# Patient Record
Sex: FEMALE | Race: WHITE | NOT HISPANIC OR LATINO | Employment: OTHER | URBAN - METROPOLITAN AREA
[De-identification: names, ages, dates, MRNs, and addresses within clinical notes are randomized per-mention and may not be internally consistent; named-entity substitution may affect disease eponyms.]

---

## 2017-04-28 ENCOUNTER — ALLSCRIPTS OFFICE VISIT (OUTPATIENT)
Dept: OTHER | Facility: OTHER | Age: 62
End: 2017-04-28

## 2018-01-11 NOTE — RESULT NOTES
Verified Results  (1) CBC/PLT/DIFF [id number] 10:29AM Ida Dub     Test Name Result Flag Reference   WBC 5 4 x10E3/uL  3 4-10 8   RBC 4 89 x10E6/uL  3 77-5 28   Hemoglobin 13 6 g/dL  11 1-15 9   Hematocrit 41 6 %  34 0-46  6   MCV 85 fL  79-97   MCH 27 8 pg  26 6-33 0   Baso (Absolute) 0 0 x10E3/uL  0 0-0 2   Immature Granulocytes 0 %     Immature Grans (Abs) 0 0 x10E3/uL  0 0-0 1   Eos 2 %     Basos 0 %     Neutrophils (Absolute) 2 9 x10E3/uL  1 4-7 0   Lymphs (Absolute) 2 1 x10E3/uL  0 7-3 1   Monocytes(Absolute) 0 4 x10E3/uL  0 1-0 9   Eos (Absolute) 0 1 x10E3/uL  0 0-0 4   MCHC 32 7 g/dL  31 5-35 7   RDW 14 8 %  12 3-15 4   Platelets 729 O45L5/MT  150-379   Neutrophils 53 %     Lymphs 38 %     Monocytes 7 %

## 2018-01-12 NOTE — RESULT NOTES
Verified Results  *US BREAST RIGHT LIMITED (DIAGNOSTIC) [id number] 03:04PM Ileene Side Order Number: [id number]    - Patient Instructions: To schedule this appointment, please contact Central Scheduling at [phone]   Order Number: [id number]    - Patient Instructions: To schedule this appointment, please contact Central Scheduling at [phone]   Order Number: [id number]    - Patient Instructions: To schedule this appointment, please contact Central Scheduling at [phone]  Test Name Result Flag Reference   US BREAST RIGHT LIMITED (Report)     Patient History:   Patient is postmenopausal    Family history of breast cancer in 2 maternal cousins  Benign excisional biopsy of the right breast, January 1, 2004  Patient's BMI is 39 3  Reason for exam: follow-up at short interval from prior study  Abnormal previous study  Mammo Diagnostic Bilateral W CAD: October 14, 2016 - Check In #:    [de-identified]   Bilateral MLO and CC view(s) were taken  ML view(s) were taken    of the right breast      Technologist: DANIELLA North   Prior study comparison: September 14, 2015, right breast    screening mammogram performed at [address] [address]  August 30, 2005, diagnostic mammogram performed at [facility]  There are scattered fibroglandular densities  Asymmetric density   previously described in the right breast is less conspicuous    compared to the prior study  The left breast is unremarkable  US Breast Right Limited: October 14, 2016 - Check In #: [de-identified]   Technologist: Ekaterina Rausch   Real-time images are obtained in the right breast at the 12    o'clock position, 5 cm from the nipple  A band of very dense    breast tissue is identified  No discrete solid or cystic mass is   seen  No architectural distortion is identified  No abnormal masses identified       ASSESSMENT: BiRad:2 - Benign (Overall)   Diag Mammogram: BiRad:2 - benign finding  Right breast Right Brst US: BiRad:2 - benign finding in both    breasts  Recommendation:   Routine screening mammogram of both breasts in 1 year  Analyzed by CAD     Transcription Location: [address] [place], [place] [address]way: [id number]     Risk Value(s):   Tyrer-Cuzick 10 Year: 2 838%, Tyrer-Cuzick Lifetime: 6 676%,    Myriad Table: 1 5%, MICK 5 Year: 1 7%, NCI Lifetime: 7 8%   Signed by:   Gali Martinez MD   10/18/16     MAMMO DIAGNOSTIC BILATERAL W CAD [id number] 03:04PM Natalie Turner Order Number: [id number]    - Patient Instructions: To schedule this appointment, please contact Central Scheduling at [phone]  Do not wear any perfume, powder, lotion or deodorant on breast or underarm area  Please bring your doctors order, referral (if needed) and insurance information with you on the day of the test  Failure to bring this information may result in this test being rescheduled  Arrive 15 minutes prior to your appointment time to register  On the day of your test, please bring any prior mammogram or breast studies with you that were not performed at a [facility]  Failure to bring prior exams may result in your test needing to be rescheduled   Order Number: [id number]    - Patient Instructions: To schedule this appointment, please contact Central Scheduling at [phone]  Do not wear any perfume, powder, lotion or deodorant on breast or underarm area  Please bring your doctors order, referral (if needed) and insurance information with you on the day of the test  Failure to bring this information may result in this test being rescheduled  Arrive 15 minutes prior to your appointment time to register  On the day of your test, please bring any prior mammogram or breast studies with you that were not performed at a [facility]  Failure to bring prior exams may result in your test needing to be rescheduled     TW Order Number: [id number]    - Patient Instructions: To schedule this appointment, please contact Central Scheduling at [phone]  Do not wear any perfume, powder, lotion or deodorant on breast or underarm area  Please bring your doctors order, referral (if needed) and insurance information with you on the day of the test  Failure to bring this information may result in this test being rescheduled  Arrive 15 minutes prior to your appointment time to register  On the day of your test, please bring any prior mammogram or breast studies with you that were not performed at a [facility]  Failure to bring prior exams may result in your test needing to be rescheduled  Test Name Result Flag Reference   MAMMO DIAGNOSTIC BILATERAL W CAD (Report)     Patient History:   Patient is postmenopausal    Family history of breast cancer in 2 maternal cousins  Benign excisional biopsy of the right breast, January 1, 2004  Patient's BMI is 39 3  Reason for exam: follow-up at short interval from prior study  Abnormal previous study  Mammo Diagnostic Bilateral W CAD: October 14, 2016 - Check In #:    [de-identified]   Bilateral MLO and CC view(s) were taken  ML view(s) were taken    of the right breast      Technologist: DANIELLA Moul[place]   Prior study comparison: September 14, 2015, right breast    screening mammogram performed at [facility]  August 30, 2005, diagnostic mammogram performed at [facility]  There are scattered fibroglandular densities  Asymmetric density   previously described in the right breast is less conspicuous    compared to the prior study  The left breast is unremarkable  US Breast Right Limited: October 14, 2016 - Check In #: [de-identified]   Technologist: Braeden Rogers   Real-time images are obtained in the right breast at the 12    o'clock position, 5 cm from the nipple  A band of very dense    breast tissue is identified   No discrete solid or cystic mass is   seen  No architectural distortion is identified  No abnormal masses identified  ASSESSMENT: BiRad:2 - Benign (Overall)   Diag Mammogram: BiRad:2 - benign finding  Right breast Right Brst US: BiRad:2 - benign finding in both    breasts  Recommendation:   Routine screening mammogram of both breasts in 1 year  Analyzed by CAD     Transcription Location: [facility]: [id number]     Risk Value(s):   Tyrer-Cuzick 10 Year: 2 838%, Tyrer-Cuzick Lifetime: 6 676%,    Myriad Table: 1 5%, MICK 5 Year: 1 7%, NCI Lifetime: 7 8%   Signed by:   Malgorzata Glaser MD   10/18/16       Discussion/Summary   Called patient at home number  Left message stating mammo and ultrasound normal  Recommend routine screening in one year  Left CFP number for callback if needed        Signatures   Electronically signed by : ESTRELLITA Schulz ; Nov 15 2016  5:22PM EST                       (Author)

## 2018-01-13 VITALS
HEART RATE: 76 BPM | RESPIRATION RATE: 16 BRPM | WEIGHT: 198 LBS | BODY MASS INDEX: 41.56 KG/M2 | SYSTOLIC BLOOD PRESSURE: 108 MMHG | TEMPERATURE: 98.5 F | HEIGHT: 58 IN | DIASTOLIC BLOOD PRESSURE: 70 MMHG

## 2018-01-14 NOTE — RESULT NOTES
Verified Results  (1) COMPREHENSIVE METABOLIC PANEL [id number] 48:17QU [facility]     Test Name Result Flag Reference   Glucose, Serum 83 mg/dL  65-99   BUN 19 mg/dL  8-27   Creatinine, Serum 0 91 mg/dL  0 57-1 00   eGFR If NonAfricn Am 68 mL/min/1 73  >59   eGFR If Africn Am 78 mL/min/1 73  >59   BUN/Creatinine Ratio 21  11-26   Sodium, Serum 141 mmol/L  136-144   **Effective December 12, 2016 the reference interval**                   for Sodium, Serum will be changing to:                                                             134 - 144   Potassium, Serum 4 5 mmol/L  3 5-5 2   Chloride, Serum 104 mmol/L     **Effective December 12, 2016 the reference interval**                   for Chloride, Serum will be changing to:                                                              96 - 106   Carbon Dioxide, Total 22 mmol/L  18-29   Calcium, Serum 9 2 mg/dL  8 7-10 3   Protein, Total, Serum 6 6 g/dL  6 0-8 5   Albumin, Serum 3 6 g/dL  3 6-4 8   Globulin, Total 3 0 g/dL  1 5-4 5   A/G Ratio 1 2  1 1-2 5   Bilirubin, Total 0 7 mg/dL  0 0-1 2   Alkaline Phosphatase, S 54 IU/L     AST (SGOT) 22 IU/L  0-40   ALT (SGPT) 24 IU/L  0-32

## 2018-01-17 NOTE — RESULT NOTES
Verified Results  (1) COMPREHENSIVE METABOLIC PANEL [id number] 38:54YW [facility]     Test Name Result Flag Reference   Glucose, Serum 87 mg/dL  65-99   BUN 18 mg/dL  8-27   Creatinine, Serum 0 90 mg/dL  0 57-1 00   eGFR If NonAfricn Am 69 mL/min/1 73  >59   eGFR If Africn Am 79 mL/min/1 73  >59   BUN/Creatinine Ratio 20  11-26   Sodium, Serum 139 mmol/L  136-144   Potassium, Serum 5 5 mmol/L H 3 5-5 2   Chloride, Serum 102 mmol/L     Carbon Dioxide, Total 22 mmol/L  18-29   Calcium, Serum 9 4 mg/dL  8 7-10 3   Protein, Total, Serum 6 6 g/dL  6 0-8 5   Albumin, Serum 3 8 g/dL  3 6-4 8   Globulin, Total 2 8 g/dL  1 5-4 5   A/G Ratio 1 4  1 1-2 5   Bilirubin, Total 0 9 mg/dL  0 0-1 2   Alkaline Phosphatase, S 51 IU/L     AST (SGOT) 16 IU/L  0-40   ALT (SGPT) 16 IU/L  0-32     (1) LIPID PANEL FASTING W DIRECT LDL REFLEX [id number] 10:29AM [facility]     Test Name Result Flag Reference   Cholesterol, Total 226 mg/dL H 100-199   Triglycerides 68 mg/dL  0-149   HDL Cholesterol 57 mg/dL  >39   LDL Cholesterol Calc 155 mg/dL H 0-99     (1) HEMOGLOBIN A1C [id number] 10:29AM [facility]     Test Name Result Flag Reference   Hemoglobin A1c 5 6 %  4 8-5 6   Pre-diabetes: 5 7 - 6 4           Diabetes: >6 4           Glycemic control for adults with diabetes: <7 0     (1) TSH [id number] 10:29AM [facility]     Test Name Result Flag Reference   TSH 2 240 uIU/mL  0 450-4 500

## 2018-01-17 NOTE — RESULT NOTES
Verified Results  ([address] [facility]) [address] [facility] Default [id number] 10:36AM Genella Proper     Test Name Result Flag Reference   Oksana Starr CMP14 Default Comment     A hand-written panel/profile was received from your office  In  accordance with the [facility] Ambiguous Test Code Policy dated July 3617, we have completed your order by using the closest currently  or formerly recognized AMA panel  We have assigned Comprehensive  Metabolic Panel (14), Test Code #[id number] to this request   If this  is not the testing you wished to receive on this specimen, please  contact the [facility] Client Inquiry/Technical Services Department  to clarify the test order  We appreciate your business

## 2019-04-01 ENCOUNTER — OFFICE VISIT (OUTPATIENT)
Dept: OBGYN CLINIC | Facility: CLINIC | Age: 64
End: 2019-04-01
Payer: MEDICARE

## 2019-04-01 VITALS
SYSTOLIC BLOOD PRESSURE: 122 MMHG | BODY MASS INDEX: 45.97 KG/M2 | DIASTOLIC BLOOD PRESSURE: 78 MMHG | WEIGHT: 219 LBS | HEIGHT: 58 IN

## 2019-04-01 DIAGNOSIS — Z11.3 SCREENING EXAMINATION FOR STD (SEXUALLY TRANSMITTED DISEASE): ICD-10-CM

## 2019-04-01 DIAGNOSIS — Z72.51 HIGH RISK HETEROSEXUAL BEHAVIOR: ICD-10-CM

## 2019-04-01 DIAGNOSIS — Z01.419 ENCNTR FOR GYN EXAM (GENERAL) (ROUTINE) W/O ABN FINDINGS: Primary | ICD-10-CM

## 2019-04-01 DIAGNOSIS — Z78.0 MENOPAUSE: ICD-10-CM

## 2019-04-01 DIAGNOSIS — Z13.820 SCREENING FOR OSTEOPOROSIS: ICD-10-CM

## 2019-04-01 DIAGNOSIS — Z12.39 BREAST CANCER SCREENING: ICD-10-CM

## 2019-04-01 PROBLEM — L84 CALLUS OF FOOT: Status: ACTIVE | Noted: 2017-04-28

## 2019-04-01 PROBLEM — M79.673 FOOT PAIN: Status: ACTIVE | Noted: 2017-04-28

## 2019-04-01 PROCEDURE — G0101 CA SCREEN;PELVIC/BREAST EXAM: HCPCS | Performed by: NURSE PRACTITIONER

## 2019-04-01 RX ORDER — ATORVASTATIN CALCIUM 40 MG/1
1 TABLET, FILM COATED ORAL DAILY
COMMUNITY
Start: 2016-11-28 | End: 2019-04-08 | Stop reason: ALTCHOICE

## 2019-04-01 RX ORDER — RANITIDINE 150 MG/1
1 TABLET ORAL 2 TIMES DAILY
COMMUNITY
Start: 2016-12-09 | End: 2019-04-08 | Stop reason: ALTCHOICE

## 2019-04-08 ENCOUNTER — OFFICE VISIT (OUTPATIENT)
Dept: FAMILY MEDICINE CLINIC | Facility: CLINIC | Age: 64
End: 2019-04-08
Payer: MEDICARE

## 2019-04-08 VITALS
HEIGHT: 58 IN | HEART RATE: 88 BPM | DIASTOLIC BLOOD PRESSURE: 80 MMHG | WEIGHT: 220 LBS | SYSTOLIC BLOOD PRESSURE: 126 MMHG | BODY MASS INDEX: 46.18 KG/M2 | TEMPERATURE: 98.5 F | RESPIRATION RATE: 16 BRPM

## 2019-04-08 DIAGNOSIS — R25.2 BILATERAL LEG CRAMPS: ICD-10-CM

## 2019-04-08 DIAGNOSIS — Z12.11 SCREENING FOR COLON CANCER: ICD-10-CM

## 2019-04-08 DIAGNOSIS — K21.9 GASTROESOPHAGEAL REFLUX DISEASE, ESOPHAGITIS PRESENCE NOT SPECIFIED: ICD-10-CM

## 2019-04-08 DIAGNOSIS — Z13.29 SCREENING FOR THYROID DISORDER: ICD-10-CM

## 2019-04-08 DIAGNOSIS — Z13.6 SCREENING FOR CARDIOVASCULAR CONDITION: ICD-10-CM

## 2019-04-08 DIAGNOSIS — Z76.89 ENCOUNTER TO ESTABLISH CARE: Primary | ICD-10-CM

## 2019-04-08 PROCEDURE — 99204 OFFICE O/P NEW MOD 45 MIN: CPT | Performed by: NURSE PRACTITIONER

## 2019-04-08 RX ORDER — RANITIDINE 150 MG/1
150 TABLET ORAL 2 TIMES DAILY
Qty: 60 TABLET | Refills: 2 | Status: SHIPPED | OUTPATIENT
Start: 2019-04-08 | End: 2019-12-02

## 2019-04-10 LAB
DEPRECATED C TRACH RRNA XXX QL PRB: NOT DETECTED
HPV HR 12 DNA CVX QL NAA+PROBE: NOT DETECTED
HPV16 DNA SPEC QL NAA+PROBE: NOT DETECTED
HPV18 DNA SPEC QL NAA+PROBE: NOT DETECTED
N GONORRHOEA DNA UR QL NAA+PROBE: NOT DETECTED
THIN PREP CVX: NORMAL

## 2019-04-16 ENCOUNTER — TELEPHONE (OUTPATIENT)
Dept: FAMILY MEDICINE CLINIC | Facility: CLINIC | Age: 64
End: 2019-04-16

## 2019-04-16 DIAGNOSIS — E78.2 MIXED HYPERLIPIDEMIA: Primary | ICD-10-CM

## 2019-04-16 LAB
ALBUMIN SERPL-MCNC: 4 G/DL (ref 3.6–4.8)
ALBUMIN/GLOB SERPL: 1.3 {RATIO} (ref 1.2–2.2)
ALP SERPL-CCNC: 51 IU/L (ref 39–117)
ALT SERPL-CCNC: 18 IU/L (ref 0–32)
AST SERPL-CCNC: 16 IU/L (ref 0–40)
BASOPHILS # BLD AUTO: 0 X10E3/UL (ref 0–0.2)
BASOPHILS NFR BLD AUTO: 0 %
BILIRUB SERPL-MCNC: 0.5 MG/DL (ref 0–1.2)
BUN SERPL-MCNC: 20 MG/DL (ref 8–27)
BUN/CREAT SERPL: 22 (ref 12–28)
CALCIUM SERPL-MCNC: 9.2 MG/DL (ref 8.7–10.3)
CHLORIDE SERPL-SCNC: 108 MMOL/L (ref 96–106)
CHOLEST SERPL-MCNC: 246 MG/DL (ref 100–199)
CO2 SERPL-SCNC: 21 MMOL/L (ref 20–29)
CREAT SERPL-MCNC: 0.9 MG/DL (ref 0.57–1)
EOSINOPHIL # BLD AUTO: 0.1 X10E3/UL (ref 0–0.4)
EOSINOPHIL NFR BLD AUTO: 1 %
ERYTHROCYTE [DISTWIDTH] IN BLOOD BY AUTOMATED COUNT: 15 % (ref 12.3–15.4)
GLOBULIN SER-MCNC: 3 G/DL (ref 1.5–4.5)
GLUCOSE SERPL-MCNC: 89 MG/DL (ref 65–99)
HCT VFR BLD AUTO: 41.8 % (ref 34–46.6)
HCV AB S/CO SERPL IA: <0.1 S/CO RATIO (ref 0–0.9)
HDLC SERPL-MCNC: 54 MG/DL
HGB BLD-MCNC: 13.7 G/DL (ref 11.1–15.9)
HIV 1+2 AB+HIV1 P24 AG SERPL QL IA: NON REACTIVE
IMM GRANULOCYTES # BLD: 0 X10E3/UL (ref 0–0.1)
IMM GRANULOCYTES NFR BLD: 0 %
LABCORP COMMENT: NORMAL
LDLC SERPL CALC-MCNC: 174 MG/DL (ref 0–99)
LYMPHOCYTES # BLD AUTO: 1.9 X10E3/UL (ref 0.7–3.1)
LYMPHOCYTES NFR BLD AUTO: 34 %
MAGNESIUM SERPL-MCNC: 2.1 MG/DL (ref 1.6–2.3)
MCH RBC QN AUTO: 27.8 PG (ref 26.6–33)
MCHC RBC AUTO-ENTMCNC: 32.8 G/DL (ref 31.5–35.7)
MCV RBC AUTO: 85 FL (ref 79–97)
MICRODELETION SYND BLD/T FISH: NORMAL
MONOCYTES # BLD AUTO: 0.3 X10E3/UL (ref 0.1–0.9)
MONOCYTES NFR BLD AUTO: 5 %
NEUTROPHILS # BLD AUTO: 3.4 X10E3/UL (ref 1.4–7)
NEUTROPHILS NFR BLD AUTO: 60 %
PHOSPHATE SERPL-MCNC: 2.7 MG/DL (ref 2.5–4.5)
PLATELET # BLD AUTO: 231 X10E3/UL (ref 150–379)
POTASSIUM SERPL-SCNC: 4.8 MMOL/L (ref 3.5–5.2)
PROT SERPL-MCNC: 7 G/DL (ref 6–8.5)
RBC # BLD AUTO: 4.92 X10E6/UL (ref 3.77–5.28)
RPR SER QL: NON REACTIVE
SL AMB EGFR AFRICAN AMERICAN: 78 ML/MIN/1.73
SL AMB EGFR NON AFRICAN AMERICAN: 67 ML/MIN/1.73
SODIUM SERPL-SCNC: 141 MMOL/L (ref 134–144)
TRIGL SERPL-MCNC: 91 MG/DL (ref 0–149)
TSH SERPL DL<=0.005 MIU/L-ACNC: 1.6 UIU/ML (ref 0.45–4.5)
WBC # BLD AUTO: 5.7 X10E3/UL (ref 3.4–10.8)

## 2019-04-16 RX ORDER — ROSUVASTATIN CALCIUM 20 MG/1
20 TABLET, COATED ORAL DAILY
Qty: 90 TABLET | Refills: 3 | Status: SHIPPED | OUTPATIENT
Start: 2019-04-16 | End: 2020-01-13 | Stop reason: SDUPTHER

## 2019-04-30 ENCOUNTER — TELEPHONE (OUTPATIENT)
Dept: BARIATRICS | Facility: CLINIC | Age: 64
End: 2019-04-30

## 2019-05-06 ENCOUNTER — HOSPITAL ENCOUNTER (OUTPATIENT)
Dept: RADIOLOGY | Facility: HOSPITAL | Age: 64
Discharge: HOME | End: 2019-05-06
Payer: MEDICARE

## 2019-05-06 VITALS — HEIGHT: 58 IN | BODY MASS INDEX: 45.98 KG/M2

## 2019-05-06 DIAGNOSIS — Z78.0 MENOPAUSE: ICD-10-CM

## 2019-05-06 DIAGNOSIS — Z12.39 BREAST CANCER SCREENING: ICD-10-CM

## 2019-05-06 DIAGNOSIS — Z13.820 SCREENING FOR OSTEOPOROSIS: ICD-10-CM

## 2019-05-06 PROCEDURE — 77080 DXA BONE DENSITY AXIAL: CPT

## 2019-05-06 PROCEDURE — 77067 SCR MAMMO BI INCL CAD: CPT

## 2019-05-15 ENCOUNTER — OFFICE VISIT (OUTPATIENT)
Dept: BARIATRICS | Facility: CLINIC | Age: 64
End: 2019-05-15
Payer: MEDICARE

## 2019-05-15 VITALS
DIASTOLIC BLOOD PRESSURE: 74 MMHG | TEMPERATURE: 98.5 F | BODY MASS INDEX: 44.71 KG/M2 | SYSTOLIC BLOOD PRESSURE: 126 MMHG | HEIGHT: 58 IN | WEIGHT: 213 LBS | RESPIRATION RATE: 16 BRPM | HEART RATE: 76 BPM

## 2019-05-15 DIAGNOSIS — E66.01 OBESITY, CLASS III, BMI 40-49.9 (MORBID OBESITY) (HCC): Primary | ICD-10-CM

## 2019-05-15 DIAGNOSIS — E78.49 OTHER HYPERLIPIDEMIA: ICD-10-CM

## 2019-05-15 DIAGNOSIS — K21.9 GASTROESOPHAGEAL REFLUX DISEASE, ESOPHAGITIS PRESENCE NOT SPECIFIED: ICD-10-CM

## 2019-05-15 PROCEDURE — 99204 OFFICE O/P NEW MOD 45 MIN: CPT | Performed by: PHYSICIAN ASSISTANT

## 2019-06-28 ENCOUNTER — OFFICE VISIT (OUTPATIENT)
Dept: BARIATRICS | Facility: CLINIC | Age: 64
End: 2019-06-28
Payer: MEDICARE

## 2019-06-28 VITALS
WEIGHT: 207 LBS | SYSTOLIC BLOOD PRESSURE: 100 MMHG | TEMPERATURE: 99.5 F | HEIGHT: 58 IN | HEART RATE: 90 BPM | DIASTOLIC BLOOD PRESSURE: 64 MMHG | RESPIRATION RATE: 14 BRPM | BODY MASS INDEX: 43.45 KG/M2

## 2019-06-28 DIAGNOSIS — E66.01 OBESITY, CLASS III, BMI 40-49.9 (MORBID OBESITY) (HCC): Primary | ICD-10-CM

## 2019-06-28 PROCEDURE — 99214 OFFICE O/P EST MOD 30 MIN: CPT | Performed by: PHYSICIAN ASSISTANT

## 2019-06-28 RX ORDER — RANITIDINE 150 MG/1
150 TABLET ORAL AS NEEDED
COMMUNITY
Start: 2019-06-05 | End: 2020-01-06 | Stop reason: ALTCHOICE

## 2019-08-09 ENCOUNTER — OFFICE VISIT (OUTPATIENT)
Dept: BARIATRICS | Facility: CLINIC | Age: 64
End: 2019-08-09
Payer: MEDICARE

## 2019-08-09 VITALS
BODY MASS INDEX: 42.53 KG/M2 | WEIGHT: 202.6 LBS | SYSTOLIC BLOOD PRESSURE: 110 MMHG | HEART RATE: 78 BPM | HEIGHT: 58 IN | TEMPERATURE: 98.3 F | DIASTOLIC BLOOD PRESSURE: 82 MMHG

## 2019-08-09 DIAGNOSIS — E66.01 OBESITY, CLASS III, BMI 40-49.9 (MORBID OBESITY) (HCC): Primary | ICD-10-CM

## 2019-08-09 PROCEDURE — 99214 OFFICE O/P EST MOD 30 MIN: CPT | Performed by: PHYSICIAN ASSISTANT

## 2019-08-09 NOTE — PATIENT INSTRUCTIONS
Goals: Food log (ie ) www myfitnesspal com,sparkpeople  com,loseit com,calorieking  com,etc    No sugary beverages  At least 64oz of water daily  Increase physical activity by 10 minutes daily   Gradually increase physical activity to a goal of 5 days per week for 30 minutes of MODERATE intensity PLUS 2 days per week of FULL BODY resistance training  5749-7031 calories per day  5-10 servings of fruits and vegetables per day  Plan for 100-150 calorie snack in between lunch and dinner  1/4 cup = serving of nuts/seeds  Keep up the great work!!

## 2019-08-09 NOTE — ASSESSMENT & PLAN NOTE
-Patient is pursuing Conservative Program  -Initial weight loss goal of 5-10% weight loss for improved health-met  -continue with dietary/lifestyle changes  -encouraged patient to food log at least 3 full days a week to help with better accuracy of estimation of calorie intake  -increased water intake recommended  -encouraged her to consider starting a formal exercise routine with increasing her steps daily/walking    Initial: 213  Current: 202 6  Change: -10 4 lbs (5 % TBW)

## 2019-08-09 NOTE — PROGRESS NOTES
Assessment/Plan:    Obesity, Class III, BMI 40-49 9 (morbid obesity) ([address] )  -Patient is pursuing Conservative Program  -Initial weight loss goal of 5-10% weight loss for improved health-met  -continue with dietary/lifestyle changes  -encouraged patient to food log at least 3 full days a week to help with better accuracy of estimation of calorie intake  -increased water intake recommended  -encouraged her to consider starting a formal exercise routine with increasing her steps daily/walking    Initial: 213  Current: 202 6  Change: -10 4 lbs (5 % TBW)    Goals:    Food log (ie ) www myfitnesspal com,sparkpeople  com,loseit com,calorieking  com,etc    No sugary beverages  At least 64oz of water daily  Increase physical activity by 10 minutes daily  Gradually increase physical activity to a goal of 5 days per week for 30 minutes of MODERATE intensity PLUS 2 days per week of FULL BODY resistance training  3171-5818 calories per day  5-10 servings of fruits and vegetables per day  Plan for 100-150 calorie snack in between lunch and dinner  1/4 cup = serving of nuts/seeds      Follow up in approximately 2 months with Non-Surgical Physician/Advanced Practitioner  25 minute visit, >50% face-to-face time spent counseling patient on diet behavior and exercise modification for weight loss  Diagnoses and all orders for this visit:    Obesity, Class III, BMI 40-49 9 (morbid obesity) ([facility])          Subjective:   Chief Complaint   Patient presents with    Follow-up     Patient here for six week MWM follow-up w/PA  Patient ID: Rufino Lara  is a 71 y o  female with excess weight/obesity here to pursue weight managment  Patient is pursuing Conservative Program      HPI Patient presents for MWM follow up  Continues to do well with dietary changes  Reports she has been walking more [place] [employer] and reaching over 10,000 steps  Historically reported not exercising in the form of walking due to joint pain   She has un upcoming cruise  She reports she plans not to watch her intake  Discussed goal of maintaining weight while on vacation  Still struggles with hydration, discussed ways to increase  Also reports going 7+ hours between lunch and dinner on days she works 2 shifts which may lead to some grazing while at work - encouraged planned snack in between these two meals on those days    The following portions of the patient's history were reviewed and updated as appropriate: allergies, current medications, past family history, past medical history, past social history, past surgical history and problem list     Review of Systems   Respiratory: Negative for cough and shortness of breath  Cardiovascular: Negative for chest pain and palpitations  Gastrointestinal: Negative for abdominal pain, nausea and vomiting  Musculoskeletal: Positive for arthralgias  Objective:    /82 (BP Location: Right arm, Patient Position: Sitting, Cuff Size: Large)   Pulse 78   Temp 98 3 °F (36 8 °C) (Tympanic)   Ht 4' 10" (1 473 m)   Wt 91 9 kg (202 lb 9 6 oz)   LMP 09/01/2011 (Approximate)   BMI 42 34 kg/m²      Physical Exam   Constitutional: She is oriented to person, place, and time  She appears well-developed  HENT:   Head: Normocephalic  Pulmonary/Chest: Effort normal    Neurological: She is alert and oriented to person, place, and time  Psychiatric: She has a normal mood and affect  Her behavior is normal  Thought content normal    Nursing note and vitals reviewed

## 2019-09-16 ENCOUNTER — OFFICE VISIT (OUTPATIENT)
Dept: BARIATRICS | Facility: CLINIC | Age: 64
End: 2019-09-16
Payer: MEDICARE

## 2019-09-16 VITALS
HEIGHT: 58 IN | RESPIRATION RATE: 16 BRPM | WEIGHT: 201.8 LBS | HEART RATE: 81 BPM | DIASTOLIC BLOOD PRESSURE: 68 MMHG | TEMPERATURE: 99.9 F | BODY MASS INDEX: 42.36 KG/M2 | SYSTOLIC BLOOD PRESSURE: 102 MMHG

## 2019-09-16 DIAGNOSIS — E66.01 OBESITY, CLASS III, BMI 40-49.9 (MORBID OBESITY) (HCC): Primary | ICD-10-CM

## 2019-09-16 DIAGNOSIS — K21.9 GASTROESOPHAGEAL REFLUX DISEASE, ESOPHAGITIS PRESENCE NOT SPECIFIED: ICD-10-CM

## 2019-09-16 PROCEDURE — 99213 OFFICE O/P EST LOW 20 MIN: CPT | Performed by: PHYSICIAN ASSISTANT

## 2019-09-16 NOTE — PATIENT INSTRUCTIONS
Goals: Food log (ie ) www myfitnesspal com,sparkpeople  com,loseit com,calorieking  com,etc    No sugary beverages  At least 64oz of water daily  --3 bottles of water   Increase physical activity by 10 minutes daily   Gradually increase physical activity to a goal of 5 days per week for 30 minutes of MODERATE intensity PLUS 2 days per week of FULL BODY resistance training- 30 minutes of walking 3 times per week   8945-9667 calories per day  5-10 servings of fruits and vegetables per day-3 servings per day   Add protein to salad   1 cup of nonstarchy at every dinner

## 2019-09-16 NOTE — ASSESSMENT & PLAN NOTE
-Patient is pursuing Conservative Program  -Initial weight loss goal of 5-10% weight loss for improved health-met    Initial: 213 lbs  Current: 201 8 lbs   Change: -11 2 lbs    Goals:  Food log (ie ) www myfitnesspal com,sparkpeople  com,loseit com,calorieking  com,etc    No sugary beverages  At least 64oz of water daily  --3 bottles of water   Increase physical activity by 10 minutes daily   Gradually increase physical activity to a goal of 5 days per week for 30 minutes of MODERATE intensity PLUS 2 days per week of FULL BODY resistance training- 30 minutes of walking 3 times per week   5481-0729 calories per day  5-10 servings of fruits and vegetables per day-3 servings per day   Add protein to salad   1 cup of nonstarchy at every dinner

## 2019-09-16 NOTE — ASSESSMENT & PLAN NOTE
Taking zantac  -should improve with weight loss, dietary, and lifestyle changes  -continue management with prescribing provider

## 2019-09-16 NOTE — PROGRESS NOTES
Assessment/Plan:    Obesity, Class III, BMI 40-49 9 (morbid obesity) ([facility] [address] [facility] )  -Patient is pursuing Conservative Program  -Initial weight loss goal of 5-10% weight loss for improved health-met    Initial: 213 lbs  Current: 201 8 lbs   Change: -11 2 lbs    Goals:  Food log (ie ) www myfitnesspal com,sparkpeople  com,loseit com,calorieking  com,etc    No sugary beverages  At least 64oz of water daily  --3 bottles of water   Increase physical activity by 10 minutes daily  Gradually increase physical activity to a goal of 5 days per week for 30 minutes of MODERATE intensity PLUS 2 days per week of FULL BODY resistance training- 30 minutes of walking 3 times per week   7658-9598 calories per day  5-10 servings of fruits and vegetables per day-3 servings per day   Add protein to salad   1 cup of nonstarchy at every dinner     GERD (gastroesophageal reflux disease)  Taking zantac  -should improve with weight loss, dietary, and lifestyle changes  -continue management with prescribing provider        Follow up in approximately 2 months with Non-Surgical Physician/Advanced Practitioner  Diagnoses and all orders for this visit:    Obesity, Class III, BMI 40-49 9 (morbid obesity) ([facility])    Gastroesophageal reflux disease, esophagitis presence not specified          Subjective:   Chief Complaint   Patient presents with    Follow-up     pt is here for 6 wk follow up  Patient ID: Arnulfo Mena  is a 71 y o  female with excess weight/obesity here to pursue weight managment  Patient is pursuing Conservative Program      HPI  Patient notes she just returned from a cruise which is why she didn't lose     Food logging: has not started back up since being on the cruise   Increased appetite/cravings: denies   Fruit/Vegetable servings: 1 serving   Exercise: no exercise   Hydration: 2 bottles of water     B: cereal with skim milk  L: salad or sandwich   D: protein with starch   S: popcorn or ice cream sandwich Colonoscopy-Not Completed--going next week    The following portions of the patient's history were reviewed and updated as appropriate: allergies, current medications, past family history, past medical history, past social history, past surgical history and problem list     Review of Systems   HENT: Negative for sore throat  Respiratory: Negative for cough and shortness of breath  Cardiovascular: Negative for chest pain and palpitations  Gastrointestinal: Positive for diarrhea (getting colonoscopy next week )  Negative for abdominal pain, constipation, nausea and vomiting         + GERD--diet controlled    Skin: Negative for rash  Psychiatric/Behavioral: Negative for suicidal ideas (denies HI)          Denies depression and anxiety       Objective:    /68 (BP Location: Left arm, Patient Position: Sitting, Cuff Size: Large)   Pulse 81   Temp 99 9 °F (37 7 °C) (Tympanic)   Resp 16   Ht 4' 10" (1 473 m)   Wt 91 5 kg (201 lb 12 8 oz)   LMP 09/01/2011 (Approximate)   BMI 42 18 kg/m²      Physical Exam

## 2019-09-24 ENCOUNTER — TELEPHONE (OUTPATIENT)
Dept: FAMILY MEDICINE CLINIC | Facility: CLINIC | Age: 64
End: 2019-09-24

## 2019-09-24 NOTE — TELEPHONE ENCOUNTER
Dr Chris Stein called and stated that recently did the colonoscopy on the patient and found rectal mass and biopsy still pending and he told patient and will be referring patient to colorectal surgeon   DEEPAK Soler

## 2019-09-30 PROBLEM — C19 RECTOSIGMOID CANCER (HCC): Status: ACTIVE | Noted: 2019-09-30

## 2019-10-04 ENCOUNTER — HOSPITAL ENCOUNTER (OUTPATIENT)
Dept: RADIOLOGY | Facility: HOSPITAL | Age: 64
Discharge: HOME | End: 2019-10-04
Attending: COLON & RECTAL SURGERY
Payer: MEDICARE

## 2019-10-04 DIAGNOSIS — C20 RECTAL CANCER (HCC): ICD-10-CM

## 2019-10-04 PROCEDURE — 71260 CT THORAX DX C+: CPT

## 2019-10-04 PROCEDURE — 74177 CT ABD & PELVIS W/CONTRAST: CPT

## 2019-10-04 RX ADMIN — IOHEXOL 100 ML: 350 INJECTION, SOLUTION INTRAVENOUS at 11:03

## 2019-10-14 ENCOUNTER — HOSPITAL ENCOUNTER (OUTPATIENT)
Dept: RADIOLOGY | Age: 64
Discharge: HOME | End: 2019-10-14
Payer: MEDICARE

## 2019-10-14 DIAGNOSIS — C20 RECTAL CANCER (HCC): ICD-10-CM

## 2019-10-14 PROCEDURE — 72195 MRI PELVIS W/O DYE: CPT

## 2019-10-21 PROBLEM — R06.00 DYSPNEA ON EXERTION: Status: ACTIVE | Noted: 2019-10-21

## 2019-10-21 PROBLEM — R06.09 DYSPNEA ON EXERTION: Status: ACTIVE | Noted: 2019-10-21

## 2019-10-21 PROCEDURE — 1124F ACP DISCUSS-NO DSCNMKR DOCD: CPT | Performed by: PATHOLOGY

## 2019-10-28 ENCOUNTER — OFFICE VISIT (OUTPATIENT)
Dept: BARIATRICS | Facility: CLINIC | Age: 64
End: 2019-10-28
Payer: MEDICARE

## 2019-10-28 VITALS
HEIGHT: 58 IN | DIASTOLIC BLOOD PRESSURE: 72 MMHG | SYSTOLIC BLOOD PRESSURE: 118 MMHG | TEMPERATURE: 100.1 F | HEART RATE: 76 BPM | BODY MASS INDEX: 43.07 KG/M2 | WEIGHT: 205.2 LBS | RESPIRATION RATE: 14 BRPM

## 2019-10-28 DIAGNOSIS — K21.9 GASTROESOPHAGEAL REFLUX DISEASE, ESOPHAGITIS PRESENCE NOT SPECIFIED: ICD-10-CM

## 2019-10-28 DIAGNOSIS — E66.01 OBESITY, CLASS III, BMI 40-49.9 (MORBID OBESITY) (HCC): Primary | ICD-10-CM

## 2019-10-28 PROCEDURE — 99213 OFFICE O/P EST LOW 20 MIN: CPT | Performed by: PHYSICIAN ASSISTANT

## 2019-10-28 NOTE — PROGRESS NOTES
Assessment/Plan:    Obesity, Class III, BMI 40-49 9 (morbid obesity) ([facility] [address] )  -Patient is pursuing Conservative Program  -Initial weight loss goal of 5-10% weight loss for improved health-met    Initial: 213 lbs  Current: 205 2 lbs   Change: -7 8 lbs    Patient will return when she has finished receiving treatment for her cancer  Discussed being mindful of her food choices while stress eating  Patient think about eating more fruits/vegetables  GERD (gastroesophageal reflux disease)  No longer taking medications         Follow up after she receives treatment for her cancer      Diagnoses and all orders for this visit:    Obesity, Class III, BMI 40-49 9 (morbid obesity) ([facility] )    Gastroesophageal reflux disease, esophagitis presence not specified          Subjective:   Chief Complaint   Patient presents with    Follow-up     pt is here for mwm follow up  Patient ID: Poly Cruz  is a 71 y o  female with excess weight/obesity here to pursue weight managment  Patient is pursuing Conservative Program      HPI  Patient was just diagnosed colorectal cancer  Having surgery dec 10, 2019  Patient notes she is stress eating and has not focused on weight loss at all  Patient notes she feel likes she has to get all the good "pumpkin food in now" as she will be on a liquid after surgery  Has good support system at home  Does not feel she needs to see a therapist  Patient has a good attitude about her cancer doctor  Colonoscopy-Completed 9/23/19    The following portions of the patient's history were reviewed and updated as appropriate: allergies, current medications, past family history, past medical history, past social history, past surgical history and problem list     Review of Systems   HENT: Negative for sore throat  Respiratory: Negative for cough and shortness of breath  Cardiovascular: Negative for chest pain and palpitations     Gastrointestinal: Negative for abdominal pain, constipation, diarrhea, nausea and vomiting  Denies GERD   Skin: Negative for rash  Psychiatric/Behavioral: Negative for suicidal ideas (denies HI)  Denies depression and anxiety       Objective:    /72 (BP Location: Left arm, Patient Position: Sitting, Cuff Size: Large)   Pulse 76   Temp 100 1 °F (37 8 °C) (Tympanic)   Resp 14   Ht 4' 10" (1 473 m)   Wt 93 1 kg (205 lb 3 2 oz)   LMP 09/01/2011 (Approximate)   BMI 42 89 kg/m²      Physical Exam   Nursing note and vitals reviewed  Constitutional   General appearance: Abnormal   well developed and morbidly obese  Eyes No conjunctival pallor  Ears, Nose, Mouth, and Throat Oral mucosa moist    Pulmonary   Respiratory effort: No increased work of breathing or signs of respiratory distress  Auscultation of lungs: Clear to auscultation, equal breath sounds bilaterally, no wheezes, no rales, no rhonci  Cardiovascular   Auscultation of heart: Normal rate and rhythm, normal S1 and S2, without murmurs  Examination of extremities for edema and/or varicosities: Normal   no edema  Abdomen   Abdomen: Abnormal   The abdomen was obese  Bowel sounds were normal  The abdomen was soft and nontender     Musculoskeletal   Gait and station: Normal     Psychiatric   Orientation to person, place and time: Normal     Affect: appropriate

## 2019-10-28 NOTE — ASSESSMENT & PLAN NOTE
-Patient is pursuing Conservative Program  -Initial weight loss goal of 5-10% weight loss for improved health-met    Initial: 213 lbs  Current: 205 2 lbs   Change: -7 8 lbs    Patient will return when she has finished receiving treatment for her cancer  Discussed being mindful of her food choices while stress eating  Patient think about eating more fruits/vegetables

## 2019-11-14 ENCOUNTER — DOCUMENTATION (OUTPATIENT)
Dept: HEMATOLOGY ONCOLOGY | Facility: CLINIC | Age: 64
End: 2019-11-14

## 2019-11-14 NOTE — PROGRESS NOTES
Rectal/GI Multidisciplinary Conference: 11/14/19    Diagnosis: Rectal Cancer    Rosalba Tom was presented at the Rectal/GI Multidisciplinary Conference today  Physician Recommended Plan:    - Patient will need surgery    Patient is scheduled for surgery with Dr Korina Sprague on 12/10/19  The team agreed to the plan

## 2019-11-25 ENCOUNTER — VBI (OUTPATIENT)
Dept: ADMINISTRATIVE | Facility: OTHER | Age: 64
End: 2019-11-25

## 2019-11-25 NOTE — TELEPHONE ENCOUNTER
Oseas Matos [facility]   Phone Number: [phone]             Patient had colonoscopy done by [facility] recently, please collect reports  DEEPAK Barajas      Colonoscopy was scanned in 10/1/2019 for DOS 9/23/19 but not resulted/ resulted colonoscopy   Estela Hicks MA 11/25/19 9:19 AM

## 2019-12-02 ENCOUNTER — ANESTHESIA EVENT (OUTPATIENT)
Dept: PERIOP | Facility: HOSPITAL | Age: 64
DRG: 330 | End: 2019-12-02
Payer: MEDICARE

## 2019-12-02 DIAGNOSIS — Z91.89 RISK FACTORS FOR OBSTRUCTIVE SLEEP APNEA: Primary | ICD-10-CM

## 2019-12-05 NOTE — PRE-PROCEDURE INSTRUCTIONS
Pre-Surgery Instructions:   Medication Instructions    [START ON 12/9/2019] metroNIDAZOLE (FLAGYL) 500 mg tablet Patient was instructed by Physician and understands   [START ON 12/9/2019] neomycin (MYCIFRADIN) 500 mg tablet Patient was instructed by Physician and understands   ranitidine (ZANTAC) 150 mg tablet Instructed patient per Anesthesia Guidelines   rosuvastatin (CRESTOR) 20 MG tablet Instructed patient per Anesthesia Guidelines      Reviewed by Christine Valdivia RN

## 2019-12-09 NOTE — ANESTHESIA PREPROCEDURE EVALUATION
Review of Systems/Medical History  Patient summary reviewed    History of anesthetic complications PONV    Cardiovascular  Hyperlipidemia,    Pulmonary  Not a smoker ,        GI/Hepatic    GERD ,        Negative  ROS        Endo/Other    Obesity    GYN  Negative gynecology ROS          Hematology  Negative hematology ROS      Musculoskeletal  Negative musculoskeletal ROS        Neurology  Negative neurology ROS      Psychology   Negative psychology ROS                   Anesthesia Plan  ASA Score- 3     Anesthesia Type- general with ASA Monitors  Additional Monitors:   Airway Plan:     Comment: Tap block prn  Plan Factors-    Induction- intravenous  Postoperative Plan- Plan for postoperative opioid use       Informed Consent-

## 2019-12-10 ENCOUNTER — ANESTHESIA (OUTPATIENT)
Dept: PERIOP | Facility: HOSPITAL | Age: 64
DRG: 330 | End: 2019-12-10
Payer: MEDICARE

## 2019-12-10 ENCOUNTER — HOSPITAL ENCOUNTER (INPATIENT)
Facility: HOSPITAL | Age: 64
LOS: 4 days | Discharge: HOME HEALTH | DRG: 330 | End: 2019-12-14
Attending: COLON & RECTAL SURGERY | Admitting: COLON & RECTAL SURGERY
Payer: MEDICARE

## 2019-12-10 DIAGNOSIS — C19 RECTOSIGMOID CANCER (HCC): ICD-10-CM

## 2019-12-10 LAB
ABO GROUP BLD: NORMAL
BLD GP AB SCN SERPL QL: NEGATIVE
GLUCOSE SERPL-MCNC: 113 MG/DL (ref 65–140)
PLATELET # BLD AUTO: 203 THOUSANDS/UL (ref 149–390)
PMV BLD AUTO: 10.6 FL (ref 8.9–12.7)
RH BLD: POSITIVE
SPECIMEN EXPIRATION DATE: NORMAL

## 2019-12-10 PROCEDURE — 44207 L COLECTOMY/COLOPROCTOSTOMY: CPT | Performed by: COLON & RECTAL SURGERY

## 2019-12-10 PROCEDURE — 44187 LAP ILEO/JEJUNO-STOMY: CPT | Performed by: PHYSICIAN ASSISTANT

## 2019-12-10 PROCEDURE — 0D1B0Z4 BYPASS ILEUM TO CUTANEOUS, OPEN APPROACH: ICD-10-PCS | Performed by: COLON & RECTAL SURGERY

## 2019-12-10 PROCEDURE — 88309 TISSUE EXAM BY PATHOLOGIST: CPT | Performed by: PATHOLOGY

## 2019-12-10 PROCEDURE — 45330 DIAGNOSTIC SIGMOIDOSCOPY: CPT | Performed by: COLON & RECTAL SURGERY

## 2019-12-10 PROCEDURE — 82948 REAGENT STRIP/BLOOD GLUCOSE: CPT

## 2019-12-10 PROCEDURE — 15860 IV NJX TST VASC FLO FLAP/GRF: CPT | Performed by: COLON & RECTAL SURGERY

## 2019-12-10 PROCEDURE — 0DBP0ZZ EXCISION OF RECTUM, OPEN APPROACH: ICD-10-PCS | Performed by: COLON & RECTAL SURGERY

## 2019-12-10 PROCEDURE — 88304 TISSUE EXAM BY PATHOLOGIST: CPT | Performed by: PATHOLOGY

## 2019-12-10 PROCEDURE — 85049 AUTOMATED PLATELET COUNT: CPT | Performed by: SURGERY

## 2019-12-10 PROCEDURE — 0DJD8ZZ INSPECTION OF LOWER INTESTINAL TRACT, VIA NATURAL OR ARTIFICIAL OPENING ENDOSCOPIC: ICD-10-PCS | Performed by: COLON & RECTAL SURGERY

## 2019-12-10 PROCEDURE — 88341 IMHCHEM/IMCYTCHM EA ADD ANTB: CPT | Performed by: PATHOLOGY

## 2019-12-10 PROCEDURE — 44213 LAP MOBIL SPLENIC FL ADD-ON: CPT | Performed by: COLON & RECTAL SURGERY

## 2019-12-10 PROCEDURE — 86901 BLOOD TYPING SEROLOGIC RH(D): CPT | Performed by: STUDENT IN AN ORGANIZED HEALTH CARE EDUCATION/TRAINING PROGRAM

## 2019-12-10 PROCEDURE — 86850 RBC ANTIBODY SCREEN: CPT | Performed by: STUDENT IN AN ORGANIZED HEALTH CARE EDUCATION/TRAINING PROGRAM

## 2019-12-10 PROCEDURE — 45330 DIAGNOSTIC SIGMOIDOSCOPY: CPT | Performed by: PHYSICIAN ASSISTANT

## 2019-12-10 PROCEDURE — 44187 LAP ILEO/JEJUNO-STOMY: CPT | Performed by: COLON & RECTAL SURGERY

## 2019-12-10 PROCEDURE — 44213 LAP MOBIL SPLENIC FL ADD-ON: CPT | Performed by: PHYSICIAN ASSISTANT

## 2019-12-10 PROCEDURE — 8E0W4CZ ROBOTIC ASSISTED PROCEDURE OF TRUNK REGION, PERCUTANEOUS ENDOSCOPIC APPROACH: ICD-10-PCS | Performed by: COLON & RECTAL SURGERY

## 2019-12-10 PROCEDURE — 88342 IMHCHEM/IMCYTCHM 1ST ANTB: CPT | Performed by: PATHOLOGY

## 2019-12-10 PROCEDURE — 15860 IV NJX TST VASC FLO FLAP/GRF: CPT | Performed by: PHYSICIAN ASSISTANT

## 2019-12-10 PROCEDURE — 44207 L COLECTOMY/COLOPROCTOSTOMY: CPT | Performed by: PHYSICIAN ASSISTANT

## 2019-12-10 PROCEDURE — 86900 BLOOD TYPING SEROLOGIC ABO: CPT | Performed by: STUDENT IN AN ORGANIZED HEALTH CARE EDUCATION/TRAINING PROGRAM

## 2019-12-10 PROCEDURE — 0DTN0ZZ RESECTION OF SIGMOID COLON, OPEN APPROACH: ICD-10-PCS | Performed by: COLON & RECTAL SURGERY

## 2019-12-10 RX ORDER — ONDANSETRON 2 MG/ML
4 INJECTION INTRAMUSCULAR; INTRAVENOUS EVERY 6 HOURS PRN
Status: DISCONTINUED | OUTPATIENT
Start: 2019-12-10 | End: 2019-12-14 | Stop reason: HOSPADM

## 2019-12-10 RX ORDER — FENTANYL CITRATE 50 UG/ML
INJECTION, SOLUTION INTRAMUSCULAR; INTRAVENOUS AS NEEDED
Status: DISCONTINUED | OUTPATIENT
Start: 2019-12-10 | End: 2019-12-10 | Stop reason: SURG

## 2019-12-10 RX ORDER — SODIUM CHLORIDE 9 MG/ML
INJECTION, SOLUTION INTRAVENOUS CONTINUOUS PRN
Status: DISCONTINUED | OUTPATIENT
Start: 2019-12-10 | End: 2019-12-10

## 2019-12-10 RX ORDER — CEFAZOLIN SODIUM 2 G/50ML
2000 SOLUTION INTRAVENOUS ONCE
Status: DISCONTINUED | OUTPATIENT
Start: 2019-12-10 | End: 2019-12-10 | Stop reason: HOSPADM

## 2019-12-10 RX ORDER — HEPARIN SODIUM 5000 [USP'U]/ML
INJECTION, SOLUTION INTRAVENOUS; SUBCUTANEOUS AS NEEDED
Status: DISCONTINUED | OUTPATIENT
Start: 2019-12-10 | End: 2019-12-10 | Stop reason: SURG

## 2019-12-10 RX ORDER — PROMETHAZINE HYDROCHLORIDE 25 MG/ML
12.5 INJECTION, SOLUTION INTRAMUSCULAR; INTRAVENOUS
Status: DISCONTINUED | OUTPATIENT
Start: 2019-12-10 | End: 2019-12-10 | Stop reason: HOSPADM

## 2019-12-10 RX ORDER — PROPOFOL 10 MG/ML
INJECTION, EMULSION INTRAVENOUS AS NEEDED
Status: DISCONTINUED | OUTPATIENT
Start: 2019-12-10 | End: 2019-12-10 | Stop reason: SURG

## 2019-12-10 RX ORDER — ALBUMIN, HUMAN INJ 5% 5 %
SOLUTION INTRAVENOUS CONTINUOUS PRN
Status: DISCONTINUED | OUTPATIENT
Start: 2019-12-10 | End: 2019-12-10 | Stop reason: SURG

## 2019-12-10 RX ORDER — ONDANSETRON 2 MG/ML
INJECTION INTRAMUSCULAR; INTRAVENOUS AS NEEDED
Status: DISCONTINUED | OUTPATIENT
Start: 2019-12-10 | End: 2019-12-10 | Stop reason: SURG

## 2019-12-10 RX ORDER — ONDANSETRON 2 MG/ML
4 INJECTION INTRAMUSCULAR; INTRAVENOUS ONCE AS NEEDED
Status: DISCONTINUED | OUTPATIENT
Start: 2019-12-10 | End: 2019-12-10 | Stop reason: HOSPADM

## 2019-12-10 RX ORDER — FENTANYL CITRATE/PF 50 MCG/ML
50 SYRINGE (ML) INJECTION
Status: DISCONTINUED | OUTPATIENT
Start: 2019-12-10 | End: 2019-12-10 | Stop reason: HOSPADM

## 2019-12-10 RX ORDER — SODIUM CHLORIDE, SODIUM LACTATE, POTASSIUM CHLORIDE, CALCIUM CHLORIDE 600; 310; 30; 20 MG/100ML; MG/100ML; MG/100ML; MG/100ML
75 INJECTION, SOLUTION INTRAVENOUS CONTINUOUS
Status: DISCONTINUED | OUTPATIENT
Start: 2019-12-10 | End: 2019-12-10

## 2019-12-10 RX ORDER — DIPHENHYDRAMINE HYDROCHLORIDE 50 MG/ML
25 INJECTION INTRAMUSCULAR; INTRAVENOUS EVERY 6 HOURS PRN
Status: DISCONTINUED | OUTPATIENT
Start: 2019-12-10 | End: 2019-12-14 | Stop reason: HOSPADM

## 2019-12-10 RX ORDER — ACETAMINOPHEN 325 MG/1
650 TABLET ORAL EVERY 6 HOURS SCHEDULED
Status: DISCONTINUED | OUTPATIENT
Start: 2019-12-10 | End: 2019-12-14 | Stop reason: HOSPADM

## 2019-12-10 RX ORDER — EPHEDRINE SULFATE 50 MG/ML
INJECTION INTRAVENOUS AS NEEDED
Status: DISCONTINUED | OUTPATIENT
Start: 2019-12-10 | End: 2019-12-10 | Stop reason: SURG

## 2019-12-10 RX ORDER — HYDROMORPHONE HCL/PF 1 MG/ML
0.4 SYRINGE (ML) INJECTION
Status: DISCONTINUED | OUTPATIENT
Start: 2019-12-10 | End: 2019-12-10 | Stop reason: HOSPADM

## 2019-12-10 RX ORDER — HEPARIN SODIUM 5000 [USP'U]/ML
5000 INJECTION, SOLUTION INTRAVENOUS; SUBCUTANEOUS EVERY 8 HOURS SCHEDULED
Status: DISCONTINUED | OUTPATIENT
Start: 2019-12-10 | End: 2019-12-14 | Stop reason: HOSPADM

## 2019-12-10 RX ORDER — MIDAZOLAM HYDROCHLORIDE 2 MG/2ML
INJECTION, SOLUTION INTRAMUSCULAR; INTRAVENOUS AS NEEDED
Status: DISCONTINUED | OUTPATIENT
Start: 2019-12-10 | End: 2019-12-10 | Stop reason: SURG

## 2019-12-10 RX ORDER — LIDOCAINE HYDROCHLORIDE 10 MG/ML
INJECTION, SOLUTION EPIDURAL; INFILTRATION; INTRACAUDAL; PERINEURAL AS NEEDED
Status: DISCONTINUED | OUTPATIENT
Start: 2019-12-10 | End: 2019-12-10 | Stop reason: SURG

## 2019-12-10 RX ORDER — ROCURONIUM BROMIDE 10 MG/ML
INJECTION, SOLUTION INTRAVENOUS AS NEEDED
Status: DISCONTINUED | OUTPATIENT
Start: 2019-12-10 | End: 2019-12-10 | Stop reason: SURG

## 2019-12-10 RX ORDER — GABAPENTIN 100 MG/1
100 CAPSULE ORAL
Status: DISCONTINUED | OUTPATIENT
Start: 2019-12-10 | End: 2019-12-14 | Stop reason: HOSPADM

## 2019-12-10 RX ORDER — NEOMYCIN SULFATE 500 MG/1
1000 TABLET ORAL 3 TIMES DAILY
Status: DISCONTINUED | OUTPATIENT
Start: 2019-12-10 | End: 2019-12-10

## 2019-12-10 RX ORDER — SODIUM CHLORIDE, SODIUM LACTATE, POTASSIUM CHLORIDE, CALCIUM CHLORIDE 600; 310; 30; 20 MG/100ML; MG/100ML; MG/100ML; MG/100ML
INJECTION, SOLUTION INTRAVENOUS CONTINUOUS PRN
Status: DISCONTINUED | OUTPATIENT
Start: 2019-12-10 | End: 2019-12-10

## 2019-12-10 RX ORDER — METRONIDAZOLE 500 MG/1
1000 TABLET ORAL 3 TIMES DAILY
Status: DISCONTINUED | OUTPATIENT
Start: 2019-12-10 | End: 2019-12-10

## 2019-12-10 RX ORDER — HEPARIN SODIUM 5000 [USP'U]/ML
5000 INJECTION, SOLUTION INTRAVENOUS; SUBCUTANEOUS EVERY 8 HOURS SCHEDULED
Status: DISCONTINUED | OUTPATIENT
Start: 2019-12-10 | End: 2019-12-10 | Stop reason: SDUPTHER

## 2019-12-10 RX ORDER — CEFAZOLIN SODIUM 2 G/50ML
SOLUTION INTRAVENOUS AS NEEDED
Status: DISCONTINUED | OUTPATIENT
Start: 2019-12-10 | End: 2019-12-10 | Stop reason: SURG

## 2019-12-10 RX ORDER — SODIUM CHLORIDE 9 MG/ML
75 INJECTION, SOLUTION INTRAVENOUS CONTINUOUS
Status: DISCONTINUED | OUTPATIENT
Start: 2019-12-10 | End: 2019-12-11

## 2019-12-10 RX ORDER — PRAVASTATIN SODIUM 20 MG
20 TABLET ORAL
Status: DISCONTINUED | OUTPATIENT
Start: 2019-12-10 | End: 2019-12-14 | Stop reason: HOSPADM

## 2019-12-10 RX ORDER — DEXAMETHASONE SODIUM PHOSPHATE 10 MG/ML
INJECTION, SOLUTION INTRAMUSCULAR; INTRAVENOUS AS NEEDED
Status: DISCONTINUED | OUTPATIENT
Start: 2019-12-10 | End: 2019-12-10 | Stop reason: SURG

## 2019-12-10 RX ORDER — HYDROMORPHONE HCL 110MG/55ML
PATIENT CONTROLLED ANALGESIA SYRINGE INTRAVENOUS AS NEEDED
Status: DISCONTINUED | OUTPATIENT
Start: 2019-12-10 | End: 2019-12-10 | Stop reason: SURG

## 2019-12-10 RX ORDER — INDOCYANINE GREEN AND WATER 25 MG
KIT INJECTION AS NEEDED
Status: DISCONTINUED | OUTPATIENT
Start: 2019-12-10 | End: 2019-12-10 | Stop reason: SURG

## 2019-12-10 RX ORDER — METHOCARBAMOL 500 MG/1
500 TABLET, FILM COATED ORAL EVERY 6 HOURS SCHEDULED
Status: DISCONTINUED | OUTPATIENT
Start: 2019-12-10 | End: 2019-12-14 | Stop reason: HOSPADM

## 2019-12-10 RX ADMIN — SUGAMMADEX 377 MG: 100 INJECTION, SOLUTION INTRAVENOUS at 13:12

## 2019-12-10 RX ADMIN — METHOCARBAMOL TABLETS 500 MG: 500 TABLET, COATED ORAL at 23:27

## 2019-12-10 RX ADMIN — HEPARIN SODIUM 5000 UNITS: 5000 INJECTION, SOLUTION INTRAVENOUS; SUBCUTANEOUS at 07:55

## 2019-12-10 RX ADMIN — SODIUM CHLORIDE: 0.9 INJECTION, SOLUTION INTRAVENOUS at 12:25

## 2019-12-10 RX ADMIN — CEFAZOLIN 500 MG: 1 INJECTION, POWDER, FOR SOLUTION INTRAMUSCULAR; INTRAVENOUS at 20:54

## 2019-12-10 RX ADMIN — DEXAMETHASONE SODIUM PHOSPHATE 10 MG: 10 INJECTION, SOLUTION INTRAMUSCULAR; INTRAVENOUS at 10:35

## 2019-12-10 RX ADMIN — SODIUM CHLORIDE, SODIUM LACTATE, POTASSIUM CHLORIDE, AND CALCIUM CHLORIDE: .6; .31; .03; .02 INJECTION, SOLUTION INTRAVENOUS at 11:52

## 2019-12-10 RX ADMIN — SODIUM CHLORIDE 125 ML/HR: 0.9 INJECTION, SOLUTION INTRAVENOUS at 14:21

## 2019-12-10 RX ADMIN — HYDROMORPHONE HYDROCHLORIDE 0.5 MG: 2 INJECTION, SOLUTION INTRAMUSCULAR; INTRAVENOUS; SUBCUTANEOUS at 08:30

## 2019-12-10 RX ADMIN — PHENYLEPHRINE HYDROCHLORIDE 100 MCG: 10 INJECTION INTRAVENOUS at 07:51

## 2019-12-10 RX ADMIN — ACETAMINOPHEN 650 MG: 325 TABLET ORAL at 23:27

## 2019-12-10 RX ADMIN — HYDROMORPHONE HYDROCHLORIDE 0.25 MG: 2 INJECTION, SOLUTION INTRAMUSCULAR; INTRAVENOUS; SUBCUTANEOUS at 11:51

## 2019-12-10 RX ADMIN — GABAPENTIN 100 MG: 100 CAPSULE ORAL at 21:04

## 2019-12-10 RX ADMIN — ROCURONIUM BROMIDE 40 MG: 50 INJECTION, SOLUTION INTRAVENOUS at 07:49

## 2019-12-10 RX ADMIN — ROCURONIUM BROMIDE 20 MG: 50 INJECTION, SOLUTION INTRAVENOUS at 08:41

## 2019-12-10 RX ADMIN — METRONIDAZOLE 500 MG: 500 INJECTION, SOLUTION INTRAVENOUS at 08:30

## 2019-12-10 RX ADMIN — ROCURONIUM BROMIDE 20 MG: 50 INJECTION, SOLUTION INTRAVENOUS at 09:24

## 2019-12-10 RX ADMIN — ALBUMIN (HUMAN): 12.5 SOLUTION INTRAVENOUS at 11:00

## 2019-12-10 RX ADMIN — DEXMEDETOMIDINE 0.2 MCG/KG/HR: 100 INJECTION, SOLUTION, CONCENTRATE INTRAVENOUS at 08:00

## 2019-12-10 RX ADMIN — SODIUM CHLORIDE 125 ML/HR: 0.9 INJECTION, SOLUTION INTRAVENOUS at 15:54

## 2019-12-10 RX ADMIN — PHENYLEPHRINE HYDROCHLORIDE 20 MCG/MIN: 10 INJECTION INTRAVENOUS at 07:57

## 2019-12-10 RX ADMIN — ROCURONIUM BROMIDE 10 MG: 50 INJECTION, SOLUTION INTRAVENOUS at 11:51

## 2019-12-10 RX ADMIN — SODIUM CHLORIDE, SODIUM LACTATE, POTASSIUM CHLORIDE, AND CALCIUM CHLORIDE: .6; .31; .03; .02 INJECTION, SOLUTION INTRAVENOUS at 07:15

## 2019-12-10 RX ADMIN — LIDOCAINE HYDROCHLORIDE 50 MG: 10 INJECTION, SOLUTION EPIDURAL; INFILTRATION; INTRACAUDAL; PERINEURAL at 07:48

## 2019-12-10 RX ADMIN — HYDROMORPHONE HYDROCHLORIDE 0.25 MG: 2 INJECTION, SOLUTION INTRAMUSCULAR; INTRAVENOUS; SUBCUTANEOUS at 11:03

## 2019-12-10 RX ADMIN — CEFAZOLIN SODIUM 2000 MG: 2 SOLUTION INTRAVENOUS at 08:25

## 2019-12-10 RX ADMIN — INDOCYANINE GREEN AND WATER 8 MG: KIT at 11:33

## 2019-12-10 RX ADMIN — ROCURONIUM BROMIDE 20 MG: 50 INJECTION, SOLUTION INTRAVENOUS at 10:02

## 2019-12-10 RX ADMIN — FENTANYL CITRATE 50 MCG: 50 INJECTION, SOLUTION INTRAMUSCULAR; INTRAVENOUS at 15:44

## 2019-12-10 RX ADMIN — ALBUMIN (HUMAN): 12.5 SOLUTION INTRAVENOUS at 10:46

## 2019-12-10 RX ADMIN — PHENYLEPHRINE HYDROCHLORIDE 100 MCG: 10 INJECTION INTRAVENOUS at 08:00

## 2019-12-10 RX ADMIN — ONDANSETRON 4 MG: 2 INJECTION INTRAMUSCULAR; INTRAVENOUS at 10:36

## 2019-12-10 RX ADMIN — SODIUM CHLORIDE 500 ML: 0.9 INJECTION, SOLUTION INTRAVENOUS at 15:12

## 2019-12-10 RX ADMIN — SODIUM CHLORIDE 125 ML/HR: 0.9 INJECTION, SOLUTION INTRAVENOUS at 22:50

## 2019-12-10 RX ADMIN — SODIUM CHLORIDE: 0.9 INJECTION, SOLUTION INTRAVENOUS at 07:51

## 2019-12-10 RX ADMIN — FENTANYL CITRATE 50 MCG: 50 INJECTION, SOLUTION INTRAMUSCULAR; INTRAVENOUS at 07:48

## 2019-12-10 RX ADMIN — PROPOFOL 150 MG: 10 INJECTION, EMULSION INTRAVENOUS at 07:48

## 2019-12-10 RX ADMIN — Medication: at 14:56

## 2019-12-10 RX ADMIN — HEPARIN SODIUM 5000 UNITS: 5000 INJECTION INTRAVENOUS; SUBCUTANEOUS at 21:04

## 2019-12-10 RX ADMIN — MIDAZOLAM 2 MG: 1 INJECTION INTRAMUSCULAR; INTRAVENOUS at 07:43

## 2019-12-10 RX ADMIN — CEFAZOLIN SODIUM 2000 MG: 2 SOLUTION INTRAVENOUS at 12:25

## 2019-12-10 RX ADMIN — EPHEDRINE SULFATE 10 MG: 50 INJECTION, SOLUTION INTRAVENOUS at 10:57

## 2019-12-10 NOTE — PROGRESS NOTES
Unable to obtain temperature; temp not reading via thermometer; dr Amari greenfield aware; will be up to see pt

## 2019-12-10 NOTE — PROGRESS NOTES
Dr Stevphen Lennox on floor to see pt; temp done via forehead reader from the or; temp 96 7; will continue to monitor

## 2019-12-10 NOTE — PLAN OF CARE
Problem: Prexisting or High Potential for Compromised Skin Integrity  Goal: Skin integrity is maintained or improved  Description  INTERVENTIONS:  - Identify patients at risk for skin breakdown  - Assess and monitor skin integrity  - Assess and monitor nutrition and hydration status  - Monitor labs   - Assess for incontinence   - Turn and reposition patient  - Assist with mobility/ambulation  - Relieve pressure over bony prominences  - Avoid friction and shearing  - Provide appropriate hygiene as needed including keeping skin clean and dry  - Evaluate need for skin moisturizer/barrier cream  - Collaborate with interdisciplinary team   - Patient/family teaching  - Consider wound care consult   Outcome: Progressing     Problem: Potential for Falls  Goal: Patient will remain free of falls  Description  INTERVENTIONS:  - Assess patient frequently for physical needs  -  Identify cognitive and physical deficits and behaviors that affect risk of falls    -  [place] fall precautions as indicated by assessment   - Educate patient/family on patient safety including physical limitations  - Instruct patient to call for assistance with activity based on assessment  - Modify environment to reduce risk of injury  - Consider OT/PT consult to assist with strengthening/mobility  Outcome: Progressing     Problem: GASTROINTESTINAL - ADULT  Goal: Minimal or absence of nausea and/or vomiting  Description  INTERVENTIONS:  - Administer IV fluids if ordered to ensure adequate hydration  - Maintain NPO status until nausea and vomiting are resolved  - Nasogastric tube if ordered  - Administer ordered antiemetic medications as needed  - Provide nonpharmacologic comfort measures as appropriate  - Advance diet as tolerated, if ordered  - Consider nutrition services referral to assist patient with adequate nutrition and appropriate food choices  Outcome: Progressing  Goal: Maintains or returns to baseline bowel function  Description  INTERVENTIONS:  - Assess bowel function  - Encourage oral fluids to ensure adequate hydration  - Administer IV fluids if ordered to ensure adequate hydration  - Administer ordered medications as needed  - Encourage mobilization and activity  - Consider nutritional services referral to assist patient with adequate nutrition and appropriate food choices  Outcome: Progressing  Goal: Maintains adequate nutritional intake  Description  INTERVENTIONS:  - Monitor percentage of each meal consumed  - Identify factors contributing to decreased intake, treat as appropriate  - Assist with meals as needed  - Monitor I&O, weight, and lab values if indicated  - Obtain nutrition services referral as needed  Outcome: Progressing     Problem: GENITOURINARY - ADULT  Goal: Maintains or returns to baseline urinary function  Description  INTERVENTIONS:  - Assess urinary function  - Encourage oral fluids to ensure adequate hydration if ordered  - Administer IV fluids as ordered to ensure adequate hydration  - Administer ordered medications as needed  - Offer frequent toileting  - Follow urinary retention protocol if ordered  Outcome: Progressing     Problem: SKIN/TISSUE INTEGRITY - ADULT  Goal: Skin integrity remains intact  Description  INTERVENTIONS  - Identify patients at risk for skin breakdown  - Assess and monitor skin integrity  - Assess and monitor nutrition and hydration status  - Monitor labs (i e  albumin)  - Assess for incontinence   - Turn and reposition patient  - Assist with mobility/ambulation  - Relieve pressure over bony prominences  - Avoid friction and shearing  - Provide appropriate hygiene as needed including keeping skin clean and dry  - Evaluate need for skin moisturizer/barrier cream  - Collaborate with interdisciplinary team (i e  Nutrition, Rehabilitation, etc )   - Patient/family teaching  Outcome: Progressing  Goal: Incision(s), wounds(s) or drain site(s) healing without S/S of infection  Description  INTERVENTIONS  - Assess and document risk factors for skin impairment   - Assess and document dressing, incision, wound bed, drain sites and surrounding tissue  - Consider nutrition services referral as needed  - Oral mucous membranes remain intact  - Provide patient/ family education  Outcome: Progressing     Problem: PAIN - ADULT  Goal: Verbalizes/displays adequate comfort level or baseline comfort level  Description  Interventions:  - Encourage patient to monitor pain and request assistance  - Assess pain using appropriate pain scale  - Administer analgesics based on type and severity of pain and evaluate response  - Implement non-pharmacological measures as appropriate and evaluate response  - Consider cultural and social influences on pain and pain management  - Notify physician/advanced practitioner if interventions unsuccessful or patient reports new pain  Outcome: Progressing     Problem: INFECTION - ADULT  Goal: Absence or prevention of progression during hospitalization  Description  INTERVENTIONS:  - Assess and monitor for signs and symptoms of infection  - Monitor lab/diagnostic results  - Monitor all insertion sites, i e  indwelling lines, tubes, and drains  - Monitor endotracheal if appropriate and nasal secretions for changes in amount and color  - [place] appropriate cooling/warming therapies per order  - Administer medications as ordered  - Instruct and encourage patient and family to use good hand hygiene technique  - Identify and instruct in appropriate isolation precautions for identified infection/condition  Outcome: Progressing     Problem: SAFETY ADULT  Goal: Maintain or return to baseline ADL function  Description  INTERVENTIONS:  -  Assess patient's ability to carry out ADLs; assess patient's baseline for ADL function and identify physical deficits which impact ability to perform ADLs (bathing, care of mouth/teeth, toileting, grooming, dressing, etc )  - Assess/evaluate cause of self-care deficits   - Assess range of motion  - Assess patient's mobility; develop plan if impaired  - Assess patient's need for assistive devices and provide as appropriate  - Encourage maximum independence but intervene and supervise when necessary  - Involve family in performance of ADLs  - Assess for home care needs following discharge   - Consider OT consult to assist with ADL evaluation and planning for discharge  - Provide patient education as appropriate  Outcome: Progressing  Goal: Maintain or return mobility status to optimal level  Description  INTERVENTIONS:  - Assess patient's baseline mobility status (ambulation, transfers, stairs, etc )    - Identify cognitive and physical deficits and behaviors that affect mobility  - Identify mobility aids required to assist with transfers and/or ambulation (gait belt, sit-to-stand, lift, walker, cane, etc )  - [place] fall precautions as indicated by assessment  - Record patient progress and toleration of activity level on Mobility SBAR; progress patient to next Phase/Stage  - Instruct patient to call for assistance with activity based on assessment  - Consider rehabilitation consult to assist with strengthening/weightbearing, etc   Outcome: Progressing     Problem: DISCHARGE PLANNING  Goal: Discharge to home or other facility with appropriate resources  Description  INTERVENTIONS:  - Identify barriers to discharge w/patient and caregiver  - Arrange for needed discharge resources and transportation as appropriate  - Identify discharge learning needs (meds, wound care, etc )  - Arrange for interpretive services to assist at discharge as needed  - Refer to Case Management Department for coordinating discharge planning if the patient needs post-hospital services based on physician/advanced practitioner order or complex needs related to functional status, cognitive ability, or social support system  Outcome: Progressing     Problem: Knowledge Deficit  Goal: Patient/family/caregiver demonstrates understanding of disease process, treatment plan, medications, and discharge instructions  Description  Complete learning assessment and assess knowledge base    Interventions:  - Provide teaching at level of understanding  - Provide teaching via preferred learning methods  Outcome: Progressing

## 2019-12-10 NOTE — PROGRESS NOTES
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 today    AVSS  On room air  Ostomy is pink and healthy  Bowel sweat present in bag  LLQ MARIA T drain with 30 cc SS output since placement  Plan:   OSMIN Burch@[facility] TV  PCA-D  Prn anti-emetics   DVT ppx      S: Patient is s/p Robotic sigmoidectomy from earlier today  Post-operatively, patient is feeling well  She is complaining of abdominal pain, controlled on prn analgesia  Denies nausea, vomiting, fevers, chills, chest pain, SOB or any other complaints at this time  O:  NAD, alert and oriented x3  Normocephalic, atraumatic  MMM, EOMI, PERRLA  Norm resp effort on RA  RRR  Abd soft, NT/ND, incisions c/d/i  Ostomy is pink and healthy  Bowel sweat present in bag  LLQ MARIA T drain with 30 cc SS output since placement    No calf tenderness or peripheral edema  Motor/sensation intact in distal extremities  CN grossly intact  -rash/lesions

## 2019-12-10 NOTE — H&P
History and Physical   Colon and Rectal Surgery   Lashae Selby 71 y o  female MRN: [id number]  Unit/Bed#: OR [place] Encounter: [id number]  12/10/19   7:17 AM      CC: Rectosigmoid cancer  History of Present Illness   HPI:  Lashae Selby is a 71 y o  female for resection      Historical Information   Past Medical History:   Diagnosis Date    Blood in stool     Breast disorder     GERD (gastroesophageal reflux disease)     Hyperlipidemia     PONV (postoperative nausea and vomiting)      Past Surgical History:   Procedure Laterality Date    BREAST LUMPECTOMY Right      SECTION      x3    COLONOSCOPY         Meds/Allergies     Medications Prior to Admission   Medication    [] metroNIDAZOLE (FLAGYL) 500 mg tablet    [] neomycin (MYCIFRADIN) 500 mg tablet    ranitidine (ZANTAC) 150 mg tablet    rosuvastatin (CRESTOR) 20 MG tablet         Current Facility-Administered Medications:     ceFAZolin (ANCEF) IVPB (premix) 2,000 mg, 2,000 mg, Intravenous, Once **AND** metroNIDAZOLE (FLAGYL) IVPB (premix) 500 mg, 500 mg, Intravenous, Once, W Promise Yoon MD    Allergies   Allergen Reactions    Medical Tape      Skin irritation  Skin peeling         Social History   Social History     Substance and Sexual Activity   Alcohol Use Yes    Frequency: 2-4 times a month     Social History     Substance and Sexual Activity   Drug Use Never     Social History     Tobacco Use   Smoking Status Never Smoker   Smokeless Tobacco Never Used         Family History:   Family History   Problem Relation Age of Onset    Hypertension Mother     Heart attack Mother     Heart attack Father     Heart disease Father     Hypertension Brother     Heart attack Brother     Leukemia Cousin     Breast cancer Cousin     Diabetes Cousin     Breast cancer Family         maternal side    Colon cancer Family         paternal uncle    Colon cancer Paternal Uncle     Stroke Neg Hx          Objective Current Vitals:   Blood Pressure: 119/69 (12/10/19 0601)  Pulse: 84 (12/10/19 0601)  Temperature: 97 7 °F (36 5 °C) (12/10/19 0601)  Temp Source: Tympanic (12/10/19 0601)  Respirations: 20 (12/10/19 0601)  Height: 4' 10" (147 3 cm) (12/10/19 0601)  Weight - Scale: 94 3 kg (208 lb) (12/10/19 0601)  SpO2: 100 % (12/10/19 0601)  No intake or output data in the 24 hours ending 12/10/19 0717    Physical Exam:  General:  Well nourished, no distress  Neuro: Alert and oriented  Eyes:Sclera anicteric, conjunctiva pink  Pulm: Clear to auscultation bilaterally  No respiratory Distress  CV:  Regular rate and rhythm  No murmurs  Abdomen:  Soft, flat, non-tender, without masses or hepatosplenomegaly  Lab Results:       ASSESSMENT:  Rafia May is a 71 y o  female for robotic proctosigmoidectomy  PLAN:  Risks of surgery, including but not limited to bleeding, pain, infection, hernia formation, injury to the ureter or other internal organs requiring surgery specific to these injuries, anastomotic leak, impotence, deep vein thrombosis, pulmonary embolism, myocardial infarction or heart failure, stroke, death, need for and enterostomy, or other unspecified complications were discussed  Benefits and alternatives were discussed  Questions were answered     Lydia Calhoun MD

## 2019-12-10 NOTE — ANESTHESIA POSTPROCEDURE EVALUATION
Post-Op Assessment Note    CV Status:  Stable  Pain Score: 0    Pain management: adequate     Mental Status:  Awake and lethargic   Hydration Status:  Euvolemic   PONV Controlled:  Controlled   Airway Patency:  Patent   Post Op Vitals Reviewed: Yes      Staff: CRNA           BP   103/52   Temp   96 5   Pulse  76   Resp   14   SpO2   98% on simple mask

## 2019-12-11 LAB
ANION GAP SERPL CALCULATED.3IONS-SCNC: 6 MMOL/L (ref 4–13)
BASOPHILS # BLD AUTO: 0.01 THOUSANDS/ΜL (ref 0–0.1)
BASOPHILS NFR BLD AUTO: 0 % (ref 0–1)
BUN SERPL-MCNC: 11 MG/DL (ref 5–25)
CALCIUM SERPL-MCNC: 7.5 MG/DL (ref 8.3–10.1)
CHLORIDE SERPL-SCNC: 118 MMOL/L (ref 100–108)
CO2 SERPL-SCNC: 19 MMOL/L (ref 21–32)
CREAT SERPL-MCNC: 0.84 MG/DL (ref 0.6–1.3)
EOSINOPHIL # BLD AUTO: 0 THOUSAND/ΜL (ref 0–0.61)
EOSINOPHIL NFR BLD AUTO: 0 % (ref 0–6)
ERYTHROCYTE [DISTWIDTH] IN BLOOD BY AUTOMATED COUNT: 14.5 % (ref 11.6–15.1)
GFR SERPL CREATININE-BSD FRML MDRD: 73 ML/MIN/1.73SQ M
GLUCOSE SERPL-MCNC: 106 MG/DL (ref 65–140)
HCT VFR BLD AUTO: 29.5 % (ref 34.8–46.1)
HCT VFR BLD AUTO: 29.5 % (ref 34.8–46.1)
HGB BLD-MCNC: 9 G/DL (ref 11.5–15.4)
HGB BLD-MCNC: 9.1 G/DL (ref 11.5–15.4)
IMM GRANULOCYTES # BLD AUTO: 0.04 THOUSAND/UL (ref 0–0.2)
IMM GRANULOCYTES NFR BLD AUTO: 1 % (ref 0–2)
LYMPHOCYTES # BLD AUTO: 1.07 THOUSANDS/ΜL (ref 0.6–4.47)
LYMPHOCYTES NFR BLD AUTO: 13 % (ref 14–44)
MCH RBC QN AUTO: 27.5 PG (ref 26.8–34.3)
MCHC RBC AUTO-ENTMCNC: 30.8 G/DL (ref 31.4–37.4)
MCV RBC AUTO: 89 FL (ref 82–98)
MONOCYTES # BLD AUTO: 0.46 THOUSAND/ΜL (ref 0.17–1.22)
MONOCYTES NFR BLD AUTO: 6 % (ref 4–12)
NEUTROPHILS # BLD AUTO: 6.75 THOUSANDS/ΜL (ref 1.85–7.62)
NEUTS SEG NFR BLD AUTO: 80 % (ref 43–75)
NRBC BLD AUTO-RTO: 0 /100 WBCS
PLATELET # BLD AUTO: 181 THOUSANDS/UL (ref 149–390)
PMV BLD AUTO: 10.5 FL (ref 8.9–12.7)
POTASSIUM SERPL-SCNC: 4.1 MMOL/L (ref 3.5–5.3)
RBC # BLD AUTO: 3.31 MILLION/UL (ref 3.81–5.12)
SODIUM SERPL-SCNC: 143 MMOL/L (ref 136–145)
WBC # BLD AUTO: 8.33 THOUSAND/UL (ref 4.31–10.16)

## 2019-12-11 PROCEDURE — 80048 BASIC METABOLIC PNL TOTAL CA: CPT | Performed by: STUDENT IN AN ORGANIZED HEALTH CARE EDUCATION/TRAINING PROGRAM

## 2019-12-11 PROCEDURE — 85014 HEMATOCRIT: CPT | Performed by: STUDENT IN AN ORGANIZED HEALTH CARE EDUCATION/TRAINING PROGRAM

## 2019-12-11 PROCEDURE — 85018 HEMOGLOBIN: CPT | Performed by: STUDENT IN AN ORGANIZED HEALTH CARE EDUCATION/TRAINING PROGRAM

## 2019-12-11 PROCEDURE — 85025 COMPLETE CBC W/AUTO DIFF WBC: CPT | Performed by: STUDENT IN AN ORGANIZED HEALTH CARE EDUCATION/TRAINING PROGRAM

## 2019-12-11 RX ORDER — HYDROMORPHONE HCL/PF 1 MG/ML
0.5 SYRINGE (ML) INJECTION
Status: DISCONTINUED | OUTPATIENT
Start: 2019-12-11 | End: 2019-12-14 | Stop reason: HOSPADM

## 2019-12-11 RX ORDER — DEXTROSE, SODIUM CHLORIDE, AND POTASSIUM CHLORIDE 5; .45; .15 G/100ML; G/100ML; G/100ML
75 INJECTION INTRAVENOUS CONTINUOUS
Status: DISCONTINUED | OUTPATIENT
Start: 2019-12-11 | End: 2019-12-12

## 2019-12-11 RX ADMIN — GABAPENTIN 100 MG: 100 CAPSULE ORAL at 22:27

## 2019-12-11 RX ADMIN — ACETAMINOPHEN 650 MG: 325 TABLET ORAL at 19:27

## 2019-12-11 RX ADMIN — HEPARIN SODIUM 5000 UNITS: 5000 INJECTION INTRAVENOUS; SUBCUTANEOUS at 12:46

## 2019-12-11 RX ADMIN — METHOCARBAMOL TABLETS 500 MG: 500 TABLET, COATED ORAL at 05:35

## 2019-12-11 RX ADMIN — PRAVASTATIN SODIUM 20 MG: 20 TABLET ORAL at 17:47

## 2019-12-11 RX ADMIN — CEFAZOLIN 500 MG: 1 INJECTION, POWDER, FOR SOLUTION INTRAMUSCULAR; INTRAVENOUS at 04:24

## 2019-12-11 RX ADMIN — HEPARIN SODIUM 5000 UNITS: 5000 INJECTION INTRAVENOUS; SUBCUTANEOUS at 05:35

## 2019-12-11 RX ADMIN — METHOCARBAMOL TABLETS 500 MG: 500 TABLET, COATED ORAL at 19:28

## 2019-12-11 RX ADMIN — METHOCARBAMOL TABLETS 500 MG: 500 TABLET, COATED ORAL at 12:46

## 2019-12-11 RX ADMIN — DEXTROSE, SODIUM CHLORIDE, AND POTASSIUM CHLORIDE 84 ML/HR: 5; .45; .15 INJECTION INTRAVENOUS at 08:56

## 2019-12-11 RX ADMIN — DEXTROSE, SODIUM CHLORIDE, AND POTASSIUM CHLORIDE 84 ML/HR: 5; .45; .15 INJECTION INTRAVENOUS at 19:33

## 2019-12-11 RX ADMIN — SODIUM CHLORIDE 75 ML/HR: 0.9 INJECTION, SOLUTION INTRAVENOUS at 06:48

## 2019-12-11 RX ADMIN — ACETAMINOPHEN 650 MG: 325 TABLET ORAL at 05:35

## 2019-12-11 RX ADMIN — ACETAMINOPHEN 650 MG: 325 TABLET ORAL at 12:46

## 2019-12-11 RX ADMIN — HYDROMORPHONE HYDROCHLORIDE 0.5 MG: 1 INJECTION, SOLUTION INTRAMUSCULAR; INTRAVENOUS; SUBCUTANEOUS at 22:44

## 2019-12-11 RX ADMIN — HEPARIN SODIUM 5000 UNITS: 5000 INJECTION INTRAVENOUS; SUBCUTANEOUS at 22:27

## 2019-12-11 NOTE — PROGRESS NOTES
She has morbid obesity with poor exercise tolerance  Dietary management was discussed  Tumor care is more paramount

## 2019-12-11 NOTE — PLAN OF CARE
Problem: Prexisting or High Potential for Compromised Skin Integrity  Goal: Skin integrity is maintained or improved  Description  INTERVENTIONS:  - Identify patients at risk for skin breakdown  - Assess and monitor skin integrity  - Assess and monitor nutrition and hydration status  - Monitor labs   - Assess for incontinence   - Turn and reposition patient  - Assist with mobility/ambulation  - Relieve pressure over bony prominences  - Avoid friction and shearing  - Provide appropriate hygiene as needed including keeping skin clean and dry  - Evaluate need for skin moisturizer/barrier cream  - Collaborate with interdisciplinary team   - Patient/family teaching  - Consider wound care consult   12/11/2019 0750 by Félix Duarte RN  Outcome: Progressing  12/10/2019 1840 by Félix Duarte RN  Outcome: Progressing     Problem: Potential for Falls  Goal: Patient will remain free of falls  Description  INTERVENTIONS:  - Assess patient frequently for physical needs  -  Identify cognitive and physical deficits and behaviors that affect risk of falls    -  [place] fall precautions as indicated by assessment   - Educate patient/family on patient safety including physical limitations  - Instruct patient to call for assistance with activity based on assessment  - Modify environment to reduce risk of injury  - Consider OT/PT consult to assist with strengthening/mobility  12/11/2019 0750 by Félix Duarte RN  Outcome: Progressing  12/10/2019 1840 by Félix Duarte RN  Outcome: Progressing     Problem: GASTROINTESTINAL - ADULT  Goal: Minimal or absence of nausea and/or vomiting  Description  INTERVENTIONS:  - Administer IV fluids if ordered to ensure adequate hydration  - Maintain NPO status until nausea and vomiting are resolved  - Nasogastric tube if ordered  - Administer ordered antiemetic medications as needed  - Provide nonpharmacologic comfort measures as appropriate  - Advance diet as tolerated, if ordered  - Consider nutrition services referral to assist patient with adequate nutrition and appropriate food choices  12/11/2019 0750 by Carlos Dunn RN  Outcome: Progressing  12/10/2019 1840 by Carlos Dunn RN  Outcome: Progressing  Goal: Maintains or returns to baseline bowel function  Description  INTERVENTIONS:  - Assess bowel function  - Encourage oral fluids to ensure adequate hydration  - Administer IV fluids if ordered to ensure adequate hydration  - Administer ordered medications as needed  - Encourage mobilization and activity  - Consider nutritional services referral to assist patient with adequate nutrition and appropriate food choices  12/11/2019 0750 by Carlos Dunn RN  Outcome: Progressing  12/10/2019 1840 by Carlos Dunn RN  Outcome: Progressing  Goal: Maintains adequate nutritional intake  Description  INTERVENTIONS:  - Monitor percentage of each meal consumed  - Identify factors contributing to decreased intake, treat as appropriate  - Assist with meals as needed  - Monitor I&O, weight, and lab values if indicated  - Obtain nutrition services referral as needed  12/11/2019 0750 by Carlos Dunn RN  Outcome: Progressing  12/10/2019 1840 by Carlos Dunn RN  Outcome: Progressing     Problem: GENITOURINARY - ADULT  Goal: Maintains or returns to baseline urinary function  Description  INTERVENTIONS:  - Assess urinary function  - Encourage oral fluids to ensure adequate hydration if ordered  - Administer IV fluids as ordered to ensure adequate hydration  - Administer ordered medications as needed  - Offer frequent toileting  - Follow urinary retention protocol if ordered  12/11/2019 0750 by Carlos Dunn RN  Outcome: Progressing  12/10/2019 1840 by Carlos Dunn RN  Outcome: Progressing     Problem: SKIN/TISSUE INTEGRITY - ADULT  Goal: Skin integrity remains intact  Description  INTERVENTIONS  - Identify patients at risk for skin breakdown  - Assess and monitor skin integrity  - Assess and monitor nutrition and hydration status  - Monitor labs (i e  albumin)  - Assess for incontinence   - Turn and reposition patient  - Assist with mobility/ambulation  - Relieve pressure over bony prominences  - Avoid friction and shearing  - Provide appropriate hygiene as needed including keeping skin clean and dry  - Evaluate need for skin moisturizer/barrier cream  - Collaborate with interdisciplinary team (i e  Nutrition, Rehabilitation, etc )   - Patient/family teaching  12/11/2019 0750 by Khari Gross RN  Outcome: Progressing  12/10/2019 1840 by Khari Gross RN  Outcome: Progressing  Goal: Incision(s), wounds(s) or drain site(s) healing without S/S of infection  Description  INTERVENTIONS  - Assess and document risk factors for skin impairment   - Assess and document dressing, incision, wound bed, drain sites and surrounding tissue  - Consider nutrition services referral as needed  - Oral mucous membranes remain intact  - Provide patient/ family education  12/11/2019 0750 by Khari Gross RN  Outcome: Progressing  12/10/2019 1840 by Khari Gross RN  Outcome: Progressing     Problem: PAIN - ADULT  Goal: Verbalizes/displays adequate comfort level or baseline comfort level  Description  Interventions:  - Encourage patient to monitor pain and request assistance  - Assess pain using appropriate pain scale  - Administer analgesics based on type and severity of pain and evaluate response  - Implement non-pharmacological measures as appropriate and evaluate response  - Consider cultural and social influences on pain and pain management  - Notify physician/advanced practitioner if interventions unsuccessful or patient reports new pain  12/11/2019 0750 by Khari Gross RN  Outcome: Progressing  12/10/2019 1840 by Khari Gross RN  Outcome: Progressing     Problem: INFECTION - ADULT  Goal: Absence or prevention of progression during hospitalization  Description  INTERVENTIONS:  - Assess and monitor for signs and symptoms of infection  - Monitor lab/diagnostic results  - Monitor all insertion sites, i e  indwelling lines, tubes, and drains  - Monitor endotracheal if appropriate and nasal secretions for changes in amount and color  - [place] appropriate cooling/warming therapies per order  - Administer medications as ordered  - Instruct and encourage patient and family to use good hand hygiene technique  - Identify and instruct in appropriate isolation precautions for identified infection/condition  12/11/2019 0750 by Ronald Cross RN  Outcome: Progressing  12/10/2019 1840 by Ronald Cross RN  Outcome: Progressing     Problem: SAFETY ADULT  Goal: Maintain or return to baseline ADL function  Description  INTERVENTIONS:  -  Assess patient's ability to carry out ADLs; assess patient's baseline for ADL function and identify physical deficits which impact ability to perform ADLs (bathing, care of mouth/teeth, toileting, grooming, dressing, etc )  - Assess/evaluate cause of self-care deficits   - Assess range of motion  - Assess patient's mobility; develop plan if impaired  - Assess patient's need for assistive devices and provide as appropriate  - Encourage maximum independence but intervene and supervise when necessary  - Involve family in performance of ADLs  - Assess for home care needs following discharge   - Consider OT consult to assist with ADL evaluation and planning for discharge  - Provide patient education as appropriate  12/11/2019 0750 by Ronald Cross RN  Outcome: Progressing  12/10/2019 1840 by Ronald Cross RN  Outcome: Progressing  Goal: Maintain or return mobility status to optimal level  Description  INTERVENTIONS:  - Assess patient's baseline mobility status (ambulation, transfers, stairs, etc )    - Identify cognitive and physical deficits and behaviors that affect mobility  - Identify mobility aids required to assist with transfers and/or ambulation (gait belt, sit-to-stand, lift, walker, cane, etc )  - [place] fall precautions as indicated by assessment  - Record patient progress and toleration of activity level on Mobility SBAR; progress patient to next Phase/Stage  - Instruct patient to call for assistance with activity based on assessment  - Consider rehabilitation consult to assist with strengthening/weightbearing, etc   12/11/2019 0750 by Alis Wilkerson RN  Outcome: Progressing  12/10/2019 1840 by Alis Wilkerson RN  Outcome: Progressing     Problem: DISCHARGE PLANNING  Goal: Discharge to home or other facility with appropriate resources  Description  INTERVENTIONS:  - Identify barriers to discharge w/patient and caregiver  - Arrange for needed discharge resources and transportation as appropriate  - Identify discharge learning needs (meds, wound care, etc )  - Arrange for interpretive services to assist at discharge as needed  - Refer to Case Management Department for coordinating discharge planning if the patient needs post-hospital services based on physician/advanced practitioner order or complex needs related to functional status, cognitive ability, or social support system  12/11/2019 0750 by Alis Wilkerson RN  Outcome: Progressing  12/10/2019 1840 by Alis Wilkerson RN  Outcome: Progressing     Problem: Knowledge Deficit  Goal: Patient/family/caregiver demonstrates understanding of disease process, treatment plan, medications, and discharge instructions  Description  Complete learning assessment and assess knowledge base    Interventions:  - Provide teaching at level of understanding  - Provide teaching via preferred learning methods  12/11/2019 0750 by Alis Wilkerson RN  Outcome: Progressing  12/10/2019 1840 by Alis Wilkerson RN  Outcome: Progressing

## 2019-12-11 NOTE — PROGRESS NOTES
Progress Note - Colorectal Surgery   Gloria Blackburn 71 y o  female MRN: [id number]  Unit/Bed#: [facility] 811-01 Encounter: [id number]    Assessment:  71yo F with h/o rectosigmoid cancer, POD#1 s/p Robotic sigmoidectomy    Plan:  · Clear liquid diet as tolerated  · Wean IVF  · Mg d/c'd this AM  · Pain control: PCA-d  · OOB, ambulate  · Maintain MARIA T drain, monitor output  · DVT PPx    Subjective/Objective     Subjective:   No acute events overnight  Pain is well-controlled with PCA, which she used twice overnight  Has not ambulated yet  No nausea/vomiting  Objective:     Blood pressure 111/57, pulse 94, temperature 98 °F (36 7 °C), resp  rate 17, height 4' 10" (1 473 m), weight 94 3 kg (208 lb), last menstrual period 09/01/2011, SpO2 98 %  ,Body mass index is 43 47 kg/m²        Intake/Output Summary (Last 24 hours) at 12/11/2019 1061  Last data filed at 12/11/2019 8561  Gross per 24 hour   Intake 5378 2 ml   Output 1918 ml   Net 3460 2 ml       Invasive Devices     Peripheral Intravenous Line            Peripheral IV 12/10/19 Left Hand less than 1 day    Peripheral IV 12/10/19 Right Hand less than 1 day          Drain            Closed/Suction Drain Left LLQ Bulb 10 Fr  less than 1 day    Ileostomy Loop RLQ less than 1 day                Physical Exam:   Gen: NAD  CV: RRR  Lungs: nl effort  Abd: soft, obese, tender in LLQ near MARIA T drain, incisions C/D/I    Ostomy pink with dark bowel sweat noted in appliance (no gas)   LLQ MARIA T in place to bulb suction with serosang output, 178 cc in 24 hrs  Ext: no CCE  Skin: no rashes  Neuro: A&Ox3     Lab, Imaging and other studies:  CBC:   Lab Results   Component Value Date     12/10/2019    MPV 10 6 12/10/2019   , CMP: No results found for: SODIUM, K, CL, CO2, ANIONGAP, BUN, CREATININE, GLUCOSE, CALCIUM, AST, ALT, ALKPHOS, PROT, BILITOT, EGFR  VTE Pharmacologic Prophylaxis: Heparin  VTE Mechanical Prophylaxis: sequential compression device

## 2019-12-11 NOTE — SOCIAL WORK
Cm met with patient to explain Cm role and discuss discharge planning  Patient lives alone in 3rd floor apartment with 2 flight of [place]  Patient drives, works, has no DME, and reported being independent with ADLs PTA  Patient's dtr Stefania Noble) is emergency contact, [phone]  Patient denied SNF, VNA, mental health or ETOH treatment in the past   Pharmacy: [facility] in [place]  PCP: [facility]  Patient does not have POA/living will  Patient's family to transport once stable  Cm following  Patient needing VNA services for ostomy care  Patient stated she lives in [place] but will be staying at her dtr's home in [place] upon discharge ([address], [address]) but does not know how long she will be staying there  Patient provided Cm with permission to expand VNA search to both [place] and [place] VNAs  Awaiting determination on VNAs and if any are able to service both areas  A post acute care recommendation was made by your care team for [facility] [facility]  Discussed Freedom of Choice with patient  List of agencies given to patient via in person  patient aware the list is custom filtered for them by preference  and that [facility] post acute providers are designated  CM reviewed d/c planning process including the following: identifying help at home, patient preference for d/c planning needs, Discharge Lounge, [facility] Meds to Bed program, availability of treatment team to discuss questions or concerns patient and/or family may have regarding understanding medications and recognizing signs and symptoms once discharged  CM also encouraged patient to follow up with all recommended appointments after discharge  Patient advised of importance for patient and family to participate in managing patients medical well being

## 2019-12-11 NOTE — OP NOTE
OPERATIVE REPORT  PATIENT NAME: Naeem aTmayo    :  1955  MRN: [id number]  Pt Location: [facility] OR ROOM 14    SURGERY DATE: 12/10/2019    Surgeon(s) and Role:     * Michelle Cantu MD - Primary     * Celena Martin PA-C - Assisting, needed for retraction and assistance  No resident was available for the procedure  Preop Diagnosis:  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]    Post-Op Diagnosis Codes:     * 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]    Procedure(s) (LRB):  SIGMOID RESECTION COLON LAPAROSCOPIC W ROBOTICS converted to lap hand assisted  with Partial proctectomy , LASER FLUORESCENCE ANGIOGRAPHY, INTRA OP COLONOSCOPY (N/A)  ILEOSTOMY LOOP (N/A)    Specimen(s):  ID Type Source Tests Collected by Time Destination   1 : and portion of rectum Tissue Large Intestine, Sigmoid Colon TISSUE EXAM Michelle Cantu MD 12/10/2019 1159    2 : anastamotic rings Tissue Anastomatic site TISSUE EXAM Michelle Cantu MD 12/10/2019 1220        Estimated Blood Loss:   300 mL    Drains:  Closed/Suction Drain Left LLQ Bulb 10 Fr  (Active)   Site Description Unable to view 12/10/2019  6:30 PM   Dressing Status Intact; New drainage 12/10/2019  6:30 PM   Drainage Appearance Bloody 12/10/2019  6:30 PM   Status To bulb suction 12/10/2019  6:30 PM   Intake (mL) 150 mL 12/10/2019  1:50 PM   Output (mL) 50 mL 12/10/2019  4:02 PM   Number of days: 0       Ileostomy Loop RLQ (Active)   Stomal Appliance 1 piece;Clean;Dry; Intact 12/10/2019  6:30 PM   Stoma Assessment [place] 12/10/2019  6:30 PM   Stoma Shape Round 12/10/2019  1:50 PM   Peristomal Assessment Clean; Intact 12/10/2019  6:30 PM   Number of days: 0       Urethral Catheter Non-latex 16 Fr   (Active)   Reasons to continue Urinary Catheter  Post-operative urological requirements 12/10/2019  6:30 PM   Site Assessment Clean;Skin intact 12/10/2019  6:30 PM   Collection Container Standard drainage bag 12/10/2019  6:30 PM   Securement Method Securing device (Describe) 12/10/2019 6:30 PM   Output (mL) 175 mL 12/10/2019  4:02 PM   Number of days: 0       Anesthesia Type:   General    Operative Indications:  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]    Operative Findings:  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[address] ) [A59]    Complications:   None    Procedure and Technique:  The patient was placed in a supine position with legs in [employer]  The arms were positioned at her sides and were doubly cushion with egg crate material at the elbows and hands followed by a wrap of a 2nd layer of egg crate to tuck the arms at her sides with the thumbs facing ventrally  The abdomen was prepped widely using ChloraPrep and allowed to dry completely  The areas were draped in a sterile manner and a time-out was done  Robotic surgery was initiated by 1st positioning a port for an Philippe device at the right upper abdomen  This was a small incision and was intended for use for a specimen excision  It was lateral to the rectus muscle  Through the opening, the Philippe device was positioned and air insufflation was instituted through trocar placed in the Pinky port  A 12 mm right lower quadrant robotic trocar and 3 9 mm trocars were placed in a diagonal line extending from just anteromedial to the ASIS, up to the level to the right of the midline  The robot was docked appropriately  Inspection of the abdominal cavity was unremarkable for any evidence of tumor  Robotic dissection was initiated and was undertaken to mobilize the medial side of the rectosigmoid colon  This allowed for delivery of the rectosigmoid anteriorly and identification of the left ureter and gonadal vessels  Dissection was carried posterior to the rectum,  the mesorectum from surrounding structures  Nerves were protected posteriorly and lateral dissection down through the level of the peritoneal reflection was undertaken  The rectum was  from the superior vagina to deliver the upper rectal tumor more superiorly    The dissection was made difficult to some degree by extensive obesity and a very large mesorectum  We were able to surround the rectum at the level just distal to the cul de sac for transection at a point approximately 4 cm distal to the tumor that was noted in the proximal rectum just distal to the rectosigmoid junction  Before mobilization, the tumor was just above the cul de sac  This patient had a very short pelvis and was quite short herself at 4 feet 10  Dissection was then carried more proximally up to the inferior mesenteric artery which was surrounded and transected using a vessel sealer with double fire on 2 sides of the site that was eventually transected using the vessel sealer  No significant bleeding was and countered during dissection to this point  The mesorectum was transected in a the squared off plane, after total mesenteric excision technique was used down to that point, at the level that was later seen to be at least 4 cm distal to the rectal tumor before fixation of the specimen  Inspection of the tumor itself revealed no evidence of serosal involvement or other abnormalities of the tissues surrounding the tumor itself  A small enterotomy was noted in the anterior rectum just above where the transection point was chosen and was not involved in the selection of the transection level  This opening was made as a course of the procedure and is not considered a complication, given the difficult dissection planes in the patient  The enterotomy was removed with the resected specimen  No fecal material was ever noted during the operation  The transection was done using 3 staple lines of robotic stapler at the level chosen  The 3 staple lines were required due to the low position of the tumor and needs with angles for the stapler itself        Because we had some delay in the dissection due to the adipose tissue in the pelvis and due to extrusion of the trocars when the patient was placed in a lateral position, the robotic procedure was abandoned after transection of the rectum to minimize complications and to expedite the operation  The lower midline was opened using Bovie cautery and a GelPort was inserted to use for a hand assist   Proximal dissection on the colon was carried up to and including the inferior mesenteric vein at the ligament of Treitz  The lateral and superior attachments to the retroperitoneum and omentum were  all the way to the middle colic artery and associated vessels  This created great mobility of descending and splenic flexure of the colon  The ureter was avoided during all the dissection  The bowel was then delivered into the GelPort wound and was seen to be very redundant and allowed for a tension-free anastomosis  Laser fluorescence angiography was performed to allow selection of an optimal point on the descending colon for creation of the proximal side of the anastomosis  The mesentery was sequentially divided and ligated out to that viable bowel  A pursestring device was used to place of 2 0 Prolene pursestring and the anvil of a 29 mm [facility] stapler device was positioned and doubly wrapped with a nice thick full-thickness collar of bowel to create the anastomosis  It was tightened to the anvil of stapler and brought into the pelvis  The GelPort lid was replaced and we insufflation was accomplished  The anastomosis was a double stapled anastomosis with no tension  Flexible sigmoidoscopy revealed viable and pink bowel proximal and distal to the wide and circumferentially intact anastomosis  However, on an air test, 2 single bubbles were identified  I had some difficulty reconfirming any leak but these bubbles were a concern    I therefore used the GelPort incision and placed a Bookwalter retractor to allow direct visualization of the anastomosis during a repeat air test   We repeated the air test using the [facility] and full visualization of the area including the anastomosis  No leak was visualized on this repeat attempt to identify a defect in the anastomosis  The donuts that were seen at the time of the stapling were very thick and actually unusually thick and were circumferentially intact  I felt strongly that this anastomosis would do fine without any further surgery and thought a repeat anastomosis would reduce the size of the rectum and may in fact result in a less good anastomosis  It was my best judgment to leave this very good anastomosis with perhaps a staple hole exit of some air if any, and perform a diverting ileostomy for protection  Again, this was a very minimal finding and the leak was uncertain and the bowel was very healthy and otherwise the anastomosis was perfect  There was no tension on the anastomosis and the bowel was supple and otherwise unremarkable  We insufflated the abdomen again and inspected all our dissection field  No bleeding was identified  The omentum was positioned over the bowel that had been placed in a fashion to avoid torsion or herniation  A 10  Flat Lazaro-Tineo drain was placed in the pelvis and brought out through a left lower quadrant stab incision and tied into position using 3 0 nylon  The trocars were then removed  The right lower quadrant trocar was closed using 0 Vicryl suture placed from inside the abdomen  The lower midline was closed using running 1 PDS suture  The wounds were irrigated and the skin was closed using running or interrupted subcuticular 4 O Monocryl sutures  Prior to the closure of the midline wound, the bowel was inspected and a position approximately 30 cm proximal to the ileocecal valve was chosen for an ileostomy  It was marked for proximal and distal orientation and was brought out through the Pinky port site in the right upper quadrant  After the wounds were closed, and after Histoacryl was applied, the ileostomy was matured    A [place] type ileostomy was created using 3 0 chromic suture, folding back to proximal side of the ileostomy and maturing the distal side flush with the skin  Subcuticular to full-thickness interval sutures were placed to complete the maturation of the stoma  An appliance was positioned and the procedure was concluded  Sponge needle and instrument counts were correct  Wand technique revealed no retained gauze      I was present for the entire procedure    Patient Disposition:  PACU     SIGNATURE: Tanya Barillas MD  DATE: December 10, 2019  TIME: 7:18 PM

## 2019-12-11 NOTE — CONSULTS
Progress Note- Ostomy  Tin Meraz 71 y o  female  [id number]  [facility] 811-01        Assessment: Patient is seen for ostomy care and teaching today   The patient is out of bed in the chair for the assessment of the ostomy   The ileostomy if located in the right lower quadrant of the abdomen   Stoma is moist pink and well budded   Darlene stomal area is covered with 1 piece appliance intact on the skin   Output is loose liquid brown stool   Emptied for 75 cc of brown liquid stool   Patient was very receptive to the information and reported that her mother had a ostomy that she would help change the bags for her at times   Discussed the type of bags , the 3 companies for supplies , measuring of the stoma , appearance of the stoma , opening and closing the bag , emptying the bag and when to empty , hydration , types of foods that may affect the output and foods to avoid   Plan: Will continue to provide teaching and support for ostomy care   Vitals:    12/11/19 1118   BP: 106/56   Pulse: 95   Resp: 18   Temp: 98 5 °F (36 9 °C)   SpO2: 99%             Wound 12/10/19 Incision Abdomen N/A (Active)   Wound Description Clean;Dry; Intact 12/11/2019  7:05 AM   Darlene-wound Assessment Clean;Dry; Intact 12/11/2019  7:05 AM   Closure Approximated; Hystocryl 12/11/2019  7:05 AM   Drainage Amount None 12/11/2019  7:05 AM   Dressing Open to air 12/11/2019  7:05 AM   Number of days: 1             Ileostomy Loop RLQ (Active)   Stomal Appliance 1 piece; Intact 12/11/2019  2:01 PM   Stoma Assessment Atkinson 12/11/2019  2:01 PM   Stoma Shape Round;Budded 12/11/2019  2:01 PM   Peristomal Assessment Intact 12/11/2019  2:01 PM   Output (mL) 0 mL 12/11/2019  6:01 AM   Number of days: [address], RN BSN Pio Turcios

## 2019-12-12 LAB
ANION GAP SERPL CALCULATED.3IONS-SCNC: 3 MMOL/L (ref 4–13)
BUN SERPL-MCNC: 6 MG/DL (ref 5–25)
CALCIUM SERPL-MCNC: 8 MG/DL (ref 8.3–10.1)
CHLORIDE SERPL-SCNC: 115 MMOL/L (ref 100–108)
CO2 SERPL-SCNC: 24 MMOL/L (ref 21–32)
CREAT SERPL-MCNC: 0.82 MG/DL (ref 0.6–1.3)
ERYTHROCYTE [DISTWIDTH] IN BLOOD BY AUTOMATED COUNT: 14.9 % (ref 11.6–15.1)
GFR SERPL CREATININE-BSD FRML MDRD: 75 ML/MIN/1.73SQ M
GLUCOSE SERPL-MCNC: 105 MG/DL (ref 65–140)
HCT VFR BLD AUTO: 29.7 % (ref 34.8–46.1)
HGB BLD-MCNC: 9 G/DL (ref 11.5–15.4)
MCH RBC QN AUTO: 27.4 PG (ref 26.8–34.3)
MCHC RBC AUTO-ENTMCNC: 30.3 G/DL (ref 31.4–37.4)
MCV RBC AUTO: 91 FL (ref 82–98)
PLATELET # BLD AUTO: 159 THOUSANDS/UL (ref 149–390)
PMV BLD AUTO: 10.9 FL (ref 8.9–12.7)
POTASSIUM SERPL-SCNC: 3.9 MMOL/L (ref 3.5–5.3)
RBC # BLD AUTO: 3.28 MILLION/UL (ref 3.81–5.12)
SODIUM SERPL-SCNC: 142 MMOL/L (ref 136–145)
WBC # BLD AUTO: 8.36 THOUSAND/UL (ref 4.31–10.16)

## 2019-12-12 PROCEDURE — 85027 COMPLETE CBC AUTOMATED: CPT | Performed by: SURGERY

## 2019-12-12 PROCEDURE — G8979 MOBILITY GOAL STATUS: HCPCS

## 2019-12-12 PROCEDURE — 80048 BASIC METABOLIC PNL TOTAL CA: CPT | Performed by: SURGERY

## 2019-12-12 PROCEDURE — G8978 MOBILITY CURRENT STATUS: HCPCS

## 2019-12-12 PROCEDURE — G8989 SELF CARE D/C STATUS: HCPCS

## 2019-12-12 PROCEDURE — G8980 MOBILITY D/C STATUS: HCPCS

## 2019-12-12 PROCEDURE — 97530 THERAPEUTIC ACTIVITIES: CPT

## 2019-12-12 PROCEDURE — 97166 OT EVAL MOD COMPLEX 45 MIN: CPT

## 2019-12-12 PROCEDURE — G8988 SELF CARE GOAL STATUS: HCPCS

## 2019-12-12 PROCEDURE — G8987 SELF CARE CURRENT STATUS: HCPCS

## 2019-12-12 PROCEDURE — 97163 PT EVAL HIGH COMPLEX 45 MIN: CPT

## 2019-12-12 RX ORDER — OXYCODONE HYDROCHLORIDE 10 MG/1
10 TABLET ORAL EVERY 4 HOURS PRN
Status: DISCONTINUED | OUTPATIENT
Start: 2019-12-12 | End: 2019-12-14 | Stop reason: HOSPADM

## 2019-12-12 RX ORDER — POTASSIUM CHLORIDE 20 MEQ/1
20 TABLET, EXTENDED RELEASE ORAL ONCE
Status: COMPLETED | OUTPATIENT
Start: 2019-12-12 | End: 2019-12-12

## 2019-12-12 RX ORDER — OXYCODONE HYDROCHLORIDE 5 MG/1
5 TABLET ORAL EVERY 4 HOURS PRN
Status: DISCONTINUED | OUTPATIENT
Start: 2019-12-12 | End: 2019-12-14 | Stop reason: HOSPADM

## 2019-12-12 RX ADMIN — ACETAMINOPHEN 650 MG: 325 TABLET ORAL at 06:15

## 2019-12-12 RX ADMIN — ACETAMINOPHEN 650 MG: 325 TABLET ORAL at 00:40

## 2019-12-12 RX ADMIN — GABAPENTIN 100 MG: 100 CAPSULE ORAL at 21:34

## 2019-12-12 RX ADMIN — POTASSIUM CHLORIDE 20 MEQ: 1500 TABLET, EXTENDED RELEASE ORAL at 10:33

## 2019-12-12 RX ADMIN — METHOCARBAMOL TABLETS 500 MG: 500 TABLET, COATED ORAL at 13:11

## 2019-12-12 RX ADMIN — METHOCARBAMOL TABLETS 500 MG: 500 TABLET, COATED ORAL at 17:11

## 2019-12-12 RX ADMIN — ACETAMINOPHEN 650 MG: 325 TABLET ORAL at 13:11

## 2019-12-12 RX ADMIN — HEPARIN SODIUM 5000 UNITS: 5000 INJECTION INTRAVENOUS; SUBCUTANEOUS at 14:13

## 2019-12-12 RX ADMIN — ACETAMINOPHEN 650 MG: 325 TABLET ORAL at 23:07

## 2019-12-12 RX ADMIN — METHOCARBAMOL TABLETS 500 MG: 500 TABLET, COATED ORAL at 23:07

## 2019-12-12 RX ADMIN — DEXTROSE, SODIUM CHLORIDE, AND POTASSIUM CHLORIDE 75 ML/HR: 5; .45; .15 INJECTION INTRAVENOUS at 06:15

## 2019-12-12 RX ADMIN — HEPARIN SODIUM 5000 UNITS: 5000 INJECTION INTRAVENOUS; SUBCUTANEOUS at 06:14

## 2019-12-12 RX ADMIN — HEPARIN SODIUM 5000 UNITS: 5000 INJECTION INTRAVENOUS; SUBCUTANEOUS at 21:34

## 2019-12-12 RX ADMIN — ACETAMINOPHEN 650 MG: 325 TABLET ORAL at 17:12

## 2019-12-12 RX ADMIN — METHOCARBAMOL TABLETS 500 MG: 500 TABLET, COATED ORAL at 06:14

## 2019-12-12 RX ADMIN — OXYCODONE HYDROCHLORIDE 5 MG: 5 TABLET ORAL at 10:33

## 2019-12-12 RX ADMIN — METHOCARBAMOL TABLETS 500 MG: 500 TABLET, COATED ORAL at 00:40

## 2019-12-12 RX ADMIN — PRAVASTATIN SODIUM 20 MG: 20 TABLET ORAL at 17:12

## 2019-12-12 RX ADMIN — OXYCODONE HYDROCHLORIDE 5 MG: 5 TABLET ORAL at 20:08

## 2019-12-12 NOTE — WOUND OSTOMY CARE
Progress Note- Ostomy  Leanna Levels 71 y o  female  [id number]  [facility] 811-[facility] 811-01        Assessment: Patient is seen for ostomy care and teaching   The patient is in bed for the teaching   Literature is provided and at the bedside   Discussed the following information with the patient   Discussed 1 piece versus 2 piece appliance , appearance of the stoma , measuring of the stoma, merle stomal appearance , when to empty and shown how to empty and close the bag , hydration and dietary review , supplies for at home and the 3 companies with the different types of products ,  Cleansing of the peristomal skin ,showering and activities  Did the first appliance change , peristomal skin is intact , stoma is moist pink well budded in the right lower quadrant   Stoma size is 1 1/2 inch by 1 3/4 inch in size   Patient wanting to try the 2 piece appliance   Reviewed 2 piece moldable and the application   Plan: Will continue to provide support and ostomy care / teaching           Objective:      Vitals:    12/12/19 1512   BP: 133/68   Pulse: (!) 51   Resp: 18   Temp: 98 °F (36 7 °C)   SpO2: 90%             Wound 12/10/19 Incision Abdomen N/A (Active)   Wound Description Clean;Dry; Intact 12/12/2019  8:30 AM   Merle-wound Assessment Clean;Dry; Intact 12/12/2019  8:30 AM   Closure Hystocryl 12/12/2019  8:30 AM   Drainage Amount None 12/11/2019  7:05 AM   Dressing Open to air 12/12/2019  8:30 AM   Number of days: 2            Ileostomy Loop RLQ (Active)   Stomal Appliance 1 piece;2 piece 12/12/2019  5:13 PM   Stoma Assessment Budded;Pink 12/12/2019  5:13 PM   Stoma Shape Round;Budded 12/12/2019  5:13 PM   Peristomal Assessment Clean; Intact 12/12/2019  5:13 PM   Treatment Bag change 12/12/2019  5:13 PM   Output (mL) 250 mL 12/12/2019  5:13 PM   Number of days: [address] PADILLA BSN Sisi Velasquez

## 2019-12-12 NOTE — PLAN OF CARE
Problem: PHYSICAL THERAPY ADULT  Goal: Performs mobility at highest level of function for planned discharge setting  See evaluation for individualized goals  Description     Equipment Recommended: Walker(short wide )       See flowsheet documentation for full assessment, interventions and recommendations  Outcome: Adequate for Discharge  Note:   Prognosis: Good  Problem List: Decreased strength, Decreased endurance, Impaired balance, Obesity, Decreased skin integrity, Pain  Assessment: see below   Barriers to Discharge: None  Barriers to Discharge Comments: pain/ medical sta  Recommendation: Home with family support     PT - OK to Discharge: Yes    See flowsheet documentation for full assessment

## 2019-12-12 NOTE — SOCIAL WORK
Cm reviewed patient during care coordination rounds with [facility]  Patient anticipated for possible discharge tomorrow  Patient needing VNA services for new ostomy  No accepting VNA for both Victoriano BAKER and Syed Escamilla as patient will be discharging to her dtr's home once stable and then returning to her home in [place] once comfortable being on her own again  Cm discussed accepting VNAs with patient  Patient requesting [facility] [address] [place] [address] needing 4-5 days of supplies; RN Chloe Combs aware)  Cm faxed needed clinicals to [facility] [place] for review  [facility] to assist in transferring patient to [facility] VNA once patient returns to her home  Patient aware of this  RW to be ordered tomorrow  Chayo aware script needs to be signed  Cm following

## 2019-12-12 NOTE — PHYSICAL THERAPY NOTE
Physical Therapy Evaluation     Patient Name: Kimberley Ghosh    FZFIH'T Date: 2019     Problem List  Principal Problem:    Rectosigmoid cancer [facility])       Past Medical History  Past Medical History:   Diagnosis Date    Blood in stool     Breast disorder     GERD (gastroesophageal reflux disease)     Hyperlipidemia     PONV (postoperative nausea and vomiting)         Past Surgical History  Past Surgical History:   Procedure Laterality Date    BREAST LUMPECTOMY Right      SECTION      x3    COLONOSCOPY      ILEO LOOP DIVERSION N/A 12/10/2019    Procedure: ILEOSTOMY LOOP;  Surgeon: Erick Leroy MD;  [facility];  Service: Robotics    DE LAP,SURG,COLECTOMY, PARTIAL, W/ANAST N/A 12/10/2019    Procedure: SIGMOID RESECTION COLON LAPAROSCOPIC W ROBOTICS converted to lap hand assisted  with Partial proctectomy , LASER FLUORESCENCE ANGIOGRAPHY, INTRA OP COLONOSCOPY;  Surgeon: Erick Leroy MD;  [facility];  Service: Robotics         19 1210   Note Type   Note type Eval/Treat   Pain Assessment   Pain Assessment 0-10   Pain Score 4   Pain Location Abdomen   Pain Orientation Bilateral   Pain Descriptors Aching; Sore   Pain Frequency Intermittent   Pain Onset Gradual   Clinical Progression Gradually improving   Effect of Pain on Daily Activities limited mobility    Patient's Stated Pain Goal No pain   Hospital Pain Intervention(s) Repositioned; Emotional support; Ambulation/increased activity   Response to Interventions improved post ambualtion    Home Living   Type of [address] One level  ( 2 flights to enter w/ B/L rails)   Home Equipment   (grab bars in BR otherwise none )   Additional Comments PLAN TO D/C TO 1ST FLOOR OF DAUGHTER''S HOME 1-2STE    Prior Function   Level of [place] Independent with ADLs and functional mobility   Lives With Alone   Receives Help From Family;Friend(s)   ADL Assistance Independent   IADLs Independent   Falls in the last 6 months 0  (DRIVES )   Vocational Full time employment   Comments PT REPORTS I AND NOT USING AD PTA- 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    Restrictions/Precautions   Weight Bearing Precautions Per Order No   Other Precautions Pain;Multiple lines  (OBESITY/ NEW COLOSTOMY )   General   Family/Caregiver Present Yes  (friend present )   Cognition   Overall Cognitive Status WFL   Arousal/Participation Alert   Attention Within functional limits   Orientation Level Oriented X4   Memory Within functional limits   Following Commands Follows all commands and directions without difficulty   RUE Assessment   RUE Assessment WFL   LUE Assessment   LUE Assessment WFL   RLE Assessment   RLE Assessment WFL   LLE Assessment   LLE Assessment WFL   Coordination   Movements are Fluid and Coordinated   (slow antalgic )   Bed Mobility   Rolling R 5  Supervision   Rolling L 5  Supervision   Supine to Sit 5  Supervision   Additional items Increased time required;Verbal cues  (for technique )   Sit to Supine 4  Minimal assistance  (for LE's onto bed 2* pain )   Transfers   Sit to Stand 5  Supervision   Stand to Sit 5  Supervision   Stand pivot 5  Supervision  (rw)   Toilet transfer 5  Supervision   Ambulation/Elevation   Gait pattern Excessively slow;Decreased foot clearance; Forward Flexion   Gait Assistance 5  Supervision   Additional items Verbal cues   Assistive Device Rolling walker   Distance 360   Stair Management Assistance 4  Minimal assist   Balance   Static Sitting Good   Dynamic Sitting Fair +   Static Standing Fair +   Dynamic Standing Fair +   Ambulatory Fair  (rw)   Activity Tolerance   Activity Tolerance Patient limited by fatigue;Patient limited by pain   Medical Staff Made Aware CM RN    Nurse Made Aware RN updated - cleared for session    Assessment   Prognosis Good   Problem List Decreased strength;Decreased endurance; Impaired balance;Obesity; Decreased skin integrity;Pain   Assessment see below    Barriers to Discharge None   Barriers to Discharge Comments pain/ medical sta   Goals   Patient Goals go home    Plan   PT Frequency Follow-up visit only  (see below )   Recommendation   Recommendation D/C PT;Home with family support   Equipment Recommended Walker  (short wide )   PT - OK to Discharge Yes   Modified Evangeline Scale   Modified Evangeline Scale 3   Barthel Index   Feeding 10   Bathing 0   Grooming Score 5   Dressing Score 5   Bladder Score 10   Bowels Score 10   Toilet Use Score 10   Transfers (Bed/Chair) Score 10   Mobility (Level Surface) Score 10   Stairs Score 5   Barthel Index Score 75     Pt is 71 y o  female seen for PT evaluation s/p admit to [address] [place] [address] on 12/10/2019  Pt presenting for elective resection 2* dx of rectosigmoid CA  Pt underwent SIGMOID RESECTION COLON LAPAROSCOPIC W ROBOTICS converted to lap hand assisted  with Partial proctectomy , LASER FLUORESCENCE ANGIOGRAPHY, INTRA OP COLONOSCOPY (N/A)  ILEOSTOMY LOOP (N/A) on 12/10/19  PT now consulted for assessment of mobility and d/c needs w/ up as tolerated and ambulate orders  Comorbidities affecting pt's physical performance at time of assessment listed above and significant for post op hypotension and PMhx as above   Personal factors affecting pt at time of IE include: steps to enter environment, limited home support, regression from baseline function; environmental and financial barriers; unable to  Currently perform job functions; adjustment to disability and CA dx inability to perform IADLs, inability to perform ADLs, inability to ambulate household distances, Prior to admission, pt was living alone in a [address] [place] [address] w/ 2 flights of steps to enter and was I w/ all mobility and not using AD; workign FT and driving ; I w/ all ADL's reports having supportive daughter   Plan for d/c to daughter's home to [address] [place] [address] on d/c- will require RW for d/c  Upon evaluation, pt currently is requiring S A for bed skills; S for functional transfers and S for ambulation w/ ' PT provided Tee for sit<>supine 2* abdominal pain and pt reports daughter can assist- PT provided 1x tx session that occurred post eval from [id number] for pt assit to use BR- during subsequent session pt was able to demonstrate log rool to R and supine <>sit w/ S and w/o cues- ambualtes to/ from BR x !10' x2 w/ ophysical assist from PT- pt refusing OOB in chair reporting bed is more comfortable      Pt presents functioning below baseline and currently w/ overall mobility deficits 2* to: decreased LE strength/AROM; limited flexibility;  generalized weakness/ deconditioning; decreased endurance; decreased activity tolerance;gait deviations; pain obesity   Lines; colostomy (Please find additional objective findings from PT assessment regarding body systems outlined above ) From a  PT standpoint pt is cleared for d/c home w/ daughter w/ RW - anticipate no need for home PT as long as pt continues to ambualte at min 4x/ day w/ RW as recommended - please reconsult if status declines- recommend d/c from PT at this time- teaching compelted and pt is safe to d/ home once medically cleared- Pt/ family agreeable to plan and d/c from PT at present - pt states she will ambulate daily as recommended

## 2019-12-12 NOTE — PROGRESS NOTES
Progress Note - Colorectal   Fernando Dad 71 y o  female MRN: [id number]  Unit/Bed#: [facility] 811-01 Encounter: [id number]      Objective:  Patient is doing okay this morning  States that she has incisional pain  There is no nausea, no emesis  She has walked from the bathroom to her bed, has not been out the halls as of yet  She is tolerating clear liquids is  She is having the normal serous drainage from the ileostomy  900 + 1 UA  110 MARIA T  400 ileostomy    Blood pressure 118/68, pulse 95, temperature 98 3 °F (36 8 °C), resp  rate 16, height 4' 10" (1 473 m), weight 94 3 kg (208 lb), last menstrual period 09/01/2011, SpO2 99 %  ,Body mass index is 43 47 kg/m²  Intake/Output Summary (Last 24 hours) at 12/12/2019 0526  Last data filed at 12/11/2019 2244  Gross per 24 hour   Intake 3136 47 ml   Output 1800 ml   Net 1336 47 ml       Invasive Devices     Peripheral Intravenous Line            Peripheral IV 12/10/19 Left Hand 1 day          Drain            Closed/Suction Drain Left LLQ Bulb 10 Fr  1 day    Ileostomy Loop RLQ 1 day                Physical Exam:   Abdomen:  Soft, lower midline wound is clean and dry, Lazaro-Tineo left lower quadrant is light serosanguineous, drain milked x2, ileostomy stoma is pink and healthy there is no air in the bag, serosanguineous fluid in base of bag  Extremities:  No calf tenderness, no pedal edema, venadynes are on and functioning    Lab, Imaging and other studies:  Pending  VTE Pharmacologic Prophylaxis: Heparin  VTE Mechanical Prophylaxis: sequential compression device    Assessment:  POD # 2 robotic sigmoid resection converted to laparoscopic hand assisted partial proctectomy, spy angiography, diverting loop ileostomy - Dr Carlos Bartlett:  1  Add toast and crackers  2  Decrease IV fluids to 75 mL/hour  3  Continue ileostomy teaching and care  4  Patient needs to be out the hallway today and ambulating  5  Continue using the incentive spirometry  6  Check a m  Labs

## 2019-12-12 NOTE — OCCUPATIONAL THERAPY NOTE
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[facility])    Past Medical History  Past Medical History:   Diagnosis Date    Blood in stool     Breast disorder     GERD (gastroesophageal reflux disease)     Hyperlipidemia     PONV (postoperative nausea and vomiting)      Past Surgical History  Past Surgical History:   Procedure Laterality Date    BREAST LUMPECTOMY Right      SECTION      x3    COLONOSCOPY      ILEO LOOP DIVERSION N/A 12/10/2019    Procedure: ILEOSTOMY LOOP;  Surgeon: Tanya Barillas MD;  [facility];  Service: Robotics    TX LAP,SURG,COLECTOMY, PARTIAL, W/ANAST N/A 12/10/2019    Procedure: SIGMOID RESECTION COLON LAPAROSCOPIC W ROBOTICS converted to lap hand assisted  with Partial proctectomy , LASER FLUORESCENCE ANGIOGRAPHY, INTRA OP COLONOSCOPY;  Surgeon: Tanya Barillas MD;  [facility];  Service: Robotics             19 0914   Note Type   Note type Eval only   Restrictions/Precautions   Weight Bearing Precautions Per Order No   Other Precautions Multiple lines;Telemetry; Fall Risk;Pain, MARIA T drain, illeostomy   Pain Assessment   Pain Assessment Craft-Baker FACES   Craft-Baker FACES Pain Rating 4   Pain Type Surgical pain;Acute pain   Pain Location Abdomen   Hospital Pain Intervention(s) Ambulation/increased activity;Repositioned;Rest;Emotional support   Home Living   Type of Home Apartment  (3rd floor apartment without elevator access/two FF stairs to enter -will stay with daughter upon discharge in a bilevel home with 0STE and first floor bed/bathroom )   Home Layout One level   Bathroom Shower/Tub Tub/shower unit   Bathroom Toilet Standard   Bathroom Equipment   (suction cup grab bar)   Bathroom Accessibility Accessible   Home Equipment Grab bars   Prior Function   Level of [place] Independent with ADLs and functional mobility   Lives With Alone Receives Help From Family   ADL Assistance Independent   IADLs Independent   Falls in the last 6 months 0   Vocational Full time employment- at [employer]   Lifestyle   Autonomy I ADL's/IADL's, no AD with functional ambulation, +drives, works full time   Reciprocal Relationships 2 daughters -live local   Service to Others full time [address] staying active   Psychosocial   Psychosocial (WDL) WDL   ADL   Where Assessed Chair   Eating Assistance 7  Independent   Eating Deficit   (clear diet)   Grooming Assistance 7  Independent   UB Bathing Assistance 5  Supervision/Setup   UB Bathing Deficit Setup   LB Bathing Assistance 3  Moderate Assistance   [address] 5  Supervision/Setup   [address] [facility] 2  Maximal [address]  5  Supervision/Setup   Bed Mobility   Additional Comments pt left in chair as received with all needs within reach   Transfers   Sit to Stand 5  Supervision   Additional items Assist x 1   Stand to Sit 5  Supervision   Additional items Assist x 1   Toilet transfer 5  Supervision   Additional items Commode   Functional Mobility   Functional Mobility 5  Supervision   Additional Comments S with IV pole, improved balance with RW short distance functional mobility   Additional items Rolling walker   Balance   Static Sitting Fair +   Static Standing Fair   Dynamic Standing [address],Floors 3,4, & 5 -   Activity Tolerance   Activity Tolerance Patient tolerated treatment well   Medical Staff Made Aware CM   Nurse Made Aware RN cleared pt for therapy   RUE Assessment   RUE Assessment WFL   LUE Assessment   LUE Assessment WFL   Hand Function   Gross Motor Coordination Functional   Fine Motor Coordination Functional   Cognition   Overall Cognitive Status WFL   Arousal/Participation Alert; Cooperative   Attention Within functional limits   Orientation Level Oriented X4   Memory Within functional limits Following Commands Follows all commands and directions without difficulty   Comments pt with good safety/judgement with evaluation   Assessment   Limitation Decreased ADL status; Decreased endurance;Decreased high-level ADLs; Decreased self-care trans   Prognosis Good   Assessment Pt is a 71 y o  female who was [facility] on 12/10/2019 with Rectosigmoid cancer [facility])  S/p sigmoid resection colon partial protectomy on 12/10/19 Pt's problem list also includes PMH of prediabetes, varicose veins obesity  At baseline pt was completing ADL's/IADL's with I, drives, works full time  Pt lives alone in a 3rd floor aparment with 2 full flights to enter -pt reports will stay with daughter in a bilevel-[address] [place] on first floor when discharged  Currently pt requires S UB, mod a LB for overall ADLS and S with RW for functional mobility/transfers  Pt currently presents with impairments in the following categories -steps to enter environment, difficulty performing ADLS and difficulty performing IADLS  activity tolerance, endurance and standing balance/tolerance  These impairments, as well as pt's fatigue, pain and risk for falls  limit pt's ability to safely engage in all baseline areas of occupation, includingbathing, dressing, toileting, functional mobility/transfers, community mobility, laundry , driving, house maintenance, medication management, meal prep, cleaning and work/volunteer work  From [employer] standpoint, recommend home with family support upon D/C  No further acute OT needs indicated at this time - Recommend continued oob for meals, ambulation to/from BR, setup for self care tasks and mobility in hallway with nursing/restorative - d/c from caseload with above recommendations   Goals   Patient Goals go home   Recommendation   OT Discharge Recommendation Home with family support-daughters can assist as needed     Equipment Recommended Bedside commode   OT - OK to Discharge Yes   Barthel Index   Feeding 10 Bathing 0   Grooming Score 5   Dressing Score 5   Bladder Score 10   Bowels Score 10   Toilet Use Score 10   Transfers (Bed/Chair) Score 10   Mobility (Level Surface) Score 10   Stairs Score 0   Barthel Index Score 70   Modified [place] Scale   Modified [place] Scale 3      Melani Mai MOT, OTR/L

## 2019-12-13 LAB
ANION GAP SERPL CALCULATED.3IONS-SCNC: 3 MMOL/L (ref 4–13)
BUN SERPL-MCNC: 5 MG/DL (ref 5–25)
CALCIUM SERPL-MCNC: 8.6 MG/DL (ref 8.3–10.1)
CHLORIDE SERPL-SCNC: 114 MMOL/L (ref 100–108)
CO2 SERPL-SCNC: 24 MMOL/L (ref 21–32)
CREAT SERPL-MCNC: 0.81 MG/DL (ref 0.6–1.3)
GFR SERPL CREATININE-BSD FRML MDRD: 76 ML/MIN/1.73SQ M
GLUCOSE SERPL-MCNC: 96 MG/DL (ref 65–140)
POTASSIUM SERPL-SCNC: 3.7 MMOL/L (ref 3.5–5.3)
SODIUM SERPL-SCNC: 141 MMOL/L (ref 136–145)

## 2019-12-13 PROCEDURE — 80048 BASIC METABOLIC PNL TOTAL CA: CPT | Performed by: PHYSICIAN ASSISTANT

## 2019-12-13 RX ORDER — POTASSIUM CHLORIDE 20 MEQ/1
20 TABLET, EXTENDED RELEASE ORAL ONCE
Status: COMPLETED | OUTPATIENT
Start: 2019-12-13 | End: 2019-12-13

## 2019-12-13 RX ORDER — OXYCODONE HYDROCHLORIDE 5 MG/1
5 TABLET ORAL EVERY 4 HOURS PRN
Qty: 30 TABLET | Refills: 0 | Status: SHIPPED | OUTPATIENT
Start: 2019-12-13 | End: 2019-12-18

## 2019-12-13 RX ADMIN — POTASSIUM CHLORIDE 20 MEQ: 1500 TABLET, EXTENDED RELEASE ORAL at 08:49

## 2019-12-13 RX ADMIN — HEPARIN SODIUM 5000 UNITS: 5000 INJECTION INTRAVENOUS; SUBCUTANEOUS at 22:00

## 2019-12-13 RX ADMIN — ACETAMINOPHEN 650 MG: 325 TABLET ORAL at 17:53

## 2019-12-13 RX ADMIN — OXYCODONE HYDROCHLORIDE 5 MG: 5 TABLET ORAL at 13:55

## 2019-12-13 RX ADMIN — ACETAMINOPHEN 650 MG: 325 TABLET ORAL at 05:10

## 2019-12-13 RX ADMIN — ACETAMINOPHEN 650 MG: 325 TABLET ORAL at 12:22

## 2019-12-13 RX ADMIN — METHOCARBAMOL TABLETS 500 MG: 500 TABLET, COATED ORAL at 17:53

## 2019-12-13 RX ADMIN — GABAPENTIN 100 MG: 100 CAPSULE ORAL at 22:00

## 2019-12-13 RX ADMIN — HEPARIN SODIUM 5000 UNITS: 5000 INJECTION INTRAVENOUS; SUBCUTANEOUS at 13:55

## 2019-12-13 RX ADMIN — ACETAMINOPHEN 650 MG: 325 TABLET ORAL at 23:00

## 2019-12-13 RX ADMIN — METHOCARBAMOL TABLETS 500 MG: 500 TABLET, COATED ORAL at 12:22

## 2019-12-13 RX ADMIN — HEPARIN SODIUM 5000 UNITS: 5000 INJECTION INTRAVENOUS; SUBCUTANEOUS at 05:11

## 2019-12-13 RX ADMIN — METHOCARBAMOL TABLETS 500 MG: 500 TABLET, COATED ORAL at 05:10

## 2019-12-13 RX ADMIN — METHOCARBAMOL TABLETS 500 MG: 500 TABLET, COATED ORAL at 23:00

## 2019-12-13 RX ADMIN — PRAVASTATIN SODIUM 20 MG: 20 TABLET ORAL at 16:16

## 2019-12-13 NOTE — SOCIAL WORK
Cm reviewed patient during care coordination rounds with [facility]  Patient not stable for discharge today but anticipated for possible discharge tomorrow  [facility] to accept (needing 4-5 days of ostomy supplies)  RANDY mcintosh  RW ordered  Patient's family to transport  Cm following

## 2019-12-13 NOTE — PROGRESS NOTES
Lazaro-Tineo drain was removed without difficulty    An E-script was sent to [address] [place] [address] here for oxycodone 5 mg every 4-6 hours as needed for pain  #30 dispensed with no refills  For when patient is discharged this weekend

## 2019-12-13 NOTE — PROGRESS NOTES
Progress Note - Colorectal   Leonid Swift 71 y o  female MRN: [id number]  Unit/Bed#: [facility] 811-01 Encounter: [id number]      Objective:  Patient doing extremely well this morning now  She is out of bed and ambulating the halls  Her ileostomy is functioning well now  She is tolerating a house diet without nausea or vomiting  Patient has ileostomy wound care and teaching  Her Lazaro-Tineo drain is a very light serosanguineous  Patient is voiding well on her own     1900 + 2 UA  95 MARIA T  675 ileostomy    Blood pressure 123/66, pulse 96, temperature 98 °F (36 7 °C), resp  rate 17, height 4' 10" (1 473 m), weight 94 3 kg (208 lb), last menstrual period 09/01/2011, SpO2 98 %  ,Body mass index is 43 47 kg/m²  Intake/Output Summary (Last 24 hours) at 12/13/2019 4908  Last data filed at 12/13/2019 0507  Gross per 24 hour   Intake 1374 6 ml   Output 2670 ml   Net -1295 4 ml       Invasive Devices     Peripheral Intravenous Line            Peripheral IV 12/10/19 Left Hand 2 days          Drain            Closed/Suction Drain Left LLQ Bulb 10 Fr  2 days    Ileostomy Loop RLQ 2 days                Physical Exam:   Abdomen:  Is soft, nondistended, obese, Lazaro-Tineo drain left abdomen is a light serosanguineous  Lower midline wound is clean and dry with incisional tenderness  Ileostomy, with healthy stoma hand thicker dark fluid in the bag  Extremities:  No calf tenderness, no pedal edema    Lab, Imaging and other studies:    VTE Pharmacologic Prophylaxis: Heparin  VTE Mechanical Prophylaxis: sequential compression device    Assessment:  POD # 3 robotic sigmoid resection converted to laparoscopic hand assisted partial proctectomy, spy angiography, diverting loop ileostomy - Dr Kessler Aas:  1  Discontinue Lazaro-Tineo drain  2  Continue out of bed and ambulating the halls  3  Continue incentive spirometry  4  Continue ileostomy teaching and care  5  Case management for DC planning  6   Saline lock IV fluids

## 2019-12-13 NOTE — PLAN OF CARE
Problem: Prexisting or High Potential for Compromised Skin Integrity  Goal: Skin integrity is maintained or improved  Description  INTERVENTIONS:  - Identify patients at risk for skin breakdown  - Assess and monitor skin integrity  - Assess and monitor nutrition and hydration status  - Monitor labs   - Assess for incontinence   - Turn and reposition patient  - Assist with mobility/ambulation  - Relieve pressure over bony prominences  - Avoid friction and shearing  - Provide appropriate hygiene as needed including keeping skin clean and dry  - Evaluate need for skin moisturizer/barrier cream  - Collaborate with interdisciplinary team   - Patient/family teaching  - Consider wound care consult   Outcome: Progressing     Problem: Potential for Falls  Goal: Patient will remain free of falls  Description  INTERVENTIONS:  - Assess patient frequently for physical needs  -  Identify cognitive and physical deficits and behaviors that affect risk of falls    -  [place] fall precautions as indicated by assessment   - Educate patient/family on patient safety including physical limitations  - Instruct patient to call for assistance with activity based on assessment  - Modify environment to reduce risk of injury  - Consider OT/PT consult to assist with strengthening/mobility  Outcome: Progressing     Problem: GASTROINTESTINAL - ADULT  Goal: Minimal or absence of nausea and/or vomiting  Description  INTERVENTIONS:  - Administer IV fluids if ordered to ensure adequate hydration  - Maintain NPO status until nausea and vomiting are resolved  - Nasogastric tube if ordered  - Administer ordered antiemetic medications as needed  - Provide nonpharmacologic comfort measures as appropriate  - Advance diet as tolerated, if ordered  - Consider nutrition services referral to assist patient with adequate nutrition and appropriate food choices  Outcome: Progressing  Goal: Maintains or returns to baseline bowel function  Description  INTERVENTIONS:  - Assess bowel function  - Encourage oral fluids to ensure adequate hydration  - Administer IV fluids if ordered to ensure adequate hydration  - Administer ordered medications as needed  - Encourage mobilization and activity  - Consider nutritional services referral to assist patient with adequate nutrition and appropriate food choices  Outcome: Progressing  Goal: Maintains adequate nutritional intake  Description  INTERVENTIONS:  - Monitor percentage of each meal consumed  - Identify factors contributing to decreased intake, treat as appropriate  - Assist with meals as needed  - Monitor I&O, weight, and lab values if indicated  - Obtain nutrition services referral as needed  Outcome: Progressing     Problem: GENITOURINARY - ADULT  Goal: Maintains or returns to baseline urinary function  Description  INTERVENTIONS:  - Assess urinary function  - Encourage oral fluids to ensure adequate hydration if ordered  - Administer IV fluids as ordered to ensure adequate hydration  - Administer ordered medications as needed  - Offer frequent toileting  - Follow urinary retention protocol if ordered  Outcome: Progressing     Problem: SKIN/TISSUE INTEGRITY - ADULT  Goal: Skin integrity remains intact  Description  INTERVENTIONS  - Identify patients at risk for skin breakdown  - Assess and monitor skin integrity  - Assess and monitor nutrition and hydration status  - Monitor labs (i e  albumin)  - Assess for incontinence   - Turn and reposition patient  - Assist with mobility/ambulation  - Relieve pressure over bony prominences  - Avoid friction and shearing  - Provide appropriate hygiene as needed including keeping skin clean and dry  - Evaluate need for skin moisturizer/barrier cream  - Collaborate with interdisciplinary team (i e  Nutrition, Rehabilitation, etc )   - Patient/family teaching  Outcome: Progressing  Goal: Incision(s), wounds(s) or drain site(s) healing without S/S of infection  Description  INTERVENTIONS  - Assess and document risk factors for skin impairment   - Assess and document dressing, incision, wound bed, drain sites and surrounding tissue  - Consider nutrition services referral as needed  - Oral mucous membranes remain intact  - Provide patient/ family education  Outcome: Progressing     Problem: PAIN - ADULT  Goal: Verbalizes/displays adequate comfort level or baseline comfort level  Description  Interventions:  - Encourage patient to monitor pain and request assistance  - Assess pain using appropriate pain scale  - Administer analgesics based on type and severity of pain and evaluate response  - Implement non-pharmacological measures as appropriate and evaluate response  - Consider cultural and social influences on pain and pain management  - Notify physician/advanced practitioner if interventions unsuccessful or patient reports new pain  Outcome: Progressing     Problem: INFECTION - ADULT  Goal: Absence or prevention of progression during hospitalization  Description  INTERVENTIONS:  - Assess and monitor for signs and symptoms of infection  - Monitor lab/diagnostic results  - Monitor all insertion sites, i e  indwelling lines, tubes, and drains  - Monitor endotracheal if appropriate and nasal secretions for changes in amount and color  - [place] appropriate cooling/warming therapies per order  - Administer medications as ordered  - Instruct and encourage patient and family to use good hand hygiene technique  - Identify and instruct in appropriate isolation precautions for identified infection/condition  Outcome: Progressing     Problem: SAFETY ADULT  Goal: Maintain or return to baseline ADL function  Description  INTERVENTIONS:  -  Assess patient's ability to carry out ADLs; assess patient's baseline for ADL function and identify physical deficits which impact ability to perform ADLs (bathing, care of mouth/teeth, toileting, grooming, dressing, etc )  - Assess/evaluate cause of self-care deficits   - Assess range of motion  - Assess patient's mobility; develop plan if impaired  - Assess patient's need for assistive devices and provide as appropriate  - Encourage maximum independence but intervene and supervise when necessary  - Involve family in performance of ADLs  - Assess for home care needs following discharge   - Consider OT consult to assist with ADL evaluation and planning for discharge  - Provide patient education as appropriate  Outcome: Progressing  Goal: Maintain or return mobility status to optimal level  Description  INTERVENTIONS:  - Assess patient's baseline mobility status (ambulation, transfers, stairs, etc )    - Identify cognitive and physical deficits and behaviors that affect mobility  - Identify mobility aids required to assist with transfers and/or ambulation (gait belt, sit-to-stand, lift, walker, cane, etc )  - [place] fall precautions as indicated by assessment  - Record patient progress and toleration of activity level on Mobility SBAR; progress patient to next Phase/Stage  - Instruct patient to call for assistance with activity based on assessment  - Consider rehabilitation consult to assist with strengthening/weightbearing, etc   Outcome: Progressing     Problem: DISCHARGE PLANNING  Goal: Discharge to home or other facility with appropriate resources  Description  INTERVENTIONS:  - Identify barriers to discharge w/patient and caregiver  - Arrange for needed discharge resources and transportation as appropriate  - Identify discharge learning needs (meds, wound care, etc )  - Arrange for interpretive services to assist at discharge as needed  - Refer to Case Management Department for coordinating discharge planning if the patient needs post-hospital services based on physician/advanced practitioner order or complex needs related to functional status, cognitive ability, or social support system  Outcome: Progressing     Problem: Knowledge Deficit  Goal: Patient/family/caregiver demonstrates understanding of disease process, treatment plan, medications, and discharge instructions  Description  Complete learning assessment and assess knowledge base  Interventions:  - Provide teaching at level of understanding  - Provide teaching via preferred learning methods  Outcome: Progressing     Problem: Nutrition/Hydration-ADULT  Goal: Nutrient/Hydration intake appropriate for improving, restoring or maintaining nutritional needs  Description  Monitor and assess patient's nutrition/hydration status for malnutrition  Collaborate with interdisciplinary team and initiate plan and interventions as ordered  Monitor patient's weight and dietary intake as ordered or per policy  Utilize nutrition screening tool and intervene as necessary  Determine patient's food preferences and provide high-protein, high-caloric foods as appropriate       INTERVENTIONS:  - Monitor oral intake, urinary output, labs, and treatment plans  - Assess nutrition and hydration status and recommend course of action  - Evaluate amount of meals eaten  - Assist patient with eating if necessary   - Allow adequate time for meals  - Recommend/ encourage appropriate diets, oral nutritional supplements, and vitamin/mineral supplements  - Order, calculate, and assess calorie counts as needed  - Recommend, monitor, and adjust tube feedings and TPN/PPN based on assessed needs  - Assess need for intravenous fluids  - Provide specific nutrition/hydration education as appropriate  - Include patient/family/caregiver in decisions related to nutrition  Outcome: Progressing

## 2019-12-14 VITALS
WEIGHT: 208 LBS | HEIGHT: 58 IN | SYSTOLIC BLOOD PRESSURE: 144 MMHG | DIASTOLIC BLOOD PRESSURE: 79 MMHG | BODY MASS INDEX: 43.66 KG/M2 | OXYGEN SATURATION: 91 % | HEART RATE: 89 BPM | TEMPERATURE: 98.2 F | RESPIRATION RATE: 16 BRPM

## 2019-12-14 RX ADMIN — ACETAMINOPHEN 650 MG: 325 TABLET ORAL at 05:36

## 2019-12-14 RX ADMIN — METHOCARBAMOL TABLETS 500 MG: 500 TABLET, COATED ORAL at 05:36

## 2019-12-14 RX ADMIN — HEPARIN SODIUM 5000 UNITS: 5000 INJECTION INTRAVENOUS; SUBCUTANEOUS at 05:36

## 2019-12-14 NOTE — DISCHARGE SUMMARY
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    Discharge Diagnosis: Same    Resolved Problems  Date Reviewed: 10/28/2019    None          Attending: Hernandez Guerrero Physician(s): None    Procedures Performed: No orders of the defined types were placed in this encounter  12/10/19   SIGMOID RESECTION COLON LAPAROSCOPIC W ROBOTICS converted to lap hand assisted  with Partial proctectomy , LASER FLUORESCENCE ANGIOGRAPHY, INTRA OP COLONOSCOPY (N/A)  ILEOSTOMY LOOP (N/A)    Pathology: Pending at time of discharge    Hospital Course: The patient presented on 12/10/19 for scheduled surgery  She underwent the above listed procedure and tolerated well and was transferred to the floor  Diet was advanced to clears on POD#1 and then to regular diet on POD#2 which ximena tolerated well  Pain was controlled on oral analgesics  Mg catheter was removed on POD#1 and she voided spontaneously  She was ambulating without difficulty  MARIA T drain was removed on POD#3 without incident  Wound care was consulted and the patient underwent ostomy teaching  She met discharge criteria on POD#4 and was discharged home with home health care for assistance with ostomy care  She will be seen in follow up in the office  She is also instructed to follow up with her PCP within 1 week of discharge to discuss her recent hospitalization  Condition at Discharge: good     Discharge instructions/Information to patient and family:   See after visit summary for information provided to patient and family        Provisions for Follow-Up Care:  See after visit summary for information related to follow-up care and any pertinent home health orders  Disposition: See After Visit Summary for discharge disposition information  Planned Readmission: No    Discharge Statement   I spent 25 minutes discharging the patient  This time was spent on the day of discharge  I had direct contact with the patient on the day of discharge  Additional documentation is required if more than 30 minutes were spent on discharge  Discharge Medications:  See after visit summary for reconciled discharge medications provided to patient and family

## 2019-12-14 NOTE — PLAN OF CARE
Problem: Prexisting or High Potential for Compromised Skin Integrity  Goal: Skin integrity is maintained or improved  Description  INTERVENTIONS:  - Identify patients at risk for skin breakdown  - Assess and monitor skin integrity  - Assess and monitor nutrition and hydration status  - Monitor labs   - Assess for incontinence   - Turn and reposition patient  - Assist with mobility/ambulation  - Relieve pressure over bony prominences  - Avoid friction and shearing  - Provide appropriate hygiene as needed including keeping skin clean and dry  - Evaluate need for skin moisturizer/barrier cream  - Collaborate with interdisciplinary team   - Patient/family teaching  - Consider wound care consult   Outcome: Adequate for Discharge     Problem: Potential for Falls  Goal: Patient will remain free of falls  Description  INTERVENTIONS:  - Assess patient frequently for physical needs  -  Identify cognitive and physical deficits and behaviors that affect risk of falls    -  [place] fall precautions as indicated by assessment   - Educate patient/family on patient safety including physical limitations  - Instruct patient to call for assistance with activity based on assessment  - Modify environment to reduce risk of injury  - Consider OT/PT consult to assist with strengthening/mobility  Outcome: Adequate for Discharge     Problem: GASTROINTESTINAL - ADULT  Goal: Minimal or absence of nausea and/or vomiting  Description  INTERVENTIONS:  - Administer IV fluids if ordered to ensure adequate hydration  - Maintain NPO status until nausea and vomiting are resolved  - Nasogastric tube if ordered  - Administer ordered antiemetic medications as needed  - Provide nonpharmacologic comfort measures as appropriate  - Advance diet as tolerated, if ordered  - Consider nutrition services referral to assist patient with adequate nutrition and appropriate food choices  Outcome: Adequate for Discharge  Goal: Maintains or returns to baseline bowel function  Description  INTERVENTIONS:  - Assess bowel function  - Encourage oral fluids to ensure adequate hydration  - Administer IV fluids if ordered to ensure adequate hydration  - Administer ordered medications as needed  - Encourage mobilization and activity  - Consider nutritional services referral to assist patient with adequate nutrition and appropriate food choices  Outcome: Adequate for Discharge  Goal: Maintains adequate nutritional intake  Description  INTERVENTIONS:  - Monitor percentage of each meal consumed  - Identify factors contributing to decreased intake, treat as appropriate  - Assist with meals as needed  - Monitor I&O, weight, and lab values if indicated  - Obtain nutrition services referral as needed  Outcome: Adequate for Discharge     Problem: GENITOURINARY - ADULT  Goal: Maintains or returns to baseline urinary function  Description  INTERVENTIONS:  - Assess urinary function  - Encourage oral fluids to ensure adequate hydration if ordered  - Administer IV fluids as ordered to ensure adequate hydration  - Administer ordered medications as needed  - Offer frequent toileting  - Follow urinary retention protocol if ordered  Outcome: Adequate for Discharge     Problem: SKIN/TISSUE INTEGRITY - ADULT  Goal: Skin integrity remains intact  Description  INTERVENTIONS  - Identify patients at risk for skin breakdown  - Assess and monitor skin integrity  - Assess and monitor nutrition and hydration status  - Monitor labs (i e  albumin)  - Assess for incontinence   - Turn and reposition patient  - Assist with mobility/ambulation  - Relieve pressure over bony prominences  - Avoid friction and shearing  - Provide appropriate hygiene as needed including keeping skin clean and dry  - Evaluate need for skin moisturizer/barrier cream  - Collaborate with interdisciplinary team (i e  Nutrition, Rehabilitation, etc )   - Patient/family teaching  Outcome: Adequate for Discharge  Goal: Incision(s), wounds(s) or drain site(s) healing without S/S of infection  Description  INTERVENTIONS  - Assess and document risk factors for skin impairment   - Assess and document dressing, incision, wound bed, drain sites and surrounding tissue  - Consider nutrition services referral as needed  - Oral mucous membranes remain intact  - Provide patient/ family education  Outcome: Adequate for Discharge     Problem: PAIN - ADULT  Goal: Verbalizes/displays adequate comfort level or baseline comfort level  Description  Interventions:  - Encourage patient to monitor pain and request assistance  - Assess pain using appropriate pain scale  - Administer analgesics based on type and severity of pain and evaluate response  - Implement non-pharmacological measures as appropriate and evaluate response  - Consider cultural and social influences on pain and pain management  - Notify physician/advanced practitioner if interventions unsuccessful or patient reports new pain  Outcome: Adequate for Discharge     Problem: INFECTION - ADULT  Goal: Absence or prevention of progression during hospitalization  Description  INTERVENTIONS:  - Assess and monitor for signs and symptoms of infection  - Monitor lab/diagnostic results  - Monitor all insertion sites, i e  indwelling lines, tubes, and drains  - Monitor endotracheal if appropriate and nasal secretions for changes in amount and color  - [place] appropriate cooling/warming therapies per order  - Administer medications as ordered  - Instruct and encourage patient and family to use good hand hygiene technique  - Identify and instruct in appropriate isolation precautions for identified infection/condition  Outcome: Adequate for Discharge     Problem: SAFETY ADULT  Goal: Maintain or return to baseline ADL function  Description  INTERVENTIONS:  -  Assess patient's ability to carry out ADLs; assess patient's baseline for ADL function and identify physical deficits which impact ability to perform ADLs (bathing, care of mouth/teeth, toileting, grooming, dressing, etc )  - Assess/evaluate cause of self-care deficits   - Assess range of motion  - Assess patient's mobility; develop plan if impaired  - Assess patient's need for assistive devices and provide as appropriate  - Encourage maximum independence but intervene and supervise when necessary  - Involve family in performance of ADLs  - Assess for home care needs following discharge   - Consider OT consult to assist with ADL evaluation and planning for discharge  - Provide patient education as appropriate  Outcome: Adequate for Discharge  Goal: Maintain or return mobility status to optimal level  Description  INTERVENTIONS:  - Assess patient's baseline mobility status (ambulation, transfers, stairs, etc )    - Identify cognitive and physical deficits and behaviors that affect mobility  - Identify mobility aids required to assist with transfers and/or ambulation (gait belt, sit-to-stand, lift, walker, cane, etc )  - [place] fall precautions as indicated by assessment  - Record patient progress and toleration of activity level on Mobility SBAR; progress patient to next Phase/Stage  - Instruct patient to call for assistance with activity based on assessment  - Consider rehabilitation consult to assist with strengthening/weightbearing, etc   Outcome: Adequate for Discharge     Problem: DISCHARGE PLANNING  Goal: Discharge to home or other facility with appropriate resources  Description  INTERVENTIONS:  - Identify barriers to discharge w/patient and caregiver  - Arrange for needed discharge resources and transportation as appropriate  - Identify discharge learning needs (meds, wound care, etc )  - Arrange for interpretive services to assist at discharge as needed  - Refer to Case Management Department for coordinating discharge planning if the patient needs post-hospital services based on physician/advanced practitioner order or complex needs related to functional status, cognitive ability, or social support system  Outcome: Adequate for Discharge     Problem: Knowledge Deficit  Goal: Patient/family/caregiver demonstrates understanding of disease process, treatment plan, medications, and discharge instructions  Description  Complete learning assessment and assess knowledge base  Interventions:  - Provide teaching at level of understanding  - Provide teaching via preferred learning methods  Outcome: Adequate for Discharge     Problem: Nutrition/Hydration-ADULT  Goal: Nutrient/Hydration intake appropriate for improving, restoring or maintaining nutritional needs  Description  Monitor and assess patient's nutrition/hydration status for malnutrition  Collaborate with interdisciplinary team and initiate plan and interventions as ordered  Monitor patient's weight and dietary intake as ordered or per policy  Utilize nutrition screening tool and intervene as necessary  Determine patient's food preferences and provide high-protein, high-caloric foods as appropriate       INTERVENTIONS:  - Monitor oral intake, urinary output, labs, and treatment plans  - Assess nutrition and hydration status and recommend course of action  - Evaluate amount of meals eaten  - Assist patient with eating if necessary   - Allow adequate time for meals  - Recommend/ encourage appropriate diets, oral nutritional supplements, and vitamin/mineral supplements  - Order, calculate, and assess calorie counts as needed  - Recommend, monitor, and adjust tube feedings and TPN/PPN based on assessed needs  - Assess need for intravenous fluids  - Provide specific nutrition/hydration education as appropriate  - Include patient/family/caregiver in decisions related to nutrition  Outcome: Adequate for Discharge

## 2019-12-14 NOTE — DISCHARGE INSTRUCTIONS
Please call your PCP to arrange an appointment to be seen within 1 week of discharge to discuss your recent hospitalization

## 2019-12-14 NOTE — PROGRESS NOTES
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[facility])  Assessment & Plan  59-year-old female with rectosigmoid cancer now postop day for from robotic converted to laparoscopic hand assisted sigmoid resection, partial proctectomy and diverting loop ileostomy doing well ready for discharge today  Plan:  Regular diet  Discharge today, patient will go home with home health care                 Subjective/Objective     Subjective: No acute events overnight  Doing well  Urinating, ostomy functioning  Pain is controlled, mainly incisional pain  Objective:     Blood pressure 122/59, pulse 89, temperature 98 3 °F (36 8 °C), resp  rate 18, height 4' 10" (1 473 m), weight 94 3 kg (208 lb), last menstrual period 09/01/2011, SpO2 95 %  ,Body mass index is 43 47 kg/m²  Intake/Output Summary (Last 24 hours) at 12/14/2019 0358  Last data filed at 12/13/2019 2300  Gross per 24 hour   Intake 960 ml   Output 1395 ml   Net -435 ml       Invasive Devices     Peripheral Intravenous Line            Peripheral IV 12/10/19 Left Hand 3 days          Drain            Ileostomy Loop RLQ 3 days                Physical Exam:   Gen:  Well-developed, obese female  in NAD  CV: RRR, distal pulses intact  Lungs: Normal respiratory effort  Abd: soft, nontender, nondistended, surgical incisions clean dry and intact; ileostomy in place w/ air and stool in the bag  Neuro:  AxO x3          Lab, Imaging and other studies:  CBC: No results found for: WBC, HGB, HCT, MCV, PLT, ADJUSTEDWBC, MCH, MCHC, RDW, MPV, NRBC, CMP:   Lab Results   Component Value Date    SODIUM 141 12/13/2019    K 3 7 12/13/2019     (H) 12/13/2019    CO2 24 12/13/2019    BUN 5 12/13/2019    CREATININE 0 81 12/13/2019    CALCIUM 8 6 12/13/2019    EGFR 76 12/13/2019     VTE Pharmacologic Prophylaxis: Heparin  VTE Mechanical Prophylaxis: sequential compression device

## 2019-12-16 ENCOUNTER — TRANSITIONAL CARE MANAGEMENT (OUTPATIENT)
Dept: FAMILY MEDICINE CLINIC | Facility: CLINIC | Age: 64
End: 2019-12-16

## 2020-01-06 ENCOUNTER — OFFICE VISIT (OUTPATIENT)
Dept: FAMILY MEDICINE CLINIC | Facility: CLINIC | Age: 65
End: 2020-01-06
Payer: MEDICARE

## 2020-01-06 VITALS
SYSTOLIC BLOOD PRESSURE: 124 MMHG | WEIGHT: 196 LBS | RESPIRATION RATE: 18 BRPM | HEIGHT: 58 IN | HEART RATE: 100 BPM | OXYGEN SATURATION: 98 % | BODY MASS INDEX: 41.14 KG/M2 | DIASTOLIC BLOOD PRESSURE: 78 MMHG | TEMPERATURE: 97.6 F

## 2020-01-06 DIAGNOSIS — E66.01 MORBID OBESITY (HCC): ICD-10-CM

## 2020-01-06 DIAGNOSIS — Z00.00 MEDICARE ANNUAL WELLNESS VISIT, INITIAL: ICD-10-CM

## 2020-01-06 DIAGNOSIS — C19 RECTOSIGMOID CANCER (HCC): Primary | ICD-10-CM

## 2020-01-06 DIAGNOSIS — E78.49 OTHER HYPERLIPIDEMIA: ICD-10-CM

## 2020-01-06 PROBLEM — E78.5 DYSLIPIDEMIA: Status: ACTIVE | Noted: 2019-10-30

## 2020-01-06 PROBLEM — C18.7 CARCINOMA OF SIGMOID COLON (HCC): Status: ACTIVE | Noted: 2019-10-30

## 2020-01-06 PROBLEM — C20 RECTAL CANCER (HCC): Status: ACTIVE | Noted: 2020-01-06

## 2020-01-06 PROCEDURE — 99213 OFFICE O/P EST LOW 20 MIN: CPT | Performed by: NURSE PRACTITIONER

## 2020-01-06 PROCEDURE — G0402 INITIAL PREVENTIVE EXAM: HCPCS | Performed by: NURSE PRACTITIONER

## 2020-01-06 PROCEDURE — 1123F ACP DISCUSS/DSCN MKR DOCD: CPT | Performed by: NURSE PRACTITIONER

## 2020-01-06 NOTE — PROGRESS NOTES
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    Relevant Orders    CBC and Platelet    Comprehensive metabolic panel    Lipid Panel with Direct LDL reflex       Other    Hyperlipidemia    Relevant Orders    CBC and Platelet    Comprehensive metabolic panel    Lipid Panel with Direct LDL reflex    Morbid obesity ([facility] )      Other Visit Diagnoses     Medicare annual wellness visit, initial               Preventive health issues were discussed with patient, and age appropriate screening tests were ordered as noted in patient's After Visit Summary  Personalized health advice and appropriate referrals for health education or preventive services given if needed, as noted in patient's After Visit Summary       History of Present Illness:     Patient presents for Medicare Annual Wellness visit    Patient Care Team:  Estelle Moseley as PCP - General (Family Medicine)  Shaylee Mathews MD     Problem List:     Patient Active Problem List   Diagnosis    Callus of foot    Foot pain    GERD (gastroesophageal reflux disease)    Hyperlipidemia    Prediabetes    Varicose veins with pain    Morbid obesity ([facility] )    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[facility] )    Dyspnea on exertion    Carcinoma of sigmoid colon ([facility] )    Dyslipidemia    Rectal cancer ([facility] )      Past Medical and Surgical History:     Past Medical History:   Diagnosis Date    Blood in stool     Breast disorder     GERD (gastroesophageal reflux disease)     Hyperlipidemia     PONV (postoperative nausea and vomiting)      Past Surgical History:   Procedure Laterality Date    BREAST LUMPECTOMY Right      SECTION      x3    COLONOSCOPY      ILEO LOOP DIVERSION N/A 12/10/2019    Procedure: ILEOSTOMY LOOP;  Surgeon: All Brownlee MD;  [facility]: [facility];  Service: Robotics    AK LAP,SURG,COLECTOMY, PARTIAL, Theadorchanelle Marcano N/A 12/10/2019    Procedure: SIGMOID RESECTION COLON LAPAROSCOPIC W ROBOTICS converted to lap hand assisted  with Partial proctectomy , LASER FLUORESCENCE ANGIOGRAPHY, INTRA OP COLONOSCOPY;  Surgeon: Anitha Cooper MD;  Location: [facility];  Service: Robotics      Family History:     Family History   Problem Relation Age of Onset    Hypertension Mother     Heart attack Mother     Heart attack Father     Heart disease Father     Hypertension Brother     Heart attack Brother     Leukemia Cousin     Breast cancer Cousin     Diabetes Cousin     Breast cancer Family         maternal side    Colon cancer Family         paternal uncle    Colon cancer Paternal Uncle     Stroke Neg Hx       Social History:     Social History     Socioeconomic History    Marital status:      Spouse name: None    Number of children: None    Years of education: None    Highest education level: None   Occupational History    None   Social Needs    Financial resource strain: None    Food insecurity:     Worry: None     Inability: None    Transportation needs:     Medical: None     Non-medical: None   Tobacco Use    Smoking status: Never Smoker    Smokeless tobacco: Never Used   Substance and Sexual Activity    Alcohol use: Yes     Frequency: 2-4 times a month    Drug use: Never    Sexual activity: Not Currently     Partners: Male     Birth control/protection: Post-menopausal   Lifestyle    Physical activity:     Days per week: None     Minutes per session: None    Stress: None   Relationships    Social connections:     Talks on phone: None     Gets together: None     Attends Christian service: None     Active member of club or organization: None     Attends meetings of clubs or organizations: None     Relationship status: None    Intimate partner violence:     Fear of current or ex partner: None     Emotionally abused: None     Physically abused: None     Forced sexual activity: None   Other Topics Concern    None   Social History Narrative    None       Medications and Allergies:     Current Outpatient Medications   Medication Sig Dispense Refill    rosuvastatin (CRESTOR) 20 MG tablet Take 1 tablet (20 mg total) by mouth daily 90 tablet 3     No current facility-administered medications for this visit  Allergies   Allergen Reactions    Medical Tape      Skin irritation  Skin peeling      Immunizations:     Immunization History   Administered Date(s) Administered    Influenza Quadrivalent Preservative Free 3 years and older IM 10/26/2016      Health Maintenance:         Topic Date Due    MAMMOGRAM  05/06/2020    CRC Screening: Colonoscopy  09/23/2020    Cervical Cancer Screening  04/01/2022    Hepatitis C Screening  Completed         Topic Date Due    Pneumococcal Vaccine: 65+ Years (1 of 2 - PCV13) 03/17/2019    Influenza Vaccine  07/01/2019      Medicare Health Risk Assessment:     /78   Pulse 100   Temp 97 6 °F (36 4 °C)   Resp 18   Ht 4' 10" (1 473 m)   Wt 88 9 kg (196 lb)   LMP 09/01/2011 (Approximate)   SpO2 98%   BMI 40 96 kg/m²      Rosales Eagle is here for her Initial Wellness visit  Health Risk Assessment:   Patient rates overall health as very good  Patient feels that their physical health rating is same  Eyesight was rated as same  Hearing was rated as same  Patient feels that their emotional and mental health rating is same  Pain experienced in the last 7 days has been some  Patient's pain rating has been 2/10  Patient states that she has experienced no weight loss or gain in last 6 months  Depression Screening:   PHQ-2 Score: 0      Fall Risk Screening: In the past year, patient has experienced: no history of falling in past year      Urinary Incontinence Screening:   Patient has leaked urine accidently in the last six months  Home Safety:  Patient does not have trouble with stairs inside or outside of their home  Patient has working smoke alarms and has working carbon monoxide detector   Home safety hazards include: household clutter  Nutrition:   Current diet is Regular  Medications:   Patient is not currently taking any over-the-counter supplements  Patient is able to manage medications  Activities of Daily Living (ADLs)/Instrumental Activities of Daily Living (IADLs):   Walk and transfer into and out of bed and chair?: Yes  Dress and groom yourself?: Yes    Bathe or shower yourself?: Yes    Feed yourself? Yes  Do your laundry/housekeeping?: Yes  Manage your money, pay your bills and track your expenses?: Yes  Make your own meals?: Yes    Do your own shopping?: Yes    Previous Hospitalizations:   Any hospitalizations or ED visits within the last 12 months?: Yes    How many hospitalizations have you had in the last year?: 1-2    Hospitalization Comments: surgery    Advance Care Planning:   Living will: No    Durable POA for healthcare: No    Advanced directive: No    Advanced directive counseling given: Yes      Cognitive Screening:   Provider or family/friend/caregiver concerned regarding cognition?: No    PREVENTIVE SCREENINGS      Cardiovascular Screening:    General: History Lipid Disorder    Due for: Lipid Panel      Diabetes Screening:     General: Screening Current    Due for: Blood Glucose      Colorectal Cancer Screening:     General: Screening Current and History Colorectal Cancer      Breast Cancer Screening:     General: Screening Current      Cervical Cancer Screening:    General: Screening Current      Osteoporosis Screening:    General: Screening Current      Abdominal Aortic Aneurysm (AAA) Screening:        General: Screening Not Indicated      Lung Cancer Screening:     General: Screening Not Indicated      Hepatitis C Screening:    General: Screening Current    Other Counseling Topics:   Alcohol use counseling, car/seat belt/driving safety, skin self-exam, sunscreen and calcium and vitamin D intake and regular weightbearing exercise   Refused flu and pneumonia vaccine      DEEPAK Crawford

## 2020-01-06 NOTE — PROGRESS NOTES
Assessment/Plan:    1  Rectosigmoid cancer [facility])  Comments:  management as per colorectal surgeon  Orders:  -     CBC and Platelet; Future  -     Comprehensive metabolic panel; Future  -     Lipid Panel with Direct LDL reflex; Future    2  Other hyperlipidemia  Comments:  will do blood work this week and will decide on dose and will call  Orders:  -     CBC and Platelet; Future  -     Comprehensive metabolic panel; Future  -     Lipid Panel with Direct LDL reflex; Future    3  Morbid obesity ([address] )    4  Medicare annual wellness visit, initial          BMI Counseling: Body mass index is 40 96 kg/m²  Discussed the patient's BMI with her  The BMI is above normal  Nutrition recommendations include reducing portion sizes, decreasing overall calorie intake, 3-5 servings of fruits/vegetables daily, reducing fast food intake, consuming healthier snacks, decreasing soda and/or juice intake and moderation in carbohydrate intake  Exercise recommendations include exercising 3-5 times per week, joining a gym and strength training exercises  Patient Instructions:  Supportive care discussed and advised  Advised to RTO for any worsening and no improvement  Follow up for no improvement and worsening of conditions  Patient advised and educated when to see immediate medical care  Return in about 6 months (around 7/6/2020)  Future Appointments   Date Time Provider Colton Cunningham   2/3/2020  2:30 PM Nallely Clement MD [facility]  [facility]           Subjective:      Patient ID: Vince Griffith is a 71 y o  female  Chief Complaint   Patient presents with    Follow-up     surgery  [facility]         Vitals:  /78   Pulse 100   Temp 97 6 °F (36 4 °C)   Resp 18   Ht 4' 10" (1 473 m)   Wt 88 9 kg (196 lb)   LMP 09/01/2011 (Approximate)   SpO2 98%   BMI 40 96 kg/m²     HPI  Patient is here for follow up  Had rectosigmoid colon, low anterior resection done on 12/10/19    Has follow up with surgeon tonight to discuss about chemo  Stated that doing very well and denies any pain  Stated that on and off with crestor and due for blood work  PHQ-9 Depression Screening    PHQ-9:    Frequency of the following problems over the past two weeks:       Little interest or pleasure in doing things:  0 - not at all  Feeling down, depressed, or hopeless:  0 - not at all  PHQ-2 Score:  0             The following portions of the patient's history were reviewed and updated as appropriate: allergies, current medications, past family history, past medical history, past social history, past surgical history and problem list       Review of Systems   Constitutional: Negative  Respiratory: Negative  Cardiovascular: Negative  Gastrointestinal: Negative  Genitourinary: Negative  Musculoskeletal: Negative  Skin: Negative  Neurological: Negative  Psychiatric/Behavioral: Negative  Objective:    Social History     Tobacco Use   Smoking Status Never Smoker   Smokeless Tobacco Never Used       Allergies: Allergies   Allergen Reactions    Medical Tape      Skin irritation  Skin peeling         Current Outpatient Medications   Medication Sig Dispense Refill    rosuvastatin (CRESTOR) 20 MG tablet Take 1 tablet (20 mg total) by mouth daily 90 tablet 3     No current facility-administered medications for this visit  Physical Exam   Constitutional: She appears well-developed and well-nourished  Cardiovascular: Normal rate, regular rhythm and normal heart sounds  Pulmonary/Chest: Effort normal and breath sounds normal    Abdominal: Soft  Bowel sounds are normal  She exhibits no distension  There is no tenderness  Musculoskeletal: Normal range of motion  She exhibits no edema, tenderness or deformity  Neurological: She is alert  Skin: Skin is warm and dry  Psychiatric: She has a normal mood and affect   Her behavior is normal  Judgment and thought content normal  BARB SuarezNP

## 2020-01-06 NOTE — PATIENT INSTRUCTIONS
Medicare Preventive Visit Patient Instructions  Thank you for completing your Welcome to Medicare Visit or Medicare Annual Wellness Visit today  Your next wellness visit will be due in one year (1/6/2021)  The screening/preventive services that you may require over the next 5-10 years are detailed below  Some tests may not apply to you based off risk factors and/or age  Screening tests ordered at today's visit but not completed yet may show as past due  Also, please note that scanned in results may not display below  Preventive Screenings:  Service Recommendations Previous Testing/Comments   Colorectal Cancer Screening  * Colonoscopy    * Fecal Occult Blood Test (FOBT)/Fecal Immunochemical Test (FIT)  * Fecal DNA/Cologuard Test  * Flexible Sigmoidoscopy Age: 54-65 years old   Colonoscopy: every 10 years (may be performed more frequently if at higher risk)  OR  FOBT/FIT: every 1 year  OR  Cologuard: every 3 years  OR  Sigmoidoscopy: every 5 years  Screening may be recommended earlier than age 48 if at higher risk for colorectal cancer  Also, an individualized decision between you and your healthcare provider will decide whether screening between the ages of 74-80 would be appropriate  Colonoscopy: 09/23/2019  FOBT/FIT: Not on file  Cologuard: Not on file  Sigmoidoscopy: Not on file    Screening Current  History Colorectal Cancer     Breast Cancer Screening Age: 36 years old  Frequency: every 1-2 years  Not required if history of left and right mastectomy Mammogram: 05/06/2019    Screening Current   Cervical Cancer Screening Between the ages of 21-29, pap smear recommended once every 3 years  Between the ages of 33-67, can perform pap smear with HPV co-testing every 5 years     Recommendations may differ for women with a history of total hysterectomy, cervical cancer, or abnormal pap smears in past  Pap Smear: 04/01/2019    Screening Not Indicated   Hepatitis C Screening Once for adults born between 1945 and 1965  More frequently in patients at high risk for Hepatitis C Hep C Antibody: 04/15/2019    Screening Current   Diabetes Screening 1-2 times per year if you're at risk for diabetes or have pre-diabetes Fasting glucose: No results in last 5 years   A1C: 5 6 %    Screening Current   Cholesterol Screening Once every 5 years if you don't have a lipid disorder  May order more often based on risk factors  Lipid panel: 04/15/2019    Screening Not Indicated  History Lipid Disorder     Other Preventive Screenings Covered by Medicare:  1  Abdominal Aortic Aneurysm (AAA) Screening: covered once if your at risk  You're considered to be at risk if you have a family history of AAA  2  Lung Cancer Screening: covers low dose CT scan once per year if you meet all of the following conditions: (1) Age 50-69; (2) No signs or symptoms of lung cancer; (3) Current smoker or have quit smoking within the last 15 years; (4) You have a tobacco smoking history of at least 30 pack years (packs per day multiplied by number of years you smoked); (5) You get a written order from a healthcare provider  3  Glaucoma Screening: covered annually if you're considered high risk: (1) You have diabetes OR (2) Family history of glaucoma OR (3)  aged 48 and older OR (3)  American aged 72 and older  3  Osteoporosis Screening: covered every 2 years if you meet one of the following conditions: (1) You're estrogen deficient and at risk for osteoporosis based off medical history and other findings; (2) Have a vertebral abnormality; (3) On glucocorticoid therapy for more than 3 months; (4) Have primary hyperparathyroidism; (5) On osteoporosis medications and need to assess response to drug therapy  · Last bone density test (DXA Scan): 05/06/2019   5  HIV Screening: covered annually if you're between the age of 15-65  Also covered annually if you are younger than 13 and older than 72 with risk factors for HIV infection   For pregnant patients, it is covered up to 3 times per pregnancy  Immunizations:  Immunization Recommendations   Influenza Vaccine Annual influenza vaccination during flu season is recommended for all persons aged >= 6 months who do not have contraindications   Pneumococcal Vaccine (Prevnar and Pneumovax)  * Prevnar = PCV13  * Pneumovax = PPSV23   Adults 25-60 years old: 1-3 doses may be recommended based on certain risk factors  Adults 72 years old: Prevnar (PCV13) vaccine recommended followed by Pneumovax (PPSV23) vaccine  If already received PPSV23 since turning 65, then PCV13 recommended at least one year after PPSV23 dose  Hepatitis B Vaccine 3 dose series if at intermediate or high risk (ex: diabetes, end stage renal disease, liver disease)   Tetanus (Td) Vaccine - COST NOT COVERED BY MEDICARE PART B Following completion of primary series, a booster dose should be given every 10 years to maintain immunity against tetanus  Td may also be given as tetanus wound prophylaxis  Tdap Vaccine - COST NOT COVERED BY MEDICARE PART B Recommended at least once for all adults  For pregnant patients, recommended with each pregnancy  Shingles Vaccine (Shingrix) - COST NOT COVERED BY MEDICARE PART B  2 shot series recommended in those aged 48 and above     Health Maintenance Due:      Topic Date Due    MAMMOGRAM  05/06/2020    CRC Screening: Colonoscopy  09/23/2020    Cervical Cancer Screening  04/01/2022    Hepatitis C Screening  Completed     Immunizations Due:      Topic Date Due    Pneumococcal Vaccine: 65+ Years (1 of 2 - PCV13) 03/17/2019    Influenza Vaccine  07/01/2019     Advance Directives   What are advance directives? Advance directives are legal documents that state your wishes and plans for medical care  These plans are made ahead of time in case you lose your ability to make decisions for yourself   Advance directives can apply to any medical decision, such as the treatments you want, and if you want to donate organs  What are the types of advance directives? There are many types of advance directives, and each state has rules about how to use them  You may choose a combination of any of the following:  · Living will: This is a written record of the treatment you want  You can also choose which treatments you do not want, which to limit, and which to stop at a certain time  This includes surgery, medicine, IV fluid, and tube feedings  · Durable power of  for healthcare [facility]): This is a written record that states who you want to make healthcare choices for you when you are unable to make them for yourself  This person, called a proxy, is usually a family member or a friend  You may choose more than 1 proxy  · Do not resuscitate (DNR) order:  A DNR order is used in case your heart stops beating or you stop breathing  It is a request not to have certain forms of treatment, such as CPR  A DNR order may be included in other types of advance directives  · Medical directive: This covers the care that you want if you are in a coma, near death, or unable to make decisions for yourself  You can list the treatments you want for each condition  Treatment may include pain medicine, surgery, blood transfusions, dialysis, IV or tube feedings, and a ventilator (breathing machine)  · Values history: This document has questions about your views, beliefs, and how you feel and think about life  This information can help others choose the care that you would choose  Why are advance directives important? An advance directive helps you control your care  Although spoken wishes may be used, it is better to have your wishes written down  Spoken wishes can be misunderstood, or not followed  Treatments may be given even if you do not want them  An advance directive may make it easier for your family to make difficult choices about your care     Urinary Incontinence   Urinary incontinence (UI)  is when you lose control of your bladder  UI develops because your bladder cannot store or empty urine properly  The 3 most common types of UI are stress incontinence, urge incontinence, or both  Medicines:   · May be given to help strengthen your bladder control  Report any side effects of medication to your healthcare provider  Do pelvic muscle exercises often:  Your pelvic muscles help you stop urinating  Squeeze these muscles tight for 5 seconds, then relax for 5 seconds  Gradually work up to squeezing for 10 seconds  Do 3 sets of 15 repetitions a day, or as directed  This will help strengthen your pelvic muscles and improve bladder control  Train your bladder:  Go to the bathroom at set times, such as every 2 hours, even if you do not feel the urge to go  You can also try to hold your urine when you feel the urge to go  For example, hold your urine for 5 minutes when you feel the urge to go  As that becomes easier, hold your urine for 10 minutes  Self-care:   · Keep a UI record  Write down how often you leak urine and how much you leak  Make a note of what you were doing when you leaked urine  · Drink liquids as directed  You may need to limit the amount of liquid you drink to help control your urine leakage  Do not drink any liquid right before you go to bed  Limit or do not have drinks that contain caffeine or alcohol  · Prevent constipation  Eat a variety of high-fiber foods  Good examples are high-fiber cereals, beans, vegetables, and whole-grain breads  Walking is the best way to trigger your intestines to have a bowel movement  · Exercise regularly and maintain a healthy weight  Weight loss and exercise will decrease pressure on your bladder and help you control your leakage  · Use a catheter as directed  to help empty your bladder  A catheter is a tiny, plastic tube that is put into your bladder to drain your urine  · Go to behavior therapy as directed    Behavior therapy may be used to help you learn to control your urge to urinate  Weight Management   Why it is important to manage your weight:  Being overweight increases your risk of health conditions such as heart disease, high blood pressure, type 2 diabetes, and certain types of cancer  It can also increase your risk for osteoarthritis, sleep apnea, and other respiratory problems  Aim for a slow, steady weight loss  Even a small amount of weight loss can lower your risk of health problems  How to lose weight safely:  A safe and healthy way to lose weight is to eat fewer calories and get regular exercise  You can lose up about 1 pound a week by decreasing the number of calories you eat by 500 calories each day  Healthy meal plan for weight management:  A healthy meal plan includes a variety of foods, contains fewer calories, and helps you stay healthy  A healthy meal plan includes the following:  · Eat whole-grain foods more often  A healthy meal plan should contain fiber  Fiber is the part of grains, fruits, and vegetables that is not broken down by your body  Whole-grain foods are healthy and provide extra fiber in your diet  Some examples of whole-grain foods are whole-wheat breads and pastas, oatmeal, brown rice, and bulgur  · Eat a variety of vegetables every day  Include dark, leafy greens such as spinach, kale, devin greens, and mustard greens  Eat yellow and orange vegetables such as carrots, sweet potatoes, and winter squash  · Eat a variety of fruits every day  Choose fresh or canned fruit (canned in its own juice or light syrup) instead of juice  Fruit juice has very little or no fiber  · Eat low-fat dairy foods  Drink fat-free (skim) milk or 1% milk  Eat fat-free yogurt and low-fat cottage cheese  Try low-fat cheeses such as mozzarella and other reduced-fat cheeses  · Choose meat and other protein foods that are low in fat  Choose beans or other legumes such as split peas or lentils   Choose fish, skinless poultry (chicken or turkey), or lean cuts of red meat (beef or pork)  Before you cook meat or poultry, cut off any visible fat  · Use less fat and oil  Try baking foods instead of frying them  Add less fat, such as margarine, sour cream, regular salad dressing and mayonnaise to foods  Eat fewer high-fat foods  Some examples of high-fat foods include french fries, doughnuts, ice cream, and cakes  · Eat fewer sweets  Limit foods and drinks that are high in sugar  This includes candy, cookies, regular soda, and sweetened drinks  Exercise:  Exercise at least 30 minutes per day on most days of the week  Some examples of exercise include walking, biking, dancing, and swimming  You can also fit in more physical activity by taking the stairs instead of the elevator or parking farther away from stores  Ask your healthcare provider about the best exercise plan for you  © Copyright [employer] 2018 Information is for End User's use only and may not be sold, redistributed or otherwise used for commercial purposes   All illustrations and images included in CareNotes® are the copyrighted property of [employer]  or [address] [place] [address]

## 2020-01-11 LAB
ALBUMIN SERPL-MCNC: 4 G/DL (ref 3.6–4.8)
ALBUMIN/GLOB SERPL: 1.3 {RATIO} (ref 1.2–2.2)
ALP SERPL-CCNC: 70 IU/L (ref 39–117)
ALT SERPL-CCNC: 27 IU/L (ref 0–32)
AST SERPL-CCNC: 18 IU/L (ref 0–40)
BASOPHILS # BLD AUTO: 0 X10E3/UL (ref 0–0.2)
BASOPHILS NFR BLD AUTO: 0 %
BILIRUB SERPL-MCNC: 0.6 MG/DL (ref 0–1.2)
BUN SERPL-MCNC: 26 MG/DL (ref 8–27)
BUN/CREAT SERPL: 18 (ref 12–28)
CALCIUM SERPL-MCNC: 9.7 MG/DL (ref 8.7–10.3)
CHLORIDE SERPL-SCNC: 106 MMOL/L (ref 96–106)
CHOLEST SERPL-MCNC: 219 MG/DL (ref 100–199)
CO2 SERPL-SCNC: 17 MMOL/L (ref 20–29)
CREAT SERPL-MCNC: 1.48 MG/DL (ref 0.57–1)
EOSINOPHIL # BLD AUTO: 0.2 X10E3/UL (ref 0–0.4)
EOSINOPHIL NFR BLD AUTO: 3 %
ERYTHROCYTE [DISTWIDTH] IN BLOOD BY AUTOMATED COUNT: 14.4 % (ref 11.7–15.4)
GLOBULIN SER-MCNC: 3 G/DL (ref 1.5–4.5)
GLUCOSE SERPL-MCNC: 96 MG/DL (ref 65–99)
HCT VFR BLD AUTO: 38.8 % (ref 34–46.6)
HDLC SERPL-MCNC: 53 MG/DL
HGB BLD-MCNC: 12.4 G/DL (ref 11.1–15.9)
IMM GRANULOCYTES # BLD: 0 X10E3/UL (ref 0–0.1)
IMM GRANULOCYTES NFR BLD: 0 %
LDLC SERPL CALC-MCNC: 143 MG/DL (ref 0–99)
LYMPHOCYTES # BLD AUTO: 1.9 X10E3/UL (ref 0.7–3.1)
LYMPHOCYTES NFR BLD AUTO: 31 %
MCH RBC QN AUTO: 26.6 PG (ref 26.6–33)
MCHC RBC AUTO-ENTMCNC: 32 G/DL (ref 31.5–35.7)
MCV RBC AUTO: 83 FL (ref 79–97)
MICRODELETION SYND BLD/T FISH: NORMAL
MICRODELETION SYND BLD/T FISH: NORMAL
MONOCYTES # BLD AUTO: 0.5 X10E3/UL (ref 0.1–0.9)
MONOCYTES NFR BLD AUTO: 8 %
NEUTROPHILS # BLD AUTO: 3.5 X10E3/UL (ref 1.4–7)
NEUTROPHILS NFR BLD AUTO: 58 %
PLATELET # BLD AUTO: 225 X10E3/UL (ref 150–450)
POTASSIUM SERPL-SCNC: 5.1 MMOL/L (ref 3.5–5.2)
PROT SERPL-MCNC: 7 G/DL (ref 6–8.5)
RBC # BLD AUTO: 4.66 X10E6/UL (ref 3.77–5.28)
SL AMB EGFR AFRICAN AMERICAN: 43 ML/MIN/1.73
SL AMB EGFR NON AFRICAN AMERICAN: 37 ML/MIN/1.73
SL AMB PDF IMAGE: NORMAL
SODIUM SERPL-SCNC: 139 MMOL/L (ref 134–144)
TRIGL SERPL-MCNC: 117 MG/DL (ref 0–149)
WBC # BLD AUTO: 6 X10E3/UL (ref 3.4–10.8)

## 2020-01-13 ENCOUNTER — TELEPHONE (OUTPATIENT)
Dept: FAMILY MEDICINE CLINIC | Facility: CLINIC | Age: 65
End: 2020-01-13

## 2020-01-13 DIAGNOSIS — E78.2 MIXED HYPERLIPIDEMIA: ICD-10-CM

## 2020-01-13 DIAGNOSIS — R79.89 ELEVATED SERUM CREATININE: Primary | ICD-10-CM

## 2020-01-13 RX ORDER — ROSUVASTATIN CALCIUM 10 MG/1
20 TABLET, COATED ORAL DAILY
Qty: 90 TABLET | Refills: 1 | Status: SHIPPED | OUTPATIENT
Start: 2020-01-13 | End: 2020-08-24 | Stop reason: ALTCHOICE

## 2020-01-13 NOTE — TELEPHONE ENCOUNTER
Patient called and blood work discussed  Informed that creatinine is elevated which was normal about month ago and advised to drink plenty of fluids and repeat blood work in a month, script mailed to her house  If still elevate, will consult nephrology  Total cholesterol and LDL slightly improved, considering patient was not complaint with Crestor and does not have any CVD, will start on 10 mg and repeat levels in 6 months

## 2020-01-24 NOTE — PRE-PROCEDURE INSTRUCTIONS
Pre-Surgery Instructions:   Medication Instructions    rosuvastatin (CRESTOR) 10 MG tablet Instructed patient per Anesthesia Guidelines  Spoke to pt  Medication list reviewed & instructed  As of 1/28 instructed on tylenol only   Am DOS no meds  (statin taken @ hs)   Showering instructions provided by surgeon office, reviewed @ time of call  Pt questioning pre op instructions regarding fleet enema  Forwarded to office and will f/u with patient  All instructions verbally understood by patient  No further questions  Callback number provided     Statin Med Class     Continue to take this medication on your normal schedule  If this is an oral medication and you take it in the morning, then you may take this medicine with a sip of water  1/28: left message with pt and instructed per office:     Harish June,  Patient will not need to do the fleet enema  I apologize, the enema is used for patients with a colostomy    Thanks    Roberto   From: Estrella Morfin - To: Jude Casillas RN  yesterday

## 2020-01-30 NOTE — H&P (VIEW-ONLY)
Colon and Rectal Surgery   Rosario Wilcox 71 y o  female MRN [id number]  Encounter: [id number]  20 3:20 PM            Assessment: Rosario Wilcox is a 71 y o  female who has rectal cancer  Plan:   Rectal cancer [facility])  She is doing very well  Her anastomosis is unremarkable  It is palpable at 6 cm from the anal verge and is circumferentially intact  Sigmoidoscopy confirmed intact this of anastomosis  The remainder of her examination is also unremarkable, relative to her prior status  Ileostomy closure is recommended  Risks of surgery, including but not limited to bleeding, pain, infection, hernia formation, injury to the ureter or other internal organs requiring surgery specific to these injuries, anastomotic leak, deep vein thrombosis, pulmonary embolism, myocardial infarction or heart failure, stroke, death, need for and enterostomy, or other unspecified complications were discussed  Benefits and alternatives were discussed  Questions were answered  Subjective     HPI    Rosario Wilcox is a 71 y o  female is here today for Flexible sigmoidoscopy evaluation prior to ileostomy closure surgery on 2020  Overall she states that she is feeling well  She was last seen in the office on 2020, status post laparoscopic with robotics sigmoid resection converted to lap hand assisted with partial proctectomy; loop ileostomy on 12/10/2019, due to rectal cancer          Historical Information   Past Medical History:   Diagnosis Date    Blood in stool     Breast disorder     GERD (gastroesophageal reflux disease)     Hyperlipidemia     PONV (postoperative nausea and vomiting)      Past Surgical History:   Procedure Laterality Date    BREAST LUMPECTOMY Right      SECTION      x3    COLONOSCOPY      ILEO LOOP DIVERSION N/A 12/10/2019    Procedure: ILEOSTOMY LOOP;  Surgeon: You Draper MD;  [facility]: [facility];  Service: Robotics    DC LAP,SURG,COLECTOMY, PARTIAL, W/ANAST N/A 12/10/2019    Procedure: SIGMOID RESECTION COLON LAPAROSCOPIC W ROBOTICS converted to lap hand assisted  with Partial proctectomy , LASER FLUORESCENCE ANGIOGRAPHY, INTRA OP COLONOSCOPY;  Surgeon: Harris Medrano MD;  Location: [facility];  Service: Robotics       Meds/Allergies       Current Outpatient Medications:     metroNIDAZOLE (FLAGYL) 500 mg tablet, Take 1 tablet at 1:00pm and 1 tablet at 10:00pm the day prior to surgery  , Disp: 2 tablet, Rfl: 0    neomycin (MYCIFRADIN) 500 mg tablet, Take 2 tablets at 1:00pm and 2 tablets at 10:00pm the day prior to surgery  , Disp: 4 tablet, Rfl: 0    rosuvastatin (CRESTOR) 10 MG tablet, Take 2 tablets (20 mg total) by mouth daily (Patient taking differently: Take 20 mg by mouth daily at bedtime ), Disp: 90 tablet, Rfl: 1  Allergies   Allergen Reactions    Medical Tape      Skin irritation  Skin peeling       Social History   Social History     Substance and Sexual Activity   Drug Use Never     Social History     Tobacco Use   Smoking Status Never Smoker   Smokeless Tobacco Never Used         Family History   Problem Relation Age of Onset    Hypertension Mother     Heart attack Mother     Heart attack Father     Heart disease Father     Hypertension Brother     Heart attack Brother     Leukemia Cousin     Breast cancer Cousin     Diabetes Cousin     Breast cancer Family         maternal side    Colon cancer Family         paternal uncle    Colon cancer Paternal Uncle     Stroke Neg Hx          Review of Systems   Constitutional: Negative  Respiratory: Negative  Cardiovascular: Negative  Gastrointestinal: Negative  Objective   Current Vitals:  Vitals:    02/03/20 1431   BP: 140/82   Weight: 72 6 kg (160 lb)   Height: 4' 10" (1 473 m)         Physical Exam   Constitutional: She is oriented to person, place, and time  She appears well-developed and well-nourished  Cardiovascular: Normal rate and regular rhythm  Pulmonary/Chest: Effort normal and breath sounds normal    Abdominal: Soft  Bowel sounds are normal    Stoma well  Genitourinary:   Genitourinary Comments: Anastomosis normal at 6-7 cm from anal verge  Neurological: She is alert and oriented to person, place, and time  Psychiatric: She has a normal mood and affect   Her behavior is normal  Judgment and thought content normal

## 2020-02-03 ENCOUNTER — ANESTHESIA EVENT (OUTPATIENT)
Dept: PERIOP | Facility: HOSPITAL | Age: 65
DRG: 331 | End: 2020-02-03
Payer: MEDICARE

## 2020-02-03 NOTE — ANESTHESIA PREPROCEDURE EVALUATION
Review of Systems/Medical History  Patient summary reviewed  Chart reviewed  History of anesthetic complications PONV    Cardiovascular  Exercise tolerance (METS): >4,  Hyperlipidemia, No past MI , No angina ,    Pulmonary  Negative pulmonary ROS Not a smoker ,        GI/Hepatic    GERD , GI malignancy (rectal CA s/p colectomy and ileostomy),        Negative  ROS        Endo/Other    Obesity    GYN  Negative gynecology ROS          Hematology  Negative hematology ROS      Musculoskeletal  Negative musculoskeletal ROS        Neurology  Negative neurology ROS No seizures ,     Psychology   Negative psychology ROS          TTE 11/4/19:  Ejection fraction 60 to 65%  Aortic valve leaflets mildly thickened and calcified without significant stenosis  Color Doppler reveals trace mitral regurgitation and trace tricuspid regurgitation    EKG 10/30/19:  Sinus rhythm   Within normal limits  Lab Results   Component Value Date    WBC 6 0 01/10/2020    HGB 12 4 01/10/2020    HCT 38 8 01/10/2020    MCV 83 01/10/2020     01/10/2020     Lab Results   Component Value Date    SODIUM 139 01/10/2020    K 5 1 01/10/2020     01/10/2020    CO2 17 (L) 01/10/2020    BUN 26 01/10/2020    CREATININE 1 48 (H) 01/10/2020    GLUC 96 01/10/2020    CALCIUM 8 6 12/13/2019     No results found for: INR, PROTIME      Physical Exam    Airway    Mallampati score: I  TM Distance: >3 FB  Neck ROM: full     Dental   No notable dental hx     Cardiovascular  Cardiovascular exam normal    Pulmonary  Pulmonary exam normal     Other Findings        Anesthesia Plan  ASA Score- 3     Anesthesia Type- general with ASA Monitors  Additional Monitors:   Airway Plan: ETT  Plan Factors-    Induction- intravenous  Postoperative Plan- Plan for postoperative opioid use  Planned trial extubation    Informed Consent- Anesthetic plan and risks discussed with patient  I personally reviewed this patient with the CRNA   Discussed and agreed on the Anesthesia Plan with the CRNA  Rip Virgen

## 2020-02-04 ENCOUNTER — HOSPITAL ENCOUNTER (INPATIENT)
Facility: HOSPITAL | Age: 65
LOS: 2 days | Discharge: HOME | DRG: 331 | End: 2020-02-06
Attending: COLON & RECTAL SURGERY | Admitting: COLON & RECTAL SURGERY
Payer: MEDICARE

## 2020-02-04 ENCOUNTER — ANESTHESIA (OUTPATIENT)
Dept: PERIOP | Facility: HOSPITAL | Age: 65
DRG: 331 | End: 2020-02-04
Payer: MEDICARE

## 2020-02-04 DIAGNOSIS — C20 RECTAL CANCER (HCC): ICD-10-CM

## 2020-02-04 LAB
ABO GROUP BLD: NORMAL
ANION GAP SERPL CALCULATED.3IONS-SCNC: 4 MMOL/L (ref 4–13)
BASOPHILS # BLD AUTO: 0.03 THOUSANDS/ΜL (ref 0–0.1)
BASOPHILS NFR BLD AUTO: 1 % (ref 0–1)
BLD GP AB SCN SERPL QL: NEGATIVE
BUN SERPL-MCNC: 21 MG/DL (ref 5–25)
CALCIUM SERPL-MCNC: 9.4 MG/DL (ref 8.3–10.1)
CHLORIDE SERPL-SCNC: 111 MMOL/L (ref 100–108)
CO2 SERPL-SCNC: 23 MMOL/L (ref 21–32)
CREAT SERPL-MCNC: 1.48 MG/DL (ref 0.6–1.3)
EOSINOPHIL # BLD AUTO: 0.05 THOUSAND/ΜL (ref 0–0.61)
EOSINOPHIL NFR BLD AUTO: 1 % (ref 0–6)
ERYTHROCYTE [DISTWIDTH] IN BLOOD BY AUTOMATED COUNT: 15.3 % (ref 11.6–15.1)
GFR SERPL CREATININE-BSD FRML MDRD: 37 ML/MIN/1.73SQ M
GLUCOSE SERPL-MCNC: 108 MG/DL (ref 65–140)
GLUCOSE SERPL-MCNC: 93 MG/DL (ref 65–140)
HCT VFR BLD AUTO: 36.7 % (ref 34.8–46.1)
HGB BLD-MCNC: 11.5 G/DL (ref 11.5–15.4)
IMM GRANULOCYTES # BLD AUTO: 0.01 THOUSAND/UL (ref 0–0.2)
IMM GRANULOCYTES NFR BLD AUTO: 0 % (ref 0–2)
LYMPHOCYTES # BLD AUTO: 1.34 THOUSANDS/ΜL (ref 0.6–4.47)
LYMPHOCYTES NFR BLD AUTO: 21 % (ref 14–44)
MCH RBC QN AUTO: 26.7 PG (ref 26.8–34.3)
MCHC RBC AUTO-ENTMCNC: 31.3 G/DL (ref 31.4–37.4)
MCV RBC AUTO: 85 FL (ref 82–98)
MONOCYTES # BLD AUTO: 0.37 THOUSAND/ΜL (ref 0.17–1.22)
MONOCYTES NFR BLD AUTO: 6 % (ref 4–12)
NEUTROPHILS # BLD AUTO: 4.72 THOUSANDS/ΜL (ref 1.85–7.62)
NEUTS SEG NFR BLD AUTO: 71 % (ref 43–75)
NRBC BLD AUTO-RTO: 0 /100 WBCS
PLATELET # BLD AUTO: 232 THOUSANDS/UL (ref 149–390)
PMV BLD AUTO: 10.7 FL (ref 8.9–12.7)
POTASSIUM SERPL-SCNC: 4 MMOL/L (ref 3.5–5.3)
RBC # BLD AUTO: 4.3 MILLION/UL (ref 3.81–5.12)
RH BLD: POSITIVE
SODIUM SERPL-SCNC: 138 MMOL/L (ref 136–145)
SPECIMEN EXPIRATION DATE: NORMAL
WBC # BLD AUTO: 6.52 THOUSAND/UL (ref 4.31–10.16)

## 2020-02-04 PROCEDURE — 82948 REAGENT STRIP/BLOOD GLUCOSE: CPT

## 2020-02-04 PROCEDURE — 88342 IMHCHEM/IMCYTCHM 1ST ANTB: CPT | Performed by: PATHOLOGY

## 2020-02-04 PROCEDURE — 0DBB0ZZ EXCISION OF ILEUM, OPEN APPROACH: ICD-10-PCS | Performed by: COLON & RECTAL SURGERY

## 2020-02-04 PROCEDURE — 44605 REPAIR OF BOWEL LESION: CPT | Performed by: COLON & RECTAL SURGERY

## 2020-02-04 PROCEDURE — 0DNW0ZZ RELEASE PERITONEUM, OPEN APPROACH: ICD-10-PCS | Performed by: COLON & RECTAL SURGERY

## 2020-02-04 PROCEDURE — 85025 COMPLETE CBC W/AUTO DIFF WBC: CPT | Performed by: COLON & RECTAL SURGERY

## 2020-02-04 PROCEDURE — 0WQF0ZZ REPAIR ABDOMINAL WALL, OPEN APPROACH: ICD-10-PCS | Performed by: COLON & RECTAL SURGERY

## 2020-02-04 PROCEDURE — 86901 BLOOD TYPING SEROLOGIC RH(D): CPT | Performed by: COLON & RECTAL SURGERY

## 2020-02-04 PROCEDURE — 86900 BLOOD TYPING SEROLOGIC ABO: CPT | Performed by: COLON & RECTAL SURGERY

## 2020-02-04 PROCEDURE — 80048 BASIC METABOLIC PNL TOTAL CA: CPT | Performed by: COLON & RECTAL SURGERY

## 2020-02-04 PROCEDURE — 88304 TISSUE EXAM BY PATHOLOGIST: CPT | Performed by: PATHOLOGY

## 2020-02-04 PROCEDURE — 0DQ80ZZ REPAIR SMALL INTESTINE, OPEN APPROACH: ICD-10-PCS | Performed by: COLON & RECTAL SURGERY

## 2020-02-04 PROCEDURE — 86850 RBC ANTIBODY SCREEN: CPT | Performed by: COLON & RECTAL SURGERY

## 2020-02-04 RX ORDER — SODIUM CHLORIDE, SODIUM LACTATE, POTASSIUM CHLORIDE, CALCIUM CHLORIDE 600; 310; 30; 20 MG/100ML; MG/100ML; MG/100ML; MG/100ML
INJECTION, SOLUTION INTRAVENOUS CONTINUOUS PRN
Status: DISCONTINUED | OUTPATIENT
Start: 2020-02-04 | End: 2020-02-04 | Stop reason: SURG

## 2020-02-04 RX ORDER — FENTANYL CITRATE 50 UG/ML
INJECTION, SOLUTION INTRAMUSCULAR; INTRAVENOUS AS NEEDED
Status: DISCONTINUED | OUTPATIENT
Start: 2020-02-04 | End: 2020-02-04 | Stop reason: SURG

## 2020-02-04 RX ORDER — NEOSTIGMINE METHYLSULFATE 1 MG/ML
INJECTION INTRAVENOUS AS NEEDED
Status: DISCONTINUED | OUTPATIENT
Start: 2020-02-04 | End: 2020-02-04 | Stop reason: SURG

## 2020-02-04 RX ORDER — ONDANSETRON 2 MG/ML
4 INJECTION INTRAMUSCULAR; INTRAVENOUS EVERY 4 HOURS PRN
Status: DISCONTINUED | OUTPATIENT
Start: 2020-02-04 | End: 2020-02-06 | Stop reason: HOSPADM

## 2020-02-04 RX ORDER — OXYCODONE HYDROCHLORIDE 5 MG/1
5 TABLET ORAL EVERY 4 HOURS PRN
Status: CANCELLED | OUTPATIENT
Start: 2020-02-04

## 2020-02-04 RX ORDER — LANOLIN ALCOHOL/MO/W.PET/CERES
3 CREAM (GRAM) TOPICAL
Status: DISCONTINUED | OUTPATIENT
Start: 2020-02-04 | End: 2020-02-06 | Stop reason: HOSPADM

## 2020-02-04 RX ORDER — LIDOCAINE HYDROCHLORIDE 10 MG/ML
0.5 INJECTION, SOLUTION EPIDURAL; INFILTRATION; INTRACAUDAL; PERINEURAL ONCE AS NEEDED
Status: COMPLETED | OUTPATIENT
Start: 2020-02-04 | End: 2020-02-04

## 2020-02-04 RX ORDER — ACETAMINOPHEN 325 MG/1
975 TABLET ORAL EVERY 8 HOURS SCHEDULED
Status: DISCONTINUED | OUTPATIENT
Start: 2020-02-04 | End: 2020-02-06 | Stop reason: HOSPADM

## 2020-02-04 RX ORDER — ROCURONIUM BROMIDE 10 MG/ML
INJECTION, SOLUTION INTRAVENOUS AS NEEDED
Status: DISCONTINUED | OUTPATIENT
Start: 2020-02-04 | End: 2020-02-04 | Stop reason: SURG

## 2020-02-04 RX ORDER — SODIUM CHLORIDE, SODIUM LACTATE, POTASSIUM CHLORIDE, CALCIUM CHLORIDE 600; 310; 30; 20 MG/100ML; MG/100ML; MG/100ML; MG/100ML
125 INJECTION, SOLUTION INTRAVENOUS CONTINUOUS
Status: DISCONTINUED | OUTPATIENT
Start: 2020-02-04 | End: 2020-02-04

## 2020-02-04 RX ORDER — HEPARIN SODIUM 5000 [USP'U]/ML
5000 INJECTION, SOLUTION INTRAVENOUS; SUBCUTANEOUS EVERY 8 HOURS SCHEDULED
Status: DISCONTINUED | OUTPATIENT
Start: 2020-02-04 | End: 2020-02-06 | Stop reason: HOSPADM

## 2020-02-04 RX ORDER — DOCUSATE SODIUM 100 MG/1
100 CAPSULE, LIQUID FILLED ORAL 2 TIMES DAILY
Status: DISCONTINUED | OUTPATIENT
Start: 2020-02-04 | End: 2020-02-06 | Stop reason: HOSPADM

## 2020-02-04 RX ORDER — MIDAZOLAM HYDROCHLORIDE 2 MG/2ML
INJECTION, SOLUTION INTRAMUSCULAR; INTRAVENOUS AS NEEDED
Status: DISCONTINUED | OUTPATIENT
Start: 2020-02-04 | End: 2020-02-04 | Stop reason: SURG

## 2020-02-04 RX ORDER — PROPOFOL 10 MG/ML
INJECTION, EMULSION INTRAVENOUS CONTINUOUS PRN
Status: DISCONTINUED | OUTPATIENT
Start: 2020-02-04 | End: 2020-02-04 | Stop reason: SURG

## 2020-02-04 RX ORDER — HYDROMORPHONE HCL/PF 1 MG/ML
0.5 SYRINGE (ML) INJECTION
Status: CANCELLED | OUTPATIENT
Start: 2020-02-04

## 2020-02-04 RX ORDER — OXYCODONE HYDROCHLORIDE 10 MG/1
10 TABLET ORAL EVERY 4 HOURS PRN
Status: CANCELLED | OUTPATIENT
Start: 2020-02-04

## 2020-02-04 RX ORDER — FENTANYL CITRATE/PF 50 MCG/ML
25 SYRINGE (ML) INJECTION
Status: COMPLETED | OUTPATIENT
Start: 2020-02-04 | End: 2020-02-04

## 2020-02-04 RX ORDER — DEXAMETHASONE SODIUM PHOSPHATE 10 MG/ML
INJECTION, SOLUTION INTRAMUSCULAR; INTRAVENOUS AS NEEDED
Status: DISCONTINUED | OUTPATIENT
Start: 2020-02-04 | End: 2020-02-04 | Stop reason: SURG

## 2020-02-04 RX ORDER — SODIUM CHLORIDE, SODIUM LACTATE, POTASSIUM CHLORIDE, CALCIUM CHLORIDE 600; 310; 30; 20 MG/100ML; MG/100ML; MG/100ML; MG/100ML
100 INJECTION, SOLUTION INTRAVENOUS CONTINUOUS
Status: DISCONTINUED | OUTPATIENT
Start: 2020-02-04 | End: 2020-02-05

## 2020-02-04 RX ORDER — DIPHENHYDRAMINE HYDROCHLORIDE 50 MG/ML
25 INJECTION INTRAMUSCULAR; INTRAVENOUS EVERY 6 HOURS PRN
Status: DISCONTINUED | OUTPATIENT
Start: 2020-02-04 | End: 2020-02-06 | Stop reason: HOSPADM

## 2020-02-04 RX ORDER — GLYCOPYRROLATE 0.2 MG/ML
INJECTION INTRAMUSCULAR; INTRAVENOUS AS NEEDED
Status: DISCONTINUED | OUTPATIENT
Start: 2020-02-04 | End: 2020-02-04 | Stop reason: SURG

## 2020-02-04 RX ORDER — ONDANSETRON 2 MG/ML
4 INJECTION INTRAMUSCULAR; INTRAVENOUS ONCE AS NEEDED
Status: DISCONTINUED | OUTPATIENT
Start: 2020-02-04 | End: 2020-02-04 | Stop reason: HOSPADM

## 2020-02-04 RX ORDER — ONDANSETRON 2 MG/ML
INJECTION INTRAMUSCULAR; INTRAVENOUS AS NEEDED
Status: DISCONTINUED | OUTPATIENT
Start: 2020-02-04 | End: 2020-02-04 | Stop reason: SURG

## 2020-02-04 RX ORDER — DIPHENHYDRAMINE HYDROCHLORIDE 50 MG/ML
12.5 INJECTION INTRAMUSCULAR; INTRAVENOUS ONCE AS NEEDED
Status: DISCONTINUED | OUTPATIENT
Start: 2020-02-04 | End: 2020-02-04 | Stop reason: HOSPADM

## 2020-02-04 RX ORDER — ONDANSETRON 2 MG/ML
4 INJECTION INTRAMUSCULAR; INTRAVENOUS EVERY 6 HOURS PRN
Status: DISCONTINUED | OUTPATIENT
Start: 2020-02-04 | End: 2020-02-04

## 2020-02-04 RX ORDER — HYDROMORPHONE HCL/PF 1 MG/ML
0.2 SYRINGE (ML) INJECTION ONCE
Status: COMPLETED | OUTPATIENT
Start: 2020-02-04 | End: 2020-02-04

## 2020-02-04 RX ORDER — PROPOFOL 10 MG/ML
INJECTION, EMULSION INTRAVENOUS AS NEEDED
Status: DISCONTINUED | OUTPATIENT
Start: 2020-02-04 | End: 2020-02-04 | Stop reason: SURG

## 2020-02-04 RX ORDER — ATORVASTATIN CALCIUM 40 MG/1
40 TABLET, FILM COATED ORAL
Status: DISCONTINUED | OUTPATIENT
Start: 2020-02-04 | End: 2020-02-06 | Stop reason: HOSPADM

## 2020-02-04 RX ADMIN — LIDOCAINE HYDROCHLORIDE 100 MG: 20 INJECTION, SOLUTION INTRAVENOUS at 12:16

## 2020-02-04 RX ADMIN — DEXAMETHASONE SODIUM PHOSPHATE 5 MG: 10 INJECTION, SOLUTION INTRAMUSCULAR; INTRAVENOUS at 12:22

## 2020-02-04 RX ADMIN — FENTANYL CITRATE 50 MCG: 50 INJECTION, SOLUTION INTRAMUSCULAR; INTRAVENOUS at 15:18

## 2020-02-04 RX ADMIN — HYDROMORPHONE HYDROCHLORIDE 0.2 MG: 1 INJECTION, SOLUTION INTRAMUSCULAR; INTRAVENOUS; SUBCUTANEOUS at 18:08

## 2020-02-04 RX ADMIN — NEOSTIGMINE METHYLSULFATE 3 MG: 1 INJECTION INTRAVENOUS at 15:04

## 2020-02-04 RX ADMIN — FENTANYL CITRATE 50 MCG: 50 INJECTION, SOLUTION INTRAMUSCULAR; INTRAVENOUS at 12:16

## 2020-02-04 RX ADMIN — ROCURONIUM BROMIDE 10 MG: 50 INJECTION, SOLUTION INTRAVENOUS at 13:19

## 2020-02-04 RX ADMIN — ACETAMINOPHEN 975 MG: 325 TABLET ORAL at 18:08

## 2020-02-04 RX ADMIN — FENTANYL CITRATE 50 MCG: 50 INJECTION, SOLUTION INTRAMUSCULAR; INTRAVENOUS at 12:56

## 2020-02-04 RX ADMIN — FENTANYL CITRATE 25 MCG: 50 INJECTION, SOLUTION INTRAMUSCULAR; INTRAVENOUS at 15:47

## 2020-02-04 RX ADMIN — SODIUM CHLORIDE, SODIUM LACTATE, POTASSIUM CHLORIDE, AND CALCIUM CHLORIDE: .6; .31; .03; .02 INJECTION, SOLUTION INTRAVENOUS at 11:22

## 2020-02-04 RX ADMIN — ATORVASTATIN CALCIUM 40 MG: 40 TABLET, FILM COATED ORAL at 18:08

## 2020-02-04 RX ADMIN — ROCURONIUM BROMIDE 10 MG: 50 INJECTION, SOLUTION INTRAVENOUS at 14:17

## 2020-02-04 RX ADMIN — ROCURONIUM BROMIDE 10 MG: 50 INJECTION, SOLUTION INTRAVENOUS at 14:36

## 2020-02-04 RX ADMIN — METRONIDAZOLE 500 MG: 500 INJECTION, SOLUTION INTRAVENOUS at 12:01

## 2020-02-04 RX ADMIN — FENTANYL CITRATE 25 MCG: 50 INJECTION, SOLUTION INTRAMUSCULAR; INTRAVENOUS at 15:54

## 2020-02-04 RX ADMIN — FENTANYL CITRATE 50 MCG: 50 INJECTION, SOLUTION INTRAMUSCULAR; INTRAVENOUS at 14:36

## 2020-02-04 RX ADMIN — PHENYLEPHRINE HYDROCHLORIDE 100 MCG: 10 INJECTION INTRAVENOUS at 12:32

## 2020-02-04 RX ADMIN — MIDAZOLAM 2 MG: 1 INJECTION INTRAMUSCULAR; INTRAVENOUS at 12:03

## 2020-02-04 RX ADMIN — LIDOCAINE HYDROCHLORIDE 0.5 ML: 10 INJECTION, SOLUTION EPIDURAL; INFILTRATION; INTRACAUDAL; PERINEURAL at 11:23

## 2020-02-04 RX ADMIN — SODIUM CHLORIDE, SODIUM LACTATE, POTASSIUM CHLORIDE, AND CALCIUM CHLORIDE: .6; .31; .03; .02 INJECTION, SOLUTION INTRAVENOUS at 13:23

## 2020-02-04 RX ADMIN — ONDANSETRON 4 MG: 2 INJECTION INTRAMUSCULAR; INTRAVENOUS at 14:11

## 2020-02-04 RX ADMIN — PROPOFOL 120 MCG/KG/MIN: 10 INJECTION, EMULSION INTRAVENOUS at 12:18

## 2020-02-04 RX ADMIN — FENTANYL CITRATE 25 MCG: 50 INJECTION, SOLUTION INTRAMUSCULAR; INTRAVENOUS at 16:15

## 2020-02-04 RX ADMIN — SODIUM CHLORIDE, SODIUM LACTATE, POTASSIUM CHLORIDE, AND CALCIUM CHLORIDE 125 ML/HR: .6; .31; .03; .02 INJECTION, SOLUTION INTRAVENOUS at 11:23

## 2020-02-04 RX ADMIN — Medication: at 18:43

## 2020-02-04 RX ADMIN — Medication 2000 MG: at 12:04

## 2020-02-04 RX ADMIN — FENTANYL CITRATE 25 MCG: 50 INJECTION, SOLUTION INTRAMUSCULAR; INTRAVENOUS at 16:28

## 2020-02-04 RX ADMIN — SODIUM CHLORIDE, SODIUM LACTATE, POTASSIUM CHLORIDE, AND CALCIUM CHLORIDE 100 ML/HR: .6; .31; .03; .02 INJECTION, SOLUTION INTRAVENOUS at 16:50

## 2020-02-04 RX ADMIN — GLYCOPYRROLATE 0.4 MG: 0.2 INJECTION, SOLUTION INTRAMUSCULAR; INTRAVENOUS at 15:04

## 2020-02-04 RX ADMIN — DOCUSATE SODIUM 100 MG: 100 CAPSULE, LIQUID FILLED ORAL at 18:08

## 2020-02-04 RX ADMIN — ROCURONIUM BROMIDE 50 MG: 50 INJECTION, SOLUTION INTRAVENOUS at 12:16

## 2020-02-04 RX ADMIN — PROPOFOL 150 MG: 10 INJECTION, EMULSION INTRAVENOUS at 12:16

## 2020-02-04 RX ADMIN — ROCURONIUM BROMIDE 10 MG: 50 INJECTION, SOLUTION INTRAVENOUS at 14:10

## 2020-02-04 RX ADMIN — HEPARIN SODIUM 5000 UNITS: 5000 INJECTION INTRAVENOUS; SUBCUTANEOUS at 18:08

## 2020-02-04 NOTE — ANESTHESIA POSTPROCEDURE EVALUATION
Post-Op Assessment Note    CV Status:  Stable       Mental Status:  Arousable   Hydration Status:  Stable   PONV Controlled:  None   Airway Patency:  Patent   Post Op Vitals Reviewed: Yes      Staff: CRNA           BP   92/62   Temp   98 6   Pulse  55   Resp   22   SpO2   100% on 6LFM   Postop VS in PACU noted above, SV non-obstructed

## 2020-02-04 NOTE — OP NOTE
OPERATIVE REPORT  PATIENT NAME: Rosario Wilcox    :  1955  MRN: [id number]  Pt Location: [facility] OR ROOM 10    SURGERY DATE: 2020    Surgeon(s) and Role:     * You Draper MD - Primary     * Bernarda Lyn MD - Assisting    Preop Diagnosis:  Rectal cancer ([facility] ) [C20]  Ileostomy    Post-Op Diagnosis Codes:  Rectal cancer ([facility] [address] ) [C20]  Ileostomy  Parastomal hernia  Intestinal adhesions    Procedure(s) (LRB):  CLOSURE ILEOSTOMY (N/A)  RESECTION SMALL BOWEL    Specimen(s):  ID Type Source Tests Collected by Time Destination   1 :  Tissue Ileum, ileostomy stoma TISSUE EXAM You Draper MD 2020 1244        Estimated Blood Loss:   Minimal    Drains:  [REMOVED] Urethral Catheter Latex 16 Fr  (Removed)   Number of days: 0       Anesthesia Type:   General    Operative Indications:  Rectal cancer ([facility] [address] ) [C20]  Ileostomy    Operative Findings:  Dense adhesions  Complications:   None    Procedure and Technique:  The patient was placed in a supine position with the arms at a T position  The abdomen was cleansed and then prepped using Betadine after removal of the ileostomy device  A 2nd prep was used  The areas were draped in a sterile manner  A time-out was done  A horizontally oriented elliptical incision was created around the ileostomy  Dissection was carried down to the subcutaneous fat  The fat was retained as much as possible  The serosal levels were identified and dissection was carried deeply along the subcutaneous fat and to the level of the fascia  There was dense adhesion between the ileostomy and the subcutaneous fat as well as to the fascia  At the fascia, there was very hard adhesions  The dissection was made more difficult because of the depth of the abdominal wall fat  At the fascia, there was a single area in the superior ileostomy/fascia junction that was more densely adhesed  Dissection was continued trying to separate the bowel from the fascia    The abdominal cavity was entered and we were able to preserve the bowel at multiple sites but at the superior aspect, there was a loop of bowel in a parastomal hernia that was more densely adhesed to the fascia  During the dissection of this very dense adhesion area, an enterotomy was created in the course of the operation, that was unavoidable  No fecal spillage was identified  We continued the dissection to separate the loops of bowel from the fascia and surrounding loops of bowel  I opended the fascia 1 cm to the left of the wound to allow easier mobilization of bowel  There were dense interloop adhesions, as well  When the dissection was completed, the limbs of bowel to the ileostomy and to the enterotomy were well mobilized  We repaired the enterotomy by creating a side-to-side anastomosis  This was done using a double staple technique using 75 mm RUBEN stapling  The mesentery did not need to be divided at this anastomosis  Attention was then directed to the ileostomy  The mesentery at the ileostomy was sequentially divided and ligated and the functional end-to-end anastomosis was created there using the similar double stapled technique with the 75 mm RUBEN stapler  During planning for closure of the fascia, the area of the repair of the enterostomy was noted to have very subtle duskiness at the staple line  This appeared to be a problem with venous drainage, as it retained its viability a full 45 minutes after the anastomosis was created  None the less, I felt the security warranted removal of the staple lines and a repeat of the anastomosis between 2 viable and supple sides of the bowel  It should be noted that all the small intestine that remained at this time was supple and well-vascularized  The staple lines were therefore removed, mesenteric vessels were ligated as needed, and an end-to-end is running interrupted 4 0 PDS hand-sewn anastomosis was created    At this time, the bowel appeared to be completely unremarkable at the anastomosis  There was the palpable lumen  The abdominal wound was irrigated with copious amounts of saline and dried  The bowel was returned to the abdominal cavity  Inspection revealed no evidence of bleeding from mesentery or surrounding areas  The fascia was then closed using running 1 PDS suture placed from either end and the wound  The wound was again irrigated and dried  The subcutaneous fat was approximated using 2 0 Vicryl sutures  The skin was closed using staples  Sponge needle and instrument counts were correct  Wand technique revealed no retained gauze    Blood loss was minimal      I was present for the entire procedure    Patient Disposition:  PACU     SIGNATURE: Avery Guzman MD  DATE: February 4, 2020  TIME: 3:10 PM

## 2020-02-04 NOTE — INTERVAL H&P NOTE
H&P reviewed  After examining the patient I find no changes in the patients condition since the H&P had been written      Vitals:    02/04/20 1130   BP: 128/74   Pulse: 88   Resp: 16   Temp: 98 2 °F (36 8 °C)   SpO2: 100%

## 2020-02-05 PROCEDURE — 97161 PT EVAL LOW COMPLEX 20 MIN: CPT

## 2020-02-05 RX ORDER — PANTOPRAZOLE SODIUM 40 MG/1
40 TABLET, DELAYED RELEASE ORAL
Status: DISCONTINUED | OUTPATIENT
Start: 2020-02-05 | End: 2020-02-06 | Stop reason: HOSPADM

## 2020-02-05 RX ORDER — HYDROMORPHONE HCL/PF 1 MG/ML
0.5 SYRINGE (ML) INJECTION
Status: DISCONTINUED | OUTPATIENT
Start: 2020-02-05 | End: 2020-02-06 | Stop reason: HOSPADM

## 2020-02-05 RX ORDER — DEXTROSE, SODIUM CHLORIDE, AND POTASSIUM CHLORIDE 5; .45; .15 G/100ML; G/100ML; G/100ML
75 INJECTION INTRAVENOUS CONTINUOUS
Status: DISCONTINUED | OUTPATIENT
Start: 2020-02-05 | End: 2020-02-06

## 2020-02-05 RX ORDER — OXYCODONE HYDROCHLORIDE 5 MG/1
5 TABLET ORAL EVERY 4 HOURS PRN
Status: DISCONTINUED | OUTPATIENT
Start: 2020-02-05 | End: 2020-02-06 | Stop reason: HOSPADM

## 2020-02-05 RX ORDER — OXYCODONE HYDROCHLORIDE 5 MG/1
10 TABLET ORAL EVERY 4 HOURS PRN
Status: DISCONTINUED | OUTPATIENT
Start: 2020-02-05 | End: 2020-02-06 | Stop reason: HOSPADM

## 2020-02-05 RX ADMIN — DOCUSATE SODIUM 100 MG: 100 CAPSULE, LIQUID FILLED ORAL at 17:30

## 2020-02-05 RX ADMIN — DEXTROSE, SODIUM CHLORIDE, AND POTASSIUM CHLORIDE 75 ML/HR: 5; .45; .15 INJECTION INTRAVENOUS at 17:33

## 2020-02-05 RX ADMIN — OXYCODONE HYDROCHLORIDE 5 MG: 5 TABLET ORAL at 21:51

## 2020-02-05 RX ADMIN — ACETAMINOPHEN 975 MG: 325 TABLET ORAL at 06:07

## 2020-02-05 RX ADMIN — PANTOPRAZOLE SODIUM 40 MG: 40 TABLET, DELAYED RELEASE ORAL at 12:06

## 2020-02-05 RX ADMIN — HEPARIN SODIUM 5000 UNITS: 5000 INJECTION INTRAVENOUS; SUBCUTANEOUS at 06:07

## 2020-02-05 RX ADMIN — DEXTROSE, SODIUM CHLORIDE, AND POTASSIUM CHLORIDE 75 ML/HR: 5; .45; .15 INJECTION INTRAVENOUS at 06:44

## 2020-02-05 RX ADMIN — ACETAMINOPHEN 975 MG: 325 TABLET ORAL at 13:36

## 2020-02-05 RX ADMIN — ACETAMINOPHEN 975 MG: 325 TABLET ORAL at 21:52

## 2020-02-05 RX ADMIN — SODIUM CHLORIDE, SODIUM LACTATE, POTASSIUM CHLORIDE, AND CALCIUM CHLORIDE 100 ML/HR: .6; .31; .03; .02 INJECTION, SOLUTION INTRAVENOUS at 03:06

## 2020-02-05 RX ADMIN — DOCUSATE SODIUM 100 MG: 100 CAPSULE, LIQUID FILLED ORAL at 08:36

## 2020-02-05 RX ADMIN — ATORVASTATIN CALCIUM 40 MG: 40 TABLET, FILM COATED ORAL at 17:30

## 2020-02-05 RX ADMIN — HEPARIN SODIUM 5000 UNITS: 5000 INJECTION INTRAVENOUS; SUBCUTANEOUS at 13:36

## 2020-02-05 RX ADMIN — HEPARIN SODIUM 5000 UNITS: 5000 INJECTION INTRAVENOUS; SUBCUTANEOUS at 21:53

## 2020-02-05 NOTE — PROGRESS NOTES
PGY2 Post-Op Check Note    S: Doing well resting in bed  Complaining of back pain, but no other discomforts  Offered her an Aqua K pad and she accepted  Tolerated small sips of water without nausea at this time    O:   Vitals:    02/04/20 1935   BP: 144/74   Pulse: 60   Resp: 18   Temp: 97 5 °F (36 4 °C)   SpO2: 100%       I/O last 3 completed shifts: In: 1750 [I V :1750]  Out: 110 [Urine:60; Blood:50]  No intake/output data recorded      Physical Exam: General: AAOx3  Head: normocephalic, atraumatic  Neck: supple, trachea midline  Respiratory: BS b/l  Abdomen: Soft, mildly tender near incision, bandage is in place, with small serosanguinous drainage other wise clean and intact in RLQ  Heart: RRR, S1s2  Ext: Warm no cyanosis   Pulse: 2+ radial      Lab Results   Component Value Date    WBC 6 52 02/04/2020    HGB 11 5 02/04/2020    HCT 36 7 02/04/2020    MCV 85 02/04/2020     02/04/2020     Lab Results   Component Value Date    GLUCOSE 83 12/03/2016    CALCIUM 9 4 02/04/2020     12/03/2016    K 4 0 02/04/2020    CO2 23 02/04/2020     (H) 02/04/2020    BUN 21 02/04/2020    CREATININE 1 48 (H) 02/04/2020         A/P: 71 y o  F w/ PMHx of rectal cancer with ileostomy now s/p ileostomy closure and small bowel resection 2/4      - Clears  - PCA for pain control  Incentive spirometer  OOB/[place]  - PT/OT  - SQH/SCDs

## 2020-02-05 NOTE — PLAN OF CARE
Problem: PAIN - ADULT  Goal: Verbalizes/displays adequate comfort level or baseline comfort level  Description  Interventions:  - Encourage patient to monitor pain and request assistance  - Assess pain using appropriate pain scale  - Administer analgesics based on type and severity of pain and evaluate response  - Implement non-pharmacological measures as appropriate and evaluate response  - Consider cultural and social influences on pain and pain management  - Notify physician/advanced practitioner if interventions unsuccessful or patient reports new pain  2/5/2020 0844 by Epifanio Peacock RN  Outcome: Progressing  2/5/2020 0844 by Epifanio Peacock RN  Outcome: Progressing     Problem: INFECTION - ADULT  Goal: Absence or prevention of progression during hospitalization  Description  INTERVENTIONS:  - Assess and monitor for signs and symptoms of infection  - Monitor lab/diagnostic results  - Monitor all insertion sites, i e  indwelling lines, tubes, and drains  - Monitor endotracheal if appropriate and nasal secretions for changes in amount and color  - [place] appropriate cooling/warming therapies per order  - Administer medications as ordered  - Instruct and encourage patient and family to use good hand hygiene technique  - Identify and instruct in appropriate isolation precautions for identified infection/condition  2/5/2020 0844 by Epifanio Peacock RN  Outcome: Progressing  2/5/2020 0844 by Epifanio Peacock RN  Outcome: Progressing  Goal: Absence of fever/infection during neutropenic period  Description  INTERVENTIONS:  - Monitor WBC    2/5/2020 0844 by Epifanio Peacock RN  Outcome: Progressing  2/5/2020 0844 by Epifanio Paecock RN  Outcome: Progressing     Problem: SAFETY ADULT  Goal: Patient will remain free of falls  Description  INTERVENTIONS:  - Assess patient frequently for physical needs  -  Identify cognitive and physical deficits and behaviors that affect risk of falls    - [place] fall precautions as indicated by assessment   - Educate patient/family on patient safety including physical limitations  - Instruct patient to call for assistance with activity based on assessment  - Modify environment to reduce risk of injury  - Consider OT/PT consult to assist with strengthening/mobility  2/5/2020 0844 by Pelon Conteh RN  Outcome: Progressing  2/5/2020 0844 by Pelon Conteh RN  Outcome: Progressing  Goal: Maintain or return to baseline ADL function  Description  INTERVENTIONS:  -  Assess patient's ability to carry out ADLs; assess patient's baseline for ADL function and identify physical deficits which impact ability to perform ADLs (bathing, care of mouth/teeth, toileting, grooming, dressing, etc )  - Assess/evaluate cause of self-care deficits   - Assess range of motion  - Assess patient's mobility; develop plan if impaired  - Assess patient's need for assistive devices and provide as appropriate  - Encourage maximum independence but intervene and supervise when necessary  - Involve family in performance of ADLs  - Assess for home care needs following discharge   - Consider OT consult to assist with ADL evaluation and planning for discharge  - Provide patient education as appropriate  2/5/2020 0844 by Pelon Conteh RN  Outcome: Progressing  2/5/2020 0844 by Pelon Conteh RN  Outcome: Progressing  Goal: Maintain or return mobility status to optimal level  Description  INTERVENTIONS:  - Assess patient's baseline mobility status (ambulation, transfers, stairs, etc )    - Identify cognitive and physical deficits and behaviors that affect mobility  - Identify mobility aids required to assist with transfers and/or ambulation (gait belt, sit-to-stand, lift, walker, cane, etc )  - [place] fall precautions as indicated by assessment  - Record patient progress and toleration of activity level on Mobility SBAR; progress patient to next Phase/Stage  - Instruct patient to call for assistance with activity based on assessment  - Consider rehabilitation consult to assist with strengthening/weightbearing, etc   2/5/2020 0844 by Arthur Powers RN  Outcome: Progressing  2/5/2020 0844 by Arthur Powers RN  Outcome: Progressing     Problem: DISCHARGE PLANNING  Goal: Discharge to home or other facility with appropriate resources  Description  INTERVENTIONS:  - Identify barriers to discharge w/patient and caregiver  - Arrange for needed discharge resources and transportation as appropriate  - Identify discharge learning needs (meds, wound care, etc )  - Arrange for interpretive services to assist at discharge as needed  - Refer to Case Management Department for coordinating discharge planning if the patient needs post-hospital services based on physician/advanced practitioner order or complex needs related to functional status, cognitive ability, or social support system  2/5/2020 0844 by Arhtur Powers RN  Outcome: Progressing  2/5/2020 0844 by Arthur Powers RN  Outcome: Progressing     Problem: Knowledge Deficit  Goal: Patient/family/caregiver demonstrates understanding of disease process, treatment plan, medications, and discharge instructions  Description  Complete learning assessment and assess knowledge base    Interventions:  - Provide teaching at level of understanding  - Provide teaching via preferred learning methods  2/5/2020 0844 by Arthur Powers RN  Outcome: Progressing  2/5/2020 0844 by Arthur Powers RN  Outcome: Progressing

## 2020-02-05 NOTE — PLAN OF CARE
Problem: PAIN - ADULT  Goal: Verbalizes/displays adequate comfort level or baseline comfort level  Description  Interventions:  - Encourage patient to monitor pain and request assistance  - Assess pain using appropriate pain scale  - Administer analgesics based on type and severity of pain and evaluate response  - Implement non-pharmacological measures as appropriate and evaluate response  - Consider cultural and social influences on pain and pain management  - Notify physician/advanced practitioner if interventions unsuccessful or patient reports new pain  Outcome: Progressing     Problem: INFECTION - ADULT  Goal: Absence or prevention of progression during hospitalization  Description  INTERVENTIONS:  - Assess and monitor for signs and symptoms of infection  - Monitor lab/diagnostic results  - Monitor all insertion sites, i e  indwelling lines, tubes, and drains  - Monitor endotracheal if appropriate and nasal secretions for changes in amount and color  - [place] appropriate cooling/warming therapies per order  - Administer medications as ordered  - Instruct and encourage patient and family to use good hand hygiene technique  - Identify and instruct in appropriate isolation precautions for identified infection/condition  Outcome: Progressing  Goal: Absence of fever/infection during neutropenic period  Description  INTERVENTIONS:  - Monitor WBC    Outcome: Progressing     Problem: SAFETY ADULT  Goal: Patient will remain free of falls  Description  INTERVENTIONS:  - Assess patient frequently for physical needs  -  Identify cognitive and physical deficits and behaviors that affect risk of falls    -  [place] fall precautions as indicated by assessment   - Educate patient/family on patient safety including physical limitations  - Instruct patient to call for assistance with activity based on assessment  - Modify environment to reduce risk of injury  - Consider OT/PT consult to assist with strengthening/mobility  Outcome: Progressing  Goal: Maintain or return to baseline ADL function  Description  INTERVENTIONS:  -  Assess patient's ability to carry out ADLs; assess patient's baseline for ADL function and identify physical deficits which impact ability to perform ADLs (bathing, care of mouth/teeth, toileting, grooming, dressing, etc )  - Assess/evaluate cause of self-care deficits   - Assess range of motion  - Assess patient's mobility; develop plan if impaired  - Assess patient's need for assistive devices and provide as appropriate  - Encourage maximum independence but intervene and supervise when necessary  - Involve family in performance of ADLs  - Assess for home care needs following discharge   - Consider OT consult to assist with ADL evaluation and planning for discharge  - Provide patient education as appropriate  Outcome: Progressing  Goal: Maintain or return mobility status to optimal level  Description  INTERVENTIONS:  - Assess patient's baseline mobility status (ambulation, transfers, stairs, etc )    - Identify cognitive and physical deficits and behaviors that affect mobility  - Identify mobility aids required to assist with transfers and/or ambulation (gait belt, sit-to-stand, lift, walker, cane, etc )  - [place] fall precautions as indicated by assessment  - Record patient progress and toleration of activity level on Mobility SBAR; progress patient to next Phase/Stage  - Instruct patient to call for assistance with activity based on assessment  - Consider rehabilitation consult to assist with strengthening/weightbearing, etc   Outcome: Progressing     Problem: DISCHARGE PLANNING  Goal: Discharge to home or other facility with appropriate resources  Description  INTERVENTIONS:  - Identify barriers to discharge w/patient and caregiver  - Arrange for needed discharge resources and transportation as appropriate  - Identify discharge learning needs (meds, wound care, etc )  - Arrange for interpretive services to assist at discharge as needed  - Refer to Case Management Department for coordinating discharge planning if the patient needs post-hospital services based on physician/advanced practitioner order or complex needs related to functional status, cognitive ability, or social support system  Outcome: Progressing     Problem: Knowledge Deficit  Goal: Patient/family/caregiver demonstrates understanding of disease process, treatment plan, medications, and discharge instructions  Description  Complete learning assessment and assess knowledge base    Interventions:  - Provide teaching at level of understanding  - Provide teaching via preferred learning methods  Outcome: Progressing

## 2020-02-05 NOTE — PHYSICAL THERAPY NOTE
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[facility] )    Dyspnea on exertion    Carcinoma of sigmoid colon ([facility] )    Dyslipidemia    Rectal cancer ([facility] )       Past Medical History  Past Medical History:   Diagnosis Date    Blood in stool     Breast disorder     Cancer ([facility] )     rectal    GERD (gastroesophageal reflux disease)     Hyperlipidemia     PONV (postoperative nausea and vomiting)        Past Surgical History  Past Surgical History:   Procedure Laterality Date    BREAST LUMPECTOMY Right      SECTION      x3    COLONOSCOPY      ILEO LOOP DIVERSION N/A 12/10/2019    Procedure: ILEOSTOMY LOOP;  Surgeon: All Brownlee MD;  Location: [facility] MAIN OR;  Service: Robotics    DC LAP,SURG,COLECTOMY, PARTIAL, W/ANAST N/A 12/10/2019    Procedure: SIGMOID RESECTION COLON LAPAROSCOPIC W ROBOTICS converted to lap hand assisted  with Partial proctectomy , LASER FLUORESCENCE ANGIOGRAPHY, INTRA OP COLONOSCOPY;  Surgeon: All Brownlee MD;  Location: [facility] MAIN OR;  Service: Robotics        20 1005   Note Type   Note type Eval only   Pain Assessment   Pain Assessment No/denies pain   Pain Score 2   Pain Type Surgical pain   Pain Location Abdomen   Pain Orientation Bilateral   Home Living   Type of Home House   Home Layout Stairs to enter with rails; One level  (15 [place] with HR's, once in home, everything on one floor )   Prior Function   Level of [place] Independent with ADLs and functional mobility   Lives With Alone   Receives Help From Family   ADL Assistance Independent   IADLs Independent   Falls in the last 6 months 0   Comments Pt reports no AD prior to admission, managing well at home on short term disability, has been driving herself to appointments   Restrictions/Precautions   Weight Bearing Precautions Per Order No   Other Precautions Pain   General   Family/Caregiver Present No   Cognition   Overall Cognitive Status WFL   Arousal/Participation Alert   Orientation Level Oriented X4   Following Commands Follows all commands and directions without difficulty   RLE Assessment   RLE Assessment WNL   LLE Assessment   LLE Assessment WNL   Bed Mobility   Sit to Supine 7  Independent   Transfers   Sit to Stand 7  Independent   Stand to Sit 7  Independent   Additional Comments Pt standing up in room independently upon PT arrival    Ambulation/Elevation   Gait pattern WNL   Gait Assistance 7  Independent   Assistive Device None   Distance 350 ft   Stair Management Assistance   (pt declined performance )   Balance   Static Sitting Normal   Dynamic Sitting Normal   Static Standing Normal   Dynamic Standing Good   Ambulatory Good   Endurance Deficit   Endurance Deficit No   Activity Tolerance   Activity Tolerance Patient tolerated treatment well   Nurse Made Aware Handoff communication with RN before and after PT session    Assessment   Prognosis Good   Assessment Pt is a 71 y o  female seen for PT evaluation s/p admit to [facility] on 2/4/2020  Pt was admitted with a primary dx of: rectal ca s/p closure ileostomy and resection of small bowel on 2/4  PT now consulted for assessment of mobility and d/c needs  Pt with Up and OOB as tolerated  orders  Pts current co morbidities effecting treatment include: GERD  Pts current clinical presentation is Stable (low complexity)  Prior to admission, pt was independent with all mobility, resides alone  Upon evaluation, pt currently is independent for bed mobility; independent for transfers and independent for ambulation 350 ft without AD  Pt presents at PT eval functioning at baseline    No acute PT needs, encouraged continued mobility 3x/day with RN staff, verbalized understanding  At conclusion of PT session pt returned BTB with phone and call bell within reach  Pt denies any further questions at this time  Recommend home independently upon hospital D/C     Goals   Patient Goals "to go home"   Plan   PT Frequency One time visit   Recommendation   Recommendation Home independently   PT - OK to Discharge Yes   B[place] Index   Feeding 10   Bathing 5   Grooming Score 5   Dressing Score 10   Bladder Score 10   Bowels Score 10   Toilet Use Score 10   Transfers (Bed/Chair) Score 15   Mobility (Level Surface) Score 15   Stairs Score 10   Barthel Index Score 100       Adelfo Hernandez, PT, DPT

## 2020-02-05 NOTE — SOCIAL WORK
Pt lives alone in 3rd floor apartment  Indpendent Pta  Patient's daughter Romie Molina will transport home  Hx VNA with [facility]  Patient c/o they did not transfer her care to [place] when she left her daughters  NO LW  No DME  Drives  CM reviewed d/c planning process including the following: identifying help at home, patient preference for d/c planning needs, Discharge Lounge, [facility] Meds to Bed program, availability of treatment team to discuss questions or concerns patient and/or family may have regarding understanding medications and recognizing signs and symptoms once discharged  CM also encouraged patient to follow up with all recommended appointments after discharge  Patient advised of importance for patient and family to participate in managing patients medical well being

## 2020-02-05 NOTE — PLAN OF CARE
Problem: PAIN - ADULT  Goal: Verbalizes/displays adequate comfort level or baseline comfort level  Description  Interventions:  - Encourage patient to monitor pain and request assistance  - Assess pain using appropriate pain scale  - Administer analgesics based on type and severity of pain and evaluate response  - Implement non-pharmacological measures as appropriate and evaluate response  - Consider cultural and social influences on pain and pain management  - Notify physician/advanced practitioner if interventions unsuccessful or patient reports new pain  Outcome: Progressing     Problem: INFECTION - ADULT  Goal: Absence or prevention of progression during hospitalization  Description  INTERVENTIONS:  - Assess and monitor for signs and symptoms of infection  - Monitor lab/diagnostic results  - Monitor all insertion sites, i e  indwelling lines, tubes, and drains  - Monitor endotracheal if appropriate and nasal secretions for changes in amount and color  - [place] appropriate cooling/warming therapies per order  - Administer medications as ordered  - Instruct and encourage patient and family to use good hand hygiene technique  - Identify and instruct in appropriate isolation precautions for identified infection/condition  Outcome: Progressing  Goal: Absence of fever/infection during neutropenic period  Description  INTERVENTIONS:  - Monitor WBC    Outcome: Progressing     Problem: SAFETY ADULT  Goal: Patient will remain free of falls  Description  INTERVENTIONS:  - Assess patient frequently for physical needs  -  Identify cognitive and physical deficits and behaviors that affect risk of falls    -  [place] fall precautions as indicated by assessment   - Educate patient/family on patient safety including physical limitations  - Instruct patient to call for assistance with activity based on assessment  - Modify environment to reduce risk of injury  - Consider OT/PT consult to assist with strengthening/mobility  Outcome: Progressing  Goal: Maintain or return to baseline ADL function  Description  INTERVENTIONS:  -  Assess patient's ability to carry out ADLs; assess patient's baseline for ADL function and identify physical deficits which impact ability to perform ADLs (bathing, care of mouth/teeth, toileting, grooming, dressing, etc )  - Assess/evaluate cause of self-care deficits   - Assess range of motion  - Assess patient's mobility; develop plan if impaired  - Assess patient's need for assistive devices and provide as appropriate  - Encourage maximum independence but intervene and supervise when necessary  - Involve family in performance of ADLs  - Assess for home care needs following discharge   - Consider OT consult to assist with ADL evaluation and planning for discharge  - Provide patient education as appropriate  Outcome: Progressing  Goal: Maintain or return mobility status to optimal level  Description  INTERVENTIONS:  - Assess patient's baseline mobility status (ambulation, transfers, stairs, etc )    - Identify cognitive and physical deficits and behaviors that affect mobility  - Identify mobility aids required to assist with transfers and/or ambulation (gait belt, sit-to-stand, lift, walker, cane, etc )  - [place] fall precautions as indicated by assessment  - Record patient progress and toleration of activity level on Mobility SBAR; progress patient to next Phase/Stage  - Instruct patient to call for assistance with activity based on assessment  - Consider rehabilitation consult to assist with strengthening/weightbearing, etc   Outcome: Progressing     Problem: DISCHARGE PLANNING  Goal: Discharge to home or other facility with appropriate resources  Description  INTERVENTIONS:  - Identify barriers to discharge w/patient and caregiver  - Arrange for needed discharge resources and transportation as appropriate  - Identify discharge learning needs (meds, wound care, etc )  - Arrange for interpretive services to assist at discharge as needed  - Refer to Case Management Department for coordinating discharge planning if the patient needs post-hospital services based on physician/advanced practitioner order or complex needs related to functional status, cognitive ability, or social support system  Outcome: Progressing     Problem: Knowledge Deficit  Goal: Patient/family/caregiver demonstrates understanding of disease process, treatment plan, medications, and discharge instructions  Description  Complete learning assessment and assess knowledge base    Interventions:  - Provide teaching at level of understanding  - Provide teaching via preferred learning methods  Outcome: Progressing

## 2020-02-05 NOTE — PROGRESS NOTES
Progress Note - Colorectal Surgery   Kimberley Ghosh 71 y o  female MRN: [id number]  Unit/Bed#: [facility] 822-01 Encounter: [id number]    Assessment:  64F w/ PMHx of Rectal cancer and ileostomy, now s/p ileostomy closure 2/4    Afebrile, good urine output overnight  Plan:  Advance to Clears/Toast, if tolerates with return of bowel function will go to surgical soft  Decrease fluids and switch to D5 1/2ns 20K  Pain control PRN, can try to wean of PCA this aftenoon for PO  OOB/Ambulate  PT/OT    Subjective/Objective   Chief Complaint:     Subjective: No acute events  Tolerated clears with no nausea or vomiting, no bowel function at this time    Objective:     Blood pressure 140/59, pulse 75, temperature 98 6 °F (37 °C), resp  rate 16, height 4' 10" (1 473 m), weight 88 9 kg (196 lb), last menstrual period 09/01/2011, SpO2 97 %  ,Body mass index is 40 96 kg/m²  Intake/Output Summary (Last 24 hours) at 2/5/2020 0509  Last data filed at 2/5/2020 0304  Gross per 24 hour   Intake 2773 33 ml   Output 660 ml   Net 2113 33 ml       Invasive Devices     Peripheral Intravenous Line            Peripheral IV 02/04/20 Left Arm less than 1 day                Physical Exam: General: AAOx3  Head: normocephalic, atraumatic  Neck: supple, trachea midline   Respiratory: BS b/l  Abdomen: Soft, non tender, incision is c/d/i, dressing taken down and mild non blanching erythema on cephalad portion likely from retraction/manipulation  Heart: RRR, S1s2  Ext: Warm no cyanosis   Pulse: 2+ radial      Lab, Imaging and other studies:  I have personally reviewed pertinent lab results    , CBC:   Lab Results   Component Value Date    WBC 6 52 02/04/2020    HGB 11 5 02/04/2020    HCT 36 7 02/04/2020    MCV 85 02/04/2020     02/04/2020    MCH 26 7 (L) 02/04/2020    MCHC 31 3 (L) 02/04/2020    RDW 15 3 (H) 02/04/2020    MPV 10 7 02/04/2020    NRBC 0 02/04/2020   , CMP:   Lab Results   Component Value Date    SODIUM 138 02/04/2020    K 4 0 02/04/2020     (H) 02/04/2020    CO2 23 02/04/2020    BUN 21 02/04/2020    CREATININE 1 48 (H) 02/04/2020    CALCIUM 9 4 02/04/2020    EGFR 37 02/04/2020     VTE Pharmacologic Prophylaxis: Heparin  VTE Mechanical Prophylaxis: sequential compression device

## 2020-02-06 VITALS
BODY MASS INDEX: 41.14 KG/M2 | TEMPERATURE: 98.2 F | WEIGHT: 196 LBS | SYSTOLIC BLOOD PRESSURE: 115 MMHG | RESPIRATION RATE: 12 BRPM | HEART RATE: 79 BPM | HEIGHT: 58 IN | DIASTOLIC BLOOD PRESSURE: 57 MMHG | OXYGEN SATURATION: 95 %

## 2020-02-06 LAB
ANION GAP SERPL CALCULATED.3IONS-SCNC: 6 MMOL/L (ref 4–13)
BASOPHILS # BLD AUTO: 0.02 THOUSANDS/ΜL (ref 0–0.1)
BASOPHILS NFR BLD AUTO: 0 % (ref 0–1)
BUN SERPL-MCNC: 10 MG/DL (ref 5–25)
CALCIUM SERPL-MCNC: 8.2 MG/DL (ref 8.3–10.1)
CHLORIDE SERPL-SCNC: 114 MMOL/L (ref 100–108)
CO2 SERPL-SCNC: 23 MMOL/L (ref 21–32)
CREAT SERPL-MCNC: 0.99 MG/DL (ref 0.6–1.3)
EOSINOPHIL # BLD AUTO: 0.07 THOUSAND/ΜL (ref 0–0.61)
EOSINOPHIL NFR BLD AUTO: 1 % (ref 0–6)
ERYTHROCYTE [DISTWIDTH] IN BLOOD BY AUTOMATED COUNT: 15.7 % (ref 11.6–15.1)
GFR SERPL CREATININE-BSD FRML MDRD: 60 ML/MIN/1.73SQ M
GLUCOSE SERPL-MCNC: 104 MG/DL (ref 65–140)
HCT VFR BLD AUTO: 30.1 % (ref 34.8–46.1)
HGB BLD-MCNC: 9.3 G/DL (ref 11.5–15.4)
IMM GRANULOCYTES # BLD AUTO: 0.02 THOUSAND/UL (ref 0–0.2)
IMM GRANULOCYTES NFR BLD AUTO: 0 % (ref 0–2)
LYMPHOCYTES # BLD AUTO: 2.09 THOUSANDS/ΜL (ref 0.6–4.47)
LYMPHOCYTES NFR BLD AUTO: 28 % (ref 14–44)
MCH RBC QN AUTO: 26.7 PG (ref 26.8–34.3)
MCHC RBC AUTO-ENTMCNC: 30.9 G/DL (ref 31.4–37.4)
MCV RBC AUTO: 87 FL (ref 82–98)
MONOCYTES # BLD AUTO: 0.65 THOUSAND/ΜL (ref 0.17–1.22)
MONOCYTES NFR BLD AUTO: 9 % (ref 4–12)
NEUTROPHILS # BLD AUTO: 4.52 THOUSANDS/ΜL (ref 1.85–7.62)
NEUTS SEG NFR BLD AUTO: 62 % (ref 43–75)
NRBC BLD AUTO-RTO: 0 /100 WBCS
PLATELET # BLD AUTO: 170 THOUSANDS/UL (ref 149–390)
PMV BLD AUTO: 11.7 FL (ref 8.9–12.7)
POTASSIUM SERPL-SCNC: 4.1 MMOL/L (ref 3.5–5.3)
RBC # BLD AUTO: 3.48 MILLION/UL (ref 3.81–5.12)
SODIUM SERPL-SCNC: 143 MMOL/L (ref 136–145)
WBC # BLD AUTO: 7.37 THOUSAND/UL (ref 4.31–10.16)

## 2020-02-06 PROCEDURE — 80048 BASIC METABOLIC PNL TOTAL CA: CPT | Performed by: SURGERY

## 2020-02-06 PROCEDURE — 85025 COMPLETE CBC W/AUTO DIFF WBC: CPT | Performed by: SURGERY

## 2020-02-06 RX ORDER — ACETAMINOPHEN 325 MG/1
975 TABLET ORAL EVERY 8 HOURS SCHEDULED
Qty: 30 TABLET | Refills: 0 | COMMUNITY
Start: 2020-02-06 | End: 2022-04-25

## 2020-02-06 RX ORDER — CALCIUM CARBONATE 200(500)MG
1000 TABLET,CHEWABLE ORAL ONCE
Status: COMPLETED | OUTPATIENT
Start: 2020-02-06 | End: 2020-02-06

## 2020-02-06 RX ORDER — CALCIUM CARBONATE 200(500)MG
TABLET,CHEWABLE ORAL
Status: COMPLETED
Start: 2020-02-06 | End: 2020-02-06

## 2020-02-06 RX ORDER — DOCUSATE SODIUM 100 MG/1
100 CAPSULE, LIQUID FILLED ORAL 2 TIMES DAILY
Qty: 10 CAPSULE | Refills: 0 | COMMUNITY
Start: 2020-02-06 | End: 2020-08-24 | Stop reason: ALTCHOICE

## 2020-02-06 RX ADMIN — PANTOPRAZOLE SODIUM 40 MG: 40 TABLET, DELAYED RELEASE ORAL at 05:29

## 2020-02-06 RX ADMIN — HEPARIN SODIUM 5000 UNITS: 5000 INJECTION INTRAVENOUS; SUBCUTANEOUS at 05:28

## 2020-02-06 RX ADMIN — DEXTROSE, SODIUM CHLORIDE, AND POTASSIUM CHLORIDE 75 ML/HR: 5; .45; .15 INJECTION INTRAVENOUS at 07:20

## 2020-02-06 RX ADMIN — CALCIUM CARBONATE (ANTACID) CHEW TAB 500 MG 1000 MG: 500 CHEW TAB at 01:30

## 2020-02-06 RX ADMIN — ACETAMINOPHEN 975 MG: 325 TABLET ORAL at 05:29

## 2020-02-06 RX ADMIN — Medication 1000 MG: at 01:30

## 2020-02-06 NOTE — DISCHARGE SUMMARY
Discharge Summary - Colorectal Surgery   Jamie Avila 71 y o  female MRN: [id number]  Unit/Bed#: [facility] 822-01 Encounter: [id number]        Admitting Diagnosis:  Rectal cancer, diverting loop ileostomy    Admit Date:  February 4th, 2020    Discharge Diagnosis:  Same    Discharge Date: 2/6/20    HPI:  This is a very pleasant 21-year-old woman who has a history rectal cancer  She underwent a low anterior resection with a diverting loop ileostomy in December of 2019  Patient now is being admitted for closure of her ileostomy  She has maintained her weight, and has a good appetite  Procedures Performed:  February 4th, 2020 diverting loop ileostomy reversal with a hand-sewn enteroenterostomy- Dr Mina Kay  Hospital Course:  Patient has done well postoperatively  There has been no nausea or vomiting  Her abdomen is states soft and nondistended  Her right abdominal wound is clean and dry with no erythema  Staples are intact  Patient initially was started on a clear liquid diet and advanced to a low residue diet which she is tolerating well  Patient is up and ambulating the halls  She will be discharged home and followed up with Dr Mina Kay in 2 weeks for staple removal   All discharge instructions were given to the patient  Patient is only using Tylenol for pain and refuses to have any narcotics  Complications:  None      Condition at Discharge: good     Discharge instructions/Information to patient and family:   See after visit summary for information provided to patient and family  Provisions for Follow-Up Care:  See after visit summary for information related to follow-up care and any pertinent home health orders  Disposition: Home    Planned Readmission: No    Discharge Statement   I spent 20 minutes discharging the patient  This time was spent on the day of discharge  I had direct contact with the patient on the day of discharge   Additional documentation is required if more than 30 minutes were spent on discharge  Discharge Medications:  See after visit summary for reconciled discharge medications provided to patient and family

## 2020-02-06 NOTE — PROGRESS NOTES
Progress Note - Colorectal Surgery   Sonido Browne 71 y o  female MRN: [id number]  Unit/Bed#: [facility] 822-01 Encounter: [id number]    Assessment:  64F w/ PMHx of Rectal cancer and ileostomy, now s/p ileostomy closure 2/4    Vss  Afebrile  abd soft, nontender, non distended  Plan:  Continue diet  Home today  dvt ppx    Subjective/Objective     Subjective:  Nothing acute overnight  Denied fever, chills, chest pain, shortness of breath, nausea, vomiting, or abdominal pain this morning  Objective:   Blood pressure 115/57, pulse 90, temperature 99 2 °F (37 3 °C), resp  rate 18, height 4' 10" (1 473 m), weight 88 9 kg (196 lb), last menstrual period 09/01/2011, SpO2 96 %  ,Body mass index is 40 96 kg/m²  Intake/Output Summary (Last 24 hours) at 2/6/2020 0343  Last data filed at 2/6/2020 0035  Gross per 24 hour   Intake 2347 92 ml   Output 1725 ml   Net 622 92 ml       Invasive Devices     Peripheral Intravenous Line            Peripheral IV 02/05/20 Left Forearm less than 1 day                Physical Exam:   Gen: NAD, A&O, Comfortable   Chest: Normal work of breathing, no resp distress  Abd: S, ND, NT  Ext: No edema  Skin: warm, dry, intact        Lab, Imaging and other studies:  I have personally reviewed pertinent lab results    , CBC:   No results found for: WBC, HGB, HCT, MCV, PLT, ADJUSTEDWBC, MCH, MCHC, RDW, MPV, NRBC, CMP:   No results found for: SODIUM, K, CL, CO2, ANIONGAP, BUN, CREATININE, GLUCOSE, CALCIUM, AST, ALT, ALKPHOS, PROT, BILITOT, EGFR  VTE Pharmacologic Prophylaxis: Heparin  VTE Mechanical Prophylaxis: sequential compression device

## 2020-02-06 NOTE — OCCUPATIONAL THERAPY NOTE
Occupational Therapy Screen        Patient Name: Lashae Selby  [id number] Date: 2/6/2020    OT consult received, chart reviewed  Pt Independent with ambulation/self care tasks, good activity tolerance per PT, RN   pt with no skilled OT needs at this time  Please re-consult with any changes or concerns    Sade RING, OTR/L

## 2020-02-07 ENCOUNTER — TRANSITIONAL CARE MANAGEMENT (OUTPATIENT)
Dept: FAMILY MEDICINE CLINIC | Facility: CLINIC | Age: 65
End: 2020-02-07

## 2020-02-18 ENCOUNTER — OFFICE VISIT (OUTPATIENT)
Dept: FAMILY MEDICINE CLINIC | Facility: CLINIC | Age: 65
End: 2020-02-18
Payer: MEDICARE

## 2020-02-18 VITALS
HEART RATE: 76 BPM | SYSTOLIC BLOOD PRESSURE: 126 MMHG | RESPIRATION RATE: 16 BRPM | WEIGHT: 196 LBS | OXYGEN SATURATION: 98 % | BODY MASS INDEX: 41.14 KG/M2 | TEMPERATURE: 98.8 F | DIASTOLIC BLOOD PRESSURE: 78 MMHG | HEIGHT: 58 IN

## 2020-02-18 DIAGNOSIS — Z98.890 STATUS POST REVERSAL OF ILEOSTOMY: Primary | ICD-10-CM

## 2020-02-18 DIAGNOSIS — E78.2 MIXED HYPERLIPIDEMIA: ICD-10-CM

## 2020-02-18 DIAGNOSIS — C20 RECTAL CANCER (HCC): ICD-10-CM

## 2020-02-18 PROCEDURE — 99495 TRANSJ CARE MGMT MOD F2F 14D: CPT | Performed by: NURSE PRACTITIONER

## 2020-02-18 NOTE — PROGRESS NOTES
Assessment/Plan:    1  Status post reversal of ileostomy  Comments:  stapels intact and well approximated and will follow up with surgeon next week    2  Rectal cancer [facility])  Comments:  will be going for colonoscopies every 3 months for next 2 years as per advised by surgeon  3  Mixed hyperlipidemia  Comments:  will repeat cmp by 3/15/2020  Orders:  -     Comprehensive metabolic panel; Future  -     Lipid Panel with Direct LDL reflex; Future; Expected date: 05/18/2020  -     Comprehensive metabolic panel; Future; Expected date: 05/18/2020          BMI Counseling: Body mass index is 40 96 kg/m²  Discussed the patient's BMI with her  The BMI is above normal  Nutrition recommendations include reducing portion sizes, decreasing overall calorie intake, 3-5 servings of fruits/vegetables daily, reducing fast food intake, consuming healthier snacks, decreasing soda and/or juice intake, moderation in carbohydrate intake, increasing intake of lean protein, reducing intake of saturated fat and trans fat and reducing intake of cholesterol  Patient Instructions:  Supportive care discussed and advised  Advised to RTO for any worsening and no improvement  Follow up for no improvement and worsening of conditions  Patient advised and educated when to see immediate medical care  Return in about 6 months (around 8/18/2020)  Future Appointments   Date Time Provider Colton Cunningham   2/24/2020  4:45 PM Julio C Moctezuma MD [facility]   8/24/2020  1:00 PM DEEPAK Sargent [facility]           Subjective:      Patient ID: Domo Chowdary is a 71 y o  female      Chief Complaint   Patient presents with    Transition of Care Management     [facility]         Vitals:  /78   Pulse 76   Temp 98 8 °F (37 1 °C)   Resp 16   Ht 4' 10" (1 473 m)   Wt 88 9 kg (196 lb)   LMP 09/01/2011 (Approximate)   SpO2 98%   BMI 40 96 kg/m²     HPI  Patient is here for follow up after reversal of ileostomy  s  Stated that doing well  Bowel movements are getting better  Tolerating food  Have follow up with surgeon next week for staple removals  Denies any fever, chills and sob  Complaint with statins and tolerating it well    TCM Call (since 1/18/2020)     Date and time call was made  2/7/2020 10:02 AM    Hospital care reviewed  Records reviewed    Patient was hospitialized at  [facility]    Date of Admission  02/04/20    Date of discharge  02/06/20    Diagnosis  Rectal cancer     Disposition  Home    Were the patients medications reviewed and updated  Yes    Current Symptoms  -- <img src='C:FILES (X86)      TCM Call (since 1/18/2020)     Post hospital issues  None    Should patient be enrolled in anticoag monitoring? No    Scheduled for follow up? Yes    Patients specialists  Other (comment)    Other specialists names  Christiana Granados MD (Colon and Rectal Surgery)    Did you obtain your prescribed medications  Yes    Do you need help managing your prescriptions or medications  No    Is transportation to your appointment needed  Yes    Specify why  She is not driving for 10 days due to being post op    I have advised the patient to call PCP with any new or worsening symptoms  [place]  Friends    The type of support provided  Physical; Emotional    Do you have social support  Yes, as much as I need    Are you recieving any outpatient services  No    Are you recieving home care services  No    Interperter language line needed  No    Counseling  Patient    Counseling topics  Activities of daily living; Importance of RX compliance; patient and family education; instructions for management; Risk factor reduction    Comments  I spoke with Leslie Chung who is doing well  She is afebrile and her pain is controlled with Tylenol  She is tolerating her diet and is moving her bowels which are mostly formed   She knows to report any fever to her surgeon  She knows to go to the ER if any chest pain, dypsnea, weakness, etc Jassi Michael                    The following portions of the patient's history were reviewed and updated as appropriate: allergies, current medications, past family history, past medical history, past social history, past surgical history and problem list       Review of Systems   Constitutional: Negative for chills, diaphoresis, fatigue, fever and unexpected weight change  Respiratory: Negative  Cardiovascular: Negative  Gastrointestinal: Negative for abdominal pain, nausea and vomiting  Genitourinary: Negative for decreased urine volume, difficulty urinating, dysuria, flank pain, frequency, genital sores, hematuria and urgency  Musculoskeletal: Negative  Skin:        As noted in HPI     Neurological: Negative for dizziness and headaches  Objective:    Social History     Tobacco Use   Smoking Status Never Smoker   Smokeless Tobacco Never Used       Allergies: Allergies   Allergen Reactions    Medical Tape      Skin irritation  Skin peeling         Current Outpatient Medications   Medication Sig Dispense Refill    acetaminophen (TYLENOL) 325 mg tablet Take 3 tablets (975 mg total) by mouth every 8 (eight) hours 30 tablet 0    docusate sodium (COLACE) 100 mg capsule Take 1 capsule (100 mg total) by mouth 2 (two) times a day 10 capsule 0    rosuvastatin (CRESTOR) 10 MG tablet Take 2 tablets (20 mg total) by mouth daily (Patient taking differently: Take 20 mg by mouth daily at bedtime ) 90 tablet 1     No current facility-administered medications for this visit  Physical Exam   Constitutional: She is oriented to person, place, and time  She appears well-developed and well-nourished  Cardiovascular: Normal rate, regular rhythm and normal heart sounds  Pulmonary/Chest: Effort normal and breath sounds normal    Abdominal: Soft  Bowel sounds are normal  There is no tenderness   There is no CVA tenderness  Neurological: She is alert and oriented to person, place, and time  Skin: Skin is warm and dry  Psychiatric: She has a normal mood and affect  Her behavior is normal  Judgment and thought content normal    Vitals reviewed                    DEEPAK Hogue

## 2020-06-09 ENCOUNTER — TELEPHONE (OUTPATIENT)
Dept: FAMILY MEDICINE CLINIC | Facility: CLINIC | Age: 65
End: 2020-06-09

## 2020-06-09 DIAGNOSIS — J30.2 SEASONAL ALLERGIC RHINITIS, UNSPECIFIED TRIGGER: ICD-10-CM

## 2020-06-09 DIAGNOSIS — Z78.9 STATIN INTOLERANCE: ICD-10-CM

## 2020-06-09 DIAGNOSIS — Z12.39 ENCOUNTER FOR SCREENING FOR MALIGNANT NEOPLASM OF BREAST: Primary | ICD-10-CM

## 2020-06-09 DIAGNOSIS — E78.2 MIXED HYPERLIPIDEMIA: ICD-10-CM

## 2020-06-09 LAB
ALBUMIN SERPL-MCNC: 3.8 G/DL (ref 3.8–4.8)
ALBUMIN/GLOB SERPL: 1.4 {RATIO} (ref 1.2–2.2)
ALP SERPL-CCNC: 56 IU/L (ref 39–117)
ALT SERPL-CCNC: 14 IU/L (ref 0–32)
AST SERPL-CCNC: 13 IU/L (ref 0–40)
BILIRUB SERPL-MCNC: 0.9 MG/DL (ref 0–1.2)
BUN SERPL-MCNC: 19 MG/DL (ref 8–27)
BUN/CREAT SERPL: 19 (ref 12–28)
CALCIUM SERPL-MCNC: 9.2 MG/DL (ref 8.7–10.3)
CHLORIDE SERPL-SCNC: 105 MMOL/L (ref 96–106)
CHOLEST SERPL-MCNC: 239 MG/DL (ref 100–199)
CO2 SERPL-SCNC: 21 MMOL/L (ref 20–29)
CREAT SERPL-MCNC: 1 MG/DL (ref 0.57–1)
GLOBULIN SER-MCNC: 2.8 G/DL (ref 1.5–4.5)
GLUCOSE SERPL-MCNC: 86 MG/DL (ref 65–99)
HDLC SERPL-MCNC: 52 MG/DL
LDLC SERPL CALC-MCNC: 169 MG/DL (ref 0–99)
MICRODELETION SYND BLD/T FISH: NORMAL
MICRODELETION SYND BLD/T FISH: NORMAL
POTASSIUM SERPL-SCNC: 4.4 MMOL/L (ref 3.5–5.2)
PROT SERPL-MCNC: 6.6 G/DL (ref 6–8.5)
SL AMB EGFR AFRICAN AMERICAN: 68 ML/MIN/1.73
SL AMB EGFR NON AFRICAN AMERICAN: 59 ML/MIN/1.73
SL AMB PDF IMAGE: NORMAL
SODIUM SERPL-SCNC: 139 MMOL/L (ref 134–144)
TRIGL SERPL-MCNC: 92 MG/DL (ref 0–149)

## 2020-06-10 RX ORDER — EZETIMIBE 10 MG/1
10 TABLET ORAL DAILY
Qty: 90 TABLET | Refills: 0 | Status: SHIPPED | OUTPATIENT
Start: 2020-06-10 | End: 2020-09-19 | Stop reason: SDUPTHER

## 2020-06-10 RX ORDER — LEVOCETIRIZINE DIHYDROCHLORIDE 5 MG/1
2.5 TABLET, FILM COATED ORAL DAILY
Qty: 45 TABLET | Refills: 0 | Status: SHIPPED | OUTPATIENT
Start: 2020-06-10 | End: 2021-11-12 | Stop reason: ALTCHOICE

## 2020-06-26 ENCOUNTER — HOSPITAL ENCOUNTER (OUTPATIENT)
Dept: RADIOLOGY | Facility: HOSPITAL | Age: 65
Discharge: HOME | End: 2020-06-26
Payer: MEDICARE

## 2020-06-26 ENCOUNTER — TRANSCRIBE ORDERS (OUTPATIENT)
Dept: ADMINISTRATIVE | Facility: HOSPITAL | Age: 65
End: 2020-06-26

## 2020-06-26 VITALS — WEIGHT: 196 LBS | BODY MASS INDEX: 41.14 KG/M2 | HEIGHT: 58 IN

## 2020-06-26 DIAGNOSIS — Z12.39 ENCOUNTER FOR SCREENING FOR MALIGNANT NEOPLASM OF BREAST: ICD-10-CM

## 2020-06-26 PROCEDURE — 77063 BREAST TOMOSYNTHESIS BI: CPT

## 2020-06-26 PROCEDURE — 77067 SCR MAMMO BI INCL CAD: CPT

## 2020-08-17 ENCOUNTER — APPOINTMENT (EMERGENCY)
Dept: RADIOLOGY | Facility: HOSPITAL | Age: 65
End: 2020-08-17
Payer: MEDICARE

## 2020-08-17 ENCOUNTER — HOSPITAL ENCOUNTER (EMERGENCY)
Facility: HOSPITAL | Age: 65
Discharge: HOME | End: 2020-08-17
Attending: EMERGENCY MEDICINE | Admitting: EMERGENCY MEDICINE
Payer: MEDICARE

## 2020-08-17 VITALS
HEART RATE: 122 BPM | SYSTOLIC BLOOD PRESSURE: 179 MMHG | TEMPERATURE: 98.4 F | RESPIRATION RATE: 18 BRPM | OXYGEN SATURATION: 99 % | DIASTOLIC BLOOD PRESSURE: 80 MMHG

## 2020-08-17 DIAGNOSIS — S92.901A FOOT FRACTURE, RIGHT, CLOSED, INITIAL ENCOUNTER: Primary | ICD-10-CM

## 2020-08-17 PROCEDURE — 99283 EMERGENCY DEPT VISIT LOW MDM: CPT

## 2020-08-17 PROCEDURE — 99282 EMERGENCY DEPT VISIT SF MDM: CPT | Performed by: EMERGENCY MEDICINE

## 2020-08-17 PROCEDURE — 73630 X-RAY EXAM OF FOOT: CPT

## 2020-08-17 NOTE — Clinical Note
Sourav Sandra was seen and treated in our emergency department on 8/17/2020  Diagnosis:     Lenora Sethi  may return to work on return date  She may return on 08/20/2020  If you have any questions or concerns, please don't hesitate to call        Ander Gannon DO    ______________________________           _______________          _______________  Hospital Representative                              Date                                Time

## 2020-08-18 NOTE — ED PROVIDER NOTES
History  Chief Complaint   Patient presents with    Ankle Injury     Pt reports rolling her ankle wearing a pair of wedges and reports pain around her lateral malleolus  Very slight swelling noted     77-year-old female presents with complaints of right foot pain she states approximately 3 hours ago she was wearing low heel and had an inversion injury of the right foot  She has some bruising and tenderness right around the area of the 5th metatarsal   No alleviating factors are aggravated by palpation and movement  History provided by:  Patient and spouse   used: No        Prior to Admission Medications   Prescriptions Last Dose Informant Patient Reported?  Taking?   acetaminophen (TYLENOL) 325 mg tablet   No No   Sig: Take 3 tablets (975 mg total) by mouth every 8 (eight) hours   docusate sodium (COLACE) 100 mg capsule   No No   Sig: Take 1 capsule (100 mg total) by mouth 2 (two) times a day   ezetimibe (ZETIA) 10 mg tablet   No No   Sig: Take 1 tablet (10 mg total) by mouth daily   levocetirizine (XYZAL) 5 MG tablet   No No   Sig: Take 0 5 tablets (2 5 mg total) by mouth daily   rosuvastatin (CRESTOR) 10 MG tablet   No No   Sig: Take 2 tablets (20 mg total) by mouth daily   Patient taking differently: Take 20 mg by mouth daily at bedtime       Facility-Administered Medications: None       Past Medical History:   Diagnosis Date    Blood in stool     Breast disorder     Cancer ([facility] [address] )     rectal    GERD (gastroesophageal reflux disease)     History of rectal surgery     Hyperlipidemia     PONV (postoperative nausea and vomiting)        Past Surgical History:   Procedure Laterality Date    BREAST LUMPECTOMY Right      SECTION      x3    COLONOSCOPY      ILEO LOOP DIVERSION N/A 12/10/2019    Procedure: ILEOSTOMY LOOP;  Surgeon: Quentin Go MD;  [facility];  Service: Robotics    [place] CLOSE ENTEROSTOMY N/A 2020    Procedure: CLOSURE ILEOSTOMY;  Surgeon: Sherri Baeza Tracy Velasquez MD;  [facility];  Service: Colorectal    TN LAP,SURG,COLECTOMY, PARTIAL, W/ANAST N/A 12/10/2019    Procedure: SIGMOID RESECTION COLON LAPAROSCOPIC W ROBOTICS converted to lap hand assisted  with Partial proctectomy , LASER FLUORESCENCE ANGIOGRAPHY, INTRA OP COLONOSCOPY;  Surgeon: Ramy Malcolm MD;  [facility];  Service: Robotics    SMALL INTESTINE SURGERY  2/4/2020    Procedure: RESECTION SMALL BOWEL;  Surgeon: Ramy Malcolm MD;  [facility];  Service: Colorectal       Family History   Problem Relation Age of Onset    Hypertension Mother     Heart attack Mother     Heart attack Father     Heart disease Father     Hypertension Brother     Heart attack Brother     Leukemia Cousin     Breast cancer Cousin     Diabetes Cousin     Breast cancer Family         maternal side    Colon cancer Family         paternal uncle    Colon cancer Paternal Uncle     Stroke Neg Hx      I have reviewed and agree with the history as documented  E-Cigarette/Vaping    E-Cigarette Use Never User      E-Cigarette/Vaping Substances     Social History     Tobacco Use    Smoking status: Never Smoker    Smokeless tobacco: Never Used   Substance Use Topics    Alcohol use: Yes     Frequency: Monthly or less    Drug use: Never       Review of Systems   Constitutional: Negative for activity change, chills, diaphoresis and fever  HENT: Negative for congestion, ear pain, nosebleeds, sore throat, trouble swallowing and voice change  Eyes: Negative for pain, discharge and redness  Respiratory: Negative for apnea, cough, choking, shortness of breath, wheezing and stridor  Cardiovascular: Negative for chest pain and palpitations  Gastrointestinal: Negative for abdominal distention, abdominal pain, constipation, diarrhea, nausea and vomiting  Endocrine: Negative for polydipsia     Genitourinary: Negative for difficulty urinating, dysuria, flank pain, frequency, hematuria and urgency  Musculoskeletal: Positive for arthralgias and gait problem  Negative for back pain, joint swelling, myalgias, neck pain and neck stiffness  Skin: Negative for pallor and rash  Neurological: Negative for dizziness, tremors, syncope, speech difficulty, weakness, numbness and headaches  Hematological: Negative for adenopathy  Psychiatric/Behavioral: Negative for confusion, hallucinations, self-injury and suicidal ideas  The patient is not nervous/anxious  Physical Exam  Physical Exam  Vitals signs and nursing note reviewed  Constitutional:       General: She is not in acute distress  Appearance: She is well-developed  She is not diaphoretic  HENT:      Head: Normocephalic and atraumatic  Right Ear: External ear normal       Left Ear: External ear normal       Nose: Nose normal    Eyes:      Conjunctiva/sclera: Conjunctivae normal       Pupils: Pupils are equal, round, and reactive to light  Neck:      Musculoskeletal: Normal range of motion and neck supple  Cardiovascular:      Rate and Rhythm: Normal rate and regular rhythm  Heart sounds: Normal heart sounds  Pulmonary:      Effort: Pulmonary effort is normal       Breath sounds: Normal breath sounds  Abdominal:      General: Bowel sounds are normal       Palpations: Abdomen is soft  Musculoskeletal: Normal range of motion  General: Swelling, tenderness and signs of injury present  Comments: Ecchymosis swelling tenderness to the right foot in the area of the 5th metatarsal head   Skin:     General: Skin is warm and dry  Neurological:      Mental Status: She is alert and oriented to person, place, and time  Deep Tendon Reflexes: Reflexes are normal and symmetric  Psychiatric:         Behavior: Behavior is cooperative           Vital Signs  ED Triage Vitals [08/17/20 2037]   Temperature Pulse Respirations Blood Pressure SpO2   98 4 °F (36 9 °C) (!) 122 18 (!) 179/80 99 %      Temp Source Heart Rate Source Patient Position - Orthostatic VS BP Location FiO2 (%)   Tympanic Monitor Sitting Left arm --      Pain Score       3           Vitals:    08/17/20 2037   BP: (!) 179/80   Pulse: (!) 122   Patient Position - Orthostatic VS: Sitting         Visual Acuity      ED Medications  Medications - No data to display    Diagnostic Studies  Results Reviewed     None                 XR foot 3+ views RIGHT    (Results Pending)              Procedures  Procedures         ED Course       US AUDIT      Most Recent Value   Initial Alcohol Screen: US AUDIT-C    1  How often do you have a drink containing alcohol? 1 Filed at: 08/17/2020 2037   2  How many drinks containing alcohol do you have on a typical day you are drinking? 1 Filed at: 08/17/2020 2037   3a  Male UNDER 65: How often do you have five or more drinks on one occasion? 0 Filed at: 08/17/2020 2037   3b  FEMALE Any Age, or MALE 65+: How often do you have 4 or more drinks on one occassion? 1 Filed at: 08/17/2020 2037   Audit-C Score  3 Filed at: 08/17/2020 2037                  HYACINTH/DAST-10      Most Recent Value   How many times in the past year have you    Used an illegal drug or used a prescription medication for non-medical reasons? Never Filed at: 08/17/2020 2038                                MDM      Disposition  Final diagnoses: Foot fracture, right, closed, initial encounter     Time reflects when diagnosis was documented in both MDM as applicable and the Disposition within this note     Time User Action Codes Description Comment    8/17/2020  9:28 PM Adela Selby 129 Foot fracture, right, closed, initial encounter       ED Disposition     ED Disposition Condition Date/Time Comment    Discharge Stable Mon Aug 17, 2020  9:28 PM Vicky Jackson discharge to home/self care              Follow-up Information     Follow up With Specialties Details Why Contact Info    Elisa Waddell MD Orthopedic Surgery Schedule an appointment as soon as possible for a visit  As needed [address] [facility] [address]  [phone]            Patient's Medications   Discharge Prescriptions    No medications on file     No discharge procedures on file      PDMP Review     None          ED Provider  Electronically Signed by           Ander Gannon DO  08/17/20 2129       Ander Gannon DO  08/17/20 2138

## 2020-08-19 ENCOUNTER — VBI (OUTPATIENT)
Dept: FAMILY MEDICINE CLINIC | Facility: CLINIC | Age: 65
End: 2020-08-19

## 2020-08-19 NOTE — LETTER
A call is made to Harry to schedule his pre-op COVID testing and he states he has decided with the pandemic still active he would prefer to reschedule his sinus surgery at a later date, he would like a call back in approx. 2 months to do so   [facility]  [address]  [place] [address]    Date: 08/20/20     Sharita Quinonez  [facility] [place] [address] [address]    Dear Vicki Hence:                                                                                                                              Thank you for choosing [facility] Emergency Department for care  As your primary care provider we want to make sure that your ongoing medical care is being addressed  If you require follow up care as a result of your emergency department visit, there are a few things we would like you to know  As part of our continuing commitment to caring for our patients, we have added more same day appointments and have extended our office hours to meet your medical needs  After hours, on-call physicians are available via our main office line  We encourage you to contact our office prior to seeking treatment to discuss your symptoms with our medical staff  Together, we can determine the correct course of action  A majority of non-emergent conditions such as: common cold, flu-like symptoms, fevers, strains/sprains, dislocations, minor burns, cuts and animal bites can be treated at [address] [place] [address] facilities  Diagnostic testing is available at some sites  Of course, if you are experiencing a life threatening medical emergency call 911 or proceed directly to the nearest emergency room      Your nearest [address] [place] [address] facility is conveniently located at:    [address] [place] [address] [address], [facility]  [phone]    SKIP THE WAIT  Conveniently offered at most [facility] locations  Cynthiafort your spot online at www [email or web address] or on the [facility]    Sincerely,      [facility]  Dept: [phone]

## 2020-08-19 NOTE — TELEPHONE ENCOUNTER
Minor Lunch    ED Visit Information     Ed visit date: 08/17/2020  Diagnosis Description: Foot fracture, right, closed, initial encounter  In Network? Yes [facility]  Discharge status: Home  Discharged with meds ? No  Number of ED visits to date: 0  ED Severity:NA     Outreach Information    Outreach successful: Yes 2  Date letter mailed: 08/20/2020  Date Finalized: 08/20/2020    Care Coordination    Follow up appointment with pcp: no no  Transportation issues ?  NA    Value Base Outreach    08/19/20 9:45 AM - [facility]  08/20/2020 11:44 AM

## 2020-08-24 ENCOUNTER — OFFICE VISIT (OUTPATIENT)
Dept: FAMILY MEDICINE CLINIC | Facility: CLINIC | Age: 65
End: 2020-08-24
Payer: MEDICARE

## 2020-08-24 VITALS
TEMPERATURE: 97.5 F | BODY MASS INDEX: 41.56 KG/M2 | HEART RATE: 94 BPM | DIASTOLIC BLOOD PRESSURE: 70 MMHG | RESPIRATION RATE: 16 BRPM | HEIGHT: 58 IN | WEIGHT: 198 LBS | OXYGEN SATURATION: 97 % | SYSTOLIC BLOOD PRESSURE: 110 MMHG

## 2020-08-24 DIAGNOSIS — R42 VERTIGO: ICD-10-CM

## 2020-08-24 DIAGNOSIS — E78.49 OTHER HYPERLIPIDEMIA: Primary | ICD-10-CM

## 2020-08-24 DIAGNOSIS — R42 DIZZINESS: ICD-10-CM

## 2020-08-24 PROCEDURE — 99214 OFFICE O/P EST MOD 30 MIN: CPT | Performed by: NURSE PRACTITIONER

## 2020-08-24 PROCEDURE — 1160F RVW MEDS BY RX/DR IN RCRD: CPT | Performed by: NURSE PRACTITIONER

## 2020-08-24 PROCEDURE — 1036F TOBACCO NON-USER: CPT | Performed by: NURSE PRACTITIONER

## 2020-08-24 PROCEDURE — 3008F BODY MASS INDEX DOCD: CPT | Performed by: NURSE PRACTITIONER

## 2020-08-24 RX ORDER — MECLIZINE HYDROCHLORIDE 25 MG/1
25 TABLET ORAL 3 TIMES DAILY PRN
Qty: 30 TABLET | Refills: 0 | Status: SHIPPED | OUTPATIENT
Start: 2020-08-24 | End: 2021-09-08 | Stop reason: SDUPTHER

## 2020-08-24 NOTE — PROGRESS NOTES
Assessment/Plan:  Advised to avoid any straining  Will try antivert and will follow up with balance center and if still having dizziness or not resolved then should follow up back in office and will do consider brain imaging at that  Patient verbalizes understanding  1  Other hyperlipidemia  Comments:  continue with zetia at this time and will repeat labs around mid sept 2020 as due to check lipid profile  Orders:  -     Comprehensive metabolic panel; Future  -     Lipid Panel with Direct LDL reflex; Future    2  Vertigo  -     Ambulatory referral to Physical Therapy; Future  -     meclizine (ANTIVERT) 25 mg tablet; Take 1 tablet (25 mg total) by mouth 3 (three) times a day as needed for dizziness    3  Dizziness  -     Ambulatory referral to Physical Therapy; Future  -     meclizine (ANTIVERT) 25 mg tablet; Take 1 tablet (25 mg total) by mouth 3 (three) times a day as needed for dizziness          BMI Counseling: Body mass index is 41 38 kg/m²  Discussed the patient's BMI with her  The BMI is above normal  Nutrition recommendations include reducing portion sizes, decreasing overall calorie intake, 3-5 servings of fruits/vegetables daily, reducing fast food intake, consuming healthier snacks, decreasing soda and/or juice intake, moderation in carbohydrate intake, increasing intake of lean protein, reducing intake of saturated fat and trans fat and reducing intake of cholesterol  Patient Instructions:  Supportive care discussed and advised  Advised to RTO for any worsening and no improvement  Follow up for no improvement and worsening of conditions  Patient advised and educated when to see immediate medical care  Return in about 6 months (around 2/24/2021)        Future Appointments   Date Time Provider [place]   11/2/2020  1:30 PM Delano Tabor MD [facility] [address] [facility]   2/22/2021  1:00 PM DEEPAK Reyes [facility]           Subjective:      Patient ID: Moriah Gabriela Allyson Babcock is a 76 y o  female  Chief Complaint   Patient presents with    Follow-up     6 month f/u-[facility]    Dizziness         Vitals:  /70   Pulse 94   Temp 97 5 °F (36 4 °C)   Resp 16   Ht 4' 10" (1 473 m)   Wt 89 8 kg (198 lb)   LMP 09/01/2011 (Approximate)   SpO2 97%   BMI 41 38 kg/m²     HPI  Patient is here for follow up on her hyperlipidemia medication  Been complaint with zetia and tolerating it well  Denies any adverse effects related to that  Stated that used to have lot of dizziness and vertigo years ago and now restarted back again about a month ago  Denies any injury, falls, headache and weakness  Stated that started with room spinning and now more dizziness on and off  Stated that usually feels when lying down and sitting on toilet  On asking patient informed that she gets constipated and does strain at times and gets dizzy at that time  PHQ-9 Depression Screening    PHQ-9:    Frequency of the following problems over the past two weeks:       Little interest or pleasure in doing things:  0 - not at all  Feeling down, depressed, or hopeless:  0 - not at all  PHQ-2 Score:  0             The following portions of the patient's history were reviewed and updated as appropriate: allergies, current medications, past family history, past medical history, past social history, past surgical history and problem list       Review of Systems   Constitutional: Negative  HENT: Negative  Respiratory: Negative  Cardiovascular: Negative  Gastrointestinal: Negative  Genitourinary: Negative  Skin: Negative  Neurological: Positive for dizziness  Negative for tremors, seizures, syncope, facial asymmetry, speech difficulty, weakness, light-headedness, numbness and headaches  As noted in HPI     Psychiatric/Behavioral: Negative  Objective:    Social History     Tobacco Use   Smoking Status Never Smoker   Smokeless Tobacco Never Used       Allergies:    Allergies Allergen Reactions    Medical Tape      Skin irritation  Skin peeling         Current Outpatient Medications   Medication Sig Dispense Refill    acetaminophen (TYLENOL) 325 mg tablet Take 3 tablets (975 mg total) by mouth every 8 (eight) hours 30 tablet 0    ezetimibe (ZETIA) 10 mg tablet Take 1 tablet (10 mg total) by mouth daily 90 tablet 0    levocetirizine (XYZAL) 5 MG tablet Take 0 5 tablets (2 5 mg total) by mouth daily 45 tablet 0    meclizine (ANTIVERT) 25 mg tablet Take 1 tablet (25 mg total) by mouth 3 (three) times a day as needed for dizziness 30 tablet 0     No current facility-administered medications for this visit  Physical Exam  Vitals signs reviewed  Constitutional:       Appearance: Normal appearance  She is well-developed  HENT:      Head: Normocephalic  Right Ear: Tympanic membrane, ear canal and external ear normal       Left Ear: Tympanic membrane, ear canal and external ear normal       Nose: Nose normal       Right Sinus: No maxillary sinus tenderness or frontal sinus tenderness  Left Sinus: No maxillary sinus tenderness or frontal sinus tenderness  Neck:      Musculoskeletal: Neck supple  Cardiovascular:      Rate and Rhythm: Normal rate and regular rhythm  Heart sounds: Normal heart sounds  Pulmonary:      Effort: Pulmonary effort is normal       Breath sounds: Normal breath sounds  Abdominal:      General: Bowel sounds are normal       Tenderness: There is no abdominal tenderness  There is no rebound  Lymphadenopathy:      Cervical:      Right cervical: No superficial or posterior cervical adenopathy  Left cervical: No superficial or posterior cervical adenopathy  Skin:     General: Skin is warm and dry  Neurological:      Mental Status: She is alert and oriented to person, place, and time  GCS: GCS eye subscore is 4  GCS verbal subscore is 5  GCS motor subscore is 6  Cranial Nerves: Cranial nerves are intact        Sensory: Sensation is intact  Motor: No weakness  Coordination: Coordination is intact  Coordination normal       Comments: No eye lag noted  Psychiatric:         Behavior: Behavior normal          Thought Content:  Thought content normal          Judgment: Judgment normal                      DEEPAK Gupta

## 2020-09-15 ENCOUNTER — EVALUATION (OUTPATIENT)
Dept: PHYSICAL THERAPY | Facility: CLINIC | Age: 65
End: 2020-09-15
Payer: MEDICARE

## 2020-09-15 DIAGNOSIS — R42 VERTIGO: Primary | ICD-10-CM

## 2020-09-15 DIAGNOSIS — R42 DIZZINESS: ICD-10-CM

## 2020-09-15 PROCEDURE — 97163 PT EVAL HIGH COMPLEX 45 MIN: CPT | Performed by: PHYSICAL THERAPIST

## 2020-09-15 NOTE — PROGRESS NOTES
PT Evaluation     Today's date: 9/15/2020  Patient name: Olga Puckett  : 1955  MRN: [id number]  Referring provider: DEEPAK Meyer  Dx:   Encounter Diagnosis     ICD-10-CM    1  Vertigo  R42 Ambulatory referral to Physical Therapy   2  Dizziness  R42 Ambulatory referral to Physical Therapy                  Assessment  Assessment details: Pt is a 76year old female who presents to skilled PT for onset of dizziness a month and half ago  Oculomotor exam was unremarkable  Low risk for falls with DGI and 10 meter walk test  Patient was given education on dizziness and causes  Advised patient to contact therapy clinic if dizziness returns and does not reduce within 2 to 4 weeks  Patient will be DC at this time  Patient is agreeable to PT recommendation  Plan  Plan of Care beginning date: 9/15/2020  Plan of Care expiration date: 10/6/2020        Subjective Evaluation    History of Present Illness  Mechanism of injury: Patient is a 76year old female who presents to skilled PT with history of dizziness which started 15 years ago where she was dizzy for 4 days  7 years ago had same issue were it lasted 3-4 days  Most recently had episode of dizziness about 3 to 4 times were had to lay in bed for a day to day and half  With feeling off balance for about 1-2 weeks  Noted spinning waking up and usually am was busy  Pt reports triggering when bearing down at night  Had surgery for cancer in Dec and than follow up in feb  Notes having most recent spinning dizziness about a month and month half  Reports dizziness lasted about a week and week half  No dizziness since then     Quality of life: good    Pain  No pain reported    Social Support  Lives in: multiple-level home          Objective     Functional Assessment        Comments  Oculomotor Screen   Headache 0 / 10  Dizziness 0 / 10  Nausea 0/ 10    -Smooth Pursuits- (Central)   Normal, Dizziness 0/10    -Gaze holding nystagmus-   Normal, Dizziness 0/10    -Spontaneous nystagmus room light-  Normal, Dizziness 0/10    -Near Point Convergence- (Central)   Normal, 3 Inches/CM ( 4 inch/ 5 CM norm)   Dizziness 0/10    -Saccades- (Central)   Horizontal   Normal, Dizziness 0/10  Vertical   Normal, Dizziness 0/10    -VOR Screen / Motion Sensitivity-   Horizontal   Normal, Dizziness 0/10  Vertical   Normal, Dizziness 0/10    -VOR Cancel- (Central)   Horizontal   Normal, Dizziness 0/10  Vertical   Normal, Dizziness 0/10      -Head Thrust- Moderate to severe   Horizontal  Normal,bilateral, Dizziness 0/10    Head Shaking Test: Mild hypofunction   shake 20 times with eyes closed and eyes open  Dizziness 0/10    -Coordination Screen-   -Dysmetria- Normal  -Dysdiadochokinesia- Normal  -Alternating Toe Taps- Normal      -Positional Testing-  -Kirt-Hallpike-   Right:Normal, Nystagmus  no, 0 seconds   Left: Normal,  Nystagmus  no, 0 seconds   -Roll Test-   Right: Normal, Nystagmus  no 0 seconds  Left: Normal,  Nystagmus  no, 0 seconds       DGI: 19/24    10 MWT: 1 1 M/S       Flowsheet Rows      Most Recent Value   PT/OT G-Codes   Current Score  19   Projected Score  59             Precautions:  has a past medical history of Blood in stool, Breast disorder, Cancer ([address] ), GERD (gastroesophageal reflux disease), History of rectal surgery, Hyperlipidemia, and PONV (postoperative nausea and vomiting)

## 2020-09-19 ENCOUNTER — TELEPHONE (OUTPATIENT)
Dept: FAMILY MEDICINE CLINIC | Facility: CLINIC | Age: 65
End: 2020-09-19

## 2020-09-19 DIAGNOSIS — E78.2 MIXED HYPERLIPIDEMIA: ICD-10-CM

## 2020-09-19 DIAGNOSIS — Z78.9 STATIN INTOLERANCE: ICD-10-CM

## 2020-09-19 LAB
ALBUMIN SERPL-MCNC: 3.8 G/DL (ref 3.8–4.8)
ALBUMIN/GLOB SERPL: 1.3 {RATIO} (ref 1.2–2.2)
ALP SERPL-CCNC: 50 IU/L (ref 39–117)
ALT SERPL-CCNC: 15 IU/L (ref 0–32)
AST SERPL-CCNC: 14 IU/L (ref 0–40)
BILIRUB SERPL-MCNC: 0.9 MG/DL (ref 0–1.2)
BUN SERPL-MCNC: 15 MG/DL (ref 8–27)
BUN/CREAT SERPL: 15 (ref 12–28)
CALCIUM SERPL-MCNC: 9.5 MG/DL (ref 8.7–10.3)
CHLORIDE SERPL-SCNC: 106 MMOL/L (ref 96–106)
CHOLEST SERPL-MCNC: 206 MG/DL (ref 100–199)
CO2 SERPL-SCNC: 23 MMOL/L (ref 20–29)
CREAT SERPL-MCNC: 0.97 MG/DL (ref 0.57–1)
GLOBULIN SER-MCNC: 3 G/DL (ref 1.5–4.5)
GLUCOSE SERPL-MCNC: 85 MG/DL (ref 65–99)
HDLC SERPL-MCNC: 49 MG/DL
LDLC SERPL CALC-MCNC: 135 MG/DL (ref 0–99)
MICRODELETION SYND BLD/T FISH: NORMAL
POTASSIUM SERPL-SCNC: 4.8 MMOL/L (ref 3.5–5.2)
PROT SERPL-MCNC: 6.8 G/DL (ref 6–8.5)
SL AMB EGFR AFRICAN AMERICAN: 70 ML/MIN/1.73
SL AMB EGFR NON AFRICAN AMERICAN: 61 ML/MIN/1.73
SODIUM SERPL-SCNC: 140 MMOL/L (ref 134–144)
TRIGL SERPL-MCNC: 120 MG/DL (ref 0–149)

## 2020-09-19 RX ORDER — EZETIMIBE 10 MG/1
10 TABLET ORAL DAILY
Qty: 90 TABLET | Refills: 1 | Status: SHIPPED | OUTPATIENT
Start: 2020-09-19 | End: 2021-02-25 | Stop reason: SDUPTHER

## 2020-09-19 NOTE — TELEPHONE ENCOUNTER
Patient called and results discussed and no further action needed at this time  Will continue with zetia at this time and lipid profile improved  Denies any adverse effects related to it   DEEPAK Chávez

## 2020-09-30 PROBLEM — K57.30 SIGMOID DIVERTICULOSIS: Status: ACTIVE | Noted: 2020-09-30

## 2021-02-24 ENCOUNTER — HOSPITAL ENCOUNTER (OUTPATIENT)
Dept: RADIOLOGY | Facility: HOSPITAL | Age: 66
Discharge: HOME | End: 2021-02-24
Attending: COLON & RECTAL SURGERY
Payer: MEDICARE

## 2021-02-24 DIAGNOSIS — C19 RECTOSIGMOID CANCER (HCC): ICD-10-CM

## 2021-02-24 PROCEDURE — 71260 CT THORAX DX C+: CPT

## 2021-02-24 PROCEDURE — 74177 CT ABD & PELVIS W/CONTRAST: CPT

## 2021-02-24 PROCEDURE — G1004 CDSM NDSC: HCPCS

## 2021-02-24 RX ADMIN — IOHEXOL 100 ML: 350 INJECTION, SOLUTION INTRAVENOUS at 15:14

## 2021-02-25 ENCOUNTER — OFFICE VISIT (OUTPATIENT)
Dept: FAMILY MEDICINE CLINIC | Facility: CLINIC | Age: 66
End: 2021-02-25
Payer: MEDICARE

## 2021-02-25 VITALS
HEIGHT: 58 IN | BODY MASS INDEX: 45.76 KG/M2 | OXYGEN SATURATION: 98 % | RESPIRATION RATE: 18 BRPM | TEMPERATURE: 97.2 F | HEART RATE: 88 BPM | SYSTOLIC BLOOD PRESSURE: 114 MMHG | DIASTOLIC BLOOD PRESSURE: 72 MMHG | WEIGHT: 218 LBS

## 2021-02-25 DIAGNOSIS — Z12.31 ENCOUNTER FOR SCREENING MAMMOGRAM FOR MALIGNANT NEOPLASM OF BREAST: ICD-10-CM

## 2021-02-25 DIAGNOSIS — Z78.9 STATIN INTOLERANCE: ICD-10-CM

## 2021-02-25 DIAGNOSIS — K21.9 GASTROESOPHAGEAL REFLUX DISEASE, UNSPECIFIED WHETHER ESOPHAGITIS PRESENT: ICD-10-CM

## 2021-02-25 DIAGNOSIS — Z00.00 MEDICARE ANNUAL WELLNESS VISIT, SUBSEQUENT: ICD-10-CM

## 2021-02-25 DIAGNOSIS — Z78.0 POST-MENOPAUSAL: ICD-10-CM

## 2021-02-25 DIAGNOSIS — E78.2 MIXED HYPERLIPIDEMIA: Primary | ICD-10-CM

## 2021-02-25 PROCEDURE — G0438 PPPS, INITIAL VISIT: HCPCS | Performed by: NURSE PRACTITIONER

## 2021-02-25 PROCEDURE — 1123F ACP DISCUSS/DSCN MKR DOCD: CPT | Performed by: NURSE PRACTITIONER

## 2021-02-25 PROCEDURE — 99213 OFFICE O/P EST LOW 20 MIN: CPT | Performed by: NURSE PRACTITIONER

## 2021-02-25 RX ORDER — FAMOTIDINE 20 MG/1
20 TABLET, FILM COATED ORAL 2 TIMES DAILY
Qty: 180 TABLET | Refills: 1 | Status: SHIPPED | OUTPATIENT
Start: 2021-02-25 | End: 2022-03-21 | Stop reason: SDUPTHER

## 2021-02-25 RX ORDER — EZETIMIBE 10 MG/1
10 TABLET ORAL DAILY
Qty: 90 TABLET | Refills: 1 | Status: SHIPPED | OUTPATIENT
Start: 2021-02-25 | End: 2021-09-08 | Stop reason: SDUPTHER

## 2021-02-25 NOTE — PROGRESS NOTES
Assessment and Plan:     Problem List Items Addressed This Visit        Digestive    GERD (gastroesophageal reflux disease)    Relevant Medications    famotidine (PEPCID) 20 mg tablet       Other    Hyperlipidemia - Primary    Relevant Medications    ezetimibe (ZETIA) 10 mg tablet    Other Relevant Orders    Comprehensive metabolic panel    Lipid Panel with Direct LDL reflex      Other Visit Diagnoses     Statin intolerance        Relevant Medications    ezetimibe (ZETIA) 10 mg tablet    Other Relevant Orders    Comprehensive metabolic panel    Lipid Panel with Direct LDL reflex    Body mass index (BMI) 45 0-49 9, adult (HCC)        diet/excercise/weight loss advised    Encounter for screening mammogram for malignant neoplasm of breast        Relevant Orders    Mammo screening bilateral w 3d & cad    Post-menopausal        Relevant Orders    DXA bone density spine hip and pelvis    Medicare annual wellness visit, subsequent               Preventive health issues were discussed with patient, and age appropriate screening tests were ordered as noted in patient's After Visit Summary  Personalized health advice and appropriate referrals for health education or preventive services given if needed, as noted in patient's After Visit Summary       History of Present Illness:     Patient presents for Medicare Annual Wellness visit    Patient Care Team:  Fernando Edge as PCP - General (Family Medicine)  Marichuy Harry MD as PCP - [address] [place] [address] (RTE)  MD Elsy Quinones MD (Colon and Rectal Surgery)     Problem List:     Patient Active Problem List   Diagnosis    Callus of foot    Foot pain    GERD (gastroesophageal reflux disease)    Hyperlipidemia    Prediabetes    Varicose veins with pain    Morbid obesity ([address]    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[address] )    Dyspnea on exertion    Carcinoma of sigmoid colon ([address]    Dyslipidemia    Rectal cancer ([address] )    Sigmoid diverticulosis      Past Medical and Surgical History:     Past Medical History:   Diagnosis Date    Blood in stool     Breast disorder     Cancer ([address] )     rectal    GERD (gastroesophageal reflux disease)     History of rectal surgery     Hyperlipidemia     PONV (postoperative nausea and vomiting)      Past Surgical History:   Procedure Laterality Date    BREAST LUMPECTOMY Right      SECTION      x3    COLONOSCOPY      ILEO LOOP DIVERSION N/A 12/10/2019    Procedure: ILEOSTOMY LOOP;  Surgeon: Vern Delgadillo MD;  [facility];  Service: Robotics    TX CLOSE ENTEROSTOMY N/A 2020    Procedure: CLOSURE ILEOSTOMY;  Surgeon: Vern Delgadillo MD;  [facility];  Service: Colorectal    TX LAP,SURG,COLECTOMY, PARTIAL, W/ANAST N/A 12/10/2019    Procedure: SIGMOID RESECTION COLON LAPAROSCOPIC W ROBOTICS converted to lap hand assisted  with Partial proctectomy , LASER FLUORESCENCE ANGIOGRAPHY, INTRA OP COLONOSCOPY;  Surgeon: Vern Delgadillo MD;  [facility];  Service: Robotics    SMALL INTESTINE SURGERY  2020    Procedure: RESECTION SMALL BOWEL;  Surgeon: Vern Delgadillo MD;  [facility];  Service: Colorectal      Family History:     Family History   Problem Relation Age of Onset    Hypertension Mother     Heart attack Mother     Heart attack Father     Heart disease Father     Hypertension Brother     Heart attack Brother     Leukemia Cousin     Breast cancer Cousin     Diabetes Cousin     Breast cancer Family         maternal side    Colon cancer Family         paternal uncle    Colon cancer Paternal Uncle     Stroke Neg Hx       Social History:        Social History     Socioeconomic History    Marital status:      Spouse name: None    Number of children: None    Years of education: None    Highest education level: None   Occupational History    None   Social Needs    Financial resource strain: None    Food insecurity     Worry: None     Inability: None    Transportation needs     Medical: None     Non-medical: None   Tobacco Use    Smoking status: Never Smoker    Smokeless tobacco: Never Used   Substance and Sexual Activity    Alcohol use: Yes     Frequency: Monthly or less    Drug use: Never    Sexual activity: Not Currently     Partners: Male     Birth control/protection: Post-menopausal   Lifestyle    Physical activity     Days per week: None     Minutes per session: None    Stress: None   Relationships    Social connections     Talks on phone: None     Gets together: None     Attends Taoist service: None     Active member of club or organization: None     Attends meetings of clubs or organizations: None     Relationship status: None    Intimate partner violence     Fear of current or ex partner: None     Emotionally abused: None     Physically abused: None     Forced sexual activity: None   Other Topics Concern    None   Social History Narrative    None      Medications and Allergies:     Current Outpatient Medications   Medication Sig Dispense Refill    acetaminophen (TYLENOL) 325 mg tablet Take 3 tablets (975 mg total) by mouth every 8 (eight) hours 30 tablet 0    ezetimibe (ZETIA) 10 mg tablet Take 1 tablet (10 mg total) by mouth daily 90 tablet 1    meclizine (ANTIVERT) 25 mg tablet Take 1 tablet (25 mg total) by mouth 3 (three) times a day as needed for dizziness 30 tablet 0    famotidine (PEPCID) 20 mg tablet Take 1 tablet (20 mg total) by mouth 2 (two) times a day 180 tablet 1    levocetirizine (XYZAL) 5 MG tablet Take 0 5 tablets (2 5 mg total) by mouth daily (Patient not taking: Reported on 2/25/2021) 45 tablet 0     No current facility-administered medications for this visit        Allergies   Allergen Reactions    Medical Tape Rash     Skin irritation  Skin peeling  Skin irritation  Skin peeling  Blisters  Rash      Immunizations:     Immunization History   Administered Date(s) Administered    Influenza Quadrivalent Preservative Free 3 years and older IM 10/26/2016      Health Maintenance:         Topic Date Due    MAMMOGRAM  06/26/2021    Colonoscopy Surveillance  09/28/2021    Cervical Cancer Screening  04/01/2022    Hepatitis C Screening  Completed         Topic Date Due    DTaP,Tdap,and Td Vaccines (1 - Tdap) 03/17/1975    Pneumococcal Vaccine: 65+ Years (1 of 1 - PPSV23) 03/17/2019    Influenza Vaccine (1) 09/01/2020      Medicare Health Risk Assessment:     /72   Pulse 88   Temp (!) 97 2 °F (36 2 °C)   Resp 18   Ht 4' 10" (1 473 m)   Wt 98 9 kg (218 lb)   LMP 09/01/2011 (Approximate)   SpO2 98%   BMI 45 56 kg/m²      Aida Andrade is here for her Subsequent Wellness visit  Health Risk Assessment:   Patient feels that their physical health rating is slightly better  Eyesight was rated as slightly worse  Hearing was rated as same  Patient feels that their emotional and mental health rating is same  Pain experienced in the last 7 days has been none  Patient states that she has experienced no weight loss or gain in last 6 months  Depression Screening:   PHQ-2 Score: 0      Fall Risk Screening: In the past year, patient has experienced: history of falling in past year    Number of falls: 1  Injured during fall?: Yes    Feels unsteady when standing or walking?: No    Worried about falling?: No      Urinary Incontinence Screening:   Patient has not leaked urine accidently in the last six months  Home Safety:  Patient does not have trouble with stairs inside or outside of their home  Patient has working smoke alarms and has working carbon monoxide detector  Home safety hazards include: none  Nutrition:   Current diet is Regular  Medications:   Patient is currently taking over-the-counter supplements  OTC medications include: see medication list  Patient is able to manage medications       Activities of Daily Living (ADLs)/Instrumental Activities of Daily Living (IADLs):   Walk and transfer into and out of bed and chair?: Yes  Dress and groom yourself?: Yes    Bathe or shower yourself?: Yes    Feed yourself? Yes  Do your laundry/housekeeping?: Yes  Manage your money, pay your bills and track your expenses?: Yes  Make your own meals?: Yes    Do your own shopping?: Yes    Previous Hospitalizations:   Any hospitalizations or ED visits within the last 12 months?: Yes    How many hospitalizations have you had in the last year?: 1-2    Advance Care Planning:   Living will: No    Durable POA for healthcare: No    Advanced directive: No    Advanced directive counseling given: Yes    Five wishes given: Yes      Cognitive Screening:   Provider or family/friend/caregiver concerned regarding cognition?: No    PREVENTIVE SCREENINGS      Cardiovascular Screening:    General: History Lipid Disorder, Risks and Benefits Discussed and Screening Current      Diabetes Screening:     General: Screening Current and Risks and Benefits Discussed      Colorectal Cancer Screening:     General: History Colorectal Cancer, Risks and Benefits Discussed and Screening Current      Breast Cancer Screening:     General: Screening Current and Risks and Benefits Discussed      Cervical Cancer Screening:    General: Screening Not Indicated      Osteoporosis Screening:    General: Risks and Benefits Discussed and Screening Current      Abdominal Aortic Aneurysm (AAA) Screening:        General: Screening Not Indicated      Lung Cancer Screening:     General: Screening Not Indicated      Hepatitis C Screening:    General: Screening Current    Other Counseling Topics:   Alcohol use counseling, car/seat belt/driving safety, skin self-exam, sunscreen and calcium and vitamin D intake and regular weightbearing exercise         DEEPAK Comer

## 2021-02-25 NOTE — PROGRESS NOTES
Assessment/Plan:    1  Mixed hyperlipidemia  Comments:  tolearting zetia and compliant with it and will do blood work in first week of april 2021  Orders:  -     ezetimibe (ZETIA) 10 mg tablet; Take 1 tablet (10 mg total) by mouth daily  -     Comprehensive metabolic panel; Future  -     Lipid Panel with Direct LDL reflex; Future    2  Statin intolerance  -     ezetimibe (ZETIA) 10 mg tablet; Take 1 tablet (10 mg total) by mouth daily  -     Comprehensive metabolic panel; Future  -     Lipid Panel with Direct LDL reflex; Future    3  Body mass index (BMI) 45 0-49 9, adult ([facility])  Comments:  diet/excercise/weight loss advised    4  Gastroesophageal reflux disease, unspecified whether esophagitis present  -     famotidine (PEPCID) 20 mg tablet; Take 1 tablet (20 mg total) by mouth 2 (two) times a day    5  Encounter for screening mammogram for malignant neoplasm of breast  -     Mammo screening bilateral w 3d & cad; Future; Expected date: 06/27/2021    6  Post-menopausal  -     DXA bone density spine hip and pelvis; Future; Expected date: 05/07/2021    7  Medicare annual wellness visit, subsequent          BMI Counseling: Body mass index is 45 56 kg/m²  Discussed the patient's BMI with her  The BMI is above normal  Nutrition recommendations include reducing portion sizes, decreasing overall calorie intake, 3-5 servings of fruits/vegetables daily, reducing fast food intake, consuming healthier snacks, decreasing soda and/or juice intake, moderation in carbohydrate intake, increasing intake of lean protein, reducing intake of saturated fat and trans fat and reducing intake of cholesterol  Exercise recommendations include exercising 3-5 times per week, joining a gym and strength training exercises  Patient Instructions:    Return in about 6 months (around 8/25/2021)        Future Appointments   Date Time Provider Colton Cunningham   9/7/2021  1:00 PM DEEPAK Olsen [facility]           Subjective: Patient ID: Russ López is a 76 y o  female  Chief Complaint   Patient presents with    Follow-up     lab work from Dr Daniela Harmon and blood pressure check  [facility]         Vitals:  /72   Pulse 88   Temp (!) 97 2 °F (36 2 °C)   Resp 18   Ht 4' 10" (1 473 m)   Wt 98 9 kg (218 lb)   LMP 09/01/2011 (Approximate)   SpO2 98%   BMI 45 56 kg/m²     HPI  Patient is here for 6 month follow up  Doing well with zetia and denies any adverse effects related to that  Will do blood work in April 2021  Had CT chest abdomen pelvis done yesterday but report pending  CEA blood work done recently and is negative  Last colonoscopy was in sept 2020  Stated that at times her GERD symptoms are worse as gained weight due to covid-19 as not be to active and will start pepcid and if no improvement then will follow up with gastro  Denies any sob, and chest pain  Refusing flu and pneumonia vaccine but will consider shingrix vaccine and aware that to go to pharmacy to get it  PHQ-9 Depression Screening    PHQ-9:   Frequency of the following problems over the past two weeks:      Little interest or pleasure in doing things: 0 - not at all  Feeling down, depressed, or hopeless: 0 - not at all  PHQ-2 Score: 0             The following portions of the patient's history were reviewed and updated as appropriate: allergies, current medications, past family history, past medical history, past social history, past surgical history and problem list       Review of Systems   Constitutional: Negative  Negative for appetite change, chills, fever and unexpected weight change  HENT: Negative  Respiratory: Negative  Cardiovascular: Negative  Gastrointestinal: Negative for abdominal pain, anal bleeding, blood in stool, constipation, diarrhea, nausea, rectal pain and vomiting  As noted in HPI     Genitourinary: Negative  Skin: Negative      Neurological: Negative for dizziness, light-headedness and headaches  Hematological: Negative  Psychiatric/Behavioral: Negative  Objective:    Social History     Tobacco Use   Smoking Status Never Smoker   Smokeless Tobacco Never Used       Allergies: Allergies   Allergen Reactions    Medical Tape Rash     Skin irritation  Skin peeling  Skin irritation  Skin peeling  Blisters  Rash         Current Outpatient Medications   Medication Sig Dispense Refill    acetaminophen (TYLENOL) 325 mg tablet Take 3 tablets (975 mg total) by mouth every 8 (eight) hours 30 tablet 0    ezetimibe (ZETIA) 10 mg tablet Take 1 tablet (10 mg total) by mouth daily 90 tablet 1    meclizine (ANTIVERT) 25 mg tablet Take 1 tablet (25 mg total) by mouth 3 (three) times a day as needed for dizziness 30 tablet 0    famotidine (PEPCID) 20 mg tablet Take 1 tablet (20 mg total) by mouth 2 (two) times a day 180 tablet 1    levocetirizine (XYZAL) 5 MG tablet Take 0 5 tablets (2 5 mg total) by mouth daily (Patient not taking: Reported on 2/25/2021) 45 tablet 0     No current facility-administered medications for this visit  Physical Exam  Constitutional:       Appearance: Normal appearance  HENT:      Head: Normocephalic and atraumatic  Nose: Nose normal    Eyes:      Conjunctiva/sclera: Conjunctivae normal    Cardiovascular:      Rate and Rhythm: Normal rate and regular rhythm  Pulses: Normal pulses  Heart sounds: Normal heart sounds  Pulmonary:      Effort: Pulmonary effort is normal       Breath sounds: Normal breath sounds  Skin:     General: Skin is warm and dry  Findings: No rash  Neurological:      Mental Status: She is alert and oriented to person, place, and time  Psychiatric:         Mood and Affect: Mood normal          Behavior: Behavior normal          Thought Content:  Thought content normal          Judgment: Judgment normal                      DEEPAK Suarez

## 2021-02-25 NOTE — PATIENT INSTRUCTIONS
Medicare Preventive Visit Patient Instructions  Thank you for completing your Welcome to Medicare Visit or Medicare Annual Wellness Visit today  Your next wellness visit will be due in one year (2/25/2022)  The screening/preventive services that you may require over the next 5-10 years are detailed below  Some tests may not apply to you based off risk factors and/or age  Screening tests ordered at today's visit but not completed yet may show as past due  Also, please note that scanned in results may not display below  Preventive Screenings:  Service Recommendations Previous Testing/Comments   Colorectal Cancer Screening  * Colonoscopy    * Fecal Occult Blood Test (FOBT)/Fecal Immunochemical Test (FIT)  * Fecal DNA/Cologuard Test  * Flexible Sigmoidoscopy Age: 54-65 years old   Colonoscopy: every 10 years (may be performed more frequently if at higher risk)  OR  FOBT/FIT: every 1 year  OR  Cologuard: every 3 years  OR  Sigmoidoscopy: every 5 years  Screening may be recommended earlier than age 48 if at higher risk for colorectal cancer  Also, an individualized decision between you and your healthcare provider will decide whether screening between the ages of 74-80 would be appropriate  Colonoscopy: 09/28/2020  FOBT/FIT: Not on file  Cologuard: Not on file  Sigmoidoscopy: Not on file    History Colorectal Cancer     Breast Cancer Screening Age: 36 years old  Frequency: every 1-2 years  Not required if history of left and right mastectomy Mammogram: 06/26/2020    Screening Current   Cervical Cancer Screening Between the ages of 21-29, pap smear recommended once every 3 years  Between the ages of 33-67, can perform pap smear with HPV co-testing every 5 years     Recommendations may differ for women with a history of total hysterectomy, cervical cancer, or abnormal pap smears in past  Pap Smear: 04/01/2019    Screening Not Indicated   Hepatitis C Screening Once for adults born between 1945 and 1965  More frequently in patients at high risk for Hepatitis C Hep C Antibody: 04/15/2019    Screening Current   Diabetes Screening 1-2 times per year if you're at risk for diabetes or have pre-diabetes Fasting glucose: No results in last 5 years   A1C: 5 3 %    Screening Current   Cholesterol Screening Once every 5 years if you don't have a lipid disorder  May order more often based on risk factors  Lipid panel: 09/18/2020    Screening Not Indicated  History Lipid Disorder     Other Preventive Screenings Covered by Medicare:  1  Abdominal Aortic Aneurysm (AAA) Screening: covered once if your at risk  You're considered to be at risk if you have a family history of AAA  2  Lung Cancer Screening: covers low dose CT scan once per year if you meet all of the following conditions: (1) Age 50-69; (2) No signs or symptoms of lung cancer; (3) Current smoker or have quit smoking within the last 15 years; (4) You have a tobacco smoking history of at least 30 pack years (packs per day multiplied by number of years you smoked); (5) You get a written order from a healthcare provider  3  Glaucoma Screening: covered annually if you're considered high risk: (1) You have diabetes OR (2) Family history of glaucoma OR (3)  aged 48 and older OR (3)  American aged 72 and older  3  Osteoporosis Screening: covered every 2 years if you meet one of the following conditions: (1) You're estrogen deficient and at risk for osteoporosis based off medical history and other findings; (2) Have a vertebral abnormality; (3) On glucocorticoid therapy for more than 3 months; (4) Have primary hyperparathyroidism; (5) On osteoporosis medications and need to assess response to drug therapy  · Last bone density test (DXA Scan): 05/06/2019   5  HIV Screening: covered annually if you're between the age of 15-65  Also covered annually if you are younger than 13 and older than 72 with risk factors for HIV infection   For pregnant patients, it is covered up to 3 times per pregnancy  Immunizations:  Immunization Recommendations   Influenza Vaccine Annual influenza vaccination during flu season is recommended for all persons aged >= 6 months who do not have contraindications   Pneumococcal Vaccine (Prevnar and Pneumovax)  * Prevnar = PCV13  * Pneumovax = PPSV23   Adults 25-60 years old: 1-3 doses may be recommended based on certain risk factors  Adults 72 years old: Prevnar (PCV13) vaccine recommended followed by Pneumovax (PPSV23) vaccine  If already received PPSV23 since turning 65, then PCV13 recommended at least one year after PPSV23 dose  Hepatitis B Vaccine 3 dose series if at intermediate or high risk (ex: diabetes, end stage renal disease, liver disease)   Tetanus (Td) Vaccine - COST NOT COVERED BY MEDICARE PART B Following completion of primary series, a booster dose should be given every 10 years to maintain immunity against tetanus  Td may also be given as tetanus wound prophylaxis  Tdap Vaccine - COST NOT COVERED BY MEDICARE PART B Recommended at least once for all adults  For pregnant patients, recommended with each pregnancy  Shingles Vaccine (Shingrix) - COST NOT COVERED BY MEDICARE PART B  2 shot series recommended in those aged 48 and above     Health Maintenance Due:      Topic Date Due    MAMMOGRAM  06/26/2021    Colonoscopy Surveillance  09/28/2021    Cervical Cancer Screening  04/01/2022    Hepatitis C Screening  Completed     Immunizations Due:      Topic Date Due    DTaP,Tdap,and Td Vaccines (1 - Tdap) 03/17/1975    Pneumococcal Vaccine: 65+ Years (1 of 1 - PPSV23) 03/17/2019    Influenza Vaccine (1) 09/01/2020     Advance Directives   What are advance directives? Advance directives are legal documents that state your wishes and plans for medical care  These plans are made ahead of time in case you lose your ability to make decisions for yourself   Advance directives can apply to any medical decision, such as the treatments you want, and if you want to donate organs  What are the types of advance directives? There are many types of advance directives, and each state has rules about how to use them  You may choose a combination of any of the following:  · Living will: This is a written record of the treatment you want  You can also choose which treatments you do not want, which to limit, and which to stop at a certain time  This includes surgery, medicine, IV fluid, and tube feedings  · Durable power of  for healthcare [facility]): This is a written record that states who you want to make healthcare choices for you when you are unable to make them for yourself  This person, called a proxy, is usually a family member or a friend  You may choose more than 1 proxy  · Do not resuscitate (DNR) order:  A DNR order is used in case your heart stops beating or you stop breathing  It is a request not to have certain forms of treatment, such as CPR  A DNR order may be included in other types of advance directives  · Medical directive: This covers the care that you want if you are in a coma, near death, or unable to make decisions for yourself  You can list the treatments you want for each condition  Treatment may include pain medicine, surgery, blood transfusions, dialysis, IV or tube feedings, and a ventilator (breathing machine)  · Values history: This document has questions about your views, beliefs, and how you feel and think about life  This information can help others choose the care that you would choose  Why are advance directives important? An advance directive helps you control your care  Although spoken wishes may be used, it is better to have your wishes written down  Spoken wishes can be misunderstood, or not followed  Treatments may be given even if you do not want them  An advance directive may make it easier for your family to make difficult choices about your care     Fall Prevention    Fall prevention  includes ways to make your home and other areas safer  It also includes ways you can move more carefully to prevent a fall  Health conditions that cause changes in your blood pressure, vision, or muscle strength and coordination may increase your risk for falls  Medicines may also increase your risk for falls if they make you dizzy, weak, or sleepy  Fall prevention tips:   · Stand or sit up slowly  · Use assistive devices as directed  · Wear shoes that fit well and have soles that   · Wear a personal alarm  · Stay active  · Manage your medical conditions  Home Safety Tips:  · Add items to prevent falls in the bathroom  · Keep paths clear  · Install bright lights in your home  · Keep items you use often on shelves within reach  · Paint or place reflective tape on the edges of your stairs  Weight Management   Why it is important to manage your weight:  Being overweight increases your risk of health conditions such as heart disease, high blood pressure, type 2 diabetes, and certain types of cancer  It can also increase your risk for osteoarthritis, sleep apnea, and other respiratory problems  Aim for a slow, steady weight loss  Even a small amount of weight loss can lower your risk of health problems  How to lose weight safely:  A safe and healthy way to lose weight is to eat fewer calories and get regular exercise  You can lose up about 1 pound a week by decreasing the number of calories you eat by 500 calories each day  Healthy meal plan for weight management:  A healthy meal plan includes a variety of foods, contains fewer calories, and helps you stay healthy  A healthy meal plan includes the following:  · Eat whole-grain foods more often  A healthy meal plan should contain fiber  Fiber is the part of grains, fruits, and vegetables that is not broken down by your body  Whole-grain foods are healthy and provide extra fiber in your diet   Some examples of whole-grain foods are whole-wheat breads and pastas, oatmeal, brown rice, and bulgur  · Eat a variety of vegetables every day  Include dark, leafy greens such as spinach, kale, devin greens, and mustard greens  Eat yellow and orange vegetables such as carrots, sweet potatoes, and winter squash  · Eat a variety of fruits every day  Choose fresh or canned fruit (canned in its own juice or light syrup) instead of juice  Fruit juice has very little or no fiber  · Eat low-fat dairy foods  Drink fat-free (skim) milk or 1% milk  Eat fat-free yogurt and low-fat cottage cheese  Try low-fat cheeses such as mozzarella and other reduced-fat cheeses  · Choose meat and other protein foods that are low in fat  Choose beans or other legumes such as split peas or lentils  Choose fish, skinless poultry (chicken or turkey), or lean cuts of red meat (beef or pork)  Before you cook meat or poultry, cut off any visible fat  · Use less fat and oil  Try baking foods instead of frying them  Add less fat, such as margarine, sour cream, regular salad dressing and mayonnaise to foods  Eat fewer high-fat foods  Some examples of high-fat foods include french fries, doughnuts, ice cream, and cakes  · Eat fewer sweets  Limit foods and drinks that are high in sugar  This includes candy, cookies, regular soda, and sweetened drinks  Exercise:  Exercise at least 30 minutes per day on most days of the week  Some examples of exercise include walking, biking, dancing, and swimming  You can also fit in more physical activity by taking the stairs instead of the elevator or parking farther away from stores  Ask your healthcare provider about the best exercise plan for you  © Copyright [employer] 2018 Information is for End User's use only and may not be sold, redistributed or otherwise used for commercial purposes   All illustrations and images included in CareNotes® are the copyrighted property of [employer] M [employer]  or [facility] Hospital[facility] (By mouth) Ezetimibe ([id number])  Lowers high cholesterol levels  Brand Name(s): Ezetimibe SandroakHilary   There may be other brand names for this medicine  When This Medicine Should Not Be Used: This medicine is not right for everyone  Do not use it if you had an allergic reaction to ezetimibe  How to Use This Medicine:   Tablet  · Take your medicine as directed  Your dose may need to be changed several times to find what works best for you  · If you also use cholestyramine, take it at least 2 hours after or 4 hours before you take ezetimibe  · Missed dose: Take a dose as soon as you remember  If it is almost time for your next dose, wait until then and take a regular dose  Do not take extra medicine to make up for a missed dose  · Store the medicine in a closed container at room temperature, away from heat, moisture, and direct light  Drugs and Foods to Avoid:   Ask your doctor or pharmacist before using any other medicine, including over-the-counter medicines, vitamins, and herbal products  · Do not use this medicine together with a statin medicine if you are pregnant or breastfeeding, or if you have liver disease  · Some medicines can affect how ezetimibe works  Tell your doctor if you are using any of the following:   ? Cyclosporine  ? Cholestyramine  ? Fenofibrate  ? Gemfibrozil  ? A blood thinner, such as warfarin  Warnings While Using This Medicine:   · Tell your doctor if you are pregnant or breastfeeding, or if you have liver disease, kidney disease, or thyroid problems  · This medicine may cause myopathy or rhabdomyolysis, which are serious muscle problems  · Your doctor will check your progress and the effects of this medicine at regular visits  Keep all appointments  · Keep all medicine out of the reach of children  Never share your medicine with anyone    Possible Side Effects While Using This Medicine:   Call your doctor right away if you notice any of these side effects:  · Allergic reaction: Itching or hives, swelling in your face or hands, swelling or tingling in your mouth or throat, chest tightness, trouble breathing  · Muscle pain, tenderness, or weakness  · Nausea, vomiting, loss of appetite, stomach pain, yellow skin or eyes  If you notice other side effects that you think are caused by this medicine, tell your doctor  Call your doctor for medical advice about side effects  You may report side effects to FDA at 3-639-FDA-7044  © Copyright [address] Information is for End User's use only and may not be sold, redistributed or otherwise used for commercial purposes  The above information is an  only  It is not intended as medical advice for individual conditions or treatments  Talk to your doctor, nurse or pharmacist before following any medical regimen to see if it is safe and effective for you

## 2021-03-01 DIAGNOSIS — Z23 ENCOUNTER FOR IMMUNIZATION: ICD-10-CM

## 2021-03-04 ENCOUNTER — IMMUNIZATIONS (OUTPATIENT)
Dept: FAMILY MEDICINE CLINIC | Facility: HOSPITAL | Age: 66
End: 2021-03-04

## 2021-03-04 DIAGNOSIS — Z23 ENCOUNTER FOR IMMUNIZATION: Primary | ICD-10-CM

## 2021-03-04 PROCEDURE — 91300 SARS-COV-2 / COVID-19 MRNA VACCINE (PFIZER-BIONTECH) 30 MCG: CPT

## 2021-03-04 PROCEDURE — 0001A SARS-COV-2 / COVID-19 MRNA VACCINE (PFIZER-BIONTECH) 30 MCG: CPT

## 2021-03-23 ENCOUNTER — IMMUNIZATIONS (OUTPATIENT)
Dept: FAMILY MEDICINE CLINIC | Facility: HOSPITAL | Age: 66
End: 2021-03-23

## 2021-03-23 DIAGNOSIS — Z23 ENCOUNTER FOR IMMUNIZATION: Primary | ICD-10-CM

## 2021-03-23 PROCEDURE — 0002A SARS-COV-2 / COVID-19 MRNA VACCINE (PFIZER-BIONTECH) 30 MCG: CPT

## 2021-03-23 PROCEDURE — 91300 SARS-COV-2 / COVID-19 MRNA VACCINE (PFIZER-BIONTECH) 30 MCG: CPT

## 2021-04-17 LAB
ALBUMIN SERPL-MCNC: 4 G/DL (ref 3.8–4.8)
ALBUMIN/GLOB SERPL: 1.2 {RATIO} (ref 1.2–2.2)
ALP SERPL-CCNC: 60 IU/L (ref 39–117)
ALT SERPL-CCNC: 17 IU/L (ref 0–32)
AST SERPL-CCNC: 18 IU/L (ref 0–40)
BILIRUB SERPL-MCNC: 0.8 MG/DL (ref 0–1.2)
BUN SERPL-MCNC: 23 MG/DL (ref 8–27)
BUN/CREAT SERPL: 21 (ref 12–28)
CALCIUM SERPL-MCNC: 9.5 MG/DL (ref 8.7–10.3)
CHLORIDE SERPL-SCNC: 104 MMOL/L (ref 96–106)
CHOLEST SERPL-MCNC: 225 MG/DL (ref 100–199)
CO2 SERPL-SCNC: 21 MMOL/L (ref 20–29)
CREAT SERPL-MCNC: 1.09 MG/DL (ref 0.57–1)
GLOBULIN SER-MCNC: 3.3 G/DL (ref 1.5–4.5)
GLUCOSE SERPL-MCNC: 85 MG/DL (ref 65–99)
HDLC SERPL-MCNC: 60 MG/DL
LDLC SERPL CALC-MCNC: 149 MG/DL (ref 0–99)
MICRODELETION SYND BLD/T FISH: NORMAL
MICRODELETION SYND BLD/T FISH: NORMAL
POTASSIUM SERPL-SCNC: 4.3 MMOL/L (ref 3.5–5.2)
PROT SERPL-MCNC: 7.3 G/DL (ref 6–8.5)
SL AMB EGFR AFRICAN AMERICAN: 61 ML/MIN/1.73
SL AMB EGFR NON AFRICAN AMERICAN: 53 ML/MIN/1.73
SODIUM SERPL-SCNC: 139 MMOL/L (ref 134–144)
TRIGL SERPL-MCNC: 90 MG/DL (ref 0–149)

## 2021-04-19 DIAGNOSIS — R79.89 ELEVATED SERUM CREATININE: Primary | ICD-10-CM

## 2021-06-12 LAB
BUN SERPL-MCNC: 17 MG/DL (ref 8–27)
BUN/CREAT SERPL: 18 (ref 12–28)
CALCIUM SERPL-MCNC: 9.1 MG/DL (ref 8.7–10.3)
CHLORIDE SERPL-SCNC: 107 MMOL/L (ref 96–106)
CO2 SERPL-SCNC: 24 MMOL/L (ref 20–29)
CREAT SERPL-MCNC: 0.92 MG/DL (ref 0.57–1)
GLUCOSE SERPL-MCNC: 94 MG/DL (ref 65–99)
POTASSIUM SERPL-SCNC: 4.6 MMOL/L (ref 3.5–5.2)
SL AMB EGFR AFRICAN AMERICAN: 75 ML/MIN/1.73
SL AMB EGFR NON AFRICAN AMERICAN: 65 ML/MIN/1.73
SODIUM SERPL-SCNC: 140 MMOL/L (ref 134–144)

## 2021-09-08 ENCOUNTER — OFFICE VISIT (OUTPATIENT)
Dept: FAMILY MEDICINE CLINIC | Facility: CLINIC | Age: 66
End: 2021-09-08
Payer: MEDICARE

## 2021-09-08 VITALS
HEIGHT: 58 IN | DIASTOLIC BLOOD PRESSURE: 74 MMHG | TEMPERATURE: 97.5 F | OXYGEN SATURATION: 98 % | RESPIRATION RATE: 16 BRPM | SYSTOLIC BLOOD PRESSURE: 120 MMHG | BODY MASS INDEX: 45.97 KG/M2 | HEART RATE: 106 BPM | WEIGHT: 219 LBS

## 2021-09-08 DIAGNOSIS — K21.9 GASTROESOPHAGEAL REFLUX DISEASE, UNSPECIFIED WHETHER ESOPHAGITIS PRESENT: ICD-10-CM

## 2021-09-08 DIAGNOSIS — R42 VERTIGO: ICD-10-CM

## 2021-09-08 DIAGNOSIS — Z78.9 STATIN INTOLERANCE: ICD-10-CM

## 2021-09-08 DIAGNOSIS — E78.2 MIXED HYPERLIPIDEMIA: Primary | ICD-10-CM

## 2021-09-08 DIAGNOSIS — E66.01 MORBID OBESITY (HCC): ICD-10-CM

## 2021-09-08 DIAGNOSIS — Z13.29 SCREENING FOR THYROID DISORDER: ICD-10-CM

## 2021-09-08 DIAGNOSIS — N18.2 STAGE 2 CHRONIC KIDNEY DISEASE: ICD-10-CM

## 2021-09-08 DIAGNOSIS — C20 RECTAL CANCER (HCC): ICD-10-CM

## 2021-09-08 PROCEDURE — 99214 OFFICE O/P EST MOD 30 MIN: CPT | Performed by: NURSE PRACTITIONER

## 2021-09-08 RX ORDER — EZETIMIBE 10 MG/1
10 TABLET ORAL DAILY
Qty: 90 TABLET | Refills: 1 | Status: SHIPPED | OUTPATIENT
Start: 2021-09-08 | End: 2022-03-21 | Stop reason: SDUPTHER

## 2021-09-08 RX ORDER — MECLIZINE HYDROCHLORIDE 25 MG/1
25 TABLET ORAL EVERY 12 HOURS PRN
Qty: 30 TABLET | Refills: 0 | Status: SHIPPED | OUTPATIENT
Start: 2021-09-08 | End: 2021-11-12 | Stop reason: ALTCHOICE

## 2021-09-08 NOTE — PROGRESS NOTES
Assessment/Plan:    1  Mixed hyperlipidemia  Comments:  continue with zetia and will repeat blood work this month  Orders:  -     ezetimibe (ZETIA) 10 mg tablet; Take 1 tablet (10 mg total) by mouth daily  -     Comprehensive metabolic panel; Future  -     Lipid Panel with Direct LDL reflex; Future  -     Comprehensive metabolic panel  -     Lipid Panel with Direct LDL reflex    2  Stage 2 chronic kidney disease  Comments:  stable and will check BMP and advised to drink plenty of fluids and avoid NSAIDs  Orders:  -     Comprehensive metabolic panel; Future  -     Comprehensive metabolic panel    3  Morbid obesity ([address] )  Comments:  diet/excercise/weight loss advised    4  Statin intolerance  -     ezetimibe (ZETIA) 10 mg tablet; Take 1 tablet (10 mg total) by mouth daily    5  Rectal cancer [facility])  Comments:  managed by colorectal surgeon and due for repeat CEA end of this month  Orders:  -     CBC and Platelet; Future  -     CBC and Platelet    6  Screening for thyroid disorder  -     TSH, 3rd generation with Free T4 reflex; Future  -     TSH, 3rd generation with Free T4 reflex    7  Vertigo  -     meclizine (ANTIVERT) 25 mg tablet; Take 1 tablet (25 mg total) by mouth every 12 (twelve) hours as needed for dizziness    8  Gastroesophageal reflux disease, unspecified whether esophagitis present  Comments:  stable with current regimen          BMI Counseling: Body mass index is 45 77 kg/m²  Discussed the patient's BMI with her  The BMI is above normal  Nutrition recommendations include reducing portion sizes, decreasing overall calorie intake, 3-5 servings of fruits/vegetables daily, reducing fast food intake, consuming healthier snacks, decreasing soda and/or juice intake and moderation in carbohydrate intake  Patient Instructions:  Supportive care discussed and advised  Advised to RTO for any worsening and no improvement  Follow up for no improvement and worsening of conditions    Patient advised and educated when to see immediate medical care  Return in about 6 months (around 3/8/2022)  Future Appointments   Date Time Provider Colton Cunningham   2021  2:00 PM DEEPAK Mills [facility]   3/8/2022  1:00 PM DEEPAK Mills [facility]           Subjective:      Patient ID: Candie Baeza is a 78 y o  female  No chief complaint on file  Vitals:  /74   Pulse (!) 106   Temp 97 5 °F (36 4 °C)   Resp 16   Ht 4' 10" (1 473 m)   Wt 99 3 kg (219 lb)   LMP 2011 (Approximate)   SpO2 98%   BMI 45 77 kg/m²     HPI  Patient is here for 6 month follow up  Complaint with zetia and tolerating it well  Due for blood work for lipids and will order that  Stated that last month walked a lot straight for couple of days and had left knee pain and shin pain and now almost resolved  Denies any injury and will follow back for any concerns  Recent CEA marker done and was in normal range but elevated then her previous level and surgeon advised to repeat in a month and if still elevated then they will consider PET scan  Last ct scan in march was normal   CKD stage 2 and will recheck BMP  Refused pneumonia vaccine and flu vaccine  Will schedule shingrix at pharmacy  Due for mammogram and has script and will schedule that  Stated that occasionally has vertigo and would like script of meclizine handy as old one  and have not used or needed from more than 6 months  Due for colonoscopy and will schedule that      Falls Plan of Care: Balance, strength, and gait training instructions were provided  The following portions of the patient's history were reviewed and updated as appropriate: allergies, current medications, past family history, past medical history, past social history, past surgical history and problem list       Review of Systems   Constitutional: Negative  HENT: Negative  Respiratory: Negative  Cardiovascular: Negative  Gastrointestinal: Negative  Genitourinary: Negative for difficulty urinating  Skin: Negative  Neurological: Negative for dizziness, light-headedness and headaches  Psychiatric/Behavioral: Negative  Objective:    Social History     Tobacco Use   Smoking Status Never Smoker   Smokeless Tobacco Never Used       Allergies: Allergies   Allergen Reactions    Medical Tape Rash     Skin irritation  Skin peeling  Skin irritation  Skin peeling  Blisters  Rash         Current Outpatient Medications   Medication Sig Dispense Refill    acetaminophen (TYLENOL) 325 mg tablet Take 3 tablets (975 mg total) by mouth every 8 (eight) hours 30 tablet 0    ezetimibe (ZETIA) 10 mg tablet Take 1 tablet (10 mg total) by mouth daily 90 tablet 1    famotidine (PEPCID) 20 mg tablet Take 1 tablet (20 mg total) by mouth 2 (two) times a day 180 tablet 1    Glucosamine-Chondroitin-MSM (GLUCOSAMINE CHONDROIT MSM DS PO) Take by mouth      meclizine (ANTIVERT) 25 mg tablet Take 1 tablet (25 mg total) by mouth every 12 (twelve) hours as needed for dizziness 30 tablet 0    levocetirizine (XYZAL) 5 MG tablet Take 0 5 tablets (2 5 mg total) by mouth daily (Patient not taking: Reported on 2/25/2021) 45 tablet 0     No current facility-administered medications for this visit  Physical Exam  Constitutional:       Appearance: She is obese  HENT:      Head: Normocephalic and atraumatic  Nose: Nose normal    Eyes:      Conjunctiva/sclera: Conjunctivae normal    Cardiovascular:      Rate and Rhythm: Normal rate and regular rhythm  Pulses: Normal pulses  Heart sounds: Normal heart sounds  Pulmonary:      Effort: Pulmonary effort is normal       Breath sounds: Normal breath sounds  Musculoskeletal:         General: No swelling  Normal range of motion  Right lower leg: No edema  Left lower leg: No edema  Skin:     General: Skin is warm and dry  Findings: No rash     Neurological: Mental Status: She is alert and oriented to person, place, and time  Psychiatric:         Mood and Affect: Mood normal          Behavior: Behavior normal          Thought Content:  Thought content normal          Judgment: Judgment normal                      DEEPAK Oliver

## 2021-09-10 ENCOUNTER — PREP FOR PROCEDURE (OUTPATIENT)
Dept: GASTROENTEROLOGY | Facility: CLINIC | Age: 66
End: 2021-09-10

## 2021-09-10 DIAGNOSIS — Z85.048 PERSONAL HISTORY OF RECTAL CANCER: Primary | ICD-10-CM

## 2021-09-13 LAB
ALBUMIN SERPL-MCNC: 3.7 G/DL (ref 3.8–4.8)
ALBUMIN/GLOB SERPL: 1.4 {RATIO} (ref 1.2–2.2)
ALP SERPL-CCNC: 51 IU/L (ref 44–121)
ALT SERPL-CCNC: 16 IU/L (ref 0–32)
AST SERPL-CCNC: 14 IU/L (ref 0–40)
BILIRUB SERPL-MCNC: 0.6 MG/DL (ref 0–1.2)
BUN SERPL-MCNC: 19 MG/DL (ref 8–27)
BUN/CREAT SERPL: 20 (ref 12–28)
CALCIUM SERPL-MCNC: 9.3 MG/DL (ref 8.7–10.3)
CHLORIDE SERPL-SCNC: 105 MMOL/L (ref 96–106)
CHOLEST SERPL-MCNC: 205 MG/DL (ref 100–199)
CO2 SERPL-SCNC: 22 MMOL/L (ref 20–29)
CREAT SERPL-MCNC: 0.93 MG/DL (ref 0.57–1)
ERYTHROCYTE [DISTWIDTH] IN BLOOD BY AUTOMATED COUNT: 14.6 % (ref 11.7–15.4)
GLOBULIN SER-MCNC: 2.7 G/DL (ref 1.5–4.5)
GLUCOSE SERPL-MCNC: 93 MG/DL (ref 65–99)
HCT VFR BLD AUTO: 40.2 % (ref 34–46.6)
HDLC SERPL-MCNC: 51 MG/DL
HGB BLD-MCNC: 13.1 G/DL (ref 11.1–15.9)
LDLC SERPL CALC-MCNC: 137 MG/DL (ref 0–99)
LDLC/HDLC SERPL: 2.7 RATIO (ref 0–3.2)
MCH RBC QN AUTO: 27.5 PG (ref 26.6–33)
MCHC RBC AUTO-ENTMCNC: 32.6 G/DL (ref 31.5–35.7)
MCV RBC AUTO: 84 FL (ref 79–97)
MICRODELETION SYND BLD/T FISH: NORMAL
PLATELET # BLD AUTO: 235 X10E3/UL (ref 150–450)
POTASSIUM SERPL-SCNC: 4.7 MMOL/L (ref 3.5–5.2)
PROT SERPL-MCNC: 6.4 G/DL (ref 6–8.5)
RBC # BLD AUTO: 4.77 X10E6/UL (ref 3.77–5.28)
SL AMB EGFR AFRICAN AMERICAN: 74 ML/MIN/1.73
SL AMB EGFR NON AFRICAN AMERICAN: 64 ML/MIN/1.73
SL AMB VLDL CHOLESTEROL CALC: 17 MG/DL (ref 5–40)
SODIUM SERPL-SCNC: 142 MMOL/L (ref 134–144)
TRIGL SERPL-MCNC: 95 MG/DL (ref 0–149)
TSH SERPL DL<=0.005 MIU/L-ACNC: 1.86 UIU/ML (ref 0.45–4.5)
WBC # BLD AUTO: 6.5 X10E3/UL (ref 3.4–10.8)

## 2021-09-28 ENCOUNTER — HOSPITAL ENCOUNTER (OUTPATIENT)
Dept: RADIOLOGY | Age: 66
Discharge: HOME | End: 2021-09-28
Payer: MEDICARE

## 2021-09-28 DIAGNOSIS — C20 RECTAL CANCER (HCC): ICD-10-CM

## 2021-09-28 DIAGNOSIS — C18.7 CARCINOMA OF SIGMOID COLON (HCC): ICD-10-CM

## 2021-09-28 LAB — GLUCOSE SERPL-MCNC: 90 MG/DL (ref 65–140)

## 2021-09-28 PROCEDURE — 82948 REAGENT STRIP/BLOOD GLUCOSE: CPT

## 2021-09-28 PROCEDURE — A9552 F18 FDG: HCPCS

## 2021-09-28 PROCEDURE — 78815 PET IMAGE W/CT SKULL-THIGH: CPT

## 2021-09-28 PROCEDURE — G1004 CDSM NDSC: HCPCS

## 2021-10-04 ENCOUNTER — TELEPHONE (OUTPATIENT)
Dept: GASTROENTEROLOGY | Facility: CLINIC | Age: 66
End: 2021-10-04

## 2021-10-05 ENCOUNTER — TELEPHONE (OUTPATIENT)
Dept: GASTROENTEROLOGY | Facility: AMBULARY SURGERY CENTER | Age: 66
End: 2021-10-05

## 2021-10-06 ENCOUNTER — ANESTHESIA EVENT (OUTPATIENT)
Dept: GASTROENTEROLOGY | Facility: AMBULARY SURGERY CENTER | Age: 66
End: 2021-10-06

## 2021-10-06 ENCOUNTER — HOSPITAL ENCOUNTER (OUTPATIENT)
Dept: GASTROENTEROLOGY | Facility: AMBULARY SURGERY CENTER | Age: 66
Setting detail: OUTPATIENT SURGERY
Discharge: HOME | End: 2021-10-06
Attending: INTERNAL MEDICINE | Admitting: INTERNAL MEDICINE
Payer: MEDICARE

## 2021-10-06 ENCOUNTER — ANESTHESIA (OUTPATIENT)
Dept: GASTROENTEROLOGY | Facility: AMBULARY SURGERY CENTER | Age: 66
End: 2021-10-06

## 2021-10-06 VITALS
TEMPERATURE: 97.6 F | SYSTOLIC BLOOD PRESSURE: 171 MMHG | DIASTOLIC BLOOD PRESSURE: 81 MMHG | HEART RATE: 75 BPM | RESPIRATION RATE: 18 BRPM | OXYGEN SATURATION: 100 %

## 2021-10-06 DIAGNOSIS — Z85.048 PERSONAL HISTORY OF RECTAL CANCER: ICD-10-CM

## 2021-10-06 PROBLEM — R11.2 PONV (POSTOPERATIVE NAUSEA AND VOMITING): Status: ACTIVE | Noted: 2021-10-06

## 2021-10-06 PROBLEM — Z98.890 PONV (POSTOPERATIVE NAUSEA AND VOMITING): Status: ACTIVE | Noted: 2021-10-06

## 2021-10-06 PROCEDURE — 45378 DIAGNOSTIC COLONOSCOPY: CPT | Performed by: INTERNAL MEDICINE

## 2021-10-06 RX ORDER — PROPOFOL 10 MG/ML
INJECTION, EMULSION INTRAVENOUS AS NEEDED
Status: DISCONTINUED | OUTPATIENT
Start: 2021-10-06 | End: 2021-10-06

## 2021-10-06 RX ORDER — SODIUM CHLORIDE, SODIUM LACTATE, POTASSIUM CHLORIDE, CALCIUM CHLORIDE 600; 310; 30; 20 MG/100ML; MG/100ML; MG/100ML; MG/100ML
75 INJECTION, SOLUTION INTRAVENOUS CONTINUOUS
Status: DISCONTINUED | OUTPATIENT
Start: 2021-10-06 | End: 2021-10-10 | Stop reason: HOSPADM

## 2021-10-06 RX ORDER — PROPOFOL 10 MG/ML
INJECTION, EMULSION INTRAVENOUS CONTINUOUS PRN
Status: DISCONTINUED | OUTPATIENT
Start: 2021-10-06 | End: 2021-10-06

## 2021-10-06 RX ADMIN — PROPOFOL 145 MCG/KG/MIN: 10 INJECTION, EMULSION INTRAVENOUS at 12:17

## 2021-10-06 RX ADMIN — SODIUM CHLORIDE, SODIUM LACTATE, POTASSIUM CHLORIDE, AND CALCIUM CHLORIDE: .6; .31; .03; .02 INJECTION, SOLUTION INTRAVENOUS at 12:15

## 2021-10-06 RX ADMIN — PROPOFOL 70 MG: 10 INJECTION, EMULSION INTRAVENOUS at 12:17

## 2021-10-11 ENCOUNTER — TELEPHONE (OUTPATIENT)
Dept: HEMATOLOGY ONCOLOGY | Facility: CLINIC | Age: 66
End: 2021-10-11

## 2021-10-13 ENCOUNTER — HOSPITAL ENCOUNTER (OUTPATIENT)
Dept: RADIOLOGY | Facility: HOSPITAL | Age: 66
Discharge: HOME | End: 2021-10-13
Payer: MEDICARE

## 2021-10-13 VITALS — BODY MASS INDEX: 46.18 KG/M2 | HEIGHT: 58 IN | WEIGHT: 220 LBS

## 2021-10-13 DIAGNOSIS — Z12.31 ENCOUNTER FOR SCREENING MAMMOGRAM FOR MALIGNANT NEOPLASM OF BREAST: ICD-10-CM

## 2021-10-13 DIAGNOSIS — Z78.0 POST-MENOPAUSAL: ICD-10-CM

## 2021-10-13 PROCEDURE — 77063 BREAST TOMOSYNTHESIS BI: CPT

## 2021-10-13 PROCEDURE — 77080 DXA BONE DENSITY AXIAL: CPT

## 2021-10-13 PROCEDURE — 77067 SCR MAMMO BI INCL CAD: CPT

## 2021-10-15 PROBLEM — R19.00 INTRAABDOMINAL MASS: Status: ACTIVE | Noted: 2021-10-15

## 2021-10-19 ENCOUNTER — PREP FOR PROCEDURE (OUTPATIENT)
Dept: INTERVENTIONAL RADIOLOGY/VASCULAR | Facility: CLINIC | Age: 66
End: 2021-10-19

## 2021-10-19 ENCOUNTER — TELEPHONE (OUTPATIENT)
Dept: GENETICS | Facility: CLINIC | Age: 66
End: 2021-10-19

## 2021-10-19 ENCOUNTER — CONSULT (OUTPATIENT)
Dept: SURGICAL ONCOLOGY | Facility: CLINIC | Age: 66
End: 2021-10-19
Payer: MEDICARE

## 2021-10-19 VITALS
HEIGHT: 58 IN | TEMPERATURE: 96.9 F | RESPIRATION RATE: 17 BRPM | OXYGEN SATURATION: 99 % | SYSTOLIC BLOOD PRESSURE: 126 MMHG | BODY MASS INDEX: 45.97 KG/M2 | WEIGHT: 219 LBS | HEART RATE: 86 BPM | DIASTOLIC BLOOD PRESSURE: 72 MMHG

## 2021-10-19 DIAGNOSIS — C19 RECTOSIGMOID CANCER (HCC): Primary | ICD-10-CM

## 2021-10-19 DIAGNOSIS — R19.00 INTRAABDOMINAL MASS: ICD-10-CM

## 2021-10-19 PROCEDURE — 99205 OFFICE O/P NEW HI 60 MIN: CPT | Performed by: STUDENT IN AN ORGANIZED HEALTH CARE EDUCATION/TRAINING PROGRAM

## 2021-10-20 ENCOUNTER — TELEPHONE (OUTPATIENT)
Dept: HEMATOLOGY ONCOLOGY | Facility: CLINIC | Age: 66
End: 2021-10-20

## 2021-10-25 ENCOUNTER — HOSPITAL ENCOUNTER (OUTPATIENT)
Dept: RADIOLOGY | Facility: HOSPITAL | Age: 66
Discharge: HOME | End: 2021-10-25
Attending: STUDENT IN AN ORGANIZED HEALTH CARE EDUCATION/TRAINING PROGRAM
Payer: MEDICARE

## 2021-10-25 DIAGNOSIS — R19.00 INTRAABDOMINAL MASS: ICD-10-CM

## 2021-10-25 PROCEDURE — A9585 GADOBUTROL INJECTION: HCPCS | Performed by: STUDENT IN AN ORGANIZED HEALTH CARE EDUCATION/TRAINING PROGRAM

## 2021-10-25 PROCEDURE — G1004 CDSM NDSC: HCPCS

## 2021-10-25 PROCEDURE — 74183 MRI ABD W/O CNTR FLWD CNTR: CPT

## 2021-10-25 RX ADMIN — GADOBUTROL 12 ML: 604.72 INJECTION INTRAVENOUS at 13:14

## 2021-10-29 ENCOUNTER — HOSPITAL ENCOUNTER (OUTPATIENT)
Dept: RADIOLOGY | Facility: HOSPITAL | Age: 66
Discharge: HOME | End: 2021-10-29
Payer: MEDICARE

## 2021-10-29 DIAGNOSIS — R19.00 INTRAABDOMINAL MASS: ICD-10-CM

## 2021-11-01 ENCOUNTER — HOSPITAL ENCOUNTER (OUTPATIENT)
Dept: RADIOLOGY | Facility: HOSPITAL | Age: 66
Discharge: HOME | End: 2021-11-01
Payer: MEDICARE

## 2021-11-01 PROCEDURE — 72195 MRI PELVIS W/O DYE: CPT

## 2021-11-03 ENCOUNTER — CONSULT (OUTPATIENT)
Dept: HEMATOLOGY ONCOLOGY | Facility: MEDICAL CENTER | Age: 66
End: 2021-11-03
Payer: MEDICARE

## 2021-11-03 VITALS
BODY MASS INDEX: 46.18 KG/M2 | TEMPERATURE: 97.4 F | DIASTOLIC BLOOD PRESSURE: 62 MMHG | RESPIRATION RATE: 18 BRPM | HEART RATE: 90 BPM | WEIGHT: 220 LBS | OXYGEN SATURATION: 100 % | SYSTOLIC BLOOD PRESSURE: 118 MMHG | HEIGHT: 58 IN

## 2021-11-03 DIAGNOSIS — C19 RECTOSIGMOID CANCER (HCC): ICD-10-CM

## 2021-11-03 DIAGNOSIS — R19.00 INTRAABDOMINAL MASS: ICD-10-CM

## 2021-11-03 DIAGNOSIS — E66.01 MORBID OBESITY (HCC): Primary | ICD-10-CM

## 2021-11-03 PROCEDURE — 99205 OFFICE O/P NEW HI 60 MIN: CPT | Performed by: INTERNAL MEDICINE

## 2021-11-04 ENCOUNTER — TELEPHONE (OUTPATIENT)
Dept: HEMATOLOGY ONCOLOGY | Facility: MEDICAL CENTER | Age: 66
End: 2021-11-04

## 2021-11-04 DIAGNOSIS — C19 RECTOSIGMOID CANCER (HCC): Primary | ICD-10-CM

## 2021-11-04 DIAGNOSIS — N83.8 OVARIAN MASS: Primary | ICD-10-CM

## 2021-11-11 ENCOUNTER — HOSPITAL ENCOUNTER (OUTPATIENT)
Dept: RADIOLOGY | Facility: HOSPITAL | Age: 66
Discharge: HOME | End: 2021-11-11
Attending: INTERNAL MEDICINE
Payer: MEDICARE

## 2021-11-11 DIAGNOSIS — C19 RECTOSIGMOID CANCER (HCC): ICD-10-CM

## 2021-11-11 PROCEDURE — 76856 US EXAM PELVIC COMPLETE: CPT

## 2021-11-12 ENCOUNTER — HOSPITAL ENCOUNTER (OUTPATIENT)
Dept: RADIOLOGY | Facility: HOSPITAL | Age: 66
Discharge: HOME | End: 2021-11-12
Payer: MEDICARE

## 2021-11-12 VITALS
WEIGHT: 218.4 LBS | BODY MASS INDEX: 45.65 KG/M2 | RESPIRATION RATE: 18 BRPM | OXYGEN SATURATION: 99 % | SYSTOLIC BLOOD PRESSURE: 142 MMHG | TEMPERATURE: 97.8 F | DIASTOLIC BLOOD PRESSURE: 85 MMHG | HEART RATE: 76 BPM

## 2021-11-12 DIAGNOSIS — C19 RECTOSIGMOID CANCER (HCC): ICD-10-CM

## 2021-11-12 LAB
INR PPP: 0.98 (ref 0.84–1.19)
PROTHROMBIN TIME: 12.8 SECONDS (ref 11.6–14.5)

## 2021-11-12 PROCEDURE — 88305 TISSUE EXAM BY PATHOLOGIST: CPT | Performed by: PATHOLOGY

## 2021-11-12 PROCEDURE — 88341 IMHCHEM/IMCYTCHM EA ADD ANTB: CPT | Performed by: PATHOLOGY

## 2021-11-12 PROCEDURE — 99153 MOD SED SAME PHYS/QHP EA: CPT

## 2021-11-12 PROCEDURE — 88333 PATH CONSLTJ SURG CYTO XM 1: CPT | Performed by: PATHOLOGY

## 2021-11-12 PROCEDURE — 99152 MOD SED SAME PHYS/QHP 5/>YRS: CPT | Performed by: RADIOLOGY

## 2021-11-12 PROCEDURE — 99152 MOD SED SAME PHYS/QHP 5/>YRS: CPT

## 2021-11-12 PROCEDURE — 49180 BIOPSY ABDOMINAL MASS: CPT

## 2021-11-12 PROCEDURE — 88363 XM ARCHIVE TISSUE MOLEC ANAL: CPT | Performed by: PATHOLOGY

## 2021-11-12 PROCEDURE — 88342 IMHCHEM/IMCYTCHM 1ST ANTB: CPT | Performed by: PATHOLOGY

## 2021-11-12 PROCEDURE — 49180 BIOPSY ABDOMINAL MASS: CPT | Performed by: RADIOLOGY

## 2021-11-12 PROCEDURE — 77012 CT SCAN FOR NEEDLE BIOPSY: CPT | Performed by: RADIOLOGY

## 2021-11-12 PROCEDURE — 85610 PROTHROMBIN TIME: CPT | Performed by: RADIOLOGY

## 2021-11-12 RX ORDER — MIDAZOLAM HYDROCHLORIDE 2 MG/2ML
INJECTION, SOLUTION INTRAMUSCULAR; INTRAVENOUS CODE/TRAUMA/SEDATION MEDICATION
Status: COMPLETED | OUTPATIENT
Start: 2021-11-12 | End: 2021-11-12

## 2021-11-12 RX ORDER — FENTANYL CITRATE 50 UG/ML
INJECTION, SOLUTION INTRAMUSCULAR; INTRAVENOUS CODE/TRAUMA/SEDATION MEDICATION
Status: COMPLETED | OUTPATIENT
Start: 2021-11-12 | End: 2021-11-12

## 2021-11-12 RX ORDER — SODIUM CHLORIDE 9 MG/ML
75 INJECTION, SOLUTION INTRAVENOUS CONTINUOUS
Status: DISCONTINUED | OUTPATIENT
Start: 2021-11-12 | End: 2021-11-13 | Stop reason: HOSPADM

## 2021-11-12 RX ORDER — ACETAMINOPHEN 325 MG/1
650 TABLET ORAL EVERY 4 HOURS PRN
Status: DISCONTINUED | OUTPATIENT
Start: 2021-11-12 | End: 2021-11-13 | Stop reason: HOSPADM

## 2021-11-12 RX ORDER — HYDROMORPHONE HCL/PF 1 MG/ML
0.5 SYRINGE (ML) INJECTION EVERY 2 HOUR PRN
Status: DISCONTINUED | OUTPATIENT
Start: 2021-11-12 | End: 2021-11-13 | Stop reason: HOSPADM

## 2021-11-12 RX ORDER — OXYCODONE HYDROCHLORIDE 10 MG/1
10 TABLET ORAL EVERY 4 HOURS PRN
Status: DISCONTINUED | OUTPATIENT
Start: 2021-11-12 | End: 2021-11-13 | Stop reason: HOSPADM

## 2021-11-12 RX ORDER — OXYCODONE HYDROCHLORIDE 5 MG/1
5 TABLET ORAL EVERY 4 HOURS PRN
Status: DISCONTINUED | OUTPATIENT
Start: 2021-11-12 | End: 2021-11-13 | Stop reason: HOSPADM

## 2021-11-12 RX ADMIN — MIDAZOLAM 1 MG: 1 INJECTION INTRAMUSCULAR; INTRAVENOUS at 09:18

## 2021-11-12 RX ADMIN — MIDAZOLAM 1 MG: 1 INJECTION INTRAMUSCULAR; INTRAVENOUS at 09:05

## 2021-11-12 RX ADMIN — FENTANYL CITRATE 50 MCG: 50 INJECTION, SOLUTION INTRAMUSCULAR; INTRAVENOUS at 09:05

## 2021-11-12 RX ADMIN — FENTANYL CITRATE 50 MCG: 50 INJECTION, SOLUTION INTRAMUSCULAR; INTRAVENOUS at 09:19

## 2021-11-16 ENCOUNTER — TELEPHONE (OUTPATIENT)
Dept: SURGICAL ONCOLOGY | Facility: CLINIC | Age: 66
End: 2021-11-16

## 2021-11-22 ENCOUNTER — TELEPHONE (OUTPATIENT)
Dept: SURGICAL ONCOLOGY | Facility: CLINIC | Age: 66
End: 2021-11-22

## 2021-11-29 PROBLEM — C18.7 CARCINOMA OF SIGMOID COLON (HCC): Status: RESOLVED | Noted: 2019-10-30 | Resolved: 2021-11-29

## 2021-11-29 PROBLEM — R19.00 INTRAABDOMINAL MASS: Status: RESOLVED | Noted: 2021-10-15 | Resolved: 2021-11-29

## 2021-11-29 PROBLEM — C20 RECTAL CANCER (HCC): Status: RESOLVED | Noted: 2020-01-06 | Resolved: 2021-11-29

## 2021-11-29 PROBLEM — C78.6 OMENTAL METASTASIS (HCC): Status: ACTIVE | Noted: 2021-11-29

## 2021-11-30 ENCOUNTER — OFFICE VISIT (OUTPATIENT)
Dept: SURGICAL ONCOLOGY | Facility: CLINIC | Age: 66
End: 2021-11-30
Payer: MEDICARE

## 2021-11-30 VITALS
RESPIRATION RATE: 18 BRPM | HEIGHT: 58 IN | DIASTOLIC BLOOD PRESSURE: 94 MMHG | TEMPERATURE: 97.8 F | BODY MASS INDEX: 45.76 KG/M2 | OXYGEN SATURATION: 100 % | SYSTOLIC BLOOD PRESSURE: 158 MMHG | HEART RATE: 90 BPM | WEIGHT: 218 LBS

## 2021-11-30 DIAGNOSIS — C19 RECTOSIGMOID CANCER (HCC): Primary | ICD-10-CM

## 2021-11-30 DIAGNOSIS — C78.6 OMENTAL METASTASIS (HCC): ICD-10-CM

## 2021-11-30 PROCEDURE — 99215 OFFICE O/P EST HI 40 MIN: CPT | Performed by: STUDENT IN AN ORGANIZED HEALTH CARE EDUCATION/TRAINING PROGRAM

## 2021-12-01 ENCOUNTER — TELEPHONE (OUTPATIENT)
Dept: HEMATOLOGY ONCOLOGY | Facility: CLINIC | Age: 66
End: 2021-12-01

## 2021-12-01 ENCOUNTER — OFFICE VISIT (OUTPATIENT)
Dept: HEMATOLOGY ONCOLOGY | Facility: MEDICAL CENTER | Age: 66
End: 2021-12-01
Payer: MEDICARE

## 2021-12-01 ENCOUNTER — TELEPHONE (OUTPATIENT)
Dept: HEMATOLOGY ONCOLOGY | Facility: MEDICAL CENTER | Age: 66
End: 2021-12-01

## 2021-12-01 VITALS
HEART RATE: 78 BPM | RESPIRATION RATE: 16 BRPM | WEIGHT: 216 LBS | OXYGEN SATURATION: 100 % | HEIGHT: 58 IN | SYSTOLIC BLOOD PRESSURE: 136 MMHG | BODY MASS INDEX: 45.34 KG/M2 | DIASTOLIC BLOOD PRESSURE: 82 MMHG | TEMPERATURE: 97.6 F

## 2021-12-01 DIAGNOSIS — E66.01 MORBID OBESITY (HCC): ICD-10-CM

## 2021-12-01 DIAGNOSIS — N83.9 LESION OF OVARY: ICD-10-CM

## 2021-12-01 DIAGNOSIS — C19 RECTOSIGMOID CANCER (HCC): Primary | ICD-10-CM

## 2021-12-01 DIAGNOSIS — N83.209 CYSTIC DISEASE OF OVARY: ICD-10-CM

## 2021-12-01 DIAGNOSIS — C78.6 OMENTAL METASTASIS (HCC): ICD-10-CM

## 2021-12-01 PROCEDURE — 99215 OFFICE O/P EST HI 40 MIN: CPT | Performed by: INTERNAL MEDICINE

## 2021-12-02 ENCOUNTER — TELEPHONE (OUTPATIENT)
Dept: HEMATOLOGY ONCOLOGY | Facility: MEDICAL CENTER | Age: 66
End: 2021-12-02

## 2021-12-02 ENCOUNTER — TELEPHONE (OUTPATIENT)
Dept: GENETICS | Facility: CLINIC | Age: 66
End: 2021-12-02

## 2021-12-02 DIAGNOSIS — C78.6 OMENTAL METASTASIS (HCC): Primary | ICD-10-CM

## 2021-12-02 DIAGNOSIS — C19 RECTOSIGMOID CANCER (HCC): ICD-10-CM

## 2021-12-03 ENCOUNTER — CLINICAL SUPPORT (OUTPATIENT)
Dept: GENETICS | Facility: CLINIC | Age: 66
End: 2021-12-03

## 2021-12-03 ENCOUNTER — DOCUMENTATION (OUTPATIENT)
Dept: GENETICS | Facility: CLINIC | Age: 66
End: 2021-12-03

## 2021-12-03 ENCOUNTER — HOSPITAL ENCOUNTER (OUTPATIENT)
Dept: RADIOLOGY | Facility: HOSPITAL | Age: 66
Discharge: HOME | End: 2021-12-03
Attending: INTERNAL MEDICINE
Payer: MEDICARE

## 2021-12-03 DIAGNOSIS — C19 RECTOSIGMOID CANCER (HCC): Primary | ICD-10-CM

## 2021-12-03 DIAGNOSIS — C78.6 OMENTAL METASTASIS (HCC): ICD-10-CM

## 2021-12-03 DIAGNOSIS — Z80.3 FAMILY HISTORY OF BREAST CANCER: ICD-10-CM

## 2021-12-03 DIAGNOSIS — C19 RECTOSIGMOID CANCER (HCC): ICD-10-CM

## 2021-12-03 DIAGNOSIS — Z80.0 FAMILY HISTORY OF COLON CANCER: ICD-10-CM

## 2021-12-03 PROCEDURE — NC001 PR NO CHARGE: Performed by: GENETIC COUNSELOR, MS

## 2021-12-03 PROCEDURE — G1004 CDSM NDSC: HCPCS

## 2021-12-03 PROCEDURE — 74177 CT ABD & PELVIS W/CONTRAST: CPT

## 2021-12-03 PROCEDURE — 71260 CT THORAX DX C+: CPT

## 2021-12-03 RX ADMIN — IOHEXOL 100 ML: 350 INJECTION, SOLUTION INTRAVENOUS at 13:51

## 2021-12-06 DIAGNOSIS — C78.6 OMENTAL METASTASIS (HCC): Primary | ICD-10-CM

## 2021-12-06 DIAGNOSIS — C19 RECTOSIGMOID CANCER (HCC): ICD-10-CM

## 2021-12-06 RX ORDER — DEXTROSE MONOHYDRATE 50 MG/ML
20 INJECTION, SOLUTION INTRAVENOUS ONCE
Status: CANCELLED | OUTPATIENT
Start: 2021-12-10

## 2021-12-06 RX ORDER — SODIUM CHLORIDE 9 MG/ML
20 INJECTION, SOLUTION INTRAVENOUS ONCE AS NEEDED
Status: CANCELLED | OUTPATIENT
Start: 2021-12-10

## 2021-12-08 ENCOUNTER — TELEPHONE (OUTPATIENT)
Dept: HEMATOLOGY ONCOLOGY | Facility: MEDICAL CENTER | Age: 66
End: 2021-12-08

## 2021-12-09 ENCOUNTER — TELEPHONE (OUTPATIENT)
Dept: HEMATOLOGY ONCOLOGY | Facility: MEDICAL CENTER | Age: 66
End: 2021-12-09

## 2021-12-09 ENCOUNTER — TELEPHONE (OUTPATIENT)
Dept: INFUSION CENTER | Facility: HOSPITAL | Age: 66
End: 2021-12-09

## 2021-12-09 DIAGNOSIS — C19 RECTOSIGMOID CANCER (HCC): Primary | ICD-10-CM

## 2021-12-10 ENCOUNTER — HOSPITAL ENCOUNTER (OUTPATIENT)
Facility: HOSPITAL | Age: 66
Setting detail: OBSERVATION
Discharge: HOME | End: 2021-12-11
Attending: EMERGENCY MEDICINE | Admitting: STUDENT IN AN ORGANIZED HEALTH CARE EDUCATION/TRAINING PROGRAM
Payer: MEDICARE

## 2021-12-10 ENCOUNTER — OFFICE VISIT (OUTPATIENT)
Dept: HEMATOLOGY ONCOLOGY | Facility: MEDICAL CENTER | Age: 66
End: 2021-12-10
Payer: MEDICARE

## 2021-12-10 ENCOUNTER — HOSPITAL ENCOUNTER (OUTPATIENT)
Dept: INFUSION CENTER | Facility: HOSPITAL | Age: 66
Discharge: HOME | End: 2021-12-10
Attending: INTERNAL MEDICINE
Payer: MEDICARE

## 2021-12-10 VITALS
TEMPERATURE: 97.4 F | RESPIRATION RATE: 20 BRPM | BODY MASS INDEX: 46.09 KG/M2 | SYSTOLIC BLOOD PRESSURE: 167 MMHG | WEIGHT: 219.58 LBS | OXYGEN SATURATION: 99 % | DIASTOLIC BLOOD PRESSURE: 77 MMHG | HEART RATE: 86 BPM | HEIGHT: 58 IN

## 2021-12-10 DIAGNOSIS — C19 RECTOSIGMOID CANCER (HCC): Primary | ICD-10-CM

## 2021-12-10 DIAGNOSIS — R11.10 INTRACTABLE VOMITING: Primary | ICD-10-CM

## 2021-12-10 DIAGNOSIS — N83.9 LESION OF OVARY: ICD-10-CM

## 2021-12-10 DIAGNOSIS — C78.6 OMENTAL METASTASIS (HCC): Primary | ICD-10-CM

## 2021-12-10 DIAGNOSIS — E66.01 MORBID OBESITY (HCC): ICD-10-CM

## 2021-12-10 DIAGNOSIS — C78.6 OMENTAL METASTASIS (HCC): ICD-10-CM

## 2021-12-10 PROBLEM — I10 HYPERTENSION: Status: ACTIVE | Noted: 2021-12-10

## 2021-12-10 PROBLEM — T45.1X5A CHEMOTHERAPY ADVERSE REACTION: Status: ACTIVE | Noted: 2021-12-10

## 2021-12-10 LAB
ALBUMIN SERPL BCP-MCNC: 2.9 G/DL (ref 3.5–5)
ALBUMIN SERPL BCP-MCNC: 3.3 G/DL (ref 3.5–5)
ALP SERPL-CCNC: 53 U/L (ref 46–116)
ALP SERPL-CCNC: 59 U/L (ref 46–116)
ALT SERPL W P-5'-P-CCNC: 26 U/L (ref 12–78)
ALT SERPL W P-5'-P-CCNC: 27 U/L (ref 12–78)
ANION GAP SERPL CALCULATED.3IONS-SCNC: 12 MMOL/L (ref 4–13)
ANION GAP SERPL CALCULATED.3IONS-SCNC: 9 MMOL/L (ref 4–13)
AST SERPL W P-5'-P-CCNC: 15 U/L (ref 5–45)
AST SERPL W P-5'-P-CCNC: 17 U/L (ref 5–45)
BASOPHILS # BLD AUTO: 0.03 THOUSANDS/ΜL (ref 0–0.1)
BASOPHILS # BLD MANUAL: 0 THOUSAND/UL (ref 0–0.1)
BASOPHILS NFR BLD AUTO: 1 % (ref 0–1)
BASOPHILS NFR MAR MANUAL: 0 % (ref 0–1)
BILIRUB SERPL-MCNC: 0.56 MG/DL (ref 0.2–1)
BILIRUB SERPL-MCNC: 0.69 MG/DL (ref 0.2–1)
BUN SERPL-MCNC: 16 MG/DL (ref 5–25)
BUN SERPL-MCNC: 21 MG/DL (ref 5–25)
CALCIUM ALBUM COR SERPL-MCNC: 9.4 MG/DL (ref 8.3–10.1)
CALCIUM ALBUM COR SERPL-MCNC: 9.5 MG/DL (ref 8.3–10.1)
CALCIUM SERPL-MCNC: 8.6 MG/DL (ref 8.3–10.1)
CALCIUM SERPL-MCNC: 8.8 MG/DL (ref 8.3–10.1)
CHLORIDE SERPL-SCNC: 105 MMOL/L (ref 100–108)
CHLORIDE SERPL-SCNC: 110 MMOL/L (ref 100–108)
CO2 SERPL-SCNC: 22 MMOL/L (ref 21–32)
CO2 SERPL-SCNC: 24 MMOL/L (ref 21–32)
CREAT SERPL-MCNC: 0.92 MG/DL (ref 0.6–1.3)
CREAT SERPL-MCNC: 1.11 MG/DL (ref 0.6–1.3)
EOSINOPHIL # BLD AUTO: 0.15 THOUSAND/ΜL (ref 0–0.61)
EOSINOPHIL # BLD MANUAL: 0 THOUSAND/UL (ref 0–0.4)
EOSINOPHIL NFR BLD AUTO: 2 % (ref 0–6)
EOSINOPHIL NFR BLD MANUAL: 0 % (ref 0–6)
ERYTHROCYTE [DISTWIDTH] IN BLOOD BY AUTOMATED COUNT: 14.8 % (ref 11.6–15.1)
ERYTHROCYTE [DISTWIDTH] IN BLOOD BY AUTOMATED COUNT: 15 % (ref 11.6–15.1)
GFR SERPL CREATININE-BSD FRML MDRD: 52 ML/MIN/1.73SQ M
GFR SERPL CREATININE-BSD FRML MDRD: 65 ML/MIN/1.73SQ M
GLUCOSE SERPL-MCNC: 127 MG/DL (ref 65–140)
GLUCOSE SERPL-MCNC: 87 MG/DL (ref 65–140)
HCT VFR BLD AUTO: 41 % (ref 34.8–46.1)
HCT VFR BLD AUTO: 42.2 % (ref 34.8–46.1)
HGB BLD-MCNC: 12.8 G/DL (ref 11.5–15.4)
HGB BLD-MCNC: 13.4 G/DL (ref 11.5–15.4)
IMM GRANULOCYTES # BLD AUTO: 0.02 THOUSAND/UL (ref 0–0.2)
IMM GRANULOCYTES NFR BLD AUTO: 0 % (ref 0–2)
LIPASE SERPL-CCNC: 61 U/L (ref 73–393)
LYMPHOCYTES # BLD AUTO: 0.16 THOUSAND/UL (ref 0.6–4.47)
LYMPHOCYTES # BLD AUTO: 1.8 THOUSANDS/ΜL (ref 0.6–4.47)
LYMPHOCYTES # BLD AUTO: 2 % (ref 14–44)
LYMPHOCYTES NFR BLD AUTO: 29 % (ref 14–44)
MAGNESIUM SERPL-MCNC: 2.1 MG/DL (ref 1.6–2.6)
MCH RBC QN AUTO: 27.5 PG (ref 26.8–34.3)
MCH RBC QN AUTO: 27.7 PG (ref 26.8–34.3)
MCHC RBC AUTO-ENTMCNC: 31.2 G/DL (ref 31.4–37.4)
MCHC RBC AUTO-ENTMCNC: 31.8 G/DL (ref 31.4–37.4)
MCV RBC AUTO: 87 FL (ref 82–98)
MCV RBC AUTO: 88 FL (ref 82–98)
MONOCYTES # BLD AUTO: 0.24 THOUSAND/UL (ref 0–1.22)
MONOCYTES # BLD AUTO: 0.45 THOUSAND/ΜL (ref 0.17–1.22)
MONOCYTES NFR BLD AUTO: 7 % (ref 4–12)
MONOCYTES NFR BLD: 3 % (ref 4–12)
NEUTROPHILS # BLD AUTO: 3.86 THOUSANDS/ΜL (ref 1.85–7.62)
NEUTROPHILS # BLD MANUAL: 7.7 THOUSAND/UL (ref 1.85–7.62)
NEUTS SEG NFR BLD AUTO: 61 % (ref 43–75)
NEUTS SEG NFR BLD AUTO: 95 % (ref 43–75)
NRBC BLD AUTO-RTO: 0 /100 WBCS
PLATELET # BLD AUTO: 199 THOUSANDS/UL (ref 149–390)
PLATELET # BLD AUTO: 220 THOUSANDS/UL (ref 149–390)
PLATELET BLD QL SMEAR: ADEQUATE
PMV BLD AUTO: 10.1 FL (ref 8.9–12.7)
PMV BLD AUTO: 10.5 FL (ref 8.9–12.7)
POTASSIUM SERPL-SCNC: 3.5 MMOL/L (ref 3.5–5.3)
POTASSIUM SERPL-SCNC: 4.2 MMOL/L (ref 3.5–5.3)
PROT SERPL-MCNC: 6.9 G/DL (ref 6.4–8.2)
PROT SERPL-MCNC: 7.8 G/DL (ref 6.4–8.2)
RBC # BLD AUTO: 4.65 MILLION/UL (ref 3.81–5.12)
RBC # BLD AUTO: 4.84 MILLION/UL (ref 3.81–5.12)
RBC MORPH BLD: NORMAL
SODIUM SERPL-SCNC: 139 MMOL/L (ref 136–145)
SODIUM SERPL-SCNC: 143 MMOL/L (ref 136–145)
WBC # BLD AUTO: 6.31 THOUSAND/UL (ref 4.31–10.16)
WBC # BLD AUTO: 8.11 THOUSAND/UL (ref 4.31–10.16)

## 2021-12-10 PROCEDURE — 96415 CHEMO IV INFUSION ADDL HR: CPT

## 2021-12-10 PROCEDURE — 99285 EMERGENCY DEPT VISIT HI MDM: CPT

## 2021-12-10 PROCEDURE — 96361 HYDRATE IV INFUSION ADD-ON: CPT

## 2021-12-10 PROCEDURE — 96413 CHEMO IV INFUSION 1 HR: CPT

## 2021-12-10 PROCEDURE — 80053 COMPREHEN METABOLIC PANEL: CPT | Performed by: INTERNAL MEDICINE

## 2021-12-10 PROCEDURE — 99219 PR INITIAL OBSERVATION CARE/DAY 50 MINUTES: CPT | Performed by: NURSE PRACTITIONER

## 2021-12-10 PROCEDURE — 83735 ASSAY OF MAGNESIUM: CPT | Performed by: EMERGENCY MEDICINE

## 2021-12-10 PROCEDURE — 85007 BL SMEAR W/DIFF WBC COUNT: CPT | Performed by: EMERGENCY MEDICINE

## 2021-12-10 PROCEDURE — 96360 HYDRATION IV INFUSION INIT: CPT

## 2021-12-10 PROCEDURE — 36415 COLL VENOUS BLD VENIPUNCTURE: CPT | Performed by: EMERGENCY MEDICINE

## 2021-12-10 PROCEDURE — 99284 EMERGENCY DEPT VISIT MOD MDM: CPT | Performed by: EMERGENCY MEDICINE

## 2021-12-10 PROCEDURE — 96367 TX/PROPH/DG ADDL SEQ IV INF: CPT

## 2021-12-10 PROCEDURE — 85025 COMPLETE CBC W/AUTO DIFF WBC: CPT | Performed by: INTERNAL MEDICINE

## 2021-12-10 PROCEDURE — 85027 COMPLETE CBC AUTOMATED: CPT | Performed by: EMERGENCY MEDICINE

## 2021-12-10 PROCEDURE — 99214 OFFICE O/P EST MOD 30 MIN: CPT | Performed by: INTERNAL MEDICINE

## 2021-12-10 PROCEDURE — 83690 ASSAY OF LIPASE: CPT | Performed by: EMERGENCY MEDICINE

## 2021-12-10 RX ORDER — HYDRALAZINE HYDROCHLORIDE 10 MG/1
10 TABLET, FILM COATED ORAL ONCE
Status: COMPLETED | OUTPATIENT
Start: 2021-12-10 | End: 2021-12-10

## 2021-12-10 RX ORDER — ONDANSETRON 2 MG/ML
4 INJECTION INTRAMUSCULAR; INTRAVENOUS EVERY 6 HOURS PRN
Status: DISCONTINUED | OUTPATIENT
Start: 2021-12-10 | End: 2021-12-11 | Stop reason: HOSPADM

## 2021-12-10 RX ORDER — ONDANSETRON HYDROCHLORIDE 8 MG/1
8 TABLET, FILM COATED ORAL EVERY 12 HOURS PRN
Qty: 20 TABLET | Refills: 2 | Status: SHIPPED | OUTPATIENT
Start: 2021-12-10 | End: 2022-06-21

## 2021-12-10 RX ORDER — ACETAMINOPHEN 325 MG/1
650 TABLET ORAL EVERY 6 HOURS PRN
Status: DISCONTINUED | OUTPATIENT
Start: 2021-12-10 | End: 2021-12-11 | Stop reason: HOSPADM

## 2021-12-10 RX ORDER — PALONOSETRON 0.05 MG/ML
0.25 INJECTION, SOLUTION INTRAVENOUS ONCE
Status: CANCELLED
Start: 2021-12-10

## 2021-12-10 RX ORDER — HYDRALAZINE HYDROCHLORIDE 10 MG/1
10 TABLET, FILM COATED ORAL EVERY 6 HOURS PRN
Status: DISCONTINUED | OUTPATIENT
Start: 2021-12-10 | End: 2021-12-11 | Stop reason: HOSPADM

## 2021-12-10 RX ORDER — DEXTROSE MONOHYDRATE 50 MG/ML
20 INJECTION, SOLUTION INTRAVENOUS ONCE
Status: COMPLETED | OUTPATIENT
Start: 2021-12-10 | End: 2021-12-10

## 2021-12-10 RX ORDER — PROCHLORPERAZINE MALEATE 10 MG
10 TABLET ORAL EVERY 6 HOURS PRN
Qty: 60 TABLET | Refills: 0 | Status: SHIPPED | OUTPATIENT
Start: 2021-12-10 | End: 2022-06-21

## 2021-12-10 RX ORDER — EZETIMIBE 10 MG/1
10 TABLET ORAL
Status: DISCONTINUED | OUTPATIENT
Start: 2021-12-10 | End: 2021-12-11 | Stop reason: HOSPADM

## 2021-12-10 RX ORDER — POTASSIUM CHLORIDE 20 MEQ/1
20 TABLET, EXTENDED RELEASE ORAL ONCE
Status: COMPLETED | OUTPATIENT
Start: 2021-12-10 | End: 2021-12-10

## 2021-12-10 RX ORDER — SODIUM CHLORIDE, SODIUM LACTATE, POTASSIUM CHLORIDE, CALCIUM CHLORIDE 600; 310; 30; 20 MG/100ML; MG/100ML; MG/100ML; MG/100ML
50 INJECTION, SOLUTION INTRAVENOUS CONTINUOUS
Status: DISCONTINUED | OUTPATIENT
Start: 2021-12-10 | End: 2021-12-11 | Stop reason: HOSPADM

## 2021-12-10 RX ORDER — FAMOTIDINE 20 MG/1
20 TABLET, FILM COATED ORAL 2 TIMES DAILY
Status: DISCONTINUED | OUTPATIENT
Start: 2021-12-10 | End: 2021-12-11 | Stop reason: HOSPADM

## 2021-12-10 RX ORDER — SODIUM CHLORIDE 9 MG/ML
20 INJECTION, SOLUTION INTRAVENOUS ONCE AS NEEDED
Status: DISCONTINUED | OUTPATIENT
Start: 2021-12-10 | End: 2021-12-13 | Stop reason: HOSPADM

## 2021-12-10 RX ADMIN — EZETIMIBE 10 MG: 10 TABLET ORAL at 22:04

## 2021-12-10 RX ADMIN — HYDRALAZINE HYDROCHLORIDE 10 MG: 10 TABLET, FILM COATED ORAL at 18:33

## 2021-12-10 RX ADMIN — FOSAPREPITANT 150 MG: 150 INJECTION, POWDER, LYOPHILIZED, FOR SOLUTION INTRAVENOUS at 12:28

## 2021-12-10 RX ADMIN — OXALIPLATIN 250 MG: 5 INJECTION, SOLUTION INTRAVENOUS at 09:50

## 2021-12-10 RX ADMIN — DEXTROSE 20 ML/HR: 50 INJECTION, SOLUTION INTRAVENOUS at 09:49

## 2021-12-10 RX ADMIN — SODIUM CHLORIDE 500 ML: 0.9 INJECTION, SOLUTION INTRAVENOUS at 15:11

## 2021-12-10 RX ADMIN — ONDANSETRON 4 MG: 2 INJECTION INTRAMUSCULAR; INTRAVENOUS at 20:55

## 2021-12-10 RX ADMIN — ENOXAPARIN SODIUM 40 MG: 40 INJECTION SUBCUTANEOUS at 22:00

## 2021-12-10 RX ADMIN — FAMOTIDINE 20 MG: 20 TABLET ORAL at 21:40

## 2021-12-10 RX ADMIN — SODIUM CHLORIDE 500 ML: 0.9 INJECTION, SOLUTION INTRAVENOUS at 12:10

## 2021-12-10 RX ADMIN — ACETAMINOPHEN 650 MG: 325 TABLET, FILM COATED ORAL at 19:27

## 2021-12-10 RX ADMIN — DEXAMETHASONE SODIUM PHOSPHATE: 10 INJECTION, SOLUTION INTRAMUSCULAR; INTRAVENOUS at 09:26

## 2021-12-10 RX ADMIN — SODIUM CHLORIDE 20 ML/HR: 0.9 INJECTION, SOLUTION INTRAVENOUS at 09:26

## 2021-12-10 RX ADMIN — SODIUM CHLORIDE, SODIUM LACTATE, POTASSIUM CHLORIDE, AND CALCIUM CHLORIDE 50 ML/HR: .6; .31; .03; .02 INJECTION, SOLUTION INTRAVENOUS at 21:30

## 2021-12-10 RX ADMIN — POTASSIUM CHLORIDE 20 MEQ: 1500 TABLET, EXTENDED RELEASE ORAL at 21:40

## 2021-12-11 VITALS
HEART RATE: 80 BPM | WEIGHT: 219.14 LBS | BODY MASS INDEX: 47.28 KG/M2 | DIASTOLIC BLOOD PRESSURE: 76 MMHG | SYSTOLIC BLOOD PRESSURE: 137 MMHG | RESPIRATION RATE: 17 BRPM | HEIGHT: 57 IN | OXYGEN SATURATION: 97 % | TEMPERATURE: 98.7 F

## 2021-12-11 LAB
ANION GAP SERPL CALCULATED.3IONS-SCNC: 9 MMOL/L (ref 4–13)
BUN SERPL-MCNC: 11 MG/DL (ref 5–25)
CALCIUM SERPL-MCNC: 9.1 MG/DL (ref 8.3–10.1)
CHLORIDE SERPL-SCNC: 108 MMOL/L (ref 100–108)
CO2 SERPL-SCNC: 22 MMOL/L (ref 21–32)
CREAT SERPL-MCNC: 0.92 MG/DL (ref 0.6–1.3)
GFR SERPL CREATININE-BSD FRML MDRD: 65 ML/MIN/1.73SQ M
GLUCOSE P FAST SERPL-MCNC: 108 MG/DL (ref 65–99)
GLUCOSE SERPL-MCNC: 108 MG/DL (ref 65–140)
MAGNESIUM SERPL-MCNC: 2.3 MG/DL (ref 1.6–2.6)
PHOSPHATE SERPL-MCNC: 3 MG/DL (ref 2.3–4.1)
POTASSIUM SERPL-SCNC: 4 MMOL/L (ref 3.5–5.3)
SODIUM SERPL-SCNC: 139 MMOL/L (ref 136–145)

## 2021-12-11 PROCEDURE — 84100 ASSAY OF PHOSPHORUS: CPT | Performed by: NURSE PRACTITIONER

## 2021-12-11 PROCEDURE — 80048 BASIC METABOLIC PNL TOTAL CA: CPT | Performed by: NURSE PRACTITIONER

## 2021-12-11 PROCEDURE — 83735 ASSAY OF MAGNESIUM: CPT | Performed by: NURSE PRACTITIONER

## 2021-12-11 PROCEDURE — 99217 PR OBSERVATION CARE DISCHARGE MANAGEMENT: CPT | Performed by: NURSE PRACTITIONER

## 2021-12-11 RX ADMIN — ENOXAPARIN SODIUM 40 MG: 40 INJECTION SUBCUTANEOUS at 09:40

## 2021-12-11 RX ADMIN — FAMOTIDINE 20 MG: 20 TABLET ORAL at 18:01

## 2021-12-11 RX ADMIN — FAMOTIDINE 20 MG: 20 TABLET ORAL at 09:40

## 2021-12-13 ENCOUNTER — TELEPHONE (OUTPATIENT)
Dept: HEMATOLOGY ONCOLOGY | Facility: MEDICAL CENTER | Age: 66
End: 2021-12-13

## 2021-12-13 DIAGNOSIS — C19 RECTOSIGMOID CANCER (HCC): Primary | ICD-10-CM

## 2021-12-14 ENCOUNTER — TELEPHONE (OUTPATIENT)
Dept: HEMATOLOGY ONCOLOGY | Facility: MEDICAL CENTER | Age: 66
End: 2021-12-14

## 2021-12-14 DIAGNOSIS — C19 RECTOSIGMOID CANCER (HCC): ICD-10-CM

## 2021-12-14 DIAGNOSIS — C78.6 OMENTAL METASTASIS (HCC): Primary | ICD-10-CM

## 2021-12-15 RX ORDER — SODIUM CHLORIDE 9 MG/ML
20 INJECTION, SOLUTION INTRAVENOUS ONCE AS NEEDED
Status: CANCELLED | OUTPATIENT
Start: 2022-01-04

## 2021-12-15 RX ORDER — DEXTROSE MONOHYDRATE 50 MG/ML
20 INJECTION, SOLUTION INTRAVENOUS ONCE
Status: CANCELLED | OUTPATIENT
Start: 2022-01-04

## 2021-12-15 RX ORDER — FLUOROURACIL 50 MG/ML
400 INJECTION, SOLUTION INTRAVENOUS ONCE
Status: CANCELLED | OUTPATIENT
Start: 2022-01-03

## 2021-12-16 ENCOUNTER — TELEPHONE (OUTPATIENT)
Dept: HEMATOLOGY ONCOLOGY | Facility: MEDICAL CENTER | Age: 66
End: 2021-12-16

## 2021-12-28 ENCOUNTER — HOSPITAL ENCOUNTER (OUTPATIENT)
Dept: NON INVASIVE DIAGNOSTICS | Facility: HOSPITAL | Age: 66
Discharge: HOME | End: 2021-12-28
Attending: INTERNAL MEDICINE
Payer: MEDICARE

## 2021-12-28 VITALS
HEART RATE: 77 BPM | HEIGHT: 58 IN | RESPIRATION RATE: 18 BRPM | WEIGHT: 217 LBS | SYSTOLIC BLOOD PRESSURE: 150 MMHG | BODY MASS INDEX: 45.55 KG/M2 | OXYGEN SATURATION: 97 % | TEMPERATURE: 96.8 F | DIASTOLIC BLOOD PRESSURE: 72 MMHG

## 2021-12-28 DIAGNOSIS — C19 RECTOSIGMOID CANCER (HCC): ICD-10-CM

## 2021-12-28 PROCEDURE — 77001 FLUOROGUIDE FOR VEIN DEVICE: CPT

## 2021-12-28 PROCEDURE — 99153 MOD SED SAME PHYS/QHP EA: CPT

## 2021-12-28 PROCEDURE — 77001 FLUOROGUIDE FOR VEIN DEVICE: CPT | Performed by: RADIOLOGY

## 2021-12-28 PROCEDURE — 76937 US GUIDE VASCULAR ACCESS: CPT | Performed by: RADIOLOGY

## 2021-12-28 PROCEDURE — 99152 MOD SED SAME PHYS/QHP 5/>YRS: CPT | Performed by: RADIOLOGY

## 2021-12-28 PROCEDURE — 36561 INSERT TUNNELED CV CATH: CPT | Performed by: RADIOLOGY

## 2021-12-28 PROCEDURE — 99152 MOD SED SAME PHYS/QHP 5/>YRS: CPT

## 2021-12-28 PROCEDURE — 76937 US GUIDE VASCULAR ACCESS: CPT

## 2021-12-28 PROCEDURE — 36561 INSERT TUNNELED CV CATH: CPT

## 2021-12-28 PROCEDURE — C1788 PORT, INDWELLING, IMP: HCPCS

## 2021-12-28 RX ORDER — FENTANYL CITRATE 50 UG/ML
INJECTION, SOLUTION INTRAMUSCULAR; INTRAVENOUS CODE/TRAUMA/SEDATION MEDICATION
Status: COMPLETED | OUTPATIENT
Start: 2021-12-28 | End: 2021-12-28

## 2021-12-28 RX ORDER — ACETAMINOPHEN 325 MG/1
650 TABLET ORAL EVERY 4 HOURS PRN
Status: DISCONTINUED | OUTPATIENT
Start: 2021-12-28 | End: 2021-12-29 | Stop reason: HOSPADM

## 2021-12-28 RX ORDER — FENTANYL CITRATE 50 UG/ML
25 INJECTION, SOLUTION INTRAMUSCULAR; INTRAVENOUS EVERY 2 HOUR PRN
Status: DISCONTINUED | OUTPATIENT
Start: 2021-12-28 | End: 2021-12-29 | Stop reason: HOSPADM

## 2021-12-28 RX ORDER — MIDAZOLAM HYDROCHLORIDE 2 MG/2ML
INJECTION, SOLUTION INTRAMUSCULAR; INTRAVENOUS CODE/TRAUMA/SEDATION MEDICATION
Status: COMPLETED | OUTPATIENT
Start: 2021-12-28 | End: 2021-12-28

## 2021-12-28 RX ORDER — SODIUM CHLORIDE 9 MG/ML
75 INJECTION, SOLUTION INTRAVENOUS CONTINUOUS
Status: DISCONTINUED | OUTPATIENT
Start: 2021-12-28 | End: 2021-12-29 | Stop reason: HOSPADM

## 2021-12-28 RX ORDER — LIDOCAINE HYDROCHLORIDE AND EPINEPHRINE 10; 10 MG/ML; UG/ML
INJECTION, SOLUTION INFILTRATION; PERINEURAL CODE/TRAUMA/SEDATION MEDICATION
Status: COMPLETED | OUTPATIENT
Start: 2021-12-28 | End: 2021-12-28

## 2021-12-28 RX ORDER — CEFAZOLIN SODIUM 2 G/50ML
2000 SOLUTION INTRAVENOUS ONCE
Status: COMPLETED | OUTPATIENT
Start: 2021-12-28 | End: 2021-12-28

## 2021-12-28 RX ORDER — OXYCODONE HYDROCHLORIDE 5 MG/1
10 TABLET ORAL EVERY 4 HOURS PRN
Status: DISCONTINUED | OUTPATIENT
Start: 2021-12-28 | End: 2021-12-29 | Stop reason: HOSPADM

## 2021-12-28 RX ORDER — OXYCODONE HYDROCHLORIDE 5 MG/1
5 TABLET ORAL EVERY 4 HOURS PRN
Status: DISCONTINUED | OUTPATIENT
Start: 2021-12-28 | End: 2021-12-29 | Stop reason: HOSPADM

## 2021-12-28 RX ADMIN — FENTANYL CITRATE 50 MCG: 50 INJECTION, SOLUTION INTRAMUSCULAR; INTRAVENOUS at 08:41

## 2021-12-28 RX ADMIN — Medication 10 ML: at 08:56

## 2021-12-28 RX ADMIN — FENTANYL CITRATE 50 MCG: 50 INJECTION, SOLUTION INTRAMUSCULAR; INTRAVENOUS at 09:02

## 2021-12-28 RX ADMIN — MIDAZOLAM 1 MG: 1 INJECTION INTRAMUSCULAR; INTRAVENOUS at 09:02

## 2021-12-28 RX ADMIN — FENTANYL CITRATE 50 MCG: 50 INJECTION, SOLUTION INTRAMUSCULAR; INTRAVENOUS at 08:56

## 2021-12-28 RX ADMIN — LIDOCAINE HYDROCHLORIDE AND EPINEPHRINE 10 ML: 10; 10 INJECTION, SOLUTION INFILTRATION; PERINEURAL at 09:00

## 2021-12-28 RX ADMIN — SODIUM CHLORIDE 75 ML/HR: 0.9 INJECTION, SOLUTION INTRAVENOUS at 07:51

## 2021-12-28 RX ADMIN — MIDAZOLAM 1 MG: 1 INJECTION INTRAMUSCULAR; INTRAVENOUS at 08:41

## 2021-12-28 RX ADMIN — MIDAZOLAM 1 MG: 1 INJECTION INTRAMUSCULAR; INTRAVENOUS at 08:56

## 2021-12-28 RX ADMIN — CEFAZOLIN SODIUM 2000 MG: 2 SOLUTION INTRAVENOUS at 08:40

## 2021-12-29 ENCOUNTER — OFFICE VISIT (OUTPATIENT)
Dept: HEMATOLOGY ONCOLOGY | Facility: MEDICAL CENTER | Age: 66
End: 2021-12-29
Payer: MEDICARE

## 2021-12-29 ENCOUNTER — APPOINTMENT (OUTPATIENT)
Dept: LAB | Facility: HOSPITAL | Age: 66
End: 2021-12-29
Attending: INTERNAL MEDICINE
Payer: MEDICARE

## 2021-12-29 VITALS
RESPIRATION RATE: 20 BRPM | HEIGHT: 58 IN | DIASTOLIC BLOOD PRESSURE: 72 MMHG | HEART RATE: 102 BPM | BODY MASS INDEX: 45.55 KG/M2 | WEIGHT: 217 LBS | TEMPERATURE: 96.2 F | SYSTOLIC BLOOD PRESSURE: 112 MMHG | OXYGEN SATURATION: 98 %

## 2021-12-29 DIAGNOSIS — E66.01 MORBID OBESITY (HCC): ICD-10-CM

## 2021-12-29 DIAGNOSIS — C19 RECTOSIGMOID CANCER (HCC): ICD-10-CM

## 2021-12-29 DIAGNOSIS — C78.6 OMENTAL METASTASIS (HCC): ICD-10-CM

## 2021-12-29 DIAGNOSIS — C78.6 OMENTAL METASTASIS (HCC): Primary | ICD-10-CM

## 2021-12-29 DIAGNOSIS — N83.9 LESION OF OVARY: ICD-10-CM

## 2021-12-29 DIAGNOSIS — C19 RECTOSIGMOID CANCER (HCC): Primary | ICD-10-CM

## 2021-12-29 LAB
ALBUMIN SERPL BCP-MCNC: 3 G/DL (ref 3.5–5)
ALP SERPL-CCNC: 61 U/L (ref 46–116)
ALT SERPL W P-5'-P-CCNC: 34 U/L (ref 12–78)
ANION GAP SERPL CALCULATED.3IONS-SCNC: 7 MMOL/L (ref 4–13)
AST SERPL W P-5'-P-CCNC: 23 U/L (ref 5–45)
BASOPHILS # BLD AUTO: 0.03 THOUSANDS/ΜL (ref 0–0.1)
BASOPHILS NFR BLD AUTO: 0 % (ref 0–1)
BILIRUB SERPL-MCNC: 0.41 MG/DL (ref 0.2–1)
BUN SERPL-MCNC: 13 MG/DL (ref 5–25)
CALCIUM ALBUM COR SERPL-MCNC: 9.4 MG/DL (ref 8.3–10.1)
CALCIUM SERPL-MCNC: 8.6 MG/DL (ref 8.3–10.1)
CHLORIDE SERPL-SCNC: 107 MMOL/L (ref 100–108)
CO2 SERPL-SCNC: 27 MMOL/L (ref 21–32)
CREAT SERPL-MCNC: 1.15 MG/DL (ref 0.6–1.3)
EOSINOPHIL # BLD AUTO: 0.06 THOUSAND/ΜL (ref 0–0.61)
EOSINOPHIL NFR BLD AUTO: 1 % (ref 0–6)
ERYTHROCYTE [DISTWIDTH] IN BLOOD BY AUTOMATED COUNT: 15.4 % (ref 11.6–15.1)
GFR SERPL CREATININE-BSD FRML MDRD: 49 ML/MIN/1.73SQ M
GLUCOSE SERPL-MCNC: 87 MG/DL (ref 65–140)
HCT VFR BLD AUTO: 42.5 % (ref 34.8–46.1)
HGB BLD-MCNC: 13.1 G/DL (ref 11.5–15.4)
IMM GRANULOCYTES # BLD AUTO: 0.02 THOUSAND/UL (ref 0–0.2)
IMM GRANULOCYTES NFR BLD AUTO: 0 % (ref 0–2)
LYMPHOCYTES # BLD AUTO: 1.82 THOUSANDS/ΜL (ref 0.6–4.47)
LYMPHOCYTES NFR BLD AUTO: 25 % (ref 14–44)
MCH RBC QN AUTO: 26.6 PG (ref 26.8–34.3)
MCHC RBC AUTO-ENTMCNC: 30.8 G/DL (ref 31.4–37.4)
MCV RBC AUTO: 86 FL (ref 82–98)
MONOCYTES # BLD AUTO: 0.68 THOUSAND/ΜL (ref 0.17–1.22)
MONOCYTES NFR BLD AUTO: 9 % (ref 4–12)
NEUTROPHILS # BLD AUTO: 4.7 THOUSANDS/ΜL (ref 1.85–7.62)
NEUTS SEG NFR BLD AUTO: 65 % (ref 43–75)
NRBC BLD AUTO-RTO: 0 /100 WBCS
PLATELET # BLD AUTO: 255 THOUSANDS/UL (ref 149–390)
PMV BLD AUTO: 10.3 FL (ref 8.9–12.7)
POTASSIUM SERPL-SCNC: 4.1 MMOL/L (ref 3.5–5.3)
PROT SERPL-MCNC: 7.2 G/DL (ref 6.4–8.2)
RBC # BLD AUTO: 4.93 MILLION/UL (ref 3.81–5.12)
SODIUM SERPL-SCNC: 141 MMOL/L (ref 136–145)
WBC # BLD AUTO: 7.31 THOUSAND/UL (ref 4.31–10.16)

## 2021-12-29 PROCEDURE — 99215 OFFICE O/P EST HI 40 MIN: CPT | Performed by: INTERNAL MEDICINE

## 2021-12-29 PROCEDURE — 85025 COMPLETE CBC W/AUTO DIFF WBC: CPT

## 2021-12-29 PROCEDURE — 36415 COLL VENOUS BLD VENIPUNCTURE: CPT

## 2021-12-29 PROCEDURE — 80053 COMPREHEN METABOLIC PANEL: CPT

## 2021-12-29 RX ORDER — FLUOROURACIL 50 MG/ML
300 INJECTION, SOLUTION INTRAVENOUS ONCE
Status: CANCELLED | OUTPATIENT
Start: 2022-01-04

## 2021-12-30 ENCOUNTER — CONSULT (OUTPATIENT)
Dept: GYNECOLOGIC ONCOLOGY | Facility: CLINIC | Age: 66
End: 2021-12-30
Payer: MEDICARE

## 2021-12-30 VITALS
TEMPERATURE: 98.8 F | BODY MASS INDEX: 45.34 KG/M2 | HEART RATE: 106 BPM | RESPIRATION RATE: 18 BRPM | HEIGHT: 58 IN | DIASTOLIC BLOOD PRESSURE: 94 MMHG | WEIGHT: 216 LBS | SYSTOLIC BLOOD PRESSURE: 134 MMHG

## 2021-12-30 DIAGNOSIS — N83.209 CYSTIC DISEASE OF OVARY: ICD-10-CM

## 2021-12-30 DIAGNOSIS — C19 RECTOSIGMOID CANCER (HCC): ICD-10-CM

## 2021-12-30 DIAGNOSIS — N83.9 LESION OF OVARY: Primary | ICD-10-CM

## 2021-12-30 DIAGNOSIS — C78.6 OMENTAL METASTASIS (HCC): ICD-10-CM

## 2021-12-30 PROCEDURE — 99205 OFFICE O/P NEW HI 60 MIN: CPT | Performed by: OBSTETRICS & GYNECOLOGY

## 2022-01-04 ENCOUNTER — HOSPITAL ENCOUNTER (OUTPATIENT)
Dept: INFUSION CENTER | Facility: HOSPITAL | Age: 67
Discharge: HOME | End: 2022-01-04
Attending: INTERNAL MEDICINE
Payer: MEDICARE

## 2022-01-04 VITALS
HEART RATE: 96 BPM | RESPIRATION RATE: 20 BRPM | BODY MASS INDEX: 45.81 KG/M2 | WEIGHT: 218.26 LBS | OXYGEN SATURATION: 94 % | DIASTOLIC BLOOD PRESSURE: 91 MMHG | TEMPERATURE: 97 F | HEIGHT: 58 IN | SYSTOLIC BLOOD PRESSURE: 168 MMHG

## 2022-01-04 DIAGNOSIS — C19 RECTOSIGMOID CANCER (HCC): Primary | ICD-10-CM

## 2022-01-04 DIAGNOSIS — C78.6 OMENTAL METASTASIS (HCC): ICD-10-CM

## 2022-01-04 PROCEDURE — 96368 THER/DIAG CONCURRENT INF: CPT

## 2022-01-04 PROCEDURE — 96411 CHEMO IV PUSH ADDL DRUG: CPT

## 2022-01-04 PROCEDURE — 96413 CHEMO IV INFUSION 1 HR: CPT

## 2022-01-04 PROCEDURE — 96415 CHEMO IV INFUSION ADDL HR: CPT

## 2022-01-04 PROCEDURE — G0498 CHEMO EXTEND IV INFUS W/PUMP: HCPCS

## 2022-01-04 PROCEDURE — 96367 TX/PROPH/DG ADDL SEQ IV INF: CPT

## 2022-01-04 RX ORDER — DEXTROSE MONOHYDRATE 50 MG/ML
20 INJECTION, SOLUTION INTRAVENOUS ONCE
Status: COMPLETED | OUTPATIENT
Start: 2022-01-04 | End: 2022-01-04

## 2022-01-04 RX ORDER — SODIUM CHLORIDE 9 MG/ML
20 INJECTION, SOLUTION INTRAVENOUS ONCE AS NEEDED
Status: DISCONTINUED | OUTPATIENT
Start: 2022-01-04 | End: 2022-01-07 | Stop reason: HOSPADM

## 2022-01-04 RX ORDER — FLUOROURACIL 50 MG/ML
300 INJECTION, SOLUTION INTRAVENOUS ONCE
Status: COMPLETED | OUTPATIENT
Start: 2022-01-04 | End: 2022-01-04

## 2022-01-04 RX ADMIN — LEUCOVORIN CALCIUM 561 MG: 100 INJECTION, POWDER, LYOPHILIZED, FOR SUSPENSION INTRAMUSCULAR; INTRAVENOUS at 12:15

## 2022-01-04 RX ADMIN — FLUOROURACIL 560 MG: 50 INJECTION, SOLUTION INTRAVENOUS at 14:22

## 2022-01-04 RX ADMIN — OXALIPLATIN 119.21 MG: 5 INJECTION, SOLUTION INTRAVENOUS at 12:15

## 2022-01-04 RX ADMIN — DEXTROSE 20 ML/HR: 50 INJECTION, SOLUTION INTRAVENOUS at 10:29

## 2022-01-04 RX ADMIN — DEXAMETHASONE SODIUM PHOSPHATE: 10 INJECTION, SOLUTION INTRAMUSCULAR; INTRAVENOUS at 10:30

## 2022-01-05 ENCOUNTER — TELEPHONE (OUTPATIENT)
Dept: GENETICS | Facility: CLINIC | Age: 67
End: 2022-01-05

## 2022-01-05 ENCOUNTER — TELEPHONE (OUTPATIENT)
Dept: HEMATOLOGY ONCOLOGY | Facility: MEDICAL CENTER | Age: 67
End: 2022-01-05

## 2022-01-05 NOTE — TELEPHONE ENCOUNTER
I called and left a message for the patient regarding a sample that has not been received by the lab for genetic testing  I advised the patient to return my call  A reminder letter will be sent to the patient if she does not reach our team by 1/10

## 2022-01-06 ENCOUNTER — HOSPITAL ENCOUNTER (OUTPATIENT)
Dept: INFUSION CENTER | Facility: HOSPITAL | Age: 67
Discharge: HOME | End: 2022-01-06
Attending: INTERNAL MEDICINE

## 2022-01-06 VITALS — TEMPERATURE: 96.3 F

## 2022-01-06 DIAGNOSIS — C19 RECTOSIGMOID CANCER (HCC): Primary | ICD-10-CM

## 2022-01-06 DIAGNOSIS — C78.6 OMENTAL METASTASIS (HCC): ICD-10-CM

## 2022-01-11 RX ORDER — FLUOROURACIL 50 MG/ML
300 INJECTION, SOLUTION INTRAVENOUS ONCE
Status: CANCELLED | OUTPATIENT
Start: 2022-01-18

## 2022-01-11 RX ORDER — DEXTROSE MONOHYDRATE 50 MG/ML
20 INJECTION, SOLUTION INTRAVENOUS ONCE
Status: CANCELLED | OUTPATIENT
Start: 2022-01-18

## 2022-01-11 RX ORDER — SODIUM CHLORIDE 9 MG/ML
20 INJECTION, SOLUTION INTRAVENOUS ONCE AS NEEDED
Status: CANCELLED | OUTPATIENT
Start: 2022-01-18

## 2022-01-12 ENCOUNTER — OFFICE VISIT (OUTPATIENT)
Dept: HEMATOLOGY ONCOLOGY | Facility: MEDICAL CENTER | Age: 67
End: 2022-01-12
Payer: MEDICARE

## 2022-01-12 VITALS
HEART RATE: 109 BPM | HEIGHT: 57 IN | OXYGEN SATURATION: 98 % | SYSTOLIC BLOOD PRESSURE: 116 MMHG | WEIGHT: 212 LBS | DIASTOLIC BLOOD PRESSURE: 68 MMHG | RESPIRATION RATE: 18 BRPM | TEMPERATURE: 97 F | BODY MASS INDEX: 45.74 KG/M2

## 2022-01-12 DIAGNOSIS — T45.1X5S ADVERSE EFFECT OF CHEMOTHERAPY, SEQUELA: ICD-10-CM

## 2022-01-12 DIAGNOSIS — N83.9 LESION OF OVARY: ICD-10-CM

## 2022-01-12 DIAGNOSIS — C78.6 OMENTAL METASTASIS (HCC): ICD-10-CM

## 2022-01-12 DIAGNOSIS — C19 RECTOSIGMOID CANCER (HCC): Primary | ICD-10-CM

## 2022-01-12 PROCEDURE — 99215 OFFICE O/P EST HI 40 MIN: CPT | Performed by: INTERNAL MEDICINE

## 2022-01-12 NOTE — PROGRESS NOTES
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) now with an abdominal mass consistent with recurrence  Issues:    Recurrent rectosigmoid adenocarcinoma  Patient was seen by Dr Ephraim Fenton surgical oncology recently  The plan was to begin with chemotherapy and demonstrate response with systemic treatment  Patient was then to be re-evaluated for the possibility of surgery  Patient was recently started on CAPEOX  Unfortunately Mrs Feliz Ching was not able to get through the 1st day of treatment  Mrs Feliz Ching needed to be admitted for treatment of nausea/vomiting and dehydration (2 day hospitalization)  CAPEOX was discontinued  Other options were discussed and patient has now completed 1 cycle of FOLFOX  Mrs Feliz Ching was able to tolerate the FOLFOX much better  Patient states feeling quite well presently  Second cycle is scheduled for January 18, 2022  Patient states having all required medications at home  If patient is able to tolerate the 2nd cycle as well as the 1st, the plan will be to increase the dose up towards 100%  NCCN guidelines 3 2020 state that for patients with recurrent disease, potentially resectable with no previous chemotherapy, options include resection upfront or neoadjuvant chemotherapy for 2-3 months (mFOLFOX and CAPOX are preferred)  If/when patient is able to demonstrate a response to systemic treatment, she can return to surgery for re-evaluation of resection  Hopefully patient will be able to complete 6 months of perioperative treatment      mFOLFOX 6 regimen (all drugs dose reduced to 75%)  Oxaliplatin 85 mg/m2 IV day 1  Leucovorin 400 mg/m2 IV day 1  5  mg/m2 IVP day 1  5 FU 1200 mg/m2/day, continues infusion x 46 hours  Cycle length = 2 weeks  Goal = cure    Because of planned pending surgery, patient will not receive bevacizumab  Caris results are still pending  Ovarian lesion  Recent MRI/pelvis demonstrated a multi septated left ovarian cystic lesion  Transabdominal ultrasound results did not demonstrate any concerning abnormality, 1 year follow-up suggested  Patient was recently seen by Dr Miguel Rodriguez, gyn Oncology  Final decisions are being made but patient may undergo DILMA/BSO at the time of the resection of the abdominal mass (after patient demonstrates response to neoadjuvant chemotherapy)  Patient may also be a candidate for HIPEC  Recent PET-CT with increased uptake at the anastomotic site  No GI issues  Patient underwent colonoscopy on October 6, 2021 - no abnormalities found at the anastomotic site  Port  Port was placed few weeks ago  The area was slightly red but no clear signs of infection  To be sure there was no infection, the covering bandage was removed  There are 2 small areas on the medial suture line that have not closed  This was addressed by the nursing staff, triple antibiotic and a Steri-Strip wa placed  Patient will monitor the area  Genetics  Patient has a complicated family history but multiple family members with colon/rectal cancer  Patient can be seen by Oncology genetics in the future when patient has restarted chemotherapy and is tolerating it  Patient is to return in 2 weeks  Carefully review your medication list and verify that the list is accurate and up-to-date  Please call the hematology/oncology office if there are medications missing from the list, medications on the list that you are not currently taking or if there is a dosage or instruction that is different from how you're taking that medication      Patient goals and areas of care:  Continue with FOLFOX  Barriers to care:  None  Patient is able to self-care   ______________________________________________________________________________________    Chief complaint: Rectosigmoid carcinoma, nausea, vomiting    History of Present Illness:  78year old female previously diagnosed with rectosigmoid carcinoma status post resection in September 2019  Postoperatively patient did well; Mrs Adriano Sullivan did not need/receive adjuvant chemotherapy  Recent surveillance CEA level was found to be elevated  Patient underwent workup  Results demonstrated an intra-abdominal mass by the spleen  Patient has been seen by Dr Ann Bernard the plan has been neoadjuvant chemotherapy followed by resection  Patient began her 1st cycle of CAPEOX recently  Unfortunately Mrs Adriano Sullivan could not tolerate the Xeloda at all  Patient was seen/evaluated in the infusion room a few weeks ago with persistent nausea/vomiting and dehydration  Patient required admission, discharged after 48 hours  Options were subsequently discussed and patient was switched to FOLFOX  Mrs Adriano Sullivan has completed her 1st cycle of treatment - tolerated this well  Appetite is good, no GI issues  No nausea or vomiting, no GI bleeding  No pain control issues  No respiratory issues  No fevers or signs of infection  Activities are baseline  No port issues  Patient has received her COVID vaccination  Review of Systems   Constitutional: Positive for fatigue  Negative for activity change and appetite change  HENT: Negative  Eyes: Negative  Respiratory: Negative  Cardiovascular: Negative  Gastrointestinal: Negative for nausea and vomiting  Endocrine: Negative  Genitourinary: Negative  Musculoskeletal: Negative  Skin: Negative  Allergic/Immunologic: Negative  Neurological: Negative  Hematological: Negative  Psychiatric/Behavioral: Negative  All other systems reviewed and are negative      Patient Active Problem List   Diagnosis    Callus of foot    Foot pain    GERD (gastroesophageal reflux disease)    Hyperlipidemia    Prediabetes    Varicose veins with pain    Morbid obesity (HCC)    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HCC)    Dyspnea on exertion    Dyslipidemia    Sigmoid diverticulosis    Stage 2 chronic kidney disease    PONV (postoperative nausea and vomiting)    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HCC)    Lesion of ovary    Hypertension    Chemotherapy adverse reaction     Past Medical History:   Diagnosis Date    Blood in stool     Breast disorder     Cancer ([facility] [address] )     rectal    Cancer determined by colorectal biopsy ([facility] [address] )     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    GERD (gastroesophageal reflux disease)     History of rectal surgery     Hyperlipidemia     PONV (postoperative nausea and vomiting)      Past Surgical History:   Procedure Laterality Date    BREAST LUMPECTOMY Right      SECTION      x3    COLON SIGMOID RESECTION      COLONOSCOPY      DILATION AND CURETTAGE OF UTERUS      ILEO LOOP DIVERSION N/A 12/10/2019    Procedure: ILEOSTOMY LOOP;  Surgeon: La Beltrán MD;  [facility];  Service: Robotics    IR BIOPSY OMENTUM  2021    IR PORT PLACEMENT  2021    NJ CLOSE ENTEROSTOMY N/A 2020    Procedure: CLOSURE ILEOSTOMY;  Surgeon: La Beltrán MD;  [facility];  Service: Colorectal    NJ LAP,SURG,COLECTOMY, PARTIAL, W/ANAST N/A 12/10/2019    Procedure: SIGMOID RESECTION COLON LAPAROSCOPIC W ROBOTICS converted to lap hand assisted  with Partial proctectomy , LASER FLUORESCENCE ANGIOGRAPHY, INTRA OP COLONOSCOPY;  Surgeon: La Beltrán MD;  [facility];  Service: Robotics    SMALL INTESTINE SURGERY  2020    Procedure: RESECTION SMALL BOWEL;  Surgeon: La Beltrán MD;  [facility];  Service: Colorectal   Past surgical history:  No prior blood transfusions    OBGYN:  No breast issues, no abnormal mammograms previously, no postmenopausal bleeding, no other GYN issues    Family History   Problem Relation Age of Onset    Hypertension Mother     Heart attack Mother     Heart attack Father     Heart disease Father     Hypertension Brother     Heart attack Brother     Leukemia Cousin     Breast cancer Cousin     Diabetes Cousin     Breast cancer Family         maternal side    Colon cancer Family         paternal uncle    Colon cancer Paternal Uncle     Stroke Neg Hx    Family history:  Somewhat complicated, mother with colostomy but etiology for this is unclear, paternal uncle with rectal cancer, patient has to first-degree relatives on the paternal side with history of colon cancer, 3 sons and 1 grandchild in good general health    Social History     Socioeconomic History    Marital status:      Spouse name: Not on file    Number of children: Not on file    Years of education: Not on file    Highest education level: Not on file   Occupational History    Not on file   Tobacco Use    Smoking status: Never Smoker    Smokeless tobacco: Never Used   Vaping Use    Vaping Use: Never used   Substance and Sexual Activity    Alcohol use: Yes     Comment: "occasionally"    Drug use: Never    Sexual activity: Not on file   Other Topics Concern    Not on file   Social History Narrative    Not on file     Social Determinants of Health     Financial Resource Strain: Not on file   Food Insecurity: Not on file   Transportation Needs: Not on file   Physical Activity: Not on file   Stress: Not on file   Social Connections: Not on file   Intimate Partner Violence: Not on file   Housing Stability: Not on file   Social history:  No tobacco, alcohol or drug abuse, no toxic exposure    Current Outpatient Medications:     acetaminophen (TYLENOL) 325 mg tablet, Take 3 tablets (975 mg total) by mouth every 8 (eight) hours, Disp: 30 tablet, Rfl: 0    ezetimibe (ZETIA) 10 mg tablet, Take 1 tablet (10 mg total) by mouth daily (Patient taking differently: Take 10 mg by mouth daily at bedtime  ), Disp: 90 tablet, Rfl: 1    famotidine (PEPCID) 20 mg tablet, Take 1 tablet (20 mg total) by mouth 2 (two) times a day (Patient taking differently: Take 20 mg by mouth daily as needed ), Disp: 180 tablet, Rfl: 1    ondansetron (ZOFRAN) 8 mg tablet, Take 1 tablet (8 mg total) by mouth every 12 (twelve) hours as needed for nausea or vomiting, Disp: 20 tablet, Rfl: 2    prochlorperazine (COMPAZINE) 10 mg tablet, Take 1 tablet (10 mg total) by mouth every 6 (six) hours as needed for nausea or vomiting, Disp: 60 tablet, Rfl: 0    Allergies   Allergen Reactions    Medical Tape Rash     Skin irritation  Skin peeling  Skin irritation  Skin peeling  Blisters  Rash     Vitals:    01/12/22 1349   BP: 116/68   Pulse: (!) 109   Resp: 18   Temp: (!) 97 °F (36 1 °C)   SpO2: 98%   Physical Exam  Constitutional:       Appearance: She is well-developed  HENT:      Head: Normocephalic and atraumatic  Right Ear: External ear normal       Left Ear: External ear normal    Eyes:      Conjunctiva/sclera: Conjunctivae normal       Pupils: Pupils are equal, round, and reactive to light  Cardiovascular:      Rate and Rhythm: Normal rate and regular rhythm  Heart sounds: Normal heart sounds  Pulmonary:      Effort: Pulmonary effort is normal       Breath sounds: Normal breath sounds  Abdominal:      General:  Obese, +bowel sounds, nontender, cannot palpate liver or spleen, no guarding  Musculoskeletal:         General: Normal range of motion  Cervical back: Normal range of motion and neck supple  Skin:     General: Skin is warm moist, good color, no petechiae or ecchymoses, see anterior chest wall picture below - 2 areas of open suture line, no signs of infection or cellulitis although area is still red  Neurological:      Mental Status: She is alert and oriented to person, place, and time  Deep Tendon Reflexes: Reflexes are normal and symmetric  Psychiatric:         Behavior: Behavior normal          Thought Content:  Thought content normal          Judgment: Judgment normal     Extremities:  No lower extremity edema, +obesity, no cords, pulses are 1+  Lymphatics:  No adenopathy in neck, supraclavicular region, axilla bilaterally    01/12/2022 right anterior chest wall - picture reviewed and okayed by patient        Laboratory    12/29/2021 WBC = 7 3 hemoglobin = 13 1 hematocrit = 42 5 platelet = 506 neutrophil = 65% BUN = 13 creatinine = 1 15 LFTs WNL        Procedures    10/06/2021 colonoscopy    FINDINGS:  · Healthy end-to-end colorectal anastomosis with no bleeding in the mid rectum  · All observed locations appeared normal, including the cecum, ascending colon, transverse colon, splenic flexure and descending colon  A couple scattered diverticuli seen    Imaging    11/11/2021 ultrasound pelvis transabdominal only    Cluster of anechoic cystic areas seen replacing the left ovary similar to MRI largest measuring 8 mm compared to 1 3 cm  Based on the ACR O-RADS system, this is O-RADS category 2 (almost certain benign with <1% risk of malignancy ) The management recommendation is followup in 1 year  10/25/2021 MRI pelvis rectal cancer staging    1  No recurrent or metastatic disease in the pelvis  Please refer to the separately dictated MRI abdomen report regarding mesenteric mass in the left upper quadrant      2   Multi septated cystic lesion versus cluster of small cysts in the left ovary measuring 2 6 x 2 5 x 1 9 cm, increased in size from October 2019  Further characterization with pelvic ultrasound is recommended      10/25/2021 MRI abdomen    1   5 2 cm mesenteric mass in the left upper quadrant with thickening of peritoneal reflection and invasion of the spleen, similar to prior PET/CT  This is highly suspicious for metastatic tumor implant  2   No additional potential sites of metastasis in the abdomen  3   Moderately distended gallbladder with gallstones and probable adenomyomatosis  09/28/2021 PET-CT    1   There is a large left paracolic gutter markedly hypermetabolic mass immediately inferior to the spleen, most consistent with metastatic colorectal carcinoma  2  Mildly increased activity at the right abdominal bowel anastomotic site  If not recently performed, direct visualization is recommended  3  There are no other glucose avid abnormalities identified within the abdomen or pelvis  4  No evidence of distant glucose avid metastatic disease in the neck, chest, or skeleton     Pathology    Case Report   Surgical Pathology Report                         Case: [id number]                                    Authorizing Provider: Peterson Mcarthur MD           Collected:           11/12/2021 0921               Ordering Location:     [address] [place] [address] Received:            11/12/2021 0941                                      CAT Scan                                                                      Pathologist:           Pamela Flores MD                                                         Specimen:    Omentum, Omental mass                                                                      Final Diagnosis   A  Omentum, Omental mass:  - Metastatic adenocarcinoma, with an immunophenotype compatible with metastasis from patient's known colonic primary   - See note      Note: Tumor is positive with CK20, CDX2 and negative with CK7, PAX8  This immunophenotype supports the above interpretation  Best representative block with tumor A5      This case was reviewed at the intradepartmental  conference     Dr Leif Clark is notified of the diagnosis in [facility] via [address] on 11/17/2021  at 8 45 am    Electronically signed by Harinder Ahumada MD on 11/17/2021 at  8:53 AM         Case Report   Surgical Pathology Report                         Case: [id number]                                    Authorizing Provider: Laird Baumgarten, MD       Collected:           12/10/2019 1159               Ordering Location:     [address] [place] [address]      Received:            12/10/2019 [address] [place] [address] Operating Room                                                       Pathologist:           Lan Bhardwaj MD                                                                  Specimens:   A) - Large Intestine, Sigmoid Colon, and portion of rectum                                           B) - Anastomatic site, anastamotic rings                                                   Addendum   RRESULTS OF IMMUNOHISTOCHEMICAL ANALYSIS FOR MISMATCH REPAIR PROTEIN LOSS  INTERPRETATION: NO LOSS OF NUCLEAR EXPRESSION OF MMR PROTEINS: LOW PROBABILITY OF MSI-H  Note: Background non-neoplastic tissue and/or internal control with intact nuclear expression       RESULTS:  Antibody          Clone               Description                           Results  MLH1               M1                  Mismatch repair protein       Intact nuclear expression  MSH2              [id number]       Mismatch repair protein       Intact nuclear expression  ZFB0              66                   Mismatch repair protein       Intact nuclear expression  GEZ1              [id number]         Mismatch repair protein       Intact nuclear expression     Comment:  A negative control and a positive control for each antibody have been reviewed and accepted  [facility] Specimen ID: [id number], Evaluator: Jordon Pastor MD  These tests were developed and their performance characteristics determined by [facility]  Rylan Combs may not be cleared or approved by the [place]   Food and Drug Administration   The FDA determined that such clearance or approval is not necessary   These tests are used for clinical purposes  Rylan Combs should not be regarded as investigational or for research   This laboratory has been approved by CLIA 88, designated as a high-complexity laboratory and is qualified to perform these tests      Comments: Patients whose tumors demonstrate lack of expression of one or more DNA mismatch repair proteins might be at risk for Bee Syndrome  This cancer susceptibility syndrome greatly increases the risk of synchronous and/or metachronous cancers in the affected patients and their family members  Normal expression of all proteins does not completely rule out familial cancer predisposition      The [facility] Bee Syndrome Surveillance Program Task Forces recommends that all patients with lack of expression of one or more DNA mismatch repair proteins and those with concerning personal or family history should undergo thorough evaluation, counseling and possibly genetic testing        Addendum electronically signed by Santo Brand MD on 12/20/2019 at  3:28 PM   Final Diagnosis   A  Rectosigmoid colon, low anterior resection:  - Adenocarcinoma, moderately differentiated  - Tumor invades through the muscularis propria into pericolorectal tissue, T3  - Diverticula  - All margins are negative for tumor   - Thirty-four lymph nodes, negative for malignancy (0/34)      B  Colon, anastomotic rings, resection:  - Two portions of colon with no pathologic abnormality     Intradepartmental consultation is in agreement  Interpretation performed at [facility] [address]  Immunohistochemistry for desmin  is performed on tissue blocks A13 and A16 to help in the assessment of this case          Electronically signed by Santo Brand MD on 12/18/2019 at 10:42 AM   Additional Information    All controls performed with the immunohistochemical stains reported above reacted appropriately  These tests were developed and their performance characteristics determined by [facility] or [facility]  They may not be cleared or approved by the [place]  Food and Drug Administration  The FDA has determined that such clearance or approval is not necessary  These tests are used for clinical purposes   They should not be regarded as investigational or for research  This laboratory has been approved by CLIA 88, designated as a high-complexity laboratory and is qualified to perform these tests  Synoptic Checklist   COLON AND RECTUM: Resection, Including Transanal Disk Excision of Rectal Neoplasms  8th Edition - Protocol posted: 2/27/2019  ColoRectal - All Specimens  SPECIMEN   Procedure  Low anterior resection    Macroscopic Intactness of Mesorectum  Complete    TUMOR   Tumor Site  Rectosigmoid    Histologic Type  Adenocarcinoma    Histologic Grade  G2: Moderately differentiated    Tumor Size  Greatest dimension (Centimeters): 5 4 cm   Additional Dimension (Centimeters)  4 6 cm     1 cm   Tumor Deposits  Not identified    Tumor Extension  Tumor invades through the muscularis propria into pericolorectal tissue    Macroscopic Tumor Perforation  Not identified    Lymphovascular Invasion  Not identified    Perineural Invasion  Not identified    Type of Polyp in Which Invasive Carcinoma Arose  Tubular adenoma    Treatment Effect  No known presurgical therapy    MARGINS   Margins  All margins are uninvolved by invasive carcinoma, high-grade dysplasia, intramucosal adenocarcinoma, and adenoma    Margins Examined  Proximal      Distal      Radial or Mesenteric    Distance of Invasive Carcinoma from Closest Margin  1 5 cm   Closest Margin  Radial or Mesenteric    Distance of Tumor from Radial Margin  1 5 cm   LYMPH NODES   Number of Lymph Nodes Involved  0    Number of Lymph Nodes Examined  34    PATHOLOGIC STAGE CLASSIFICATION (pTNM, AJCC 8th Edition)   Primary Tumor (pT)  pT3    Regional Lymph Nodes (pN)  pN0    ADDITIONAL FINDINGS   Additional Pathologic Findings  Diverticulosis

## 2022-01-13 ENCOUNTER — TELEPHONE (OUTPATIENT)
Dept: HEMATOLOGY ONCOLOGY | Facility: MEDICAL CENTER | Age: 67
End: 2022-01-13

## 2022-01-13 NOTE — TELEPHONE ENCOUNTER
Patirnt will be going to outpatient lab for labs pre chemo  Appt for central line lab draw cancelled  Message left for [facility] transport as well

## 2022-01-14 ENCOUNTER — APPOINTMENT (OUTPATIENT)
Dept: LAB | Facility: HOSPITAL | Age: 67
End: 2022-01-14
Attending: INTERNAL MEDICINE
Payer: MEDICARE

## 2022-01-17 ENCOUNTER — HOSPITAL ENCOUNTER (OUTPATIENT)
Dept: INFUSION CENTER | Facility: HOSPITAL | Age: 67
Discharge: HOME | End: 2022-01-17

## 2022-01-18 ENCOUNTER — HOSPITAL ENCOUNTER (OUTPATIENT)
Dept: INFUSION CENTER | Facility: HOSPITAL | Age: 67
Discharge: HOME | End: 2022-01-18
Attending: INTERNAL MEDICINE
Payer: MEDICARE

## 2022-01-18 VITALS
WEIGHT: 215.61 LBS | HEART RATE: 96 BPM | OXYGEN SATURATION: 96 % | RESPIRATION RATE: 20 BRPM | SYSTOLIC BLOOD PRESSURE: 161 MMHG | BODY MASS INDEX: 46.52 KG/M2 | HEIGHT: 57 IN | TEMPERATURE: 96.8 F | DIASTOLIC BLOOD PRESSURE: 73 MMHG

## 2022-01-18 DIAGNOSIS — C78.6 OMENTAL METASTASIS (HCC): ICD-10-CM

## 2022-01-18 DIAGNOSIS — C19 RECTOSIGMOID CANCER (HCC): Primary | ICD-10-CM

## 2022-01-18 PROCEDURE — 96368 THER/DIAG CONCURRENT INF: CPT

## 2022-01-18 PROCEDURE — G0498 CHEMO EXTEND IV INFUS W/PUMP: HCPCS

## 2022-01-18 PROCEDURE — 96411 CHEMO IV PUSH ADDL DRUG: CPT

## 2022-01-18 PROCEDURE — 96413 CHEMO IV INFUSION 1 HR: CPT

## 2022-01-18 PROCEDURE — 96415 CHEMO IV INFUSION ADDL HR: CPT

## 2022-01-18 PROCEDURE — 96367 TX/PROPH/DG ADDL SEQ IV INF: CPT

## 2022-01-18 RX ORDER — FLUOROURACIL 50 MG/ML
300 INJECTION, SOLUTION INTRAVENOUS ONCE
Status: COMPLETED | OUTPATIENT
Start: 2022-01-18 | End: 2022-01-18

## 2022-01-18 RX ORDER — DEXTROSE MONOHYDRATE 50 MG/ML
20 INJECTION, SOLUTION INTRAVENOUS ONCE
Status: COMPLETED | OUTPATIENT
Start: 2022-01-18 | End: 2022-01-18

## 2022-01-18 RX ORDER — SODIUM CHLORIDE 9 MG/ML
20 INJECTION, SOLUTION INTRAVENOUS ONCE AS NEEDED
Status: DISCONTINUED | OUTPATIENT
Start: 2022-01-18 | End: 2022-01-21 | Stop reason: HOSPADM

## 2022-01-18 RX ADMIN — DEXTROSE 20 ML/HR: 50 INJECTION, SOLUTION INTRAVENOUS at 10:14

## 2022-01-18 RX ADMIN — FLUOROURACIL 560 MG: 50 INJECTION, SOLUTION INTRAVENOUS at 13:25

## 2022-01-18 RX ADMIN — LEUCOVORIN CALCIUM 561 MG: 200 INJECTION, POWDER, LYOPHILIZED, FOR SUSPENSION INTRAMUSCULAR; INTRAVENOUS at 11:19

## 2022-01-18 RX ADMIN — OXALIPLATIN 119.21 MG: 5 INJECTION, SOLUTION INTRAVENOUS at 11:19

## 2022-01-18 RX ADMIN — DEXAMETHASONE SODIUM PHOSPHATE: 10 INJECTION, SOLUTION INTRAMUSCULAR; INTRAVENOUS at 10:15

## 2022-01-18 NOTE — PLAN OF CARE
Problem: Potential for Falls  Goal: Patient will remain free of falls  Description: INTERVENTIONS:  - Educate patient/family on patient safety including physical limitations  - Instruct patient to call for assistance with activity   - Keep Call bell within reach  Outcome: Progressing     Problem: INFECTION - ADULT  Goal: Absence or prevention of progression during hospitalization  Description: INTERVENTIONS:  - Assess and monitor for signs and symptoms of infection  - Monitor lab/diagnostic results  - Monitor all insertion sites, i e  indwelling lines, tubes, and drains  - Monitor endotracheal if appropriate and nasal secretions for changes in amount and color  - [place] appropriate cooling/warming therapies per order  - Administer medications as ordered  - Instruct and encourage patient and family to use good hand hygiene technique  - Identify and instruct in appropriate isolation precautions for identified infection/condition  Outcome: Progressing  Goal: Absence of fever/infection during neutropenic period  Description: INTERVENTIONS:  - Monitor WBC    Outcome: Progressing     Problem: DISCHARGE PLANNING  Goal: Discharge to home or other facility with appropriate resources  Description: INTERVENTIONS:  - Identify barriers to discharge w/patient and caregiver  - Arrange for needed discharge resources and transportation as appropriate  - Identify discharge learning needs (meds, wound care, etc )  - Arrange for interpretive services to assist at discharge as needed  - Refer to Case Management Department for coordinating discharge planning if the patient needs post-hospital services based on physician/advanced practitioner order or complex needs related to functional status, cognitive ability, or social support system  Outcome: Progressing     Problem: Knowledge Deficit  Goal: Patient/family/caregiver demonstrates understanding of disease process, treatment plan, medications, and discharge instructions  Description: Complete learning assessment and assess knowledge base    Interventions:  - Provide teaching at level of understanding  - Provide teaching via preferred learning methods  Outcome: Progressing

## 2022-01-19 DIAGNOSIS — C19 RECTOSIGMOID CANCER (HCC): ICD-10-CM

## 2022-01-19 DIAGNOSIS — C78.6 OMENTAL METASTASIS (HCC): Primary | ICD-10-CM

## 2022-01-20 ENCOUNTER — HOSPITAL ENCOUNTER (OUTPATIENT)
Dept: INFUSION CENTER | Facility: HOSPITAL | Age: 67
Discharge: HOME | End: 2022-01-20
Attending: INTERNAL MEDICINE

## 2022-01-20 DIAGNOSIS — C78.6 OMENTAL METASTASIS (HCC): ICD-10-CM

## 2022-01-20 DIAGNOSIS — C19 RECTOSIGMOID CANCER (HCC): Primary | ICD-10-CM

## 2022-01-25 DIAGNOSIS — C19 RECTOSIGMOID CANCER (HCC): ICD-10-CM

## 2022-01-25 DIAGNOSIS — C78.6 OMENTAL METASTASIS (HCC): Primary | ICD-10-CM

## 2022-01-25 RX ORDER — FLUOROURACIL 50 MG/ML
300 INJECTION, SOLUTION INTRAVENOUS ONCE
Status: CANCELLED | OUTPATIENT
Start: 2022-02-01

## 2022-01-25 RX ORDER — SODIUM CHLORIDE 9 MG/ML
20 INJECTION, SOLUTION INTRAVENOUS ONCE AS NEEDED
Status: CANCELLED | OUTPATIENT
Start: 2022-02-01

## 2022-01-25 RX ORDER — DEXTROSE MONOHYDRATE 50 MG/ML
20 INJECTION, SOLUTION INTRAVENOUS ONCE
Status: CANCELLED | OUTPATIENT
Start: 2022-02-01

## 2022-01-27 ENCOUNTER — OFFICE VISIT (OUTPATIENT)
Dept: HEMATOLOGY ONCOLOGY | Facility: MEDICAL CENTER | Age: 67
End: 2022-01-27
Payer: MEDICARE

## 2022-01-27 VITALS
SYSTOLIC BLOOD PRESSURE: 122 MMHG | HEIGHT: 57 IN | RESPIRATION RATE: 18 BRPM | HEART RATE: 90 BPM | BODY MASS INDEX: 45.52 KG/M2 | OXYGEN SATURATION: 98 % | TEMPERATURE: 97.6 F | WEIGHT: 211 LBS | DIASTOLIC BLOOD PRESSURE: 72 MMHG

## 2022-01-27 DIAGNOSIS — K59.03 DRUG-INDUCED CONSTIPATION: ICD-10-CM

## 2022-01-27 DIAGNOSIS — T45.1X5A CHEMOTHERAPY-INDUCED NAUSEA: Primary | ICD-10-CM

## 2022-01-27 DIAGNOSIS — R11.0 CHEMOTHERAPY-INDUCED NAUSEA: Primary | ICD-10-CM

## 2022-01-27 DIAGNOSIS — E86.0 DEHYDRATION: ICD-10-CM

## 2022-01-27 DIAGNOSIS — R19.00 INTRAABDOMINAL MASS: ICD-10-CM

## 2022-01-27 DIAGNOSIS — C19 RECTOSIGMOID CANCER (HCC): ICD-10-CM

## 2022-01-27 DIAGNOSIS — C78.6 OMENTAL METASTASIS (HCC): ICD-10-CM

## 2022-01-27 PROCEDURE — 99215 OFFICE O/P EST HI 40 MIN: CPT | Performed by: PHYSICIAN ASSISTANT

## 2022-01-27 RX ORDER — LIDOCAINE AND PRILOCAINE 25; 25 MG/G; MG/G
CREAM TOPICAL AS NEEDED
Qty: 30 G | Refills: 0 | Status: SHIPPED | OUTPATIENT
Start: 2022-01-27 | End: 2022-04-25

## 2022-01-27 NOTE — PROGRESS NOTES
[address] HEMATOLOGY ONCOLOGY Abhilash Rogers  [facility] [address]  Oncology Progress Note  Manolo See, 1955, [id number]  1/27/2022        Assessment/Plan:   1  Chemotherapy-induced nausea;  2  Dehydration  Patient's nausea secondary to chemotherapy  We discussed utilizing home medications since she did not do that during cycle two  We discussed per actively taking this medication in order to decrease total nausea  Patient's nausea is most likely the reason the patient became dehydrated  We discussed hydration through the IV on her disconnect day at the infusion center  Regimen:  1000 mL normal saline on day three  16 mg IV Zofran on day three  START proactively taking Zofran Day 2 at home  3  Drug-induced constipation  Constipation is likely multifactorial due to chemotherapy medications as well as increased oral intake of fluids  Regimen:  Colace PO Daily to twice a day starting day 2 of chemotherapy    4  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[address] ); 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[address] ); 6  Intraabdominal mass  This is a 70-year-old female with history of early stage colorectal cancer who recently was found to have elevation in CEA in October of 2021 and was subsequently found to have in of the intra-abdominal mass adjacent to the spleen  Plan is for the patient to receive neoadjuvant chemotherapy and then evaluate the patient for surgical excision  Patient had completed a cycle of CAPOX but this was discontinued due to side effects  Patient has now completed two cycles of FOLFOX that is dose reduced to 75%  Patient has had some issues with side effects see 1  Three three above  I will not increase the dose of chemotherapy at this time due to side effect profile  Caris testing has returned without significant findings, MSI stable, no actionable gene mutations        mFOLFOX 6 regimen (all drugs dose reduced to 75%)  Oxaliplatin 85 mg/m2 IV day 1  Leucovorin 400 mg/m2 IV day 1  5  mg/m2 IVP day 1  5 FU 1200 mg/m2/day, continues infusion x 46 hours  Cycle length = 2 weeks  Goal = cure  - lidocaine-prilocaine (EMLA) cream; Apply topically as needed for mild pain  Dispense: 30 g; Refill: 0    Once the patient is tolerating chemotherapy successfully, referral to Oncology genetics will occur  The patient is scheduled for follow-up in approximately 2 weels  Patient voiced agreement and understanding to the above  Patient knows to call the Hematology/Oncology office with any questions and concerns regarding the above  Goals and Barriers:    Current Goal:   Prolong Survival from Cancer  Barriers: None  Patient's Capacity to Self Care:  Patient able to self care  Shirley Hector PA-C  Medical Oncology/Hematology  [address]  ______________________________________________________________________________________________________________    Subjective     Chief Complaint   Patient presents with    Follow-up     patient states second round of tx much worse than first  port bothersome after treatment        History of present illness/Cancer History:   Oncology History   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 [address] )   9/23/2019 Initial Diagnosis    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[facility] [address] )     12/10/2019 Surgery    Low anterior resection (Dr Teresa Mcnulty):    A  Rectosigmoid colon, low anterior resection:  - Adenocarcinoma, moderately differentiated  - Tumor invades through the muscularis propria into pericolorectal tissue, T3  - Diverticula  - All margins are negative for tumor   - Thirty-four lymph nodes, negative for malignancy (0/34)       B  Colon, anastomotic rings, resection:  - Two portions of colon with no pathologic abnormality     12/10/2019 Genomic Testing    RRESULTS OF IMMUNOHISTOCHEMICAL ANALYSIS FOR MISMATCH REPAIR PROTEIN LOSS  INTERPRETATION: NO LOSS OF NUCLEAR EXPRESSION OF MMR PROTEINS: LOW PROBABILITY OF MSI-H  Note: Background non-neoplastic tissue and/or internal control with intact nuclear expression  RESULTS:  Antibody          Clone               Description                           Results  MLH1               M1                  Mismatch repair protein       Intact nuclear expression  MSH2              [id number]       Mismatch repair protein       Intact nuclear expression  MSH6              40                   Mismatch repair protein       Intact nuclear expression  PMS2              [id number]         Mismatch repair protein       Intact nuclear expression     11/12/2021 Biopsy    Omental mass:  - Metastatic adenocarcinoma, with an immunophenotype compatible with metastasis from patient's known colonic primary       12/10/2021 - 12/10/2021 Chemotherapy    capecitabine (XELODA), 850 mg/m2 = 1,600 mg (100 % of original dose 850 mg/m2), Oral, 2 times daily with meals, 1 of 8 cycles  Dose modification: 850 mg/m2 (100 % of original dose 850 mg/m2, Cycle 1, Reason: Other (see comments))  fosaprepitant (EMEND) IVPB, 150 mg, Intravenous, Once, 1 of 1 cycle  Administration: 150 mg (12/10/2021)  oxaliplatin (ELOXATIN) chemo infusion, 244 4 mg, Intravenous, Once, 1 of 8 cycles  Administration: 250 mg (12/10/2021)     1/4/2022 -  Chemotherapy    fluorouracil (ADRUCIL), 300 mg/m2 = 560 mg (75 % of original dose 400 mg/m2), Intravenous, Once, 2 of 12 cycles  Dose modification: 300 mg/m2 (75 % of original dose 400 mg/m2, Cycle 1, Reason: Dose Not Tolerated)  Administration: 560 mg (1/4/2022), 560 mg (1/18/2022)  leucovorin calcium IVPB, 300 mg/m2 = 561 mg (75 % of original dose 400 mg/m2), Intravenous, Once, 2 of 12 cycles  Dose modification: 300 mg/m2 (75 % of original dose 400 mg/m2, Cycle 1, Reason: Dose Not Tolerated)  Administration: 561 mg (1/4/2022), 561 mg (1/18/2022)  oxaliplatin (ELOXATIN) chemo infusion, 63 75 mg/m2 = 119 2125 mg (75 % of original dose 85 mg/m2), Intravenous, Once, 2 of 12 cycles  Dose modification: 63 75 mg/m2 (75 % of original dose 85 mg/m2, Cycle 1, Reason: Dose Not Tolerated)  Administration: 119 2125 mg (1/4/2022), 119 2125 mg (1/18/2022)  fluorouracil (ADRUCIL) ambulatory infusion Soln, 900 mg/m2/day = 3,365 mg (75 % of original dose 1,200 mg/m2/day), Intravenous, Over 46 hours, 2 of 12 cycles  Dose modification: 900 mg/m2/day (75 % of original dose 1,200 mg/m2/day, Cycle 2, Reason: Other (see comments))     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[facility] [address] )   11/29/2021 Initial Diagnosis    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[facility] [address] )     12/10/2021 - 12/10/2021 Chemotherapy    capecitabine (XELODA), 850 mg/m2 = 1,600 mg (100 % of original dose 850 mg/m2), Oral, 2 times daily with meals, 1 of 8 cycles  Dose modification: 850 mg/m2 (100 % of original dose 850 mg/m2, Cycle 1, Reason: Other (see comments))  fosaprepitant (EMEND) IVPB, 150 mg, Intravenous, Once, 1 of 1 cycle  Administration: 150 mg (12/10/2021)  oxaliplatin (ELOXATIN) chemo infusion, 244 4 mg, Intravenous, Once, 1 of 8 cycles  Administration: 250 mg (12/10/2021)     1/4/2022 -  Chemotherapy    fluorouracil (ADRUCIL), 300 mg/m2 = 560 mg (75 % of original dose 400 mg/m2), Intravenous, Once, 2 of 12 cycles  Dose modification: 300 mg/m2 (75 % of original dose 400 mg/m2, Cycle 1, Reason: Dose Not Tolerated)  Administration: 560 mg (1/4/2022), 560 mg (1/18/2022)  leucovorin calcium IVPB, 300 mg/m2 = 561 mg (75 % of original dose 400 mg/m2), Intravenous, Once, 2 of 12 cycles  Dose modification: 300 mg/m2 (75 % of original dose 400 mg/m2, Cycle 1, Reason: Dose Not Tolerated)  Administration: 561 mg (1/4/2022), 561 mg (1/18/2022)  oxaliplatin (ELOXATIN) chemo infusion, 63 75 mg/m2 = 119 2125 mg (75 % of original dose 85 mg/m2), Intravenous, Once, 2 of 12 cycles  Dose modification: 63 75 mg/m2 (75 % of original dose 85 mg/m2, Cycle 1, Reason: Dose Not Tolerated)  Administration: 119 2125 mg (1/4/2022), 119 2125 mg (1/18/2022)  fluorouracil (ADRUCIL) ambulatory infusion Soln, 900 mg/m2/day = 3,365 mg (75 % of original dose 1,200 mg/m2/day), Intravenous, Over 46 hours, 2 of 12 cycles  Dose modification: 900 mg/m2/day (75 % of original dose 1,200 mg/m2/day, Cycle 2, Reason: Other (see comments))          Lab Results   Component Value Date    WBC 5 48 2022    HGB 12 4 2022    HCT 40 1 2022    MCV 86 2022     2022     Lab Results   Component Value Date     2016    SODIUM 142 2022    K 3 9 2022     2022    CO2 25 2022    AGAP 9 2022    BUN 18 2022    CREATININE 1 23 2022    GLUC 93 2022    GLUF 108 (H) 2021    CALCIUM 8 5 2022    AST 21 2022    ALT 34 2022    ALKPHOS 55 2022    PROT 6 6 2016    TP 7 1 2022    BILITOT 0 7 2016    TBILI 0 61 2022    EGFR 45 2022     Interval history:  Feeling tired from last cycle  Beat up   + dehydration with nausea and no vomiting  Constipation with help from stool softener x 1 dose  ECO - Symptomatic but completely ambulatory    Review of Systems   Constitutional: Positive for fatigue  Gastrointestinal: Positive for constipation and nausea  Neurological: Positive for weakness (generalized)  Hematological: Bruises/bleeds easily  All other systems reviewed and are negative        Current Outpatient Medications:     acetaminophen (TYLENOL) 325 mg tablet, Take 3 tablets (975 mg total) by mouth every 8 (eight) hours, Disp: 30 tablet, Rfl: 0    ezetimibe (ZETIA) 10 mg tablet, Take 1 tablet (10 mg total) by mouth daily (Patient taking differently: Take 10 mg by mouth daily at bedtime  ), Disp: 90 tablet, Rfl: 1    famotidine (PEPCID) 20 mg tablet, Take 1 tablet (20 mg total) by mouth 2 (two) times a day (Patient taking differently: Take 20 mg by mouth daily as needed ), Disp: 180 tablet, Rfl: 1    [START ON 2022] fluorouracil 3,365 mg in CADD infusion pump, Infuse 3,365 mg (900 mg/m2/day x 1 87 m2) into a venous catheter over 46 hours for 2 days  Do not start before February 1, 2022 , Disp: 1 each, Rfl: 0    lidocaine-prilocaine (EMLA) cream, Apply topically as needed for mild pain, Disp: 30 g, Rfl: 0    ondansetron (ZOFRAN) 8 mg tablet, Take 1 tablet (8 mg total) by mouth every 12 (twelve) hours as needed for nausea or vomiting, Disp: 20 tablet, Rfl: 2    prochlorperazine (COMPAZINE) 10 mg tablet, Take 1 tablet (10 mg total) by mouth every 6 (six) hours as needed for nausea or vomiting, Disp: 60 tablet, Rfl: 0  Allergies   Allergen Reactions    Medical Tape Rash     Skin irritation  Skin peeling  Skin irritation  Skin peeling  Blisters  Rash       Advance Directive and Living Will:            Objective   /72 (BP Location: Left arm, Patient Position: Sitting, Cuff Size: Adult)   Pulse 90   Temp 97 6 °F (36 4 °C)   Resp 18   Ht 4' 9" (1 448 m)   Wt 95 7 kg (211 lb)   LMP 09/01/2011 (Approximate)   SpO2 98%   BMI 45 66 kg/m²   Wt Readings from Last 6 Encounters:   01/27/22 95 7 kg (211 lb)   01/18/22 97 8 kg (215 lb 9 8 oz)   01/12/22 96 2 kg (212 lb)   01/04/22 99 kg (218 lb 4 1 oz)   12/30/21 98 kg (216 lb)   12/29/21 98 4 kg (217 lb)       Physical Exam  Constitutional:       General: She is not in acute distress  Appearance: She is well-developed  HENT:      Head: Normocephalic and atraumatic  Mouth/Throat:      Pharynx: No oropharyngeal exudate  Eyes:      General: No scleral icterus  Pupils: Pupils are equal, round, and reactive to light  Cardiovascular:      Rate and Rhythm: Normal rate and regular rhythm  Heart sounds: No murmur heard  Comments: + right chest wall port a cath  Pulmonary:      Effort: Pulmonary effort is normal  No respiratory distress  Breath sounds: Normal breath sounds  Abdominal:      General: Bowel sounds are normal  There is no distension  Palpations: Abdomen is soft  Tenderness: There is no abdominal tenderness  Musculoskeletal:         General: Normal range of motion  Cervical back: Normal range of motion  Lymphadenopathy:      Cervical: No cervical adenopathy  Skin:     General: Skin is warm  Coloration: Skin is not pale  Findings: No rash  Neurological:      Mental Status: She is alert and oriented to person, place, and time  Cranial Nerves: No cranial nerve deficit  Psychiatric:         Thought Content:  Thought content normal          Pertinent Laboratory Results and Imaging Review:  Ancillary Orders on 01/14/2022   Component Date Value Ref Range Status    WBC 01/14/2022 5 48  4 31 - 10 16 Thousand/uL Final    RBC 01/14/2022 4 67  3 81 - 5 12 Million/uL Final    Hemoglobin 01/14/2022 12 4  11 5 - 15 4 g/dL Final    Hematocrit 01/14/2022 40 1  34 8 - 46 1 % Final    MCV 01/14/2022 86  82 - 98 fL Final    MCH 01/14/2022 26 6* 26 8 - 34 3 pg Final    MCHC 01/14/2022 30 9* 31 4 - 37 4 g/dL Final    RDW 01/14/2022 15 5* 11 6 - 15 1 % Final    MPV 01/14/2022 10 3  8 9 - 12 7 fL Final    Platelets 83/81/6311 223  149 - 390 Thousands/uL Final    nRBC 01/14/2022 0  /100 WBCs Final    Neutrophils Relative 01/14/2022 53  43 - 75 % Final    Immat GRANS % 01/14/2022 0  0 - 2 % Final    Lymphocytes Relative 01/14/2022 36  14 - 44 % Final    Monocytes Relative 01/14/2022 9  4 - 12 % Final    Eosinophils Relative 01/14/2022 2  0 - 6 % Final    Basophils Relative 01/14/2022 0  0 - 1 % Final    Neutrophils Absolute 01/14/2022 2 89  1 85 - 7 62 Thousands/µL Final    Immature Grans Absolute 01/14/2022 0 02  0 00 - 0 20 Thousand/uL Final    Lymphocytes Absolute 01/14/2022 1 95  0 60 - 4 47 Thousands/µL Final    Monocytes Absolute 01/14/2022 0 49  0 17 - 1 22 Thousand/µL Final    Eosinophils Absolute 01/14/2022 0 11  0 00 - 0 61 Thousand/µL Final    Basophils Absolute 01/14/2022 0 02  0 00 - 0 10 Thousands/µL Final    Sodium 01/14/2022 142  136 - 145 mmol/L Final    Potassium 01/14/2022 3 9  3 5 - 5 3 mmol/L Final    Chloride 01/14/2022 108  100 - 108 mmol/L Final    CO2 01/14/2022 25  21 - 32 mmol/L Final    ANION GAP 01/14/2022 9  4 - 13 mmol/L Final    BUN 01/14/2022 18  5 - 25 mg/dL Final    Creatinine 01/14/2022 1 23  0 60 - 1 30 mg/dL Final    Standardized to IDMS reference method    Glucose 01/14/2022 93  65 - 140 mg/dL Final    If the patient is fasting, the ADA then defines impaired fasting glucose as > 100 mg/dL and diabetes as > or equal to 123 mg/dL  Specimen collection should occur prior to Sulfasalazine administration due to the potential for falsely depressed results  Specimen collection should occur prior to Sulfapyridine administration due to the potential for falsely elevated results   Calcium 01/14/2022 8 5  8 3 - 10 1 mg/dL Final    Corrected Calcium 01/14/2022 9 3  8 3 - 10 1 mg/dL Final    AST 01/14/2022 21  5 - 45 U/L Final    Specimen collection should occur prior to Sulfasalazine administration due to the potential for falsely depressed results   ALT 01/14/2022 34  12 - 78 U/L Final    Specimen collection should occur prior to Sulfasalazine administration due to the potential for falsely depressed results   Alkaline Phosphatase 01/14/2022 55  46 - 116 U/L Final    Total Protein 01/14/2022 7 1  6 4 - 8 2 g/dL Final    Albumin 01/14/2022 3 0* 3 5 - 5 0 g/dL Final    Total Bilirubin 01/14/2022 0 61  0 20 - 1 00 mg/dL Final    Use of this assay is not recommended for patients undergoing treatment with eltrombopag due to the potential for falsely elevated results      eGFR 01/14/2022 45  ml/min/1 73sq m Final       The following historical data was reviewed:  Past Medical History:   Diagnosis Date    Blood in stool     Breast disorder     Cancer ([facility] [address] )     rectal    Cancer determined by colorectal biopsy ([facility] )     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    GERD (gastroesophageal reflux disease)     History of rectal surgery     Hyperlipidemia     PONV (postoperative nausea and vomiting)      Past Surgical History:   Procedure Laterality Date    BREAST LUMPECTOMY Right      SECTION      x3    COLON SIGMOID RESECTION      COLONOSCOPY      DILATION AND CURETTAGE OF UTERUS      ILEO LOOP DIVERSION N/A 12/10/2019    Procedure: ILEOSTOMY LOOP;  Surgeon: Liam Lu MD;  [facility];  Service: Robotics    IR BIOPSY OMENTUM  2021    IR PORT PLACEMENT  2021    [place] CLOSE ENTEROSTOMY N/A 2020    Procedure: CLOSURE ILEOSTOMY;  Surgeon: Liam Lu MD;  [facility];  Service: Colorectal    [place] LAP,SURG,COLECTOMY, PARTIAL, W/ANAST N/A 12/10/2019    Procedure: SIGMOID RESECTION COLON LAPAROSCOPIC W ROBOTICS converted to lap hand assisted  with Partial proctectomy , LASER FLUORESCENCE ANGIOGRAPHY, INTRA OP COLONOSCOPY;  Surgeon: Liam Lu MD;  [facility];  Service: Robotics    SMALL INTESTINE SURGERY  2020    Procedure: RESECTION SMALL BOWEL;  Surgeon: Liam Lu MD;  [facility];  Service: Colorectal     Social History     Socioeconomic History    Marital status:      Spouse name: None    Number of children: None    Years of education: None    Highest education level: None   Occupational History    None   Tobacco Use    Smoking status: Never Smoker    Smokeless tobacco: Never Used   Vaping Use    Vaping Use: Never used   Substance and Sexual Activity    Alcohol use: Yes     Comment: "occasionally"    Drug use: Never    Sexual activity: None   Other Topics Concern    None   Social History Narrative    None     Social Determinants of Health     Financial Resource Strain: Not on file   Food Insecurity: Not on file   Transportation Needs: Not on file   Physical Activity: Not on file   Stress: Not on file   Social Connections: Not on file   Intimate Partner Violence: Not on file   Housing Stability: Not on file     Family History   Problem Relation Age of Onset    Hypertension Mother     Heart attack Mother     Heart attack Father     Heart disease Father     Hypertension Brother     Heart attack Brother     Leukemia Cousin     Breast cancer Cousin     Diabetes Cousin     Breast cancer Family         maternal side    Colon cancer Family         paternal uncle    Colon cancer Paternal Uncle     Stroke Neg Hx        Please note: This report has been generated by a voice recognition software system  Therefore there may be syntax, spelling, and/or grammatical errors  Please call if you have any questions

## 2022-01-31 ENCOUNTER — APPOINTMENT (OUTPATIENT)
Dept: LAB | Facility: HOSPITAL | Age: 67
End: 2022-01-31
Attending: INTERNAL MEDICINE
Payer: MEDICARE

## 2022-01-31 DIAGNOSIS — C19 RECTOSIGMOID CANCER (HCC): ICD-10-CM

## 2022-01-31 DIAGNOSIS — C78.6 OMENTAL METASTASIS (HCC): ICD-10-CM

## 2022-01-31 LAB
ALBUMIN SERPL BCP-MCNC: 3 G/DL (ref 3.5–5)
ALP SERPL-CCNC: 64 U/L (ref 46–116)
ALT SERPL W P-5'-P-CCNC: 44 U/L (ref 12–78)
ANION GAP SERPL CALCULATED.3IONS-SCNC: 10 MMOL/L (ref 4–13)
AST SERPL W P-5'-P-CCNC: 29 U/L (ref 5–45)
BASOPHILS # BLD AUTO: 0.02 THOUSANDS/ΜL (ref 0–0.1)
BASOPHILS NFR BLD AUTO: 0 % (ref 0–1)
BILIRUB SERPL-MCNC: 0.57 MG/DL (ref 0.2–1)
BUN SERPL-MCNC: 16 MG/DL (ref 5–25)
CALCIUM ALBUM COR SERPL-MCNC: 9.5 MG/DL (ref 8.3–10.1)
CALCIUM SERPL-MCNC: 8.7 MG/DL (ref 8.3–10.1)
CHLORIDE SERPL-SCNC: 108 MMOL/L (ref 100–108)
CO2 SERPL-SCNC: 24 MMOL/L (ref 21–32)
CREAT SERPL-MCNC: 0.98 MG/DL (ref 0.6–1.3)
EOSINOPHIL # BLD AUTO: 0.06 THOUSAND/ΜL (ref 0–0.61)
EOSINOPHIL NFR BLD AUTO: 1 % (ref 0–6)
ERYTHROCYTE [DISTWIDTH] IN BLOOD BY AUTOMATED COUNT: 16.7 % (ref 11.6–15.1)
GFR SERPL CREATININE-BSD FRML MDRD: 59 ML/MIN/1.73SQ M
GLUCOSE SERPL-MCNC: 90 MG/DL (ref 65–140)
HCT VFR BLD AUTO: 40.6 % (ref 34.8–46.1)
HGB BLD-MCNC: 12.8 G/DL (ref 11.5–15.4)
IMM GRANULOCYTES # BLD AUTO: 0.02 THOUSAND/UL (ref 0–0.2)
IMM GRANULOCYTES NFR BLD AUTO: 0 % (ref 0–2)
LYMPHOCYTES # BLD AUTO: 1.79 THOUSANDS/ΜL (ref 0.6–4.47)
LYMPHOCYTES NFR BLD AUTO: 33 % (ref 14–44)
MCH RBC QN AUTO: 27.1 PG (ref 26.8–34.3)
MCHC RBC AUTO-ENTMCNC: 31.5 G/DL (ref 31.4–37.4)
MCV RBC AUTO: 86 FL (ref 82–98)
MONOCYTES # BLD AUTO: 0.56 THOUSAND/ΜL (ref 0.17–1.22)
MONOCYTES NFR BLD AUTO: 10 % (ref 4–12)
NEUTROPHILS # BLD AUTO: 2.93 THOUSANDS/ΜL (ref 1.85–7.62)
NEUTS SEG NFR BLD AUTO: 56 % (ref 43–75)
NRBC BLD AUTO-RTO: 0 /100 WBCS
PLATELET # BLD AUTO: 154 THOUSANDS/UL (ref 149–390)
PMV BLD AUTO: 10.2 FL (ref 8.9–12.7)
POTASSIUM SERPL-SCNC: 4 MMOL/L (ref 3.5–5.3)
PROT SERPL-MCNC: 7.1 G/DL (ref 6.4–8.2)
RBC # BLD AUTO: 4.72 MILLION/UL (ref 3.81–5.12)
SODIUM SERPL-SCNC: 142 MMOL/L (ref 136–145)
WBC # BLD AUTO: 5.38 THOUSAND/UL (ref 4.31–10.16)

## 2022-01-31 PROCEDURE — 85025 COMPLETE CBC W/AUTO DIFF WBC: CPT

## 2022-01-31 PROCEDURE — 36415 COLL VENOUS BLD VENIPUNCTURE: CPT

## 2022-01-31 PROCEDURE — 80053 COMPREHEN METABOLIC PANEL: CPT

## 2022-02-01 ENCOUNTER — HOSPITAL ENCOUNTER (OUTPATIENT)
Dept: INFUSION CENTER | Facility: HOSPITAL | Age: 67
Discharge: HOME | End: 2022-02-01
Attending: INTERNAL MEDICINE
Payer: MEDICARE

## 2022-02-01 VITALS
HEART RATE: 106 BPM | OXYGEN SATURATION: 97 % | WEIGHT: 214.51 LBS | RESPIRATION RATE: 20 BRPM | TEMPERATURE: 96.8 F | SYSTOLIC BLOOD PRESSURE: 133 MMHG | BODY MASS INDEX: 46.28 KG/M2 | HEIGHT: 57 IN | DIASTOLIC BLOOD PRESSURE: 78 MMHG

## 2022-02-01 DIAGNOSIS — C78.6 OMENTAL METASTASIS (HCC): ICD-10-CM

## 2022-02-01 DIAGNOSIS — C19 RECTOSIGMOID CANCER (HCC): Primary | ICD-10-CM

## 2022-02-01 PROCEDURE — 96368 THER/DIAG CONCURRENT INF: CPT

## 2022-02-01 PROCEDURE — 96367 TX/PROPH/DG ADDL SEQ IV INF: CPT

## 2022-02-01 PROCEDURE — G0498 CHEMO EXTEND IV INFUS W/PUMP: HCPCS

## 2022-02-01 PROCEDURE — 96413 CHEMO IV INFUSION 1 HR: CPT

## 2022-02-01 PROCEDURE — 96415 CHEMO IV INFUSION ADDL HR: CPT

## 2022-02-01 PROCEDURE — 96411 CHEMO IV PUSH ADDL DRUG: CPT

## 2022-02-01 RX ORDER — SODIUM CHLORIDE 9 MG/ML
20 INJECTION, SOLUTION INTRAVENOUS ONCE AS NEEDED
Status: DISCONTINUED | OUTPATIENT
Start: 2022-02-01 | End: 2022-02-04 | Stop reason: HOSPADM

## 2022-02-01 RX ORDER — DEXTROSE MONOHYDRATE 50 MG/ML
20 INJECTION, SOLUTION INTRAVENOUS ONCE
Status: COMPLETED | OUTPATIENT
Start: 2022-02-01 | End: 2022-02-01

## 2022-02-01 RX ORDER — FLUOROURACIL 50 MG/ML
300 INJECTION, SOLUTION INTRAVENOUS ONCE
Status: COMPLETED | OUTPATIENT
Start: 2022-02-01 | End: 2022-02-01

## 2022-02-01 RX ADMIN — SODIUM CHLORIDE 20 ML/HR: 0.9 INJECTION, SOLUTION INTRAVENOUS at 10:45

## 2022-02-01 RX ADMIN — FLUOROURACIL 560 MG: 50 INJECTION, SOLUTION INTRAVENOUS at 13:52

## 2022-02-01 RX ADMIN — OXALIPLATIN 119.21 MG: 5 INJECTION, SOLUTION INTRAVENOUS at 11:35

## 2022-02-01 RX ADMIN — DEXAMETHASONE SODIUM PHOSPHATE: 10 INJECTION INTRAMUSCULAR; INTRAVENOUS at 10:45

## 2022-02-01 RX ADMIN — LEUCOVORIN CALCIUM 561 MG: 100 INJECTION, POWDER, LYOPHILIZED, FOR SUSPENSION INTRAMUSCULAR; INTRAVENOUS at 11:34

## 2022-02-01 RX ADMIN — DEXTROSE 20 ML/HR: 50 INJECTION, SOLUTION INTRAVENOUS at 11:34

## 2022-02-01 NOTE — PLAN OF CARE
Problem: Knowledge Deficit  Goal: Patient/family/caregiver demonstrates understanding of disease process, treatment plan, medications, and discharge instructions  Description: Complete learning assessment and assess knowledge base    Interventions:  - Provide teaching at level of understanding  - Provide teaching via preferred learning methods  Outcome: Progressing     Problem: Potential for Falls  Goal: Patient will remain free of falls  Description: INTERVENTIONS:  - Educate patient/family on patient safety including physical limitations  - Instruct patient to call for assistance with activity   - Keep Call bell within reach  Outcome: Progressing     Problem: INFECTION - ADULT  Goal: Absence or prevention of progression during hospitalization  Description: INTERVENTIONS:  - Assess and monitor for signs and symptoms of infection  - Monitor lab/diagnostic results  - Monitor all insertion sites, i e  indwelling lines, tubes, and drains  - Monitor endotracheal if appropriate and nasal secretions for changes in amount and color  - [place] appropriate cooling/warming therapies per order  - Administer medications as ordered  - Instruct and encourage patient and family to use good hand hygiene technique  - Identify and instruct in appropriate isolation precautions for identified infection/condition  Outcome: Progressing  Goal: Absence of fever/infection during neutropenic period  Description: INTERVENTIONS:  - Monitor WBC    Outcome: Progressing

## 2022-02-03 ENCOUNTER — HOSPITAL ENCOUNTER (OUTPATIENT)
Dept: INFUSION CENTER | Facility: HOSPITAL | Age: 67
Discharge: HOME | End: 2022-02-03
Attending: INTERNAL MEDICINE
Payer: MEDICARE

## 2022-02-03 VITALS
TEMPERATURE: 97.4 F | SYSTOLIC BLOOD PRESSURE: 150 MMHG | OXYGEN SATURATION: 97 % | HEART RATE: 88 BPM | RESPIRATION RATE: 20 BRPM | DIASTOLIC BLOOD PRESSURE: 71 MMHG

## 2022-02-03 DIAGNOSIS — T45.1X5A CHEMOTHERAPY-INDUCED NAUSEA: ICD-10-CM

## 2022-02-03 DIAGNOSIS — C19 RECTOSIGMOID CANCER (HCC): Primary | ICD-10-CM

## 2022-02-03 DIAGNOSIS — C78.6 OMENTAL METASTASIS (HCC): ICD-10-CM

## 2022-02-03 DIAGNOSIS — R11.0 CHEMOTHERAPY-INDUCED NAUSEA: ICD-10-CM

## 2022-02-03 DIAGNOSIS — C78.6 OMENTAL METASTASIS (HCC): Primary | ICD-10-CM

## 2022-02-03 DIAGNOSIS — E86.0 DEHYDRATION: ICD-10-CM

## 2022-02-03 DIAGNOSIS — C19 RECTOSIGMOID CANCER (HCC): ICD-10-CM

## 2022-02-03 PROCEDURE — 96374 THER/PROPH/DIAG INJ IV PUSH: CPT

## 2022-02-03 PROCEDURE — 96361 HYDRATE IV INFUSION ADD-ON: CPT

## 2022-02-03 RX ADMIN — SODIUM CHLORIDE 1000 ML: 0.9 INJECTION, SOLUTION INTRAVENOUS at 12:00

## 2022-02-03 RX ADMIN — ONDANSETRON 16 MG: 2 INJECTION INTRAMUSCULAR; INTRAVENOUS at 12:01

## 2022-02-07 ENCOUNTER — OFFICE VISIT (OUTPATIENT)
Dept: HEMATOLOGY ONCOLOGY | Facility: MEDICAL CENTER | Age: 67
End: 2022-02-07
Payer: MEDICARE

## 2022-02-07 VITALS
DIASTOLIC BLOOD PRESSURE: 78 MMHG | BODY MASS INDEX: 45.09 KG/M2 | SYSTOLIC BLOOD PRESSURE: 130 MMHG | OXYGEN SATURATION: 99 % | HEIGHT: 57 IN | RESPIRATION RATE: 18 BRPM | HEART RATE: 104 BPM | TEMPERATURE: 97.6 F | WEIGHT: 209 LBS

## 2022-02-07 DIAGNOSIS — C78.6 OMENTAL METASTASIS (HCC): Primary | ICD-10-CM

## 2022-02-07 DIAGNOSIS — C78.6 OMENTAL METASTASIS (HCC): ICD-10-CM

## 2022-02-07 DIAGNOSIS — E66.01 MORBID OBESITY (HCC): ICD-10-CM

## 2022-02-07 DIAGNOSIS — C19 RECTOSIGMOID CANCER (HCC): Primary | ICD-10-CM

## 2022-02-07 DIAGNOSIS — C19 RECTOSIGMOID CANCER (HCC): ICD-10-CM

## 2022-02-07 PROCEDURE — 99215 OFFICE O/P EST HI 40 MIN: CPT | Performed by: INTERNAL MEDICINE

## 2022-02-07 RX ORDER — DEXTROSE MONOHYDRATE 50 MG/ML
20 INJECTION, SOLUTION INTRAVENOUS ONCE
Status: CANCELLED | OUTPATIENT
Start: 2022-02-15

## 2022-02-07 RX ORDER — FLUOROURACIL 50 MG/ML
340 INJECTION, SOLUTION INTRAVENOUS ONCE
Status: CANCELLED | OUTPATIENT
Start: 2022-02-15

## 2022-02-07 RX ORDER — SODIUM CHLORIDE 9 MG/ML
20 INJECTION, SOLUTION INTRAVENOUS ONCE AS NEEDED
Status: CANCELLED | OUTPATIENT
Start: 2022-02-15

## 2022-02-07 RX ORDER — DOCUSATE SODIUM 100 MG/1
100 CAPSULE, LIQUID FILLED ORAL 2 TIMES DAILY PRN
COMMUNITY
End: 2022-06-21

## 2022-02-07 NOTE — PROGRESS NOTES
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) now with an abdominal mass consistent with recurrence  Issues:    Recurrent rectosigmoid adenocarcinoma  Patient was seen by Dr Katrina Arellano surgical oncology recently  The plan was to begin with chemotherapy and demonstrate response with systemic treatment  Patient was then to be re-evaluated for the possibility of surgery  Patient was recently started on CAPEOX  Unfortunately Mrs Last Kruse was not able to get through the 1st day of treatment  Mrs Last Kruse needed to be admitted for treatment of nausea/vomiting and dehydration (2 day hospitalization)  CAPEOX was discontinued  Other options were discussed and patient has now completed 3 cycles of FOLFOX  Mrs Per Sellers has tolerated this regimen well  Fourth cycle is due on February 15, 2022  Patient states having all required medications at home  The regimen has been increased from 75% 85% for the 4th cycle  NCCN guidelines 3 2020 state that for patients with recurrent disease, potentially resectable with no previous chemotherapy, options include resection upfront or neoadjuvant chemotherapy for 2-3 months (mFOLFOX and CAPOX are preferred)  If/when patient is able to demonstrate a response to systemic treatment, she can return to surgery for re-evaluation of resection  Hopefully patient will be able to complete 6 months of perioperative treatment  mFOLFOX 6 regimen (reduced to 85%)  Oxaliplatin 85 mg/m2 IV day 1  Leucovorin 400 mg/m2 IV day 1  5  mg/m2 IVP day 1  5 FU 1200 mg/m2/day, continuous infusion x 46 hours  Cycle length = 2 weeks  Goal = cure    Because of planned pending surgery, patient will not receive bevacizumab      Ángelis results are still pending  Ovarian lesion  Recent MRI/pelvis demonstrated a multi septated left ovarian cystic lesion  Transabdominal ultrasound results did not demonstrate any concerning abnormality, 1 year follow-up suggested  Patient was recently seen by Dr Oanh Azevedo, gyn Oncology  Final decisions are being made but patient may undergo DILMA/BSO at the time of the resection of the abdominal mass (after patient demonstrates response to neoadjuvant chemotherapy)  Patient may also be a candidate for HIPEC  Recent PET-CT with increased uptake at the anastomotic site  No GI issues  Patient underwent colonoscopy on October 6, 2021 - no abnormalities found at the anastomotic site  Genetics  Patient has a complicated family history but multiple family members with colon/rectal cancer  Patient can be seen by Oncology genetics in the future when patient has restarted chemotherapy and is tolerating it  Patient is to return in 4 weeks  Carefully review your medication list and verify that the list is accurate and up-to-date  Please call the hematology/oncology office if there are medications missing from the list, medications on the list that you are not currently taking or if there is a dosage or instruction that is different from how you're taking that medication  Patient goals and areas of care:  Continue with FOLFOX  Barriers to care:  None  Patient is able to self-care   ______________________________________________________________________________________    Chief complaint: Rectosigmoid carcinoma, omental recurrence    History of Present Illness:  78year old female previously diagnosed with rectosigmoid carcinoma status post resection in September 2019  Postoperatively patient did well; Mrs Roula Aguirre did not need/receive adjuvant chemotherapy  Recent surveillance CEA level was found to be elevated  Patient underwent workup  Results demonstrated an intra-abdominal mass by the spleen    Patient has been seen by Dr Lili Diallo and the plan has been neoadjuvant chemotherapy followed by resection  Patient began her 1st cycle of CAPEOX recently  Unfortunately Mrs Renetta Mauricio could not tolerate the Xeloda at all  Patient was seen/evaluated in the infusion room a few weeks ago with persistent nausea/vomiting and dehydration  Patient required admission, discharged after 48 hours  Options were subsequently discussed and patient was switched to FOLFOX  Mrs Renetta Mauricio has completed 3 cycles of FOLFOX  Patient returns for follow-up  Mrs Renetta Mauricio states feeling okay, baseline  No nausea, vomiting, diarrhea or constipation  No abdominal pain  No  or gyn issues  No fevers or signs of infection  Activities are baseline  No pain control issues  Patient has received her COVID vaccination  Review of Systems   Constitutional: Positive for fatigue  Negative for activity change and appetite change  HENT: Negative  Eyes: Negative  Respiratory: Negative  Cardiovascular: Negative  Gastrointestinal: Negative for nausea and vomiting  Endocrine: Negative  Genitourinary: Negative  Musculoskeletal: Negative  Skin: Negative  Allergic/Immunologic: Negative  Neurological: Negative  Hematological: Negative  Psychiatric/Behavioral: Negative  All other systems reviewed and are negative      Patient Active Problem List   Diagnosis    Callus of foot    Foot pain    GERD (gastroesophageal reflux disease)    Hyperlipidemia    Prediabetes    Varicose veins with pain    Morbid obesity ([address] )    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HCC)    Dyspnea on exertion    Dyslipidemia    Sigmoid diverticulosis    Stage 2 chronic kidney disease    PONV (postoperative nausea and vomiting)    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HCC)    Lesion of ovary    Hypertension    Chemotherapy adverse reaction    Dehydration    Chemotherapy-induced nausea     Past Medical History:   Diagnosis Date    Blood in stool     Breast disorder     Cancer [facility] )     rectal    Cancer determined by colorectal biopsy ([facility] )     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    GERD (gastroesophageal reflux disease)     History of rectal surgery     Hyperlipidemia     PONV (postoperative nausea and vomiting)      Past Surgical History:   Procedure Laterality Date    BREAST LUMPECTOMY Right      SECTION      x3    COLON SIGMOID RESECTION      COLONOSCOPY      DILATION AND CURETTAGE OF UTERUS      ILEO LOOP DIVERSION N/A 12/10/2019    Procedure: ILEOSTOMY LOOP;  Surgeon: Branden Ley MD;  [facility];  Service: Robotics    IR BIOPSY OMENTUM  2021    IR PORT PLACEMENT  2021    W[place] CLOSE ENTEROSTOMY N/A 2020    Procedure: CLOSURE ILEOSTOMY;  Surgeon: Branden Ley MD;  [facility];  Service: Colorectal    [place] LAP,SURG,COLECTOMY, PARTIAL, W/ANAST N/A 12/10/2019    Procedure: SIGMOID RESECTION COLON LAPAROSCOPIC W ROBOTICS converted to lap hand assisted  with Partial proctectomy , LASER FLUORESCENCE ANGIOGRAPHY, INTRA OP COLONOSCOPY;  Surgeon: Branden Ley MD;  [facility];  Service: Robotics    SMALL INTESTINE SURGERY  2020    Procedure: RESECTION SMALL BOWEL;  Surgeon: Branden Ley MD;  [facility];  Service: Colorectal   Past surgical history:  No prior blood transfusions    OBGYN:  No breast issues, no abnormal mammograms previously, no postmenopausal bleeding, no other GYN issues    Family History   Problem Relation Age of Onset    Hypertension Mother     Heart attack Mother     Heart attack Father     Heart disease Father     Hypertension Brother     Heart attack Brother     Leukemia Cousin     Breast cancer Cousin     Diabetes Cousin     Breast cancer Family         maternal side    Colon cancer Family         paternal uncle    Colon cancer Paternal Uncle     Stroke Neg Hx    Family history:  Somewhat complicated, mother with colostomy but etiology for this is unclear, paternal uncle with rectal cancer, patient has to first-degree relatives on the paternal side with history of colon cancer, 3 sons and 1 grandchild in good general health    Social History     Socioeconomic History    Marital status:      Spouse name: Not on file    Number of children: Not on file    Years of education: Not on file    Highest education level: Not on file   Occupational History    Not on file   Tobacco Use    Smoking status: Never Smoker    Smokeless tobacco: Never Used   Vaping Use    Vaping Use: Never used   Substance and Sexual Activity    Alcohol use: Yes     Comment: "occasionally"    Drug use: Never    Sexual activity: Not on file   Other Topics Concern    Not on file   Social History Narrative    Not on file     Social Determinants of Health     Financial Resource Strain: Not on file   Food Insecurity: Not on file   Transportation Needs: Not on file   Physical Activity: Not on file   Stress: Not on file   Social Connections: Not on file   Intimate Partner Violence: Not on file   Housing Stability: Not on file   Social history:  No tobacco, alcohol or drug abuse, no toxic exposure    Current Outpatient Medications:     docusate sodium (COLACE) 100 mg capsule, Take 100 mg by mouth 2 (two) times a day, Disp: , Rfl:     acetaminophen (TYLENOL) 325 mg tablet, Take 3 tablets (975 mg total) by mouth every 8 (eight) hours, Disp: 30 tablet, Rfl: 0    ezetimibe (ZETIA) 10 mg tablet, Take 1 tablet (10 mg total) by mouth daily (Patient taking differently: Take 10 mg by mouth daily at bedtime  ), Disp: 90 tablet, Rfl: 1    famotidine (PEPCID) 20 mg tablet, Take 1 tablet (20 mg total) by mouth 2 (two) times a day (Patient taking differently: Take 20 mg by mouth daily as needed ), Disp: 180 tablet, Rfl: 1    lidocaine-prilocaine (EMLA) cream, Apply topically as needed for mild pain, Disp: 30 g, Rfl: 0    ondansetron (ZOFRAN) 8 mg tablet, Take 1 tablet (8 mg total) by mouth every 12 (twelve) hours as needed for nausea or vomiting, Disp: 20 tablet, Rfl: 2    prochlorperazine (COMPAZINE) 10 mg tablet, Take 1 tablet (10 mg total) by mouth every 6 (six) hours as needed for nausea or vomiting, Disp: 60 tablet, Rfl: 0    Allergies   Allergen Reactions    Medical Tape Rash     Skin irritation  Skin peeling  Skin irritation  Skin peeling  Blisters  Rash     Vitals:    02/07/22 1236   BP: 130/78   Pulse: 104   Resp: 18   Temp: 97 6 °F (36 4 °C)   SpO2: 99%   Physical Exam  Constitutional:       Appearance: She is well-developed  HENT:      Head: Normocephalic and atraumatic  Right Ear: External ear normal       Left Ear: External ear normal    Eyes:      Conjunctiva/sclera: Conjunctivae normal       Pupils: Pupils are equal, round, and reactive to light  Cardiovascular:      Rate and Rhythm: Normal rate and regular rhythm  Heart sounds: Normal heart sounds  Pulmonary:      Effort: Pulmonary effort is normal       Breath sounds: Normal breath sounds  Abdominal:      General:  Obese, +bowel sounds, nontender, cannot palpate liver or spleen, no guarding  Musculoskeletal:         General: Normal range of motion  Cervical back: Normal range of motion and neck supple  Skin:     General: Skin is warm moist, good color, no petechiae or ecchymoses  Neurological:      Mental Status: She is alert and oriented to person, place, and time  Deep Tendon Reflexes: Reflexes are normal and symmetric  Psychiatric:         Behavior: Behavior normal          Thought Content:  Thought content normal          Judgment: Judgment normal     Extremities:  No lower extremity edema, +obesity, no cords, pulses are 1+  Lymphatics:  No adenopathy in neck, supraclavicular region, axilla bilaterally    Laboratory    01/31/2022 WBC = 5 38 hemoglobin = 12 8 hematocrit = 40 6 platelet = 086 neutrophil = 56% BUN = 16 creatinine = 0 98 calcium = 8 7 LFTs WNL    12/29/2021 WBC = 7 3 hemoglobin = 13 1 hematocrit = 42 5 platelet = 944 neutrophil = 65% BUN = 13 creatinine = 1 15 LFTs WNL        Procedures    10/06/2021 colonoscopy    FINDINGS:  · Healthy end-to-end colorectal anastomosis with no bleeding in the mid rectum  · All observed locations appeared normal, including the cecum, ascending colon, transverse colon, splenic flexure and descending colon  A couple scattered diverticuli seen    Imaging    11/11/2021 ultrasound pelvis transabdominal only    Cluster of anechoic cystic areas seen replacing the left ovary similar to MRI largest measuring 8 mm compared to 1 3 cm  Based on the ACR O-RADS system, this is O-RADS category 2 (almost certain benign with <3% risk of malignancy ) The management recommendation is followup in 1 year  10/25/2021 MRI pelvis rectal cancer staging    1  No recurrent or metastatic disease in the pelvis  Please refer to the separately dictated MRI abdomen report regarding mesenteric mass in the left upper quadrant      2   Multi septated cystic lesion versus cluster of small cysts in the left ovary measuring 2 6 x 2 5 x 1 9 cm, increased in size from October 2019  Further characterization with pelvic ultrasound is recommended      10/25/2021 MRI abdomen    1   5 2 cm mesenteric mass in the left upper quadrant with thickening of peritoneal reflection and invasion of the spleen, similar to prior PET/CT  This is highly suspicious for metastatic tumor implant  2   No additional potential sites of metastasis in the abdomen  3   Moderately distended gallbladder with gallstones and probable adenomyomatosis  09/28/2021 PET-CT    1  There is a large left paracolic gutter markedly hypermetabolic mass immediately inferior to the spleen, most consistent with metastatic colorectal carcinoma  2  Mildly increased activity at the right abdominal bowel anastomotic site  If not recently performed, direct visualization is recommended  3   There are no other glucose avid abnormalities identified within the abdomen or pelvis  4  No evidence of distant glucose avid metastatic disease in the neck, chest, or skeleton     Pathology    Case Report   Surgical Pathology Report                         Case: [id number]                                    Authorizing Provider: Mala Ponce MD           Collected:           11/12/2021 0921               Ordering Location:     [address] [place] [address] Received:            11/12/2021 0941                                      CAT Scan                                                                      Pathologist:           Pamela Flores MD                                                         Specimen:    Omentum, Omental mass                                                                      Final Diagnosis   A  Omentum, Omental mass:  - Metastatic adenocarcinoma, with an immunophenotype compatible with metastasis from patient's known colonic primary   - See note      Note: Tumor is positive with CK20, CDX2 and negative with CK7, PAX8  This immunophenotype supports the above interpretation  Best representative block with tumor A5      This case was reviewed at the intradepartmental  conference     Dr Jose Yun is notified of the diagnosis in [facility] via [address] on 11/17/2021  at 8 45 am    Electronically signed by Aravind Macedo MD on 11/17/2021 at  8:53 AM         Case Report   Surgical Pathology Report                         Case: [id number]                                    Authorizing Provider: Carola Melara MD       Collected:           12/10/2019 1159               Ordering Location:     [address]      Received:            12/10/2019 [address] [place] [address] Operating Room                                                       Pathologist:           Norberto Mathews MD                                                                  Specimens:   A) - Large Intestine, Sigmoid Colon, and portion of rectum                                           B) - Anastomatic site, anastamotic rings                                                   Addendum   RRESULTS OF IMMUNOHISTOCHEMICAL ANALYSIS FOR MISMATCH REPAIR PROTEIN LOSS  INTERPRETATION: NO LOSS OF NUCLEAR EXPRESSION OF MMR PROTEINS: LOW PROBABILITY OF MSI-H  Note: Background non-neoplastic tissue and/or internal control with intact nuclear expression       RESULTS:  Antibody          Clone               Description                           Results  MLH1               M1                  Mismatch repair protein       Intact nuclear expression  BTT8              [id number]       Mismatch repair protein       Intact nuclear expression  ADO9              05                   Mismatch repair protein       Intact nuclear expression  AEE2              [id number]         Mismatch repair protein       Intact nuclear expression     Comment:  A negative control and a positive control for each antibody have been reviewed and accepted  [facility] Specimen ID: [id number], Evaluator: Homa Sharma MD  These tests were developed and their performance characteristics determined by [facility]  Yvone Fly may not be cleared or approved by the [place]  Food and Drug Administration   The FDA determined that such clearance or approval is not necessary   These tests are used for clinical purposes  Yvone Fly should not be regarded as investigational or for research   This laboratory has been approved by [place] [id number], designated as a high-complexity laboratory and is qualified to perform these tests      Comments: Patients whose tumors demonstrate lack of expression of one or more DNA mismatch repair proteins might be at risk for Bee Syndrome  This cancer susceptibility syndrome greatly increases the risk of synchronous and/or metachronous cancers in the affected patients and their family members   Normal expression of all proteins does not completely rule out familial cancer predisposition      The [facility] Bee Syndrome Surveillance Program Task Forces recommends that all patients with lack of expression of one or more DNA mismatch repair proteins and those with concerning personal or family history should undergo thorough evaluation, counseling and possibly genetic testing        Addendum electronically signed by Heaven Balderas MD on 12/20/2019 at  3:28 PM   Final Diagnosis   A  Rectosigmoid colon, low anterior resection:  - Adenocarcinoma, moderately differentiated  - Tumor invades through the muscularis propria into pericolorectal tissue, T3  - Diverticula  - All margins are negative for tumor   - Thirty-four lymph nodes, negative for malignancy (0/34)      B  Colon, anastomotic rings, resection:  - Two portions of colon with no pathologic abnormality     Intradepartmental consultation is in agreement  Interpretation performed at [address]  Immunohistochemistry for desmin  is performed on tissue blocks A13 and A16 to help in the assessment of this case          Electronically signed by Heaven Balderas MD on 12/18/2019 at 10:42 AM   Additional Information    All controls performed with the immunohistochemical stains reported above reacted appropriately  These tests were developed and their performance characteristics determined by [facility] or [facility]  They may not be cleared or approved by the [place]  Food and Drug Administration  The FDA has determined that such clearance or approval is not necessary  These tests are used for clinical purposes  They should not be regarded as investigational or for research  This laboratory has been approved by CLIA 88, designated as a high-complexity laboratory and is qualified to perform these tests     Synoptic Checklist   COLON AND RECTUM: Resection, Including Transanal Disk Excision of Rectal Neoplasms  8th Edition - Protocol posted: 2/27/2019  ColoRectal - All Specimens  SPECIMEN   Procedure Low anterior resection    Macroscopic Intactness of Mesorectum  Complete    TUMOR   Tumor Site  Rectosigmoid    Histologic Type  Adenocarcinoma    Histologic Grade  G2: Moderately differentiated    Tumor Size  Greatest dimension (Centimeters): 5 4 cm   Additional Dimension (Centimeters)  4 6 cm     1 cm   Tumor Deposits  Not identified    Tumor Extension  Tumor invades through the muscularis propria into pericolorectal tissue    Macroscopic Tumor Perforation  Not identified    Lymphovascular Invasion  Not identified    Perineural Invasion  Not identified    Type of Polyp in Which Invasive Carcinoma Arose  Tubular adenoma    Treatment Effect  No known presurgical therapy    MARGINS   Margins  All margins are uninvolved by invasive carcinoma, high-grade dysplasia, intramucosal adenocarcinoma, and adenoma    Margins Examined  Proximal      Distal      Radial or Mesenteric    Distance of Invasive Carcinoma from Closest Margin  1 5 cm   Closest Margin  Radial or Mesenteric    Distance of Tumor from Radial Margin  1 5 cm   LYMPH NODES   Number of Lymph Nodes Involved  0    Number of Lymph Nodes Examined  34    PATHOLOGIC STAGE CLASSIFICATION (pTNM, AJCC 8th Edition)   Primary Tumor (pT)  pT3    Regional Lymph Nodes (pN)  pN0    ADDITIONAL FINDINGS   Additional Pathologic Findings  Diverticulosis

## 2022-02-09 DIAGNOSIS — C78.6 OMENTAL METASTASIS (HCC): Primary | ICD-10-CM

## 2022-02-09 DIAGNOSIS — C19 RECTOSIGMOID CANCER (HCC): ICD-10-CM

## 2022-02-14 ENCOUNTER — APPOINTMENT (OUTPATIENT)
Dept: LAB | Facility: HOSPITAL | Age: 67
End: 2022-02-14
Attending: INTERNAL MEDICINE
Payer: MEDICARE

## 2022-02-14 DIAGNOSIS — C19 RECTOSIGMOID CANCER (HCC): ICD-10-CM

## 2022-02-14 DIAGNOSIS — C78.6 OMENTAL METASTASIS (HCC): ICD-10-CM

## 2022-02-14 LAB
ALBUMIN SERPL BCP-MCNC: 3 G/DL (ref 3.5–5)
ALP SERPL-CCNC: 58 U/L (ref 46–116)
ALT SERPL W P-5'-P-CCNC: 48 U/L (ref 12–78)
ANION GAP SERPL CALCULATED.3IONS-SCNC: 8 MMOL/L (ref 4–13)
AST SERPL W P-5'-P-CCNC: 29 U/L (ref 5–45)
BASOPHILS # BLD AUTO: 0.01 THOUSANDS/ΜL (ref 0–0.1)
BASOPHILS NFR BLD AUTO: 0 % (ref 0–1)
BILIRUB SERPL-MCNC: 0.79 MG/DL (ref 0.2–1)
BUN SERPL-MCNC: 17 MG/DL (ref 5–25)
CALCIUM ALBUM COR SERPL-MCNC: 9.6 MG/DL (ref 8.3–10.1)
CALCIUM SERPL-MCNC: 8.8 MG/DL (ref 8.3–10.1)
CHLORIDE SERPL-SCNC: 109 MMOL/L (ref 100–108)
CO2 SERPL-SCNC: 26 MMOL/L (ref 21–32)
CREAT SERPL-MCNC: 0.97 MG/DL (ref 0.6–1.3)
EOSINOPHIL # BLD AUTO: 0.04 THOUSAND/ΜL (ref 0–0.61)
EOSINOPHIL NFR BLD AUTO: 1 % (ref 0–6)
ERYTHROCYTE [DISTWIDTH] IN BLOOD BY AUTOMATED COUNT: 18.2 % (ref 11.6–15.1)
GFR SERPL CREATININE-BSD FRML MDRD: 60 ML/MIN/1.73SQ M
GLUCOSE SERPL-MCNC: 100 MG/DL (ref 65–140)
HCT VFR BLD AUTO: 39.6 % (ref 34.8–46.1)
HGB BLD-MCNC: 12.6 G/DL (ref 11.5–15.4)
IMM GRANULOCYTES # BLD AUTO: 0.01 THOUSAND/UL (ref 0–0.2)
IMM GRANULOCYTES NFR BLD AUTO: 0 % (ref 0–2)
LYMPHOCYTES # BLD AUTO: 1.74 THOUSANDS/ΜL (ref 0.6–4.47)
LYMPHOCYTES NFR BLD AUTO: 36 % (ref 14–44)
MCH RBC QN AUTO: 27.8 PG (ref 26.8–34.3)
MCHC RBC AUTO-ENTMCNC: 31.8 G/DL (ref 31.4–37.4)
MCV RBC AUTO: 87 FL (ref 82–98)
MONOCYTES # BLD AUTO: 0.71 THOUSAND/ΜL (ref 0.17–1.22)
MONOCYTES NFR BLD AUTO: 15 % (ref 4–12)
NEUTROPHILS # BLD AUTO: 2.37 THOUSANDS/ΜL (ref 1.85–7.62)
NEUTS SEG NFR BLD AUTO: 48 % (ref 43–75)
NRBC BLD AUTO-RTO: 0 /100 WBCS
PLATELET # BLD AUTO: 135 THOUSANDS/UL (ref 149–390)
PMV BLD AUTO: 10.2 FL (ref 8.9–12.7)
POTASSIUM SERPL-SCNC: 4.5 MMOL/L (ref 3.5–5.3)
PROT SERPL-MCNC: 7.2 G/DL (ref 6.4–8.2)
RBC # BLD AUTO: 4.54 MILLION/UL (ref 3.81–5.12)
SODIUM SERPL-SCNC: 143 MMOL/L (ref 136–145)
WBC # BLD AUTO: 4.88 THOUSAND/UL (ref 4.31–10.16)

## 2022-02-14 PROCEDURE — 85025 COMPLETE CBC W/AUTO DIFF WBC: CPT

## 2022-02-14 PROCEDURE — 36415 COLL VENOUS BLD VENIPUNCTURE: CPT

## 2022-02-14 PROCEDURE — 80053 COMPREHEN METABOLIC PANEL: CPT

## 2022-02-15 ENCOUNTER — HOSPITAL ENCOUNTER (OUTPATIENT)
Dept: INFUSION CENTER | Facility: HOSPITAL | Age: 67
Discharge: HOME | End: 2022-02-15
Attending: INTERNAL MEDICINE
Payer: MEDICARE

## 2022-02-15 VITALS
RESPIRATION RATE: 18 BRPM | HEART RATE: 118 BPM | HEIGHT: 57 IN | BODY MASS INDEX: 45.47 KG/M2 | TEMPERATURE: 96.7 F | SYSTOLIC BLOOD PRESSURE: 115 MMHG | WEIGHT: 210.76 LBS | DIASTOLIC BLOOD PRESSURE: 56 MMHG | OXYGEN SATURATION: 98 %

## 2022-02-15 DIAGNOSIS — C78.6 OMENTAL METASTASIS (HCC): ICD-10-CM

## 2022-02-15 DIAGNOSIS — E86.0 DEHYDRATION: Primary | ICD-10-CM

## 2022-02-15 DIAGNOSIS — R11.0 CHEMOTHERAPY-INDUCED NAUSEA: ICD-10-CM

## 2022-02-15 DIAGNOSIS — C19 RECTOSIGMOID CANCER (HCC): Primary | ICD-10-CM

## 2022-02-15 DIAGNOSIS — T45.1X5A CHEMOTHERAPY-INDUCED NAUSEA: ICD-10-CM

## 2022-02-15 PROCEDURE — 96413 CHEMO IV INFUSION 1 HR: CPT

## 2022-02-15 PROCEDURE — 96367 TX/PROPH/DG ADDL SEQ IV INF: CPT

## 2022-02-15 PROCEDURE — G0498 CHEMO EXTEND IV INFUS W/PUMP: HCPCS

## 2022-02-15 PROCEDURE — 96368 THER/DIAG CONCURRENT INF: CPT

## 2022-02-15 PROCEDURE — 96411 CHEMO IV PUSH ADDL DRUG: CPT

## 2022-02-15 PROCEDURE — 96415 CHEMO IV INFUSION ADDL HR: CPT

## 2022-02-15 RX ORDER — DEXTROSE MONOHYDRATE 50 MG/ML
20 INJECTION, SOLUTION INTRAVENOUS ONCE
Status: COMPLETED | OUTPATIENT
Start: 2022-02-15 | End: 2022-02-15

## 2022-02-15 RX ORDER — FLUOROURACIL 50 MG/ML
340 INJECTION, SOLUTION INTRAVENOUS ONCE
Status: COMPLETED | OUTPATIENT
Start: 2022-02-15 | End: 2022-02-15

## 2022-02-15 RX ORDER — SODIUM CHLORIDE 9 MG/ML
20 INJECTION, SOLUTION INTRAVENOUS ONCE AS NEEDED
Status: DISCONTINUED | OUTPATIENT
Start: 2022-02-15 | End: 2022-02-18 | Stop reason: HOSPADM

## 2022-02-15 RX ADMIN — LEUCOVORIN CALCIUM 636 MG: 500 INJECTION, POWDER, LYOPHILIZED, FOR SOLUTION INTRAMUSCULAR; INTRAVENOUS at 11:39

## 2022-02-15 RX ADMIN — OXALIPLATIN 135.11 MG: 5 INJECTION, SOLUTION INTRAVENOUS at 11:40

## 2022-02-15 RX ADMIN — DEXTROSE 20 ML/HR: 50 INJECTION, SOLUTION INTRAVENOUS at 11:39

## 2022-02-15 RX ADMIN — FLUOROURACIL 635 MG: 50 INJECTION, SOLUTION INTRAVENOUS at 13:47

## 2022-02-15 RX ADMIN — DEXAMETHASONE SODIUM PHOSPHATE: 10 INJECTION INTRAMUSCULAR; INTRAVENOUS at 10:35

## 2022-02-15 RX ADMIN — SODIUM CHLORIDE 20 ML/HR: 9 INJECTION, SOLUTION INTRAVENOUS at 10:35

## 2022-02-17 ENCOUNTER — HOSPITAL ENCOUNTER (OUTPATIENT)
Dept: INFUSION CENTER | Facility: HOSPITAL | Age: 67
Discharge: HOME | End: 2022-02-17
Attending: INTERNAL MEDICINE
Payer: MEDICARE

## 2022-02-17 VITALS
SYSTOLIC BLOOD PRESSURE: 107 MMHG | RESPIRATION RATE: 18 BRPM | DIASTOLIC BLOOD PRESSURE: 61 MMHG | TEMPERATURE: 96.9 F | HEART RATE: 109 BPM | OXYGEN SATURATION: 97 %

## 2022-02-17 DIAGNOSIS — R11.0 CHEMOTHERAPY-INDUCED NAUSEA: ICD-10-CM

## 2022-02-17 DIAGNOSIS — C19 RECTOSIGMOID CANCER (HCC): Primary | ICD-10-CM

## 2022-02-17 DIAGNOSIS — T45.1X5A CHEMOTHERAPY-INDUCED NAUSEA: ICD-10-CM

## 2022-02-17 DIAGNOSIS — E86.0 DEHYDRATION: ICD-10-CM

## 2022-02-17 DIAGNOSIS — C78.6 OMENTAL METASTASIS (HCC): ICD-10-CM

## 2022-02-17 PROCEDURE — 96374 THER/PROPH/DIAG INJ IV PUSH: CPT

## 2022-02-17 PROCEDURE — 96361 HYDRATE IV INFUSION ADD-ON: CPT

## 2022-02-17 RX ADMIN — ONDANSETRON 16 MG: 2 INJECTION INTRAMUSCULAR; INTRAVENOUS at 12:05

## 2022-02-17 RX ADMIN — SODIUM CHLORIDE 1000 ML: 0.9 INJECTION, SOLUTION INTRAVENOUS at 12:29

## 2022-02-22 RX ORDER — FLUOROURACIL 50 MG/ML
300 INJECTION, SOLUTION INTRAVENOUS ONCE
Status: CANCELLED | OUTPATIENT
Start: 2022-03-01

## 2022-02-22 RX ORDER — DEXTROSE MONOHYDRATE 50 MG/ML
20 INJECTION, SOLUTION INTRAVENOUS ONCE
Status: CANCELLED | OUTPATIENT
Start: 2022-03-01

## 2022-02-22 RX ORDER — SODIUM CHLORIDE 9 MG/ML
20 INJECTION, SOLUTION INTRAVENOUS ONCE AS NEEDED
Status: CANCELLED | OUTPATIENT
Start: 2022-03-01

## 2022-02-23 DIAGNOSIS — C78.6 OMENTAL METASTASIS (HCC): Primary | ICD-10-CM

## 2022-02-23 DIAGNOSIS — C19 RECTOSIGMOID CANCER (HCC): ICD-10-CM

## 2022-02-28 ENCOUNTER — HOSPITAL ENCOUNTER (OUTPATIENT)
Dept: INFUSION CENTER | Facility: HOSPITAL | Age: 67
Discharge: HOME | End: 2022-02-28
Payer: MEDICARE

## 2022-02-28 DIAGNOSIS — C19 RECTOSIGMOID CANCER (HCC): Primary | ICD-10-CM

## 2022-02-28 LAB
ALBUMIN SERPL BCP-MCNC: 2.9 G/DL (ref 3.5–5)
ALP SERPL-CCNC: 63 U/L (ref 46–116)
ALT SERPL W P-5'-P-CCNC: 59 U/L (ref 12–78)
ANION GAP SERPL CALCULATED.3IONS-SCNC: 10 MMOL/L (ref 4–13)
AST SERPL W P-5'-P-CCNC: 38 U/L (ref 5–45)
BASOPHILS # BLD AUTO: 0.01 THOUSANDS/ΜL (ref 0–0.1)
BASOPHILS NFR BLD AUTO: 0 % (ref 0–1)
BILIRUB SERPL-MCNC: 0.88 MG/DL (ref 0.2–1)
BUN SERPL-MCNC: 17 MG/DL (ref 5–25)
CALCIUM ALBUM COR SERPL-MCNC: 9.7 MG/DL (ref 8.3–10.1)
CALCIUM SERPL-MCNC: 8.8 MG/DL (ref 8.3–10.1)
CHLORIDE SERPL-SCNC: 110 MMOL/L (ref 100–108)
CO2 SERPL-SCNC: 22 MMOL/L (ref 21–32)
CREAT SERPL-MCNC: 1.11 MG/DL (ref 0.6–1.3)
EOSINOPHIL # BLD AUTO: 0.04 THOUSAND/ΜL (ref 0–0.61)
EOSINOPHIL NFR BLD AUTO: 1 % (ref 0–6)
ERYTHROCYTE [DISTWIDTH] IN BLOOD BY AUTOMATED COUNT: 19.1 % (ref 11.6–15.1)
GFR SERPL CREATININE-BSD FRML MDRD: 51 ML/MIN/1.73SQ M
GLUCOSE SERPL-MCNC: 117 MG/DL (ref 65–140)
HCT VFR BLD AUTO: 38.6 % (ref 34.8–46.1)
HGB BLD-MCNC: 11.9 G/DL (ref 11.5–15.4)
IMM GRANULOCYTES # BLD AUTO: 0 THOUSAND/UL (ref 0–0.2)
IMM GRANULOCYTES NFR BLD AUTO: 0 % (ref 0–2)
LYMPHOCYTES # BLD AUTO: 2.04 THOUSANDS/ΜL (ref 0.6–4.47)
LYMPHOCYTES NFR BLD AUTO: 39 % (ref 14–44)
MCH RBC QN AUTO: 26.7 PG (ref 26.8–34.3)
MCHC RBC AUTO-ENTMCNC: 30.8 G/DL (ref 31.4–37.4)
MCV RBC AUTO: 87 FL (ref 82–98)
MONOCYTES # BLD AUTO: 0.49 THOUSAND/ΜL (ref 0.17–1.22)
MONOCYTES NFR BLD AUTO: 9 % (ref 4–12)
NEUTROPHILS # BLD AUTO: 2.68 THOUSANDS/ΜL (ref 1.85–7.62)
NEUTS SEG NFR BLD AUTO: 51 % (ref 43–75)
NRBC BLD AUTO-RTO: 0 /100 WBCS
PLATELET # BLD AUTO: 112 THOUSANDS/UL (ref 149–390)
PMV BLD AUTO: 10.8 FL (ref 8.9–12.7)
POTASSIUM SERPL-SCNC: 3.8 MMOL/L (ref 3.5–5.3)
PROT SERPL-MCNC: 7.1 G/DL (ref 6.4–8.2)
RBC # BLD AUTO: 4.45 MILLION/UL (ref 3.81–5.12)
SODIUM SERPL-SCNC: 142 MMOL/L (ref 136–145)
WBC # BLD AUTO: 5.26 THOUSAND/UL (ref 4.31–10.16)

## 2022-02-28 PROCEDURE — 85025 COMPLETE CBC W/AUTO DIFF WBC: CPT | Performed by: INTERNAL MEDICINE

## 2022-02-28 PROCEDURE — 80053 COMPREHEN METABOLIC PANEL: CPT | Performed by: INTERNAL MEDICINE

## 2022-02-28 RX ORDER — FLUOROURACIL 50 MG/ML
340 INJECTION, SOLUTION INTRAVENOUS ONCE
Status: CANCELLED | OUTPATIENT
Start: 2022-03-01

## 2022-03-01 ENCOUNTER — HOSPITAL ENCOUNTER (OUTPATIENT)
Dept: INFUSION CENTER | Facility: HOSPITAL | Age: 67
Discharge: HOME | End: 2022-03-01
Attending: INTERNAL MEDICINE
Payer: MEDICARE

## 2022-03-01 VITALS
HEIGHT: 57 IN | HEART RATE: 86 BPM | SYSTOLIC BLOOD PRESSURE: 155 MMHG | RESPIRATION RATE: 20 BRPM | WEIGHT: 210.1 LBS | BODY MASS INDEX: 45.33 KG/M2 | OXYGEN SATURATION: 98 % | TEMPERATURE: 97.6 F | DIASTOLIC BLOOD PRESSURE: 74 MMHG

## 2022-03-01 DIAGNOSIS — C19 RECTOSIGMOID CANCER (HCC): Primary | ICD-10-CM

## 2022-03-01 DIAGNOSIS — C78.6 OMENTAL METASTASIS (HCC): ICD-10-CM

## 2022-03-01 PROCEDURE — 96411 CHEMO IV PUSH ADDL DRUG: CPT

## 2022-03-01 PROCEDURE — 96368 THER/DIAG CONCURRENT INF: CPT

## 2022-03-01 PROCEDURE — G0498 CHEMO EXTEND IV INFUS W/PUMP: HCPCS

## 2022-03-01 PROCEDURE — 96413 CHEMO IV INFUSION 1 HR: CPT

## 2022-03-01 PROCEDURE — 96415 CHEMO IV INFUSION ADDL HR: CPT

## 2022-03-01 PROCEDURE — 96367 TX/PROPH/DG ADDL SEQ IV INF: CPT

## 2022-03-01 RX ORDER — FLUOROURACIL 50 MG/ML
340 INJECTION, SOLUTION INTRAVENOUS ONCE
Status: COMPLETED | OUTPATIENT
Start: 2022-03-01 | End: 2022-03-01

## 2022-03-01 RX ORDER — DEXTROSE MONOHYDRATE 50 MG/ML
20 INJECTION, SOLUTION INTRAVENOUS ONCE
Status: COMPLETED | OUTPATIENT
Start: 2022-03-01 | End: 2022-03-01

## 2022-03-01 RX ORDER — SODIUM CHLORIDE 9 MG/ML
20 INJECTION, SOLUTION INTRAVENOUS ONCE AS NEEDED
Status: DISCONTINUED | OUTPATIENT
Start: 2022-03-01 | End: 2022-03-04 | Stop reason: HOSPADM

## 2022-03-01 RX ADMIN — DEXTROSE 20 ML/HR: 50 INJECTION, SOLUTION INTRAVENOUS at 10:51

## 2022-03-01 RX ADMIN — LEUCOVORIN CALCIUM 636 MG: 100 INJECTION, POWDER, LYOPHILIZED, FOR SOLUTION INTRAMUSCULAR; INTRAVENOUS at 10:56

## 2022-03-01 RX ADMIN — FLUOROURACIL 635 MG: 50 INJECTION, SOLUTION INTRAVENOUS at 13:37

## 2022-03-01 RX ADMIN — DEXAMETHASONE SODIUM PHOSPHATE: 10 INJECTION INTRAMUSCULAR; INTRAVENOUS at 10:04

## 2022-03-01 RX ADMIN — SODIUM CHLORIDE 20 ML/HR: 9 INJECTION, SOLUTION INTRAVENOUS at 10:04

## 2022-03-01 RX ADMIN — OXALIPLATIN 135.11 MG: 5 INJECTION, SOLUTION INTRAVENOUS at 11:01

## 2022-03-01 NOTE — PLAN OF CARE
Problem: Potential for Falls  Goal: Patient will remain free of falls  Description: INTERVENTIONS:  - Educate patient/family on patient safety including physical limitations  - Instruct patient to call for assistance with activity   - Consult OT/PT to assist with strengthening/mobility   - Keep Call bell within reach  - Keep care items and personal belongings within reach  - Initiate and maintain comfort rounds    Outcome: Progressing

## 2022-03-02 ENCOUNTER — RA CDI HCC (OUTPATIENT)
Dept: OTHER | Facility: HOSPITAL | Age: 67
End: 2022-03-02

## 2022-03-02 NOTE — PROGRESS NOTES
[facility]  coding opportunities       Chart reviewed, no opportunity found: CHART REVIEWED, NO OPPORTUNITY FOUND                        Patients insurance company: [employer]

## 2022-03-03 ENCOUNTER — HOSPITAL ENCOUNTER (OUTPATIENT)
Dept: INFUSION CENTER | Facility: HOSPITAL | Age: 67
Discharge: HOME | End: 2022-03-03
Attending: INTERNAL MEDICINE
Payer: MEDICARE

## 2022-03-03 VITALS
OXYGEN SATURATION: 97 % | TEMPERATURE: 96.5 F | RESPIRATION RATE: 20 BRPM | HEART RATE: 101 BPM | SYSTOLIC BLOOD PRESSURE: 126 MMHG | DIASTOLIC BLOOD PRESSURE: 66 MMHG

## 2022-03-03 DIAGNOSIS — E86.0 DEHYDRATION: ICD-10-CM

## 2022-03-03 DIAGNOSIS — C19 RECTOSIGMOID CANCER (HCC): Primary | ICD-10-CM

## 2022-03-03 DIAGNOSIS — R11.0 CHEMOTHERAPY-INDUCED NAUSEA: ICD-10-CM

## 2022-03-03 DIAGNOSIS — T45.1X5A CHEMOTHERAPY-INDUCED NAUSEA: ICD-10-CM

## 2022-03-03 DIAGNOSIS — C78.6 OMENTAL METASTASIS (HCC): ICD-10-CM

## 2022-03-03 PROCEDURE — 96361 HYDRATE IV INFUSION ADD-ON: CPT

## 2022-03-03 PROCEDURE — 96374 THER/PROPH/DIAG INJ IV PUSH: CPT

## 2022-03-03 RX ADMIN — ONDANSETRON 16 MG: 2 INJECTION INTRAMUSCULAR; INTRAVENOUS at 12:41

## 2022-03-03 RX ADMIN — SODIUM CHLORIDE 1000 ML: 0.9 INJECTION, SOLUTION INTRAVENOUS at 12:40

## 2022-03-07 ENCOUNTER — OFFICE VISIT (OUTPATIENT)
Dept: HEMATOLOGY ONCOLOGY | Facility: MEDICAL CENTER | Age: 67
End: 2022-03-07
Payer: MEDICARE

## 2022-03-07 VITALS
RESPIRATION RATE: 18 BRPM | BODY MASS INDEX: 45.09 KG/M2 | HEART RATE: 94 BPM | HEIGHT: 57 IN | TEMPERATURE: 97.3 F | SYSTOLIC BLOOD PRESSURE: 124 MMHG | OXYGEN SATURATION: 98 % | WEIGHT: 209 LBS | DIASTOLIC BLOOD PRESSURE: 76 MMHG

## 2022-03-07 DIAGNOSIS — C19 RECTOSIGMOID CANCER (HCC): Primary | ICD-10-CM

## 2022-03-07 DIAGNOSIS — C19 RECTOSIGMOID CANCER (HCC): ICD-10-CM

## 2022-03-07 DIAGNOSIS — C78.6 OMENTAL METASTASIS (HCC): Primary | ICD-10-CM

## 2022-03-07 DIAGNOSIS — C78.6 OMENTAL METASTASIS (HCC): ICD-10-CM

## 2022-03-07 PROCEDURE — 99215 OFFICE O/P EST HI 40 MIN: CPT | Performed by: PHYSICIAN ASSISTANT

## 2022-03-07 NOTE — PROGRESS NOTES
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 )  This is a 58-year-old female with history of early stage colorectal cancer who was found to have an increasing CEA level through observation with Colorectal surgery  Patient subsequently had a PET-CT scan that demonstrated omental metastases, invading the spleen  Patient's plan was to undergo perioperative chemotherapy for approximately six months  Patient is now three months into chemotherapy administration  I recommended that the patient undergo CT scan to re-evaluate the abdomen and chest   Patient will follow-up with Dr Rock Hummel for result review  Furthermore, I will also have the patient follow-up with Dr Rena aLne after CT scan is reviewed by Dr Rock Hummel in approximately three weeks  I explained to the patient that if the CT imaging demonstrates reduction of the masses, surgical evaluation would then be evaluated  If chemotherapy response is not as robust as we would like, then the patient would continue on chemotherapy  Caris testing has returned without significant findings, MSI stable, no actionable gene mutations        mFOLFOX 6 regimen (all drugs dose reduced to 85%)  Oxaliplatin 85 mg/m2 IV day 1  Leucovorin 400 mg/m2 IV day 1  5  mg/m2 IVP day 1  5 FU 1200 mg/m2/day, continues infusion x 46 hours  Cycle length = 2 weeks  Goal = cure  - CT chest abdomen pelvis w contrast; Future    Patient will be it dehydrated and would not eat  Patient is rehydrated on day three at the same time as her disconnect from chemotherapy in the [facility]  This should be continued      The patient is scheduled for follow-up in approximately 3 weeks with Dr Rock Hummel and CT scan       Patient voiced agreement and understanding to the above  Patient knows to call the Hematology/Oncology office with any questions and concerns regarding the above  Goals and Barriers:    Current Goal:   Prolong Survival from Cancer  Barriers: None  Patient's Capacity to Self Care:  Patient able to self care  Shirley Hector PA-C  Medical Oncology/Hematology  [address]  ______________________________________________________________________________________________________________    Subjective     Chief Complaint   Patient presents with    Follow-up       History of present illness/Cancer History:   Oncology History   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[facility] )   9/23/2019 Initial Diagnosis    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[facility] )     12/10/2019 Surgery    Low anterior resection (Dr Lala Jin):    A  Rectosigmoid colon, low anterior resection:  - Adenocarcinoma, moderately differentiated  - Tumor invades through the muscularis propria into pericolorectal tissue, T3  - Diverticula  - All margins are negative for tumor   - Thirty-four lymph nodes, negative for malignancy (0/34)  B  Colon, anastomotic rings, resection:  - Two portions of colon with no pathologic abnormality     12/10/2019 Genomic Testing    RRESULTS OF IMMUNOHISTOCHEMICAL ANALYSIS FOR MISMATCH REPAIR PROTEIN LOSS  INTERPRETATION: NO LOSS OF NUCLEAR EXPRESSION OF MMR PROTEINS: LOW PROBABILITY OF MSI-H  Note: Background non-neoplastic tissue and/or internal control with intact nuclear expression        RESULTS:  Antibody          Clone               Description                           Results  MLH1               M1                  Mismatch repair protein       Intact nuclear expression  MSH2              [id number]       Mismatch repair protein       Intact nuclear expression  MSH6              40                   Mismatch repair protein       Intact nuclear expression  PMS2              [id number]         Mismatch repair protein       Intact nuclear expression     11/12/2021 Biopsy    Omental mass:  - Metastatic adenocarcinoma, with an immunophenotype compatible with metastasis from patient's known colonic primary       12/10/2021 - 12/10/2021 Chemotherapy    capecitabine (XELODA), 850 mg/m2 = 1,600 mg (100 % of original dose 850 mg/m2), Oral, 2 times daily with meals, 1 of 8 cycles  Dose modification: 850 mg/m2 (100 % of original dose 850 mg/m2, Cycle 1, Reason: Other (see comments))  fosaprepitant (EMEND) IVPB, 150 mg, Intravenous, Once, 1 of 1 cycle  Administration: 150 mg (12/10/2021)  oxaliplatin (ELOXATIN) chemo infusion, 244 4 mg, Intravenous, Once, 1 of 8 cycles  Administration: 250 mg (12/10/2021)     1/4/2022 -  Chemotherapy    fluorouracil (ADRUCIL), 300 mg/m2 = 560 mg (75 % of original dose 400 mg/m2), Intravenous, Once, 5 of 12 cycles  Dose modification: 300 mg/m2 (75 % of original dose 400 mg/m2, Cycle 1, Reason: Dose Not Tolerated), 340 mg/m2 (85 % of original dose 400 mg/m2, Cycle 4, Reason: Other (see comments)), 340 mg/m2 (85 % of original dose 400 mg/m2, Cycle 5, Reason: Other (see comments))  Administration: 560 mg (1/4/2022), 560 mg (1/18/2022), 560 mg (2/1/2022), 635 mg (2/15/2022), 635 mg (3/1/2022)  leucovorin calcium IVPB, 300 mg/m2 = 561 mg (75 % of original dose 400 mg/m2), Intravenous, Once, 5 of 12 cycles  Dose modification: 300 mg/m2 (75 % of original dose 400 mg/m2, Cycle 1, Reason: Dose Not Tolerated), 340 mg/m2 (85 % of original dose 400 mg/m2, Cycle 4, Reason: Other (see comments)), 340 mg/m2 (85 % of original dose 400 mg/m2, Cycle 5, Reason: Other (see comments))  Administration: 561 mg (1/4/2022), 561 mg (1/18/2022), 561 mg (2/1/2022), 636 mg (2/15/2022), 636 mg (3/1/2022)  oxaliplatin (ELOXATIN) chemo infusion, 63 75 mg/m2 = 119 2125 mg (75 % of original dose 85 mg/m2), Intravenous, Once, 5 of 12 cycles  Dose modification: 63 75 mg/m2 (75 % of original dose 85 mg/m2, Cycle 1, Reason: Dose Not Tolerated), 72 25 mg/m2 (85 % of original dose 85 mg/m2, Cycle 4, Reason: Other (see comments)), 72 25 mg/m2 (85 % of original dose 85 mg/m2, Cycle 5, Reason: Other (see comments))  Administration: 119 2125 mg (1/4/2022), 119 2125 mg (1/18/2022), 119 2125 mg (2/1/2022), 135 1075 mg (2/15/2022), 135 1075 mg (3/1/2022)  fluorouracil (ADRUCIL) ambulatory infusion Soln, 900 mg/m2/day = 3,365 mg (75 % of original dose 1,200 mg/m2/day), Intravenous, Over 46 hours, 5 of 12 cycles  Dose modification: 900 mg/m2/day (75 % of original dose 1,200 mg/m2/day, Cycle 2, Reason: Other (see comments)), 1,020 mg/m2/day (85 % of original dose 1,200 mg/m2/day, Cycle 4, Reason: Other (see comments), Comment: anticipated tolerance)     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[facility] )   11/29/2021 Initial Diagnosis    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[facility] )     12/10/2021 - 12/10/2021 Chemotherapy    capecitabine (XELODA), 850 mg/m2 = 1,600 mg (100 % of original dose 850 mg/m2), Oral, 2 times daily with meals, 1 of 8 cycles  Dose modification: 850 mg/m2 (100 % of original dose 850 mg/m2, Cycle 1, Reason: Other (see comments))  fosaprepitant (EMEND) IVPB, 150 mg, Intravenous, Once, 1 of 1 cycle  Administration: 150 mg (12/10/2021)  oxaliplatin (ELOXATIN) chemo infusion, 244 4 mg, Intravenous, Once, 1 of 8 cycles  Administration: 250 mg (12/10/2021)     1/4/2022 -  Chemotherapy    fluorouracil (ADRUCIL), 300 mg/m2 = 560 mg (75 % of original dose 400 mg/m2), Intravenous, Once, 5 of 12 cycles  Dose modification: 300 mg/m2 (75 % of original dose 400 mg/m2, Cycle 1, Reason: Dose Not Tolerated), 340 mg/m2 (85 % of original dose 400 mg/m2, Cycle 4, Reason: Other (see comments)), 340 mg/m2 (85 % of original dose 400 mg/m2, Cycle 5, Reason: Other (see comments))  Administration: 560 mg (1/4/2022), 560 mg (1/18/2022), 560 mg (2/1/2022), 635 mg (2/15/2022), 635 mg (3/1/2022)  leucovorin calcium IVPB, 300 mg/m2 = 561 mg (75 % of original dose 400 mg/m2), Intravenous, Once, 5 of 12 cycles  Dose modification: 300 mg/m2 (75 % of original dose 400 mg/m2, Cycle 1, Reason: Dose Not Tolerated), 340 mg/m2 (85 % of original dose 400 mg/m2, Cycle 4, Reason: Other (see comments)), 340 mg/m2 (85 % of original dose 400 mg/m2, Cycle 5, Reason: Other (see comments))  Administration: 561 mg (1/4/2022), 561 mg (1/18/2022), 561 mg (2/1/2022), 636 mg (2/15/2022), 636 mg (3/1/2022)  oxaliplatin (ELOXATIN) chemo infusion, 63 75 mg/m2 = 119 2125 mg (75 % of original dose 85 mg/m2), Intravenous, Once, 5 of 12 cycles  Dose modification: 63 75 mg/m2 (75 % of original dose 85 mg/m2, Cycle 1, Reason: Dose Not Tolerated), 72 25 mg/m2 (85 % of original dose 85 mg/m2, Cycle 4, Reason: Other (see comments)), 72 25 mg/m2 (85 % of original dose 85 mg/m2, Cycle 5, Reason: Other (see comments))  Administration: 119 2125 mg (1/4/2022), 119 2125 mg (1/18/2022), 119 2125 mg (2/1/2022), 135 1075 mg (2/15/2022), 135 1075 mg (3/1/2022)  fluorouracil (ADRUCIL) ambulatory infusion Soln, 900 mg/m2/day = 3,365 mg (75 % of original dose 1,200 mg/m2/day), Intravenous, Over 46 hours, 5 of 12 cycles  Dose modification: 900 mg/m2/day (75 % of original dose 1,200 mg/m2/day, Cycle 2, Reason: Other (see comments)), 1,020 mg/m2/day (85 % of original dose 1,200 mg/m2/day, Cycle 4, Reason: Other (see comments), Comment: anticipated tolerance)          Lab Results   Component Value Date    WBC 5 26 02/28/2022    HGB 11 9 02/28/2022    HCT 38 6 02/28/2022    MCV 87 02/28/2022     (L) 02/28/2022     Lab Results   Component Value Date     12/03/2016    SODIUM 142 02/28/2022    K 3 8 02/28/2022     (H) 02/28/2022    CO2 22 02/28/2022    AGAP 10 02/28/2022    BUN 17 02/28/2022    CREATININE 1 11 02/28/2022    GLUC 117 02/28/2022    GLUF 108 (H) 12/11/2021    CALCIUM 8 8 02/28/2022    AST 38 02/28/2022    ALT 59 02/28/2022    ALKPHOS 63 02/28/2022    PROT 6 6 12/03/2016    TP 7 1 02/28/2022    BILITOT 0 7 12/03/2016    TBILI 0 88 02/28/2022    EGFR 51 02/28/2022       Interval history:  Feeling ok  Cold interance with eating- misses ice cream     ECO - Symptomatic but completely ambulatory    Review of Systems   Constitutional: Negative for appetite change, fatigue, fever and unexpected weight change  HENT: Negative for nosebleeds  Respiratory: Negative for cough, choking and shortness of breath  Negative hemoptysis  Cardiovascular: Negative for chest pain, palpitations and leg swelling  Gastrointestinal: Negative  Negative for abdominal distention, abdominal pain, anal bleeding, blood in stool, constipation, diarrhea, nausea and vomiting  Endocrine: Negative  Negative for cold intolerance  Genitourinary: Negative  Negative for hematuria, menstrual problem, vaginal bleeding, vaginal discharge and vaginal pain  Musculoskeletal: Negative  Negative for arthralgias, myalgias, neck pain and neck stiffness  Skin: Negative  Negative for color change, pallor and rash  Allergic/Immunologic: Negative  Negative for immunocompromised state  Neurological: Negative  Negative for weakness and headaches  Hematological: Negative for adenopathy  Does not bruise/bleed easily  All other systems reviewed and are negative        Current Outpatient Medications:     acetaminophen (TYLENOL) 325 mg tablet, Take 3 tablets (975 mg total) by mouth every 8 (eight) hours, Disp: 30 tablet, Rfl: 0    docusate sodium (COLACE) 100 mg capsule, Take 100 mg by mouth 2 (two) times a day, Disp: , Rfl:     ezetimibe (ZETIA) 10 mg tablet, Take 1 tablet (10 mg total) by mouth daily (Patient taking differently: Take 10 mg by mouth daily at bedtime  ), Disp: 90 tablet, Rfl: 1    famotidine (PEPCID) 20 mg tablet, Take 1 tablet (20 mg total) by mouth 2 (two) times a day (Patient taking differently: Take 20 mg by mouth daily as needed ), Disp: 180 tablet, Rfl: 1    lidocaine-prilocaine (EMLA) cream, Apply topically as needed for mild pain, Disp: 30 g, Rfl: 0    ondansetron (ZOFRAN) 8 mg tablet, Take 1 tablet (8 mg total) by mouth every 12 (twelve) hours as needed for nausea or vomiting, Disp: 20 tablet, Rfl: 2    prochlorperazine (COMPAZINE) 10 mg tablet, Take 1 tablet (10 mg total) by mouth every 6 (six) hours as needed for nausea or vomiting, Disp: 60 tablet, Rfl: 0  Allergies   Allergen Reactions    Medical Tape Rash     Skin irritation  Skin peeling  Skin irritation  Skin peeling  Blisters  Rash       Advance Directive and Living Will:            Objective   /76 (BP Location: Left arm, Patient Position: Sitting, Cuff Size: Large)   Pulse 94   Temp (!) 97 3 °F (36 3 °C) (Tympanic)   Resp 18   Ht 4' 9 01" (1 448 m)   Wt 94 8 kg (209 lb)   LMP 09/01/2011 (Approximate)   SpO2 98%   BMI 45 21 kg/m²   Wt Readings from Last 6 Encounters:   03/07/22 94 8 kg (209 lb)   03/01/22 95 3 kg (210 lb 1 6 oz)   02/15/22 95 6 kg (210 lb 12 2 oz)   02/07/22 94 8 kg (209 lb)   02/01/22 97 3 kg (214 lb 8 1 oz)   01/27/22 95 7 kg (211 lb)       Physical Exam  Constitutional:       General: She is not in acute distress  Appearance: She is well-developed  HENT:      Head: Normocephalic and atraumatic  Mouth/Throat:      Pharynx: No oropharyngeal exudate  Eyes:      General: No scleral icterus  Pupils: Pupils are equal, round, and reactive to light  Cardiovascular:      Rate and Rhythm: Normal rate and regular rhythm  Heart sounds: No murmur heard  Comments: Right chest wall amee cath intact  Pulmonary:      Effort: Pulmonary effort is normal  No respiratory distress  Breath sounds: Normal breath sounds  Abdominal:      General: Bowel sounds are normal  There is no distension  Palpations: Abdomen is soft  Tenderness: There is no abdominal tenderness  Musculoskeletal:         General: Normal range of motion  Cervical back: Normal range of motion  Lymphadenopathy:      Cervical: No cervical adenopathy  Skin:     General: Skin is warm  Coloration: Skin is not pale  Findings: No rash  Neurological:      Mental Status: She is alert and oriented to person, place, and time  Cranial Nerves: No cranial nerve deficit  Psychiatric:         Thought Content:  Thought content normal          Pertinent Laboratory Results and Imaging Review:  Hospital Outpatient Visit on 02/28/2022   Component Date Value Ref Range Status    WBC 02/28/2022 5 26  4 31 - 10 16 Thousand/uL Final    RBC 02/28/2022 4 45  3 81 - 5 12 Million/uL Final    Hemoglobin 02/28/2022 11 9  11 5 - 15 4 g/dL Final    Hematocrit 02/28/2022 38 6  34 8 - 46 1 % Final    MCV 02/28/2022 87  82 - 98 fL Final    MCH 02/28/2022 26 7* 26 8 - 34 3 pg Final    MCHC 02/28/2022 30 8* 31 4 - 37 4 g/dL Final    RDW 02/28/2022 19 1* 11 6 - 15 1 % Final    MPV 02/28/2022 10 8  8 9 - 12 7 fL Final    Platelets 55/08/5838 112* 149 - 390 Thousands/uL Final    nRBC 02/28/2022 0  /100 WBCs Final    Neutrophils Relative 02/28/2022 51  43 - 75 % Final    Immat GRANS % 02/28/2022 0  0 - 2 % Final    Lymphocytes Relative 02/28/2022 39  14 - 44 % Final    Monocytes Relative 02/28/2022 9  4 - 12 % Final    Eosinophils Relative 02/28/2022 1  0 - 6 % Final    Basophils Relative 02/28/2022 0  0 - 1 % Final    Neutrophils Absolute 02/28/2022 2 68  1 85 - 7 62 Thousands/µL Final    Immature Grans Absolute 02/28/2022 0 00  0 00 - 0 20 Thousand/uL Final    Lymphocytes Absolute 02/28/2022 2 04  0 60 - 4 47 Thousands/µL Final    Monocytes Absolute 02/28/2022 0 49  0 17 - 1 22 Thousand/µL Final    Eosinophils Absolute 02/28/2022 0 04  0 00 - 0 61 Thousand/µL Final    Basophils Absolute 02/28/2022 0 01  0 00 - 0 10 Thousands/µL Final    Sodium 02/28/2022 142  136 - 145 mmol/L Final    Potassium 02/28/2022 3 8  3 5 - 5 3 mmol/L Final    Chloride 02/28/2022 110* 100 - 108 mmol/L Final    CO2 02/28/2022 22  21 - 32 mmol/L Final    ANION GAP 02/28/2022 10  4 - 13 mmol/L Final    BUN 02/28/2022 17  5 - 25 mg/dL Final    Creatinine 2022 1 11  0 60 - 1 30 mg/dL Final    Standardized to IDMS reference method    Glucose 2022 117  65 - 140 mg/dL Final    If the patient is fasting, the ADA then defines impaired fasting glucose as > 100 mg/dL and diabetes as > or equal to 123 mg/dL  Specimen collection should occur prior to Sulfasalazine administration due to the potential for falsely depressed results  Specimen collection should occur prior to Sulfapyridine administration due to the potential for falsely elevated results   Calcium 2022 8 8  8 3 - 10 1 mg/dL Final    Corrected Calcium 2022 9 7  8 3 - 10 1 mg/dL Final    AST 2022 38  5 - 45 U/L Final    Specimen collection should occur prior to Sulfasalazine administration due to the potential for falsely depressed results   ALT 2022 59  12 - 78 U/L Final    Specimen collection should occur prior to Sulfasalazine administration due to the potential for falsely depressed results   Alkaline Phosphatase 2022 63  46 - 116 U/L Final    Total Protein 2022 7 1  6 4 - 8 2 g/dL Final    Albumin 2022 2 9* 3 5 - 5 0 g/dL Final    Total Bilirubin 2022 0 88  0 20 - 1 00 mg/dL Final    Use of this assay is not recommended for patients undergoing treatment with eltrombopag due to the potential for falsely elevated results      eGFR 2022 51  ml/min/1 73sq m Final         The following historical data was reviewed:  Past Medical History:   Diagnosis Date    Blood in stool     Breast disorder     Cancer ([facility] )     rectal    Cancer determined by colorectal biopsy ([facility] )     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    GERD (gastroesophageal reflux disease)     History of rectal surgery     Hyperlipidemia     PONV (postoperative nausea and vomiting)      Past Surgical History:   Procedure Laterality Date    BREAST LUMPECTOMY Right      SECTION      x3    COLON SIGMOID RESECTION      COLONOSCOPY      DILATION AND CURETTAGE OF UTERUS      ILEO LOOP DIVERSION N/A 12/10/2019    Procedure: ILEOSTOMY LOOP;  Surgeon: Margoth Castro MD;  [facility];  Service: Robotics    IR BIOPSY OMENTUM  11/12/2021    IR PORT PLACEMENT  12/28/2021    FL CLOSE ENTEROSTOMY N/A 2/4/2020    Procedure: CLOSURE ILEOSTOMY;  Surgeon: Margoth Castro MD;  [facility];  Service: Colorectal    FL LAP,SURG,COLECTOMY, PARTIAL, W/ANAST N/A 12/10/2019    Procedure: SIGMOID RESECTION COLON LAPAROSCOPIC W ROBOTICS converted to lap hand assisted  with Partial proctectomy , LASER FLUORESCENCE ANGIOGRAPHY, INTRA OP COLONOSCOPY;  Surgeon: Margoth Castro MD;  [facility];  Service: Robotics    SMALL INTESTINE SURGERY  2/4/2020    Procedure: RESECTION SMALL BOWEL;  Surgeon: Margoth Castro MD;  [facility];  Service: Colorectal     Social History     Socioeconomic History    Marital status:      Spouse name: None    Number of children: None    Years of education: None    Highest education level: None   Occupational History    None   Tobacco Use    Smoking status: Never Smoker    Smokeless tobacco: Never Used   Vaping Use    Vaping Use: Never used   Substance and Sexual Activity    Alcohol use: Yes     Comment: "occasionally"    Drug use: Never    Sexual activity: None   Other Topics Concern    None   Social History Narrative    None     Social Determinants of Health     Financial Resource Strain: Not on file   Food Insecurity: Not on file   Transportation Needs: Not on file   Physical Activity: Not on file   Stress: Not on file   Social Connections: Not on file   Intimate Partner Violence: Not on file   Housing Stability: Not on file     Family History   Problem Relation Age of Onset    Hypertension Mother     Heart attack Mother     Heart attack Father     Heart disease Father     Hypertension Brother     Heart attack Brother     Leukemia Cousin     Breast cancer [address] Breast cancer Family         maternal side    Colon cancer Family         paternal uncle    Colon cancer Paternal Uncle     Stroke Neg Hx        Please note: This report has been generated by a voice recognition software system  Therefore there may be syntax, spelling, and/or grammatical errors  Please call if you have any questions

## 2022-03-08 RX ORDER — DEXTROSE MONOHYDRATE 50 MG/ML
20 INJECTION, SOLUTION INTRAVENOUS ONCE
Status: CANCELLED | OUTPATIENT
Start: 2022-03-15

## 2022-03-08 RX ORDER — SODIUM CHLORIDE 9 MG/ML
20 INJECTION, SOLUTION INTRAVENOUS ONCE AS NEEDED
Status: CANCELLED | OUTPATIENT
Start: 2022-03-15

## 2022-03-08 RX ORDER — FLUOROURACIL 50 MG/ML
340 INJECTION, SOLUTION INTRAVENOUS ONCE
Status: CANCELLED | OUTPATIENT
Start: 2022-03-15

## 2022-03-10 DIAGNOSIS — C19 RECTOSIGMOID CANCER (HCC): ICD-10-CM

## 2022-03-10 DIAGNOSIS — C78.6 OMENTAL METASTASIS (HCC): Primary | ICD-10-CM

## 2022-03-14 ENCOUNTER — APPOINTMENT (OUTPATIENT)
Dept: LAB | Facility: HOSPITAL | Age: 67
End: 2022-03-14
Attending: INTERNAL MEDICINE
Payer: MEDICARE

## 2022-03-14 DIAGNOSIS — C19 RECTOSIGMOID CANCER (HCC): ICD-10-CM

## 2022-03-14 DIAGNOSIS — C78.6 OMENTAL METASTASIS (HCC): ICD-10-CM

## 2022-03-15 ENCOUNTER — HOSPITAL ENCOUNTER (OUTPATIENT)
Dept: INFUSION CENTER | Facility: HOSPITAL | Age: 67
Discharge: HOME | End: 2022-03-15
Attending: INTERNAL MEDICINE
Payer: MEDICARE

## 2022-03-15 VITALS
WEIGHT: 209.44 LBS | TEMPERATURE: 98.2 F | HEIGHT: 57 IN | OXYGEN SATURATION: 97 % | BODY MASS INDEX: 45.18 KG/M2 | SYSTOLIC BLOOD PRESSURE: 147 MMHG | RESPIRATION RATE: 20 BRPM | DIASTOLIC BLOOD PRESSURE: 74 MMHG

## 2022-03-15 DIAGNOSIS — C19 RECTOSIGMOID CANCER (HCC): Primary | ICD-10-CM

## 2022-03-15 DIAGNOSIS — C78.6 OMENTAL METASTASIS (HCC): ICD-10-CM

## 2022-03-15 PROCEDURE — 96415 CHEMO IV INFUSION ADDL HR: CPT

## 2022-03-15 PROCEDURE — 96411 CHEMO IV PUSH ADDL DRUG: CPT

## 2022-03-15 PROCEDURE — 96367 TX/PROPH/DG ADDL SEQ IV INF: CPT

## 2022-03-15 PROCEDURE — 96368 THER/DIAG CONCURRENT INF: CPT

## 2022-03-15 PROCEDURE — 96413 CHEMO IV INFUSION 1 HR: CPT

## 2022-03-15 RX ORDER — FLUOROURACIL 50 MG/ML
340 INJECTION, SOLUTION INTRAVENOUS ONCE
Status: COMPLETED | OUTPATIENT
Start: 2022-03-15 | End: 2022-03-15

## 2022-03-15 RX ORDER — SODIUM CHLORIDE 9 MG/ML
20 INJECTION, SOLUTION INTRAVENOUS ONCE AS NEEDED
Status: DISCONTINUED | OUTPATIENT
Start: 2022-03-15 | End: 2022-03-18 | Stop reason: HOSPADM

## 2022-03-15 RX ORDER — DEXTROSE MONOHYDRATE 50 MG/ML
20 INJECTION, SOLUTION INTRAVENOUS ONCE
Status: COMPLETED | OUTPATIENT
Start: 2022-03-15 | End: 2022-03-15

## 2022-03-15 RX ADMIN — OXALIPLATIN 135.11 MG: 5 INJECTION, SOLUTION INTRAVENOUS at 11:25

## 2022-03-15 RX ADMIN — DEXAMETHASONE SODIUM PHOSPHATE: 10 INJECTION, SOLUTION INTRAMUSCULAR; INTRAVENOUS at 10:19

## 2022-03-15 RX ADMIN — SODIUM CHLORIDE 20 ML/HR: 9 INJECTION, SOLUTION INTRAVENOUS at 10:17

## 2022-03-15 RX ADMIN — FLUOROURACIL 635 MG: 50 INJECTION, SOLUTION INTRAVENOUS at 13:54

## 2022-03-15 RX ADMIN — LEUCOVORIN CALCIUM 636 MG: 100 INJECTION, POWDER, LYOPHILIZED, FOR SUSPENSION INTRAMUSCULAR; INTRAVENOUS at 11:20

## 2022-03-15 RX ADMIN — DEXTROSE 20 ML/HR: 50 INJECTION, SOLUTION INTRAVENOUS at 11:17

## 2022-03-17 ENCOUNTER — HOSPITAL ENCOUNTER (OUTPATIENT)
Dept: INFUSION CENTER | Facility: HOSPITAL | Age: 67
Discharge: HOME | End: 2022-03-17
Attending: INTERNAL MEDICINE
Payer: MEDICARE

## 2022-03-17 VITALS
OXYGEN SATURATION: 97 % | HEART RATE: 91 BPM | SYSTOLIC BLOOD PRESSURE: 109 MMHG | RESPIRATION RATE: 20 BRPM | DIASTOLIC BLOOD PRESSURE: 63 MMHG | TEMPERATURE: 98.5 F

## 2022-03-17 DIAGNOSIS — C19 RECTOSIGMOID CANCER (HCC): Primary | ICD-10-CM

## 2022-03-17 DIAGNOSIS — E86.0 DEHYDRATION: ICD-10-CM

## 2022-03-17 DIAGNOSIS — C78.6 OMENTAL METASTASIS (HCC): ICD-10-CM

## 2022-03-17 DIAGNOSIS — R11.0 CHEMOTHERAPY-INDUCED NAUSEA: ICD-10-CM

## 2022-03-17 DIAGNOSIS — T45.1X5A CHEMOTHERAPY-INDUCED NAUSEA: ICD-10-CM

## 2022-03-17 PROCEDURE — 96374 THER/PROPH/DIAG INJ IV PUSH: CPT

## 2022-03-17 PROCEDURE — 96361 HYDRATE IV INFUSION ADD-ON: CPT

## 2022-03-17 RX ADMIN — SODIUM CHLORIDE 1000 ML: 0.9 INJECTION, SOLUTION INTRAVENOUS at 12:54

## 2022-03-17 RX ADMIN — ONDANSETRON 16 MG: 2 INJECTION INTRAMUSCULAR; INTRAVENOUS at 12:30

## 2022-03-17 NOTE — PROGRESS NOTES
CADD pump D/C'd by riri hong RN  Patient received antiemetics and hydration fluids via port  Tolerated treatment well with no adverse effects  Patient offers no complaints  Next appointment verified, AVS provided

## 2022-03-21 ENCOUNTER — OFFICE VISIT (OUTPATIENT)
Dept: FAMILY MEDICINE CLINIC | Facility: CLINIC | Age: 67
End: 2022-03-21
Payer: MEDICARE

## 2022-03-21 VITALS
DIASTOLIC BLOOD PRESSURE: 68 MMHG | WEIGHT: 203.8 LBS | HEIGHT: 57 IN | TEMPERATURE: 99.5 F | BODY MASS INDEX: 43.97 KG/M2 | SYSTOLIC BLOOD PRESSURE: 128 MMHG | HEART RATE: 105 BPM | OXYGEN SATURATION: 99 % | RESPIRATION RATE: 18 BRPM

## 2022-03-21 DIAGNOSIS — E78.2 MIXED HYPERLIPIDEMIA: ICD-10-CM

## 2022-03-21 DIAGNOSIS — C78.6 OMENTAL METASTASIS (HCC): ICD-10-CM

## 2022-03-21 DIAGNOSIS — K21.9 GASTROESOPHAGEAL REFLUX DISEASE, UNSPECIFIED WHETHER ESOPHAGITIS PRESENT: ICD-10-CM

## 2022-03-21 DIAGNOSIS — Z00.00 MEDICARE ANNUAL WELLNESS VISIT, SUBSEQUENT: ICD-10-CM

## 2022-03-21 DIAGNOSIS — E66.01 MORBID OBESITY (HCC): ICD-10-CM

## 2022-03-21 DIAGNOSIS — C19 RECTOSIGMOID CANCER (HCC): Primary | ICD-10-CM

## 2022-03-21 DIAGNOSIS — K59.00 CONSTIPATION, UNSPECIFIED CONSTIPATION TYPE: ICD-10-CM

## 2022-03-21 DIAGNOSIS — Z78.9 STATIN INTOLERANCE: ICD-10-CM

## 2022-03-21 PROBLEM — D69.6 PLATELETS DECREASED (HCC): Status: ACTIVE | Noted: 2022-03-21

## 2022-03-21 PROBLEM — N18.30 STAGE 3 CHRONIC KIDNEY DISEASE, UNSPECIFIED WHETHER STAGE 3A OR 3B CKD (HCC): Status: ACTIVE | Noted: 2022-03-21

## 2022-03-21 PROBLEM — N18.2 STAGE 2 CHRONIC KIDNEY DISEASE: Status: RESOLVED | Noted: 2021-09-08 | Resolved: 2022-03-21

## 2022-03-21 PROCEDURE — 99214 OFFICE O/P EST MOD 30 MIN: CPT | Performed by: NURSE PRACTITIONER

## 2022-03-21 PROCEDURE — G0439 PPPS, SUBSEQ VISIT: HCPCS | Performed by: NURSE PRACTITIONER

## 2022-03-21 PROCEDURE — 1123F ACP DISCUSS/DSCN MKR DOCD: CPT | Performed by: NURSE PRACTITIONER

## 2022-03-21 RX ORDER — FAMOTIDINE 20 MG/1
20 TABLET, FILM COATED ORAL 2 TIMES DAILY
Qty: 180 TABLET | Refills: 1 | Status: SHIPPED | OUTPATIENT
Start: 2022-03-21 | End: 2022-06-21

## 2022-03-21 RX ORDER — EZETIMIBE 10 MG/1
10 TABLET ORAL DAILY
Qty: 90 TABLET | Refills: 1 | Status: SHIPPED | OUTPATIENT
Start: 2022-03-21

## 2022-03-21 NOTE — PROGRESS NOTES
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[facility] )      Other Visit Diagnoses     Statin intolerance        Relevant Medications    ezetimibe (ZETIA) 10 mg tablet    Other Relevant Orders    Lipid panel    Medicare annual wellness visit, subsequent        Constipation, unspecified constipation type        Advised on adequate fluid intake, fiber diet, and will follow back for any concerns          Depression Screening and Follow-up Plan: Patient was screened for depression during today's encounter  They screened negative with a PHQ-2 score of 0  Preventive health issues were discussed with patient, and age appropriate screening tests were ordered as noted in patient's After Visit Summary  Personalized health advice and appropriate referrals for health education or preventive services given if needed, as noted in patient's After Visit Summary       History of Present Illness:     Patient presents for Medicare Annual Wellness visit    Patient Care Team:  Estephania Tijerina as PCP - General (Family Medicine)  Anne Marie Law MD as PCP - [address] [place] [address] (RTE)  MD Ramy Goldberg MD (Colon and Rectal Surgery)  Lusi Costa MD (Surgical Oncology)     Problem List:     Patient Active Problem List   Diagnosis    Callus of foot    Foot pain    GERD (gastroesophageal reflux disease)    Hyperlipidemia    Prediabetes    Varicose veins with pain    [facility]    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[facility] )    Dyspnea on exertion    Dyslipidemia    Sigmoid diverticulosis    PONV (postoperative nausea and vomiting)    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    Lesion of ovary    Hypertension    Chemotherapy adverse reaction    Dehydration    Chemotherapy-induced nausea    Platelets decreased (HCC)    Stage 3 chronic kidney disease, unspecified whether stage 3a or 3b CKD (HCC)      Past Medical and Surgical History:     Past Medical History:   Diagnosis Date    Blood in stool     Breast disorder     Cancer [facility] )     rectal    Cancer determined by colorectal biopsy ([facility] )     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    GERD (gastroesophageal reflux disease)     History of rectal surgery     Hyperlipidemia     PONV (postoperative nausea and vomiting)      Past Surgical History:   Procedure Laterality Date    BREAST LUMPECTOMY Right      SECTION      x3    COLON SIGMOID RESECTION      COLONOSCOPY      DILATION AND CURETTAGE OF UTERUS      ILEO LOOP DIVERSION N/A 12/10/2019    Procedure: ILEOSTOMY LOOP;  Surgeon: Valery Latif MD;  [facility];  Service: Robotics    IR BIOPSY OMENTUM  2021    IR PORT PLACEMENT  2021    TX CLOSE ENTEROSTOMY N/A 2020    Procedure: CLOSURE ILEOSTOMY;  Surgeon: Valery Latif MD;  [facility];  Service: Colorectal    TX LAP,SURG,COLECTOMY, PARTIAL, W/ANAST N/A 12/10/2019    Procedure: SIGMOID RESECTION COLON LAPAROSCOPIC W ROBOTICS converted to lap hand assisted  with Partial proctectomy , LASER FLUORESCENCE ANGIOGRAPHY, INTRA OP COLONOSCOPY;  Surgeon: Valery Latif MD;  [facility];  Service: Robotics    SMALL INTESTINE SURGERY  2020    Procedure: RESECTION SMALL BOWEL;  Surgeon: Valery Latif MD;  [facility];  Service: Colorectal      Family History:     Family History   Problem Relation Age of Onset    Hypertension Mother     Heart attack Mother     Heart attack Father     Heart disease Father     Hypertension Brother     Heart attack Brother     Leukemia Cousin     Breast cancer Cousin     Diabetes Cousin     Breast cancer Family         maternal side  Colon cancer Family         paternal uncle    Colon cancer Paternal Uncle     Stroke Neg Hx       Social History:     Social History     Socioeconomic History    Marital status:      Spouse name: None    Number of children: None    Years of education: None    Highest education level: None   Occupational History    None   Tobacco Use    Smoking status: Never Smoker    Smokeless tobacco: Never Used   Vaping Use    Vaping Use: Never used   Substance and Sexual Activity    Alcohol use: Yes     Comment: "occasionally"    Drug use: Never    Sexual activity: None   Other Topics Concern    None   Social History Narrative    None     Social Determinants of Health     Financial Resource Strain: Not on file   Food Insecurity: Not on file   Transportation Needs: Not on file   Physical Activity: Not on file   Stress: Not on file   Social Connections: Not on file   Intimate Partner Violence: Not on file   Housing Stability: Not on file      Medications and Allergies:     Current Outpatient Medications   Medication Sig Dispense Refill    docusate sodium (COLACE) 100 mg capsule Take 100 mg by mouth 2 (two) times a day      ezetimibe (ZETIA) 10 mg tablet Take 1 tablet (10 mg total) by mouth daily 90 tablet 1    famotidine (PEPCID) 20 mg tablet Take 1 tablet (20 mg total) by mouth 2 (two) times a day 180 tablet 1    ondansetron (ZOFRAN) 8 mg tablet Take 1 tablet (8 mg total) by mouth every 12 (twelve) hours as needed for nausea or vomiting 20 tablet 2    prochlorperazine (COMPAZINE) 10 mg tablet Take 1 tablet (10 mg total) by mouth every 6 (six) hours as needed for nausea or vomiting 60 tablet 0    acetaminophen (TYLENOL) 325 mg tablet Take 3 tablets (975 mg total) by mouth every 8 (eight) hours (Patient not taking: Reported on 3/21/2022 ) 30 tablet 0    lidocaine-prilocaine (EMLA) cream Apply topically as needed for mild pain (Patient not taking: Reported on 3/21/2022 ) 30 g 0     No current facility-administered medications for this visit  Allergies   Allergen Reactions    Medical Tape Rash     Skin irritation  Skin peeling  Skin irritation  Skin peeling  Blisters  Rash      Immunizations:     Immunization History   Administered Date(s) Administered    COVID-19 PFIZER VACCINE 0 3 ML IM 03/04/2021, 03/23/2021    Influenza Quadrivalent Preservative Free 3 years and older IM 10/26/2016      Health Maintenance:         Topic Date Due    Cervical Cancer Screening  04/01/2022    Breast Cancer Screening: Mammogram  10/13/2022    Hepatitis C Screening  Completed         Topic Date Due    Pneumococcal Vaccine: 65+ Years (1 of 4 - PCV13) Never done    DTaP,Tdap,and Td Vaccines (1 - Tdap) Never done    COVID-19 Vaccine (3 - Pfizer risk 4-dose series) 04/20/2021    Influenza Vaccine (1) 09/01/2021      Medicare Health Risk Assessment:     /68   Pulse 105   Temp 99 5 °F (37 5 °C)   Resp 18   Ht 4' 9" (1 448 m)   Wt 92 4 kg (203 lb 12 8 oz)   LMP 09/01/2011 (Approximate)   SpO2 99%   BMI 44 10 kg/m²      Vicki Hence is here for her Subsequent Wellness visit  Health Risk Assessment:   Patient rates overall health as good  Patient feels that their physical health rating is slightly worse  Patient is very satisfied with their life  Eyesight was rated as slightly worse  Hearing was rated as same  Patient feels that their emotional and mental health rating is same  Patients states they are never, rarely angry  Patient states they are always unusually tired/fatigued  Pain experienced in the last 7 days has been a lot  Patient's pain rating has been 9/10  Patient states that she has experienced weight loss or gain in last 6 months  Lost weight due to chemo    Depression Screening:   PHQ-2 Score: 0      Fall Risk Screening:    In the past year, patient has experienced: no history of falling in past year      Urinary Incontinence Screening:   Patient has not leaked urine accidently in the last six months  Home Safety:  Patient has trouble with stairs inside or outside of their home  Patient has working smoke alarms and has working carbon monoxide detector  Home safety hazards include: none  Nutrition:   Current diet is Regular  Medications:   Patient is not currently taking any over-the-counter supplements  Patient is able to manage medications  Activities of Daily Living (ADLs)/Instrumental Activities of Daily Living (IADLs):   Walk and transfer into and out of bed and chair?: Yes  Dress and groom yourself?: Yes    Bathe or shower yourself?: Yes    Feed yourself?  Yes  Do your laundry/housekeeping?: Yes  Manage your money, pay your bills and track your expenses?: Yes  Make your own meals?: Yes    Do your own shopping?: Yes    Previous Hospitalizations:   Any hospitalizations or ED visits within the last 12 months?: Yes    How many hospitalizations have you had in the last year?: 1-2    Advance Care Planning:   Living will: No    Durable POA for healthcare: No    Advanced directive counseling given: Yes    Five wishes given: Yes      Cognitive Screening:   Provider or family/friend/caregiver concerned regarding cognition?: No    PREVENTIVE SCREENINGS      Cardiovascular Screening:    General: History Lipid Disorder, Risks and Benefits Discussed and Screening Current      Diabetes Screening:     General: Screening Current      Colorectal Cancer Screening:     General: History Colorectal Cancer      Breast Cancer Screening:     General: Screening Current and Risks and Benefits Discussed      Cervical Cancer Screening:    General: Screening Not Indicated      Osteoporosis Screening:    General: Risks and Benefits Discussed and Screening Current      Abdominal Aortic Aneurysm (AAA) Screening:        General: Screening Not Indicated      Lung Cancer Screening:     General: Screening Not Indicated      Hepatitis C Screening:    General: Screening Current    Screening, Brief Intervention, and Referral to Treatment (SBIRT)    Screening  Typical number of drinks in a day: 0  Typical number of drinks in a week: 0  Interpretation: Low risk drinking behavior  Single Item Drug Screening:  How often have you used an illegal drug (including marijuana) or a prescription medication for non-medical reasons in the past year? never    Single Item Drug Screen Score: 0  Interpretation: Negative screen for possible drug use disorder    Brief Intervention  Alcohol & drug use screenings were reviewed  No concerns regarding substance use disorder identified  Other Counseling Topics:   Car/seat belt/driving safety, skin self-exam, sunscreen and calcium and vitamin D intake and regular weightbearing exercise         DEEPAK Michelle

## 2022-03-21 NOTE — PROGRESS NOTES
Assessment/Plan:    1  Rectosigmoid cancer [facility])  Comments:  managed by oncologist and will follow with surgeon for evaluation for possible surgery based on CT abdomen results    2  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[facility] )  Comments:  currently on chemotherapy    3  Mixed hyperlipidemia  Comments:  continue with zetia  Orders:  -     ezetimibe (ZETIA) 10 mg tablet; Take 1 tablet (10 mg total) by mouth daily  -     Lipid panel; Future    4  Statin intolerance  -     ezetimibe (ZETIA) 10 mg tablet; Take 1 tablet (10 mg total) by mouth daily  -     Lipid panel; Future    5  Gastroesophageal reflux disease, unspecified whether esophagitis present  Comments:  stable with current regimen  Orders:  -     famotidine (PEPCID) 20 mg tablet; Take 1 tablet (20 mg total) by mouth 2 (two) times a day    6  Morbid obesity ([facility] )    7  Medicare annual wellness visit, subsequent    8  Constipation, unspecified constipation type  Comments:  Advised on adequate fluid intake, fiber diet, and will follow back for any concerns          BMI Counseling: Body mass index is 44 1 kg/m²  Discussed the patient's BMI with her  The BMI is above normal  Nutrition recommendations include reducing portion sizes, decreasing overall calorie intake, 3-5 servings of fruits/vegetables daily, reducing fast food intake and consuming healthier snacks  Patient Instructions:  Supportive care discussed and advised  Advised to RTO for any worsening and no improvement  Follow up for no improvement and worsening of conditions  Patient advised and educated when to see immediate medical care   '  Return in about 6 months (around 9/21/2022), or if symptoms worsen or fail to improve        Future Appointments   Date Time Provider Colton Cunningham   3/22/2022  1:00 PM [address]   3/29/2022 10:00 AM [facility] CHAIR [address] [facility]   3/31/2022 12:00 PM [address]   4/6/2022  1:00 PM Calvin Haney MD SURG [facility] 4/12/2022 10:00 AM [facility] [address]   4/14/2022 11:30 AM [facility] [address]   4/26/2022 10:00 AM [facility] [address]   4/28/2022 12:00 PM [facility]           Subjective:      Patient ID: Concha Rodriguez is a 75 y o  female  Chief Complaint   Patient presents with    Follow-up     6 month f/u nm lpn  Medicare Wellness Visit         Vitals:  /68   Pulse 105   Temp 99 5 °F (37 5 °C)   Resp 18   Ht 4' 9" (1 448 m)   Wt 92 4 kg (203 lb 12 8 oz)   LMP 09/01/2011 (Approximate)   SpO2 99%   BMI 44 10 kg/m²     HPI  Patient is here for 6 month follow up  Currently under chemotherapy and scheduled for CT abdomen tomorrow and then will be evaluated for the possibility for surgery  Currently having constipation from last couple of days which has gotten better slightly  Taking colace and dulcolax  Denies nausea and vomiting  Complaint with zetia and tolerating it well and will check lipid profile with next blood work  No pneumonia vaccine administered today as waiting for office to receive PCV 20 and will call patient to schedule that              PHQ-2/9 Depression Screening    Little interest or pleasure in doing things: 0 - not at all  Feeling down, depressed, or hopeless: 0 - not at all  PHQ-2 Score: 0  PHQ-2 Interpretation: Negative depression screen             The following portions of the patient's history were reviewed and updated as appropriate: allergies, current medications, past family history, past medical history, past social history, past surgical history and problem list       Review of Systems   Constitutional: Negative  Negative for appetite change, chills, fever and unexpected weight change  HENT: Negative  Respiratory: Negative  Cardiovascular: Negative  Gastrointestinal: Positive for constipation and nausea   Negative for abdominal pain, anal bleeding, blood in stool, diarrhea, rectal pain and vomiting  Genitourinary: Negative  Skin: Negative  Neurological: Negative for dizziness and light-headedness  Objective:    Social History     Tobacco Use   Smoking Status Never Smoker   Smokeless Tobacco Never Used       Allergies: Allergies   Allergen Reactions    Medical Tape Rash     Skin irritation  Skin peeling  Skin irritation  Skin peeling  Blisters  Rash         Current Outpatient Medications   Medication Sig Dispense Refill    docusate sodium (COLACE) 100 mg capsule Take 100 mg by mouth 2 (two) times a day      ezetimibe (ZETIA) 10 mg tablet Take 1 tablet (10 mg total) by mouth daily 90 tablet 1    famotidine (PEPCID) 20 mg tablet Take 1 tablet (20 mg total) by mouth 2 (two) times a day 180 tablet 1    ondansetron (ZOFRAN) 8 mg tablet Take 1 tablet (8 mg total) by mouth every 12 (twelve) hours as needed for nausea or vomiting 20 tablet 2    prochlorperazine (COMPAZINE) 10 mg tablet Take 1 tablet (10 mg total) by mouth every 6 (six) hours as needed for nausea or vomiting 60 tablet 0    acetaminophen (TYLENOL) 325 mg tablet Take 3 tablets (975 mg total) by mouth every 8 (eight) hours (Patient not taking: Reported on 3/21/2022 ) 30 tablet 0    lidocaine-prilocaine (EMLA) cream Apply topically as needed for mild pain (Patient not taking: Reported on 3/21/2022 ) 30 g 0     No current facility-administered medications for this visit  Physical Exam  Vitals reviewed  Constitutional:       Appearance: She is well-developed  She is obese  Cardiovascular:      Rate and Rhythm: Normal rate and regular rhythm  Heart sounds: Normal heart sounds  Pulmonary:      Effort: Pulmonary effort is normal       Breath sounds: Normal breath sounds  Abdominal:      General: Bowel sounds are normal       Palpations: Abdomen is soft  Tenderness: There is no abdominal tenderness  Skin:     General: Skin is warm and dry     Neurological:      Mental Status: She is alert and oriented to person, place, and time  Psychiatric:         Behavior: Behavior normal          Thought Content:  Thought content normal          Judgment: Judgment normal                      DEEPAK Stark

## 2022-03-21 NOTE — PATIENT INSTRUCTIONS
Supportive care discussed and advised  Advised to RTO for any worsening and no improvement  Follow up for no improvement and worsening of conditions  Patient advised and educated when to see immediate medical care  Medicare Preventive Visit Patient Instructions  Thank you for completing your Welcome to Medicare Visit or Medicare Annual Wellness Visit today  Your next wellness visit will be due in one year (3/22/2023)  The screening/preventive services that you may require over the next 5-10 years are detailed below  Some tests may not apply to you based off risk factors and/or age  Screening tests ordered at today's visit but not completed yet may show as past due  Also, please note that scanned in results may not display below  Preventive Screenings:  Service Recommendations Previous Testing/Comments   Colorectal Cancer Screening  * Colonoscopy    * Fecal Occult Blood Test (FOBT)/Fecal Immunochemical Test (FIT)  * Fecal DNA/Cologuard Test  * Flexible Sigmoidoscopy Age: 54-65 years old   Colonoscopy: every 10 years (may be performed more frequently if at higher risk)  OR  FOBT/FIT: every 1 year  OR  Cologuard: every 3 years  OR  Sigmoidoscopy: every 5 years  Screening may be recommended earlier than age 48 if at higher risk for colorectal cancer  Also, an individualized decision between you and your healthcare provider will decide whether screening between the ages of 74-80 would be appropriate  Colonoscopy: 10/06/2021  FOBT/FIT: Not on file  Cologuard: Not on file  Sigmoidoscopy: Not on file    History Colorectal Cancer     Breast Cancer Screening Age: 36 years old  Frequency: every 1-2 years  Not required if history of left and right mastectomy Mammogram: 10/13/2021    Screening Current   Cervical Cancer Screening Between the ages of 21-29, pap smear recommended once every 3 years  Between the ages of 33-67, can perform pap smear with HPV co-testing every 5 years     Recommendations may differ for women with a history of total hysterectomy, cervical cancer, or abnormal pap smears in past  Pap Smear: 04/01/2019    Screening Not Indicated   Hepatitis C Screening Once for adults born between 1945 and 1965  More frequently in patients at high risk for Hepatitis C Hep C Antibody: 04/15/2019    Screening Current   Diabetes Screening 1-2 times per year if you're at risk for diabetes or have pre-diabetes Fasting glucose: 108 mg/dL   A1C: 5 3 %    Screening Current   Cholesterol Screening Once every 5 years if you don't have a lipid disorder  May order more often based on risk factors  Lipid panel: 09/13/2021    Screening Not Indicated  History Lipid Disorder     Other Preventive Screenings Covered by Medicare:  1  Abdominal Aortic Aneurysm (AAA) Screening: covered once if your at risk  You're considered to be at risk if you have a family history of AAA  2  Lung Cancer Screening: covers low dose CT scan once per year if you meet all of the following conditions: (1) Age 50-69; (2) No signs or symptoms of lung cancer; (3) Current smoker or have quit smoking within the last 15 years; (4) You have a tobacco smoking history of at least 30 pack years (packs per day multiplied by number of years you smoked); (5) You get a written order from a healthcare provider  3  Glaucoma Screening: covered annually if you're considered high risk: (1) You have diabetes OR (2) Family history of glaucoma OR (3)  aged 48 and older OR (3)  American aged 72 and older  3  Osteoporosis Screening: covered every 2 years if you meet one of the following conditions: (1) You're estrogen deficient and at risk for osteoporosis based off medical history and other findings; (2) Have a vertebral abnormality; (3) On glucocorticoid therapy for more than 3 months; (4) Have primary hyperparathyroidism; (5) On osteoporosis medications and need to assess response to drug therapy     · Last bone density test (DXA Scan): 10/13/2021   5  HIV Screening: covered annually if you're between the age of 15-65  Also covered annually if you are younger than 13 and older than 72 with risk factors for HIV infection  For pregnant patients, it is covered up to 3 times per pregnancy  Immunizations:  Immunization Recommendations   Influenza Vaccine Annual influenza vaccination during flu season is recommended for all persons aged >= 6 months who do not have contraindications   Pneumococcal Vaccine (Prevnar and Pneumovax)  * Prevnar = PCV13  * Pneumovax = PPSV23   Adults 25-60 years old: 1-3 doses may be recommended based on certain risk factors  Adults 72 years old: Prevnar (PCV13) vaccine recommended followed by Pneumovax (PPSV23) vaccine  If already received PPSV23 since turning 65, then PCV13 recommended at least one year after PPSV23 dose  Hepatitis B Vaccine 3 dose series if at intermediate or high risk (ex: diabetes, end stage renal disease, liver disease)   Tetanus (Td) Vaccine - COST NOT COVERED BY MEDICARE PART B Following completion of primary series, a booster dose should be given every 10 years to maintain immunity against tetanus  Td may also be given as tetanus wound prophylaxis  Tdap Vaccine - COST NOT COVERED BY MEDICARE PART B Recommended at least once for all adults  For pregnant patients, recommended with each pregnancy     Shingles Vaccine (Shingrix) - COST NOT COVERED BY MEDICARE PART B  2 shot series recommended in those aged 48 and above     Health Maintenance Due:      Topic Date Due    Cervical Cancer Screening  04/01/2022    Breast Cancer Screening: Mammogram  10/13/2022    Hepatitis C Screening  Completed     Immunizations Due:      Topic Date Due    Pneumococcal Vaccine: 65+ Years (1 of 4 - PCV13) Never done    DTaP,Tdap,and Td Vaccines (1 - Tdap) Never done    COVID-19 Vaccine (3 - Pfizer risk 4-dose series) 04/20/2021    Influenza Vaccine (1) 09/01/2021     Advance Directives   What are advance directives? Advance directives are legal documents that state your wishes and plans for medical care  These plans are made ahead of time in case you lose your ability to make decisions for yourself  Advance directives can apply to any medical decision, such as the treatments you want, and if you want to donate organs  What are the types of advance directives? There are many types of advance directives, and each state has rules about how to use them  You may choose a combination of any of the following:  · Living will: This is a written record of the treatment you want  You can also choose which treatments you do not want, which to limit, and which to stop at a certain time  This includes surgery, medicine, IV fluid, and tube feedings  · Durable power of  for healthcare [facility]): This is a written record that states who you want to make healthcare choices for you when you are unable to make them for yourself  This person, called a proxy, is usually a family member or a friend  You may choose more than 1 proxy  · Do not resuscitate (DNR) order:  A DNR order is used in case your heart stops beating or you stop breathing  It is a request not to have certain forms of treatment, such as CPR  A DNR order may be included in other types of advance directives  · Medical directive: This covers the care that you want if you are in a coma, near death, or unable to make decisions for yourself  You can list the treatments you want for each condition  Treatment may include pain medicine, surgery, blood transfusions, dialysis, IV or tube feedings, and a ventilator (breathing machine)  · Values history: This document has questions about your views, beliefs, and how you feel and think about life  This information can help others choose the care that you would choose  Why are advance directives important? An advance directive helps you control your care   Although spoken wishes may be used, it is better to have your wishes written down  Spoken wishes can be misunderstood, or not followed  Treatments may be given even if you do not want them  An advance directive may make it easier for your family to make difficult choices about your care  Weight Management   Why it is important to manage your weight:  Being overweight increases your risk of health conditions such as heart disease, high blood pressure, type 2 diabetes, and certain types of cancer  It can also increase your risk for osteoarthritis, sleep apnea, and other respiratory problems  Aim for a slow, steady weight loss  Even a small amount of weight loss can lower your risk of health problems  How to lose weight safely:  A safe and healthy way to lose weight is to eat fewer calories and get regular exercise  You can lose up about 1 pound a week by decreasing the number of calories you eat by 500 calories each day  Healthy meal plan for weight management:  A healthy meal plan includes a variety of foods, contains fewer calories, and helps you stay healthy  A healthy meal plan includes the following:  · Eat whole-grain foods more often  A healthy meal plan should contain fiber  Fiber is the part of grains, fruits, and vegetables that is not broken down by your body  Whole-grain foods are healthy and provide extra fiber in your diet  Some examples of whole-grain foods are whole-wheat breads and pastas, oatmeal, brown rice, and bulgur  · Eat a variety of vegetables every day  Include dark, leafy greens such as spinach, kale, devin greens, and mustard greens  Eat yellow and orange vegetables such as carrots, sweet potatoes, and winter squash  · Eat a variety of fruits every day  Choose fresh or canned fruit (canned in its own juice or light syrup) instead of juice  Fruit juice has very little or no fiber  · Eat low-fat dairy foods  Drink fat-free (skim) milk or 1% milk  Eat fat-free yogurt and low-fat cottage cheese   Try low-fat cheeses such as mozzarella and other reduced-fat cheeses  · Choose meat and other protein foods that are low in fat  Choose beans or other legumes such as split peas or lentils  Choose fish, skinless poultry (chicken or turkey), or lean cuts of red meat (beef or pork)  Before you cook meat or poultry, cut off any visible fat  · Use less fat and oil  Try baking foods instead of frying them  Add less fat, such as margarine, sour cream, regular salad dressing and mayonnaise to foods  Eat fewer high-fat foods  Some examples of high-fat foods include french fries, doughnuts, ice cream, and cakes  · Eat fewer sweets  Limit foods and drinks that are high in sugar  This includes candy, cookies, regular soda, and sweetened drinks  Exercise:  Exercise at least 30 minutes per day on most days of the week  Some examples of exercise include walking, biking, dancing, and swimming  You can also fit in more physical activity by taking the stairs instead of the elevator or parking farther away from stores  Ask your healthcare provider about the best exercise plan for you  © Copyright [employer] 2018 Information is for End User's use only and may not be sold, redistributed or otherwise used for commercial purposes   All illustrations and images included in CareNotes® are the copyrighted property of A D A M , Inc  or Stan Partida  [facility]

## 2022-03-22 ENCOUNTER — APPOINTMENT (OUTPATIENT)
Dept: LAB | Facility: HOSPITAL | Age: 67
End: 2022-03-22
Payer: MEDICARE

## 2022-03-22 ENCOUNTER — HOSPITAL ENCOUNTER (OUTPATIENT)
Dept: RADIOLOGY | Facility: HOSPITAL | Age: 67
Discharge: HOME | End: 2022-03-22
Payer: MEDICARE

## 2022-03-22 DIAGNOSIS — Z78.9 STATIN INTOLERANCE: ICD-10-CM

## 2022-03-22 DIAGNOSIS — C19 RECTOSIGMOID CANCER (HCC): ICD-10-CM

## 2022-03-22 DIAGNOSIS — C78.6 OMENTAL METASTASIS (HCC): ICD-10-CM

## 2022-03-22 DIAGNOSIS — E78.2 MIXED HYPERLIPIDEMIA: ICD-10-CM

## 2022-03-22 LAB
CHOLEST SERPL-MCNC: 179 MG/DL
HDLC SERPL-MCNC: 57 MG/DL
LDLC SERPL CALC-MCNC: 103 MG/DL (ref 0–100)
NONHDLC SERPL-MCNC: 122 MG/DL
TRIGL SERPL-MCNC: 93 MG/DL

## 2022-03-22 PROCEDURE — 71260 CT THORAX DX C+: CPT

## 2022-03-22 PROCEDURE — 80061 LIPID PANEL: CPT

## 2022-03-22 PROCEDURE — 36415 COLL VENOUS BLD VENIPUNCTURE: CPT

## 2022-03-22 PROCEDURE — 74177 CT ABD & PELVIS W/CONTRAST: CPT

## 2022-03-22 RX ORDER — FLUOROURACIL 50 MG/ML
340 INJECTION, SOLUTION INTRAVENOUS ONCE
Status: CANCELLED | OUTPATIENT
Start: 2022-03-29

## 2022-03-22 RX ORDER — SODIUM CHLORIDE 9 MG/ML
20 INJECTION, SOLUTION INTRAVENOUS ONCE AS NEEDED
Status: CANCELLED | OUTPATIENT
Start: 2022-03-29

## 2022-03-22 RX ORDER — DEXTROSE MONOHYDRATE 50 MG/ML
20 INJECTION, SOLUTION INTRAVENOUS ONCE
Status: CANCELLED | OUTPATIENT
Start: 2022-03-29

## 2022-03-22 RX ADMIN — IOHEXOL 100 ML: 350 INJECTION, SOLUTION INTRAVENOUS at 13:17

## 2022-03-23 DIAGNOSIS — C19 RECTOSIGMOID CANCER (HCC): ICD-10-CM

## 2022-03-23 DIAGNOSIS — C78.6 OMENTAL METASTASIS (HCC): Primary | ICD-10-CM

## 2022-03-24 ENCOUNTER — TELEPHONE (OUTPATIENT)
Dept: HEMATOLOGY ONCOLOGY | Facility: CLINIC | Age: 67
End: 2022-03-24

## 2022-03-24 NOTE — TELEPHONE ENCOUNTER
Dr Lyons  reviewed CT scan  Patient will see Dr Davonte Kenyon on 4/6/2022  No chemotherapy until seen by Dr Davonte Kenyon  Will send message to [facility]fusion, place [facility] order on hold, email [facility] and cancel [facility]  Patient aware of plan

## 2022-03-29 ENCOUNTER — HOSPITAL ENCOUNTER (OUTPATIENT)
Dept: INFUSION CENTER | Facility: HOSPITAL | Age: 67
End: 2022-03-29
Attending: INTERNAL MEDICINE

## 2022-04-04 ENCOUNTER — TELEPHONE (OUTPATIENT)
Dept: HEMATOLOGY ONCOLOGY | Facility: CLINIC | Age: 67
End: 2022-04-04

## 2022-04-04 NOTE — TELEPHONE ENCOUNTER
CALL RETURN FORM   Reason for patient call? Patient has questions about transportation    Patient's primary oncologist?  Dr Mimi Martinez   Name of person the patient was calling for? Dr Mimi Martinez   Any additional information to add, if applicable? Please call [phone] regarding arrangements with [facility]   Informed patient that the message will be forwarded to the team and someone will get back to them as soon as possible    Did you relay this information to the patient?   Yes

## 2022-04-04 NOTE — TELEPHONE ENCOUNTER
[facility] to [facility]  [facility] booked on 4/6  Offered to reschedule appt with Dr Freeman and setup [facility] however none of the open appts at [facility] or [place] were close enough for her liking  We will keep appt as scheduled on 4/6 and she will try to find a ride from someone  She asked to be called if there are any cancellations at [facility] or [place]

## 2022-04-05 NOTE — TELEPHONE ENCOUNTER
Called and spoke with Heather Kelly to inform her we were able to setup a lyft ride for her appt tomorrow  12pm pickup tomorrow  She was agreeable and very appreciative

## 2022-04-06 ENCOUNTER — OFFICE VISIT (OUTPATIENT)
Dept: SURGICAL ONCOLOGY | Facility: CLINIC | Age: 67
End: 2022-04-06
Payer: MEDICARE

## 2022-04-06 VITALS
RESPIRATION RATE: 20 BRPM | WEIGHT: 207.2 LBS | SYSTOLIC BLOOD PRESSURE: 138 MMHG | OXYGEN SATURATION: 98 % | TEMPERATURE: 98.3 F | HEIGHT: 57 IN | DIASTOLIC BLOOD PRESSURE: 82 MMHG | BODY MASS INDEX: 44.7 KG/M2 | HEART RATE: 113 BPM

## 2022-04-06 DIAGNOSIS — C19 RECTOSIGMOID CANCER (HCC): Primary | ICD-10-CM

## 2022-04-06 DIAGNOSIS — D49.9 NEOPLASM OF UNSPECIFIED BEHAVIOR OF UNSPECIFIED SITE: ICD-10-CM

## 2022-04-06 DIAGNOSIS — C78.6 OMENTAL METASTASIS (HCC): ICD-10-CM

## 2022-04-06 DIAGNOSIS — E08.8 DIABETES MELLITUS DUE TO UNDERLYING CONDITION WITH UNSPECIFIED COMPLICATIONS (HCC): ICD-10-CM

## 2022-04-06 PROCEDURE — 99215 OFFICE O/P EST HI 40 MIN: CPT | Performed by: STUDENT IN AN ORGANIZED HEALTH CARE EDUCATION/TRAINING PROGRAM

## 2022-04-06 RX ORDER — CEFAZOLIN SODIUM 2 G/50ML
2000 SOLUTION INTRAVENOUS ONCE
Status: CANCELLED | OUTPATIENT
Start: 2022-04-06 | End: 2022-04-06

## 2022-04-06 NOTE — LETTER
April 6, 2022     Vianey Nascimento, [address] [facility] [address]    Patient: Leanna Bermudez   YOB: 1955   Date of Visit: 4/6/2022       Dear Dr Ivana Luz: Thank you for referring Ruba Gonzalez to me for evaluation  Below are my notes for this consultation  If you have questions, please do not hesitate to call me  I look forward to following your patient along with you  Sincerely,        Héctor Robles MD        CC: MD Héctor Braswell MD  4/6/2022  2:40 PM  Sign when Signing Visit  Surgical Oncology Consultation    [address] [address]    Patient:  Leanna Bermudez  1955  [id number]    Primary Care provider:  Enrigue Collet, [place] [address]    Referring provider:  No referring provider defined for this encounter  Diagnoses and all orders for this visit:    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[facility] )    Omental metastasis [facility])        Chief Complaint   Patient presents with    Follow-up       No follow-ups on file  Oncology History   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[facility] [address] )   9/23/2019 Initial Diagnosis    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[facility] [address] )     12/10/2019 Surgery    Low anterior resection (Dr Quintin Aj):    A  Rectosigmoid colon, low anterior resection:  - Adenocarcinoma, moderately differentiated  - Tumor invades through the muscularis propria into pericolorectal tissue, T3  - Diverticula  - All margins are negative for tumor   - Thirty-four lymph nodes, negative for malignancy (0/34)       B  Colon, anastomotic rings, resection:  - Two portions of colon with no pathologic abnormality     12/10/2019 Genomic Testing    RRESULTS OF IMMUNOHISTOCHEMICAL ANALYSIS FOR MISMATCH REPAIR PROTEIN LOSS  INTERPRETATION: NO LOSS OF NUCLEAR EXPRESSION OF MMR PROTEINS: LOW PROBABILITY OF MSI-H  Note: Background non-neoplastic tissue and/or internal control with intact nuclear expression  RESULTS:  Antibody          Clone               Description                           Results  MLH1               M1                  Mismatch repair protein       Intact nuclear expression  MSH2              [id number]       Mismatch repair protein       Intact nuclear expression  MSH6              40                   Mismatch repair protein       Intact nuclear expression  PMS2              [id number]         Mismatch repair protein       Intact nuclear expression     11/12/2021 Biopsy    Omental mass:  - Metastatic adenocarcinoma, with an immunophenotype compatible with metastasis from patient's known colonic primary       12/10/2021 - 12/10/2021 Chemotherapy    capecitabine (XELODA), 850 mg/m2 = 1,600 mg (100 % of original dose 850 mg/m2), Oral, 2 times daily with meals, 1 of 8 cycles  Dose modification: 850 mg/m2 (100 % of original dose 850 mg/m2, Cycle 1, Reason: Other (see comments))  fosaprepitant (EMEND) IVPB, 150 mg, Intravenous, Once, 1 of 1 cycle  Administration: 150 mg (12/10/2021)  oxaliplatin (ELOXATIN) chemo infusion, 244 4 mg, Intravenous, Once, 1 of 8 cycles  Administration: 250 mg (12/10/2021)     1/4/2022 -  Chemotherapy    fluorouracil (ADRUCIL), 300 mg/m2 = 560 mg (75 % of original dose 400 mg/m2), Intravenous, Once, 6 of 12 cycles  Dose modification: 300 mg/m2 (75 % of original dose 400 mg/m2, Cycle 1, Reason: Dose Not Tolerated), 340 mg/m2 (85 % of original dose 400 mg/m2, Cycle 4, Reason: Other (see comments)), 340 mg/m2 (85 % of original dose 400 mg/m2, Cycle 5, Reason: Other (see comments))  Administration: 560 mg (1/4/2022), 560 mg (1/18/2022), 560 mg (2/1/2022), 635 mg (2/15/2022), 635 mg (3/1/2022), 635 mg (3/15/2022)  leucovorin calcium IVPB, 300 mg/m2 = 561 mg (75 % of original dose 400 mg/m2), Intravenous, Once, 6 of 12 cycles  Dose modification: 300 mg/m2 (75 % of original dose 400 mg/m2, Cycle 1, Reason: Dose Not Tolerated), 340 mg/m2 (85 % of original dose 400 mg/m2, Cycle 4, Reason: Other (see comments)), 340 mg/m2 (85 % of original dose 400 mg/m2, Cycle 5, Reason: Other (see comments))  Administration: 561 mg (1/4/2022), 561 mg (1/18/2022), 561 mg (2/1/2022), 636 mg (2/15/2022), 636 mg (3/1/2022), 636 mg (3/15/2022)  oxaliplatin (ELOXATIN) chemo infusion, 63 75 mg/m2 = 119 2125 mg (75 % of original dose 85 mg/m2), Intravenous, Once, 6 of 12 cycles  Dose modification: 63 75 mg/m2 (75 % of original dose 85 mg/m2, Cycle 1, Reason: Dose Not Tolerated), 72 25 mg/m2 (85 % of original dose 85 mg/m2, Cycle 4, Reason: Other (see comments)), 72 25 mg/m2 (85 % of original dose 85 mg/m2, Cycle 5, Reason: Other (see comments))  Administration: 119 2125 mg (1/4/2022), 119 2125 mg (1/18/2022), 119 2125 mg (2/1/2022), 135 1075 mg (2/15/2022), 135 1075 mg (3/1/2022), 135 1075 mg (3/15/2022)  fluorouracil (ADRUCIL) ambulatory infusion Soln, 900 mg/m2/day = 3,365 mg (75 % of original dose 1,200 mg/m2/day), Intravenous, Over 46 hours, 6 of 12 cycles  Dose modification: 900 mg/m2/day (75 % of original dose 1,200 mg/m2/day, Cycle 2, Reason: Other (see comments)), 1,020 mg/m2/day (85 % of original dose 1,200 mg/m2/day, Cycle 4, Reason: Other (see comments), Comment: anticipated tolerance)     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[address] )   11/29/2021 Initial Diagnosis    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[facility] [address] )     12/10/2021 - 12/10/2021 Chemotherapy    capecitabine (XELODA), 850 mg/m2 = 1,600 mg (100 % of original dose 850 mg/m2), Oral, 2 times daily with meals, 1 of 8 cycles  Dose modification: 850 mg/m2 (100 % of original dose 850 mg/m2, Cycle 1, Reason: Other (see comments))  fosaprepitant (EMEND) IVPB, 150 mg, Intravenous, Once, 1 of 1 cycle  Administration: 150 mg (12/10/2021)  oxaliplatin (ELOXATIN) chemo infusion, 244 4 mg, Intravenous, Once, 1 of 8 cycles  Administration: 250 mg (12/10/2021)     1/4/2022 -  Chemotherapy    fluorouracil (ADRUCIL), 300 mg/m2 = 560 mg (75 % of original dose 400 mg/m2), Intravenous, Once, 6 of 12 cycles  Dose modification: 300 mg/m2 (75 % of original dose 400 mg/m2, Cycle 1, Reason: Dose Not Tolerated), 340 mg/m2 (85 % of original dose 400 mg/m2, Cycle 4, Reason: Other (see comments)), 340 mg/m2 (85 % of original dose 400 mg/m2, Cycle 5, Reason: Other (see comments))  Administration: 560 mg (1/4/2022), 560 mg (1/18/2022), 560 mg (2/1/2022), 635 mg (2/15/2022), 635 mg (3/1/2022), 635 mg (3/15/2022)  leucovorin calcium IVPB, 300 mg/m2 = 561 mg (75 % of original dose 400 mg/m2), Intravenous, Once, 6 of 12 cycles  Dose modification: 300 mg/m2 (75 % of original dose 400 mg/m2, Cycle 1, Reason: Dose Not Tolerated), 340 mg/m2 (85 % of original dose 400 mg/m2, Cycle 4, Reason: Other (see comments)), 340 mg/m2 (85 % of original dose 400 mg/m2, Cycle 5, Reason: Other (see comments))  Administration: 561 mg (1/4/2022), 561 mg (1/18/2022), 561 mg (2/1/2022), 636 mg (2/15/2022), 636 mg (3/1/2022), 636 mg (3/15/2022)  oxaliplatin (ELOXATIN) chemo infusion, 63 75 mg/m2 = 119 2125 mg (75 % of original dose 85 mg/m2), Intravenous, Once, 6 of 12 cycles  Dose modification: 63 75 mg/m2 (75 % of original dose 85 mg/m2, Cycle 1, Reason: Dose Not Tolerated), 72 25 mg/m2 (85 % of original dose 85 mg/m2, Cycle 4, Reason: Other (see comments)), 72 25 mg/m2 (85 % of original dose 85 mg/m2, Cycle 5, Reason: Other (see comments))  Administration: 119 2125 mg (1/4/2022), 119 2125 mg (1/18/2022), 119 2125 mg (2/1/2022), 135 1075 mg (2/15/2022), 135 1075 mg (3/1/2022), 135 1075 mg (3/15/2022)  fluorouracil (ADRUCIL) ambulatory infusion Soln, 900 mg/m2/day = 3,365 mg (75 % of original dose 1,200 mg/m2/day), Intravenous, Over 46 hours, 6 of 12 cycles  Dose modification: 900 mg/m2/day (75 % of original dose 1,200 mg/m2/day, Cycle 2, Reason: Other (see comments)), 1,020 mg/m2/day (85 % of original dose 1,200 mg/m2/day, Cycle 4, Reason: Other (see comments), Comment: anticipated tolerance) History of Present Illness  :   Miss Junaid Carrion is seen here today for a new diagnosis of a likely metastatic colorectal cancer  Briefly, the patient underwent screening colonoscopy in late 2019  This detected a rectosigmoid mass, which was then resected in December of 2019  Surgery required a diverting loop ileostomy, which was subsequently reversed in February of 2020  Of note, preop MRI suggested a T3bN1 lesion above the peritoneal reflection, and upfront surgery was recommended  This revealed a final path T3 N0 cancer with no aggressive features and no adjuvant therapy was recommended by her surgeon Dr Dangelo Humphrey  Since that time, the patient has been doing well  She had a CT 2/2021 which demonstrated ISIAH  She recently underwent a PET scan due to a rising CEA (4 4 from 2 2 posotp), and this demonstrated a large 4 5 cm left pericolic gutter markedly hypermetabolic mass  There was mild increased activity at the colon anastomosis  Subsequent colonoscopy revealed no abnormality at this location  She denies any systemic symptoms including weight loss, nausea, vomiting, changes in her bowel habits  I described that this juncture, a PET scan demonstrates a highly metabolic mass, and more detailed imaging is necessary  I described that an MRI will give us a detailed image of her abdomen and will allow was to ascertain whether there is a high suspicion for other peritoneal implants  This will change my surgical recommendations of resection of the single metastasis versus a potential cytoreductive surgery with HIPEC  Likewise, depending on the burden of disease, I may recommend that she proceed chemotherapy before surgery  I recommend an IR biopsy in order to determine her current markers to more properly select chemotherapy regimen  I will refer her to Dr Jordan Phillips with medical oncology for assessment    Finally, given her family history of colorectal cancer, I willr efer her to onc [facility] for assessment  She will f/u following these studies  Interval History 11/30/21  Patient returns today after the above studies  MRI of the abdomen revealed a single metastatic lesion invading the spleen  No evidence of other peritoneal implants  MRI of the pelvis revealed a concerning appearance of the ovary  This was followed by a transvaginal ultrasound, which ultimately determined that the ovary appeared benign  She then underwent interventional radiology biopsy, which confirmed a metastatic lesion from a colorectal primary  The patient has been doing very well with no new developments  We discussed that the treatment for this will require chemotherapy as well as surgical resection  I think that a robotic approach would be beneficial given the patient's overall body habitus  The patient would strongly prefer not to be in the hospital postoperatively around the holidays, and I have discussed this with Dr Magali Lo, who agrees that initiating chemotherapy now is reasonable  I will therefore see the patient in 2-3 months with a repeat CT scan of the chest abdomen and pelvis  Interval History 4/6/22  The patient returns in follow-up today  She has been undergoing FOLFOX with decent toleratation of treatment  She does describe some cold hypersensitivity  Today she is doing well with no abdominal pain, nausea vomiting, constipation or diarrhea  Follow-up scan showed overall decrease in the size of a single omental implant with less involvement of the spleen  No evidence of other disease in the chest abdomen or pelvis with the exception of the multicystic disease noted in the pelvis  Review of Systems  Complete ROS Surg Onc:   Constitutional: The patient denies new or recent history of general fatigue, no recent weight loss, no change in appetite  Eyes: No complaints of visual problems, no scleral icterus     ENT: No complaints of ear pain, no hoarseness, no difficulty swallowing,  no tinnitus and no new masses in head, oral cavity, or neck  Cardiovascular: No complaints of chest pain, no palpitations, no ankle edema  Respiratory: No complaints of shortness of breath, no cough  Gastrointestinal: No complaints of jaundice, no bloody stools, no pale stools  Genitourinary: No complaints of dysuria, no hematuria, no nocturia, no frequent urination, no urethral discharge  Musculoskeletal: No complaints of weakness, paralysis, joint stiffness or arthralgias  Integumentary: No complaints of rash, no new lesions  Neurological: No complaints of convulsions, no seizures, no dizziness  Hematologic/Lymphatic: No complaints of easy bruising  Endocrine:  ENDORSES cold intolerance  No polydipsia, polyphagia, or polyuria  Allergy/immunology:  No environmental allergies  No food allergies  Not immunocompromised        Patient Active Problem List   Diagnosis    Callus of foot    Foot pain    GERD (gastroesophageal reflux disease)    Hyperlipidemia    Prediabetes    Varicose veins with pain    [facility]    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HCC)    Dyspnea on exertion    Dyslipidemia    Sigmoid diverticulosis    PONV (postoperative nausea and vomiting)    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HCC)    Lesion of ovary    Hypertension    Chemotherapy adverse reaction    Dehydration    Chemotherapy-induced nausea    Platelets decreased (HCC)    Stage 3 chronic kidney disease, unspecified whether stage 3a or 3b CKD (HCC)     Past Medical History:   Diagnosis Date    Blood in stool     Breast disorder     Cancer ([facility] [address] [address] )     rectal    Cancer determined by colorectal biopsy [facility]     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    GERD (gastroesophageal reflux disease)     History of rectal surgery     Hyperlipidemia     PONV (postoperative nausea and vomiting)      Past Surgical History:   Procedure Laterality Date    BREAST LUMPECTOMY Right      SECTION      x3    COLON SIGMOID RESECTION      COLONOSCOPY      DILATION AND CURETTAGE OF UTERUS      ILEO LOOP DIVERSION N/A 12/10/2019    Procedure: ILEOSTOMY LOOP;  Surgeon: Vern Delgadillo MD;  [facility];  Service: Robotics    IR BIOPSY OMENTUM  11/12/2021    IR PORT PLACEMENT  12/28/2021    ND CLOSE ENTEROSTOMY N/A 2/4/2020    Procedure: CLOSURE ILEOSTOMY;  Surgeon: Vern Delgadillo MD;  [facility];  Service: Colorectal    ND LAP,SURG,COLECTOMY, PARTIAL, W/ANAST N/A 12/10/2019    Procedure: SIGMOID RESECTION COLON LAPAROSCOPIC W ROBOTICS converted to lap hand assisted  with Partial proctectomy , LASER FLUORESCENCE ANGIOGRAPHY, INTRA OP COLONOSCOPY;  Surgeon: Vern Delgadillo MD;  [facility];  Service: Robotics    SMALL INTESTINE SURGERY  2/4/2020    Procedure: RESECTION SMALL BOWEL;  Surgeon: Vern Delgadillo MD;  [facility];  Service: Colorectal     Family History   Problem Relation Age of Onset    Hypertension Mother     Heart attack Mother     Heart attack Father     Heart disease Father     Hypertension Brother     Heart attack Brother     Leukemia Cousin     Breast cancer Cousin     Diabetes Cousin     Breast cancer Family         maternal side    Colon cancer Family         paternal uncle    Colon cancer Paternal Uncle     Stroke Neg Hx      Social History     Socioeconomic History    Marital status:      Spouse name: Not on file    Number of children: Not on file    Years of education: Not on file    Highest education level: Not on file   Occupational History    Not on file   Tobacco Use    Smoking status: Never Smoker    Smokeless tobacco: Never Used   Vaping Use    Vaping Use: Never used   Substance and Sexual Activity    Alcohol use: Yes     Comment: "occasionally"    Drug use: Never    Sexual activity: Not on file   Other Topics Concern    Not on file   Social History Narrative    Not on file     Social Determinants of Health     Financial Resource Strain: Not on file   Food Insecurity: Not on file   Transportation Needs: Not on file   Physical Activity: Not on file   Stress: Not on file   Social Connections: Not on file   Intimate Partner Violence: Not on file   Housing Stability: Not on file       Current Outpatient Medications:     acetaminophen (TYLENOL) 325 mg tablet, Take 3 tablets (975 mg total) by mouth every 8 (eight) hours (Patient not taking: Reported on 3/21/2022 ), Disp: 30 tablet, Rfl: 0    docusate sodium (COLACE) 100 mg capsule, Take 100 mg by mouth 2 (two) times a day, Disp: , Rfl:     ezetimibe (ZETIA) 10 mg tablet, Take 1 tablet (10 mg total) by mouth daily, Disp: 90 tablet, Rfl: 1    famotidine (PEPCID) 20 mg tablet, Take 1 tablet (20 mg total) by mouth 2 (two) times a day, Disp: 180 tablet, Rfl: 1    lidocaine-prilocaine (EMLA) cream, Apply topically as needed for mild pain (Patient not taking: Reported on 3/21/2022 ), Disp: 30 g, Rfl: 0    ondansetron (ZOFRAN) 8 mg tablet, Take 1 tablet (8 mg total) by mouth every 12 (twelve) hours as needed for nausea or vomiting, Disp: 20 tablet, Rfl: 2    prochlorperazine (COMPAZINE) 10 mg tablet, Take 1 tablet (10 mg total) by mouth every 6 (six) hours as needed for nausea or vomiting, Disp: 60 tablet, Rfl: 0  Allergies   Allergen Reactions    Medical Tape Rash     Skin irritation  Skin peeling  Skin irritation  Skin peeling  Blisters  Rash       Vitals:    04/06/22 1305   BP: 138/82   Pulse: (!) 113   Resp: 20   Temp: 98 3 °F (36 8 °C)   SpO2: 98%       Physical Exam   General: Appears well, appears stated age  Skin: Warm, anicteric  HEENT: Normocephalic, atraumatic; sclera aniceteric, mucous membranes moist; cervical nodes without adenopathy  Cardiopulmonary: RRR, Easy WOB, no BLE edema  Abd: Obese, nontender, no masses appreciated, no hepatosplenomegaly   Incisions well-healed  MSK: Symmetric, no cyanosis, no overt weakness  Lymphatic: No cervical, axillary or inguinal lymphadenopathy  Neuro: Affect appropriate, no gross motor abnormalities      Pathology:  Final Diagnosis   A  Rectosigmoid colon, low anterior resection:  - Adenocarcinoma, moderately differentiated  - Tumor invades through the muscularis propria into pericolorectal tissue, T3  - Diverticula  - All margins are negative for tumor   - Thirty-four lymph nodes, negative for malignancy (0/34)      B  Colon, anastomotic rings, resection:  - Two portions of colon with no pathologic abnormality     Intradepartmental consultation is in agreement  Interpretation performed at [facility], [address] [facility] [address] [place] [address]  Immunohistochemistry for desmin  is performed on tissue blocks A13 and A16 to help in the assessment of this case  Labs: Reviewed in [facility]    Imaging  Colonoscopy    Addendum Date: 10/6/2021 Addendum:   [address] [id number] [phone] DATE OF SERVICE: 10/06/21 PHYSICIAN(S): Carrillo Oakes MD - Attending Physician INDICATION: Personal history of rectal cancer , had a low anterior resection in December of 2019  T3 N0, 34 lymph nodes were removed and were negative  Patient did not receive any chemotherapy  Colonoscopy performed for a diagnostic indication  POST-OP DIAGNOSIS: See the impression below  HISTORY: Prior colonoscopy: Less than 3 years ago  It is being repeated at an interval of less than 3 years because: This colonoscopy is being performed for a diagnostic indication BOWEL PREPARATION: Miralax/Dulcolax PREPROCEDURE: Informed consent was obtained for the procedure, including sedation  Risks including but not limited to bleeding, infection, perforation, adverse drug reaction and aspiration were explained in detail  Also explained about less than 100% sensitivity with the exam and other alternatives  The patient was placed in the left lateral decubitus position   DETAILS OF PROCEDURE: Patient was taken to the procedure room where a time out was performed to confirm correct patient and correct procedure  The patient underwent monitored anesthesia care, which was administered by an anesthesia professional  The patient's blood pressure, heart rate, level of consciousness, oxygen and respirations were monitored throughout the procedure  A digital rectal exam was performed  The scope was introduced through the anus and advanced to the cecum  Retroflexion was performed in the rectum  The quality of bowel preparation was evaluated using the [facility] Bowel Preparation Scale with scores of: right colon = 2, transverse colon = 2, left colon = 2  The total BBPS score was 6  Bowel prep was adequate  The patient experienced no blood loss  The procedure was not difficult  The patient tolerated the procedure well  There were no apparent complications  ANESTHESIA INFORMATION: ASA: III Anesthesia Type: IV Sedation with Anesthesia MEDICATIONS: No administrations occurring from 1215 to 1230 on 10/06/21 FINDINGS: Healthy end-to-end colorectal anastomosis with no bleeding in the mid rectum All observed locations appeared normal, including the cecum, ascending colon, transverse colon, splenic flexure and descending colon  A couple scattered diverticuli seen EVENTS: Procedure Events Event Event Time ENDO CECUM REACHED 10/6/2021 12:21 PM ENDO SCOPE OUT TIME 10/6/2021 12:28 PM SPECIMENS: * No specimens in log * EQUIPMENT: Colonoscope - IMPRESSION: 1  Normal-appearing colon  Anastomosis site was seen in the rectum appeared healthy  No evidence of local recurrence  2  Couple small scattered diverticuli  RECOMMENDATION: Repeat colonoscopy in 2 years due to a personal history of colon cancer  Advised her urgent evaluation by medical oncologist and Dr Earline Weber MD     Addendum Date: 10/6/2021 Addendum:   [address] [place] [id number] [phone] DATE OF SERVICE: 10/06/21 PHYSICIAN(S): Jemal Weber MD - Attending Physician INDICATION: Personal history of rectal cancer , had a low anterior resection in December of 2019  T3 N0, 34 lymph nodes were removed and were negative  Patient did not receive any chemotherapy  Colonoscopy performed for a diagnostic indication  POST-OP DIAGNOSIS: See the impression below  HISTORY: Prior colonoscopy: Less than 3 years ago  It is being repeated at an interval of less than 3 years because: This colonoscopy is being performed for a diagnostic indication BOWEL PREPARATION: Miralax/Dulcolax PREPROCEDURE: Informed consent was obtained for the procedure, including sedation  Risks including but not limited to bleeding, infection, perforation, adverse drug reaction and aspiration were explained in detail  Also explained about less than 100% sensitivity with the exam and other alternatives  The patient was placed in the left lateral decubitus position  DETAILS OF PROCEDURE: Patient was taken to the procedure room where a time out was performed to confirm correct patient and correct procedure  The patient underwent monitored anesthesia care, which was administered by an anesthesia professional  The patient's blood pressure, heart rate, level of consciousness, oxygen and respirations were monitored throughout the procedure  A digital rectal exam was performed  The scope was introduced through the anus and advanced to the cecum  Retroflexion was performed in the rectum  The quality of bowel preparation was evaluated using the [facility] Bowel Preparation Scale with scores of: right colon = 2, transverse colon = 2, left colon = 2  The total BBPS score was 6  Bowel prep was adequate  The patient experienced no blood loss  The procedure was not difficult  The patient tolerated the procedure well  There were no apparent complications   ANESTHESIA INFORMATION: ASA: III Anesthesia Type: IV Sedation with Anesthesia MEDICATIONS: No administrations occurring from 1215 to 1230 on 10/06/21 FINDINGS: Healthy end-to-end colorectal anastomosis with no bleeding in the mid rectum All observed locations appeared normal, including the cecum, ascending colon, transverse colon, splenic flexure and descending colon  A couple scattered diverticuli seen EVENTS: Procedure Events Event Event Time ENDO CECUM REACHED 10/6/2021 12:21 PM ENDO SCOPE OUT TIME 10/6/2021 12:28 PM SPECIMENS: * No specimens in log * EQUIPMENT: Colonoscope - IMPRESSION: 1  Normal-appearing colon  Anastomosis site was seen in the rectum appeared healthy  No evidence of local recurrence  2  Couple small scattered diverticuli  RECOMMENDATION: Repeat colonoscopy in 2 years due to a personal history of colon cancer  Heath Lambert MD     Result Date: 10/6/2021  Narrative: [address] [place] [id number] [phone] DATE OF SERVICE: 10/06/21 PHYSICIAN(S): Heath Lambert MD - Attending Physician INDICATION: Personal history of rectal cancer Colonoscopy performed for a diagnostic indication  POST-OP DIAGNOSIS: See the impression below  HISTORY: Prior colonoscopy: Less than 3 years ago  It is being repeated at an interval of less than 3 years because: This colonoscopy is being performed for a diagnostic indication BOWEL PREPARATION: Miralax/Dulcolax PREPROCEDURE: Informed consent was obtained for the procedure, including sedation  Risks including but not limited to bleeding, infection, perforation, adverse drug reaction and aspiration were explained in detail  Also explained about less than 100% sensitivity with the exam and other alternatives  The patient was placed in the left lateral decubitus position  DETAILS OF PROCEDURE: Patient was taken to the procedure room where a time out was performed to confirm correct patient and correct procedure  The patient underwent monitored anesthesia care, which was administered by an anesthesia professional  The patient's blood pressure, heart rate, level of consciousness, oxygen and respirations were monitored throughout the procedure   A digital rectal exam was performed  The scope was introduced through the anus and advanced to the cecum  Retroflexion was performed in the rectum  The quality of bowel preparation was evaluated using the [facility] Bowel Preparation Scale with scores of: right colon = 2, transverse colon = 2, left colon = 2  The total BBPS score was 6  Bowel prep was adequate  The patient experienced no blood loss  The procedure was not difficult  The patient tolerated the procedure well  There were no apparent complications  ANESTHESIA INFORMATION: ASA: III Anesthesia Type: IV Sedation with Anesthesia MEDICATIONS: No administrations occurring from 1215 to 1230 on 10/06/21 FINDINGS: Healthy end-to-end colorectal anastomosis with no bleeding in the mid rectum All observed locations appeared normal, including the cecum, ascending colon, transverse colon, splenic flexure and descending colon  A couple scattered diverticuli seen EVENTS: Procedure Events Event Event Time ENDO CECUM REACHED 10/6/2021 12:21 PM ENDO SCOPE OUT TIME 10/6/2021 12:28 PM SPECIMENS: * No specimens in log * EQUIPMENT: Colonoscope -     Impression: 1  Normal-appearing colon  Anastomosis site was seen in the rectum appeared healthy  No evidence of local recurrence  2  Couple small scattered diverticuli  RECOMMENDATION: Repeat colonoscopy in 2 years due to a personal history of colon cancer  Ariela Ga MD     DXA bone density spine hip and pelvis    Result Date: 10/14/2021  Narrative: CENTRAL  DXA SCAN CLINICAL HISTORY:  69-year-old postmenopausal female  OTHER RISK FACTORS:  History of fracture resulting from minor injury  PHARMACOLOGIC THERAPY FOR OSTEOPOROSIS:  None  TECHNIQUE: Bone densitometry was performed using a Achieve XXA   bone densitometer  Regions of interest appear properly placed  COMPARISON: 5/6/2019   RESULTS: LUMBAR SPINE L1-L4 : BMD  1 141  gm/cm2 T-score -0 4 LEFT TOTAL HIP: BMD: 0 992  gm/cm2 T-score: -0 1 LEFT FEMORAL NECK: BMD: 0 856  gm/cm2 T score: -1 3 RIGHT TOTAL HIP: BMD:  1 004  gm/cm2 T-score: 0 0 RIGHT FEMORAL NECK: BMD:  0 851  gm/cm2  T score: -1 3     Impression: 1  Low bone mass (osteopenia)  [Based on the left and right femoral necks] 2  Since a DXA study from 5/6/2019, there has been: A  STATISTICALLY SIGNIFICANT DECREASE in bone mineral density of  0 039 g/cm2 (3 3)% in the lumbar spine  A  STATISTICALLY SIGNIFICANT DECREASE in bone mineral density of  0 046 g/cm2 (4 4)% in the hips  3   The 10 year risk of hip fracture is 1 2% with the 10 year risk of major osteoporotic fracture being 12 6% as calculated by the [facility] fracture risk assessment tool (FRAX, which is based on data generated by the [facility] for Metabolic Bone Diseases)  4   The current NOF guidelines recommend treating patients with a T-score of -2 5 or less in the lumbar spine or hips, or in post-menopausal women and men over the age of 48 with low bone mass (osteopenia) and a FRAX 10 year risk score of >3% for hip fracture and/or >20% for major osteoporotic fracture  5   The NOF recommends follow-up DXA in 1-2 years after initiating therapy for osteoporosis and every 2 years thereafter  More frequent evaluation is appropriate for patients with conditions associated with rapid bone loss, such as glucocorticoid therapy  The interval between DXA screenings may be longer for individuals without major risk factors and initial T-score in the normal or upper low bone mass range  The FRAX algorithm has certain limitations: -FRAX has not been validated in patients currently or previously treated with pharmacotherapy for osteoporosis  In such patients, clinical judgment must be exercised in interpreting FRAX scores    -Prior hip, vertebral and humeral fragility fractures appear to confer greater risk of subsequent fracture than fractures at other sites (this is especially true for individuals with severe vertebral fractures), but quantification of this incremental risk is not possible with FRAX  -FRAX underestimates fracture risk in patients with history of multiple fragility fractures  -FRAX may underestimate fracture risk in patients with history of frequent falls  -It is not appropriate to use FRAX to monitor treatment response  WHO CLASSIFICATION: Normal (a T-score of -1 0 or higher) Low bone mineral density (a T-score of less than -1 0 but higher than -2 5) Osteoporosis (a T-score of -2 5 or less) Severe osteoporosis (a T-score of -2 5 or less with a fragility fracture) LEAST SIGNIFICANT CHANGE AT 95% C I: Lumbar spine: 0 036 gm/cm2 (3 2%)  Total hip: 0 018 gm/cm2 (2 0%)  Forearm: 0 024 gm/cm2 (3 2%)  Workstation performed: [id number]     NM PET CT skull base to mid thigh    Result Date: 9/28/2021  Narrative: PET/CT SCAN INDICATION:  C18 7: Malignant neoplasm of sigmoid colon C20: Malignant neoplasm of rectum   Rectosigmoid carcinoma, restaging/surveillance examination  Borderline elevated CEA (most recently 4 4)  MODIFIER: PS COMPARISON: CT chest abdomen and pelvis 2/24/2021  CELL TYPE:  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 TECHNIQUE:   12 1 mCi F-18-FDG administered IV  Multiplanar attenuation corrected and non attenuation corrected PET images were acquired 60 minutes post injection  Contiguous, low dose, axial CT sections were obtained from the skull base through the femurs   Intravenous contrast material was not utilized  This examination, like all CT scans performed in the [facility], was performed utilizing techniques to minimize radiation dose exposure, including the use of iterative reconstruction and automated exposure control  Fasting serum glucose: 90 mg/dl FINDINGS: VISUALIZED BRAIN:   No acute abnormalities are seen  HEAD/NECK:   There is a physiologic distribution of FDG  No FDG avid cervical adenopathy is seen  CT images: Unremarkable  CHEST:   No FDG avid soft tissue lesions are seen   CT images: Mild coronary artery calcification  No pericardial effusion, no pleural effusion ABDOMEN:   There is a large hypermetabolic left lateral abdominal lobular mass situated immediately inferior to the spleen in the left paracolic gutter region, which measures 4 3 x 3 9 x 4 5 cm in size, SUV max 20 8  No other definite hypermetabolic abnormalities are seen within the upper abdomen  Activity at the bowel anastomotic site in the right abdomen on image 165 demonstrates SUV max of 3 8  This is nonspecific but may simply be inflammatory or physiologic  Direct visualization is recommended if not recently performed  CT images: No ascites  No hydronephrosis or bowel obstruction  PELVIS: Additional rectosigmoid anastomotic site noted image 212  No evidence of abnormal glucose activity  No evidence of glucose avid pelvic adenopathy  No pelvic ascites  OSSEOUS STRUCTURES: No FDG avid lesions are seen  CT images: No significant findings  Impression: 1  There is a large left paracolic gutter markedly hypermetabolic mass immediately inferior to the spleen, most consistent with metastatic colorectal carcinoma  2  Mildly increased activity at the right abdominal bowel anastomotic site  If not recently performed, direct visualization is recommended 3  There are no other glucose avid abnormalities identified within the abdomen or pelvis 4  No evidence of distant glucose avid metastatic disease in the neck, chest, or skeleton The examination demonstrates a significant  finding and was documented as such in [facility] for liaison and referring practitioner notification  Workstation performed: [id number]     Mammo screening bilateral w 3d & cad    Result Date: 10/14/2021  Narrative: DIAGNOSIS: Encounter for screening mammogram for malignant neoplasm of breast TECHNIQUE: Digital screening mammography was performed  Computer Aided Detection (CAD) analyzed all applicable images   COMPARISONS: Prior breast imaging dated: 06/26/2020, 05/06/2019, 10/18/2016, 10/14/2016, and 09/14/2015 RELEVANT HISTORY: Family Breast Cancer History: History of breast cancer in [address] [place], Family  Family Medical History: Family medical history includes breast cancer in 2 relatives (cousin, family) and colon cancer in 2 relatives (family, paternal uncle)  Personal History: No known relevant hormone history  Surgical history includes lumpectomy  No known relevant medical history  The patient is scheduled in a reminder system for screening mammography  8-10% of cancers will be missed on mammography  Management of a palpable abnormality must be based on clinical grounds  Patients will be notified of their results via letter from our facility  Accredited by [employer] of [employer] and FDA  RISK ASSESSMENT: 5 Year Tyrer-Cuzick: 2 13 % 10 Year Tyrer-Cuzick: 4 17 % Lifetime Tyrer-Cuzick: 7 93 % TISSUE DENSITY: There are scattered areas of fibroglandular density  INDICATION: Nani Beck is a 78 y o  female presenting for screening mammography  FINDINGS: Breast parenchymal distribution is unchanged from priors including multiple focal asymmetries in the right breast   Long-term stability confirms benignancy  No developing asymmetries or concerning masses  Single upper-outer-anterior coarse calcification in the left breast is definitively benign  No suspicious microcalcifications, architectural distortion, skin thickening, or abnormalities of the nipples in either breast       Impression: No mammographic evidence of malignancy or significant change from priors  ASSESSMENT/BI-RADS CATEGORY: Left: 2 - Benign Right: 2 - Benign Overall: 2 - Benign RECOMMENDATION:      - Routine screening mammogram in 1 year for both breasts  Workstation ID: [id number]       I independently reviewed and interpreted the above laboratory and imaging data, including recent medical oncology visits, new cross-sectional imaging    I discussed this patient with   Suzanna Greenwood  Discussion/Summary: This is a 65-year-old female with a 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  She has had several months of FOLFOX treatment with no progression of disease  I discussed with the patient today after personally reviewing her scans that the implant in her spleen appears to be the only site of omental disease, however, CT scans can often underestimate the degree of peritoneal disease  I have proposed a laparoscopic versus hand assisted versus open omentectomy with possible additional cytoreduction followed by HIPEC  Concurrently, she will undergo hysterectomy with oophorectomy with Dr Suzanna Greenwood  We discussed the risks the procedure to include infection, bleeding, ileus, leak, respiratory failure, heart attack, stroke, DVT, PE, death  Due to her body habitus, she is at above average risk for these complications  We discussed the possible organs of resection and that intraoperatively these decisions would need to be made  At this juncture, the patient has been off chemotherapy for 3 weeks and we need to perform her surgery in the next 2-3 weeks  We will work diligently to find a date with Dr Suzanna Greenwood that works for everyone involved  Likewise, she will see the surgical optimization clinic preoperatively for optimization given upcoming major surgery  All questions were answered today

## 2022-04-06 NOTE — H&P (VIEW-ONLY)
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[address] )     12/10/2019 Surgery    Low anterior resection (Dr Dino Colon):    A  Rectosigmoid colon, low anterior resection:  - Adenocarcinoma, moderately differentiated  - Tumor invades through the muscularis propria into pericolorectal tissue, T3  - Diverticula  - All margins are negative for tumor   - Thirty-four lymph nodes, negative for malignancy (0/34)  B  Colon, anastomotic rings, resection:  - Two portions of colon with no pathologic abnormality     12/10/2019 Genomic Testing    RRESULTS OF IMMUNOHISTOCHEMICAL ANALYSIS FOR MISMATCH REPAIR PROTEIN LOSS  INTERPRETATION: NO LOSS OF NUCLEAR EXPRESSION OF MMR PROTEINS: LOW PROBABILITY OF MSI-H  Note: Background non-neoplastic tissue and/or internal control with intact nuclear expression        RESULTS:  Antibody          Clone               Description                           Results  MLH1               M1                  Mismatch repair protein       Intact nuclear expression  MSH2              [id number]       Mismatch repair protein       Intact nuclear expression  MSH6              40                   Mismatch repair protein       Intact nuclear expression  PMS2              [id number]         Mismatch repair protein       Intact nuclear expression     11/12/2021 Biopsy    Omental mass:  - Metastatic adenocarcinoma, with an immunophenotype compatible with metastasis from patient's known colonic primary       12/10/2021 - 12/10/2021 Chemotherapy    capecitabine (XELODA), 850 mg/m2 = 1,600 mg (100 % of original dose 850 mg/m2), Oral, 2 times daily with meals, 1 of 8 cycles  Dose modification: 850 mg/m2 (100 % of original dose 850 mg/m2, Cycle 1, Reason: Other (see comments))  fosaprepitant (EMEND) IVPB, 150 mg, Intravenous, Once, 1 of 1 cycle  Administration: 150 mg (12/10/2021)  oxaliplatin (ELOXATIN) chemo infusion, 244 4 mg, Intravenous, Once, 1 of 8 cycles  Administration: 250 mg (12/10/2021)     1/4/2022 -  Chemotherapy    fluorouracil (ADRUCIL), 300 mg/m2 = 560 mg (75 % of original dose 400 mg/m2), Intravenous, Once, 6 of 12 cycles  Dose modification: 300 mg/m2 (75 % of original dose 400 mg/m2, Cycle 1, Reason: Dose Not Tolerated), 340 mg/m2 (85 % of original dose 400 mg/m2, Cycle 4, Reason: Other (see comments)), 340 mg/m2 (85 % of original dose 400 mg/m2, Cycle 5, Reason: Other (see comments))  Administration: 560 mg (1/4/2022), 560 mg (1/18/2022), 560 mg (2/1/2022), 635 mg (2/15/2022), 635 mg (3/1/2022), 635 mg (3/15/2022)  leucovorin calcium IVPB, 300 mg/m2 = 561 mg (75 % of original dose 400 mg/m2), Intravenous, Once, 6 of 12 cycles  Dose modification: 300 mg/m2 (75 % of original dose 400 mg/m2, Cycle 1, Reason: Dose Not Tolerated), 340 mg/m2 (85 % of original dose 400 mg/m2, Cycle 4, Reason: Other (see comments)), 340 mg/m2 (85 % of original dose 400 mg/m2, Cycle 5, Reason: Other (see comments))  Administration: 561 mg (1/4/2022), 561 mg (1/18/2022), 561 mg (2/1/2022), 636 mg (2/15/2022), 636 mg (3/1/2022), 636 mg (3/15/2022)  oxaliplatin (ELOXATIN) chemo infusion, 63 75 mg/m2 = 119 2125 mg (75 % of original dose 85 mg/m2), Intravenous, Once, 6 of 12 cycles  Dose modification: 63 75 mg/m2 (75 % of original dose 85 mg/m2, Cycle 1, Reason: Dose Not Tolerated), 72 25 mg/m2 (85 % of original dose 85 mg/m2, Cycle 4, Reason: Other (see comments)), 72 25 mg/m2 (85 % of original dose 85 mg/m2, Cycle 5, Reason: Other (see comments))  Administration: 119 2125 mg (1/4/2022), 119 2125 mg (1/18/2022), 119 2125 mg (2/1/2022), 135 1075 mg (2/15/2022), 135 1075 mg (3/1/2022), 135 1075 mg (3/15/2022)  fluorouracil (ADRUCIL) ambulatory infusion Soln, 900 mg/m2/day = 3,365 mg (75 % of original dose 1,200 mg/m2/day), Intravenous, Over 46 hours, 6 of 12 cycles  Dose modification: 900 mg/m2/day (75 % of original dose 1,200 mg/m2/day, Cycle 2, Reason: Other (see comments)), 1,020 mg/m2/day (85 % of original dose 1,200 mg/m2/day, Cycle 4, Reason: Other (see comments), Comment: anticipated tolerance)     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HCC)   11/29/2021 Initial Diagnosis    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[facility] [address] )     12/10/2021 - 12/10/2021 Chemotherapy    capecitabine (XELODA), 850 mg/m2 = 1,600 mg (100 % of original dose 850 mg/m2), Oral, 2 times daily with meals, 1 of 8 cycles  Dose modification: 850 mg/m2 (100 % of original dose 850 mg/m2, Cycle 1, Reason: Other (see comments))  fosaprepitant (EMEND) IVPB, 150 mg, Intravenous, Once, 1 of 1 cycle  Administration: 150 mg (12/10/2021)  oxaliplatin (ELOXATIN) chemo infusion, 244 4 mg, Intravenous, Once, 1 of 8 cycles  Administration: 250 mg (12/10/2021)     1/4/2022 -  Chemotherapy    fluorouracil (ADRUCIL), 300 mg/m2 = 560 mg (75 % of original dose 400 mg/m2), Intravenous, Once, 6 of 12 cycles  Dose modification: 300 mg/m2 (75 % of original dose 400 mg/m2, Cycle 1, Reason: Dose Not Tolerated), 340 mg/m2 (85 % of original dose 400 mg/m2, Cycle 4, Reason: Other (see comments)), 340 mg/m2 (85 % of original dose 400 mg/m2, Cycle 5, Reason: Other (see comments))  Administration: 560 mg (1/4/2022), 560 mg (1/18/2022), 560 mg (2/1/2022), 635 mg (2/15/2022), 635 mg (3/1/2022), 635 mg (3/15/2022)  leucovorin calcium IVPB, 300 mg/m2 = 561 mg (75 % of original dose 400 mg/m2), Intravenous, Once, 6 of 12 cycles  Dose modification: 300 mg/m2 (75 % of original dose 400 mg/m2, Cycle 1, Reason: Dose Not Tolerated), 340 mg/m2 (85 % of original dose 400 mg/m2, Cycle 4, Reason: Other (see comments)), 340 mg/m2 (85 % of original dose 400 mg/m2, Cycle 5, Reason: Other (see comments))  Administration: 561 mg (1/4/2022), 561 mg (1/18/2022), 561 mg (2/1/2022), 636 mg (2/15/2022), 636 mg (3/1/2022), 636 mg (3/15/2022)  oxaliplatin (ELOXATIN) chemo infusion, 63 75 mg/m2 = 119 2125 mg (75 % of original dose 85 mg/m2), Intravenous, Once, 6 of 12 cycles  Dose modification: 63 75 mg/m2 (75 % of original dose 85 mg/m2, Cycle 1, Reason: Dose Not Tolerated), 72 25 mg/m2 (85 % of original dose 85 mg/m2, Cycle 4, Reason: Other (see comments)), 72 25 mg/m2 (85 % of original dose 85 mg/m2, Cycle 5, Reason: Other (see comments))  Administration: 119 2125 mg (1/4/2022), 119 2125 mg (1/18/2022), 119 2125 mg (2/1/2022), 135 1075 mg (2/15/2022), 135 1075 mg (3/1/2022), 135 1075 mg (3/15/2022)  fluorouracil (ADRUCIL) ambulatory infusion Soln, 900 mg/m2/day = 3,365 mg (75 % of original dose 1,200 mg/m2/day), Intravenous, Over 46 hours, 6 of 12 cycles  Dose modification: 900 mg/m2/day (75 % of original dose 1,200 mg/m2/day, Cycle 2, Reason: Other (see comments)), 1,020 mg/m2/day (85 % of original dose 1,200 mg/m2/day, Cycle 4, Reason: Other (see comments), Comment: anticipated tolerance)         History of Present Illness  :   Miss Alise Casillas is seen here today for a new diagnosis of a likely metastatic colorectal cancer  Briefly, the patient underwent screening colonoscopy in late 2019  This detected a rectosigmoid mass, which was then resected in December of 2019  Surgery required a diverting loop ileostomy, which was subsequently reversed in February of 2020  Of note, preop MRI suggested a T3bN1 lesion above the peritoneal reflection, and upfront surgery was recommended   This revealed a final path T3 N0 cancer with no aggressive features and no adjuvant therapy was recommended by her surgeon Dr Ronaldo Castleman  Since that time, the patient has been doing well  She had a CT 2/2021 which demonstrated ISIAH  She recently underwent a PET scan due to a rising CEA (4 4 from 2 2 posotp), and this demonstrated a large 4 5 cm left pericolic gutter markedly hypermetabolic mass  There was mild increased activity at the colon anastomosis  Subsequent colonoscopy revealed no abnormality at this location  She denies any systemic symptoms including weight loss, nausea, vomiting, changes in her bowel habits  I described that this juncture, a PET scan demonstrates a highly metabolic mass, and more detailed imaging is necessary  I described that an MRI will give us a detailed image of her abdomen and will allow was to ascertain whether there is a high suspicion for other peritoneal implants  This will change my surgical recommendations of resection of the single metastasis versus a potential cytoreductive surgery with HIPEC  Likewise, depending on the burden of disease, I may recommend that she proceed chemotherapy before surgery  I recommend an IR biopsy in order to determine her current markers to more properly select chemotherapy regimen  I will refer her to Dr Frank Smith with medical oncology for assessment  Finally, given her family history of colorectal cancer, I willr efer her to onc genetics for assessment  She will f/u following these studies  Interval History 11/30/21  Patient returns today after the above studies  MRI of the abdomen revealed a single metastatic lesion invading the spleen  No evidence of other peritoneal implants  MRI of the pelvis revealed a concerning appearance of the ovary  This was followed by a transvaginal ultrasound, which ultimately determined that the ovary appeared benign  She then underwent interventional radiology biopsy, which confirmed a metastatic lesion from a colorectal primary   The patient has been doing very well with no new developments  We discussed that the treatment for this will require chemotherapy as well as surgical resection  I think that a robotic approach would be beneficial given the patient's overall body habitus  The patient would strongly prefer not to be in the hospital postoperatively around the holidays, and I have discussed this with Dr Sukumar Swenson, who agrees that initiating chemotherapy now is reasonable  I will therefore see the patient in 2-3 months with a repeat CT scan of the chest abdomen and pelvis  Interval History 4/6/22  The patient returns in follow-up today  She has been undergoing FOLFOX with decent toleratation of treatment  She does describe some cold hypersensitivity  Today she is doing well with no abdominal pain, nausea vomiting, constipation or diarrhea  Follow-up scan showed overall decrease in the size of a single omental implant with less involvement of the spleen  No evidence of other disease in the chest abdomen or pelvis with the exception of the multicystic disease noted in the pelvis  Review of Systems  Complete ROS Surg Onc:   Constitutional: The patient denies new or recent history of general fatigue, no recent weight loss, no change in appetite  Eyes: No complaints of visual problems, no scleral icterus  ENT: No complaints of ear pain, no hoarseness, no difficulty swallowing,  no tinnitus and no new masses in head, oral cavity, or neck  Cardiovascular: No complaints of chest pain, no palpitations, no ankle edema  Respiratory: No complaints of shortness of breath, no cough  Gastrointestinal: No complaints of jaundice, no bloody stools, no pale stools  Genitourinary: No complaints of dysuria, no hematuria, no nocturia, no frequent urination, no urethral discharge  Musculoskeletal: No complaints of weakness, paralysis, joint stiffness or arthralgias  Integumentary: No complaints of rash, no new lesions     Neurological: No complaints of convulsions, no seizures, no dizziness  Hematologic/Lymphatic: No complaints of easy bruising  Endocrine:  ENDORSES cold intolerance  No polydipsia, polyphagia, or polyuria  Allergy/immunology:  No environmental allergies  No food allergies  Not immunocompromised        Patient Active Problem List   Diagnosis    Callus of foot    Foot pain    GERD (gastroesophageal reflux disease)    Hyperlipidemia    Prediabetes    Varicose veins with pain    [facility]    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HCC)    Dyspnea on exertion    Dyslipidemia    Sigmoid diverticulosis    PONV (postoperative nausea and vomiting)    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HCC)    Lesion of ovary    Hypertension    Chemotherapy adverse reaction    Dehydration    Chemotherapy-induced nausea    Platelets decreased (HCC)    Stage 3 chronic kidney disease, unspecified whether stage 3a or 3b CKD (HCC)     Past Medical History:   Diagnosis Date    Blood in stool     Breast disorder     Cancer ([facility] )     rectal    Cancer determined by colorectal biopsy ([place] [address] )     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    GERD (gastroesophageal reflux disease)     History of rectal surgery     Hyperlipidemia     PONV (postoperative nausea and vomiting)      Past Surgical History:   Procedure Laterality Date    BREAST LUMPECTOMY Right      SECTION      x3    COLON SIGMOID RESECTION      COLONOSCOPY      DILATION AND CURETTAGE OF UTERUS      ILEO LOOP DIVERSION N/A 12/10/2019    Procedure: ILEOSTOMY LOOP;  Surgeon: Michelle Cantu MD;  [facility];  Service: Robotics    IR BIOPSY OMENTUM  2021    IR PORT PLACEMENT  2021    MN CLOSE ENTEROSTOMY N/A 2020    Procedure: CLOSURE ILEOSTOMY;  Surgeon: Michelle Cantu MD;  [facility];  Service: Colorectal    MN LAP,SURG,COLECTOMY, PARTIAL, W/ANAST N/A 12/10/2019    Procedure: SIGMOID RESECTION COLON LAPAROSCOPIC W ROBOTICS converted to lap hand assisted  with Partial proctectomy , LASER FLUORESCENCE ANGIOGRAPHY, INTRA OP COLONOSCOPY;  Surgeon: Ines Manning MD;  [facility];  Service: Robotics    SMALL INTESTINE SURGERY  2/4/2020    Procedure: RESECTION SMALL BOWEL;  Surgeon: Ines Manning MD;  [facility];  Service: Colorectal     Family History   Problem Relation Age of Onset    Hypertension Mother     Heart attack Mother     Heart attack Father     Heart disease Father     Hypertension Brother     Heart attack Brother     Leukemia Cousin     Breast cancer Cousin     Diabetes Cousin     Breast cancer Family         maternal side    Colon cancer Family         paternal uncle    Colon cancer Paternal Uncle     Stroke Neg Hx      Social History     Socioeconomic History    Marital status:      Spouse name: Not on file    Number of children: Not on file    Years of education: Not on file    Highest education level: Not on file   Occupational History    Not on file   Tobacco Use    Smoking status: Never Smoker    Smokeless tobacco: Never Used   Vaping Use    Vaping Use: Never used   Substance and Sexual Activity    Alcohol use: Yes     Comment: "occasionally"    Drug use: Never    Sexual activity: Not on file   Other Topics Concern    Not on file   Social History Narrative    Not on file     Social Determinants of Health     Financial Resource Strain: Not on file   Food Insecurity: Not on file   Transportation Needs: Not on file   Physical Activity: Not on file   Stress: Not on file   Social Connections: Not on file   Intimate Partner Violence: Not on file   Housing Stability: Not on file       Current Outpatient Medications:     acetaminophen (TYLENOL) 325 mg tablet, Take 3 tablets (975 mg total) by mouth every 8 (eight) hours (Patient not taking: Reported on 3/21/2022 ), Disp: 30 tablet, Rfl: 0    docusate sodium (COLACE) 100 mg capsule, Take 100 mg by mouth 2 (two) times a day, Disp: , Rfl:     ezetimibe (ZETIA) 10 mg tablet, Take 1 tablet (10 mg total) by mouth daily, Disp: 90 tablet, Rfl: 1    famotidine (PEPCID) 20 mg tablet, Take 1 tablet (20 mg total) by mouth 2 (two) times a day, Disp: 180 tablet, Rfl: 1    lidocaine-prilocaine (EMLA) cream, Apply topically as needed for mild pain (Patient not taking: Reported on 3/21/2022 ), Disp: 30 g, Rfl: 0    ondansetron (ZOFRAN) 8 mg tablet, Take 1 tablet (8 mg total) by mouth every 12 (twelve) hours as needed for nausea or vomiting, Disp: 20 tablet, Rfl: 2    prochlorperazine (COMPAZINE) 10 mg tablet, Take 1 tablet (10 mg total) by mouth every 6 (six) hours as needed for nausea or vomiting, Disp: 60 tablet, Rfl: 0  Allergies   Allergen Reactions    Medical Tape Rash     Skin irritation  Skin peeling  Skin irritation  Skin peeling  Blisters  Rash       Vitals:    04/06/22 1305   BP: 138/82   Pulse: (!) 113   Resp: 20   Temp: 98 3 °F (36 8 °C)   SpO2: 98%       Physical Exam   General: Appears well, appears stated age  Skin: Warm, anicteric  HEENT: Normocephalic, atraumatic; sclera aniceteric, mucous membranes moist; cervical nodes without adenopathy  Cardiopulmonary: RRR, Easy WOB, no BLE edema  Abd: Obese, nontender, no masses appreciated, no hepatosplenomegaly  Incisions well-healed  MSK: Symmetric, no cyanosis, no overt weakness  Lymphatic: No cervical, axillary or inguinal lymphadenopathy  Neuro: Affect appropriate, no gross motor abnormalities      Pathology:  Final Diagnosis   A  Rectosigmoid colon, low anterior resection:  - Adenocarcinoma, moderately differentiated  - Tumor invades through the muscularis propria into pericolorectal tissue, T3  - Diverticula  - All margins are negative for tumor   - Thirty-four lymph nodes, negative for malignancy (0/34)      B  Colon, anastomotic rings, resection:  - Two portions of colon with no pathologic abnormality     Intradepartmental consultation is in agreement    Interpretation performed at [facility], [address] [place] [address], [address]  Immunohistochemistry for desmin  is performed on tissue blocks A13 and A16 to help in the assessment of this case  Labs: Reviewed in Psychiatric    Imaging  Colonoscopy    Addendum Date: 10/6/2021 Addendum:   [facility] [place] [address] [phone] DATE OF SERVICE: 10/06/21 PHYSICIAN(S): Garrett Olmedo MD - Attending Physician INDICATION: Personal history of rectal cancer , had a low anterior resection in December of 2019  T3 N0, 34 lymph nodes were removed and were negative  Patient did not receive any chemotherapy  Colonoscopy performed for a diagnostic indication  POST-OP DIAGNOSIS: See the impression below  HISTORY: Prior colonoscopy: Less than 3 years ago  It is being repeated at an interval of less than 3 years because: This colonoscopy is being performed for a diagnostic indication BOWEL PREPARATION: Miralax/Dulcolax PREPROCEDURE: Informed consent was obtained for the procedure, including sedation  Risks including but not limited to bleeding, infection, perforation, adverse drug reaction and aspiration were explained in detail  Also explained about less than 100% sensitivity with the exam and other alternatives  The patient was placed in the left lateral decubitus position  DETAILS OF PROCEDURE: Patient was taken to the procedure room where a time out was performed to confirm correct patient and correct procedure  The patient underwent monitored anesthesia care, which was administered by an anesthesia professional  The patient's blood pressure, heart rate, level of consciousness, oxygen and respirations were monitored throughout the procedure  A digital rectal exam was performed  The scope was introduced through the anus and advanced to the cecum  Retroflexion was performed in the rectum   The quality of bowel preparation was evaluated using the [facility] Bowel Preparation Scale with scores of: right colon = 2, transverse colon = 2, left colon = 2  The total BBPS score was 6  Bowel prep was adequate  The patient experienced no blood loss  The procedure was not difficult  The patient tolerated the procedure well  There were no apparent complications  ANESTHESIA INFORMATION: ASA: III Anesthesia Type: IV Sedation with Anesthesia MEDICATIONS: No administrations occurring from 1215 to 1230 on 10/06/21 FINDINGS: Healthy end-to-end colorectal anastomosis with no bleeding in the mid rectum All observed locations appeared normal, including the cecum, ascending colon, transverse colon, splenic flexure and descending colon  A couple scattered diverticuli seen EVENTS: Procedure Events Event Event Time ENDO CECUM REACHED 10/6/2021 12:21 PM ENDO SCOPE OUT TIME 10/6/2021 12:28 PM SPECIMENS: * No specimens in log * EQUIPMENT: Colonoscope - IMPRESSION: 1  Normal-appearing colon  Anastomosis site was seen in the rectum appeared healthy  No evidence of local recurrence  2  Couple small scattered diverticuli  RECOMMENDATION: Repeat colonoscopy in 2 years due to a personal history of colon cancer  Advised her urgent evaluation by medical oncologist and Dr Mina Ralph MD     Addendum Date: 10/6/2021 Addendum:   [address] [place] [address] [phone] DATE OF SERVICE: 10/06/21 PHYSICIAN(S): Tonja Ralph MD - Attending Physician INDICATION: Personal history of rectal cancer , had a low anterior resection in December of 2019  T3 N0, 34 lymph nodes were removed and were negative  Patient did not receive any chemotherapy  Colonoscopy performed for a diagnostic indication  POST-OP DIAGNOSIS: See the impression below  HISTORY: Prior colonoscopy: Less than 3 years ago  It is being repeated at an interval of less than 3 years because: This colonoscopy is being performed for a diagnostic indication BOWEL PREPARATION: Miralax/Dulcolax PREPROCEDURE: Informed consent was obtained for the procedure, including sedation   Risks including but not limited to bleeding, infection, perforation, adverse drug reaction and aspiration were explained in detail  Also explained about less than 100% sensitivity with the exam and other alternatives  The patient was placed in the left lateral decubitus position  DETAILS OF PROCEDURE: Patient was taken to the procedure room where a time out was performed to confirm correct patient and correct procedure  The patient underwent monitored anesthesia care, which was administered by an anesthesia professional  The patient's blood pressure, heart rate, level of consciousness, oxygen and respirations were monitored throughout the procedure  A digital rectal exam was performed  The scope was introduced through the anus and advanced to the cecum  Retroflexion was performed in the rectum  The quality of bowel preparation was evaluated using the [place] Bowel Preparation Scale with scores of: right colon = 2, transverse colon = 2, left colon = 2  The total BBPS score was 6  Bowel prep was adequate  The patient experienced no blood loss  The procedure was not difficult  The patient tolerated the procedure well  There were no apparent complications  ANESTHESIA INFORMATION: ASA: III Anesthesia Type: IV Sedation with Anesthesia MEDICATIONS: No administrations occurring from 1215 to 1230 on 10/06/21 FINDINGS: Healthy end-to-end colorectal anastomosis with no bleeding in the mid rectum All observed locations appeared normal, including the cecum, ascending colon, transverse colon, splenic flexure and descending colon  A couple scattered diverticuli seen EVENTS: Procedure Events Event Event Time ENDO CECUM REACHED 10/6/2021 12:21 PM ENDO SCOPE OUT TIME 10/6/2021 12:28 PM SPECIMENS: * No specimens in log * EQUIPMENT: Colonoscope - IMPRESSION: 1  Normal-appearing colon  Anastomosis site was seen in the rectum appeared healthy  No evidence of local recurrence  2  Couple small scattered diverticuli   RECOMMENDATION: Repeat colonoscopy in 2 years due to a personal history of colon cancer  Chelsea Street MD     Result Date: 10/6/2021  Narrative: [address] [place] [phone] DATE OF SERVICE: 10/06/21 PHYSICIAN(S): Chelsea Street MD - Attending Physician INDICATION: Personal history of rectal cancer Colonoscopy performed for a diagnostic indication  POST-OP DIAGNOSIS: See the impression below  HISTORY: Prior colonoscopy: Less than 3 years ago  It is being repeated at an interval of less than 3 years because: This colonoscopy is being performed for a diagnostic indication BOWEL PREPARATION: Miralax/Dulcolax PREPROCEDURE: Informed consent was obtained for the procedure, including sedation  Risks including but not limited to bleeding, infection, perforation, adverse drug reaction and aspiration were explained in detail  Also explained about less than 100% sensitivity with the exam and other alternatives  The patient was placed in the left lateral decubitus position  DETAILS OF PROCEDURE: Patient was taken to the procedure room where a time out was performed to confirm correct patient and correct procedure  The patient underwent monitored anesthesia care, which was administered by an anesthesia professional  The patient's blood pressure, heart rate, level of consciousness, oxygen and respirations were monitored throughout the procedure  A digital rectal exam was performed  The scope was introduced through the anus and advanced to the cecum  Retroflexion was performed in the rectum  The quality of bowel preparation was evaluated using the [facility] Bowel Preparation Scale with scores of: right colon = 2, transverse colon = 2, left colon = 2  The total BBPS score was 6  Bowel prep was adequate  The patient experienced no blood loss  The procedure was not difficult  The patient tolerated the procedure well  There were no apparent complications   ANESTHESIA INFORMATION: ASA: III Anesthesia Type: IV Sedation with Anesthesia MEDICATIONS: No administrations occurring from 1215 to 1230 on 10/06/21 FINDINGS: Healthy end-to-end colorectal anastomosis with no bleeding in the mid rectum All observed locations appeared normal, including the cecum, ascending colon, transverse colon, splenic flexure and descending colon  A couple scattered diverticuli seen EVENTS: Procedure Events Event Event Time ENDO CECUM REACHED 10/6/2021 12:21 PM ENDO SCOPE OUT TIME 10/6/2021 12:28 PM SPECIMENS: * No specimens in log * EQUIPMENT: Colonoscope -     Impression: 1  Normal-appearing colon  Anastomosis site was seen in the rectum appeared healthy  No evidence of local recurrence  2  Couple small scattered diverticuli  RECOMMENDATION: Repeat colonoscopy in 2 years due to a personal history of colon cancer  Ariela Ga MD     DXA bone density spine hip and pelvis    Result Date: 10/14/2021  Narrative: CENTRAL  DXA SCAN CLINICAL HISTORY:  16-year-old postmenopausal female  OTHER RISK FACTORS:  History of fracture resulting from minor injury  PHARMACOLOGIC THERAPY FOR OSTEOPOROSIS:  None  TECHNIQUE: Bone densitometry was performed using a Liazon   bone densitometer  Regions of interest appear properly placed  COMPARISON: 5/6/2019  RESULTS: LUMBAR SPINE L1-L4 : BMD  1 141  gm/cm2 T-score -0 4 LEFT TOTAL HIP: BMD: 0 992  gm/cm2 T-score: -0 1 LEFT FEMORAL NECK: BMD: 0 856  gm/cm2 T score: -1 3 RIGHT TOTAL HIP: BMD:  1 004  gm/cm2 T-score: 0 0 RIGHT FEMORAL NECK: BMD:  0 851  gm/cm2  T score: -1 3     Impression: 1  Low bone mass (osteopenia)  [Based on the left and right femoral necks] 2  Since a DXA study from 5/6/2019, there has been: A  STATISTICALLY SIGNIFICANT DECREASE in bone mineral density of  0 039 g/cm2 (3 3)% in the lumbar spine  A  STATISTICALLY SIGNIFICANT DECREASE in bone mineral density of  0 046 g/cm2 (4 4)% in the hips   3   The 10 year risk of hip fracture is 1 2% with the 10 year risk of major osteoporotic fracture being 12 6% as calculated by the [facility] fracture risk assessment tool (FRAX, which is based on data generated by the [facility] for Metabolic Bone Diseases)  4   The current NOF guidelines recommend treating patients with a T-score of -2 5 or less in the lumbar spine or hips, or in post-menopausal women and men over the age of 48 with low bone mass (osteopenia) and a FRAX 10 year risk score of >3% for hip fracture and/or >20% for major osteoporotic fracture  5   The NOF recommends follow-up DXA in 1-2 years after initiating therapy for osteoporosis and every 2 years thereafter  More frequent evaluation is appropriate for patients with conditions associated with rapid bone loss, such as glucocorticoid therapy  The interval between DXA screenings may be longer for individuals without major risk factors and initial T-score in the normal or upper low bone mass range  The FRAX algorithm has certain limitations: -FRAX has not been validated in patients currently or previously treated with pharmacotherapy for osteoporosis  In such patients, clinical judgment must be exercised in interpreting FRAX scores  -Prior hip, vertebral and humeral fragility fractures appear to confer greater risk of subsequent fracture than fractures at other sites (this is especially true for individuals with severe vertebral fractures), but quantification of this incremental risk is not possible with FRAX  -FRAX underestimates fracture risk in patients with history of multiple fragility fractures  -FRAX may underestimate fracture risk in patients with history of frequent falls  -It is not appropriate to use FRAX to monitor treatment response   WHO CLASSIFICATION: Normal (a T-score of -1 0 or higher) Low bone mineral density (a T-score of less than -1 0 but higher than -2 5) Osteoporosis (a T-score of -2 5 or less) Severe osteoporosis (a T-score of -2 5 or less with a fragility fracture) LEAST SIGNIFICANT CHANGE AT 95% C I: Lumbar spine: 0 036 gm/cm2 (3 2%)  Total hip: 0 018 gm/cm2 (2 0%)  Forearm: 0 024 gm/cm2 (3 2%)  Workstation performed: [id number]     NM PET CT skull base to mid thigh    Result Date: 9/28/2021  Narrative: PET/CT SCAN INDICATION:  C18 7: Malignant neoplasm of sigmoid colon C20: Malignant neoplasm of rectum   Rectosigmoid carcinoma, restaging/surveillance examination  Borderline elevated CEA (most recently 4 4)  MODIFIER: PS COMPARISON: CT chest abdomen and pelvis 2/24/2021  CELL TYPE:  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 TECHNIQUE:   12 1 mCi F-18-FDG administered IV  Multiplanar attenuation corrected and non attenuation corrected PET images were acquired 60 minutes post injection  Contiguous, low dose, axial CT sections were obtained from the skull base through the femurs   Intravenous contrast material was not utilized  This examination, like all CT scans performed in the [facility], was performed utilizing techniques to minimize radiation dose exposure, including the use of iterative reconstruction and automated exposure control  Fasting serum glucose: 90 mg/dl FINDINGS: VISUALIZED BRAIN:   No acute abnormalities are seen  HEAD/NECK:   There is a physiologic distribution of FDG  No FDG avid cervical adenopathy is seen  CT images: Unremarkable  CHEST:   No FDG avid soft tissue lesions are seen  CT images: Mild coronary artery calcification  No pericardial effusion, no pleural effusion ABDOMEN:   There is a large hypermetabolic left lateral abdominal lobular mass situated immediately inferior to the spleen in the left paracolic gutter region, which measures 4 3 x 3 9 x 4 5 cm in size, SUV max 20 8  No other definite hypermetabolic abnormalities are seen within the upper abdomen  Activity at the bowel anastomotic site in the right abdomen on image 165 demonstrates SUV max of 3 8  This is nonspecific but may simply be inflammatory or physiologic  Direct visualization is recommended if not recently performed  CT images: No ascites  No hydronephrosis or bowel obstruction  PELVIS: Additional rectosigmoid anastomotic site noted image 212  No evidence of abnormal glucose activity  No evidence of glucose avid pelvic adenopathy  No pelvic ascites  OSSEOUS STRUCTURES: No FDG avid lesions are seen  CT images: No significant findings  Impression: 1  There is a large left paracolic gutter markedly hypermetabolic mass immediately inferior to the spleen, most consistent with metastatic colorectal carcinoma  2  Mildly increased activity at the right abdominal bowel anastomotic site  If not recently performed, direct visualization is recommended 3  There are no other glucose avid abnormalities identified within the abdomen or pelvis 4  No evidence of distant glucose avid metastatic disease in the neck, chest, or skeleton The examination demonstrates a significant  finding and was documented as such in [facility] for liaison and referring practitioner notification  Workstation performed: [id number]     Mammo screening bilateral w 3d & cad    Result Date: 10/14/2021  Narrative: DIAGNOSIS: Encounter for screening mammogram for malignant neoplasm of breast TECHNIQUE: Digital screening mammography was performed  Computer Aided Detection (CAD) analyzed all applicable images  COMPARISONS: Prior breast imaging dated: 06/26/2020, 05/06/2019, 10/18/2016, 10/14/2016, and 09/14/2015 RELEVANT HISTORY: Family Breast Cancer History: History of breast cancer in [facility], Family  Family Medical History: Family medical history includes breast cancer in 2 relatives (cousin, family) and colon cancer in 2 relatives (family, paternal uncle)  Personal History: No known relevant hormone history  Surgical history includes lumpectomy  No known relevant medical history  The patient is scheduled in a reminder system for screening mammography  8-10% of cancers will be missed on mammography  Management of a palpable abnormality must be based on clinical grounds  Patients will be notified of their results via letter from our facility  Accredited by [employer] of [employer] and FDA  RISK ASSESSMENT: 5 Year Tyrer-Cuzick: 2 13 % 10 Year Tyrer-Cuzick: 4 17 % Lifetime Tyrer-Cuzick: 7 93 % TISSUE DENSITY: There are scattered areas of fibroglandular density  INDICATION: Domo Chowdary is a 78 y o  female presenting for screening mammography  FINDINGS: Breast parenchymal distribution is unchanged from priors including multiple focal asymmetries in the right breast   Long-term stability confirms benignancy  No developing asymmetries or concerning masses  Single upper-outer-anterior coarse calcification in the left breast is definitively benign  No suspicious microcalcifications, architectural distortion, skin thickening, or abnormalities of the nipples in either breast       Impression: No mammographic evidence of malignancy or significant change from priors  ASSESSMENT/BI-RADS CATEGORY: Left: 2 - Benign Right: 2 - Benign Overall: 2 - Benign RECOMMENDATION:      - Routine screening mammogram in 1 year for both breasts  Workstation ID: [id number]       I independently reviewed and interpreted the above laboratory and imaging data, including recent medical oncology visits, new cross-sectional imaging  I discussed this patient with Dr Shannan Stover  Discussion/Summary: This is a 50-year-old female with a 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  She has had several months of FOLFOX treatment with no progression of disease  I discussed with the patient today after personally reviewing her scans that the implant in her spleen appears to be the only site of omental disease, however, CT scans can often underestimate the degree of peritoneal disease    I have proposed a laparoscopic versus hand assisted versus open omentectomy with possible additional cytoreduction followed by HIPEC  Concurrently, she will undergo hysterectomy with oophorectomy with Dr Trudy Kate  We discussed the risks the procedure to include infection, bleeding, ileus, leak, respiratory failure, heart attack, stroke, DVT, PE, death  Due to her body habitus, she is at above average risk for these complications  We discussed the possible organs of resection and that intraoperatively these decisions would need to be made  At this juncture, the patient has been off chemotherapy for 3 weeks and we need to perform her surgery in the next 2-3 weeks  We will work diligently to find a date with Dr Trudy Kate that works for everyone involved  Likewise, she will see the surgical optimization clinic preoperatively for optimization given upcoming major surgery  All questions were answered today

## 2022-04-06 NOTE — PROGRESS NOTES
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[address] )     12/10/2019 Surgery    Low anterior resection (Dr Hercules [place]):    A  Rectosigmoid colon, low anterior resection:  - Adenocarcinoma, moderately differentiated  - Tumor invades through the muscularis propria into pericolorectal tissue, T3  - Diverticula  - All margins are negative for tumor   - Thirty-four lymph nodes, negative for malignancy (0/34)  B  Colon, anastomotic rings, resection:  - Two portions of colon with no pathologic abnormality     12/10/2019 Genomic Testing    RRESULTS OF IMMUNOHISTOCHEMICAL ANALYSIS FOR MISMATCH REPAIR PROTEIN LOSS  INTERPRETATION: NO LOSS OF NUCLEAR EXPRESSION OF MMR PROTEINS: LOW PROBABILITY OF MSI-H  Note: Background non-neoplastic tissue and/or internal control with intact nuclear expression        RESULTS:  Antibody          Clone               Description                           Results  MLH1               M1                  Mismatch repair protein       Intact nuclear expression  MSH2              [id number]       Mismatch repair protein       Intact nuclear expression  MSH6              40                   Mismatch repair protein       Intact nuclear expression  PMS2              [id number]         Mismatch repair protein       Intact nuclear expression     11/12/2021 Biopsy    Omental mass:  - Metastatic adenocarcinoma, with an immunophenotype compatible with metastasis from patient's known colonic primary       12/10/2021 - 12/10/2021 Chemotherapy    capecitabine (XELODA), 850 mg/m2 = 1,600 mg (100 % of original dose 850 mg/m2), Oral, 2 times daily with meals, 1 of 8 cycles  Dose modification: 850 mg/m2 (100 % of original dose 850 mg/m2, Cycle 1, Reason: Other (see comments))  fosaprepitant (EMEND) IVPB, 150 mg, Intravenous, Once, 1 of 1 cycle  Administration: 150 mg (12/10/2021)  oxaliplatin (ELOXATIN) chemo infusion, 244 4 mg, Intravenous, Once, 1 of 8 cycles  Administration: 250 mg (12/10/2021)     1/4/2022 -  Chemotherapy    fluorouracil (ADRUCIL), 300 mg/m2 = 560 mg (75 % of original dose 400 mg/m2), Intravenous, Once, 6 of 12 cycles  Dose modification: 300 mg/m2 (75 % of original dose 400 mg/m2, Cycle 1, Reason: Dose Not Tolerated), 340 mg/m2 (85 % of original dose 400 mg/m2, Cycle 4, Reason: Other (see comments)), 340 mg/m2 (85 % of original dose 400 mg/m2, Cycle 5, Reason: Other (see comments))  Administration: 560 mg (1/4/2022), 560 mg (1/18/2022), 560 mg (2/1/2022), 635 mg (2/15/2022), 635 mg (3/1/2022), 635 mg (3/15/2022)  leucovorin calcium IVPB, 300 mg/m2 = 561 mg (75 % of original dose 400 mg/m2), Intravenous, Once, 6 of 12 cycles  Dose modification: 300 mg/m2 (75 % of original dose 400 mg/m2, Cycle 1, Reason: Dose Not Tolerated), 340 mg/m2 (85 % of original dose 400 mg/m2, Cycle 4, Reason: Other (see comments)), 340 mg/m2 (85 % of original dose 400 mg/m2, Cycle 5, Reason: Other (see comments))  Administration: 561 mg (1/4/2022), 561 mg (1/18/2022), 561 mg (2/1/2022), 636 mg (2/15/2022), 636 mg (3/1/2022), 636 mg (3/15/2022)  oxaliplatin (ELOXATIN) chemo infusion, 63 75 mg/m2 = 119 2125 mg (75 % of original dose 85 mg/m2), Intravenous, Once, 6 of 12 cycles  Dose modification: 63 75 mg/m2 (75 % of original dose 85 mg/m2, Cycle 1, Reason: Dose Not Tolerated), 72 25 mg/m2 (85 % of original dose 85 mg/m2, Cycle 4, Reason: Other (see comments)), 72 25 mg/m2 (85 % of original dose 85 mg/m2, Cycle 5, Reason: Other (see comments))  Administration: 119 2125 mg (1/4/2022), 119 2125 mg (1/18/2022), 119 2125 mg (2/1/2022), 135 1075 mg (2/15/2022), 135 1075 mg (3/1/2022), 135 1075 mg (3/15/2022)  fluorouracil (ADRUCIL) ambulatory infusion Soln, 900 mg/m2/day = 3,365 mg (75 % of original dose 1,200 mg/m2/day), Intravenous, Over 46 hours, 6 of 12 cycles  Dose modification: 900 mg/m2/day (75 % of original dose 1,200 mg/m2/day, Cycle 2, Reason: Other (see comments)), 1,020 mg/m2/day (85 % of original dose 1,200 mg/m2/day, Cycle 4, Reason: Other (see comments), Comment: anticipated tolerance)     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HCC)   11/29/2021 Initial Diagnosis    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[facility] [address] )     12/10/2021 - 12/10/2021 Chemotherapy    capecitabine (XELODA), 850 mg/m2 = 1,600 mg (100 % of original dose 850 mg/m2), Oral, 2 times daily with meals, 1 of 8 cycles  Dose modification: 850 mg/m2 (100 % of original dose 850 mg/m2, Cycle 1, Reason: Other (see comments))  fosaprepitant (EMEND) IVPB, 150 mg, Intravenous, Once, 1 of 1 cycle  Administration: 150 mg (12/10/2021)  oxaliplatin (ELOXATIN) chemo infusion, 244 4 mg, Intravenous, Once, 1 of 8 cycles  Administration: 250 mg (12/10/2021)     1/4/2022 -  Chemotherapy    fluorouracil (ADRUCIL), 300 mg/m2 = 560 mg (75 % of original dose 400 mg/m2), Intravenous, Once, 6 of 12 cycles  Dose modification: 300 mg/m2 (75 % of original dose 400 mg/m2, Cycle 1, Reason: Dose Not Tolerated), 340 mg/m2 (85 % of original dose 400 mg/m2, Cycle 4, Reason: Other (see comments)), 340 mg/m2 (85 % of original dose 400 mg/m2, Cycle 5, Reason: Other (see comments))  Administration: 560 mg (1/4/2022), 560 mg (1/18/2022), 560 mg (2/1/2022), 635 mg (2/15/2022), 635 mg (3/1/2022), 635 mg (3/15/2022)  leucovorin calcium IVPB, 300 mg/m2 = 561 mg (75 % of original dose 400 mg/m2), Intravenous, Once, 6 of 12 cycles  Dose modification: 300 mg/m2 (75 % of original dose 400 mg/m2, Cycle 1, Reason: Dose Not Tolerated), 340 mg/m2 (85 % of original dose 400 mg/m2, Cycle 4, Reason: Other (see comments)), 340 mg/m2 (85 % of original dose 400 mg/m2, Cycle 5, Reason: Other (see comments))  Administration: 561 mg (1/4/2022), 561 mg (1/18/2022), 561 mg (2/1/2022), 636 mg (2/15/2022), 636 mg (3/1/2022), 636 mg (3/15/2022)  oxaliplatin (ELOXATIN) chemo infusion, 63 75 mg/m2 = 119 2125 mg (75 % of original dose 85 mg/m2), Intravenous, Once, 6 of 12 cycles  Dose modification: 63 75 mg/m2 (75 % of original dose 85 mg/m2, Cycle 1, Reason: Dose Not Tolerated), 72 25 mg/m2 (85 % of original dose 85 mg/m2, Cycle 4, Reason: Other (see comments)), 72 25 mg/m2 (85 % of original dose 85 mg/m2, Cycle 5, Reason: Other (see comments))  Administration: 119 2125 mg (1/4/2022), 119 2125 mg (1/18/2022), 119 2125 mg (2/1/2022), 135 1075 mg (2/15/2022), 135 1075 mg (3/1/2022), 135 1075 mg (3/15/2022)  fluorouracil (ADRUCIL) ambulatory infusion Soln, 900 mg/m2/day = 3,365 mg (75 % of original dose 1,200 mg/m2/day), Intravenous, Over 46 hours, 6 of 12 cycles  Dose modification: 900 mg/m2/day (75 % of original dose 1,200 mg/m2/day, Cycle 2, Reason: Other (see comments)), 1,020 mg/m2/day (85 % of original dose 1,200 mg/m2/day, Cycle 4, Reason: Other (see comments), Comment: anticipated tolerance)         History of Present Illness  :   Miss Precious Rai is seen here today for a new diagnosis of a likely metastatic colorectal cancer  Briefly, the patient underwent screening colonoscopy in late 2019  This detected a rectosigmoid mass, which was then resected in December of 2019  Surgery required a diverting loop ileostomy, which was subsequently reversed in February of 2020  Of note, preop MRI suggested a T3bN1 lesion above the peritoneal reflection, and upfront surgery was recommended   This revealed a final path T3 N0 cancer with no aggressive features and no adjuvant therapy was recommended by her surgeon Dr Marcos Ortiz  Since that time, the patient has been doing well  She had a CT 2/2021 which demonstrated ISIAH  She recently underwent a PET scan due to a rising CEA (4 4 from 2 2 posotp), and this demonstrated a large 4 5 cm left pericolic gutter markedly hypermetabolic mass  There was mild increased activity at the colon anastomosis  Subsequent colonoscopy revealed no abnormality at this location  She denies any systemic symptoms including weight loss, nausea, vomiting, changes in her bowel habits  I described that this juncture, a PET scan demonstrates a highly metabolic mass, and more detailed imaging is necessary  I described that an MRI will give us a detailed image of her abdomen and will allow was to ascertain whether there is a high suspicion for other peritoneal implants  This will change my surgical recommendations of resection of the single metastasis versus a potential cytoreductive surgery with HIPEC  Likewise, depending on the burden of disease, I may recommend that she proceed chemotherapy before surgery  I recommend an IR biopsy in order to determine her current markers to more properly select chemotherapy regimen  I will refer her to Dr Dalia Childers with medical oncology for assessment  Finally, given her family history of colorectal cancer, I willr efer her to onc genetics for assessment  She will f/u following these studies  Interval History 11/30/21  Patient returns today after the above studies  MRI of the abdomen revealed a single metastatic lesion invading the spleen  No evidence of other peritoneal implants  MRI of the pelvis revealed a concerning appearance of the ovary  This was followed by a transvaginal ultrasound, which ultimately determined that the ovary appeared benign  She then underwent interventional radiology biopsy, which confirmed a metastatic lesion from a colorectal primary   The patient has been doing very well with no new developments  We discussed that the treatment for this will require chemotherapy as well as surgical resection  I think that a robotic approach would be beneficial given the patient's overall body habitus  The patient would strongly prefer not to be in the hospital postoperatively around the holidays, and I have discussed this with Dr Zulema Danielson, who agrees that initiating chemotherapy now is reasonable  I will therefore see the patient in 2-3 months with a repeat CT scan of the chest abdomen and pelvis  Interval History 4/6/22  The patient returns in follow-up today  She has been undergoing FOLFOX with decent toleratation of treatment  She does describe some cold hypersensitivity  Today she is doing well with no abdominal pain, nausea vomiting, constipation or diarrhea  Follow-up scan showed overall decrease in the size of a single omental implant with less involvement of the spleen  No evidence of other disease in the chest abdomen or pelvis with the exception of the multicystic disease noted in the pelvis  Review of Systems  Complete ROS Surg Onc:   Constitutional: The patient denies new or recent history of general fatigue, no recent weight loss, no change in appetite  Eyes: No complaints of visual problems, no scleral icterus  ENT: No complaints of ear pain, no hoarseness, no difficulty swallowing,  no tinnitus and no new masses in head, oral cavity, or neck  Cardiovascular: No complaints of chest pain, no palpitations, no ankle edema  Respiratory: No complaints of shortness of breath, no cough  Gastrointestinal: No complaints of jaundice, no bloody stools, no pale stools  Genitourinary: No complaints of dysuria, no hematuria, no nocturia, no frequent urination, no urethral discharge  Musculoskeletal: No complaints of weakness, paralysis, joint stiffness or arthralgias  Integumentary: No complaints of rash, no new lesions     Neurological: No complaints of convulsions, no seizures, no dizziness  Hematologic/Lymphatic: No complaints of easy bruising  Endocrine:  ENDORSES cold intolerance  No polydipsia, polyphagia, or polyuria  Allergy/immunology:  No environmental allergies  No food allergies  Not immunocompromised        Patient Active Problem List   Diagnosis    Callus of foot    Foot pain    GERD (gastroesophageal reflux disease)    Hyperlipidemia    Prediabetes    Varicose veins with pain    [facility]    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HCC)    Dyspnea on exertion    Dyslipidemia    Sigmoid diverticulosis    PONV (postoperative nausea and vomiting)    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HCC)    Lesion of ovary    Hypertension    Chemotherapy adverse reaction    Dehydration    Chemotherapy-induced nausea    Platelets decreased (HCC)    Stage 3 chronic kidney disease, unspecified whether stage 3a or 3b CKD (HCC)     Past Medical History:   Diagnosis Date    Blood in stool     Breast disorder     Cancer ([facility] )     rectal    Cancer determined by colorectal biopsy ([place] [address] )     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    GERD (gastroesophageal reflux disease)     History of rectal surgery     Hyperlipidemia     PONV (postoperative nausea and vomiting)      Past Surgical History:   Procedure Laterality Date    BREAST LUMPECTOMY Right      SECTION      x3    COLON SIGMOID RESECTION      COLONOSCOPY      DILATION AND CURETTAGE OF UTERUS      ILEO LOOP DIVERSION N/A 12/10/2019    Procedure: ILEOSTOMY LOOP;  Surgeon: Jonas Stock MD;  [facility];  Service: Robotics    IR BIOPSY OMENTUM  2021    IR PORT PLACEMENT  2021    AL CLOSE ENTEROSTOMY N/A 2020    Procedure: CLOSURE ILEOSTOMY;  Surgeon: Jonas Stock MD;  [facility];  Service: Colorectal    AL LAP,SURG,COLECTOMY, PARTIAL, W/ANAST N/A 12/10/2019    Procedure: SIGMOID RESECTION COLON LAPAROSCOPIC W ROBOTICS converted to lap hand assisted  with Partial proctectomy , LASER FLUORESCENCE ANGIOGRAPHY, INTRA OP COLONOSCOPY;  Surgeon: Brenden Nazario MD;  [facility];  Service: Robotics    SMALL INTESTINE SURGERY  2/4/2020    Procedure: RESECTION SMALL BOWEL;  Surgeon: Brenden Nazario MD;  [facility];  Service: Colorectal     Family History   Problem Relation Age of Onset    Hypertension Mother     Heart attack Mother     Heart attack Father     Heart disease Father     Hypertension Brother     Heart attack Brother     Leukemia Cousin     Breast cancer Cousin     Diabetes Cousin     Breast cancer Family         maternal side    Colon cancer Family         paternal uncle    Colon cancer Paternal Uncle     Stroke Neg Hx      Social History     Socioeconomic History    Marital status:      Spouse name: Not on file    Number of children: Not on file    Years of education: Not on file    Highest education level: Not on file   Occupational History    Not on file   Tobacco Use    Smoking status: Never Smoker    Smokeless tobacco: Never Used   Vaping Use    Vaping Use: Never used   Substance and Sexual Activity    Alcohol use: Yes     Comment: "occasionally"    Drug use: Never    Sexual activity: Not on file   Other Topics Concern    Not on file   Social History Narrative    Not on file     Social Determinants of Health     Financial Resource Strain: Not on file   Food Insecurity: Not on file   Transportation Needs: Not on file   Physical Activity: Not on file   Stress: Not on file   Social Connections: Not on file   Intimate Partner Violence: Not on file   Housing Stability: Not on file       Current Outpatient Medications:     acetaminophen (TYLENOL) 325 mg tablet, Take 3 tablets (975 mg total) by mouth every 8 (eight) hours (Patient not taking: Reported on 3/21/2022 ), Disp: 30 tablet, Rfl: 0    docusate sodium (COLACE) 100 mg capsule, Take 100 mg by mouth 2 (two) times a day, Disp: , Rfl:     ezetimibe (ZETIA) 10 mg tablet, Take 1 tablet (10 mg total) by mouth daily, Disp: 90 tablet, Rfl: 1    famotidine (PEPCID) 20 mg tablet, Take 1 tablet (20 mg total) by mouth 2 (two) times a day, Disp: 180 tablet, Rfl: 1    lidocaine-prilocaine (EMLA) cream, Apply topically as needed for mild pain (Patient not taking: Reported on 3/21/2022 ), Disp: 30 g, Rfl: 0    ondansetron (ZOFRAN) 8 mg tablet, Take 1 tablet (8 mg total) by mouth every 12 (twelve) hours as needed for nausea or vomiting, Disp: 20 tablet, Rfl: 2    prochlorperazine (COMPAZINE) 10 mg tablet, Take 1 tablet (10 mg total) by mouth every 6 (six) hours as needed for nausea or vomiting, Disp: 60 tablet, Rfl: 0  Allergies   Allergen Reactions    Medical Tape Rash     Skin irritation  Skin peeling  Skin irritation  Skin peeling  Blisters  Rash       Vitals:    04/06/22 1305   BP: 138/82   Pulse: (!) 113   Resp: 20   Temp: 98 3 °F (36 8 °C)   SpO2: 98%       Physical Exam   General: Appears well, appears stated age  Skin: Warm, anicteric  HEENT: Normocephalic, atraumatic; sclera aniceteric, mucous membranes moist; cervical nodes without adenopathy  Cardiopulmonary: RRR, Easy WOB, no BLE edema  Abd: Obese, nontender, no masses appreciated, no hepatosplenomegaly  Incisions well-healed  MSK: Symmetric, no cyanosis, no overt weakness  Lymphatic: No cervical, axillary or inguinal lymphadenopathy  Neuro: Affect appropriate, no gross motor abnormalities      Pathology:  Final Diagnosis   A  Rectosigmoid colon, low anterior resection:  - Adenocarcinoma, moderately differentiated  - Tumor invades through the muscularis propria into pericolorectal tissue, T3  - Diverticula  - All margins are negative for tumor   - Thirty-four lymph nodes, negative for malignancy (0/34)      B  Colon, anastomotic rings, resection:  - Two portions of colon with no pathologic abnormality     Intradepartmental consultation is in agreement    Interpretation performed at [facility], [address] [place] [address], [address]  Immunohistochemistry for desmin  is performed on tissue blocks A13 and A16 to help in the assessment of this case  Labs: Reviewed in [facility]    Imaging  Colonoscopy    Addendum Date: 10/6/2021 Addendum:   [address] [facility] [place] [address] [id number] [phone] DATE OF SERVICE: 10/06/21 PHYSICIAN(S): Lennox Iba, MD - Attending Physician INDICATION: Personal history of rectal cancer , had a low anterior resection in December of 2019  T3 N0, 34 lymph nodes were removed and were negative  Patient did not receive any chemotherapy  Colonoscopy performed for a diagnostic indication  POST-OP DIAGNOSIS: See the impression below  HISTORY: Prior colonoscopy: Less than 3 years ago  It is being repeated at an interval of less than 3 years because: This colonoscopy is being performed for a diagnostic indication BOWEL PREPARATION: Miralax/Dulcolax PREPROCEDURE: Informed consent was obtained for the procedure, including sedation  Risks including but not limited to bleeding, infection, perforation, adverse drug reaction and aspiration were explained in detail  Also explained about less than 100% sensitivity with the exam and other alternatives  The patient was placed in the left lateral decubitus position  DETAILS OF PROCEDURE: Patient was taken to the procedure room where a time out was performed to confirm correct patient and correct procedure  The patient underwent monitored anesthesia care, which was administered by an anesthesia professional  The patient's blood pressure, heart rate, level of consciousness, oxygen and respirations were monitored throughout the procedure  A digital rectal exam was performed  The scope was introduced through the anus and advanced to the cecum  Retroflexion was performed in the rectum   The quality of bowel preparation was evaluated using the [place] Bowel Preparation Scale with scores of: right colon = 2, transverse colon = 2, left colon = 2  The total BBPS score was 6  Bowel prep was adequate  The patient experienced no blood loss  The procedure was not difficult  The patient tolerated the procedure well  There were no apparent complications  ANESTHESIA INFORMATION: ASA: III Anesthesia Type: IV Sedation with Anesthesia MEDICATIONS: No administrations occurring from 1215 to 1230 on 10/06/21 FINDINGS: Healthy end-to-end colorectal anastomosis with no bleeding in the mid rectum All observed locations appeared normal, including the cecum, ascending colon, transverse colon, splenic flexure and descending colon  A couple scattered diverticuli seen EVENTS: Procedure Events Event Event Time ENDO CECUM REACHED 10/6/2021 12:21 PM ENDO SCOPE OUT TIME 10/6/2021 12:28 PM SPECIMENS: * No specimens in log * EQUIPMENT: Colonoscope - IMPRESSION: 1  Normal-appearing colon  Anastomosis site was seen in the rectum appeared healthy  No evidence of local recurrence  2  Couple small scattered diverticuli  RECOMMENDATION: Repeat colonoscopy in 2 years due to a personal history of colon cancer  Advised her urgent evaluation by medical oncologist and Dr Sisto Siemens Omelia Gruber, MD     Addendum Date: 10/6/2021 Addendum:   [address] [place] [id number] [phone] DATE OF SERVICE: 10/06/21 PHYSICIAN(S): Jorge Castillo MD - Attending Physician INDICATION: Personal history of rectal cancer , had a low anterior resection in December of 2019  T3 N0, 34 lymph nodes were removed and were negative  Patient did not receive any chemotherapy  Colonoscopy performed for a diagnostic indication  POST-OP DIAGNOSIS: See the impression below  HISTORY: Prior colonoscopy: Less than 3 years ago  It is being repeated at an interval of less than 3 years because: This colonoscopy is being performed for a diagnostic indication BOWEL PREPARATION: Miralax/Dulcolax PREPROCEDURE: Informed consent was obtained for the procedure, including sedation   Risks including but not limited to bleeding, infection, perforation, adverse drug reaction and aspiration were explained in detail  Also explained about less than 100% sensitivity with the exam and other alternatives  The patient was placed in the left lateral decubitus position  DETAILS OF PROCEDURE: Patient was taken to the procedure room where a time out was performed to confirm correct patient and correct procedure  The patient underwent monitored anesthesia care, which was administered by an anesthesia professional  The patient's blood pressure, heart rate, level of consciousness, oxygen and respirations were monitored throughout the procedure  A digital rectal exam was performed  The scope was introduced through the anus and advanced to the cecum  Retroflexion was performed in the rectum  The quality of bowel preparation was evaluated using the [facility] Bowel Preparation Scale with scores of: right colon = 2, transverse colon = 2, left colon = 2  The total BBPS score was 6  Bowel prep was adequate  The patient experienced no blood loss  The procedure was not difficult  The patient tolerated the procedure well  There were no apparent complications  ANESTHESIA INFORMATION: ASA: III Anesthesia Type: IV Sedation with Anesthesia MEDICATIONS: No administrations occurring from 1215 to 1230 on 10/06/21 FINDINGS: Healthy end-to-end colorectal anastomosis with no bleeding in the mid rectum All observed locations appeared normal, including the cecum, ascending colon, transverse colon, splenic flexure and descending colon  A couple scattered diverticuli seen EVENTS: Procedure Events Event Event Time ENDO CECUM REACHED 10/6/2021 12:21 PM ENDO SCOPE OUT TIME 10/6/2021 12:28 PM SPECIMENS: * No specimens in log * EQUIPMENT: Colonoscope - IMPRESSION: 1  Normal-appearing colon  Anastomosis site was seen in the rectum appeared healthy  No evidence of local recurrence  2  Couple small scattered diverticuli   RECOMMENDATION: Repeat colonoscopy in 2 years due to a personal history of colon cancer  Coral Lewis MD     Result Date: 10/6/2021  Narrative: [address] [place] [id number] [phone] DATE OF SERVICE: 10/06/21 PHYSICIAN(S): Coral Lewis MD - Attending Physician INDICATION: Personal history of rectal cancer Colonoscopy performed for a diagnostic indication  POST-OP DIAGNOSIS: See the impression below  HISTORY: Prior colonoscopy: Less than 3 years ago  It is being repeated at an interval of less than 3 years because: This colonoscopy is being performed for a diagnostic indication BOWEL PREPARATION: Miralax/Dulcolax PREPROCEDURE: Informed consent was obtained for the procedure, including sedation  Risks including but not limited to bleeding, infection, perforation, adverse drug reaction and aspiration were explained in detail  Also explained about less than 100% sensitivity with the exam and other alternatives  The patient was placed in the left lateral decubitus position  DETAILS OF PROCEDURE: Patient was taken to the procedure room where a time out was performed to confirm correct patient and correct procedure  The patient underwent monitored anesthesia care, which was administered by an anesthesia professional  The patient's blood pressure, heart rate, level of consciousness, oxygen and respirations were monitored throughout the procedure  A digital rectal exam was performed  The scope was introduced through the anus and advanced to the cecum  Retroflexion was performed in the rectum  The quality of bowel preparation was evaluated using the [facility] Bowel Preparation Scale with scores of: right colon = 2, transverse colon = 2, left colon = 2  The total BBPS score was 6  Bowel prep was adequate  The patient experienced no blood loss  The procedure was not difficult  The patient tolerated the procedure well  There were no apparent complications   ANESTHESIA INFORMATION: ASA: III Anesthesia Type: IV Sedation with Anesthesia MEDICATIONS: No administrations occurring from 1215 to 1230 on 10/06/21 FINDINGS: Healthy end-to-end colorectal anastomosis with no bleeding in the mid rectum All observed locations appeared normal, including the cecum, ascending colon, transverse colon, splenic flexure and descending colon  A couple scattered diverticuli seen EVENTS: Procedure Events Event Event Time ENDO CECUM REACHED 10/6/2021 12:21 PM ENDO SCOPE OUT TIME 10/6/2021 12:28 PM SPECIMENS: * No specimens in log * EQUIPMENT: Colonoscope -     Impression: 1  Normal-appearing colon  Anastomosis site was seen in the rectum appeared healthy  No evidence of local recurrence  2  Couple small scattered diverticuli  RECOMMENDATION: Repeat colonoscopy in 2 years due to a personal history of colon cancer  Laureen Booker MD     DXA bone density spine hip and pelvis    Result Date: 10/14/2021  Narrative: CENTRAL  DXA SCAN CLINICAL HISTORY:  58-year-old postmenopausal female  OTHER RISK FACTORS:  History of fracture resulting from minor injury  PHARMACOLOGIC THERAPY FOR OSTEOPOROSIS:  None  TECHNIQUE: Bone densitometry was performed using a [employer]   bone densitometer  Regions of interest appear properly placed  COMPARISON: 5/6/2019  RESULTS: LUMBAR SPINE L1-L4 : BMD  1 141  gm/cm2 T-score -0 4 LEFT TOTAL HIP: BMD: 0 992  gm/cm2 T-score: -0 1 LEFT FEMORAL NECK: BMD: 0 856  gm/cm2 T score: -1 3 RIGHT TOTAL HIP: BMD:  1 004  gm/cm2 T-score: 0 0 RIGHT FEMORAL NECK: BMD:  0 851  gm/cm2  T score: -1 3     Impression: 1  Low bone mass (osteopenia)  [Based on the left and right femoral necks] 2  Since a DXA study from 5/6/2019, there has been: A  STATISTICALLY SIGNIFICANT DECREASE in bone mineral density of  0 039 g/cm2 (3 3)% in the lumbar spine  A  STATISTICALLY SIGNIFICANT DECREASE in bone mineral density of  0 046 g/cm2 (4 4)% in the hips   3   The 10 year risk of hip fracture is 1 2% with the 10 year risk of major osteoporotic fracture being 12 6% as calculated by the [facility] of [place] fracture risk assessment tool (FRAX, which is based on data generated by the [facility] for Metabolic Bone Diseases)  4   The current NOF guidelines recommend treating patients with a T-score of -2 5 or less in the lumbar spine or hips, or in post-menopausal women and men over the age of 48 with low bone mass (osteopenia) and a FRAX 10 year risk score of >3% for hip fracture and/or >20% for major osteoporotic fracture  5   The NOF recommends follow-up DXA in 1-2 years after initiating therapy for osteoporosis and every 2 years thereafter  More frequent evaluation is appropriate for patients with conditions associated with rapid bone loss, such as glucocorticoid therapy  The interval between DXA screenings may be longer for individuals without major risk factors and initial T-score in the normal or upper low bone mass range  The FRAX algorithm has certain limitations: -FRAX has not been validated in patients currently or previously treated with pharmacotherapy for osteoporosis  In such patients, clinical judgment must be exercised in interpreting FRAX scores  -Prior hip, vertebral and humeral fragility fractures appear to confer greater risk of subsequent fracture than fractures at other sites (this is especially true for individuals with severe vertebral fractures), but quantification of this incremental risk is not possible with FRAX  -FRAX underestimates fracture risk in patients with history of multiple fragility fractures  -FRAX may underestimate fracture risk in patients with history of frequent falls  -It is not appropriate to use FRAX to monitor treatment response   WHO CLASSIFICATION: Normal (a T-score of -1 0 or higher) Low bone mineral density (a T-score of less than -1 0 but higher than -2 5) Osteoporosis (a T-score of -2 5 or less) Severe osteoporosis (a T-score of -2 5 or less with a fragility fracture) LEAST SIGNIFICANT CHANGE AT 95% C I: Lumbar spine: 0 036 gm/cm2 (3 2%)  Total hip: 0 018 gm/cm2 (2 0%)  Forearm: 0 024 gm/cm2 (3 2%)  Workstation performed: [id number]     NM PET CT skull base to mid thigh    Result Date: 9/28/2021  Narrative: PET/CT SCAN INDICATION:  C18 7: Malignant neoplasm of sigmoid colon C20: Malignant neoplasm of rectum   Rectosigmoid carcinoma, restaging/surveillance examination  Borderline elevated CEA (most recently 4 4)  MODIFIER: PS COMPARISON: CT chest abdomen and pelvis 2/24/2021  CELL TYPE:  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 TECHNIQUE:   12 1 mCi F-18-FDG administered IV  Multiplanar attenuation corrected and non attenuation corrected PET images were acquired 60 minutes post injection  Contiguous, low dose, axial CT sections were obtained from the skull base through the femurs   Intravenous contrast material was not utilized  This examination, like all CT scans performed in the [facility], was performed utilizing techniques to minimize radiation dose exposure, including the use of iterative reconstruction and automated exposure control  Fasting serum glucose: 90 mg/dl FINDINGS: VISUALIZED BRAIN:   No acute abnormalities are seen  HEAD/NECK:   There is a physiologic distribution of FDG  No FDG avid cervical adenopathy is seen  CT images: Unremarkable  CHEST:   No FDG avid soft tissue lesions are seen  CT images: Mild coronary artery calcification  No pericardial effusion, no pleural effusion ABDOMEN:   There is a large hypermetabolic left lateral abdominal lobular mass situated immediately inferior to the spleen in the left paracolic gutter region, which measures 4 3 x 3 9 x 4 5 cm in size, SUV max 20 8  No other definite hypermetabolic abnormalities are seen within the upper abdomen  Activity at the bowel anastomotic site in the right abdomen on image 165 demonstrates SUV max of 3 8  This is nonspecific but may simply be inflammatory or physiologic  Direct visualization is recommended if not recently performed  CT images: No ascites  No hydronephrosis or bowel obstruction  PELVIS: Additional rectosigmoid anastomotic site noted image 212  No evidence of abnormal glucose activity  No evidence of glucose avid pelvic adenopathy  No pelvic ascites  OSSEOUS STRUCTURES: No FDG avid lesions are seen  CT images: No significant findings  Impression: 1  There is a large left paracolic gutter markedly hypermetabolic mass immediately inferior to the spleen, most consistent with metastatic colorectal carcinoma  2  Mildly increased activity at the right abdominal bowel anastomotic site  If not recently performed, direct visualization is recommended 3  There are no other glucose avid abnormalities identified within the abdomen or pelvis 4  No evidence of distant glucose avid metastatic disease in the neck, chest, or skeleton The examination demonstrates a significant  finding and was documented as such in [facility] for liaison and referring practitioner notification  Workstation performed: [id number]     Mammo screening bilateral w 3d & cad    Result Date: 10/14/2021  Narrative: DIAGNOSIS: Encounter for screening mammogram for malignant neoplasm of breast TECHNIQUE: Digital screening mammography was performed  Computer Aided Detection (CAD) analyzed all applicable images  COMPARISONS: Prior breast imaging dated: 06/26/2020, 05/06/2019, 10/18/2016, 10/14/2016, and 09/14/2015 RELEVANT HISTORY: Family Breast Cancer History: History of breast cancer in [address] [facility], Family  Family Medical History: Family medical history includes breast cancer in 2 relatives (cousin, family) and colon cancer in 2 relatives (family, paternal uncle)  Personal History: No known relevant hormone history  Surgical history includes lumpectomy  No known relevant medical history  The patient is scheduled in a reminder system for screening mammography  8-10% of cancers will be missed on mammography  Management of a palpable abnormality must be based on clinical grounds  Patients will be notified of their results via letter from our facility  Accredited by [employer] of Radiology and FDA  RISK ASSESSMENT: 5 Year Tyrer-Cuzick: 2 13 % 10 Year Tyrer-Cuzick: 4 17 % Lifetime Tyrer-Cuzick: 7 93 % TISSUE DENSITY: There are scattered areas of fibroglandular density  INDICATION: Sonido Browne is a 78 y o  female presenting for screening mammography  FINDINGS: Breast parenchymal distribution is unchanged from priors including multiple focal asymmetries in the right breast   Long-term stability confirms benignancy  No developing asymmetries or concerning masses  Single upper-outer-anterior coarse calcification in the left breast is definitively benign  No suspicious microcalcifications, architectural distortion, skin thickening, or abnormalities of the nipples in either breast       Impression: No mammographic evidence of malignancy or significant change from priors  ASSESSMENT/BI-RADS CATEGORY: Left: 2 - Benign Right: 2 - Benign Overall: 2 - Benign RECOMMENDATION:      - Routine screening mammogram in 1 year for both breasts  Workstation ID: [id number]       I independently reviewed and interpreted the above laboratory and imaging data, including recent medical oncology visits, new cross-sectional imaging  I discussed this patient with Dr Zach Arthur  Discussion/Summary: This is a 69-year-old female with a 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  She has had several months of FOLFOX treatment with no progression of disease  I discussed with the patient today after personally reviewing her scans that the implant in her spleen appears to be the only site of omental disease, however, CT scans can often underestimate the degree of peritoneal disease    I have proposed a laparoscopic versus hand assisted versus open omentectomy with possible additional cytoreduction followed by HIPEC  Concurrently, she will undergo hysterectomy with oophorectomy with Dr Macdonald  We discussed the risks the procedure to include infection, bleeding, ileus, leak, respiratory failure, heart attack, stroke, DVT, PE, death  Due to her body habitus, she is at above average risk for these complications  We discussed the possible organs of resection and that intraoperatively these decisions would need to be made  At this juncture, the patient has been off chemotherapy for 3 weeks and we need to perform her surgery in the next 2-3 weeks  We will work diligently to find a date with Dr Macdonald that works for everyone involved  Likewise, she will see the surgical optimization clinic preoperatively for optimization given upcoming major surgery  All questions were answered today

## 2022-04-08 ENCOUNTER — TELEPHONE (OUTPATIENT)
Dept: SURGICAL ONCOLOGY | Facility: CLINIC | Age: 67
End: 2022-04-08

## 2022-04-11 ENCOUNTER — TELEPHONE (OUTPATIENT)
Dept: SURGICAL ONCOLOGY | Facility: CLINIC | Age: 67
End: 2022-04-11

## 2022-04-13 ENCOUNTER — CLINICAL SUPPORT (OUTPATIENT)
Dept: FAMILY MEDICINE CLINIC | Facility: CLINIC | Age: 67
End: 2022-04-13
Payer: MEDICARE

## 2022-04-13 DIAGNOSIS — Z23 NEED FOR VACCINATION: Primary | ICD-10-CM

## 2022-04-13 PROCEDURE — 90677 PCV20 VACCINE IM: CPT

## 2022-04-13 PROCEDURE — G0009 ADMIN PNEUMOCOCCAL VACCINE: HCPCS

## 2022-04-14 ENCOUNTER — TELEPHONE (OUTPATIENT)
Dept: SURGICAL ONCOLOGY | Facility: CLINIC | Age: 67
End: 2022-04-14

## 2022-04-15 ENCOUNTER — LAB REQUISITION (OUTPATIENT)
Dept: LAB | Facility: HOSPITAL | Age: 67
End: 2022-04-15
Payer: MEDICARE

## 2022-04-15 ENCOUNTER — APPOINTMENT (OUTPATIENT)
Dept: LAB | Facility: HOSPITAL | Age: 67
End: 2022-04-15
Payer: MEDICARE

## 2022-04-15 DIAGNOSIS — E08.8 DIABETES MELLITUS DUE TO UNDERLYING CONDITION WITH UNSPECIFIED COMPLICATIONS (HCC): ICD-10-CM

## 2022-04-15 DIAGNOSIS — D49.9 NEOPLASM OF UNSPECIFIED BEHAVIOR OF UNSPECIFIED SITE: ICD-10-CM

## 2022-04-15 DIAGNOSIS — G11.4 HEREDITARY SPASTIC PARAPLEGIA (HCC): ICD-10-CM

## 2022-04-15 DIAGNOSIS — C19 RECTOSIGMOID CANCER (HCC): ICD-10-CM

## 2022-04-15 DIAGNOSIS — C78.6 SECONDARY MALIGNANT NEOPLASM OF RETROPERITONEUM AND PERITONEUM (HCC): ICD-10-CM

## 2022-04-15 DIAGNOSIS — C78.6 OMENTAL METASTASIS (HCC): ICD-10-CM

## 2022-04-15 LAB
ALBUMIN SERPL BCP-MCNC: 2.7 G/DL (ref 3.5–5)
ALP SERPL-CCNC: 67 U/L (ref 46–116)
ALT SERPL W P-5'-P-CCNC: 30 U/L (ref 12–78)
ANION GAP SERPL CALCULATED.3IONS-SCNC: 9 MMOL/L (ref 4–13)
APTT PPP: 26 SECONDS (ref 23–37)
AST SERPL W P-5'-P-CCNC: 25 U/L (ref 5–45)
BASOPHILS # BLD AUTO: 0.02 THOUSANDS/ΜL (ref 0–0.1)
BASOPHILS NFR BLD AUTO: 0 % (ref 0–1)
BILIRUB SERPL-MCNC: 0.74 MG/DL (ref 0.2–1)
BUN SERPL-MCNC: 14 MG/DL (ref 5–25)
CALCIUM ALBUM COR SERPL-MCNC: 9.4 MG/DL (ref 8.3–10.1)
CALCIUM SERPL-MCNC: 8.4 MG/DL (ref 8.3–10.1)
CEA SERPL-MCNC: 4 NG/ML (ref 0–3)
CHLORIDE SERPL-SCNC: 108 MMOL/L (ref 100–108)
CO2 SERPL-SCNC: 22 MMOL/L (ref 21–32)
CREAT SERPL-MCNC: 0.9 MG/DL (ref 0.6–1.3)
EOSINOPHIL # BLD AUTO: 0.06 THOUSAND/ΜL (ref 0–0.61)
EOSINOPHIL NFR BLD AUTO: 1 % (ref 0–6)
ERYTHROCYTE [DISTWIDTH] IN BLOOD BY AUTOMATED COUNT: 18.6 % (ref 11.6–15.1)
EST. AVERAGE GLUCOSE BLD GHB EST-MCNC: 105 MG/DL
GFR SERPL CREATININE-BSD FRML MDRD: 65 ML/MIN/1.73SQ M
GLUCOSE P FAST SERPL-MCNC: 91 MG/DL (ref 65–99)
HBA1C MFR BLD: 5.3 %
HCT VFR BLD AUTO: 36.5 % (ref 34.8–46.1)
HGB BLD-MCNC: 11.5 G/DL (ref 11.5–15.4)
IMM GRANULOCYTES # BLD AUTO: 0.03 THOUSAND/UL (ref 0–0.2)
IMM GRANULOCYTES NFR BLD AUTO: 1 % (ref 0–2)
INR PPP: 1.01 (ref 0.84–1.19)
LYMPHOCYTES # BLD AUTO: 1.64 THOUSANDS/ΜL (ref 0.6–4.47)
LYMPHOCYTES NFR BLD AUTO: 30 % (ref 14–44)
MCH RBC QN AUTO: 28.8 PG (ref 26.8–34.3)
MCHC RBC AUTO-ENTMCNC: 31.5 G/DL (ref 31.4–37.4)
MCV RBC AUTO: 92 FL (ref 82–98)
MONOCYTES # BLD AUTO: 0.43 THOUSAND/ΜL (ref 0.17–1.22)
MONOCYTES NFR BLD AUTO: 8 % (ref 4–12)
NEUTROPHILS # BLD AUTO: 3.29 THOUSANDS/ΜL (ref 1.85–7.62)
NEUTS SEG NFR BLD AUTO: 60 % (ref 43–75)
NRBC BLD AUTO-RTO: 0 /100 WBCS
PLATELET # BLD AUTO: 178 THOUSANDS/UL (ref 149–390)
PMV BLD AUTO: 10.7 FL (ref 8.9–12.7)
POTASSIUM SERPL-SCNC: 3.7 MMOL/L (ref 3.5–5.3)
PROT SERPL-MCNC: 7.1 G/DL (ref 6.4–8.2)
PROTHROMBIN TIME: 13.1 SECONDS (ref 11.6–14.5)
RBC # BLD AUTO: 3.99 MILLION/UL (ref 3.81–5.12)
SODIUM SERPL-SCNC: 139 MMOL/L (ref 136–145)
WBC # BLD AUTO: 5.47 THOUSAND/UL (ref 4.31–10.16)

## 2022-04-15 PROCEDURE — 86850 RBC ANTIBODY SCREEN: CPT | Performed by: STUDENT IN AN ORGANIZED HEALTH CARE EDUCATION/TRAINING PROGRAM

## 2022-04-15 PROCEDURE — 86900 BLOOD TYPING SEROLOGIC ABO: CPT | Performed by: STUDENT IN AN ORGANIZED HEALTH CARE EDUCATION/TRAINING PROGRAM

## 2022-04-15 PROCEDURE — 86901 BLOOD TYPING SEROLOGIC RH(D): CPT | Performed by: STUDENT IN AN ORGANIZED HEALTH CARE EDUCATION/TRAINING PROGRAM

## 2022-04-15 PROCEDURE — 85730 THROMBOPLASTIN TIME PARTIAL: CPT

## 2022-04-15 PROCEDURE — 80053 COMPREHEN METABOLIC PANEL: CPT

## 2022-04-15 PROCEDURE — 93005 ELECTROCARDIOGRAM TRACING: CPT

## 2022-04-15 PROCEDURE — 82378 CARCINOEMBRYONIC ANTIGEN: CPT

## 2022-04-15 PROCEDURE — 83036 HEMOGLOBIN GLYCOSYLATED A1C: CPT

## 2022-04-15 PROCEDURE — 85610 PROTHROMBIN TIME: CPT

## 2022-04-15 PROCEDURE — 85025 COMPLETE CBC W/AUTO DIFF WBC: CPT

## 2022-04-15 PROCEDURE — 36415 COLL VENOUS BLD VENIPUNCTURE: CPT

## 2022-04-18 LAB
ATRIAL RATE: 76 BPM
P AXIS: 29 DEGREES
PR INTERVAL: 170 MS
QRS AXIS: 18 DEGREES
QRSD INTERVAL: 82 MS
QT INTERVAL: 382 MS
QTC INTERVAL: 429 MS
T WAVE AXIS: 26 DEGREES
VENTRICULAR RATE: 76 BPM

## 2022-04-18 PROCEDURE — 93010 ELECTROCARDIOGRAM REPORT: CPT | Performed by: INTERNAL MEDICINE

## 2022-04-19 ENCOUNTER — TELEPHONE (OUTPATIENT)
Dept: ANESTHESIOLOGY | Facility: CLINIC | Age: 67
End: 2022-04-19

## 2022-04-20 ENCOUNTER — TELEPHONE (OUTPATIENT)
Dept: SURGICAL ONCOLOGY | Facility: CLINIC | Age: 67
End: 2022-04-20

## 2022-04-24 NOTE — PROGRESS NOTES
[facility]  Consult Surgery Sptimization  Hypoalbuminemia/S/P CHEMO       Assessment/Plan:  · 26-year-old female referred to [facility] for pre-surgery surgical optimization with concerns of hypoalbuminemia ans S/P CHEMO   · She is scheduled on 04/29/2022  Case: [id number] Date/Time: 04/29/22 0800   Procedures:        TOTAL ABDOMINAL HYSTERECTOMY, BILATERAL SALPINGO-OOPHORECTOMY (N/A Abdomen)       LAPAROSCOPIC VS OPEN OMENTECTOMY, POSSIBLE SPLENECTOMY (N/A Abdomen)       INSTILLATION CHEMOTHERAPY INTRAPERITONEAL (HIPEC) LAPAROTOMY (N/A Abdomen)   Anesthesia type: General w/ Epidural   Diagnosis:        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]       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]   Pre-op diagnosis:        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]       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]   Location: [facility] OR ROOM 09 / [facility] MAIN OR         No problem-specific Assessment & Plan notes found for this encounter         Problem List Items Addressed This Visit        Digestive    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[address] ) - Primary  · Here today for surgery optimization   · Encouraged to increase protein prior surgery, encouraged to switch out carbs & sweets for protein snacks  · Recommended chicken, fish, tuna fish, cottage cheese, [place] yogurt, eggs, cheese, and protein shakes as needed  · Nutrition will see patient after surgery  · Encouraged bilateral lower extremity exercises to be done for muscle strengthening and balance coordination  · Instructed to exercise lungs with incentive spirometer  CMP Egfr 65 CR 0 90  CBC HGB 11 5 PLATES 802  DCS2S 5 3  At risk for post-op BETY -yes  At risk for post-op SSI - no  METS 7   Active goes out shopping   Eating well        Cardiovascular and Mediastinum    Hypertension  · Followed by PCP  · PCP aware of upcoming surgery  · No medications at this time       Genitourinary    Stage 3 chronic kidney disease, unspecified whether stage 3a or 3b CKD ([address] )  · Alerts high risk for BETY after surgery due to renal insufficiency  · Currently estimated GFR and creatinine are stable  · Nephrology can see postop- I ordered       Other        Morbid obesity ([facility] )  BMI Counseling:   · Body mass index is 44 1 kg/m²  Discussed the patient's BMI with her  · The BMI is above normal  Nutrition recommendations include reducing portion sizes, decreasing overall calorie intake, 3-5 servings of fruits/vegetables daily, reducing fast food intake and consuming healthier snacks  · Not interested in weight management this time           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[facility])    Chemotherapy-induced nausea  · Status post chemo  · Last chemo was March 14th  · Patient is doing well, feels stronger  · Yesterday she went out shopping, which is what she loves to do in her past time  · Encouraged to remain active and healthy  · Encouraged to implement exercise routines      Platelets decreased ([facility] )  · Platelets stable at this time 176            Subjective:      Patient ID: Laisha Sanabria is a 75 y o  female who was referred to [facility] for surgical optimization  Patient is status post chemo on 03/14/2022  She is planning to undergo a total abdominal hysterectomy  I met patient SOC today  She arrived alone  She walks independently  Denies falls  Does not use a walker or cane  She lives alone  She is independent with all ADLs  She is active  She has 2 daughters and 1 son  She is close with her family  Yesterday she went out shopping for a few hours and tolerated well  She does feel at her physical baseline since having chemo  She lives in an apartment and has to 3 flights of steps  She does complain of chronic shortness of breath with steps but said that her baseline because of being overweight  Has some episodes of exhaustion but feels much better  Today she looks well  Her protein levels are still low  Albumin is 2 7  She does have a good appetite  She does have a BMI of 45 1    I did recommend changing out carbs and sweets and replacing them with protein snacks  Patient agrees  She refused weight management at this time  As always we discussed having your BEST surgery, and BEST recovery  Surgery goals reviewed today  Breathing exercises   Patient was encouraged to begin lung exercises today  This could be accomplished through deep breathing and cough exercises  Patient was taught how to use an incentive spirometer  Return demonstration provided  Eating/nutrition   Encouraged patient to increase oral protein intake prior to surgery  Based on current weight of   , patient was instructed to consume    Grams of protein a day  This can be accomplished by consuming chicken, fish, tuna fish, cottage cheese, cheese, eggs, [place] yogurt, and protein shakes as needed  I encouraged use of protein shakes such ENLIVE  I also recommended making your own protein shakes with protein powder  Sleep/Stress management  Patient was encouraged to rest their body prior to surgery  Encouraged attempting to get 8 hours of sleep at night  Avoid stress  Avoid sick contacts  Encouraged to find a relaxing hobby such as reading, meditation, listening to music  Training exercises  Patient was encouraged to remain active as possible  Today bilateral lower extremity generic exercises were taught for muscle strengthening and balance  All exercises to be done sitting down  The following portions of the patient's history were reviewed and updated as appropriate: current medications, past family history, past medical history, past social history, past surgical history and problem list     Review of Systems   Constitutional: Negative for chills and fever  HENT: Positive for postnasal drip  Negative for sore throat and trouble swallowing  Respiratory: Positive for shortness of breath  Negative for cough  Chronic    Cardiovascular: Negative for palpitations and leg swelling     Gastrointestinal: Negative for constipation, diarrhea and vomiting  Genitourinary: Negative for difficulty urinating  Skin: Negative for color change, pallor and rash  Neurological: Negative for dizziness, seizures, light-headedness, numbness and headaches  Psychiatric/Behavioral: Negative for agitation, behavioral problems, confusion, decreased concentration and dysphoric mood  Objective:      LMP 09/01/2011 (Approximate)          Physical Exam  Constitutional:       Appearance: She is obese  HENT:      Head: Normocephalic  Nose: Nose normal       Mouth/Throat:      Mouth: Mucous membranes are moist    Eyes:      Pupils: Pupils are equal, round, and reactive to light  Cardiovascular:      Rate and Rhythm: Normal rate and regular rhythm  Pulmonary:      Effort: Pulmonary effort is normal       Breath sounds: Normal breath sounds  No wheezing or rales  Abdominal:      Palpations: Abdomen is soft  Musculoskeletal:         General: Normal range of motion  Cervical back: Normal range of motion  Skin:     General: Skin is warm and dry  Neurological:      General: No focal deficit present  Mental Status: She is alert and oriented to person, place, and time  Mental status is at baseline  Psychiatric:         Mood and Affect: Mood normal          Behavior: Behavior normal          Thought Content:  Thought content normal          Judgment: Judgment normal

## 2022-04-25 ENCOUNTER — CONSULT (OUTPATIENT)
Dept: ANESTHESIOLOGY | Facility: CLINIC | Age: 67
End: 2022-04-25
Payer: MEDICARE

## 2022-04-25 ENCOUNTER — ANESTHESIA EVENT (OUTPATIENT)
Dept: PERIOP | Facility: HOSPITAL | Age: 67
DRG: 326 | End: 2022-04-25
Payer: MEDICARE

## 2022-04-25 VITALS
SYSTOLIC BLOOD PRESSURE: 160 MMHG | DIASTOLIC BLOOD PRESSURE: 92 MMHG | WEIGHT: 208 LBS | TEMPERATURE: 96.9 F | HEART RATE: 99 BPM | BODY MASS INDEX: 44.88 KG/M2 | HEIGHT: 57 IN | RESPIRATION RATE: 15 BRPM | OXYGEN SATURATION: 99 %

## 2022-04-25 DIAGNOSIS — N18.30 STAGE 3 CHRONIC KIDNEY DISEASE, UNSPECIFIED WHETHER STAGE 3A OR 3B CKD (HCC): ICD-10-CM

## 2022-04-25 DIAGNOSIS — R73.03 PREDIABETES: ICD-10-CM

## 2022-04-25 DIAGNOSIS — C78.6 OMENTAL METASTASIS (HCC): ICD-10-CM

## 2022-04-25 DIAGNOSIS — E66.01 MORBID OBESITY (HCC): ICD-10-CM

## 2022-04-25 DIAGNOSIS — T45.1X5A CHEMOTHERAPY-INDUCED NAUSEA: ICD-10-CM

## 2022-04-25 DIAGNOSIS — C19 RECTOSIGMOID CANCER (HCC): Primary | ICD-10-CM

## 2022-04-25 DIAGNOSIS — D69.6 PLATELETS DECREASED (HCC): ICD-10-CM

## 2022-04-25 DIAGNOSIS — R11.0 CHEMOTHERAPY-INDUCED NAUSEA: ICD-10-CM

## 2022-04-25 DIAGNOSIS — I10 HYPERTENSION, UNSPECIFIED TYPE: ICD-10-CM

## 2022-04-25 PROCEDURE — 99215 OFFICE O/P EST HI 40 MIN: CPT | Performed by: NURSE PRACTITIONER

## 2022-04-25 NOTE — PRE-PROCEDURE INSTRUCTIONS
Pre-Surgery Instructions:   Medication Instructions    docusate sodium (COLACE) 100 mg capsule Instructed to take as needed including DOS    ezetimibe (ZETIA) 10 mg tablet Take at HS    famotidine (PEPCID) 20 mg tablet Instructed to take as needed including DOS    ondansetron (ZOFRAN) 8 mg tablet Instructed to take as needed including DOS    prochlorperazine (COMPAZINE) 10 mg tablet Instructed to take as needed including DOS   Have you had / have a sore throat? No  Have you had / have a cough less than 1 week? No  Have you had / have a fever greater than 100 0 - 100  4? No  Are you experiencing any shortness of breath? No  Fully covid vaccinated    Review with patient in [facility] clinic  medications and showering instructions  Advised don't take NSAID's, ok tylenol products  Advised ASC call with surgery schedule time, nothing eat or drink after midnight  Verbalized understanding  Advised AMB Referral to Sleep Medicine and  Refer to Weight Management Program is not interested

## 2022-04-29 ENCOUNTER — APPOINTMENT (INPATIENT)
Dept: RADIOLOGY | Facility: HOSPITAL | Age: 67
DRG: 326 | End: 2022-04-29
Payer: MEDICARE

## 2022-04-29 ENCOUNTER — HOSPITAL ENCOUNTER (INPATIENT)
Facility: HOSPITAL | Age: 67
LOS: 6 days | Discharge: HOME HEALTH | DRG: 326 | End: 2022-05-05
Attending: OBSTETRICS & GYNECOLOGY | Admitting: STUDENT IN AN ORGANIZED HEALTH CARE EDUCATION/TRAINING PROGRAM
Payer: MEDICARE

## 2022-04-29 ENCOUNTER — ANESTHESIA (OUTPATIENT)
Dept: PERIOP | Facility: HOSPITAL | Age: 67
DRG: 326 | End: 2022-04-29
Payer: MEDICARE

## 2022-04-29 DIAGNOSIS — N18.30 STAGE 3 CHRONIC KIDNEY DISEASE, UNSPECIFIED WHETHER STAGE 3A OR 3B CKD (HCC): ICD-10-CM

## 2022-04-29 DIAGNOSIS — C78.6 OMENTAL METASTASIS (HCC): Primary | ICD-10-CM

## 2022-04-29 DIAGNOSIS — C19 RECTOSIGMOID CANCER (HCC): ICD-10-CM

## 2022-04-29 LAB
ALBUMIN SERPL BCP-MCNC: 2.8 G/DL (ref 3.5–5)
ALP SERPL-CCNC: 38 U/L (ref 46–116)
ALT SERPL W P-5'-P-CCNC: 60 U/L (ref 12–78)
ANION GAP SERPL CALCULATED.3IONS-SCNC: 7 MMOL/L (ref 4–13)
ANION GAP SERPL CALCULATED.3IONS-SCNC: 8 MMOL/L (ref 4–13)
ANISOCYTOSIS BLD QL SMEAR: PRESENT
APTT PPP: 23 SECONDS (ref 23–37)
AST SERPL W P-5'-P-CCNC: 76 U/L (ref 5–45)
BASOPHILS # BLD MANUAL: 0 THOUSAND/UL (ref 0–0.1)
BASOPHILS NFR MAR MANUAL: 0 % (ref 0–1)
BILIRUB SERPL-MCNC: 0.55 MG/DL (ref 0.2–1)
BUN SERPL-MCNC: 14 MG/DL (ref 5–25)
BUN SERPL-MCNC: 15 MG/DL (ref 5–25)
CA-I BLD-SCNC: 1.04 MMOL/L (ref 1.12–1.32)
CALCIUM ALBUM COR SERPL-MCNC: 8.7 MG/DL (ref 8.3–10.1)
CALCIUM SERPL-MCNC: 7.6 MG/DL (ref 8.3–10.1)
CALCIUM SERPL-MCNC: 7.7 MG/DL (ref 8.3–10.1)
CHLORIDE SERPL-SCNC: 113 MMOL/L (ref 100–108)
CHLORIDE SERPL-SCNC: 114 MMOL/L (ref 100–108)
CO2 SERPL-SCNC: 16 MMOL/L (ref 21–32)
CO2 SERPL-SCNC: 17 MMOL/L (ref 21–32)
CREAT SERPL-MCNC: 1.16 MG/DL (ref 0.6–1.3)
CREAT SERPL-MCNC: 1.21 MG/DL (ref 0.6–1.3)
EOSINOPHIL # BLD MANUAL: 0 THOUSAND/UL (ref 0–0.4)
EOSINOPHIL NFR BLD MANUAL: 0 % (ref 0–6)
ERYTHROCYTE [DISTWIDTH] IN BLOOD BY AUTOMATED COUNT: 17 % (ref 11.6–15.1)
GFR SERPL CREATININE-BSD FRML MDRD: 46 ML/MIN/1.73SQ M
GFR SERPL CREATININE-BSD FRML MDRD: 48 ML/MIN/1.73SQ M
GLUCOSE SERPL-MCNC: 164 MG/DL (ref 65–140)
GLUCOSE SERPL-MCNC: 180 MG/DL (ref 65–140)
HCT VFR BLD AUTO: 28.6 % (ref 34.8–46.1)
HGB BLD-MCNC: 8.4 G/DL (ref 11.5–15.4)
INR PPP: 1.13 (ref 0.84–1.19)
LACTATE SERPL-SCNC: 3.9 MMOL/L (ref 0.5–2)
LACTATE SERPL-SCNC: 3.9 MMOL/L (ref 0.5–2)
LYMPHOCYTES # BLD AUTO: 0.99 THOUSAND/UL (ref 0.6–4.47)
LYMPHOCYTES # BLD AUTO: 6 % (ref 14–44)
MACROCYTES BLD QL AUTO: PRESENT
MAGNESIUM SERPL-MCNC: 1.4 MG/DL (ref 1.6–2.6)
MCH RBC QN AUTO: 29.3 PG (ref 26.8–34.3)
MCHC RBC AUTO-ENTMCNC: 29.4 G/DL (ref 31.4–37.4)
MCV RBC AUTO: 100 FL (ref 82–98)
METAMYELOCYTES NFR BLD MANUAL: 4 % (ref 0–1)
MONOCYTES # BLD AUTO: 0.5 THOUSAND/UL (ref 0–1.22)
MONOCYTES NFR BLD: 3 % (ref 4–12)
NEUTROPHILS # BLD MANUAL: 14.41 THOUSAND/UL (ref 1.85–7.62)
NEUTS BAND NFR BLD MANUAL: 19 % (ref 0–8)
NEUTS SEG NFR BLD AUTO: 68 % (ref 43–75)
OVALOCYTES BLD QL SMEAR: PRESENT
PHOSPHATE SERPL-MCNC: 3 MG/DL (ref 2.3–4.1)
PLATELET # BLD AUTO: 188 THOUSANDS/UL (ref 149–390)
PLATELET BLD QL SMEAR: ADEQUATE
PMV BLD AUTO: 10.4 FL (ref 8.9–12.7)
POIKILOCYTOSIS BLD QL SMEAR: PRESENT
POLYCHROMASIA BLD QL SMEAR: PRESENT
POTASSIUM SERPL-SCNC: 5.4 MMOL/L (ref 3.5–5.3)
POTASSIUM SERPL-SCNC: 5.7 MMOL/L (ref 3.5–5.3)
PROT SERPL-MCNC: 5.6 G/DL (ref 6.4–8.2)
PROTHROMBIN TIME: 14 SECONDS (ref 11.6–14.5)
RBC # BLD AUTO: 2.87 MILLION/UL (ref 3.81–5.12)
RBC MORPH BLD: PRESENT
SODIUM SERPL-SCNC: 137 MMOL/L (ref 136–145)
SODIUM SERPL-SCNC: 138 MMOL/L (ref 136–145)
WBC # BLD AUTO: 16.56 THOUSAND/UL (ref 4.31–10.16)

## 2022-04-29 PROCEDURE — 80053 COMPREHEN METABOLIC PANEL: CPT | Performed by: SURGERY

## 2022-04-29 PROCEDURE — 83605 ASSAY OF LACTIC ACID: CPT | Performed by: SURGERY

## 2022-04-29 PROCEDURE — 85730 THROMBOPLASTIN TIME PARTIAL: CPT | Performed by: SURGERY

## 2022-04-29 PROCEDURE — 0W9B00Z DRAINAGE OF LEFT PLEURAL CAVITY WITH DRAINAGE DEVICE, OPEN APPROACH: ICD-10-PCS | Performed by: STUDENT IN AN ORGANIZED HEALTH CARE EDUCATION/TRAINING PROGRAM

## 2022-04-29 PROCEDURE — 0DTU0ZZ RESECTION OF OMENTUM, OPEN APPROACH: ICD-10-PCS | Performed by: STUDENT IN AN ORGANIZED HEALTH CARE EDUCATION/TRAINING PROGRAM

## 2022-04-29 PROCEDURE — 32551 INSERTION OF CHEST TUBE: CPT | Performed by: STUDENT IN AN ORGANIZED HEALTH CARE EDUCATION/TRAINING PROGRAM

## 2022-04-29 PROCEDURE — 71045 X-RAY EXAM CHEST 1 VIEW: CPT

## 2022-04-29 PROCEDURE — 88309 TISSUE EXAM BY PATHOLOGIST: CPT | Performed by: PATHOLOGY

## 2022-04-29 PROCEDURE — 88305 TISSUE EXAM BY PATHOLOGIST: CPT | Performed by: PATHOLOGY

## 2022-04-29 PROCEDURE — 0UT74ZZ RESECTION OF BILATERAL FALLOPIAN TUBES, PERCUTANEOUS ENDOSCOPIC APPROACH: ICD-10-PCS | Performed by: STUDENT IN AN ORGANIZED HEALTH CARE EDUCATION/TRAINING PROGRAM

## 2022-04-29 PROCEDURE — 0UT94ZZ RESECTION OF UTERUS, PERCUTANEOUS ENDOSCOPIC APPROACH: ICD-10-PCS | Performed by: STUDENT IN AN ORGANIZED HEALTH CARE EDUCATION/TRAINING PROGRAM

## 2022-04-29 PROCEDURE — 80048 BASIC METABOLIC PNL TOTAL CA: CPT | Performed by: SURGERY

## 2022-04-29 PROCEDURE — 94002 VENT MGMT INPAT INIT DAY: CPT

## 2022-04-29 PROCEDURE — 94760 N-INVAS EAR/PLS OXIMETRY 1: CPT

## 2022-04-29 PROCEDURE — 83735 ASSAY OF MAGNESIUM: CPT | Performed by: SURGERY

## 2022-04-29 PROCEDURE — 58150 TOTAL HYSTERECTOMY: CPT | Performed by: OBSTETRICS & GYNECOLOGY

## 2022-04-29 PROCEDURE — 99223 1ST HOSP IP/OBS HIGH 75: CPT | Performed by: EMERGENCY MEDICINE

## 2022-04-29 PROCEDURE — 39501 REPAIR DIAPHRAGM LACERATION: CPT | Performed by: THORACIC SURGERY (CARDIOTHORACIC VASCULAR SURGERY)

## 2022-04-29 PROCEDURE — 0BQT0ZZ REPAIR DIAPHRAGM, OPEN APPROACH: ICD-10-PCS | Performed by: STUDENT IN AN ORGANIZED HEALTH CARE EDUCATION/TRAINING PROGRAM

## 2022-04-29 PROCEDURE — 93005 ELECTROCARDIOGRAM TRACING: CPT

## 2022-04-29 PROCEDURE — 86923 COMPATIBILITY TEST ELECTRIC: CPT

## 2022-04-29 PROCEDURE — 85610 PROTHROMBIN TIME: CPT | Performed by: SURGERY

## 2022-04-29 PROCEDURE — NC001 PR NO CHARGE: Performed by: OBSTETRICS & GYNECOLOGY

## 2022-04-29 PROCEDURE — 84100 ASSAY OF PHOSPHORUS: CPT | Performed by: SURGERY

## 2022-04-29 PROCEDURE — 85027 COMPLETE CBC AUTOMATED: CPT | Performed by: SURGERY

## 2022-04-29 PROCEDURE — 39560 RESECT DIAPHRAGM SIMPLE: CPT | Performed by: STUDENT IN AN ORGANIZED HEALTH CARE EDUCATION/TRAINING PROGRAM

## 2022-04-29 PROCEDURE — 07TP0ZZ RESECTION OF SPLEEN, OPEN APPROACH: ICD-10-PCS | Performed by: STUDENT IN AN ORGANIZED HEALTH CARE EDUCATION/TRAINING PROGRAM

## 2022-04-29 PROCEDURE — 85007 BL SMEAR W/DIFF WBC COUNT: CPT | Performed by: SURGERY

## 2022-04-29 PROCEDURE — 82330 ASSAY OF CALCIUM: CPT | Performed by: SURGERY

## 2022-04-29 PROCEDURE — C9113 INJ PANTOPRAZOLE SODIUM, VIA: HCPCS | Performed by: SURGERY

## 2022-04-29 PROCEDURE — 0UT24ZZ RESECTION OF BILATERAL OVARIES, PERCUTANEOUS ENDOSCOPIC APPROACH: ICD-10-PCS | Performed by: STUDENT IN AN ORGANIZED HEALTH CARE EDUCATION/TRAINING PROGRAM

## 2022-04-29 PROCEDURE — 96549 UNLISTED CHEMOTHERAPY PX: CPT | Performed by: STUDENT IN AN ORGANIZED HEALTH CARE EDUCATION/TRAINING PROGRAM

## 2022-04-29 PROCEDURE — 38102 REMOVAL OF SPLEEN TOTAL: CPT | Performed by: STUDENT IN AN ORGANIZED HEALTH CARE EDUCATION/TRAINING PROGRAM

## 2022-04-29 RX ORDER — ONDANSETRON 2 MG/ML
INJECTION INTRAMUSCULAR; INTRAVENOUS AS NEEDED
Status: DISCONTINUED | OUTPATIENT
Start: 2022-04-29 | End: 2022-04-29

## 2022-04-29 RX ORDER — SODIUM CHLORIDE 9 MG/ML
INJECTION, SOLUTION INTRAVENOUS CONTINUOUS PRN
Status: DISCONTINUED | OUTPATIENT
Start: 2022-04-29 | End: 2022-04-29

## 2022-04-29 RX ORDER — LIDOCAINE HYDROCHLORIDE 10 MG/ML
INJECTION, SOLUTION EPIDURAL; INFILTRATION; INTRACAUDAL; PERINEURAL AS NEEDED
Status: DISCONTINUED | OUTPATIENT
Start: 2022-04-29 | End: 2022-04-29

## 2022-04-29 RX ORDER — ALBUMIN, HUMAN INJ 5% 5 %
SOLUTION INTRAVENOUS CONTINUOUS PRN
Status: DISCONTINUED | OUTPATIENT
Start: 2022-04-29 | End: 2022-04-29

## 2022-04-29 RX ORDER — MIDAZOLAM HYDROCHLORIDE 2 MG/2ML
INJECTION, SOLUTION INTRAMUSCULAR; INTRAVENOUS AS NEEDED
Status: DISCONTINUED | OUTPATIENT
Start: 2022-04-29 | End: 2022-04-29

## 2022-04-29 RX ORDER — HYDROMORPHONE HCL/PF 1 MG/ML
0.5 SYRINGE (ML) INJECTION
Status: DISCONTINUED | OUTPATIENT
Start: 2022-04-29 | End: 2022-04-29

## 2022-04-29 RX ORDER — SODIUM CHLORIDE, SODIUM LACTATE, POTASSIUM CHLORIDE, CALCIUM CHLORIDE 600; 310; 30; 20 MG/100ML; MG/100ML; MG/100ML; MG/100ML
INJECTION, SOLUTION INTRAVENOUS CONTINUOUS PRN
Status: DISCONTINUED | OUTPATIENT
Start: 2022-04-29 | End: 2022-04-29

## 2022-04-29 RX ORDER — ROCURONIUM BROMIDE 10 MG/ML
INJECTION, SOLUTION INTRAVENOUS AS NEEDED
Status: DISCONTINUED | OUTPATIENT
Start: 2022-04-29 | End: 2022-04-29

## 2022-04-29 RX ORDER — HEPARIN SODIUM 5000 [USP'U]/ML
5000 INJECTION, SOLUTION INTRAVENOUS; SUBCUTANEOUS EVERY 8 HOURS SCHEDULED
Status: DISCONTINUED | OUTPATIENT
Start: 2022-04-29 | End: 2022-05-05

## 2022-04-29 RX ORDER — DEXAMETHASONE SODIUM PHOSPHATE 10 MG/ML
INJECTION, SOLUTION INTRAMUSCULAR; INTRAVENOUS AS NEEDED
Status: DISCONTINUED | OUTPATIENT
Start: 2022-04-29 | End: 2022-04-29

## 2022-04-29 RX ORDER — LABETALOL HYDROCHLORIDE 5 MG/ML
10 INJECTION, SOLUTION INTRAVENOUS EVERY 4 HOURS PRN
Status: DISCONTINUED | OUTPATIENT
Start: 2022-04-29 | End: 2022-05-05 | Stop reason: HOSPADM

## 2022-04-29 RX ORDER — SODIUM CHLORIDE, SODIUM GLUCONATE, SODIUM ACETATE, POTASSIUM CHLORIDE, MAGNESIUM CHLORIDE, SODIUM PHOSPHATE, DIBASIC, AND POTASSIUM PHOSPHATE .53; .5; .37; .037; .03; .012; .00082 G/100ML; G/100ML; G/100ML; G/100ML; G/100ML; G/100ML; G/100ML
1000 INJECTION, SOLUTION INTRAVENOUS ONCE
Status: COMPLETED | OUTPATIENT
Start: 2022-04-29 | End: 2022-04-30

## 2022-04-29 RX ORDER — FENTANYL CITRATE/PF 50 MCG/ML
25 SYRINGE (ML) INJECTION
Status: DISCONTINUED | OUTPATIENT
Start: 2022-04-29 | End: 2022-04-29

## 2022-04-29 RX ORDER — HYDROMORPHONE HCL/PF 1 MG/ML
SYRINGE (ML) INJECTION AS NEEDED
Status: DISCONTINUED | OUTPATIENT
Start: 2022-04-29 | End: 2022-04-29

## 2022-04-29 RX ORDER — KETAMINE HCL IN NACL, ISO-OSM 100MG/10ML
SYRINGE (ML) INJECTION AS NEEDED
Status: DISCONTINUED | OUTPATIENT
Start: 2022-04-29 | End: 2022-04-29

## 2022-04-29 RX ORDER — ONDANSETRON 2 MG/ML
4 INJECTION INTRAMUSCULAR; INTRAVENOUS EVERY 6 HOURS PRN
Status: DISCONTINUED | OUTPATIENT
Start: 2022-04-29 | End: 2022-05-05 | Stop reason: HOSPADM

## 2022-04-29 RX ORDER — DEXAMETHASONE SODIUM PHOSPHATE 4 MG/ML
8 INJECTION, SOLUTION INTRA-ARTICULAR; INTRALESIONAL; INTRAMUSCULAR; INTRAVENOUS; SOFT TISSUE ONCE AS NEEDED
Status: DISCONTINUED | OUTPATIENT
Start: 2022-04-29 | End: 2022-04-29

## 2022-04-29 RX ORDER — PANTOPRAZOLE SODIUM 40 MG/1
40 INJECTION, POWDER, FOR SOLUTION INTRAVENOUS
Status: DISCONTINUED | OUTPATIENT
Start: 2022-04-29 | End: 2022-04-30

## 2022-04-29 RX ORDER — SODIUM CHLORIDE 9 MG/ML
125 INJECTION, SOLUTION INTRAVENOUS CONTINUOUS
Status: DISCONTINUED | OUTPATIENT
Start: 2022-04-29 | End: 2022-04-29

## 2022-04-29 RX ORDER — PROPOFOL 10 MG/ML
INJECTION, EMULSION INTRAVENOUS CONTINUOUS PRN
Status: DISCONTINUED | OUTPATIENT
Start: 2022-04-29 | End: 2022-04-29

## 2022-04-29 RX ORDER — MAGNESIUM HYDROXIDE 1200 MG/15ML
LIQUID ORAL AS NEEDED
Status: DISCONTINUED | OUTPATIENT
Start: 2022-04-29 | End: 2022-04-29 | Stop reason: HOSPADM

## 2022-04-29 RX ORDER — SODIUM CHLORIDE, SODIUM GLUCONATE, SODIUM ACETATE, POTASSIUM CHLORIDE, MAGNESIUM CHLORIDE, SODIUM PHOSPHATE, DIBASIC, AND POTASSIUM PHOSPHATE .53; .5; .37; .037; .03; .012; .00082 G/100ML; G/100ML; G/100ML; G/100ML; G/100ML; G/100ML; G/100ML
125 INJECTION, SOLUTION INTRAVENOUS CONTINUOUS
Status: DISCONTINUED | OUTPATIENT
Start: 2022-04-29 | End: 2022-05-01

## 2022-04-29 RX ORDER — ONDANSETRON 2 MG/ML
4 INJECTION INTRAMUSCULAR; INTRAVENOUS ONCE AS NEEDED
Status: DISCONTINUED | OUTPATIENT
Start: 2022-04-29 | End: 2022-04-29

## 2022-04-29 RX ORDER — CEFAZOLIN SODIUM 2 G/50ML
2000 SOLUTION INTRAVENOUS ONCE
Status: COMPLETED | OUTPATIENT
Start: 2022-04-29 | End: 2022-04-29

## 2022-04-29 RX ORDER — PROPOFOL 10 MG/ML
INJECTION, EMULSION INTRAVENOUS AS NEEDED
Status: DISCONTINUED | OUTPATIENT
Start: 2022-04-29 | End: 2022-04-29

## 2022-04-29 RX ORDER — DEXTROSE MONOHYDRATE 25 G/50ML
25 INJECTION, SOLUTION INTRAVENOUS ONCE
Status: COMPLETED | OUTPATIENT
Start: 2022-04-29 | End: 2022-04-29

## 2022-04-29 RX ORDER — LIDOCAINE HYDROCHLORIDE AND EPINEPHRINE 15; 5 MG/ML; UG/ML
INJECTION, SOLUTION EPIDURAL
Status: COMPLETED | OUTPATIENT
Start: 2022-04-29 | End: 2022-04-29

## 2022-04-29 RX ORDER — KETOROLAC TROMETHAMINE 30 MG/ML
15 INJECTION, SOLUTION INTRAMUSCULAR; INTRAVENOUS EVERY 6 HOURS
Status: DISCONTINUED | OUTPATIENT
Start: 2022-04-29 | End: 2022-04-29

## 2022-04-29 RX ORDER — MAGNESIUM SULFATE HEPTAHYDRATE 40 MG/ML
2 INJECTION, SOLUTION INTRAVENOUS ONCE
Status: COMPLETED | OUTPATIENT
Start: 2022-04-29 | End: 2022-04-29

## 2022-04-29 RX ORDER — SODIUM CHLORIDE, SODIUM GLUCONATE, SODIUM ACETATE, POTASSIUM CHLORIDE, MAGNESIUM CHLORIDE, SODIUM PHOSPHATE, DIBASIC, AND POTASSIUM PHOSPHATE .53; .5; .37; .037; .03; .012; .00082 G/100ML; G/100ML; G/100ML; G/100ML; G/100ML; G/100ML; G/100ML
1000 INJECTION, SOLUTION INTRAVENOUS ONCE
Status: COMPLETED | OUTPATIENT
Start: 2022-04-29 | End: 2022-04-29

## 2022-04-29 RX ORDER — CALCIUM GLUCONATE 20 MG/ML
2 INJECTION, SOLUTION INTRAVENOUS ONCE
Status: COMPLETED | OUTPATIENT
Start: 2022-04-29 | End: 2022-04-29

## 2022-04-29 RX ORDER — HYDRALAZINE HYDROCHLORIDE 20 MG/ML
10 INJECTION INTRAMUSCULAR; INTRAVENOUS EVERY 4 HOURS PRN
Status: DISCONTINUED | OUTPATIENT
Start: 2022-04-29 | End: 2022-05-05 | Stop reason: HOSPADM

## 2022-04-29 RX ORDER — FENTANYL CITRATE 50 UG/ML
INJECTION, SOLUTION INTRAMUSCULAR; INTRAVENOUS AS NEEDED
Status: DISCONTINUED | OUTPATIENT
Start: 2022-04-29 | End: 2022-04-29

## 2022-04-29 RX ORDER — CEFAZOLIN SODIUM 1 G/3ML
INJECTION, POWDER, FOR SOLUTION INTRAMUSCULAR; INTRAVENOUS AS NEEDED
Status: DISCONTINUED | OUTPATIENT
Start: 2022-04-29 | End: 2022-04-29

## 2022-04-29 RX ADMIN — DEXTROSE MONOHYDRATE 25 ML: 25 INJECTION, SOLUTION INTRAVENOUS at 19:58

## 2022-04-29 RX ADMIN — ALBUMIN (HUMAN): 12.5 INJECTION, SOLUTION INTRAVENOUS at 10:26

## 2022-04-29 RX ADMIN — HYDROMORPHONE HYDROCHLORIDE: 10 INJECTION, SOLUTION INTRAMUSCULAR; INTRAVENOUS; SUBCUTANEOUS at 21:13

## 2022-04-29 RX ADMIN — ROCURONIUM BROMIDE 20 MG: 50 INJECTION INTRAVENOUS at 13:05

## 2022-04-29 RX ADMIN — MAGNESIUM SULFATE HEPTAHYDRATE 2 G: 40 INJECTION, SOLUTION INTRAVENOUS at 19:55

## 2022-04-29 RX ADMIN — CEFAZOLIN 2000 MG: 1 INJECTION, POWDER, FOR SOLUTION INTRAMUSCULAR; INTRAVENOUS at 12:43

## 2022-04-29 RX ADMIN — SODIUM CHLORIDE, SODIUM LACTATE, POTASSIUM CHLORIDE, AND CALCIUM CHLORIDE: .6; .31; .03; .02 INJECTION, SOLUTION INTRAVENOUS at 10:31

## 2022-04-29 RX ADMIN — SODIUM CHLORIDE, SODIUM LACTATE, POTASSIUM CHLORIDE, AND CALCIUM CHLORIDE: .6; .31; .03; .02 INJECTION, SOLUTION INTRAVENOUS at 07:45

## 2022-04-29 RX ADMIN — SODIUM CHLORIDE, SODIUM LACTATE, POTASSIUM CHLORIDE, CALCIUM CHLORIDE: 600; 310; 30; 20 INJECTION, SOLUTION INTRAVENOUS at 11:31

## 2022-04-29 RX ADMIN — CALCIUM GLUCONATE 2 G: 20 INJECTION, SOLUTION INTRAVENOUS at 19:53

## 2022-04-29 RX ADMIN — HYDROMORPHONE HYDROCHLORIDE 0.5 MG: 1 INJECTION, SOLUTION INTRAMUSCULAR; INTRAVENOUS; SUBCUTANEOUS at 15:49

## 2022-04-29 RX ADMIN — PANTOPRAZOLE SODIUM 40 MG: 40 INJECTION, POWDER, FOR SOLUTION INTRAVENOUS at 19:56

## 2022-04-29 RX ADMIN — ONDANSETRON 4 MG: 2 INJECTION INTRAMUSCULAR; INTRAVENOUS at 08:47

## 2022-04-29 RX ADMIN — MITOMYCIN 10 MG: 40 INJECTION, POWDER, LYOPHILIZED, FOR SOLUTION INTRAVENOUS at 15:03

## 2022-04-29 RX ADMIN — HYDROMORPHONE HYDROCHLORIDE 0.5 MG: 1 INJECTION, SOLUTION INTRAMUSCULAR; INTRAVENOUS; SUBCUTANEOUS at 12:46

## 2022-04-29 RX ADMIN — PROPOFOL 40 MCG/KG/MIN: 10 INJECTION, EMULSION INTRAVENOUS at 08:53

## 2022-04-29 RX ADMIN — SODIUM CHLORIDE 125 ML/HR: 0.9 INJECTION, SOLUTION INTRAVENOUS at 18:34

## 2022-04-29 RX ADMIN — Medication 10 MG: at 21:56

## 2022-04-29 RX ADMIN — Medication 20 MG: at 11:38

## 2022-04-29 RX ADMIN — ALBUMIN (HUMAN): 12.5 INJECTION, SOLUTION INTRAVENOUS at 10:50

## 2022-04-29 RX ADMIN — ROCURONIUM BROMIDE 20 MG: 50 INJECTION INTRAVENOUS at 10:39

## 2022-04-29 RX ADMIN — INSULIN HUMAN 10 UNITS: 100 INJECTION, SOLUTION PARENTERAL at 19:58

## 2022-04-29 RX ADMIN — LIDOCAINE HYDROCHLORIDE 50 MG: 10 INJECTION, SOLUTION EPIDURAL; INFILTRATION; INTRACAUDAL; PERINEURAL at 08:47

## 2022-04-29 RX ADMIN — FENTANYL CITRATE 100 MCG: 50 INJECTION INTRAMUSCULAR; INTRAVENOUS at 08:52

## 2022-04-29 RX ADMIN — LIDOCAINE HYDROCHLORIDE AND EPINEPHRINE 5 ML: 15; 5 INJECTION, SOLUTION EPIDURAL at 09:24

## 2022-04-29 RX ADMIN — SODIUM CHLORIDE, SODIUM GLUCONATE, SODIUM ACETATE, POTASSIUM CHLORIDE, MAGNESIUM CHLORIDE, SODIUM PHOSPHATE, DIBASIC, AND POTASSIUM PHOSPHATE 150 ML/HR: .53; .5; .37; .037; .03; .012; .00082 INJECTION, SOLUTION INTRAVENOUS at 22:46

## 2022-04-29 RX ADMIN — HEPARIN SODIUM 5000 UNITS: 5000 INJECTION INTRAVENOUS; SUBCUTANEOUS at 18:50

## 2022-04-29 RX ADMIN — ROCURONIUM BROMIDE 30 MG: 50 INJECTION INTRAVENOUS at 11:36

## 2022-04-29 RX ADMIN — SODIUM CHLORIDE, SODIUM LACTATE, POTASSIUM CHLORIDE, AND CALCIUM CHLORIDE: .6; .31; .03; .02 INJECTION, SOLUTION INTRAVENOUS at 16:15

## 2022-04-29 RX ADMIN — ROCURONIUM BROMIDE 50 MG: 50 INJECTION INTRAVENOUS at 08:47

## 2022-04-29 RX ADMIN — ALBUMIN (HUMAN): 12.5 INJECTION, SOLUTION INTRAVENOUS at 16:10

## 2022-04-29 RX ADMIN — SODIUM CHLORIDE, SODIUM LACTATE, POTASSIUM CHLORIDE, CALCIUM CHLORIDE: 600; 310; 30; 20 INJECTION, SOLUTION INTRAVENOUS at 13:38

## 2022-04-29 RX ADMIN — SUGAMMADEX 200 MG: 100 INJECTION, SOLUTION INTRAVENOUS at 16:53

## 2022-04-29 RX ADMIN — CEFAZOLIN 2000 MG: 1 INJECTION, POWDER, FOR SOLUTION INTRAMUSCULAR; INTRAVENOUS at 16:47

## 2022-04-29 RX ADMIN — MITOMYCIN 10 MG: 40 INJECTION, POWDER, LYOPHILIZED, FOR SOLUTION INTRAVENOUS at 14:02

## 2022-04-29 RX ADMIN — SODIUM CHLORIDE, SODIUM GLUCONATE, SODIUM ACETATE, POTASSIUM CHLORIDE, MAGNESIUM CHLORIDE, SODIUM PHOSPHATE, DIBASIC, AND POTASSIUM PHOSPHATE 125 ML/HR: .53; .5; .37; .037; .03; .012; .00082 INJECTION, SOLUTION INTRAVENOUS at 18:45

## 2022-04-29 RX ADMIN — PROPOFOL 200 MG: 10 INJECTION, EMULSION INTRAVENOUS at 08:47

## 2022-04-29 RX ADMIN — ROCURONIUM BROMIDE 10 MG: 50 INJECTION INTRAVENOUS at 15:34

## 2022-04-29 RX ADMIN — ROCURONIUM BROMIDE 10 MG: 50 INJECTION INTRAVENOUS at 16:25

## 2022-04-29 RX ADMIN — Medication 30 MG: at 08:47

## 2022-04-29 RX ADMIN — PHENYLEPHRINE HYDROCHLORIDE 40 MCG/MIN: 10 INJECTION INTRAVENOUS at 08:53

## 2022-04-29 RX ADMIN — ROCURONIUM BROMIDE 20 MG: 50 INJECTION INTRAVENOUS at 09:46

## 2022-04-29 RX ADMIN — CEFAZOLIN SODIUM 2000 MG: 2 SOLUTION INTRAVENOUS at 08:57

## 2022-04-29 RX ADMIN — SODIUM CHLORIDE, SODIUM GLUCONATE, SODIUM ACETATE, POTASSIUM CHLORIDE, MAGNESIUM CHLORIDE, SODIUM PHOSPHATE, DIBASIC, AND POTASSIUM PHOSPHATE 1000 ML: .53; .5; .37; .037; .03; .012; .00082 INJECTION, SOLUTION INTRAVENOUS at 18:53

## 2022-04-29 RX ADMIN — FENTANYL CITRATE 100 MCG: 50 INJECTION INTRAMUSCULAR; INTRAVENOUS at 08:47

## 2022-04-29 RX ADMIN — MIDAZOLAM 2 MG: 1 INJECTION INTRAMUSCULAR; INTRAVENOUS at 07:45

## 2022-04-29 RX ADMIN — DEXAMETHASONE SODIUM PHOSPHATE 10 MG: 10 INJECTION, SOLUTION INTRAMUSCULAR; INTRAVENOUS at 08:47

## 2022-04-29 RX ADMIN — HYDRALAZINE HYDROCHLORIDE 10 MG: 20 INJECTION INTRAMUSCULAR; INTRAVENOUS at 20:11

## 2022-04-29 RX ADMIN — SODIUM CHLORIDE, SODIUM LACTATE, POTASSIUM CHLORIDE, CALCIUM CHLORIDE: 600; 310; 30; 20 INJECTION, SOLUTION INTRAVENOUS at 10:02

## 2022-04-29 RX ADMIN — ONDANSETRON 4 MG: 2 INJECTION INTRAMUSCULAR; INTRAVENOUS at 16:16

## 2022-04-29 RX ADMIN — SODIUM BICARBONATE 50 MEQ: 84 INJECTION INTRAVENOUS at 21:25

## 2022-04-29 RX ADMIN — SODIUM CHLORIDE: 0.9 INJECTION, SOLUTION INTRAVENOUS at 08:52

## 2022-04-29 RX ADMIN — SODIUM CHLORIDE, SODIUM GLUCONATE, SODIUM ACETATE, POTASSIUM CHLORIDE, MAGNESIUM CHLORIDE, SODIUM PHOSPHATE, DIBASIC, AND POTASSIUM PHOSPHATE 1000 ML: .53; .5; .37; .037; .03; .012; .00082 INJECTION, SOLUTION INTRAVENOUS at 22:46

## 2022-04-29 NOTE — ANESTHESIA POSTPROCEDURE EVALUATION
Post-Op Assessment Note    CV Status:  Stable  Pain Score: 0    Pain management: adequate     Hydration Status:  Euvolemic   PONV Controlled:  Controlled   Airway Patency:  Patent  Airway: intubated      Post Op Vitals Reviewed: Yes      Staff: Anesthesiologist, CRNA   Comments: patient was transported to ICU with assistance of CRNA and anesthesiologist  Refer to quick note regarding epidural removal and reasoning for patient to remain intubated     Post-op block assessment: no complications      No complications documented      BP   103/82   Temp      Pulse  90   Resp   20   SpO2 100%

## 2022-04-29 NOTE — ANESTHESIA PROCEDURE NOTES
Epidural Block    Start time: 4/29/2022 7:45 AM  Staffing  Performed: Anesthesiologist   Anesthesiologist: Monica Rahman MD  Preanesthetic Checklist  Completed: patient identified, IV checked, site marked, risks and benefits discussed, surgical consent, monitors and equipment checked, pre-op evaluation and timeout performed  Epidural  Patient position: sitting  Prep: Betadine  Patient monitoring: continuous pulse ox and heart rate  Approach: midline  Location: lumbar  Injection technique: LIZETH saline  Needle  Needle type: Tuohy   Needle gauge: 18 G  Catheter type: side hole  Catheter size: 20 G  Catheter securement method: clear occlusive dressing  Test dose: negativelidocaine 1 5% with epinephrine 1:200,000 test dose, 5 mL  Assessment  Number of attempts: 2negative aspiration for CSF, negative aspiration for heme and no paresthesia on injection  patient tolerated the procedure well with no immediate complications

## 2022-04-29 NOTE — OP NOTE
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]    Procedure(s) (LRB):  DIAGNOSTIC LAPAROSCOPY, TOTAL ABDOMINAL HYSTERECTOMY, BILATERAL SALPINGO-OOPHORECTOMY, EXTENSIVE ADHESIOLYSIS (N/A)  DIAGNOSTIC LAPAROSCOPY, OPEN OMENTECTOMY, SPLENECTOMY, DIAPHRAGM REPAIR, CHEST TUBE INSERTION (N/A)  INSTILLATION CHEMOTHERAPY INTRAPERITONEAL (HIPEC) LAPAROTOMY (N/A)  REPAIR DIAPHRAGM TEAR     Diagnostic laparoscopy  Exploratory laparotomy  Lysis of adhesions> 90 minutes  Complete omentectomy  Splenectomy  DILMA/BSO Philsolomon Anderson)  Repair of diaphragm defect (Ana Joseph)  Left chest tube placement  HIPEC w/ mitomycin C x 90 min    Specimen(s):  ID Type Source Tests Collected by Time Destination   1 :  Tissue Uterus w/Bilateral Ovaries and Fallopian Tubes TISSUE EXAM Holden Elise MD 2022 1118    2 : spleen, omentum, darotis Tissue Spleen TISSUE EXAM Holden Elise MD 2022 1224        Estimated Blood Loss:   500 mL    Drains:  Chest Tube 3 Left Pleural 28 Fr  (Active)   Number of days: 0       Closed/Suction Drain Left LLQ Bulb 19 Fr  (Active)   Number of days: 0       Closed/Suction Drain Left LUQ Bulb 19 Fr  (Active)   Number of days: 0       NG/OG/Enteral Tube Orogastric 18 Fr Center mouth (Active)   Number of days: 0       Urethral Catheter Non-latex; Temperature probe 16 Fr   (Active)   Number of days: 0       Anesthesia Type:   General w/ Epidural    Operative Indications:  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]  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[address] ) [C78 6]    Operative Findings:  Dense adhesive disease; LUQ tumor implant invading spleen and focally invading diaphragm; scattered disease in the pelvis with no major implants  No disease of the small bowel, bilateral diaphragms, other intra-abdominal sites    Complications:   None    Procedure and Technique:  The patient was identified in the operating suite general endotracheal intubation was achieved  Both arms were padded and tucked to for line placement by Anesthesia  The legs were padded and placed in stirrups  The abdomen and vagina were prepped and draped in the usual sterile fashion  Mg was placed sterilely by the Gynecologic Oncology team   Elisabeth Pendleton was performed  A left upper quadrant stab incision at patel's point was made and a 0 degree scope was advanced through a 5 mm port  Inspection revealed dense adhesive disease to the anterior abdominal wall  An additional port was placed in the right upper quadrant  Immediate inspection revealed no evidence of peritoneal studding  Sharp dissection was initiated to release the dense adhesions from the abdominal wall  After some time, it became clear that the small bowel was quite adherent to the abdominal wall and extended deep into the pelvis  The decision was made at this juncture to transition to an open procedure due to the safety of lysis of adhesions  A generous midline incision was made to accommodate both upper and lower abdominal procedures  The fascia was incised sharply and the abdomen entered in the falciform ligament region in an area clear of adhesions  The falciform ligament was divided with the EnSeal device  The small bowel adhesions to the anterior abdominal wall were then taken down sharply  This required an additional over 60 minutes of time  Once this was completed, Dr Dee Thakkar performed the hysterectomy and oophorectomy  Please see his separate dictation for details of this procedure  Once his procedure was complete, I rejoined the operating suite  We examined the rectum carefully and several serosal tears were oversewn with Lembert 3-0 silk and 3-0 Vicryl suture  There was no evidence of full-thickness injury  We then turned our attention to the upper abdomen  Dissection was initiated at the transverse colon, carefully taking the omentum from the transverse colon and  it from the mesentery, which were densely adherent to 1 another  Via the lesser sac, the omentum was taken off the greater curvature with the EnSeal device to the diaphragm  The short gastrics again were divided with the EnSeal device  The dissection was carried distally, and the omentum was taken down from gallbladder adhesions  Continuing the dissection to the left lateral wall, became clear that the implant detected on preoperative CT scan was buried within the omentum, inferior spleen, and anterior Gerota's fascia  Using manual retraction, these tissues were divided with the EnSeal device, dividing the retroperitoneal tissue from the splenocolic ligament, tracing the dissection superiorly along the lateral spleen, taking care to include the entirety of the omental implant  At this juncture, it became clear that the implant could not be  from the diaphragm  Thus, a 3 cm region of full-thickness diaphragm was included with the specimen  The lateral attachments to the spleen were then taken down, tracing the spleen superiorly  Remaining short gastric vessels were divided with the EnSeal device  We then identified the tail of the pancreas terminating at the splenic hilum  With several fires of the vascular load stapler, the splenic hilum was divided, removing the specimen from the field to include the omentum, tumor implant, Gerota's fascia, and spleen  Bleeding was noted at the inferior adrenal gland    This was controlled with 3-0 silk suture  At this juncture, the area was copiously irrigated and found to be hemostatic  The diaphragm was reapproximated with 0 Ethibond interrupted suture  At the inferior/posterior extent of the defect, the diaphragm fibers were thinned and there was no robust peritoneum to sew to  The superior and inferior diaphragm were reapproximated  The area was then irrigated and Valsalva maneuver was applied  Several air bubbles could be seen in the fluid  Dr Kristian Graves with thoracic surgery was then called to assist with diaphragm repair  Please see her separate dictation for details of this procedure, however, the previous repair was taken down and the diaphragm was reapproximated in a slightly different reconfiguration, allowing better apposition of the anterior and posterior leaflets  Again, Valsalva was applied once irrigation was placed into the field  Small air bubbles could be seen from the posterior most extent of the repair  An additional figure-of-eight Ethibond suture was placed  Again irrigation was infiltrated into the space and Valsalva applied  No air bubbles could be seen at this juncture  The area was irrigated and found to be hemostatic  The rest of the abdomen was carefully checked for any evidence of peritoneal disease  We confirmed that the bilateral diaphragm surfaces, small bowel serosa, and remaining peritoneal surfaces were free of disease  At this juncture, the cannulas were sewed in placed and the skin closed with nylon  Temperature probes were placed  Her solution was pumped into the abdomen, and when it reached 41°, 30 mg of mitomycin-C was added  This was allowed to circulate for 60 minutes with excellent flow  At 60 minutes, an additional 10 mg of mitomycin-C was added to the peritoneal fluid  This circulated for 30 additional minutes  The abdomen was then flushed and drained  The temporary skin closure was opened and the abdomen irrigated dry    We then inspected for hemostasis  The vaginal cuff appeared to be hemostatic and Surgicel was placed deep in the pelvis  At the left lateral pelvic wall, a small branch of the internal iliac appeared to be bleeding  This was controlled with 3-0 silk suture  We then irrigated the pelvis and again confirmed hemostasis  A 19 [place] drain was brought through a left lower quadrant stab incision to terminate in the pelvis  We then turned our attention to the small bowel  This was inspected carefully for any evidence of serosal injury during lysis of adhesions  Three separate very small serosal injuries were oversewn with 3-0 silk Lembert sutures  We then turned our attention to the upper abdomen  At the inferior adrenal gland, there was continued oozing  This wound was controlled with 3-0 silk  This area was then copiously irrigated and found to be hemostatic  Surgiflo was placed over the pancreatic tail, transected splenic veins and arteries, and adrenal bed  A drain was placed through the patel's point stab incision to terminate at the splenic bed  Hemostasis was again confirmed  All lap and instrument counts were correct  The fascia was then run with 1  PDS  The skin was then irrigated and closed with staples  We then turned our attention towards placement of a 28 Upper sorbian chest tube  The left chest was prepped and draped in the usual sterile fashion  A 3 cm incision was made at the 5th intercostal space  Using blunt dissection, the chest cavity was entered over the rib space  Chest tube was advanced, guided along the left lateral chest wall  This was then sutured in place with silk suture and attached to a Pleur-Evac system  All incisions were sterilely dressed  The patient tolerated the procedure well  She was transported to the ICU in stable condition       I was present for the entire procedure    Patient Disposition:  Critical Care Unit      SIGNATURE: Jamie Pham MD  DATE: April 29, 2022  TIME: 4:47 PM

## 2022-04-29 NOTE — H&P
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 presents for debulking surgery and heated intraperitoneal chemotherapy after receiving FOLFOX  I reviewed her CBC, CMP, CEA, CT of chest abdomen pelvis  Her performance status is 0   1  I discussed the risks and benefits of total abdominal hysterectomy, bilateral salpingo-oophorectomy, all other indicated procedures including possible laparoscopic hysterectomy  She understands the risks and benefits of the operation agrees to proceed as outlined  Consent was obtained by me  CHIEF COMPLAINT:  Here for surgery        Previous therapy:  Oncology History   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[facility] [place] [address] )   9/23/2019 Initial Diagnosis    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[facility] [address] )     12/10/2019 Surgery    Low anterior resection (Dr Jeffie Leyden):    A  Rectosigmoid colon, low anterior resection:  - Adenocarcinoma, moderately differentiated  - Tumor invades through the muscularis propria into pericolorectal tissue, T3  - Diverticula  - All margins are negative for tumor   - Thirty-four lymph nodes, negative for malignancy (0/34)  B  Colon, anastomotic rings, resection:  - Two portions of colon with no pathologic abnormality     12/10/2019 Genomic Testing    RRESULTS OF IMMUNOHISTOCHEMICAL ANALYSIS FOR MISMATCH REPAIR PROTEIN LOSS  INTERPRETATION: NO LOSS OF NUCLEAR EXPRESSION OF MMR PROTEINS: LOW PROBABILITY OF MSI-H  Note: Background non-neoplastic tissue and/or internal control with intact nuclear expression        RESULTS:  Antibody          Clone               Description                           Results  MLH1               M1                  Mismatch repair protein       Intact nuclear expression  MSH2              [id number]       Mismatch repair protein       Intact nuclear expression  MSH6              44                   Mismatch repair protein       Intact nuclear expression  PMS2              [id number] Mismatch repair protein       Intact nuclear expression     11/12/2021 Biopsy    Omental mass:  - Metastatic adenocarcinoma, with an immunophenotype compatible with metastasis from patient's known colonic primary       12/10/2021 - 12/10/2021 Chemotherapy    capecitabine (XELODA), 850 mg/m2 = 1,600 mg (100 % of original dose 850 mg/m2), Oral, 2 times daily with meals, 1 of 8 cycles  Dose modification: 850 mg/m2 (100 % of original dose 850 mg/m2, Cycle 1, Reason: Other (see comments))  fosaprepitant (EMEND) IVPB, 150 mg, Intravenous, Once, 1 of 1 cycle  Administration: 150 mg (12/10/2021)  oxaliplatin (ELOXATIN) chemo infusion, 244 4 mg, Intravenous, Once, 1 of 8 cycles  Administration: 250 mg (12/10/2021)     1/4/2022 -  Chemotherapy    fluorouracil (ADRUCIL), 300 mg/m2 = 560 mg (75 % of original dose 400 mg/m2), Intravenous, Once, 6 of 12 cycles  Dose modification: 300 mg/m2 (75 % of original dose 400 mg/m2, Cycle 1, Reason: Dose Not Tolerated), 340 mg/m2 (85 % of original dose 400 mg/m2, Cycle 4, Reason: Other (see comments)), 340 mg/m2 (85 % of original dose 400 mg/m2, Cycle 5, Reason: Other (see comments))  Administration: 560 mg (1/4/2022), 560 mg (1/18/2022), 560 mg (2/1/2022), 635 mg (2/15/2022), 635 mg (3/1/2022), 635 mg (3/15/2022)  leucovorin calcium IVPB, 300 mg/m2 = 561 mg (75 % of original dose 400 mg/m2), Intravenous, Once, 6 of 12 cycles  Dose modification: 300 mg/m2 (75 % of original dose 400 mg/m2, Cycle 1, Reason: Dose Not Tolerated), 340 mg/m2 (85 % of original dose 400 mg/m2, Cycle 4, Reason: Other (see comments)), 340 mg/m2 (85 % of original dose 400 mg/m2, Cycle 5, Reason: Other (see comments))  Administration: 561 mg (1/4/2022), 561 mg (1/18/2022), 561 mg (2/1/2022), 636 mg (2/15/2022), 636 mg (3/1/2022), 636 mg (3/15/2022)  oxaliplatin (ELOXATIN) chemo infusion, 63 75 mg/m2 = 119 2125 mg (75 % of original dose 85 mg/m2), Intravenous, Once, 6 of 12 cycles  Dose modification: 63 75 mg/m2 (75 % of original dose 85 mg/m2, Cycle 1, Reason: Dose Not Tolerated), 72 25 mg/m2 (85 % of original dose 85 mg/m2, Cycle 4, Reason: Other (see comments)), 72 25 mg/m2 (85 % of original dose 85 mg/m2, Cycle 5, Reason: Other (see comments))  Administration: 119 2125 mg (1/4/2022), 119 2125 mg (1/18/2022), 119 2125 mg (2/1/2022), 135 1075 mg (2/15/2022), 135 1075 mg (3/1/2022), 135 1075 mg (3/15/2022)  fluorouracil (ADRUCIL) ambulatory infusion Soln, 900 mg/m2/day = 3,365 mg (75 % of original dose 1,200 mg/m2/day), Intravenous, Over 46 hours, 6 of 12 cycles  Dose modification: 900 mg/m2/day (75 % of original dose 1,200 mg/m2/day, Cycle 2, Reason: Other (see comments)), 1,020 mg/m2/day (85 % of original dose 1,200 mg/m2/day, Cycle 4, Reason: Other (see comments), Comment: anticipated tolerance)     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[facility] [place] [address] )   11/29/2021 Initial Diagnosis    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[facility] [place] [address] )     12/10/2021 - 12/10/2021 Chemotherapy    capecitabine (XELODA), 850 mg/m2 = 1,600 mg (100 % of original dose 850 mg/m2), Oral, 2 times daily with meals, 1 of 8 cycles  Dose modification: 850 mg/m2 (100 % of original dose 850 mg/m2, Cycle 1, Reason: Other (see comments))  fosaprepitant (EMEND) IVPB, 150 mg, Intravenous, Once, 1 of 1 cycle  Administration: 150 mg (12/10/2021)  oxaliplatin (ELOXATIN) chemo infusion, 244 4 mg, Intravenous, Once, 1 of 8 cycles  Administration: 250 mg (12/10/2021)     1/4/2022 -  Chemotherapy    fluorouracil (ADRUCIL), 300 mg/m2 = 560 mg (75 % of original dose 400 mg/m2), Intravenous, Once, 6 of 12 cycles  Dose modification: 300 mg/m2 (75 % of original dose 400 mg/m2, Cycle 1, Reason: Dose Not Tolerated), 340 mg/m2 (85 % of original dose 400 mg/m2, Cycle 4, Reason: Other (see comments)), 340 mg/m2 (85 % of original dose 400 mg/m2, Cycle 5, Reason: Other (see comments))  Administration: 560 mg (1/4/2022), 560 mg (1/18/2022), 560 mg (2/1/2022), 635 mg (2/15/2022), 635 mg (3/1/2022), 635 mg (3/15/2022)  leucovorin calcium IVPB, 300 mg/m2 = 561 mg (75 % of original dose 400 mg/m2), Intravenous, Once, 6 of 12 cycles  Dose modification: 300 mg/m2 (75 % of original dose 400 mg/m2, Cycle 1, Reason: Dose Not Tolerated), 340 mg/m2 (85 % of original dose 400 mg/m2, Cycle 4, Reason: Other (see comments)), 340 mg/m2 (85 % of original dose 400 mg/m2, Cycle 5, Reason: Other (see comments))  Administration: 561 mg (1/4/2022), 561 mg (1/18/2022), 561 mg (2/1/2022), 636 mg (2/15/2022), 636 mg (3/1/2022), 636 mg (3/15/2022)  oxaliplatin (ELOXATIN) chemo infusion, 63 75 mg/m2 = 119 2125 mg (75 % of original dose 85 mg/m2), Intravenous, Once, 6 of 12 cycles  Dose modification: 63 75 mg/m2 (75 % of original dose 85 mg/m2, Cycle 1, Reason: Dose Not Tolerated), 72 25 mg/m2 (85 % of original dose 85 mg/m2, Cycle 4, Reason: Other (see comments)), 72 25 mg/m2 (85 % of original dose 85 mg/m2, Cycle 5, Reason: Other (see comments))  Administration: 119 2125 mg (1/4/2022), 119 2125 mg (1/18/2022), 119 2125 mg (2/1/2022), 135 1075 mg (2/15/2022), 135 1075 mg (3/1/2022), 135 1075 mg (3/15/2022)  fluorouracil (ADRUCIL) ambulatory infusion Soln, 900 mg/m2/day = 3,365 mg (75 % of original dose 1,200 mg/m2/day), Intravenous, Over 46 hours, 6 of 12 cycles  Dose modification: 900 mg/m2/day (75 % of original dose 1,200 mg/m2/day, Cycle 2, Reason: Other (see comments)), 1,020 mg/m2/day (85 % of original dose 1,200 mg/m2/day, Cycle 4, Reason: Other (see comments), Comment: anticipated tolerance)           Patient ID: Leanna Bermudez is a 75 y o  female  59-year-old presents for debulking surgery for recurrent rectosigmoid adenocarcinoma  She has been receiving FOLFOX treatment which she has been tolerating well  She has no new complaints today  No other interval change in her medications or medical history since her last visit to the office  Review of Systems   Constitutional: Negative for activity change and unexpected weight change     HENT: Negative  Eyes: Negative  Respiratory: Negative  Cardiovascular: Negative  Gastrointestinal: Negative for abdominal distention and abdominal pain  Endocrine: Negative  Genitourinary: Negative for pelvic pain and vaginal bleeding  Musculoskeletal: Negative  Skin: Negative  Allergic/Immunologic: Negative  Neurological: Negative  Hematological: Negative  Psychiatric/Behavioral: Negative          Current Facility-Administered Medications   Medication Dose Route Frequency Provider Last Rate Last Admin    bupivacaine liposomal (EXPAREL) 1 3 % injection 20 mL  20 mL Infiltration Once Ilir Knight MD        ceFAZolin (ANCEF) IVPB (premix in dextrose) 2,000 mg 50 mL  2,000 mg Intravenous Once Rometta Dance, MD        heparin (porcine) 5,000 Units in sodium chloride 0 9 % 500 mL irrigation   Irrigation Once Ilir Knight MD           Allergies   Allergen Reactions    Medical Tape Rash     Skin irritation  Skin peeling  Skin irritation  Skin peeling  Blisters  Rash       Past Medical History:   Diagnosis Date    Blood in stool     Breast disorder     Cancer ([facility] [address] )     rectal    Cancer determined by colorectal biopsy ([facility] )     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    Colon polyp     GERD (gastroesophageal reflux disease)     History of rectal surgery     Hyperlipidemia     PONV (postoperative nausea and vomiting)        Past Surgical History:   Procedure Laterality Date    BREAST LUMPECTOMY Right      SECTION      x3    COLON SIGMOID RESECTION      COLONOSCOPY      DILATION AND CURETTAGE OF UTERUS      ILEO LOOP DIVERSION N/A 12/10/2019    Procedure: ILEOSTOMY LOOP;  Surgeon: Harris Medrano MD;  Location: [facility] MAIN [facility];  Service: Robotics    IR BIOPSY OMENTUM  2021    IR PORT PLACEMENT  2021    KS CLOSE ENTEROSTOMY N/A 2020    Procedure: CLOSURE ILEOSTOMY;  Surgeon: Harris Medrano MD;  Location: [facility];  Service: Colorectal    KS LAP,SURG,COLECTOMY, PARTIAL, W/ANAST N/A 12/10/2019    Procedure: SIGMOID RESECTION COLON LAPAROSCOPIC W ROBOTICS converted to lap hand assisted  with Partial proctectomy , LASER FLUORESCENCE ANGIOGRAPHY, INTRA OP COLONOSCOPY;  Surgeon: Gisselle Broussard MD;  Location: [facility] MAIN OR;  Service: Robotics    SMALL INTESTINE SURGERY  2020    Procedure: RESECTION SMALL BOWEL;  Surgeon: Gisselle Broussard MD;  Location: [facility];  Service: Colorectal       OB History        4    Para   3    Term   3            AB   1    Living   3       SAB        IAB        Ectopic        Multiple        Live Births                     Family History   Problem Relation Age of Onset    Hypertension Mother     Heart attack Mother     Heart attack Father     Heart disease Father     Hypertension Brother     Heart attack Brother     Leukemia Cousin     Breast cancer Cousin     Diabetes Cousin     Breast cancer Family         maternal side    Colon cancer Family         paternal uncle    Colon cancer Paternal Uncle     Stroke Neg Hx        The following portions of the patient's history were reviewed and updated as appropriate: allergies, current medications, past family history, past medical history, past social history, past surgical history and problem list       Objective:    Blood pressure 163/89, pulse 96, temperature 97 5 °F (36 4 °C), temperature source Temporal, resp  rate 20, height 4' 9" (1 448 m), weight 94 3 kg (208 lb), last menstrual period 2011, SpO2 100 %, not currently breastfeeding  Body mass index is 45 01 kg/m²  Physical Exam  Vitals reviewed  Constitutional:       General: She is not in acute distress  Appearance: Normal appearance  She is obese  She is not ill-appearing  HENT:      Head: Normocephalic and atraumatic  Eyes:      General: No scleral icterus  Right eye: No discharge  Left eye: No discharge        Conjunctiva/sclera: Conjunctivae normal  Cardiovascular:      Rate and Rhythm: Normal rate and regular rhythm  Heart sounds: Normal heart sounds  Pulmonary:      Effort: Pulmonary effort is normal       Breath sounds: Normal breath sounds  Abdominal:      Palpations: Abdomen is soft  Musculoskeletal:      Right lower leg: No edema  Left lower leg: No edema  Skin:     General: Skin is warm and dry  Coloration: Skin is not jaundiced  Findings: No rash  Neurological:      General: No focal deficit present  Mental Status: She is alert and oriented to person, place, and time  Cranial Nerves: No cranial nerve deficit  Motor: No weakness  Gait: Gait normal    Psychiatric:         Mood and Affect: Mood normal          Behavior: Behavior normal          Thought Content:  Thought content normal          Judgment: Judgment normal            No results found for:   Lab Results   Component Value Date    WBC 5 47 04/15/2022    HGB 11 5 04/15/2022    HCT 36 5 04/15/2022    MCV 92 04/15/2022     04/15/2022     Lab Results   Component Value Date     12/03/2016    K 3 7 04/15/2022     04/15/2022    CO2 22 04/15/2022    BUN 14 04/15/2022    CREATININE 0 90 04/15/2022    GLUCOSE 83 12/03/2016    GLUF 91 04/15/2022    CALCIUM 8 4 04/15/2022    CORRECTEDCA 9 4 04/15/2022    AST 25 04/15/2022    ALT 30 04/15/2022    ALKPHOS 67 04/15/2022    PROT 6 6 12/03/2016    BILITOT 0 7 12/03/2016    EGFR 65 04/15/2022

## 2022-04-29 NOTE — CONSULTS
Acceptance Note - Surgical Critical Care   Naeem Tamayo 75 y o  female MRN: [id number]  Unit/Bed#: [facility] Encounter: [id number]      -------------------------------------------------------------------------------------------------------------  Chief Complaint: s/p HIPEC on 4/29    History of Present Illness     Naeem Tamayo is a 75 y o  female with PMHx of colorectal cancer, prior colectomy, HLD, presenting w omental metastasis, and now s/p DILMA BSO, extensive adhesiolysis, open omentectomy, splenectomy, diaphragm repair, L CT placement and HIPEC on 4/29  Pt arrives to the MICU intubated  No current signs of distress  Vitals are stable  L CT in place, to suction no air leak  MARIA T x 2 w serosang output  History obtained from chart review  -------------------------------------------------------------------------------------------------------------  Assessment and Plan:    Neuro:    Diagnosis: acute pain post op  o Plan: PCA dilaudid  o Hold SQH @ [place] for thoracic epidural on 4/30   Sedation: none  · Delirium PPx  ? Plan: regulate sleep-wake cycle, daily CAM-ICU, delirium precautions    CV:    Diagnosis: h/o HLD  o Plan: resume ezetimibe when starts diet   Diagnosis: post op lactic acidosis  o Plan: monitor lactate  o Fluid resuscitate with 1 L bolus and increased isolyte to 150cc/h  o Will monitor urine output and goal is 1cc/kg/h    Pulm:   Diagnosis: acute hypoxic respiratory failure  o Plan: wean vent  o Goal to extubate  o Tolerating pressure support now  o Diagnosis: s/p diaphragm repair and L CT placement on 4/29  - Monitor L CT  Continue CT to suction  GI:    Diagnosis: s/p DILMA, BSO, Splenectomy, diaphragm repair   Plan: continue NPO, NGT until 5/1   Maintain abd drains x2  Monitor output and character   Stress Ulcer PPx: protonix   Bowel Regimen: not indicated         :    Diagnosis: oliguria    o Plan: continue IVF  o Bolus prn for UOP goal 1cc/kg/h  - Monitor UOP and renal function    F/E/N:    F: isolyte 150cc/h   E: monitor electrolytes and replete PRN   Hyperkalemia  tx w insulin dextrose   Replete mag   Replete ical   Follow up bmp and lytes at midnight   N: npo      Heme/Onc:    Diagnosis: acute blood loss anemia s/p surgery  o Plan: monitor hgb    o Transfuse < 7   DVT PPx: sqh, SCDs  o Hold dvt ppx at midnight for possible thoracic epidural on 4/30      Endo:    Diagnosis: no active issues  No h/o DM    o Plan: monitor blood glucose, goal 140-180      ID:    Diagnosis: reactive leukocytosis s/p surgery  o Plan:   - Monitor fever curve and WBC count    o MSK/Skin:    Diagnosis: at risk for pressure ulcer  o Plan: frequent turning/repositioning and pressure offloading      Disposition: Continue Critical Care   Code Status: Level 1 - Full Code  --------------------------------------------------------------------------------------------------------------  Review of Systems   Unable to perform ROS: Intubated       A 12-point, complete review of systems was reviewed and negative except as stated above     Physical Exam  Vitals reviewed  HENT:      Head: Normocephalic and atraumatic  Nose: Nose normal    Eyes:      Pupils: Pupils are equal, round, and reactive to light  Cardiovascular:      Rate and Rhythm: Normal rate  Pulses: Normal pulses  Pulmonary:      Effort: Pulmonary effort is normal       Comments: Remains intubated  Following commands  Abdominal:      General: There is no distension  Palpations: Abdomen is soft  Comments: MARIA T x2 w serosang output  Genitourinary:     Comments: deferred  Musculoskeletal:         General: Normal range of motion  Cervical back: Normal range of motion  Skin:     General: Skin is warm  Capillary Refill: Capillary refill takes 2 to 3 seconds  Neurological:      General: No focal deficit present  Mental Status: She is alert and oriented to person, place, and time     Psychiatric: Mood and Affect: Mood normal          --------------------------------------------------------------------------------------------------------------  Vitals:   Vitals:    22 1755 22 1800 22 1815 22 1830   BP:    121/76   BP Location:    Left arm   Pulse: 86 86 82 86   Resp: 21      Temp:    (!) 96 4 °F (35 8 °C)   TempSrc:    Bladder   SpO2: 99% 100% 100% 100%   Weight:       Height:         Temp  Min: 96 4 °F (35 8 °C)  Max: 98 3 °F (36 8 °C)     Height: 4' 9" (144 8 cm)  Body mass index is 45 01 kg/m²  Laboratory and Diagnostics:  Results from last 7 days   Lab Units 22  1804   WBC Thousand/uL 16 56*   HEMOGLOBIN g/dL 8 4*   HEMATOCRIT % 28 6*   PLATELETS Thousands/uL 188   BANDS PCT % 19*   MONO PCT % 3*     Results from last 7 days   Lab Units 22  1804   SODIUM mmol/L 137   POTASSIUM mmol/L 5 7*   CHLORIDE mmol/L 113*   CO2 mmol/L 17*   ANION GAP mmol/L 7   BUN mg/dL 14   CREATININE mg/dL 1 21   CALCIUM mg/dL 7 7*   GLUCOSE RANDOM mg/dL 164*   ALT U/L 60   AST U/L 76*   ALK PHOS U/L 38*   ALBUMIN g/dL 2 8*   TOTAL BILIRUBIN mg/dL 0 55          Results from last 7 days   Lab Units 22  1804   INR  1 13   PTT seconds 23              ABG:    VBG:          Micro:        EKG: I have personally reviewed pertinent reports  Imaging: I have personally reviewed pertinent reports        Historical Information   Past Medical History:   Diagnosis Date    Blood in stool     Breast disorder     Cancer ([facility] )     rectal    Cancer determined by colorectal biopsy ([facility] )     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    Colon polyp     GERD (gastroesophageal reflux disease)     History of rectal surgery     Hyperlipidemia     PONV (postoperative nausea and vomiting)      Past Surgical History:   Procedure Laterality Date    BREAST LUMPECTOMY Right      SECTION      x3    COLON SIGMOID RESECTION      COLONOSCOPY      DILATION AND CURETTAGE OF UTERUS      ILEO LOOP DIVERSION N/A 12/10/2019    Procedure: ILEOSTOMY LOOP;  Surgeon: Nallely Clement MD;  [facility];  Service: Robotics    IR BIOPSY OMENTUM  11/12/2021    IR PORT PLACEMENT  12/28/2021    MD CLOSE ENTEROSTOMY N/A 2/4/2020    Procedure: CLOSURE ILEOSTOMY;  Surgeon: Nallely Clement MD;  [facility];  Service: Colorectal    MD LAP,SURG,COLECTOMY, PARTIAL, W/ANAST N/A 12/10/2019    Procedure: SIGMOID RESECTION COLON LAPAROSCOPIC W ROBOTICS converted to lap hand assisted  with Partial proctectomy , LASER FLUORESCENCE ANGIOGRAPHY, INTRA OP COLONOSCOPY;  Surgeon: Nallely Clement MD;  [facility];  Service: Robotics    SMALL INTESTINE SURGERY  2/4/2020    Procedure: RESECTION SMALL BOWEL;  Surgeon: Nallely Clement MD;  [facility];  Service: Colorectal     Social History   Social History     Substance and Sexual Activity   Alcohol Use Yes    Comment: "occasionally"     Social History     Substance and Sexual Activity   Drug Use Never     Social History     Tobacco Use   Smoking Status Never Smoker   Smokeless Tobacco Never Used       Family History:   Family History   Problem Relation Age of Onset    Hypertension Mother     Heart attack Mother     Heart attack Father     Heart disease Father     Hypertension Brother     Heart attack Brother     Leukemia Cousin     Breast cancer Cousin     Diabetes Cousin     Breast cancer Family         maternal side    Colon cancer Family         paternal uncle    Colon cancer Paternal Uncle     Stroke Neg Hx      I have reviewed this patient's family history and commented on sigificant items within the HPI      Medications:  Current Facility-Administered Medications   Medication Dose Route Frequency    dexamethasone (DECADRON) injection 8 mg  8 mg Intravenous Once PRN    heparin (porcine) subcutaneous injection 5,000 Units  5,000 Units Subcutaneous Q8H [facility]    HYDROmorphone (DILAUDID) 1 mg/mL 50 mL PCA   Intravenous Continuous    multi-electrolyte (ISOLYTE-S PH 7 4) bolus 1,000 mL  1,000 mL Intravenous Once    multi-electrolyte (PLASMALYTE-A/ISOLYTE-S PH 7 4) IV solution  125 mL/hr Intravenous Continuous    ondansetron (ZOFRAN) injection 4 mg  4 mg Intravenous Once PRN    ondansetron (ZOFRAN) injection 4 mg  4 mg Intravenous Q6H PRN     Home medications:  Prior to Admission Medications   Prescriptions Last Dose Informant Patient Reported? Taking?   docusate sodium (COLACE) 100 mg capsule 4/8/2022 Self Yes Yes   Sig: Take 100 mg by mouth 2 (two) times a day as needed     ezetimibe (ZETIA) 10 mg tablet 4/28/2022 at Unknown time  No Yes   Sig: Take 1 tablet (10 mg total) by mouth daily   Patient taking differently: Take 10 mg by mouth daily at bedtime     famotidine (PEPCID) 20 mg tablet 4/26/2022  No Yes   Sig: Take 1 tablet (20 mg total) by mouth 2 (two) times a day   Patient taking differently: Take 20 mg by mouth as needed     ondansetron (ZOFRAN) 8 mg tablet 4/15/2022 Self No Yes   Sig: Take 1 tablet (8 mg total) by mouth every 12 (twelve) hours as needed for nausea or vomiting   prochlorperazine (COMPAZINE) 10 mg tablet 4/15/2022 Self No Yes   Sig: Take 1 tablet (10 mg total) by mouth every 6 (six) hours as needed for nausea or vomiting      Facility-Administered Medications: None     Allergies: Allergies   Allergen Reactions    Medical Tape Rash     Skin irritation  Skin peeling  Skin irritation  Skin peeling  Blisters  Rash     ------------------------------------------------------------------------------------------------------------  Advance Directive and Living Will:      Power of :    POLST:    ------------------------------------------------------------------------------------------------------------  Anticipated Length of Stay is > 2 midnights      Arvin Brooke MD        Portions of the record may have been created with voice recognition software    Occasional wrong word or "sound a like" substitutions may have occurred due to the inherent limitations of voice recognition software    Read the chart carefully and recognize, using context, where substitutions have occurred

## 2022-04-29 NOTE — OP NOTE
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]     * 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]    Procedure(s) (LRB):  DIAGNOSTIC LAPAROSCOPY, TOTAL ABDOMINAL HYSTERECTOMY, BILATERAL SALPINGO-OOPHORECTOMY, EXTENSIVE ADHESIOLYSIS (N/A)  DIAGNOSTIC LAPAROSCOPY, OPEN OMENTECTOMY, POSSIBLE SPLENECTOMY (N/A)  INSTILLATION CHEMOTHERAPY INTRAPERITONEAL (HIPEC) LAPAROTOMY (N/A)  REPAIR DIAPHRAGM TEAR    Specimen(s):  ID Type Source Tests Collected by Time Destination   1 :  Tissue Uterus w/Bilateral Ovaries and Fallopian Tubes TISSUE EXAM Daniel Rios MD 2022 1118    2 : spleen, omentum, darotis Tissue Spleen TISSUE EXAM Daniel Rios MD 2022 1224        Estimated Blood Loss:   500 mL    Drains:  NG/OG/Enteral Tube Orogastric 18 Fr Center mouth (Active)   Number of days: 0       Urethral Catheter Non-latex; Temperature probe 16 Fr  (Active)   Number of days: 0       Anesthesia Type:   General w/ Epidural    Operative Indications:  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]  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[address] ) [C666]  58-year-old with recurrent rectal cancer presents for surgical cytoreduction and HIPEC  The left ovary was abnormal on preoperative imaging suggestive of metastatic disease  Operative Findings:  1  Exam under anesthesia revealed a firm cervix  The uterus was mobile  There is no palpable adnexal masses  No parametrial disease    2  On laparoscopy, there were extensive adhesions identified from the small bowel to the anterior abdominal wall as well as from the omentum to the anterior abdominal wall  Therefore, decision was made to perform laparotomy  3  On laparotomy, there were extensive adhesions present from the small bowel to the anterior abdominal wall, omentum to the anterior abdominal wall, colon to anterior abdominal wall, colon to left pelvic sidewall, rectosigmoid colon the posterior aspect of the uterus, sigmoid mesentery to left adnexa, left pelvis  There was palpable disease in the left upper quadrant just inferior to the spleen  Complications:   None    Procedure and Technique:  After informed consent was obtained, the patient had an epidural placed followed by general endotracheal anesthesia  She was then prepped and draped in normal sterile fashion in the low dorsal lithotomy position  Examination under anesthesia was then performed with findings noted as above  Mg catheter was inserted  Attention was then turned the abdomen  Dr Jessica Amin then performed the initial trocar incision with initial evaluation laparoscopically  Once the decision was made to perform laparotomy, a midline vertical incision was made using cutting current cautery  This was taken down to the underlying layer of fascia using cautery as well  The fascia was opened the midline the fascial incision extended superiorly and inferiorly  The peritoneum was then identified and entered using cautery  The gas was expelled  The trocars removed  Nesha Gals clamps were used to grasp the fascia and extensive lysis of adhesions was then performed lines small-bowel to be taken down from the anterior abdominal wall, bilateral pelvic sidewalls  Once the pelvis was able to be visualized, adhesions from the rectosigmoid colon to the left pelvic brim were lysed  This allowed placement of a Bookwalter retractor  The bowel was packed with moist laparotomy sponges    Adhesions from the rectosigmoid colon to the left pelvic sidewall were then lysed  There were extensive dense adhesions present between the colon and the left adnexa  These were lysed  There were inherent small serosal tears to the sigmoid colon which were oversewn using 3-0 Vicryl suture  The mesentery was freed from dense adhesions left pelvic sidewall  There were mesenteric bleeders that were controlled using 3-0 Vicryl suture  The rectum was then able to be taken down from the posterior aspect of the uterus  These adhesions were dense as well  The rectovaginal septum was entered  The bilateral retroperitoneal spaces were opened using monopolar cautery  The ureters were able to be identified coursing normally within the medial leaf of the broad ligament  On the left side, there was fibrosis present in the retroperitoneum without evidence of hydronephrosis  The ureter was able to be identified via palpation and trans peritoneal visualization  A window was made in the broad ligament on the right side above the ureter  The infundibulopelvic ligament on the right side was then skeletonized, cauterized and transected using the EnSeal Super jaw  In a similar fashion on the left side, a window was made in the broad ligament above the ureter and the infundibulopelvic ligament was skeletonized, cauterized and transected with the EnSeal   There were adhesions present from the bladder to the uterus  These were taken down  The vesicovaginal space was ultimately developed  The bladder was able to be taken down below the level of the external os of the cervix  There was some small bleeders present on the posterior aspect of lateral for cauterized  Adhesions from the ovaries to the bilateral pelvic sidewalls were lysed  The ovaries were able to be elevated  The uterine vessels were able to be skeletonized  The cardinal ligaments were then clamped with zeppelin clamps transected secured using 0 Vicryl suture    The cardinal ligaments were then clamped with straight zeppelin clamps transected and secured using 0 Vicryl suture until the level of the external os of the cervix was reached  At this point, right angle zeppelin clamps were used to come across the upper portion of the vagina and the uterus, cervix, bilateral tubes and ovaries were removed using Arturo scissors  The vagina was then closed using interrupted figure-of-eight 0 Vicryl suture with good hemostasis identified  Additional bleeders on the surface of the sigmoid colon were then oversewn using interrupted 3-0 Vicryl  The pelvis was packed  There is no evidence of bleeding identified  The procedure was then turned over to Dr Maxim Tovar to perform the upper abdominal debulking and heated intraperitoneal chemotherapy  Estimated blood loss for this portion of procedure is 200 mL  I was present for the entire procedure        Patient Disposition:  PACU       SIGNATURE: Didi Stallworth MD  DATE: April 29, 2022  TIME: 1:39 PM

## 2022-04-29 NOTE — ANESTHESIA PREPROCEDURE EVALUATION
Procedure:  TOTAL ABDOMINAL HYSTERECTOMY, BILATERAL SALPINGO-OOPHORECTOMY (N/A Abdomen)  LAPAROSCOPIC VS OPEN OMENTECTOMY, POSSIBLE SPLENECTOMY (N/A Abdomen)  INSTILLATION CHEMOTHERAPY INTRAPERITONEAL (HIPEC) LAPAROTOMY (N/A Abdomen)    Relevant Problems   ANESTHESIA   (+) PONV (postoperative nausea and vomiting)      CARDIO   (+) Dyspnea on exertion   (+) Hyperlipidemia   (+) Hypertension      GI/HEPATIC   (+) GERD (gastroesophageal reflux disease)   (+) 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HCC)      /RENAL   (+) Stage 3 chronic kidney disease, unspecified whether stage 3a or 3b CKD (HCC)      HEMATOLOGY   (+) Platelets decreased (HCC)      PULMONARY   (+) Dyspnea on exertion        Physical Exam    Airway    Mallampati score: III  TM Distance: >3 FB  Neck ROM: full     Dental       Cardiovascular      Pulmonary      Other Findings        Anesthesia Plan  ASA Score- 3     Anesthesia Type- general with ASA Monitors  Additional Monitors:   Airway Plan: ETT  Plan Factors-    Chart reviewed  Patient summary reviewed  Patient is not a current smoker  Induction- intravenous  Postoperative Plan-     Informed Consent- Anesthetic plan and risks discussed with patient  I personally reviewed this patient with the CRNA  Discussed and agreed on the Anesthesia Plan with the CRNA  Shakeel Reddy

## 2022-04-29 NOTE — INTERVAL H&P NOTE
H&P reviewed  After examining the patient I find no changes in the patients condition since the H&P had been written      Vitals:    04/29/22 0614   BP: 163/89   Pulse: 96   Resp: 20   Temp: 97 5 °F (36 4 °C)   SpO2: 100%

## 2022-04-29 NOTE — ANESTHESIA PROCEDURE NOTES
Arterial Line Insertion  Performed by: Daniel Cross CRNA  Authorized by: Urbano Arechiga MD   Patient understanding: patient states understanding of the procedure being performed  Patient consent: the patient's understanding of the procedure matches consent given  Procedure consent: procedure consent matches procedure scheduled  Relevant documents: relevant documents present and verified  Patient identity confirmed: verbally with patient, arm band and provided demographic data  Preparation: Patient was prepped and draped in the usual sterile fashion    Indications: hemodynamic monitoring  Orientation:  Right  Location: radial artery  Procedure Details:  Bonifacio's test normal: yes  Needle gauge: 20  Seldinger technique: Seldinger technique used  Number of attempts: 1    Post-procedure:  Post-procedure: dressing applied  Waveform: good waveform  Post-procedure CNS: normal    Comments: Placed by Linda Chavarria CRNA

## 2022-04-29 NOTE — RESPIRATORY THERAPY NOTE
RT Ventilator Management Note  Gaby Mack 75 y o  female MRN: [id number]  Unit/Bed#: [facility] 02 Encounter: [id number]      Daily Screen       4/29/2022  5255             Patient safety screen outcome[de-identified] Passed (P)             Physical Exam:   Assessment Type: Assess only  General Appearance: Drowsy  Respiratory Pattern: Spontaneous  Chest Assessment: Chest expansion symmetrical  Bilateral Breath Sounds: Diminished  Suction: ET Tube  O2 Device: vent      Resp Comments: pt received from OR intubated  pt placed on psv on vent  pt tolwerating it well  will continue to moniter pt

## 2022-04-30 LAB
ANION GAP SERPL CALCULATED.3IONS-SCNC: 5 MMOL/L (ref 4–13)
ANION GAP SERPL CALCULATED.3IONS-SCNC: 6 MMOL/L (ref 4–13)
BASOPHILS # BLD AUTO: 0.01 THOUSANDS/ΜL (ref 0–0.1)
BASOPHILS NFR BLD AUTO: 0 % (ref 0–1)
BUN SERPL-MCNC: 12 MG/DL (ref 5–25)
BUN SERPL-MCNC: 13 MG/DL (ref 5–25)
CA-I BLD-SCNC: 0.99 MMOL/L (ref 1.12–1.32)
CA-I BLD-SCNC: 1.1 MMOL/L (ref 1.12–1.32)
CALCIUM SERPL-MCNC: 7.7 MG/DL (ref 8.3–10.1)
CALCIUM SERPL-MCNC: 8.4 MG/DL (ref 8.3–10.1)
CHLORIDE SERPL-SCNC: 112 MMOL/L (ref 100–108)
CHLORIDE SERPL-SCNC: 113 MMOL/L (ref 100–108)
CO2 SERPL-SCNC: 19 MMOL/L (ref 21–32)
CO2 SERPL-SCNC: 23 MMOL/L (ref 21–32)
CREAT SERPL-MCNC: 0.89 MG/DL (ref 0.6–1.3)
CREAT SERPL-MCNC: 0.9 MG/DL (ref 0.6–1.3)
EOSINOPHIL # BLD AUTO: 0 THOUSAND/ΜL (ref 0–0.61)
EOSINOPHIL NFR BLD AUTO: 0 % (ref 0–6)
ERYTHROCYTE [DISTWIDTH] IN BLOOD BY AUTOMATED COUNT: 17.1 % (ref 11.6–15.1)
GFR SERPL CREATININE-BSD FRML MDRD: 65 ML/MIN/1.73SQ M
GFR SERPL CREATININE-BSD FRML MDRD: 66 ML/MIN/1.73SQ M
GLUCOSE SERPL-MCNC: 120 MG/DL (ref 65–140)
GLUCOSE SERPL-MCNC: 132 MG/DL (ref 65–140)
HCT VFR BLD AUTO: 23.2 % (ref 34.8–46.1)
HGB BLD-MCNC: 7.2 G/DL (ref 11.5–15.4)
IMM GRANULOCYTES # BLD AUTO: 0.04 THOUSAND/UL (ref 0–0.2)
IMM GRANULOCYTES NFR BLD AUTO: 0 % (ref 0–2)
LACTATE SERPL-SCNC: 1.5 MMOL/L (ref 0.5–2)
LACTATE SERPL-SCNC: 1.8 MMOL/L (ref 0.5–2)
LYMPHOCYTES # BLD AUTO: 1.55 THOUSANDS/ΜL (ref 0.6–4.47)
LYMPHOCYTES NFR BLD AUTO: 12 % (ref 14–44)
MAGNESIUM SERPL-MCNC: 2.4 MG/DL (ref 1.6–2.6)
MAGNESIUM SERPL-MCNC: 2.4 MG/DL (ref 1.6–2.6)
MCH RBC QN AUTO: 29.1 PG (ref 26.8–34.3)
MCHC RBC AUTO-ENTMCNC: 31 G/DL (ref 31.4–37.4)
MCV RBC AUTO: 94 FL (ref 82–98)
MONOCYTES # BLD AUTO: 0.63 THOUSAND/ΜL (ref 0.17–1.22)
MONOCYTES NFR BLD AUTO: 5 % (ref 4–12)
NEUTROPHILS # BLD AUTO: 11.08 THOUSANDS/ΜL (ref 1.85–7.62)
NEUTS SEG NFR BLD AUTO: 83 % (ref 43–75)
NRBC BLD AUTO-RTO: 0 /100 WBCS
PHOSPHATE SERPL-MCNC: 2.9 MG/DL (ref 2.3–4.1)
PHOSPHATE SERPL-MCNC: 3.3 MG/DL (ref 2.3–4.1)
PLATELET # BLD AUTO: 162 THOUSANDS/UL (ref 149–390)
PMV BLD AUTO: 10.7 FL (ref 8.9–12.7)
POTASSIUM SERPL-SCNC: 4.4 MMOL/L (ref 3.5–5.3)
POTASSIUM SERPL-SCNC: 4.5 MMOL/L (ref 3.5–5.3)
RBC # BLD AUTO: 2.47 MILLION/UL (ref 3.81–5.12)
SODIUM SERPL-SCNC: 138 MMOL/L (ref 136–145)
SODIUM SERPL-SCNC: 140 MMOL/L (ref 136–145)
WBC # BLD AUTO: 13.31 THOUSAND/UL (ref 4.31–10.16)

## 2022-04-30 PROCEDURE — NC001 PR NO CHARGE: Performed by: INTERNAL MEDICINE

## 2022-04-30 PROCEDURE — 99024 POSTOP FOLLOW-UP VISIT: CPT | Performed by: OBSTETRICS & GYNECOLOGY

## 2022-04-30 PROCEDURE — 83605 ASSAY OF LACTIC ACID: CPT | Performed by: SURGERY

## 2022-04-30 PROCEDURE — 99024 POSTOP FOLLOW-UP VISIT: CPT | Performed by: SURGERY

## 2022-04-30 PROCEDURE — 99024 POSTOP FOLLOW-UP VISIT: CPT | Performed by: THORACIC SURGERY (CARDIOTHORACIC VASCULAR SURGERY)

## 2022-04-30 PROCEDURE — 82330 ASSAY OF CALCIUM: CPT | Performed by: SURGERY

## 2022-04-30 PROCEDURE — 84100 ASSAY OF PHOSPHORUS: CPT | Performed by: SURGERY

## 2022-04-30 PROCEDURE — 80048 BASIC METABOLIC PNL TOTAL CA: CPT | Performed by: SURGERY

## 2022-04-30 PROCEDURE — 83735 ASSAY OF MAGNESIUM: CPT | Performed by: SURGERY

## 2022-04-30 PROCEDURE — C9113 INJ PANTOPRAZOLE SODIUM, VIA: HCPCS | Performed by: SURGERY

## 2022-04-30 PROCEDURE — 85025 COMPLETE CBC W/AUTO DIFF WBC: CPT | Performed by: SURGERY

## 2022-04-30 PROCEDURE — 99291 CRITICAL CARE FIRST HOUR: CPT | Performed by: EMERGENCY MEDICINE

## 2022-04-30 PROCEDURE — NC001 PR NO CHARGE: Performed by: OBSTETRICS & GYNECOLOGY

## 2022-04-30 RX ORDER — SODIUM CHLORIDE, SODIUM GLUCONATE, SODIUM ACETATE, POTASSIUM CHLORIDE, MAGNESIUM CHLORIDE, SODIUM PHOSPHATE, DIBASIC, AND POTASSIUM PHOSPHATE .53; .5; .37; .037; .03; .012; .00082 G/100ML; G/100ML; G/100ML; G/100ML; G/100ML; G/100ML; G/100ML
1000 INJECTION, SOLUTION INTRAVENOUS ONCE
Status: COMPLETED | OUTPATIENT
Start: 2022-04-30 | End: 2022-04-30

## 2022-04-30 RX ORDER — CALCIUM GLUCONATE 20 MG/ML
2 INJECTION, SOLUTION INTRAVENOUS ONCE
Status: COMPLETED | OUTPATIENT
Start: 2022-04-30 | End: 2022-04-30

## 2022-04-30 RX ORDER — ALBUMIN, HUMAN INJ 5% 5 %
25 SOLUTION INTRAVENOUS ONCE
Status: COMPLETED | OUTPATIENT
Start: 2022-04-30 | End: 2022-04-30

## 2022-04-30 RX ORDER — CALCIUM GLUCONATE 20 MG/ML
1 INJECTION, SOLUTION INTRAVENOUS ONCE
Status: COMPLETED | OUTPATIENT
Start: 2022-04-30 | End: 2022-04-30

## 2022-04-30 RX ADMIN — ALBUMIN (HUMAN) 25 G: 12.5 INJECTION, SOLUTION INTRAVENOUS at 01:29

## 2022-04-30 RX ADMIN — CALCIUM GLUCONATE 1 G: 20 INJECTION, SOLUTION INTRAVENOUS at 09:23

## 2022-04-30 RX ADMIN — PANTOPRAZOLE SODIUM 40 MG: 40 INJECTION, POWDER, FOR SOLUTION INTRAVENOUS at 09:23

## 2022-04-30 RX ADMIN — CALCIUM GLUCONATE 2 G: 20 INJECTION, SOLUTION INTRAVENOUS at 01:18

## 2022-04-30 RX ADMIN — SODIUM CHLORIDE, SODIUM GLUCONATE, SODIUM ACETATE, POTASSIUM CHLORIDE, MAGNESIUM CHLORIDE, SODIUM PHOSPHATE, DIBASIC, AND POTASSIUM PHOSPHATE 1000 ML: .53; .5; .37; .037; .03; .012; .00082 INJECTION, SOLUTION INTRAVENOUS at 22:48

## 2022-04-30 RX ADMIN — HEPARIN SODIUM 5000 UNITS: 5000 INJECTION INTRAVENOUS; SUBCUTANEOUS at 22:45

## 2022-04-30 RX ADMIN — SODIUM CHLORIDE, SODIUM GLUCONATE, SODIUM ACETATE, POTASSIUM CHLORIDE, MAGNESIUM CHLORIDE, SODIUM PHOSPHATE, DIBASIC, AND POTASSIUM PHOSPHATE 1000 ML: .53; .5; .37; .037; .03; .012; .00082 INJECTION, SOLUTION INTRAVENOUS at 02:27

## 2022-04-30 NOTE — PROGRESS NOTES
Entered in error  See separate note      Simone Banegas MD, [address]  4/30/2022  11:54 AM

## 2022-04-30 NOTE — CONSULTS
Inpatient consult to Acute Pain Service  Consult performed by: Ezlbieta Mir MD  Consult ordered by: Ita Garcia MD        Progress Note - Acute Pain Service    Ari Wu 75 y o  female MRN: [id number]  [facility] En[facility]: [id number]      Assessment:   Principal Problem:    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[facility] [address] )  Active Problems:    GERD (gastroesophageal reflux disease)    Hyperlipidemia    Prediabetes    Morbid obesity ([facility] [address] )    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[facility] )    Dyspnea on exertion    PONV (postoperative nausea and vomiting)    Hypertension    Ari Wu is a 75 y o  female  POD1 s/p TAHBSO, extensive CARMEN, HIPEC, diaphragm repair  Preoperative thoracic epidural placed however malfunctioning tubing post procedure and pulled out by Anesthesia team   Started on dilaudid PCA after extubation in ICU  Pt seen and examined today, remains in ICU care, extubated yesterday with no issues  Doing well today, pain controlled with PCA  Pt happy with button and gets good relief with each push  L Chest tube to suction and patient tolerating well with [facility]  Discussed replacement of epidural which patient responded "do I need it" and given good cough clearing, minimal supplemental oxygen, and subjective comfort with low dose opioids without e/o oversedation, will hold off on replacement of epidural at this time  Plan:   - Pt appears comfortable and relieved with PCA, will forego Epidural Replacement unless further indications arise  - Dilaudid PCA 0 basal/0 2mg/q10min  - MMA when able to tolerate PO's  - t/c IV muscle relaxants if needed  - Bowel regimen when appropriate from surgical perspective    APS will continue to follow  Please contact Acute Pain Service - [facility] via [employer] from [phone] with additional questions or concerns  See Mayur or Valarie for additional contacts and after hours information      Pain History  Current pain location(s): incisional  Pain Scale:   2-3  Quality: sharp  24 hour history: as above    Opioid requirement previous 24 hours: 3 2mg IV dilaudid via PCA    Meds/Allergies   all current active meds have been reviewed    Allergies   Allergen Reactions    Medical Tape Rash     Skin irritation  Skin peeling  Skin irritation  Skin peeling  Blisters  Rash       Objective     Temp:  [96 1 °F (35 6 °C)-98 2 °F (36 8 °C)] 98 2 °F (36 8 °C)  HR:  [74-98] 92  Resp:  [4-35] 15  BP: (110-174)/(42-80) 141/57  Arterial Line BP: ()/() 94/62  FiO2 (%):  [40] 40    Physical Exam  Vitals and nursing note reviewed  Constitutional:       Appearance: Normal appearance  She is ill-appearing  HENT:      Head: Normocephalic and atraumatic  Mouth/Throat:      Mouth: Mucous membranes are moist    Eyes:      Extraocular Movements: Extraocular movements intact  Cardiovascular:      Rate and Rhythm: Normal rate  Pulmonary:      Effort: Pulmonary effort is normal    Abdominal:      General: There is no distension  Palpations: Abdomen is soft  Tenderness: There is abdominal tenderness  Musculoskeletal:         General: No swelling  Skin:     General: Skin is warm and dry  Neurological:      Mental Status: She is alert and oriented to person, place, and time  Psychiatric:         Mood and Affect: Mood normal          Behavior: Behavior normal          Lab Results:   Results from last 7 days   Lab Units 04/30/22  0618   WBC Thousand/uL 13 31*   HEMOGLOBIN g/dL 7 2*   HEMATOCRIT % 23 2*   PLATELETS Thousands/uL 162      Results from last 7 days   Lab Units 04/30/22  0618 04/29/22  1847 04/29/22  1804   POTASSIUM mmol/L 4 5   < > 5 7*   CHLORIDE mmol/L 112*   < > 113*   CO2 mmol/L 23   < > 17*   BUN mg/dL 12   < > 14   CREATININE mg/dL 0 90   < > 1 21   CALCIUM mg/dL 8 4   < > 7 7*   ALK PHOS U/L  --   --  38*   ALT U/L  --   --  60   AST U/L  --   --  76*    < > = values in this interval not displayed         Imaging Studies: I have personally reviewed pertinent reports  EKG, Pathology, and Other Studies: I have personally reviewed pertinent reports  Counseling / Coordination of Care  Total floor / unit time spent today 20 minutes  Greater than 50% of total time was spent with the patient and / or family counseling and / or coordination of care  A description of the counseling / coordination of care:  oor    Please note that the APS provides consultative services regarding pain management only  With the exception of ketamine and epidural infusions and except when indicated, final decisions regarding starting or changing doses of analgesic medications are at the discretion of the consulting service  Off hours consultation and/or medication management is generally not available      Hanane Souza MD  Acute Pain Service

## 2022-04-30 NOTE — QUICK NOTE
Surgical Oncology Post-Op Check Note  Laisha Sanabria 75 y o  female MRN: [id number]  Unit/Bed#: MICU 02 Encounter: [id number]     S:    Patient extubated earlier this evening without incident  Currently denies any complaints, pain well-controlled, on 4L NC     O:     Vitals:    04/29/22 2100   BP:    Pulse: 98   Resp: (!) 35   Temp:    SpO2: 100%        I/O       04/28 0701  04/29 0700 04/29 0701 04/30 0700    I V  (mL/kg)  7734 6 (82)    IV Piggyback  750    Total Intake(mL/kg)  8484 6 (90)    Urine (mL/kg/hr)  740 (0 5)    Emesis/NG output  0    Drains  160    Blood  500    Chest Tube  65    Total Output  1465    Net  +7019 6                PE:  GEN: NAD  HEENT: MMM  Chest:  L CT to suction with serosanguinous output, -AL  CV: RRR  Lung: normal effort, on 4L NC  Ab: Soft, NT/ND, MARIA T x2 with serosanguinous outputs, dressings c/d/i  Extrem: No CCE  Neuro:  A+Ox3, motor and sensation grossly intact    Lab Results   Component Value Date    WBC 16 56 (H) 04/29/2022    HGB 8 4 (L) 04/29/2022    HCT 28 6 (L) 04/29/2022     (H) 04/29/2022     04/29/2022     Lab Results   Component Value Date    GLUCOSE 83 12/03/2016    CALCIUM 7 6 (L) 04/29/2022     12/03/2016    K 5 4 (H) 04/29/2022    CO2 16 (L) 04/29/2022     (H) 04/29/2022    BUN 15 04/29/2022    CREATININE 1 16 04/29/2022           A/P:   76 y/o F w/ metastatic colon cancer, now s/p open DILMA/BSO, omentectomy, splenectomy, diaphragm repair, Left chest tube insertion, HIPEC   --NPO/NGT (NGT stays until Sun, 5/1)  --[email or web address]  --Fluid resuscitation to UOP of 1cc/kg/hr  --D-PCA for pain control, APS following and will place Thoracic Epidural tomorrow (SQ Heparin to be held at midnight)  --Saurav (stays until Sun, 5/1)  --MARIA T x2  --Maintain Left Chest tube to suction    Bertha Aldana MD  PGY-4, General Surgery  4/29/2022

## 2022-04-30 NOTE — PROGRESS NOTES
Progress Note - Surgical Oncology   Tiago Ortega 75 y o  female MRN: [id number]  Unit/Bed#: MICU 02 Encounter: [id number]    Assessment:  74 y/o F w/ metastatic colon cancer, now s/p open DILMA/BSO, omentectomy, splenectomy, diaphragm repair, Left chest tube insertion, HIPEC on 4/29     3L crystalloid, 500cc albumin given overnight, with Lactic acidosis cleared    L CT (-20 sxn, -AL): 229 cc output, serosanguinous  MARIA T #1: 170 cc, serosanguinous  MARIA T #2: 210 cc, serosanguinous    Plan:  --NPO/NGT (NGT stays until Sun, 5/1)  --[email or web address]  --Fluid resuscitation to UOP of 1cc/kg/hr  --Dilaudid PCA for pain control, APS following   --Mg (stays until Sun, 5/1)  --MARIA T x2  --Maintain Left Chest tube to suction    Subjective/Objective     Subjective:     No acute events overnight  This morning, pt feels well- reports pain is well-controlled  Denies any nausea or vomiting  Objective:     Blood pressure (!) 173/70, pulse 90, temperature (!) 96 4 °F (35 8 °C), resp  rate 14, height 4' 9" (1 448 m), weight 94 3 kg (208 lb), last menstrual period 09/01/2011, SpO2 99 %, not currently breastfeeding  ,Body mass index is 45 01 kg/m²  Intake/Output Summary (Last 24 hours) at 4/29/2022 2158  Last data filed at 4/29/2022 2137  Gross per 24 hour   Intake 8484 59 ml   Output 1695 ml   Net 6789 59 ml       Invasive Devices  Report    Central Venous Catheter Line            Port A Cath Right Chest -- days          Peripheral Intravenous Line            Peripheral IV 04/29/22 Left Antecubital <1 day    Peripheral IV 04/29/22 Left Hand <1 day          Arterial Line            Arterial Line 04/29/22 Right Radial <1 day          Drain            Chest Tube 3 Left Pleural 28 Fr  <1 day    Closed/Suction Drain Left LLQ Bulb 19 Fr  <1 day    Closed/Suction Drain Left LUQ Bulb 19 Fr  <1 day    NG/OG/Enteral Tube Orogastric 18 Fr Center mouth <1 day    Urethral Catheter Non-latex; Temperature probe 16 Fr  <1 day                Physical Exam:    GEN: NAD  HEENT: MMM  Chest: L CT in place to -20 sxn, with serosanguinous output, -AL  CV: RRR  Lung: normal effort  Ab: Soft, NT/ND, dressing c/d/i, MARIA T x2 with serosanguinous outputs  Extrem: No CCE  Neuro:  A+Ox3, motor and sensation grossly intact    Lab, Imaging and other studies:  CBC:   Lab Results   Component Value Date    WBC 16 56 (H) 04/29/2022    HGB 8 4 (L) 04/29/2022    HCT 28 6 (L) 04/29/2022     (H) 04/29/2022     04/29/2022    MCH 29 3 04/29/2022    MCHC 29 4 (L) 04/29/2022    RDW 17 0 (H) 04/29/2022    MPV 10 4 04/29/2022   , CMP:   Lab Results   Component Value Date    SODIUM 138 04/29/2022    K 5 4 (H) 04/29/2022     (H) 04/29/2022    CO2 16 (L) 04/29/2022    BUN 15 04/29/2022    CREATININE 1 16 04/29/2022    CALCIUM 7 6 (L) 04/29/2022    AST 76 (H) 04/29/2022    ALT 60 04/29/2022    ALKPHOS 38 (L) 04/29/2022    EGFR 48 04/29/2022   , Coagulation:   Lab Results   Component Value Date    INR 1 13 04/29/2022   , Urinalysis: No results found for: Verlyn Concepcion, SPECGRAV, PHUR, LEUKOCYTESUR, NITRITE, PROTEINUA, GLUCOSEU, KETONESU, BILIRUBINUR, BLOODU, Amylase: No results found for: AMYLASE  VTE Pharmacologic Prophylaxis: Heparin  VTE Mechanical Prophylaxis: sequential compression device

## 2022-04-30 NOTE — PROGRESS NOTES
Progress Note - Gynecologic Oncology   Billy Dyer 75 y o  female MRN: [id number]  Unit/Bed#: MICU 02 Encounter: [id number]    Assessment / Plan:    1  Recurrent metastatic rectosigmoid adenocarcinoma:  Now postoperative day 1 status post cytoreduction with HIPEC  Our service performed hysterectomy bilateral salpingo-oophorectomy  I discussed with patient procedure in detail  All questions answered  Noted anemia with hemoglobin above 7 grams/deciliter  Consider transfusion if hemoglobin less than 7 g/dL or prn symptoms  Postoperative management per surgical Oncology service  Will follow peripherally  Subjective/Objective       Subjective:  Abdominal pelvic discomfort, fatigued  No chest pain  No shortness of breath  No significant vaginal bleeding  Voiding through Mg  Objective:     Blood pressure 141/57, pulse 92, temperature 98 2 °F (36 8 °C), resp  rate 15, height 4' 9" (1 448 m), weight 97 6 kg (215 lb 2 7 oz), last menstrual period 09/01/2011, SpO2 97 %, not currently breastfeeding  ,Body mass index is 46 56 kg/m²  Intake/Output Summary (Last 24 hours) at 4/30/2022 1150  Last data filed at 4/30/2022 0731  Gross per 24 hour   Intake 7884 49 ml   Output 2679 ml   Net 5205 49 ml       Invasive Devices  Report    Central Venous Catheter Line            Port A Cath Right Chest -- days          Peripheral Intravenous Line            Peripheral IV 04/29/22 Left Antecubital 1 day    Peripheral IV 04/29/22 Left Hand 1 day          Arterial Line            Arterial Line 04/29/22 Right Radial 1 day          Drain            NG/OG/Enteral Tube Orogastric 18 Fr Center mouth 1 day    Urethral Catheter Non-latex; Temperature probe 16 Fr  1 day    Chest Tube 3 Left Pleural 28 Fr  <1 day    Closed/Suction Drain Left LLQ Bulb 19 Fr  <1 day    Closed/Suction Drain Left LUQ Bulb 19 Fr  <1 day                Physical Exam: /57   Pulse 92   Temp 98 2 °F (36 8 °C)   Resp 15   Ht 4' 9" (1 448 m)   Wt 97 6 kg (215 lb 2 7 oz)   LMP 09/01/2011 (Approximate)   SpO2 97%   BMI 46 56 kg/m²     General Appearance:    Alert, cooperative, no distress, appears stated age   Abdomen:     Soft, drains in place with serosanguineous output, dressing dry  Extremities:   SCDs on         Recent Results (from the past 24 hour(s))   CBC and differential    Collection Time: 04/29/22  6:04 PM   Result Value Ref Range    WBC 16 56 (H) 4 31 - 10 16 Thousand/uL    RBC 2 87 (L) 3 81 - 5 12 Million/uL    Hemoglobin 8 4 (L) 11 5 - 15 4 g/dL    Hematocrit 28 6 (L) 34 8 - 46 1 %     (H) 82 - 98 fL    MCH 29 3 26 8 - 34 3 pg    MCHC 29 4 (L) 31 4 - 37 4 g/dL    RDW 17 0 (H) 11 6 - 15 1 %    MPV 10 4 8 9 - 12 7 fL    Platelets 262 857 - 676 Thousands/uL   Protime-INR    Collection Time: 04/29/22  6:04 PM   Result Value Ref Range    Protime 14 0 11 6 - 14 5 seconds    INR 1 13 0 84 - 1 19   APTT    Collection Time: 04/29/22  6:04 PM   Result Value Ref Range    PTT 23 23 - 37 seconds   Comprehensive metabolic panel    Collection Time: 04/29/22  6:04 PM   Result Value Ref Range    Sodium 137 136 - 145 mmol/L    Potassium 5 7 (H) 3 5 - 5 3 mmol/L    Chloride 113 (H) 100 - 108 mmol/L    CO2 17 (L) 21 - 32 mmol/L    ANION GAP 7 4 - 13 mmol/L    BUN 14 5 - 25 mg/dL    Creatinine 1 21 0 60 - 1 30 mg/dL    Glucose 164 (H) 65 - 140 mg/dL    Calcium 7 7 (L) 8 3 - 10 1 mg/dL    Corrected Calcium 8 7 8 3 - 10 1 mg/dL    AST 76 (H) 5 - 45 U/L    ALT 60 12 - 78 U/L    Alkaline Phosphatase 38 (L) 46 - 116 U/L    Total Protein 5 6 (L) 6 4 - 8 2 g/dL    Albumin 2 8 (L) 3 5 - 5 0 g/dL    Total Bilirubin 0 55 0 20 - 1 00 mg/dL    eGFR 46 ml/min/1 73sq m   Lactic acid, plasma    Collection Time: 04/29/22  6:04 PM   Result Value Ref Range    LACTIC ACID 3 9 (HH) 0 5 - 2 0 mmol/L   Manual Differential(PHLEBS Do Not Order)    Collection Time: 04/29/22  6:04 PM   Result Value Ref Range    Segmented % 68 43 - 75 %    Bands % 19 (H) 0 - 8 % Lymphocytes % 6 (L) 14 - 44 %    Monocytes % 3 (L) 4 - 12 %    Eosinophils, % 0 0 - 6 %    Basophils % 0 0 - 1 %    Metamyelocytes% 4 (H) 0 - 1 %    Absolute Neutrophils 14 41 (H) 1 85 - 7 62 Thousand/uL    Lymphocytes Absolute 0 99 0 60 - 4 47 Thousand/uL    Monocytes Absolute 0 50 0 00 - 1 22 Thousand/uL    Eosinophils Absolute 0 00 0 00 - 0 40 Thousand/uL    Basophils Absolute 0 00 0 00 - 0 10 Thousand/uL    Total Counted      RBC Morphology Present     Anisocytosis Present     Macrocytes Present     Ovalocytes Present     Poikilocytes Present     Polychromasia Present     Platelet Estimate Adequate Adequate   Basic metabolic panel    Collection Time: 04/29/22  6:47 PM   Result Value Ref Range    Sodium 138 136 - 145 mmol/L    Potassium 5 4 (H) 3 5 - 5 3 mmol/L    Chloride 114 (H) 100 - 108 mmol/L    CO2 16 (L) 21 - 32 mmol/L    ANION GAP 8 4 - 13 mmol/L    BUN 15 5 - 25 mg/dL    Creatinine 1 16 0 60 - 1 30 mg/dL    Glucose 180 (H) 65 - 140 mg/dL    Calcium 7 6 (L) 8 3 - 10 1 mg/dL    eGFR 48 ml/min/1 73sq m   Magnesium    Collection Time: 04/29/22  6:47 PM   Result Value Ref Range    Magnesium 1 4 (L) 1 6 - 2 6 mg/dL   Phosphorus    Collection Time: 04/29/22  6:47 PM   Result Value Ref Range    Phosphorus 3 0 2 3 - 4 1 mg/dL   Calcium, ionized    Collection Time: 04/29/22  6:47 PM   Result Value Ref Range    Calcium, Ionized 1 04 (L) 1 12 - 1 32 mmol/L   Lactic acid 2 Hours    Collection Time: 04/29/22  9:35 PM   Result Value Ref Range    LACTIC ACID 3 9 (HH) 0 5 - 2 0 mmol/L   Basic metabolic panel    Collection Time: 04/30/22 12:18 AM   Result Value Ref Range    Sodium 138 136 - 145 mmol/L    Potassium 4 4 3 5 - 5 3 mmol/L    Chloride 113 (H) 100 - 108 mmol/L    CO2 19 (L) 21 - 32 mmol/L    ANION GAP 6 4 - 13 mmol/L    BUN 13 5 - 25 mg/dL    Creatinine 0 89 0 60 - 1 30 mg/dL    Glucose 132 65 - 140 mg/dL    Calcium 7 7 (L) 8 3 - 10 1 mg/dL    eGFR 66 ml/min/1 73sq m   Calcium, ionized    Collection Time: 04/30/22 12:18 AM   Result Value Ref Range    Calcium, Ionized 0 99 (L) 1 12 - 1 32 mmol/L   Magnesium    Collection Time: 04/30/22 12:18 AM   Result Value Ref Range    Magnesium 2 4 1 6 - 2 6 mg/dL   Phosphorus    Collection Time: 04/30/22 12:18 AM   Result Value Ref Range    Phosphorus 2 9 2 3 - 4 1 mg/dL   Lactic acid, plasma    Collection Time: 04/30/22  1:33 AM   Result Value Ref Range    LACTIC ACID 1 8 0 5 - 2 0 mmol/L   CBC and differential    Collection Time: 04/30/22  6:18 AM   Result Value Ref Range    WBC 13 31 (H) 4 31 - 10 16 Thousand/uL    RBC 2 47 (L) 3 81 - 5 12 Million/uL    Hemoglobin 7 2 (L) 11 5 - 15 4 g/dL    Hematocrit 23 2 (L) 34 8 - 46 1 %    MCV 94 82 - 98 fL    MCH 29 1 26 8 - 34 3 pg    MCHC 31 0 (L) 31 4 - 37 4 g/dL    RDW 17 1 (H) 11 6 - 15 1 %    MPV 10 7 8 9 - 12 7 fL    Platelets 430 596 - 589 Thousands/uL    nRBC 0 /100 WBCs    Neutrophils Relative 83 (H) 43 - 75 %    Immat GRANS % 0 0 - 2 %    Lymphocytes Relative 12 (L) 14 - 44 %    Monocytes Relative 5 4 - 12 %    Eosinophils Relative 0 0 - 6 %    Basophils Relative 0 0 - 1 %    Neutrophils Absolute 11 08 (H) 1 85 - 7 62 Thousands/µL    Immature Grans Absolute 0 04 0 00 - 0 20 Thousand/uL    Lymphocytes Absolute 1 55 0 60 - 4 47 Thousands/µL    Monocytes Absolute 0 63 0 17 - 1 22 Thousand/µL    Eosinophils Absolute 0 00 0 00 - 0 61 Thousand/µL    Basophils Absolute 0 01 0 00 - 0 10 Thousands/µL   Basic metabolic panel    Collection Time: 04/30/22  6:18 AM   Result Value Ref Range    Sodium 140 136 - 145 mmol/L    Potassium 4 5 3 5 - 5 3 mmol/L    Chloride 112 (H) 100 - 108 mmol/L    CO2 23 21 - 32 mmol/L    ANION GAP 5 4 - 13 mmol/L    BUN 12 5 - 25 mg/dL    Creatinine 0 90 0 60 - 1 30 mg/dL    Glucose 120 65 - 140 mg/dL    Calcium 8 4 8 3 - 10 1 mg/dL    eGFR 65 ml/min/1 73sq m   Calcium, ionized    Collection Time: 04/30/22  6:18 AM   Result Value Ref Range    Calcium, Ionized 1 10 (L) 1 12 - 1 32 mmol/L   Magnesium Collection Time: 04/30/22  6:18 AM   Result Value Ref Range    Magnesium 2 4 1 6 - 2 6 mg/dL   Phosphorus    Collection Time: 04/30/22  6:18 AM   Result Value Ref Range    Phosphorus 3 3 2 3 - 4 1 mg/dL   Lactic acid, plasma    Collection Time: 04/30/22  6:18 AM   Result Value Ref Range    LACTIC ACID 1 5 0 5 - 2 0 mmol/L     Cora Townsend MD, [place], [facility]  4/30/2022  11:53 AM

## 2022-04-30 NOTE — PLAN OF CARE
Pt arrived approximately 1800 from OR vented and sedated  Hyperkalemia on repeat lab  Treatment with insulin and dextrose  Mag replaced  Urine output goal >100 cc hr  Dr Sherie Alonzo at bedside discussed increasing blood pressure, pt shakes head yes and no to questions  Dtr at bedside  2130-Pt was able to be liberated from the ventilator and verbalized understanding of PCA use and settings  Pt continues to complain of left shoulder and back pain  Attempts to reposition for comfort unsuccessful  Call bell in reach

## 2022-04-30 NOTE — CONSULTS
Consultation generated through [facility] protocol  For surgical optimization  Overall GFR is above 65 blood pressures are stable patient is nonoliguric with lactic acid that is improving  Electrolytes are stable bland potassium is improved  Given the above will see the patient as needed  Please call us with any questions    This was discussed with surgical resident

## 2022-04-30 NOTE — PROGRESS NOTES
Progress Note - Thoracic Surgery   Kimberley Ghosh 75 y o  female MRN: [id number]  Unit/Bed#: MICU 02 Encounter: [id number]    Assessment:  76 y/o F w/ metastatic colon cancer, now s/p open DILMA/BSO, omentectomy, splenectomy, diaphragm repair, Left chest tube insertion, HIPEC on 4/29     L CT to -20 sxn with 229 cc serosanguinous output, -AL    Plan:  --Maintain Left Chest tube to suction, monitor output  --IS, aggressive pulmonary toileting  --OOB, ambulate  --PT/OT    Subjective/Objective     Subjective:     No acute events overnight  Patient doing well this AM, pain adequately controlled with PCA  SpO2 97% on 2L NC  Objective:     Blood pressure (!) 173/70, pulse 90, temperature (!) 96 4 °F (35 8 °C), resp  rate 14, height 4' 9" (1 448 m), weight 94 3 kg (208 lb), last menstrual period 09/01/2011, SpO2 99 %, not currently breastfeeding  ,Body mass index is 45 01 kg/m²  Intake/Output Summary (Last 24 hours) at 4/29/2022 2158  Last data filed at 4/29/2022 2137  Gross per 24 hour   Intake 8484 59 ml   Output 1695 ml   Net 6789 59 ml       Invasive Devices  Report    Central Venous Catheter Line            Port A Cath Right Chest -- days          Peripheral Intravenous Line            Peripheral IV 04/29/22 Left Antecubital <1 day    Peripheral IV 04/29/22 Left Hand <1 day          Arterial Line            Arterial Line 04/29/22 Right Radial <1 day          Drain            Chest Tube 3 Left Pleural 28 Fr  <1 day    Closed/Suction Drain Left LLQ Bulb 19 Fr  <1 day    Closed/Suction Drain Left LUQ Bulb 19 Fr  <1 day    NG/OG/Enteral Tube Orogastric 18 Fr Center mouth <1 day    Urethral Catheter Non-latex; Temperature probe 16 Fr  <1 day                Physical Exam:    GEN: NAD  HEENT: MMM  Chest: L CT to suction with serosanguinous output, -AL  CV: RRR  Lung: normal effort  Ab: Soft, NT/ND  Extrem: No CCE  Neuro:  A+Ox3, motor and sensation grossly intact    Lab, Imaging and other studies:  CBC:   Lab Results Component Value Date    WBC 16 56 (H) 04/29/2022    HGB 8 4 (L) 04/29/2022    HCT 28 6 (L) 04/29/2022     (H) 04/29/2022     04/29/2022    MCH 29 3 04/29/2022    MCHC 29 4 (L) 04/29/2022    RDW 17 0 (H) 04/29/2022    MPV 10 4 04/29/2022   , CMP:   Lab Results   Component Value Date    SODIUM 138 04/29/2022    K 5 4 (H) 04/29/2022     (H) 04/29/2022    CO2 16 (L) 04/29/2022    BUN 15 04/29/2022    CREATININE 1 16 04/29/2022    CALCIUM 7 6 (L) 04/29/2022    AST 76 (H) 04/29/2022    ALT 60 04/29/2022    ALKPHOS 38 (L) 04/29/2022    EGFR 48 04/29/2022   , Coagulation:   Lab Results   Component Value Date    INR 1 13 04/29/2022   , Urinalysis: No results found for: Cathren Barrow, SPECGRAV, PHUR, LEUKOCYTESUR, NITRITE, PROTEINUA, GLUCOSEU, KETONESU, BILIRUBINUR, BLOODU, Amylase: No results found for: AMYLASE  VTE Pharmacologic Prophylaxis: Heparin  VTE Mechanical Prophylaxis: sequential compression device

## 2022-04-30 NOTE — PLAN OF CARE
Problem: Potential for Falls  Goal: Patient will remain free of falls  Description: INTERVENTIONS:  - Educate patient/family on patient safety including physical limitations  - Instruct patient to call for assistance with activity   - Consult OT/PT to assist with strengthening/mobility   - Keep Call bell within reach  - Keep bed low and locked with side rails adjusted as appropriate  - Keep care items and personal belongings within reach  - Initiate and maintain comfort rounds  - Make Fall Risk Sign visible to staff  - Offer Toileting every 2 Hours, in advance of need  - Initiate/Maintain alarm  - Obtain necessary fall risk management equipment:   - Apply yellow socks and bracelet for high fall risk patients  - Consider moving patient to room near nurses station  Outcome: Progressing     Problem: Prexisting or High Potential for Compromised Skin Integrity  Goal: Skin integrity is maintained or improved  Description: INTERVENTIONS:  - Identify patients at risk for skin breakdown  - Assess and monitor skin integrity  - Assess and monitor nutrition and hydration status  - Monitor labs   - Assess for incontinence   - Turn and reposition patient  - Assist with mobility/ambulation  - Relieve pressure over bony prominences  - Avoid friction and shearing  - Provide appropriate hygiene as needed including keeping skin clean and dry  - Evaluate need for skin moisturizer/barrier cream  - Collaborate with interdisciplinary team   - Patient/family teaching  - Consider wound care consult   Outcome: Progressing     Problem: MOBILITY - ADULT  Goal: Maintain or return to baseline ADL function  Description: INTERVENTIONS:  -  Assess patient's ability to carry out ADLs; assess patient's baseline for ADL function and identify physical deficits which impact ability to perform ADLs (bathing, care of mouth/teeth, toileting, grooming, dressing, etc )  - Assess/evaluate cause of self-care deficits   - Assess range of motion  - Assess patient's mobility; develop plan if impaired  - Assess patient's need for assistive devices and provide as appropriate  - Encourage maximum independence but intervene and supervise when necessary  - Involve family in performance of ADLs  - Assess for home care needs following discharge   - Consider OT consult to assist with ADL evaluation and planning for discharge  - Provide patient education as appropriate  Outcome: Progressing  Goal: Maintains/Returns to pre admission functional level  Description: INTERVENTIONS:  - Perform BMAT or MOVE assessment daily    - Set and communicate daily mobility goal to care team and patient/family/caregiver  - Collaborate with rehabilitation services on mobility goals if consulted  - Perform Range of Motion 4 times a day  - Reposition patient every 2 hours    - Dangle patient 1 times a day  - Stand patient 1 times a day  - Ambulate patient 1 times a day  - Out of bed to chair 1 times a day   - Out of bed for meals 0 times a day  - Out of bed for toileting  - Record patient progress and toleration of activity level   Outcome: Progressing     Problem: PAIN - ADULT  Goal: Verbalizes/displays adequate comfort level or baseline comfort level  Description: Interventions:  - Encourage patient to monitor pain and request assistance  - Assess pain using appropriate pain scale  - Administer analgesics based on type and severity of pain and evaluate response  - Implement non-pharmacological measures as appropriate and evaluate response  - Consider cultural and social influences on pain and pain management  - Notify physician/advanced practitioner if interventions unsuccessful or patient reports new pain  Outcome: Progressing     Problem: INFECTION - ADULT  Goal: Absence or prevention of progression during hospitalization  Description: INTERVENTIONS:  - Assess and monitor for signs and symptoms of infection  - Monitor lab/diagnostic results  - Monitor all insertion sites, i e  indwelling lines, tubes, and drains  - Monitor endotracheal if appropriate and nasal secretions for changes in amount and color  - [place] appropriate cooling/warming therapies per order  - Administer medications as ordered  - Instruct and encourage patient and family to use good hand hygiene technique  - Identify and instruct in appropriate isolation precautions for identified infection/condition  Outcome: Progressing  Goal: Absence of fever/infection during neutropenic period  Description: INTERVENTIONS:  - Monitor WBC    Outcome: Progressing     Problem: DISCHARGE PLANNING  Goal: Discharge to home or other facility with appropriate resources  Description: INTERVENTIONS:  - Identify barriers to discharge w/patient and caregiver  - Arrange for needed discharge resources and transportation as appropriate  - Identify discharge learning needs (meds, wound care, etc )  - Arrange for interpretive services to assist at discharge as needed  - Refer to Case Management Department for coordinating discharge planning if the patient needs post-hospital services based on physician/advanced practitioner order or complex needs related to functional status, cognitive ability, or social support system  Outcome: Progressing

## 2022-04-30 NOTE — PROGRESS NOTES
Progress Note - [address] 75 y o  female MRN: [id number]  Unit/Bed#: MICU 02 Encounter: [id number]        ----------------------------------------------------------------------------------------  HPI/24hr events: pod 1 s/p ex lap, DILMA BSO, omentectomy, extensive CARMEN, splenectomy, diaphragm repair  Extubated shortly after arrival to unit  bolused 3L crystalloid, and 500 cc albumin  Urine output adequate  Lactate cleared 3 9-> 1 8    NGT: 0  MARIA T drain: 310 serosang  UOP: 1 6 L    ---------------------------------------------------------------------------------------  SUBJECTIVE  Denied chest pain or shortness of breath  Review of Systems   Constitutional: Negative for chills and fever  HENT: Negative  Eyes: Negative  Respiratory: Negative  Cardiovascular: Negative  Gastrointestinal: Negative  Negative for abdominal pain, nausea and vomiting  Endocrine: Negative  Genitourinary: Negative  Musculoskeletal: Negative  Skin: Negative  Allergic/Immunologic: Negative  Neurological: Negative  Hematological: Negative  Psychiatric/Behavioral: Negative  Review of systems was reviewed and negative unless stated above in HPI/24-hour events   ---------------------------------------------------------------------------------------  Assessment and Plan:    Neuro:    Diagnosis: acute pain post op  o Plan: thoracic epidural today, appreciate APS  o Heparin held since midnight   Delirium Precautions  o Plan: CAM ICU, regulate sleep-wake cycle, avoid deliriogenic medications      CV:    Diagnosis: post op htn  o Plan: prn hydralazine, labetalol    o Goal systolic < 421    Pulm:   Diagnosis: L diaphragm repair  o Plan: continue left chest tube to suction  o Monitor output and character    GI:   · Diagnosis: s/p DILMA, BSO, Splenectomy, diaphragm repair  · Plan: continue NPO, NGT until 5/1  · Maintain abd drains x2  Monitor output and character     · Stress Ulcer PPx: protonix  · Bowel Regimen: not indicated  :    Diagnosis: no acute issues  o Plan: monitor urine output  o Goal 1cc/kg/h  o Continue ortiz until 5/1    F/E/N:    F: isoltye @ 150 cc/h   E: monitor and replete electrolytes PRN    N: keep npo and ngt      Heme/Onc:    Diagnosis: acute blood loss anemia s/p surgery  o Plan: monitor hgb  o Transfuse < 7   DVT PPx  o Plan: SQH  Held at [facility] for epidural today  Will resume post procedure  Endo:    Diagnosis: no active issues  o Plan: monitor glucose 140-180    ID:    Diagnosis: reactive leukocytosis  o Plan: monitor wbc  o Monitor fever curve    MSK/Skin:    Diagnosis: deconditioning  o Plan: out of bed, up in chair  o Ambulate  o Pt ot    o Pt must walk 3x today    Disposition: Continue Critical Care   Code Status: Level 1 - Full Code  ---------------------------------------------------------------------------------------  ICU CORE MEASURES    Prophylaxis   VTE Pharmacologic Prophylaxis: Heparin  VTE Mechanical Prophylaxis: sequential compression device  Stress Ulcer Prophylaxis: Pantoprazole IV     ABCDE Protocol (if indicated)  Plan to perform spontaneous awakening trial today? Not applicable  Plan to perform spontaneous breathing trial today? Not applicable  Obvious barriers to extubation? Not applicable  CAM-ICU: Negative    Invasive Devices Review  Invasive Devices  Report    Central Venous Catheter Line            Port A Cath Right Chest -- days          Peripheral Intravenous Line            Peripheral IV 04/29/22 Left Antecubital <1 day    Peripheral IV 04/29/22 Left Hand <1 day          Arterial Line            Arterial Line 04/29/22 Right Radial <1 day          Drain            Chest Tube 3 Left Pleural 28 Fr  <1 day    Closed/Suction Drain Left LLQ Bulb 19 Fr  <1 day    Closed/Suction Drain Left LUQ Bulb 19 Fr  <1 day    NG/OG/Enteral Tube Orogastric 18 Fr Center mouth <1 day    Urethral Catheter Non-latex; Temperature probe 16 Fr  <1 day              Can any invasive devices be discontinued today? Not applicable  ---------------------------------------------------------------------------------------  OBJECTIVE    Vitals   Vitals:    22 0100 22 0130 22 0200 22 0300   BP: (!) 110/42  126/57 135/59   Pulse: 78 82 86 88   Resp: (!) 9 (!) 8 (!) 10 12   Temp: (!) 96 4 °F (35 8 °C) (!) 96 4 °F (35 8 °C) (!) 96 4 °F (35 8 °C) (!) 96 8 °F (36 °C)   TempSrc:       SpO2: 99% 99% 99% 97%   Weight:       Height:         Temp (24hrs), Av 4 °F (35 8 °C), Min:96 1 °F (35 6 °C), Max:97 5 °F (36 4 °C)  Current: Temperature: (!) 96 8 °F (36 °C)    Physical Exam  Vitals reviewed  HENT:      Head: Normocephalic and atraumatic  Nose: Nose normal       Mouth/Throat:      Mouth: Mucous membranes are moist    Eyes:      Pupils: Pupils are equal, round, and reactive to light  Cardiovascular:      Rate and Rhythm: Normal rate  Pulses: Normal pulses  Pulmonary:      Effort: Pulmonary effort is normal    Abdominal:      Palpations: Abdomen is soft  Genitourinary:     Comments: deferred  Musculoskeletal:         General: Normal range of motion  Cervical back: Normal range of motion  Skin:     General: Skin is warm  Capillary Refill: Capillary refill takes 2 to 3 seconds  Neurological:      Mental Status: She is alert     Psychiatric:         Mood and Affect: Mood normal          Respiratory:  SpO2: SpO2: 97 %       Invasive/non-invasive ventilation settings   Respiratory  Report   Lab Data (Last 4 hours)    None         O2/Vent Data (Last 4 hours)    None                Laboratory and Diagnostics:  Results from last 7 days   Lab Units 22  1804   WBC Thousand/uL 16 56*   HEMOGLOBIN g/dL 8 4*   HEMATOCRIT % 28 6*   PLATELETS Thousands/uL 188   BANDS PCT % 19*   MONO PCT % 3*     Results from last 7 days   Lab Units 22  0018 22  1847 22  1804   SODIUM mmol/L 138 138 137   POTASSIUM mmol/L 4 4 5 4* 5 7*   CHLORIDE mmol/L 113* 114* 113*   CO2 mmol/L 19* 16* 17*   ANION GAP mmol/L 6 8 7   BUN mg/dL 13 15 14   CREATININE mg/dL 0 89 1 16 1 21   CALCIUM mg/dL 7 7* 7 6* 7 7*   GLUCOSE RANDOM mg/dL 132 180* 164*   ALT U/L  --   --  60   AST U/L  --   --  76*   ALK PHOS U/L  --   --  38*   ALBUMIN g/dL  --   --  2 8*   TOTAL BILIRUBIN mg/dL  --   --  0 55     Results from last 7 days   Lab Units 04/30/22  0018 04/29/22  1847   MAGNESIUM mg/dL 2 4 1 4*   PHOSPHORUS mg/dL 2 9 3 0      Results from last 7 days   Lab Units 04/29/22  1804   INR  1 13   PTT seconds 23          Results from last 7 days   Lab Units 04/30/22  0133 04/29/22  2135 04/29/22  1804   LACTIC ACID mmol/L 1 8 3 9* 3 9*     ABG:    VBG:          Micro        EKG: I have personally reviewed pertinent reports  Imaging: I have personally reviewed pertinent reports  Intake and Output  I/O       04/28 0701  04/29 0700 04/29 0701  04/30 0700    I V  (mL/kg)  7734 6 (82)    IV Piggyback  750    Total Intake(mL/kg)  8484 6 (90)    Urine (mL/kg/hr)  740 (0 5)    Emesis/NG output  0    Drains  160    Blood  500    Chest Tube  65    Total Output  1465    Net  +7019 6                  Height and Weights   Height: 4' 9" (144 8 cm)     Body mass index is 46 56 kg/m²  Weight (last 2 days)     Date/Time Weight    04/29/22 2200 97 6 (215 17)    04/29/22 0650 94 3 (208)            Nutrition       Diet Orders   (From admission, onward)             Start     Ordered    04/29/22 1726  Diet NPO  Diet effective now        References:    Nutrtion Support Algorithm Enteral vs  Parenteral   Question Answer Comment   Diet Type NPO    RD to adjust diet per protocol?  Yes        04/29/22 1726                  Active Medications  Scheduled Meds:  Current Facility-Administered Medications   Medication Dose Route Frequency Provider Last Rate    heparin (porcine)  5,000 Units Subcutaneous [facility] Gali Bruce MD      hydrALAZINE  10 mg Intravenous Q4H PRN Brook Morataya MD      HYDROmorphone   Intravenous Continuous Candice Jimenes MD      labetalol  10 mg Intravenous Q4H PRN Brook Morataya MD      multi-electrolyte  150 mL/hr Intravenous Continuous Brook Morataya  mL/hr (04/29/22 2246)    ondansetron  4 mg Intravenous Q6H PRN Bonilla Brock MD      pantoprazole  40 mg Intravenous Q24H [address] Brook Morataya MD       Continuous Infusions:  HYDROmorphone,   multi-electrolyte, 150 mL/hr, Last Rate: 150 mL/hr (04/29/22 2246)      PRN Meds:   hydrALAZINE, 10 mg, Q4H PRN  labetalol, 10 mg, Q4H PRN  ondansetron, 4 mg, Q6H PRN        Allergies   Allergies   Allergen Reactions    Medical Tape Rash     Skin irritation  Skin peeling  Skin irritation  Skin peeling  Blisters  Rash     ---------------------------------------------------------------------------------------  Advance Directive and Living Will:      Power of :    POLST:    ---------------------------------------------------------------------------------------      Brook Morataya MD      Portions of the record may have been created with voice recognition software  Occasional wrong word or "sound a like" substitutions may have occurred due to the inherent limitations of voice recognition software    Read the chart carefully and recognize, using context, where substitutions have occurred

## 2022-05-01 LAB
ABO GROUP BLD BPU: NORMAL
ABO GROUP BLD BPU: NORMAL
ABO GROUP BLD: NORMAL
ABO GROUP BLD: NORMAL
ALBUMIN SERPL BCP-MCNC: 2.3 G/DL (ref 3.5–5)
ALP SERPL-CCNC: 40 U/L (ref 46–116)
ALT SERPL W P-5'-P-CCNC: 31 U/L (ref 12–78)
ANION GAP SERPL CALCULATED.3IONS-SCNC: 6 MMOL/L (ref 4–13)
AST SERPL W P-5'-P-CCNC: 48 U/L (ref 5–45)
BILIRUB SERPL-MCNC: 1.04 MG/DL (ref 0.2–1)
BLD GP AB SCN SERPL QL: NEGATIVE
BLD GP AB SCN SERPL QL: NEGATIVE
BPU ID: NORMAL
BPU ID: NORMAL
BUN SERPL-MCNC: 11 MG/DL (ref 5–25)
CA-I BLD-SCNC: 1.08 MMOL/L (ref 1.12–1.32)
CALCIUM ALBUM COR SERPL-MCNC: 9.5 MG/DL (ref 8.3–10.1)
CALCIUM SERPL-MCNC: 8.1 MG/DL (ref 8.3–10.1)
CHLORIDE SERPL-SCNC: 111 MMOL/L (ref 100–108)
CO2 SERPL-SCNC: 24 MMOL/L (ref 21–32)
CREAT SERPL-MCNC: 0.9 MG/DL (ref 0.6–1.3)
CROSSMATCH: NORMAL
CROSSMATCH: NORMAL
ERYTHROCYTE [DISTWIDTH] IN BLOOD BY AUTOMATED COUNT: 17.6 % (ref 11.6–15.1)
GFR SERPL CREATININE-BSD FRML MDRD: 65 ML/MIN/1.73SQ M
GLUCOSE SERPL-MCNC: 119 MG/DL (ref 65–140)
HCT VFR BLD AUTO: 22.4 % (ref 34.8–46.1)
HCT VFR BLD AUTO: 26.6 % (ref 34.8–46.1)
HGB BLD-MCNC: 6.9 G/DL (ref 11.5–15.4)
HGB BLD-MCNC: 8.2 G/DL (ref 11.5–15.4)
MAGNESIUM SERPL-MCNC: 2.4 MG/DL (ref 1.6–2.6)
MCH RBC QN AUTO: 29.4 PG (ref 26.8–34.3)
MCHC RBC AUTO-ENTMCNC: 30.8 G/DL (ref 31.4–37.4)
MCV RBC AUTO: 95 FL (ref 82–98)
NRBC BLD AUTO-RTO: 0 /100 WBCS
PHOSPHATE SERPL-MCNC: 2.7 MG/DL (ref 2.3–4.1)
PLATELET # BLD AUTO: 172 THOUSANDS/UL (ref 149–390)
PMV BLD AUTO: 11.5 FL (ref 8.9–12.7)
POTASSIUM SERPL-SCNC: 4.6 MMOL/L (ref 3.5–5.3)
PROT SERPL-MCNC: 5.3 G/DL (ref 6.4–8.2)
RBC # BLD AUTO: 2.35 MILLION/UL (ref 3.81–5.12)
RH BLD: POSITIVE
RH BLD: POSITIVE
SODIUM SERPL-SCNC: 141 MMOL/L (ref 136–145)
SPECIMEN EXPIRATION DATE: NORMAL
SPECIMEN EXPIRATION DATE: NORMAL
UNIT DISPENSE STATUS: NORMAL
UNIT DISPENSE STATUS: NORMAL
UNIT PRODUCT CODE: NORMAL
UNIT PRODUCT CODE: NORMAL
UNIT PRODUCT VOLUME: 300 ML
UNIT PRODUCT VOLUME: 300 ML
UNIT RH: NORMAL
UNIT RH: NORMAL
WBC # BLD AUTO: 16.97 THOUSAND/UL (ref 4.31–10.16)

## 2022-05-01 PROCEDURE — 84100 ASSAY OF PHOSPHORUS: CPT | Performed by: STUDENT IN AN ORGANIZED HEALTH CARE EDUCATION/TRAINING PROGRAM

## 2022-05-01 PROCEDURE — 80053 COMPREHEN METABOLIC PANEL: CPT | Performed by: STUDENT IN AN ORGANIZED HEALTH CARE EDUCATION/TRAINING PROGRAM

## 2022-05-01 PROCEDURE — 86850 RBC ANTIBODY SCREEN: CPT | Performed by: STUDENT IN AN ORGANIZED HEALTH CARE EDUCATION/TRAINING PROGRAM

## 2022-05-01 PROCEDURE — 83735 ASSAY OF MAGNESIUM: CPT | Performed by: STUDENT IN AN ORGANIZED HEALTH CARE EDUCATION/TRAINING PROGRAM

## 2022-05-01 PROCEDURE — 86900 BLOOD TYPING SEROLOGIC ABO: CPT | Performed by: STUDENT IN AN ORGANIZED HEALTH CARE EDUCATION/TRAINING PROGRAM

## 2022-05-01 PROCEDURE — 99291 CRITICAL CARE FIRST HOUR: CPT | Performed by: EMERGENCY MEDICINE

## 2022-05-01 PROCEDURE — 99024 POSTOP FOLLOW-UP VISIT: CPT | Performed by: SURGERY

## 2022-05-01 PROCEDURE — 99232 SBSQ HOSP IP/OBS MODERATE 35: CPT | Performed by: ANESTHESIOLOGY

## 2022-05-01 PROCEDURE — P9040 RBC LEUKOREDUCED IRRADIATED: HCPCS

## 2022-05-01 PROCEDURE — 99024 POSTOP FOLLOW-UP VISIT: CPT | Performed by: THORACIC SURGERY (CARDIOTHORACIC VASCULAR SURGERY)

## 2022-05-01 PROCEDURE — 82330 ASSAY OF CALCIUM: CPT | Performed by: NURSE PRACTITIONER

## 2022-05-01 PROCEDURE — 86901 BLOOD TYPING SEROLOGIC RH(D): CPT | Performed by: STUDENT IN AN ORGANIZED HEALTH CARE EDUCATION/TRAINING PROGRAM

## 2022-05-01 PROCEDURE — 85027 COMPLETE CBC AUTOMATED: CPT | Performed by: STUDENT IN AN ORGANIZED HEALTH CARE EDUCATION/TRAINING PROGRAM

## 2022-05-01 PROCEDURE — 85018 HEMOGLOBIN: CPT | Performed by: SURGERY

## 2022-05-01 PROCEDURE — 85014 HEMATOCRIT: CPT | Performed by: SURGERY

## 2022-05-01 RX ORDER — DIPHENHYDRAMINE HYDROCHLORIDE 50 MG/ML
25 INJECTION INTRAMUSCULAR; INTRAVENOUS ONCE
Status: COMPLETED | OUTPATIENT
Start: 2022-05-01 | End: 2022-05-01

## 2022-05-01 RX ORDER — GABAPENTIN 250 MG/5ML
100 SOLUTION ORAL 3 TIMES DAILY
Status: DISCONTINUED | OUTPATIENT
Start: 2022-05-01 | End: 2022-05-01

## 2022-05-01 RX ORDER — HYDROMORPHONE HCL IN WATER/PF 6 MG/30 ML
0.2 PATIENT CONTROLLED ANALGESIA SYRINGE INTRAVENOUS EVERY 4 HOURS PRN
Status: DISCONTINUED | OUTPATIENT
Start: 2022-05-01 | End: 2022-05-03

## 2022-05-01 RX ORDER — ACETAMINOPHEN 325 MG/1
650 TABLET ORAL ONCE
Status: COMPLETED | OUTPATIENT
Start: 2022-05-01 | End: 2022-05-01

## 2022-05-01 RX ORDER — OXYCODONE HYDROCHLORIDE 5 MG/1
10 TABLET ORAL EVERY 4 HOURS PRN
Status: DISCONTINUED | OUTPATIENT
Start: 2022-05-01 | End: 2022-05-05 | Stop reason: HOSPADM

## 2022-05-01 RX ORDER — DEXTROSE, SODIUM CHLORIDE, AND POTASSIUM CHLORIDE 5; .45; .15 G/100ML; G/100ML; G/100ML
100 INJECTION INTRAVENOUS CONTINUOUS
Status: DISCONTINUED | OUTPATIENT
Start: 2022-05-01 | End: 2022-05-02

## 2022-05-01 RX ORDER — GABAPENTIN 100 MG/1
100 CAPSULE ORAL 3 TIMES DAILY
Status: DISCONTINUED | OUTPATIENT
Start: 2022-05-01 | End: 2022-05-02

## 2022-05-01 RX ORDER — OXYCODONE HYDROCHLORIDE 5 MG/1
5 TABLET ORAL EVERY 4 HOURS PRN
Status: DISCONTINUED | OUTPATIENT
Start: 2022-05-01 | End: 2022-05-05 | Stop reason: HOSPADM

## 2022-05-01 RX ORDER — ACETAMINOPHEN 325 MG/1
975 TABLET ORAL EVERY 8 HOURS SCHEDULED
Status: DISCONTINUED | OUTPATIENT
Start: 2022-05-01 | End: 2022-05-05 | Stop reason: HOSPADM

## 2022-05-01 RX ORDER — CELECOXIB 100 MG/1
100 CAPSULE ORAL 2 TIMES DAILY
Status: DISCONTINUED | OUTPATIENT
Start: 2022-05-01 | End: 2022-05-02

## 2022-05-01 RX ADMIN — DEXTROSE, SODIUM CHLORIDE, AND POTASSIUM CHLORIDE 100 ML/HR: 5; .45; .15 INJECTION INTRAVENOUS at 08:55

## 2022-05-01 RX ADMIN — ACETAMINOPHEN 650 MG: 325 TABLET ORAL at 08:30

## 2022-05-01 RX ADMIN — DIPHENHYDRAMINE HYDROCHLORIDE 25 MG: 50 INJECTION INTRAMUSCULAR; INTRAVENOUS at 08:30

## 2022-05-01 RX ADMIN — ACETAMINOPHEN 975 MG: 325 TABLET, FILM COATED ORAL at 13:33

## 2022-05-01 RX ADMIN — HEPARIN SODIUM 5000 UNITS: 5000 INJECTION INTRAVENOUS; SUBCUTANEOUS at 23:18

## 2022-05-01 RX ADMIN — GABAPENTIN 100 MG: 100 CAPSULE ORAL at 23:17

## 2022-05-01 RX ADMIN — HEPARIN SODIUM 5000 UNITS: 5000 INJECTION INTRAVENOUS; SUBCUTANEOUS at 13:33

## 2022-05-01 RX ADMIN — GABAPENTIN 100 MG: 100 CAPSULE ORAL at 17:30

## 2022-05-01 RX ADMIN — SODIUM CHLORIDE, SODIUM GLUCONATE, SODIUM ACETATE, POTASSIUM CHLORIDE, MAGNESIUM CHLORIDE, SODIUM PHOSPHATE, DIBASIC, AND POTASSIUM PHOSPHATE 125 ML/HR: .53; .5; .37; .037; .03; .012; .00082 INJECTION, SOLUTION INTRAVENOUS at 05:35

## 2022-05-01 RX ADMIN — ACETAMINOPHEN 975 MG: 325 TABLET, FILM COATED ORAL at 23:17

## 2022-05-01 NOTE — PLAN OF CARE
Problem: Potential for Falls  Goal: Patient will remain free of falls  Description: INTERVENTIONS:  - Educate patient/family on patient safety including physical limitations  - Instruct patient to call for assistance with activity   - Consult OT/PT to assist with strengthening/mobility   - Keep Call bell within reach  - Keep bed low and locked with side rails adjusted as appropriate  - Keep care items and personal belongings within reach  - Initiate and maintain comfort rounds  - Make Fall Risk Sign visible to staff  - Offer Toileting every  Hours, in advance of need  - Initiate/Maintain alarm  - Obtain necessary fall risk management equipment:   - Apply yellow socks and bracelet for high fall risk patients  - Consider moving patient to room near nurses station  Outcome: Progressing     Problem: Prexisting or High Potential for Compromised Skin Integrity  Goal: Skin integrity is maintained or improved  Description: INTERVENTIONS:  - Identify patients at risk for skin breakdown  - Assess and monitor skin integrity  - Assess and monitor nutrition and hydration status  - Monitor labs   - Assess for incontinence   - Turn and reposition patient  - Assist with mobility/ambulation  - Relieve pressure over bony prominences  - Avoid friction and shearing  - Provide appropriate hygiene as needed including keeping skin clean and dry  - Evaluate need for skin moisturizer/barrier cream  - Collaborate with interdisciplinary team   - Patient/family teaching  - Consider wound care consult   Outcome: Progressing     Problem: MOBILITY - ADULT  Goal: Maintain or return to baseline ADL function  Description: INTERVENTIONS:  -  Assess patient's ability to carry out ADLs; assess patient's baseline for ADL function and identify physical deficits which impact ability to perform ADLs (bathing, care of mouth/teeth, toileting, grooming, dressing, etc )  - Assess/evaluate cause of self-care deficits   - Assess range of motion  - Assess patient's mobility; develop plan if impaired  - Assess patient's need for assistive devices and provide as appropriate  - Encourage maximum independence but intervene and supervise when necessary  - Involve family in performance of ADLs  - Assess for home care needs following discharge   - Consider OT consult to assist with ADL evaluation and planning for discharge  - Provide patient education as appropriate  Outcome: Progressing  Goal: Maintains/Returns to pre admission functional level  Description: INTERVENTIONS:  - Perform BMAT or MOVE assessment daily    - Set and communicate daily mobility goal to care team and patient/family/caregiver  - Collaborate with rehabilitation services on mobility goals if consulted  - Perform Range of Motion  times a day  - Reposition patient every  hours    - Dangle patient  times a day  - Stand patient  times a day  - Ambulate patient times a day  - Out of bed to chair  times a day   - Out of bed for meals  times a day  - Out of bed for toileting  - Record patient progress and toleration of activity level   Outcome: Progressing     Problem: PAIN - ADULT  Goal: Verbalizes/displays adequate comfort level or baseline comfort level  Description: Interventions:  - Encourage patient to monitor pain and request assistance  - Assess pain using appropriate pain scale  - Administer analgesics based on type and severity of pain and evaluate response  - Implement non-pharmacological measures as appropriate and evaluate response  - Consider cultural and social influences on pain and pain management  - Notify physician/advanced practitioner if interventions unsuccessful or patient reports new pain  Outcome: Progressing     Problem: INFECTION - ADULT  Goal: Absence or prevention of progression during hospitalization  Description: INTERVENTIONS:  - Assess and monitor for signs and symptoms of infection  - Monitor lab/diagnostic results  - Monitor all insertion sites, i e  indwelling lines, tubes, and drains  - Monitor endotracheal if appropriate and nasal secretions for changes in amount and color  - [place] appropriate cooling/warming therapies per order  - Administer medications as ordered  - Instruct and encourage patient and family to use good hand hygiene technique  - Identify and instruct in appropriate isolation precautions for identified infection/condition  Outcome: Progressing  Goal: Absence of fever/infection during neutropenic period  Description: INTERVENTIONS:  - Monitor WBC    Outcome: Progressing     Problem: DISCHARGE PLANNING  Goal: Discharge to home or other facility with appropriate resources  Description: INTERVENTIONS:  - Identify barriers to discharge w/patient and caregiver  - Arrange for needed discharge resources and transportation as appropriate  - Identify discharge learning needs (meds, wound care, etc )  - Arrange for interpretive services to assist at discharge as needed  - Refer to Case Management Department for coordinating discharge planning if the patient needs post-hospital services based on physician/advanced practitioner order or complex needs related to functional status, cognitive ability, or social support system  Outcome: Progressing

## 2022-05-01 NOTE — PROGRESS NOTES
Progress Note - Oncology Surgery   Lashae Selby 75 y o  female MRN: [id number]  Unit/Bed#: MICU 02 Encounter: [id number]    Assessment:  74 y/o F w/ metastatic colon cancer, now s/p open DILMA/BSO, omentectomy, splenectomy, diaphragm repair, Left chest tube insertion, HIPEC on 4/29      L CT (-8, -AL): 320 cc output, serosanguinous  MARIA T LLQ: 135 cc, serosanguinous  MARIA T LUQ: 115 cc, serosanguinous     Plan:  --Will dc NG and start liquid diet  --[email or web address]  --DC ortiz  --Dilaudid PCA for pain control, APS following   --MARIA T x2  --Maintain Left Chest tube to water seal    Subjective/Objective     Subjective:   Pain is controlled, no flatus or BM    Objective:    Blood pressure 144/55, pulse 102, temperature 97 8 °F (36 6 °C), temperature source Oral, resp  rate (!) 26, height 4' 9" (1 448 m), weight 97 6 kg (215 lb 2 7 oz), last menstrual period 09/01/2011, SpO2 94 %, not currently breastfeeding  ,Body mass index is 46 56 kg/m²  Intake/Output Summary (Last 24 hours) at 5/1/2022 0706  Last data filed at 5/1/2022 0700  Gross per 24 hour   Intake 2499 3 ml   Output 2620 ml   Net -120 7 ml       Invasive Devices  Report    Central Venous Catheter Line            Port A Cath Right Chest -- days          Peripheral Intravenous Line            Peripheral IV 04/29/22 Left Antecubital 1 day    Peripheral IV 04/29/22 Left Hand 1 day          Drain            Chest Tube 3 Left Pleural 28 Fr  1 day    Closed/Suction Drain Left LLQ Bulb 19 Fr  1 day    Closed/Suction Drain Left LUQ Bulb 19 Fr  1 day    NG/OG/Enteral Tube Orogastric 18 Fr Center mouth 1 day    Urethral Catheter Non-latex; Temperature probe 16 Fr  1 day                Physical Exam:   Gen:  NAD  CV:  warm, well-perfused  Lungs: nl effort, L CT in place  Abd:  soft, NT/ND, MARIA T x2 in place, NG in place  Ext:  no CCE  Neuro: A&Ox3     Results from last 7 days   Lab Units 05/01/22  0508 04/30/22  0618 04/29/22  1804   WBC Thousand/uL 16 97* 13 31* 16 56*   HEMOGLOBIN g/dL 6 9* 7 2* 8 4*   HEMATOCRIT % 22 4* 23 2* 28 6*   PLATELETS Thousands/uL 172 162 188     Results from last 7 days   Lab Units 05/01/22  0508 04/30/22  0618 04/30/22  0018   POTASSIUM mmol/L 4 6 4 5 4 4   CHLORIDE mmol/L 111* 112* 113*   CO2 mmol/L 24 23 19*   BUN mg/dL 11 12 13   CREATININE mg/dL 0 90 0 90 0 89   CALCIUM mg/dL 8 1* 8 4 7 7*     Results from last 7 days   Lab Units 04/29/22  1804   INR  1 13   PTT seconds 23

## 2022-05-01 NOTE — PROGRESS NOTES
Progress Note - Thoracic Surgery   Lauren Chung 75 y o  female MRN: [id number]  Unit/Bed#: MICU 02 Encounter: [id number]    Assessment:  76 y/o F w/ metastatic colon cancer, now s/p open DILMA/BSO, omentectomy, splenectomy, diaphragm repair, Left chest tube insertion, HIPEC on 4/29      L CT to -8 sxn with 320 cc serosanguinous output, -AL     Plan:  --Maintain Left Chest tube to suction, monitor output  --IS, aggressive pulmonary toileting  --OOB, ambulate  --PT/OT    Subjective/Objective     Subjective:   Pain is controlled with PCA, denies nausea, no flatus or BM    Objective:    Blood pressure 144/55, pulse 102, temperature 97 8 °F (36 6 °C), temperature source Oral, resp  rate (!) 26, height 4' 9" (1 448 m), weight 97 6 kg (215 lb 2 7 oz), last menstrual period 09/01/2011, SpO2 94 %, not currently breastfeeding  ,Body mass index is 46 56 kg/m²  Intake/Output Summary (Last 24 hours) at 5/1/2022 0703  Last data filed at 5/1/2022 0700  Gross per 24 hour   Intake 2499 3 ml   Output 2620 ml   Net -120 7 ml       Invasive Devices  Report    Central Venous Catheter Line            Port A Cath Right Chest -- days          Peripheral Intravenous Line            Peripheral IV 04/29/22 Left Antecubital 1 day    Peripheral IV 04/29/22 Left Hand 1 day          Drain            Chest Tube 3 Left Pleural 28 Fr  1 day    Closed/Suction Drain Left LLQ Bulb 19 Fr  1 day    Closed/Suction Drain Left LUQ Bulb 19 Fr  1 day    NG/OG/Enteral Tube Orogastric 18 Fr Center mouth 1 day    Urethral Catheter Non-latex; Temperature probe 16 Fr  1 day                Physical Exam:   Gen:  NAD  CV:  warm, well-perfused  Lungs: nl effort, L CT in place  Abd:  soft, NT/ND, dressing clean, MARIA T x2 in place, NG in place  Ext:  no CCE  Neuro: A&Ox3     Results from last 7 days   Lab Units 05/01/22  0508 04/30/22  0618 04/29/22  1804   WBC Thousand/uL 16 97* 13 31* 16 56*   HEMOGLOBIN g/dL 6 9* 7 2* 8 4*   HEMATOCRIT % 22 4* 23 2* 28 6* PLATELETS Thousands/uL 172 162 188     Results from last 7 days   Lab Units 05/01/22  0508 04/30/22  0618 04/30/22  0018   POTASSIUM mmol/L 4 6 4 5 4 4   CHLORIDE mmol/L 111* 112* 113*   CO2 mmol/L 24 23 19*   BUN mg/dL 11 12 13   CREATININE mg/dL 0 90 0 90 0 89   CALCIUM mg/dL 8 1* 8 4 7 7*     Results from last 7 days   Lab Units 04/29/22  1804   INR  1 13   PTT seconds 23

## 2022-05-01 NOTE — NURSING NOTE
V/ss noted report given to [facility]   Family notified by patient re transfer to Q610

## 2022-05-01 NOTE — PROGRESS NOTES
Progress Note - Acute Pain Service    Kimberley Ghosh 75 y o  female MRN: [id number]  Unit/Bed#: [facility] 912-01 Encounter: [id number]      Assessment:   Principal Problem:    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[facility] )  Active Problems:    GERD (gastroesophageal reflux disease)    Hyperlipidemia    Prediabetes    Morbid obesity ([facility] )    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[facility] )    Dyspnea on exertion    PONV (postoperative nausea and vomiting)    Hypertension    Kimberley Ghosh is a 75 y o  female  POD1 s/p TAHBSO, extensive CARMEN, HIPEC, diaphragm repair  Preoperative thoracic epidural placed however malfunctioning tubing post procedure and pulled out by Anesthesia team   Started on dilaudid PCA after extubation in ICU  Doing well today, pain controlled with PCA  Pt happy with button and gets good relief with each push  Plan:   1  Would discontinue PCA tomorrow and switch to an oral regimen as she is tolerating an advancing diet  2  Bowel regimen when appropriate as hasn't had bowel movement yet  3  As she is now tolerating PO, would add acetaminophen 975 mg q8h PO scheduled  APS sign off  Thank you for the Consult  Please contact APS ( [phone]) with any further questions    Pain History  Current pain location(s): abdomen  Pain Scale:   1 5/10  Quality: dull ache  24 hour history: transferred out of ICU    Opioid requirement previous 24 hours: dilaudid PCA    Meds/Allergies   all current active meds have been reviewed    Allergies   Allergen Reactions    Medical Tape Rash     Skin irritation  Skin peeling  Skin irritation  Skin peeling  Blisters  Rash       Objective     Temp:  [97 8 °F (36 6 °C)-98 9 °F (37 2 °C)] 98 3 °F (36 8 °C)  HR:  [] 98  Resp:  [12-26] 20  BP: (102-165)/() 165/83    Physical Exam  Constitutional:       Appearance: Normal appearance  She is obese  HENT:      Head: Normocephalic and atraumatic        Nose: Nose normal       Mouth/Throat:      Mouth: Mucous membranes are moist  Pulmonary:      Effort: Pulmonary effort is normal  No respiratory distress  Musculoskeletal:      Cervical back: Normal range of motion  Skin:     General: Skin is warm  Neurological:      General: No focal deficit present  Mental Status: She is alert and oriented to person, place, and time  Mental status is at baseline  Psychiatric:         Mood and Affect: Mood normal          Behavior: Behavior normal          Thought Content: Thought content normal          Judgment: Judgment normal          Lab Results:   Results from last 7 days   Lab Units 05/01/22  1330 05/01/22  0508 05/01/22  0508   WBC Thousand/uL  --   --  16 97*   HEMOGLOBIN g/dL 8 2*   < > 6 9*   HEMATOCRIT % 26 6*   < > 22 4*   PLATELETS Thousands/uL  --   --  172    < > = values in this interval not displayed  Results from last 7 days   Lab Units 05/01/22  0508   POTASSIUM mmol/L 4 6   CHLORIDE mmol/L 111*   CO2 mmol/L 24   BUN mg/dL 11   CREATININE mg/dL 0 90   CALCIUM mg/dL 8 1*   ALK PHOS U/L 40*   ALT U/L 31   AST U/L 48*       Imaging Studies: I have personally reviewed pertinent reports  EKG, Pathology, and Other Studies: I have personally reviewed pertinent reports          Gisel Bruce MD

## 2022-05-02 LAB
ABO GROUP BLD BPU: NORMAL
ANION GAP SERPL CALCULATED.3IONS-SCNC: 5 MMOL/L (ref 4–13)
ATRIAL RATE: 83 BPM
BASOPHILS # BLD MANUAL: 0 THOUSAND/UL (ref 0–0.1)
BASOPHILS NFR MAR MANUAL: 0 % (ref 0–1)
BPU ID: NORMAL
BUN SERPL-MCNC: 10 MG/DL (ref 5–25)
CALCIUM SERPL-MCNC: 8.2 MG/DL (ref 8.3–10.1)
CHLORIDE SERPL-SCNC: 112 MMOL/L (ref 100–108)
CO2 SERPL-SCNC: 24 MMOL/L (ref 21–32)
CREAT SERPL-MCNC: 0.94 MG/DL (ref 0.6–1.3)
CROSSMATCH: NORMAL
EOSINOPHIL # BLD MANUAL: 0 THOUSAND/UL (ref 0–0.4)
EOSINOPHIL NFR BLD MANUAL: 0 % (ref 0–6)
ERYTHROCYTE [DISTWIDTH] IN BLOOD BY AUTOMATED COUNT: 18.3 % (ref 11.6–15.1)
GFR SERPL CREATININE-BSD FRML MDRD: 62 ML/MIN/1.73SQ M
GLUCOSE SERPL-MCNC: 88 MG/DL (ref 65–140)
HCT VFR BLD AUTO: 25.1 % (ref 34.8–46.1)
HGB BLD-MCNC: 7.9 G/DL (ref 11.5–15.4)
LYMPHOCYTES # BLD AUTO: 0.68 THOUSAND/UL (ref 0.6–4.47)
LYMPHOCYTES # BLD AUTO: 5 % (ref 14–44)
MAGNESIUM SERPL-MCNC: 2.1 MG/DL (ref 1.6–2.6)
MCH RBC QN AUTO: 30 PG (ref 26.8–34.3)
MCHC RBC AUTO-ENTMCNC: 31.5 G/DL (ref 31.4–37.4)
MCV RBC AUTO: 95 FL (ref 82–98)
MONOCYTES # BLD AUTO: 0.27 THOUSAND/UL (ref 0–1.22)
MONOCYTES NFR BLD: 2 % (ref 4–12)
NEUTROPHILS # BLD MANUAL: 12.64 THOUSAND/UL (ref 1.85–7.62)
NEUTS BAND NFR BLD MANUAL: 1 % (ref 0–8)
NEUTS SEG NFR BLD AUTO: 92 % (ref 43–75)
P AXIS: 48 DEGREES
PHOSPHATE SERPL-MCNC: 1.7 MG/DL (ref 2.3–4.1)
PLATELET # BLD AUTO: 188 THOUSANDS/UL (ref 149–390)
PLATELET BLD QL SMEAR: ADEQUATE
PMV BLD AUTO: 11.2 FL (ref 8.9–12.7)
POLYCHROMASIA BLD QL SMEAR: PRESENT
POTASSIUM SERPL-SCNC: 3.8 MMOL/L (ref 3.5–5.3)
PR INTERVAL: 183 MS
QRS AXIS: 30 DEGREES
QRSD INTERVAL: 75 MS
QT INTERVAL: 392 MS
QTC INTERVAL: 461 MS
RBC # BLD AUTO: 2.63 MILLION/UL (ref 3.81–5.12)
RBC MORPH BLD: PRESENT
SODIUM SERPL-SCNC: 141 MMOL/L (ref 136–145)
T WAVE AXIS: 42 DEGREES
UNIT DISPENSE STATUS: NORMAL
UNIT PRODUCT CODE: NORMAL
UNIT PRODUCT VOLUME: 350 ML
UNIT RH: NORMAL
VENTRICULAR RATE: 83 BPM
WBC # BLD AUTO: 13.59 THOUSAND/UL (ref 4.31–10.16)

## 2022-05-02 PROCEDURE — 84100 ASSAY OF PHOSPHORUS: CPT

## 2022-05-02 PROCEDURE — 93010 ELECTROCARDIOGRAM REPORT: CPT | Performed by: INTERNAL MEDICINE

## 2022-05-02 PROCEDURE — 97166 OT EVAL MOD COMPLEX 45 MIN: CPT

## 2022-05-02 PROCEDURE — 80048 BASIC METABOLIC PNL TOTAL CA: CPT

## 2022-05-02 PROCEDURE — 99024 POSTOP FOLLOW-UP VISIT: CPT | Performed by: STUDENT IN AN ORGANIZED HEALTH CARE EDUCATION/TRAINING PROGRAM

## 2022-05-02 PROCEDURE — 83735 ASSAY OF MAGNESIUM: CPT

## 2022-05-02 PROCEDURE — 99024 POSTOP FOLLOW-UP VISIT: CPT | Performed by: THORACIC SURGERY (CARDIOTHORACIC VASCULAR SURGERY)

## 2022-05-02 PROCEDURE — 97162 PT EVAL MOD COMPLEX 30 MIN: CPT

## 2022-05-02 PROCEDURE — 85027 COMPLETE CBC AUTOMATED: CPT

## 2022-05-02 PROCEDURE — 85007 BL SMEAR W/DIFF WBC COUNT: CPT

## 2022-05-02 RX ORDER — GABAPENTIN 300 MG/1
300 CAPSULE ORAL 3 TIMES DAILY
Status: DISCONTINUED | OUTPATIENT
Start: 2022-05-02 | End: 2022-05-05 | Stop reason: HOSPADM

## 2022-05-02 RX ORDER — CELECOXIB 200 MG/1
200 CAPSULE ORAL DAILY
Status: DISCONTINUED | OUTPATIENT
Start: 2022-05-03 | End: 2022-05-05 | Stop reason: HOSPADM

## 2022-05-02 RX ADMIN — GABAPENTIN 100 MG: 100 CAPSULE ORAL at 09:39

## 2022-05-02 RX ADMIN — HEPARIN SODIUM 5000 UNITS: 5000 INJECTION INTRAVENOUS; SUBCUTANEOUS at 22:11

## 2022-05-02 RX ADMIN — GABAPENTIN 300 MG: 300 CAPSULE ORAL at 16:07

## 2022-05-02 RX ADMIN — GABAPENTIN 300 MG: 300 CAPSULE ORAL at 22:07

## 2022-05-02 RX ADMIN — ACETAMINOPHEN 975 MG: 325 TABLET, FILM COATED ORAL at 22:07

## 2022-05-02 RX ADMIN — ACETAMINOPHEN 975 MG: 325 TABLET, FILM COATED ORAL at 16:08

## 2022-05-02 RX ADMIN — ACETAMINOPHEN 975 MG: 325 TABLET, FILM COATED ORAL at 05:40

## 2022-05-02 RX ADMIN — CELECOXIB 100 MG: 100 CAPSULE ORAL at 09:40

## 2022-05-02 RX ADMIN — DEXTROSE, SODIUM CHLORIDE, AND POTASSIUM CHLORIDE 100 ML/HR: 5; .45; .15 INJECTION INTRAVENOUS at 05:47

## 2022-05-02 RX ADMIN — HEPARIN SODIUM 5000 UNITS: 5000 INJECTION INTRAVENOUS; SUBCUTANEOUS at 16:08

## 2022-05-02 RX ADMIN — HEPARIN SODIUM 5000 UNITS: 5000 INJECTION INTRAVENOUS; SUBCUTANEOUS at 05:40

## 2022-05-02 NOTE — PROGRESS NOTES
Progress Note - Thoracic Surgery  Carol Espinoza 75 y o  female MRN: [id number]  Unit/Bed#: [facility] 912-01 Encounter: [id number]    Assessment:  76 y/o F w/ metastatic colon cancer, now s/p open DILMA/BSO, omentectomy, splenectomy, diaphragm repair, Left chest tube insertion, HIPEC on 4/29     L CT (-8, -AL): 215 cc output, serous, -AL    Plan:  --d/c Left Chest tube today  --Post-pull CXR  --IS, aggressive pulmonary toileting  --OOB, ambulate  --PT/OT    Subjective/Objective     Subjective:     No acute events overnight  This morning, pt feels well, denies any complaints  Objective:     Blood pressure (!) 175/91, pulse 103, temperature 98 1 °F (36 7 °C), resp  rate 20, height 4' 9" (1 448 m), weight 97 6 kg (215 lb 2 7 oz), last menstrual period 09/01/2011, SpO2 95 %, not currently breastfeeding  ,Body mass index is 46 56 kg/m²  Intake/Output Summary (Last 24 hours) at 5/1/2022 2036  Last data filed at 5/1/2022 1951  Gross per 24 hour   Intake 3141 8 ml   Output 3015 ml   Net 126 8 ml       Invasive Devices  Report    Central Venous Catheter Line            Port A Cath Right Chest -- days          Peripheral Intravenous Line            Peripheral IV 04/29/22 Left Antecubital 2 days    Peripheral IV 04/29/22 Left Hand 2 days          Drain            Chest Tube 3 Left Pleural 28 Fr  2 days    Closed/Suction Drain Left LLQ Bulb 19 Fr  2 days    Closed/Suction Drain Left LUQ Bulb 19 Fr  2 days                Physical Exam:     GEN: NAD  HEENT: MMM  Chest: L CT in place to water seal with serous output, -AL  CV: RRR  Lung: normal effort  Ab: Soft, NT/ND  Extrem: No CCE  Neuro:  A+Ox3, motor and sensation grossly intact    Lab, Imaging and other studies:  CBC:   Lab Results   Component Value Date    WBC 16 97 (H) 05/01/2022    HGB 8 2 (L) 05/01/2022    HCT 26 6 (L) 05/01/2022    MCV 95 05/01/2022     05/01/2022    MCH 29 4 05/01/2022    MCHC 30 8 (L) 05/01/2022    RDW 17 6 (H) 05/01/2022    MPV 11 5 05/01/2022    NRBC 0 05/01/2022   , CMP:   Lab Results   Component Value Date    SODIUM 141 05/01/2022    K 4 6 05/01/2022     (H) 05/01/2022    CO2 24 05/01/2022    BUN 11 05/01/2022    CREATININE 0 90 05/01/2022    CALCIUM 8 1 (L) 05/01/2022    AST 48 (H) 05/01/2022    ALT 31 05/01/2022    ALKPHOS 40 (L) 05/01/2022    EGFR 65 05/01/2022   , Coagulation: No results found for: PT, INR, APTT, Urinalysis: No results found for: COLORU, CLARITYU, SPECGRAV, PHUR, LEUKOCYTESUR, NITRITE, PROTEINUA, GLUCOSEU, KETONESU, BILIRUBINUR, BLOODU  VTE Pharmacologic Prophylaxis: Heparin  VTE Mechanical Prophylaxis: sequential compression device

## 2022-05-02 NOTE — CASE MANAGEMENT
Case Management Assessment    Patient name Vince Griffith  Location [address] 912/PPHP 512-04 MRN [id number]  : 1955 Date 2022       Current Admission Date: 2022  Current Admission Diagnosis:Omental metastasis [facility])   Patient Active Problem List    Diagnosis Date Noted    Platelets decreased ([facility] ) 2022    Stage 3 chronic kidney disease, unspecified whether stage 3a or 3b CKD ([facility] ) 2022    Dehydration 2022    Chemotherapy-induced nausea 2022    Hypertension 12/10/2021    Chemotherapy adverse reaction 12/10/2021    Lesion of ovary 2021    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[facility] ) 2021    PONV (postoperative nausea and vomiting) 10/06/2021    Sigmoid diverticulosis 2020    Dyslipidemia 10/30/2019    Dyspnea on exertion 10/21/2019    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[facility] ) 2019    Morbid obesity ([facility] ) 05/15/2019    Callus of foot 2017    Foot pain 2017    GERD (gastroesophageal reflux disease) 2016    Hyperlipidemia 2015    Prediabetes 2015    Varicose veins with pain 2015      LOS (days): 3  Geometric Mean LOS (GMLOS) (days): 3 60  Days to GMLOS:0 8     OBJECTIVE:    Risk of Unplanned Readmission Score: 17         Current admission status: Inpatient       Preferred Pharmacy:   [address] [place] [address], [address] [place] [address]  [address]  [place] [address]  Phone: [phone] Fax: Victoriano Lambert  [address] [place] [address] [facility] [address] [place] [facility]  Phone: [phone] Fax: [phone]    Primary Care Provider: DEEPAK Lyons    Primary Insurance: MEDICARE  Secondary Insurance: [employer]    ASSESSMENT:  [address] Proxies    There are no active Health Care Proxies on file                   Readmission Root Cause  30 Day Readmission: No    Patient Information  Admitted from[de-identified] Home  Mental Status: Alert  During Assessment patient was accompanied by: Not accompanied during assessment  Assessment information provided by[de-identified] Patient  Primary Caregiver: Self  Support Systems: [address] of Residence: [address] [place] [address] do you live in?: [place]  Type of Current Residence: Bi-level (5/6 steps to enter)  Upon entering residence, is there a bedroom on the main floor (no further steps)?: No  A bedroom is located on the following floor levels of residence (select all that apply):: 2nd Floor  Upon entering residence, is there a bathroom on the main floor (no further steps)?: No  Number of steps to 2nd floor from main floor: 6  In the last 12 months, was there a time when you were not able to pay the mortgage or rent on time?: No  In the last 12 months, was there a time when you did not have a steady place to sleep or slept in a shelter (including now)?: No  Homeless/housing insecurity resource given?: N/A  Living Arrangements: Lives Alone (will be staying with daughters post-discharge)    Activities of Daily Living Prior to Admission  Functional Status: Independent  Completes ADLs independently?: Yes  Ambulates independently?: Yes  Does patient use assisted devices?: No  Does patient currently own DME?: Yes  What DME does the patient currently own?: [employer] Cooley  Does patient have a history of Outpatient Therapy (PT/OT)?: No  Does the patient have a history of Short-Term Rehab?: No  Does patient have a history of HHC?: Yes ([facility])  Does patient currently have [address]?: No         Patient Information Continued  Income Source: Pension/residential  Does patient have prescription coverage?: Yes  Within the past 12 months, you worried that your food would run out before you got the money to buy more : Never true  Within the past 12 months, the food you bought just didnt last and you didnt have money to get more : Never true  Food insecurity resource given?: N/A  Does patient receive dialysis treatments?: No  Does patient have a history of substance abuse?: No  Does patient have a history of Mental Health Diagnosis?: No         Means of Transportation  Means of Transport to Appts[de-identified] Drives Self  In the past 12 months, has lack of transportation kept you from medical appointments or from getting medications?: No  In the past 12 months, has lack of transportation kept you from meetings, work, or from getting things needed for daily living?: No  Was application for public transport provided?: N/A      Patient/caregiver received discharge checklist   Content reviewed  Patient/caregiver encouraged to participate in discharge plan of care prior to discharge home  CM reviewed d/c planning process including the following: identifying help at home, patient preference for d/c planning needs, Discharge Lounge, [facility] Meds to Bed program, availability of treatment team to discuss questions or concerns patient and/or family may have regarding understanding medications and recognizing signs and symptoms once discharged  CM also encouraged patient to follow up with all recommended appointments after discharge  Patient advised of importance for patient and family to participate in managing patients medical well being  Patient independent at baseline, will be residing at daughters' home at [address] [place] [address] [place] post[place], family will assist   Referral sent to  KEELEY  Assigned CM to follow

## 2022-05-02 NOTE — PLAN OF CARE
Problem: Potential for Falls  Goal: Patient will remain free of falls  Description: INTERVENTIONS:  - Educate patient/family on patient safety including physical limitations  - Instruct patient to call for assistance with activity   - Consult OT/PT to assist with strengthening/mobility   - Keep Call bell within reach  - Keep bed low and locked with side rails adjusted as appropriate  - Keep care items and personal belongings within reach  - Initiate and maintain comfort rounds  - Make Fall Risk Sign visible to staff  - Apply yellow socks and bracelet for high fall risk patients  - Consider moving patient to room near nurses station  Outcome: Progressing     Problem: Prexisting or High Potential for Compromised Skin Integrity  Goal: Skin integrity is maintained or improved  Description: INTERVENTIONS:  - Identify patients at risk for skin breakdown  - Assess and monitor skin integrity  - Assess and monitor nutrition and hydration status  - Monitor labs   - Assess for incontinence   - Turn and reposition patient  - Assist with mobility/ambulation  - Relieve pressure over bony prominences  - Avoid friction and shearing  - Provide appropriate hygiene as needed including keeping skin clean and dry  - Evaluate need for skin moisturizer/barrier cream  - Collaborate with interdisciplinary team   - Patient/family teaching  - Consider wound care consult   Outcome: Progressing     Problem: MOBILITY - ADULT  Goal: Maintain or return to baseline ADL function  Description: INTERVENTIONS:  -  Assess patient's ability to carry out ADLs; assess patient's baseline for ADL function and identify physical deficits which impact ability to perform ADLs (bathing, care of mouth/teeth, toileting, grooming, dressing, etc )  - Assess/evaluate cause of self-care deficits   - Assess range of motion  - Assess patient's mobility; develop plan if impaired  - Assess patient's need for assistive devices and provide as appropriate  - Encourage maximum independence but intervene and supervise when necessary  - Involve family in performance of ADLs  - Assess for home care needs following discharge   - Consider OT consult to assist with ADL evaluation and planning for discharge  - Provide patient education as appropriate  Outcome: Progressing  Goal: Maintains/Returns to pre admission functional level  Description: INTERVENTIONS:  - Perform BMAT or MOVE assessment daily    - Set and communicate daily mobility goal to care team and patient/family/caregiver     - Collaborate with rehabilitation services on mobility goals if consulted  - Out of bed for toileting  - Record patient progress and toleration of activity level   Outcome: Progressing     Problem: PAIN - ADULT  Goal: Verbalizes/displays adequate comfort level or baseline comfort level  Description: Interventions:  - Encourage patient to monitor pain and request assistance  - Assess pain using appropriate pain scale  - Administer analgesics based on type and severity of pain and evaluate response  - Implement non-pharmacological measures as appropriate and evaluate response  - Consider cultural and social influences on pain and pain management  - Notify physician/advanced practitioner if interventions unsuccessful or patient reports new pain  Outcome: Progressing     Problem: INFECTION - ADULT  Goal: Absence or prevention of progression during hospitalization  Description: INTERVENTIONS:  - Assess and monitor for signs and symptoms of infection  - Monitor lab/diagnostic results  - Monitor all insertion sites, i e  indwelling lines, tubes, and drains  - Monitor endotracheal if appropriate and nasal secretions for changes in amount and color  - [place] appropriate cooling/warming therapies per order  - Administer medications as ordered  - Instruct and encourage patient and family to use good hand hygiene technique  - Identify and instruct in appropriate isolation precautions for identified infection/condition  Outcome: Progressing  Goal: Absence of fever/infection during neutropenic period  Description: INTERVENTIONS:  - Monitor WBC    Outcome: Progressing     Problem: DISCHARGE PLANNING  Goal: Discharge to home or other facility with appropriate resources  Description: INTERVENTIONS:  - Identify barriers to discharge w/patient and caregiver  - Arrange for needed discharge resources and transportation as appropriate  - Identify discharge learning needs (meds, wound care, etc )  - Arrange for interpretive services to assist at discharge as needed  - Refer to Case Management Department for coordinating discharge planning if the patient needs post-hospital services based on physician/advanced practitioner order or complex needs related to functional status, cognitive ability, or social support system  Outcome: Progressing     Problem: Nutrition/Hydration-ADULT  Goal: Nutrient/Hydration intake appropriate for improving, restoring or maintaining nutritional needs  Description: Monitor and assess patient's nutrition/hydration status for malnutrition  Collaborate with interdisciplinary team and initiate plan and interventions as ordered  Monitor patient's weight and dietary intake as ordered or per policy  Utilize nutrition screening tool and intervene as necessary  Determine patient's food preferences and provide high-protein, high-caloric foods as appropriate       INTERVENTIONS:  - Monitor oral intake, urinary output, labs, and treatment plans  - Assess nutrition and hydration status and recommend course of action  - Evaluate amount of meals eaten  - Assist patient with eating if necessary   - Allow adequate time for meals  - Recommend/ encourage appropriate diets, oral nutritional supplements, and vitamin/mineral supplements  - Order, calculate, and assess calorie counts as needed  - Recommend, monitor, and adjust tube feedings and TPN/PPN based on assessed needs  - Assess need for intravenous fluids  - Provide specific nutrition/hydration education as appropriate  - Include patient/family/caregiver in decisions related to nutrition  Outcome: Progressing

## 2022-05-02 NOTE — PHYSICAL THERAPY NOTE
Physical Therapy Evaluation     Patient's Name: Gaby Mack    Admitting Diagnosis  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[facility] ) Jasiel Cifuentes  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[place] [address] ) [C78 6]    Problem List  Patient Active Problem List   Diagnosis    Callus of foot    Foot pain    GERD (gastroesophageal reflux disease)    Hyperlipidemia    Prediabetes    Varicose veins with pain    Morbid obesity ([facility] )    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[place] [address] )    Dyspnea on exertion    Dyslipidemia    Sigmoid diverticulosis    PONV (postoperative nausea and vomiting)    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HCC)    Lesion of ovary    Hypertension    Chemotherapy adverse reaction    Dehydration    Chemotherapy-induced nausea    Platelets decreased (HCC)    Stage 3 chronic kidney disease, unspecified whether stage 3a or 3b CKD ([place] [address] )       Past Medical History  Past Medical History:   Diagnosis Date    Blood in stool     Breast disorder     Cancer ([place] [address] )     rectal    Cancer determined by colorectal biopsy ([place] [address] )     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    Colon polyp     GERD (gastroesophageal reflux disease)     History of rectal surgery     Hyperlipidemia     PONV (postoperative nausea and vomiting)        Past Surgical History  Past Surgical History:   Procedure Laterality Date    BREAST LUMPECTOMY Right      SECTION      x3    COLON SIGMOID RESECTION      COLONOSCOPY      DILATION AND CURETTAGE OF UTERUS      ILEO LOOP DIVERSION N/A 12/10/2019    Procedure: ILEOSTOMY LOOP;  Surgeon: Bronwyn Vanessa MD;  [facility]: [facility] MA[facility];  Service: Robotics    INSTILLATION CHEMOTHERAPY INTRAPERITONEAL (HIPEC) LAPAROTOMY N/A 2022    Procedure: INSTILLATION CHEMOTHERAPY INTRAPERITONEAL (HIPEC) LAPAROTOMY;  Surgeon: Nancy Estrada MD;  Location: [facility] MA[facility] OR;  Service: Surgical Oncology    IR BIOPSY OMENTUM  2021    IR PORT PLACEMENT  2021    OMENTECTOMY N/A 2022    Procedure: DIAGNOSTIC LAPAROSCOPY, OPEN OMENTECTOMY, SPLENECTOMY, DIAPHRAGM REPAIR, CHEST TUBE INSERTION;  Surgeon: Basil Rome MD;  Location: [facility];  Service: Surgical Oncology    NJ CLOSE ENTEROSTOMY N/A 2/4/2020    Procedure: CLOSURE ILEOSTOMY;  Surgeon: Hi Andrew MD;  Location: [facility];  Service: Colorectal    [place] DIAPHRAGM SURG [address] UNLISTED  4/29/2022    Procedure: REPAIR DIAPHRAGM TEAR;  Surgeon: Nidhi Harris MD;  Location: [facility] MA[facility] OR;  Service: Thoracic    NJ LAP,SURG,COLECTOMY, PARTIAL, W/ANAST N/A 12/10/2019    Procedure: SIGMOID RESECTION COLON LAPAROSCOPIC W ROBOTICS converted to lap hand assisted  with Partial proctectomy , LASER FLUORESCENCE ANGIOGRAPHY, INTRA OP COLONOSCOPY;  Surgeon: Hi Andrew MD;  Location: [facility];  Service: Robotics    NJ TOTAL ABDOM HYSTERECTOMY N/A 4/29/2022    Procedure: DIAGNOSTIC LAPAROSCOPY, TOTAL ABDOMINAL HYSTERECTOMY, BILATERAL SALPINGO-OOPHORECTOMY, EXTENSIVE ADHESIOLYSIS;  Surgeon: Oneal Sanderson MD;  Location: [facility] OR;  Service: Gynecology Oncology    SMALL INTESTINE SURGERY  2/4/2020    Procedure: RESECTION SMALL BOWEL;  Surgeon: Hi Andrew MD;  Location: [facility];  Service: Colorectal          05/02/22 0853   PT Last Visit   PT Visit Date 05/02/22   Note Type   Note type Evaluation   Pain Assessment   Pain Assessment Tool 0-10   Pain Score No Pain   Restrictions/Precautions   Weight Bearing Precautions Per Order No   Other Precautions Multiple lines; Fall Risk  (2x MARIA T drain, chest tube)   Home Living   Type of [address] [place] [address] Two level; Able to live on main level with bedroom/bathroom  (reports being able to sleep on couch on 1st floor, 0 [place] )   Bathroom Shower/Tub Walk-in shower   Bathroom Toilet Standard   Bathroom Equipment Shower chair   Home Equipment Walker   Additional Comments pt reports she will be living with daughter in above set up, but PTA lived alone in 3rd floor apt   Prior Function   Level of [place] Independent with ADLs and functional mobility   Lives With (typically alone; will be staying with 2 daughters )   Alicia 67 Help From [facility], [address] in the last 6 months 0   Vocational Retired   Comments (+) drives; reports at least one of her daughters will be home during the day with her to help out as needed    General   Family/Caregiver Present No   Cognition   Overall Cognitive Status WFL   Arousal/Participation Alert   Attention Within functional limits   Orientation Level Oriented X4   Memory Within functional limits   Following Commands Follows all commands and directions without difficulty   Comments pt pleasant and cooperative t/o   RLE Assessment   RLE Assessment WFL   LLE Assessment   LLE Assessment WFL   Light Touch   RLE Light Touch Grossly intact   LLE Light Touch Grossly intact   Bed Mobility   Supine to Sit 5  Supervision   Additional items HOB elevated; Increased time required   Sit to Supine 5  Supervision   Additional items HOB elevated; Increased time required   Transfers   Sit to Stand 5  Supervision   Stand to Sit 5  Supervision   Additional Comments transfers with RW    Ambulation/Elevation   Gait pattern Excessively slow   Gait Assistance 5  Supervision   Assistive Device Rolling walker   Distance 60 ft x 2    Stair Management Assistance Not tested  (pt does not need to perform 2* d/c with daughters )   Balance   Static Sitting Fair +   Dynamic Sitting Fair +   Static Standing Fair   Dynamic Standing Jace Silverman 2334 -   Activity Tolerance   Activity Tolerance Patient limited by fatigue   Medical Staff Made Aware co eval with OT Emi Cluster 2* medical complexity    Nurse Made Aware RN cleared pt to participate in therapy session   Assessment   Prognosis Good   Assessment Pt seen for moderate complexity PT evaluation to assess functional status and discharge needs  Pt with active PT eval/treat orders as well as up as tolerated orders   Pt is a 74 y/o female admitted on 4/29/22 for omental metastasis and is s/p total abdominal hysterectomy, B/L salpingo-oophorectomy, omentectomy, possible splenectomy, instillation chemotherapy intraperitoneal laparotomy 4/29/22  Pt's active comorbidities include GERD, hyperlipidemia, prediabetes, morbid obesity, rectosigmoid cancer, MARTINEZ and HTN  Pt resides with her two daughters in bi level home  PTA, pt was independent with ADLS/IADLS  Upon evaluation, pt was able to perform bed mobility tasks with supervision, transfers with supervision, and ambulation with supervision with RW  Pt is performing near/at baseline mobility levels, and reports no questions or concerns regarding d/c  Given the results of the evaluation, pt with no acute needs for PT at this time  PT to sign off and recommends home with increased family support  Please re-consult if needed  The patient's AM-PAC Basic Mobility Inpatient Short Form Raw Score is 18  A Raw score of greater than 16 suggests the patient may benefit from discharge to home  Please also refer to the recommendation of the Physical Therapist for safe discharge planning  Goals   Patient Goals to go home    Plan   Treatment/Interventions Functional transfer training; Endurance training;Patient/family training;Equipment eval/education;Gait training;Bed mobility;Spoke to nursing;OT   PT Frequency Other (Comment)  (eval only )   Recommendation   PT Discharge Recommendation No rehabilitation needs  (+increased family support )   Equipment Recommended Walker  (owns )   [address] in Bed Without Bedrails 3   Lying on Back to Sitting on Edge of Flat Bed 3   Moving Bed to Chair 3   Standing Up From Chair 3   Walk in Room 3   Climb 3-5 Stairs 3   Basic Mobility Inpatient Raw Score 18   Basic Mobility Standardized Score 41 05   Highest Level Of Mobility   JH-HLM Goal 6: Walk 10 steps or more   JH-HLM Highest Level of Mobility 7: Walk 25 feet or more   JH-HLM Goal Achieved Yes   End of Consult   Patient Position at End of Consult Bedside chair; All needs within reach         [facility], SPT

## 2022-05-02 NOTE — PROGRESS NOTES
Progress Note - Surgical Oncology   Tiago Ortega 75 y o  female MRN: [id number]  Unit/Bed#: [facility] 912-01 Encounter: [id number]    Assessment:  76 y/o F w/ metastatic colon cancer, now s/p open DILMA/BSO, omentectomy, splenectomy, diaphragm repair, Left chest tube insertion, HIPEC on 4/29     L CT (-8, -AL): 210 cc output, serosanguinous  MARIA T #1: 50 cc, serosanguinous  MARIA T #2: 50 cc, serosanguinous    Plan:  --Advance to Regular diet   --Maintenance fluids  --Dilaudid PCA for pain control, APS following   --Monitor MARIA T outputs  --Management of Left chest tube per Thoracic Surgery  --PT/OT    Subjective/Objective     Subjective:     No acute events overnight  This morning, pt denies any complaints  Denies any flatus or bowel movements  Objective:     Blood pressure (!) 175/91, pulse 103, temperature 98 1 °F (36 7 °C), resp  rate 20, height 4' 9" (1 448 m), weight 97 6 kg (215 lb 2 7 oz), last menstrual period 09/01/2011, SpO2 95 %, not currently breastfeeding  ,Body mass index is 46 56 kg/m²  Intake/Output Summary (Last 24 hours) at 5/1/2022 2036  Last data filed at 5/1/2022 1951  Gross per 24 hour   Intake 3141 8 ml   Output 3015 ml   Net 126 8 ml       Invasive Devices  Report    Central Venous Catheter Line            Port A Cath Right Chest -- days          Peripheral Intravenous Line            Peripheral IV 04/29/22 Left Antecubital 2 days    Peripheral IV 04/29/22 Left Hand 2 days          Drain            Chest Tube 3 Left Pleural 28 Fr  2 days    Closed/Suction Drain Left LLQ Bulb 19 Fr  2 days    Closed/Suction Drain Left LUQ Bulb 19 Fr  2 days                Physical Exam:     GEN: NAD  HEENT: MMM  Chest: L CT to water seal with serous output, -AL  CV: RRR  Lung: normal effort  Ab: Soft, NT/ND, incision c/d/i, MARIA T x2 with serosanguinous outputs  Extrem: No CCE  Neuro:  A+Ox3, motor and sensation grossly intact    Lab, Imaging and other studies:  CBC:   Lab Results   Component Value Date    WBC 16 97 (H) 05/01/2022    HGB 8 2 (L) 05/01/2022    HCT 26 6 (L) 05/01/2022    MCV 95 05/01/2022     05/01/2022    MCH 29 4 05/01/2022    MCHC 30 8 (L) 05/01/2022    RDW 17 6 (H) 05/01/2022    MPV 11 5 05/01/2022    NRBC 0 05/01/2022   , CMP:   Lab Results   Component Value Date    SODIUM 141 05/01/2022    K 4 6 05/01/2022     (H) 05/01/2022    CO2 24 05/01/2022    BUN 11 05/01/2022    CREATININE 0 90 05/01/2022    CALCIUM 8 1 (L) 05/01/2022    AST 48 (H) 05/01/2022    ALT 31 05/01/2022    ALKPHOS 40 (L) 05/01/2022    EGFR 65 05/01/2022   , Coagulation: No results found for: PT, INR, APTT, Urinalysis: No results found for: COLORU, CLARITYU, SPECGRAV, PHUR, LEUKOCYTESUR, NITRITE, PROTEINUA, GLUCOSEU, KETONESU, BILIRUBINUR, BLOODU  VTE Pharmacologic Prophylaxis: Heparin  VTE Mechanical Prophylaxis: sequential compression device

## 2022-05-02 NOTE — RESTORATIVE TECHNICIAN NOTE
Restorative Technician Note      Patient Name: Rafia May     Restorative Tech Visit Date: 05/02/22  Note Type: Mobility  Patient Position Upon Consult: Supine  Activity Performed: Ambulated  Assistive Device: Standard walker  Patient Position at End of Consult: Bedside chair;  All needs within reach    Calleen Parkinson  DPT, Restorative Technician

## 2022-05-02 NOTE — OCCUPATIONAL THERAPY NOTE
Occupational Therapy Evaluation     Patient Name: Rosario Wilcox  BAJRK'D Date: 2022  Problem List  Principal Problem:    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 )  Active Problems:    GERD (gastroesophageal reflux disease)    Hyperlipidemia    Prediabetes    Morbid obesity ([facility] )    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HCC)    Dyspnea on exertion    PONV (postoperative nausea and vomiting)    Hypertension    Past Medical History  Past Medical History:   Diagnosis Date    Blood in stool     Breast disorder     Cancer ([facility] )     rectal    Cancer determined by colorectal biopsy ([facility] )     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    Colon polyp     GERD (gastroesophageal reflux disease)     History of rectal surgery     Hyperlipidemia     PONV (postoperative nausea and vomiting)      Past Surgical History  Past Surgical History:   Procedure Laterality Date    BREAST LUMPECTOMY Right      SECTION      x3    COLON SIGMOID RESECTION      COLONOSCOPY      DILATION AND CURETTAGE OF UTERUS      ILEO LOOP DIVERSION N/A 12/10/2019    Procedure: ILEOSTOMY LOOP;  Surgeon: You Draper MD;  Location: [facility];  Service: Robotics    INSTILLATION CHEMOTHERAPY INTRAPERITONEAL (HIPEC) LAPAROTOMY N/A 2022    Procedure: INSTILLATION CHEMOTHERAPY INTRAPERITONEAL (HIPEC) LAPAROTOMY;  Surgeon: Robinson Marsh MD;  Location: [facility] OR;  Service: Surgical Oncology    IR BIOPSY OMENTUM  2021    IR PORT PLACEMENT  2021    OMENTECTOMY N/A 2022    Procedure: DIAGNOSTIC LAPAROSCOPY, OPEN OMENTECTOMY, SPLENECTOMY, DIAPHRAGM REPAIR, CHEST TUBE INSERTION;  Surgeon: Robinson Marsh MD;  Location: [facility] OR;  Service: Surgical Oncology    FL CLOSE ENTEROSTOMY N/A 2020    Procedure: CLOSURE ILEOSTOMY;  Surgeon: You Draper MD;  Location: [facility];  Service: Colorectal    FL DIAPHRAGM SURG [address] UNLISTED  2022    Procedure: REPAIR DIAPHRAGM TEAR;  Surgeon: Marlon Reese MD;  Location: [facility] OR;  Service: Thoracic    MI LAP,SURG,COLECTOMY, PARTIAL, W/ANAST N/A 12/10/2019    Procedure: SIGMOID RESECTION COLON LAPAROSCOPIC W ROBOTICS converted to lap hand assisted  with Partial proctectomy , LASER FLUORESCENCE ANGIOGRAPHY, INTRA OP COLONOSCOPY;  Surgeon: Mike Baum MD;  Location: [facility];  Service: Robotics    MI TOTAL ABDOM HYSTERECTOMY N/A 4/29/2022    Procedure: DIAGNOSTIC LAPAROSCOPY, TOTAL ABDOMINAL HYSTERECTOMY, BILATERAL SALPINGO-OOPHORECTOMY, EXTENSIVE ADHESIOLYSIS;  Surgeon: Myrtle Obando MD;  Location: [facility] OR;  Service: Gynecology Oncology    SMALL INTESTINE SURGERY  2/4/2020    Procedure: RESECTION SMALL BOWEL;  Surgeon: Mike Baum MD;  Location: [facility] OR;  Service: Colorectal         05/02/22 0852   OT Last Visit   OT Visit Date 05/02/22   Note Type   Note type Evaluation   Restrictions/Precautions   Weight Bearing Precautions Per Order No   Other Precautions Fall Risk;Multiple lines  (chest tube )   Pain Assessment   Pain Assessment Tool 0-10   Pain Score No Pain   Effect of Pain on Daily Activities No pain at rest, ~ 2/10 with activity    Home Living   Type of [address] Two level  (bi-level, 0STE through garage)   Bathroom Shower/Tub Walk-in shower   Bathroom Toilet Standard   Bathroom Equipment Shower chair   Home Equipment Walker   Additional Comments Pt typically lives alone in a 3rd floor apt; however, pt reports she plans to stay with her dtrs upon d/c in above set-up   Prior Function   Level of [place] Independent with ADLs and functional mobility   Lives With   (typically alone, ill be staying with her 2 dtrs)   Receives Help From [facility], [address] in the last 6 months 0   Vocational Retired   Lifestyle   Autonomy PTA, pt reports being I with ADLs/IADLs, RW for fnxl mobility, (+)    Reciprocal Relationships Dtrs   Service to Others Retired - worked for [employer] [address] Enjoys going to dinner with friends, outdoor concerts, food festivals    Psychosocial   Psychosocial (WDL) [address] of bed   Eating Assistance 7  [address]  5  Supervision/Setup   Bed Mobility   Supine to Sit 5  Supervision   Additional items HOB elevated; Increased time required   Sit to Supine 5  Supervision   Additional items HOB elevated; Increased time required   Transfers   Sit to Stand 5  Supervision   Stand to Sit 5  Supervision   Additional Comments Transfers with RW   Functional Mobility   Functional Mobility 5  Supervision   Additional items Rolling walker   Balance   Static Sitting Fair +   Dynamic Sitting Fair +   Static Standing Fair   Dynamic Standing Fair   Ambulatory Fair -   Activity Tolerance   Activity Tolerance Patient limited by fatigue   Medical Staff Made Aware PT Clive Edwards, SPT [address]    Nurse Made Aware RN clearance for session   RUE Assessment   RUE Assessment WFL   LUE Assessment   LUE Assessment WFL   Vision - Complex Assessment   Ocular Range of Motion WFL   Head Position WDL   Tracking Able to track stimulus in all quads without difficulty   Cognition   Overall Cognitive Status [facility]   Arousal/Participation Alert; Responsive; Cooperative   Attention Within functional limits   Orientation Level Oriented X4   Memory Within functional limits   Following Commands Follows all commands and directions without difficulty   Comments Pt pleasant and cooperative t/o session    Assessment   Assessment Pt is a 75 y o  female admitted [facility] on 4/29/2022 w/ 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[facility] )  Pt s/p open DILMA/BSO, omentectomy, splenectomy, diaphragm repair, L chest tube insertion    has a past medical history of Blood in stool, Breast disorder, Cancer, Colon polyp, GERD, rectal surgery, Hyperlipidemia, and PONV  Pt with active OT orders and up as tolerated orders  Pt seen as a co-evaluation with PT due to the patient's co-morbidities, clinically unstable presentation/clinical complexity, and present impairments  Pt typically lives alone in a 3rd floor apt and was I with ADLs/IADLs; however, pt reports she plans to stay with her 2 dtrs upon d/c in a bi-level home with 3STE  Pt (+)   Upon evaluation, pt currently requires S with RW for transfers and mobility  Pt currently requires I eating, I grooming, S UB ADLs, MIN A LB ADLs, and S toileting  Pt reports supportive dtrs who are able to assist as needed upon d/c  From OT standpoint, recommendation would be home with increased social support  No further acute OT needs  D/C OT  Please re-consult if needed  Thank you  Pt was left after session with all current needs met  The patient's raw score on the AM-PAC Daily Activity inpatient short form is 20, standardized score is 42 03, greater than 39 4  Patients at this level are likely to benefit from discharge to home  Please refer to the recommendation of the Occupational Therapist for safe discharge planning     Plan   OT Frequency Eval only   Recommendation   OT Discharge Recommendation No rehabilitation needs  (home with increased social support)   OT - OK to Discharge Yes  (when medically stable )   AM-PAC Daily Activity Inpatient   Lower Body Dressing 3   Bathing 3   Toileting 3   Upper Body Dressing 3   Grooming 4   Eating 4   Daily Activity Raw Score 20   Daily Activity Standardized Score (Calc for Raw Score >=11) 42 03   AM-PAC Applied Cognition Inpatient   Following a Speech/Presentation 4   Understanding Ordinary Conversation 4   Taking Medications 4   Remembering Where Things Are Placed or Put Away 4   Remembering List of 4-5 Errands 4   Taking Care of Complicated Tasks 4   Applied Cognition Raw Score 24   Applied Cognition Standardized Score 62 21 Vika Viramontes MS, OTR/L

## 2022-05-02 NOTE — CASE MANAGEMENT
Case Management Discharge Planning Note    Patient name Conrado Foster  Location [address] 912/[address] 303[address]89 MRN [id number]  : 1955 Date 2022       Current Admission Date: 2022  Current Admission Diagnosis:Omental metastasis [facility])   Patient Active Problem List    Diagnosis Date Noted    [facility] 2022    Stage 3 chronic kidney disease, unspecified whether stage 3a or 3b CKD ([facility] ) 2022    Dehydration 2022    Chemotherapy-induced nausea 2022    Hypertension 12/10/2021    Chemotherapy adverse reaction 12/10/2021    Lesion of ovary 2021    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[facility] ) 2021    PONV (postoperative nausea and vomiting) 10/06/2021    Sigmoid diverticulosis 2020    Dyslipidemia 10/30/2019    Dyspnea on exertion 10/21/2019    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[facility] ) 2019    Morbid obesity ([facility] ) 05/15/2019    Callus of foot 2017    Foot pain 2017    GERD (gastroesophageal reflux disease) 2016    Hyperlipidemia 2015    Prediabetes 2015    Varicose veins with pain 2015      LOS (days): 3  Geometric Mean LOS (GMLOS) (days): 3 60  Days to GMLOS:0 6     OBJECTIVE:  Risk of Unplanned Readmission Score: 17         Current admission status: Inpatient   Preferred Pharmacy:   [address], [address] [place] [address]  [address]  [place] [address]  Phone: [phone] Fax: [place], [place] [address] [place] [address] [facility] 94 [facility] [address] [place] [address]  Phone: [phone] Fax: [phone]    Primary Care Provider: DEEPAK Rodriguez    Primary Insurance: MEDICARE  Secondary Insurance: [employer]    DISCHARGE DETAILS:                               Additional Comments: [facility] both following for home nursing care post-discharge  Assigned CM to follow

## 2022-05-03 ENCOUNTER — APPOINTMENT (INPATIENT)
Dept: RADIOLOGY | Facility: HOSPITAL | Age: 67
DRG: 326 | End: 2022-05-03
Payer: MEDICARE

## 2022-05-03 LAB
ERYTHROCYTE [DISTWIDTH] IN BLOOD BY AUTOMATED COUNT: 17.2 % (ref 11.6–15.1)
HCT VFR BLD AUTO: 25.8 % (ref 34.8–46.1)
HGB BLD-MCNC: 8 G/DL (ref 11.5–15.4)
MCH RBC QN AUTO: 30.1 PG (ref 26.8–34.3)
MCHC RBC AUTO-ENTMCNC: 31 G/DL (ref 31.4–37.4)
MCV RBC AUTO: 97 FL (ref 82–98)
PLATELET # BLD AUTO: 232 THOUSANDS/UL (ref 149–390)
PMV BLD AUTO: 11 FL (ref 8.9–12.7)
RBC # BLD AUTO: 2.66 MILLION/UL (ref 3.81–5.12)
WBC # BLD AUTO: 10.22 THOUSAND/UL (ref 4.31–10.16)

## 2022-05-03 PROCEDURE — 71046 X-RAY EXAM CHEST 2 VIEWS: CPT

## 2022-05-03 PROCEDURE — 99024 POSTOP FOLLOW-UP VISIT: CPT | Performed by: STUDENT IN AN ORGANIZED HEALTH CARE EDUCATION/TRAINING PROGRAM

## 2022-05-03 PROCEDURE — 99024 POSTOP FOLLOW-UP VISIT: CPT | Performed by: THORACIC SURGERY (CARDIOTHORACIC VASCULAR SURGERY)

## 2022-05-03 PROCEDURE — 85027 COMPLETE CBC AUTOMATED: CPT | Performed by: PHYSICIAN ASSISTANT

## 2022-05-03 RX ORDER — HYDROMORPHONE HCL/PF 1 MG/ML
0.5 SYRINGE (ML) INJECTION
Status: DISCONTINUED | OUTPATIENT
Start: 2022-05-03 | End: 2022-05-05 | Stop reason: HOSPADM

## 2022-05-03 RX ORDER — SENNOSIDES 8.6 MG
1 TABLET ORAL 2 TIMES DAILY
Status: DISCONTINUED | OUTPATIENT
Start: 2022-05-03 | End: 2022-05-05 | Stop reason: HOSPADM

## 2022-05-03 RX ORDER — POLYETHYLENE GLYCOL 3350 17 G/17G
17 POWDER, FOR SOLUTION ORAL DAILY
Status: DISCONTINUED | OUTPATIENT
Start: 2022-05-03 | End: 2022-05-05 | Stop reason: HOSPADM

## 2022-05-03 RX ADMIN — HEPARIN SODIUM 5000 UNITS: 5000 INJECTION INTRAVENOUS; SUBCUTANEOUS at 18:37

## 2022-05-03 RX ADMIN — SENNOSIDES 8.6 MG: 8.6 TABLET, FILM COATED ORAL at 12:38

## 2022-05-03 RX ADMIN — HEPARIN SODIUM 5000 UNITS: 5000 INJECTION INTRAVENOUS; SUBCUTANEOUS at 05:39

## 2022-05-03 RX ADMIN — SENNOSIDES 8.6 MG: 8.6 TABLET, FILM COATED ORAL at 18:37

## 2022-05-03 RX ADMIN — ACETAMINOPHEN 975 MG: 325 TABLET, FILM COATED ORAL at 05:38

## 2022-05-03 RX ADMIN — CELECOXIB 200 MG: 200 CAPSULE ORAL at 09:57

## 2022-05-03 RX ADMIN — ACETAMINOPHEN 975 MG: 325 TABLET, FILM COATED ORAL at 18:37

## 2022-05-03 RX ADMIN — GABAPENTIN 300 MG: 300 CAPSULE ORAL at 21:17

## 2022-05-03 RX ADMIN — GABAPENTIN 300 MG: 300 CAPSULE ORAL at 09:57

## 2022-05-03 RX ADMIN — POLYETHYLENE GLYCOL 3350 17 G: 17 POWDER, FOR SOLUTION ORAL at 12:38

## 2022-05-03 RX ADMIN — GABAPENTIN 300 MG: 300 CAPSULE ORAL at 18:37

## 2022-05-03 RX ADMIN — ACETAMINOPHEN 975 MG: 325 TABLET, FILM COATED ORAL at 21:16

## 2022-05-03 RX ADMIN — HEPARIN SODIUM 5000 UNITS: 5000 INJECTION INTRAVENOUS; SUBCUTANEOUS at 21:17

## 2022-05-03 NOTE — PROGRESS NOTES
Progress Note - Surgical Oncology   Baron Esteves 75 y o  female MRN: [id number]  Unit/Bed#: [facility] 912-01 Encounter: [id number]    Assessment:  76 y/o F w/ metastatic colon cancer, now s/p open DILMA/BSO, omentectomy, splenectomy, diaphragm repair, Left chest tube insertion, HIPEC on 4/29, POD4    Vitals stable, afebrile  Labs pending    L CT (-8, -AL): 70cc output, serosanguinous  MARIA T LLQ  60 cc serosanguinous  MARIA T LUQ  70 cc serosanguinous      Plan:  - fulls  - JPx2, monitor outputs and character  - PCA for pain control  - SQH  - L CT per thoracic  - incentive spirometry  - encourage ambulation    Subjective/Objective   Subjective:   Having some minimal abdominal pain but overall doing well, passing flatus but not yet having a bowel movement, tolerating full liquid diet without nausea or emesis, ambulating without issues, using incentive spirometry  Objective:     Blood pressure 142/69, pulse 103, temperature 98 °F (36 7 °C), resp  rate 17, height 4' 9" (1 448 m), weight 97 6 kg (215 lb 2 7 oz), last menstrual period 09/01/2011, SpO2 95 %, not currently breastfeeding  ,Body mass index is 46 56 kg/m²        Intake/Output Summary (Last 24 hours) at 5/3/2022 0544  Last data filed at 5/3/2022 0111  Gross per 24 hour   Intake 202 ml   Output 980 ml   Net -778 ml       Invasive Devices  Report    Central Venous Catheter Line            Port A Cath Right Chest -- days          Peripheral Intravenous Line            Peripheral IV 04/29/22 Left Antecubital 3 days          Drain            Chest Tube 3 Left Pleural 28 Fr  3 days    Closed/Suction Drain Left LLQ Bulb 19 Fr  3 days    Closed/Suction Drain Left LUQ Bulb 19 Fr  3 days                Physical Exam:  General: NAD  Skin: Warm, dry, anicteric  HEENT: Normocephalic, atraumatic  CV: RRR, no m/r/g  Pulm: CTA b/l, no inc WOB, left chest tube serosanguineous output, to water seal without air leak  Abd: Soft, ND/NT, incision clean dry and intact, MARIA T x2 serosanguineous  MSK: Symmetric, no edema, no tenderness, no deformity  Neuro: AOx3, GCS 15    Lab, Imaging and other studies:I have personally reviewed pertinent lab results    , CBC: No results found for: WBC, HGB, HCT, MCV, PLT, ADJUSTEDWBC, MCH, MCHC, RDW, MPV, NRBC, CMP: No results found for: SODIUM, K, CL, CO2, ANIONGAP, BUN, CREATININE, GLUCOSE, CALCIUM, AST, ALT, ALKPHOS, PROT, BILITOT, EGFR  VTE Pharmacologic Prophylaxis: Sequential compression device (Venodyne)  and Heparin  VTE Mechanical Prophylaxis: sequential compression device

## 2022-05-03 NOTE — OP NOTE
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]    Procedure(s) (LRB):  DIAGNOSTIC LAPAROSCOPY, TOTAL ABDOMINAL HYSTERECTOMY, BILATERAL SALPINGO-OOPHORECTOMY, EXTENSIVE ADHESIOLYSIS (N/A)  DIAGNOSTIC LAPAROSCOPY, OPEN OMENTECTOMY, SPLENECTOMY, DIAPHRAGM REPAIR, CHEST TUBE INSERTION (N/A)  INSTILLATION CHEMOTHERAPY INTRAPERITONEAL (HIPEC) LAPAROTOMY (N/A)  REPAIR DIAPHRAGM TEAR    Specimen(s):  ID Type Source Tests Collected by Time Destination   1 :  Tissue Uterus w/Bilateral Ovaries and Fallopian Tubes TISSUE EXAM Oneal Sanderson MD 2022 1118    2 : spleen, omentum, darotis Tissue Spleen TISSUE EXAM Oneal Sanderson MD 2022 1224        Estimated Blood Loss:   500 mL    Drains:  Chest Tube 3 Left Pleural 28 Fr  (Active)   Function To water seal 22   Chest Tube Air Leak No 22   Patency Intervention Tip/tilt 22   Drainage Description Serous 22   Dressing Status Dry; Intact; Old drainage 22   Site Assessment Intact 22   Surrounding Skin Intact 22   Output (mL) 15 mL 22 0501   Number of days: 4       Closed/Suction Drain Left LLQ Bulb 19 Fr  (Active)   Site Description Unable to view 22   Dressing Status Clean;Dry; Intact 22   Drainage Appearance Serosanguineous 05/03/22 0201   Status To bulb suction 22 0201   Output (mL) 30 mL 05/03/22 0501   Number of days: 4       Closed/Suction Drain Left LUQ Bulb 19 Fr  (Active)   Site Description Unable to view 05/03/22 0201   Dressing Status Clean;Dry; Intact 05/03/22 0201   Drainage Appearance Serosanguineous 05/03/22 0201   Status To bulb suction 05/03/22 0201   Output (mL) 60 mL 05/03/22 0501   Number of days: 4       [REMOVED] NG/OG/Enteral Tube Orogastric 18 Fr Center mouth (Removed)   Placement Reverification Auscultation 05/01/22 0902   Site Assessment Clean; Intact;Dry 05/01/22 0902   Status Suction-low continuous 05/01/22 0902   Drainage Appearance Bile 05/01/22 0401   Output (mL) 50 mL 04/30/22 1801   Number of days: 2       [REMOVED] Urethral Catheter Non-latex; Temperature probe 16 Fr  (Removed)   Reasons to continue Urinary Catheter  Post-operative urological requirements 04/30/22 1601   Goal for Removal Other (Comment) 04/29/22 1905   Site Assessment Clean;Skin intact 05/01/22 0800   Mg Care Done 05/01/22 0800   Collection Container Standard drainage bag 05/01/22 0800   Output (mL) 225 mL 05/01/22 0900   Number of days: 2       Anesthesia Type:   General w/ Epidural    Operative Indications:  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]  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]  Diaphragm Defect    Operative Findings:  Approximately 4cm defect in left posterior diaphragm from cancer resection    Complications:   None    Procedure and Technique:  I was called into the operating room for consultation by Dr Ramiro Rodriguez  During her procedure for cancer debulking and HIPEC, a large diaphragm metastasis was identified  This was attempted to be removed, however, it was clear that this was invading deep into the diaphragm  Because of this, a piece of diaphragm was removed on block with the metastasis  The defect was large and there was concern that this was not able to be closed primarily so consultation with myself was requested  Upon inspection, the defect had been attempted to be closed    The stitches were removed  Reconfiguring the diaphragm in a different direction, I was able to primarily close the defect using multiple horizontal mattress 0 Ethibond suture  After the closure, the abdomen was instilled with saline solution and the anesthesiologist provided multiple Valsalva maneuvers  There were some bubbles that were identified in the saline and so 2 additional stitches were placed posteriorly  Because of the nature of the procedure, and the need for intraperitoneal chemotherapy, I elected not to perform a repair with pledgets or Craigmont-Jimi mesh at the request of the surgical oncologist   Again, the abdomen was instilled with saline and after multiple Valsalva maneuvers, no bubbles were identified  The diaphragm repair appeared to be complete and intact  The surgery was turned back over to Dr Tonya Freeman at this time  I was present for the entire procedure    Patient Disposition:  intubated and hemodynamically stable        SIGNATURE: Denise Oliver MD  DATE: May 3, 2022  TIME: 9:25 AM

## 2022-05-03 NOTE — PLAN OF CARE
Problem: Potential for Falls  Goal: Patient will remain free of falls  Description: INTERVENTIONS:  - Educate patient/family on patient safety including physical limitations  - Instruct patient to call for assistance with activity   - Consult OT/PT to assist with strengthening/mobility   - Keep Call bell within reach  - Keep bed low and locked with side rails adjusted as appropriate  - Keep care items and personal belongings within reach  - Apply yellow socks and bracelet for high fall risk patients  - Consider moving patient to room near nurses station  5/3/2022 0218 by Igor Bolton RN  Outcome: Progressing  5/3/2022 0212 by Igor Bolton RN  Outcome: Progressing

## 2022-05-03 NOTE — PROGRESS NOTES
Progress Note - Thoracic Surgery   Lauren Chung 75 y o  female MRN: [id number]  Unit/Bed#: [facility] 912-01 Encounter: [id number]    Assessment:  76 y/o F w/ metastatic colon cancer, now s/p open DILMA/BSO, omentectomy, splenectomy, diaphragm repair, Left chest tube insertion, HIPEC on 4/29, POD4    Vital stable, afebrile  Labs pending    L CT (-8, -AL): 70cc output serosanguineous     Plan:  - L CT to WS, continue to monitor output and character  - incentive spirometry, pulm toilet  - encourage ambulation  - care per primary    Subjective/Objective   Subjective:   Having some minimal abdominal pain but overall doing well, passing flatus but not yet having a bowel movement, tolerating full liquid diet without nausea or emesis, ambulating without issues, using incentive spirometry  Objective:     Blood pressure 142/69, pulse 103, temperature 98 °F (36 7 °C), resp  rate 17, height 4' 9" (1 448 m), weight 97 6 kg (215 lb 2 7 oz), last menstrual period 09/01/2011, SpO2 95 %, not currently breastfeeding  ,Body mass index is 46 56 kg/m²        Intake/Output Summary (Last 24 hours) at 5/3/2022 0546  Last data filed at 5/3/2022 0111  Gross per 24 hour   Intake 202 ml   Output 980 ml   Net -778 ml       Invasive Devices  Report    Central Venous Catheter Line            Port A Cath Right Chest -- days          Peripheral Intravenous Line            Peripheral IV 04/29/22 Left Antecubital 3 days          Drain            Chest Tube 3 Left Pleural 28 Fr  3 days    Closed/Suction Drain Left LLQ Bulb 19 Fr  3 days    Closed/Suction Drain Left LUQ Bulb 19 Fr  3 days                Physical Exam:     General: NAD  Skin: Warm, dry, anicteric  HEENT: Normocephalic, atraumatic  CV: RRR, no m/r/g  Pulm: CTA b/l, no inc WOB, left chest tube serosanguineous output, to water seal without air leak  Abd: Soft, ND/NT, incision clean dry and intact, MARIA T x2 serosanguineous  MSK: Symmetric, no edema, no tenderness, no deformity  Neuro: AOx3, GCS 15    Lab, Imaging and other studies:I have personally reviewed pertinent lab results    , CBC: No results found for: WBC, HGB, HCT, MCV, PLT, ADJUSTEDWBC, MCH, MCHC, RDW, MPV, NRBC, CMP: No results found for: SODIUM, K, CL, CO2, ANIONGAP, BUN, CREATININE, GLUCOSE, CALCIUM, AST, ALT, ALKPHOS, PROT, BILITOT, EGFR  VTE Pharmacologic Prophylaxis: Sequential compression device (Venodyne)  and Heparin  VTE Mechanical Prophylaxis: sequential compression device

## 2022-05-03 NOTE — CASE MANAGEMENT
Case Management Discharge Planning Note    Patient name Tin Master  Location [facility] [address] 912/[facility] 068-60 MRN [id number]  : 1955 Date 5/3/2022       Current Admission Date: 2022  Current Admission Diagnosis:Omental metastasis [facility])   Patient Active Problem List    Diagnosis Date Noted    Platelets decreased ([facility] ) 2022    Stage 3 chronic kidney disease, unspecified whether stage 3a or 3b CKD ([facility] ) 2022    Dehydration 2022    Chemotherapy-induced nausea 2022    Hypertension 12/10/2021    Chemotherapy adverse reaction 12/10/2021    Lesion of ovary 2021    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[facility] ) 2021    PONV (postoperative nausea and vomiting) 10/06/2021    Sigmoid diverticulosis 2020    Dyslipidemia 10/30/2019    Dyspnea on exertion 10/21/2019    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[facility] ) 2019    Morbid obesity ([facility] ) 05/15/2019    Callus of foot 2017    Foot pain 2017    GERD (gastroesophageal reflux disease) 2016    Hyperlipidemia 2015    Prediabetes 2015    Varicose veins with pain 2015      LOS (days): 4  Geometric Mean LOS (GMLOS) (days): 3 60  Days to GMLOS:-0 4     OBJECTIVE:  Risk of Unplanned Readmission Score: 18         Current admission status: Inpatient   Preferred Pharmacy:   [address], [address] [place] [address] Drive  [place] [address]  Phone: [phone] Fax: Victoriano Lambert  P[facility] Corrigan [address] [place] [address]  Phone: [phone] Fax: [phone]    Primary Care Provider: DEEPAK Zazueta    Primary Insurance: MEDICARE  Secondary Insurance: [employer] FIDELITY    DISCHARGE DETAILS:          Comments - Freedom of Choice: [facility] unable to accept, referral accepted by [facility] a/k/a [address] requested[de-identified] Nursing  Home Health Agency Name[de-identified] Other ([address] [place] [address] a/k/a [facility] Phone: [phone]) [address] Provider[de-identified] PCP  Home Health Services Needed[de-identified] Post-Op Care and Assessment  Homebound Criteria Met[de-identified] Requires the Assistance of Another Person for Safe Ambulation or to Leave the Home  Supporting Clincal Findings[de-identified] Delmy Win in Short Distances    DME Referral Provided  Referral made for DME?: No              Treatment Team Recommendation: Home with [address]  Discharge Destination Plan[de-identified] Home with [place] at Discharge : Family           ETA of Transport (Date): 05/04/22                          Additional Comments: [facility] able to provide home health nursing care for patient post-discharge  Expected to be cleared for discharge Wednesday 5/4

## 2022-05-03 NOTE — QUICK NOTE
05/03/22       Procedure: Chest tube removal     L chest tube removed in routine fashion without incident  The patient tolerated the procedure well  A dry, sterile dressing was placed  PA/Lateral Chest Xray ordered

## 2022-05-04 ENCOUNTER — APPOINTMENT (INPATIENT)
Dept: RADIOLOGY | Facility: HOSPITAL | Age: 67
DRG: 326 | End: 2022-05-04
Payer: MEDICARE

## 2022-05-04 LAB
ERYTHROCYTE [DISTWIDTH] IN BLOOD BY AUTOMATED COUNT: 16.8 % (ref 11.6–15.1)
HCT VFR BLD AUTO: 22.5 % (ref 34.8–46.1)
HGB BLD-MCNC: 7.1 G/DL (ref 11.5–15.4)
MCH RBC QN AUTO: 30 PG (ref 26.8–34.3)
MCHC RBC AUTO-ENTMCNC: 31.6 G/DL (ref 31.4–37.4)
MCV RBC AUTO: 95 FL (ref 82–98)
PLATELET # BLD AUTO: 252 THOUSANDS/UL (ref 149–390)
PMV BLD AUTO: 11 FL (ref 8.9–12.7)
RBC # BLD AUTO: 2.37 MILLION/UL (ref 3.81–5.12)
WBC # BLD AUTO: 8.34 THOUSAND/UL (ref 4.31–10.16)

## 2022-05-04 PROCEDURE — 71046 X-RAY EXAM CHEST 2 VIEWS: CPT

## 2022-05-04 PROCEDURE — 99024 POSTOP FOLLOW-UP VISIT: CPT | Performed by: THORACIC SURGERY (CARDIOTHORACIC VASCULAR SURGERY)

## 2022-05-04 PROCEDURE — 85027 COMPLETE CBC AUTOMATED: CPT | Performed by: SURGERY

## 2022-05-04 PROCEDURE — 99024 POSTOP FOLLOW-UP VISIT: CPT | Performed by: STUDENT IN AN ORGANIZED HEALTH CARE EDUCATION/TRAINING PROGRAM

## 2022-05-04 RX ORDER — ACETAMINOPHEN 325 MG/1
975 TABLET ORAL EVERY 8 HOURS SCHEDULED
Refills: 0 | COMMUNITY
Start: 2022-05-04 | End: 2022-06-21

## 2022-05-04 RX ORDER — SENNOSIDES 8.6 MG
8.6 TABLET ORAL 2 TIMES DAILY
Refills: 0 | COMMUNITY
Start: 2022-05-04 | End: 2022-06-21

## 2022-05-04 RX ORDER — OXYCODONE HYDROCHLORIDE 5 MG/1
5 TABLET ORAL EVERY 4 HOURS PRN
Qty: 28 TABLET | Refills: 0 | Status: SHIPPED | OUTPATIENT
Start: 2022-05-04 | End: 2022-05-09

## 2022-05-04 RX ORDER — POLYETHYLENE GLYCOL 3350 17 G/17G
17 POWDER, FOR SOLUTION ORAL DAILY
Refills: 0 | COMMUNITY
Start: 2022-05-05 | End: 2022-06-21

## 2022-05-04 RX ORDER — ENOXAPARIN SODIUM 100 MG/ML
40 INJECTION SUBCUTANEOUS EVERY 12 HOURS
Qty: 20 ML | Refills: 0 | Status: SHIPPED | OUTPATIENT
Start: 2022-05-04 | End: 2022-06-21

## 2022-05-04 RX ORDER — GABAPENTIN 300 MG/1
300 CAPSULE ORAL 3 TIMES DAILY
Qty: 30 CAPSULE | Refills: 0 | Status: SHIPPED | OUTPATIENT
Start: 2022-05-04 | End: 2022-06-21

## 2022-05-04 RX ORDER — CELECOXIB 200 MG/1
200 CAPSULE ORAL DAILY
Qty: 10 CAPSULE | Refills: 0 | Status: SHIPPED | OUTPATIENT
Start: 2022-05-05 | End: 2022-06-21

## 2022-05-04 RX ADMIN — OXYCODONE HYDROCHLORIDE 5 MG: 5 TABLET ORAL at 22:53

## 2022-05-04 RX ADMIN — HEPARIN SODIUM 5000 UNITS: 5000 INJECTION INTRAVENOUS; SUBCUTANEOUS at 21:12

## 2022-05-04 RX ADMIN — ACETAMINOPHEN 975 MG: 325 TABLET, FILM COATED ORAL at 04:34

## 2022-05-04 RX ADMIN — CELECOXIB 200 MG: 200 CAPSULE ORAL at 09:00

## 2022-05-04 RX ADMIN — SENNOSIDES 8.6 MG: 8.6 TABLET, FILM COATED ORAL at 08:59

## 2022-05-04 RX ADMIN — ACETAMINOPHEN 975 MG: 325 TABLET, FILM COATED ORAL at 21:11

## 2022-05-04 RX ADMIN — GABAPENTIN 300 MG: 300 CAPSULE ORAL at 08:59

## 2022-05-04 RX ADMIN — ACETAMINOPHEN 975 MG: 325 TABLET, FILM COATED ORAL at 14:13

## 2022-05-04 RX ADMIN — GABAPENTIN 300 MG: 300 CAPSULE ORAL at 21:12

## 2022-05-04 RX ADMIN — HEPARIN SODIUM 5000 UNITS: 5000 INJECTION INTRAVENOUS; SUBCUTANEOUS at 14:13

## 2022-05-04 RX ADMIN — GABAPENTIN 300 MG: 300 CAPSULE ORAL at 16:32

## 2022-05-04 RX ADMIN — HEPARIN SODIUM 5000 UNITS: 5000 INJECTION INTRAVENOUS; SUBCUTANEOUS at 04:34

## 2022-05-04 NOTE — PROGRESS NOTES
Progress Note - Thoracic surgery  Rafia May 75 y o  female MRN: [id number]  [facility] Encounter: [id number]    Assessment:  76 y/o F w/ metastatic colon cancer, now s/p open DILMA/BSO, omentectomy, splenectomy, diaphragm repair, Left chest tube insertion, HIPEC on 4/29, POD5  Afebrile, VSS  MARIA T LLQ 45 from 60 cc yesterday light serosanguinous  MARIA T LUQ 80 from  70 cc yesterday light serosanguinous  UOP 1 1 L    L CT pulled yesterday; post post chest x-ray showed small left pneumothorax    Plan:  Fulls toast and crackers  Monitor MARIA T x2  DVT prophylaxis  Encourage out of bed and ambulation  P r n  Pain control  Case Management for dispo planning:  Patient set up home health care  Follow-up a m  Chest x-ray    Subjective/Objective   Subjective:   No acute events overnight  Doing well  Pain is controlled  Passing flatus and having bowel movements  Urinating adequately  Tolerating a diet  Denies chest pain or shortness of breath    Objective:     Blood pressure 130/64, pulse 84, temperature 98 1 °F (36 7 °C), resp  rate 20, height 4' 9" (1 448 m), weight 97 6 kg (215 lb 2 7 oz), last menstrual period 09/01/2011, SpO2 95 %, not currently breastfeeding  ,Body mass index is 46 56 kg/m²  Intake/Output Summary (Last 24 hours) at 5/4/2022 0600  Last data filed at 5/4/2022 0401  Gross per 24 hour   Intake 480 2 ml   Output 1525 ml   Net -1044 8 ml       Invasive Devices  Report    Central Venous Catheter Line            Port A Cath Right Chest -- days          Peripheral Intravenous Line            Peripheral IV 05/03/22 Proximal;Right;Ventral (anterior) Forearm 1 day          Drain            Closed/Suction Drain Left LLQ Bulb 19 Fr  4 days    Closed/Suction Drain Left LUQ Bulb 19 Fr  4 days                Physical Exam  Constitutional:       General: She is not in acute distress  Appearance: She is well-developed  HENT:      Head: Normocephalic and atraumatic     Eyes:      Extraocular Movements: Extraocular movements intact  Cardiovascular:      Rate and Rhythm: Normal rate  Pulmonary:      Effort: Pulmonary effort is normal  No respiratory distress  Comments: Left chest tube dressing clean dry and intact  Abdominal:      General: There is no distension  Palpations: Abdomen is soft  Tenderness: There is abdominal tenderness  There is no guarding or rebound  Comments: Incisions clean dry and intact MARIA T drains x2   Musculoskeletal:         General: Normal range of motion  Cervical back: Normal range of motion  Skin:     General: Skin is warm and dry  Neurological:      Mental Status: She is alert and oriented to person, place, and time  Psychiatric:         Behavior: Behavior normal          Thought Content: Thought content normal          Judgment: Judgment normal          Lab, Imaging and other studies:I have personally reviewed pertinent lab results    , CBC:   No results found for: WBC, HGB, HCT, MCV, PLT, ADJUSTEDWBC, MCH, MCHC, RDW, MPV, NRBC, CMP: No results found for: SODIUM, K, CL, CO2, ANIONGAP, BUN, CREATININE, GLUCOSE, CALCIUM, AST, ALT, ALKPHOS, PROT, BILITOT, EGFR  VTE Pharmacologic Prophylaxis: Sequential compression device (Venodyne)  and Heparin  VTE Mechanical Prophylaxis: sequential compression device

## 2022-05-04 NOTE — PLAN OF CARE
Problem: Potential for Falls  Goal: Patient will remain free of falls  Description: INTERVENTIONS:  - Educate patient/family on patient safety including physical limitations  - Instruct patient to call for assistance with activity   - Consult OT/PT to assist with strengthening/mobility   - Keep Call bell within reach  - Keep bed low and locked with side rails adjusted as appropriate  - Keep care items and personal belongings within reach  - Initiate and maintain comfort rounds  - Make Fall Risk Sign visible to staff  - Offer Toileting every  Hours, in advance of need  - Initiate/Maintain alarm  - Obtain necessary fall risk management equipment:   - Apply yellow socks and bracelet for high fall risk patients  - Consider moving patient to room near nurses station  Outcome: Progressing     Problem: Prexisting or High Potential for Compromised Skin Integrity  Goal: Skin integrity is maintained or improved  Description: INTERVENTIONS:  - Identify patients at risk for skin breakdown  - Assess and monitor skin integrity  - Assess and monitor nutrition and hydration status  - Monitor labs   - Assess for incontinence   - Turn and reposition patient  - Assist with mobility/ambulation  - Relieve pressure over bony prominences  - Avoid friction and shearing  - Provide appropriate hygiene as needed including keeping skin clean and dry  - Evaluate need for skin moisturizer/barrier cream  - Collaborate with interdisciplinary team   - Patient/family teaching  - Consider wound care consult   Outcome: Progressing     Problem: MOBILITY - ADULT  Goal: Maintain or return to baseline ADL function  Description: INTERVENTIONS:  -  Assess patient's ability to carry out ADLs; assess patient's baseline for ADL function and identify physical deficits which impact ability to perform ADLs (bathing, care of mouth/teeth, toileting, grooming, dressing, etc )  - Assess/evaluate cause of self-care deficits   - Assess range of motion  - Assess patient's mobility; develop plan if impaired  - Assess patient's need for assistive devices and provide as appropriate  - Encourage maximum independence but intervene and supervise when necessary  - Involve family in performance of ADLs  - Assess for home care needs following discharge   - Consider OT consult to assist with ADL evaluation and planning for discharge  - Provide patient education as appropriate  Outcome: Progressing  Goal: Maintains/Returns to pre admission functional level  Description: INTERVENTIONS:  - Perform BMAT or MOVE assessment daily    - Set and communicate daily mobility goal to care team and patient/family/caregiver  - Collaborate with rehabilitation services on mobility goals if consulted  - Perform Range of Motion  times a day  - Reposition patient every  hours    - Dangle patient  times a day  - Stand patient  times a day  - Ambulate patient  times a day  - Out of bed to chair  times a day   - Out of bed for meal times a day  - Out of bed for toileting  - Record patient progress and toleration of activity level   Outcome: Progressing     Problem: PAIN - ADULT  Goal: Verbalizes/displays adequate comfort level or baseline comfort level  Description: Interventions:  - Encourage patient to monitor pain and request assistance  - Assess pain using appropriate pain scale  - Administer analgesics based on type and severity of pain and evaluate response  - Implement non-pharmacological measures as appropriate and evaluate response  - Consider cultural and social influences on pain and pain management  - Notify physician/advanced practitioner if interventions unsuccessful or patient reports new pain  Outcome: Progressing     Problem: INFECTION - ADULT  Goal: Absence or prevention of progression during hospitalization  Description: INTERVENTIONS:  - Assess and monitor for signs and symptoms of infection  - Monitor lab/diagnostic results  - Monitor all insertion sites, i e  indwelling lines, tubes, and drains  - Monitor endotracheal if appropriate and nasal secretions for changes in amount and color  - [place] appropriate cooling/warming therapies per order  - Administer medications as ordered  - Instruct and encourage patient and family to use good hand hygiene technique  - Identify and instruct in appropriate isolation precautions for identified infection/condition  Outcome: Progressing  Goal: Absence of fever/infection during neutropenic period  Description: INTERVENTIONS:  - Monitor WBC    Outcome: Progressing     Problem: DISCHARGE PLANNING  Goal: Discharge to home or other facility with appropriate resources  Description: INTERVENTIONS:  - Identify barriers to discharge w/patient and caregiver  - Arrange for needed discharge resources and transportation as appropriate  - Identify discharge learning needs (meds, wound care, etc )  - Arrange for interpretive services to assist at discharge as needed  - Refer to Case Management Department for coordinating discharge planning if the patient needs post-hospital services based on physician/advanced practitioner order or complex needs related to functional status, cognitive ability, or social support system  Outcome: Progressing     Problem: Nutrition/Hydration-ADULT  Goal: Nutrient/Hydration intake appropriate for improving, restoring or maintaining nutritional needs  Description: Monitor and assess patient's nutrition/hydration status for malnutrition  Collaborate with interdisciplinary team and initiate plan and interventions as ordered  Monitor patient's weight and dietary intake as ordered or per policy  Utilize nutrition screening tool and intervene as necessary  Determine patient's food preferences and provide high-protein, high-caloric foods as appropriate       INTERVENTIONS:  - Monitor oral intake, urinary output, labs, and treatment plans  - Assess nutrition and hydration status and recommend course of action  - Evaluate amount of meals eaten  - Assist patient with eating if necessary   - Allow adequate time for meals  - Recommend/ encourage appropriate diets, oral nutritional supplements, and vitamin/mineral supplements  - Order, calculate, and assess calorie counts as needed  - Recommend, monitor, and adjust tube feedings and TPN/PPN based on assessed needs  - Assess need for intravenous fluids  - Provide specific nutrition/hydration education as appropriate  - Include patient/family/caregiver in decisions related to nutrition  Outcome: Progressing

## 2022-05-04 NOTE — CASE MANAGEMENT
Case Management Discharge Planning Note    Patient name Vince Griffith  [facility] [id number]  : 1955 Date 2022       Current Admission Date: 2022  Current Admission Diagnosis:Omental metastasis [facility])   Patient Active Problem List    Diagnosis Date Noted    [facility] ) 2022    Stage 3 chronic kidney disease, unspecified whether stage 3a or 3b CKD ([facility] ) 2022    Dehydration 2022    Chemotherapy-induced nausea 2022    Hypertension 12/10/2021    Chemotherapy adverse reaction 12/10/2021    Lesion of ovary 2021    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[facility] ) 2021    PONV (postoperative nausea and vomiting) 10/06/2021    Sigmoid diverticulosis 2020    Dyslipidemia 10/30/2019    Dyspnea on exertion 10/21/2019    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[facility] ) 2019    Morbid obesity ([facility] ) 05/15/2019    Callus of foot 2017    Foot pain 2017    GERD (gastroesophageal reflux disease) 2016    Hyperlipidemia 2015    Prediabetes 2015    Varicose veins with pain 2015      LOS (days): 5  Geometric Mean LOS (GMLOS) (days): 3 60  Days to GMLOS:-1 2     OBJECTIVE:  Risk of Unplanned Readmission Score: 18         Current admission status: Inpatient   Preferred Pharmacy:   [address] [place] [address], [address] [place] [address]  Phone: [phone] Fax: [place], [place] - [address] [facility]  Phone: [phone] Fax: [phone]    Primary Care Provider: DEEPAK Lyons    Primary Insurance: MEDICARE  Secondary Insurance: [employer] FIDELITY    DISCHARGE DETAILS:        ETA of Transport (Date): 22      IMM Given (Date):: 22  IMM Given to[de-identified] Patient     Additional Comments: [facility] able to provide home health nursing care for patient post-discharge   Expected to be cleared for discharge Thursday 5/5  Price for Lovenox $1 35 (as per [facility]) , patient aware

## 2022-05-04 NOTE — PROGRESS NOTES
Progress Note - Surgical Oncology   Theodore Hillman 75 y o  female MRN: [id number]  Unit/Bed#: [facility] 912-01 Encounter: [id number]    Assessment:  74 y/o F w/ metastatic colon cancer, now s/p open DILMA/BSO, omentectomy, splenectomy, diaphragm repair, Left chest tube insertion, HIPEC on 4/29, POD5  Afebrile, VSS  MARIA T LLQ 45 from 60 cc yesterday light serosanguinous  MARIA T LUQ 80 from  70 cc yesterday light serosanguinous  UOP 1 1 L    L CT pulled yesterday; post post chest x-ray showed small left pneumothorax    Plan:  Fulls toast and crackers  Monitor MARIA T x2  DVT prophylaxis  Encourage out of bed and ambulation  P r n  Pain control  Case Management for dispo planning:  Patient set up home health care  Follow-up a m  Chest x-ray    Subjective/Objective   Subjective:   No acute events overnight  Doing well  Pain is controlled  Passing flatus and having bowel movements  Urinating adequately  Tolerating a diet  Denies chest pain or shortness of breath    Objective:     Blood pressure 112/64, pulse 84, temperature 98 7 °F (37 1 °C), resp  rate 20, height 4' 9" (1 448 m), weight 97 6 kg (215 lb 2 7 oz), last menstrual period 09/01/2011, SpO2 95 %, not currently breastfeeding  ,Body mass index is 46 56 kg/m²  Intake/Output Summary (Last 24 hours) at 5/4/2022 0348  Last data filed at [address] [place] [address] Rd  Gross per 24 hour   Intake 483 ml   Output 1680 ml   Net -1197 ml       Invasive Devices  Report    Central Venous Catheter Line            Port A Cath Right Chest -- days          Peripheral Intravenous Line            Peripheral IV 05/03/22 Proximal;Right;Ventral (anterior) Forearm <1 day          Drain            Closed/Suction Drain Left LLQ Bulb 19 Fr  4 days    Closed/Suction Drain Left LUQ Bulb 19 Fr  4 days                Physical Exam  Constitutional:       General: She is not in acute distress  Appearance: She is well-developed  HENT:      Head: Normocephalic and atraumatic     Eyes:      Extraocular Movements: Extraocular movements intact  Cardiovascular:      Rate and Rhythm: Normal rate  Pulmonary:      Effort: Pulmonary effort is normal  No respiratory distress  Comments: Left chest tube dressing clean dry and intact  Abdominal:      General: There is no distension  Palpations: Abdomen is soft  Tenderness: There is abdominal tenderness  There is no guarding or rebound  Comments: Incisions clean dry and intact MARIA T drains x2   Musculoskeletal:         General: Normal range of motion  Cervical back: Normal range of motion  Skin:     General: Skin is warm and dry  Neurological:      Mental Status: She is alert and oriented to person, place, and time  Psychiatric:         Behavior: Behavior normal          Thought Content: Thought content normal          Judgment: Judgment normal          Lab, Imaging and other studies:I have personally reviewed pertinent lab results    , CBC:   Lab Results   Component Value Date    WBC 10 22 (H) 05/03/2022    HGB 8 0 (L) 05/03/2022    HCT 25 8 (L) 05/03/2022    MCV 97 05/03/2022     05/03/2022    MCH 30 1 05/03/2022    MCHC 31 0 (L) 05/03/2022    RDW 17 2 (H) 05/03/2022    MPV 11 0 05/03/2022   , CMP: No results found for: SODIUM, K, CL, CO2, ANIONGAP, BUN, CREATININE, GLUCOSE, CALCIUM, AST, ALT, ALKPHOS, PROT, BILITOT, EGFR  VTE Pharmacologic Prophylaxis: Sequential compression device (Venodyne)  and Heparin  VTE Mechanical Prophylaxis: sequential compression device

## 2022-05-05 ENCOUNTER — TRANSITIONAL CARE MANAGEMENT (OUTPATIENT)
Dept: FAMILY MEDICINE CLINIC | Facility: CLINIC | Age: 67
End: 2022-05-05

## 2022-05-05 VITALS
RESPIRATION RATE: 16 BRPM | SYSTOLIC BLOOD PRESSURE: 130 MMHG | OXYGEN SATURATION: 95 % | WEIGHT: 215.17 LBS | TEMPERATURE: 98.1 F | HEIGHT: 57 IN | HEART RATE: 84 BPM | BODY MASS INDEX: 46.42 KG/M2 | DIASTOLIC BLOOD PRESSURE: 72 MMHG

## 2022-05-05 PROCEDURE — 99024 POSTOP FOLLOW-UP VISIT: CPT | Performed by: STUDENT IN AN ORGANIZED HEALTH CARE EDUCATION/TRAINING PROGRAM

## 2022-05-05 PROCEDURE — 90734 MENACWYD/MENACWYCRM VACC IM: CPT | Performed by: PHYSICIAN ASSISTANT

## 2022-05-05 PROCEDURE — NC001 PR NO CHARGE: Performed by: STUDENT IN AN ORGANIZED HEALTH CARE EDUCATION/TRAINING PROGRAM

## 2022-05-05 PROCEDURE — 90648 HIB PRP-T VACCINE 4 DOSE IM: CPT | Performed by: PHYSICIAN ASSISTANT

## 2022-05-05 RX ORDER — ENOXAPARIN SODIUM 100 MG/ML
40 INJECTION SUBCUTANEOUS EVERY 12 HOURS SCHEDULED
Status: DISCONTINUED | OUTPATIENT
Start: 2022-05-05 | End: 2022-05-05 | Stop reason: HOSPADM

## 2022-05-05 RX ADMIN — GABAPENTIN 300 MG: 300 CAPSULE ORAL at 09:41

## 2022-05-05 RX ADMIN — HAEMOPHILUS B POLYSACCHARIDE CONJUGATE VACCINE FOR INJ 0.5 ML: RECON SOLN at 09:42

## 2022-05-05 RX ADMIN — ENOXAPARIN SODIUM 40 MG: 40 INJECTION SUBCUTANEOUS at 12:53

## 2022-05-05 RX ADMIN — HEPARIN SODIUM 5000 UNITS: 5000 INJECTION INTRAVENOUS; SUBCUTANEOUS at 05:51

## 2022-05-05 RX ADMIN — NEISSERIA MENINGITIDIS GROUP A CAPSULAR POLYSACCHARIDE DIPHTHERIA TOXOID CONJUGATE ANTIGEN, NEISSERIA MENINGITIDIS GROUP C CAPSULAR POLYSACCHARIDE DIPHTHERIA TOXOID CONJUGATE ANTIGEN, NEISSERIA MENINGITIDIS GROUP Y CAPSULAR POLYSACCHARIDE DIPHTHERIA TOXOID CONJUGATE ANTIGEN, AND NEISSERIA MENINGITIDIS GROUP W-135 CAPSULAR POLYSACCHARIDE DIPHTHERIA TOXOID CONJUGATE ANTIGEN 0.5 ML: 4; 4; 4; 4 INJECTION, SOLUTION INTRAMUSCULAR at 09:43

## 2022-05-05 RX ADMIN — CELECOXIB 200 MG: 200 CAPSULE ORAL at 09:48

## 2022-05-05 RX ADMIN — ACETAMINOPHEN 975 MG: 325 TABLET, FILM COATED ORAL at 05:51

## 2022-05-05 NOTE — PROGRESS NOTES
Progress Note - Surgical Oncology   Rafia May 75 y o  female MRN: [id number]  Unit/Bed#: [facility] 912-01 Encounter: [id number]    Assessment:  74 y/o F w/ metastatic colon cancer, now s/p open DILMA/BSO, omentectomy, splenectomy, diaphragm repair, Left chest tube insertion, HIPEC on 4/29, POD6     Vitals stable, afebrile  Labs pending    MARIA T LLQ  60 cc serosanguinous  MARIA T LUQ  45 cc serosanguinous  UOP unrecorded  Stool x4    Plan:  - regular diet  - JPx2, monitor outputs and character, likely discontinue to  - pain control  - SQH  - incentive spirometry  - encourage ambulation  - splenic vaccines    Subjective/Objective   Subjective:   Patient doing well, denies pain, tolerating diet without nausea or emesis, continues to pass flatus and have bowel function, voiding without issues, out of bed ambulating, using incentive spirometry  Objective:     Blood pressure 130/72, pulse 84, temperature 98 1 °F (36 7 °C), resp  rate 16, height 4' 9" (1 448 m), weight 97 6 kg (215 lb 2 7 oz), last menstrual period 09/01/2011, SpO2 95 %, not currently breastfeeding  ,Body mass index is 46 56 kg/m²        Intake/Output Summary (Last 24 hours) at 5/5/2022 7962  Last data filed at 5/4/2022 2028  Gross per 24 hour   Intake --   Output 105 ml   Net -105 ml       Invasive Devices  Report    Central Venous Catheter Line            Port A Cath Right Chest -- days          Peripheral Intravenous Line            Peripheral IV 05/03/22 Proximal;Right;Ventral (anterior) Forearm 2 days          Drain            Closed/Suction Drain Left LLQ Bulb 19 Fr  5 days    Closed/Suction Drain Left LUQ Bulb 19 Fr  5 days                Physical Exam:  General: NAD  Skin: Warm, dry, anicteric  HEENT: Normocephalic, atraumatic  CV: RRR, no m/r/g  Pulm: CTA b/l, no inc WOB  Abd: Soft, ND/NT, MARIA T x2 serosanguineous  MSK: Symmetric, no edema, no tenderness, no deformity  Neuro: AOx3, GCS 15    Lab, Imaging and other studies:I have personally reviewed pertinent lab results    , CBC: No results found for: WBC, HGB, HCT, MCV, PLT, ADJUSTEDWBC, MCH, MCHC, RDW, MPV, NRBC, CMP: No results found for: SODIUM, K, CL, CO2, ANIONGAP, BUN, CREATININE, GLUCOSE, CALCIUM, AST, ALT, ALKPHOS, PROT, BILITOT, EGFR  VTE Pharmacologic Prophylaxis: Sequential compression device (Venodyne)  and Heparin  VTE Mechanical Prophylaxis: sequential compression device

## 2022-05-05 NOTE — DISCHARGE SUMMARY
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]  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[facility] ) [C78 6]    HPI:  Du Crane is a 44-year-old woman was found to have metastatic colorectal cancer in 2019  It was a rectosigmoid mass where patient underwent a low anterior resection with a diverting loop ileostomy  The loop ileostomy was reversed in February of 2020 and patient has been followed since  Her final path pathology of fat tumor was T3 N0  Patient underwent a PET scan since her CEA level was rising from 2 2 to 4 4  The PET scan revealed a hypermetabolic mass in the left pericolic gutter that was 4 5 cm  A colonoscopy did not reveal any abnormality  Patient underwent an IR biopsy of the mass and chemotherapy was initiated  This all occurred before Christmas of 2021  An MRI showed a single metastatic lesion involving her spleen  A single omental implant was seen  Also the MRI of her pelvis revealed an abnormal appearance of her ovary which IR did a biopsy confirming metastatic lesion from colorectal primary  Patient is now being admitted for surgical intervention with Gyn Onc assisting  Procedures Performed:  April 29th, 2022 open total abdominal hysterectomy, bilateral salpingo oophorectomy, omentectomy, splenectomy, diaphragm repair, left chest tube insertion, HIPEC - Dr Yulissa Horta Course:  Patient did well postoperatively  Patient was started on a clear liquid diet by postop day 2  Her pain was controlled with an epidural   Her midline abdominal wound stayed clean and dry with staples intact  She had 2 Lazaro-Tineo drains which were subsequently removed prior to her discharge  Once the epidural catheter was removed by postop day number 5 patient was well pain controlled with gabapentin, Celebrex, oxycodone  Patient was slow to get out of bed and start ambulating the halls  She was working with her incentive spirometer well  Patient's chest tube was removed on postop day number 4  And subsequent chest x-rays revealed very small left pneumothorax post pull  Patient was continued on subcu heparin throughout the admission for DVT prophylaxis  Patient started having bowel movements  She was advanced from a full liquid diet to a regular diet  Splenic vaccines were given on May 5th, 2022  Previously patient got Prevnar 13 on April 13th, 2022 so therefore that was not readministered  Patient has a postop appointment with Dr Maxim Tovar on May 18th, 2022 at 10:30 a m  At the [facility]  Scripts for gabapentin 300 mg t i d  For the next 10 days no refills, Celebrex 200 mg daily, oxycodone 5 mg every 4-6 hours as needed for pain with 28 pills prescribed in no refills  were sent to patient's pharmacy  Lovenox 40 mg subQ q 12 hours for the next 25 days was also sent to her pharmacy  Case management has been working with the patient and therefore will be sent home with VNA  She is not hesitate to call the office for any fevers of 101 5 or higher increasing abdominal pain or vomiting  Patient's platelet count post splenectomy on day of discharge was 252  She will be getting CBCs with platelet counts weekly with results going to Dr Maxim Tovar  Pathology pending    Complications:  None    Discharge Diagnosis:  Metastatic colon cancer    Discharge Date:  May 5th, 2022    Condition at Discharge:  Good    Discharge instructions/Information to patient and family:   See after visit summary for information provided to patient and family  Provisions for Follow-Up Care:  See after visit summary for information related to follow-up care and any pertinent home health orders  Disposition:  Home with VNA    Planned Readmission:  No    Discharge Statement   I spent 45 minutes discharging the patient   This time was spent on the day of discharge  I had direct contact with the patient on the day of discharge  Additional documentation is required if more than 30 minutes were spent on discharge  Discharge Medications:  See after visit summary for reconciled discharge medications provided to patient and family

## 2022-05-05 NOTE — CASE MANAGEMENT
Case Management Discharge Planning Note    Patient name Lashae Selby  Location [facility] [address] 912/PPHP 617-85 MRN [id number]  : 1955 Date 2022       Current Admission Date: 2022  Current Admission Diagnosis:Omental metastasis [facility])   Patient Active Problem List    Diagnosis Date Noted    Platelets decreased ([facility] ) 2022    Stage 3 chronic kidney disease, unspecified whether stage 3a or 3b CKD ([facility] ) 2022    Dehydration 2022    Chemotherapy-induced nausea 2022    Hypertension 12/10/2021    Chemotherapy adverse reaction 12/10/2021    Lesion of ovary 2021    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[facility] ) 2021    PONV (postoperative nausea and vomiting) 10/06/2021    Sigmoid diverticulosis 2020    Dyslipidemia 10/30/2019    Dyspnea on exertion 10/21/2019    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[facility] ) 2019    Morbid obesity ([facility] ) 05/15/2019    Callus of foot 2017    Foot pain 2017    GERD (gastroesophageal reflux disease) 2016    Hyperlipidemia 2015    Prediabetes 2015    Varicose veins with pain 2015      LOS (days): 6  Geometric Mean LOS (GMLOS) (days): 4 60  Days to GMLOS:-1 1     OBJECTIVE:  Risk of Unplanned Readmission Score: 16         Current admission status: Inpatient   Preferred Pharmacy:   [address] [place] [address], [address] [place] [address] Road  [address]  [place] [address]  Phone: [phone] Fax: Rainer Alvarez [address] [facility] [address] [facility]  Phone: [phone] Fax: [phone]    Primary Care Provider: DEEPAK Barajas    Primary Insurance: MEDICARE  Secondary Insurance: [employer]    DISCHARGE DETAILS:               Additional Comments: Patient cleared for discharge today, home with [facility] for nursing  Daughter to provide discharge transportation  AVS faxed to [facility]       Message sent to [facility], patient will be residing at daughter's home on [facility], for p/u on 5/18 follow-up with Dr Juan Grajeda  Discussed with patient she will be receiving first dose of Lovenox here at hospital prior to discharge, she will arrange for family to  her meds at [facility] post-discharge  Daughter will be providing transport this afternoon after 1530

## 2022-05-13 ENCOUNTER — TELEPHONE (OUTPATIENT)
Dept: HEMATOLOGY ONCOLOGY | Facility: CLINIC | Age: 67
End: 2022-05-13

## 2022-05-13 NOTE — TELEPHONE ENCOUNTER
Patient calling in to confirm [facility]  location for appt 5/18 Dr Meri Knox at 10:30am  Contact [facility] to confirm she has been scheduled and   location

## 2022-05-16 ENCOUNTER — TELEPHONE (OUTPATIENT)
Dept: HEMATOLOGY ONCOLOGY | Facility: CLINIC | Age: 67
End: 2022-05-16

## 2022-05-16 ENCOUNTER — TELEPHONE (OUTPATIENT)
Dept: SURGICAL ONCOLOGY | Facility: CLINIC | Age: 67
End: 2022-05-16

## 2022-05-16 ENCOUNTER — OFFICE VISIT (OUTPATIENT)
Dept: FAMILY MEDICINE CLINIC | Facility: CLINIC | Age: 67
End: 2022-05-16
Payer: MEDICARE

## 2022-05-16 VITALS
OXYGEN SATURATION: 97 % | HEIGHT: 57 IN | DIASTOLIC BLOOD PRESSURE: 60 MMHG | BODY MASS INDEX: 43.15 KG/M2 | TEMPERATURE: 97.6 F | WEIGHT: 200 LBS | SYSTOLIC BLOOD PRESSURE: 100 MMHG | HEART RATE: 100 BPM | RESPIRATION RATE: 20 BRPM

## 2022-05-16 DIAGNOSIS — C78.6 OMENTAL METASTASIS (HCC): Primary | ICD-10-CM

## 2022-05-16 DIAGNOSIS — C19 RECTOSIGMOID CANCER (HCC): ICD-10-CM

## 2022-05-16 DIAGNOSIS — Z23 NEED FOR VACCINATION: ICD-10-CM

## 2022-05-16 DIAGNOSIS — K21.9 GASTROESOPHAGEAL REFLUX DISEASE, UNSPECIFIED WHETHER ESOPHAGITIS PRESENT: ICD-10-CM

## 2022-05-16 DIAGNOSIS — Q89.01 ASPLENIA: ICD-10-CM

## 2022-05-16 DIAGNOSIS — E78.49 OTHER HYPERLIPIDEMIA: ICD-10-CM

## 2022-05-16 PROBLEM — Z90.81 HISTORY OF SPLENECTOMY: Status: ACTIVE | Noted: 2022-05-16

## 2022-05-16 PROBLEM — I10 HYPERTENSION: Status: RESOLVED | Noted: 2021-12-10 | Resolved: 2022-05-16

## 2022-05-16 PROCEDURE — 99495 TRANSJ CARE MGMT MOD F2F 14D: CPT | Performed by: NURSE PRACTITIONER

## 2022-05-16 PROCEDURE — 90471 IMMUNIZATION ADMIN: CPT | Performed by: NURSE PRACTITIONER

## 2022-05-16 PROCEDURE — 90715 TDAP VACCINE 7 YRS/> IM: CPT | Performed by: NURSE PRACTITIONER

## 2022-05-16 NOTE — TELEPHONE ENCOUNTER
Attempted to contact pt about rescheduling f/u appt with Dr Dewayne Bolaños on 5/18  Unable to leave VM

## 2022-05-16 NOTE — TELEPHONE ENCOUNTER
Patient calling to speak with Cynthia regarding r/s appt for Dr Anoop Brown  As per Cynthia, I transferred the call to her

## 2022-05-16 NOTE — PROGRESS NOTES
Assessment/Plan:    1  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[facility] [address] )  Comments:  will be following with surgical and GYN oncologist and doing well at this time and will continue with lovenox as advised    2  Asplenia  -     TDAP VACCINE GREATER THAN OR EQUAL TO 6YO IM    3  Need for vaccination  -     TDAP VACCINE GREATER THAN OR EQUAL TO 6YO IM    4  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[facility] )    5  Other hyperlipidemia  Comments:  complaint with zetia and tolerating it well    6  Gastroesophageal reflux disease, unspecified whether esophagitis present  Comments:  stable with current regimen        Patient Instructions:  Supportive care discussed and advised  Advised to RTO for any worsening and no improvement  Follow up for no improvement and worsening of conditions  Patient advised and educated when to see immediate medical care  Return if symptoms worsen or fail to improve  Future Appointments   Date Time Provider Colton Cunningham   5/18/2022 10:30 AM Mel Patterson MD [facility]G [facility]   6/1/2022  1:00 PM David Castrejon PA-C GYN ONC [facility]           Subjective:      Patient ID: Leonid Swift is a 75 y o  female  Chief Complaint   Patient presents with    Transition of Care Management     Lashay Ahmadi MA          Vitals:  /60   Pulse 100   Temp 97 6 °F (36 4 °C)   Resp 20   Ht 4' 9" (1 448 m)   Wt 90 7 kg (200 lb)   LMP 09/01/2011 (Approximate)   SpO2 97%   BMI 43 28 kg/m²     HPI  Patient is following up after recent surgery of  DIAGNOSTIC LAPAROSCOPY, TOTAL ABDOMINAL HYSTERECTOMY, BILATERAL SALPINGO-OOPHORECTOMY, EXTENSIVE ADHESIOLYSIS (N/A Abdomen)       DIAGNOSTIC LAPAROSCOPY, OPEN OMENTECTOMY, SPLENECTOMY, DIAPHRAGM REPAIR, CHEST TUBE INSERTION (N/A Abdomen)       INSTILLATION CHEMOTHERAPY INTRAPERITONEAL (HIPEC) LAPAROTOMY (N/A Abdomen)       REPAIR DIAPHRAGM TEAR (Chest)  Patient stated that doing well and denies any abdominal pain, nausea, vomiting, diarrhea and constipation  Tolerating food  Stated that initially after eating was getting some left sided abdominal pain and under rib cage area but now better  Denies any fever and sob  Vaccines reviewed and due for tdap and will administer that    TCM Call (since 4/15/2022)     Date and time call was made  5/5/2022  3:59 PM    Hospital care reviewed  Records reviewed    Patient was hospitialized at  [facility]    Date of Admission  04/29/22    Date of discharge  05/05/22    Diagnosis  Omental metastasis    Disposition  Home    Were the patients medications reviewed and updated  Yes    Current Symptoms  --  Left abdomen discomfort, somewhat controlled with Tylenol      TCM Call (since 4/15/2022)     Post hospital issues  None    Should patient be enrolled in anticoag monitoring? No    Scheduled for follow up? Yes    Patients specialists  Other (comment)    Other specialists names  Dr Hina Newberry, Surgical oncology    Did you obtain your prescribed medications  Yes    Do you need help managing your prescriptions or medications  No    Is transportation to your appointment needed  No    I have advised the patient to call PCP with any new or worsening symptoms  [place]  Family    The type of support provided  Physical; Emotional    Do you have social support  Yes, as much as I need    Are you recieving any outpatient services  No    Are you recieving home care services  Yes    Types of home care services  Nurse visit    Interperter language line needed  No    Counseling  Patient    Counseling topics  Activities of daily living; Importance of RX compliance; patient and family education; instructions for management; Risk factor reduction    Comments  I spoke with Rosales Eagle who states that she is doing ok but still having muscle spasms, left abdomen  She did not receive the Celebrex from the pharmacy and she will call to inquire if this is ready for     If any issues with this rx, she will let us know  I told her to please follow up if any pain control issues  She knows to call surgeon if any fevers, vomiting, not moving her bowels  etc Julia Pizarro LPN                          The following portions of the patient's history were reviewed and updated as appropriate: allergies, current medications, past family history, past medical history, past social history, past surgical history and problem list       Review of Systems   Constitutional: Negative  Respiratory: Negative  Cardiovascular: Negative  Gastrointestinal: Negative  Skin: Positive for wound (surgical wound on mid abdomen area)  Neurological: Negative  Objective:    Social History     Tobacco Use   Smoking Status Never Smoker   Smokeless Tobacco Never Used       Allergies:    Allergies   Allergen Reactions    Medical Tape Rash     Skin irritation  Skin peeling  Skin irritation  Skin peeling  Blisters  Rash         Current Outpatient Medications   Medication Sig Dispense Refill    acetaminophen (TYLENOL) 325 mg tablet Take 3 tablets (975 mg total) by mouth every 8 (eight) hours  0    docusate sodium (COLACE) 100 mg capsule Take 100 mg by mouth 2 (two) times a day as needed        enoxaparin (enoxaparin) 40 mg/0 4 mL Inject 0 4 mL (40 mg total) under the skin every 12 (twelve) hours for 25 days 20 mL 0    ezetimibe (ZETIA) 10 mg tablet Take 1 tablet (10 mg total) by mouth daily (Patient taking differently: Take 10 mg by mouth daily at bedtime) 90 tablet 1    famotidine (PEPCID) 20 mg tablet Take 1 tablet (20 mg total) by mouth 2 (two) times a day (Patient taking differently: Take 20 mg by mouth as needed in the morning ) 180 tablet 1    gabapentin (NEURONTIN) 300 mg capsule Take 1 capsule (300 mg total) by mouth 3 (three) times a day for 10 days 30 capsule 0    ondansetron (ZOFRAN) 8 mg tablet Take 1 tablet (8 mg total) by mouth every 12 (twelve) hours as needed for nausea or vomiting 20 tablet 2    polyethylene glycol (MIRALAX) 17 g packet Take 17 g by mouth daily  0    prochlorperazine (COMPAZINE) 10 mg tablet Take 1 tablet (10 mg total) by mouth every 6 (six) hours as needed for nausea or vomiting 60 tablet 0    senna (SENOKOT) 8 6 mg Take 1 tablet (8 6 mg total) by mouth 2 (two) times a day  0    celecoxib (CeleBREX) 200 mg capsule Take 1 capsule (200 mg total) by mouth daily for 10 days (Patient not taking: Reported on 5/16/2022) 10 capsule 0     No current facility-administered medications for this visit  Physical Exam  Vitals reviewed  Constitutional:       Appearance: Normal appearance  She is well-developed  HENT:      Head: Normocephalic and atraumatic  Nose: Nose normal    Eyes:      Conjunctiva/sclera: Conjunctivae normal    Cardiovascular:      Rate and Rhythm: Normal rate and regular rhythm  Pulses: Normal pulses  Heart sounds: Normal heart sounds  Pulmonary:      Effort: Pulmonary effort is normal       Breath sounds: Normal breath sounds  Abdominal:      General: Bowel sounds are normal       Palpations: Abdomen is soft  Tenderness: There is no abdominal tenderness  Skin:     General: Skin is warm and dry  Comments: Midline abdominal surgical incision with staples on  staples area intact and well approximated and no signs of infection noted   Neurological:      Mental Status: She is alert and oriented to person, place, and time  Psychiatric:         Mood and Affect: Mood normal          Behavior: Behavior normal          Thought Content:  Thought content normal          Judgment: Judgment normal                      Enrigue Collet, CRNP

## 2022-05-16 NOTE — TELEPHONE ENCOUNTER
Tc to pt to notify that [facility] has been set up for her f/u appt with Dr Sameera Pulido on 5/19 at 1pm  Pt verbalized understanding

## 2022-05-17 ENCOUNTER — DOCUMENTATION (OUTPATIENT)
Dept: HEMATOLOGY ONCOLOGY | Facility: MEDICAL CENTER | Age: 67
End: 2022-05-17

## 2022-05-17 NOTE — PROGRESS NOTES
I tried leaving a message for patient but her voicemail was full  I have scheduled her follow up with Odalys Calvert for Thurs 6/2/22 at 10:30am   I asked her to call us if this date and time does not work for her

## 2022-05-19 ENCOUNTER — OFFICE VISIT (OUTPATIENT)
Dept: SURGICAL ONCOLOGY | Facility: CLINIC | Age: 67
End: 2022-05-19

## 2022-05-19 VITALS
WEIGHT: 192 LBS | HEART RATE: 99 BPM | RESPIRATION RATE: 16 BRPM | SYSTOLIC BLOOD PRESSURE: 120 MMHG | OXYGEN SATURATION: 99 % | TEMPERATURE: 98.1 F | BODY MASS INDEX: 41.42 KG/M2 | DIASTOLIC BLOOD PRESSURE: 78 MMHG | HEIGHT: 57 IN

## 2022-05-19 DIAGNOSIS — C18.7 MALIGNANT NEOPLASM OF SIGMOID COLON (HCC): ICD-10-CM

## 2022-05-19 DIAGNOSIS — C78.6 OMENTAL METASTASIS (HCC): ICD-10-CM

## 2022-05-19 DIAGNOSIS — C19 RECTOSIGMOID CANCER (HCC): Primary | ICD-10-CM

## 2022-05-19 PROCEDURE — 99024 POSTOP FOLLOW-UP VISIT: CPT | Performed by: STUDENT IN AN ORGANIZED HEALTH CARE EDUCATION/TRAINING PROGRAM

## 2022-05-19 NOTE — PROGRESS NOTES
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[address] )    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[address] )  -     CT chest abdomen pelvis w contrast; Future  -     CEA; Future    Malignant neoplasm of sigmoid colon ([address] )   -     CEA; Future        Chief Complaint   Patient presents with    Post-op       Return in about 4 months (around 9/19/2022) for Office Visit, Imaging - See orders, Labs - See Treatment Plan  Oncology History   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[address] )   9/23/2019 Initial Diagnosis    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[address] )     12/10/2019 Surgery    Low anterior resection (Dr Kelsea Henderson):    A  Rectosigmoid colon, low anterior resection:  - Adenocarcinoma, moderately differentiated  - Tumor invades through the muscularis propria into pericolorectal tissue, T3  - Diverticula  - All margins are negative for tumor   - Thirty-four lymph nodes, negative for malignancy (0/34)  B  Colon, anastomotic rings, resection:  - Two portions of colon with no pathologic abnormality     12/10/2019 Genomic Testing    RRESULTS OF IMMUNOHISTOCHEMICAL ANALYSIS FOR MISMATCH REPAIR PROTEIN LOSS  INTERPRETATION: NO LOSS OF NUCLEAR EXPRESSION OF MMR PROTEINS: LOW PROBABILITY OF MSI-H  Note: Background non-neoplastic tissue and/or internal control with intact nuclear expression        RESULTS:  Antibody          Clone               Description                           Results  MLH1               M1                  Mismatch repair protein       Intact nuclear expression  MSH2              [id number]       Mismatch repair protein       Intact nuclear expression  MSH6 40                   Mismatch repair protein       Intact nuclear expression  PMS2              [id number]         Mismatch repair protein       Intact nuclear expression     11/12/2021 Biopsy    Omental mass:  - Metastatic adenocarcinoma, with an immunophenotype compatible with metastasis from patient's known colonic primary       12/10/2021 - 12/10/2021 Chemotherapy    capecitabine (XELODA), 850 mg/m2 = 1,600 mg (100 % of original dose 850 mg/m2), Oral, 2 times daily with meals, 1 of 8 cycles  Dose modification: 850 mg/m2 (100 % of original dose 850 mg/m2, Cycle 1, Reason: Other (see comments))  fosaprepitant (EMEND) IVPB, 150 mg, Intravenous, Once, 1 of 1 cycle  Administration: 150 mg (12/10/2021)  oxaliplatin (ELOXATIN) chemo infusion, 244 4 mg, Intravenous, Once, 1 of 8 cycles  Administration: 250 mg (12/10/2021)     1/4/2022 -  Chemotherapy    fluorouracil (ADRUCIL), 300 mg/m2 = 560 mg (75 % of original dose 400 mg/m2), Intravenous, Once, 6 of 12 cycles  Dose modification: 300 mg/m2 (75 % of original dose 400 mg/m2, Cycle 1, Reason: Dose Not Tolerated), 340 mg/m2 (85 % of original dose 400 mg/m2, Cycle 4, Reason: Other (see comments)), 340 mg/m2 (85 % of original dose 400 mg/m2, Cycle 5, Reason: Other (see comments))  Administration: 560 mg (1/4/2022), 560 mg (1/18/2022), 560 mg (2/1/2022), 635 mg (2/15/2022), 635 mg (3/1/2022), 635 mg (3/15/2022)  leucovorin calcium IVPB, 300 mg/m2 = 561 mg (75 % of original dose 400 mg/m2), Intravenous, Once, 6 of 12 cycles  Dose modification: 300 mg/m2 (75 % of original dose 400 mg/m2, Cycle 1, Reason: Dose Not Tolerated), 340 mg/m2 (85 % of original dose 400 mg/m2, Cycle 4, Reason: Other (see comments)), 340 mg/m2 (85 % of original dose 400 mg/m2, Cycle 5, Reason: Other (see comments))  Administration: 561 mg (1/4/2022), 561 mg (1/18/2022), 561 mg (2/1/2022), 636 mg (2/15/2022), 636 mg (3/1/2022), 636 mg (3/15/2022)  oxaliplatin (ELOXATIN) chemo infusion, 63 75 mg/m2 = 119 2125 mg (75 % of original dose 85 mg/m2), Intravenous, Once, 6 of 12 cycles  Dose modification: 63 75 mg/m2 (75 % of original dose 85 mg/m2, Cycle 1, Reason: Dose Not Tolerated), 72 25 mg/m2 (85 % of original dose 85 mg/m2, Cycle 4, Reason: Other (see comments)), 72 25 mg/m2 (85 % of original dose 85 mg/m2, Cycle 5, Reason: Other (see comments))  Administration: 119 2125 mg (1/4/2022), 119 2125 mg (1/18/2022), 119 2125 mg (2/1/2022), 135 1075 mg (2/15/2022), 135 1075 mg (3/1/2022), 135 1075 mg (3/15/2022)  fluorouracil (ADRUCIL) ambulatory infusion Soln, 900 mg/m2/day = 3,365 mg (75 % of original dose 1,200 mg/m2/day), Intravenous, Over 46 hours, 6 of 12 cycles  Dose modification: 900 mg/m2/day (75 % of original dose 1,200 mg/m2/day, Cycle 2, Reason: Other (see comments)), 1,020 mg/m2/day (85 % of original dose 1,200 mg/m2/day, Cycle 4, Reason: Other (see comments), Comment: anticipated tolerance)     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HCC)   11/29/2021 Initial Diagnosis    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[facility] )     12/10/2021 - 12/10/2021 Chemotherapy    capecitabine (XELODA), 850 mg/m2 = 1,600 mg (100 % of original dose 850 mg/m2), Oral, 2 times daily with meals, 1 of 8 cycles  Dose modification: 850 mg/m2 (100 % of original dose 850 mg/m2, Cycle 1, Reason: Other (see comments))  fosaprepitant (EMEND) IVPB, 150 mg, Intravenous, Once, 1 of 1 cycle  Administration: 150 mg (12/10/2021)  oxaliplatin (ELOXATIN) chemo infusion, 244 4 mg, Intravenous, Once, 1 of 8 cycles  Administration: 250 mg (12/10/2021)     1/4/2022 -  Chemotherapy    fluorouracil (ADRUCIL), 300 mg/m2 = 560 mg (75 % of original dose 400 mg/m2), Intravenous, Once, 6 of 12 cycles  Dose modification: 300 mg/m2 (75 % of original dose 400 mg/m2, Cycle 1, Reason: Dose Not Tolerated), 340 mg/m2 (85 % of original dose 400 mg/m2, Cycle 4, Reason: Other (see comments)), 340 mg/m2 (85 % of original dose 400 mg/m2, Cycle 5, Reason: Other (see comments))  Administration: 560 mg (1/4/2022), 560 mg (1/18/2022), 560 mg (2/1/2022), 635 mg (2/15/2022), 635 mg (3/1/2022), 635 mg (3/15/2022)  leucovorin calcium IVPB, 300 mg/m2 = 561 mg (75 % of original dose 400 mg/m2), Intravenous, Once, 6 of 12 cycles  Dose modification: 300 mg/m2 (75 % of original dose 400 mg/m2, Cycle 1, Reason: Dose Not Tolerated), 340 mg/m2 (85 % of original dose 400 mg/m2, Cycle 4, Reason: Other (see comments)), 340 mg/m2 (85 % of original dose 400 mg/m2, Cycle 5, Reason: Other (see comments))  Administration: 561 mg (1/4/2022), 561 mg (1/18/2022), 561 mg (2/1/2022), 636 mg (2/15/2022), 636 mg (3/1/2022), 636 mg (3/15/2022)  oxaliplatin (ELOXATIN) chemo infusion, 63 75 mg/m2 = 119 2125 mg (75 % of original dose 85 mg/m2), Intravenous, Once, 6 of 12 cycles  Dose modification: 63 75 mg/m2 (75 % of original dose 85 mg/m2, Cycle 1, Reason: Dose Not Tolerated), 72 25 mg/m2 (85 % of original dose 85 mg/m2, Cycle 4, Reason: Other (see comments)), 72 25 mg/m2 (85 % of original dose 85 mg/m2, Cycle 5, Reason: Other (see comments))  Administration: 119 2125 mg (1/4/2022), 119 2125 mg (1/18/2022), 119 2125 mg (2/1/2022), 135 1075 mg (2/15/2022), 135 1075 mg (3/1/2022), 135 1075 mg (3/15/2022)  fluorouracil (ADRUCIL) ambulatory infusion Soln, 900 mg/m2/day = 3,365 mg (75 % of original dose 1,200 mg/m2/day), Intravenous, Over 46 hours, 6 of 12 cycles  Dose modification: 900 mg/m2/day (75 % of original dose 1,200 mg/m2/day, Cycle 2, Reason: Other (see comments)), 1,020 mg/m2/day (85 % of original dose 1,200 mg/m2/day, Cycle 4, Reason: Other (see comments), Comment: anticipated tolerance)     4/29/2022 Surgery    Final Diagnosis   A  Uterus, Bilateral Ovaries and Fallopian Tubes; Hysterosalpingo-oophorectomy:  - Leiomyoma  - Left ovary with serous cystadenoma  - Unremarkable cervix  - Benign inactive endometrium with no specific pathologic change  - Unremarkable right ovary and bilateral fallopian tubes  B   Spleen, spleen, omentum, darotis:  - Metastatic adenocarcinoma involving spleen and omentum, consistent with colonic primary  See Note  History of Present Illness  :   Miss Maribeth Ugarte is seen here today for a new diagnosis of a likely metastatic colorectal cancer  Briefly, the patient underwent screening colonoscopy in late 2019  This detected a rectosigmoid mass, which was then resected in December of 2019  Surgery required a diverting loop ileostomy, which was subsequently reversed in February of 2020  Of note, preop MRI suggested a T3bN1 lesion above the peritoneal reflection, and upfront surgery was recommended  This revealed a final path T3 N0 cancer with no aggressive features and no adjuvant therapy was recommended by her surgeon Dr Marcos Ortiz  Since that time, the patient has been doing well  She had a CT 2/2021 which demonstrated ISIAH  She recently underwent a PET scan due to a rising CEA (4 4 from 2 2 posotp), and this demonstrated a large 4 5 cm left pericolic gutter markedly hypermetabolic mass  There was mild increased activity at the colon anastomosis  Subsequent colonoscopy revealed no abnormality at this location  She denies any systemic symptoms including weight loss, nausea, vomiting, changes in her bowel habits  I described that this juncture, a PET scan demonstrates a highly metabolic mass, and more detailed imaging is necessary  I described that an MRI will give us a detailed image of her abdomen and will allow was to ascertain whether there is a high suspicion for other peritoneal implants  This will change my surgical recommendations of resection of the single metastasis versus a potential cytoreductive surgery with HIPEC  Likewise, depending on the burden of disease, I may recommend that she proceed chemotherapy before surgery  I recommend an IR biopsy in order to determine her current markers to more properly select chemotherapy regimen    I will refer her to Dr Dalia Childers with medical oncology for assessment  Finally, given her family history of colorectal cancer, I willr efer her to onc genetics for assessment  She will f/u following these studies  Interval History 11/30/21  Patient returns today after the above studies  MRI of the abdomen revealed a single metastatic lesion invading the spleen  No evidence of other peritoneal implants  MRI of the pelvis revealed a concerning appearance of the ovary  This was followed by a transvaginal ultrasound, which ultimately determined that the ovary appeared benign  She then underwent interventional radiology biopsy, which confirmed a metastatic lesion from a colorectal primary  The patient has been doing very well with no new developments  We discussed that the treatment for this will require chemotherapy as well as surgical resection  I think that a robotic approach would be beneficial given the patient's overall body habitus  The patient would strongly prefer not to be in the hospital postoperatively around the holidays, and I have discussed this with Dr Ab Luis, who agrees that initiating chemotherapy now is reasonable  I will therefore see the patient in 2-3 months with a repeat CT scan of the chest abdomen and pelvis  Interval History 4/6/22  The patient returns in follow-up today  She has been undergoing FOLFOX with decent toleratation of treatment  She does describe some cold hypersensitivity  Today she is doing well with no abdominal pain, nausea vomiting, constipation or diarrhea  Follow-up scan showed overall decrease in the size of a single omental implant with less involvement of the spleen  No evidence of other disease in the chest abdomen or pelvis with the exception of the multicystic disease noted in the pelvis  5/19/22  Peter Rehman is seen postop after open omentectomy, splenectomy, resection of involved diaphragm with primary repair, hysterectomy and oophorectomy with HIPEC    The postoperative course was relatively uneventful  She did have a chest tube in for some time after her diaphragm repair but has had no sequela of shortness of breath, fever, chills, fluid collection  She does complain of occasional left upper quadrant pain  No fevers, chills, concerns about her incision  She is getting worked around well, eating well, having regular bowel movements  Again, no fever, chills, other systemic symptoms  Path was reviewed with a single large omental implant and no other evidence of disease  Review of Systems  Complete ROS Surg Onc:   Constitutional: The patient denies new or recent history of general fatigue, no recent weight loss, no change in appetite  Eyes: No complaints of visual problems, no scleral icterus  ENT: No complaints of ear pain, no hoarseness, no difficulty swallowing,  no tinnitus and no new masses in head, oral cavity, or neck  Cardiovascular: No complaints of chest pain, no palpitations, no ankle edema  Respiratory: No complaints of shortness of breath, no cough  Gastrointestinal: No complaints of jaundice, no bloody stools, no pale stools  Genitourinary: No complaints of dysuria, no hematuria, no nocturia, no frequent urination, no urethral discharge  Musculoskeletal: No complaints of weakness, paralysis, joint stiffness or arthralgias  Integumentary: No complaints of rash, no new lesions  Neurological: No complaints of convulsions, no seizures, no dizziness  Hematologic/Lymphatic: No complaints of easy bruising  Endocrine:  ENDORSES cold intolerance  No polydipsia, polyphagia, or polyuria  Allergy/immunology:  No environmental allergies  No food allergies  Not immunocompromised        Patient Active Problem List   Diagnosis    Callus of foot    Foot pain    GERD (gastroesophageal reflux disease)    Hyperlipidemia    Prediabetes    Varicose veins with pain    Morbid obesity ([address] )    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HCC)    Dyspnea on exertion    Dyslipidemia    Sigmoid diverticulosis    PONV (postoperative nausea and vomiting)    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HCC)    Lesion of ovary    Chemotherapy adverse reaction    Dehydration    Chemotherapy-induced nausea    Platelets decreased (HCC)    Stage 3 chronic kidney disease, unspecified whether stage 3a or 3b CKD (HCC)    History of splenectomy    Asplenia     Past Medical History:   Diagnosis Date    Blood in stool     Breast disorder     Cancer ([facility] )     rectal    Cancer determined by colorectal biopsy ([facility] )     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    Colon polyp     GERD (gastroesophageal reflux disease)     History of rectal surgery     Hyperlipidemia     PONV (postoperative nausea and vomiting)      Past Surgical History:   Procedure Laterality Date    BREAST LUMPECTOMY Right      SECTION      x3    COLON SIGMOID RESECTION      COLONOSCOPY      DILATION AND CURETTAGE OF UTERUS      ILEO LOOP DIVERSION N/A 12/10/2019    Procedure: ILEOSTOMY LOOP;  Surgeon: Anthony Phan MD;  [facility];  Service: Robotics    INSTILLATION CHEMOTHERAPY INTRAPERITONEAL (HIPEC) LAPAROTOMY N/A 2022    Procedure: INSTILLATION CHEMOTHERAPY INTRAPERITONEAL (HIPEC) LAPAROTOMY;  Surgeon: Ivan Taylor MD;  [facility];  Service: Surgical Oncology    IR BIOPSY OMENTUM  2021    IR PORT PLACEMENT  2021    OMENTECTOMY N/A 2022    Procedure: DIAGNOSTIC LAPAROSCOPY, OPEN OMENTECTOMY, SPLENECTOMY, DIAPHRAGM REPAIR, CHEST TUBE INSERTION;  Surgeon: Ivan Taylor MD;  [facility];  Service: Surgical Oncology    MD CLOSE ENTEROSTOMY N/A 2020    Procedure: CLOSURE ILEOSTOMY;  Surgeon: Anthony Phan MD;  [facility];  Service: Colorectal    MD DIAPHRAGM SURG [address] UNLISTED  2022    Procedure: REPAIR DIAPHRAGM TEAR;  Surgeon: Juan C Diez MD;  [facility];  Service: Thoracic    MD LAP,SURG,COLECTOMY, PARTIAL, W/ANAST N/A 12/10/2019    Procedure: SIGMOID RESECTION COLON LAPAROSCOPIC W ROBOTICS converted to lap hand assisted  with Partial proctectomy , LASER FLUORESCENCE ANGIOGRAPHY, INTRA OP COLONOSCOPY;  Surgeon: Sandip Marks MD;  [facility];  Service: Robotics    GA TOTAL ABDOM HYSTERECTOMY N/A 4/29/2022    Procedure: DIAGNOSTIC LAPAROSCOPY, TOTAL ABDOMINAL HYSTERECTOMY, BILATERAL SALPINGO-OOPHORECTOMY, EXTENSIVE ADHESIOLYSIS;  Surgeon: Deo Mann MD;  [facility];  Service: Gynecology Oncology    SMALL INTESTINE SURGERY  2/4/2020    Procedure: RESECTION SMALL BOWEL;  Surgeon: Sandip Marks MD;  [facility];  Service: Colorectal     Family History   Problem Relation Age of Onset    Hypertension Mother     Heart attack Mother     Heart attack Father     Heart disease Father     Hypertension Brother     Heart attack Brother     Leukemia Cousin     Breast cancer Cousin     Diabetes Cousin     Breast cancer Family         maternal side    Colon cancer Family         paternal uncle    Colon cancer Paternal Uncle     Stroke Neg Hx      Social History     Socioeconomic History    Marital status:      Spouse name: Not on file    Number of children: Not on file    Years of education: Not on file    Highest education level: Not on file   Occupational History    Not on file   Tobacco Use    Smoking status: Never Smoker    Smokeless tobacco: Never Used   Vaping Use    Vaping Use: Never used   Substance and Sexual Activity    Alcohol use: Yes     Comment: "occasionally"    Drug use: Never    Sexual activity: Not on file   Other Topics Concern    Not on file   Social History Narrative    Not on file     Social Determinants of Health     Financial Resource Strain: Not on file   Food Insecurity: No Food Insecurity    Worried About Running Out of Food in the Last Year: Never true    Uri of Food in the Last Year: Never true   Transportation Needs: No Transportation Needs    Lack of Transportation (Medical): No    Lack of Transportation (Non-Medical):  No   Physical Activity: Not on file   Stress: Not on file   Social Connections: Not on file   Intimate Partner Violence: Not on file   Housing Stability: Unknown    Unable to Pay for Housing in the Last Year: No    Number of Places Lived in the Last Year: Not on file    Unstable Housing in the Last Year: No       Current Outpatient Medications:     acetaminophen (TYLENOL) 325 mg tablet, Take 3 tablets (975 mg total) by mouth every 8 (eight) hours, Disp: , Rfl: 0    docusate sodium (COLACE) 100 mg capsule, Take 100 mg by mouth 2 (two) times a day as needed  , Disp: , Rfl:     enoxaparin (enoxaparin) 40 mg/0 4 mL, Inject 0 4 mL (40 mg total) under the skin every 12 (twelve) hours for 25 days, Disp: 20 mL, Rfl: 0    ezetimibe (ZETIA) 10 mg tablet, Take 1 tablet (10 mg total) by mouth daily (Patient taking differently: Take 10 mg by mouth daily at bedtime), Disp: 90 tablet, Rfl: 1    famotidine (PEPCID) 20 mg tablet, Take 1 tablet (20 mg total) by mouth 2 (two) times a day (Patient taking differently: Take 20 mg by mouth as needed in the morning ), Disp: 180 tablet, Rfl: 1    ondansetron (ZOFRAN) 8 mg tablet, Take 1 tablet (8 mg total) by mouth every 12 (twelve) hours as needed for nausea or vomiting, Disp: 20 tablet, Rfl: 2    polyethylene glycol (MIRALAX) 17 g packet, Take 17 g by mouth daily, Disp: , Rfl: 0    prochlorperazine (COMPAZINE) 10 mg tablet, Take 1 tablet (10 mg total) by mouth every 6 (six) hours as needed for nausea or vomiting, Disp: 60 tablet, Rfl: 0    senna (SENOKOT) 8 6 mg, Take 1 tablet (8 6 mg total) by mouth 2 (two) times a day, Disp: , Rfl: 0    celecoxib (CeleBREX) 200 mg capsule, Take 1 capsule (200 mg total) by mouth daily for 10 days (Patient not taking: No sig reported), Disp: 10 capsule, Rfl: 0    gabapentin (NEURONTIN) 300 mg capsule, Take 1 capsule (300 mg total) by mouth 3 (three) times a day for 10 days (Patient not taking: Reported on 5/19/2022), Disp: 30 capsule, Rfl: 0  Allergies   Allergen Reactions    Medical Tape Rash     Skin irritation  Skin peeling  Skin irritation  Skin peeling  Blisters  Rash       Vitals:    05/19/22 1256   BP: 120/78   Pulse: 99   Resp: 16   Temp: 98 1 °F (36 7 °C)   SpO2: 99%       Physical Exam   General: Appears well, appears stated age  Skin: Warm, anicteric  HEENT: Normocephalic, atraumatic; sclera aniceteric, mucous membranes moist; cervical nodes without adenopathy  Cardiopulmonary: RRR, Easy WOB, no BLE edema  Abd: Obese, nontender, no masses appreciated, no hepatosplenomegaly  Incision well-healed  MSK: Symmetric, no cyanosis, no overt weakness  Lymphatic: No cervical, axillary or inguinal lymphadenopathy  Neuro: Affect appropriate, no gross motor abnormalities      Pathology:  Final Diagnosis   A  Rectosigmoid colon, low anterior resection:  - Adenocarcinoma, moderately differentiated  - Tumor invades through the muscularis propria into pericolorectal tissue, T3  - Diverticula  - All margins are negative for tumor   - Thirty-four lymph nodes, negative for malignancy (0/34)      B  Colon, anastomotic rings, resection:  - Two portions of colon with no pathologic abnormality     Intradepartmental consultation is in agreement  [facility], [address] [place] [address], [address]  Immunohistochemistry for desmin  is performed on tissue blocks A13 and A16 to help in the assessment of this case  Final Diagnosis   A  Uterus, Bilateral Ovaries and Fallopian Tubes; Hysterosalpingo-oophorectomy:  - Leiomyoma  - Left ovary with serous cystadenoma  - Unremarkable cervix  - Benign inactive endometrium with no specific pathologic change  - Unremarkable right ovary and bilateral fallopian tubes      B  Spleen, spleen, omentum, darotis:  - Metastatic adenocarcinoma involving spleen and omentum, consistent with colonic primary   See Note          Labs: Reviewed in [facility]    Imaging  Colonoscopy    Addendum Date: 10/6/2021 Addendum:   [address] [facility] [address] [address] [phone] DATE OF SERVICE: 10/06/21 PHYSICIAN(S): Ro Polanco MD - Attending Physician INDICATION: Personal history of rectal cancer , had a low anterior resection in December of 2019  T3 N0, 34 lymph nodes were removed and were negative  Patient did not receive any chemotherapy  Colonoscopy performed for a diagnostic indication  POST-OP DIAGNOSIS: See the impression below  HISTORY: Prior colonoscopy: Less than 3 years ago  It is being repeated at an interval of less than 3 years because: This colonoscopy is being performed for a diagnostic indication BOWEL PREPARATION: Miralax/Dulcolax PREPROCEDURE: Informed consent was obtained for the procedure, including sedation  Risks including but not limited to bleeding, infection, perforation, adverse drug reaction and aspiration were explained in detail  Also explained about less than 100% sensitivity with the exam and other alternatives  The patient was placed in the left lateral decubitus position  DETAILS OF PROCEDURE: Patient was taken to the procedure room where a time out was performed to confirm correct patient and correct procedure  The patient underwent monitored anesthesia care, which was administered by an anesthesia professional  The patient's blood pressure, heart rate, level of consciousness, oxygen and respirations were monitored throughout the procedure  A digital rectal exam was performed  The scope was introduced through the anus and advanced to the cecum  Retroflexion was performed in the rectum  The quality of bowel preparation was evaluated using the [facility] Bowel Preparation Scale with scores of: right colon = 2, transverse colon = 2, left colon = 2  The total BBPS score was 6  Bowel prep was adequate  The patient experienced no blood loss  The procedure was not difficult  The patient tolerated the procedure well  There were no apparent complications  ANESTHESIA INFORMATION: ASA: III Anesthesia Type: IV Sedation with Anesthesia MEDICATIONS: No administrations occurring from 1215 to 1230 on 10/06/21 FINDINGS: Healthy end-to-end colorectal anastomosis with no bleeding in the mid rectum All observed locations appeared normal, including the cecum, ascending colon, transverse colon, splenic flexure and descending colon  A couple scattered diverticuli seen EVENTS: Procedure Events Event Event Time ENDO CECUM REACHED 10/6/2021 12:21 PM ENDO SCOPE OUT TIME 10/6/2021 12:28 PM SPECIMENS: * No specimens in log * EQUIPMENT: Colonoscope - IMPRESSION: 1  Normal-appearing colon  Anastomosis site was seen in the rectum appeared healthy  No evidence of local recurrence  2  Couple small scattered diverticuli  RECOMMENDATION: Repeat colonoscopy in 2 years due to a personal history of colon cancer  Advised her urgent evaluation by medical oncologist and Dr Aneta Olmedo MD     Addendum Date: 10/6/2021 Addendum:   [address] [place] [id number] [phone] DATE OF SERVICE: 10/06/21 PHYSICIAN(S): Garrett Olmedo MD - Attending Physician INDICATION: Personal history of rectal cancer , had a low anterior resection in December of 2019  T3 N0, 34 lymph nodes were removed and were negative  Patient did not receive any chemotherapy  Colonoscopy performed for a diagnostic indication  POST-OP DIAGNOSIS: See the impression below  HISTORY: Prior colonoscopy: Less than 3 years ago  It is being repeated at an interval of less than 3 years because: This colonoscopy is being performed for a diagnostic indication BOWEL PREPARATION: Miralax/Dulcolax PREPROCEDURE: Informed consent was obtained for the procedure, including sedation  Risks including but not limited to bleeding, infection, perforation, adverse drug reaction and aspiration were explained in detail   Also explained about less than 100% sensitivity with the exam and other alternatives  The patient was placed in the left lateral decubitus position  DETAILS OF PROCEDURE: Patient was taken to the procedure room where a time out was performed to confirm correct patient and correct procedure  The patient underwent monitored anesthesia care, which was administered by an anesthesia professional  The patient's blood pressure, heart rate, level of consciousness, oxygen and respirations were monitored throughout the procedure  A digital rectal exam was performed  The scope was introduced through the anus and advanced to the cecum  Retroflexion was performed in the rectum  The quality of bowel preparation was evaluated using the [facility] Bowel Preparation Scale with scores of: right colon = 2, transverse colon = 2, left colon = 2  The total BBPS score was 6  Bowel prep was adequate  The patient experienced no blood loss  The procedure was not difficult  The patient tolerated the procedure well  There were no apparent complications  ANESTHESIA INFORMATION: ASA: III Anesthesia Type: IV Sedation with Anesthesia MEDICATIONS: No administrations occurring from 1215 to 1230 on 10/06/21 FINDINGS: Healthy end-to-end colorectal anastomosis with no bleeding in the mid rectum All observed locations appeared normal, including the cecum, ascending colon, transverse colon, splenic flexure and descending colon  A couple scattered diverticuli seen EVENTS: Procedure Events Event Event Time ENDO CECUM REACHED 10/6/2021 12:21 PM ENDO SCOPE OUT TIME 10/6/2021 12:28 PM SPECIMENS: * No specimens in log * EQUIPMENT: Colonoscope - IMPRESSION: 1  Normal-appearing colon  Anastomosis site was seen in the rectum appeared healthy  No evidence of local recurrence  2  Couple small scattered diverticuli  RECOMMENDATION: Repeat colonoscopy in 2 years due to a personal history of colon cancer  Be Jung MD     Result Date: 10/6/2021  Narrative:  [facility] [address] [place] [address] [phone] DATE OF SERVICE: 10/06/21 PHYSICIAN(S): Tonja Ralph MD - Attending Physician INDICATION: Personal history of rectal cancer Colonoscopy performed for a diagnostic indication  POST-OP DIAGNOSIS: See the impression below  HISTORY: Prior colonoscopy: Less than 3 years ago  It is being repeated at an interval of less than 3 years because: This colonoscopy is being performed for a diagnostic indication BOWEL PREPARATION: Miralax/Dulcolax PREPROCEDURE: Informed consent was obtained for the procedure, including sedation  Risks including but not limited to bleeding, infection, perforation, adverse drug reaction and aspiration were explained in detail  Also explained about less than 100% sensitivity with the exam and other alternatives  The patient was placed in the left lateral decubitus position  DETAILS OF PROCEDURE: Patient was taken to the procedure room where a time out was performed to confirm correct patient and correct procedure  The patient underwent monitored anesthesia care, which was administered by an anesthesia professional  The patient's blood pressure, heart rate, level of consciousness, oxygen and respirations were monitored throughout the procedure  A digital rectal exam was performed  The scope was introduced through the anus and advanced to the cecum  Retroflexion was performed in the rectum  The quality of bowel preparation was evaluated using the [facility] Bowel Preparation Scale with scores of: right colon = 2, transverse colon = 2, left colon = 2  The total BBPS score was 6  Bowel prep was adequate  The patient experienced no blood loss  The procedure was not difficult  The patient tolerated the procedure well  There were no apparent complications   ANESTHESIA INFORMATION: ASA: III Anesthesia Type: IV Sedation with Anesthesia MEDICATIONS: No administrations occurring from 1215 to 1230 on 10/06/21 FINDINGS: Healthy end-to-end colorectal anastomosis with no bleeding in the mid rectum All observed locations appeared normal, including the cecum, ascending colon, transverse colon, splenic flexure and descending colon  A couple scattered diverticuli seen EVENTS: Procedure Events Event Event Time ENDO CECUM REACHED 10/6/2021 12:21 PM ENDO SCOPE OUT TIME 10/6/2021 12:28 PM SPECIMENS: * No specimens in log * EQUIPMENT: Colonoscope -     Impression: 1  Normal-appearing colon  Anastomosis site was seen in the rectum appeared healthy  No evidence of local recurrence  2  Couple small scattered diverticuli  RECOMMENDATION: Repeat colonoscopy in 2 years due to a personal history of colon cancer  Roxana Sawyer MD     DXA bone density spine hip and pelvis    Result Date: 10/14/2021  Narrative: CENTRAL  DXA SCAN CLINICAL HISTORY:  50-year-old postmenopausal female  OTHER RISK FACTORS:  History of fracture resulting from minor injury  PHARMACOLOGIC THERAPY FOR OSTEOPOROSIS:  None  TECHNIQUE: Bone densitometry was performed using a Pop.itXA   bone densitometer  Regions of interest appear properly placed  COMPARISON: 5/6/2019  RESULTS: LUMBAR SPINE L1-L4 : BMD  1 141  gm/cm2 T-score -0 4 LEFT TOTAL HIP: BMD: 0 992  gm/cm2 T-score: -0 1 LEFT FEMORAL NECK: BMD: 0 856  gm/cm2 T score: -1 3 RIGHT TOTAL HIP: BMD:  1 004  gm/cm2 T-score: 0 0 RIGHT FEMORAL NECK: BMD:  0 851  gm/cm2  T score: -1 3     Impression: 1  Low bone mass (osteopenia)  [Based on the left and right femoral necks] 2  Since a DXA study from 5/6/2019, there has been: A  STATISTICALLY SIGNIFICANT DECREASE in bone mineral density of  0 039 g/cm2 (3 3)% in the lumbar spine  A  STATISTICALLY SIGNIFICANT DECREASE in bone mineral density of  0 046 g/cm2 (4 4)% in the hips   3   The 10 year risk of hip fracture is 1 2% with the 10 year risk of major osteoporotic fracture being 12 6% as calculated by the [facility] fracture risk assessment tool (FRAX, which is based on data generated by the [facility] for Metabolic Bone Diseases)  4   The current NOF guidelines recommend treating patients with a T-score of -2 5 or less in the lumbar spine or hips, or in post-menopausal women and men over the age of 48 with low bone mass (osteopenia) and a FRAX 10 year risk score of >3% for hip fracture and/or >20% for major osteoporotic fracture  5   The NOF recommends follow-up DXA in 1-2 years after initiating therapy for osteoporosis and every 2 years thereafter  More frequent evaluation is appropriate for patients with conditions associated with rapid bone loss, such as glucocorticoid therapy  The interval between DXA screenings may be longer for individuals without major risk factors and initial T-score in the normal or upper low bone mass range  The FRAX algorithm has certain limitations: -FRAX has not been validated in patients currently or previously treated with pharmacotherapy for osteoporosis  In such patients, clinical judgment must be exercised in interpreting FRAX scores  -Prior hip, vertebral and humeral fragility fractures appear to confer greater risk of subsequent fracture than fractures at other sites (this is especially true for individuals with severe vertebral fractures), but quantification of this incremental risk is not possible with FRAX  -FRAX underestimates fracture risk in patients with history of multiple fragility fractures  -FRAX may underestimate fracture risk in patients with history of frequent falls  -It is not appropriate to use FRAX to monitor treatment response  WHO CLASSIFICATION: Normal (a T-score of -1 0 or higher) Low bone mineral density (a T-score of less than -1 0 but higher than -2 5) Osteoporosis (a T-score of -2 5 or less) Severe osteoporosis (a T-score of -2 5 or less with a fragility fracture) LEAST SIGNIFICANT CHANGE AT 95% C I: Lumbar spine: 0 036 gm/cm2 (3 2%)  Total hip: 0 018 gm/cm2 (2 0%)  Forearm: 0 024 gm/cm2 (3 2%)   Workstation performed: [id number]     NM PET CT skull base to mid thigh    Result Date: 9/28/2021  Narrative: PET/CT SCAN INDICATION:  C18 7: Malignant neoplasm of sigmoid colon C20: Malignant neoplasm of rectum   Rectosigmoid carcinoma, restaging/surveillance examination  Borderline elevated CEA (most recently 4 4)  MODIFIER: PS COMPARISON: CT chest abdomen and pelvis 2/24/2021  CELL TYPE:  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 TECHNIQUE:   12 1 mCi F-18-FDG administered IV  Multiplanar attenuation corrected and non attenuation corrected PET images were acquired 60 minutes post injection  Contiguous, low dose, axial CT sections were obtained from the skull base through the femurs   Intravenous contrast material was not utilized  This examination, like all CT scans performed in the [facility], was performed utilizing techniques to minimize radiation dose exposure, including the use of iterative reconstruction and automated exposure control  Fasting serum glucose: 90 mg/dl FINDINGS: VISUALIZED BRAIN:   No acute abnormalities are seen  HEAD/NECK:   There is a physiologic distribution of FDG  No FDG avid cervical adenopathy is seen  CT images: Unremarkable  CHEST:   No FDG avid soft tissue lesions are seen  CT images: Mild coronary artery calcification  No pericardial effusion, no pleural effusion ABDOMEN:   There is a large hypermetabolic left lateral abdominal lobular mass situated immediately inferior to the spleen in the left paracolic gutter region, which measures 4 3 x 3 9 x 4 5 cm in size, SUV max 20 8  No other definite hypermetabolic abnormalities are seen within the upper abdomen  Activity at the bowel anastomotic site in the right abdomen on image 165 demonstrates SUV max of 3 8  This is nonspecific but may simply be inflammatory or physiologic  Direct visualization is recommended if not recently performed  CT images: No ascites  No hydronephrosis or bowel obstruction  PELVIS: Additional rectosigmoid anastomotic site noted image 212  No evidence of abnormal glucose activity  No evidence of glucose avid pelvic adenopathy  No pelvic ascites  OSSEOUS STRUCTURES: No FDG avid lesions are seen  CT images: No significant findings  Impression: 1  There is a large left paracolic gutter markedly hypermetabolic mass immediately inferior to the spleen, most consistent with metastatic colorectal carcinoma  2  Mildly increased activity at the right abdominal bowel anastomotic site  If not recently performed, direct visualization is recommended 3  There are no other glucose avid abnormalities identified within the abdomen or pelvis 4  No evidence of distant glucose avid metastatic disease in the neck, chest, or skeleton The examination demonstrates a significant  finding and was documented as such in [facility] for liaison and referring practitioner notification  Workstation performed: [id number]     Mammo screening bilateral w 3d & cad    Result Date: 10/14/2021  Narrative: DIAGNOSIS: Encounter for screening mammogram for malignant neoplasm of breast TECHNIQUE: Digital screening mammography was performed  Computer Aided Detection (CAD) analyzed all applicable images  COMPARISONS: Prior breast imaging dated: 06/26/2020, 05/06/2019, 10/18/2016, 10/14/2016, and 09/14/2015 RELEVANT HISTORY: Family Breast Cancer History: History of breast cancer in [address] [place] [address], [facility]  Family Medical History: Family medical history includes breast cancer in 2 relatives (cousin, family) and colon cancer in 2 relatives (family, paternal uncle)  Personal History: No known relevant hormone history  Surgical history includes lumpectomy  No known relevant medical history  The patient is scheduled in a reminder system for screening mammography  8-10% of cancers will be missed on mammography  Management of a palpable abnormality must be based on clinical grounds    Patients will be notified of their results via letter from our facility  Accredited by [employer] of Rad[employer] and FDA  RISK ASSESSMENT: 5 Year Tyrer-Cuzick: 2 13 % 10 Year Tyrer-Cuzick: 4 17 % Lifetime Tyrer-Cuzick: 7 93 % TISSUE DENSITY: There are scattered areas of fibroglandular density  INDICATION: Ever Stokes is a 78 y o  female presenting for screening mammography  FINDINGS: Breast parenchymal distribution is unchanged from priors including multiple focal asymmetries in the right breast   Long-term stability confirms benignancy  No developing asymmetries or concerning masses  Single upper-outer-anterior coarse calcification in the left breast is definitively benign  No suspicious microcalcifications, architectural distortion, skin thickening, or abnormalities of the nipples in either breast       Impression: No mammographic evidence of malignancy or significant change from priors  ASSESSMENT/BI-RADS CATEGORY: Left: 2 - Benign Right: 2 - Benign Overall: 2 - Benign RECOMMENDATION:      - Routine screening mammogram in 1 year for both breasts  Workstation ID: [id number]       Discussion/Summary: This is a 48-year-old female with a 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

## 2022-05-19 NOTE — LETTER
May 19, 2022     Purvi Chavez MD  [address] [address]    Patient: Conrado Foster   YOB: 1955   Date of Visit: 5/19/2022       Dear Dr Elsy Vences: Thank you for referring Anand Yang to me for evaluation  Below are my notes for this consultation  If you have questions, please do not hesitate to call me  I look forward to following your patient along with you  Sincerely,        Jerry Gardiner MD        CC: MD Jerry Tellez MD  5/19/2022  1:43 PM  Sign when Signing Visit  Surgical Oncology Consultation    [address] [id number]    Patient:  Conrado Foster  1955  [id number]    Primary Care provider:  Iris Rodriguez [id number]    Referring provider:  No referring provider defined for this encounter  Diagnoses and all orders for this visit:    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[address] )    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[address] )  -     CT chest abdomen pelvis w contrast; Future  -     CEA; Future    Malignant neoplasm of sigmoid colon ([address] )   -     CEA; Future        Chief Complaint   Patient presents with    Post-op       Return in about 4 months (around 9/19/2022) for Office Visit, Imaging - See orders, Labs - See Treatment Plan  Oncology History   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[address] )   9/23/2019 Initial Diagnosis    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[address] )     12/10/2019 Surgery    Low anterior resection (Dr Elver Lujan):    A  Rectosigmoid colon, low anterior resection:  - Adenocarcinoma, moderately differentiated  - Tumor invades through the muscularis propria into pericolorectal tissue, T3  - Diverticula  - All margins are negative for tumor   - Thirty-four lymph nodes, negative for malignancy (0/34)       B  Colon, anastomotic rings, resection:  - Two portions of colon with no pathologic abnormality     12/10/2019 Genomic Testing RRESULTS OF IMMUNOHISTOCHEMICAL ANALYSIS FOR MISMATCH REPAIR PROTEIN LOSS  INTERPRETATION: NO LOSS OF NUCLEAR EXPRESSION OF MMR PROTEINS: LOW PROBABILITY OF MSI-H  Note: Background non-neoplastic tissue and/or internal control with intact nuclear expression  RESULTS:  Antibody          Clone               Description                           Results  MLH1               M1                  Mismatch repair protein       Intact nuclear expression  MSH2              [id number]       Mismatch repair protein       Intact nuclear expression  MSH6              40                   Mismatch repair protein       Intact nuclear expression  PMS2              [id number]         Mismatch repair protein       Intact nuclear expression     11/12/2021 Biopsy    Omental mass:  - Metastatic adenocarcinoma, with an immunophenotype compatible with metastasis from patient's known colonic primary       12/10/2021 - 12/10/2021 Chemotherapy    capecitabine (XELODA), 850 mg/m2 = 1,600 mg (100 % of original dose 850 mg/m2), Oral, 2 times daily with meals, 1 of 8 cycles  Dose modification: 850 mg/m2 (100 % of original dose 850 mg/m2, Cycle 1, Reason: Other (see comments))  fosaprepitant (EMEND) IVPB, 150 mg, Intravenous, Once, 1 of 1 cycle  Administration: 150 mg (12/10/2021)  oxaliplatin (ELOXATIN) chemo infusion, 244 4 mg, Intravenous, Once, 1 of 8 cycles  Administration: 250 mg (12/10/2021)     1/4/2022 -  Chemotherapy    fluorouracil (ADRUCIL), 300 mg/m2 = 560 mg (75 % of original dose 400 mg/m2), Intravenous, Once, 6 of 12 cycles  Dose modification: 300 mg/m2 (75 % of original dose 400 mg/m2, Cycle 1, Reason: Dose Not Tolerated), 340 mg/m2 (85 % of original dose 400 mg/m2, Cycle 4, Reason: Other (see comments)), 340 mg/m2 (85 % of original dose 400 mg/m2, Cycle 5, Reason: Other (see comments))  Administration: 560 mg (1/4/2022), 560 mg (1/18/2022), 560 mg (2/1/2022), 635 mg (2/15/2022), 635 mg (3/1/2022), 635 mg (3/15/2022)  leucovorin calcium IVPB, 300 mg/m2 = 561 mg (75 % of original dose 400 mg/m2), Intravenous, Once, 6 of 12 cycles  Dose modification: 300 mg/m2 (75 % of original dose 400 mg/m2, Cycle 1, Reason: Dose Not Tolerated), 340 mg/m2 (85 % of original dose 400 mg/m2, Cycle 4, Reason: Other (see comments)), 340 mg/m2 (85 % of original dose 400 mg/m2, Cycle 5, Reason: Other (see comments))  Administration: 561 mg (1/4/2022), 561 mg (1/18/2022), 561 mg (2/1/2022), 636 mg (2/15/2022), 636 mg (3/1/2022), 636 mg (3/15/2022)  oxaliplatin (ELOXATIN) chemo infusion, 63 75 mg/m2 = 119 2125 mg (75 % of original dose 85 mg/m2), Intravenous, Once, 6 of 12 cycles  Dose modification: 63 75 mg/m2 (75 % of original dose 85 mg/m2, Cycle 1, Reason: Dose Not Tolerated), 72 25 mg/m2 (85 % of original dose 85 mg/m2, Cycle 4, Reason: Other (see comments)), 72 25 mg/m2 (85 % of original dose 85 mg/m2, Cycle 5, Reason: Other (see comments))  Administration: 119 2125 mg (1/4/2022), 119 2125 mg (1/18/2022), 119 2125 mg (2/1/2022), 135 1075 mg (2/15/2022), 135 1075 mg (3/1/2022), 135 1075 mg (3/15/2022)  fluorouracil (ADRUCIL) ambulatory infusion Soln, 900 mg/m2/day = 3,365 mg (75 % of original dose 1,200 mg/m2/day), Intravenous, Over 46 hours, 6 of 12 cycles  Dose modification: 900 mg/m2/day (75 % of original dose 1,200 mg/m2/day, Cycle 2, Reason: Other (see comments)), 1,020 mg/m2/day (85 % of original dose 1,200 mg/m2/day, Cycle 4, Reason: Other (see comments), Comment: anticipated tolerance)     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[facility] )   11/29/2021 Initial Diagnosis    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[facility] )     12/10/2021 - 12/10/2021 Chemotherapy    capecitabine (XELODA), 850 mg/m2 = 1,600 mg (100 % of original dose 850 mg/m2), Oral, 2 times daily with meals, 1 of 8 cycles  Dose modification: 850 mg/m2 (100 % of original dose 850 mg/m2, Cycle 1, Reason: Other (see comments))  fosaprepitant (EMEND) IVPB, 150 mg, Intravenous, Once, 1 of 1 cycle  Administration: 150 mg (12/10/2021)  oxaliplatin (ELOXATIN) chemo infusion, 244 4 mg, Intravenous, Once, 1 of 8 cycles  Administration: 250 mg (12/10/2021)     1/4/2022 -  Chemotherapy    fluorouracil (ADRUCIL), 300 mg/m2 = 560 mg (75 % of original dose 400 mg/m2), Intravenous, Once, 6 of 12 cycles  Dose modification: 300 mg/m2 (75 % of original dose 400 mg/m2, Cycle 1, Reason: Dose Not Tolerated), 340 mg/m2 (85 % of original dose 400 mg/m2, Cycle 4, Reason: Other (see comments)), 340 mg/m2 (85 % of original dose 400 mg/m2, Cycle 5, Reason: Other (see comments))  Administration: 560 mg (1/4/2022), 560 mg (1/18/2022), 560 mg (2/1/2022), 635 mg (2/15/2022), 635 mg (3/1/2022), 635 mg (3/15/2022)  leucovorin calcium IVPB, 300 mg/m2 = 561 mg (75 % of original dose 400 mg/m2), Intravenous, Once, 6 of 12 cycles  Dose modification: 300 mg/m2 (75 % of original dose 400 mg/m2, Cycle 1, Reason: Dose Not Tolerated), 340 mg/m2 (85 % of original dose 400 mg/m2, Cycle 4, Reason: Other (see comments)), 340 mg/m2 (85 % of original dose 400 mg/m2, Cycle 5, Reason: Other (see comments))  Administration: 561 mg (1/4/2022), 561 mg (1/18/2022), 561 mg (2/1/2022), 636 mg (2/15/2022), 636 mg (3/1/2022), 636 mg (3/15/2022)  oxaliplatin (ELOXATIN) chemo infusion, 63 75 mg/m2 = 119 2125 mg (75 % of original dose 85 mg/m2), Intravenous, Once, 6 of 12 cycles  Dose modification: 63 75 mg/m2 (75 % of original dose 85 mg/m2, Cycle 1, Reason: Dose Not Tolerated), 72 25 mg/m2 (85 % of original dose 85 mg/m2, Cycle 4, Reason: Other (see comments)), 72 25 mg/m2 (85 % of original dose 85 mg/m2, Cycle 5, Reason: Other (see comments))  Administration: 119 2125 mg (1/4/2022), 119 2125 mg (1/18/2022), 119 2125 mg (2/1/2022), 135 1075 mg (2/15/2022), 135 1075 mg (3/1/2022), 135 1075 mg (3/15/2022)  fluorouracil (ADRUCIL) ambulatory infusion Soln, 900 mg/m2/day = 3,365 mg (75 % of original dose 1,200 mg/m2/day), Intravenous, Over 46 hours, 6 of 12 cycles  Dose modification: 900 mg/m2/day (75 % of original dose 1,200 mg/m2/day, Cycle 2, Reason: Other (see comments)), 1,020 mg/m2/day (85 % of original dose 1,200 mg/m2/day, Cycle 4, Reason: Other (see comments), Comment: anticipated tolerance)     4/29/2022 Surgery    Final Diagnosis   A  Uterus, Bilateral Ovaries and Fallopian Tubes; Hysterosalpingo-oophorectomy:  - Leiomyoma  - Left ovary with serous cystadenoma  - Unremarkable cervix  - Benign inactive endometrium with no specific pathologic change  - Unremarkable right ovary and bilateral fallopian tubes  B  Spleen, spleen, omentum, darotis:  - Metastatic adenocarcinoma involving spleen and omentum, consistent with colonic primary  See Note  History of Present Illness  :   Miss Miranda Loera is seen here today for a new diagnosis of a likely metastatic colorectal cancer  Briefly, the patient underwent screening colonoscopy in late 2019  This detected a rectosigmoid mass, which was then resected in December of 2019  Surgery required a diverting loop ileostomy, which was subsequently reversed in February of 2020  Of note, preop MRI suggested a T3bN1 lesion above the peritoneal reflection, and upfront surgery was recommended  This revealed a final path T3 N0 cancer with no aggressive features and no adjuvant therapy was recommended by her surgeon Dr Sarai Mejias  Since that time, the patient has been doing well  She had a CT 2/2021 which demonstrated ISIAH  She recently underwent a PET scan due to a rising CEA (4 4 from 2 2 posotp), and this demonstrated a large 4 5 cm left pericolic gutter markedly hypermetabolic mass  There was mild increased activity at the colon anastomosis  Subsequent colonoscopy revealed no abnormality at this location  She denies any systemic symptoms including weight loss, nausea, vomiting, changes in her bowel habits  I described that this juncture, a PET scan demonstrates a highly metabolic mass, and more detailed imaging is necessary    I described that an MRI will give us a detailed image of her abdomen and will allow was to ascertain whether there is a high suspicion for other peritoneal implants  This will change my surgical recommendations of resection of the single metastasis versus a potential cytoreductive surgery with HIPEC  Likewise, depending on the burden of disease, I may recommend that she proceed chemotherapy before surgery  I recommend an IR biopsy in order to determine her current markers to more properly select chemotherapy regimen  I will refer her to Dr Ke Bar with medical oncology for assessment  Finally, given her family history of colorectal cancer, I willr efer her to onc genetics for assessment  She will f/u following these studies  Interval History 11/30/21  Patient returns today after the above studies  MRI of the abdomen revealed a single metastatic lesion invading the spleen  No evidence of other peritoneal implants  MRI of the pelvis revealed a concerning appearance of the ovary  This was followed by a transvaginal ultrasound, which ultimately determined that the ovary appeared benign  She then underwent interventional radiology biopsy, which confirmed a metastatic lesion from a colorectal primary  The patient has been doing very well with no new developments  We discussed that the treatment for this will require chemotherapy as well as surgical resection  I think that a robotic approach would be beneficial given the patient's overall body habitus  The patient would strongly prefer not to be in the hospital postoperatively around the holidays, and I have discussed this with Dr Toño Jiménez, who agrees that initiating chemotherapy now is reasonable  I will therefore see the patient in 2-3 months with a repeat CT scan of the chest abdomen and pelvis  Interval History 4/6/22  The patient returns in follow-up today  She has been undergoing FOLFOX with decent toleratation of treatment    She does describe some cold hypersensitivity  Today she is doing well with no abdominal pain, nausea vomiting, constipation or diarrhea  Follow-up scan showed overall decrease in the size of a single omental implant with less involvement of the spleen  No evidence of other disease in the chest abdomen or pelvis with the exception of the multicystic disease noted in the pelvis  5/19/22  Michelle Meneses is seen postop after open omentectomy, splenectomy, resection of involved diaphragm with primary repair, hysterectomy and oophorectomy with HIPEC  The postoperative course was relatively uneventful  She did have a chest tube in for some time after her diaphragm repair but has had no sequela of shortness of breath, fever, chills, fluid collection  She does complain of occasional left upper quadrant pain  No fevers, chills, concerns about her incision  She is getting worked around well, eating well, having regular bowel movements  Again, no fever, chills, other systemic symptoms  Path was reviewed with a single large omental implant and no other evidence of disease  Review of Systems  Complete ROS Surg Onc:   Constitutional: The patient denies new or recent history of general fatigue, no recent weight loss, no change in appetite  Eyes: No complaints of visual problems, no scleral icterus  ENT: No complaints of ear pain, no hoarseness, no difficulty swallowing,  no tinnitus and no new masses in head, oral cavity, or neck  Cardiovascular: No complaints of chest pain, no palpitations, no ankle edema  Respiratory: No complaints of shortness of breath, no cough  Gastrointestinal: No complaints of jaundice, no bloody stools, no pale stools  Genitourinary: No complaints of dysuria, no hematuria, no nocturia, no frequent urination, no urethral discharge  Musculoskeletal: No complaints of weakness, paralysis, joint stiffness or arthralgias  Integumentary: No complaints of rash, no new lesions     Neurological: No complaints of convulsions, no seizures, no dizziness  Hematologic/Lymphatic: No complaints of easy bruising  Endocrine:  ENDORSES cold intolerance  No polydipsia, polyphagia, or polyuria  Allergy/immunology:  No environmental allergies  No food allergies  Not immunocompromised        Patient Active Problem List   Diagnosis    Callus of foot    Foot pain    GERD (gastroesophageal reflux disease)    Hyperlipidemia    Prediabetes    Varicose veins with pain    Morbid obesity ([facility] )    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HCC)    Dyspnea on exertion    Dyslipidemia    Sigmoid diverticulosis    PONV (postoperative nausea and vomiting)    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HCC)    Lesion of ovary    Chemotherapy adverse reaction    Dehydration    Chemotherapy-induced nausea    Platelets decreased (HCC)    Stage 3 chronic kidney disease, unspecified whether stage 3a or 3b CKD ([place] [address] )    History of splenectomy    Asplenia     Past Medical History:   Diagnosis Date    Blood in stool     Breast disorder     Cancer ([place] [address] )     rectal    Cancer determined by colorectal biopsy ([place] [address] )     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    Colon polyp     GERD (gastroesophageal reflux disease)     History of rectal surgery     Hyperlipidemia     PONV (postoperative nausea and vomiting)      Past Surgical History:   Procedure Laterality Date    BREAST LUMPECTOMY Right      SECTION      x3    COLON SIGMOID RESECTION      COLONOSCOPY      DILATION AND CURETTAGE OF UTERUS      ILEO LOOP DIVERSION N/A 12/10/2019    Procedure: ILEOSTOMY LOOP;  Surgeon: Vonda Ramirez MD;  [facility];  Service: Robotics    INSTILLATION CHEMOTHERAPY INTRAPERITONEAL (HIPEC) LAPAROTOMY N/A 2022    Procedure: INSTILLATION CHEMOTHERAPY INTRAPERITONEAL (HIPEC) LAPAROTOMY;  Surgeon: Héctor Robles MD;  [facility];  Service: Surgical Oncology    IR BIOPSY OMENTUM  2021    IR PORT PLACEMENT  2021    OMENTECTOMY N/A 2022    Procedure: DIAGNOSTIC LAPAROSCOPY, OPEN OMENTECTOMY, SPLENECTOMY, DIAPHRAGM REPAIR, CHEST TUBE INSERTION;  Surgeon: Ousmane Fabian MD;  [facility];  Service: Surgical Oncology    CT CLOSE ENTEROSTOMY N/A 2/4/2020    Procedure: CLOSURE ILEOSTOMY;  Surgeon: Anitha Cooper MD;  [facility];  Service: Colorectal    CT DIAPHRAGM SURG [address] UNLISTED  4/29/2022    Procedure: REPAIR DIAPHRAGM TEAR;  Surgeon: Augusto Elena MD;  [facility];  Service: Thoracic    CT LAP,SURG,COLECTOMY, PARTIAL, W/ANAST N/A 12/10/2019    Procedure: SIGMOID RESECTION COLON LAPAROSCOPIC W ROBOTICS converted to lap hand assisted  with Partial proctectomy , LASER FLUORESCENCE ANGIOGRAPHY, INTRA OP COLONOSCOPY;  Surgeon: Anitha Cooper MD;  [facility];  Service: Robotics    CT TOTAL ABDOM HYSTERECTOMY N/A 4/29/2022    Procedure: DIAGNOSTIC LAPAROSCOPY, TOTAL ABDOMINAL HYSTERECTOMY, BILATERAL SALPINGO-OOPHORECTOMY, EXTENSIVE ADHESIOLYSIS;  Surgeon: Shekhar Wright MD;  [facility];  Service: Gynecology Oncology    SMALL INTESTINE SURGERY  2/4/2020    Procedure: RESECTION SMALL BOWEL;  Surgeon: Anitha Cooper MD;  [facility];  Service: Colorectal     Family History   Problem Relation Age of Onset    Hypertension Mother     Heart attack Mother     Heart attack Father     Heart disease Father     Hypertension Brother     Heart attack Brother     Leukemia Cousin     Breast cancer Cousin     Diabetes Cousin     Breast cancer Family         maternal side    Colon cancer Family         paternal uncle    Colon cancer Paternal Uncle     Stroke Neg Hx      Social History     Socioeconomic History    Marital status:      Spouse name: Not on file    Number of children: Not on file    Years of education: Not on file    Highest education level: Not on file   Occupational History    Not on file   Tobacco Use    Smoking status: Never Smoker    Smokeless tobacco: Never Used   Vaping Use    Vaping Use: Never used   Substance and Sexual Activity    Alcohol use: Yes     Comment: "occasionally"    Drug use: Never    Sexual activity: Not on file   Other Topics Concern    Not on file   Social History Narrative    Not on file     Social Determinants of Health     Financial Resource Strain: Not on file   Food Insecurity: No Food Insecurity    Worried About Running Out of Food in the Last Year: Never true    Uri of Food in the Last Year: Never true   Transportation Needs: No Transportation Needs    Lack of Transportation (Medical): No    Lack of Transportation (Non-Medical):  No   Physical Activity: Not on file   Stress: Not on file   Social Connections: Not on file   Intimate Partner Violence: Not on file   Housing Stability: Unknown    Unable to Pay for Housing in the Last Year: No    Number of Places Lived in the Last Year: Not on file    Unstable Housing in the Last Year: No       Current Outpatient Medications:     acetaminophen (TYLENOL) 325 mg tablet, Take 3 tablets (975 mg total) by mouth every 8 (eight) hours, Disp: , Rfl: 0    docusate sodium (COLACE) 100 mg capsule, Take 100 mg by mouth 2 (two) times a day as needed  , Disp: , Rfl:     enoxaparin (enoxaparin) 40 mg/0 4 mL, Inject 0 4 mL (40 mg total) under the skin every 12 (twelve) hours for 25 days, Disp: 20 mL, Rfl: 0    ezetimibe (ZETIA) 10 mg tablet, Take 1 tablet (10 mg total) by mouth daily (Patient taking differently: Take 10 mg by mouth daily at bedtime), Disp: 90 tablet, Rfl: 1    famotidine (PEPCID) 20 mg tablet, Take 1 tablet (20 mg total) by mouth 2 (two) times a day (Patient taking differently: Take 20 mg by mouth as needed in the morning ), Disp: 180 tablet, Rfl: 1    ondansetron (ZOFRAN) 8 mg tablet, Take 1 tablet (8 mg total) by mouth every 12 (twelve) hours as needed for nausea or vomiting, Disp: 20 tablet, Rfl: 2    polyethylene glycol (MIRALAX) 17 g packet, Take 17 g by mouth daily, Disp: , Rfl: 0    prochlorperazine (COMPAZINE) 10 mg tablet, Take 1 tablet (10 mg total) by mouth every 6 (six) hours as needed for nausea or vomiting, Disp: 60 tablet, Rfl: 0    senna (SENOKOT) 8 6 mg, Take 1 tablet (8 6 mg total) by mouth 2 (two) times a day, Disp: , Rfl: 0    celecoxib (CeleBREX) 200 mg capsule, Take 1 capsule (200 mg total) by mouth daily for 10 days (Patient not taking: No sig reported), Disp: 10 capsule, Rfl: 0    gabapentin (NEURONTIN) 300 mg capsule, Take 1 capsule (300 mg total) by mouth 3 (three) times a day for 10 days (Patient not taking: Reported on 5/19/2022), Disp: 30 capsule, Rfl: 0  Allergies   Allergen Reactions    Medical Tape Rash     Skin irritation  Skin peeling  Skin irritation  Skin peeling  Blisters  Rash       Vitals:    05/19/22 1256   BP: 120/78   Pulse: 99   Resp: 16   Temp: 98 1 °F (36 7 °C)   SpO2: 99%       Physical Exam   General: Appears well, appears stated age  Skin: Warm, anicteric  HEENT: Normocephalic, atraumatic; sclera aniceteric, mucous membranes moist; cervical nodes without adenopathy  Cardiopulmonary: RRR, Easy WOB, no BLE edema  Abd: Obese, nontender, no masses appreciated, no hepatosplenomegaly  Incision well-healed  MSK: Symmetric, no cyanosis, no overt weakness  Lymphatic: No cervical, axillary or inguinal lymphadenopathy  Neuro: Affect appropriate, no gross motor abnormalities      Pathology:  Final Diagnosis   A  Rectosigmoid colon, low anterior resection:  - Adenocarcinoma, moderately differentiated  - Tumor invades through the muscularis propria into pericolorectal tissue, T3  - Diverticula  - All margins are negative for tumor   - Thirty-four lymph nodes, negative for malignancy (0/34)      B  Colon, anastomotic rings, resection:  - Two portions of colon with no pathologic abnormality     Intradepartmental consultation is in agreement    Interpretation performed at [facility], [address] [place] [id number]  Immunohistochemistry for desmin  is performed on tissue blocks A13 and A16 to help in the assessment of this case  Final Diagnosis   A  Uterus, Bilateral Ovaries and Fallopian Tubes; Hysterosalpingo-oophorectomy:  - Leiomyoma  - Left ovary with serous cystadenoma  - Unremarkable cervix  - Benign inactive endometrium with no specific pathologic change  - Unremarkable right ovary and bilateral fallopian tubes      B  Spleen, spleen, omentum, darotis:  - Metastatic adenocarcinoma involving spleen and omentum, consistent with colonic primary  See Note  Labs: Reviewed in [facility]    Imaging  Colonoscopy    Addendum Date: 10/6/2021 Addendum:   [address] [facility] [address] [phone] DATE OF SERVICE: 10/06/21 PHYSICIAN(S): Chelsea Street MD - Attending Physician INDICATION: Personal history of rectal cancer , had a low anterior resection in December of 2019  T3 N0, 34 lymph nodes were removed and were negative  Patient did not receive any chemotherapy  Colonoscopy performed for a diagnostic indication  POST-OP DIAGNOSIS: See the impression below  HISTORY: Prior colonoscopy: Less than 3 years ago  It is being repeated at an interval of less than 3 years because: This colonoscopy is being performed for a diagnostic indication BOWEL PREPARATION: Miralax/Dulcolax PREPROCEDURE: Informed consent was obtained for the procedure, including sedation  Risks including but not limited to bleeding, infection, perforation, adverse drug reaction and aspiration were explained in detail  Also explained about less than 100% sensitivity with the exam and other alternatives  The patient was placed in the left lateral decubitus position  DETAILS OF PROCEDURE: Patient was taken to the procedure room where a time out was performed to confirm correct patient and correct procedure   The patient underwent monitored anesthesia care, which was administered by an anesthesia professional  The patient's blood pressure, heart rate, level of consciousness, oxygen and respirations were monitored throughout the procedure  A digital rectal exam was performed  The scope was introduced through the anus and advanced to the cecum  Retroflexion was performed in the rectum  The quality of bowel preparation was evaluated using the [facility] Bowel Preparation Scale with scores of: right colon = 2, transverse colon = 2, left colon = 2  The total BBPS score was 6  Bowel prep was adequate  The patient experienced no blood loss  The procedure was not difficult  The patient tolerated the procedure well  There were no apparent complications  ANESTHESIA INFORMATION: ASA: III Anesthesia Type: IV Sedation with Anesthesia MEDICATIONS: No administrations occurring from 1215 to 1230 on 10/06/21 FINDINGS: Healthy end-to-end colorectal anastomosis with no bleeding in the mid rectum All observed locations appeared normal, including the cecum, ascending colon, transverse colon, splenic flexure and descending colon  A couple scattered diverticuli seen EVENTS: Procedure Events Event Event Time ENDO CECUM REACHED 10/6/2021 12:21 PM ENDO SCOPE OUT TIME 10/6/2021 12:28 PM SPECIMENS: * No specimens in log * EQUIPMENT: Colonoscope - IMPRESSION: 1  Normal-appearing colon  Anastomosis site was seen in the rectum appeared healthy  No evidence of local recurrence  2  Couple small scattered diverticuli  RECOMMENDATION: Repeat colonoscopy in 2 years due to a personal history of colon cancer  Advised her urgent evaluation by medical oncologist and Dr Edith Waller MD     Addendum Date: 10/6/2021 Addendum:   [address] [facility] [address] [id number] [phone] DATE OF SERVICE: 10/06/21 PHYSICIAN(S): Ramandeep Waller MD - Attending Physician INDICATION: Personal history of rectal cancer , had a low anterior resection in December of 2019  T3 N0, 34 lymph nodes were removed and were negative    Patient did not receive any chemotherapy  Colonoscopy performed for a diagnostic indication  POST-OP DIAGNOSIS: See the impression below  HISTORY: Prior colonoscopy: Less than 3 years ago  It is being repeated at an interval of less than 3 years because: This colonoscopy is being performed for a diagnostic indication BOWEL PREPARATION: Miralax/Dulcolax PREPROCEDURE: Informed consent was obtained for the procedure, including sedation  Risks including but not limited to bleeding, infection, perforation, adverse drug reaction and aspiration were explained in detail  Also explained about less than 100% sensitivity with the exam and other alternatives  The patient was placed in the left lateral decubitus position  DETAILS OF PROCEDURE: Patient was taken to the procedure room where a time out was performed to confirm correct patient and correct procedure  The patient underwent monitored anesthesia care, which was administered by an anesthesia professional  The patient's blood pressure, heart rate, level of consciousness, oxygen and respirations were monitored throughout the procedure  A digital rectal exam was performed  The scope was introduced through the anus and advanced to the cecum  Retroflexion was performed in the rectum  The quality of bowel preparation was evaluated using the [facility] Bowel Preparation Scale with scores of: right colon = 2, transverse colon = 2, left colon = 2  The total BBPS score was 6  Bowel prep was adequate  The patient experienced no blood loss  The procedure was not difficult  The patient tolerated the procedure well  There were no apparent complications   ANESTHESIA INFORMATION: ASA: III Anesthesia Type: IV Sedation with Anesthesia MEDICATIONS: No administrations occurring from 1215 to 1230 on 10/06/21 FINDINGS: Healthy end-to-end colorectal anastomosis with no bleeding in the mid rectum All observed locations appeared normal, including the cecum, ascending colon, transverse colon, splenic flexure and descending colon  A couple scattered diverticuli seen EVENTS: Procedure Events Event Event Time ENDO CECUM REACHED 10/6/2021 12:21 PM ENDO SCOPE OUT TIME 10/6/2021 12:28 PM SPECIMENS: * No specimens in log * EQUIPMENT: Colonoscope - IMPRESSION: 1  Normal-appearing colon  Anastomosis site was seen in the rectum appeared healthy  No evidence of local recurrence  2  Couple small scattered diverticuli  RECOMMENDATION: Repeat colonoscopy in 2 years due to a personal history of colon cancer  Heath Lambert MD     Result Date: 10/6/2021  Narrative: [address] [place] [id number] [phone] DATE OF SERVICE: 10/06/21 PHYSICIAN(S): Heath Lambert MD - Attending Physician INDICATION: Personal history of rectal cancer Colonoscopy performed for a diagnostic indication  POST-OP DIAGNOSIS: See the impression below  HISTORY: Prior colonoscopy: Less than 3 years ago  It is being repeated at an interval of less than 3 years because: This colonoscopy is being performed for a diagnostic indication BOWEL PREPARATION: Miralax/Dulcolax PREPROCEDURE: Informed consent was obtained for the procedure, including sedation  Risks including but not limited to bleeding, infection, perforation, adverse drug reaction and aspiration were explained in detail  Also explained about less than 100% sensitivity with the exam and other alternatives  The patient was placed in the left lateral decubitus position  DETAILS OF PROCEDURE: Patient was taken to the procedure room where a time out was performed to confirm correct patient and correct procedure  The patient underwent monitored anesthesia care, which was administered by an anesthesia professional  The patient's blood pressure, heart rate, level of consciousness, oxygen and respirations were monitored throughout the procedure  A digital rectal exam was performed  The scope was introduced through the anus and advanced to the cecum   Retroflexion was performed in the rectum  The quality of bowel preparation was evaluated using the [facility] Bowel Preparation Scale with scores of: right colon = 2, transverse colon = 2, left colon = 2  The total BBPS score was 6  Bowel prep was adequate  The patient experienced no blood loss  The procedure was not difficult  The patient tolerated the procedure well  There were no apparent complications  ANESTHESIA INFORMATION: ASA: III Anesthesia Type: IV Sedation with Anesthesia MEDICATIONS: No administrations occurring from 1215 to 1230 on 10/06/21 FINDINGS: Healthy end-to-end colorectal anastomosis with no bleeding in the mid rectum All observed locations appeared normal, including the cecum, ascending colon, transverse colon, splenic flexure and descending colon  A couple scattered diverticuli seen EVENTS: Procedure Events Event Event Time ENDO CECUM REACHED 10/6/2021 12:21 PM ENDO SCOPE OUT TIME 10/6/2021 12:28 PM SPECIMENS: * No specimens in log * EQUIPMENT: Colonoscope -     Impression: 1  Normal-appearing colon  Anastomosis site was seen in the rectum appeared healthy  No evidence of local recurrence  2  Couple small scattered diverticuli  RECOMMENDATION: Repeat colonoscopy in 2 years due to a personal history of colon cancer  Kathleen Berman MD     DXA bone density spine hip and pelvis    Result Date: 10/14/2021  Narrative: CENTRAL  DXA SCAN CLINICAL HISTORY:  69-year-old postmenopausal female  OTHER RISK FACTORS:  History of fracture resulting from minor injury  PHARMACOLOGIC THERAPY FOR OSTEOPOROSIS:  None  TECHNIQUE: Bone densitometry was performed using a [employer]   bone densitometer  Regions of interest appear properly placed  COMPARISON: 5/6/2019   RESULTS: LUMBAR SPINE L1-L4 : BMD  1 141  gm/cm2 T-score -0 4 LEFT TOTAL HIP: BMD: 0 992  gm/cm2 T-score: -0 1 LEFT FEMORAL NECK: BMD: 0 856  gm/cm2 T score: -1 3 RIGHT TOTAL HIP: BMD:  1 004  gm/cm2 T-score: 0 0 RIGHT FEMORAL NECK: BMD:  0 851  gm/cm2  T score: -1 3     Impression: 1   Low bone mass (osteopenia)  [Based on the left and right femoral necks] 2  Since a DXA study from 5/6/2019, there has been: A  STATISTICALLY SIGNIFICANT DECREASE in bone mineral density of  0 039 g/cm2 (3 3)% in the lumbar spine  A  STATISTICALLY SIGNIFICANT DECREASE in bone mineral density of  0 046 g/cm2 (4 4)% in the hips  3   The 10 year risk of hip fracture is 1 2% with the 10 year risk of major osteoporotic fracture being 12 6% as calculated by the [facility] fracture risk assessment tool (FRAX, which is based on data generated by the [facility] for Metabolic Bone Diseases)  4   The current NOF guidelines recommend treating patients with a T-score of -2 5 or less in the lumbar spine or hips, or in post-menopausal women and men over the age of 48 with low bone mass (osteopenia) and a FRAX 10 year risk score of >3% for hip fracture and/or >20% for major osteoporotic fracture  5   The NOF recommends follow-up DXA in 1-2 years after initiating therapy for osteoporosis and every 2 years thereafter  More frequent evaluation is appropriate for patients with conditions associated with rapid bone loss, such as glucocorticoid therapy  The interval between DXA screenings may be longer for individuals without major risk factors and initial T-score in the normal or upper low bone mass range  The FRAX algorithm has certain limitations: -FRAX has not been validated in patients currently or previously treated with pharmacotherapy for osteoporosis  In such patients, clinical judgment must be exercised in interpreting FRAX scores  -Prior hip, vertebral and humeral fragility fractures appear to confer greater risk of subsequent fracture than fractures at other sites (this is especially true for individuals with severe vertebral fractures), but quantification of this incremental risk is not possible with FRAX   -FRAX underestimates fracture risk in patients with history of multiple fragility fractures  -FRAX may underestimate fracture risk in patients with history of frequent falls  -It is not appropriate to use FRAX to monitor treatment response  WHO CLASSIFICATION: Normal (a T-score of -1 0 or higher) Low bone mineral density (a T-score of less than -1 0 but higher than -2 5) Osteoporosis (a T-score of -2 5 or less) Severe osteoporosis (a T-score of -2 5 or less with a fragility fracture) LEAST SIGNIFICANT CHANGE AT 95% C I: Lumbar spine: 0 036 gm/cm2 (3 2%)  Total hip: 0 018 gm/cm2 (2 0%)  Forearm: 0 024 gm/cm2 (3 2%)  Workstation performed: [id number]     NM PET CT skull base to mid thigh    Result Date: 9/28/2021  Narrative: PET/CT SCAN INDICATION:  C18 7: Malignant neoplasm of sigmoid colon C20: Malignant neoplasm of rectum   Rectosigmoid carcinoma, restaging/surveillance examination  Borderline elevated CEA (most recently 4 4)  MODIFIER: PS COMPARISON: CT chest abdomen and pelvis 2/24/2021  CELL TYPE:  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 TECHNIQUE:   12 1 mCi F-18-FDG administered IV  Multiplanar attenuation corrected and non attenuation corrected PET images were acquired 60 minutes post injection  Contiguous, low dose, axial CT sections were obtained from the skull base through the femurs   Intravenous contrast material was not utilized  This examination, like all CT scans performed in the [facility], was performed utilizing techniques to minimize radiation dose exposure, including the use of iterative reconstruction and automated exposure control  Fasting serum glucose: 90 mg/dl FINDINGS: VISUALIZED BRAIN:   No acute abnormalities are seen  HEAD/NECK:   There is a physiologic distribution of FDG  No FDG avid cervical adenopathy is seen  CT images: Unremarkable  CHEST:   No FDG avid soft tissue lesions are seen  CT images: Mild coronary artery calcification    No pericardial effusion, no pleural effusion ABDOMEN:   There is a large hypermetabolic left lateral abdominal lobular mass situated immediately inferior to the spleen in the left paracolic gutter region, which measures 4 3 x 3 9 x 4 5 cm in size, SUV max 20 8  No other definite hypermetabolic abnormalities are seen within the upper abdomen  Activity at the bowel anastomotic site in the right abdomen on image 165 demonstrates SUV max of 3 8  This is nonspecific but may simply be inflammatory or physiologic  Direct visualization is recommended if not recently performed  CT images: No ascites  No hydronephrosis or bowel obstruction  PELVIS: Additional rectosigmoid anastomotic site noted image 212  No evidence of abnormal glucose activity  No evidence of glucose avid pelvic adenopathy  No pelvic ascites  OSSEOUS STRUCTURES: No FDG avid lesions are seen  CT images: No significant findings  Impression: 1  There is a large left paracolic gutter markedly hypermetabolic mass immediately inferior to the spleen, most consistent with metastatic colorectal carcinoma  2  Mildly increased activity at the right abdominal bowel anastomotic site  If not recently performed, direct visualization is recommended 3  There are no other glucose avid abnormalities identified within the abdomen or pelvis 4  No evidence of distant glucose avid metastatic disease in the neck, chest, or skeleton The examination demonstrates a significant  finding and was documented as such in [facility] for liaison and referring practitioner notification  Workstation performed: [id number]     Mammo screening bilateral w 3d & cad    Result Date: 10/14/2021  Narrative: DIAGNOSIS: Encounter for screening mammogram for malignant neoplasm of breast TECHNIQUE: Digital screening mammography was performed  Computer Aided Detection (CAD) analyzed all applicable images   COMPARISONS: Prior breast imaging dated: 06/26/2020, 05/06/2019, 10/18/2016, 10/14/2016, and 09/14/2015 RELEVANT HISTORY: Family Breast Cancer History: History of breast cancer in [facility]  Family Medical History: Family medical history includes breast cancer in 2 relatives (cousin, family) and colon cancer in 2 relatives (family, paternal uncle)  Personal History: No known relevant hormone history  Surgical history includes lumpectomy  No known relevant medical history  The patient is scheduled in a reminder system for screening mammography  8-10% of cancers will be missed on mammography  Management of a palpable abnormality must be based on clinical grounds  Patients will be notified of their results via letter from our facility  Accredited by [employer] of Rad[employer] and FDA  RISK ASSESSMENT: 5 Year Tyrer-Cuzick: 2 13 % 10 Year Tyrer-Cuzick: 4 17 % Lifetime Tyrer-Cuzick: 7 93 % TISSUE DENSITY: There are scattered areas of fibroglandular density  INDICATION: Gaby Mack is a 78 y o  female presenting for screening mammography  FINDINGS: Breast parenchymal distribution is unchanged from priors including multiple focal asymmetries in the right breast   Long-term stability confirms benignancy  No developing asymmetries or concerning masses  Single upper-outer-anterior coarse calcification in the left breast is definitively benign  No suspicious microcalcifications, architectural distortion, skin thickening, or abnormalities of the nipples in either breast       Impression: No mammographic evidence of malignancy or significant change from priors  ASSESSMENT/BI-RADS CATEGORY: Left: 2 - Benign Right: 2 - Benign Overall: 2 - Benign RECOMMENDATION:      - Routine screening mammogram in 1 year for both breasts  Workstation ID: [id number]       Discussion/Summary: This is a 79-year-old female with a 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

## 2022-06-01 ENCOUNTER — OFFICE VISIT (OUTPATIENT)
Dept: GYNECOLOGIC ONCOLOGY | Facility: CLINIC | Age: 67
End: 2022-06-01

## 2022-06-01 ENCOUNTER — APPOINTMENT (OUTPATIENT)
Dept: LAB | Facility: HOSPITAL | Age: 67
End: 2022-06-01
Payer: MEDICARE

## 2022-06-01 ENCOUNTER — OFFICE VISIT (OUTPATIENT)
Dept: HEMATOLOGY ONCOLOGY | Facility: MEDICAL CENTER | Age: 67
End: 2022-06-01
Payer: MEDICARE

## 2022-06-01 VITALS
OXYGEN SATURATION: 97 % | SYSTOLIC BLOOD PRESSURE: 124 MMHG | RESPIRATION RATE: 16 BRPM | WEIGHT: 192 LBS | TEMPERATURE: 97 F | DIASTOLIC BLOOD PRESSURE: 60 MMHG | BODY MASS INDEX: 41.42 KG/M2 | HEART RATE: 111 BPM | HEIGHT: 57 IN

## 2022-06-01 VITALS
HEIGHT: 57 IN | RESPIRATION RATE: 16 BRPM | BODY MASS INDEX: 41.42 KG/M2 | HEART RATE: 111 BPM | OXYGEN SATURATION: 97 % | SYSTOLIC BLOOD PRESSURE: 124 MMHG | DIASTOLIC BLOOD PRESSURE: 60 MMHG | TEMPERATURE: 97 F | WEIGHT: 192 LBS

## 2022-06-01 DIAGNOSIS — C19 RECTOSIGMOID CANCER (HCC): Primary | ICD-10-CM

## 2022-06-01 DIAGNOSIS — C19 RECTOSIGMOID CANCER (HCC): ICD-10-CM

## 2022-06-01 DIAGNOSIS — Z98.890 POSTOPERATIVE STATE: Primary | ICD-10-CM

## 2022-06-01 DIAGNOSIS — C78.6 OMENTAL METASTASIS (HCC): ICD-10-CM

## 2022-06-01 DIAGNOSIS — C78.6 OMENTAL METASTASIS (HCC): Primary | ICD-10-CM

## 2022-06-01 LAB
ALBUMIN SERPL BCP-MCNC: 2.9 G/DL (ref 3.5–5)
ALP SERPL-CCNC: 76 U/L (ref 46–116)
ALT SERPL W P-5'-P-CCNC: 25 U/L (ref 12–78)
ANION GAP SERPL CALCULATED.3IONS-SCNC: 9 MMOL/L (ref 4–13)
AST SERPL W P-5'-P-CCNC: 21 U/L (ref 5–45)
BASOPHILS # BLD AUTO: 0.03 THOUSANDS/ΜL (ref 0–0.1)
BASOPHILS NFR BLD AUTO: 0 % (ref 0–1)
BILIRUB SERPL-MCNC: 0.45 MG/DL (ref 0.2–1)
BUN SERPL-MCNC: 15 MG/DL (ref 5–25)
CALCIUM ALBUM COR SERPL-MCNC: 10.4 MG/DL (ref 8.3–10.1)
CALCIUM SERPL-MCNC: 9.5 MG/DL (ref 8.3–10.1)
CEA SERPL-MCNC: 1.8 NG/ML (ref 0–3)
CHLORIDE SERPL-SCNC: 106 MMOL/L (ref 100–108)
CO2 SERPL-SCNC: 25 MMOL/L (ref 21–32)
CREAT SERPL-MCNC: 1.12 MG/DL (ref 0.6–1.3)
EOSINOPHIL # BLD AUTO: 0.27 THOUSAND/ΜL (ref 0–0.61)
EOSINOPHIL NFR BLD AUTO: 3 % (ref 0–6)
ERYTHROCYTE [DISTWIDTH] IN BLOOD BY AUTOMATED COUNT: 16.2 % (ref 11.6–15.1)
GFR SERPL CREATININE-BSD FRML MDRD: 50 ML/MIN/1.73SQ M
GLUCOSE SERPL-MCNC: 107 MG/DL (ref 65–140)
HCT VFR BLD AUTO: 33.8 % (ref 34.8–46.1)
HGB BLD-MCNC: 10 G/DL (ref 11.5–15.4)
IMM GRANULOCYTES # BLD AUTO: 0.02 THOUSAND/UL (ref 0–0.2)
IMM GRANULOCYTES NFR BLD AUTO: 0 % (ref 0–2)
LYMPHOCYTES # BLD AUTO: 1.72 THOUSANDS/ΜL (ref 0.6–4.47)
LYMPHOCYTES NFR BLD AUTO: 21 % (ref 14–44)
MCH RBC QN AUTO: 28.1 PG (ref 26.8–34.3)
MCHC RBC AUTO-ENTMCNC: 29.6 G/DL (ref 31.4–37.4)
MCV RBC AUTO: 95 FL (ref 82–98)
MONOCYTES # BLD AUTO: 0.87 THOUSAND/ΜL (ref 0.17–1.22)
MONOCYTES NFR BLD AUTO: 11 % (ref 4–12)
NEUTROPHILS # BLD AUTO: 5.35 THOUSANDS/ΜL (ref 1.85–7.62)
NEUTS SEG NFR BLD AUTO: 65 % (ref 43–75)
NRBC BLD AUTO-RTO: 0 /100 WBCS
PLATELET # BLD AUTO: 473 THOUSANDS/UL (ref 149–390)
PMV BLD AUTO: 10.3 FL (ref 8.9–12.7)
POTASSIUM SERPL-SCNC: 4 MMOL/L (ref 3.5–5.3)
PROT SERPL-MCNC: 7.7 G/DL (ref 6.4–8.2)
RBC # BLD AUTO: 3.56 MILLION/UL (ref 3.81–5.12)
SODIUM SERPL-SCNC: 140 MMOL/L (ref 136–145)
WBC # BLD AUTO: 8.26 THOUSAND/UL (ref 4.31–10.16)

## 2022-06-01 PROCEDURE — 99215 OFFICE O/P EST HI 40 MIN: CPT | Performed by: INTERNAL MEDICINE

## 2022-06-01 PROCEDURE — 85025 COMPLETE CBC W/AUTO DIFF WBC: CPT

## 2022-06-01 PROCEDURE — 99024 POSTOP FOLLOW-UP VISIT: CPT | Performed by: PHYSICIAN ASSISTANT

## 2022-06-01 PROCEDURE — 80053 COMPREHEN METABOLIC PANEL: CPT

## 2022-06-01 PROCEDURE — 82378 CARCINOEMBRYONIC ANTIGEN: CPT

## 2022-06-01 PROCEDURE — 36415 COLL VENOUS BLD VENIPUNCTURE: CPT

## 2022-06-01 NOTE — PROGRESS NOTES
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 who received neoadjuvant chemotherapy and is now s/p diagnostic laparoscopy, DILMA, BSO, extensive adhesiolysis, omentectomy splenectomy HIPEC and diaphragmatic tear repair on 4/29/22  This surgery was performed in combination with surg-onc and thoracic team      Patient has recovered well from surgery  Her vaginal cuff is healed  Scheduled follow-up with medical oncology for treatment planning  Return to our office prn  CHIEF COMPLAINT:  Post-operative evaluation     Problem:  Cancer Staging  No matching staging information was found for the patient  Previous therapy:  Oncology History   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[facility] [address] )   9/23/2019 Initial Diagnosis    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[facility] [address] )     12/10/2019 Surgery    Low anterior resection (Dr Brea Roman):    A  Rectosigmoid colon, low anterior resection:  - Adenocarcinoma, moderately differentiated  - Tumor invades through the muscularis propria into pericolorectal tissue, T3  - Diverticula  - All margins are negative for tumor   - Thirty-four lymph nodes, negative for malignancy (0/34)  B  Colon, anastomotic rings, resection:  - Two portions of colon with no pathologic abnormality     12/10/2019 Genomic Testing    RRESULTS OF IMMUNOHISTOCHEMICAL ANALYSIS FOR MISMATCH REPAIR PROTEIN LOSS  INTERPRETATION: NO LOSS OF NUCLEAR EXPRESSION OF MMR PROTEINS: LOW PROBABILITY OF MSI-H  Note: Background non-neoplastic tissue and/or internal control with intact nuclear expression        RESULTS:  Antibody          Clone               Description                           Results  MLH1               M1                  Mismatch repair protein       Intact nuclear expression  MSH2              [id number]       Mismatch repair protein Intact nuclear expression  MSH6              40                   Mismatch repair protein       Intact nuclear expression  PMS2              [id number]         Mismatch repair protein       Intact nuclear expression     11/12/2021 Biopsy    Omental mass:  - Metastatic adenocarcinoma, with an immunophenotype compatible with metastasis from patient's known colonic primary       12/10/2021 - 12/10/2021 Chemotherapy    capecitabine (XELODA), 850 mg/m2 = 1,600 mg (100 % of original dose 850 mg/m2), Oral, 2 times daily with meals, 1 of 8 cycles  Dose modification: 850 mg/m2 (100 % of original dose 850 mg/m2, Cycle 1, Reason: Other (see comments))  fosaprepitant (EMEND) IVPB, 150 mg, Intravenous, Once, 1 of 1 cycle  Administration: 150 mg (12/10/2021)  oxaliplatin (ELOXATIN) chemo infusion, 244 4 mg, Intravenous, Once, 1 of 8 cycles  Administration: 250 mg (12/10/2021)     1/4/2022 -  Chemotherapy    fluorouracil (ADRUCIL), 300 mg/m2 = 560 mg (75 % of original dose 400 mg/m2), Intravenous, Once, 6 of 12 cycles  Dose modification: 300 mg/m2 (75 % of original dose 400 mg/m2, Cycle 1, Reason: Dose Not Tolerated), 340 mg/m2 (85 % of original dose 400 mg/m2, Cycle 4, Reason: Other (see comments)), 340 mg/m2 (85 % of original dose 400 mg/m2, Cycle 5, Reason: Other (see comments))  Administration: 560 mg (1/4/2022), 560 mg (1/18/2022), 560 mg (2/1/2022), 635 mg (2/15/2022), 635 mg (3/1/2022), 635 mg (3/15/2022)  leucovorin calcium IVPB, 300 mg/m2 = 561 mg (75 % of original dose 400 mg/m2), Intravenous, Once, 6 of 12 cycles  Dose modification: 300 mg/m2 (75 % of original dose 400 mg/m2, Cycle 1, Reason: Dose Not Tolerated), 340 mg/m2 (85 % of original dose 400 mg/m2, Cycle 4, Reason: Other (see comments)), 340 mg/m2 (85 % of original dose 400 mg/m2, Cycle 5, Reason: Other (see comments))  Administration: 561 mg (1/4/2022), 561 mg (1/18/2022), 561 mg (2/1/2022), 636 mg (2/15/2022), 636 mg (3/1/2022), 636 mg (3/15/2022)  oxaliplatin (ELOXATIN) chemo infusion, 63 75 mg/m2 = 119 2125 mg (75 % of original dose 85 mg/m2), Intravenous, Once, 6 of 12 cycles  Dose modification: 63 75 mg/m2 (75 % of original dose 85 mg/m2, Cycle 1, Reason: Dose Not Tolerated), 72 25 mg/m2 (85 % of original dose 85 mg/m2, Cycle 4, Reason: Other (see comments)), 72 25 mg/m2 (85 % of original dose 85 mg/m2, Cycle 5, Reason: Other (see comments))  Administration: 119 2125 mg (1/4/2022), 119 2125 mg (1/18/2022), 119 2125 mg (2/1/2022), 135 1075 mg (2/15/2022), 135 1075 mg (3/1/2022), 135 1075 mg (3/15/2022)  fluorouracil (ADRUCIL) ambulatory infusion Soln, 900 mg/m2/day = 3,365 mg (75 % of original dose 1,200 mg/m2/day), Intravenous, Over 46 hours, 6 of 12 cycles  Dose modification: 900 mg/m2/day (75 % of original dose 1,200 mg/m2/day, Cycle 2, Reason: Other (see comments)), 1,020 mg/m2/day (85 % of original dose 1,200 mg/m2/day, Cycle 4, Reason: Other (see comments), Comment: anticipated tolerance)     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[facility])   11/29/2021 Initial Diagnosis    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[facility] [address] )     12/10/2021 - 12/10/2021 Chemotherapy    capecitabine (XELODA), 850 mg/m2 = 1,600 mg (100 % of original dose 850 mg/m2), Oral, 2 times daily with meals, 1 of 8 cycles  Dose modification: 850 mg/m2 (100 % of original dose 850 mg/m2, Cycle 1, Reason: Other (see comments))  fosaprepitant (EMEND) IVPB, 150 mg, Intravenous, Once, 1 of 1 cycle  Administration: 150 mg (12/10/2021)  oxaliplatin (ELOXATIN) chemo infusion, 244 4 mg, Intravenous, Once, 1 of 8 cycles  Administration: 250 mg (12/10/2021)     1/4/2022 -  Chemotherapy    fluorouracil (ADRUCIL), 300 mg/m2 = 560 mg (75 % of original dose 400 mg/m2), Intravenous, Once, 6 of 12 cycles  Dose modification: 300 mg/m2 (75 % of original dose 400 mg/m2, Cycle 1, Reason: Dose Not Tolerated), 340 mg/m2 (85 % of original dose 400 mg/m2, Cycle 4, Reason: Other (see comments)), 340 mg/m2 (85 % of original dose 400 mg/m2, Cycle 5, Reason: Other (see comments))  Administration: 560 mg (1/4/2022), 560 mg (1/18/2022), 560 mg (2/1/2022), 635 mg (2/15/2022), 635 mg (3/1/2022), 635 mg (3/15/2022)  leucovorin calcium IVPB, 300 mg/m2 = 561 mg (75 % of original dose 400 mg/m2), Intravenous, Once, 6 of 12 cycles  Dose modification: 300 mg/m2 (75 % of original dose 400 mg/m2, Cycle 1, Reason: Dose Not Tolerated), 340 mg/m2 (85 % of original dose 400 mg/m2, Cycle 4, Reason: Other (see comments)), 340 mg/m2 (85 % of original dose 400 mg/m2, Cycle 5, Reason: Other (see comments))  Administration: 561 mg (1/4/2022), 561 mg (1/18/2022), 561 mg (2/1/2022), 636 mg (2/15/2022), 636 mg (3/1/2022), 636 mg (3/15/2022)  oxaliplatin (ELOXATIN) chemo infusion, 63 75 mg/m2 = 119 2125 mg (75 % of original dose 85 mg/m2), Intravenous, Once, 6 of 12 cycles  Dose modification: 63 75 mg/m2 (75 % of original dose 85 mg/m2, Cycle 1, Reason: Dose Not Tolerated), 72 25 mg/m2 (85 % of original dose 85 mg/m2, Cycle 4, Reason: Other (see comments)), 72 25 mg/m2 (85 % of original dose 85 mg/m2, Cycle 5, Reason: Other (see comments))  Administration: 119 2125 mg (1/4/2022), 119 2125 mg (1/18/2022), 119 2125 mg (2/1/2022), 135 1075 mg (2/15/2022), 135 1075 mg (3/1/2022), 135 1075 mg (3/15/2022)  fluorouracil (ADRUCIL) ambulatory infusion Soln, 900 mg/m2/day = 3,365 mg (75 % of original dose 1,200 mg/m2/day), Intravenous, Over 46 hours, 6 of 12 cycles  Dose modification: 900 mg/m2/day (75 % of original dose 1,200 mg/m2/day, Cycle 2, Reason: Other (see comments)), 1,020 mg/m2/day (85 % of original dose 1,200 mg/m2/day, Cycle 4, Reason: Other (see comments), Comment: anticipated tolerance)     4/29/2022 Surgery    Final Diagnosis   A  Uterus, Bilateral Ovaries and Fallopian Tubes; Hysterosalpingo-oophorectomy:  - Leiomyoma  - Left ovary with serous cystadenoma  - Unremarkable cervix  - Benign inactive endometrium with no specific pathologic change    - Unremarkable right ovary and bilateral fallopian tubes      B  Spleen, spleen, omentum, darotis:  - Metastatic adenocarcinoma involving spleen and omentum, consistent with colonic primary  See Note  Patient ID: Trino Ventura is a 75 y o  female  who presents to the office for post-operative evaluation  She has been afebrile  She notes significant fatigue which is stable since hospital discharge  She denies n/v/abdominal pain  Appetite is appropriate  Bowel/bladder function is stable  She denies vaginal bleeding or discharge  She has scheduled follow-up with medical oncology  The following portions of the patient's history were reviewed and updated as appropriate: allergies, current medications, past medical history, past surgical history and problem list     Review of Systems   Constitutional: Positive for fatigue  Negative for fever  Respiratory: Positive for shortness of breath (stable)  Negative for cough  Cardiovascular: Negative  Gastrointestinal: Negative  Genitourinary: Negative  Skin: Negative            Current Outpatient Medications:     acetaminophen (TYLENOL) 325 mg tablet, Take 3 tablets (975 mg total) by mouth every 8 (eight) hours, Disp: , Rfl: 0    docusate sodium (COLACE) 100 mg capsule, Take 100 mg by mouth 2 (two) times a day as needed  , Disp: , Rfl:     ezetimibe (ZETIA) 10 mg tablet, Take 1 tablet (10 mg total) by mouth daily (Patient taking differently: Take 10 mg by mouth daily at bedtime), Disp: 90 tablet, Rfl: 1    famotidine (PEPCID) 20 mg tablet, Take 1 tablet (20 mg total) by mouth 2 (two) times a day (Patient taking differently: Take 20 mg by mouth as needed), Disp: 180 tablet, Rfl: 1    ondansetron (ZOFRAN) 8 mg tablet, Take 1 tablet (8 mg total) by mouth every 12 (twelve) hours as needed for nausea or vomiting, Disp: 20 tablet, Rfl: 2    polyethylene glycol (MIRALAX) 17 g packet, Take 17 g by mouth daily, Disp: , Rfl: 0    prochlorperazine (COMPAZINE) 10 mg tablet, Take 1 tablet (10 mg total) by mouth every 6 (six) hours as needed for nausea or vomiting, Disp: 60 tablet, Rfl: 0    senna (SENOKOT) 8 6 mg, Take 1 tablet (8 6 mg total) by mouth 2 (two) times a day, Disp: , Rfl: 0    celecoxib (CeleBREX) 200 mg capsule, Take 1 capsule (200 mg total) by mouth daily for 10 days (Patient not taking: No sig reported), Disp: 10 capsule, Rfl: 0    enoxaparin (enoxaparin) 40 mg/0 4 mL, Inject 0 4 mL (40 mg total) under the skin every 12 (twelve) hours for 25 days, Disp: 20 mL, Rfl: 0    gabapentin (NEURONTIN) 300 mg capsule, Take 1 capsule (300 mg total) by mouth 3 (three) times a day for 10 days (Patient not taking: Reported on 5/19/2022), Disp: 30 capsule, Rfl: 0    Objective:    Blood pressure 124/60, pulse (!) 111, temperature (!) 97 °F (36 1 °C), temperature source Temporal, resp  rate 16, height 4' 9" (1 448 m), weight 87 1 kg (192 lb), last menstrual period 09/01/2011, SpO2 97 %, not currently breastfeeding  Body mass index is 41 55 kg/m²  Body surface area is 1 77 meters squared  Physical Exam  Vitals reviewed  Exam conducted with a chaperone present  Constitutional:       General: She is not in acute distress  Appearance: Normal appearance  HENT:      Head: Normocephalic and atraumatic  Mouth/Throat:      Mouth: Mucous membranes are moist    Abdominal:      Palpations: Abdomen is soft  There is no mass  Tenderness: There is no abdominal tenderness  Genitourinary:     Comments: The external female genitalia is normal  The bartholin's, uretheral and skenes glands are normal  The urethral meatus is normal (midline with no lesions)  Anus without fissure or lesion  Speculum exam reveals a grossly normal vagina  No masses, lesions, discharge or bleeding  Speculum exam was difficult due to patient comfort  No significant cystocele or rectocele noted  Bimanual exam notes a surgical absent cervix, uterus and adnexal structures   Manually, the vaginal apex was intact without dehiscence  No masses or fullness  Bladder is without fullness, mass or tenderness  Skin:     General: Skin is warm and dry  Comments: Midline incision is intact and healing well  Neurological:      Mental Status: She is alert and oriented to person, place, and time  Psychiatric:         Mood and Affect: Mood normal          Behavior: Behavior normal          Thought Content:  Thought content normal          Judgment: Judgment normal

## 2022-06-01 NOTE — PROGRESS NOTES
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) now with an abdominal mass consistent with recurrence  Issues:    Recurrent rectosigmoid adenocarcinoma  Patient was seen by Dr Erika Ponce surgical oncology  Patient received 3 months of preoperative FOLFOX  Follow-up CT scans demonstrated response to treatment with no evidence of progressive disease  Patient then underwent resection of the omental mass and spleen as well as DILMA/BSO  Mrs Savanna Ortiz feels +/-  The prior CBCs almost 3month-old  Patient is to go for blood work now - results will help determine treatment options  If patient is feeling better and has good laboratory parameters, the plan is to restart the back and 3 months of FOLFOX on June 14 2022  Patient is to return to the office in 3 weeks  mFOLFOX 6 regimen (reduced to 85%)  Oxaliplatin 85 mg/m2 IV day 1  Leucovorin 400 mg/m2 IV day 1  5  mg/m2 IVP day 1  5 FU 1200 mg/m2/day, continuous infusion x 46 hours  Cycle length = 2 weeks  Goal = cure    Caris results are still pending  Ovarian lesion  Recent MRI/pelvis demonstrated a multi septated left ovarian cystic lesion  Transabdominal ultrasound results did not demonstrate any concerning abnormality, 1 year follow-up suggested  Patient was recently seen by Dr Darryl Beltran, gyn Oncology  As above, patient underwent DILMA/BSO  Pathology results are listed below - no evidence of malignancy  Patient will follow-up with gyn Oncology as directed  Genetics  Patient has a complicated family history but multiple family members with colon/rectal cancer  Patient can be seen by Oncology genetics in the future when patient has restarted chemotherapy and is tolerating it      Patient is to return in 3 weeks  Carefully review your medication list and verify that the list is accurate and up-to-date  Please call the hematology/oncology office if there are medications missing from the list, medications on the list that you are not currently taking or if there is a dosage or instruction that is different from how you're taking that medication  Patient goals and areas of care:  Check blood work now, if okay, resume perioperative chemotherapy  Barriers to care:  None  Patient is able to self-care   ______________________________________________________________________________________    Chief complaint: Rectosigmoid carcinoma, omental recurrence    History of Present Illness:  75year old female previously diagnosed with rectosigmoid carcinoma status post resection in September 2019  Postoperatively patient did well; Mrs Tami Verma did not need/receive adjuvant chemotherapy  Recent surveillance CEA level was found to be elevated  Patient underwent workup  Results demonstrated an intra-abdominal mass by the spleen  Patient has been seen by Dr Maxim Tovar and the plan has been neoadjuvant chemotherapy followed by resection  Patient began her 1st cycle of CAPEOX recently  Unfortunately Mrs Tami Verma could not tolerate the Xeloda at all  Patient was seen/evaluated in the infusion room a few weeks ago with persistent nausea/vomiting and dehydration  Patient required admission, discharged after 48 hours  Options were subsequently discussed and patient was switched to FOLFOX  Mrs Tami Verma has completed 3 cycles of FOLFOX  Follow-up scans demonstrated response to treatment  Patient was re-evaluated by Dr Maxim Tovar and underwent resection on April 29, 2022  Prior scans also demonstrated gyn abnormalities  Patient was seen/evaluated by Dr Billie Levine and underwent DILMA/BSO at the time of the splenectomy  No malignancy was found  Mrs Tami Verma states feeling +/-, a little bit more fatigued recently    No GI,  or gyn issues or bleeding  Appetite is okay, weight is stable  Patient admits to not being very active  No shortness of breath or dyspnea on exertion  No pain control issues  Patient has received her COVID vaccination  Review of Systems   Constitutional: Positive for fatigue  Negative for activity change and appetite change  HENT: Negative  Eyes: Negative  Respiratory: Negative  Cardiovascular: Negative  Gastrointestinal: Negative for nausea and vomiting  Endocrine: Negative  Genitourinary: Negative  Musculoskeletal: Negative  Skin: Negative  Allergic/Immunologic: Negative  Neurological: Negative  Hematological: Negative  Psychiatric/Behavioral: Negative  All other systems reviewed and are negative      Patient Active Problem List   Diagnosis    Callus of foot    Foot pain    GERD (gastroesophageal reflux disease)    Hyperlipidemia    Prediabetes    Varicose veins with pain    Morbid obesity ([facility] )    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HCC)    Dyspnea on exertion    Dyslipidemia    Sigmoid diverticulosis    PONV (postoperative nausea and vomiting)    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HCC)    Lesion of ovary    Chemotherapy adverse reaction    Dehydration    Chemotherapy-induced nausea    Platelets decreased (HCC)    Stage 3 chronic kidney disease, unspecified whether stage 3a or 3b CKD ([place] [address] )    History of splenectomy    Asplenia     Past Medical History:   Diagnosis Date    Blood in stool     Breast disorder     Cancer ([facility] )     rectal    Cancer determined by colorectal biopsy ([place] [address] )     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    Colon polyp     GERD (gastroesophageal reflux disease)     History of rectal surgery     Hyperlipidemia     PONV (postoperative nausea and vomiting)      Past Surgical History:   Procedure Laterality Date    BREAST LUMPECTOMY Right      SECTION      x3    COLON SIGMOID RESECTION      COLONOSCOPY      DILATION AND CURETTAGE OF UTERUS      ILEO LOOP DIVERSION N/A 12/10/2019    Procedure: ILEOSTOMY LOOP;  Surgeon: Vonda Ramirez MD;  [facility];  Service: Robotics    INSTILLATION CHEMOTHERAPY INTRAPERITONEAL (HIPEC) LAPAROTOMY N/A 4/29/2022    Procedure: INSTILLATION CHEMOTHERAPY INTRAPERITONEAL (HIPEC) LAPAROTOMY;  Surgeon: Héctor Robles MD;  [facility];  Service: Surgical Oncology    IR BIOPSY OMENTUM  11/12/2021    IR PORT PLACEMENT  12/28/2021    OMENTECTOMY N/A 4/29/2022    Procedure: DIAGNOSTIC LAPAROSCOPY, OPEN OMENTECTOMY, SPLENECTOMY, DIAPHRAGM REPAIR, CHEST TUBE INSERTION;  Surgeon: Héctor Robles MD;  [facility];  Service: Surgical Oncology    AK CLOSE ENTEROSTOMY N/A 2/4/2020    Procedure: CLOSURE ILEOSTOMY;  Surgeon: Vonda Ramirez MD;  [facility];  Service: Colorectal    AK DIAPHRAGM SURG [address] UNLISTED  4/29/2022    Procedure: REPAIR DIAPHRAGM TEAR;  Surgeon: Veryl Boast, MD;  [facility];  Service: Thoracic    AK LAP,SURG,COLECTOMY, PARTIAL, W/ANAST N/A 12/10/2019    Procedure: SIGMOID RESECTION COLON LAPAROSCOPIC W ROBOTICS converted to lap hand assisted  with Partial proctectomy , LASER FLUORESCENCE ANGIOGRAPHY, INTRA OP COLONOSCOPY;  Surgeon: Vonda Ramirez MD;  [facility];  Service: Robotics    AK TOTAL ABDOM HYSTERECTOMY N/A 4/29/2022    Procedure: DIAGNOSTIC LAPAROSCOPY, TOTAL ABDOMINAL HYSTERECTOMY, BILATERAL SALPINGO-OOPHORECTOMY, EXTENSIVE ADHESIOLYSIS;  Surgeon: Mona Altamirano MD;  [facility];  Service: Gynecology Oncology    SMALL INTESTINE SURGERY  2/4/2020    Procedure: RESECTION SMALL BOWEL;  Surgeon: Vonda Ramirez MD;  [facility];  Service: Colorectal   Past surgical history:  No prior blood transfusions    OBGYN:  No breast issues, no abnormal mammograms previously, no postmenopausal bleeding, no other GYN issues    Family History   Problem Relation Age of Onset    Hypertension Mother     Heart attack Mother     Heart attack Father     Heart disease Father     Hypertension Brother     Heart attack Brother     Leukemia Cousin     Breast cancer Cousin     Diabetes Cousin     Breast cancer Family         maternal side    Colon cancer Family         paternal uncle    Colon cancer Paternal Uncle     Stroke Neg Hx    Family history:  Somewhat complicated, mother with colostomy but etiology for this is unclear, paternal uncle with rectal cancer, patient has to first-degree relatives on the paternal side with history of colon cancer, 3 sons and 1 grandchild in good general health    Social History     Socioeconomic History    Marital status:      Spouse name: Not on file    Number of children: Not on file    Years of education: Not on file    Highest education level: Not on file   Occupational History    Not on file   Tobacco Use    Smoking status: Never Smoker    Smokeless tobacco: Never Used   Vaping Use    Vaping Use: Never used   Substance and Sexual Activity    Alcohol use: Yes     Comment: "occasionally"    Drug use: Never    Sexual activity: Not on file   Other Topics Concern    Not on file   Social History Narrative    Not on file     Social Determinants of Health     Financial Resource Strain: Not on file   Food Insecurity: No Food Insecurity    Worried About Running Out of Food in the Last Year: Never true    [address] in the Last Year: Never true   Transportation Needs: No Transportation Needs    Lack of Transportation (Medical): No    Lack of Transportation (Non-Medical):  No   Physical Activity: Not on file   Stress: Not on file   Social Connections: Not on file   Intimate Partner Violence: Not on file   Housing Stability: Unknown    Unable to Pay for Housing in the Last Year: No    Number of Places Lived in the Last Year: Not on file    Unstable Housing in the Last Year: No   Social history:  No tobacco, alcohol or drug abuse, no toxic exposure    Current Outpatient Medications:     acetaminophen (TYLENOL) 325 mg tablet, Take 3 tablets (975 mg total) by mouth every 8 (eight) hours, Disp: , Rfl: 0    celecoxib (CeleBREX) 200 mg capsule, Take 1 capsule (200 mg total) by mouth daily for 10 days (Patient not taking: No sig reported), Disp: 10 capsule, Rfl: 0    docusate sodium (COLACE) 100 mg capsule, Take 100 mg by mouth 2 (two) times a day as needed  , Disp: , Rfl:     enoxaparin (enoxaparin) 40 mg/0 4 mL, Inject 0 4 mL (40 mg total) under the skin every 12 (twelve) hours for 25 days, Disp: 20 mL, Rfl: 0    ezetimibe (ZETIA) 10 mg tablet, Take 1 tablet (10 mg total) by mouth daily (Patient taking differently: Take 10 mg by mouth daily at bedtime), Disp: 90 tablet, Rfl: 1    famotidine (PEPCID) 20 mg tablet, Take 1 tablet (20 mg total) by mouth 2 (two) times a day (Patient taking differently: Take 20 mg by mouth as needed), Disp: 180 tablet, Rfl: 1    gabapentin (NEURONTIN) 300 mg capsule, Take 1 capsule (300 mg total) by mouth 3 (three) times a day for 10 days (Patient not taking: Reported on 5/19/2022), Disp: 30 capsule, Rfl: 0    ondansetron (ZOFRAN) 8 mg tablet, Take 1 tablet (8 mg total) by mouth every 12 (twelve) hours as needed for nausea or vomiting, Disp: 20 tablet, Rfl: 2    polyethylene glycol (MIRALAX) 17 g packet, Take 17 g by mouth daily, Disp: , Rfl: 0    prochlorperazine (COMPAZINE) 10 mg tablet, Take 1 tablet (10 mg total) by mouth every 6 (six) hours as needed for nausea or vomiting, Disp: 60 tablet, Rfl: 0    senna (SENOKOT) 8 6 mg, Take 1 tablet (8 6 mg total) by mouth 2 (two) times a day, Disp: , Rfl: 0    Allergies   Allergen Reactions    Medical Tape Rash     Skin irritation  Skin peeling  Skin irritation  Skin peeling  Blisters  Rash     Vitals:    06/01/22 1334   BP: 124/60   Pulse: (!) 111   Resp: 16   Temp: (!) 97 °F (36 1 °C)   SpO2: 97%   Physical Exam  Constitutional:       Appearance: She is well-developed     HENT:      Head: Normocephalic and atraumatic  Right Ear: External ear normal       Left Ear: External ear normal    Eyes:      Conjunctiva/sclera: Conjunctivae normal       Pupils: Pupils are equal, round, and reactive to light  Cardiovascular:      Rate and Rhythm: Normal rate and regular rhythm  Heart sounds: Normal heart sounds  Pulmonary:      Effort: Pulmonary effort is normal       Breath sounds: Normal breath sounds  Abdominal:      General:  Obese, +bowel sounds, nontender, cannot palpate liver or spleen, no guarding  Musculoskeletal:         General: Normal range of motion  Cervical back: Normal range of motion and neck supple  Skin:     General:  Slightly pale, warm, moist, no petechiae or ecchymoses, well-healed midline abdominal suture, right anterior chest wall port area clean and dry  Neurological:      Mental Status: She is alert and oriented to person, place, and time  Deep Tendon Reflexes: Reflexes are normal and symmetric  Psychiatric:         Behavior: Behavior normal          Thought Content: Thought content normal          Judgment: Judgment normal     Extremities:  No lower extremity edema, +obesity, no cords, pulses are 1+  Lymphatics:  No adenopathy in neck, supraclavicular region, axilla bilaterally    Laboratory    05/04/2022 WBC = 8 34 hemoglobin = 7 1 hematocrit = 22 5 MCV = 95 platelet = 654        Procedures    10/06/2021 colonoscopy    FINDINGS:  · Healthy end-to-end colorectal anastomosis with no bleeding in the mid rectum  · All observed locations appeared normal, including the cecum, ascending colon, transverse colon, splenic flexure and descending colon   A couple scattered diverticuli seen    Imaging    03/22/2022 CT scan chest abdomen pelvis with contrast    ABDOMINOPELVIC CAVITY: Left upper quadrant biopsy-proven omental metastasis has decreased in size now measuring 4 1 x 2 5 x 2 7 cm (previously 5 3 x 4 5 x 4 3 cm), and now only focally contacts rather than grossly invades the spleen and intercostal soft   tissues, on series 2/54, 601/71  Adjacent omental nodularity is less conspicuous  No new evidence of metastatic disease in the abdomen or pelvis  No lymphadenopathy  No ascites or intraperitoneal free air  IMPRESSION:     1   Decreased size of biopsy-proven omental metastasis, which now only focally contacting rather than grossly invading the spleen and adjacent abdominal wall  No new evidence of metastatic disease in the abdomen or pelvis  2   No new or suspicious pulmonary nodules  One of the previously noted new 1-2 mm nodules is no longer seen, and the other is unchanged  Recommend continued attention on follow-up  3   Top-normal thickness endometrial stripe measuring 7 mm  If there is history of vaginal bleeding, suggest catheterization with endometrial biopsy  4   Hepatic steatosis  11/11/2021 ultrasound pelvis transabdominal only    Cluster of anechoic cystic areas seen replacing the left ovary similar to MRI largest measuring 8 mm compared to 1 3 cm  Based on the ACR O-RADS system, this is O-RADS category 2 (almost certain benign with <0% risk of malignancy ) The management recommendation is followup in 1 year  10/25/2021 MRI pelvis rectal cancer staging    1  No recurrent or metastatic disease in the pelvis  Please refer to the separately dictated MRI abdomen report regarding mesenteric mass in the left upper quadrant      2   Multi septated cystic lesion versus cluster of small cysts in the left ovary measuring 2 6 x 2 5 x 1 9 cm, increased in size from October 2019  Further characterization with pelvic ultrasound is recommended      10/25/2021 MRI abdomen    1   5 2 cm mesenteric mass in the left upper quadrant with thickening of peritoneal reflection and invasion of the spleen, similar to prior PET/CT  This is highly suspicious for metastatic tumor implant  2   No additional potential sites of metastasis in the abdomen    3   Moderately distended gallbladder with gallstones and probable adenomyomatosis  09/28/2021 PET-CT    1  There is a large left paracolic gutter markedly hypermetabolic mass immediately inferior to the spleen, most consistent with metastatic colorectal carcinoma  2  Mildly increased activity at the right abdominal bowel anastomotic site  If not recently performed, direct visualization is recommended  3  There are no other glucose avid abnormalities identified within the abdomen or pelvis  4  No evidence of distant glucose avid metastatic disease in the neck, chest, or skeleton     Pathology    Case Report   Surgical Pathology Report                         Case: [id number]                                    Authorizing Provider: Saintclair How,   Collected:           04/29/2022 1118                                      MD                                                                            Ordering Location:     [address]      Received:            04/29/2022 [address] [place] [address] Operating Room                                                       Pathologist:           Akbar Bear DO                                                      Specimens:   A) - Uterus w/Bilateral Ovaries and Fallopian Tubes                                                  B) - Spleen, spleen, omentum, darotis                                                      Final Diagnosis   A  Uterus, Bilateral Ovaries and Fallopian Tubes; Hysterosalpingo-oophorectomy:  - Leiomyoma  - Left ovary with serous cystadenoma  - Unremarkable cervix  - Benign inactive endometrium with no specific pathologic change  - Unremarkable right ovary and bilateral fallopian tubes      B  Spleen, spleen, omentum, darotis:  - Metastatic adenocarcinoma involving spleen and omentum, consistent with colonic primary  See Note     Electronically signed by Akbar Bear DO on 5/12/2022 at  2:49 PM     Note Note B: Comparison to prior material ([id number], omental mass) reveals identical morphology to previous metastatic colonic adenocarcinoma  Case Report   Surgical Pathology Report                         Case: [id number]                                    Authorizing Provider: Shani Butts MD           Collected:           11/12/2021 0921               Ordering Location:     [address] [place] [address] Received:            11/12/2021 0941                                      CAT Scan                                                                      Pathologist:           Pamela Jacinto MD                                                         Specimen:    Omentum, Omental mass                                                                      Final Diagnosis   A  Omentum, Omental mass:  - Metastatic adenocarcinoma, with an immunophenotype compatible with metastasis from patient's known colonic primary   - See note      Note: Tumor is positive with CK20, CDX2 and negative with CK7, PAX8  This immunophenotype supports the above interpretation  Best representative block with tumor A5      This case was reviewed at the intradepartmental  conference     Dr Marybel Moreno is notified of the diagnosis in [facility] via [address] on 11/17/2021  at 8 45 am    Electronically signed by Caio Girard MD on 11/17/2021 at  8:53 AM         Case Report   Surgical Pathology Report                         Case: [id number]                                    Authorizing Provider: Claudell Hoa, MD       Collected:           12/10/2019 1159               Ordering Location:     [address] [place] [address]      Received:            12/10/2019 1435                                      Hospital Operating Room                                                       Pathologist:           Mor Ward MD                                                                  Specimens:   A) - Large Intestine, Sigmoid Colon, and portion of rectum                                           B) - Anastomatic site, anastamotic rings                                                   Addendum   RRESULTS OF IMMUNOHISTOCHEMICAL ANALYSIS FOR MISMATCH REPAIR PROTEIN LOSS  INTERPRETATION: NO LOSS OF NUCLEAR EXPRESSION OF MMR PROTEINS: LOW PROBABILITY OF MSI-H  Note: Background non-neoplastic tissue and/or internal control with intact nuclear expression       RESULTS:  Antibody          Clone               Description                           Results  MLH1               M1                  Mismatch repair protein       Intact nuclear expression  MSH2              [id number]       Mismatch repair protein       Intact nuclear expression  PSS7              56                   Mismatch repair protein       Intact nuclear expression  DVL6              [id number]         Mismatch repair protein       Intact nuclear expression     Comment:  A negative control and a positive control for each antibody have been reviewed and accepted  [facility] Specimen ID: [id number], Evaluator: Yulissa Lopez MD  These tests were developed and their performance characteristics determined by [facility]  Marley Vianney may not be cleared or approved by the [place]  Food and Drug Administration   The FDA determined that such clearance or approval is not necessary   These tests are used for clinical purposes  Marley Slay should not be regarded as investigational or for research   This laboratory has been approved by [facility], designated as a high-complexity laboratory and is qualified to perform these tests      Comments: Patients whose tumors demonstrate lack of expression of one or more DNA mismatch repair proteins might be at risk for Bee Syndrome  This cancer susceptibility syndrome greatly increases the risk of synchronous and/or metachronous cancers in the affected patients and their family members   Normal expression of all proteins does not completely rule out familial cancer predisposition      The [facility]'s Bee Syndrome Surveillance Program Task Forces recommends that all patients with lack of expression of one or more DNA mismatch repair proteins and those with concerning personal or family history should undergo thorough evaluation, counseling and possibly genetic testing        Addendum electronically signed by Mor Ward MD on 12/20/2019 at  3:28 PM   Final Diagnosis   A  Rectosigmoid colon, low anterior resection:  - Adenocarcinoma, moderately differentiated  - Tumor invades through the muscularis propria into pericolorectal tissue, T3  - Diverticula  - All margins are negative for tumor   - Thirty-four lymph nodes, negative for malignancy (0/34)      B  Colon, anastomotic rings, resection:  - Two portions of colon with no pathologic abnormality     Intradepartmental consultation is in agreement  Interpretation performed at hospitals [facility] [address]  Immunohistochemistry for desmin  is performed on tissue blocks A13 and A16 to help in the assessment of this case          Electronically signed by Mor Ward MD on 12/18/2019 at 10:42 AM   Additional Information    All controls performed with the immunohistochemical stains reported above reacted appropriately  These tests were developed and their performance characteristics determined by [facility]  They may not be cleared or approved by the [place]  Food and Drug Administration  The FDA has determined that such clearance or approval is not necessary  These tests are used for clinical purposes  They should not be regarded as investigational or for research  This laboratory has been approved by CLIA 88, designated as a high-complexity laboratory and is qualified to perform these tests     Synoptic Checklist   COLON AND RECTUM: Resection, Including Transanal Disk Excision of Rectal Neoplasms  8th Edition - Protocol posted: 2/27/2019  ColoRectal - All Specimens  SPECIMEN   Procedure  Low anterior resection    Macroscopic Intactness of Mesorectum  Complete    TUMOR   Tumor Site  Rectosigmoid    Histologic Type  Adenocarcinoma    Histologic Grade  G2: Moderately differentiated    Tumor Size  Greatest dimension (Centimeters): 5 4 cm   Additional Dimension (Centimeters)  4 6 cm     1 cm   Tumor Deposits  Not identified    Tumor Extension  Tumor invades through the muscularis propria into pericolorectal tissue    Macroscopic Tumor Perforation  Not identified    Lymphovascular Invasion  Not identified    Perineural Invasion  Not identified    Type of Polyp in Which Invasive Carcinoma Arose  Tubular adenoma    Treatment Effect  No known presurgical therapy    MARGINS   Margins  All margins are uninvolved by invasive carcinoma, high-grade dysplasia, intramucosal adenocarcinoma, and adenoma    Margins Examined  Proximal      Distal      Radial or Mesenteric    Distance of Invasive Carcinoma from Closest Margin  1 5 cm   Closest Margin  Radial or Mesenteric    Distance of Tumor from Radial Margin  1 5 cm   LYMPH NODES   Number of Lymph Nodes Involved  0    Number of Lymph Nodes Examined  34    PATHOLOGIC STAGE CLASSIFICATION (pTNM, AJCC 8th Edition)   Primary Tumor (pT)  pT3    Regional Lymph Nodes (pN)  pN0    ADDITIONAL FINDINGS   Additional Pathologic Findings  Diverticulosis

## 2022-06-02 PROBLEM — Z98.890 POSTOPERATIVE STATE: Status: ACTIVE | Noted: 2022-06-02

## 2022-06-03 NOTE — ASSESSMENT & PLAN NOTE
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

## 2022-06-08 DIAGNOSIS — C19 RECTOSIGMOID CANCER (HCC): ICD-10-CM

## 2022-06-08 DIAGNOSIS — C78.6 OMENTAL METASTASIS (HCC): Primary | ICD-10-CM

## 2022-06-08 RX ORDER — SODIUM CHLORIDE 9 MG/ML
20 INJECTION, SOLUTION INTRAVENOUS ONCE AS NEEDED
Status: CANCELLED | OUTPATIENT
Start: 2022-06-14

## 2022-06-08 RX ORDER — FLUOROURACIL 50 MG/ML
340 INJECTION, SOLUTION INTRAVENOUS ONCE
Status: CANCELLED | OUTPATIENT
Start: 2022-06-14

## 2022-06-08 RX ORDER — DEXTROSE MONOHYDRATE 50 MG/ML
20 INJECTION, SOLUTION INTRAVENOUS ONCE
Status: CANCELLED | OUTPATIENT
Start: 2022-06-14

## 2022-06-13 ENCOUNTER — APPOINTMENT (OUTPATIENT)
Dept: LAB | Facility: HOSPITAL | Age: 67
End: 2022-06-13
Payer: MEDICARE

## 2022-06-13 DIAGNOSIS — C19 RECTOSIGMOID CANCER (HCC): ICD-10-CM

## 2022-06-13 DIAGNOSIS — C78.6 OMENTAL METASTASIS (HCC): ICD-10-CM

## 2022-06-13 LAB
ALBUMIN SERPL BCP-MCNC: 2.8 G/DL (ref 3.5–5)
ALP SERPL-CCNC: 70 U/L (ref 46–116)
ALT SERPL W P-5'-P-CCNC: 20 U/L (ref 12–78)
ANION GAP SERPL CALCULATED.3IONS-SCNC: 11 MMOL/L (ref 4–13)
AST SERPL W P-5'-P-CCNC: 16 U/L (ref 5–45)
BASOPHILS # BLD AUTO: 0.02 THOUSANDS/ΜL (ref 0–0.1)
BASOPHILS NFR BLD AUTO: 0 % (ref 0–1)
BILIRUB SERPL-MCNC: 0.51 MG/DL (ref 0.2–1)
BUN SERPL-MCNC: 17 MG/DL (ref 5–25)
CALCIUM ALBUM COR SERPL-MCNC: 10.5 MG/DL (ref 8.3–10.1)
CALCIUM SERPL-MCNC: 9.5 MG/DL (ref 8.3–10.1)
CHLORIDE SERPL-SCNC: 106 MMOL/L (ref 100–108)
CO2 SERPL-SCNC: 24 MMOL/L (ref 21–32)
CREAT SERPL-MCNC: 1.15 MG/DL (ref 0.6–1.3)
EOSINOPHIL # BLD AUTO: 0.25 THOUSAND/ΜL (ref 0–0.61)
EOSINOPHIL NFR BLD AUTO: 3 % (ref 0–6)
ERYTHROCYTE [DISTWIDTH] IN BLOOD BY AUTOMATED COUNT: 16 % (ref 11.6–15.1)
GFR SERPL CREATININE-BSD FRML MDRD: 49 ML/MIN/1.73SQ M
GLUCOSE SERPL-MCNC: 111 MG/DL (ref 65–140)
HCT VFR BLD AUTO: 34.9 % (ref 34.8–46.1)
HGB BLD-MCNC: 10.4 G/DL (ref 11.5–15.4)
IMM GRANULOCYTES # BLD AUTO: 0.02 THOUSAND/UL (ref 0–0.2)
IMM GRANULOCYTES NFR BLD AUTO: 0 % (ref 0–2)
LYMPHOCYTES # BLD AUTO: 1.95 THOUSANDS/ΜL (ref 0.6–4.47)
LYMPHOCYTES NFR BLD AUTO: 23 % (ref 14–44)
MCH RBC QN AUTO: 27.7 PG (ref 26.8–34.3)
MCHC RBC AUTO-ENTMCNC: 29.8 G/DL (ref 31.4–37.4)
MCV RBC AUTO: 93 FL (ref 82–98)
MONOCYTES # BLD AUTO: 0.64 THOUSAND/ΜL (ref 0.17–1.22)
MONOCYTES NFR BLD AUTO: 8 % (ref 4–12)
NEUTROPHILS # BLD AUTO: 5.46 THOUSANDS/ΜL (ref 1.85–7.62)
NEUTS SEG NFR BLD AUTO: 66 % (ref 43–75)
NRBC BLD AUTO-RTO: 0 /100 WBCS
PLATELET # BLD AUTO: 454 THOUSANDS/UL (ref 149–390)
PMV BLD AUTO: 10.9 FL (ref 8.9–12.7)
POTASSIUM SERPL-SCNC: 4.3 MMOL/L (ref 3.5–5.3)
PROT SERPL-MCNC: 7.5 G/DL (ref 6.4–8.2)
RBC # BLD AUTO: 3.76 MILLION/UL (ref 3.81–5.12)
SODIUM SERPL-SCNC: 141 MMOL/L (ref 136–145)
WBC # BLD AUTO: 8.34 THOUSAND/UL (ref 4.31–10.16)

## 2022-06-13 PROCEDURE — 85025 COMPLETE CBC W/AUTO DIFF WBC: CPT

## 2022-06-13 PROCEDURE — 80053 COMPREHEN METABOLIC PANEL: CPT

## 2022-06-13 PROCEDURE — 36415 COLL VENOUS BLD VENIPUNCTURE: CPT

## 2022-06-14 ENCOUNTER — HOSPITAL ENCOUNTER (OUTPATIENT)
Dept: INFUSION CENTER | Facility: HOSPITAL | Age: 67
Discharge: HOME | End: 2022-06-14
Attending: INTERNAL MEDICINE
Payer: MEDICARE

## 2022-06-14 VITALS
BODY MASS INDEX: 41.09 KG/M2 | SYSTOLIC BLOOD PRESSURE: 134 MMHG | WEIGHT: 190.48 LBS | RESPIRATION RATE: 19 BRPM | OXYGEN SATURATION: 98 % | TEMPERATURE: 98.8 F | DIASTOLIC BLOOD PRESSURE: 74 MMHG | HEART RATE: 83 BPM | HEIGHT: 57 IN

## 2022-06-14 DIAGNOSIS — C19 RECTOSIGMOID CANCER (HCC): Primary | ICD-10-CM

## 2022-06-14 DIAGNOSIS — C78.6 OMENTAL METASTASIS (HCC): ICD-10-CM

## 2022-06-14 PROCEDURE — 96367 TX/PROPH/DG ADDL SEQ IV INF: CPT

## 2022-06-14 PROCEDURE — G0498 CHEMO EXTEND IV INFUS W/PUMP: HCPCS

## 2022-06-14 PROCEDURE — 96375 TX/PRO/DX INJ NEW DRUG ADDON: CPT

## 2022-06-14 PROCEDURE — 96415 CHEMO IV INFUSION ADDL HR: CPT

## 2022-06-14 PROCEDURE — 96413 CHEMO IV INFUSION 1 HR: CPT

## 2022-06-14 PROCEDURE — 36593 DECLOT VASCULAR DEVICE: CPT

## 2022-06-14 PROCEDURE — 96411 CHEMO IV PUSH ADDL DRUG: CPT

## 2022-06-14 PROCEDURE — 96368 THER/DIAG CONCURRENT INF: CPT

## 2022-06-14 RX ORDER — DIPHENHYDRAMINE HYDROCHLORIDE 50 MG/ML
25 INJECTION INTRAMUSCULAR; INTRAVENOUS
Status: ACTIVE | OUTPATIENT
Start: 2022-06-14 | End: 2022-06-14

## 2022-06-14 RX ORDER — DEXTROSE MONOHYDRATE 50 MG/ML
20 INJECTION, SOLUTION INTRAVENOUS ONCE
Status: COMPLETED | OUTPATIENT
Start: 2022-06-14 | End: 2022-06-14

## 2022-06-14 RX ORDER — SODIUM CHLORIDE 9 MG/ML
20 INJECTION, SOLUTION INTRAVENOUS ONCE AS NEEDED
Status: DISCONTINUED | OUTPATIENT
Start: 2022-06-14 | End: 2022-06-17 | Stop reason: HOSPADM

## 2022-06-14 RX ORDER — FAMOTIDINE 20 MG/50ML
20 INJECTION, SOLUTION INTRAVENOUS ONCE
Status: COMPLETED | OUTPATIENT
Start: 2022-06-14 | End: 2022-06-14

## 2022-06-14 RX ORDER — FLUOROURACIL 50 MG/ML
340 INJECTION, SOLUTION INTRAVENOUS ONCE
Status: COMPLETED | OUTPATIENT
Start: 2022-06-14 | End: 2022-06-14

## 2022-06-14 RX ADMIN — LEUCOVORIN CALCIUM 636 MG: 200 INJECTION, POWDER, LYOPHILIZED, FOR SOLUTION INTRAMUSCULAR; INTRAVENOUS at 12:29

## 2022-06-14 RX ADMIN — DEXAMETHASONE SODIUM PHOSPHATE: 10 INJECTION INTRAMUSCULAR; INTRAVENOUS at 11:21

## 2022-06-14 RX ADMIN — DIPHENHYDRAMINE HYDROCHLORIDE 25 MG: 50 INJECTION, SOLUTION INTRAMUSCULAR; INTRAVENOUS at 14:36

## 2022-06-14 RX ADMIN — SODIUM CHLORIDE 20 ML/HR: 0.9 INJECTION, SOLUTION INTRAVENOUS at 11:20

## 2022-06-14 RX ADMIN — ALTEPLASE 2 MG: 2.2 INJECTION, POWDER, LYOPHILIZED, FOR SOLUTION INTRAVENOUS at 10:26

## 2022-06-14 RX ADMIN — OXALIPLATIN 135.11 MG: 5 INJECTION, SOLUTION INTRAVENOUS at 12:35

## 2022-06-14 RX ADMIN — DEXTROSE 20 ML/HR: 5 SOLUTION INTRAVENOUS at 12:26

## 2022-06-14 RX ADMIN — FAMOTIDINE 20 MG: 20 INJECTION, SOLUTION INTRAVENOUS at 14:39

## 2022-06-14 RX ADMIN — FLUOROURACIL 635 MG: 50 INJECTION, SOLUTION INTRAVENOUS at 15:12

## 2022-06-14 RX ADMIN — HYDROCORTISONE SODIUM SUCCINATE 50 MG: 100 INJECTION, POWDER, FOR SOLUTION INTRAMUSCULAR; INTRAVENOUS at 14:37

## 2022-06-14 NOTE — PROGRESS NOTES
Pt stated "my neuropathy has been getting worse lately"  Pt stated "I used to be able to run my fingers under hot water and the numbness and tingling would subside, but that doesn't work any longer " Relevant e-solution made aware

## 2022-06-14 NOTE — PROGRESS NOTES
Pt educated on Elastomeric Infusion Device  Pt offers no complaints or concerns at this time  Pt stable at time of d/c  Pt aware of when to return for CADD d/c

## 2022-06-14 NOTE — PLAN OF CARE
Problem: Potential for Falls  Goal: Patient will remain free of falls  Description: INTERVENTIONS:  - Educate patient/family on patient safety including physical limitations  - Instruct patient to call for assistance with activity   - Consult OT/PT to assist with strengthening/mobility   - Keep Call bell within reach  - Keep bed low and locked with side rails adjusted as appropriate  - Keep care items and personal belongings within reach  - Initiate and maintain comfort rounds  - Make Fall Risk Sign visible to staff  - Offer Toileting every Hours, in advance of need  - Initiate/Maintain alarm  - Obtain necessary fall risk management equipment:   - Apply yellow socks and bracelet for high fall risk patients  - Consider moving patient to room near nurses station  6/14/2022 1035 by Nicole Carreno RN  Outcome: Progressing

## 2022-06-14 NOTE — PROGRESS NOTES
Pt c/o generalized itchiness mainly in her hands and chest  Oxaliplatin STOPPED at 1425  HSR protocol initiated at this time

## 2022-06-16 ENCOUNTER — HOSPITAL ENCOUNTER (OUTPATIENT)
Dept: INFUSION CENTER | Facility: HOSPITAL | Age: 67
Discharge: HOME | End: 2022-06-16
Attending: INTERNAL MEDICINE

## 2022-06-16 VITALS — TEMPERATURE: 97.5 F

## 2022-06-16 DIAGNOSIS — C19 RECTOSIGMOID CANCER (HCC): Primary | ICD-10-CM

## 2022-06-16 DIAGNOSIS — C78.6 OMENTAL METASTASIS (HCC): ICD-10-CM

## 2022-06-21 ENCOUNTER — TELEPHONE (OUTPATIENT)
Dept: HEMATOLOGY ONCOLOGY | Facility: CLINIC | Age: 67
End: 2022-06-21

## 2022-06-21 ENCOUNTER — OFFICE VISIT (OUTPATIENT)
Dept: HEMATOLOGY ONCOLOGY | Facility: MEDICAL CENTER | Age: 67
End: 2022-06-21
Payer: MEDICARE

## 2022-06-21 VITALS
HEART RATE: 104 BPM | BODY MASS INDEX: 40.13 KG/M2 | HEIGHT: 57 IN | WEIGHT: 186 LBS | RESPIRATION RATE: 20 BRPM | TEMPERATURE: 97.6 F | DIASTOLIC BLOOD PRESSURE: 74 MMHG | SYSTOLIC BLOOD PRESSURE: 132 MMHG | OXYGEN SATURATION: 98 %

## 2022-06-21 DIAGNOSIS — C19 RECTOSIGMOID CANCER (HCC): ICD-10-CM

## 2022-06-21 DIAGNOSIS — C19 RECTOSIGMOID CANCER (HCC): Primary | ICD-10-CM

## 2022-06-21 DIAGNOSIS — C78.6 OMENTAL METASTASIS (HCC): Primary | ICD-10-CM

## 2022-06-21 DIAGNOSIS — T45.1X5S ADVERSE EFFECT OF CHEMOTHERAPY, SEQUELA: ICD-10-CM

## 2022-06-21 DIAGNOSIS — C78.6 OMENTAL METASTASIS (HCC): ICD-10-CM

## 2022-06-21 PROCEDURE — 99215 OFFICE O/P EST HI 40 MIN: CPT | Performed by: PHYSICIAN ASSISTANT

## 2022-06-21 RX ORDER — DEXTROSE MONOHYDRATE 50 MG/ML
20 INJECTION, SOLUTION INTRAVENOUS ONCE
Status: CANCELLED | OUTPATIENT
Start: 2022-06-28

## 2022-06-21 RX ORDER — SODIUM CHLORIDE 9 MG/ML
20 INJECTION, SOLUTION INTRAVENOUS ONCE AS NEEDED
Status: CANCELLED | OUTPATIENT
Start: 2022-06-28

## 2022-06-21 RX ORDER — FLUOROURACIL 50 MG/ML
340 INJECTION, SOLUTION INTRAVENOUS ONCE
Status: CANCELLED | OUTPATIENT
Start: 2022-06-28

## 2022-06-21 RX ORDER — GABAPENTIN 300 MG/1
300 CAPSULE ORAL
Qty: 90 CAPSULE | Refills: 0 | Status: SHIPPED | OUTPATIENT
Start: 2022-06-21 | End: 2022-07-19

## 2022-06-21 NOTE — PROGRESS NOTES
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 s/p 3 months neoadjuvant chemo follow by open omentectomy, splenectomy, resection of involved diaphragm with primary repair, hysterectomy and oophorectomy with HIPEC  patient returned to the medical oncology for an additional three months of adjuvant chemotherapy  Patient has returned back to chemotherapy has been rather uneventful  Patient continues to have some neuropathies as outlined below  Patient notes that generally she is tolerating things quite well  We discussed our plan for future chemotherapy  No adjustments are necessary at this time  Patient 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  However with 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, prior chemotherapy is likely contributing to neurologic dysfunction  We discussed the use of gabapentin as outlined below  Patient will continue for a total of 12 cycles of chemotherapy- roughly scheduled through the end of August     mFOLFOX 6 regimen (reduced to 85%)  Oxaliplatin 85 mg/m2 IV day 1  Leucovorin 400 mg/m2 IV day 1  5  mg/m2 IVP day 1  5 FU 1200 mg/m2/day, continuous infusion x 46 hours  Cycle length = 2 weeks  Goal = cure    - gabapentin (NEURONTIN) 300 mg capsule; Take 1 capsule (300 mg total) by mouth daily at bedtime  Dispense: 90 capsule; Refill: 0    3  Adverse effect of chemotherapy, sequela  See gabapentin  The patient is scheduled for follow-up in approximately 4 weeks with Dr Toño Jiménez  Patient voiced agreement and understanding to the above     Patient knows to call the Hematology/Oncology office with any questions and concerns regarding the above  Goals and Barriers:    Current Goal:   Prolong Survival from Cancer  Barriers: None  Patient's Capacity to Self Care:  Patient able to self care  Shirley Hector PA-C  Medical Oncology/Hematology  [address]  ______________________________________________________________________________________________________________    Subjective     Chief Complaint   Patient presents with    Follow-up     Rectosigmoid cancer [facility])       History of present illness/Cancer History:   Oncology History   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[facility] )   9/23/2019 Initial Diagnosis    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[facility] )     12/10/2019 Surgery    Low anterior resection (Dr Cleveland Martinez):    A  Rectosigmoid colon, low anterior resection:  - Adenocarcinoma, moderately differentiated  - Tumor invades through the muscularis propria into pericolorectal tissue, T3  - Diverticula  - All margins are negative for tumor   - Thirty-four lymph nodes, negative for malignancy (0/34)  B  Colon, anastomotic rings, resection:  - Two portions of colon with no pathologic abnormality     12/10/2019 Genomic Testing    RRESULTS OF IMMUNOHISTOCHEMICAL ANALYSIS FOR MISMATCH REPAIR PROTEIN LOSS  INTERPRETATION: NO LOSS OF NUCLEAR EXPRESSION OF MMR PROTEINS: LOW PROBABILITY OF MSI-H  Note: Background non-neoplastic tissue and/or internal control with intact nuclear expression        RESULTS:  Antibody          Clone               Description                           Results  MLH1               M1                  Mismatch repair protein       Intact nuclear expression  MSH2              [id number]       Mismatch repair protein       Intact nuclear expression  MSH6              40                   Mismatch repair protein       Intact nuclear expression  PMS2              [id number]         Mismatch repair protein       Intact nuclear expression     11/12/2021 Biopsy    Omental mass:  - Metastatic adenocarcinoma, with an immunophenotype compatible with metastasis from patient's known colonic primary       12/10/2021 - 12/10/2021 Chemotherapy    capecitabine (XELODA), 850 mg/m2 = 1,600 mg (100 % of original dose 850 mg/m2), Oral, 2 times daily with meals, 1 of 8 cycles  Dose modification: 850 mg/m2 (100 % of original dose 850 mg/m2, Cycle 1, Reason: Other (see comments))  fosaprepitant (EMEND) IVPB, 150 mg, Intravenous, Once, 1 of 1 cycle  Administration: 150 mg (12/10/2021)  oxaliplatin (ELOXATIN) chemo infusion, 244 4 mg, Intravenous, Once, 1 of 8 cycles  Administration: 250 mg (12/10/2021)     1/2/2022 Genomic Testing         1/4/2022 -  Chemotherapy    First 6 cycles given prior to surgery- 1/4 through 3/17/2022    7th cycle started on 6/14/2022      fluorouracil (ADRUCIL), 300 mg/m2 = 560 mg (75 % of original dose 400 mg/m2), Intravenous, Once, 7 of 12 cycles  Dose modification: 300 mg/m2 (75 % of original dose 400 mg/m2, Cycle 1, Reason: Dose Not Tolerated), 340 mg/m2 (85 % of original dose 400 mg/m2, Cycle 4, Reason: Other (see comments)), 340 mg/m2 (85 % of original dose 400 mg/m2, Cycle 5, Reason: Other (see comments))  Administration: 560 mg (1/4/2022), 560 mg (1/18/2022), 560 mg (2/1/2022), 635 mg (2/15/2022), 635 mg (3/1/2022), 635 mg (3/15/2022), 635 mg (6/14/2022)  leucovorin calcium IVPB, 300 mg/m2 = 561 mg (75 % of original dose 400 mg/m2), Intravenous, Once, 7 of 12 cycles  Dose modification: 300 mg/m2 (75 % of original dose 400 mg/m2, Cycle 1, Reason: Dose Not Tolerated), 340 mg/m2 (85 % of original dose 400 mg/m2, Cycle 4, Reason: Other (see comments)), 340 mg/m2 (85 % of original dose 400 mg/m2, Cycle 5, Reason: Other (see comments))  Administration: 561 mg (1/4/2022), 561 mg (1/18/2022), 561 mg (2/1/2022), 636 mg (2/15/2022), 636 mg (3/1/2022), 636 mg (3/15/2022), 636 mg (6/14/2022)  oxaliplatin (ELOXATIN) chemo infusion, 63 75 mg/m2 = 119 2125 mg (75 % of original dose 85 mg/m2), Intravenous, Once, 7 of 12 cycles  Dose modification: 63 75 mg/m2 (75 % of original dose 85 mg/m2, Cycle 1, Reason: Dose Not Tolerated), 72 25 mg/m2 (85 % of original dose 85 mg/m2, Cycle 4, Reason: Other (see comments)), 72 25 mg/m2 (85 % of original dose 85 mg/m2, Cycle 5, Reason: Other (see comments))  Administration: 119 2125 mg (1/4/2022), 119 2125 mg (1/18/2022), 119 2125 mg (2/1/2022), 135 1075 mg (2/15/2022), 135 1075 mg (3/1/2022), 135 1075 mg (3/15/2022), 135 1075 mg (6/14/2022)  fluorouracil (ADRUCIL) ambulatory infusion Soln, 900 mg/m2/day = 3,365 mg (75 % of original dose 1,200 mg/m2/day), Intravenous, Over 46 hours, 7 of 12 cycles  Dose modification: 900 mg/m2/day (75 % of original dose 1,200 mg/m2/day, Cycle 2, Reason: Other (see comments)), 1,020 mg/m2/day (85 % of original dose 1,200 mg/m2/day, Cycle 4, Reason: Other (see comments), Comment: anticipated tolerance)     3/22/2022 Observation    CT CAP  1  Decreased size of biopsy-proven omental metastasis, which now only focally contacting rather than grossly invading the spleen and adjacent abdominal wall  No new evidence of metastatic disease in the abdomen or pelvis  2   No new or suspicious pulmonary nodules  One of the previously noted new 1-2 mm nodules is no longer seen, and the other is unchanged  Recommend continued attention on follow-up  3   Top-normal thickness endometrial stripe measuring 7 mm  If there is history of vaginal bleeding, suggest catheterization with endometrial biopsy  4   Hepatic steatosis       4/29/2022 Surgery    DIAGNOSTIC LAPAROSCOPY, TOTAL ABDOMINAL HYSTERECTOMY, BILATERAL SALPINGO-OOPHORECTOMY, EXTENSIVE ADHESIOLYSIS (N/A)  DIAGNOSTIC LAPAROSCOPY, OPEN OMENTECTOMY, POSSIBLE SPLENECTOMY (N/A)  INSTILLATION CHEMOTHERAPY INTRAPERITONEAL (HIPEC) LAPAROTOMY (N/A)  REPAIR DIAPHRAGM TEAR    Escobar Silva and Artelichente Brood     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[facility] )   11/29/2021 Initial Diagnosis    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[address] )     12/10/2021 - 12/10/2021 Chemotherapy capecitabine (XELODA), 850 mg/m2 = 1,600 mg (100 % of original dose 850 mg/m2), Oral, 2 times daily with meals, 1 of 8 cycles  Dose modification: 850 mg/m2 (100 % of original dose 850 mg/m2, Cycle 1, Reason: Other (see comments))  fosaprepitant (EMEND) IVPB, 150 mg, Intravenous, Once, 1 of 1 cycle  Administration: 150 mg (12/10/2021)  oxaliplatin (ELOXATIN) chemo infusion, 244 4 mg, Intravenous, Once, 1 of 8 cycles  Administration: 250 mg (12/10/2021)     1/4/2022 -  Chemotherapy    First 6 cycles given prior to surgery- 1/4 through 3/17/2022    7th cycle started on 6/14/2022      fluorouracil (ADRUCIL), 300 mg/m2 = 560 mg (75 % of original dose 400 mg/m2), Intravenous, Once, 7 of 12 cycles  Dose modification: 300 mg/m2 (75 % of original dose 400 mg/m2, Cycle 1, Reason: Dose Not Tolerated), 340 mg/m2 (85 % of original dose 400 mg/m2, Cycle 4, Reason: Other (see comments)), 340 mg/m2 (85 % of original dose 400 mg/m2, Cycle 5, Reason: Other (see comments))  Administration: 560 mg (1/4/2022), 560 mg (1/18/2022), 560 mg (2/1/2022), 635 mg (2/15/2022), 635 mg (3/1/2022), 635 mg (3/15/2022), 635 mg (6/14/2022)  leucovorin calcium IVPB, 300 mg/m2 = 561 mg (75 % of original dose 400 mg/m2), Intravenous, Once, 7 of 12 cycles  Dose modification: 300 mg/m2 (75 % of original dose 400 mg/m2, Cycle 1, Reason: Dose Not Tolerated), 340 mg/m2 (85 % of original dose 400 mg/m2, Cycle 4, Reason: Other (see comments)), 340 mg/m2 (85 % of original dose 400 mg/m2, Cycle 5, Reason: Other (see comments))  Administration: 561 mg (1/4/2022), 561 mg (1/18/2022), 561 mg (2/1/2022), 636 mg (2/15/2022), 636 mg (3/1/2022), 636 mg (3/15/2022), 636 mg (6/14/2022)  oxaliplatin (ELOXATIN) chemo infusion, 63 75 mg/m2 = 119 2125 mg (75 % of original dose 85 mg/m2), Intravenous, Once, 7 of 12 cycles  Dose modification: 63 75 mg/m2 (75 % of original dose 85 mg/m2, Cycle 1, Reason: Dose Not Tolerated), 72 25 mg/m2 (85 % of original dose 85 mg/m2, Cycle 4, Reason: Other (see comments)), 72 25 mg/m2 (85 % of original dose 85 mg/m2, Cycle 5, Reason: Other (see comments))  Administration: 119 2125 mg (2022), 119 2125 mg (2022), 119 2125 mg (2022), 135 1075 mg (2/15/2022), 135 1075 mg (3/1/2022), 135 1075 mg (3/15/2022), 135 1075 mg (2022)  fluorouracil (ADRUCIL) ambulatory infusion Soln, 900 mg/m2/day = 3,365 mg (75 % of original dose 1,200 mg/m2/day), Intravenous, Over 46 hours, 7 of 12 cycles  Dose modification: 900 mg/m2/day (75 % of original dose 1,200 mg/m2/day, Cycle 2, Reason: Other (see comments)), 1,020 mg/m2/day (85 % of original dose 1,200 mg/m2/day, Cycle 4, Reason: Other (see comments), Comment: anticipated tolerance)     2022 Surgery    Final Diagnosis   A  Uterus, Bilateral Ovaries and Fallopian Tubes; Hysterosalpingo-oophorectomy:  - Leiomyoma  - Left ovary with serous cystadenoma  - Unremarkable cervix  - Benign inactive endometrium with no specific pathologic change  - Unremarkable right ovary and bilateral fallopian tubes  B  Spleen, spleen, omentum, darotis:  - Metastatic adenocarcinoma involving spleen and omentum, consistent with colonic primary  See Note               Lab Results   Component Value Date    WBC 8 34 2022    HGB 10 4 (L) 2022    HCT 34 9 2022    MCV 93 2022     (H) 2022     Lab Results   Component Value Date     2016    SODIUM 141 2022    K 4 3 2022     2022    CO2 24 2022    AGAP 11 2022    BUN 17 2022    CREATININE 1 15 2022    GLUC 111 2022    GLUF 91 04/15/2022    CALCIUM 9 5 2022    AST 16 2022    ALT 20 2022    ALKPHOS 70 2022    PROT 6 6 2016    TP 7 5 2022    BILITOT 0 7 2016    TBILI 0 51 2022    EGFR 49 2022       Interval history:   numbess b/l hands     ECO - Symptomatic but completely ambulatory    Review of Systems Constitutional: Positive for fatigue  Negative for appetite change, fever and unexpected weight change  HENT: Negative for nosebleeds  Respiratory: Negative for cough, choking and shortness of breath  Negative hemoptysis  Cardiovascular: Negative for chest pain, palpitations and leg swelling  Gastrointestinal: Negative  Negative for abdominal distention, abdominal pain, anal bleeding, blood in stool, constipation, diarrhea, nausea and vomiting  Endocrine: Negative  Negative for cold intolerance  Genitourinary: Negative  Negative for hematuria, menstrual problem, vaginal bleeding, vaginal discharge and vaginal pain  Musculoskeletal: Negative  Negative for arthralgias, myalgias, neck pain and neck stiffness  Skin: Negative  Negative for color change, pallor and rash  Allergic/Immunologic: Negative  Negative for immunocompromised state  Neurological: Positive for numbness (hands - consistant since surgery)  Negative for weakness and headaches  Hematological: Negative for adenopathy  Does not bruise/bleed easily  All other systems reviewed and are negative        Current Outpatient Medications:     ezetimibe (ZETIA) 10 mg tablet, Take 1 tablet (10 mg total) by mouth daily (Patient taking differently: Take 10 mg by mouth daily at bedtime), Disp: 90 tablet, Rfl: 1    [START ON 6/28/2022] fluorouracil 3,365 mg in CADD/Elastomeric Infusion Device, Infuse 3,365 mg (900 mg/m2/day x 1 87 m2) into a venous catheter over 46 hours for 2 days  Do not start before June 28, 2022 , Disp: 1 each, Rfl: 0    gabapentin (NEURONTIN) 300 mg capsule, Take 1 capsule (300 mg total) by mouth daily at bedtime, Disp: 90 capsule, Rfl: 0  Allergies   Allergen Reactions    Medical Tape Rash     Skin irritation  Skin peeling  Skin irritation  Skin peeling  Blisters  Rash       Advance Directive and Living Will:            Objective   /74 (BP Location: Left arm, Patient Position: Sitting, Cuff Size: Adult)   Pulse 104   Temp 97 6 °F (36 4 °C) (Temporal)   Resp 20   Ht 4' 9" (1 448 m)   Wt 84 4 kg (186 lb)   LMP 09/01/2011 (Approximate)   SpO2 98%   BMI 40 25 kg/m²   Wt Readings from Last 6 Encounters:   06/21/22 84 4 kg (186 lb)   06/14/22 86 4 kg (190 lb 7 6 oz)   06/01/22 87 1 kg (192 lb)   06/01/22 87 1 kg (192 lb)   05/19/22 87 1 kg (192 lb)   05/16/22 90 7 kg (200 lb)       Physical Exam  Constitutional:       General: She is not in acute distress  Appearance: She is well-developed  HENT:      Head: Normocephalic and atraumatic  Eyes:      General: No scleral icterus  Pupils: Pupils are equal, round, and reactive to light  Cardiovascular:      Rate and Rhythm: Normal rate and regular rhythm  Heart sounds: No murmur heard  Pulmonary:      Effort: Pulmonary effort is normal  No respiratory distress  Skin:     General: Skin is warm  Coloration: Skin is not pale  Findings: No rash  Neurological:      Mental Status: She is alert and oriented to person, place, and time  Psychiatric:         Thought Content:  Thought content normal          Pertinent Laboratory Results and Imaging Review:  Appointment on 06/13/2022   Component Date Value Ref Range Status    WBC 06/13/2022 8 34  4 31 - 10 16 Thousand/uL Final    RBC 06/13/2022 3 76 (A) 3 81 - 5 12 Million/uL Final    Hemoglobin 06/13/2022 10 4 (A) 11 5 - 15 4 g/dL Final    Hematocrit 06/13/2022 34 9  34 8 - 46 1 % Final    MCV 06/13/2022 93  82 - 98 fL Final    MCH 06/13/2022 27 7  26 8 - 34 3 pg Final    MCHC 06/13/2022 29 8 (A) 31 4 - 37 4 g/dL Final    RDW 06/13/2022 16 0 (A) 11 6 - 15 1 % Final    MPV 06/13/2022 10 9  8 9 - 12 7 fL Final    Platelets 92/53/2386 454 (A) 149 - 390 Thousands/uL Final    nRBC 06/13/2022 0  /100 WBCs Final    Neutrophils Relative 06/13/2022 66  43 - 75 % Final    Immat GRANS % 06/13/2022 0  0 - 2 % Final    Lymphocytes Relative 06/13/2022 23  14 - 44 % Final    Monocytes Relative 06/13/2022 8  4 - 12 % Final    Eosinophils Relative 06/13/2022 3  0 - 6 % Final    Basophils Relative 06/13/2022 0  0 - 1 % Final    Neutrophils Absolute 06/13/2022 5 46  1 85 - 7 62 Thousands/µL Final    Immature Grans Absolute 06/13/2022 0 02  0 00 - 0 20 Thousand/uL Final    Lymphocytes Absolute 06/13/2022 1 95  0 60 - 4 47 Thousands/µL Final    Monocytes Absolute 06/13/2022 0 64  0 17 - 1 22 Thousand/µL Final    Eosinophils Absolute 06/13/2022 0 25  0 00 - 0 61 Thousand/µL Final    Basophils Absolute 06/13/2022 0 02  0 00 - 0 10 Thousands/µL Final    Sodium 06/13/2022 141  136 - 145 mmol/L Final    Potassium 06/13/2022 4 3  3 5 - 5 3 mmol/L Final    Chloride 06/13/2022 106  100 - 108 mmol/L Final    CO2 06/13/2022 24  21 - 32 mmol/L Final    ANION GAP 06/13/2022 11  4 - 13 mmol/L Final    BUN 06/13/2022 17  5 - 25 mg/dL Final    Creatinine 06/13/2022 1 15  0 60 - 1 30 mg/dL Final    Standardized to IDMS reference method    Glucose 06/13/2022 111  65 - 140 mg/dL Final    If the patient is fasting, the ADA then defines impaired fasting glucose as > 100 mg/dL and diabetes as > or equal to 123 mg/dL  Specimen collection should occur prior to Sulfasalazine administration due to the potential for falsely depressed results  Specimen collection should occur prior to Sulfapyridine administration due to the potential for falsely elevated results   Calcium 06/13/2022 9 5  8 3 - 10 1 mg/dL Final    Corrected Calcium 06/13/2022 10 5 (A) 8 3 - 10 1 mg/dL Final    AST 06/13/2022 16  5 - 45 U/L Final    Specimen collection should occur prior to Sulfasalazine administration due to the potential for falsely depressed results   ALT 06/13/2022 20  12 - 78 U/L Final    Specimen collection should occur prior to Sulfasalazine administration due to the potential for falsely depressed results       Alkaline Phosphatase 06/13/2022 70  46 - 116 U/L Final    Total Protein 06/13/2022 7 5  6 4 - 8 2 g/dL Final    Albumin 06/13/2022 2 8 (A) 3 5 - 5 0 g/dL Final    Total Bilirubin 06/13/2022 0 51  0 20 - 1 00 mg/dL Final    Use of this assay is not recommended for patients undergoing treatment with eltrombopag due to the potential for falsely elevated results      eGFR 06/13/2022 49  ml/min/1 73sq m Final   Appointment on 06/01/2022   Component Date Value Ref Range Status    WBC 06/01/2022 8 26  4 31 - 10 16 Thousand/uL Final    RBC 06/01/2022 3 56 (A) 3 81 - 5 12 Million/uL Final    Hemoglobin 06/01/2022 10 0 (A) 11 5 - 15 4 g/dL Final    Hematocrit 06/01/2022 33 8 (A) 34 8 - 46 1 % Final    MCV 06/01/2022 95  82 - 98 fL Final    MCH 06/01/2022 28 1  26 8 - 34 3 pg Final    MCHC 06/01/2022 29 6 (A) 31 4 - 37 4 g/dL Final    RDW 06/01/2022 16 2 (A) 11 6 - 15 1 % Final    MPV 06/01/2022 10 3  8 9 - 12 7 fL Final    Platelets 07/45/3480 473 (A) 149 - 390 Thousands/uL Final    nRBC 06/01/2022 0  /100 WBCs Final    Neutrophils Relative 06/01/2022 65  43 - 75 % Final    Immat GRANS % 06/01/2022 0  0 - 2 % Final    Lymphocytes Relative 06/01/2022 21  14 - 44 % Final    Monocytes Relative 06/01/2022 11  4 - 12 % Final    Eosinophils Relative 06/01/2022 3  0 - 6 % Final    Basophils Relative 06/01/2022 0  0 - 1 % Final    Neutrophils Absolute 06/01/2022 5 35  1 85 - 7 62 Thousands/µL Final    Immature Grans Absolute 06/01/2022 0 02  0 00 - 0 20 Thousand/uL Final    Lymphocytes Absolute 06/01/2022 1 72  0 60 - 4 47 Thousands/µL Final    Monocytes Absolute 06/01/2022 0 87  0 17 - 1 22 Thousand/µL Final    Eosinophils Absolute 06/01/2022 0 27  0 00 - 0 61 Thousand/µL Final    Basophils Absolute 06/01/2022 0 03  0 00 - 0 10 Thousands/µL Final    Sodium 06/01/2022 140  136 - 145 mmol/L Final    Potassium 06/01/2022 4 0  3 5 - 5 3 mmol/L Final    Chloride 06/01/2022 106  100 - 108 mmol/L Final    CO2 06/01/2022 25  21 - 32 mmol/L Final    ANION GAP 06/01/2022 9  4 - 13 mmol/L Final    BUN 06/01/2022 15  5 - 25 mg/dL Final    Creatinine 06/01/2022 1 12  0 60 - 1 30 mg/dL Final    Standardized to IDMS reference method    Glucose 06/01/2022 107  65 - 140 mg/dL Final    If the patient is fasting, the ADA then defines impaired fasting glucose as > 100 mg/dL and diabetes as > or equal to 123 mg/dL  Specimen collection should occur prior to Sulfasalazine administration due to the potential for falsely depressed results  Specimen collection should occur prior to Sulfapyridine administration due to the potential for falsely elevated results   Calcium 06/01/2022 9 5  8 3 - 10 1 mg/dL Final    Corrected Calcium 06/01/2022 10 4 (A) 8 3 - 10 1 mg/dL Final    AST 06/01/2022 21  5 - 45 U/L Final    Specimen collection should occur prior to Sulfasalazine administration due to the potential for falsely depressed results   ALT 06/01/2022 25  12 - 78 U/L Final    Specimen collection should occur prior to Sulfasalazine administration due to the potential for falsely depressed results   Alkaline Phosphatase 06/01/2022 76  46 - 116 U/L Final    Total Protein 06/01/2022 7 7  6 4 - 8 2 g/dL Final    Albumin 06/01/2022 2 9 (A) 3 5 - 5 0 g/dL Final    Total Bilirubin 06/01/2022 0 45  0 20 - 1 00 mg/dL Final    Use of this assay is not recommended for patients undergoing treatment with eltrombopag due to the potential for falsely elevated results      eGFR 06/01/2022 50  ml/min/1 73sq m Final    CEA 06/01/2022 1 8  0 0 - 3 0 ng/mL Final    Normal/Non-Smoker: 0 0-3 0 ng/mL   Normal/Smoker:     3 1-5 0 ng/mL         The following historical data was reviewed:  Past Medical History:   Diagnosis Date    Blood in stool     Breast disorder     Cancer ([facility] [address] )     rectal    [facility]     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    Colon polyp     GERD (gastroesophageal reflux disease)     History of rectal surgery     Hyperlipidemia     PONV (postoperative nausea and vomiting)      Past Surgical History: Procedure Laterality Date    BREAST LUMPECTOMY Right      SECTION      x3    COLON SIGMOID RESECTION      COLONOSCOPY      DILATION AND CURETTAGE OF UTERUS      ILEO LOOP DIVERSION N/A 12/10/2019    Procedure: ILEOSTOMY LOOP;  Surgeon: Claudell Hoa, MD;  [facility];  Service: Robotics    INSTILLATION CHEMOTHERAPY INTRAPERITONEAL (HIPEC) LAPAROTOMY N/A 2022    Procedure: INSTILLATION CHEMOTHERAPY INTRAPERITONEAL (HIPEC) LAPAROTOMY;  Surgeon: Raul Watts MD;  [facility];  Service: Surgical Oncology    IR BIOPSY OMENTUM  2021    IR PORT PLACEMENT  2021    OMENTECTOMY N/A 2022    Procedure: DIAGNOSTIC LAPAROSCOPY, OPEN OMENTECTOMY, SPLENECTOMY, DIAPHRAGM REPAIR, CHEST TUBE INSERTION;  Surgeon: Raul Watts MD;  [facility];  Service: Surgical Oncology    NE CLOSE ENTEROSTOMY N/A 2020    Procedure: CLOSURE ILEOSTOMY;  Surgeon: Claudell Hoa, MD;  [facility];  Service: Colorectal    NE DIAPHRAGM SURG [address] UNLISTED  2022    Procedure: REPAIR DIAPHRAGM TEAR;  Surgeon: Pa Recinos MD;  [facility];  Service: Thoracic    NE LAP,SURG,COLECTOMY, PARTIAL, W/ANAST N/A 12/10/2019    Procedure: SIGMOID RESECTION COLON LAPAROSCOPIC W ROBOTICS converted to lap hand assisted  with Partial proctectomy , LASER FLUORESCENCE ANGIOGRAPHY, INTRA OP COLONOSCOPY;  Surgeon: Claudell Hoa, MD;  [facility];  Service: Robotics    NE TOTAL ABDOM HYSTERECTOMY N/A 2022    Procedure: DIAGNOSTIC LAPAROSCOPY, TOTAL ABDOMINAL HYSTERECTOMY, BILATERAL SALPINGO-OOPHORECTOMY, EXTENSIVE ADHESIOLYSIS;  Surgeon: Luci Dominguez MD;  [facility];  Service: Gynecology Oncology    SMALL INTESTINE SURGERY  2020    Procedure: RESECTION SMALL BOWEL;  Surgeon: Claudell Hoa, MD;  [facility];  Service: Colorectal     Social History     Socioeconomic History    Marital status:  Spouse name: None    Number of children: None    Years of education: None    Highest education level: None   Occupational History    None   Tobacco Use    Smoking status: Never Smoker    Smokeless tobacco: Never Used   Vaping Use    Vaping Use: Never used   Substance and Sexual Activity    Alcohol use: Yes     Comment: "occasionally"    Drug use: Never    Sexual activity: None   Other Topics Concern    None   Social History Narrative    None     Social Determinants of Health     Financial Resource Strain: Not on file   Food Insecurity: No Food Insecurity    Worried About Running Out of Food in the Last Year: Never true    Uri of Food in the Last Year: Never true   Transportation Needs: No Transportation Needs    Lack of Transportation (Medical): No    Lack of Transportation (Non-Medical): No   Physical Activity: Not on file   Stress: Not on file   Social Connections: Not on file   Intimate Partner Violence: Not on file   Housing Stability: Unknown    Unable to Pay for Housing in the Last Year: No    Number of Places Lived in the Last Year: Not on file    Unstable Housing in the Last Year: No     Family History   Problem Relation Age of Onset    Hypertension Mother     Heart attack Mother     Heart attack Father     Heart disease Father     Hypertension Brother     Heart attack Brother     Leukemia Cousin     Breast cancer Cousin     Diabetes Cousin     Breast cancer Family         maternal side    Colon cancer Family         paternal uncle    Colon cancer Paternal Uncle     Stroke Neg Hx        Please note: This report has been generated by a voice recognition software system  Therefore there may be syntax, spelling, and/or grammatical errors  Please call if you have any questions

## 2022-06-24 ENCOUNTER — TELEPHONE (OUTPATIENT)
Dept: HEMATOLOGY ONCOLOGY | Facility: CLINIC | Age: 67
End: 2022-06-24

## 2022-06-24 NOTE — TELEPHONE ENCOUNTER
LVM to the patient in regards to her appt on 8/22/22 at 2:00pm  Informed the patient that Dr Kyle Her will not be in the office and I will need to reschedule  Informed patient I have scheduled her for 9/12/22 at 2:20pm  Informed patient if this appt does not work for her to give the office a call back to reschedule at [phone]

## 2022-06-27 ENCOUNTER — APPOINTMENT (OUTPATIENT)
Dept: LAB | Facility: HOSPITAL | Age: 67
End: 2022-06-27
Payer: MEDICARE

## 2022-06-27 ENCOUNTER — TELEPHONE (OUTPATIENT)
Dept: HEMATOLOGY ONCOLOGY | Facility: MEDICAL CENTER | Age: 67
End: 2022-06-27

## 2022-06-27 DIAGNOSIS — C19 RECTOSIGMOID CANCER (HCC): ICD-10-CM

## 2022-06-27 DIAGNOSIS — C78.6 OMENTAL METASTASIS (HCC): ICD-10-CM

## 2022-06-27 LAB
ALBUMIN SERPL BCP-MCNC: 2.7 G/DL (ref 3.5–5)
ALP SERPL-CCNC: 54 U/L (ref 46–116)
ALT SERPL W P-5'-P-CCNC: 29 U/L (ref 12–78)
ANION GAP SERPL CALCULATED.3IONS-SCNC: 9 MMOL/L (ref 4–13)
AST SERPL W P-5'-P-CCNC: 26 U/L (ref 5–45)
BASOPHILS # BLD AUTO: 0.02 THOUSANDS/ΜL (ref 0–0.1)
BASOPHILS NFR BLD AUTO: 0 % (ref 0–1)
BILIRUB SERPL-MCNC: 0.43 MG/DL (ref 0.2–1)
BUN SERPL-MCNC: 19 MG/DL (ref 5–25)
CALCIUM ALBUM COR SERPL-MCNC: 10 MG/DL (ref 8.3–10.1)
CALCIUM SERPL-MCNC: 9 MG/DL (ref 8.3–10.1)
CHLORIDE SERPL-SCNC: 109 MMOL/L (ref 100–108)
CO2 SERPL-SCNC: 23 MMOL/L (ref 21–32)
CREAT SERPL-MCNC: 1.13 MG/DL (ref 0.6–1.3)
EOSINOPHIL # BLD AUTO: 0.14 THOUSAND/ΜL (ref 0–0.61)
EOSINOPHIL NFR BLD AUTO: 3 % (ref 0–6)
ERYTHROCYTE [DISTWIDTH] IN BLOOD BY AUTOMATED COUNT: 16.5 % (ref 11.6–15.1)
GFR SERPL CREATININE-BSD FRML MDRD: 50 ML/MIN/1.73SQ M
GLUCOSE SERPL-MCNC: 105 MG/DL (ref 65–140)
HCT VFR BLD AUTO: 33.9 % (ref 34.8–46.1)
HGB BLD-MCNC: 10.4 G/DL (ref 11.5–15.4)
IMM GRANULOCYTES # BLD AUTO: 0.01 THOUSAND/UL (ref 0–0.2)
IMM GRANULOCYTES NFR BLD AUTO: 0 % (ref 0–2)
LYMPHOCYTES # BLD AUTO: 2.18 THOUSANDS/ΜL (ref 0.6–4.47)
LYMPHOCYTES NFR BLD AUTO: 41 % (ref 14–44)
MCH RBC QN AUTO: 27.6 PG (ref 26.8–34.3)
MCHC RBC AUTO-ENTMCNC: 30.7 G/DL (ref 31.4–37.4)
MCV RBC AUTO: 90 FL (ref 82–98)
MONOCYTES # BLD AUTO: 0.46 THOUSAND/ΜL (ref 0.17–1.22)
MONOCYTES NFR BLD AUTO: 9 % (ref 4–12)
NEUTROPHILS # BLD AUTO: 2.45 THOUSANDS/ΜL (ref 1.85–7.62)
NEUTS SEG NFR BLD AUTO: 47 % (ref 43–75)
NRBC BLD AUTO-RTO: 1 /100 WBCS
PLATELET # BLD AUTO: 328 THOUSANDS/UL (ref 149–390)
PMV BLD AUTO: 10.6 FL (ref 8.9–12.7)
POTASSIUM SERPL-SCNC: 3.6 MMOL/L (ref 3.5–5.3)
PROT SERPL-MCNC: 7.1 G/DL (ref 6.4–8.2)
RBC # BLD AUTO: 3.77 MILLION/UL (ref 3.81–5.12)
SODIUM SERPL-SCNC: 141 MMOL/L (ref 136–145)
WBC # BLD AUTO: 5.26 THOUSAND/UL (ref 4.31–10.16)

## 2022-06-27 PROCEDURE — 36415 COLL VENOUS BLD VENIPUNCTURE: CPT

## 2022-06-27 PROCEDURE — 80053 COMPREHEN METABOLIC PANEL: CPT

## 2022-06-27 PROCEDURE — 85025 COMPLETE CBC W/AUTO DIFF WBC: CPT

## 2022-06-27 NOTE — TELEPHONE ENCOUNTER
LVM informing patient that her appointment on 8/22/22 at 2:00pm was rescheduled in error and that Karen Sauer will see her on this day even though it is his rounding week  Patient instructed to contact the office to confirm appt

## 2022-06-27 NOTE — TELEPHONE ENCOUNTER
Patient had reaction to oxaliplatin last infusion with itchiness in hands and chest  D/W Dr Isai Faustin oxaliplatin with a premed of decadron 20mg, pepcid 20 mg and benadryl 50 mg  Left voicemail with patient to call my TEAMs number to discuss    Spoke to infusion RN to inform

## 2022-06-28 ENCOUNTER — HOSPITAL ENCOUNTER (OUTPATIENT)
Dept: INFUSION CENTER | Facility: HOSPITAL | Age: 67
Discharge: HOME | End: 2022-06-28
Attending: INTERNAL MEDICINE
Payer: MEDICARE

## 2022-06-28 VITALS
RESPIRATION RATE: 18 BRPM | TEMPERATURE: 97.9 F | SYSTOLIC BLOOD PRESSURE: 112 MMHG | OXYGEN SATURATION: 95 % | HEIGHT: 57 IN | HEART RATE: 90 BPM | DIASTOLIC BLOOD PRESSURE: 69 MMHG | BODY MASS INDEX: 40.57 KG/M2 | WEIGHT: 188.05 LBS

## 2022-06-28 DIAGNOSIS — C78.6 OMENTAL METASTASIS (HCC): ICD-10-CM

## 2022-06-28 DIAGNOSIS — C19 RECTOSIGMOID CANCER (HCC): Primary | ICD-10-CM

## 2022-06-28 PROCEDURE — 96361 HYDRATE IV INFUSION ADD-ON: CPT

## 2022-06-28 PROCEDURE — 96375 TX/PRO/DX INJ NEW DRUG ADDON: CPT

## 2022-06-28 PROCEDURE — 96413 CHEMO IV INFUSION 1 HR: CPT

## 2022-06-28 PROCEDURE — 96367 TX/PROPH/DG ADDL SEQ IV INF: CPT

## 2022-06-28 PROCEDURE — 96368 THER/DIAG CONCURRENT INF: CPT

## 2022-06-28 PROCEDURE — G0498 CHEMO EXTEND IV INFUS W/PUMP: HCPCS

## 2022-06-28 PROCEDURE — 96411 CHEMO IV PUSH ADDL DRUG: CPT

## 2022-06-28 RX ORDER — ACETAMINOPHEN 325 MG/1
650 TABLET ORAL ONCE
Status: CANCELLED
Start: 2022-06-28 | End: 2022-06-28

## 2022-06-28 RX ORDER — FLUOROURACIL 50 MG/ML
340 INJECTION, SOLUTION INTRAVENOUS ONCE
Status: COMPLETED | OUTPATIENT
Start: 2022-06-28 | End: 2022-06-28

## 2022-06-28 RX ORDER — SODIUM CHLORIDE 9 MG/ML
20 INJECTION, SOLUTION INTRAVENOUS ONCE AS NEEDED
Status: DISCONTINUED | OUTPATIENT
Start: 2022-06-28 | End: 2022-07-01 | Stop reason: HOSPADM

## 2022-06-28 RX ORDER — ACETAMINOPHEN 325 MG/1
650 TABLET ORAL ONCE
Status: COMPLETED | OUTPATIENT
Start: 2022-06-28 | End: 2022-06-28

## 2022-06-28 RX ORDER — DEXTROSE MONOHYDRATE 50 MG/ML
20 INJECTION, SOLUTION INTRAVENOUS ONCE
Status: COMPLETED | OUTPATIENT
Start: 2022-06-28 | End: 2022-06-28

## 2022-06-28 RX ADMIN — DIPHENHYDRAMINE HYDROCHLORIDE 50 MG: 50 INJECTION, SOLUTION INTRAMUSCULAR; INTRAVENOUS at 11:34

## 2022-06-28 RX ADMIN — OXALIPLATIN 135.11 MG: 5 INJECTION, SOLUTION INTRAVENOUS at 12:07

## 2022-06-28 RX ADMIN — DEXAMETHASONE SODIUM PHOSPHATE: 10 INJECTION INTRAMUSCULAR; INTRAVENOUS at 10:54

## 2022-06-28 RX ADMIN — FAMOTIDINE 20 MG: 10 INJECTION, SOLUTION INTRAVENOUS at 11:17

## 2022-06-28 RX ADMIN — DEXTROSE 20 ML/HR: 50 INJECTION, SOLUTION INTRAVENOUS at 10:54

## 2022-06-28 RX ADMIN — LEUCOVORIN CALCIUM 636 MG: 200 INJECTION, POWDER, LYOPHILIZED, FOR SUSPENSION INTRAMUSCULAR; INTRAVENOUS at 12:07

## 2022-06-28 RX ADMIN — ACETAMINOPHEN 650 MG: 325 TABLET ORAL at 13:46

## 2022-06-28 RX ADMIN — FLUOROURACIL 635 MG: 50 INJECTION, SOLUTION INTRAVENOUS at 14:50

## 2022-06-28 RX ADMIN — SODIUM CHLORIDE 500 ML: 0.9 INJECTION, SOLUTION INTRAVENOUS at 12:52

## 2022-06-28 NOTE — PLAN OF CARE
Problem: Knowledge Deficit  Goal: Patient/family/caregiver demonstrates understanding of disease process, treatment plan, medications, and discharge instructions  Description: Complete learning assessment and assess knowledge base    Interventions:  - Provide teaching at level of understanding  - Provide teaching via preferred learning methods  6/28/2022 1418 by Eber Huber RN  Outcome: Progressing  6/28/2022 1121 by Eber Huber RN  Outcome: Progressing     Problem: Potential for Falls  Goal: Patient will remain free of falls  Description: INTERVENTIONS:  - Educate patient/family on patient safety including physical limitations  - Instruct patient to call for assistance with activity   - Keep Call bell within reach  Outcome: Progressing

## 2022-06-28 NOTE — PROGRESS NOTES
5FU IVP given and pump connected per order  Pt states she feels fine now  AVS given  D/C'd home with [facility]

## 2022-06-28 NOTE — PROGRESS NOTES
Oxaliplatin and LCV initiated at 1207  At 1224 pt c/o abd pain, nausea, wanted to walk to bathroom with assistance  Chemo stopped immediately  Pt sitting on toilet bending over, c/o face and hands feeling numb  No vomiting or diarrhea, but c/o abd cramping  Pt taken back to recliner via wheelchair  BP 95/51  Anthony Gill RN made aware and does not want hypersensitivity meds given due to already having them prior  NS bolus initiated with frequent vs  Pt reclined with feet up  States nausea subsided  Vs monitored and [place] updated

## 2022-06-28 NOTE — PROGRESS NOTES
Pt states she had pain behind her right shoulder blade for 2 weeks that has now subsided  Pt states oncologist made aware at appt  No rash noted  Pt c/o intermittent pain in right ribcage area under right breast along with intermittent pain left side abd  States it feels ok today but will notify oncologist if pain persists or worsens  Offers no other complaints

## 2022-06-28 NOTE — PROGRESS NOTES
Pt c/o chills, remains afebrile  /69  Pt states feeling much better  Tani Sanchez made aware  Pt given Tylenol as ordered  Order received to D/C Oxaliplatin and LCV and proceed with 5FU and 5FU pump

## 2022-06-29 ENCOUNTER — TELEPHONE (OUTPATIENT)
Dept: HEMATOLOGY ONCOLOGY | Facility: MEDICAL CENTER | Age: 67
End: 2022-06-29

## 2022-06-29 NOTE — TELEPHONE ENCOUNTER
Left voicemail message for patient to call my TEAMS number to discuss status after reaction to oxaliplatin on 6/28/2022  Will update Dr Severa Higashi upon his return to office on 7/5/2022

## 2022-06-29 NOTE — TELEPHONE ENCOUNTER
Spoke to patient  Patient offers no complaints  Will discuss treatment plan with Dr Nikita Naik upon his return  Patient aware

## 2022-06-30 ENCOUNTER — HOSPITAL ENCOUNTER (OUTPATIENT)
Dept: INFUSION CENTER | Facility: HOSPITAL | Age: 67
Discharge: HOME | End: 2022-06-30
Attending: INTERNAL MEDICINE

## 2022-06-30 DIAGNOSIS — C19 RECTOSIGMOID CANCER (HCC): Primary | ICD-10-CM

## 2022-06-30 DIAGNOSIS — C78.6 OMENTAL METASTASIS (HCC): ICD-10-CM

## 2022-06-30 NOTE — PROGRESS NOTES
Pt asked if an additional antiemetic was being added to her treatment plan for next cycle  Message sent to [facility]  Pt aware of next apt

## 2022-07-05 RX ORDER — FLUOROURACIL 50 MG/ML
340 INJECTION, SOLUTION INTRAVENOUS ONCE
Status: CANCELLED | OUTPATIENT
Start: 2022-07-12

## 2022-07-05 RX ORDER — DEXTROSE MONOHYDRATE 50 MG/ML
20 INJECTION, SOLUTION INTRAVENOUS ONCE
Status: CANCELLED | OUTPATIENT
Start: 2022-07-12

## 2022-07-05 RX ORDER — SODIUM CHLORIDE 9 MG/ML
20 INJECTION, SOLUTION INTRAVENOUS ONCE AS NEEDED
Status: CANCELLED | OUTPATIENT
Start: 2022-07-12

## 2022-07-06 RX ORDER — ATROPINE SULFATE 1 MG/ML
0.25 INJECTION, SOLUTION INTRAMUSCULAR; INTRAVENOUS; SUBCUTANEOUS ONCE
Status: CANCELLED
Start: 2022-07-12

## 2022-07-06 NOTE — TELEPHONE ENCOUNTER
Discussed reaction to oxaliplatin with Dr Mee Rosen  Irinotecan to be substituted  [place] plan adjusted   Will notify infusion Rns and have patient sign consent for new drug

## 2022-07-07 DIAGNOSIS — C78.6 OMENTAL METASTASIS: Primary | ICD-10-CM

## 2022-07-07 DIAGNOSIS — C19 RECTOSIGMOID CANCER (HCC): ICD-10-CM

## 2022-07-11 ENCOUNTER — APPOINTMENT (OUTPATIENT)
Dept: LAB | Facility: HOSPITAL | Age: 67
End: 2022-07-11
Payer: MEDICARE

## 2022-07-11 DIAGNOSIS — C78.6 OMENTAL METASTASIS (HCC): ICD-10-CM

## 2022-07-11 DIAGNOSIS — C19 RECTOSIGMOID CANCER (HCC): ICD-10-CM

## 2022-07-11 LAB
ALBUMIN SERPL BCP-MCNC: 2.7 G/DL (ref 3.5–5)
ALP SERPL-CCNC: 55 U/L (ref 46–116)
ALT SERPL W P-5'-P-CCNC: 21 U/L (ref 12–78)
ANION GAP SERPL CALCULATED.3IONS-SCNC: 8 MMOL/L (ref 4–13)
AST SERPL W P-5'-P-CCNC: 17 U/L (ref 5–45)
BASOPHILS # BLD AUTO: 0.03 THOUSANDS/ΜL (ref 0–0.1)
BASOPHILS NFR BLD AUTO: 1 % (ref 0–1)
BILIRUB SERPL-MCNC: 0.74 MG/DL (ref 0.2–1)
BUN SERPL-MCNC: 19 MG/DL (ref 5–25)
CALCIUM ALBUM COR SERPL-MCNC: 9.8 MG/DL (ref 8.3–10.1)
CALCIUM SERPL-MCNC: 8.8 MG/DL (ref 8.3–10.1)
CHLORIDE SERPL-SCNC: 109 MMOL/L (ref 100–108)
CO2 SERPL-SCNC: 24 MMOL/L (ref 21–32)
CREAT SERPL-MCNC: 1.23 MG/DL (ref 0.6–1.3)
EOSINOPHIL # BLD AUTO: 0.1 THOUSAND/ΜL (ref 0–0.61)
EOSINOPHIL NFR BLD AUTO: 2 % (ref 0–6)
ERYTHROCYTE [DISTWIDTH] IN BLOOD BY AUTOMATED COUNT: 17.3 % (ref 11.6–15.1)
GFR SERPL CREATININE-BSD FRML MDRD: 45 ML/MIN/1.73SQ M
GLUCOSE SERPL-MCNC: 121 MG/DL (ref 65–140)
HCT VFR BLD AUTO: 31.7 % (ref 34.8–46.1)
HGB BLD-MCNC: 9.9 G/DL (ref 11.5–15.4)
IMM GRANULOCYTES # BLD AUTO: 0.01 THOUSAND/UL (ref 0–0.2)
IMM GRANULOCYTES NFR BLD AUTO: 0 % (ref 0–2)
LYMPHOCYTES # BLD AUTO: 1.77 THOUSANDS/ΜL (ref 0.6–4.47)
LYMPHOCYTES NFR BLD AUTO: 41 % (ref 14–44)
MCH RBC QN AUTO: 27.3 PG (ref 26.8–34.3)
MCHC RBC AUTO-ENTMCNC: 31.2 G/DL (ref 31.4–37.4)
MCV RBC AUTO: 87 FL (ref 82–98)
MONOCYTES # BLD AUTO: 0.44 THOUSAND/ΜL (ref 0.17–1.22)
MONOCYTES NFR BLD AUTO: 10 % (ref 4–12)
NEUTROPHILS # BLD AUTO: 1.94 THOUSANDS/ΜL (ref 1.85–7.62)
NEUTS SEG NFR BLD AUTO: 46 % (ref 43–75)
NRBC BLD AUTO-RTO: 0 /100 WBCS
PLATELET # BLD AUTO: 238 THOUSANDS/UL (ref 149–390)
PMV BLD AUTO: 10.2 FL (ref 8.9–12.7)
POTASSIUM SERPL-SCNC: 4 MMOL/L (ref 3.5–5.3)
PROT SERPL-MCNC: 7 G/DL (ref 6.4–8.2)
RBC # BLD AUTO: 3.63 MILLION/UL (ref 3.81–5.12)
SODIUM SERPL-SCNC: 141 MMOL/L (ref 136–145)
WBC # BLD AUTO: 4.29 THOUSAND/UL (ref 4.31–10.16)

## 2022-07-11 PROCEDURE — 80053 COMPREHEN METABOLIC PANEL: CPT

## 2022-07-11 PROCEDURE — 36415 COLL VENOUS BLD VENIPUNCTURE: CPT

## 2022-07-11 PROCEDURE — 85025 COMPLETE CBC W/AUTO DIFF WBC: CPT

## 2022-07-12 ENCOUNTER — HOSPITAL ENCOUNTER (OUTPATIENT)
Dept: INFUSION CENTER | Facility: HOSPITAL | Age: 67
Discharge: HOME | End: 2022-07-12
Attending: INTERNAL MEDICINE

## 2022-07-12 ENCOUNTER — HOSPITAL ENCOUNTER (OUTPATIENT)
Dept: INFUSION CENTER | Facility: HOSPITAL | Age: 67
Discharge: HOME | End: 2022-07-12
Attending: INTERNAL MEDICINE
Payer: MEDICARE

## 2022-07-12 ENCOUNTER — HOSPITAL ENCOUNTER (OUTPATIENT)
Dept: INFUSION CENTER | Facility: HOSPITAL | Age: 67
End: 2022-07-12
Attending: INTERNAL MEDICINE

## 2022-07-12 VITALS
SYSTOLIC BLOOD PRESSURE: 121 MMHG | HEIGHT: 57 IN | WEIGHT: 186.51 LBS | OXYGEN SATURATION: 99 % | TEMPERATURE: 98 F | DIASTOLIC BLOOD PRESSURE: 75 MMHG | HEART RATE: 97 BPM | BODY MASS INDEX: 40.24 KG/M2 | RESPIRATION RATE: 20 BRPM

## 2022-07-12 DIAGNOSIS — C78.6 OMENTAL METASTASIS (HCC): ICD-10-CM

## 2022-07-12 DIAGNOSIS — C19 RECTOSIGMOID CANCER (HCC): Primary | ICD-10-CM

## 2022-07-12 PROCEDURE — 96411 CHEMO IV PUSH ADDL DRUG: CPT

## 2022-07-12 PROCEDURE — G0498 CHEMO EXTEND IV INFUS W/PUMP: HCPCS

## 2022-07-12 PROCEDURE — 96368 THER/DIAG CONCURRENT INF: CPT

## 2022-07-12 PROCEDURE — 96375 TX/PRO/DX INJ NEW DRUG ADDON: CPT

## 2022-07-12 PROCEDURE — 96367 TX/PROPH/DG ADDL SEQ IV INF: CPT

## 2022-07-12 PROCEDURE — 96413 CHEMO IV INFUSION 1 HR: CPT

## 2022-07-12 RX ORDER — FLUOROURACIL 50 MG/ML
340 INJECTION, SOLUTION INTRAVENOUS ONCE
Status: COMPLETED | OUTPATIENT
Start: 2022-07-12 | End: 2022-07-12

## 2022-07-12 RX ORDER — ATROPINE SULFATE 1 MG/ML
0.25 INJECTION, SOLUTION INTRAVENOUS ONCE
Status: COMPLETED | OUTPATIENT
Start: 2022-07-12 | End: 2022-07-12

## 2022-07-12 RX ORDER — SODIUM CHLORIDE 9 MG/ML
20 INJECTION, SOLUTION INTRAVENOUS ONCE AS NEEDED
Status: DISCONTINUED | OUTPATIENT
Start: 2022-07-12 | End: 2022-07-15 | Stop reason: HOSPADM

## 2022-07-12 RX ADMIN — IRINOTECAN HYDROCHLORIDE 286 MG: 20 INJECTION, SOLUTION INTRAVENOUS at 11:53

## 2022-07-12 RX ADMIN — ATROPINE SULFATE 0.25 MG: 1 INJECTION, SOLUTION INTRAMUSCULAR; INTRAVENOUS; SUBCUTANEOUS at 11:37

## 2022-07-12 RX ADMIN — SODIUM CHLORIDE 20 ML/HR: 0.9 INJECTION, SOLUTION INTRAVENOUS at 10:37

## 2022-07-12 RX ADMIN — FLUOROURACIL 635 MG: 50 INJECTION, SOLUTION INTRAVENOUS at 14:17

## 2022-07-12 RX ADMIN — DEXAMETHASONE SODIUM PHOSPHATE: 10 INJECTION INTRAMUSCULAR; INTRAVENOUS at 10:37

## 2022-07-12 RX ADMIN — LEUCOVORIN CALCIUM 636 MG: 100 INJECTION, POWDER, LYOPHILIZED, FOR SUSPENSION INTRAMUSCULAR; INTRAVENOUS at 11:51

## 2022-07-14 ENCOUNTER — HOSPITAL ENCOUNTER (OUTPATIENT)
Dept: INFUSION CENTER | Facility: HOSPITAL | Age: 67
Discharge: HOME | End: 2022-07-14
Attending: INTERNAL MEDICINE

## 2022-07-14 VITALS — TEMPERATURE: 98.9 F

## 2022-07-14 DIAGNOSIS — C78.6 OMENTAL METASTASIS (HCC): ICD-10-CM

## 2022-07-14 DIAGNOSIS — C19 RECTOSIGMOID CANCER (HCC): Primary | ICD-10-CM

## 2022-07-18 DIAGNOSIS — C19 RECTOSIGMOID CANCER (HCC): ICD-10-CM

## 2022-07-18 DIAGNOSIS — C78.6 OMENTAL METASTASIS (HCC): Primary | ICD-10-CM

## 2022-07-18 RX ORDER — SODIUM CHLORIDE 9 MG/ML
20 INJECTION, SOLUTION INTRAVENOUS ONCE AS NEEDED
Status: CANCELLED | OUTPATIENT
Start: 2022-07-26

## 2022-07-18 RX ORDER — FLUOROURACIL 50 MG/ML
340 INJECTION, SOLUTION INTRAVENOUS ONCE
Status: CANCELLED | OUTPATIENT
Start: 2022-07-26

## 2022-07-18 RX ORDER — ATROPINE SULFATE 1 MG/ML
0.25 INJECTION, SOLUTION INTRAVENOUS ONCE
Status: CANCELLED
Start: 2022-07-26 | End: 2022-07-26

## 2022-07-19 ENCOUNTER — HOSPITAL ENCOUNTER (OUTPATIENT)
Dept: RADIOLOGY | Facility: HOSPITAL | Age: 67
Discharge: HOME | End: 2022-07-19
Payer: MEDICARE

## 2022-07-19 ENCOUNTER — OFFICE VISIT (OUTPATIENT)
Dept: HEMATOLOGY ONCOLOGY | Facility: MEDICAL CENTER | Age: 67
End: 2022-07-19
Payer: MEDICARE

## 2022-07-19 VITALS
WEIGHT: 180.4 LBS | BODY MASS INDEX: 38.92 KG/M2 | OXYGEN SATURATION: 98 % | HEIGHT: 57 IN | HEART RATE: 114 BPM | DIASTOLIC BLOOD PRESSURE: 90 MMHG | SYSTOLIC BLOOD PRESSURE: 140 MMHG | TEMPERATURE: 98 F | RESPIRATION RATE: 16 BRPM

## 2022-07-19 DIAGNOSIS — M79.671 PAIN OF RIGHT HEEL: ICD-10-CM

## 2022-07-19 DIAGNOSIS — K52.1 CHEMOTHERAPY INDUCED DIARRHEA: ICD-10-CM

## 2022-07-19 DIAGNOSIS — T45.1X5A CHEMOTHERAPY INDUCED DIARRHEA: ICD-10-CM

## 2022-07-19 DIAGNOSIS — C19 RECTOSIGMOID CANCER (HCC): ICD-10-CM

## 2022-07-19 DIAGNOSIS — M79.661 PAIN OF RIGHT LOWER LEG: ICD-10-CM

## 2022-07-19 DIAGNOSIS — M79.661 PAIN OF RIGHT LOWER LEG: Primary | ICD-10-CM

## 2022-07-19 DIAGNOSIS — C78.6 OMENTAL METASTASIS (HCC): ICD-10-CM

## 2022-07-19 PROCEDURE — 99215 OFFICE O/P EST HI 40 MIN: CPT | Performed by: PHYSICIAN ASSISTANT

## 2022-07-19 PROCEDURE — 73650 X-RAY EXAM OF HEEL: CPT

## 2022-07-19 PROCEDURE — 73590 X-RAY EXAM OF LOWER LEG: CPT

## 2022-07-19 RX ORDER — METHYLPREDNISOLONE 4 MG/1
TABLET ORAL
Qty: 1 EACH | Refills: 0 | Status: SHIPPED | OUTPATIENT
Start: 2022-07-19 | End: 2022-10-18

## 2022-07-19 NOTE — PROGRESS NOTES
[address] HEMATOLOGY ONCOLOGY Abhilash Rogers  [facility] [facility] [id number]  Oncology Progress Note  Manolo See, 1955, [id number]  7/19/2022    Assessment/Plan:   1  Pain of right lower leg; 2  Pain of right heel  None traumatic pain, starting approximately one week ago walking normally on a flat surface, exacerbated by compression boots  Positionally the patient feels better when the leg is elevated  Location of pain is medially and does not involve the posterior aspect of the calf  There is a soft tissue swelling noted in this area near mole  I recommended that the patient undergo both x-ray evaluation as well as ultrasound soft tissue of the area  Patient feels better with Tylenol and ice  Steroid pack has been prescribed to help reduce inflammation of this area  No signs of infection on the skin, patient has not had a fever     - US extremity soft tissue; Future  -VAS lower extremity duplex bilateral; future  - XR tibia fibula 2 vw right; Future  - XR heel / calcaneus 2+ vw right; Future  - methylPREDNISolone 4 MG tablet therapy pack; Use as directed on package  Dispense: 1 each; Refill: 0    3  Chemotherapy induced diarrhea  Unfortunately due to neuropathy, patient's previous regimen was changed from oxaliplatin to irinotecan  Patient is now having chemotherapy-induced diarrhea, grade 2  Today, I believe the diarrhea to be letting up a bit  However, I have advised the patient to utilize Imodium  Patient did not have this at home  Furthermore, considering the grade, I will dose reduce the patient's irinotecan to 50%  Previous dose reduction was to 85%  Furthermore I have advocated for the patient to take Imodium as directed on the bottle  Patient and I discussed that she can use this as a preventative measure when going out as well as an interventional measure with diarrhea the follows next round of chemotherapy      Patient was advised to contact the office if there is any changes    4  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[address] );  5  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HCC)  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 s/p 3 months neoadjuvant chemo follow by open omentectomy, splenectomy, resection of involved diaphragm with primary repair, hysterectomy and oophorectomy with HIPEC    Patient returned to the medical oncology for an additional three months of adjuvant chemotherapy  Cycle nine resulted in significant neuropathy and the patient was transitioned from FOLFOX to FOLFIRI  As listed 3  Above, irinotecan transition has caused the patient to have chemotherapy-induced diarrhea having greater than 10 bowel movements a day, grade 2  Adjustments were made to Leucovorin below  Patient was instructed to take Imodium  Dose reductions as discussed above are outlined below  Current Treatment:  Irinotecan 180mg/m2, IV, Day 1 (50% dose reduction, starting cycle 10)  Leucovorin 400mg/m2, IV, Day 1 (85% dose reduction)  5-Fu 400 mg/m2, IV , Day 1 (85% dose reduction)  5-Fu 1200 mg/m2, IV, CI x 46 hours (85% dose reduction)    Cycle length = 2 weeks  Treatment goal = pallative    The patient is scheduled for follow-up in approximately 2- 4 weeks with Dr Poncho Eubanks  Patient voiced agreement and understanding to the above  Patient knows to call the Hematology/Oncology office with any questions and concerns regarding the above  Goals and Barriers:    Current Goal:   Prolong Survival from Cancer  Barriers: does not drive  Patient's Capacity to Self Care:  Patient able to self care  Shirley Hector PA-C  Medical Oncology/Hematology  [address]  ______________________________________________________________________________________________________________    Subjective     Chief Complaint   Patient presents with    Follow-up     Rectosigmoid cancer        History of present illness/Cancer History:   Oncology History 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[facility] [address] )   9/23/2019 Initial Diagnosis    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[facility] [address] )     12/10/2019 Surgery    Low anterior resection (Dr Randy Venegas):    A  Rectosigmoid colon, low anterior resection:  - Adenocarcinoma, moderately differentiated  - Tumor invades through the muscularis propria into pericolorectal tissue, T3  - Diverticula  - All margins are negative for tumor   - Thirty-four lymph nodes, negative for malignancy (0/34)  B  Colon, anastomotic rings, resection:  - Two portions of colon with no pathologic abnormality     12/10/2019 Genomic Testing    RRESULTS OF IMMUNOHISTOCHEMICAL ANALYSIS FOR MISMATCH REPAIR PROTEIN LOSS  INTERPRETATION: NO LOSS OF NUCLEAR EXPRESSION OF MMR PROTEINS: LOW PROBABILITY OF MSI-H  Note: Background non-neoplastic tissue and/or internal control with intact nuclear expression  RESULTS:  Antibody          Clone               Description                           Results  MLH1               M1                  Mismatch repair protein       Intact nuclear expression  MSH2              [id number]       Mismatch repair protein       Intact nuclear expression  MSH6              40                   Mismatch repair protein       Intact nuclear expression  PMS2              [id number]         Mismatch repair protein       Intact nuclear expression     11/12/2021 Biopsy    Omental mass:  - Metastatic adenocarcinoma, with an immunophenotype compatible with metastasis from patient's known colonic primary       12/10/2021 - 12/10/2021 Chemotherapy    capecitabine (XELODA), 850 mg/m2 = 1,600 mg (100 % of original dose 850 mg/m2), Oral, 2 times daily with meals, 1 of 8 cycles  Dose modification: 850 mg/m2 (100 % of original dose 850 mg/m2, Cycle 1, Reason: Other (see comments))  fosaprepitant (EMEND) IVPB, 150 mg, Intravenous, Once, 1 of 1 cycle  Administration: 150 mg (12/10/2021)  oxaliplatin (ELOXATIN) chemo infusion, 244 4 mg, Intravenous, Once, 1 of 8 cycles  Administration: 250 mg (12/10/2021)     1/2/2022 Genomic Testing         1/4/2022 -  Chemotherapy    First 6 cycles given prior to surgery- 1/4 through 3/17/2022    7th cycle started on 6/14/2022  8th cycle worsening neuropathy; could not tolerate gabapentin  D/lizette oxaliplatin  9th cycle Irinotecan added at 85% dose reduction: SE Diarrhea      fluorouracil (ADRUCIL), 300 mg/m2 = 560 mg (75 % of original dose 400 mg/m2), Intravenous, Once, 9 of 12 cycles  Dose modification: 300 mg/m2 (75 % of original dose 400 mg/m2, Cycle 1, Reason: Dose Not Tolerated), 340 mg/m2 (85 % of original dose 400 mg/m2, Cycle 4, Reason: Other (see comments)), 340 mg/m2 (85 % of original dose 400 mg/m2, Cycle 5, Reason: Other (see comments))  Administration: 560 mg (1/4/2022), 560 mg (1/18/2022), 560 mg (2/1/2022), 635 mg (2/15/2022), 635 mg (3/1/2022), 635 mg (3/15/2022), 635 mg (6/14/2022), 635 mg (6/28/2022), 635 mg (7/12/2022)  irinotecan (CAMPTOSAR) chemo infusion, 153 mg/m2 = 286 mg (85 % of original dose 180 mg/m2), Intravenous, Once, 1 of 4 cycles  Dose modification: 180 mg/m2 (original dose 180 mg/m2, Cycle 9), 153 mg/m2 (85 % of original dose 180 mg/m2, Cycle 9, Reason: Other (see comments), Comment: anticipated tolerance), 90 mg/m2 (50 % of original dose 180 mg/m2, Cycle 10, Reason: Dose Not Tolerated)  Administration: 286 mg (7/12/2022)  leucovorin calcium IVPB, 300 mg/m2 = 561 mg (75 % of original dose 400 mg/m2), Intravenous, Once, 9 of 12 cycles  Dose modification: 300 mg/m2 (75 % of original dose 400 mg/m2, Cycle 1, Reason: Dose Not Tolerated), 340 mg/m2 (85 % of original dose 400 mg/m2, Cycle 4, Reason: Other (see comments)), 340 mg/m2 (85 % of original dose 400 mg/m2, Cycle 5, Reason: Other (see comments))  Administration: 561 mg (1/4/2022), 561 mg (1/18/2022), 561 mg (2/1/2022), 636 mg (2/15/2022), 636 mg (3/1/2022), 636 mg (3/15/2022), 636 mg (6/14/2022), 636 mg (6/28/2022), 636 mg (7/12/2022)  oxaliplatin (ELOXATIN) chemo infusion, 63 75 mg/m2 = 119 2125 mg (75 % of original dose 85 mg/m2), Intravenous, Once, 8 of 8 cycles  Dose modification: 63 75 mg/m2 (75 % of original dose 85 mg/m2, Cycle 1, Reason: Dose Not Tolerated), 72 25 mg/m2 (85 % of original dose 85 mg/m2, Cycle 4, Reason: Other (see comments)), 72 25 mg/m2 (85 % of original dose 85 mg/m2, Cycle 5, Reason: Other (see comments))  Administration: 119 2125 mg (1/4/2022), 119 2125 mg (1/18/2022), 119 2125 mg (2/1/2022), 135 1075 mg (2/15/2022), 135 1075 mg (3/1/2022), 135 1075 mg (3/15/2022), 135 1075 mg (6/14/2022), 135 1075 mg (6/28/2022)  fluorouracil (ADRUCIL) ambulatory infusion Soln, 900 mg/m2/day = 3,365 mg (75 % of original dose 1,200 mg/m2/day), Intravenous, Over 46 hours, 9 of 12 cycles  Dose modification: 900 mg/m2/day (75 % of original dose 1,200 mg/m2/day, Cycle 2, Reason: Other (see comments)), 1,020 mg/m2/day (85 % of original dose 1,200 mg/m2/day, Cycle 4, Reason: Other (see comments), Comment: anticipated tolerance)     3/22/2022 Observation    CT CAP  1  Decreased size of biopsy-proven omental metastasis, which now only focally contacting rather than grossly invading the spleen and adjacent abdominal wall  No new evidence of metastatic disease in the abdomen or pelvis  2   No new or suspicious pulmonary nodules  One of the previously noted new 1-2 mm nodules is no longer seen, and the other is unchanged  Recommend continued attention on follow-up  3   Top-normal thickness endometrial stripe measuring 7 mm  If there is history of vaginal bleeding, suggest catheterization with endometrial biopsy  4   Hepatic steatosis       4/29/2022 Surgery    DIAGNOSTIC LAPAROSCOPY, TOTAL ABDOMINAL HYSTERECTOMY, BILATERAL SALPINGO-OOPHORECTOMY, EXTENSIVE ADHESIOLYSIS (N/A)  DIAGNOSTIC LAPAROSCOPY, OPEN OMENTECTOMY, POSSIBLE SPLENECTOMY (N/A)  INSTILLATION CHEMOTHERAPY INTRAPERITONEAL (HIPEC) LAPAROTOMY (N/A)  REPAIR DIAPHRAGM TEAR    Escobar Silva and Anup     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[facility] [address] )   11/29/2021 Initial Diagnosis    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[facility] [address] )     12/10/2021 - 12/10/2021 Chemotherapy    capecitabine (XELODA), 850 mg/m2 = 1,600 mg (100 % of original dose 850 mg/m2), Oral, 2 times daily with meals, 1 of 8 cycles  Dose modification: 850 mg/m2 (100 % of original dose 850 mg/m2, Cycle 1, Reason: Other (see comments))  fosaprepitant (EMEND) IVPB, 150 mg, Intravenous, Once, 1 of 1 cycle  Administration: 150 mg (12/10/2021)  oxaliplatin (ELOXATIN) chemo infusion, 244 4 mg, Intravenous, Once, 1 of 8 cycles  Administration: 250 mg (12/10/2021)     1/4/2022 -  Chemotherapy    First 6 cycles given prior to surgery- 1/4 through 3/17/2022    7th cycle started on 6/14/2022  8th cycle worsening neuropathy; could not tolerate gabapentin  D/lizette oxaliplatin  9th cycle Irinotecan added at 85% dose reduction: SE Diarrhea      fluorouracil (ADRUCIL), 300 mg/m2 = 560 mg (75 % of original dose 400 mg/m2), Intravenous, Once, 9 of 12 cycles  Dose modification: 300 mg/m2 (75 % of original dose 400 mg/m2, Cycle 1, Reason: Dose Not Tolerated), 340 mg/m2 (85 % of original dose 400 mg/m2, Cycle 4, Reason: Other (see comments)), 340 mg/m2 (85 % of original dose 400 mg/m2, Cycle 5, Reason: Other (see comments))  Administration: 560 mg (1/4/2022), 560 mg (1/18/2022), 560 mg (2/1/2022), 635 mg (2/15/2022), 635 mg (3/1/2022), 635 mg (3/15/2022), 635 mg (6/14/2022), 635 mg (6/28/2022), 635 mg (7/12/2022)  irinotecan (CAMPTOSAR) chemo infusion, 153 mg/m2 = 286 mg (85 % of original dose 180 mg/m2), Intravenous, Once, 1 of 4 cycles  Dose modification: 180 mg/m2 (original dose 180 mg/m2, Cycle 9), 153 mg/m2 (85 % of original dose 180 mg/m2, Cycle 9, Reason: Other (see comments), Comment: anticipated tolerance), 90 mg/m2 (50 % of original dose 180 mg/m2, Cycle 10, Reason: Dose Not Tolerated)  Administration: 286 mg (7/12/2022)  leucovorin calcium IVPB, 300 mg/m2 = 561 mg (75 % of original dose 400 mg/m2), Intravenous, Once, 9 of 12 cycles  Dose modification: 300 mg/m2 (75 % of original dose 400 mg/m2, Cycle 1, Reason: Dose Not Tolerated), 340 mg/m2 (85 % of original dose 400 mg/m2, Cycle 4, Reason: Other (see comments)), 340 mg/m2 (85 % of original dose 400 mg/m2, Cycle 5, Reason: Other (see comments))  Administration: 561 mg (1/4/2022), 561 mg (1/18/2022), 561 mg (2/1/2022), 636 mg (2/15/2022), 636 mg (3/1/2022), 636 mg (3/15/2022), 636 mg (6/14/2022), 636 mg (6/28/2022), 636 mg (7/12/2022)  oxaliplatin (ELOXATIN) chemo infusion, 63 75 mg/m2 = 119 2125 mg (75 % of original dose 85 mg/m2), Intravenous, Once, 8 of 8 cycles  Dose modification: 63 75 mg/m2 (75 % of original dose 85 mg/m2, Cycle 1, Reason: Dose Not Tolerated), 72 25 mg/m2 (85 % of original dose 85 mg/m2, Cycle 4, Reason: Other (see comments)), 72 25 mg/m2 (85 % of original dose 85 mg/m2, Cycle 5, Reason: Other (see comments))  Administration: 119 2125 mg (1/4/2022), 119 2125 mg (1/18/2022), 119 2125 mg (2/1/2022), 135 1075 mg (2/15/2022), 135 1075 mg (3/1/2022), 135 1075 mg (3/15/2022), 135 1075 mg (6/14/2022), 135 1075 mg (6/28/2022)  fluorouracil (ADRUCIL) ambulatory infusion Soln, 900 mg/m2/day = 3,365 mg (75 % of original dose 1,200 mg/m2/day), Intravenous, Over 46 hours, 9 of 12 cycles  Dose modification: 900 mg/m2/day (75 % of original dose 1,200 mg/m2/day, Cycle 2, Reason: Other (see comments)), 1,020 mg/m2/day (85 % of original dose 1,200 mg/m2/day, Cycle 4, Reason: Other (see comments), Comment: anticipated tolerance)     4/29/2022 Surgery    Final Diagnosis   A  Uterus, Bilateral Ovaries and Fallopian Tubes; Hysterosalpingo-oophorectomy:  - Leiomyoma  - Left ovary with serous cystadenoma  - Unremarkable cervix  - Benign inactive endometrium with no specific pathologic change  - Unremarkable right ovary and bilateral fallopian tubes  B   Spleen, spleen, omentum, darotis:  - Metastatic adenocarcinoma involving spleen and omentum, consistent with colonic primary  See Note  Lab Results   Component Value Date    WBC 4 29 (L) 2022    HGB 9 9 (L) 2022    HCT 31 7 (L) 2022    MCV 87 2022     2022     Lab Results   Component Value Date     2016    SODIUM 141 2022    K 4 0 2022     (H) 2022    CO2 24 2022    AGAP 8 2022    BUN 19 2022    CREATININE 1 23 2022    GLUC 121 2022    GLUF 91 04/15/2022    CALCIUM 8 8 2022    AST 17 2022    ALT 21 2022    ALKPHOS 55 2022    PROT 6 6 2016    TP 7 0 2022    BILITOT 0 7 2016    TBILI 0 74 2022    EGFR 45 2022       Interval history:  Bad past couple of days -- diarrhea +++ no imodium, did not call the office  Pt notes she has been having right lower extremity ankle pain, medial aspect     ECO - Symptomatic, <50% confined to bed    Review of Systems   Constitutional: Positive for activity change, appetite change and fatigue  Negative for fever and unexpected weight change  HENT: Negative for nosebleeds  Respiratory: Negative for cough, choking and shortness of breath  Negative hemoptysis  Cardiovascular: Negative for chest pain, palpitations and leg swelling  Gastrointestinal: Positive for diarrhea  Negative for abdominal distention, abdominal pain, anal bleeding, blood in stool, constipation, nausea and vomiting  Endocrine: Negative  Negative for cold intolerance  Genitourinary: Negative  Negative for hematuria, menstrual problem, vaginal bleeding, vaginal discharge and vaginal pain  Musculoskeletal: Positive for arthralgias  Negative for myalgias, neck pain and neck stiffness  Skin: Negative  Negative for color change, pallor and rash  Allergic/Immunologic: Negative  Negative for immunocompromised state  Neurological: Positive for weakness  Negative for headaches  Hematological: Negative for adenopathy  Does not bruise/bleed easily  All other systems reviewed and are negative  Current Outpatient Medications:     ezetimibe (ZETIA) 10 mg tablet, Take 1 tablet (10 mg total) by mouth daily (Patient taking differently: Take 10 mg by mouth daily at bedtime), Disp: 90 tablet, Rfl: 1    [START ON 7/26/2022] fluorouracil 3,365 mg in CADD/Elastomeric Infusion Device, Infuse 3,365 mg (900 mg/m2/day x 1 87 m2) into a venous catheter over 46 hours for 2 days  Do not start before July 26, 2022 , Disp: 1 each, Rfl: 0    methylPREDNISolone 4 MG tablet therapy pack, Use as directed on package, Disp: 1 each, Rfl: 0  Allergies   Allergen Reactions    Medical Tape Rash     Skin irritation  Skin peeling  Skin irritation  Skin peeling  Blisters  Rash       Advance Directive and Living Will:            Objective   /90 (BP Location: Left arm, Patient Position: Sitting, Cuff Size: Standard)   Pulse (!) 114   Temp 98 °F (36 7 °C) (Temporal)   Resp 16   Ht 4' 9 01" (1 448 m)   Wt 81 8 kg (180 lb 6 4 oz)   LMP 09/01/2011 (Approximate)   SpO2 98%   BMI 39 02 kg/m²   Wt Readings from Last 6 Encounters:   07/19/22 81 8 kg (180 lb 6 4 oz)   07/12/22 84 6 kg (186 lb 8 2 oz)   06/28/22 85 3 kg (188 lb 0 8 oz)   06/21/22 84 4 kg (186 lb)   06/14/22 86 4 kg (190 lb 7 6 oz)   06/01/22 87 1 kg (192 lb)       Physical Exam  Constitutional:       General: She is not in acute distress  Appearance: She is well-developed  HENT:      Head: Normocephalic and atraumatic  Eyes:      General: No scleral icterus  Pupils: Pupils are equal, round, and reactive to light  Cardiovascular:      Rate and Rhythm: Normal rate and regular rhythm  Heart sounds: No murmur heard  Pulmonary:      Effort: Pulmonary effort is normal  No respiratory distress  Musculoskeletal:         General: Swelling (focal swellign see diagram) present  Right lower leg: Swelling (see number 1  No redness) and tenderness present  No edema  Left lower leg: No edema  Right ankle: Normal range of motion  Right foot: No swelling or deformity  Legs:       Comments: Tibia palpation did not reveal pain  calcaneous pain - underside the foot   Skin:     General: Skin is warm  Coloration: Skin is not pale  Findings: No rash  Neurological:      Mental Status: She is alert and oriented to person, place, and time  Psychiatric:         Thought Content:  Thought content normal          Pertinent Laboratory Results and Imaging Review:  Appointment on 07/11/2022   Component Date Value Ref Range Status    WBC 07/11/2022 4 29 (A) 4 31 - 10 16 Thousand/uL Final    RBC 07/11/2022 3 63 (A) 3 81 - 5 12 Million/uL Final    Hemoglobin 07/11/2022 9 9 (A) 11 5 - 15 4 g/dL Final    Hematocrit 07/11/2022 31 7 (A) 34 8 - 46 1 % Final    MCV 07/11/2022 87  82 - 98 fL Final    MCH 07/11/2022 27 3  26 8 - 34 3 pg Final    MCHC 07/11/2022 31 2 (A) 31 4 - 37 4 g/dL Final    RDW 07/11/2022 17 3 (A) 11 6 - 15 1 % Final    MPV 07/11/2022 10 2  8 9 - 12 7 fL Final    Platelets 82/30/4205 238  149 - 390 Thousands/uL Final    nRBC 07/11/2022 0  /100 WBCs Final    Neutrophils Relative 07/11/2022 46  43 - 75 % Final    Immat GRANS % 07/11/2022 0  0 - 2 % Final    Lymphocytes Relative 07/11/2022 41  14 - 44 % Final    Monocytes Relative 07/11/2022 10  4 - 12 % Final    Eosinophils Relative 07/11/2022 2  0 - 6 % Final    Basophils Relative 07/11/2022 1  0 - 1 % Final    Neutrophils Absolute 07/11/2022 1 94  1 85 - 7 62 Thousands/µL Final    Immature Grans Absolute 07/11/2022 0 01  0 00 - 0 20 Thousand/uL Final    Lymphocytes Absolute 07/11/2022 1 77  0 60 - 4 47 Thousands/µL Final    Monocytes Absolute 07/11/2022 0 44  0 17 - 1 22 Thousand/µL Final    Eosinophils Absolute 07/11/2022 0 10  0 00 - 0 61 Thousand/µL Final    Basophils Absolute 07/11/2022 0 03  0 00 - 0 10 Thousands/µL Final    Sodium 07/11/2022 141  136 - 145 mmol/L Final    Potassium 07/11/2022 4 0  3 5 - 5 3 mmol/L Final    Chloride 07/11/2022 109 (A) 100 - 108 mmol/L Final    CO2 07/11/2022 24  21 - 32 mmol/L Final    ANION GAP 07/11/2022 8  4 - 13 mmol/L Final    BUN 07/11/2022 19  5 - 25 mg/dL Final    Creatinine 07/11/2022 1 23  0 60 - 1 30 mg/dL Final    Standardized to IDMS reference method    Glucose 07/11/2022 121  65 - 140 mg/dL Final    If the patient is fasting, the ADA then defines impaired fasting glucose as > 100 mg/dL and diabetes as > or equal to 123 mg/dL  Specimen collection should occur prior to Sulfasalazine administration due to the potential for falsely depressed results  Specimen collection should occur prior to Sulfapyridine administration due to the potential for falsely elevated results   Calcium 07/11/2022 8 8  8 3 - 10 1 mg/dL Final    Corrected Calcium 07/11/2022 9 8  8 3 - 10 1 mg/dL Final    AST 07/11/2022 17  5 - 45 U/L Final    Specimen collection should occur prior to Sulfasalazine administration due to the potential for falsely depressed results   ALT 07/11/2022 21  12 - 78 U/L Final    Specimen collection should occur prior to Sulfasalazine administration due to the potential for falsely depressed results   Alkaline Phosphatase 07/11/2022 55  46 - 116 U/L Final    Total Protein 07/11/2022 7 0  6 4 - 8 2 g/dL Final    Albumin 07/11/2022 2 7 (A) 3 5 - 5 0 g/dL Final    Total Bilirubin 07/11/2022 0 74  0 20 - 1 00 mg/dL Final    Use of this assay is not recommended for patients undergoing treatment with eltrombopag due to the potential for falsely elevated results      eGFR 07/11/2022 45  ml/min/1 73sq m Final       The following historical data was reviewed:  Past Medical History:   Diagnosis Date    Blood in stool     Breast disorder     Cancer ([facility] )     rectal    Cancer determined by colorectal biopsy ([facility] )     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    Colon polyp     GERD (gastroesophageal reflux disease)     History of rectal surgery  Hyperlipidemia     PONV (postoperative nausea and vomiting)      Past Surgical History:   Procedure Laterality Date    BREAST LUMPECTOMY Right      SECTION      x3    COLON SIGMOID RESECTION      COLONOSCOPY      DILATION AND CURETTAGE OF UTERUS      ILEO LOOP DIVERSION N/A 12/10/2019    Procedure: ILEOSTOMY LOOP;  Surgeon: Memo Estrada MD;  [facility];  Service: Robotics    INSTILLATION CHEMOTHERAPY INTRAPERITONEAL (HIPEC) LAPAROTOMY N/A 2022    Procedure: INSTILLATION CHEMOTHERAPY INTRAPERITONEAL (HIPEC) LAPAROTOMY;  Surgeon: Elisabet Covington MD;  [facility];  Service: Surgical Oncology    IR BIOPSY OMENTUM  2021    IR PORT PLACEMENT  2021    OMENTECTOMY N/A 2022    Procedure: DIAGNOSTIC LAPAROSCOPY, OPEN OMENTECTOMY, SPLENECTOMY, DIAPHRAGM REPAIR, CHEST TUBE INSERTION;  Surgeon: Elisaebt Covington MD;  [facility];  Service: Surgical Oncology    AL CLOSE ENTEROSTOMY N/A 2020    Procedure: CLOSURE ILEOSTOMY;  Surgeon: Memo Estrada MD;  [facility];  Service: Colorectal    AL DIAPHRAGM SURG [address] UNLISTED  2022    Procedure: REPAIR DIAPHRAGM TEAR;  Surgeon: Damion Hemphill MD;  [facility];  Service: Thoracic    AL LAP,SURG,COLECTOMY, PARTIAL, W/ANAST N/A 12/10/2019    Procedure: SIGMOID RESECTION COLON LAPAROSCOPIC W ROBOTICS converted to lap hand assisted  with Partial proctectomy , LASER FLUORESCENCE ANGIOGRAPHY, INTRA OP COLONOSCOPY;  Surgeon: Memo Estrada MD;  [facility];  Service: Robotics    AL TOTAL ABDOM HYSTERECTOMY N/A 2022    Procedure: DIAGNOSTIC LAPAROSCOPY, TOTAL ABDOMINAL HYSTERECTOMY, BILATERAL SALPINGO-OOPHORECTOMY, EXTENSIVE ADHESIOLYSIS;  Surgeon: Jessica Coronel MD;  [facility];  Service: Gynecology Oncology    SMALL INTESTINE SURGERY  2020    Procedure: RESECTION SMALL BOWEL;  Surgeon: Memo Estrada MD;  [facility]; Service: Colorectal     Social History     Socioeconomic History    Marital status:      Spouse name: None    Number of children: None    Years of education: None    Highest education level: None   Occupational History    None   Tobacco Use    Smoking status: Never Smoker    Smokeless tobacco: Never Used   Vaping Use    Vaping Use: Never used   Substance and Sexual Activity    Alcohol use: Yes     Comment: "occasionally"    Drug use: Never    Sexual activity: None   Other Topics Concern    None   Social History Narrative    None     Social Determinants of Health     Financial Resource Strain: Not on file   Food Insecurity: No Food Insecurity    Worried About Running Out of Food in the Last Year: Never true    Uri of Food in the Last Year: Never true   Transportation Needs: No Transportation Needs    Lack of Transportation (Medical): No    Lack of Transportation (Non-Medical): No   Physical Activity: Not on file   Stress: Not on file   Social Connections: Not on file   Intimate Partner Violence: Not on file   Housing Stability: Unknown    Unable to Pay for Housing in the Last Year: No    Number of Places Lived in the Last Year: Not on file    Unstable Housing in the Last Year: No     Family History   Problem Relation Age of Onset    Hypertension Mother     Heart attack Mother     Heart attack Father     Heart disease Father     Hypertension Brother     Heart attack Brother     Leukemia Cousin     Breast cancer Cousin     Diabetes Cousin     Breast cancer Family         maternal side    Colon cancer Family         paternal uncle    Colon cancer Paternal Uncle     Stroke Neg Hx        Please note: This report has been generated by a voice recognition software system  Therefore there may be syntax, spelling, and/or grammatical errors  Please call if you have any questions

## 2022-07-25 ENCOUNTER — HOSPITAL ENCOUNTER (OUTPATIENT)
Dept: RADIOLOGY | Facility: HOSPITAL | Age: 67
Discharge: HOME | End: 2022-07-25
Payer: MEDICARE

## 2022-07-25 ENCOUNTER — TELEPHONE (OUTPATIENT)
Dept: HEMATOLOGY ONCOLOGY | Facility: MEDICAL CENTER | Age: 67
End: 2022-07-25

## 2022-07-25 ENCOUNTER — APPOINTMENT (OUTPATIENT)
Dept: LAB | Facility: HOSPITAL | Age: 67
End: 2022-07-25
Payer: MEDICARE

## 2022-07-25 DIAGNOSIS — M79.661 PAIN OF RIGHT LOWER LEG: ICD-10-CM

## 2022-07-25 DIAGNOSIS — C78.6 OMENTAL METASTASIS (HCC): ICD-10-CM

## 2022-07-25 DIAGNOSIS — C19 RECTOSIGMOID CANCER (HCC): ICD-10-CM

## 2022-07-25 DIAGNOSIS — M79.671 PAIN OF RIGHT HEEL: ICD-10-CM

## 2022-07-25 DIAGNOSIS — E66.01 MORBID OBESITY (HCC): Primary | ICD-10-CM

## 2022-07-25 LAB
ALBUMIN SERPL BCP-MCNC: 2.7 G/DL (ref 3.5–5)
ALP SERPL-CCNC: 57 U/L (ref 46–116)
ALT SERPL W P-5'-P-CCNC: 58 U/L (ref 12–78)
ANION GAP SERPL CALCULATED.3IONS-SCNC: 9 MMOL/L (ref 4–13)
AST SERPL W P-5'-P-CCNC: 28 U/L (ref 5–45)
BASOPHILS # BLD AUTO: 0.01 THOUSANDS/ΜL (ref 0–0.1)
BASOPHILS NFR BLD AUTO: 0 % (ref 0–1)
BILIRUB SERPL-MCNC: 0.91 MG/DL (ref 0.2–1)
BUN SERPL-MCNC: 22 MG/DL (ref 5–25)
CALCIUM ALBUM COR SERPL-MCNC: 9.3 MG/DL (ref 8.3–10.1)
CALCIUM SERPL-MCNC: 8.3 MG/DL (ref 8.3–10.1)
CHLORIDE SERPL-SCNC: 107 MMOL/L (ref 96–108)
CO2 SERPL-SCNC: 23 MMOL/L (ref 21–32)
CREAT SERPL-MCNC: 1.13 MG/DL (ref 0.6–1.3)
EOSINOPHIL # BLD AUTO: 0.07 THOUSAND/ΜL (ref 0–0.61)
EOSINOPHIL NFR BLD AUTO: 1 % (ref 0–6)
ERYTHROCYTE [DISTWIDTH] IN BLOOD BY AUTOMATED COUNT: 20 % (ref 11.6–15.1)
GFR SERPL CREATININE-BSD FRML MDRD: 50 ML/MIN/1.73SQ M
GLUCOSE SERPL-MCNC: 103 MG/DL (ref 65–140)
HCT VFR BLD AUTO: 31.4 % (ref 34.8–46.1)
HGB BLD-MCNC: 10.1 G/DL (ref 11.5–15.4)
IMM GRANULOCYTES # BLD AUTO: 0.01 THOUSAND/UL (ref 0–0.2)
IMM GRANULOCYTES NFR BLD AUTO: 0 % (ref 0–2)
LYMPHOCYTES # BLD AUTO: 2.48 THOUSANDS/ΜL (ref 0.6–4.47)
LYMPHOCYTES NFR BLD AUTO: 38 % (ref 14–44)
MCH RBC QN AUTO: 27.6 PG (ref 26.8–34.3)
MCHC RBC AUTO-ENTMCNC: 32.2 G/DL (ref 31.4–37.4)
MCV RBC AUTO: 86 FL (ref 82–98)
MONOCYTES # BLD AUTO: 0.51 THOUSAND/ΜL (ref 0.17–1.22)
MONOCYTES NFR BLD AUTO: 8 % (ref 4–12)
NEUTROPHILS # BLD AUTO: 3.39 THOUSANDS/ΜL (ref 1.85–7.62)
NEUTS SEG NFR BLD AUTO: 53 % (ref 43–75)
NRBC BLD AUTO-RTO: 0 /100 WBCS
PLATELET # BLD AUTO: 207 THOUSANDS/UL (ref 149–390)
PMV BLD AUTO: 10.6 FL (ref 8.9–12.7)
POTASSIUM SERPL-SCNC: 3.5 MMOL/L (ref 3.5–5.3)
PROT SERPL-MCNC: 6.4 G/DL (ref 6.4–8.4)
RBC # BLD AUTO: 3.66 MILLION/UL (ref 3.81–5.12)
SODIUM SERPL-SCNC: 139 MMOL/L (ref 135–147)
WBC # BLD AUTO: 6.47 THOUSAND/UL (ref 4.31–10.16)

## 2022-07-25 PROCEDURE — 76882 US LMTD JT/FCL EVL NVASC XTR: CPT

## 2022-07-25 PROCEDURE — 36415 COLL VENOUS BLD VENIPUNCTURE: CPT

## 2022-07-25 PROCEDURE — 85025 COMPLETE CBC W/AUTO DIFF WBC: CPT

## 2022-07-25 PROCEDURE — 93970 EXTREMITY STUDY: CPT | Performed by: SURGERY

## 2022-07-25 PROCEDURE — 93970 EXTREMITY STUDY: CPT

## 2022-07-25 PROCEDURE — 80053 COMPREHEN METABOLIC PANEL: CPT

## 2022-07-25 NOTE — TELEPHONE ENCOUNTER
----- Message from Abilio Peng PA-C sent at 7/25/2022  7:50 AM EDT -----  X rays look ok   No bony changes      Patient aware of the above  Has doppler and U/S scheduled for today    FYI to PA

## 2022-07-25 NOTE — TELEPHONE ENCOUNTER
Patient positive for DVT  Patient to begin eliquis at 5mg twice per day  Order sent and patient aware

## 2022-07-26 ENCOUNTER — TELEPHONE (OUTPATIENT)
Dept: INFUSION CENTER | Facility: HOSPITAL | Age: 67
End: 2022-07-26

## 2022-07-26 ENCOUNTER — HOSPITAL ENCOUNTER (OUTPATIENT)
Dept: INFUSION CENTER | Facility: HOSPITAL | Age: 67
Discharge: HOME | End: 2022-07-26
Attending: INTERNAL MEDICINE
Payer: MEDICARE

## 2022-07-26 VITALS
DIASTOLIC BLOOD PRESSURE: 60 MMHG | RESPIRATION RATE: 20 BRPM | TEMPERATURE: 98.9 F | HEIGHT: 57 IN | WEIGHT: 184.08 LBS | BODY MASS INDEX: 39.71 KG/M2 | HEART RATE: 99 BPM | SYSTOLIC BLOOD PRESSURE: 135 MMHG | OXYGEN SATURATION: 98 %

## 2022-07-26 DIAGNOSIS — C19 RECTOSIGMOID CANCER (HCC): Primary | ICD-10-CM

## 2022-07-26 DIAGNOSIS — C78.6 OMENTAL METASTASIS (HCC): ICD-10-CM

## 2022-07-26 PROCEDURE — G0498 CHEMO EXTEND IV INFUS W/PUMP: HCPCS

## 2022-07-26 PROCEDURE — 96413 CHEMO IV INFUSION 1 HR: CPT

## 2022-07-26 PROCEDURE — 96368 THER/DIAG CONCURRENT INF: CPT

## 2022-07-26 PROCEDURE — 96415 CHEMO IV INFUSION ADDL HR: CPT

## 2022-07-26 PROCEDURE — 96411 CHEMO IV PUSH ADDL DRUG: CPT

## 2022-07-26 PROCEDURE — 96375 TX/PRO/DX INJ NEW DRUG ADDON: CPT

## 2022-07-26 PROCEDURE — 96367 TX/PROPH/DG ADDL SEQ IV INF: CPT

## 2022-07-26 RX ORDER — ATROPINE SULFATE 1 MG/ML
0.25 INJECTION, SOLUTION INTRAVENOUS ONCE
Status: COMPLETED | OUTPATIENT
Start: 2022-07-26 | End: 2022-07-26

## 2022-07-26 RX ORDER — SODIUM CHLORIDE 9 MG/ML
20 INJECTION, SOLUTION INTRAVENOUS ONCE AS NEEDED
Status: DISCONTINUED | OUTPATIENT
Start: 2022-07-26 | End: 2022-07-29 | Stop reason: HOSPADM

## 2022-07-26 RX ORDER — FLUOROURACIL 50 MG/ML
340 INJECTION, SOLUTION INTRAVENOUS ONCE
Status: COMPLETED | OUTPATIENT
Start: 2022-07-26 | End: 2022-07-26

## 2022-07-26 RX ADMIN — ATROPINE SULFATE 0.25 MG: 1 INJECTION, SOLUTION INTRAMUSCULAR; INTRAVENOUS; SUBCUTANEOUS at 11:01

## 2022-07-26 RX ADMIN — LEUCOVORIN CALCIUM 636 MG: 200 INJECTION, POWDER, LYOPHILIZED, FOR SOLUTION INTRAMUSCULAR; INTRAVENOUS at 11:06

## 2022-07-26 RX ADMIN — SODIUM CHLORIDE 20 ML/HR: 0.9 INJECTION, SOLUTION INTRAVENOUS at 10:18

## 2022-07-26 RX ADMIN — FLUOROURACIL 635 MG: 50 INJECTION, SOLUTION INTRAVENOUS at 12:48

## 2022-07-26 RX ADMIN — DEXAMETHASONE SODIUM PHOSPHATE: 10 INJECTION, SOLUTION INTRAMUSCULAR; INTRAVENOUS at 10:18

## 2022-07-26 RX ADMIN — IRINOTECAN HYDROCHLORIDE 168 MG: 20 INJECTION, SOLUTION INTRAVENOUS at 11:07

## 2022-07-26 NOTE — PROGRESS NOTES
Dose of 5FU elastomeric pump is ordered as 900mg/m2  [facility] Tawny's last office note states dose to be 85% of 1200mg/m2  This was clarified with Sekou Arnett RN via [employer] and Aurea Shanks confirmed note is to be updated and dose ordered of 900mg/m2 is correct

## 2022-07-28 ENCOUNTER — HOSPITAL ENCOUNTER (OUTPATIENT)
Dept: INFUSION CENTER | Facility: HOSPITAL | Age: 67
Discharge: HOME | End: 2022-07-28
Attending: INTERNAL MEDICINE

## 2022-07-28 VITALS — TEMPERATURE: 96.7 F

## 2022-07-28 DIAGNOSIS — C78.6 OMENTAL METASTASIS (HCC): ICD-10-CM

## 2022-07-28 DIAGNOSIS — C19 RECTOSIGMOID CANCER (HCC): Primary | ICD-10-CM

## 2022-07-29 ENCOUNTER — TELEPHONE (OUTPATIENT)
Dept: HEMATOLOGY ONCOLOGY | Facility: MEDICAL CENTER | Age: 67
End: 2022-07-29

## 2022-07-29 NOTE — TELEPHONE ENCOUNTER
----- Message from Freddie Aldana PA-C sent at 7/19/2022 11:17 AM EDT -----  Pt with chemotherapy induced diarrhea started with cycle 9 of irinotecan  Please call and check on pt today  Cycle 10 on 7/26  Left voicemail for patient to call my Teams number to discuss status

## 2022-08-01 DIAGNOSIS — C78.6 OMENTAL METASTASIS (HCC): Primary | ICD-10-CM

## 2022-08-01 DIAGNOSIS — C19 RECTOSIGMOID CANCER (HCC): ICD-10-CM

## 2022-08-01 RX ORDER — ATROPINE SULFATE 1 MG/ML
0.25 INJECTION, SOLUTION INTRAVENOUS ONCE
Status: CANCELLED
Start: 2022-08-09 | End: 2022-08-09

## 2022-08-01 RX ORDER — SODIUM CHLORIDE 9 MG/ML
20 INJECTION, SOLUTION INTRAVENOUS ONCE AS NEEDED
Status: CANCELLED | OUTPATIENT
Start: 2022-08-09

## 2022-08-01 RX ORDER — FLUOROURACIL 50 MG/ML
340 INJECTION, SOLUTION INTRAVENOUS ONCE
Status: CANCELLED | OUTPATIENT
Start: 2022-08-09

## 2022-08-08 ENCOUNTER — APPOINTMENT (OUTPATIENT)
Dept: LAB | Facility: HOSPITAL | Age: 67
End: 2022-08-08
Payer: MEDICARE

## 2022-08-08 DIAGNOSIS — C78.6 OMENTAL METASTASIS (HCC): ICD-10-CM

## 2022-08-08 DIAGNOSIS — C19 RECTOSIGMOID CANCER (HCC): ICD-10-CM

## 2022-08-08 LAB
ALBUMIN SERPL BCP-MCNC: 2.9 G/DL (ref 3.5–5)
ALP SERPL-CCNC: 49 U/L (ref 46–116)
ALT SERPL W P-5'-P-CCNC: 33 U/L (ref 12–78)
ANION GAP SERPL CALCULATED.3IONS-SCNC: 9 MMOL/L (ref 4–13)
AST SERPL W P-5'-P-CCNC: 23 U/L (ref 5–45)
BASOPHILS # BLD AUTO: 0.02 THOUSANDS/ΜL (ref 0–0.1)
BASOPHILS NFR BLD AUTO: 0 % (ref 0–1)
BILIRUB SERPL-MCNC: 1.31 MG/DL (ref 0.2–1)
BUN SERPL-MCNC: 21 MG/DL (ref 5–25)
CALCIUM ALBUM COR SERPL-MCNC: 10 MG/DL (ref 8.3–10.1)
CALCIUM SERPL-MCNC: 9.1 MG/DL (ref 8.3–10.1)
CHLORIDE SERPL-SCNC: 110 MMOL/L (ref 96–108)
CO2 SERPL-SCNC: 24 MMOL/L (ref 21–32)
CREAT SERPL-MCNC: 1.35 MG/DL (ref 0.6–1.3)
EOSINOPHIL # BLD AUTO: 0.1 THOUSAND/ΜL (ref 0–0.61)
EOSINOPHIL NFR BLD AUTO: 2 % (ref 0–6)
ERYTHROCYTE [DISTWIDTH] IN BLOOD BY AUTOMATED COUNT: 24 % (ref 11.6–15.1)
GFR SERPL CREATININE-BSD FRML MDRD: 40 ML/MIN/1.73SQ M
GLUCOSE P FAST SERPL-MCNC: 108 MG/DL (ref 65–99)
HCT VFR BLD AUTO: 31.3 % (ref 34.8–46.1)
HGB BLD-MCNC: 10 G/DL (ref 11.5–15.4)
IMM GRANULOCYTES # BLD AUTO: 0.01 THOUSAND/UL (ref 0–0.2)
IMM GRANULOCYTES NFR BLD AUTO: 0 % (ref 0–2)
LYMPHOCYTES # BLD AUTO: 1.69 THOUSANDS/ΜL (ref 0.6–4.47)
LYMPHOCYTES NFR BLD AUTO: 37 % (ref 14–44)
MCH RBC QN AUTO: 27.8 PG (ref 26.8–34.3)
MCHC RBC AUTO-ENTMCNC: 31.9 G/DL (ref 31.4–37.4)
MCV RBC AUTO: 87 FL (ref 82–98)
MONOCYTES # BLD AUTO: 0.5 THOUSAND/ΜL (ref 0.17–1.22)
MONOCYTES NFR BLD AUTO: 11 % (ref 4–12)
NEUTROPHILS # BLD AUTO: 2.25 THOUSANDS/ΜL (ref 1.85–7.62)
NEUTS SEG NFR BLD AUTO: 50 % (ref 43–75)
NRBC BLD AUTO-RTO: 0 /100 WBCS
PLATELET # BLD AUTO: 292 THOUSANDS/UL (ref 149–390)
PMV BLD AUTO: 10.7 FL (ref 8.9–12.7)
POTASSIUM SERPL-SCNC: 4.2 MMOL/L (ref 3.5–5.3)
PROT SERPL-MCNC: 6.8 G/DL (ref 6.4–8.4)
RBC # BLD AUTO: 3.6 MILLION/UL (ref 3.81–5.12)
SODIUM SERPL-SCNC: 143 MMOL/L (ref 135–147)
WBC # BLD AUTO: 4.57 THOUSAND/UL (ref 4.31–10.16)

## 2022-08-08 PROCEDURE — 80053 COMPREHEN METABOLIC PANEL: CPT

## 2022-08-08 PROCEDURE — 36415 COLL VENOUS BLD VENIPUNCTURE: CPT

## 2022-08-08 PROCEDURE — 85025 COMPLETE CBC W/AUTO DIFF WBC: CPT

## 2022-08-09 ENCOUNTER — HOSPITAL ENCOUNTER (OUTPATIENT)
Dept: INFUSION CENTER | Facility: HOSPITAL | Age: 67
Discharge: HOME | End: 2022-08-09
Attending: INTERNAL MEDICINE
Payer: MEDICARE

## 2022-08-09 VITALS
BODY MASS INDEX: 39.14 KG/M2 | WEIGHT: 181.44 LBS | HEIGHT: 57 IN | SYSTOLIC BLOOD PRESSURE: 129 MMHG | TEMPERATURE: 97 F | HEART RATE: 100 BPM | OXYGEN SATURATION: 97 % | RESPIRATION RATE: 18 BRPM | DIASTOLIC BLOOD PRESSURE: 62 MMHG

## 2022-08-09 DIAGNOSIS — C78.6 OMENTAL METASTASIS (HCC): ICD-10-CM

## 2022-08-09 DIAGNOSIS — C19 RECTOSIGMOID CANCER (HCC): Primary | ICD-10-CM

## 2022-08-09 PROCEDURE — 96413 CHEMO IV INFUSION 1 HR: CPT

## 2022-08-09 PROCEDURE — G0498 CHEMO EXTEND IV INFUS W/PUMP: HCPCS

## 2022-08-09 PROCEDURE — 96375 TX/PRO/DX INJ NEW DRUG ADDON: CPT

## 2022-08-09 PROCEDURE — 96367 TX/PROPH/DG ADDL SEQ IV INF: CPT

## 2022-08-09 PROCEDURE — 96368 THER/DIAG CONCURRENT INF: CPT

## 2022-08-09 PROCEDURE — 96411 CHEMO IV PUSH ADDL DRUG: CPT

## 2022-08-09 RX ORDER — SODIUM CHLORIDE 9 MG/ML
20 INJECTION, SOLUTION INTRAVENOUS ONCE AS NEEDED
Status: DISCONTINUED | OUTPATIENT
Start: 2022-08-09 | End: 2022-08-12 | Stop reason: HOSPADM

## 2022-08-09 RX ORDER — ATROPINE SULFATE 1 MG/ML
0.25 INJECTION, SOLUTION INTRAVENOUS ONCE
Status: COMPLETED | OUTPATIENT
Start: 2022-08-09 | End: 2022-08-09

## 2022-08-09 RX ORDER — FLUOROURACIL 50 MG/ML
340 INJECTION, SOLUTION INTRAVENOUS ONCE
Status: COMPLETED | OUTPATIENT
Start: 2022-08-09 | End: 2022-08-09

## 2022-08-09 RX ADMIN — ATROPINE SULFATE 0.25 MG: 1 INJECTION, SOLUTION INTRAMUSCULAR; INTRAVENOUS; SUBCUTANEOUS at 10:25

## 2022-08-09 RX ADMIN — FLUOROURACIL 590 MG: 50 INJECTION, SOLUTION INTRAVENOUS at 12:33

## 2022-08-09 RX ADMIN — DEXAMETHASONE SODIUM PHOSPHATE: 10 INJECTION, SOLUTION INTRAMUSCULAR; INTRAVENOUS at 10:01

## 2022-08-09 RX ADMIN — SODIUM CHLORIDE 20 ML/HR: 0.9 INJECTION, SOLUTION INTRAVENOUS at 10:01

## 2022-08-09 RX ADMIN — SODIUM CHLORIDE 592 MG: 0.9 INJECTION, SOLUTION INTRAVENOUS at 10:43

## 2022-08-09 RX ADMIN — IRINOTECAN HYDROCHLORIDE 157 MG: 20 INJECTION, SOLUTION INTRAVENOUS at 10:45

## 2022-08-11 ENCOUNTER — HOSPITAL ENCOUNTER (OUTPATIENT)
Dept: INFUSION CENTER | Facility: HOSPITAL | Age: 67
Discharge: HOME | End: 2022-08-11
Attending: INTERNAL MEDICINE

## 2022-08-11 DIAGNOSIS — C78.6 OMENTAL METASTASIS (HCC): ICD-10-CM

## 2022-08-11 DIAGNOSIS — C19 RECTOSIGMOID CANCER (HCC): Primary | ICD-10-CM

## 2022-08-17 DIAGNOSIS — C78.6 OMENTAL METASTASIS (HCC): Primary | ICD-10-CM

## 2022-08-17 DIAGNOSIS — C19 RECTOSIGMOID CANCER (HCC): ICD-10-CM

## 2022-08-17 RX ORDER — SODIUM CHLORIDE 9 MG/ML
20 INJECTION, SOLUTION INTRAVENOUS ONCE AS NEEDED
Status: CANCELLED | OUTPATIENT
Start: 2022-08-23

## 2022-08-17 RX ORDER — ATROPINE SULFATE 1 MG/ML
0.25 INJECTION, SOLUTION INTRAVENOUS ONCE
Status: CANCELLED
Start: 2022-08-23 | End: 2022-08-23

## 2022-08-17 RX ORDER — FLUOROURACIL 50 MG/ML
340 INJECTION, SOLUTION INTRAVENOUS ONCE
Status: CANCELLED | OUTPATIENT
Start: 2022-08-23

## 2022-08-21 NOTE — PROGRESS NOTES
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) now with an abdominal mass consistent with recurrence  Issues:    Recurrent rectosigmoid adenocarcinoma  Patient was seen/evaluated by Dr Lili Diallo surgical oncology  Patient received 3 months of preoperative FOLFOX  Follow-up CT scans demonstrated response to treatment with no evidence of progressive disease  Patient then underwent resection of the omental mass and spleen as well as DILMA/BSO (see below)  Mrs Renetta Mauricio was then restarted on FOLFOX  Patient had problems with neuropathy; FOLFOX change to FOLFIRI  More recent issues have included diarrhea - now stable  The 12th/final cycle of chemotherapy is due on August 23, 2022  Patient states having all required medications at home including antidiarrheals  The plan is to continue the 12 cycles and then eventually repeat scans (scheduled for the end of September 2022)  Patient is to return to the office in early October 2022 with repeat blood work including CEA level before  Patient also has an appointment with Dr Lili Diallo towards the end of September 2022  Caris results are still pending  Ovarian lesion  Recent MRI/pelvis demonstrated a multi septated left ovarian cystic lesion  Transabdominal ultrasound results did not demonstrate any concerning abnormality, 1 year follow-up suggested  Patient was recently seen by Dr Mary Rodrigues, gyn Oncology  As above, patient underwent DILMA/BSO  Pathology results are listed below - no evidence of malignancy  Patient will follow-up with gyn Oncology as directed  Genetics  Patient has a complicated family history but multiple family members with colon/rectal cancer    Patient can be seen by Oncology genetics in the future when patient has restarted chemotherapy and is tolerating it  Carefully review your medication list and verify that the list is accurate and up-to-date  Please call the hematology/oncology office if there are medications missing from the list, medications on the list that you are not currently taking or if there is a dosage or instruction that is different from how you're taking that medication  Patient goals and areas of care:  Complete last cycle of chemotherapy  Barriers to care:  None  Patient is able to self-care   ______________________________________________________________________________________    Chief complaint: Rectosigmoid carcinoma, omental recurrence    History of Present Illness:  75year old female previously diagnosed with rectosigmoid carcinoma status post resection in September 2019  Postoperatively patient did well; Mrs Leila Kaplan did not need/receive adjuvant chemotherapy  Recent surveillance CEA level was found to be elevated  Patient underwent workup  Results demonstrated an intra-abdominal mass by the spleen  Patient has been seen by Dr Forest Rosales and the plan has been neoadjuvant chemotherapy followed by resection  Patient initially completed FOLFOX; follow-up scans demonstrated response  Patient was then taken to the OR for resection  Mrs Leila Kaplan has been receiving her postoperative 3 months of treatment  Patient has 1 cycle left  Patient states feeling okay, baseline  FOLFOX recently changed to FOLFIRI because of peripheral neuropathy  The neuropathy is slightly better than before but not gone  No tripping or falling  No dropping objects  Patient also with some diarrhea issues after the irinotecan - better recently  Appetite is good, weight is stable  No fevers or signs of infection  Review of Systems   Constitutional: Positive for fatigue  Negative for activity change and appetite change  HENT: Negative  Eyes: Negative  Respiratory: Negative  Cardiovascular: Negative  Gastrointestinal: Negative for nausea and vomiting  Endocrine: Negative  Genitourinary: Negative  Musculoskeletal: Negative  Skin: Negative  Allergic/Immunologic: Negative  Neurological: Negative  Hematological: Negative  Psychiatric/Behavioral: Negative  All other systems reviewed and are negative      Patient Active Problem List   Diagnosis    Callus of foot    Foot pain    GERD (gastroesophageal reflux disease)    Hyperlipidemia    Prediabetes    Varicose veins with pain    [facility]    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HCC)    Dyspnea on exertion    Dyslipidemia    Sigmoid diverticulosis    PONV (postoperative nausea and vomiting)    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HCC)    Lesion of ovary    Chemotherapy adverse reaction    Dehydration    Chemotherapy-induced nausea    Platelets decreased (HCC)    Stage 3 chronic kidney disease, unspecified whether stage 3a or 3b CKD (HCC)    History of splenectomy    Asplenia    Postoperative state     Past Medical History:   Diagnosis Date    Blood in stool     Breast disorder     Cancer ([place] [address] )     rectal    Cancer determined by colorectal biopsy ([place] [address] )     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    Colon polyp     GERD (gastroesophageal reflux disease)     History of rectal surgery     Hyperlipidemia     PONV (postoperative nausea and vomiting)      Past Surgical History:   Procedure Laterality Date    BREAST LUMPECTOMY Right      SECTION      x3    COLON SIGMOID RESECTION      COLONOSCOPY      DILATION AND CURETTAGE OF UTERUS      ILEO LOOP DIVERSION N/A 12/10/2019    Procedure: ILEOSTOMY LOOP;  Surgeon: Jordon Swenson MD;  [facility];  Service: Robotics    INSTILLATION CHEMOTHERAPY INTRAPERITONEAL (HIPEC) LAPAROTOMY N/A 2022    Procedure: INSTILLATION CHEMOTHERAPY INTRAPERITONEAL (HIPEC) LAPAROTOMY;  Surgeon: Elizabeth Graham MD;  [facility];  Service: Surgical Oncology    IR BIOPSY OMENTUM  11/12/2021    IR PORT PLACEMENT  12/28/2021    OMENTECTOMY N/A 4/29/2022    Procedure: DIAGNOSTIC LAPAROSCOPY, OPEN OMENTECTOMY, SPLENECTOMY, DIAPHRAGM REPAIR, CHEST TUBE INSERTION;  Surgeon: Joya Kearney MD;  [facility];  Service: Surgical Oncology    CO CLOSE ENTEROSTOMY N/A 2/4/2020    Procedure: CLOSURE ILEOSTOMY;  Surgeon: Frantz Chris MD;  [facility];  Service: Colorectal    CO DIAPHRAGM SURG [address] UNLISTED  4/29/2022    Procedure: REPAIR DIAPHRAGM TEAR;  Surgeon: Maranda Sue MD;  [facility];  Service: Thoracic    CO LAP,SURG,COLECTOMY, PARTIAL, W/ANAST N/A 12/10/2019    Procedure: SIGMOID RESECTION COLON LAPAROSCOPIC W ROBOTICS converted to lap hand assisted  with Partial proctectomy , LASER FLUORESCENCE ANGIOGRAPHY, INTRA OP COLONOSCOPY;  Surgeon: Frantz Chris MD;  [facility];  Service: Robotics    CO TOTAL ABDOM HYSTERECTOMY N/A 4/29/2022    Procedure: DIAGNOSTIC LAPAROSCOPY, TOTAL ABDOMINAL HYSTERECTOMY, BILATERAL SALPINGO-OOPHORECTOMY, EXTENSIVE ADHESIOLYSIS;  Surgeon: Larisa Ortega MD;  [facility];  Service: Gynecology Oncology    SMALL INTESTINE SURGERY  2/4/2020    Procedure: RESECTION SMALL BOWEL;  Surgeon: Frantz Chris MD;  [facility];  Service: Colorectal   Past surgical history:  No prior blood transfusions    OBGYN:  No breast issues, no abnormal mammograms previously, no postmenopausal bleeding, no other GYN issues    Family History   Problem Relation Age of Onset    Hypertension Mother     Heart attack Mother     Heart attack Father     Heart disease Father     Hypertension Brother     Heart attack Brother     Leukemia Cousin     Breast cancer Cousin     Diabetes Cousin     Breast cancer Family         maternal side    Colon cancer Family         paternal uncle    Colon cancer Paternal Uncle     Stroke Neg Hx    Family history:  Somewhat complicated, mother with colostomy but etiology for this is unclear, paternal uncle with rectal cancer, patient has to first-degree relatives on the paternal side with history of colon cancer, 3 sons and 1 grandchild in good general health    Social History     Socioeconomic History    Marital status:      Spouse name: Not on file    Number of children: Not on file    Years of education: Not on file    Highest education level: Not on file   Occupational History    Not on file   Tobacco Use    Smoking status: Never Smoker    Smokeless tobacco: Never Used   Vaping Use    Vaping Use: Never used   Substance and Sexual Activity    Alcohol use: Yes     Comment: "occasionally"    Drug use: Never    Sexual activity: Not on file   Other Topics Concern    Not on file   Social History Narrative    Not on file     Social Determinants of Health     Financial Resource Strain: Not on file   Food Insecurity: No Food Insecurity    Worried About Running Out of Food in the Last Year: Never true    Uri of Food in the Last Year: Never true   Transportation Needs: No Transportation Needs    Lack of Transportation (Medical): No    Lack of Transportation (Non-Medical):  No   Physical Activity: Not on file   Stress: Not on file   Social Connections: Not on file   Intimate Partner Violence: Not on file   Housing Stability: Unknown    Unable to Pay for Housing in the Last Year: No    Number of Places Lived in the Last Year: Not on file    Unstable Housing in the Last Year: No   Social history:  No tobacco, alcohol or drug abuse, no toxic exposure    Current Outpatient Medications:     apixaban (Eliquis) 5 mg, Take 1 tablet (5 mg total) by mouth 2 (two) times a day, Disp: 60 tablet, Rfl: 3    ezetimibe (ZETIA) 10 mg tablet, Take 1 tablet (10 mg total) by mouth daily (Patient taking differently: Take 10 mg by mouth daily at bedtime), Disp: 90 tablet, Rfl: 1    [START ON 8/23/2022] fluorouracil 3,130 mg in CADD/Elastomeric Infusion Device, Infuse 3,130 mg (900 mg/m2/day x 1 74 m2) into a catheter in a vein via infusion device over 46 hours for 2 days  Do not start before August 23, 2022 , Disp: 1 each, Rfl: 0    methylPREDNISolone 4 MG tablet therapy pack, Use as directed on package, Disp: 1 each, Rfl: 0    Allergies   Allergen Reactions    Medical Tape Rash     Skin irritation  Skin peeling  Skin irritation  Skin peeling  Blisters  Rash     Vitals:    08/22/22 1349   BP: 140/76   Pulse: 85   Resp: 20   Temp: 97 8 °F (36 6 °C)   SpO2: 100%   Physical Exam  Constitutional:       Appearance: She is well-developed  HENT:      Head: Normocephalic and atraumatic  Right Ear: External ear normal       Left Ear: External ear normal    Eyes:      Conjunctiva/sclera: Conjunctivae normal       Pupils: Pupils are equal, round, and reactive to light  Cardiovascular:      Rate and Rhythm: Normal rate and regular rhythm  Heart sounds: Normal heart sounds  Pulmonary:      Effort: Pulmonary effort is normal       Breath sounds: Normal breath sounds  Abdominal:      General:  Obese, +bowel sounds, nontender, cannot palpate liver or spleen, no guarding  Musculoskeletal:         General: Normal range of motion  Cervical back: Normal range of motion and neck supple  Skin:     General:  Slightly pale, warm, moist, no petechiae or ecchymoses, well-healed midline abdominal suture, right anterior chest wall port area clean and dry  Neurological:      Mental Status: She is alert and oriented to person, place, and time  Deep Tendon Reflexes: Reflexes are normal and symmetric  Psychiatric:         Behavior: Behavior normal          Thought Content:  Thought content normal          Judgment: Judgment normal     Extremities:  No lower extremity edema, +obesity, no cords, pulses are 1+  Lymphatics:  No adenopathy in neck, supraclavicular region, axilla bilaterally    Laboratory    08/08/2022 WBC = 4 57 hemoglobin = 10 0 hematocrit = 31 3 MCV = 87 platelet = 768 neutrophil = 50% BUN = 21 creatinine = 1 35 calcium = 9 1 LFTs WNL total bilirubin = 1 31        Procedures    10/06/2021 colonoscopy    FINDINGS:  · Healthy end-to-end colorectal anastomosis with no bleeding in the mid rectum  · All observed locations appeared normal, including the cecum, ascending colon, transverse colon, splenic flexure and descending colon  A couple scattered diverticuli seen    Imaging    07/25/2022 vascular lower limb venous duplex study    RIGHT LOWER LIMB:  Acute mostly deep vein thrombosis in the paired posterior tibial veins  throughout the calf  No evidence of superficial thrombophlebitis noted  Popliteal, posterior tibial and anterior tibial arterial Doppler waveforms are  triphasic  LEFT LOWER LIMB:  No evidence of acute or chronic deep vein thrombosis  No evidence of superficial thrombophlebitis noted  Doppler evaluation shows a normal response to augmentation maneuvers  Popliteal, posterior tibial and anterior tibial arterial Doppler waveforms are  triphasic     03/22/2022 CT scan chest abdomen pelvis with contrast    ABDOMINOPELVIC CAVITY: Left upper quadrant biopsy-proven omental metastasis has decreased in size now measuring 4 1 x 2 5 x 2 7 cm (previously 5 3 x 4 5 x 4 3 cm), and now only focally contacts rather than grossly invades the spleen and intercostal soft   tissues, on series 2/54, 601/71  Adjacent omental nodularity is less conspicuous  No new evidence of metastatic disease in the abdomen or pelvis  No lymphadenopathy  No ascites or intraperitoneal free air  IMPRESSION:     1   Decreased size of biopsy-proven omental metastasis, which now only focally contacting rather than grossly invading the spleen and adjacent abdominal wall  No new evidence of metastatic disease in the abdomen or pelvis  2   No new or suspicious pulmonary nodules  One of the previously noted new 1-2 mm nodules is no longer seen, and the other is unchanged   Recommend continued attention on follow-up  3   Top-normal thickness endometrial stripe measuring 7 mm  If there is history of vaginal bleeding, suggest catheterization with endometrial biopsy  4   Hepatic steatosis  11/11/2021 ultrasound pelvis transabdominal only    Cluster of anechoic cystic areas seen replacing the left ovary similar to MRI largest measuring 8 mm compared to 1 3 cm  Based on the ACR O-RADS system, this is O-RADS category 2 (almost certain benign with <5% risk of malignancy ) The management recommendation is followup in 1 year  10/25/2021 MRI pelvis rectal cancer staging    1  No recurrent or metastatic disease in the pelvis  Please refer to the separately dictated MRI abdomen report regarding mesenteric mass in the left upper quadrant      2   Multi septated cystic lesion versus cluster of small cysts in the left ovary measuring 2 6 x 2 5 x 1 9 cm, increased in size from October 2019  Further characterization with pelvic ultrasound is recommended      10/25/2021 MRI abdomen    1   5 2 cm mesenteric mass in the left upper quadrant with thickening of peritoneal reflection and invasion of the spleen, similar to prior PET/CT  This is highly suspicious for metastatic tumor implant  2   No additional potential sites of metastasis in the abdomen  3   Moderately distended gallbladder with gallstones and probable adenomyomatosis  09/28/2021 PET-CT    1  There is a large left paracolic gutter markedly hypermetabolic mass immediately inferior to the spleen, most consistent with metastatic colorectal carcinoma  2  Mildly increased activity at the right abdominal bowel anastomotic site  If not recently performed, direct visualization is recommended  3  There are no other glucose avid abnormalities identified within the abdomen or pelvis  4   No evidence of distant glucose avid metastatic disease in the neck, chest, or skeleton     Pathology    Case Report   Surgical Pathology Report                         Case: [id number]                                    Authorizing Provider: Nesha Rodriguez   Collected:           04/29/2022 1118                                      MD                                                                            Ordering Location:     [address]      Received:            04/29/2022 2236                                      Hospital Operating Room                                                       Pathologist:           Sherif Sanchez DO                                                      Specimens:   A) - Uterus w/Bilateral Ovaries and Fallopian Tubes                                                  B) - Spleen, spleen, omentum, darotis                                                      Final Diagnosis   A  Uterus, Bilateral Ovaries and Fallopian Tubes; Hysterosalpingo-oophorectomy:  - Leiomyoma  - Left ovary with serous cystadenoma  - Unremarkable cervix  - Benign inactive endometrium with no specific pathologic change  - Unremarkable right ovary and bilateral fallopian tubes      B  Spleen, spleen, omentum, darotis:  - Metastatic adenocarcinoma involving spleen and omentum, consistent with colonic primary  See Note  Electronically signed by Sherif Sanchez DO on 5/12/2022 at  2:49 PM     Note    Note B: Comparison to prior material ([id number], omental mass) reveals identical morphology to previous metastatic colonic adenocarcinoma           Case Report   Surgical Pathology Report                         Case: [id number]                                    Authorizing Provider: Wilmar Sepulveda MD           Collected:           11/12/2021 0921               Ordering Location:     [address] [place] [address] Received:            11/12/2021 0941                                      CAT Scan                                                                      Pathologist:           [id number], MD                                                         Specimen:    Omentum, Omental mass                                                                      Final Diagnosis   A  Omentum, Omental mass:  - Metastatic adenocarcinoma, with an immunophenotype compatible with metastasis from patient's known colonic primary   - See note      Note: Tumor is positive with CK20, CDX2 and negative with CK7, PAX8  This immunophenotype supports the above interpretation  Best representative block with tumor A5      This case was reviewed at the intradepartmental  conference     Dr Jose Arenas is notified of the diagnosis in [facility] via [address] on 11/17/2021  at 8 45 am    Electronically signed by Lawanda De La Fuente MD on 11/17/2021 at  8:53 AM         Case Report   Surgical Pathology Report                         Case: [id number]                                    Authorizing Provider: Frantz hCris MD       Collected:           12/10/2019 1159               Ordering Location:     [address]      Received:            12/10/2019 [address] [place] [address] Operating Room                                                       Pathologist:           Dorice Goldmann, MD                                                                  Specimens:   A) - Large Intestine, Sigmoid Colon, and portion of rectum                                           B) - Anastomatic site, anastamotic rings                                                   Addendum   RRESULTS OF IMMUNOHISTOCHEMICAL ANALYSIS FOR MISMATCH REPAIR PROTEIN LOSS  INTERPRETATION: NO LOSS OF NUCLEAR EXPRESSION OF MMR PROTEINS: LOW PROBABILITY OF MSI-H  Note: Background non-neoplastic tissue and/or internal control with intact nuclear expression       RESULTS:  Antibody          Clone               Description                           Results  MLH1               M1                  Mismatch repair protein       Intact nuclear expression  [facility]              [id number]       Mismatch repair protein       Intact nuclear expression  URN4              97                   Mismatch repair protein       Intact nuclear expression  AJD0              [id number]         Mismatch repair protein       Intact nuclear expression     Comment:  A negative control and a positive control for each antibody have been reviewed and accepted  [facility] Specimen ID: [id number], Evaluator: Malcom Rodriguez MD  These tests were developed and their performance characteristics determined by [facility]  [facility] may not be cleared or approved by the [place]  Food and Drug Administration   The FDA determined that such clearance or approval is not necessary   These tests are used for clinical purposes  [facility] should not be regarded as investigational or for research   This laboratory has been approved by [place] [address], designated as a high-complexity laboratory and is qualified to perform these tests      Comments: Patients whose tumors demonstrate lack of expression of one or more DNA mismatch repair proteins might be at risk for Bee Syndrome  This cancer susceptibility syndrome greatly increases the risk of synchronous and/or metachronous cancers in the affected patients and their family members  Normal expression of all proteins does not completely rule out familial cancer predisposition      The [facility] Bee Syndrome Surveillance Program Task Forces recommends that all patients with lack of expression of one or more DNA mismatch repair proteins and those with concerning personal or family history should undergo thorough evaluation, counseling and possibly genetic testing        Addendum electronically signed by Chuck Saravia MD on 12/20/2019 at  3:28 PM   Final Diagnosis   A  Rectosigmoid colon, low anterior resection:  - Adenocarcinoma, moderately differentiated  - Tumor invades through the muscularis propria into pericolorectal tissue, T3  - Diverticula    - All margins are negative for tumor   - Thirty-four lymph nodes, negative for malignancy (0/34)      B  Colon, anastomotic rings, resection:  - Two portions of colon with no pathologic abnormality     Intradepartmental consultation is in agreement  Interpretation performed at [facility], [address] [facility], [address] [facility]  Immunohistochemistry for desmin  is performed on tissue blocks A13 and A16 to help in the assessment of this case          Electronically signed by Diego Galeazzi, MD on 12/18/2019 at 10:42 AM   Additional Information    All controls performed with the immunohistochemical stains reported above reacted appropriately  These tests were developed and their performance characteristics determined by [address] [place] [address] or [facility]  They may not be cleared or approved by the [place]  Food and Drug Administration  The FDA has determined that such clearance or approval is not necessary  These tests are used for clinical purposes  They should not be regarded as investigational or for research  This laboratory has been approved by CLIA 88, designated as a high-complexity laboratory and is qualified to perform these tests     Synoptic Checklist   COLON AND RECTUM: Resection, Including Transanal Disk Excision of Rectal Neoplasms  8th Edition - Protocol posted: 2/27/2019  ColoRectal - All Specimens  SPECIMEN   Procedure  Low anterior resection    Macroscopic Intactness of Mesorectum  Complete    TUMOR   Tumor Site  Rectosigmoid    Histologic Type  Adenocarcinoma    Histologic Grade  G2: Moderately differentiated    Tumor Size  Greatest dimension (Centimeters): 5 4 cm   Additional Dimension (Centimeters)  4 6 cm     1 cm   Tumor Deposits  Not identified    Tumor Extension  Tumor invades through the muscularis propria into pericolorectal tissue    Macroscopic Tumor Perforation  Not identified    Lymphovascular Invasion  Not identified    Perineural Invasion  Not identified    Type of Polyp in Which Invasive Carcinoma Arose  Tubular adenoma    Treatment Effect  No known presurgical therapy    MARGINS   Margins  All margins are uninvolved by invasive carcinoma, high-grade dysplasia, intramucosal adenocarcinoma, and adenoma    Margins Examined  Proximal      Distal      Radial or Mesenteric    Distance of Invasive Carcinoma from Closest Margin  1 5 cm   Closest Margin  Radial or Mesenteric    Distance of Tumor from Radial Margin  1 5 cm   LYMPH NODES   Number of Lymph Nodes Involved  0    Number of Lymph Nodes Examined  34    PATHOLOGIC STAGE CLASSIFICATION (pTNM, AJCC 8th Edition)   Primary Tumor (pT)  pT3    Regional Lymph Nodes (pN)  pN0    ADDITIONAL FINDINGS   Additional Pathologic Findings  Diverticulosis

## 2022-08-22 ENCOUNTER — APPOINTMENT (OUTPATIENT)
Dept: LAB | Facility: HOSPITAL | Age: 67
End: 2022-08-22
Payer: MEDICARE

## 2022-08-22 ENCOUNTER — OFFICE VISIT (OUTPATIENT)
Dept: HEMATOLOGY ONCOLOGY | Facility: MEDICAL CENTER | Age: 67
End: 2022-08-22
Payer: MEDICARE

## 2022-08-22 ENCOUNTER — TELEPHONE (OUTPATIENT)
Dept: HEMATOLOGY ONCOLOGY | Facility: MEDICAL CENTER | Age: 67
End: 2022-08-22

## 2022-08-22 VITALS
WEIGHT: 181.2 LBS | HEART RATE: 85 BPM | RESPIRATION RATE: 20 BRPM | SYSTOLIC BLOOD PRESSURE: 140 MMHG | TEMPERATURE: 97.8 F | BODY MASS INDEX: 39.09 KG/M2 | DIASTOLIC BLOOD PRESSURE: 76 MMHG | HEIGHT: 57 IN | OXYGEN SATURATION: 100 %

## 2022-08-22 DIAGNOSIS — C19 RECTOSIGMOID CANCER (HCC): ICD-10-CM

## 2022-08-22 DIAGNOSIS — E66.01 MORBID OBESITY (HCC): ICD-10-CM

## 2022-08-22 DIAGNOSIS — C78.6 OMENTAL METASTASIS (HCC): Primary | ICD-10-CM

## 2022-08-22 DIAGNOSIS — C18.9 MALIGNANT NEOPLASM OF COLON, UNSPECIFIED PART OF COLON (HCC): ICD-10-CM

## 2022-08-22 DIAGNOSIS — C19 RECTOSIGMOID CANCER (HCC): Primary | ICD-10-CM

## 2022-08-22 LAB
ALBUMIN SERPL BCP-MCNC: 3 G/DL (ref 3.5–5)
ALP SERPL-CCNC: 52 U/L (ref 46–116)
ALT SERPL W P-5'-P-CCNC: 25 U/L (ref 12–78)
ANION GAP SERPL CALCULATED.3IONS-SCNC: 9 MMOL/L (ref 4–13)
AST SERPL W P-5'-P-CCNC: 22 U/L (ref 5–45)
BASOPHILS # BLD AUTO: 0.01 THOUSANDS/ΜL (ref 0–0.1)
BASOPHILS NFR BLD AUTO: 0 % (ref 0–1)
BILIRUB SERPL-MCNC: 1.12 MG/DL (ref 0.2–1)
BUN SERPL-MCNC: 15 MG/DL (ref 5–25)
CALCIUM ALBUM COR SERPL-MCNC: 9.8 MG/DL (ref 8.3–10.1)
CALCIUM SERPL-MCNC: 9 MG/DL (ref 8.3–10.1)
CHLORIDE SERPL-SCNC: 108 MMOL/L (ref 96–108)
CO2 SERPL-SCNC: 26 MMOL/L (ref 21–32)
CREAT SERPL-MCNC: 1.02 MG/DL (ref 0.6–1.3)
EOSINOPHIL # BLD AUTO: 0.07 THOUSAND/ΜL (ref 0–0.61)
EOSINOPHIL NFR BLD AUTO: 2 % (ref 0–6)
ERYTHROCYTE [DISTWIDTH] IN BLOOD BY AUTOMATED COUNT: 26.7 % (ref 11.6–15.1)
GFR SERPL CREATININE-BSD FRML MDRD: 56 ML/MIN/1.73SQ M
GLUCOSE SERPL-MCNC: 97 MG/DL (ref 65–140)
HCT VFR BLD AUTO: 31.6 % (ref 34.8–46.1)
HGB BLD-MCNC: 9.9 G/DL (ref 11.5–15.4)
IMM GRANULOCYTES # BLD AUTO: 0.02 THOUSAND/UL (ref 0–0.2)
IMM GRANULOCYTES NFR BLD AUTO: 0 % (ref 0–2)
LYMPHOCYTES # BLD AUTO: 1.9 THOUSANDS/ΜL (ref 0.6–4.47)
LYMPHOCYTES NFR BLD AUTO: 42 % (ref 14–44)
MCH RBC QN AUTO: 28.2 PG (ref 26.8–34.3)
MCHC RBC AUTO-ENTMCNC: 31.3 G/DL (ref 31.4–37.4)
MCV RBC AUTO: 90 FL (ref 82–98)
MONOCYTES # BLD AUTO: 0.36 THOUSAND/ΜL (ref 0.17–1.22)
MONOCYTES NFR BLD AUTO: 8 % (ref 4–12)
NEUTROPHILS # BLD AUTO: 2.2 THOUSANDS/ΜL (ref 1.85–7.62)
NEUTS SEG NFR BLD AUTO: 48 % (ref 43–75)
NRBC BLD AUTO-RTO: 0 /100 WBCS
PLATELET # BLD AUTO: 321 THOUSANDS/UL (ref 149–390)
PMV BLD AUTO: 11.4 FL (ref 8.9–12.7)
POTASSIUM SERPL-SCNC: 4.2 MMOL/L (ref 3.5–5.3)
PROT SERPL-MCNC: 6.7 G/DL (ref 6.4–8.4)
RBC # BLD AUTO: 3.51 MILLION/UL (ref 3.81–5.12)
SODIUM SERPL-SCNC: 143 MMOL/L (ref 135–147)
WBC # BLD AUTO: 4.56 THOUSAND/UL (ref 4.31–10.16)

## 2022-08-22 PROCEDURE — 99214 OFFICE O/P EST MOD 30 MIN: CPT | Performed by: INTERNAL MEDICINE

## 2022-08-22 PROCEDURE — 80053 COMPREHEN METABOLIC PANEL: CPT

## 2022-08-22 PROCEDURE — 36415 COLL VENOUS BLD VENIPUNCTURE: CPT

## 2022-08-22 PROCEDURE — 85025 COMPLETE CBC W/AUTO DIFF WBC: CPT

## 2022-08-23 ENCOUNTER — HOSPITAL ENCOUNTER (OUTPATIENT)
Dept: INFUSION CENTER | Facility: HOSPITAL | Age: 67
Discharge: HOME | End: 2022-08-23
Attending: INTERNAL MEDICINE
Payer: MEDICARE

## 2022-08-23 VITALS
WEIGHT: 181.66 LBS | OXYGEN SATURATION: 98 % | HEART RATE: 94 BPM | HEIGHT: 57 IN | SYSTOLIC BLOOD PRESSURE: 116 MMHG | DIASTOLIC BLOOD PRESSURE: 66 MMHG | BODY MASS INDEX: 39.19 KG/M2 | TEMPERATURE: 97.8 F | RESPIRATION RATE: 18 BRPM

## 2022-08-23 DIAGNOSIS — C19 RECTOSIGMOID CANCER (HCC): Primary | ICD-10-CM

## 2022-08-23 DIAGNOSIS — C78.6 OMENTAL METASTASIS (HCC): ICD-10-CM

## 2022-08-23 PROCEDURE — 96411 CHEMO IV PUSH ADDL DRUG: CPT

## 2022-08-23 PROCEDURE — 96413 CHEMO IV INFUSION 1 HR: CPT

## 2022-08-23 PROCEDURE — G0498 CHEMO EXTEND IV INFUS W/PUMP: HCPCS

## 2022-08-23 PROCEDURE — 96367 TX/PROPH/DG ADDL SEQ IV INF: CPT

## 2022-08-23 PROCEDURE — 96368 THER/DIAG CONCURRENT INF: CPT

## 2022-08-23 PROCEDURE — 96375 TX/PRO/DX INJ NEW DRUG ADDON: CPT

## 2022-08-23 PROCEDURE — 96415 CHEMO IV INFUSION ADDL HR: CPT

## 2022-08-23 RX ORDER — FLUOROURACIL 50 MG/ML
340 INJECTION, SOLUTION INTRAVENOUS ONCE
Status: COMPLETED | OUTPATIENT
Start: 2022-08-23 | End: 2022-08-23

## 2022-08-23 RX ORDER — SODIUM CHLORIDE 9 MG/ML
20 INJECTION, SOLUTION INTRAVENOUS ONCE AS NEEDED
Status: DISCONTINUED | OUTPATIENT
Start: 2022-08-23 | End: 2022-08-26 | Stop reason: HOSPADM

## 2022-08-23 RX ORDER — ATROPINE SULFATE 1 MG/ML
0.25 INJECTION, SOLUTION INTRAVENOUS ONCE
Status: COMPLETED | OUTPATIENT
Start: 2022-08-23 | End: 2022-08-23

## 2022-08-23 RX ADMIN — IRINOTECAN HYDROCHLORIDE 157 MG: 20 INJECTION, SOLUTION INTRAVENOUS at 11:01

## 2022-08-23 RX ADMIN — SODIUM CHLORIDE 20 ML/HR: 0.9 INJECTION, SOLUTION INTRAVENOUS at 10:26

## 2022-08-23 RX ADMIN — FLUOROURACIL 590 MG: 50 INJECTION, SOLUTION INTRAVENOUS at 12:44

## 2022-08-23 RX ADMIN — DEXAMETHASONE SODIUM PHOSPHATE: 10 INJECTION, SOLUTION INTRAMUSCULAR; INTRAVENOUS at 10:26

## 2022-08-23 RX ADMIN — ATROPINE SULFATE 0.25 MG: 1 INJECTION, SOLUTION INTRAMUSCULAR; INTRAVENOUS; SUBCUTANEOUS at 10:56

## 2022-08-23 RX ADMIN — LEUCOVORIN CALCIUM 592 MG: 200 INJECTION, POWDER, LYOPHILIZED, FOR SOLUTION INTRAMUSCULAR; INTRAVENOUS at 11:01

## 2022-08-25 ENCOUNTER — HOSPITAL ENCOUNTER (OUTPATIENT)
Dept: INFUSION CENTER | Facility: HOSPITAL | Age: 67
Discharge: HOME | End: 2022-08-25
Attending: INTERNAL MEDICINE

## 2022-08-25 DIAGNOSIS — C78.6 OMENTAL METASTASIS (HCC): ICD-10-CM

## 2022-08-25 DIAGNOSIS — C19 RECTOSIGMOID CANCER (HCC): Primary | ICD-10-CM

## 2022-08-30 ENCOUNTER — TELEPHONE (OUTPATIENT)
Dept: HEMATOLOGY ONCOLOGY | Facility: CLINIC | Age: 67
End: 2022-08-30

## 2022-09-19 ENCOUNTER — HOSPITAL ENCOUNTER (OUTPATIENT)
Dept: RADIOLOGY | Facility: HOSPITAL | Age: 67
Discharge: HOME | End: 2022-09-19
Payer: MEDICARE

## 2022-09-19 DIAGNOSIS — C78.6 OMENTAL METASTASIS (HCC): ICD-10-CM

## 2022-09-19 PROCEDURE — G1004 CDSM NDSC: HCPCS

## 2022-09-19 PROCEDURE — 71260 CT THORAX DX C+: CPT

## 2022-09-19 PROCEDURE — 74177 CT ABD & PELVIS W/CONTRAST: CPT

## 2022-09-19 RX ADMIN — IOHEXOL 100 ML: 350 INJECTION, SOLUTION INTRAVENOUS at 13:19

## 2022-09-27 ENCOUNTER — OFFICE VISIT (OUTPATIENT)
Dept: SURGICAL ONCOLOGY | Facility: CLINIC | Age: 67
End: 2022-09-27
Payer: MEDICARE

## 2022-09-27 VITALS
TEMPERATURE: 97.7 F | WEIGHT: 181 LBS | HEIGHT: 57 IN | BODY MASS INDEX: 39.05 KG/M2 | DIASTOLIC BLOOD PRESSURE: 78 MMHG | SYSTOLIC BLOOD PRESSURE: 140 MMHG | RESPIRATION RATE: 18 BRPM | HEART RATE: 75 BPM | OXYGEN SATURATION: 99 %

## 2022-09-27 DIAGNOSIS — C19 RECTOSIGMOID CANCER (HCC): Primary | ICD-10-CM

## 2022-09-27 DIAGNOSIS — C78.6 OMENTAL METASTASIS (HCC): ICD-10-CM

## 2022-09-27 PROCEDURE — 99214 OFFICE O/P EST MOD 30 MIN: CPT | Performed by: STUDENT IN AN ORGANIZED HEALTH CARE EDUCATION/TRAINING PROGRAM

## 2022-09-27 NOTE — PROGRESS NOTES
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[address] )   9/23/2019 Initial Diagnosis    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[facility] )     12/10/2019 Surgery    Low anterior resection (Dr Miranda Corona):    A  Rectosigmoid colon, low anterior resection:  - Adenocarcinoma, moderately differentiated  - Tumor invades through the muscularis propria into pericolorectal tissue, T3  - Diverticula  - All margins are negative for tumor   - Thirty-four lymph nodes, negative for malignancy (0/34)  B  Colon, anastomotic rings, resection:  - Two portions of colon with no pathologic abnormality     12/10/2019 Genomic Testing    RRESULTS OF IMMUNOHISTOCHEMICAL ANALYSIS FOR MISMATCH REPAIR PROTEIN LOSS  INTERPRETATION: NO LOSS OF NUCLEAR EXPRESSION OF MMR PROTEINS: LOW PROBABILITY OF MSI-H  Note: Background non-neoplastic tissue and/or internal control with intact nuclear expression        RESULTS:  Antibody          Clone               Description                           Results  MLH1               M1                  Mismatch repair protein       Intact nuclear expression  MSH2              [id number]       Mismatch repair protein       Intact nuclear expression  MSH6              40                   Mismatch repair protein       Intact nuclear expression  PMS2              [id number]         Mismatch repair protein       Intact nuclear expression     11/12/2021 Biopsy    Omental mass:  - Metastatic adenocarcinoma, with an immunophenotype compatible with metastasis from patient's known colonic primary       12/10/2021 - 12/10/2021 Chemotherapy    capecitabine (XELODA), 850 mg/m2 = 1,600 mg (100 % of original dose 850 mg/m2), Oral, 2 times daily with meals, 1 of 8 cycles  Dose modification: 850 mg/m2 (100 % of original dose 850 mg/m2, Cycle 1, Reason: Other (see comments))  fosaprepitant (EMEND) IVPB, 150 mg, Intravenous, Once, 1 of 1 cycle  Administration: 150 mg (12/10/2021)  oxaliplatin (ELOXATIN) chemo infusion, 244 4 mg, Intravenous, Once, 1 of 8 cycles  Administration: 250 mg (12/10/2021)     1/2/2022 Genomic Testing         1/4/2022 -  Chemotherapy    First 6 cycles given prior to surgery- 1/4 through 3/17/2022    7th cycle started on 6/14/2022  8th cycle worsening neuropathy; could not tolerate gabapentin  D/lizette oxaliplatin  9th cycle Irinotecan added at 85% dose reduction: SE Diarrhea      fluorouracil (ADRUCIL), 300 mg/m2 = 560 mg (75 % of original dose 400 mg/m2), Intravenous, Once, 12 of 12 cycles  Dose modification: 300 mg/m2 (75 % of original dose 400 mg/m2, Cycle 1, Reason: Dose Not Tolerated), 340 mg/m2 (85 % of original dose 400 mg/m2, Cycle 4, Reason: Other (see comments)), 340 mg/m2 (85 % of original dose 400 mg/m2, Cycle 5, Reason: Other (see comments))  Administration: 560 mg (1/4/2022), 560 mg (1/18/2022), 560 mg (2/1/2022), 635 mg (2/15/2022), 635 mg (3/1/2022), 635 mg (3/15/2022), 635 mg (6/14/2022), 635 mg (6/28/2022), 635 mg (7/12/2022), 635 mg (7/26/2022), 590 mg (8/9/2022), 590 mg (8/23/2022)  irinotecan (CAMPTOSAR) chemo infusion, 153 mg/m2 = 286 mg (85 % of original dose 180 mg/m2), Intravenous, Once, 4 of 4 cycles  Dose modification: 180 mg/m2 (original dose 180 mg/m2, Cycle 9), 153 mg/m2 (85 % of original dose 180 mg/m2, Cycle 9, Reason: Other (see comments), Comment: anticipated tolerance), 90 mg/m2 (50 % of original dose 180 mg/m2, Cycle 10, Reason: Dose Not Tolerated)  Administration: 286 mg (7/12/2022), 168 mg (7/26/2022), 157 mg (8/9/2022), 157 mg (8/23/2022)  leucovorin calcium IVPB, 300 mg/m2 = 561 mg (75 % of original dose 400 mg/m2), Intravenous, Once, 12 of 12 cycles  Dose modification: 300 mg/m2 (75 % of original dose 400 mg/m2, Cycle 1, Reason: Dose Not Tolerated), 340 mg/m2 (85 % of original dose 400 mg/m2, Cycle 4, Reason: Other (see comments)), 340 mg/m2 (85 % of original dose 400 mg/m2, Cycle 5, Reason: Other (see comments))  Administration: 561 mg (1/4/2022), 561 mg (1/18/2022), 561 mg (2/1/2022), 636 mg (2/15/2022), 636 mg (3/1/2022), 636 mg (3/15/2022), 636 mg (6/14/2022), 636 mg (6/28/2022), 636 mg (7/12/2022), 636 mg (7/26/2022), 592 mg (8/9/2022), 592 mg (8/23/2022)  oxaliplatin (ELOXATIN) chemo infusion, 63 75 mg/m2 = 119 2125 mg (75 % of original dose 85 mg/m2), Intravenous, Once, 8 of 8 cycles  Dose modification: 63 75 mg/m2 (75 % of original dose 85 mg/m2, Cycle 1, Reason: Dose Not Tolerated), 72 25 mg/m2 (85 % of original dose 85 mg/m2, Cycle 4, Reason: Other (see comments)), 72 25 mg/m2 (85 % of original dose 85 mg/m2, Cycle 5, Reason: Other (see comments))  Administration: 119 2125 mg (1/4/2022), 119 2125 mg (1/18/2022), 119 2125 mg (2/1/2022), 135 1075 mg (2/15/2022), 135 1075 mg (3/1/2022), 135 1075 mg (3/15/2022), 135 1075 mg (6/14/2022), 135 1075 mg (6/28/2022)  fluorouracil (ADRUCIL) ambulatory infusion Soln, 900 mg/m2/day = 3,365 mg (75 % of original dose 1,200 mg/m2/day), Intravenous, Over 46 hours, 12 of 12 cycles  Dose modification: 900 mg/m2/day (75 % of original dose 1,200 mg/m2/day, Cycle 2, Reason: Other (see comments)), 1,020 mg/m2/day (85 % of original dose 1,200 mg/m2/day, Cycle 4, Reason: Other (see comments), Comment: anticipated tolerance)     3/22/2022 Observation    CT CAP  1  Decreased size of biopsy-proven omental metastasis, which now only focally contacting rather than grossly invading the spleen and adjacent abdominal wall   No new evidence of metastatic disease in the abdomen or pelvis  2   No new or suspicious pulmonary nodules  One of the previously noted new 1-2 mm nodules is no longer seen, and the other is unchanged  Recommend continued attention on follow-up  3   Top-normal thickness endometrial stripe measuring 7 mm  If there is history of vaginal bleeding, suggest catheterization with endometrial biopsy  4   Hepatic steatosis       4/29/2022 Surgery    DIAGNOSTIC LAPAROSCOPY, TOTAL ABDOMINAL HYSTERECTOMY, BILATERAL SALPINGO-OOPHORECTOMY, EXTENSIVE ADHESIOLYSIS (N/A)  DIAGNOSTIC LAPAROSCOPY, OPEN OMENTECTOMY, POSSIBLE SPLENECTOMY (N/A)  INSTILLATION CHEMOTHERAPY INTRAPERITONEAL (HIPEC) LAPAROTOMY (N/A)  REPAIR DIAPHRAGM TEAR    Escobar Schroeder and Patrick Caulk     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[facility] [address] [address] )   11/29/2021 Initial Diagnosis    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[facility] [address] )     12/10/2021 - 12/10/2021 Chemotherapy    capecitabine (XELODA), 850 mg/m2 = 1,600 mg (100 % of original dose 850 mg/m2), Oral, 2 times daily with meals, 1 of 8 cycles  Dose modification: 850 mg/m2 (100 % of original dose 850 mg/m2, Cycle 1, Reason: Other (see comments))  fosaprepitant (EMEND) IVPB, 150 mg, Intravenous, Once, 1 of 1 cycle  Administration: 150 mg (12/10/2021)  oxaliplatin (ELOXATIN) chemo infusion, 244 4 mg, Intravenous, Once, 1 of 8 cycles  Administration: 250 mg (12/10/2021)     1/4/2022 -  Chemotherapy    First 6 cycles given prior to surgery- 1/4 through 3/17/2022    7th cycle started on 6/14/2022  8th cycle worsening neuropathy; could not tolerate gabapentin  D/lizette oxaliplatin  9th cycle Irinotecan added at 85% dose reduction: SE Diarrhea      fluorouracil (ADRUCIL), 300 mg/m2 = 560 mg (75 % of original dose 400 mg/m2), Intravenous, Once, 12 of 12 cycles  Dose modification: 300 mg/m2 (75 % of original dose 400 mg/m2, Cycle 1, Reason: Dose Not Tolerated), 340 mg/m2 (85 % of original dose 400 mg/m2, Cycle 4, Reason: Other (see comments)), 340 mg/m2 (85 % of original dose 400 mg/m2, Cycle 5, Reason: Other (see comments))  Administration: 560 mg (1/4/2022), 560 mg (1/18/2022), 560 mg (2/1/2022), 635 mg (2/15/2022), 635 mg (3/1/2022), 635 mg (3/15/2022), 635 mg (6/14/2022), 635 mg (6/28/2022), 635 mg (7/12/2022), 635 mg (7/26/2022), 590 mg (8/9/2022), 590 mg (8/23/2022)  irinotecan (CAMPTOSAR) chemo infusion, 153 mg/m2 = 286 mg (85 % of original dose 180 mg/m2), Intravenous, Once, 4 of 4 cycles  Dose modification: 180 mg/m2 (original dose 180 mg/m2, Cycle 9), 153 mg/m2 (85 % of original dose 180 mg/m2, Cycle 9, Reason: Other (see comments), Comment: anticipated tolerance), 90 mg/m2 (50 % of original dose 180 mg/m2, Cycle 10, Reason: Dose Not Tolerated)  Administration: 286 mg (7/12/2022), 168 mg (7/26/2022), 157 mg (8/9/2022), 157 mg (8/23/2022)  leucovorin calcium IVPB, 300 mg/m2 = 561 mg (75 % of original dose 400 mg/m2), Intravenous, Once, 12 of 12 cycles  Dose modification: 300 mg/m2 (75 % of original dose 400 mg/m2, Cycle 1, Reason: Dose Not Tolerated), 340 mg/m2 (85 % of original dose 400 mg/m2, Cycle 4, Reason: Other (see comments)), 340 mg/m2 (85 % of original dose 400 mg/m2, Cycle 5, Reason: Other (see comments))  Administration: 561 mg (1/4/2022), 561 mg (1/18/2022), 561 mg (2/1/2022), 636 mg (2/15/2022), 636 mg (3/1/2022), 636 mg (3/15/2022), 636 mg (6/14/2022), 636 mg (6/28/2022), 636 mg (7/12/2022), 636 mg (7/26/2022), 592 mg (8/9/2022), 592 mg (8/23/2022)  oxaliplatin (ELOXATIN) chemo infusion, 63 75 mg/m2 = 119 2125 mg (75 % of original dose 85 mg/m2), Intravenous, Once, 8 of 8 cycles  Dose modification: 63 75 mg/m2 (75 % of original dose 85 mg/m2, Cycle 1, Reason: Dose Not Tolerated), 72 25 mg/m2 (85 % of original dose 85 mg/m2, Cycle 4, Reason: Other (see comments)), 72 25 mg/m2 (85 % of original dose 85 mg/m2, Cycle 5, Reason: Other (see comments))  Administration: 119 2125 mg (1/4/2022), 119 2125 mg (1/18/2022), 119 2125 mg (2/1/2022), 135 1075 mg (2/15/2022), 135 1075 mg (3/1/2022), 135 1075 mg (3/15/2022), 135 1075 mg (6/14/2022), 135 1075 mg (6/28/2022)  fluorouracil (ADRUCIL) ambulatory infusion Soln, 900 mg/m2/day = 3,365 mg (75 % of original dose 1,200 mg/m2/day), Intravenous, Over 46 hours, 12 of 12 cycles  Dose modification: 900 mg/m2/day (75 % of original dose 1,200 mg/m2/day, Cycle 2, Reason: Other (see comments)), 1,020 mg/m2/day (85 % of original dose 1,200 mg/m2/day, Cycle 4, Reason: Other (see comments), Comment: anticipated tolerance)     4/29/2022 Surgery    Final Diagnosis   A  Uterus, Bilateral Ovaries and Fallopian Tubes; Hysterosalpingo-oophorectomy:  - Leiomyoma  - Left ovary with serous cystadenoma  - Unremarkable cervix  - Benign inactive endometrium with no specific pathologic change  - Unremarkable right ovary and bilateral fallopian tubes  B  Spleen, spleen, omentum, darotis:  - Metastatic adenocarcinoma involving spleen and omentum, consistent with colonic primary  See Note  History of Present Illness  :   Miss Marlene Chase is seen here today for a new diagnosis of a likely metastatic colorectal cancer  Briefly, the patient underwent screening colonoscopy in late 2019  This detected a rectosigmoid mass, which was then resected in December of 2019  Surgery required a diverting loop ileostomy, which was subsequently reversed in February of 2020  Of note, preop MRI suggested a T3bN1 lesion above the peritoneal reflection, and upfront surgery was recommended  This revealed a final path T3 N0 cancer with no aggressive features and no adjuvant therapy was recommended by her surgeon Dr Radha Geller  Since that time, the patient has been doing well  She had a CT 2/2021 which demonstrated ISIAH  She recently underwent a PET scan due to a rising CEA (4 4 from 2 2 posotp), and this demonstrated a large 4 5 cm left pericolic gutter markedly hypermetabolic mass  There was mild increased activity at the colon anastomosis    Subsequent colonoscopy revealed no abnormality at this location  She denies any systemic symptoms including weight loss, nausea, vomiting, changes in her bowel habits  I described that this juncture, a PET scan demonstrates a highly metabolic mass, and more detailed imaging is necessary  I described that an MRI will give us a detailed image of her abdomen and will allow was to ascertain whether there is a high suspicion for other peritoneal implants  This will change my surgical recommendations of resection of the single metastasis versus a potential cytoreductive surgery with HIPEC  Likewise, depending on the burden of disease, I may recommend that she proceed chemotherapy before surgery  I recommend an IR biopsy in order to determine her current markers to more properly select chemotherapy regimen  I will refer her to Dr Carolyn Lorenzo with medical oncology for assessment  Finally, given her family history of colorectal cancer, I willr efer her to onc genetics for assessment  She will f/u following these studies  Interval History 11/30/21  Patient returns today after the above studies  MRI of the abdomen revealed a single metastatic lesion invading the spleen  No evidence of other peritoneal implants  MRI of the pelvis revealed a concerning appearance of the ovary  This was followed by a transvaginal ultrasound, which ultimately determined that the ovary appeared benign  She then underwent interventional radiology biopsy, which confirmed a metastatic lesion from a colorectal primary  The patient has been doing very well with no new developments  We discussed that the treatment for this will require chemotherapy as well as surgical resection  I think that a robotic approach would be beneficial given the patient's overall body habitus  The patient would strongly prefer not to be in the hospital postoperatively around the holidays, and I have discussed this with Dr Charles Saenz, who agrees that initiating chemotherapy now is reasonable  I will therefore see the patient in 2-3 months with a repeat CT scan of the chest abdomen and pelvis  Interval History 4/6/22  The patient returns in follow-up today  She has been undergoing FOLFOX with decent toleratation of treatment  She does describe some cold hypersensitivity  Today she is doing well with no abdominal pain, nausea vomiting, constipation or diarrhea  Follow-up scan showed overall decrease in the size of a single omental implant with less involvement of the spleen  No evidence of other disease in the chest abdomen or pelvis with the exception of the multicystic disease noted in the pelvis  5/19/22  Lennie Jacobs is seen postop after open omentectomy, splenectomy, resection of involved diaphragm with primary repair, hysterectomy and oophorectomy with HIPEC  The postoperative course was relatively uneventful  She did have a chest tube in for some time after her diaphragm repair but has had no sequela of shortness of breath, fever, chills, fluid collection  She does complain of occasional left upper quadrant pain  No fevers, chills, concerns about her incision  She is getting worked around well, eating well, having regular bowel movements  Again, no fever, chills, other systemic symptoms  Path was reviewed with a single large omental implant and no other evidence of disease  9/2022  Doing very well  Completed chenmo in Auguts  No abd pain, n/v, c/d  Has lost weight on purpose and trying to maintain  CT with likely inflammatory lesion at LFT RP  Review of Systems  Complete ROS Surg Onc:   Constitutional: The patient denies new or recent history of general fatigue, no recent weight loss, IMPROVED appetite  Eyes: No complaints of visual problems, no scleral icterus  ENT: No complaints of ear pain, no hoarseness, no difficulty swallowing,  no tinnitus and no new masses in head, oral cavity, or neck  Cardiovascular: No complaints of chest pain, no palpitations, no ankle edema  Respiratory: No complaints of shortness of breath, no cough  Gastrointestinal: No complaints of jaundice, no bloody stools, no pale stools  Genitourinary: No complaints of dysuria, no hematuria, no nocturia, no frequent urination, no urethral discharge  Musculoskeletal: No complaints of weakness, paralysis, joint stiffness or arthralgias  Integumentary: No complaints of rash, no new lesions  Neurological: No complaints of convulsions, no seizures, no dizziness  Hematologic/Lymphatic: No complaints of easy bruising  Endocrine:  ENDORSES cold intolerance  No polydipsia, polyphagia, or polyuria  Allergy/immunology:  No environmental allergies  No food allergies  Not immunocompromised        Patient Active Problem List   Diagnosis    Callus of foot    Foot pain    GERD (gastroesophageal reflux disease)    Hyperlipidemia    Prediabetes    Varicose veins with pain    Morbid obesity ([facility]    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HCC)    Dyspnea on exertion    Dyslipidemia    Sigmoid diverticulosis    PONV (postoperative nausea and vomiting)    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HCC)    Lesion of ovary    Chemotherapy adverse reaction    Dehydration    Chemotherapy-induced nausea    Platelets decreased (HCC)    Stage 3 chronic kidney disease, unspecified whether stage 3a or 3b CKD ([place] [address] )    History of splenectomy    Asplenia    Postoperative state     Past Medical History:   Diagnosis Date    Blood in stool     Breast disorder     Cancer ([facility] )     rectal    Cancer determined by colorectal biopsy ([place] [address] )     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    Colon polyp     GERD (gastroesophageal reflux disease)     History of rectal surgery     Hyperlipidemia     PONV (postoperative nausea and vomiting)      Past Surgical History:   Procedure Laterality Date    BREAST LUMPECTOMY Right      SECTION      x3    COLON SIGMOID RESECTION      COLONOSCOPY      DILATION AND CURETTAGE OF UTERUS      ILEO LOOP DIVERSION N/A 12/10/2019    Procedure: ILEOSTOMY LOOP;  Surgeon: La Beltrán MD;  [facility];  Service: Robotics    INSTILLATION CHEMOTHERAPY INTRAPERITONEAL (HIPEC) LAPAROTOMY N/A 4/29/2022    Procedure: INSTILLATION CHEMOTHERAPY INTRAPERITONEAL (HIPEC) LAPAROTOMY;  Surgeon: Nacho Castillo MD;  [facility];  Service: Surgical Oncology    IR BIOPSY OMENTUM  11/12/2021    IR PORT PLACEMENT  12/28/2021    OMENTECTOMY N/A 4/29/2022    Procedure: DIAGNOSTIC LAPAROSCOPY, OPEN OMENTECTOMY, SPLENECTOMY, DIAPHRAGM REPAIR, CHEST TUBE INSERTION;  Surgeon: Nacho Castillo MD;  [facility];  Service: Surgical Oncology    TN CLOSE ENTEROSTOMY N/A 2/4/2020    Procedure: CLOSURE ILEOSTOMY;  Surgeon: La Beltrán MD;  [facility];  Service: Colorectal    TN DIAPHRAGM SURG [address] UNLISTED  4/29/2022    Procedure: REPAIR DIAPHRAGM TEAR;  Surgeon: Naga Blake MD;  [facility];  Service: Thoracic    TN LAP,SURG,COLECTOMY, PARTIAL, W/ANAST N/A 12/10/2019    Procedure: SIGMOID RESECTION COLON LAPAROSCOPIC W ROBOTICS converted to lap hand assisted  with Partial proctectomy , LASER FLUORESCENCE ANGIOGRAPHY, INTRA OP COLONOSCOPY;  Surgeon: La Beltrán MD;  [facility];  Service: Robotics    TN TOTAL ABDOM HYSTERECTOMY N/A 4/29/2022    Procedure: DIAGNOSTIC LAPAROSCOPY, TOTAL ABDOMINAL HYSTERECTOMY, BILATERAL SALPINGO-OOPHORECTOMY, EXTENSIVE ADHESIOLYSIS;  Surgeon: Srinivas Cheek MD;  [facility];  Service: Gynecology Oncology    SMALL INTESTINE SURGERY  2/4/2020    Procedure: RESECTION SMALL BOWEL;  Surgeon: La Beltrán MD;  [facility];  Service: Colorectal     Family History   Problem Relation Age of Onset    Hypertension Mother     Heart attack Mother     Heart attack Father     Heart disease Father     Hypertension Brother     Heart attack Brother     Leukemia Cousin     Breast cancer Cousin     Diabetes Cousin     Breast cancer Family         maternal side    Colon cancer Family         paternal uncle    Colon cancer Paternal Uncle     Stroke Neg Hx      Social History     Socioeconomic History    Marital status:      Spouse name: Not on file    Number of children: Not on file    Years of education: Not on file    Highest education level: Not on file   Occupational History    Not on file   Tobacco Use    Smoking status: Never Smoker    Smokeless tobacco: Never Used   Vaping Use    Vaping Use: Never used   Substance and Sexual Activity    Alcohol use: Yes     Comment: "occasionally"    Drug use: Never    Sexual activity: Not on file   Other Topics Concern    Not on file   Social History Narrative    Not on file     Social Determinants of Health     Financial Resource Strain: Not on file   Food Insecurity: No Food Insecurity    Worried About [address] [employer] in the Last Year: Never true    [address] in the Last Year: Never true   Transportation Needs: No Transportation Needs    Lack of Transportation (Medical): No    Lack of Transportation (Non-Medical):  No   Physical Activity: Not on file   Stress: Not on file   Social Connections: Not on file   Intimate Partner Violence: Not on file   Housing Stability: Unknown    Unable to Pay for Housing in the Last Year: No    Number of Places Lived in the Last Year: Not on file    Unstable Housing in the Last Year: No       Current Outpatient Medications:     apixaban (Eliquis) 5 mg, Take 1 tablet (5 mg total) by mouth 2 (two) times a day, Disp: 60 tablet, Rfl: 3    ezetimibe (ZETIA) 10 mg tablet, Take 1 tablet (10 mg total) by mouth daily (Patient taking differently: Take 10 mg by mouth daily at bedtime), Disp: 90 tablet, Rfl: 1    methylPREDNISolone 4 MG tablet therapy pack, Use as directed on package, Disp: 1 each, Rfl: 0  Allergies   Allergen Reactions    Medical Tape Rash     Skin irritation  Skin peeling  Skin irritation  Skin peeling  Blisters  Rash       There were no vitals filed for this visit  Physical Exam   General: Appears well, appears stated age  Skin: Warm, anicteric  HEENT: Normocephalic, atraumatic; sclera aniceteric, mucous membranes moist; cervical nodes without adenopathy  Cardiopulmonary: RRR, Easy WOB, no BLE edema  Abd: Obese, nontender, no masses appreciated, no hepatosplenomegaly  Incision well-healed  MSK: Symmetric, no cyanosis, no overt weakness  Lymphatic: No cervical, axillary or inguinal lymphadenopathy  Neuro: Affect appropriate, no gross motor abnormalities      Pathology:  Final Diagnosis   A  Rectosigmoid colon, low anterior resection:  - Adenocarcinoma, moderately differentiated  - Tumor invades through the muscularis propria into pericolorectal tissue, T3  - Diverticula  - All margins are negative for tumor   - Thirty-four lymph nodes, negative for malignancy (0/34)      B  Colon, anastomotic rings, resection:  - Two portions of colon with no pathologic abnormality     Intradepartmental consultation is in agreement  Interpretation performed at [facility], [address] [address], [address] [place] [address]  Immunohistochemistry for desmin  is performed on tissue blocks A13 and A16 to help in the assessment of this case  Final Diagnosis   A  Uterus, Bilateral Ovaries and Fallopian Tubes; Hysterosalpingo-oophorectomy:  - Leiomyoma  - Left ovary with serous cystadenoma  - Unremarkable cervix  - Benign inactive endometrium with no specific pathologic change  - Unremarkable right ovary and bilateral fallopian tubes      B  Spleen, spleen, omentum, darotis:  - Metastatic adenocarcinoma involving spleen and omentum, consistent with colonic primary  See Note           Labs: Reviewed in [facility]    Imaging  Colonoscopy    Addendum Date: 10/6/2021 Addendum:   [address] [facility] [address] [phone] DATE OF SERVICE: 10/06/21 PHYSICIAN(S): Latisha Baron MD - Attending Physician INDICATION: Personal history of rectal cancer , had a low anterior resection in December of 2019  T3 N0, 34 lymph nodes were removed and were negative  Patient did not receive any chemotherapy  Colonoscopy performed for a diagnostic indication  POST-OP DIAGNOSIS: See the impression below  HISTORY: Prior colonoscopy: Less than 3 years ago  It is being repeated at an interval of less than 3 years because: This colonoscopy is being performed for a diagnostic indication BOWEL PREPARATION: Miralax/Dulcolax PREPROCEDURE: Informed consent was obtained for the procedure, including sedation  Risks including but not limited to bleeding, infection, perforation, adverse drug reaction and aspiration were explained in detail  Also explained about less than 100% sensitivity with the exam and other alternatives  The patient was placed in the left lateral decubitus position  DETAILS OF PROCEDURE: Patient was taken to the procedure room where a time out was performed to confirm correct patient and correct procedure  The patient underwent monitored anesthesia care, which was administered by an anesthesia professional  The patient's blood pressure, heart rate, level of consciousness, oxygen and respirations were monitored throughout the procedure  A digital rectal exam was performed  The scope was introduced through the anus and advanced to the cecum  Retroflexion was performed in the rectum  The quality of bowel preparation was evaluated using the [facility] Bowel Preparation Scale with scores of: right colon = 2, transverse colon = 2, left colon = 2  The total BBPS score was 6  Bowel prep was adequate  The patient experienced no blood loss  The procedure was not difficult  The patient tolerated the procedure well  There were no apparent complications   ANESTHESIA INFORMATION: ASA: III Anesthesia Type: IV Sedation with Anesthesia MEDICATIONS: No administrations occurring from 1215 to 1230 on 10/06/21 FINDINGS: Healthy end-to-end colorectal anastomosis with no bleeding in the mid rectum All observed locations appeared normal, including the cecum, ascending colon, transverse colon, splenic flexure and descending colon  A couple scattered diverticuli seen EVENTS: Procedure Events Event Event Time ENDO CECUM REACHED 10/6/2021 12:21 PM ENDO SCOPE OUT TIME 10/6/2021 12:28 PM SPECIMENS: * No specimens in log * EQUIPMENT: Colonoscope - IMPRESSION: 1  Normal-appearing colon  Anastomosis site was seen in the rectum appeared healthy  No evidence of local recurrence  2  Couple small scattered diverticuli  RECOMMENDATION: Repeat colonoscopy in 2 years due to a personal history of colon cancer  Advised her urgent evaluation by medical oncologist and Dr Thad Sommer MD     Addendum Date: 10/6/2021 Addendum:   [address] [place] [id number] [phone] DATE OF SERVICE: 10/06/21 PHYSICIAN(S): Meagan Sommer MD - Attending Physician INDICATION: Personal history of rectal cancer , had a low anterior resection in December of 2019  T3 N0, 34 lymph nodes were removed and were negative  Patient did not receive any chemotherapy  Colonoscopy performed for a diagnostic indication  POST-OP DIAGNOSIS: See the impression below  HISTORY: Prior colonoscopy: Less than 3 years ago  It is being repeated at an interval of less than 3 years because: This colonoscopy is being performed for a diagnostic indication BOWEL PREPARATION: Miralax/Dulcolax PREPROCEDURE: Informed consent was obtained for the procedure, including sedation  Risks including but not limited to bleeding, infection, perforation, adverse drug reaction and aspiration were explained in detail  Also explained about less than 100% sensitivity with the exam and other alternatives  The patient was placed in the left lateral decubitus position   DETAILS OF PROCEDURE: Patient was taken to the procedure room where a time out was performed to confirm correct patient and correct procedure  The patient underwent monitored anesthesia care, which was administered by an anesthesia professional  The patient's blood pressure, heart rate, level of consciousness, oxygen and respirations were monitored throughout the procedure  A digital rectal exam was performed  The scope was introduced through the anus and advanced to the cecum  Retroflexion was performed in the rectum  The quality of bowel preparation was evaluated using the [facility] Bowel Preparation Scale with scores of: right colon = 2, transverse colon = 2, left colon = 2  The total BBPS score was 6  Bowel prep was adequate  The patient experienced no blood loss  The procedure was not difficult  The patient tolerated the procedure well  There were no apparent complications  ANESTHESIA INFORMATION: ASA: III Anesthesia Type: IV Sedation with Anesthesia MEDICATIONS: No administrations occurring from 1215 to 1230 on 10/06/21 FINDINGS: Healthy end-to-end colorectal anastomosis with no bleeding in the mid rectum All observed locations appeared normal, including the cecum, ascending colon, transverse colon, splenic flexure and descending colon  A couple scattered diverticuli seen EVENTS: Procedure Events Event Event Time ENDO CECUM REACHED 10/6/2021 12:21 PM ENDO SCOPE OUT TIME 10/6/2021 12:28 PM SPECIMENS: * No specimens in log * EQUIPMENT: Colonoscope - IMPRESSION: 1  Normal-appearing colon  Anastomosis site was seen in the rectum appeared healthy  No evidence of local recurrence  2  Couple small scattered diverticuli  RECOMMENDATION: Repeat colonoscopy in 2 years due to a personal history of colon cancer  Kathie Schmidt MD     Result Date: 10/6/2021  Narrative: [address] [place] [phone] DATE OF SERVICE: 10/06/21 PHYSICIAN(S): Kathie Schmidt MD - Attending Physician INDICATION: Personal history of rectal cancer Colonoscopy performed for a diagnostic indication  POST-OP DIAGNOSIS: See the impression below  HISTORY: Prior colonoscopy: Less than 3 years ago  It is being repeated at an interval of less than 3 years because: This colonoscopy is being performed for a diagnostic indication BOWEL PREPARATION: Miralax/Dulcolax PREPROCEDURE: Informed consent was obtained for the procedure, including sedation  Risks including but not limited to bleeding, infection, perforation, adverse drug reaction and aspiration were explained in detail  Also explained about less than 100% sensitivity with the exam and other alternatives  The patient was placed in the left lateral decubitus position  DETAILS OF PROCEDURE: Patient was taken to the procedure room where a time out was performed to confirm correct patient and correct procedure  The patient underwent monitored anesthesia care, which was administered by an anesthesia professional  The patient's blood pressure, heart rate, level of consciousness, oxygen and respirations were monitored throughout the procedure  A digital rectal exam was performed  The scope was introduced through the anus and advanced to the cecum  Retroflexion was performed in the rectum  The quality of bowel preparation was evaluated using the [place] Bowel Preparation Scale with scores of: right colon = 2, transverse colon = 2, left colon = 2  The total BBPS score was 6  Bowel prep was adequate  The patient experienced no blood loss  The procedure was not difficult  The patient tolerated the procedure well  There were no apparent complications  ANESTHESIA INFORMATION: ASA: III Anesthesia Type: IV Sedation with Anesthesia MEDICATIONS: No administrations occurring from 1215 to 1230 on 10/06/21 FINDINGS: Healthy end-to-end colorectal anastomosis with no bleeding in the mid rectum All observed locations appeared normal, including the cecum, ascending colon, transverse colon, splenic flexure and descending colon   A couple scattered diverticuli seen EVENTS: Procedure Events Event Event Time ENDO CECUM REACHED 10/6/2021 12:21 PM ENDO SCOPE OUT TIME 10/6/2021 12:28 PM SPECIMENS: * No specimens in log * EQUIPMENT: Colonoscope -     Impression: 1  Normal-appearing colon  Anastomosis site was seen in the rectum appeared healthy  No evidence of local recurrence  2  Couple small scattered diverticuli  RECOMMENDATION: Repeat colonoscopy in 2 years due to a personal history of colon cancer  Frida Dominguez MD     DXA bone density spine hip and pelvis    Result Date: 10/14/2021  Narrative: CENTRAL  DXA SCAN CLINICAL HISTORY:  69-year-old postmenopausal female  OTHER RISK FACTORS:  History of fracture resulting from minor injury  PHARMACOLOGIC THERAPY FOR OSTEOPOROSIS:  None  TECHNIQUE: Bone densitometry was performed using a [employer]   bone densitometer  Regions of interest appear properly placed  COMPARISON: 5/6/2019  RESULTS: LUMBAR SPINE L1-L4 : BMD  1 141  gm/cm2 T-score -0 4 LEFT TOTAL HIP: BMD: 0 992  gm/cm2 T-score: -0 1 LEFT FEMORAL NECK: BMD: 0 856  gm/cm2 T score: -1 3 RIGHT TOTAL HIP: BMD:  1 004  gm/cm2 T-score: 0 0 RIGHT FEMORAL NECK: BMD:  0 851  gm/cm2  T score: -1 3     Impression: 1  Low bone mass (osteopenia)  [Based on the left and right femoral necks] 2  Since a DXA study from 5/6/2019, there has been: A  STATISTICALLY SIGNIFICANT DECREASE in bone mineral density of  0 039 g/cm2 (3 3)% in the lumbar spine  A  STATISTICALLY SIGNIFICANT DECREASE in bone mineral density of  0 046 g/cm2 (4 4)% in the hips  3   The 10 year risk of hip fracture is 1 2% with the 10 year risk of major osteoporotic fracture being 12 6% as calculated by the [facility] fracture risk assessment tool (FRAX, which is based on data generated by the [facility] for Metabolic Bone Diseases)   4   The current NOF guidelines recommend treating patients with a T-score of -2 5 or less in the lumbar spine or hips, or in post-menopausal women and men over the age of 48 with low bone mass (osteopenia) and a FRAX 10 year risk score of >3% for hip fracture and/or >20% for major osteoporotic fracture  5   The NOF recommends follow-up DXA in 1-2 years after initiating therapy for osteoporosis and every 2 years thereafter  More frequent evaluation is appropriate for patients with conditions associated with rapid bone loss, such as glucocorticoid therapy  The interval between DXA screenings may be longer for individuals without major risk factors and initial T-score in the normal or upper low bone mass range  The FRAX algorithm has certain limitations: -FRAX has not been validated in patients currently or previously treated with pharmacotherapy for osteoporosis  In such patients, clinical judgment must be exercised in interpreting FRAX scores  -Prior hip, vertebral and humeral fragility fractures appear to confer greater risk of subsequent fracture than fractures at other sites (this is especially true for individuals with severe vertebral fractures), but quantification of this incremental risk is not possible with FRAX  -FRAX underestimates fracture risk in patients with history of multiple fragility fractures  -FRAX may underestimate fracture risk in patients with history of frequent falls  -It is not appropriate to use FRAX to monitor treatment response  WHO CLASSIFICATION: Normal (a T-score of -1 0 or higher) Low bone mineral density (a T-score of less than -1 0 but higher than -2 5) Osteoporosis (a T-score of -2 5 or less) Severe osteoporosis (a T-score of -2 5 or less with a fragility fracture) LEAST SIGNIFICANT CHANGE AT 95% C I: Lumbar spine: 0 036 gm/cm2 (3 2%)  Total hip: 0 018 gm/cm2 (2 0%)  Forearm: 0 024 gm/cm2 (3 2%)  Workstation performed: [id number]     NM PET CT skull base to mid thigh    Result Date: 9/28/2021  Narrative: PET/CT SCAN INDICATION:  C18 7: Malignant neoplasm of sigmoid colon C20:  Malignant neoplasm of rectum   Rectosigmoid carcinoma, restaging/surveillance examination  Borderline elevated CEA (most recently 4 4)  MODIFIER: PS COMPARISON: CT chest abdomen and pelvis 2/24/2021  CELL TYPE:  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 TECHNIQUE:   12 1 mCi F-18-FDG administered IV  Multiplanar attenuation corrected and non attenuation corrected PET images were acquired 60 minutes post injection  Contiguous, low dose, axial CT sections were obtained from the skull base through the femurs   Intravenous contrast material was not utilized  This examination, like all CT scans performed in the [facility], was performed utilizing techniques to minimize radiation dose exposure, including the use of iterative reconstruction and automated exposure control  Fasting serum glucose: 90 mg/dl FINDINGS: VISUALIZED BRAIN:   No acute abnormalities are seen  HEAD/NECK:   There is a physiologic distribution of FDG  No FDG avid cervical adenopathy is seen  CT images: Unremarkable  CHEST:   No FDG avid soft tissue lesions are seen  CT images: Mild coronary artery calcification  No pericardial effusion, no pleural effusion ABDOMEN:   There is a large hypermetabolic left lateral abdominal lobular mass situated immediately inferior to the spleen in the left paracolic gutter region, which measures 4 3 x 3 9 x 4 5 cm in size, SUV max 20 8  No other definite hypermetabolic abnormalities are seen within the upper abdomen  Activity at the bowel anastomotic site in the right abdomen on image 165 demonstrates SUV max of 3 8  This is nonspecific but may simply be inflammatory or physiologic  Direct visualization is recommended if not recently performed  CT images: No ascites  No hydronephrosis or bowel obstruction  PELVIS: Additional rectosigmoid anastomotic site noted image 212  No evidence of abnormal glucose activity  No evidence of glucose avid pelvic adenopathy  No pelvic ascites  OSSEOUS STRUCTURES: No FDG avid lesions are seen   CT images: No significant findings  Impression: 1  There is a large left paracolic gutter markedly hypermetabolic mass immediately inferior to the spleen, most consistent with metastatic colorectal carcinoma  2  Mildly increased activity at the right abdominal bowel anastomotic site  If not recently performed, direct visualization is recommended 3  There are no other glucose avid abnormalities identified within the abdomen or pelvis 4  No evidence of distant glucose avid metastatic disease in the neck, chest, or skeleton The examination demonstrates a significant  finding and was documented as such in [facility] for liaison and referring practitioner notification  Workstation performed: [id number]     Mammo screening bilateral w 3d & cad    Result Date: 10/14/2021  Narrative: DIAGNOSIS: Encounter for screening mammogram for malignant neoplasm of breast TECHNIQUE: Digital screening mammography was performed  Computer Aided Detection (CAD) analyzed all applicable images  COMPARISONS: Prior breast imaging dated: 06/26/2020, 05/06/2019, 10/18/2016, 10/14/2016, and 09/14/2015 RELEVANT HISTORY: Family Breast Cancer History: History of breast cancer in [address] [place] [address], [facility]  Family Medical History: Family medical history includes breast cancer in 2 relatives (cousin, family) and colon cancer in 2 relatives (family, paternal uncle)  Personal History: No known relevant hormone history  Surgical history includes lumpectomy  No known relevant medical history  The patient is scheduled in a reminder system for screening mammography  8-10% of cancers will be missed on mammography  Management of a palpable abnormality must be based on clinical grounds  Patients will be notified of their results via letter from our facility  Accredited by [employer] of Rad[employer] and FDA  RISK ASSESSMENT: 5 Year Tyrer-Cuzick: 2 13 % 10 Year Tyrer-Cuzick: 4 17 % Lifetime Tyrer-Cuzick: 7 93 % TISSUE DENSITY: There are scattered areas of fibroglandular density    INDICATION: Colette Escobar is a 78 y o  female presenting for screening mammography  FINDINGS: Breast parenchymal distribution is unchanged from priors including multiple focal asymmetries in the right breast   Long-term stability confirms benignancy  No developing asymmetries or concerning masses  Single upper-outer-anterior coarse calcification in the left breast is definitively benign  No suspicious microcalcifications, architectural distortion, skin thickening, or abnormalities of the nipples in either breast       Impression: No mammographic evidence of malignancy or significant change from priors  ASSESSMENT/BI-RADS CATEGORY: Left: 2 - Benign Right: 2 - Benign Overall: 2 - Benign RECOMMENDATION:      - Routine screening mammogram in 1 year for both breasts  Workstation ID: [id number]       Discussion/Summary: This is a 27-year-old female with a 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

## 2022-09-30 ENCOUNTER — TELEPHONE (OUTPATIENT)
Dept: HEMATOLOGY ONCOLOGY | Facility: MEDICAL CENTER | Age: 67
End: 2022-09-30

## 2022-10-04 ENCOUNTER — HOSPITAL ENCOUNTER (OUTPATIENT)
Dept: INFUSION CENTER | Facility: HOSPITAL | Age: 67
Discharge: HOME | End: 2022-10-04
Payer: MEDICARE

## 2022-10-04 DIAGNOSIS — C19 RECTOSIGMOID CANCER (HCC): Primary | ICD-10-CM

## 2022-10-04 LAB
ALBUMIN SERPL BCP-MCNC: 2.7 G/DL (ref 3.5–5)
ALP SERPL-CCNC: 47 U/L (ref 46–116)
ALT SERPL W P-5'-P-CCNC: 24 U/L (ref 12–78)
ANION GAP SERPL CALCULATED.3IONS-SCNC: 8 MMOL/L (ref 4–13)
AST SERPL W P-5'-P-CCNC: 19 U/L (ref 5–45)
BASOPHILS # BLD AUTO: 0.04 THOUSANDS/ΜL (ref 0–0.1)
BASOPHILS NFR BLD AUTO: 1 % (ref 0–1)
BILIRUB SERPL-MCNC: 0.92 MG/DL (ref 0.2–1)
BUN SERPL-MCNC: 17 MG/DL (ref 5–25)
CALCIUM ALBUM COR SERPL-MCNC: 9.7 MG/DL (ref 8.3–10.1)
CALCIUM SERPL-MCNC: 8.7 MG/DL (ref 8.3–10.1)
CHLORIDE SERPL-SCNC: 107 MMOL/L (ref 96–108)
CO2 SERPL-SCNC: 26 MMOL/L (ref 21–32)
CREAT SERPL-MCNC: 1.06 MG/DL (ref 0.6–1.3)
EOSINOPHIL # BLD AUTO: 0.17 THOUSAND/ΜL (ref 0–0.61)
EOSINOPHIL NFR BLD AUTO: 2 % (ref 0–6)
ERYTHROCYTE [DISTWIDTH] IN BLOOD BY AUTOMATED COUNT: 20.6 % (ref 11.6–15.1)
GFR SERPL CREATININE-BSD FRML MDRD: 54 ML/MIN/1.73SQ M
GLUCOSE SERPL-MCNC: 92 MG/DL (ref 65–140)
HCT VFR BLD AUTO: 32.4 % (ref 34.8–46.1)
HGB BLD-MCNC: 10.3 G/DL (ref 11.5–15.4)
IMM GRANULOCYTES # BLD AUTO: 0.03 THOUSAND/UL (ref 0–0.2)
IMM GRANULOCYTES NFR BLD AUTO: 0 % (ref 0–2)
LYMPHOCYTES # BLD AUTO: 2.74 THOUSANDS/ΜL (ref 0.6–4.47)
LYMPHOCYTES NFR BLD AUTO: 36 % (ref 14–44)
MCH RBC QN AUTO: 30.7 PG (ref 26.8–34.3)
MCHC RBC AUTO-ENTMCNC: 31.8 G/DL (ref 31.4–37.4)
MCV RBC AUTO: 96 FL (ref 82–98)
MONOCYTES # BLD AUTO: 0.83 THOUSAND/ΜL (ref 0.17–1.22)
MONOCYTES NFR BLD AUTO: 11 % (ref 4–12)
NEUTROPHILS # BLD AUTO: 3.73 THOUSANDS/ΜL (ref 1.85–7.62)
NEUTS SEG NFR BLD AUTO: 50 % (ref 43–75)
NRBC BLD AUTO-RTO: 0 /100 WBCS
PLATELET # BLD AUTO: 318 THOUSANDS/UL (ref 149–390)
PMV BLD AUTO: 11.7 FL (ref 8.9–12.7)
POTASSIUM SERPL-SCNC: 3.8 MMOL/L (ref 3.5–5.3)
PROT SERPL-MCNC: 6.7 G/DL (ref 6.4–8.4)
RBC # BLD AUTO: 3.36 MILLION/UL (ref 3.81–5.12)
SODIUM SERPL-SCNC: 141 MMOL/L (ref 135–147)
WBC # BLD AUTO: 7.54 THOUSAND/UL (ref 4.31–10.16)

## 2022-10-04 PROCEDURE — 80053 COMPREHEN METABOLIC PANEL: CPT | Performed by: INTERNAL MEDICINE

## 2022-10-04 PROCEDURE — 85025 COMPLETE CBC W/AUTO DIFF WBC: CPT | Performed by: INTERNAL MEDICINE

## 2022-10-12 PROBLEM — E86.0 DEHYDRATION: Status: RESOLVED | Noted: 2022-01-27 | Resolved: 2022-10-12

## 2022-10-18 ENCOUNTER — OFFICE VISIT (OUTPATIENT)
Dept: HEMATOLOGY ONCOLOGY | Facility: MEDICAL CENTER | Age: 67
End: 2022-10-18
Payer: MEDICARE

## 2022-10-18 VITALS
RESPIRATION RATE: 18 BRPM | TEMPERATURE: 98 F | DIASTOLIC BLOOD PRESSURE: 78 MMHG | HEART RATE: 80 BPM | HEIGHT: 57 IN | SYSTOLIC BLOOD PRESSURE: 128 MMHG | BODY MASS INDEX: 39.09 KG/M2 | OXYGEN SATURATION: 100 % | WEIGHT: 181.2 LBS

## 2022-10-18 DIAGNOSIS — G62.0 CHEMOTHERAPY-INDUCED NEUROPATHY (HCC): ICD-10-CM

## 2022-10-18 DIAGNOSIS — C78.6 OMENTAL METASTASIS (HCC): Primary | ICD-10-CM

## 2022-10-18 DIAGNOSIS — Z12.31 ENCOUNTER FOR SCREENING MAMMOGRAM FOR MALIGNANT NEOPLASM OF BREAST: ICD-10-CM

## 2022-10-18 DIAGNOSIS — C19 RECTOSIGMOID CANCER (HCC): ICD-10-CM

## 2022-10-18 DIAGNOSIS — T45.1X5A CHEMOTHERAPY-INDUCED NEUROPATHY (HCC): ICD-10-CM

## 2022-10-18 PROCEDURE — 99215 OFFICE O/P EST HI 40 MIN: CPT | Performed by: PHYSICIAN ASSISTANT

## 2022-10-18 NOTE — PROGRESS NOTES
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 who progressed with omental and splenic metastases status post three months of neoadjuvant chemo followed by extensive abdominal resection with HIPEC, followed by an additional months of adjuvant chemotherapy  Patient had significant toxicities to chemotherapy requiring augmentation  Patient could not tolerate CAPOX and then was transitioned to FOLFOX  After nine cycles patient was then transitioned to FOLFIRI due to toxicity of oxaliplatin  Irinotecan had to be dose reduced by 50% due to diarrhea  Patient underwent 1st interval CT observation scan in September that demonstrated a questionable lesion near the kidney  Surgical oncology previously saw the patient felt this to be related to inflammatory causation  Follow-up CT scan in four months  Patient did not have a CEA at the time of the evaluation of the CT scan  I requested that the patient get this done during her next port flush  I impressed upon the patient today the importance of following CEA levels as this was sensitive to our disease progression previously  Patient will have an additional CEA drawn prior to follow-up in February  Patient still has prolonged toxicity from chemotherapy including neuropathy  It does not require treatment at this time, continued observation     - CEA; Future  - CBC and differential; Future  - Comprehensive metabolic panel; Future    4  Encounter for screening mammogram for malignant neoplasm of breast  Patient was encouraged to continue with routine cancer screenings including mammography  Patient is due for one now    This will be arranged by this office     - Mammo screening bilateral w 3d & cad; Future      The patient is scheduled for follow-up in approximately 4 months with Dr Toribio Stone  Patient voiced agreement and understanding to the above  Patient knows to call the Hematology/Oncology office with any questions and concerns regarding the above  Goals and Barriers:    Current Goal:   Prolong Survival from Cancer  Barriers: None  Patient's Capacity to Self Care:  Patient able to self care  Shirley Hector PA-C  Medical Oncology/Hematology  [address]  ______________________________________________________________________________________________________________    Subjective     Chief Complaint   Patient presents with   • Follow-up     Omental metastasis [facility])       History of present illness/Cancer History:   Oncology History   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[facility] [address] )   9/23/2019 Initial Diagnosis    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[facility] [address] )     12/10/2019 Surgery    Low anterior resection (Dr Gregory Aguilar):    A  Rectosigmoid colon, low anterior resection:  - Adenocarcinoma, moderately differentiated  - Tumor invades through the muscularis propria into pericolorectal tissue, T3  - Diverticula  - All margins are negative for tumor   - Thirty-four lymph nodes, negative for malignancy (0/34)  B  Colon, anastomotic rings, resection:  - Two portions of colon with no pathologic abnormality     12/10/2019 Genomic Testing    RRESULTS OF IMMUNOHISTOCHEMICAL ANALYSIS FOR MISMATCH REPAIR PROTEIN LOSS  INTERPRETATION: NO LOSS OF NUCLEAR EXPRESSION OF MMR PROTEINS: LOW PROBABILITY OF MSI-H  Note: Background non-neoplastic tissue and/or internal control with intact nuclear expression        RESULTS:  Antibody          Clone               Description                           Results  MLH1               M1                  Mismatch repair protein       Intact nuclear expression  MSH2              [id number]       Mismatch repair protein       Intact nuclear expression  MSH6              44                   Mismatch repair protein       Intact nuclear expression  PMS2              [id number]         Mismatch repair protein       Intact nuclear expression     8/18/2021 Progression    CEA trends- observed by Dr Krista Knox  2/17/21: 2 9  8/18/2021: 4 5  9/13/2021: 4 4    PET/CT  9/28/2021 completed due to elevating CEA  1  There is a large left paracolic gutter markedly hypermetabolic mass immediately inferior to the spleen, most consistent with metastatic colorectal carcinoma  2  Mildly increased activity at the right abdominal bowel anastomotic site  If not recently performed, direct visualization is recommended  3  There are no other glucose avid abnormalities identified within the abdomen or pelvis  4  No evidence of distant glucose avid metastatic disease in the neck, chest, or skeleton      11/12/2021 Biopsy    Omental mass:  - Metastatic adenocarcinoma, with an immunophenotype compatible with metastasis from patient's known colonic primary  12/30/2021 - 12/30/2021 Chemotherapy    CapOx x 1 dose of Oxaliplatin: D/c due to tolerance  1/2/2022 Genomic Testing         1/4/2022 - 3/17/2022 Chemotherapy    First 6 cycles of Folfox given prior to surgery- 1/4 through 3/17/2022     Folfox was dose reduced due to anticipated tolerance  Minimal side effects     3/22/2022 Observation    CT CAP  1  Decreased size of biopsy-proven omental metastasis, which now only focally contacting rather than grossly invading the spleen and adjacent abdominal wall  No new evidence of metastatic disease in the abdomen or pelvis  2   No new or suspicious pulmonary nodules  One of the previously noted new 1-2 mm nodules is no longer seen, and the other is unchanged  Recommend continued attention on follow-up  3   Top-normal thickness endometrial stripe measuring 7 mm   If there is history of vaginal bleeding, suggest catheterization with endometrial biopsy  4   Hepatic steatosis  4/29/2022 Surgery    Escobar Peng and Shankar Steele preformed the following procedures:   DIAGNOSTIC LAPAROSCOPY, TOTAL ABDOMINAL HYSTERECTOMY, BILATERAL SALPINGO-OOPHORECTOMY, EXTENSIVE ADHESIOLYSIS (N/A)  DIAGNOSTIC LAPAROSCOPY, OPEN OMENTECTOMY, POSSIBLE SPLENECTOMY (N/A)  INSTILLATION CHEMOTHERAPY INTRAPERITONEAL (HIPEC) LAPAROTOMY (N/A)  REPAIR DIAPHRAGM TEAR      A  Uterus, Bilateral Ovaries and Fallopian Tubes; Hysterosalpingo-oophorectomy:  - Leiomyoma  - Left ovary with serous cystadenoma  - Unremarkable cervix  - Benign inactive endometrium with no specific pathologic change  - Unremarkable right ovary and bilateral fallopian tubes  B  Spleen, spleen, omentum, darotis:  - Metastatic adenocarcinoma involving spleen and omentum, consistent with colonic primary  See Note  Note: Comparison to prior material ([id number], omental mass) reveals identical morphology to previous metastatic colonic adenocarcinoma  6/14/2022 - 8/23/2022 Chemotherapy    7th cycle started on 6/14/2022  8th cycle worsening neuropathy; could not tolerate gabapentin  D/lizette oxaliplatin  9th cycle Irinotecan added at 85% dose reduction: SE Diarrhea  10th cycle Irinotecan dose redcued to 50%     9/19/2022 Observation    9/19/2022 CT CAP:   1  Previously seen omental metastasis in the left upper quadrant has been resected  There is a small area of nodular soft tissue thickening abutting the lateral aspect of the left kidney, cannot exclude residual/recurrent tumor  Follow up in 4 months CEA not completed at time of scan        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[facility] [address] )        Lab Results   Component Value Date    WBC 7 54 10/04/2022    HGB 10 3 (L) 10/04/2022    HCT 32 4 (L) 10/04/2022    MCV 96 10/04/2022     10/04/2022     Lab Results   Component Value Date     12/03/2016    SODIUM 141 10/04/2022    K 3 8 10/04/2022     10/04/2022    CO2 26 10/04/2022    AGAP 8 10/04/2022    BUN 17 10/04/2022    CREATININE 1 06 10/04/2022    GLUC 92 10/04/2022    GLUF 108 (H) 2022    CALCIUM 8 7 10/04/2022    AST 19 10/04/2022    ALT 24 10/04/2022    ALKPHOS 47 10/04/2022    PROT 6 6 2016    TP 6 7 10/04/2022    BILITOT 0 7 2016    TBILI 0 92 10/04/2022    EGFR 54 10/04/2022       Interval history:  Feeling well  Port flushes occurring regularly      ECO - Asymptomatic    Review of Systems   Constitutional: Negative for activity change, appetite change, fatigue, fever and unexpected weight change  HENT: Negative for nosebleeds  Respiratory: Negative for cough, choking and shortness of breath  Negative hemoptysis  Cardiovascular: Negative for chest pain, palpitations and leg swelling  Gastrointestinal: Negative  Negative for abdominal distention, abdominal pain, anal bleeding, blood in stool, constipation, diarrhea, nausea and vomiting  Endocrine: Negative  Negative for cold intolerance  Genitourinary: Negative  Negative for hematuria, menstrual problem, vaginal bleeding, vaginal discharge and vaginal pain  Musculoskeletal: Positive for arthralgias  Negative for myalgias, neck pain and neck stiffness  Skin: Negative  Negative for color change, pallor and rash  Allergic/Immunologic: Negative  Negative for immunocompromised state  Neurological: Negative  Negative for weakness and headaches  Hematological: Negative for adenopathy  Does not bruise/bleed easily  All other systems reviewed and are negative        Current Outpatient Medications:   •  apixaban (Eliquis) 5 mg, Take 1 tablet (5 mg total) by mouth 2 (two) times a day, Disp: 60 tablet, Rfl: 3  •  ezetimibe (ZETIA) 10 mg tablet, Take 1 tablet (10 mg total) by mouth daily (Patient taking differently: Take 10 mg by mouth daily at bedtime), Disp: 90 tablet, Rfl: 1  Allergies   Allergen Reactions   • Medical Tape Rash     Skin irritation  Skin peeling  Skin irritation  Skin peeling  Blisters  Rash Advance Directive and Living Will:            Objective   /78 (BP Location: Left arm, Patient Position: Sitting, Cuff Size: Adult)   Pulse 80   Temp 98 °F (36 7 °C)   Resp 18   Ht 4' 9" (1 448 m)   Wt 82 2 kg (181 lb 3 2 oz)   LMP 09/01/2011 (Approximate)   SpO2 100%   BMI 39 21 kg/m²   Wt Readings from Last 6 Encounters:   10/18/22 82 2 kg (181 lb 3 2 oz)   09/27/22 82 1 kg (181 lb)   08/23/22 82 4 kg (181 lb 10 5 oz)   08/22/22 82 2 kg (181 lb 3 2 oz)   08/09/22 82 3 kg (181 lb 7 oz)   07/26/22 83 5 kg (184 lb 1 4 oz)       Physical Exam  Constitutional:       General: She is not in acute distress  Appearance: She is well-developed  HENT:      Head: Normocephalic and atraumatic  Mouth/Throat:      Pharynx: No oropharyngeal exudate  Eyes:      General: No scleral icterus  Pupils: Pupils are equal, round, and reactive to light  Cardiovascular:      Rate and Rhythm: Normal rate and regular rhythm  Heart sounds: No murmur heard  Pulmonary:      Effort: Pulmonary effort is normal  No respiratory distress  Breath sounds: Normal breath sounds  Abdominal:      General: Bowel sounds are normal  There is no distension  Palpations: Abdomen is soft  Tenderness: There is no abdominal tenderness  Musculoskeletal:         General: Normal range of motion  Cervical back: Normal range of motion  Lymphadenopathy:      Cervical: No cervical adenopathy  Skin:     General: Skin is warm  Coloration: Skin is not pale  Findings: No rash  Neurological:      Mental Status: She is alert and oriented to person, place, and time  Cranial Nerves: No cranial nerve deficit  Psychiatric:         Thought Content:  Thought content normal          Pertinent Laboratory Results and Imaging Review:  Hospital Outpatient Visit on 10/04/2022   Component Date Value Ref Range Status   • WBC 10/04/2022 7 54  4 31 - 10 16 Thousand/uL Final   • RBC 10/04/2022 3 36 (A) 3 81 - 5 12 Million/uL Final   • Hemoglobin 10/04/2022 10 3 (A) 11 5 - 15 4 g/dL Final   • Hematocrit 10/04/2022 32 4 (A) 34 8 - 46 1 % Final   • MCV 10/04/2022 96  82 - 98 fL Final   • MCH 10/04/2022 30 7  26 8 - 34 3 pg Final   • MCHC 10/04/2022 31 8  31 4 - 37 4 g/dL Final   • RDW 10/04/2022 20 6 (A) 11 6 - 15 1 % Final   • MPV 10/04/2022 11 7  8 9 - 12 7 fL Final   • Platelets 34/23/2833 318  149 - 390 Thousands/uL Final   • nRBC 10/04/2022 0  /100 WBCs Final   • Neutrophils Relative 10/04/2022 50  43 - 75 % Final   • Immat GRANS % 10/04/2022 0  0 - 2 % Final   • Lymphocytes Relative 10/04/2022 36  14 - 44 % Final   • Monocytes Relative 10/04/2022 11  4 - 12 % Final   • Eosinophils Relative 10/04/2022 2  0 - 6 % Final   • Basophils Relative 10/04/2022 1  0 - 1 % Final   • Neutrophils Absolute 10/04/2022 3 73  1 85 - 7 62 Thousands/µL Final   • Immature Grans Absolute 10/04/2022 0 03  0 00 - 0 20 Thousand/uL Final   • Lymphocytes Absolute 10/04/2022 2 74  0 60 - 4 47 Thousands/µL Final   • Monocytes Absolute 10/04/2022 0 83  0 17 - 1 22 Thousand/µL Final   • Eosinophils Absolute 10/04/2022 0 17  0 00 - 0 61 Thousand/µL Final   • Basophils Absolute 10/04/2022 0 04  0 00 - 0 10 Thousands/µL Final   • Sodium 10/04/2022 141  135 - 147 mmol/L Final   • Potassium 10/04/2022 3 8  3 5 - 5 3 mmol/L Final   • Chloride 10/04/2022 107  96 - 108 mmol/L Final   • CO2 10/04/2022 26  21 - 32 mmol/L Final   • ANION GAP 10/04/2022 8  4 - 13 mmol/L Final   • BUN 10/04/2022 17  5 - 25 mg/dL Final   • Creatinine 10/04/2022 1 06  0 60 - 1 30 mg/dL Final    Standardized to IDMS reference method   • Glucose 10/04/2022 92  65 - 140 mg/dL Final    If the patient is fasting, the ADA then defines impaired fasting glucose as > 100 mg/dL and diabetes as > or equal to 123 mg/dL  Specimen collection should occur prior to Sulfasalazine administration due to the potential for falsely depressed results   Specimen collection should occur prior to Sulfapyridine administration due to the potential for falsely elevated results  • Calcium 10/04/2022 8 7  8 3 - 10 1 mg/dL Final   • Corrected Calcium 10/04/2022 9 7  8 3 - 10 1 mg/dL Final   • AST 10/04/2022 19  5 - 45 U/L Final    Specimen collection should occur prior to Sulfasalazine administration due to the potential for falsely depressed results  • ALT 10/04/2022 24  12 - 78 U/L Final    Specimen collection should occur prior to Sulfasalazine administration due to the potential for falsely depressed results  • Alkaline Phosphatase 10/04/2022 47  46 - 116 U/L Final   • Total Protein 10/04/2022 6 7  6 4 - 8 4 g/dL Final   • Albumin 10/04/2022 2 7 (A) 3 5 - 5 0 g/dL Final   • Total Bilirubin 10/04/2022 0 92  0 20 - 1 00 mg/dL Final    Use of this assay is not recommended for patients undergoing treatment with eltrombopag due to the potential for falsely elevated results  • eGFR 10/04/2022 54  ml/min/1 73sq m Final     CT chest abdomen pelvis w contrast  Narrative: CT CHEST, ABDOMEN AND PELVIS WITH IV CONTRAST    INDICATION:   C78 6: Secondary malignant neoplasm of retroperitoneum and peritoneum  COMPARISON:  CT chest abdomen pelvis 3/22/2022    TECHNIQUE: CT examination of the chest, abdomen and pelvis was performed  Axial, sagittal, and coronal 2D reformatted images were created from the source data and submitted for interpretation  Radiation dose length product (DLP) for this visit:  552 59 mGy-cm   This examination, like all CT scans performed in the [facility], was performed utilizing techniques to minimize radiation dose exposure, including the use of iterative   reconstruction and automated exposure control  IV Contrast:  100 mL of iohexol (OMNIPAQUE)  Enteric contrast was administered  FINDINGS:    CHEST    LUNGS:  Mild lingular atelectasis or scarring  Previously described punctate pulmonary nodules are not clearly visualized    No new suspicious pulmonary nodules  Central airways are patent  PLEURA:  Unremarkable  HEART/GREAT VESSELS:  Heart is unremarkable for patient's age  No thoracic aortic aneurysm  MEDIASTINUM AND BRII:  Unremarkable  CHEST WALL AND LOWER NECK:  Right-sided chest port with the tip at the cavoatrial junction  ABDOMEN    LIVER/BILIARY TREE:  Unremarkable  GALLBLADDER:  Cholelithiasis without pericholecystic inflammatory changes  SPLEEN:  Prior splenectomy  PANCREAS:  Unremarkable  ADRENAL GLANDS:  Unremarkable  KIDNEYS/URETERS:  Unremarkable  No hydronephrosis  STOMACH AND BOWEL:  Post low anterior resection with unremarkable anastomosis  No bowel obstruction  APPENDIX:  Normal     ABDOMINOPELVIC CAVITY: Previously seen omental metastasis in the left upper quadrant has been resected  There is a small area of nodular soft tissue thickening abutting the lateral aspect of the left kidney with a representative area measuring   approximately 1 6 x 1 0 cm (series 2 image 55), cannot exclude residual/recurrent tumor  No ascites  No pneumoperitoneum  No lymphadenopathy  VESSELS:  Unremarkable for patient's age  PELVIS    REPRODUCTIVE ORGANS:  Post DILMA/BSO  URINARY BLADDER:  Unremarkable  ABDOMINAL WALL/INGUINAL REGIONS:  Postsurgical changes in the anterior abdominopelvic wall  OSSEOUS STRUCTURES:  No acute fracture or destructive osseous lesion  Degenerative changes of the spine  Impression: 1  Previously seen omental metastasis in the left upper quadrant has been resected  There is a small area of nodular soft tissue thickening abutting the lateral aspect of the left kidney, cannot exclude residual/recurrent tumor  The study was marked in EPIC for significant notification      Workstation performed: [id number]          The following historical data was reviewed:  Past Medical History:   Diagnosis Date   • Blood in stool    • Breast disorder    • Cancer [facility])     rectal   • Cancer determined by colorectal biopsy ([facility] [address] )     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   • Colon polyp    • GERD (gastroesophageal reflux disease)    • History of rectal surgery    • Hyperlipidemia    • PONV (postoperative nausea and vomiting)      Past Surgical History:   Procedure Laterality Date   • BREAST LUMPECTOMY Right    •  SECTION      x3   • COLON SIGMOID RESECTION     • COLONOSCOPY     • DILATION AND CURETTAGE OF UTERUS     • ILEO LOOP DIVERSION N/A 12/10/2019    Procedure: ILEOSTOMY LOOP;  Surgeon: Sarah Agarwal MD;  [facility];  Service: Robotics   • INSTILLATION CHEMOTHERAPY INTRAPERITONEAL (HIPEC) LAPAROTOMY N/A 2022    Procedure: INSTILLATION CHEMOTHERAPY INTRAPERITONEAL (HIPEC) LAPAROTOMY;  Surgeon: Milton Sotelo MD;  [facility];  Service: Surgical Oncology   • IR BIOPSY OMENTUM  2021   • IR PORT PLACEMENT  2021   • OMENTECTOMY N/A 2022    Procedure: DIAGNOSTIC LAPAROSCOPY, OPEN OMENTECTOMY, SPLENECTOMY, DIAPHRAGM REPAIR, CHEST TUBE INSERTION;  Surgeon: Milton Sotelo MD;  [facility];  Service: Surgical Oncology   • NH CLOSE ENTEROSTOMY N/A 2020    Procedure: CLOSURE ILEOSTOMY;  Surgeon: Sarah Agarwal MD;  [facility];  Service: Colorectal   • NH DIAPHRAGM SURG [facility]  2022    Procedure: REPAIR DIAPHRAGM TEAR;  Surgeon: Betty Eid MD;  [facility]: [facility];  Service: Thoracic   • NH LAP,SURG,COLECTOMY, PARTIAL, W/ANAST N/A 12/10/2019    Procedure: SIGMOID RESECTION COLON LAPAROSCOPIC W ROBOTICS converted to lap hand assisted  with Partial proctectomy , LASER FLUORESCENCE ANGIOGRAPHY, INTRA OP COLONOSCOPY;  Surgeon: Sarah Agarwal MD;  [facility];  Service: Robotics   • NH TOTAL ABDOM HYSTERECTOMY N/A 2022    Procedure: DIAGNOSTIC LAPAROSCOPY, TOTAL ABDOMINAL HYSTERECTOMY, BILATERAL SALPINGO-OOPHORECTOMY, EXTENSIVE ADHESIOLYSIS;  Surgeon: Michel Denton MD;  [facility]; Service: Gynecology Oncology   • SMALL INTESTINE SURGERY  2/4/2020    Procedure: RESECTION SMALL BOWEL;  Surgeon: Frantz Chris MD;  [facility];  Service: Colorectal     Social History     Socioeconomic History   • Marital status:      Spouse name: None   • Number of children: None   • Years of education: None   • Highest education level: None   Occupational History   • None   Tobacco Use   • Smoking status: Never Smoker   • Smokeless tobacco: Never Used   Vaping Use   • Vaping Use: Never used   Substance and Sexual Activity   • Alcohol use: Yes     Comment: "occasionally"   • Drug use: Never   • Sexual activity: None   Other Topics Concern   • None   Social History Narrative   • None     Social Determinants of Health     Financial Resource Strain: Not on file   Food Insecurity: No Food Insecurity   • Worried About Running Out of Food in the Last Year: Never true   • Ran Out of Food in the Last Year: Never true   Transportation Needs: No Transportation Needs   • Lack of Transportation (Medical): No   • Lack of Transportation (Non-Medical): No   Physical Activity: Not on file   Stress: Not on file   Social Connections: Not on file   Intimate Partner Violence: Not on file   Housing Stability: Unknown   • Unable to Pay for Housing in the Last Year: No   • Number of Places Lived in the Last Year: Not on file   • Unstable Housing in the Last Year: No     Family History   Problem Relation Age of Onset   • Hypertension Mother    • Heart attack Mother    • Heart attack Father    • Heart disease Father    • Hypertension Brother    • Heart attack Brother    • Leukemia Cousin    • Breast cancer Cousin    • Diabetes Cousin    • Breast cancer Family         maternal side   • Colon cancer Family         paternal uncle   • Colon cancer Paternal Uncle    • Stroke Neg Hx        Please note: This report has been generated by a voice recognition software system   Therefore there may be syntax, spelling, and/or grammatical errors  Please call if you have any questions

## 2022-10-19 DIAGNOSIS — E78.2 MIXED HYPERLIPIDEMIA: ICD-10-CM

## 2022-10-19 DIAGNOSIS — Z78.9 STATIN INTOLERANCE: ICD-10-CM

## 2022-10-19 RX ORDER — EZETIMIBE 10 MG/1
10 TABLET ORAL DAILY
Qty: 90 TABLET | Refills: 1 | Status: SHIPPED | OUTPATIENT
Start: 2022-10-19

## 2022-11-15 ENCOUNTER — HOSPITAL ENCOUNTER (OUTPATIENT)
Dept: INFUSION CENTER | Facility: HOSPITAL | Age: 67
Discharge: HOME | End: 2022-11-15

## 2022-11-15 VITALS — TEMPERATURE: 98 F

## 2022-11-15 DIAGNOSIS — C19 RECTOSIGMOID CANCER (HCC): Primary | ICD-10-CM

## 2022-12-01 ENCOUNTER — TELEPHONE (OUTPATIENT)
Dept: HEMATOLOGY ONCOLOGY | Facility: CLINIC | Age: 67
End: 2022-12-01

## 2022-12-01 DIAGNOSIS — Z90.81 HISTORY OF SPLENECTOMY: Primary | ICD-10-CM

## 2022-12-01 DIAGNOSIS — E66.01 MORBID OBESITY (HCC): ICD-10-CM

## 2022-12-01 NOTE — TELEPHONE ENCOUNTER
MEDICATION REFILL ROUTE TO RN    Who is Calling self   Medication  apixaban (Eliquis) 5 mg        How many pills left  1   Preferred Pharmacy / Address  [address], [address] [facility] [address]   [address] [facility], [place] [address]   Phone:  [phone]  Fax:  [phone]    Who is your Physician?  Pino Bhat   Call back number  [phone]   Relevant Information  none

## 2022-12-08 ENCOUNTER — HOSPITAL ENCOUNTER (OUTPATIENT)
Dept: RADIOLOGY | Facility: HOSPITAL | Age: 67
Discharge: HOME | End: 2022-12-08

## 2022-12-08 VITALS — BODY MASS INDEX: 39.48 KG/M2 | HEIGHT: 57 IN | WEIGHT: 183 LBS

## 2022-12-08 DIAGNOSIS — Z12.31 ENCOUNTER FOR SCREENING MAMMOGRAM FOR MALIGNANT NEOPLASM OF BREAST: ICD-10-CM

## 2022-12-12 DIAGNOSIS — E66.01 MORBID OBESITY (HCC): ICD-10-CM

## 2023-01-10 ENCOUNTER — HOSPITAL ENCOUNTER (OUTPATIENT)
Dept: INFUSION CENTER | Facility: HOSPITAL | Age: 68
Discharge: HOME | End: 2023-01-10

## 2023-01-10 DIAGNOSIS — C19 RECTOSIGMOID CANCER (HCC): Primary | ICD-10-CM

## 2023-01-10 DIAGNOSIS — E66.01 MORBID OBESITY (HCC): ICD-10-CM

## 2023-01-10 RX ADMIN — ALTEPLASE 2 MG: 2.2 INJECTION, POWDER, LYOPHILIZED, FOR SOLUTION INTRAVENOUS at 13:28

## 2023-01-10 RX ADMIN — ALTEPLASE 2 MG: 2.2 INJECTION, POWDER, LYOPHILIZED, FOR SOLUTION INTRAVENOUS at 11:14

## 2023-01-10 NOTE — PROGRESS NOTES
Notified Brenda Mendez RN that pt is requesting a refill in her eliquis - per Jamey Joseph she has sent to Dr Jerald Arevalo to refill

## 2023-01-24 ENCOUNTER — APPOINTMENT (OUTPATIENT)
Dept: LAB | Facility: HOSPITAL | Age: 68
End: 2023-01-24

## 2023-01-24 DIAGNOSIS — C78.6 OMENTAL METASTASIS (HCC): ICD-10-CM

## 2023-01-24 DIAGNOSIS — C19 RECTOSIGMOID CANCER (HCC): ICD-10-CM

## 2023-01-24 LAB — CEA SERPL-MCNC: 1.6 NG/ML (ref 0–3)

## 2023-01-27 ENCOUNTER — HOSPITAL ENCOUNTER (OUTPATIENT)
Dept: RADIOLOGY | Facility: HOSPITAL | Age: 68
Discharge: HOME | End: 2023-01-27

## 2023-01-27 DIAGNOSIS — C19 RECTOSIGMOID CANCER (HCC): ICD-10-CM

## 2023-01-27 DIAGNOSIS — C78.6 OMENTAL METASTASIS (HCC): ICD-10-CM

## 2023-01-27 RX ADMIN — IOHEXOL 100 ML: 350 INJECTION, SOLUTION INTRAVENOUS at 12:54

## 2023-02-01 ENCOUNTER — TELEPHONE (OUTPATIENT)
Dept: HEMATOLOGY ONCOLOGY | Facility: CLINIC | Age: 68
End: 2023-02-01

## 2023-02-01 NOTE — TELEPHONE ENCOUNTER
Call Transfer   Reason for patient call? Suman Pino from [facility] calling to notify Dr Ananya Santacruz that a report is ready    If someone other than patient is calling, are they listed on the communication consent? N/A   Patient's primary oncologist? Dr Ananya Santacruz    Person/Department that the call was transferred to? Time that call was transferred? Seble Loco @ 10:27AM   Informed patient that the message will be forwarded to the team and someone will get back to them as soon as possible  Did you relay this information to the patient?  Yes

## 2023-02-02 ENCOUNTER — OFFICE VISIT (OUTPATIENT)
Dept: SURGICAL ONCOLOGY | Facility: CLINIC | Age: 68
End: 2023-02-02

## 2023-02-02 VITALS
OXYGEN SATURATION: 98 % | RESPIRATION RATE: 16 BRPM | WEIGHT: 187 LBS | BODY MASS INDEX: 40.34 KG/M2 | SYSTOLIC BLOOD PRESSURE: 122 MMHG | DIASTOLIC BLOOD PRESSURE: 70 MMHG | HEIGHT: 57 IN | TEMPERATURE: 98.3 F | HEART RATE: 80 BPM

## 2023-02-02 DIAGNOSIS — C78.6 OMENTAL METASTASIS (HCC): ICD-10-CM

## 2023-02-02 DIAGNOSIS — C19 RECTOSIGMOID CANCER (HCC): Primary | ICD-10-CM

## 2023-02-02 RX ORDER — FAMOTIDINE 10 MG
10 TABLET ORAL 2 TIMES DAILY PRN
COMMUNITY

## 2023-02-02 NOTE — PROGRESS NOTES
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[address] )    Other orders  -     famotidine (PEPCID) 10 mg tablet; Take 10 mg by mouth 2 (two) times a day as needed for heartburn        Chief Complaint   Patient presents with   • Follow-up       No follow-ups on file  Oncology History   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[facility] )   9/23/2019 Initial Diagnosis    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[facility] [address] )     12/10/2019 Surgery    Low anterior resection (Dr Francisco Ortiz):    A  Rectosigmoid colon, low anterior resection:  - Adenocarcinoma, moderately differentiated  - Tumor invades through the muscularis propria into pericolorectal tissue, T3  - Diverticula  - All margins are negative for tumor   - Thirty-four lymph nodes, negative for malignancy (0/34)  B  Colon, anastomotic rings, resection:  - Two portions of colon with no pathologic abnormality     12/10/2019 Genomic Testing    RRESULTS OF IMMUNOHISTOCHEMICAL ANALYSIS FOR MISMATCH REPAIR PROTEIN LOSS  INTERPRETATION: NO LOSS OF NUCLEAR EXPRESSION OF MMR PROTEINS: LOW PROBABILITY OF MSI-H  Note: Background non-neoplastic tissue and/or internal control with intact nuclear expression        RESULTS:  Antibody          Clone               Description                           Results  MLH1               M1                  Mismatch repair protein       Intact nuclear expression  MSH2              [id number]       Mismatch repair protein       Intact nuclear expression  MSH6              44                   Mismatch repair protein       Intact nuclear expression  PMS2 [id number]         Mismatch repair protein       Intact nuclear expression     8/18/2021 Progression    CEA trends- observed by Dr Rip Costa  2/17/21: 2 9  8/18/2021: 4 5  9/13/2021: 4 4    PET/CT  9/28/2021 completed due to elevating CEA  1  There is a large left paracolic gutter markedly hypermetabolic mass immediately inferior to the spleen, most consistent with metastatic colorectal carcinoma  2  Mildly increased activity at the right abdominal bowel anastomotic site  If not recently performed, direct visualization is recommended  3  There are no other glucose avid abnormalities identified within the abdomen or pelvis  4  No evidence of distant glucose avid metastatic disease in the neck, chest, or skeleton      11/12/2021 Biopsy    Omental mass:  - Metastatic adenocarcinoma, with an immunophenotype compatible with metastasis from patient's known colonic primary  12/30/2021 - 12/30/2021 Chemotherapy    CapOx x 1 dose of Oxaliplatin: D/c due to tolerance  1/2/2022 Genomic Testing         1/4/2022 - 3/17/2022 Chemotherapy    First 6 cycles of Folfox given prior to surgery- 1/4 through 3/17/2022     Folfox was dose reduced due to anticipated tolerance  Minimal side effects     3/22/2022 Observation    CT CAP  1  Decreased size of biopsy-proven omental metastasis, which now only focally contacting rather than grossly invading the spleen and adjacent abdominal wall  No new evidence of metastatic disease in the abdomen or pelvis  2   No new or suspicious pulmonary nodules  One of the previously noted new 1-2 mm nodules is no longer seen, and the other is unchanged  Recommend continued attention on follow-up  3   Top-normal thickness endometrial stripe measuring 7 mm  If there is history of vaginal bleeding, suggest catheterization with endometrial biopsy  4   Hepatic steatosis       4/29/2022 Surgery    Escobar Barr and Carri Matt preformed the following procedures:   DIAGNOSTIC LAPAROSCOPY, TOTAL ABDOMINAL HYSTERECTOMY, BILATERAL SALPINGO-OOPHORECTOMY, EXTENSIVE ADHESIOLYSIS (N/A)  DIAGNOSTIC LAPAROSCOPY, OPEN OMENTECTOMY, POSSIBLE SPLENECTOMY (N/A)  INSTILLATION CHEMOTHERAPY INTRAPERITONEAL (HIPEC) LAPAROTOMY (N/A)  REPAIR DIAPHRAGM TEAR      A  Uterus, Bilateral Ovaries and Fallopian Tubes; Hysterosalpingo-oophorectomy:  - Leiomyoma  - Left ovary with serous cystadenoma  - Unremarkable cervix  - Benign inactive endometrium with no specific pathologic change  - Unremarkable right ovary and bilateral fallopian tubes  B  Spleen, spleen, omentum, darotis:  - Metastatic adenocarcinoma involving spleen and omentum, consistent with colonic primary  See Note  Note: Comparison to prior material ([id number], omental mass) reveals identical morphology to previous metastatic colonic adenocarcinoma  6/14/2022 - 8/23/2022 Chemotherapy    7th cycle started on 6/14/2022  8th cycle worsening neuropathy; could not tolerate gabapentin  D/lizette oxaliplatin  9th cycle Irinotecan added at 85% dose reduction: SE Diarrhea  10th cycle Irinotecan dose redcued to 50%     9/19/2022 Observation    9/19/2022 CT CAP:   1  Previously seen omental metastasis in the left upper quadrant has been resected  There is a small area of nodular soft tissue thickening abutting the lateral aspect of the left kidney, cannot exclude residual/recurrent tumor  Follow up in 4 months CEA not completed at time of scan  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[facility] [address] )       History of Present Illness  :   Miss Lyric Rangel is seen here today for ongoing surveillance of metastatic colorectal cancer  Briefly, the patient underwent screening colonoscopy in late 2019  This detected a rectosigmoid mass, which was then resected in December of 2019  Surgery required a diverting loop ileostomy, which was subsequently reversed in February of 2020  Of note, preop MRI suggested a T3bN1 lesion above the peritoneal reflection, and upfront surgery was recommended   This revealed a final path T3 N0 cancer with no aggressive features and no adjuvant therapy was recommended by her surgeon Dr Kymberly Woods  She underwent a PET scan due to a rising CEA (4 4 from 2 2 posotp), and this demonstrated a large 4 5 cm left pericolic gutter markedly hypermetabolic mass  There was mild increased activity at the colon anastomosis  Subsequent colonoscopy revealed no abnormality at this location  She denies any systemic symptoms including weight loss, nausea, vomiting, changes in her bowel habits  MRI of the abdomen revealed a single metastatic lesion invading the spleen  No evidence of other peritoneal implants  MRI of the pelvis revealed a concerning appearance of the ovary  This was followed by a transvaginal ultrasound, which ultimately determined that the ovary appeared benign  She then underwent interventional radiology biopsy, which confirmed a metastatic lesion from a colorectal primary  She was iniatiated on FOLFOX and then 4/2022 underwent open omentectomy, splenectomy, resection of involved diaphragm with primary repair, hysterectomy and oophorectomy with HIPEC  Completed chemo August 2022  Interval 2/2023  Doing very well  Recent CT ISIAH  CEA 1 6 No c/o fatigue, n/v, changes in stool habits, fevers, chills, weight loss  Review of Systems  Complete ROS Surg Onc:   Constitutional: The patient denies new or recent history of general fatigue, no recent weight loss, normal appetite  Eyes: No complaints of visual problems, no scleral icterus  ENT: No complaints of ear pain, no hoarseness, no difficulty swallowing,  no tinnitus and no new masses in head, oral cavity, or neck  Cardiovascular: No complaints of chest pain, no palpitations, no ankle edema  Respiratory: No complaints of shortness of breath, no cough  Gastrointestinal: No complaints of jaundice, no bloody stools, no pale stools     Genitourinary: No complaints of dysuria, no hematuria, no nocturia, no frequent urination, no urethral discharge  Musculoskeletal: No complaints of weakness, paralysis, joint stiffness or arthralgias  Integumentary: No complaints of rash, no new lesions  Neurological: No complaints of convulsions, no seizures, no dizziness  Hematologic/Lymphatic: No complaints of easy bruising  Endocrine:  ENDORSES cold intolerance  No polydipsia, polyphagia, or polyuria  Allergy/immunology:  No environmental allergies  No food allergies  Not immunocompromised        Patient Active Problem List   Diagnosis   • Callus of foot   • Foot pain   • GERD (gastroesophageal reflux disease)   • Hyperlipidemia   • Prediabetes   • Varicose veins with pain   • Morbid obesity ([facility] [address] )   • 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HCC)   • Dyspnea on exertion   • Dyslipidemia   • Sigmoid diverticulosis   • PONV (postoperative nausea and vomiting)   • 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HCC)   • Lesion of ovary   • Chemotherapy adverse reaction   • Chemotherapy-induced nausea   • Platelets decreased (HCC)   • Stage 3 chronic kidney disease, unspecified whether stage 3a or 3b CKD (HCC)   • History of splenectomy   • Asplenia   • Postoperative state     Past Medical History:   Diagnosis Date   • Blood in stool    • Breast disorder    • Cancer ([facility] )     rectal   • Cancer determined by colorectal biopsy ([place] [id number] )     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   • Colon polyp    • GERD (gastroesophageal reflux disease)    • History of chemotherapy 2021   • History of rectal surgery    • Hyperlipidemia    • PONV (postoperative nausea and vomiting)      Past Surgical History:   Procedure Laterality Date   • BREAST CYST EXCISION Right    •  SECTION      x3   • COLON SIGMOID RESECTION     • COLONOSCOPY     • DILATION AND CURETTAGE OF UTERUS     • ILEO LOOP DIVERSION N/A 12/10/2019    Procedure: ILEOSTOMY LOOP;  Surgeon: Diania Mcburney, MD;  [facility];  Service: Robotics   • INSTILLATION CHEMOTHERAPY INTRAPERITONEAL (HIPEC) LAPAROTOMY N/A 2022    Procedure: INSTILLATION CHEMOTHERAPY INTRAPERITONEAL (HIPEC) LAPAROTOMY;  Surgeon: Jenelle Siddiqui MD;  [facility];  Service: Surgical Oncology   • IR BIOPSY OMENTUM  11/12/2021   • IR PORT PLACEMENT  12/28/2021   • OMENTECTOMY N/A 04/29/2022    Procedure: DIAGNOSTIC LAPAROSCOPY, OPEN OMENTECTOMY, SPLENECTOMY, DIAPHRAGM REPAIR, CHEST TUBE INSERTION;  Surgeon: Jenelle Siddiqui MD;  [facility];  Service: Surgical Oncology   • ND CLOSURE ENTEROSTOMY LG/SMALL INTESTINE N/A 02/04/2020    Procedure: CLOSURE ILEOSTOMY;  Surgeon: Mahendra Lane MD;  [facility];  Service: Colorectal   • ND LAPAROSCOPY COLECTOMY PARTIAL W/ANASTOMOSIS N/A 12/10/2019    Procedure: SIGMOID RESECTION COLON LAPAROSCOPIC W ROBOTICS converted to lap hand assisted  with Partial proctectomy , LASER FLUORESCENCE ANGIOGRAPHY, INTRA OP COLONOSCOPY;  Surgeon: Mahendra Lane MD;  [facility];  Service: Robotics   • ND TOTAL ABDOMINAL HYSTERECT W/WO RMVL TUBE OVARY N/A 04/29/2022    Procedure: DIAGNOSTIC LAPAROSCOPY, TOTAL ABDOMINAL HYSTERECTOMY, BILATERAL SALPINGO-OOPHORECTOMY, EXTENSIVE ADHESIOLYSIS;  Surgeon: Ivan Montelongo MD;  [facility];  Service: Gynecology Oncology   • ND UNLISTED PROCEDURE DIAPHRAGM  04/29/2022    Procedure: REPAIR DIAPHRAGM TEAR;  Surgeon: Carmel Horn MD;  [facility];  Service: Thoracic   • SMALL INTESTINE SURGERY  02/04/2020    Procedure: RESECTION SMALL BOWEL;  Surgeon: Mahendra Lane MD;  [facility];  Service: Colorectal     Family History   Problem Relation Age of Onset   • Hypertension Mother    • Heart attack Mother    • Heart attack Father    • Heart disease Father    • Hypertension Brother    • Heart attack Brother    • Colon cancer Paternal Uncle    • Leukemia Cousin    • Breast cancer Cousin    • Diabetes Cousin    • Breast cancer Cousin    • Colon cancer Family         paternal uncle   • Stroke Neg Hx      Social History Socioeconomic History   • Marital status:      Spouse name: Not on file   • Number of children: Not on file   • Years of education: Not on file   • Highest education level: Not on file   Occupational History   • Not on file   Tobacco Use   • Smoking status: Never   • Smokeless tobacco: Never   Vaping Use   • Vaping Use: Never used   Substance and Sexual Activity   • Alcohol use: Yes     Comment: "occasionally"   • Drug use: Never   • Sexual activity: Not on file   Other Topics Concern   • Not on file   Social History Narrative   • Not on file     Social Determinants of Health     Financial Resource Strain: Not on file   Food Insecurity: No Food Insecurity   • Worried About Running Out of Food in the Last Year: Never true   • Ran Out of Food in the Last Year: Never true   Transportation Needs: No Transportation Needs   • Lack of Transportation (Medical): No   • Lack of Transportation (Non-Medical):  No   Physical Activity: Not on file   Stress: Not on file   Social Connections: Not on file   Intimate Partner Violence: Not on file   Housing Stability: Unknown   • Unable to Pay for Housing in the Last Year: No   • Number of Places Lived in the Last Year: Not on file   • Unstable Housing in the Last Year: No       Current Outpatient Medications:   •  apixaban (Eliquis) 5 mg, Take 1 tablet (5 mg total) by mouth 2 (two) times a day, Disp: 60 tablet, Rfl: 5  •  ezetimibe (ZETIA) 10 mg tablet, Take 1 tablet (10 mg total) by mouth daily, Disp: 90 tablet, Rfl: 1  •  famotidine (PEPCID) 10 mg tablet, Take 10 mg by mouth 2 (two) times a day as needed for heartburn, Disp: , Rfl:   Allergies   Allergen Reactions   • Medical Tape Rash     Skin irritation  Skin peeling  Skin irritation  Skin peeling  Blisters  Rash       Vitals:    02/02/23 1246   BP: 122/70   Pulse: 80   Resp: 16   Temp: 98 3 °F (36 8 °C)   SpO2: 98%       Physical Exam   General: Appears well, appears stated age  Skin: Warm, anicteric  HEENT: Normocephalic, atraumatic; sclera aniceteric, mucous membranes moist; cervical nodes without adenopathy  Cardiopulmonary: RRR, Easy WOB, no BLE edema  Abd: Obese, nontender, no masses appreciated, no hepatosplenomegaly  Incision well-healed  MSK: Symmetric, no cyanosis, no overt weakness  Lymphatic: No cervical, axillary or inguinal lymphadenopathy  Neuro: Affect appropriate, no gross motor abnormalities      Pathology:  Final Diagnosis   A  Rectosigmoid colon, low anterior resection:  - Adenocarcinoma, moderately differentiated  - Tumor invades through the muscularis propria into pericolorectal tissue, T3  - Diverticula  - All margins are negative for tumor   - Thirty-four lymph nodes, negative for malignancy (0/34)      B  Colon, anastomotic rings, resection:  - Two portions of colon with no pathologic abnormality     Intradepartmental consultation is in agreement  [facility], [address] [place] [address], [facility]  Immunohistochemistry for desmin  is performed on tissue blocks A13 and A16 to help in the assessment of this case  Final Diagnosis   A  Uterus, Bilateral Ovaries and Fallopian Tubes; Hysterosalpingo-oophorectomy:  - Leiomyoma  - Left ovary with serous cystadenoma  - Unremarkable cervix  - Benign inactive endometrium with no specific pathologic change  - Unremarkable right ovary and bilateral fallopian tubes      B  Spleen, spleen, omentum, darotis:  - Metastatic adenocarcinoma involving spleen and omentum, consistent with colonic primary  See Note  Labs: Reviewed in [facility]    Imaging  Colonoscopy    Addendum Date: 10/6/2021 Addendum:   [address] [place] [id number] [phone] DATE OF SERVICE: 10/06/21 PHYSICIAN(S): Cristela Davis MD - Attending Physician INDICATION: Personal history of rectal cancer , had a low anterior resection in December of 2019    T3 N0, 34 lymph nodes were removed and were negative  Patient did not receive any chemotherapy  Colonoscopy performed for a diagnostic indication  POST-OP DIAGNOSIS: See the impression below  HISTORY: Prior colonoscopy: Less than 3 years ago  It is being repeated at an interval of less than 3 years because: This colonoscopy is being performed for a diagnostic indication BOWEL PREPARATION: Miralax/Dulcolax PREPROCEDURE: Informed consent was obtained for the procedure, including sedation  Risks including but not limited to bleeding, infection, perforation, adverse drug reaction and aspiration were explained in detail  Also explained about less than 100% sensitivity with the exam and other alternatives  The patient was placed in the left lateral decubitus position  DETAILS OF PROCEDURE: Patient was taken to the procedure room where a time out was performed to confirm correct patient and correct procedure  The patient underwent monitored anesthesia care, which was administered by an anesthesia professional  The patient's blood pressure, heart rate, level of consciousness, oxygen and respirations were monitored throughout the procedure  A digital rectal exam was performed  The scope was introduced through the anus and advanced to the cecum  Retroflexion was performed in the rectum  The quality of bowel preparation was evaluated using the [facility] Bowel Preparation Scale with scores of: right colon = 2, transverse colon = 2, left colon = 2  The total BBPS score was 6  Bowel prep was adequate  The patient experienced no blood loss  The procedure was not difficult  The patient tolerated the procedure well  There were no apparent complications   ANESTHESIA INFORMATION: ASA: III Anesthesia Type: IV Sedation with Anesthesia MEDICATIONS: No administrations occurring from 1215 to 1230 on 10/06/21 FINDINGS: Healthy end-to-end colorectal anastomosis with no bleeding in the mid rectum All observed locations appeared normal, including the cecum, ascending colon, transverse colon, splenic flexure and descending colon  A couple scattered diverticuli seen EVENTS: Procedure Events Event Event Time ENDO CECUM REACHED 10/6/2021 12:21 PM ENDO SCOPE OUT TIME 10/6/2021 12:28 PM SPECIMENS: * No specimens in log * EQUIPMENT: Colonoscope - IMPRESSION: 1  Normal-appearing colon  Anastomosis site was seen in the rectum appeared healthy  No evidence of local recurrence  2  Couple small scattered diverticuli  RECOMMENDATION: Repeat colonoscopy in 2 years due to a personal history of colon cancer  Advised her urgent evaluation by medical oncologist and Dr Marroquin Comment  Tammie Banegas MD     Addendum Date: 10/6/2021 Addendum:   [address] [facility] [address] [address] [phone] DATE OF SERVICE: 10/06/21 PHYSICIAN(S): Tammie Banegas MD - Attending Physician INDICATION: Personal history of rectal cancer , had a low anterior resection in December of 2019  T3 N0, 34 lymph nodes were removed and were negative  Patient did not receive any chemotherapy  Colonoscopy performed for a diagnostic indication  POST-OP DIAGNOSIS: See the impression below  HISTORY: Prior colonoscopy: Less than 3 years ago  It is being repeated at an interval of less than 3 years because: This colonoscopy is being performed for a diagnostic indication BOWEL PREPARATION: Miralax/Dulcolax PREPROCEDURE: Informed consent was obtained for the procedure, including sedation  Risks including but not limited to bleeding, infection, perforation, adverse drug reaction and aspiration were explained in detail  Also explained about less than 100% sensitivity with the exam and other alternatives  The patient was placed in the left lateral decubitus position  DETAILS OF PROCEDURE: Patient was taken to the procedure room where a time out was performed to confirm correct patient and correct procedure   The patient underwent monitored anesthesia care, which was administered by an anesthesia professional  The patient's blood pressure, heart rate, level of consciousness, oxygen and respirations were monitored throughout the procedure  A digital rectal exam was performed  The scope was introduced through the anus and advanced to the cecum  Retroflexion was performed in the rectum  The quality of bowel preparation was evaluated using the [facility] Bowel Preparation Scale with scores of: right colon = 2, transverse colon = 2, left colon = 2  The total BBPS score was 6  Bowel prep was adequate  The patient experienced no blood loss  The procedure was not difficult  The patient tolerated the procedure well  There were no apparent complications  ANESTHESIA INFORMATION: ASA: III Anesthesia Type: IV Sedation with Anesthesia MEDICATIONS: No administrations occurring from 1215 to 1230 on 10/06/21 FINDINGS: Healthy end-to-end colorectal anastomosis with no bleeding in the mid rectum All observed locations appeared normal, including the cecum, ascending colon, transverse colon, splenic flexure and descending colon  A couple scattered diverticuli seen EVENTS: Procedure Events Event Event Time ENDO CECUM REACHED 10/6/2021 12:21 PM ENDO SCOPE OUT TIME 10/6/2021 12:28 PM SPECIMENS: * No specimens in log * EQUIPMENT: Colonoscope - IMPRESSION: 1  Normal-appearing colon  Anastomosis site was seen in the rectum appeared healthy  No evidence of local recurrence  2  Couple small scattered diverticuli  RECOMMENDATION: Repeat colonoscopy in 2 years due to a personal history of colon cancer  Cristela Davis MD     Result Date: 10/6/2021  Narrative: [address] [place] [phone] DATE OF SERVICE: 10/06/21 PHYSICIAN(S): Cristela Davis MD - Attending Physician INDICATION: Personal history of rectal cancer Colonoscopy performed for a diagnostic indication  POST-OP DIAGNOSIS: See the impression below  HISTORY: Prior colonoscopy: Less than 3 years ago   It is being repeated at an interval of less than 3 years because: This colonoscopy is being performed for a diagnostic indication BOWEL PREPARATION: Miralax/Dulcolax PREPROCEDURE: Informed consent was obtained for the procedure, including sedation  Risks including but not limited to bleeding, infection, perforation, adverse drug reaction and aspiration were explained in detail  Also explained about less than 100% sensitivity with the exam and other alternatives  The patient was placed in the left lateral decubitus position  DETAILS OF PROCEDURE: Patient was taken to the procedure room where a time out was performed to confirm correct patient and correct procedure  The patient underwent monitored anesthesia care, which was administered by an anesthesia professional  The patient's blood pressure, heart rate, level of consciousness, oxygen and respirations were monitored throughout the procedure  A digital rectal exam was performed  The scope was introduced through the anus and advanced to the cecum  Retroflexion was performed in the rectum  The quality of bowel preparation was evaluated using the [facility] Bowel Preparation Scale with scores of: right colon = 2, transverse colon = 2, left colon = 2  The total BBPS score was 6  Bowel prep was adequate  The patient experienced no blood loss  The procedure was not difficult  The patient tolerated the procedure well  There were no apparent complications  ANESTHESIA INFORMATION: ASA: III Anesthesia Type: IV Sedation with Anesthesia MEDICATIONS: No administrations occurring from 1215 to 1230 on 10/06/21 FINDINGS: Healthy end-to-end colorectal anastomosis with no bleeding in the mid rectum All observed locations appeared normal, including the cecum, ascending colon, transverse colon, splenic flexure and descending colon   A couple scattered diverticuli seen EVENTS: Procedure Events Event Event Time ENDO CECUM REACHED 10/6/2021 12:21 PM ENDO SCOPE OUT TIME 10/6/2021 12:28 PM SPECIMENS: * No specimens in log * EQUIPMENT: Colonoscope - Impression: 1  Normal-appearing colon  Anastomosis site was seen in the rectum appeared healthy  No evidence of local recurrence  2  Couple small scattered diverticuli  RECOMMENDATION: Repeat colonoscopy in 2 years due to a personal history of colon cancer  Jd Jaquez MD     DXA bone density spine hip and pelvis    Result Date: 10/14/2021  Narrative: CENTRAL  DXA SCAN CLINICAL HISTORY:  65-year-old postmenopausal female  OTHER RISK FACTORS:  History of fracture resulting from minor injury  PHARMACOLOGIC THERAPY FOR OSTEOPOROSIS:  None  TECHNIQUE: Bone densitometry was performed using a [employer]   bone densitometer  Regions of interest appear properly placed  COMPARISON: 5/6/2019  RESULTS: LUMBAR SPINE L1-L4 : BMD  1 141  gm/cm2 T-score -0 4 LEFT TOTAL HIP: BMD: 0 992  gm/cm2 T-score: -0 1 LEFT FEMORAL NECK: BMD: 0 856  gm/cm2 T score: -1 3 RIGHT TOTAL HIP: BMD:  1 004  gm/cm2 T-score: 0 0 RIGHT FEMORAL NECK: BMD:  0 851  gm/cm2  T score: -1 3     Impression: 1  Low bone mass (osteopenia)  [Based on the left and right femoral necks] 2  Since a DXA study from 5/6/2019, there has been: A  STATISTICALLY SIGNIFICANT DECREASE in bone mineral density of  0 039 g/cm2 (3 3)% in the lumbar spine  A  STATISTICALLY SIGNIFICANT DECREASE in bone mineral density of  0 046 g/cm2 (4 4)% in the hips  3   The 10 year risk of hip fracture is 1 2% with the 10 year risk of major osteoporotic fracture being 12 6% as calculated by the [facility] fracture risk assessment tool (FRAX, which is based on data generated by the [facility])  4   The current NOF guidelines recommend treating patients with a T-score of -2 5 or less in the lumbar spine or hips, or in post-menopausal women and men over the age of 48 with low bone mass (osteopenia) and a FRAX 10 year risk score of >3% for hip fracture and/or >20% for major osteoporotic fracture   5   The NOF recommends follow-up DXA in 1-2 years after initiating therapy for osteoporosis and every 2 years thereafter  More frequent evaluation is appropriate for patients with conditions associated with rapid bone loss, such as glucocorticoid therapy  The interval between DXA screenings may be longer for individuals without major risk factors and initial T-score in the normal or upper low bone mass range  The FRAX algorithm has certain limitations: -FRAX has not been validated in patients currently or previously treated with pharmacotherapy for osteoporosis  In such patients, clinical judgment must be exercised in interpreting FRAX scores  -Prior hip, vertebral and humeral fragility fractures appear to confer greater risk of subsequent fracture than fractures at other sites (this is especially true for individuals with severe vertebral fractures), but quantification of this incremental risk is not possible with FRAX  -FRAX underestimates fracture risk in patients with history of multiple fragility fractures  -FRAX may underestimate fracture risk in patients with history of frequent falls  -It is not appropriate to use FRAX to monitor treatment response  WHO CLASSIFICATION: Normal (a T-score of -1 0 or higher) Low bone mineral density (a T-score of less than -1 0 but higher than -2 5) Osteoporosis (a T-score of -2 5 or less) Severe osteoporosis (a T-score of -2 5 or less with a fragility fracture) LEAST SIGNIFICANT CHANGE AT 95% C I: Lumbar spine: 0 036 gm/cm2 (3 2%)  Total hip: 0 018 gm/cm2 (2 0%)  Forearm: 0 024 gm/cm2 (3 2%)  Workstation performed: [id number]     NM PET CT skull base to mid thigh    Result Date: 9/28/2021  Narrative: PET/CT SCAN INDICATION:  C18 7: Malignant neoplasm of sigmoid colon C20: Malignant neoplasm of rectum   Rectosigmoid carcinoma, restaging/surveillance examination  Borderline elevated CEA (most recently 4 4)  MODIFIER: PS COMPARISON: CT chest abdomen and pelvis 2/24/2021   CELL TYPE:  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 TECHNIQUE:   12 1 mCi F-18-FDG administered IV  Multiplanar attenuation corrected and non attenuation corrected PET images were acquired 60 minutes post injection  Contiguous, low dose, axial CT sections were obtained from the skull base through the femurs   Intravenous contrast material was not utilized  This examination, like all CT scans performed in the [facility], was performed utilizing techniques to minimize radiation dose exposure, including the use of iterative reconstruction and automated exposure control  Fasting serum glucose: 90 mg/dl FINDINGS: VISUALIZED BRAIN:   No acute abnormalities are seen  HEAD/NECK:   There is a physiologic distribution of FDG  No FDG avid cervical adenopathy is seen  CT images: Unremarkable  CHEST:   No FDG avid soft tissue lesions are seen  CT images: Mild coronary artery calcification  No pericardial effusion, no pleural effusion ABDOMEN:   There is a large hypermetabolic left lateral abdominal lobular mass situated immediately inferior to the spleen in the left paracolic gutter region, which measures 4 3 x 3 9 x 4 5 cm in size, SUV max 20 8  No other definite hypermetabolic abnormalities are seen within the upper abdomen  Activity at the bowel anastomotic site in the right abdomen on image 165 demonstrates SUV max of 3 8  This is nonspecific but may simply be inflammatory or physiologic  Direct visualization is recommended if not recently performed  CT images: No ascites  No hydronephrosis or bowel obstruction  PELVIS: Additional rectosigmoid anastomotic site noted image 212  No evidence of abnormal glucose activity  No evidence of glucose avid pelvic adenopathy  No pelvic ascites  OSSEOUS STRUCTURES: No FDG avid lesions are seen  CT images: No significant findings  Impression: 1   There is a large left paracolic gutter markedly hypermetabolic mass immediately inferior to the spleen, most consistent with metastatic colorectal carcinoma  2  Mildly increased activity at the right abdominal bowel anastomotic site  If not recently performed, direct visualization is recommended 3  There are no other glucose avid abnormalities identified within the abdomen or pelvis 4  No evidence of distant glucose avid metastatic disease in the neck, chest, or skeleton The examination demonstrates a significant  finding and was documented as such in [facility] for liaison and referring practitioner notification  Workstation performed: [id number]     Mammo screening bilateral w 3d & cad    Result Date: 10/14/2021  Narrative: DIAGNOSIS: Encounter for screening mammogram for malignant neoplasm of breast TECHNIQUE: Digital screening mammography was performed  Computer Aided Detection (CAD) analyzed all applicable images  COMPARISONS: Prior breast imaging dated: 06/26/2020, 05/06/2019, 10/18/2016, 10/14/2016, and 09/14/2015 RELEVANT HISTORY: Family Breast Cancer History: History of breast cancer in [address] [place] [address], Family  Family Medical History: Family medical history includes breast cancer in 2 relatives (cousin, family) and colon cancer in 2 relatives (family, paternal uncle)  Personal History: No known relevant hormone history  Surgical history includes lumpectomy  No known relevant medical history  The patient is scheduled in a reminder system for screening mammography  8-10% of cancers will be missed on mammography  Management of a palpable abnormality must be based on clinical grounds  Patients will be notified of their results via letter from our facility  Accredited by [employer] of [employer] and FDA  RISK ASSESSMENT: 5 Year Tyrer-Cuzick: 2 13 % 10 Year Tyrer-Cuzick: 4 17 % Lifetime Tyrer-Cuzick: 7 93 % TISSUE DENSITY: There are scattered areas of fibroglandular density  INDICATION: Filbert Brunner is a 78 y o  female presenting for screening mammography   FINDINGS: Breast parenchymal distribution is unchanged from priors including multiple focal asymmetries in the right breast   Long-term stability confirms benignancy  No developing asymmetries or concerning masses  Single upper-outer-anterior coarse calcification in the left breast is definitively benign  No suspicious microcalcifications, architectural distortion, skin thickening, or abnormalities of the nipples in either breast       Impression: No mammographic evidence of malignancy or significant change from priors  ASSESSMENT/BI-RADS CATEGORY: Left: 2 - Benign Right: 2 - Benign Overall: 2 - Benign RECOMMENDATION:      - Routine screening mammogram in 1 year for both breasts  Workstation ID: [id number]       Discussion/Summary: This is a 69-year-old female with a 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

## 2023-02-21 ENCOUNTER — HOSPITAL ENCOUNTER (OUTPATIENT)
Dept: INFUSION CENTER | Facility: HOSPITAL | Age: 68
Discharge: HOME | End: 2023-02-21

## 2023-02-21 VITALS — TEMPERATURE: 97.8 F

## 2023-02-21 DIAGNOSIS — C19 RECTOSIGMOID CANCER (HCC): Primary | ICD-10-CM

## 2023-02-23 ENCOUNTER — OFFICE VISIT (OUTPATIENT)
Dept: HEMATOLOGY ONCOLOGY | Facility: MEDICAL CENTER | Age: 68
End: 2023-02-23

## 2023-02-23 ENCOUNTER — TELEPHONE (OUTPATIENT)
Dept: OBGYN CLINIC | Facility: CLINIC | Age: 68
End: 2023-02-23

## 2023-02-23 VITALS
DIASTOLIC BLOOD PRESSURE: 82 MMHG | SYSTOLIC BLOOD PRESSURE: 136 MMHG | WEIGHT: 192 LBS | HEART RATE: 73 BPM | TEMPERATURE: 97.3 F | OXYGEN SATURATION: 99 % | RESPIRATION RATE: 18 BRPM | HEIGHT: 57 IN | BODY MASS INDEX: 41.42 KG/M2

## 2023-02-23 DIAGNOSIS — L73.1 INGROWN HAIR: ICD-10-CM

## 2023-02-23 DIAGNOSIS — C78.6 OMENTAL METASTASIS (HCC): ICD-10-CM

## 2023-02-23 DIAGNOSIS — G62.0 CHEMOTHERAPY-INDUCED NEUROPATHY (HCC): ICD-10-CM

## 2023-02-23 DIAGNOSIS — C19 RECTOSIGMOID CANCER (HCC): Primary | ICD-10-CM

## 2023-02-23 DIAGNOSIS — T45.1X5A CHEMOTHERAPY-INDUCED NEUROPATHY (HCC): ICD-10-CM

## 2023-02-23 DIAGNOSIS — E66.01 MORBID OBESITY (HCC): ICD-10-CM

## 2023-02-23 NOTE — PROGRESS NOTES
[address] HEMATOLOGY ONCOLOGY [facility] [place] [address] [facility] [address]  Oncology Progress Note  Concha Rodrgiuez, 1955, [id number]  2/23/2023    Assessment/Plan:   1  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 who progressed with omental and splenic metastases status post three months of neoadjuvant chemo followed by extensive abdominal resection with HIPEC, followed by an additional months of adjuvant chemotherapy  Patient is now ISIAH, 6 months on observation following the above treatment  Patient CEA levels remain stable  Patient will continue with every 4 month blood work and CT examinations  Patient follows with Dr Manisha Herrera as well as medical oncology  - CBC and differential; Future  - Comprehensive metabolic panel; Future  - CEA; Future    3  Chemotherapy-induced neuropathy (HCC)  Improved  Neuropathy is only significant with extremes and temperatures specifically, the cold  Most part, this does not impact patient's quality of life on a daily basis  This is improved from where it was previously  4  Ingrown hair  Patient's ingrown hairs on her pubic mound  Oncology has requested gynecology to weigh in on whether or not she should go to them or to seek care through an urgent care facility  5  Morbid obesity ([facility] )  Patient is in the process of weight loss  Patient's weight is less than what it was a year ago but up from where she has been her latest following chemotherapeutic interventions  Patient understands that it would be unrealistic to consider the chemotherapeutic weight loss sustainable  Patient will work on dietary habits and exercise  The patient is scheduled for follow-up in approximately 4 months with Dr Leidy Sauceda  Patient voiced agreement and understanding to the above     Patient knows to call the Hematology/Oncology office with any questions and concerns regarding the above  Goals and Barriers:    Current Goal:   Prolong Survival from Cancer  Barriers: None  Patient's Capacity to Self Care:  Patient able to self care  Shirley Hector PA-C  Medical Oncology/Hematology  [address]  ______________________________________________________________________________________________________________    Subjective     Chief Complaint   Patient presents with   • Follow-up     Omental metastasis        History of present illness/Cancer History:   Oncology History   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[facility] )   9/23/2019 Initial Diagnosis    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[facility] )     12/10/2019 Surgery    Low anterior resection (Dr Kylie Banegas):    A  Rectosigmoid colon, low anterior resection:  - Adenocarcinoma, moderately differentiated  - Tumor invades through the muscularis propria into pericolorectal tissue, T3  - Diverticula  - All margins are negative for tumor   - Thirty-four lymph nodes, negative for malignancy (0/34)  B  Colon, anastomotic rings, resection:  - Two portions of colon with no pathologic abnormality     12/10/2019 Genomic Testing    RRESULTS OF IMMUNOHISTOCHEMICAL ANALYSIS FOR MISMATCH REPAIR PROTEIN LOSS  INTERPRETATION: NO LOSS OF NUCLEAR EXPRESSION OF MMR PROTEINS: LOW PROBABILITY OF MSI-H  Note: Background non-neoplastic tissue and/or internal control with intact nuclear expression        RESULTS:  Antibody          Clone               Description                           Results  MLH1               M1                  Mismatch repair protein       Intact nuclear expression  MSH2              [id number]       Mismatch repair protein       Intact nuclear expression  MSH6              40                   Mismatch repair protein       Intact nuclear expression  PMS2              [id number]         Mismatch repair protein       Intact nuclear expression     8/18/2021 Progression    CEA trends- observed by Dr Ponce Campbell  2/17/21: 2 9  8/18/2021: 4 5  9/13/2021: 4 4    PET/CT  9/28/2021 completed due to elevating CEA  1  There is a large left paracolic gutter markedly hypermetabolic mass immediately inferior to the spleen, most consistent with metastatic colorectal carcinoma  2  Mildly increased activity at the right abdominal bowel anastomotic site  If not recently performed, direct visualization is recommended  3  There are no other glucose avid abnormalities identified within the abdomen or pelvis  4  No evidence of distant glucose avid metastatic disease in the neck, chest, or skeleton      11/12/2021 Biopsy    Omental mass:  - Metastatic adenocarcinoma, with an immunophenotype compatible with metastasis from patient's known colonic primary  12/30/2021 - 12/30/2021 Chemotherapy    CapOx x 1 dose of Oxaliplatin: D/c due to tolerance  1/2/2022 Genomic Testing         1/4/2022 - 3/17/2022 Chemotherapy    First 6 cycles of Folfox given prior to surgery- 1/4 through 3/17/2022     Folfox was dose reduced due to anticipated tolerance  Minimal side effects     3/22/2022 Observation    CT CAP  1  Decreased size of biopsy-proven omental metastasis, which now only focally contacting rather than grossly invading the spleen and adjacent abdominal wall  No new evidence of metastatic disease in the abdomen or pelvis  2   No new or suspicious pulmonary nodules  One of the previously noted new 1-2 mm nodules is no longer seen, and the other is unchanged  Recommend continued attention on follow-up  3   Top-normal thickness endometrial stripe measuring 7 mm  If there is history of vaginal bleeding, suggest catheterization with endometrial biopsy  4   Hepatic steatosis       4/29/2022 Surgery    Escobar Crum and Rae Rojas preformed the following procedures:   DIAGNOSTIC LAPAROSCOPY, TOTAL ABDOMINAL HYSTERECTOMY, BILATERAL SALPINGO-OOPHORECTOMY, EXTENSIVE ADHESIOLYSIS (N/A)  DIAGNOSTIC LAPAROSCOPY, OPEN OMENTECTOMY, POSSIBLE SPLENECTOMY (N/A)  INSTILLATION CHEMOTHERAPY INTRAPERITONEAL (HIPEC) LAPAROTOMY (N/A)  REPAIR DIAPHRAGM TEAR      A  Uterus, Bilateral Ovaries and Fallopian Tubes; Hysterosalpingo-oophorectomy:  - Leiomyoma  - Left ovary with serous cystadenoma  - Unremarkable cervix  - Benign inactive endometrium with no specific pathologic change  - Unremarkable right ovary and bilateral fallopian tubes  B  Spleen, spleen, omentum, darotis:  - Metastatic adenocarcinoma involving spleen and omentum, consistent with colonic primary  See Note  Note: Comparison to prior material ([id number], omental mass) reveals identical morphology to previous metastatic colonic adenocarcinoma  6/14/2022 - 8/23/2022 Chemotherapy    7th cycle started on 6/14/2022  8th cycle worsening neuropathy; could not tolerate gabapentin  D/lizette oxaliplatin  9th cycle Irinotecan added at 85% dose reduction: SE Diarrhea  10th cycle Irinotecan dose redcued to 50%     9/19/2022 Observation    9/19/2022 CT CAP:   1  Previously seen omental metastasis in the left upper quadrant has been resected  There is a small area of nodular soft tissue thickening abutting the lateral aspect of the left kidney, cannot exclude residual/recurrent tumor  Follow up in 4 months CEA not completed at time of scan        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[facility] )        Lab Results   Component Value Date    WBC 7 09 01/24/2023    HGB 12 7 01/24/2023    HCT 39 4 01/24/2023    MCV 89 01/24/2023     01/24/2023     Lab Results   Component Value Date     12/03/2016    SODIUM 137 01/24/2023    K 4 7 01/24/2023     01/24/2023    CO2 25 01/24/2023    AGAP 7 01/24/2023    BUN 29 (H) 01/24/2023    CREATININE 1 02 01/24/2023    GLUC 92 10/04/2022    GLUF 91 01/24/2023    CALCIUM 9 4 01/24/2023    AST 21 01/24/2023    ALT 22 01/24/2023    ALKPHOS 62 01/24/2023    PROT 6 6 12/03/2016    TP 7 0 01/24/2023    BILITOT 0 7 12/03/2016    TBILI 0 84 01/24/2023    EGFR 56 01/24/2023       Interval history: Generally patient feels well  Patient notes some pain in her right pubic area  Patient has had drainage from this 1 site  Patient showed to Dr Viviane Rod who believes this to be an ingrown hair  Patient is still dealing with this issue  ECO - Asymptomatic    Review of Systems   Constitutional: Negative for appetite change, fatigue, fever and unexpected weight change  HENT: Negative for nosebleeds  Respiratory: Negative for cough, choking and shortness of breath  Negative hemoptysis  Cardiovascular: Negative for chest pain, palpitations and leg swelling  Gastrointestinal: Negative  Negative for abdominal distention, abdominal pain, anal bleeding, blood in stool, constipation, diarrhea, nausea and vomiting  Endocrine: Negative  Negative for cold intolerance  Genitourinary: Negative  Negative for hematuria, menstrual problem, vaginal bleeding, vaginal discharge and vaginal pain  Musculoskeletal: Negative  Negative for arthralgias, myalgias, neck pain and neck stiffness  Skin: Negative  Negative for color change, pallor and rash  Allergic/Immunologic: Negative  Negative for immunocompromised state  Neurological: Negative  Negative for weakness and headaches  Hematological: Negative for adenopathy  Does not bruise/bleed easily  All other systems reviewed and are negative        Current Outpatient Medications:   •  apixaban (Eliquis) 5 mg, Take 1 tablet (5 mg total) by mouth 2 (two) times a day, Disp: 60 tablet, Rfl: 5  •  ezetimibe (ZETIA) 10 mg tablet, Take 1 tablet (10 mg total) by mouth daily, Disp: 90 tablet, Rfl: 1  •  famotidine (PEPCID) 10 mg tablet, Take 10 mg by mouth 2 (two) times a day as needed for heartburn, Disp: , Rfl:   Allergies   Allergen Reactions   • Medical Tape Rash     Skin irritation  Skin peeling  Skin irritation  Skin peeling  Blisters  Rash       Objective   /82 (BP Location: Left arm, Patient Position: Sitting, Cuff Size: Large) Pulse 73   Temp (!) 97 3 °F (36 3 °C) (Tympanic)   Resp 18   Ht 4' 9" (1 448 m)   Wt 87 1 kg (192 lb)   LMP 09/01/2011 (Approximate)   SpO2 99%   BMI 41 55 kg/m²   Wt Readings from Last 6 Encounters:   02/23/23 87 1 kg (192 lb)   02/02/23 84 8 kg (187 lb)   12/08/22 83 kg (183 lb)   10/18/22 82 2 kg (181 lb 3 2 oz)   09/27/22 82 1 kg (181 lb)   08/23/22 82 4 kg (181 lb 10 5 oz)     Physical Exam  Constitutional:       General: She is not in acute distress  Appearance: She is well-developed  HENT:      Head: Normocephalic and atraumatic  Mouth/Throat:      Pharynx: No oropharyngeal exudate  Eyes:      General: No scleral icterus  Pupils: Pupils are equal, round, and reactive to light  Cardiovascular:      Rate and Rhythm: Normal rate and regular rhythm  Heart sounds: No murmur heard  Pulmonary:      Effort: Pulmonary effort is normal  No respiratory distress  Breath sounds: Normal breath sounds  Abdominal:      General: Bowel sounds are normal  There is no distension  Palpations: Abdomen is soft  Tenderness: There is no abdominal tenderness  Musculoskeletal:         General: Normal range of motion  Cervical back: Normal range of motion  Lymphadenopathy:      Cervical: No cervical adenopathy  Skin:     General: Skin is warm  Coloration: Skin is not pale  Findings: No rash  Neurological:      Mental Status: She is alert and oriented to person, place, and time  Cranial Nerves: No cranial nerve deficit  Psychiatric:         Thought Content: Thought content normal          Pertinent Laboratory Results and Imaging Review:  No visits with results within 3 Week(s) from this visit     Latest known visit with results is:   Appointment on 01/24/2023   Component Date Value Ref Range Status   • CEA 01/24/2023 1 6  0 0 - 3 0 ng/mL Final    Normal/Non-Smoker: 0 0-3 0 ng/mL   Normal/Smoker:     3 1-5 0 ng/mL           The following historical data was reviewed:  Past Medical History:   Diagnosis Date   • Blood in stool    • Breast disorder    • Cancer [facility])     rectal   • Cancer determined by colorectal biopsy ([facility] )     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   • Colon polyp    • GERD (gastroesophageal reflux disease)    • History of chemotherapy 2021   • History of rectal surgery    • Hyperlipidemia    • PONV (postoperative nausea and vomiting)      Past Surgical History:   Procedure Laterality Date   • BREAST CYST EXCISION Right    •  SECTION      x3   • COLON SIGMOID RESECTION     • COLONOSCOPY     • DILATION AND CURETTAGE OF UTERUS     • ILEO LOOP DIVERSION N/A 12/10/2019    Procedure: ILEOSTOMY LOOP;  Surgeon: Segundo Corbin MD;  [facility]: [facility];  Service: Robotics   • INSTILLATION CHEMOTHERAPY INTRAPERITONEAL (HIPEC) LAPAROTOMY N/A 2022    Procedure: INSTILLATION CHEMOTHERAPY INTRAPERITONEAL (HIPEC) LAPAROTOMY;  Surgeon: Huseyin Sparks MD;  Location[facility];  Service: Surgical Oncology   • IR BIOPSY OMENTUM  2021   • IR PORT PLACEMENT  2021   • OMENTECTOMY N/A 2022    Procedure: DIAGNOSTIC LAPAROSCOPY, OPEN OMENTECTOMY, SPLENECTOMY, DIAPHRAGM REPAIR, CHEST TUBE INSERTION;  Surgeon: Huseyin Sparks MD;  [facility]: [facility];  Service: Surgical Oncology   • MS CLOSURE ENTEROSTOMY LG/SMALL INTESTINE N/A 2020    Procedure: CLOSURE ILEOSTOMY;  Surgeon: Segundo Corbin MD;  [facility];  Service: Colorectal   • MS LAPAROSCOPY COLECTOMY PARTIAL W/ANASTOMOSIS N/A 12/10/2019    Procedure: SIGMOID RESECTION COLON LAPAROSCOPIC W ROBOTICS converted to lap hand assisted  with Partial proctectomy , LASER FLUORESCENCE ANGIOGRAPHY, INTRA OP COLONOSCOPY;  Surgeon: Segundo Corbin MD;  [facility];  Service: Robotics   • MS TOTAL ABDOMINAL HYSTERECT W/WO RMVL TUBE OVARY N/A 2022    Procedure: DIAGNOSTIC LAPAROSCOPY, TOTAL ABDOMINAL HYSTERECTOMY, BILATERAL SALPINGO-OOPHORECTOMY, EXTENSIVE ADHESIOLYSIS; Surgeon: Vincent Domingo MD;  [facility];  Service: Gynecology Oncology   • RI UNLISTED PROCEDURE DIAPHRAGM  04/29/2022    Procedure: REPAIR DIAPHRAGM TEAR;  Surgeon: Cecille Garcia MD;  [facility];  Service: Thoracic   • SMALL INTESTINE SURGERY  02/04/2020    Procedure: RESECTION SMALL BOWEL;  Surgeon: Jordon Swenson MD;  [facility];  Service: Colorectal     Social History     Socioeconomic History   • Marital status:      Spouse name: None   • Number of children: None   • Years of education: None   • Highest education level: None   Occupational History   • None   Tobacco Use   • Smoking status: Never   • Smokeless tobacco: Never   Vaping Use   • Vaping Use: Never used   Substance and Sexual Activity   • Alcohol use: Yes     Comment: "occasionally"   • Drug use: Never   • Sexual activity: None   Other Topics Concern   • None   Social History Narrative   • None     Social Determinants of Health     Financial Resource Strain: Not on file   Food Insecurity: No Food Insecurity   • Worried About Running Out of Food in the Last Year: Never true   • Ran Out of Food in the Last Year: Never true   Transportation Needs: No Transportation Needs   • Lack of Transportation (Medical): No   • Lack of Transportation (Non-Medical):  No   Physical Activity: Not on file   Stress: Not on file   Social Connections: Not on file   Intimate Partner Violence: Not on file   Housing Stability: Unknown   • Unable to Pay for Housing in the Last Year: No   • Number of Places Lived in the Last Year: Not on file   • Unstable Housing in the Last Year: No     Family History   Problem Relation Age of Onset   • Hypertension Mother    • Heart attack Mother    • Heart attack Father    • Heart disease Father    • Hypertension Brother    • Heart attack Brother    • Colon cancer Paternal Uncle    • Leukemia Cousin    • Breast cancer Cousin    • Diabetes Cousin    • Breast cancer Cousin    • Colon cancer Family         paternal uncle   • Stroke Neg Hx        Please note: This report has been generated by a voice recognition software system  Therefore there may be syntax, spelling, and/or grammatical errors  Please call if you have any questions

## 2023-02-23 NOTE — TELEPHONE ENCOUNTER
T/c from [facility]  office Hematology /Oncology  Seen patient today c/o possible abcess in pelvic are that frequently oozes blood , requesting if one of our providers can take a look at it due to the area it is located , appointment scheduled with Dr Giovanny Carlton next week

## 2023-03-02 ENCOUNTER — OFFICE VISIT (OUTPATIENT)
Dept: OBGYN CLINIC | Facility: CLINIC | Age: 68
End: 2023-03-02

## 2023-03-02 VITALS — DIASTOLIC BLOOD PRESSURE: 88 MMHG | BODY MASS INDEX: 40.9 KG/M2 | SYSTOLIC BLOOD PRESSURE: 128 MMHG | WEIGHT: 189 LBS

## 2023-03-02 DIAGNOSIS — N90.7 VULVAR CYST: Primary | ICD-10-CM

## 2023-03-02 NOTE — PROGRESS NOTES
[facility]anger MD  03/02/23      Assessment/Plan:  Shaun Ball is a 74 yo female with recurrent cyst on mons that will drain- we reviewed based on exam at is currently healing but underlying cyst can be palpated and subcentimeter- Shaun Ball would like this removed  We reviewed in office removal under local anesthesia and risks including bleeding (in her case increased given Eliquis use), pain, infection, recurrence of cyst and recurrence in other areas  We reviewed alternatives such as warm soaks and expectant management  She would like attempt at removal today in office which was completed under local anesthesia without incident  We reviewed keeping the area clean and dry and infection bleeding precautions to return call with  Subjective:      Patient ID: Vineet Arnett is a 75 y o  female  HPI  Shaun Ball is a 74 yo F presenting for recurrent cyst on mons that begins to drain and becomes very sore and painful- lesion has been off and on since November 2022 and it does not seen to fully go away  Currently area is feeling better but would very much so like something to be done given recurrence and discomfort it causes  Shaun Ball has a history significant for prior hysterectomy, colorectal cancer and stage 3 kidney disease- she is on eliquis for VTE ppx  The following portions of the patient's history were reviewed and updated as appropriate: allergies, current medications, past family history, past medical history, past social history, past surgical history and problem list     Review of Systems    Per HPI    Objective:    /88 (BP Location: Right arm, Patient Position: Sitting, Cuff Size: Large)   Wt 85 7 kg (189 lb)   LMP 09/01/2011 (Approximate)   BMI 40 90 kg/m²    Physical Exam  Vitals reviewed  Constitutional:       General: She is not in acute distress  Appearance: She is well-developed  She is obese  She is not ill-appearing or diaphoretic     HENT: Head: Normocephalic and atraumatic  Cardiovascular:      Rate and Rhythm: Normal rate and regular rhythm  Pulmonary:      Effort: Pulmonary effort is normal  No respiratory distress  Abdominal:      Palpations: Abdomen is soft  Tenderness: There is no abdominal tenderness  There is no guarding or rebound  Comments: Midline incision   Genitourinary:     Vagina: Normal        Musculoskeletal:      Cervical back: Normal range of motion and neck supple  Skin:     General: Skin is warm and dry  Neurological:      Mental Status: She is alert and oriented to person, place, and time  Psychiatric:         Mood and Affect: Mood normal          Behavior: Behavior normal            Biopsy    Date/Time: 3/2/2023 12:50 PM  Performed by: Nevaeh Christian MD  Authorized by: Nevaeh Christian MD   Universal Protocol:  Consent: Verbal consent obtained  Risks and benefits: risks, benefits and alternatives were discussed  Consent given by: patient      Procedure Details - Lesion Biopsy: Body area: Anogenital    Anogenital location: mons pubis  Vaginal Lesion: No      Biopsy method: wedge biopsy      Biopsy tissue type: skin and subcutaneous    Initial size (mm):  8    Final defect size (mm):  10    Malignancy: benign lesion       Area cleansed with alcohol and 2cc ropivicaine injected to site for anesthesia  The area was cleansed with betadine and incised with 11 blade- cyst wall grasped and removed with surrounding subcutaneous tissue- bleeding well controlled  Area reapproximated with 3 interrupted sutures of 4-0 vicryl   Good hemostasis noted thereafter

## 2023-03-16 ENCOUNTER — TELEPHONE (OUTPATIENT)
Dept: HEMATOLOGY ONCOLOGY | Facility: CLINIC | Age: 68
End: 2023-03-16

## 2023-03-16 NOTE — TELEPHONE ENCOUNTER
Patient is scheduled for cataract surgery and is scheduled 3/29/2023 and 4/12/2023  And eye doctor wants to stop eliquis for 5 days prior to each surgery    Will discuss with Dr Jasiel Campbell and get back to patient

## 2023-03-20 NOTE — TELEPHONE ENCOUNTER
D/W Dr William Medrano  The most the eliquis can be stopped is 2 days prior to cataract surgery  Patient aware

## 2023-03-21 ENCOUNTER — OFFICE VISIT (OUTPATIENT)
Dept: FAMILY MEDICINE CLINIC | Facility: CLINIC | Age: 68
End: 2023-03-21

## 2023-03-21 VITALS
BODY MASS INDEX: 41.38 KG/M2 | RESPIRATION RATE: 16 BRPM | SYSTOLIC BLOOD PRESSURE: 138 MMHG | DIASTOLIC BLOOD PRESSURE: 80 MMHG | HEART RATE: 80 BPM | WEIGHT: 191.8 LBS | OXYGEN SATURATION: 99 % | HEIGHT: 57 IN | TEMPERATURE: 97.9 F

## 2023-03-21 DIAGNOSIS — Z23 NEED FOR VACCINATION: ICD-10-CM

## 2023-03-21 DIAGNOSIS — N18.30 STAGE 3 CHRONIC KIDNEY DISEASE, UNSPECIFIED WHETHER STAGE 3A OR 3B CKD (HCC): ICD-10-CM

## 2023-03-21 DIAGNOSIS — Z78.9 STATIN INTOLERANCE: ICD-10-CM

## 2023-03-21 DIAGNOSIS — H26.9 CATARACT OF BOTH EYES, UNSPECIFIED CATARACT TYPE: ICD-10-CM

## 2023-03-21 DIAGNOSIS — Q89.01 ASPLENIA: ICD-10-CM

## 2023-03-21 DIAGNOSIS — Z01.818 PRE-OP EXAM: ICD-10-CM

## 2023-03-21 DIAGNOSIS — Z00.00 MEDICARE ANNUAL WELLNESS VISIT, SUBSEQUENT: ICD-10-CM

## 2023-03-21 DIAGNOSIS — K21.9 GASTROESOPHAGEAL REFLUX DISEASE, UNSPECIFIED WHETHER ESOPHAGITIS PRESENT: ICD-10-CM

## 2023-03-21 DIAGNOSIS — C19 RECTOSIGMOID CANCER (HCC): ICD-10-CM

## 2023-03-21 DIAGNOSIS — D69.6 PLATELETS DECREASED (HCC): ICD-10-CM

## 2023-03-21 DIAGNOSIS — R73.03 PREDIABETES: ICD-10-CM

## 2023-03-21 DIAGNOSIS — Z78.0 POST-MENOPAUSAL: ICD-10-CM

## 2023-03-21 DIAGNOSIS — M85.80 OSTEOPENIA, UNSPECIFIED LOCATION: ICD-10-CM

## 2023-03-21 DIAGNOSIS — E78.49 OTHER HYPERLIPIDEMIA: Primary | ICD-10-CM

## 2023-03-21 DIAGNOSIS — I82.441 ACUTE EMBOLISM AND THROMBOSIS OF RIGHT TIBIAL VEIN (HCC): ICD-10-CM

## 2023-03-21 RX ORDER — PREDNISOLONE ACETATE 10 MG/ML
SUSPENSION/ DROPS OPHTHALMIC
COMMUNITY
Start: 2023-03-15

## 2023-03-21 RX ORDER — EZETIMIBE 10 MG/1
10 TABLET ORAL DAILY
Qty: 90 TABLET | Refills: 1 | Status: SHIPPED | OUTPATIENT
Start: 2023-03-21

## 2023-03-21 RX ORDER — OFLOXACIN 3 MG/ML
SOLUTION/ DROPS OPHTHALMIC
COMMUNITY
Start: 2023-03-15

## 2023-03-21 NOTE — PROGRESS NOTES
FAMILY PRACTICE PRE-OPERATIVE EVALUATION  [facility]    NAME: Justen Samayoa  AGE: 70 y o  SEX: female  : 1955     DATE: 3/21/2023    Family Practice Pre-Operative Evaluation      Chief Complaint: Pre-operative Evaluation     Surgery: bilateral cataract surgery  Anticipated Date of Surgery: right eye on 3/29/2023 and left eye 2023  Surgeon: [facility]     History of Present Illness:     HPI  Patient is here to follow up on chronic conditions before upcoming surgery  Denies chest pain, sob, headache and dizziness  Complaint with medications and tolerating it well  Last blood work reviewed  Stated that on schedule for colonoscopies every 2 years and uptodate  Follows with oncologist regularly as advised  Immunizations reviewed and administered as needed  On eliquis for DVT and will be holding 2 days before procedure as per hematologist      Anesthesia:  IV sedation with MAC  Bleeding Risk: no recent abnormal bleeding, no remote history of abnormal bleeding and no use of Ca-channel blockers  Current Anti-platelet/anticoagulation medication: Apixaban (Eliquis)    Assessment of Cardiac Risk:  Denies unstable or severe angina or MI in the last 6 weeks or history of stent placement in the last year   Denies decompensated heart failure (e g  New onset heart failure, NYHA functional class IV heart failure, or worsening existing heart failure)  Denies significant arrhythmias such as high grade AV block, symptomatic ventricular arrhythmia, newly recognized ventricular tachycardia, supraventricular tachycardia with resting heart rate >100, or symptomatic bradycardia  Denies severe heart valve disease including aortic stenosis or symptomatic mitral stenosis       Prior Anesthesia Reactions: No     Personal history of venous thromboembolic disease? Yes    History of steroid use for >2 weeks within last year?  No         Review of Systems:     Review of Systems   HENT: Negative  Eyes: Positive for visual disturbance  Respiratory: Negative  Cardiovascular: Negative  Gastrointestinal: Negative  Genitourinary: Negative  Skin: Negative for rash  Neurological: Negative  Psychiatric/Behavioral: Negative  Current Problem List:     Patient Active Problem List   Diagnosis   • Callus of foot   • Foot pain   • GERD (gastroesophageal reflux disease)   • Hyperlipidemia   • Prediabetes   • Varicose veins with pain   • Morbid obesity ([facility] [address] )   • 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HCC)   • Dyspnea on exertion   • Dyslipidemia   • Sigmoid diverticulosis   • PONV (postoperative nausea and vomiting)   • 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HCC)   • Lesion of ovary   • Chemotherapy adverse reaction   • Chemotherapy-induced nausea   • Platelets decreased (HCC)   • Stage 3 chronic kidney disease, unspecified whether stage 3a or 3b CKD (HCC)   • H/O splenectomy   • Asplenia   • Postoperative state   • Acute embolism and thrombosis of right tibial vein (HCC)       Allergies:      Allergies   Allergen Reactions   • Medical Tape Rash     Skin irritation  Skin peeling  Skin irritation  Skin peeling  Blisters  Rash       Current Medications:       Current Outpatient Medications:   •  apixaban (Eliquis) 5 mg, Take 1 tablet (5 mg total) by mouth 2 (two) times a day, Disp: 60 tablet, Rfl: 5  •  ezetimibe (ZETIA) 10 mg tablet, Take 1 tablet (10 mg total) by mouth daily, Disp: 90 tablet, Rfl: 1  •  famotidine (PEPCID) 10 mg tablet, Take 10 mg by mouth 2 (two) times a day as needed for heartburn, Disp: , Rfl:   •  ofloxacin (OCUFLOX) 0 3 % ophthalmic solution, , Disp: , Rfl:   •  prednisoLONE acetate (PRED FORTE) 1 % ophthalmic suspension, , Disp: , Rfl:     Past Medical History:       Past Medical History:   Diagnosis Date   • Blood in stool    • Breast disorder    • [facility] )     rectal   • Cancer determined by colorectal biopsy (HCC)     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   • Colon polyp    • GERD (gastroesophageal reflux disease)    • History of chemotherapy 2021   • History of rectal surgery    • Hyperlipidemia    • PONV (postoperative nausea and vomiting)         Past Surgical History:   Procedure Laterality Date   • BREAST CYST EXCISION Right    •  SECTION      x3   • COLON SIGMOID RESECTION     • COLONOSCOPY     • DILATION AND CURETTAGE OF UTERUS     • ILEO LOOP DIVERSION N/A 12/10/2019    Procedure: ILEOSTOMY LOOP;  Surgeon: Samina Dc MD;  [facility];  Service: Robotics   • INSTILLATION CHEMOTHERAPY INTRAPERITONEAL (HIPEC) LAPAROTOMY N/A 2022    Procedure: INSTILLATION CHEMOTHERAPY INTRAPERITONEAL (HIPEC) LAPAROTOMY;  Surgeon: Jacob Osborne MD;  [facility];  Service: Surgical Oncology   • IR BIOPSY OMENTUM  2021   • IR PORT PLACEMENT  2021   • OMENTECTOMY N/A 2022    Procedure: DIAGNOSTIC LAPAROSCOPY, OPEN OMENTECTOMY, SPLENECTOMY, DIAPHRAGM REPAIR, CHEST TUBE INSERTION;  Surgeon: Jacob Osborne MD;  [facility];  Service: Surgical Oncology   • AR CLOSURE ENTEROSTOMY LG/SMALL INTESTINE N/A 2020    Procedure: CLOSURE ILEOSTOMY;  Surgeon: Samina Dc MD;  [facility];  Service: Colorectal   • AR LAPAROSCOPY COLECTOMY PARTIAL W/ANASTOMOSIS N/A 12/10/2019    Procedure: SIGMOID RESECTION COLON LAPAROSCOPIC W ROBOTICS converted to lap hand assisted  with Partial proctectomy , LASER FLUORESCENCE ANGIOGRAPHY, INTRA OP COLONOSCOPY;  Surgeon: Samina Dc MD;  [facility];  Service: Robotics   • AR TOTAL ABDOMINAL HYSTERECT W/WO RMVL TUBE OVARY N/A 2022    Procedure: DIAGNOSTIC LAPAROSCOPY, TOTAL ABDOMINAL HYSTERECTOMY, BILATERAL SALPINGO-OOPHORECTOMY, EXTENSIVE ADHESIOLYSIS;  Surgeon: Denver Kitten, MD;  [facility];  Service: Gynecology Oncology   • AR UNLISTED PROCEDURE DIAPHRAGM  2022    Procedure: REPAIR DIAPHRAGM TEAR;  Surgeon: Varsha Valenzuela MD;  [facility];  Service: Thoracic   • SMALL INTESTINE SURGERY  02/04/2020    Procedure: RESECTION SMALL BOWEL;  Surgeon: Margoth Castro MD;  [facility];  Service: Colorectal        Family History   Problem Relation Age of Onset   • Hypertension Mother    • Heart attack Mother    • Heart attack Father    • Heart disease Father    • Hypertension Brother    • Heart attack Brother    • Colon cancer Paternal Uncle    • Leukemia Cousin    • Breast cancer Cousin    • Diabetes Cousin    • Breast cancer Cousin    • Colon cancer Family         paternal uncle   • Stroke Neg Hx         Social History     Socioeconomic History   • Marital status:      Spouse name: Not on file   • Number of children: Not on file   • Years of education: Not on file   • Highest education level: Not on file   Occupational History   • Not on file   Tobacco Use   • Smoking status: Never   • Smokeless tobacco: Never   Vaping Use   • Vaping Use: Never used   Substance and Sexual Activity   • Alcohol use: Yes     Comment: "occasionally"   • Drug use: Never   • Sexual activity: Not Currently   Other Topics Concern   • Not on file   Social History Narrative   • Not on file     Social Determinants of Health     Financial Resource Strain: Low Risk    • Difficulty of Paying Living Expenses: Not hard at all   Food Insecurity: No Food Insecurity   • Worried About Running Out of Food in the Last Year: Never true   • Ran Out of Food in the Last Year: Never true   Transportation Needs: No Transportation Needs   • Lack of Transportation (Medical): No   • Lack of Transportation (Non-Medical):  No   Physical Activity: Not on file   Stress: Not on file   Social Connections: Not on file   Intimate Partner Violence: Not on file   Housing Stability: Unknown   • Unable to Pay for Housing in the Last Year: No   • Number of Places Lived in the Last Year: Not on file   • Unstable Housing in the Last Year: No        Physical Exam:     /80   Pulse 80 Temp 97 9 °F (36 6 °C) (Tympanic)   Resp 16   Ht 4' 9" (1 448 m)   Wt 87 kg (191 lb 12 8 oz)   LMP 09/01/2011 (Approximate)   SpO2 99%   BMI 41 51 kg/m²     Physical Exam  Constitutional:       Appearance: She is obese  HENT:      Head: Normocephalic and atraumatic  Nose: Nose normal    Eyes:      Conjunctiva/sclera: Conjunctivae normal    Cardiovascular:      Rate and Rhythm: Normal rate and regular rhythm  Pulses: Normal pulses  Heart sounds: Normal heart sounds  Pulmonary:      Effort: Pulmonary effort is normal       Breath sounds: Normal breath sounds  Skin:     General: Skin is warm and dry  Findings: No rash  Neurological:      Mental Status: She is alert and oriented to person, place, and time  Psychiatric:         Mood and Affect: Mood normal          Behavior: Behavior normal          Thought Content: Thought content normal          Judgment: Judgment normal           Data:     Pre-operative work-up    Laboratory Results: I have personally reviewed the pertinent laboratory results/reports      EKG: no EKG ordered by surgeon    No results found for this or any previous visit (from the past 672 hour(s))          Assessment & Recommendations:     Problem List Items Addressed This Visit        Digestive    GERD (gastroesophageal reflux disease)    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[facility] [address] )       Cardiovascular and Mediastinum    Acute embolism and thrombosis of right tibial vein (HCC)       Genitourinary    Stage 3 chronic kidney disease, unspecified whether stage 3a or 3b CKD ([facility] [address] )       Other    Hyperlipidemia - Primary    Relevant Medications    ezetimibe (ZETIA) 10 mg tablet    Other Relevant Orders    Lipid Panel with Direct LDL reflex    Prediabetes    Relevant Orders    HEMOGLOBIN A1C W/ EAG ESTIMATION    Platelets decreased (HCC)    Asplenia    Relevant Orders    MENINGOCOCCAL ACYW-135 TT CONJUGATE (Completed)    MENINGOCOCCAL B RECOMBINANT (Completed)   Other Visit Diagnoses Medicare annual wellness visit, subsequent        Pre-op exam        Cataract of both eyes, unspecified cataract type        Relevant Medications    ofloxacin (OCUFLOX) 0 3 % ophthalmic solution    prednisoLONE acetate (PRED FORTE) 1 % ophthalmic suspension    Need for vaccination        Relevant Orders    MENINGOCOCCAL ACYW-135 TT CONJUGATE (Completed)    MENINGOCOCCAL B RECOMBINANT (Completed)    Osteopenia, unspecified location        Relevant Orders    DXA bone density spine hip and pelvis    Post-menopausal        Relevant Orders    DXA bone density spine hip and pelvis    Statin intolerance        Relevant Medications    ezetimibe (ZETIA) 10 mg tablet          Pre-Op Evaluation Assessment  70 y o  female with planned surgery: bilateral cataract surgery   Known risk factors for perioperative complications: Renal dysfunction  Current medications which may produce withdrawal symptoms if withheld perioperatively: none  Pre-Op Evaluation Plan  1  Further preoperative workup as follows:   - None; no further preoperative work-up is required    2  Medication Management/Recommendations:   - Patient has been instructed to avoid herbs or non-directed vitamins the week prior to surgery to ensure no drug interactions with perioperative surgical and anesthetic medications  - Patient has been instructed to avoid aspirin containing medications or non-steroidal anti-inflammatory drugs for the week preceding surgery  Will hold eliquis 2 days before surgery as per hematologist     3  Patient requires further consultation with: None      Clearance  Patient is CLEARED for surgery without any additional cardiac testing       Nathan Torres [facility]  [address] [place] [address]  [place] [address] [place] [id number]  Phone#  [phone]  Fax#  [phone]

## 2023-03-21 NOTE — PATIENT INSTRUCTIONS
Medicare Preventive Visit Patient Instructions  Thank you for completing your Welcome to Medicare Visit or Medicare Annual Wellness Visit today  Your next wellness visit will be due in one year (3/21/2024)  The screening/preventive services that you may require over the next 5-10 years are detailed below  Some tests may not apply to you based off risk factors and/or age  Screening tests ordered at today's visit but not completed yet may show as past due  Also, please note that scanned in results may not display below  Preventive Screenings:  Service Recommendations Previous Testing/Comments   Colorectal Cancer Screening  * Colonoscopy    * Fecal Occult Blood Test (FOBT)/Fecal Immunochemical Test (FIT)  * Fecal DNA/Cologuard Test  * Flexible Sigmoidoscopy Age: 39-70 years old   Colonoscopy: every 10 years (may be performed more frequently if at higher risk)  OR  FOBT/FIT: every 1 year  OR  Cologuard: every 3 years  OR  Sigmoidoscopy: every 5 years  Screening may be recommended earlier than age 39 if at higher risk for colorectal cancer  Also, an individualized decision between you and your healthcare provider will decide whether screening between the ages of 74-80 would be appropriate  Colonoscopy: 10/06/2021  FOBT/FIT: Not on file  Cologuard: Not on file  Sigmoidoscopy: Not on file    History Colorectal Cancer     Breast Cancer Screening Age: 36 years old  Frequency: every 1-2 years  Not required if history of left and right mastectomy Mammogram: 12/08/2022    Screening Current   Cervical Cancer Screening Between the ages of 21-29, pap smear recommended once every 3 years  Between the ages of 33-67, can perform pap smear with HPV co-testing every 5 years     Recommendations may differ for women with a history of total hysterectomy, cervical cancer, or abnormal pap smears in past  Pap Smear: 04/01/2019    Screening Not Indicated   Hepatitis C Screening Once for adults born between 1945 and 1965  More frequently in patients at high risk for Hepatitis C Hep C Antibody: 04/15/2019    Screening Current   Diabetes Screening 1-2 times per year if you're at risk for diabetes or have pre-diabetes Fasting glucose: 91 mg/dL (1/24/2023)  A1C: 5 3 % (4/15/2022)  Screening Not Indicated  History Diabetes   Cholesterol Screening Once every 5 years if you don't have a lipid disorder  May order more often based on risk factors  Lipid panel: 03/22/2022    Screening Not Indicated  History Lipid Disorder     Other Preventive Screenings Covered by Medicare:  1  Abdominal Aortic Aneurysm (AAA) Screening: covered once if your at risk  You're considered to be at risk if you have a family history of AAA  2  Lung Cancer Screening: covers low dose CT scan once per year if you meet all of the following conditions: (1) Age 50-69; (2) No signs or symptoms of lung cancer; (3) Current smoker or have quit smoking within the last 15 years; (4) You have a tobacco smoking history of at least 20 pack years (packs per day multiplied by number of years you smoked); (5) You get a written order from a healthcare provider  3  Glaucoma Screening: covered annually if you're considered high risk: (1) You have diabetes OR (2) Family history of glaucoma OR (3)  aged 48 and older OR (3)  American aged 72 and older  3  Osteoporosis Screening: covered every 2 years if you meet one of the following conditions: (1) You're estrogen deficient and at risk for osteoporosis based off medical history and other findings; (2) Have a vertebral abnormality; (3) On glucocorticoid therapy for more than 3 months; (4) Have primary hyperparathyroidism; (5) On osteoporosis medications and need to assess response to drug therapy  · Last bone density test (DXA Scan): 10/13/2021   5  HIV Screening: covered annually if you're between the age of 15-65  Also covered annually if you are younger than 13 and older than 72 with risk factors for HIV infection   For pregnant patients, it is covered up to 3 times per pregnancy  Immunizations:  Immunization Recommendations   Influenza Vaccine Annual influenza vaccination during flu season is recommended for all persons aged >= 6 months who do not have contraindications   Pneumococcal Vaccine   * Pneumococcal conjugate vaccine = PCV13 (Prevnar 13), PCV15 (Vaxneuvance), PCV20 (Prevnar 20)  * Pneumococcal polysaccharide vaccine = PPSV23 (Pneumovax) Adults 25-60 years old: 1-3 doses may be recommended based on certain risk factors  Adults 72 years old: 1-2 doses may be recommended based off what pneumonia vaccine you previously received   Hepatitis B Vaccine 3 dose series if at intermediate or high risk (ex: diabetes, end stage renal disease, liver disease)   Tetanus (Td) Vaccine - COST NOT COVERED BY MEDICARE PART B Following completion of primary series, a booster dose should be given every 10 years to maintain immunity against tetanus  Td may also be given as tetanus wound prophylaxis  Tdap Vaccine - COST NOT COVERED BY MEDICARE PART B Recommended at least once for all adults  For pregnant patients, recommended with each pregnancy  Shingles Vaccine (Shingrix) - COST NOT COVERED BY MEDICARE PART B  2 shot series recommended in those aged 48 and above     Health Maintenance Due:      Topic Date Due   • Cervical Cancer Screening  04/01/2022   • Breast Cancer Screening: Mammogram  12/08/2023   • Hepatitis C Screening  Completed     Immunizations Due:      Topic Date Due   • COVID-19 Vaccine (4 - Booster for Pfizer series) 12/22/2021   • Meningococcal ACWY Vaccine (2 - Risk 2-dose series) 06/30/2022   • Influenza Vaccine (1) 09/01/2022     Advance Directives   What are advance directives? Advance directives are legal documents that state your wishes and plans for medical care  These plans are made ahead of time in case you lose your ability to make decisions for yourself   Advance directives can apply to any medical decision, such as the treatments you want, and if you want to donate organs  What are the types of advance directives? There are many types of advance directives, and each state has rules about how to use them  You may choose a combination of any of the following:  · Living will: This is a written record of the treatment you want  You can also choose which treatments you do not want, which to limit, and which to stop at a certain time  This includes surgery, medicine, IV fluid, and tube feedings  · Durable power of  for healthcare [facility]): This is a written record that states who you want to make healthcare choices for you when you are unable to make them for yourself  This person, called a proxy, is usually a family member or a friend  You may choose more than 1 proxy  · Do not resuscitate (DNR) order:  A DNR order is used in case your heart stops beating or you stop breathing  It is a request not to have certain forms of treatment, such as CPR  A DNR order may be included in other types of advance directives  · Medical directive: This covers the care that you want if you are in a coma, near death, or unable to make decisions for yourself  You can list the treatments you want for each condition  Treatment may include pain medicine, surgery, blood transfusions, dialysis, IV or tube feedings, and a ventilator (breathing machine)  · Values history: This document has questions about your views, beliefs, and how you feel and think about life  This information can help others choose the care that you would choose  Why are advance directives important? An advance directive helps you control your care  Although spoken wishes may be used, it is better to have your wishes written down  Spoken wishes can be misunderstood, or not followed  Treatments may be given even if you do not want them  An advance directive may make it easier for your family to make difficult choices about your care     Weight Management   Why it is important to manage your weight:  Being overweight increases your risk of health conditions such as heart disease, high blood pressure, type 2 diabetes, and certain types of cancer  It can also increase your risk for osteoarthritis, sleep apnea, and other respiratory problems  Aim for a slow, steady weight loss  Even a small amount of weight loss can lower your risk of health problems  How to lose weight safely:  A safe and healthy way to lose weight is to eat fewer calories and get regular exercise  You can lose up about 1 pound a week by decreasing the number of calories you eat by 500 calories each day  Healthy meal plan for weight management:  A healthy meal plan includes a variety of foods, contains fewer calories, and helps you stay healthy  A healthy meal plan includes the following:  · Eat whole-grain foods more often  A healthy meal plan should contain fiber  Fiber is the part of grains, fruits, and vegetables that is not broken down by your body  Whole-grain foods are healthy and provide extra fiber in your diet  Some examples of whole-grain foods are whole-wheat breads and pastas, oatmeal, brown rice, and bulgur  · Eat a variety of vegetables every day  Include dark, leafy greens such as spinach, kale, devin greens, and mustard greens  Eat yellow and orange vegetables such as carrots, sweet potatoes, and winter squash  · Eat a variety of fruits every day  Choose fresh or canned fruit (canned in its own juice or light syrup) instead of juice  Fruit juice has very little or no fiber  · Eat low-fat dairy foods  Drink fat-free (skim) milk or 1% milk  Eat fat-free yogurt and low-fat cottage cheese  Try low-fat cheeses such as mozzarella and other reduced-fat cheeses  · Choose meat and other protein foods that are low in fat  Choose beans or other legumes such as split peas or lentils  Choose fish, skinless poultry (chicken or turkey), or lean cuts of red meat (beef or pork)   Before you cook meat or poultry, cut off any visible fat  · Use less fat and oil  Try baking foods instead of frying them  Add less fat, such as margarine, sour cream, regular salad dressing and mayonnaise to foods  Eat fewer high-fat foods  Some examples of high-fat foods include french fries, doughnuts, ice cream, and cakes  · Eat fewer sweets  Limit foods and drinks that are high in sugar  This includes candy, cookies, regular soda, and sweetened drinks  Exercise:  Exercise at least 30 minutes per day on most days of the week  Some examples of exercise include walking, biking, dancing, and swimming  You can also fit in more physical activity by taking the stairs instead of the elevator or parking farther away from stores  Ask your healthcare provider about the best exercise plan for you  © Copyright [address] 2018 Information is for End User's use only and may not be sold, redistributed or otherwise used for commercial purposes   All illustrations and images included in CareNotes® are the copyrighted property of A D [employer] M [employer]  or [address] [place] [address]

## 2023-03-21 NOTE — PROGRESS NOTES
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[facility] )       Cardiovascular and Mediastinum    Acute embolism and thrombosis of right tibial vein (HCC)       Genitourinary    Stage 3 chronic kidney disease, unspecified whether stage 3a or 3b CKD ([facility] )       Other    Hyperlipidemia - Primary    Relevant Medications    ezetimibe (ZETIA) 10 mg tablet    Other Relevant Orders    Lipid Panel with Direct LDL reflex    Prediabetes    Relevant Orders    HEMOGLOBIN A1C W/ EAG ESTIMATION    Platelets decreased (HCC)    Asplenia    Relevant Orders    MENINGOCOCCAL ACYW-135 TT CONJUGATE (Completed)    MENINGOCOCCAL B RECOMBINANT (Completed)   Other Visit Diagnoses     Medicare annual wellness visit, subsequent        Pre-op exam        Cataract of both eyes, unspecified cataract type        Relevant Medications    ofloxacin (OCUFLOX) 0 3 % ophthalmic solution    prednisoLONE acetate (PRED FORTE) 1 % ophthalmic suspension    Need for vaccination        Relevant Orders    MENINGOCOCCAL ACYW-135 TT CONJUGATE (Completed)    MENINGOCOCCAL B RECOMBINANT (Completed)    Osteopenia, unspecified location        Relevant Orders    DXA bone density spine hip and pelvis    Post-menopausal        Relevant Orders    DXA bone density spine hip and pelvis    Statin intolerance        Relevant Medications    ezetimibe (ZETIA) 10 mg tablet        BMI Counseling: Body mass index is 41 51 kg/m²  The BMI is above normal  Nutrition recommendations include decreasing portion sizes, encouraging healthy choices of fruits and vegetables, decreasing fast food intake, consuming healthier snacks, limiting drinks that contain sugar, moderation in carbohydrate intake, increasing intake of lean protein, reducing intake of saturated and trans fat and reducing intake of cholesterol  Exercise recommendations include exercising 3-5 times per week   Rationale for BMI follow-up plan is due to patient being overweight or obese  Depression Screening and Follow-up Plan: Patient was screened for depression during today's encounter  They screened negative with a PHQ-2 score of 0  Preventive health issues were discussed with patient, and age appropriate screening tests were ordered as noted in patient's After Visit Summary  Personalized health advice and appropriate referrals for health education or preventive services given if needed, as noted in patient's After Visit Summary  History of Present Illness:     Patient presents for a Medicare Wellness Visit    HPI   Patient Care Team:  Neeta Bourne as PCP - General (Family Medicine)  Bard Mona MD as PCP - [address] [place] [address] (RTE)  Bertram Jade MD as PCP - [facility] [place] (RTE)  MD Barbara Diaz MD (Colon and Rectal Surgery)  Evette Andrews MD (Surgical Oncology)     Review of Systems:     Review of Systems   HENT: Negative  Eyes: Positive for visual disturbance  Respiratory: Negative  Cardiovascular: Negative  Gastrointestinal: Negative  Genitourinary: Negative  Skin: Negative  Neurological: Negative  Psychiatric/Behavioral: Negative           Problem List:     Patient Active Problem List   Diagnosis   • Callus of foot   • Foot pain   • GERD (gastroesophageal reflux disease)   • Hyperlipidemia   • Prediabetes   • Varicose veins with pain   • Morbid obesity ([address] )   • 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HCC)   • Dyspnea on exertion   • Dyslipidemia   • Sigmoid diverticulosis   • PONV (postoperative nausea and vomiting)   • 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HCC)   • Lesion of ovary   • Chemotherapy adverse reaction   • Chemotherapy-induced nausea   • Platelets decreased (HCC)   • Stage 3 chronic kidney disease, unspecified whether stage 3a or 3b CKD (HCC)   • H/O splenectomy   • Asplenia   • Postoperative state   • Acute embolism and thrombosis of right tibial vein ([address] )      Past Medical and Surgical History:     Past Medical History:   Diagnosis Date   • Blood in stool    • Breast disorder    • Cancer [facility])     rectal   • Cancer determined by colorectal biopsy ([facility] )     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   • Colon polyp    • GERD (gastroesophageal reflux disease)    • History of chemotherapy 2021   • History of rectal surgery    • Hyperlipidemia    • PONV (postoperative nausea and vomiting)      Past Surgical History:   Procedure Laterality Date   • BREAST CYST EXCISION Right    •  SECTION      x3   • COLON SIGMOID RESECTION     • COLONOSCOPY     • DILATION AND CURETTAGE OF UTERUS     • ILEO LOOP DIVERSION N/A 12/10/2019    Procedure: ILEOSTOMY LOOP;  Surgeon: Erickson Lilly MD;  [facility];  Service: Robotics   • INSTILLATION CHEMOTHERAPY INTRAPERITONEAL (HIPEC) LAPAROTOMY N/A 2022    Procedure: INSTILLATION CHEMOTHERAPY INTRAPERITONEAL (HIPEC) LAPAROTOMY;  Surgeon: Chace Doe MD;  [facility];  Service: Surgical Oncology   • IR BIOPSY OMENTUM  2021   • IR PORT PLACEMENT  2021   • OMENTECTOMY N/A 2022    Procedure: DIAGNOSTIC LAPAROSCOPY, OPEN OMENTECTOMY, SPLENECTOMY, DIAPHRAGM REPAIR, CHEST TUBE INSERTION;  Surgeon: Chace Doe MD;  [facility];  Service: Surgical Oncology   • NJ CLOSURE ENTEROSTOMY LG/SMALL INTESTINE N/A 2020    Procedure: CLOSURE ILEOSTOMY;  Surgeon: Erickson Lilly MD;  [facility];  Service: Colorectal   • NJ LAPAROSCOPY COLECTOMY PARTIAL W/ANASTOMOSIS N/A 12/10/2019    Procedure: SIGMOID RESECTION COLON LAPAROSCOPIC W ROBOTICS converted to lap hand assisted  with Partial proctectomy , LASER FLUORESCENCE ANGIOGRAPHY, INTRA OP COLONOSCOPY;  Surgeon: Erickson Lilly MD;  [facility];  Service: Robotics   • NJ TOTAL ABDOMINAL HYSTERECT W/WO RMVL TUBE OVARY N/A 2022    Procedure: DIAGNOSTIC LAPAROSCOPY, TOTAL ABDOMINAL HYSTERECTOMY, BILATERAL SALPINGO-OOPHORECTOMY, EXTENSIVE ADHESIOLYSIS;  Surgeon: Wei Cortez MD;  [facility];  Service: Gynecology Oncology   • MS UNLISTED PROCEDURE DIAPHRAGM  04/29/2022    Procedure: REPAIR DIAPHRAGM TEAR;  Surgeon: Roxane Murrieta MD;  [facility];  Service: Thoracic   • SMALL INTESTINE SURGERY  02/04/2020    Procedure: RESECTION SMALL BOWEL;  Surgeon: Vikki Peacock MD;  [facility];  Service: Colorectal      Family History:     Family History   Problem Relation Age of Onset   • Hypertension Mother    • Heart attack Mother    • Heart attack Father    • Heart disease Father    • Hypertension Brother    • Heart attack Brother    • Colon cancer Paternal Uncle    • Leukemia Cousin    • Breast cancer Cousin    • Diabetes Cousin    • Breast cancer Cousin    • Colon cancer Family         paternal uncle   • Stroke Neg Hx       Social History:     Social History     Socioeconomic History   • Marital status:      Spouse name: None   • Number of children: None   • Years of education: None   • Highest education level: None   Occupational History   • None   Tobacco Use   • Smoking status: Never   • Smokeless tobacco: Never   Vaping Use   • Vaping Use: Never used   Substance and Sexual Activity   • Alcohol use: Yes     Comment: "occasionally"   • Drug use: Never   • Sexual activity: Not Currently   Other Topics Concern   • None   Social History Narrative   • None     Social Determinants of Health     Financial Resource Strain: Low Risk    • Difficulty of Paying Living Expenses: Not hard at all   Food Insecurity: No Food Insecurity   • Worried About Running Out of Food in the Last Year: Never true   • Ran Out of Food in the Last Year: Never true   Transportation Needs: No Transportation Needs   • Lack of Transportation (Medical): No   • Lack of Transportation (Non-Medical):  No   Physical Activity: Not on file   Stress: Not on file   Social Connections: Not on file   Intimate Partner Violence: Not on file Housing Stability: Unknown   • Unable to Pay for Housing in the Last Year: No   • Number of Places Lived in the Last Year: Not on file   • Unstable Housing in the Last Year: No      Medications and Allergies:     Current Outpatient Medications   Medication Sig Dispense Refill   • apixaban (Eliquis) 5 mg Take 1 tablet (5 mg total) by mouth 2 (two) times a day 60 tablet 5   • ezetimibe (ZETIA) 10 mg tablet Take 1 tablet (10 mg total) by mouth daily 90 tablet 1   • famotidine (PEPCID) 10 mg tablet Take 10 mg by mouth 2 (two) times a day as needed for heartburn     • ofloxacin (OCUFLOX) 0 3 % ophthalmic solution      • prednisoLONE acetate (PRED FORTE) 1 % ophthalmic suspension        No current facility-administered medications for this visit  Allergies   Allergen Reactions   • Medical Tape Rash     Skin irritation  Skin peeling  Skin irritation  Skin peeling  Blisters  Rash      Immunizations:     Immunization History   Administered Date(s) Administered   • COVID-19 PFIZER VACCINE 0 3 ML IM 03/04/2021, 03/23/2021, 10/27/2021   • HiB 05/05/2022   • Hib (PRP-T) 05/05/2022   • Influenza Quadrivalent Preservative Free 3 years and older IM 10/26/2016   • Meningococcal B, Recombinant (TRUMENBA) 03/21/2023   • Meningococcal MCV4P 05/05/2022, 05/05/2022   • Pneumococcal Conjugate Vaccine 20-valent (Pcv20), Polysace 04/13/2022   • Tdap 05/16/2022   • meningococcal ACYW-135 TT Conjugate 03/21/2023      Health Maintenance:         Topic Date Due   • Cervical Cancer Screening  04/01/2022   • Breast Cancer Screening: Mammogram  12/08/2023   • Hepatitis C Screening  Completed         Topic Date Due   • COVID-19 Vaccine (4 - Booster for Pfizer series) 12/22/2021   • Influenza Vaccine (1) 09/01/2022      Medicare Screening Tests and Risk Assessments:     Devin Rick is here for her Subsequent Wellness visit  Health Risk Assessment:   Patient rates overall health as very good   Patient feels that their physical health rating is much better  Patient is very satisfied with their life  Eyesight was rated as slightly worse  Hearing was rated as same  Patient feels that their emotional and mental health rating is same  Patients states they are never, rarely angry  Patient states they are sometimes unusually tired/fatigued  Pain experienced in the last 7 days has been none  Patient states that she has experienced weight loss or gain in last 6 months  Due to chemotherapy    Depression Screening:   PHQ-2 Score: 0      Fall Risk Screening: In the past year, patient has experienced: no history of falling in past year      Urinary Incontinence Screening:   Patient has not leaked urine accidently in the last six months  Home Safety:  Patient has trouble with stairs inside or outside of their home  Patient has working smoke alarms and has working carbon monoxide detector  Home safety hazards include: none  Nutrition:   Current diet is Regular  Medications:   Patient is not currently taking any over-the-counter supplements  Patient is able to manage medications  Activities of Daily Living (ADLs)/Instrumental Activities of Daily Living (IADLs):   Walk and transfer into and out of bed and chair?: Yes  Dress and groom yourself?: Yes    Bathe or shower yourself?: Yes    Feed yourself?  Yes  Do your laundry/housekeeping?: Yes  Manage your money, pay your bills and track your expenses?: Yes  Make your own meals?: Yes    Do your own shopping?: Yes    Previous Hospitalizations:   Any hospitalizations or ED visits within the last 12 months?: Yes    How many hospitalizations have you had in the last year?: 1-2    Advance Care Planning:   Living will: No    Durable POA for healthcare: No    Advanced directive: No    Advanced directive counseling given: Yes    Five wishes given: Yes    Patient declined ACP directive: No      Cognitive Screening:   Provider or family/friend/caregiver concerned regarding cognition?: No    PREVENTIVE SCREENINGS Cardiovascular Screening:    General: Risks and Benefits Discussed    Due for: Lipid Panel      Diabetes Screening:     General: Screening Current      Colorectal Cancer Screening:     General: History Colorectal Cancer and Screening Current      Breast Cancer Screening:     General: Screening Current      Cervical Cancer Screening:    General: Screening Not Indicated      Osteoporosis Screening:    General: Screening Current      Abdominal Aortic Aneurysm (AAA) Screening:        General: Screening Not Indicated      Lung Cancer Screening:     General: Screening Not Indicated      Hepatitis C Screening:    General: Screening Current    Screening, Brief Intervention, and Referral to Treatment (SBIRT)    Screening  Typical number of drinks in a day: 0  Typical number of drinks in a week: 0  Interpretation: Low risk drinking behavior  Single Item Drug Screening:  How often have you used an illegal drug (including marijuana) or a prescription medication for non-medical reasons in the past year? never    Single Item Drug Screen Score: 0  Interpretation: Negative screen for possible drug use disorder    Brief Intervention  Alcohol & drug use screenings were reviewed  No concerns regarding substance use disorder identified  Other Counseling Topics:   Car/seat belt/driving safety, skin self-exam, sunscreen and calcium and vitamin D intake and regular weightbearing exercise  No results found  Physical Exam:     /80   Pulse 80   Temp 97 9 °F (36 6 °C) (Tympanic)   Resp 16   Ht 4' 9" (1 448 m)   Wt 87 kg (191 lb 12 8 oz)   LMP 09/01/2011 (Approximate)   SpO2 99%   BMI 41 51 kg/m²     Physical Exam  Constitutional:       Appearance: She is obese  HENT:      Head: Normocephalic  Right Ear: External ear normal       Left Ear: External ear normal       Nose: Nose normal    Eyes:      Conjunctiva/sclera: Conjunctivae normal    Cardiovascular:      Rate and Rhythm: Normal rate and regular rhythm  Heart sounds: Normal heart sounds  Pulmonary:      Effort: Pulmonary effort is normal       Breath sounds: Normal breath sounds  Musculoskeletal:      Cervical back: Normal range of motion  Skin:     General: Skin is warm and dry  Findings: No rash  Neurological:      Mental Status: She is alert and oriented to person, place, and time  Psychiatric:         Mood and Affect: Mood normal          Behavior: Behavior normal          Thought Content:  Thought content normal          Judgment: Judgment normal           DEEPAK Louise

## 2023-04-04 ENCOUNTER — HOSPITAL ENCOUNTER (OUTPATIENT)
Dept: INFUSION CENTER | Facility: HOSPITAL | Age: 68
Discharge: HOME | End: 2023-04-04

## 2023-04-04 DIAGNOSIS — C19 RECTOSIGMOID CANCER (HCC): Primary | ICD-10-CM

## 2023-05-05 ENCOUNTER — TELEPHONE (OUTPATIENT)
Dept: FAMILY MEDICINE CLINIC | Facility: CLINIC | Age: 68
End: 2023-05-05

## 2023-05-05 NOTE — TELEPHONE ENCOUNTER
I did order the trumemba second one and make sure patient will be scheduled for 3rd trumemba after 6 months from first one which she received on 3/21/2023   DEEPAK Beckwith

## 2023-05-16 ENCOUNTER — HOSPITAL ENCOUNTER (OUTPATIENT)
Dept: INFUSION CENTER | Facility: HOSPITAL | Age: 68
Discharge: HOME | End: 2023-05-16

## 2023-05-16 DIAGNOSIS — C78.6 MALIGNANT NEOPLASM METASTATIC TO OMENTUM (HCC): ICD-10-CM

## 2023-05-16 DIAGNOSIS — C19 RECTOSIGMOID CANCER (HCC): Primary | ICD-10-CM

## 2023-05-16 LAB
ALBUMIN SERPL BCP-MCNC: 3.3 G/DL (ref 3.5–5)
ALP SERPL-CCNC: 51 U/L (ref 34–104)
ALT SERPL W P-5'-P-CCNC: 11 U/L (ref 7–52)
ANION GAP SERPL CALCULATED.3IONS-SCNC: 6 MMOL/L (ref 4–13)
AST SERPL W P-5'-P-CCNC: 13 U/L (ref 13–39)
BASOPHILS # BLD AUTO: 0.04 THOUSANDS/ÂΜL (ref 0–0.1)
BASOPHILS NFR BLD AUTO: 1 % (ref 0–1)
BILIRUB SERPL-MCNC: 0.82 MG/DL (ref 0.2–1)
BUN SERPL-MCNC: 20 MG/DL (ref 5–25)
CALCIUM ALBUM COR SERPL-MCNC: 9.2 MG/DL (ref 8.3–10.1)
CALCIUM SERPL-MCNC: 8.6 MG/DL (ref 8.4–10.2)
CEA SERPL-MCNC: 1.8 NG/ML (ref 0–3)
CHLORIDE SERPL-SCNC: 110 MMOL/L (ref 96–108)
CO2 SERPL-SCNC: 22 MMOL/L (ref 21–32)
CREAT SERPL-MCNC: 0.9 MG/DL (ref 0.6–1.3)
EOSINOPHIL # BLD AUTO: 0.12 THOUSAND/ÂΜL (ref 0–0.61)
EOSINOPHIL NFR BLD AUTO: 2 % (ref 0–6)
ERYTHROCYTE [DISTWIDTH] IN BLOOD BY AUTOMATED COUNT: 16.9 % (ref 11.6–15.1)
GFR SERPL CREATININE-BSD FRML MDRD: 65 ML/MIN/1.73SQ M
GLUCOSE SERPL-MCNC: 94 MG/DL (ref 65–140)
HCT VFR BLD AUTO: 37.8 % (ref 34.8–46.1)
HGB BLD-MCNC: 12.3 G/DL (ref 11.5–15.4)
IMM GRANULOCYTES # BLD AUTO: 0.02 THOUSAND/UL (ref 0–0.2)
IMM GRANULOCYTES NFR BLD AUTO: 0 % (ref 0–2)
LYMPHOCYTES # BLD AUTO: 2.63 THOUSANDS/ÂΜL (ref 0.6–4.47)
LYMPHOCYTES NFR BLD AUTO: 36 % (ref 14–44)
MCH RBC QN AUTO: 29.2 PG (ref 26.8–34.3)
MCHC RBC AUTO-ENTMCNC: 32.5 G/DL (ref 31.4–37.4)
MCV RBC AUTO: 90 FL (ref 82–98)
MONOCYTES # BLD AUTO: 0.65 THOUSAND/ÂΜL (ref 0.17–1.22)
MONOCYTES NFR BLD AUTO: 9 % (ref 4–12)
NEUTROPHILS # BLD AUTO: 3.77 THOUSANDS/ÂΜL (ref 1.85–7.62)
NEUTS SEG NFR BLD AUTO: 52 % (ref 43–75)
NRBC BLD AUTO-RTO: 0 /100 WBCS
PLATELET # BLD AUTO: 315 THOUSANDS/UL (ref 149–390)
PMV BLD AUTO: 10.6 FL (ref 8.9–12.7)
POTASSIUM SERPL-SCNC: 3.8 MMOL/L (ref 3.5–5.3)
PROT SERPL-MCNC: 6.6 G/DL (ref 6.4–8.4)
RBC # BLD AUTO: 4.21 MILLION/UL (ref 3.81–5.12)
SODIUM SERPL-SCNC: 138 MMOL/L (ref 135–147)
WBC # BLD AUTO: 7.23 THOUSAND/UL (ref 4.31–10.16)

## 2023-05-23 ENCOUNTER — TELEPHONE (OUTPATIENT)
Dept: HEMATOLOGY ONCOLOGY | Facility: CLINIC | Age: 68
End: 2023-05-23

## 2023-05-23 NOTE — TELEPHONE ENCOUNTER
Appointment Change  Cancel, Reschedule, Change to Virtual      Who are you speaking with? Patient   If it is not the patient, are they listed on an active communication consent form? N/A   Which provider is the appointment scheduled with? Dr Tigre Larson   When is the appointment scheduled? Please list date and time 6/15/23 1:40   At which location is the appointment scheduled to take place? [facility]   Was the appointment rescheduled or changed from an in person visit to a virtual visit? If so, please list the details of the change  8/24/23 1:20   What is the reason for the appointment change? provider   Was [facility] scheduled for this visit? N/A   Does [facility] need to be scheduled for the new visit (if applicable) N/A   Does the patient need an infusion appointment rescheduled? N/A   Does the patient have an infusion appointment scheduled? If so, when? No   Is the patient undergoing chemotherapy? N/A   Was the no-show policy reviewed for appointments being changed with less then 24 hours of notice?  N/A

## 2023-05-30 ENCOUNTER — APPOINTMENT (OUTPATIENT)
Dept: LAB | Facility: HOSPITAL | Age: 68
End: 2023-05-30

## 2023-05-30 ENCOUNTER — HOSPITAL ENCOUNTER (OUTPATIENT)
Dept: RADIOLOGY | Facility: HOSPITAL | Age: 68
Discharge: HOME | End: 2023-05-30

## 2023-05-30 DIAGNOSIS — C78.6 MALIGNANT NEOPLASM METASTATIC TO OMENTUM (HCC): ICD-10-CM

## 2023-05-30 DIAGNOSIS — C19 RECTOSIGMOID CANCER (HCC): ICD-10-CM

## 2023-05-30 DIAGNOSIS — R73.03 PREDIABETES: ICD-10-CM

## 2023-05-30 DIAGNOSIS — E78.49 OTHER HYPERLIPIDEMIA: ICD-10-CM

## 2023-05-30 LAB
CHOLEST SERPL-MCNC: 211 MG/DL
EST. AVERAGE GLUCOSE BLD GHB EST-MCNC: 97 MG/DL
HBA1C MFR BLD: 5 %
HDLC SERPL-MCNC: 60 MG/DL
LDLC SERPL CALC-MCNC: 135 MG/DL (ref 0–100)
TRIGL SERPL-MCNC: 80 MG/DL

## 2023-05-30 RX ADMIN — IOHEXOL 100 ML: 350 INJECTION, SOLUTION INTRAVENOUS at 12:57

## 2023-06-01 ENCOUNTER — TELEPHONE (OUTPATIENT)
Dept: SURGICAL ONCOLOGY | Facility: CLINIC | Age: 68
End: 2023-06-01

## 2023-06-01 NOTE — TELEPHONE ENCOUNTER
I called and left a message for patient to please call us back to reschedule her apt with Dr Anyi Liu on 6/8/23 due to Dr Edouard Brock going into the OR that day  Patient can be seen earlier in the day or another day  Please reschedule her apt when she calls back

## 2023-06-06 ENCOUNTER — TELEPHONE (OUTPATIENT)
Dept: SURGICAL ONCOLOGY | Facility: CLINIC | Age: 68
End: 2023-06-06

## 2023-06-06 NOTE — TELEPHONE ENCOUNTER
----- Message from Verito Moody RN sent at 6/6/2023 12:31 PM EDT -----  Can someone please r/s this pts appt for Thursday 6/8 as Dr Angel Rodgers will be in the OR?  Thank you!!

## 2023-06-06 NOTE — TELEPHONE ENCOUNTER
Called and spoke with patient and reschedule appointment to 6/14/23 at 1:45pm at [facility]  Also called and rescheduled [facility]  She was agreeable

## 2023-06-09 ENCOUNTER — TELEPHONE (OUTPATIENT)
Dept: FAMILY MEDICINE CLINIC | Facility: CLINIC | Age: 68
End: 2023-06-09

## 2023-06-09 NOTE — TELEPHONE ENCOUNTER
Left message on machine for patient to call office back, to inform her she needs a 3rd dose of the Trumenba as well       Rasta Heath LPN

## 2023-06-09 NOTE — TELEPHONE ENCOUNTER
I ordered trumenba but patient needs total 3 doses of trumenba and make sure to schedule for 3 rd dose please   DEEPAK Livingston

## 2023-06-12 ENCOUNTER — CLINICAL SUPPORT (OUTPATIENT)
Dept: FAMILY MEDICINE CLINIC | Facility: CLINIC | Age: 68
End: 2023-06-12
Payer: MEDICARE

## 2023-06-12 DIAGNOSIS — Z23 NEED FOR VACCINATION: ICD-10-CM

## 2023-06-12 DIAGNOSIS — Q89.01 ASPLENIA: Primary | ICD-10-CM

## 2023-06-12 PROCEDURE — 90471 IMMUNIZATION ADMIN: CPT

## 2023-06-12 PROCEDURE — 90621 MENB-FHBP VACC 2/3 DOSE IM: CPT

## 2023-06-14 ENCOUNTER — OFFICE VISIT (OUTPATIENT)
Dept: SURGICAL ONCOLOGY | Facility: CLINIC | Age: 68
End: 2023-06-14
Payer: MEDICARE

## 2023-06-14 VITALS
BODY MASS INDEX: 43.62 KG/M2 | HEIGHT: 57 IN | RESPIRATION RATE: 16 BRPM | SYSTOLIC BLOOD PRESSURE: 130 MMHG | DIASTOLIC BLOOD PRESSURE: 88 MMHG | HEART RATE: 88 BPM | TEMPERATURE: 98.3 F | OXYGEN SATURATION: 100 % | WEIGHT: 202.2 LBS

## 2023-06-14 DIAGNOSIS — C19 RECTOSIGMOID CANCER (HCC): Primary | ICD-10-CM

## 2023-06-14 DIAGNOSIS — C78.6 MALIGNANT NEOPLASM METASTATIC TO OMENTUM (HCC): ICD-10-CM

## 2023-06-14 DIAGNOSIS — R16.0 LIVER MASS: ICD-10-CM

## 2023-06-14 PROCEDURE — 99214 OFFICE O/P EST MOD 30 MIN: CPT | Performed by: STUDENT IN AN ORGANIZED HEALTH CARE EDUCATION/TRAINING PROGRAM

## 2023-06-14 NOTE — PROGRESS NOTES
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        Chief Complaint   Patient presents with   • Follow-up       No follow-ups on file  Oncology History   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[facility] [address] )   9/23/2019 Initial Diagnosis    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[facility] [address] )     12/10/2019 Surgery    Low anterior resection (Dr Jason Fajardo):    A  Rectosigmoid colon, low anterior resection:  - Adenocarcinoma, moderately differentiated  - Tumor invades through the muscularis propria into pericolorectal tissue, T3  - Diverticula  - All margins are negative for tumor   - Thirty-four lymph nodes, negative for malignancy (0/34)  B  Colon, anastomotic rings, resection:  - Two portions of colon with no pathologic abnormality     12/10/2019 Genomic Testing    RRESULTS OF IMMUNOHISTOCHEMICAL ANALYSIS FOR MISMATCH REPAIR PROTEIN LOSS  INTERPRETATION: NO LOSS OF NUCLEAR EXPRESSION OF MMR PROTEINS: LOW PROBABILITY OF MSI-H  Note: Background non-neoplastic tissue and/or internal control with intact nuclear expression        RESULTS:  Antibody          Clone               Description                           Results  MLH1               M1                  Mismatch repair protein       Intact nuclear expression  MSH2              [id number]       Mismatch repair protein       Intact nuclear expression  MSH6              40                   Mismatch repair protein       Intact nuclear expression  PMS2              [id number]         Mismatch repair protein       Intact nuclear expression     8/18/2021 Progression    CEA trends- observed by Dr Leonidas Gongora  2/17/21: 2 9  8/18/2021: 4 5  9/13/2021: 4 4    PET/CT  9/28/2021 completed due to elevating CEA  1  There is a large left paracolic gutter markedly hypermetabolic mass immediately inferior to the spleen, most consistent with metastatic colorectal carcinoma  2  Mildly increased activity at the right abdominal bowel anastomotic site  If not recently performed, direct visualization is recommended  3  There are no other glucose avid abnormalities identified within the abdomen or pelvis  4  No evidence of distant glucose avid metastatic disease in the neck, chest, or skeleton      11/12/2021 Biopsy    Omental mass:  - Metastatic adenocarcinoma, with an immunophenotype compatible with metastasis from patient's known colonic primary  12/30/2021 - 12/30/2021 Chemotherapy    CapOx x 1 dose of Oxaliplatin: D/c due to tolerance  1/2/2022 Genomic Testing         1/4/2022 - 3/17/2022 Chemotherapy    First 6 cycles of Folfox given prior to surgery- 1/4 through 3/17/2022     Folfox was dose reduced due to anticipated tolerance  Minimal side effects     3/22/2022 Observation    CT CAP  1  Decreased size of biopsy-proven omental metastasis, which now only focally contacting rather than grossly invading the spleen and adjacent abdominal wall  No new evidence of metastatic disease in the abdomen or pelvis  2   No new or suspicious pulmonary nodules  One of the previously noted new 1-2 mm nodules is no longer seen, and the other is unchanged  Recommend continued attention on follow-up  3   Top-normal thickness endometrial stripe measuring 7 mm  If there is history of vaginal bleeding, suggest catheterization with endometrial biopsy  4   Hepatic steatosis       4/28/2022 -  Cancer Staged    Staging form: Colon and Rectum, AJCC 8th Edition  - Clinical stage from 4/28/2022: cT3, cN0, pM1 - Signed by Jemal Prajapati MD on 6/14/2023  Total positive nodes: 0       4/29/2022 Surgery    Escobar Samano and Feliciano Rodriguez preformed the following procedures:   DIAGNOSTIC LAPAROSCOPY, TOTAL ABDOMINAL HYSTERECTOMY, BILATERAL SALPINGO-OOPHORECTOMY, EXTENSIVE ADHESIOLYSIS (N/A)  DIAGNOSTIC LAPAROSCOPY, OPEN OMENTECTOMY, POSSIBLE SPLENECTOMY (N/A)  INSTILLATION CHEMOTHERAPY INTRAPERITONEAL (HIPEC) LAPAROTOMY (N/A)  REPAIR DIAPHRAGM TEAR      A  Uterus, Bilateral Ovaries and Fallopian Tubes; Hysterosalpingo-oophorectomy:  - Leiomyoma  - Left ovary with serous cystadenoma  - Unremarkable cervix  - Benign inactive endometrium with no specific pathologic change  - Unremarkable right ovary and bilateral fallopian tubes  B  Spleen, spleen, omentum, darotis:  - Metastatic adenocarcinoma involving spleen and omentum, consistent with colonic primary  See Note  Note: Comparison to prior material ([id number], omental mass) reveals identical morphology to previous metastatic colonic adenocarcinoma  5/18/2022 -  Cancer Staged    Staging form: Colon and Rectum, AJCC 8th Edition  - Pathologic stage from 5/18/2022: Stage KRISTAL (pT3, pN0, pM1a) - Signed by Aleksandra Raygoza MD on 6/14/2023  Total positive nodes: 0       6/14/2022 - 8/23/2022 Chemotherapy    7th cycle started on 6/14/2022  8th cycle worsening neuropathy; could not tolerate gabapentin  D/lizette oxaliplatin  9th cycle Irinotecan added at 85% dose reduction: SE Diarrhea  10th cycle Irinotecan dose redcued to 50%     9/19/2022 Observation    9/19/2022 CT CAP:   1  Previously seen omental metastasis in the left upper quadrant has been resected  There is a small area of nodular soft tissue thickening abutting the lateral aspect of the left kidney, cannot exclude residual/recurrent tumor  Follow up in 4 months CEA not completed at time of scan  Omental metastasis       History of Present Illness  :   Miss Chevy Hoyos is seen here today for ongoing surveillance of metastatic colorectal cancer  Briefly, the patient underwent screening colonoscopy in late 2019    This detected a rectosigmoid mass, which was then resected in December of 2019  Surgery required a diverting loop ileostomy, which was subsequently reversed in February of 2020  Of note, preop MRI suggested a T3bN1 lesion above the peritoneal reflection, and upfront surgery was recommended  This revealed a final path T3 N0 cancer with no aggressive features and no adjuvant therapy was recommended by her surgeon Dr Freddy Pichardo  She underwent a PET scan due to a rising CEA (4 4 from 2 2 posotp), and this demonstrated a large 4 5 cm left pericolic gutter markedly hypermetabolic mass  There was mild increased activity at the colon anastomosis  Subsequent colonoscopy revealed no abnormality at this location  She denies any systemic symptoms including weight loss, nausea, vomiting, changes in her bowel habits  MRI of the abdomen revealed a single metastatic lesion invading the spleen  No evidence of other peritoneal implants  MRI of the pelvis revealed a concerning appearance of the ovary  This was followed by a transvaginal ultrasound, which ultimately determined that the ovary appeared benign  She then underwent interventional radiology biopsy, which confirmed a metastatic lesion from a colorectal primary  She was iniatiated on FOLFOX and then 4/2022 underwent open omentectomy, splenectomy, resection of involved diaphragm with primary repair, hysterectomy and oophorectomy with HIPEC  Completed chemo August 2022  Interval 2/2023  Doing very well  Recent CT ISIAH  CEA 1 6 No c/o fatigue, n/v, changes in stool habits, fevers, chills, weight loss  I will see her in 4 months time for repeat chest abdomen pelvis cross-sectional imaging and CEA  6/2023  Silvia Gibson is doing very well clinically  She has no abdominal pain, no nausea vomiting, no change in stool habits  No weight loss, bone pain, fatigue, headaches  Her CEA remains very low, and was previously very reliable and correlated with her degree of disease    Her CT did demonstrate a new very small subcentimeter liver lesion  Review of Systems  Complete ROS Surg Onc:   Constitutional: The patient denies new or recent history of general fatigue, no recent weight loss, normal appetite  Eyes: No complaints of visual problems, no scleral icterus  ENT: No complaints of ear pain, no hoarseness, no difficulty swallowing,  no tinnitus and no new masses in head, oral cavity, or neck  Cardiovascular: No complaints of chest pain, no palpitations, no ankle edema  Respiratory: No complaints of shortness of breath, no cough  Gastrointestinal: No complaints of jaundice, no bloody stools, no pale stools  Genitourinary: No complaints of dysuria, no hematuria, no nocturia, no frequent urination, no urethral discharge  Musculoskeletal: No complaints of weakness, paralysis, joint stiffness or arthralgias  Integumentary: No complaints of rash, no new lesions  Neurological: No complaints of convulsions, no seizures, no dizziness  Hematologic/Lymphatic: No complaints of easy bruising  Endocrine:  ENDORSES cold intolerance  No polydipsia, polyphagia, or polyuria  Allergy/immunology:  No environmental allergies  No food allergies  Not immunocompromised        Patient Active Problem List   Diagnosis   • Callus of foot   • Foot pain   • GERD (gastroesophageal reflux disease)   • Hyperlipidemia   • Prediabetes   • Varicose veins with pain   • Morbid obesity ([facility] [address] )   • 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HCC)   • Dyspnea on exertion   • Dyslipidemia   • Sigmoid diverticulosis   • PONV (postoperative nausea and vomiting)   • Omental metastasis   • Lesion of ovary   • Chemotherapy adverse reaction   • Chemotherapy-induced nausea   • Platelets decreased (HCC)   • Stage 3 chronic kidney disease, unspecified whether stage 3a or 3b CKD (HCC)   • H/O splenectomy   • Asplenia   • Postoperative state   • Acute embolism and thrombosis of right tibial vein (HCC)     Past Medical History:   Diagnosis Date   • Blood in stool    • Breast disorder    • Cancer [facility])     rectal   • Cancer determined by colorectal biopsy ([facility] [address] )     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   • Colon polyp    • GERD (gastroesophageal reflux disease)    • History of chemotherapy 2021   • History of rectal surgery    • Hyperlipidemia    • PONV (postoperative nausea and vomiting)      Past Surgical History:   Procedure Laterality Date   • BREAST CYST EXCISION Right    •  SECTION      x3   • COLON SIGMOID RESECTION     • COLONOSCOPY     • DILATION AND CURETTAGE OF UTERUS     • ILEO LOOP DIVERSION N/A 12/10/2019    Procedure: ILEOSTOMY LOOP;  Surgeon: Vicente Sullivan MD;  [facility];  Service: Robotics   • INSTILLATION CHEMOTHERAPY INTRAPERITONEAL (HIPEC) LAPAROTOMY N/A 2022    Procedure: INSTILLATION CHEMOTHERAPY INTRAPERITONEAL (HIPEC) LAPAROTOMY;  Surgeon: Fabiola Felder MD;  [facility];  Service: Surgical Oncology   • IR BIOPSY OMENTUM  2021   • IR PORT PLACEMENT  2021   • OMENTECTOMY N/A 2022    Procedure: DIAGNOSTIC LAPAROSCOPY, OPEN OMENTECTOMY, SPLENECTOMY, DIAPHRAGM REPAIR, CHEST TUBE INSERTION;  Surgeon: Fabiola Felder MD;  [facility];  Service: Surgical Oncology   • RI CLOSURE ENTEROSTOMY LG/SMALL INTESTINE N/A 2020    Procedure: CLOSURE ILEOSTOMY;  Surgeon: Vicente Sullivan MD;  [facility];  Service: Colorectal   • RI LAPAROSCOPY COLECTOMY PARTIAL W/ANASTOMOSIS N/A 12/10/2019    Procedure: SIGMOID RESECTION COLON LAPAROSCOPIC W ROBOTICS converted to lap hand assisted  with Partial proctectomy , LASER FLUORESCENCE ANGIOGRAPHY, INTRA OP COLONOSCOPY;  Surgeon: Vicente Sullivan MD;  [facility];  Service: Robotics   • RI TOTAL ABDOMINAL HYSTERECT W/WO RMVL TUBE OVARY N/A 2022    Procedure: DIAGNOSTIC LAPAROSCOPY, TOTAL ABDOMINAL HYSTERECTOMY, BILATERAL SALPINGO-OOPHORECTOMY, EXTENSIVE ADHESIOLYSIS;  Surgeon: Vargas Franks MD;  Location[facility];  Service: Gynecology Oncology   • IL UNLISTED PROCEDURE DIAPHRAGM  04/29/2022    Procedure: REPAIR DIAPHRAGM TEAR;  Surgeon: Domenica Moscoso MD;  [facility];  Service: Thoracic   • SMALL INTESTINE SURGERY  02/04/2020    Procedure: RESECTION SMALL BOWEL;  Surgeon: Duarte Zurita MD;  [facility];  Service: Colorectal     Family History   Problem Relation Age of Onset   • Hypertension Mother    • Heart attack Mother    • Heart attack Father    • Heart disease Father    • Hypertension Brother    • Heart attack Brother    • Colon cancer Paternal Uncle    • Leukemia Cousin    • Breast cancer Cousin    • Diabetes Cousin    • Breast cancer Cousin    • Colon cancer Family         paternal uncle   • Stroke Neg Hx      Social History     Socioeconomic History   • Marital status:      Spouse name: Not on file   • Number of children: Not on file   • Years of education: Not on file   • Highest education level: Not on file   Occupational History   • Not on file   Tobacco Use   • Smoking status: Never   • Smokeless tobacco: Never   Vaping Use   • Vaping Use: Never used   Substance and Sexual Activity   • Alcohol use: Yes     Comment: \"occasionally\"   • Drug use: Never   • Sexual activity: Not Currently   Other Topics Concern   • Not on file   Social History Narrative   • Not on file     Social Determinants of Health     Financial Resource Strain: Low Risk  (3/21/2023)    Overall Financial Resource Strain (CARDIA)    • Difficulty of Paying Living Expenses: Not hard at all   Food Insecurity: No Food Insecurity (5/2/2022)    Hunger Vital Sign    • Worried About Running Out of Food in the Last Year: Never true    • Ran Out of Food in the Last Year: Never true   Transportation Needs: No Transportation Needs (3/21/2023)    PRAPARE - Transportation    • Lack of Transportation (Medical): No    • Lack of Transportation (Non-Medical):  No   Physical Activity: Not on file   Stress: Not on file " Social Connections: Not on file   Intimate Partner Violence: Not on file   Housing Stability: Unknown (5/2/2022)    Housing Stability Vital Sign    • Unable to Pay for Housing in the Last Year: No    • Number of Places Lived in the Last Year: Not on file    • Unstable Housing in the Last Year: No       Current Outpatient Medications:   •  apixaban (Eliquis) 5 mg, Take 1 tablet (5 mg total) by mouth 2 (two) times a day, Disp: 60 tablet, Rfl: 5  •  ezetimibe (ZETIA) 10 mg tablet, Take 1 tablet (10 mg total) by mouth daily, Disp: 90 tablet, Rfl: 1  •  famotidine (PEPCID) 10 mg tablet, Take 10 mg by mouth 2 (two) times a day as needed for heartburn, Disp: , Rfl:   •  ofloxacin (OCUFLOX) 0 3 % ophthalmic solution, , Disp: , Rfl:   •  prednisoLONE acetate (PRED FORTE) 1 % ophthalmic suspension, , Disp: , Rfl:   Allergies   Allergen Reactions   • Medical Tape Rash     Skin irritation  Skin peeling  Skin irritation  Skin peeling  Blisters  Rash       Vitals:    06/14/23 1351   BP: 130/88   Pulse: 88   Resp: 16   Temp: 98 3 °F (36 8 °C)   SpO2: 100%       Physical Exam   General: Appears well, appears stated age  Skin: Warm, anicteric  HEENT: Normocephalic, atraumatic; sclera aniceteric, mucous membranes moist; cervical nodes without adenopathy  Cardiopulmonary: RRR, Easy WOB, no BLE edema  Abd: Obese, nontender, no masses appreciated, no hepatosplenomegaly  Incision well-healed  MSK: Symmetric, no cyanosis, no overt weakness  Lymphatic: No cervical, axillary or inguinal lymphadenopathy  Neuro: Affect appropriate, no gross motor abnormalities      Pathology:  Final Diagnosis   A  Rectosigmoid colon, low anterior resection:  - Adenocarcinoma, moderately differentiated  - Tumor invades through the muscularis propria into pericolorectal tissue, T3  - Diverticula  - All margins are negative for tumor   - Thirty-four lymph nodes, negative for malignancy (0/34)      B   Colon, anastomotic rings, resection:  - Two portions of colon with no pathologic abnormality     Intradepartmental consultation is in agreement  Interpretation performed at [facility] [place], [address] [facility], [address] [facility]  Immunohistochemistry for desmin  is performed on tissue blocks A13 and A16 to help in the assessment of this case  Final Diagnosis   A  Uterus, Bilateral Ovaries and Fallopian Tubes; Hysterosalpingo-oophorectomy:  - Leiomyoma  - Left ovary with serous cystadenoma  - Unremarkable cervix  - Benign inactive endometrium with no specific pathologic change  - Unremarkable right ovary and bilateral fallopian tubes      B  Spleen, spleen, omentum, darotis:  - Metastatic adenocarcinoma involving spleen and omentum, consistent with colonic primary  See Note  L[facility]    Imaging  Colonoscopy    Addendum Date: 10/6/2021 Addendum:   [address] [place] [id number] [phone] DATE OF SERVICE: 10/06/21 PHYSICIAN(S): Reena Bennett MD - Attending Physician INDICATION: Personal history of rectal cancer , had a low anterior resection in December of 2019  T3 N0, 34 lymph nodes were removed and were negative  Patient did not receive any chemotherapy  Colonoscopy performed for a diagnostic indication  POST-OP DIAGNOSIS: See the impression below  HISTORY: Prior colonoscopy: Less than 3 years ago  It is being repeated at an interval of less than 3 years because: This colonoscopy is being performed for a diagnostic indication BOWEL PREPARATION: Miralax/Dulcolax PREPROCEDURE: Informed consent was obtained for the procedure, including sedation  Risks including but not limited to bleeding, infection, perforation, adverse drug reaction and aspiration were explained in detail  Also explained about less than 100% sensitivity with the exam and other alternatives  The patient was placed in the left lateral decubitus position   DETAILS OF PROCEDURE: Patient was taken to the procedure room where a time out was performed to confirm correct patient and correct procedure  The patient underwent monitored anesthesia care, which was administered by an anesthesia professional  The patient's blood pressure, heart rate, level of consciousness, oxygen and respirations were monitored throughout the procedure  A digital rectal exam was performed  The scope was introduced through the anus and advanced to the cecum  Retroflexion was performed in the rectum  The quality of bowel preparation was evaluated using the [facility] Bowel Preparation Scale with scores of: right colon = 2, transverse colon = 2, left colon = 2  The total BBPS score was 6  Bowel prep was adequate  The patient experienced no blood loss  The procedure was not difficult  The patient tolerated the procedure well  There were no apparent complications  ANESTHESIA INFORMATION: ASA: III Anesthesia Type: IV Sedation with Anesthesia MEDICATIONS: No administrations occurring from 1215 to 1230 on 10/06/21 FINDINGS: Healthy end-to-end colorectal anastomosis with no bleeding in the mid rectum All observed locations appeared normal, including the cecum, ascending colon, transverse colon, splenic flexure and descending colon  A couple scattered diverticuli seen EVENTS: Procedure Events Event Event Time ENDO CECUM REACHED 10/6/2021 12:21 PM ENDO SCOPE OUT TIME 10/6/2021 12:28 PM SPECIMENS: * No specimens in log * EQUIPMENT: Colonoscope - IMPRESSION: 1  Normal-appearing colon  Anastomosis site was seen in the rectum appeared healthy  No evidence of local recurrence  2  Couple small scattered diverticuli  RECOMMENDATION: Repeat colonoscopy in 2 years due to a personal history of colon cancer  Advised her urgent evaluation by medical oncologist and Dr Miracle Elias MD     Addendum Date: 10/6/2021 Addendum:   [address] [place], [place] [id number] [phone] DATE OF SERVICE: 10/06/21 PHYSICIAN(S): Billy Elias MD - Attending Physician INDICATION: Personal history of rectal cancer , had a low anterior resection in December of 2019  T3 N0, 34 lymph nodes were removed and were negative  Patient did not receive any chemotherapy  Colonoscopy performed for a diagnostic indication  POST-OP DIAGNOSIS: See the impression below  HISTORY: Prior colonoscopy: Less than 3 years ago  It is being repeated at an interval of less than 3 years because: This colonoscopy is being performed for a diagnostic indication BOWEL PREPARATION: Miralax/Dulcolax PREPROCEDURE: Informed consent was obtained for the procedure, including sedation  Risks including but not limited to bleeding, infection, perforation, adverse drug reaction and aspiration were explained in detail  Also explained about less than 100% sensitivity with the exam and other alternatives  The patient was placed in the left lateral decubitus position  DETAILS OF PROCEDURE: Patient was taken to the procedure room where a time out was performed to confirm correct patient and correct procedure  The patient underwent monitored anesthesia care, which was administered by an anesthesia professional  The patient's blood pressure, heart rate, level of consciousness, oxygen and respirations were monitored throughout the procedure  A digital rectal exam was performed  The scope was introduced through the anus and advanced to the cecum  Retroflexion was performed in the rectum  The quality of bowel preparation was evaluated using the [facility] Bowel Preparation Scale with scores of: right colon = 2, transverse colon = 2, left colon = 2  The total BBPS score was 6  Bowel prep was adequate  The patient experienced no blood loss  The procedure was not difficult  The patient tolerated the procedure well  There were no apparent complications   ANESTHESIA INFORMATION: ASA: III Anesthesia Type: IV Sedation with Anesthesia MEDICATIONS: No administrations occurring from 1215 to 1230 on 10/06/21 FINDINGS: Healthy end-to-end colorectal anastomosis with no bleeding in the mid rectum All observed locations appeared normal, including the cecum, ascending colon, transverse colon, splenic flexure and descending colon  A couple scattered diverticuli seen EVENTS: Procedure Events Event Event Time ENDO CECUM REACHED 10/6/2021 12:21 PM ENDO SCOPE OUT TIME 10/6/2021 12:28 PM SPECIMENS: * No specimens in log * EQUIPMENT: Colonoscope - IMPRESSION: 1  Normal-appearing colon  Anastomosis site was seen in the rectum appeared healthy  No evidence of local recurrence  2  Couple small scattered diverticuli  RECOMMENDATION: Repeat colonoscopy in 2 years due to a personal history of colon cancer  Yenni Pedersen MD     Result Date: 10/6/2021  Narrative: [address] [place] [phone] DATE OF SERVICE: 10/06/21 PHYSICIAN(S): Yenni Pedersen MD - Attending Physician INDICATION: Personal history of rectal cancer Colonoscopy performed for a diagnostic indication  POST-OP DIAGNOSIS: See the impression below  HISTORY: Prior colonoscopy: Less than 3 years ago  It is being repeated at an interval of less than 3 years because: This colonoscopy is being performed for a diagnostic indication BOWEL PREPARATION: Miralax/Dulcolax PREPROCEDURE: Informed consent was obtained for the procedure, including sedation  Risks including but not limited to bleeding, infection, perforation, adverse drug reaction and aspiration were explained in detail  Also explained about less than 100% sensitivity with the exam and other alternatives  The patient was placed in the left lateral decubitus position  DETAILS OF PROCEDURE: Patient was taken to the procedure room where a time out was performed to confirm correct patient and correct procedure   The patient underwent monitored anesthesia care, which was administered by an anesthesia professional  The patient's blood pressure, heart rate, level of consciousness, oxygen and respirations were monitored throughout the procedure  A digital rectal exam was performed  The scope was introduced through the anus and advanced to the cecum  Retroflexion was performed in the rectum  The quality of bowel preparation was evaluated using the [facility] Bowel Preparation Scale with scores of: right colon = 2, transverse colon = 2, left colon = 2  The total BBPS score was 6  Bowel prep was adequate  The patient experienced no blood loss  The procedure was not difficult  The patient tolerated the procedure well  There were no apparent complications  ANESTHESIA INFORMATION: ASA: III Anesthesia Type: IV Sedation with Anesthesia MEDICATIONS: No administrations occurring from 1215 to 1230 on 10/06/21 FINDINGS: Healthy end-to-end colorectal anastomosis with no bleeding in the mid rectum All observed locations appeared normal, including the cecum, ascending colon, transverse colon, splenic flexure and descending colon  A couple scattered diverticuli seen EVENTS: Procedure Events Event Event Time ENDO CECUM REACHED 10/6/2021 12:21 PM ENDO SCOPE OUT TIME 10/6/2021 12:28 PM SPECIMENS: * No specimens in log * EQUIPMENT: Colonoscope -     Impression: 1  Normal-appearing colon  Anastomosis site was seen in the rectum appeared healthy  No evidence of local recurrence  2  Couple small scattered diverticuli  RECOMMENDATION: Repeat colonoscopy in 2 years due to a personal history of colon cancer  Eran Barillas MD     DXA bone density spine hip and pelvis    Result Date: 10/14/2021  Narrative: CENTRAL  DXA SCAN CLINICAL HISTORY:  25-year-old postmenopausal female  OTHER RISK FACTORS:  History of fracture resulting from minor injury  PHARMACOLOGIC THERAPY FOR OSTEOPOROSIS:  None  TECHNIQUE: Bone densitometry was performed using a RoboDynamicsXA   bone densitometer  Regions of interest appear properly placed  COMPARISON: 5/6/2019   RESULTS: LUMBAR SPINE L1-L4 : BMD  1 141  gm/cm2 T-score -0 4 LEFT TOTAL HIP: BMD: 0 992 gm/cm2 T-score: -0 1 LEFT FEMORAL NECK: BMD: 0 856  gm/cm2 T score: -1 3 RIGHT TOTAL HIP: BMD:  1 004  gm/cm2 T-score: 0 0 RIGHT FEMORAL NECK: BMD:  0 851  gm/cm2  T score: -1 3     Impression: 1  Low bone mass (osteopenia)  [Based on the left and right femoral necks] 2  Since a DXA study from 5/6/2019, there has been: A  STATISTICALLY SIGNIFICANT DECREASE in bone mineral density of  0 039 g/cm2 (3 3)% in the lumbar spine  A  STATISTICALLY SIGNIFICANT DECREASE in bone mineral density of  0 046 g/cm2 (4 4)% in the hips  3   The 10 year risk of hip fracture is 1 2% with the 10 year risk of major osteoporotic fracture being 12 6% as calculated by the [facility] fracture risk assessment tool (FRAX, which is based on data generated by the [facility] for Metabolic Bone Diseases)  4   The current NOF guidelines recommend treating patients with a T-score of -2 5 or less in the lumbar spine or hips, or in post-menopausal women and men over the age of 48 with low bone mass (osteopenia) and a FRAX 10 year risk score of >3% for hip fracture and/or >20% for major osteoporotic fracture  5   The NOF recommends follow-up DXA in 1-2 years after initiating therapy for osteoporosis and every 2 years thereafter  More frequent evaluation is appropriate for patients with conditions associated with rapid bone loss, such as glucocorticoid therapy  The interval between DXA screenings may be longer for individuals without major risk factors and initial T-score in the normal or upper low bone mass range  The FRAX algorithm has certain limitations: -FRAX has not been validated in patients currently or previously treated with pharmacotherapy for osteoporosis  In such patients, clinical judgment must be exercised in interpreting FRAX scores    -Prior hip, vertebral and humeral fragility fractures appear to confer greater risk of subsequent fracture than fractures at other sites (this is especially true for individuals with severe vertebral fractures), but quantification of this incremental risk is not possible with FRAX  -FRAX underestimates fracture risk in patients with history of multiple fragility fractures  -FRAX may underestimate fracture risk in patients with history of frequent falls  -It is not appropriate to use FRAX to monitor treatment response  WHO CLASSIFICATION: Normal (a T-score of -1 0 or higher) Low bone mineral density (a T-score of less than -1 0 but higher than -2 5) Osteoporosis (a T-score of -2 5 or less) Severe osteoporosis (a T-score of -2 5 or less with a fragility fracture) LEAST SIGNIFICANT CHANGE AT 95% C I: Lumbar spine: 0 036 gm/cm2 (3 2%)  Total hip: 0 018 gm/cm2 (2 0%)  Forearm: 0 024 gm/cm2 (3 2%)  Workstation performed: [id number]     NM PET CT skull base to mid thigh    Result Date: 9/28/2021  Narrative: PET/CT SCAN INDICATION:  C18 7: Malignant neoplasm of sigmoid colon C20: Malignant neoplasm of rectum   Rectosigmoid carcinoma, restaging/surveillance examination  Borderline elevated CEA (most recently 4 4)  MODIFIER: PS COMPARISON: CT chest abdomen and pelvis 2/24/2021  CELL TYPE:  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 TECHNIQUE:   12 1 mCi F-18-FDG administered IV  Multiplanar attenuation corrected and non attenuation corrected PET images were acquired 60 minutes post injection  Contiguous, low dose, axial CT sections were obtained from the skull base through the femurs   Intravenous contrast material was not utilized  This examination, like all CT scans performed in the [facility], was performed utilizing techniques to minimize radiation dose exposure, including the use of iterative reconstruction and automated exposure control  Fasting serum glucose: 90 mg/dl FINDINGS: VISUALIZED BRAIN:   No acute abnormalities are seen  HEAD/NECK:   There is a physiologic distribution of FDG  No FDG avid cervical adenopathy is seen   CT images: Unremarkable  CHEST:   No FDG avid soft tissue lesions are seen  CT images: Mild coronary artery calcification  No pericardial effusion, no pleural effusion ABDOMEN:   There is a large hypermetabolic left lateral abdominal lobular mass situated immediately inferior to the spleen in the left paracolic gutter region, which measures 4 3 x 3 9 x 4 5 cm in size, SUV max 20 8  No other definite hypermetabolic abnormalities are seen within the upper abdomen  Activity at the bowel anastomotic site in the right abdomen on image 165 demonstrates SUV max of 3 8  This is nonspecific but may simply be inflammatory or physiologic  Direct visualization is recommended if not recently performed  CT images: No ascites  No hydronephrosis or bowel obstruction  PELVIS: Additional rectosigmoid anastomotic site noted image 212  No evidence of abnormal glucose activity  No evidence of glucose avid pelvic adenopathy  No pelvic ascites  OSSEOUS STRUCTURES: No FDG avid lesions are seen  CT images: No significant findings  Impression: 1  There is a large left paracolic gutter markedly hypermetabolic mass immediately inferior to the spleen, most consistent with metastatic colorectal carcinoma  2  Mildly increased activity at the right abdominal bowel anastomotic site  If not recently performed, direct visualization is recommended 3  There are no other glucose avid abnormalities identified within the abdomen or pelvis 4  No evidence of distant glucose avid metastatic disease in the neck, chest, or skeleton The examination demonstrates a significant  finding and was documented as such in [facility] for liaison and referring practitioner notification  Workstation performed: [id number]     Mammo screening bilateral w 3d & cad    Result Date: 10/14/2021  Narrative: DIAGNOSIS: Encounter for screening mammogram for malignant neoplasm of breast TECHNIQUE: Digital screening mammography was performed   Computer Aided Detection (CAD) analyzed all applicable images  COMPARISONS: Prior breast imaging dated: 06/26/2020, 05/06/2019, 10/18/2016, 10/14/2016, and 09/14/2015 RELEVANT HISTORY: Family Breast Cancer History: History of breast cancer in [address] [facility], Family  Family Medical History: Family medical history includes breast cancer in 2 relatives (cousin, family) and colon cancer in 2 relatives (family, paternal uncle)  Personal History: No known relevant hormone history  Surgical history includes lumpectomy  No known relevant medical history  The patient is scheduled in a reminder system for screening mammography  8-10% of cancers will be missed on mammography  Management of a palpable abnormality must be based on clinical grounds  Patients will be notified of their results via letter from our facility  Accredited by [employer] of Radiology and FDA  RISK ASSESSMENT: 5 Year Tyrer-Cuzick: 2 13 % 10 Year Tyrer-Cuzick: 4 17 % Lifetime Tyrer-Cuzick: 7 93 % TISSUE DENSITY: There are scattered areas of fibroglandular density  INDICATION: Buck Juarez is a 78 y o  female presenting for screening mammography  FINDINGS: Breast parenchymal distribution is unchanged from priors including multiple focal asymmetries in the right breast   Long-term stability confirms benignancy  No developing asymmetries or concerning masses  Single upper-outer-anterior coarse calcification in the left breast is definitively benign  No suspicious microcalcifications, architectural distortion, skin thickening, or abnormalities of the nipples in either breast       Impression: No mammographic evidence of malignancy or significant change from priors  ASSESSMENT/BI-RADS CATEGORY: Left: 2 - Benign Right: 2 - Benign Overall: 2 - Benign RECOMMENDATION:      - Routine screening mammogram in 1 year for both breasts   Workstation ID: [id number]     CT 6/2023     IMPRESSION:     No evidence of acute intrathoracic pathology      New 7 mm hypodensity of the right hepatic lobe which was not present on prior examinations  Contrast enhanced abdominal MRI recommended for further evaluation      Nodular soft tissue adjacent to the left upper renal capsule and prior splenectomy site is stable      Discussion/Summary: This is a 58-year-old female with a 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

## 2023-06-27 ENCOUNTER — HOSPITAL ENCOUNTER (OUTPATIENT)
Dept: INFUSION CENTER | Facility: HOSPITAL | Age: 68
Discharge: HOME | End: 2023-06-27

## 2023-06-27 DIAGNOSIS — C19 RECTOSIGMOID CANCER (HCC): Primary | ICD-10-CM

## 2023-07-24 ENCOUNTER — HOSPITAL ENCOUNTER (OUTPATIENT)
Dept: RADIOLOGY | Facility: HOSPITAL | Age: 68
Discharge: HOME | End: 2023-07-24
Payer: MEDICARE

## 2023-07-24 DIAGNOSIS — C19 RECTOSIGMOID CANCER (HCC): ICD-10-CM

## 2023-07-24 DIAGNOSIS — C78.6 MALIGNANT NEOPLASM METASTATIC TO OMENTUM (HCC): ICD-10-CM

## 2023-07-24 DIAGNOSIS — R16.0 LIVER MASS: ICD-10-CM

## 2023-07-24 PROCEDURE — G1004 CDSM NDSC: HCPCS

## 2023-07-24 PROCEDURE — A9585 GADOBUTROL INJECTION: HCPCS | Performed by: STUDENT IN AN ORGANIZED HEALTH CARE EDUCATION/TRAINING PROGRAM

## 2023-07-24 PROCEDURE — 74183 MRI ABD W/O CNTR FLWD CNTR: CPT

## 2023-07-24 RX ADMIN — GADOBUTROL 9 ML: 604.72 INJECTION INTRAVENOUS at 13:39

## 2023-08-08 ENCOUNTER — HOSPITAL ENCOUNTER (OUTPATIENT)
Dept: INFUSION CENTER | Facility: HOSPITAL | Age: 68
Discharge: HOME | End: 2023-08-08
Attending: INTERNAL MEDICINE

## 2023-08-08 DIAGNOSIS — C19 RECTOSIGMOID CANCER (HCC): Primary | ICD-10-CM

## 2023-08-18 ENCOUNTER — TELEPHONE (OUTPATIENT)
Dept: HEMATOLOGY ONCOLOGY | Facility: CLINIC | Age: 68
End: 2023-08-18

## 2023-08-18 NOTE — TELEPHONE ENCOUNTER
Appointment Change  Cancel, Reschedule, Change to Virtual      Who are you speaking with? Patient   If it is not the patient, are they listed on an active communication consent form? Yes   Which provider is the appointment scheduled with? Dr. Sada Anaya   When is the appointment scheduled? Please list date and time 8/24/23   At which location is the appointment scheduled to take place? [facility]   Was the appointment rescheduled or changed from an in person visit to a virtual visit? If so, please list the details of the change. 10/10/23   1:20pm   What is the reason for the appointment change? provider   Was [facility] scheduled for this visit? Yes   Does [facility] need to be scheduled for the new visit (if applicable) Yes   Does the patient need an infusion appointment rescheduled? No   Does the patient have an infusion appointment scheduled? If so, when? No   Is the patient undergoing chemotherapy? No   Was the no-show policy reviewed for appointments being changed with less then 24 hours of notice? Yes         Patient Call    Who are you speaking with? Patient    If it is not the patient, are they listed on an active communication consent form? Yes   What is the reason for this call? Patient wants results from her MRI. She wants someone to call her    Does this require a call back? Yes   If a call back is required, please list best call back number [phone]   If a call back is required, advise that a message will be forwarded to their care team and someone will return their call as soon as possible. Did you relay this information to the patient?  Yes

## 2023-08-21 ENCOUNTER — TELEPHONE (OUTPATIENT)
Dept: OTHER | Facility: HOSPITAL | Age: 68
End: 2023-08-21

## 2023-08-21 DIAGNOSIS — C19 RECTOSIGMOID CANCER (HCC): Primary | ICD-10-CM

## 2023-08-21 NOTE — TELEPHONE ENCOUNTER
Called to discuss results of MR. Liver lesion almost certainly a metastasis and there is also a lesion with the left posterior either pleural or intra-soft tissue implant. Will send for IR biopsy of back lesion to obtain additional tissue, and ensure that she sees Dr David Malin asap to re-initiate treatment. All questions answered.

## 2023-08-23 ENCOUNTER — PREP FOR PROCEDURE (OUTPATIENT)
Dept: INTERVENTIONAL RADIOLOGY/VASCULAR | Facility: CLINIC | Age: 68
End: 2023-08-23

## 2023-08-23 DIAGNOSIS — C19 RECTOSIGMOID CANCER (HCC): Primary | ICD-10-CM

## 2023-09-08 ENCOUNTER — TELEPHONE (OUTPATIENT)
Dept: RADIOLOGY | Facility: HOSPITAL | Age: 68
End: 2023-09-08

## 2023-09-08 NOTE — NURSING NOTE
Spoke to patient regarding biopsy scheduled 9/13 at [facility], pt to arrive in [facility] at 0830. [employer] [facility] is set up for pt. NPO after midnight, may take am meds with sips of water. Pt to hold eliquis, last dose 9/10. Pt verbalized understanding of all instructions, questions answered as offered.

## 2023-09-12 ENCOUNTER — HOSPITAL ENCOUNTER (OUTPATIENT)
Dept: INFUSION CENTER | Facility: HOSPITAL | Age: 68
Discharge: HOME | End: 2023-09-12
Attending: INTERNAL MEDICINE
Payer: MEDICARE

## 2023-09-12 VITALS
HEART RATE: 95 BPM | SYSTOLIC BLOOD PRESSURE: 134 MMHG | RESPIRATION RATE: 18 BRPM | OXYGEN SATURATION: 99 % | DIASTOLIC BLOOD PRESSURE: 74 MMHG | TEMPERATURE: 98.3 F

## 2023-09-12 DIAGNOSIS — C19 RECTOSIGMOID CANCER (HCC): Primary | ICD-10-CM

## 2023-09-12 LAB
BASOPHILS # BLD AUTO: 0.04 THOUSANDS/ÂΜL (ref 0–0.1)
BASOPHILS NFR BLD AUTO: 1 % (ref 0–1)
EOSINOPHIL # BLD AUTO: 0.14 THOUSAND/ÂΜL (ref 0–0.61)
EOSINOPHIL NFR BLD AUTO: 2 % (ref 0–6)
ERYTHROCYTE [DISTWIDTH] IN BLOOD BY AUTOMATED COUNT: 17 % (ref 11.6–15.1)
HCT VFR BLD AUTO: 38.7 % (ref 34.8–46.1)
HGB BLD-MCNC: 12.7 G/DL (ref 11.5–15.4)
IMM GRANULOCYTES # BLD AUTO: 0.02 THOUSAND/UL (ref 0–0.2)
IMM GRANULOCYTES NFR BLD AUTO: 0 % (ref 0–2)
INR PPP: 0.97 (ref 0.84–1.19)
LYMPHOCYTES # BLD AUTO: 2.94 THOUSANDS/ÂΜL (ref 0.6–4.47)
LYMPHOCYTES NFR BLD AUTO: 34 % (ref 14–44)
MCH RBC QN AUTO: 29.5 PG (ref 26.8–34.3)
MCHC RBC AUTO-ENTMCNC: 32.8 G/DL (ref 31.4–37.4)
MCV RBC AUTO: 90 FL (ref 82–98)
MONOCYTES # BLD AUTO: 0.77 THOUSAND/ÂΜL (ref 0.17–1.22)
MONOCYTES NFR BLD AUTO: 9 % (ref 4–12)
NEUTROPHILS # BLD AUTO: 4.87 THOUSANDS/ÂΜL (ref 1.85–7.62)
NEUTS SEG NFR BLD AUTO: 54 % (ref 43–75)
NRBC BLD AUTO-RTO: 0 /100 WBCS
PLATELET # BLD AUTO: 290 THOUSANDS/UL (ref 149–390)
PMV BLD AUTO: 11.2 FL (ref 8.9–12.7)
PROTHROMBIN TIME: 13 SECONDS (ref 11.6–14.5)
RBC # BLD AUTO: 4.3 MILLION/UL (ref 3.81–5.12)
WBC # BLD AUTO: 8.78 THOUSAND/UL (ref 4.31–10.16)

## 2023-09-12 PROCEDURE — 85025 COMPLETE CBC W/AUTO DIFF WBC: CPT | Performed by: INTERNAL MEDICINE

## 2023-09-12 PROCEDURE — 85610 PROTHROMBIN TIME: CPT | Performed by: INTERNAL MEDICINE

## 2023-09-13 ENCOUNTER — HOSPITAL ENCOUNTER (OUTPATIENT)
Dept: NON INVASIVE DIAGNOSTICS | Facility: HOSPITAL | Age: 68
Discharge: HOME | End: 2023-09-13
Attending: RADIOLOGY
Payer: MEDICARE

## 2023-09-13 VITALS
SYSTOLIC BLOOD PRESSURE: 160 MMHG | OXYGEN SATURATION: 99 % | WEIGHT: 209 LBS | RESPIRATION RATE: 16 BRPM | HEART RATE: 73 BPM | DIASTOLIC BLOOD PRESSURE: 73 MMHG | TEMPERATURE: 97.4 F | BODY MASS INDEX: 45.23 KG/M2

## 2023-09-13 DIAGNOSIS — C19 RECTOSIGMOID CANCER (HCC): ICD-10-CM

## 2023-09-13 PROCEDURE — 88342 IMHCHEM/IMCYTCHM 1ST ANTB: CPT | Performed by: PATHOLOGY

## 2023-09-13 PROCEDURE — 99152 MOD SED SAME PHYS/QHP 5/>YRS: CPT

## 2023-09-13 PROCEDURE — 99153 MOD SED SAME PHYS/QHP EA: CPT

## 2023-09-13 PROCEDURE — 76942 ECHO GUIDE FOR BIOPSY: CPT

## 2023-09-13 PROCEDURE — 32400 NEEDLE BIOPSY CHEST LINING: CPT | Performed by: RADIOLOGY

## 2023-09-13 PROCEDURE — 88341 IMHCHEM/IMCYTCHM EA ADD ANTB: CPT | Performed by: PATHOLOGY

## 2023-09-13 PROCEDURE — 20206 BIOPSY MUSCLE PERQ NEEDLE: CPT

## 2023-09-13 PROCEDURE — 99152 MOD SED SAME PHYS/QHP 5/>YRS: CPT | Performed by: RADIOLOGY

## 2023-09-13 PROCEDURE — 88333 PATH CONSLTJ SURG CYTO XM 1: CPT | Performed by: PATHOLOGY

## 2023-09-13 PROCEDURE — 76942 ECHO GUIDE FOR BIOPSY: CPT | Performed by: RADIOLOGY

## 2023-09-13 PROCEDURE — 88305 TISSUE EXAM BY PATHOLOGIST: CPT | Performed by: PATHOLOGY

## 2023-09-13 RX ORDER — ACETAMINOPHEN 325 MG/1
650 TABLET ORAL EVERY 4 HOURS PRN
Status: DISCONTINUED | OUTPATIENT
Start: 2023-09-13 | End: 2023-09-14 | Stop reason: HOSPADM

## 2023-09-13 RX ORDER — OXYCODONE HYDROCHLORIDE 5 MG/1
5 TABLET ORAL EVERY 4 HOURS PRN
Status: DISCONTINUED | OUTPATIENT
Start: 2023-09-13 | End: 2023-09-14 | Stop reason: HOSPADM

## 2023-09-13 RX ORDER — HYDROMORPHONE HCL/PF 1 MG/ML
0.5 SYRINGE (ML) INJECTION EVERY 2 HOUR PRN
Status: CANCELLED | OUTPATIENT
Start: 2023-09-13 | End: 2023-09-15

## 2023-09-13 RX ORDER — FENTANYL CITRATE 50 UG/ML
INJECTION, SOLUTION INTRAMUSCULAR; INTRAVENOUS AS NEEDED
Status: COMPLETED | OUTPATIENT
Start: 2023-09-13 | End: 2023-09-13

## 2023-09-13 RX ORDER — LIDOCAINE WITH 8.4% SOD BICARB 0.9%(10ML)
SYRINGE (ML) INJECTION AS NEEDED
Status: COMPLETED | OUTPATIENT
Start: 2023-09-13 | End: 2023-09-13

## 2023-09-13 RX ORDER — MIDAZOLAM HYDROCHLORIDE 2 MG/2ML
INJECTION, SOLUTION INTRAMUSCULAR; INTRAVENOUS AS NEEDED
Status: COMPLETED | OUTPATIENT
Start: 2023-09-13 | End: 2023-09-13

## 2023-09-13 RX ORDER — OXYCODONE HYDROCHLORIDE 5 MG/1
10 TABLET ORAL EVERY 4 HOURS PRN
Status: DISCONTINUED | OUTPATIENT
Start: 2023-09-13 | End: 2023-09-14 | Stop reason: HOSPADM

## 2023-09-13 RX ADMIN — MIDAZOLAM 1 MG: 1 INJECTION INTRAMUSCULAR; INTRAVENOUS at 11:21

## 2023-09-13 RX ADMIN — FENTANYL CITRATE 50 MCG: 50 INJECTION, SOLUTION INTRAMUSCULAR; INTRAVENOUS at 11:21

## 2023-09-13 RX ADMIN — Medication 20 ML: at 11:29

## 2023-09-13 NOTE — DISCHARGE INSTRUCTIONS
Procedural Sedation   WHAT YOU NEED TO KNOW:   Procedural sedation is medicine used during procedures to help you feel relaxed and calm. You will remember little to none of the procedure. After sedation you may feel tired, weak, or unsteady on your feet. You may also have trouble concentrating or short-term memory loss. These symptoms should go away in 24 hours or less. DISCHARGE INSTRUCTIONS:     Call 911 or have someone else call for any of the following: You have sudden trouble breathing. You cannot be woken. Contact Interventional Radiology at [phone]   [place] PATIENTS: Contact Interventional Radiology at [phone]) Narayan Lewis PATIENTS: Contact Interventional Radiology at [phone]) if any of the following occur: You have a severe headache or dizziness. Your heart is beating faster than usual.    You have a fever or chills. Your skin is itchy, swollen, or you have a rash. You have nausea or are vomiting for more than 8 hours after the procedure. You have questions or concerns about your condition or care. Self-care:   Have someone stay with you for 24 hours. This person can drive you to errands and help you do things around the house. This person can also watch for problems. Rest and do quiet activities for 24 hours. Do not exercise, ride a bike, or play sports. Stand up slowly to prevent dizziness and falls. Take short walks around the house with another person. Slowly return to your usual activities the next day. Do not drive or use dangerous machines or tools for 24 hours. You may injure yourself or others. Examples include a lawnmower, saw, or drill. Do not return to work for 24 hours if you use dangerous machines or tools for work. Do not make important decisions for 24 hours. For example, do not sign important papers or invest money. Drink liquids as directed. Liquids help flush the sedation medicine out of your body.  Ask how much liquid to drink each day and which liquids are best for you. Eat small, frequent meals to prevent nausea and vomiting. Start with clear liquids such as juice or broth. If you do not vomit after clear liquids, you can eat your usual foods. Do not drink alcohol or take medicines that make you drowsy. This includes medicines that help you sleep and anxiety medicines. Ask your healthcare provider if it is safe for you to take pain medicine. Follow up with your healthcare provider as directed: Write down your questions so you remember to ask them during your visits. POST BIOPSY    Care after your procedure:    1. Limit your activities for 24 hours after your biopsy. 2. No driving day of biopsy. 3. Return to your normal diet. Small sips of flat soda will help with mild nausea. 4. Remove band-aid or dressing 24 hours after procedure. Contact Interventional Radiology at [phone] [place] PATIENTS: Contact Interventional Radiology at [phone]) Shannan Landon PATIENTS: Contact Interventional Radiology at [phone]) if:    1. Difficulty breathing, nausea or vomiting. 2. Chills or fever above 101 degrees F.     3. Pain at biopsy site not relieved by medication. 4. Develop any redness, swelling, heat, unusual drainage, heavy bruising or bleeding from biopsy site.         You may resume your Eliquis on Thursday, 9/14

## 2023-09-13 NOTE — SEDATION DOCUMENTATION
Biopsy of chest wall mass completed via US by Dr. Lucero Rubi. Pt tolerated without issue. Bandaid to site. Education provided.  Transported to PACU for recovery with x2 RNs

## 2023-09-18 ENCOUNTER — RA CDI HCC (OUTPATIENT)
Dept: OTHER | Facility: HOSPITAL | Age: 68
End: 2023-09-18

## 2023-09-18 ENCOUNTER — TELEPHONE (OUTPATIENT)
Dept: SURGICAL ONCOLOGY | Facility: CLINIC | Age: 68
End: 2023-09-18

## 2023-09-18 DIAGNOSIS — C19 RECTOSIGMOID CANCER (HCC): Primary | ICD-10-CM

## 2023-09-18 PROCEDURE — 88341 IMHCHEM/IMCYTCHM EA ADD ANTB: CPT | Performed by: PATHOLOGY

## 2023-09-18 PROCEDURE — 88305 TISSUE EXAM BY PATHOLOGIST: CPT | Performed by: PATHOLOGY

## 2023-09-18 PROCEDURE — 88342 IMHCHEM/IMCYTCHM 1ST ANTB: CPT | Performed by: PATHOLOGY

## 2023-09-18 PROCEDURE — 88333 PATH CONSLTJ SURG CYTO XM 1: CPT | Performed by: PATHOLOGY

## 2023-09-18 NOTE — TELEPHONE ENCOUNTER
Called pt to discuss results of biopsy. 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 Discussed with Dr Rufina Simmonds rad onc who thinks SBRT is a good option for chest wall met. Will discuss with Dr Lucero Rubi weather [address] [place] [address] for liver lesion is feasible. As her DFI is 18 mo, she may be able to avoid systemic therapy at this time and treat these two lesions and then pursue obs. Will discuss at tumor board. All questions answered.

## 2023-09-19 ENCOUNTER — DOCUMENTATION (OUTPATIENT)
Dept: HEMATOLOGY ONCOLOGY | Facility: CLINIC | Age: 68
End: 2023-09-19

## 2023-09-19 NOTE — PROGRESS NOTES
In-basket message received from Dr. William Be to add patient to GI [facility] asap. Patient placed on next available [facility] scheduled on 9/28/2023. Chart reviewed and prep completed.

## 2023-09-25 ENCOUNTER — CLINICAL SUPPORT (OUTPATIENT)
Dept: RADIATION ONCOLOGY | Facility: HOSPITAL | Age: 68
End: 2023-09-25
Attending: STUDENT IN AN ORGANIZED HEALTH CARE EDUCATION/TRAINING PROGRAM
Payer: MEDICARE

## 2023-09-25 ENCOUNTER — APPOINTMENT (OUTPATIENT)
Dept: LAB | Facility: HOSPITAL | Age: 68
End: 2023-09-25
Payer: MEDICARE

## 2023-09-25 ENCOUNTER — OFFICE VISIT (OUTPATIENT)
Dept: FAMILY MEDICINE CLINIC | Facility: CLINIC | Age: 68
End: 2023-09-25
Payer: MEDICARE

## 2023-09-25 VITALS
WEIGHT: 208 LBS | HEIGHT: 57 IN | SYSTOLIC BLOOD PRESSURE: 124 MMHG | BODY MASS INDEX: 44.88 KG/M2 | TEMPERATURE: 98.4 F | RESPIRATION RATE: 18 BRPM | HEART RATE: 95 BPM | OXYGEN SATURATION: 99 % | DIASTOLIC BLOOD PRESSURE: 86 MMHG

## 2023-09-25 VITALS
WEIGHT: 208 LBS | HEART RATE: 90 BPM | RESPIRATION RATE: 16 BRPM | HEIGHT: 57 IN | TEMPERATURE: 97.7 F | BODY MASS INDEX: 44.88 KG/M2 | DIASTOLIC BLOOD PRESSURE: 80 MMHG | SYSTOLIC BLOOD PRESSURE: 130 MMHG | OXYGEN SATURATION: 99 %

## 2023-09-25 DIAGNOSIS — N18.30 STAGE 3 CHRONIC KIDNEY DISEASE, UNSPECIFIED WHETHER STAGE 3A OR 3B CKD (HCC): ICD-10-CM

## 2023-09-25 DIAGNOSIS — C19 RECTOSIGMOID CANCER (HCC): ICD-10-CM

## 2023-09-25 DIAGNOSIS — C78.6 MALIGNANT NEOPLASM METASTATIC TO OMENTUM (HCC): Primary | ICD-10-CM

## 2023-09-25 DIAGNOSIS — Z12.31 ENCOUNTER FOR SCREENING MAMMOGRAM FOR MALIGNANT NEOPLASM OF BREAST: ICD-10-CM

## 2023-09-25 DIAGNOSIS — Q89.01 ASPLENIA: ICD-10-CM

## 2023-09-25 DIAGNOSIS — Z23 NEED FOR VACCINATION: ICD-10-CM

## 2023-09-25 DIAGNOSIS — C78.6 MALIGNANT NEOPLASM METASTATIC TO OMENTUM (HCC): ICD-10-CM

## 2023-09-25 DIAGNOSIS — K21.9 GASTROESOPHAGEAL REFLUX DISEASE, UNSPECIFIED WHETHER ESOPHAGITIS PRESENT: ICD-10-CM

## 2023-09-25 DIAGNOSIS — E78.2 MIXED HYPERLIPIDEMIA: Primary | ICD-10-CM

## 2023-09-25 DIAGNOSIS — R42 DIZZINESS: ICD-10-CM

## 2023-09-25 LAB
ALBUMIN SERPL BCP-MCNC: 3.6 G/DL (ref 3.5–5)
ALP SERPL-CCNC: 54 U/L (ref 34–104)
ALT SERPL W P-5'-P-CCNC: 13 U/L (ref 7–52)
ANION GAP SERPL CALCULATED.3IONS-SCNC: 5 MMOL/L
AST SERPL W P-5'-P-CCNC: 16 U/L (ref 13–39)
BASOPHILS # BLD AUTO: 0.05 THOUSANDS/ÂΜL (ref 0–0.1)
BASOPHILS NFR BLD AUTO: 1 % (ref 0–1)
BILIRUB SERPL-MCNC: 0.76 MG/DL (ref 0.2–1)
BUN SERPL-MCNC: 16 MG/DL (ref 5–25)
CALCIUM SERPL-MCNC: 9.4 MG/DL (ref 8.4–10.2)
CEA SERPL-MCNC: 6.2 NG/ML (ref 0–3)
CHLORIDE SERPL-SCNC: 106 MMOL/L (ref 96–108)
CO2 SERPL-SCNC: 26 MMOL/L (ref 21–32)
CREAT SERPL-MCNC: 1.01 MG/DL (ref 0.6–1.3)
EOSINOPHIL # BLD AUTO: 0.07 THOUSAND/ÂΜL (ref 0–0.61)
EOSINOPHIL NFR BLD AUTO: 1 % (ref 0–6)
ERYTHROCYTE [DISTWIDTH] IN BLOOD BY AUTOMATED COUNT: 16.8 % (ref 11.6–15.1)
GFR SERPL CREATININE-BSD FRML MDRD: 56 ML/MIN/1.73SQ M
GLUCOSE SERPL-MCNC: 81 MG/DL (ref 65–140)
HCT VFR BLD AUTO: 40.9 % (ref 34.8–46.1)
HGB BLD-MCNC: 13.3 G/DL (ref 11.5–15.4)
IMM GRANULOCYTES # BLD AUTO: 0.01 THOUSAND/UL (ref 0–0.2)
IMM GRANULOCYTES NFR BLD AUTO: 0 % (ref 0–2)
LYMPHOCYTES # BLD AUTO: 3.26 THOUSANDS/ÂΜL (ref 0.6–4.47)
LYMPHOCYTES NFR BLD AUTO: 37 % (ref 14–44)
MCH RBC QN AUTO: 29.4 PG (ref 26.8–34.3)
MCHC RBC AUTO-ENTMCNC: 32.5 G/DL (ref 31.4–37.4)
MCV RBC AUTO: 91 FL (ref 82–98)
MONOCYTES # BLD AUTO: 0.67 THOUSAND/ÂΜL (ref 0.17–1.22)
MONOCYTES NFR BLD AUTO: 8 % (ref 4–12)
NEUTROPHILS # BLD AUTO: 4.69 THOUSANDS/ÂΜL (ref 1.85–7.62)
NEUTS SEG NFR BLD AUTO: 53 % (ref 43–75)
NRBC BLD AUTO-RTO: 0 /100 WBCS
PLATELET # BLD AUTO: 314 THOUSANDS/UL (ref 149–390)
PMV BLD AUTO: 11.5 FL (ref 8.9–12.7)
POTASSIUM SERPL-SCNC: 4.8 MMOL/L (ref 3.5–5.3)
PROT SERPL-MCNC: 7.3 G/DL (ref 6.4–8.4)
RBC # BLD AUTO: 4.52 MILLION/UL (ref 3.81–5.12)
SODIUM SERPL-SCNC: 137 MMOL/L (ref 135–147)
WBC # BLD AUTO: 8.75 THOUSAND/UL (ref 4.31–10.16)

## 2023-09-25 PROCEDURE — G0008 ADMIN INFLUENZA VIRUS VAC: HCPCS

## 2023-09-25 PROCEDURE — 82378 CARCINOEMBRYONIC ANTIGEN: CPT

## 2023-09-25 PROCEDURE — 99211 OFF/OP EST MAY X REQ PHY/QHP: CPT | Performed by: STUDENT IN AN ORGANIZED HEALTH CARE EDUCATION/TRAINING PROGRAM

## 2023-09-25 PROCEDURE — 80053 COMPREHEN METABOLIC PANEL: CPT

## 2023-09-25 PROCEDURE — 36415 COLL VENOUS BLD VENIPUNCTURE: CPT

## 2023-09-25 PROCEDURE — G0010 ADMIN HEPATITIS B VACCINE: HCPCS

## 2023-09-25 PROCEDURE — 90621 MENB-FHBP VACC 2/3 DOSE IM: CPT

## 2023-09-25 PROCEDURE — 90471 IMMUNIZATION ADMIN: CPT

## 2023-09-25 PROCEDURE — 85025 COMPLETE CBC W/AUTO DIFF WBC: CPT

## 2023-09-25 PROCEDURE — 99214 OFFICE O/P EST MOD 30 MIN: CPT

## 2023-09-25 PROCEDURE — 90662 IIV NO PRSV INCREASED AG IM: CPT

## 2023-09-25 PROCEDURE — 90746 HEPB VACCINE 3 DOSE ADULT IM: CPT

## 2023-09-25 PROCEDURE — 99204 OFFICE O/P NEW MOD 45 MIN: CPT | Performed by: STUDENT IN AN ORGANIZED HEALTH CARE EDUCATION/TRAINING PROGRAM

## 2023-09-25 RX ORDER — MECLIZINE HCL 12.5 MG/1
12.5 TABLET ORAL 3 TIMES DAILY PRN
Qty: 30 TABLET | Refills: 0 | Status: SHIPPED | OUTPATIENT
Start: 2023-09-25

## 2023-09-25 RX ORDER — EZETIMIBE 10 MG/1
10 TABLET ORAL DAILY
Qty: 90 TABLET | Refills: 1 | Status: SHIPPED | OUTPATIENT
Start: 2023-09-25

## 2023-09-25 RX ORDER — FAMOTIDINE 20 MG/1
20 TABLET, FILM COATED ORAL 2 TIMES DAILY PRN
Qty: 90 TABLET | Refills: 1 | Status: SHIPPED | OUTPATIENT
Start: 2023-09-25 | End: 2023-12-24

## 2023-09-25 NOTE — PROGRESS NOTES
Radiation Oncology consult, referred by Dr. Efren Teran to discuss SBRT to left chest wall metastasis. Rae Wren 1955 is a 70 y.o. female  with history of adenocarcinoma of rectosigmoid colon, initially diagnosed with T3 N0 in September 2019 s/p low anterior resection with Dr. Guicho Elias in December 2019. She had ileostomy reversal in Feb 2020. She developed progression with omental metastasis in 2021 and underwent omentectomy, splenectomy, resection of involved diaphragm and diaphragm repair + DILMA/BSO and instillation of HIPEC (Dr. Flip Argueta, Dr. Efren Teran, Dr. Jose A Novak) in April 2022. She completed chemotherapy in August 2022 and has continued observation with Dr. Nikko Seymour and Dr. Efren Teran, Surg Onc. CT in May 2023 showed new small liver lesion. MRI abdomen was ordered that confirmed liver metastasis and also revealed a new posterior left chest wall/pleural lesion suspicious for metastasis. The chest wall mass was biopsied on 9/13/23 revealing metastatic moderately differentiated adenocarcinoma, consistent with colon primary. She is referred to discuss SBRT to this area. 2/23/23 Med Onc, Ignacio Griggs PA-C/Dr. Maile Smith  Patient is now ISIAH, 6 months on observation following the above treatment. Patient CEA levels remain stable. Patient will continue with every 4 month blood work and CT examinations. Patient follows with Dr. Efren Teran as well as medical oncology. CARCINOEMBRYONIC ANTIGEN   Latest Ref Rng 0.0 - 3.0 ng/mL   10/1/2019 2.5    6/8/2020 2.2    9/18/2020 2.3    2/17/2021 2.9    8/18/2021 4.5    9/13/2021 4.4    4/15/2022 4.0 (H)    6/1/2022 1.8    1/24/2023 1.6    5/16/2023 1.8         5/30/23 CT chest abdomen pelvis w contrast  No evidence of acute intrathoracic pathology. New 7 mm hypodensity of the right hepatic lobe which was not present on prior examinations. Contrast enhanced abdominal MRI recommended for further evaluation.    Nodular soft tissue adjacent to the left upper renal capsule and prior splenectomy site is stable      7/24/23 MRI abdomen w wo contrast  16 mm segment 6 hepatic lesion unchanged from the CT suspicious for metastasis. New posterior left chest wall/pleural lesion measuring 2.7 x 2.3 cm also suspicious for metastasis      9/13/23 Ultrasound-guided core biopsy left posterior pleural-based chest wall mass  - Metastatic moderately differentiated adenocarcinoma, consistent with known colonic primary     9/18/23 Dr. Timo Wooten  - telephone note:  Called pt to discuss results of biopsy. 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 Discussed with Dr Cari Bravo rad onc who thinks SBRT is a good option for chest wall met. Will discuss with Dr Julian Jacques whether [facility] for liver lesion is feasible. As her DFI is 18 mo, she may be able to avoid systemic therapy at this time and treat these two lesions and then pursue obs. Will discuss at tumor board. Upcoming appts:  10/9/23 CT   10/10/23 Med Onc, Dr. Lauren Alston  10/16/23 Dr. Timo Wooten      Oncology History   Rectosigmoid cancer [facility])   9/23/2019 Initial Diagnosis    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[address])     9/23/2019 Biopsy    Rectal Mass ( 2 slides [id number] [facility] Pathology, Collected on 09/23/2019, biopsy):  - Adenocarcinoma     12/10/2019 Surgery    Low anterior resection (Dr Hernesto Tamayo):    A. Rectosigmoid colon, low anterior resection:  - Adenocarcinoma, moderately differentiated. - Tumor invades through the muscularis propria into pericolorectal tissue, T3  - Diverticula. - All margins are negative for tumor.  - Thirty-four lymph nodes, negative for malignancy (0/34).      B. Colon, anastomotic rings, resection:  - Two portions of colon with no pathologic abnormality     12/10/2019 Genomic Testing    RESULTS OF IMMUNOHISTOCHEMICAL ANALYSIS FOR MISMATCH REPAIR PROTEIN LOSS  INTERPRETATION: NO LOSS OF NUCLEAR EXPRESSION OF MMR PROTEINS: LOW PROBABILITY OF MSI-H  Note: Background non-neoplastic tissue and/or internal control with intact nuclear expression. RESULTS:  Antibody          Clone               Description                           Results  MLH1               M1                  Mismatch repair protein       Intact nuclear expression  MSH2              [id number]       Mismatch repair protein       Intact nuclear expression  MSH6              40                   Mismatch repair protein       Intact nuclear expression  PMS2              [id number]         Mismatch repair protein       Intact nuclear expression     2/4/2020 Surgery    Ileostomy, takedown:  - Portion of small intestine with mucocutaneous anastomosis consistent with stoma. - Negative for malignancy     8/18/2021 Progression    CEA trends- observed by Dr. Laina Mcmanus  2/17/21: 2.9  8/18/2021: 4.5  9/13/2021: 4.4    PET/CT  9/28/2021 completed due to elevating CEA  1. There is a large left paracolic gutter markedly hypermetabolic mass immediately inferior to the spleen, most consistent with metastatic colorectal carcinoma. 2. Mildly increased activity at the right abdominal bowel anastomotic site. If not recently performed, direct visualization is recommended  3. There are no other glucose avid abnormalities identified within the abdomen or pelvis  4. No evidence of distant glucose avid metastatic disease in the neck, chest, or skeleton      11/12/2021 Biopsy    Omental mass:  - Metastatic adenocarcinoma, with an immunophenotype compatible with metastasis from patient's known colonic primary. 12/30/2021 - 12/30/2021 Chemotherapy    CapOx x 1 dose of Oxaliplatin: D/c due to tolerance. 1/2/2022 Genomic Testing         1/4/2022 - 3/17/2022 Chemotherapy    First 6 cycles of Folfox given prior to surgery- 1/4 through 3/17/2022     Folfox was dose reduced due to anticipated tolerance. Minimal side effects     3/22/2022 Observation    CT CAP  1.   Decreased size of biopsy-proven omental metastasis, which now only focally contacting rather than grossly invading the spleen and adjacent abdominal wall. No new evidence of metastatic disease in the abdomen or pelvis. 2.  No new or suspicious pulmonary nodules. One of the previously noted new 1-2 mm nodules is no longer seen, and the other is unchanged. Recommend continued attention on follow-up. 3.  Top-normal thickness endometrial stripe measuring 7 mm. If there is history of vaginal bleeding, suggest catheterization with endometrial biopsy. 4.  Hepatic steatosis. 4/28/2022 -  Cancer Staged    Staging form: Colon and Rectum, AJCC 8th Edition  - Clinical stage from 4/28/2022: cT3, cN0, pM1 - Signed by Tim Chavez MD on 6/14/2023  Total positive nodes: 0       4/29/2022 Surgery    Escobar Sweeney and Jessika Grewal preformed the following procedures:   DIAGNOSTIC LAPAROSCOPY, TOTAL ABDOMINAL HYSTERECTOMY, BILATERAL SALPINGO-OOPHORECTOMY, EXTENSIVE ADHESIOLYSIS (N/A)  DIAGNOSTIC LAPAROSCOPY, OPEN OMENTECTOMY, POSSIBLE SPLENECTOMY (N/A)  INSTILLATION CHEMOTHERAPY INTRAPERITONEAL (HIPEC) LAPAROTOMY (N/A)  REPAIR DIAPHRAGM TEAR      A. Uterus, Bilateral Ovaries and Fallopian Tubes; Hysterosalpingo-oophorectomy:  - Leiomyoma. - Left ovary with serous cystadenoma. - Unremarkable cervix. - Benign inactive endometrium with no specific pathologic change. - Unremarkable right ovary and bilateral fallopian tubes. B. Spleen, spleen, omentum, darotis:  - Metastatic adenocarcinoma involving spleen and omentum, consistent with colonic primary. See Note. Note: Comparison to prior material ([id number], omental mass) reveals identical morphology to previous metastatic colonic adenocarcinoma.           5/18/2022 -  Cancer Staged    Staging form: Colon and Rectum, AJCC 8th Edition  - Pathologic stage from 5/18/2022: Stage KRISTAL (pT3, pN0, pM1a) - Signed by Tim Chavez MD on 6/14/2023  Total positive nodes: 0       6/14/2022 - 8/23/2022 Chemotherapy 7th cycle started on 6/14/2022  8th cycle worsening neuropathy; could not tolerate gabapentin  D/lizette oxaliplatin  9th cycle Irinotecan added at 85% dose reduction: SE Diarrhea  10th cycle Irinotecan dose redcued to 50%     9/19/2022 Observation    9/19/2022 CT CAP:   1. Previously seen omental metastasis in the left upper quadrant has been resected. There is a small area of nodular soft tissue thickening abutting the lateral aspect of the left kidney, cannot exclude residual/recurrent tumor. Follow up in 4 months CEA not completed at time of scan. 9/13/2023 Biopsy    Mass, "Chest wall mass 4 cores," Biopsy:  - Metastatic moderately differentiated adenocarcinoma, consistent with known colonic primary      Omental metastasis   9/23/2019 Biopsy    Rectal Mass ( 2 slides [id number] [facility] Pathology, Collected on 09/23/2019, biopsy):  - Adenocarcinoma     2/4/2020 Surgery    Ileostomy, takedown:  - Portion of small intestine with mucocutaneous anastomosis consistent with stoma. - Negative for malignancy     9/13/2023 Biopsy    Mass, "Chest wall mass 4 cores," Biopsy:  - Metastatic moderately differentiated adenocarcinoma, consistent with known colonic primary          Review of Systems:  Review of Systems   Constitutional: Positive for fatigue (Mild). HENT: Negative. Eyes: Negative. Respiratory: Negative. Cardiovascular: Negative. Gastrointestinal: Negative. Endocrine: Negative. Genitourinary: Negative. Musculoskeletal: Positive for back pain. Intermittent soreness s/p biopsy to mid back    Skin: Negative. Allergic/Immunologic: Negative. Neurological: Negative. Hematological: Negative. Psychiatric/Behavioral: Negative.         Clinical Trial: no    Pregnancy test needed: No    PFT: None    Prior Radiation: No    Teaching: NCI radiation packet, SBRT handout     MST: Completed     Implantable Devices (Port, Pacemaker, pain stimulator): RCW port    Hip Replacement: No        Health Maintenance   Topic Date Due   • PT PLAN OF CARE  10/15/2020   • COVID-19 Vaccine (4 - Booster for Pfizer series) 2021   • Influenza Vaccine (1) 2023   • Breast Cancer Screening: Mammogram  2023   • BMI: Followup Plan  2024   • Fall Risk  2024   • Urinary Incontinence Screening  2024   • Medicare Annual Wellness Visit (AWV)  2024   • BMI: Adult  2024   • Cervical Cancer Screening  2024   • Depression Screening  2024   • Meningococcal ACWY Vaccine (3 - Risk 2-dose series) 2028   • Hepatitis C Screening  Completed   • Osteoporosis Screening  Completed   • Pneumococcal Vaccine: 65+ Years  Completed   • HIB Vaccine  Completed   • IPV Vaccine  Aged Out   • Hepatitis A Vaccine  Aged Out   • HPV Vaccine  Aged Out   • Colonoscopy  Discontinued     Past Medical History:   Diagnosis Date   • Blood in stool    • Breast disorder    • Cancer ([address])     rectal   • Cancer determined by colorectal biopsy ([address])     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   • Colon polyp    • GERD (gastroesophageal reflux disease)    • History of chemotherapy 2021   • History of rectal surgery    • Hyperlipidemia    • PONV (postoperative nausea and vomiting)      Past Surgical History:   Procedure Laterality Date   • BREAST CYST EXCISION Right    •  SECTION      x3   • COLON SIGMOID RESECTION     • COLONOSCOPY     • DILATION AND CURETTAGE OF UTERUS     • ILEO LOOP DIVERSION N/A 12/10/2019    Procedure: ILEOSTOMY LOOP;  Surgeon: Linda Santos MD;  [facility];  Service: Robotics   • INSTILLATION CHEMOTHERAPY INTRAPERITONEAL (HIPEC) LAPAROTOMY N/A 2022    Procedure: INSTILLATION CHEMOTHERAPY INTRAPERITONEAL (HIPEC) LAPAROTOMY;  Surgeon: Rosanne Baptiste MD;  [facility];  Service: Surgical Oncology   • IR BIOPSY CHEST WALL  2023   • IR BIOPSY OMENTUM  2021   • IR PORT PLACEMENT  2021   • OMENTECTOMY N/A 2022    Procedure: DIAGNOSTIC LAPAROSCOPY, OPEN OMENTECTOMY, SPLENECTOMY, DIAPHRAGM REPAIR, CHEST TUBE INSERTION;  Surgeon: Darryl Dunn MD;  [facility];  Service: Surgical Oncology   • WV CLOSURE ENTEROSTOMY LG/SMALL INTESTINE N/A 02/04/2020    Procedure: CLOSURE ILEOSTOMY;  Surgeon: Jasson Meyers MD;  [facility];  Service: Colorectal   • WV LAPAROSCOPY COLECTOMY PARTIAL W/ANASTOMOSIS N/A 12/10/2019    Procedure: SIGMOID RESECTION COLON LAPAROSCOPIC W ROBOTICS converted to lap hand assisted  with Partial proctectomy , LASER FLUORESCENCE ANGIOGRAPHY, INTRA OP COLONOSCOPY;  Surgeon: Jasson Meyers MD;  [facility];  Service: Robotics   • WV TOTAL ABDOMINAL HYSTERECT W/WO RMVL TUBE OVARY N/A 04/29/2022    Procedure: DIAGNOSTIC LAPAROSCOPY, TOTAL ABDOMINAL HYSTERECTOMY, BILATERAL SALPINGO-OOPHORECTOMY, EXTENSIVE ADHESIOLYSIS;  Surgeon: Waldo Martins MD;  [facility];  Service: Gynecology Oncology   • [place] UNLISTED PROCEDURE DIAPHRAGM  04/29/2022    Procedure: REPAIR DIAPHRAGM TEAR;  Surgeon: Chantel Iraheta MD;  [facility];  Service: Thoracic   • SMALL INTESTINE SURGERY  02/04/2020    Procedure: RESECTION SMALL BOWEL;  Surgeon: Jasson Meyers MD;  [facility];  Service: Colorectal     Family History   Problem Relation Age of Onset   • Hypertension Mother    • Heart attack Mother    • Heart attack Father    • Heart disease Father    • Hypertension Brother    • Heart attack Brother    • Colon cancer Paternal Uncle    • Leukemia Cousin    • Breast cancer Cousin    • Diabetes Cousin    • Breast cancer Cousin    • Colon cancer Family         paternal uncle   • Stroke Neg Hx      Social History     Tobacco Use   • Smoking status: Never   • Smokeless tobacco: Never   Vaping Use   • Vaping Use: Never used   Substance Use Topics   • Alcohol use: Yes     Comment: "occasionally"   • Drug use: Never        Current Outpatient Medications:   •  apixaban (Eliquis) 5 mg, Take 1 tablet (5 mg total) by mouth 2 (two) times a day, Disp: 60 tablet, Rfl: 5  •  ezetimibe (ZETIA) 10 mg tablet, Take 1 tablet (10 mg total) by mouth daily, Disp: 90 tablet, Rfl: 1  •  famotidine (PEPCID) 10 mg tablet, Take 10 mg by mouth 2 (two) times a day as needed for heartburn, Disp: , Rfl:   Allergies   Allergen Reactions   • Medical Tape Rash     Skin irritation  Skin peeling  Skin irritation  Skin peeling  Blisters  Rash      Vitals:    09/25/23 1053   BP: 124/86   BP Location: Right arm   Patient Position: Sitting   Cuff Size: Standard   Pulse: 95   Resp: 18   Temp: 98.4 °F (36.9 °C)   TempSrc: Temporal   SpO2: 99%   Weight: 94.3 kg (208 lb)   Height: 4' 9" (1.448 m)     Pain Score: 0-No pain

## 2023-09-25 NOTE — PROGRESS NOTES
Assessment/Plan:    1. Mixed hyperlipidemia  Assessment & Plan:  Complaint with zetia and tolerating it well    Orders:  -     ezetimibe (ZETIA) 10 mg tablet; Take 1 tablet (10 mg total) by mouth daily    2. Need for vaccination  -     MENINGOCOCCAL B RECOMBINANT  -     influenza vaccine, high-dose, PF 0.7 mL (FLUZONE HIGH-DOSE)  -     HEPATITIS B VACCINE ADULT IM    3. Asplenia  -     MENINGOCOCCAL B RECOMBINANT  -     influenza vaccine, high-dose, PF 0.7 mL (FLUZONE HIGH-DOSE)  -     HEPATITIS B VACCINE ADULT IM    4. Gastroesophageal reflux disease, unspecified whether esophagitis present  -     famotidine (PEPCID) 20 mg tablet; Take 1 tablet (20 mg total) by mouth 2 (two) times a day as needed for heartburn As needed    5. Dizziness  -     meclizine (ANTIVERT) 12.5 MG tablet; Take 1 tablet (12.5 mg total) by mouth 3 (three) times a day as needed for dizziness    6. Stage 3 chronic kidney disease, unspecified whether stage 3a or 3b CKD ([address])  -     Comprehensive metabolic panel; Future    7. Encounter for screening mammogram for malignant neoplasm of breast  -     Mammo screening bilateral w 3d & cad; Future; Expected date: 12/09/2023    8. Omental metastasis  Assessment & Plan: Will be starting radiation next month      9. Rectosigmoid cancer [facility])  Assessment & Plan:  Due for colonoscopy and will call Dr. Paul Moss and will schedule that            BMI Counseling: Body mass index is 45.01 kg/m². Discussed the patient's BMI with her. The BMI is above normal. Nutrition recommendations include reducing portion sizes, decreasing overall calorie intake, 3-5 servings of fruits/vegetables daily, reducing fast food intake, consuming healthier snacks, decreasing soda and/or juice intake, moderation in carbohydrate intake, increasing intake of lean protein, reducing intake of saturated fat and trans fat and reducing intake of cholesterol. Patient Instructions:  Supportive care discussed and advised.   Advised to RTO for any worsening and no improvement. Follow up for no improvement and worsening of conditions. Patient advised and educated when to see immediate medical care. Return in about 6 months (around 3/25/2024), or if symptoms worsen or fail to improve. Future Appointments   Date Time Provider [address]   9/29/2023  1:30 PM [facility] MEDICAL NURSE [facility]   10/9/2023  1:00 PM [address]   10/10/2023  1:20 PM Brenda Medina MD ARLEEN ONC [facility]   10/16/2023  1:45 PM Michael Baeza MD SURG ONC [facility]   10/16/2023  3:00 PM Romaine Harkins MD AN Rad Onc [facility]   10/24/2023 11:00 AM [place] [facility] [address]   10/27/2023  1:15 PM [facility] NURSE [facility]   3/28/2024  1:00 PM DEEPAK Damon [facility]           Subjective:      Patient ID: Aida Mccall is a 70 y.o. female. Chief Complaint   Patient presents with   • Follow-up     6 month follow up, JOSÉ MIGUEL Holly/PAOLA         Vitals:  /80   Pulse 90   Temp 97.7 °F (36.5 °C) (Temporal)   Resp 16   Ht 4' 9" (1.448 m)   Wt 94.3 kg (208 lb)   LMP 09/01/2011 (Approximate)   SpO2 99%   BMI 45.01 kg/m²     HPI  Patient is here for 6 month follow up. Denies any concerns and complaints. Complaint with medications and tolerating it well. Needed vaccinations as per CDC recommendations discussed with patient based on asplenia and will do Hep B first vaccine and will flu vaccine and last dose of trumemba. Will start MMR and vaccine in a week. Denies having MMR/Varicella/Hep B vaccine.   Stated that would like to have refill on meclizine to have it handy for dizziness and has not needed recently        The following portions of the patient's history were reviewed and updated as appropriate: allergies, current medications, past family history, past medical history, past social history, past surgical history and problem list.      Review of Systems   HENT: Negative. Respiratory: Negative. Cardiovascular: Negative. Gastrointestinal: Negative. Neurological: Negative. Objective:    Social History     Tobacco Use   Smoking Status Never   Smokeless Tobacco Never       Allergies: Allergies   Allergen Reactions   • Medical Tape Rash     Skin irritation  Skin peeling  Skin irritation  Skin peeling  Blisters  Rash         Current Outpatient Medications   Medication Sig Dispense Refill   • apixaban (Eliquis) 5 mg Take 1 tablet (5 mg total) by mouth 2 (two) times a day 60 tablet 5   • ezetimibe (ZETIA) 10 mg tablet Take 1 tablet (10 mg total) by mouth daily 90 tablet 1   • famotidine (PEPCID) 20 mg tablet Take 1 tablet (20 mg total) by mouth 2 (two) times a day as needed for heartburn As needed 90 tablet 1   • meclizine (ANTIVERT) 12.5 MG tablet Take 1 tablet (12.5 mg total) by mouth 3 (three) times a day as needed for dizziness 30 tablet 0     No current facility-administered medications for this visit. Physical Exam  Constitutional:       Appearance: Normal appearance. HENT:      Head: Normocephalic and atraumatic. Nose: Nose normal.   Eyes:      Conjunctiva/sclera: Conjunctivae normal.   Cardiovascular:      Rate and Rhythm: Normal rate and regular rhythm. Pulses: Normal pulses. Heart sounds: Normal heart sounds. Pulmonary:      Effort: Pulmonary effort is normal.      Breath sounds: Normal breath sounds. Skin:     General: Skin is warm and dry. Findings: No rash. Neurological:      Mental Status: She is alert and oriented to person, place, and time. Psychiatric:         Mood and Affect: Mood normal.         Behavior: Behavior normal.         Thought Content:  Thought content normal.         Judgment: Judgment normal.                     DEEPAK Shelton

## 2023-09-26 NOTE — PROGRESS NOTES
Consultation - Radiation Oncology      KEITH:[id number] : 1955  Encounter: [id number]  Patient Information: [id number] [address]  Chief Complaint   Patient presents with   • Consult     Radiation Oncology     Cancer Staging   Rectosigmoid cancer [facility])  Staging form: Colon and Rectum, AJCC 8th Edition  - Clinical stage from 2022: cT3, cN0, pM1 - Signed by Mitch Pandya MD on 2023  Total positive nodes: 0  - Pathologic stage from 2022: Stage KRISTAL (pT3, pN0, pM1a) - Signed by Mitch Pandya MD on 2023  Total positive nodes: 0    Assessment and Plan:  Ms. 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 More recently she has been found to have limited progressive metastatic disease and she is seen in consideration of consolidative SBRT. We reviewed the role of consolidative local therapy in the management of metastatic colorectal adenocarcinoma. Specifically, we reviewed that in the setting of limited or oligometastatic disease, ablative treatment to all sites of cancer can lead to prolonged disease free intervals and delay before needing to restart local therapy. We reviewed that pending results of her upcoming CT C/A/P such therapy could be offered to both her left posterior chest wall mass as well as her segment 6 liver metastases. The left posterior chest wall mass could be targeted with SBRT while the segment 6 mass could be treated with SBRT or IR ablation (e.g. MWA or RFA). After considering the advantages and disadvantages of each approach she would like to proceed with SBRT to the left chest wall and IR ablation of the liver lesion. Side effects of SBRT were reviewed to include fatigue, radiation related skin changes, cough, and chest wall pain/rib fracture.      The patient will return in on 10/16/23 for CT simulation following her restaging CT Chest/Abd/Pelvis (10/9/23). I will remain available in the interim should any questions or concerns arise at any point. Summary:   - Tentative plan to proceed with SBRT to left posterior chest wall; sim date on 10/16/23.   - Tentative plan for IR referral for ablation of segment 6 lesion  - Follow-up restaging CT C/A/P           History of Present Illness   Ms. Marsha Alcaraz is a 70year old woman initially diagnosed with rectosigmoid carcinoma in 9/2019 treated with LAR, at that time with stage II (pT3N0) disease. She was not recommended for adjuvant therapy. Since then she has unfortunately developed metastatic disease recurrence and has undergone systemic therapy with a combination of different treatment regimens including FOLFOX (12/2021-3/2022, 6/2022), [address] (4/2022), and FOLFIRI (7/2022-8/2022). MRI Abdomen (7/24/23) demonstrated a 16mm segment 6 hepatic lesion along with a new posterior left chest wall/pleural lesion measuring 2.7cm IR guided chest wall biopsy (9/13/23) demonstrated metastatic moderately differentiated adenocarcinoma. The patient was referred to our office for consideration of consolidative SBRT. Currently she is doing well overall. She notes some tenderness/discomfort in the lateral posterior chest wall since the time of biopsy approximately 2 weeks ago. Otherwise she is feeling well without much in the way of symptoms or side effects. Historical Information   Oncology History   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[address] [facility])   9/23/2019 Initial Diagnosis    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[address] )     9/23/2019 Biopsy    Rectal Mass ( 2 slides [id number] [facility] Pathology, Collected on 09/23/2019, biopsy):  - Adenocarcinoma     12/10/2019 Surgery    Low anterior resection (Dr Dylon Germain):    A. Rectosigmoid colon, low anterior resection:  - Adenocarcinoma, moderately differentiated. - Tumor invades through the muscularis propria into pericolorectal tissue, T3  - Diverticula.   - All margins are negative for tumor.  - Thirty-four lymph nodes, negative for malignancy (0/34). B. Colon, anastomotic rings, resection:  - Two portions of colon with no pathologic abnormality     12/10/2019 Genomic Testing    RESULTS OF IMMUNOHISTOCHEMICAL ANALYSIS FOR MISMATCH REPAIR PROTEIN LOSS  INTERPRETATION: NO LOSS OF NUCLEAR EXPRESSION OF MMR PROTEINS: LOW PROBABILITY OF MSI-H  Note: Background non-neoplastic tissue and/or internal control with intact nuclear expression. RESULTS:  Antibody          Clone               Description                           Results  MLH1               M1                  Mismatch repair protein       Intact nuclear expression  MSH2              [id number]       Mismatch repair protein       Intact nuclear expression  MSH6              40                   Mismatch repair protein       Intact nuclear expression  PMS2              [id number]         Mismatch repair protein       Intact nuclear expression     2/4/2020 Surgery    Ileostomy, takedown:  - Portion of small intestine with mucocutaneous anastomosis consistent with stoma. - Negative for malignancy     8/18/2021 Progression    CEA trends- observed by Dr. Daniele Valero  2/17/21: 2.9  8/18/2021: 4.5  9/13/2021: 4.4    PET/CT  9/28/2021 completed due to elevating CEA  1. There is a large left paracolic gutter markedly hypermetabolic mass immediately inferior to the spleen, most consistent with metastatic colorectal carcinoma. 2. Mildly increased activity at the right abdominal bowel anastomotic site. If not recently performed, direct visualization is recommended  3. There are no other glucose avid abnormalities identified within the abdomen or pelvis  4. No evidence of distant glucose avid metastatic disease in the neck, chest, or skeleton      11/12/2021 Biopsy    Omental mass:  - Metastatic adenocarcinoma, with an immunophenotype compatible with metastasis from patient's known colonic primary.      12/30/2021 - 12/30/2021 Chemotherapy CapOx x 1 dose of Oxaliplatin: D/c due to tolerance. 1/2/2022 Genomic Testing         1/4/2022 - 3/17/2022 Chemotherapy    First 6 cycles of Folfox given prior to surgery- 1/4 through 3/17/2022     Folfox was dose reduced due to anticipated tolerance. Minimal side effects     3/22/2022 Observation    CT CAP  1. Decreased size of biopsy-proven omental metastasis, which now only focally contacting rather than grossly invading the spleen and adjacent abdominal wall. No new evidence of metastatic disease in the abdomen or pelvis. 2.  No new or suspicious pulmonary nodules. One of the previously noted new 1-2 mm nodules is no longer seen, and the other is unchanged. Recommend continued attention on follow-up. 3.  Top-normal thickness endometrial stripe measuring 7 mm. If there is history of vaginal bleeding, suggest catheterization with endometrial biopsy. 4.  Hepatic steatosis. 4/28/2022 -  Cancer Staged    Staging form: Colon and Rectum, AJCC 8th Edition  - Clinical stage from 4/28/2022: cT3, cN0, pM1 - Signed by Roddy Koch MD on 6/14/2023  Total positive nodes: 0       4/29/2022 Surgery    Escobar Diamond and Jimmy Baltazar preformed the following procedures:   DIAGNOSTIC LAPAROSCOPY, TOTAL ABDOMINAL HYSTERECTOMY, BILATERAL SALPINGO-OOPHORECTOMY, EXTENSIVE ADHESIOLYSIS (N/A)  DIAGNOSTIC LAPAROSCOPY, OPEN OMENTECTOMY, POSSIBLE SPLENECTOMY (N/A)  INSTILLATION CHEMOTHERAPY INTRAPERITONEAL (HIPEC) LAPAROTOMY (N/A)  REPAIR DIAPHRAGM TEAR      A. Uterus, Bilateral Ovaries and Fallopian Tubes; Hysterosalpingo-oophorectomy:  - Leiomyoma. - Left ovary with serous cystadenoma. - Unremarkable cervix. - Benign inactive endometrium with no specific pathologic change. - Unremarkable right ovary and bilateral fallopian tubes. B. Spleen, spleen, omentum, darotis:  - Metastatic adenocarcinoma involving spleen and omentum, consistent with colonic primary. See Note.     Note: Comparison to prior material ([id number], omental mass) reveals identical morphology to previous metastatic colonic adenocarcinoma. 5/18/2022 -  Cancer Staged    Staging form: Colon and Rectum, AJCC 8th Edition  - Pathologic stage from 5/18/2022: Stage KRISTAL (pT3, pN0, pM1a) - Signed by Saud Parsons MD on 6/14/2023  Total positive nodes: 0       6/14/2022 - 8/23/2022 Chemotherapy    7th cycle started on 6/14/2022  8th cycle worsening neuropathy; could not tolerate gabapentin  D/lizette oxaliplatin  9th cycle Irinotecan added at 85% dose reduction: SE Diarrhea  10th cycle Irinotecan dose redcued to 50%     9/19/2022 Observation    9/19/2022 CT CAP:   1. Previously seen omental metastasis in the left upper quadrant has been resected. There is a small area of nodular soft tissue thickening abutting the lateral aspect of the left kidney, cannot exclude residual/recurrent tumor. Follow up in 4 months CEA not completed at time of scan. 9/13/2023 Biopsy    Mass, "Chest wall mass 4 cores," Biopsy:  - Metastatic moderately differentiated adenocarcinoma, consistent with known colonic primary      Omental metastasis   9/23/2019 Biopsy    Rectal Mass ( 2 slides [id number] [facility] Pathology, Collected on 09/23/2019, biopsy):  - Adenocarcinoma     2/4/2020 Surgery    Ileostomy, takedown:  - Portion of small intestine with mucocutaneous anastomosis consistent with stoma.   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   • Colon polyp    • GERD (gastroesophageal reflux disease)    • History of chemotherapy 12/2021   • History of rectal surgery    • Hyperlipidemia    • PONV (postoperative nausea and vomiting)      Past Surgical History:   Procedure Laterality Date   • BREAST CYST EXCISION Right    •  SECTION      x3   • COLON SIGMOID RESECTION     • COLONOSCOPY     • DILATION AND CURETTAGE OF UTERUS     • ILEO LOOP DIVERSION N/A 12/10/2019    Procedure: ILEOSTOMY LOOP;  Surgeon: Chelsea Mendieta MD;  [facility];  Service: Robotics   • INSTILLATION CHEMOTHERAPY INTRAPERITONEAL (HIPEC) LAPAROTOMY N/A 2022    Procedure: INSTILLATION CHEMOTHERAPY INTRAPERITONEAL (HIPEC) LAPAROTOMY;  Surgeon: Bi See MD;  [facility];  Service: Surgical Oncology   • IR BIOPSY CHEST WALL  2023   • IR BIOPSY OMENTUM  2021   • IR PORT PLACEMENT  2021   • OMENTECTOMY N/A 2022    Procedure: DIAGNOSTIC LAPAROSCOPY, OPEN OMENTECTOMY, SPLENECTOMY, DIAPHRAGM REPAIR, CHEST TUBE INSERTION;  Surgeon: Bi See MD;  [facility];  Service: Surgical Oncology   • ME CLOSURE ENTEROSTOMY LG/SMALL INTESTINE N/A 2020    Procedure: CLOSURE ILEOSTOMY;  Surgeon: Chelsea Mendieta MD;  [facility];  Service: Colorectal   • ME LAPAROSCOPY COLECTOMY PARTIAL W/ANASTOMOSIS N/A 12/10/2019    Procedure: SIGMOID RESECTION COLON LAPAROSCOPIC W ROBOTICS converted to lap hand assisted  with Partial proctectomy , LASER FLUORESCENCE ANGIOGRAPHY, INTRA OP COLONOSCOPY;  Surgeon: Chelsea Mendieta MD;  [facility];  Service: Robotics   • ME TOTAL ABDOMINAL HYSTERECT W/WO RMVL TUBE OVARY N/A 2022    Procedure: DIAGNOSTIC LAPAROSCOPY, TOTAL ABDOMINAL HYSTERECTOMY, BILATERAL SALPINGO-OOPHORECTOMY, EXTENSIVE ADHESIOLYSIS;  Surgeon: Danielle Puente MD;  [facility];  Service: Gynecology Oncology   • ME UNLISTED PROCEDURE DIAPHRAGM  2022    Procedure: REPAIR DIAPHRAGM TEAR;  Surgeon: Devante David MD;  [facility];  Service: Thoracic   • SMALL INTESTINE SURGERY  2020    Procedure: RESECTION SMALL BOWEL;  Surgeon: Chelsea Mendieta MD; [facility]: [facility];  Service: Colorectal       Family History   Problem Relation Age of Onset   • Hypertension Mother    • Heart attack Mother    • Heart attack Father    • Heart disease Father    • Hypertension Brother    • Heart attack Brother    • Colon cancer Paternal Uncle    • Leukemia Cousin    • Breast cancer Cousin    • Diabetes Cousin    • Breast cancer Cousin    • Colon cancer Family         paternal uncle   • Stroke Neg Hx        Social History   Social History     Substance and Sexual Activity   Alcohol Use Yes    Comment: "occasionally"     Social History     Substance and Sexual Activity   Drug Use Never     Social History     Tobacco Use   Smoking Status Never   Smokeless Tobacco Never         Meds/Allergies     Current Outpatient Medications:   •  apixaban (Eliquis) 5 mg, Take 1 tablet (5 mg total) by mouth 2 (two) times a day, Disp: 60 tablet, Rfl: 5  •  ezetimibe (ZETIA) 10 mg tablet, Take 1 tablet (10 mg total) by mouth daily, Disp: 90 tablet, Rfl: 1  •  famotidine (PEPCID) 20 mg tablet, Take 1 tablet (20 mg total) by mouth 2 (two) times a day as needed for heartburn As needed, Disp: 90 tablet, Rfl: 1  •  meclizine (ANTIVERT) 12.5 MG tablet, Take 1 tablet (12.5 mg total) by mouth 3 (three) times a day as needed for dizziness, Disp: 30 tablet, Rfl: 0  Allergies   Allergen Reactions   • Medical Tape Rash     Skin irritation  Skin peeling  Skin irritation  Skin peeling  Blisters  Rash     Review of Systems   Constitutional: Positive for fatigue (Mild). HENT: Negative. Eyes: Negative. Respiratory: Negative. Cardiovascular: Negative. Gastrointestinal: Negative. Endocrine: Negative. Genitourinary: Negative. Musculoskeletal: Positive for back pain. Intermittent soreness s/p biopsy to mid back    Skin: Negative. Allergic/Immunologic: Negative. Neurological: Negative. Hematological: Negative. Psychiatric/Behavioral: Negative.       OBJECTIVE:   /86 (BP Location: Right arm, Patient Position: Sitting, Cuff Size: Standard)   Pulse 95   Temp 98.4 °F (36.9 °C) (Temporal)   Resp 18   Ht 4' 9" (1.448 m)   Wt 94.3 kg (208 lb)   LMP 09/01/2011 (Approximate)   SpO2 99%   BMI 45.01 kg/m²   Pain Assessment:  0  Performance Status: ECOG/Zubrod/WHO: 1 - Symptomatic but completely ambulatory    Physical Exam   Well appearing. NAD. Tenderness along the posterolateral chest wall noted, small subtle tez from her prior biopsy. No increased work of breathing. Extremities warm and well perfused. No skin lesions appreciated. RESULTS  Lab Results    Chemistry        Component Value Date/Time     12/03/2016 1036    K 4.8 09/25/2023 1402    K 4.7 09/13/2021 1023     09/25/2023 1402     09/13/2021 1023    CO2 26 09/25/2023 1402    CO2 22 09/13/2021 1023    BUN 16 09/25/2023 1402    BUN 19 09/13/2021 1023    CREATININE 1.01 09/25/2023 1402    CREATININE 0.91 12/03/2016 1036        Component Value Date/Time    CALCIUM 9.4 09/25/2023 1402    CALCIUM 9.2 12/03/2016 1036    ALKPHOS 54 09/25/2023 1402    ALKPHOS 54 12/03/2016 1036    AST 16 09/25/2023 1402    AST 14 09/13/2021 1023    ALT 13 09/25/2023 1402    ALT 16 09/13/2021 1023    BILITOT 0.7 12/03/2016 1036            Lab Results   Component Value Date    WBC 8.75 09/25/2023    HGB 13.3 09/25/2023    HCT 40.9 09/25/2023    MCV 91 09/25/2023     09/25/2023         Imaging Studies  IR biopsy chest wall    Result Date: 9/13/2023  Narrative: Ultrasound-guided core biopsy left pleural-based mass Clinical History: Colon cancer with a left pleural-based mass suspicious for metastatic disease Conscious sedation time: 30 MIN Technique: The patient was brought to the interventional radiology area and placed supine on the stretcher. Prior imaging studies were reviewed.   After a brief survey of the region of interest with ultrasound, a site on the skin was marked, prepped, and  draped in the usual sterile fashion. Lidocaine was administered to the skin and a small skin incision was made. A 17-gauge cannula was placed percutaneously into the left posterior pleural-based mass under direct ultrasound guidance. Through this cannula, 4 passes were made with an 18 gauge coring needle. The specimen was sent to the lab in formalin. The patient tolerated the procedure well and suffered no complications. Impression: Impression: Successful ultrasound guided core biopsy of the left posterior pleural-based mass. Workstation performed: [id number]         Pathology: As above. ASSESSMENT  1. Rectosigmoid cancer [facility])  Ambulatory Referral to Radiation Oncology        Cancer Staging   Rectosigmoid cancer [facility])  Staging form: Colon and Rectum, AJCC 8th Edition  - Clinical stage from 4/28/2022: cT3, cN0, pM1 - Signed by Fatuma Lang MD on 6/14/2023  Total positive nodes: 0  - Pathologic stage from 5/18/2022: Stage KRISTAL (pT3, pN0, pM1a) - Signed by Fatuma Lang MD on 6/14/2023  Total positive nodes: 0        PLAN/DISCUSSION  No orders of the defined types were placed in this encounter. Bryce Kirkpatrick MD  9/26/2023,12:19 PM    Total time spent reviewing EMR, seeing patient in consultation, documenting visit, placing treatment orders, and communicating with other medical providers: 52 minutes. Portions of the record may have been created with voice recognition software. Occasional wrong word or "sound a like" substitutions may have occurred due to the inherent limitations of voice recognition software. Read the chart carefully and recognize, using context, where substitutions have occurred.

## 2023-09-28 ENCOUNTER — TELEPHONE (OUTPATIENT)
Dept: FAMILY MEDICINE CLINIC | Facility: CLINIC | Age: 68
End: 2023-09-28

## 2023-09-28 ENCOUNTER — TELEPHONE (OUTPATIENT)
Dept: HEMATOLOGY ONCOLOGY | Facility: CLINIC | Age: 68
End: 2023-09-28

## 2023-09-28 NOTE — TELEPHONE ENCOUNTER
Appointment Change  Cancel, Reschedule, Change to Virtual      Who are you speaking with? Patient   If it is not the patient, is the caller listed on the communication consent form? Yes   Which provider is the appointment scheduled with? Dr. Terry Menchaca   When was the original appointment scheduled? Please list date and time 10/16/23  1:45pm   At which location is the appointment scheduled to take place? [facility]   Was the appointment rescheduled? Was the appointment changed from an in person visit to a virtual visit? If so, please list the details of the change. 10/17/23 1:15pm   What is the reason for the appointment change? provider       Was [facility] transport scheduled?  No   Does [facility] transport need to be scheduled for the new visit (if applicable) Yes

## 2023-09-29 ENCOUNTER — CLINICAL SUPPORT (OUTPATIENT)
Dept: FAMILY MEDICINE CLINIC | Facility: CLINIC | Age: 68
End: 2023-09-29
Payer: MEDICARE

## 2023-09-29 DIAGNOSIS — Z23 NEED FOR VACCINATION: Primary | ICD-10-CM

## 2023-09-29 PROCEDURE — 90471 IMMUNIZATION ADMIN: CPT

## 2023-09-29 PROCEDURE — 90472 IMMUNIZATION ADMIN EACH ADD: CPT

## 2023-09-29 PROCEDURE — 90716 VAR VACCINE LIVE SUBQ: CPT

## 2023-09-29 PROCEDURE — 90707 MMR VACCINE SC: CPT

## 2023-10-03 DIAGNOSIS — C19 RECTOSIGMOID CANCER (HCC): ICD-10-CM

## 2023-10-03 DIAGNOSIS — C78.7 METASTASIS TO LIVER (HCC): Primary | ICD-10-CM

## 2023-10-09 ENCOUNTER — HOSPITAL ENCOUNTER (OUTPATIENT)
Dept: RADIOLOGY | Facility: HOSPITAL | Age: 68
Discharge: HOME | End: 2023-10-09
Payer: MEDICARE

## 2023-10-09 ENCOUNTER — HOSPITAL ENCOUNTER (OUTPATIENT)
Dept: INFUSION CENTER | Facility: HOSPITAL | Age: 68
Discharge: HOME | End: 2023-10-09
Attending: INTERNAL MEDICINE
Payer: MEDICARE

## 2023-10-09 DIAGNOSIS — C19 RECTOSIGMOID CANCER (HCC): ICD-10-CM

## 2023-10-09 DIAGNOSIS — C78.6 MALIGNANT NEOPLASM METASTATIC TO OMENTUM (HCC): ICD-10-CM

## 2023-10-09 DIAGNOSIS — C19 RECTOSIGMOID CANCER (HCC): Primary | ICD-10-CM

## 2023-10-09 PROCEDURE — 71260 CT THORAX DX C+: CPT

## 2023-10-09 PROCEDURE — G1004 CDSM NDSC: HCPCS

## 2023-10-09 PROCEDURE — 74177 CT ABD & PELVIS W/CONTRAST: CPT

## 2023-10-09 RX ADMIN — IOHEXOL 100 ML: 350 INJECTION, SOLUTION INTRAVENOUS at 14:11

## 2023-10-10 ENCOUNTER — TELEPHONE (OUTPATIENT)
Dept: HEMATOLOGY ONCOLOGY | Facility: CLINIC | Age: 68
End: 2023-10-10

## 2023-10-10 ENCOUNTER — DOCUMENTATION (OUTPATIENT)
Dept: HEMATOLOGY ONCOLOGY | Facility: MEDICAL CENTER | Age: 68
End: 2023-10-10

## 2023-10-10 ENCOUNTER — DOCUMENTATION (OUTPATIENT)
Dept: HEMATOLOGY ONCOLOGY | Facility: CLINIC | Age: 68
End: 2023-10-10

## 2023-10-10 ENCOUNTER — OFFICE VISIT (OUTPATIENT)
Dept: HEMATOLOGY ONCOLOGY | Facility: CLINIC | Age: 68
End: 2023-10-10
Payer: MEDICARE

## 2023-10-10 VITALS
WEIGHT: 208 LBS | SYSTOLIC BLOOD PRESSURE: 136 MMHG | HEART RATE: 78 BPM | RESPIRATION RATE: 17 BRPM | OXYGEN SATURATION: 98 % | BODY MASS INDEX: 44.88 KG/M2 | TEMPERATURE: 98 F | DIASTOLIC BLOOD PRESSURE: 76 MMHG | HEIGHT: 57 IN

## 2023-10-10 DIAGNOSIS — C19 RECTOSIGMOID CANCER (HCC): Primary | ICD-10-CM

## 2023-10-10 DIAGNOSIS — C78.6 MALIGNANT NEOPLASM METASTATIC TO OMENTUM (HCC): ICD-10-CM

## 2023-10-10 DIAGNOSIS — E66.01 MORBID OBESITY (HCC): ICD-10-CM

## 2023-10-10 PROCEDURE — 99215 OFFICE O/P EST HI 40 MIN: CPT | Performed by: INTERNAL MEDICINE

## 2023-10-10 NOTE — TELEPHONE ENCOUNTER
Appointment Confirmation   Who are you speaking with? self   If it is not the patient, are they listed on an active communication consent form? self   Which provider is the appointment scheduled with? Elke Duggan   When is the appointment scheduled? Please list date and time CONFIRMED CONFUSION WITH [facility] ABOUT APPMT on 10/16 with Radha So coming NOW to bring patient to [facility] 1:20pm, patient will be a few minutes late. At which location is the appointment scheduled to take place? [facility]   Did caller verbalize understanding of appointment details?  yes

## 2023-10-10 NOTE — PROGRESS NOTES
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 Recurrent rectosigmoid adenocarcinoma. Patient was seen/evaluated by Dr. Timo Wooten surgical oncology. Patient received 3 months of preoperative FOLFOX. Follow-up CT scans demonstrated response to treatment with no evidence of progressive disease. Patient then underwent resection of the omental mass and spleen as well as DILMA/BSO (see below). Mrs. David Tripp was then restarted on FOLFOX. Patient had problems with neuropathy; FOLFOX change to FOLFIRI. The 12th/final cycle of chemotherapy was completed on August 23, 2022. CT scan in May 2023 showed a new lesion to the right hepatic lobe. MRI abdomen in July 2023 confirmed liver metastasis and revealed a new, posterior left chest wall/pleural lesion suspicious for metastasis. The chest wall mass was biopsied on 9/13/2023 revealing metastatic moderately differentiated adenocarcinoma consistent with known colonic primary. She was seen/evaluated by Dr. Cari Bravo (Radiation Oncology) and the tentative plan is SBRT to the left chest wall. Additionally, IR ablation of the liver lesion is planned. Follow-up restaging CT on 10/9/2023 revealed enlarging rib metastasis and stable liver nodule. Per Dr. Timo Wooten (Surgical Oncology), patient's case to be discussed at tumor board. At this time, patient is doing well and only complains of a persistent dull ache at the site of her posterior chest wall biopsy. Given metastases, the possibility of starting systemic therapy again was discussed. Patient to return for follow-up in 4 weeks. Patient to also continue follow-up with Dr. Cari Bravo, Dr. Timo Wooten, and Dr. Julian Jacques as directed. Patient voiced understanding and is in agreement with the plan. Ovarian lesion. MRI/pelvis in October 2021 demonstrated a multi septated left ovarian cystic lesion. Transabdominal ultrasound results did not demonstrate any concerning abnormality. Patient underwent DILMA/BSO. Pathology results are listed below - no evidence of malignancy. Patient was last seen by Gynecologic Oncology in June 2022, and was advised to return for follow-up as needed. Genetics. Patient has a complicated family history but multiple family members with colon/rectal cancer. Patient can be seen by Oncology genetics in the future. Carefully review your medication list and verify that the list is accurate and up-to-date. Please call the hematology/oncology office if there are medications missing from the list, medications on the list that you are not currently taking or if there is a dosage or instruction that is different from how you're taking that medication. Patient goals and areas of care: Follow-up with Dr. Eliseo Pacheco in 4 weeks. Follow-up with Radiation Oncology, Surgical Oncology, and IR, as directed. Barriers to care:  None  Patient is able to self-care.  ______________________________________________________________________________________    Chief complaint: Rectosigmoid carcinoma, omental recurrence    History of Present Illness:  70year old female previously diagnosed with rectosigmoid carcinoma status post resection in September 2019. Postoperatively patient did well; Mrs. Placido Smith did not need/receive adjuvant chemotherapy. Surveillance CEA level was found to be elevated in June 2022. Patient underwent workup. Results demonstrated an intra-abdominal mass by the spleen. Patient underwent neoadjuvant chemotherapy with FOLFOX, with follow-up scans demonstrating response. She then underwent resection, followed by postoperative chemotherapy with FOLFOX initially.  FOLFOX was changed to FOLFIRI due to peripheral neuropathy. Treatment was completed in August 2022. CT scan in May 2023 showed a new lesion to the right hepatic lobe. MRI abdomen in July 2023 confirmed liver metastasis and revealed a new, posterior left chest wall/pleural lesion suspicious for metastasis. The chest wall mass was biopsied on 9/13/2023 revealing adenocarcinoma consistent with known colonic primary (see Pathology . She was seen/evaluated by Radiation Oncology, with a tentative plan for SBRT to the left chest wall. IR ablation of the liver lesion is planned with Dr. Gaston Khalil. CT on 10/9/2023 revealed enlarging rib metastasis and stable liver nodule. Patient is concerned that there has been limited follow-up with Surgical Oncology since the time of her CT and MRI in the summer. Otherwise, she is doing well and only complains of a persistent dull ache at the site of her posterior chest wall biopsy. She denies any fevers, shortness of breath, bleeding/bruising, signs of infection, GI or  issues. Appetite is good. Activity level is fair. Regarding weight, she states that she had lost approximately 40 pounds last year due to poor appetite/chemotherapy, but has regained it since. Review of Systems   Constitutional: Negative for activity change, appetite change and fatigue. HENT: Negative. Eyes: Negative. Respiratory: Negative. Cardiovascular: Negative. Gastrointestinal: Negative for nausea and vomiting. Endocrine: Negative. Genitourinary: Negative. Musculoskeletal: Negative. Persistent dull ache to left posterior back (site of biopsy)   Skin: Negative. Allergic/Immunologic: Negative. Neurological: Negative. Hematological: Negative. Psychiatric/Behavioral: Negative. All other systems reviewed and are negative.     Patient Active Problem List   Diagnosis    Callus of foot    Foot pain    GERD (gastroesophageal reflux disease)    Mixed hyperlipidemia    Prediabetes    Varicose veins with pain    Morbid obesity ([address])    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HCC)    Dyspnea on exertion    Dyslipidemia    Sigmoid diverticulosis    PONV (postoperative nausea and vomiting)    Omental metastasis    Lesion of ovary    Chemotherapy adverse reaction    Chemotherapy-induced nausea    Platelets decreased (HCC)    Stage 3 chronic kidney disease, unspecified whether stage 3a or 3b CKD (HCC)    H/O splenectomy    Asplenia    Postoperative state    Acute embolism and thrombosis of right tibial vein ([address])     Past Medical History:   Diagnosis Date    Blood in stool     Breast disorder     Cancer ([address])     rectal    Cancer determined by colorectal biopsy ([address])     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    Colon polyp     GERD (gastroesophageal reflux disease)     History of chemotherapy 2021    History of rectal surgery     Hyperlipidemia     PONV (postoperative nausea and vomiting)      Past Surgical History:   Procedure Laterality Date    BREAST CYST EXCISION Right      SECTION      x3    COLON SIGMOID RESECTION      COLONOSCOPY      DILATION AND CURETTAGE OF UTERUS      ILEO LOOP DIVERSION N/A 12/10/2019    Procedure: ILEOSTOMY LOOP;  Surgeon: Lenwood Mcardle, MD;  [facility]: [facility];  Service: Robotics    INSTILLATION CHEMOTHERAPY INTRAPERITONEAL (HIPEC) LAPAROTOMY N/A 2022    Procedure: INSTILLATION CHEMOTHERAPY INTRAPERITONEAL (HIPEC) LAPAROTOMY;  Surgeon: Delgado Olmedo MD;  [facility];  Service: Surgical Oncology    IR BIOPSY CHEST WALL  2023    IR BIOPSY OMENTUM  2021    IR PORT PLACEMENT  2021    OMENTECTOMY N/A 2022    Procedure: DIAGNOSTIC LAPAROSCOPY, OPEN OMENTECTOMY, SPLENECTOMY, DIAPHRAGM REPAIR, CHEST TUBE INSERTION;  Surgeon: Delgado Olmedo MD;  [facility];  Service: Surgical Oncology    CO CLOSURE ENTEROSTOMY LG/SMALL INTESTINE N/A 2020    Procedure: CLOSURE ILEOSTOMY;  Surgeon: Lenwood Mcardle, MD;  [facility];  Service: Colorectal    CO LAPAROSCOPY COLECTOMY PARTIAL W/ANASTOMOSIS N/A 12/10/2019    Procedure: SIGMOID RESECTION COLON LAPAROSCOPIC W ROBOTICS converted to lap hand assisted  with Partial proctectomy , LASER FLUORESCENCE ANGIOGRAPHY, INTRA OP COLONOSCOPY;  Surgeon: Andrew Zafar MD;  [facility];  Service: Robotics    WV TOTAL ABDOMINAL HYSTERECT W/WO RMVL TUBE OVARY N/A 04/29/2022    Procedure: DIAGNOSTIC LAPAROSCOPY, TOTAL ABDOMINAL HYSTERECTOMY, BILATERAL SALPINGO-OOPHORECTOMY, EXTENSIVE ADHESIOLYSIS;  Surgeon: Willi Kuhn MD;  [facility];  Service: Gynecology Oncology    WV UNLISTED PROCEDURE DIAPHRAGM  04/29/2022    Procedure: REPAIR DIAPHRAGM TEAR;  Surgeon: Navi Floyd MD;  [facility];  Service: Thoracic    SMALL INTESTINE SURGERY  02/04/2020    Procedure: RESECTION SMALL BOWEL;  Surgeon: Andrew Zafar MD;  [facility];  Service: Colorectal   Past surgical history:  No prior blood transfusions    OBGYN:  No breast issues, no abnormal mammograms previously, no postmenopausal bleeding, no other GYN issues    Family History   Problem Relation Age of Onset    Hypertension Mother     Heart attack Mother     Heart attack Father     Heart disease Father     Hypertension Brother     Heart attack Brother     Colon cancer Paternal Uncle     Leukemia Cousin     Breast cancer Cousin     Diabetes Cousin     Breast cancer Cousin     Colon cancer Family         paternal uncle    Stroke Neg Hx    Family history:  Somewhat complicated, mother with colostomy but etiology for this is unclear, paternal uncle with rectal cancer, patient has to first-degree relatives on the paternal side with history of colon cancer, 3 sons and 1 grandchild in good general health    Social History     Socioeconomic History    Marital status:      Spouse name: Not on file    Number of children: Not on file    Years of education: Not on file    Highest education level: Not on file   Occupational History    Not on file Tobacco Use    Smoking status: Never    Smokeless tobacco: Never   Vaping Use    Vaping Use: Never used   Substance and Sexual Activity    Alcohol use: Yes     Comment: "occasionally"    Drug use: Never    Sexual activity: Not Currently   Other Topics Concern    Not on file   Social History Narrative    Not on file     Social Determinants of Health     Financial Resource Strain: Low Risk  (3/21/2023)    Overall Financial Resource Strain (CARDIA)     Difficulty of Paying Living Expenses: Not hard at all   Food Insecurity: No Food Insecurity (5/2/2022)    Hunger Vital Sign     Worried About Running Out of Food in the Last Year: Never true     [address] in the Last Year: Never true   Transportation Needs: No Transportation Needs (3/21/2023)    PRAPARE - Transportation     Lack of Transportation (Medical): No     Lack of Transportation (Non-Medical):  No   Physical Activity: Not on file   Stress: Not on file   Social Connections: Not on file   Intimate Partner Violence: Not on file   Housing Stability: Unknown (5/2/2022)    Housing Stability Vital Sign     Unable to Pay for Housing in the Last Year: No     Number of Places Lived in the Last Year: Not on file     Unstable Housing in the Last Year: No   Social history:  No tobacco, alcohol or drug abuse, no toxic exposure    Current Outpatient Medications:     Acetaminophen (TYLENOL 8 HOUR PO), Take by mouth PRN, Disp: , Rfl:     apixaban (Eliquis) 5 mg, Take 1 tablet (5 mg total) by mouth 2 (two) times a day, Disp: 60 tablet, Rfl: 5    ezetimibe (ZETIA) 10 mg tablet, Take 1 tablet (10 mg total) by mouth daily, Disp: 90 tablet, Rfl: 1    famotidine (PEPCID) 20 mg tablet, Take 1 tablet (20 mg total) by mouth 2 (two) times a day as needed for heartburn As needed, Disp: 90 tablet, Rfl: 1    meclizine (ANTIVERT) 12.5 MG tablet, Take 1 tablet (12.5 mg total) by mouth 3 (three) times a day as needed for dizziness (Patient taking differently: Take 12.5 mg by mouth 3 (three) times a day as needed for dizziness PRN), Disp: 30 tablet, Rfl: 0    Allergies   Allergen Reactions    Medical Tape Rash     Skin irritation  Skin peeling  Skin irritation  Skin peeling  Blisters  Rash     Vitals:    10/10/23 1356   BP: 136/76   Pulse: 78   Resp: 17   Temp: 98 °F (36.7 °C)   SpO2: 98%     Physical Exam  Constitutional:       Appearance: She is well-developed. She is obese. HENT:      Head: Normocephalic and atraumatic. Right Ear: External ear normal.      Left Ear: External ear normal.   Eyes:      Extraocular Movements: Extraocular movements intact. Conjunctiva/sclera: Conjunctivae normal.      Pupils: Pupils are equal, round, and reactive to light. Cardiovascular:      Rate and Rhythm: Normal rate and regular rhythm. Heart sounds: Normal heart sounds. Pulmonary:      Effort: Pulmonary effort is normal.      Breath sounds: Normal breath sounds. Abdominal:      General: Bowel sounds are normal. There is no distension. Palpations: Abdomen is soft. Tenderness: There is no abdominal tenderness. There is no guarding. Musculoskeletal:         General: Normal range of motion. Right lower leg: No edema. Left lower leg: No edema. Lymphadenopathy:      Cervical: No cervical adenopathy. Upper Body:      Right upper body: No supraclavicular or axillary adenopathy. Left upper body: No supraclavicular or axillary adenopathy. Skin:     General: Skin is warm and dry. Findings: No bruising, ecchymosis or petechiae. Neurological:      General: No focal deficit present. Mental Status: She is alert and oriented to person, place, and time.    Psychiatric:         Mood and Affect: Mood normal.         Behavior: Behavior normal.         Judgment: Judgment normal.       Laboratory    9/25/2023 WBC = 8.75 hemoglobin = 13.3 hematocrit = 40.9 MCV = 91 platelet = 361 neutrophil = 53% BUN = 16 creatinine = 1.01 calcium = 9.4 LFTs WNL total bilirubin = 0.76 CEA 6.2    08/08/2022 WBC = 4.57 hemoglobin = 10.0 hematocrit = 31.3 MCV = 87 platelet = 719 neutrophil = 50% BUN = 21 creatinine = 1.35 calcium = 9.1 LFTs WNL total bilirubin = 1.31        Procedures    10/06/2021 colonoscopy    FINDINGS:  Healthy end-to-end colorectal anastomosis with no bleeding in the mid rectum  All observed locations appeared normal, including the cecum, ascending colon, transverse colon, splenic flexure and descending colon. A couple scattered diverticuli seen    Imaging    10/09/2023 CT chest abdomen pelvis    IMPRESSION:     Enlarging 6.8 x 4.4 x 4.1 cm T11 rib metastasis. Stable ill-defined hypovascular nodule in the right lobe of the liver which remains concerning for metastasis. Cholelithiasis. 09/13/2023 IR biopsy chest wall    IMPRESSION:  Successful ultrasound guided core biopsy of the left posterior pleural-based mass. 07/24/2023 MRI abdomen     IMPRESSION:     16 mm segment 6 hepatic lesion unchanged from the CT suspicious for metastasis. New posterior left chest wall/pleural lesion measuring 2.7 x 2.3 cm also suspicious for metastasis. 05/30/2023 CT chest abdomen pelvis    IMPRESSION:     No evidence of acute intrathoracic pathology. New 7 mm hypodensity of the right hepatic lobe which was not present on prior examinations. Contrast enhanced abdominal MRI recommended for further evaluation. Nodular soft tissue adjacent to the left upper renal capsule and prior splenectomy site is stable    01/27/2023 CT chest abdomen pelvis    IMPRESSION:     No definite evidence for metastatic disease involving the chest, abdomen, or pelvis. Note is made of stable nodular soft tissue about the lateral aspect of the left upper renal pole which could reflect postsurgical changes related to prior splenectomy although continued short interval follow-up is recommended to exclude   residual/recurrent tumor in this location.     07/25/2022 vascular lower limb venous duplex study    RIGHT LOWER LIMB:  Acute mostly deep vein thrombosis in the paired posterior tibial veins  throughout the calf. No evidence of superficial thrombophlebitis noted. Popliteal, posterior tibial and anterior tibial arterial Doppler waveforms are  triphasic. LEFT LOWER LIMB:  No evidence of acute or chronic deep vein thrombosis. No evidence of superficial thrombophlebitis noted. Doppler evaluation shows a normal response to augmentation maneuvers. Popliteal, posterior tibial and anterior tibial arterial Doppler waveforms are  triphasic.    03/22/2022 CT scan chest abdomen pelvis with contrast    ABDOMINOPELVIC CAVITY: Left upper quadrant biopsy-proven omental metastasis has decreased in size now measuring 4.1 x 2.5 x 2.7 cm (previously 5.3 x 4.5 x 4.3 cm), and now only focally contacts rather than grossly invades the spleen and intercostal soft   tissues, on series 2/54, 601/71. Adjacent omental nodularity is less conspicuous. No new evidence of metastatic disease in the abdomen or pelvis. No lymphadenopathy. No ascites or intraperitoneal free air. IMPRESSION:     1. Decreased size of biopsy-proven omental metastasis, which now only focally contacting rather than grossly invading the spleen and adjacent abdominal wall. No new evidence of metastatic disease in the abdomen or pelvis. 2.  No new or suspicious pulmonary nodules. One of the previously noted new 1-2 mm nodules is no longer seen, and the other is unchanged. Recommend continued attention on follow-up. 3.  Top-normal thickness endometrial stripe measuring 7 mm. If there is history of vaginal bleeding, suggest catheterization with endometrial biopsy. 4.  Hepatic steatosis. 11/11/2021 ultrasound pelvis transabdominal only    Cluster of anechoic cystic areas seen replacing the left ovary similar to MRI largest measuring 8 mm compared to 1.3 cm.   Based on the ACR O-RADS system, this is O-RADS category 2 (almost certain benign with <1% risk of malignancy.) The management recommendation is followup in 1 year. 10/25/2021 MRI pelvis rectal cancer staging    1. No recurrent or metastatic disease in the pelvis. Please refer to the separately dictated MRI abdomen report regarding mesenteric mass in the left upper quadrant. 2.  Multi septated cystic lesion versus cluster of small cysts in the left ovary measuring 2.6 x 2.5 x 1.9 cm, increased in size from October 2019. Further characterization with pelvic ultrasound is recommended. 10/25/2021 MRI abdomen    1.  5.2 cm mesenteric mass in the left upper quadrant with thickening of peritoneal reflection and invasion of the spleen, similar to prior PET/CT. This is highly suspicious for metastatic tumor implant. 2.  No additional potential sites of metastasis in the abdomen. 3.  Moderately distended gallbladder with gallstones and probable adenomyomatosis. 09/28/2021 PET-CT    1. There is a large left paracolic gutter markedly hypermetabolic mass immediately inferior to the spleen, most consistent with metastatic colorectal carcinoma. 2. Mildly increased activity at the right abdominal bowel anastomotic site. If not recently performed, direct visualization is recommended  3. There are no other glucose avid abnormalities identified within the abdomen or pelvis  4.  No evidence of distant glucose avid metastatic disease in the neck, chest, or skeleton     Pathology    Case Report   Surgical Pathology Report                         Case: [id number]                                    Authorizing Provider:  Raheel Gardiner MD       Collected:           09/13/2023 1146               Ordering Location:     [address] [place] [address] Received:            09/13/2023 1225                                      Cardiac Cath Lab                                                              Pathologist:           Fatimah Dunn MD                                                            Specimen: Chest Wall, chest wall mass, 4 cores                                                       Final Diagnosis   A. Mass, "Chest wall mass 4 cores," Biopsy:  - Metastatic moderately differentiated adenocarcinoma, consistent with known colonic primary (see note)   Electronically signed by Melo Joy MD on 9/18/2023 at  9:45 AM   Note    Immunohistochemistry was performed on block A1 with adequate controls. Tumor cells are positive for AE1/AE3, CAM5.2, CDX2, and SATB2. They are negative for CK20 and CK7. Immunohistochemistry was performed on block A2 with adequate controls. Tumor cells are positive for AE1/AE3, Cam5.2, and negative for CK20. Case Report   Surgical Pathology Report                         Case: [id number]                                    Authorizing Provider:  Fritz Collado,   Collected:           04/29/2022 1118                                      MD                                                                            Ordering Location:     [facility]      Received:            04/29/2022 [facility] Operating Room                                                       Pathologist:           Bernadine Ferrell DO                                                      Specimens:   A) - Uterus w/Bilateral Ovaries and Fallopian Tubes                                                  B) - Spleen, spleen, omentum, darotis                                                      Final Diagnosis   A. Uterus, Bilateral Ovaries and Fallopian Tubes; Hysterosalpingo-oophorectomy:  - Leiomyoma. - Left ovary with serous cystadenoma. - Unremarkable cervix. - Benign inactive endometrium with no specific pathologic change. - Unremarkable right ovary and bilateral fallopian tubes. B. Spleen, spleen, omentum, darotis:  - Metastatic adenocarcinoma involving spleen and omentum, consistent with colonic primary. See Note.    Electronically signed by Luis Jiménez DO on 5/12/2022 at  2:49 PM     Note    Note B: Comparison to prior material ([id number], omental mass) reveals identical morphology to previous metastatic colonic adenocarcinoma. Case Report   Surgical Pathology Report                         Case: [id number]                                    Authorizing Provider:  Amanda Garrett MD           Collected:           11/12/2021 0512               Ordering Location:     [facility] [address] Received:            11/12/2021 0941                                      CAT Scan                                                                      Pathologist:           Francine Keys MD                                                         Specimen:    Omentum, Omental mass                                                                      Final Diagnosis   A. Omentum, Omental mass:  - Metastatic adenocarcinoma, with an immunophenotype compatible with metastasis from patient's known colonic primary.  - See note. Note: Tumor is positive with CK20, CDX2 and negative with CK7, PAX8. This immunophenotype supports the above interpretation. Best representative block with tumor A5. This case was reviewed at the intradepartmental  conference.    Dr. Teresa Wei is notified of the diagnosis in EPIC via [employer] on 11/17/2021  at 8.45 am.   Electronically signed by Francine Keys MD on 11/17/2021 at  8:53 AM         Case Report   Surgical Pathology Report                         Case: [id number]                                    Authorizing Provider:  Joe Gardner MD       Collected:           12/10/2019 1159               Ordering Location:     [facility]      Received:            12/10/2019 [address] Room                                                       Pathologist:           Emily Foster MD Specimens:   A) - Large Intestine, Sigmoid Colon, and portion of rectum                                           B) - Anastomatic site, anastamotic rings                                                   Addendum   RRESULTS OF IMMUNOHISTOCHEMICAL ANALYSIS FOR MISMATCH REPAIR PROTEIN LOSS  INTERPRETATION: NO LOSS OF NUCLEAR EXPRESSION OF MMR PROTEINS: LOW PROBABILITY OF MSI-H  Note: Background non-neoplastic tissue and/or internal control with intact nuclear expression. RESULTS:  Antibody          Clone               Description                           Results  MLH1               M1                  Mismatch repair protein       Intact nuclear expression  MSH2              [id number]       Mismatch repair protein       Intact nuclear expression  MSH6              40                   Mismatch repair protein       Intact nuclear expression  PMS2              [id number]         Mismatch repair protein       Intact nuclear expression     Comment:  A negative control and a positive control for each antibody have been reviewed and accepted. [employer] Specimen ID: [id number], Evaluator:  JOSÉ MIGUEL Zhang MD  These tests were developed and their performance characteristics determined by [facility]. They may not be cleared or approved by the U.S. Food and Drug Administration. The FDA determined that such clearance or approval is not necessary. These tests are used for clinical purposes. They should not be regarded as investigational or for research. This laboratory has been approved by [place], designated as a high-complexity laboratory and is qualified to perform these tests. Comments: Patients whose tumors demonstrate lack of expression of one or more DNA mismatch repair proteins might be at risk for Bee Syndrome. This cancer susceptibility syndrome greatly increases the risk of synchronous and/or metachronous cancers in the affected patients and their family members.  Normal expression of all proteins does not completely rule out familial cancer predisposition. [facility] [address] Task Forces recommends that all patients with lack of expression of one or more DNA mismatch repair proteins and those with concerning personal or family history should undergo thorough evaluation, counseling and possibly genetic testing. Addendum electronically signed by Anahi Singleton MD on 12/20/2019 at  3:28 PM   Final Diagnosis   A. Rectosigmoid colon, low anterior resection:  - Adenocarcinoma, moderately differentiated. - Tumor invades through the muscularis propria into pericolorectal tissue, T3  - Diverticula. - All margins are negative for tumor.  - Thirty-four lymph nodes, negative for malignancy (0/34). B. Colon, anastomotic rings, resection:  - Two portions of colon with no pathologic abnormality     Intradepartmental consultation is in agreement. [facility] [address] [place] [address], [place], [address]  Immunohistochemistry for desmin  is performed on tissue blocks A13 and A16 to help in the assessment of this case. Electronically signed by Anahi Singleton MD on 12/18/2019 at 10:42 AM   Additional Information    All controls performed with the immunohistochemical stains reported above reacted appropriately. These tests were developed and their performance characteristics determined by [facility] or [facility]. They may not be cleared or approved by the U.S. Food and Drug Administration. The FDA has determined that such clearance or approval is not necessary. These tests are used for clinical purposes. They should not be regarded as investigational or for research. This laboratory has been approved by [place] 88, designated as a high-complexity laboratory and is qualified to perform these tests.    Synoptic Checklist   COLON AND RECTUM: Resection, Including Transanal Disk Excision of Rectal Neoplasms  8th Edition - Protocol posted: 2/27/2019  ColoRectal - All Specimens  SPECIMEN   Procedure  Low anterior resection    Macroscopic Intactness of Mesorectum  Complete    TUMOR   Tumor Site  Rectosigmoid    Histologic Type  Adenocarcinoma    Histologic Grade  G2: Moderately differentiated    Tumor Size  Greatest dimension (Centimeters): 5.4 cm   Additional Dimension (Centimeters)  4.6 cm     1 cm   Tumor Deposits  Not identified    Tumor Extension  Tumor invades through the muscularis propria into pericolorectal tissue    Macroscopic Tumor Perforation  Not identified    Lymphovascular Invasion  Not identified    Perineural Invasion  Not identified    Type of Polyp in Which Invasive Carcinoma Arose  Tubular adenoma    Treatment Effect  No known presurgical therapy    MARGINS   Margins  All margins are uninvolved by invasive carcinoma, high-grade dysplasia, intramucosal adenocarcinoma, and adenoma    Margins Examined  Proximal      Distal      Radial or Mesenteric    Distance of Invasive Carcinoma from Closest Margin  1.5 cm   Closest Margin  Radial or Mesenteric    Distance of Tumor from Radial Margin  1.5 cm   LYMPH NODES   Number of Lymph Nodes Involved  0    Number of Lymph Nodes Examined  34    PATHOLOGIC STAGE CLASSIFICATION (pTNM, AJCC 8th Edition)   Primary Tumor (pT)  pT3    Regional Lymph Nodes (pN)  pN0    ADDITIONAL FINDINGS   Additional Pathologic Findings  Diverticulosis    .

## 2023-10-10 NOTE — PROGRESS NOTES
In-basket message received from Ericka Tripathi RN to add patient to next available [place] [id number] on 11/9/2023. Chart reviewed and prep completed.

## 2023-10-12 PROBLEM — C78.7 METASTASES TO THE LIVER (HCC): Status: ACTIVE | Noted: 2023-10-12

## 2023-10-12 NOTE — PROGRESS NOTES
Please see the hematology/oncology follow-up note dictated by the medical resident from October 10, 2023.

## 2023-10-16 ENCOUNTER — RADIATION THERAPY TREATMENT (OUTPATIENT)
Dept: RADIATION ONCOLOGY | Facility: HOSPITAL | Age: 68
End: 2023-10-16
Attending: STUDENT IN AN ORGANIZED HEALTH CARE EDUCATION/TRAINING PROGRAM
Payer: MEDICARE

## 2023-10-16 PROCEDURE — 77263 THER RADIOLOGY TX PLNG CPLX: CPT | Performed by: STUDENT IN AN ORGANIZED HEALTH CARE EDUCATION/TRAINING PROGRAM

## 2023-10-16 PROCEDURE — 77334 RADIATION TREATMENT AID(S): CPT | Performed by: STUDENT IN AN ORGANIZED HEALTH CARE EDUCATION/TRAINING PROGRAM

## 2023-10-17 ENCOUNTER — OFFICE VISIT (OUTPATIENT)
Dept: SURGICAL ONCOLOGY | Facility: CLINIC | Age: 68
End: 2023-10-17
Payer: MEDICARE

## 2023-10-17 VITALS
DIASTOLIC BLOOD PRESSURE: 80 MMHG | RESPIRATION RATE: 16 BRPM | HEIGHT: 57 IN | BODY MASS INDEX: 45.3 KG/M2 | SYSTOLIC BLOOD PRESSURE: 136 MMHG | OXYGEN SATURATION: 99 % | WEIGHT: 210 LBS | HEART RATE: 71 BPM | TEMPERATURE: 97.8 F

## 2023-10-17 DIAGNOSIS — C19 RECTOSIGMOID CANCER (HCC): Primary | ICD-10-CM

## 2023-10-17 DIAGNOSIS — C18.9 METASTASIS FROM COLON CANCER (HCC): ICD-10-CM

## 2023-10-17 DIAGNOSIS — C78.7 METASTASES TO THE LIVER (HCC): ICD-10-CM

## 2023-10-17 DIAGNOSIS — C79.9 METASTASIS FROM COLON CANCER (HCC): ICD-10-CM

## 2023-10-17 DIAGNOSIS — C78.6 MALIGNANT NEOPLASM METASTATIC TO OMENTUM (HCC): ICD-10-CM

## 2023-10-17 PROCEDURE — 99214 OFFICE O/P EST MOD 30 MIN: CPT | Performed by: STUDENT IN AN ORGANIZED HEALTH CARE EDUCATION/TRAINING PROGRAM

## 2023-10-17 RX ORDER — LIDOCAINE 50 MG/G
1 PATCH TOPICAL DAILY
Qty: 12 PATCH | Refills: 3 | Status: SHIPPED | OUTPATIENT
Start: 2023-10-17

## 2023-10-17 NOTE — LETTER
October 17, 2023     Monica Bai, [address] [place] [address]    Patient: Mick Bailey   YOB: 1955   Date of Visit: 10/17/2023       Dear Dr. Ramin Conti: Thank you for referring Zabrina Tolentino to me for evaluation. Below are my notes for this consultation. If you have questions, please do not hesitate to call me. I look forward to following your patient along with you. Sincerely,        Tim Chavez MD        CC: MD Tim Saavedra MD  10/17/2023  2:04 PM  Sign when Signing Visit  Surgical Oncology Consultation F/U    [address] [id number]    Patient:  Mick Bailey  1955  [id number]    Primary Care provider:  Chema Strange [id number]    Referring provider:  No referring provider defined for this encounter. 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 Rectosigmoid colon, low anterior resection:  - Adenocarcinoma, moderately differentiated. - Tumor invades through the muscularis propria into pericolorectal tissue, T3  - Diverticula. - All margins are negative for tumor.  - Thirty-four lymph nodes, negative for malignancy (0/34).      B. Colon, anastomotic rings, resection:  - Two portions of colon with no pathologic abnormality     12/10/2019 Genomic Testing    RESULTS OF IMMUNOHISTOCHEMICAL ANALYSIS FOR MISMATCH REPAIR PROTEIN LOSS  INTERPRETATION: NO LOSS OF NUCLEAR EXPRESSION OF MMR PROTEINS: LOW PROBABILITY OF MSI-H  Note: Background non-neoplastic tissue and/or internal control with intact nuclear expression. RESULTS:  Antibody          Clone               Description                           Results  MLH1               M1                  Mismatch repair protein       Intact nuclear expression  MSH2              [id number]       Mismatch repair protein       Intact nuclear expression  MSH6              40                   Mismatch repair protein       Intact nuclear expression  PMS2              [id number]         Mismatch repair protein       Intact nuclear expression     2/4/2020 Surgery    Ileostomy, takedown:  - Portion of small intestine with mucocutaneous anastomosis consistent with stoma. - Negative for malignancy     8/18/2021 Progression    CEA trends- observed by Dr. Carlos Perea  2/17/21: 2.9  8/18/2021: 4.5  9/13/2021: 4.4    PET/CT  9/28/2021 completed due to elevating CEA  1. There is a large left paracolic gutter markedly hypermetabolic mass immediately inferior to the spleen, most consistent with metastatic colorectal carcinoma. 2. Mildly increased activity at the right abdominal bowel anastomotic site. If not recently performed, direct visualization is recommended  3. There are no other glucose avid abnormalities identified within the abdomen or pelvis  4. No evidence of distant glucose avid metastatic disease in the neck, chest, or skeleton      11/12/2021 Biopsy    Omental mass:  - Metastatic adenocarcinoma, with an immunophenotype compatible with metastasis from patient's known colonic primary. 12/30/2021 - 12/30/2021 Chemotherapy    CapOx x 1 dose of Oxaliplatin: D/c due to tolerance.       1/2/2022 Genomic Testing         1/4/2022 - 3/17/2022 Chemotherapy    First 6 cycles of Folfox given prior to surgery- 1/4 through 3/17/2022     Folfox was dose reduced due to anticipated tolerance. Minimal side effects     3/22/2022 Observation    CT CAP  1. Decreased size of biopsy-proven omental metastasis, which now only focally contacting rather than grossly invading the spleen and adjacent abdominal wall. No new evidence of metastatic disease in the abdomen or pelvis. 2.  No new or suspicious pulmonary nodules. One of the previously noted new 1-2 mm nodules is no longer seen, and the other is unchanged. Recommend continued attention on follow-up. 3.  Top-normal thickness endometrial stripe measuring 7 mm. If there is history of vaginal bleeding, suggest catheterization with endometrial biopsy. 4.  Hepatic steatosis. 4/28/2022 -  Cancer Staged    Staging form: Colon and Rectum, AJCC 8th Edition  - Clinical stage from 4/28/2022: cT3, cN0, pM1 - Signed by Anastasiya Bell MD on 6/14/2023  Total positive nodes: 0       4/29/2022 Surgery    Escobar Johnson and Yamileth Valencia preformed the following procedures:   DIAGNOSTIC LAPAROSCOPY, TOTAL ABDOMINAL HYSTERECTOMY, BILATERAL SALPINGO-OOPHORECTOMY, EXTENSIVE ADHESIOLYSIS (N/A)  DIAGNOSTIC LAPAROSCOPY, OPEN OMENTECTOMY, POSSIBLE SPLENECTOMY (N/A)  INSTILLATION CHEMOTHERAPY INTRAPERITONEAL (HIPEC) LAPAROTOMY (N/A)  REPAIR DIAPHRAGM TEAR      A. Uterus, Bilateral Ovaries and Fallopian Tubes; Hysterosalpingo-oophorectomy:  - Leiomyoma. - Left ovary with serous cystadenoma. - Unremarkable cervix. - Benign inactive endometrium with no specific pathologic change. - Unremarkable right ovary and bilateral fallopian tubes. B. Spleen, spleen, omentum, darotis:  - Metastatic adenocarcinoma involving spleen and omentum, consistent with colonic primary. See Note. Note: Comparison to prior material ([id number], omental mass) reveals identical morphology to previous metastatic colonic adenocarcinoma.           5/18/2022 -  Cancer Staged    Staging form: Colon and Rectum, AJCC 8th Edition  - Pathologic stage from 5/18/2022: Stage KRISTAL (pT3, pN0, pM1a) - Signed by Shayla Blanc MD on 6/14/2023  Total positive nodes: 0       6/14/2022 - 8/23/2022 Chemotherapy    7th cycle started on 6/14/2022  8th cycle worsening neuropathy; could not tolerate gabapentin  D/lizette oxaliplatin  9th cycle Irinotecan added at 85% dose reduction: SE Diarrhea  10th cycle Irinotecan dose redcued to 50%     9/19/2022 Observation    9/19/2022 CT CAP:   1. Previously seen omental metastasis in the left upper quadrant has been resected. There is a small area of nodular soft tissue thickening abutting the lateral aspect of the left kidney, cannot exclude residual/recurrent tumor. Follow up in 4 months CEA not completed at time of scan. 9/13/2023 Biopsy    Mass, "Chest wall mass 4 cores," Biopsy:  - Metastatic moderately differentiated adenocarcinoma, consistent with known colonic primary      Omental metastasis   9/23/2019 Biopsy    Rectal Mass ( 2 slides [id number] [facility] Pathology, Collected on 09/23/2019, biopsy):  - Adenocarcinoma     2/4/2020 Surgery    Ileostomy, takedown:  - Portion of small intestine with mucocutaneous anastomosis consistent with stoma. - Negative for malignancy     9/13/2023 Biopsy    Mass, "Chest wall mass 4 cores," Biopsy:  - Metastatic moderately differentiated adenocarcinoma, consistent with known colonic primary          History of Present Illness  :   Miss Taniya Valenzuela is seen here today for ongoing surveillance of metastatic colorectal cancer. Briefly, the patient underwent screening colonoscopy in late 2019. This detected a rectosigmoid mass, which was then resected in December of 2019. Surgery required a diverting loop ileostomy, which was subsequently reversed in February of 2020. Of note, preop MRI suggested a T3bN1 lesion above the peritoneal reflection, and upfront surgery was recommended. This revealed a final path T3 N0 cancer with no aggressive features and no adjuvant therapy was recommended by her surgeon Dr. Anuradha Mcgrath.  She underwent a PET scan due to a rising CEA (4.4 from 2.2 posotp), and this demonstrated a large 4.5 cm left pericolic gutter markedly hypermetabolic mass. There was mild increased activity at the colon anastomosis. Subsequent colonoscopy revealed no abnormality at this location. She denies any systemic symptoms including weight loss, nausea, vomiting, changes in her bowel habits. MRI of the abdomen revealed a single metastatic lesion invading the spleen. No evidence of other peritoneal implants. MRI of the pelvis revealed a concerning appearance of the ovary. This was followed by a transvaginal ultrasound, which ultimately determined that the ovary appeared benign. She then underwent interventional radiology biopsy, which confirmed a metastatic lesion from a colorectal primary. She was iniatiated on FOLFOX and then 4/2022 underwent open omentectomy, splenectomy, resection of involved diaphragm with primary repair, hysterectomy and oophorectomy with HIPEC. Completed chemo August 2022. Interval 2/2023  Doing very well. Recent CT ISIAH. CEA 1.6 No c/o fatigue, n/v, changes in stool habits, fevers, chills, weight loss. I will see her in 4 months time for repeat chest abdomen pelvis cross-sectional imaging and CEA. 6/2023  Leonard Johnson is doing very well clinically. She has no abdominal pain, no nausea vomiting, no change in stool habits. No weight loss, bone pain, fatigue, headaches. Her CEA remains very low, and was previously very reliable and correlated with her degree of disease. Her CT did demonstrate a new very small subcentimeter liver lesion. We will obtain an MR    10/2023  Obtained an MR confirming suspicion for liver lesion as well as left posterior inferior rib lesion. IR bx of liver confirmed met. Discussed with Dr Orlando Feliz rad onc for SBRT and Dr Shelbi Donaldson to discuss ablation of liver, as well as med onc Dr Orlando Feliz to ensure agreement with plan - she is on her way to initiating treatment.  Recent imaging with doubling in size of chest wall met. Review of Systems  Complete ROS Surg Onc:   Constitutional: The patient denies new or recent history of general fatigue, no recent weight loss, normal appetite. Eyes: No complaints of visual problems, no scleral icterus. ENT: No complaints of ear pain, no hoarseness, no difficulty swallowing,  no tinnitus and no new masses in head, oral cavity, or neck. Cardiovascular: No complaints of chest pain, no palpitations, no ankle edema. Respiratory: No complaints of shortness of breath, no cough. Gastrointestinal: No complaints of jaundice, no bloody stools, no pale stools. Genitourinary: No complaints of dysuria, no hematuria, no nocturia, no frequent urination, no urethral discharge. Musculoskeletal: No complaints of weakness, paralysis, joint stiffness or arthralgias. Integumentary: No complaints of rash, no new lesions. Neurological: No complaints of convulsions, no seizures, no dizziness. Hematologic/Lymphatic: No complaints of easy bruising. Endocrine:  ENDORSES cold intolerance. No polydipsia, polyphagia, or polyuria. Allergy/immunology:  No environmental allergies. No food allergies. Not immunocompromised.       Patient Active Problem List   Diagnosis   • Callus of foot   • Foot pain   • GERD (gastroesophageal reflux disease)   • Mixed hyperlipidemia   • Prediabetes   • Varicose veins with pain   • Morbid obesity (HCC)   • 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HCC)   • Dyspnea on exertion   • Dyslipidemia   • Sigmoid diverticulosis   • PONV (postoperative nausea and vomiting)   • Omental metastasis   • Lesion of ovary   • Chemotherapy adverse reaction   • Chemotherapy-induced nausea   • Platelets decreased (HCC)   • Stage 3 chronic kidney disease, unspecified whether stage 3a or 3b CKD (HCC)   • H/O splenectomy   • Asplenia   • Postoperative state   • Acute embolism and thrombosis of right tibial vein (HCC)   • Metastases to the liver [facility])     Past Medical History:   Diagnosis Date   • Blood in stool    • Breast disorder    • Cancer [facility])     rectal   • Cancer determined by colorectal biopsy ([address])     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   • Colon polyp    • GERD (gastroesophageal reflux disease)    • History of chemotherapy 2021   • History of rectal surgery    • Hyperlipidemia    • PONV (postoperative nausea and vomiting)      Past Surgical History:   Procedure Laterality Date   • BREAST CYST EXCISION Right    •  SECTION      x3   • COLON SIGMOID RESECTION     • COLONOSCOPY     • DILATION AND CURETTAGE OF UTERUS     • ILEO LOOP DIVERSION N/A 12/10/2019    Procedure: ILEOSTOMY LOOP;  Surgeon: Cherelle Moreno MD;  [facility];  Service: Robotics   • INSTILLATION CHEMOTHERAPY INTRAPERITONEAL (HIPEC) LAPAROTOMY N/A 2022    Procedure: INSTILLATION CHEMOTHERAPY INTRAPERITONEAL (HIPEC) LAPAROTOMY;  Surgeon: Félix Gill MD;  [facility];  Service: Surgical Oncology   • IR BIOPSY CHEST WALL  2023   • IR BIOPSY OMENTUM  2021   • IR PORT PLACEMENT  2021   • OMENTECTOMY N/A 2022    Procedure: DIAGNOSTIC LAPAROSCOPY, OPEN OMENTECTOMY, SPLENECTOMY, DIAPHRAGM REPAIR, CHEST TUBE INSERTION;  Surgeon: Félix Gill MD;  [facility];  Service: Surgical Oncology   • KS CLOSURE ENTEROSTOMY LG/SMALL INTESTINE N/A 2020    Procedure: CLOSURE ILEOSTOMY;  Surgeon: Cherelle Moreno MD;  [facility];  Service: Colorectal   • KS LAPAROSCOPY COLECTOMY PARTIAL W/ANASTOMOSIS N/A 12/10/2019    Procedure: SIGMOID RESECTION COLON LAPAROSCOPIC W ROBOTICS converted to lap hand assisted  with Partial proctectomy , LASER FLUORESCENCE ANGIOGRAPHY, INTRA OP COLONOSCOPY;  Surgeon: Cherelle Moreno MD;  [facility];  Service: Robotics   • KS TOTAL ABDOMINAL HYSTERECT W/WO RMVL TUBE OVARY N/A 2022    Procedure: DIAGNOSTIC LAPAROSCOPY, TOTAL ABDOMINAL HYSTERECTOMY, BILATERAL SALPINGO-OOPHORECTOMY, EXTENSIVE ADHESIOLYSIS;  Surgeon: Rg Monte MD;  [facility];  Service: Gynecology Oncology   • NC UNLISTED PROCEDURE DIAPHRAGM  04/29/2022    Procedure: REPAIR DIAPHRAGM TEAR;  Surgeon: Mary Bravo MD;  [facility];  Service: Thoracic   • SMALL INTESTINE SURGERY  02/04/2020    Procedure: RESECTION SMALL BOWEL;  Surgeon: Norberto Gannon MD;  [facility];  Service: Colorectal     Family History   Problem Relation Age of Onset   • Hypertension Mother    • Heart attack Mother    • Heart attack Father    • Heart disease Father    • Hypertension Brother    • Heart attack Brother    • Colon cancer Paternal Uncle    • Leukemia Cousin    • Breast cancer Cousin    • Diabetes Cousin    • Breast cancer Cousin    • Colon cancer Family         paternal uncle   • Stroke Neg Hx      Social History     Socioeconomic History   • Marital status:      Spouse name: Not on file   • Number of children: Not on file   • Years of education: Not on file   • Highest education level: Not on file   Occupational History   • Not on file   Tobacco Use   • Smoking status: Never   • Smokeless tobacco: Never   Vaping Use   • Vaping Use: Never used   Substance and Sexual Activity   • Alcohol use: Yes     Comment: "occasionally"   • Drug use: Never   • Sexual activity: Not Currently   Other Topics Concern   • Not on file   Social History Narrative   • Not on file     Social Determinants of Health     Financial Resource Strain: Low Risk  (3/21/2023)    Overall Financial Resource Strain (CARDIA)    • Difficulty of Paying Living Expenses: Not hard at all   Food Insecurity: No Food Insecurity (5/2/2022)    Hunger Vital Sign    • Worried About Running Out of Food in the Last Year: Never true    • Ran Out of Food in the Last Year: Never true   Transportation Needs: No Transportation Needs (3/21/2023)    PRAPARE - Transportation    • Lack of Transportation (Medical): No    • Lack of Transportation (Non-Medical):  No   Physical Activity: Not on file   Stress: Not on file   Social Connections: Not on file   Intimate Partner Violence: Not on file   Housing Stability: Unknown (5/2/2022)    Housing Stability Vital Sign    • Unable to Pay for Housing in the Last Year: No    • Number of Places Lived in the Last Year: Not on file    • Unstable Housing in the Last Year: No       Current Outpatient Medications:   •  Acetaminophen (TYLENOL 8 HOUR PO), Take by mouth PRN, Disp: , Rfl:   •  apixaban (Eliquis) 5 mg, Take 1 tablet (5 mg total) by mouth 2 (two) times a day, Disp: 60 tablet, Rfl: 5  •  ezetimibe (ZETIA) 10 mg tablet, Take 1 tablet (10 mg total) by mouth daily, Disp: 90 tablet, Rfl: 1  •  famotidine (PEPCID) 20 mg tablet, Take 1 tablet (20 mg total) by mouth 2 (two) times a day as needed for heartburn As needed, Disp: 90 tablet, Rfl: 1  •  meclizine (ANTIVERT) 12.5 MG tablet, Take 1 tablet (12.5 mg total) by mouth 3 (three) times a day as needed for dizziness (Patient taking differently: Take 12.5 mg by mouth 3 (three) times a day as needed for dizziness PRN), Disp: 30 tablet, Rfl: 0  Allergies   Allergen Reactions   • Medical Tape Rash     Skin irritation  Skin peeling  Skin irritation  Skin peeling  Blisters  Rash       Vitals:    10/17/23 1314   BP: 136/80   Pulse: 71   Resp: 16   Temp: 97.8 °F (36.6 °C)   SpO2: 99%       Physical Exam   General: Appears well, appears stated age  Skin: Warm, anicteric  HEENT: Normocephalic, atraumatic; sclera aniceteric, mucous membranes moist; cervical nodes without adenopathy  Cardiopulmonary: RRR, Easy WOB, no BLE edema  Abd: Obese, nontender, no masses appreciated, no hepatosplenomegaly. Incision well-healed  MSK: Symmetric, no cyanosis, no overt weakness  Lymphatic: No cervical, axillary or inguinal lymphadenopathy  Neuro: Affect appropriate, no gross motor abnormalities      Pathology:  Final Diagnosis   A. Rectosigmoid colon, low anterior resection:  - Adenocarcinoma, moderately differentiated. - Tumor invades through the muscularis propria into pericolorectal tissue, T3  - Diverticula. - All margins are negative for tumor.  - Thirty-four lymph nodes, negative for malignancy (0/34). B. Colon, anastomotic rings, resection:  - Two portions of colon with no pathologic abnormality     Intradepartmental consultation is in agreement. Interpretation performed at [facility], [address], [facility], [address]  Immunohistochemistry for desmin  is performed on tissue blocks A13 and A16 to help in the assessment of this case. Final Diagnosis   A. Uterus, Bilateral Ovaries and Fallopian Tubes; Hysterosalpingo-oophorectomy:  - Leiomyoma. - Left ovary with serous cystadenoma. - Unremarkable cervix. - Benign inactive endometrium with no specific pathologic change. - Unremarkable right ovary and bilateral fallopian tubes. B. Spleen, spleen, omentum, darotis:  - Metastatic adenocarcinoma involving spleen and omentum, consistent with colonic primary. See Note. Labs: Reviewed in [employer]    Imaging  Colonoscopy    Addendum Date: 10/6/2021 Addendum:   [address] [address] [facility] [address] [phone] DATE OF SERVICE: 10/06/21 PHYSICIAN(S): Michelle Ramirez MD - Attending Physician INDICATION: Personal history of rectal cancer , had a low anterior resection in December of 2019. T3 N0, 34 lymph nodes were removed and were negative. Patient did not receive any chemotherapy. Colonoscopy performed for a diagnostic indication. POST-OP DIAGNOSIS: See the impression below. HISTORY: Prior colonoscopy: Less than 3 years ago. It is being repeated at an interval of less than 3 years because: This colonoscopy is being performed for a diagnostic indication BOWEL PREPARATION: Miralax/Dulcolax PREPROCEDURE: Informed consent was obtained for the procedure, including sedation.  Risks including but not limited to bleeding, infection, perforation, adverse drug reaction and aspiration were explained in detail. Also explained about less than 100% sensitivity with the exam and other alternatives. The patient was placed in the left lateral decubitus position. DETAILS OF PROCEDURE: Patient was taken to the procedure room where a time out was performed to confirm correct patient and correct procedure. The patient underwent monitored anesthesia care, which was administered by an anesthesia professional. The patient's blood pressure, heart rate, level of consciousness, oxygen and respirations were monitored throughout the procedure. A digital rectal exam was performed. The scope was introduced through the anus and advanced to the cecum. Retroflexion was performed in the rectum. The quality of bowel preparation was evaluated using the [facility] Bowel Preparation Scale with scores of: right colon = 2, transverse colon = 2, left colon = 2. The total BBPS score was 6. Bowel prep was adequate. The patient experienced no blood loss. The procedure was not difficult. The patient tolerated the procedure well. There were no apparent complications. ANESTHESIA INFORMATION: ASA: III Anesthesia Type: IV Sedation with Anesthesia MEDICATIONS: No administrations occurring from 1215 to 1230 on 10/06/21 FINDINGS: Healthy end-to-end colorectal anastomosis with no bleeding in the mid rectum All observed locations appeared normal, including the cecum, ascending colon, transverse colon, splenic flexure and descending colon. A couple scattered diverticuli seen EVENTS: Procedure Events Event Event Time ENDO CECUM REACHED 10/6/2021 12:21 PM ENDO SCOPE OUT TIME 10/6/2021 12:28 PM SPECIMENS: * No specimens in log * EQUIPMENT: Colonoscope - IMPRESSION: 1. Normal-appearing colon. Anastomosis site was seen in the rectum appeared healthy. No evidence of local recurrence. 2. Couple small scattered diverticuli. RECOMMENDATION: Repeat colonoscopy in 2 years due to a personal history of colon cancer.  Advised her urgent evaluation by medical oncologist and Dr. Hao Marcum. Carlos Camargo MD     Addendum Date: 10/6/2021 Addendum:   [address] [address] [place] [address] [phone] DATE OF SERVICE: 10/06/21 PHYSICIAN(S): Carlos Camargo MD - Attending Physician INDICATION: Personal history of rectal cancer , had a low anterior resection in December of 2019. T3 N0, 34 lymph nodes were removed and were negative. Patient did not receive any chemotherapy. Colonoscopy performed for a diagnostic indication. POST-OP DIAGNOSIS: See the impression below. HISTORY: Prior colonoscopy: Less than 3 years ago. It is being repeated at an interval of less than 3 years because: This colonoscopy is being performed for a diagnostic indication BOWEL PREPARATION: Miralax/Dulcolax PREPROCEDURE: Informed consent was obtained for the procedure, including sedation. Risks including but not limited to bleeding, infection, perforation, adverse drug reaction and aspiration were explained in detail. Also explained about less than 100% sensitivity with the exam and other alternatives. The patient was placed in the left lateral decubitus position. DETAILS OF PROCEDURE: Patient was taken to the procedure room where a time out was performed to confirm correct patient and correct procedure. The patient underwent monitored anesthesia care, which was administered by an anesthesia professional. The patient's blood pressure, heart rate, level of consciousness, oxygen and respirations were monitored throughout the procedure. A digital rectal exam was performed. The scope was introduced through the anus and advanced to the cecum. Retroflexion was performed in the rectum. The quality of bowel preparation was evaluated using the [facility] Bowel Preparation Scale with scores of: right colon = 2, transverse colon = 2, left colon = 2. The total BBPS score was 6. Bowel prep was adequate. The patient experienced no blood loss.  The procedure was not difficult. The patient tolerated the procedure well. There were no apparent complications. ANESTHESIA INFORMATION: ASA: III Anesthesia Type: IV Sedation with Anesthesia MEDICATIONS: No administrations occurring from 1215 to 1230 on 10/06/21 FINDINGS: Healthy end-to-end colorectal anastomosis with no bleeding in the mid rectum All observed locations appeared normal, including the cecum, ascending colon, transverse colon, splenic flexure and descending colon. A couple scattered diverticuli seen EVENTS: Procedure Events Event Event Time ENDO CECUM REACHED 10/6/2021 12:21 PM ENDO SCOPE OUT TIME 10/6/2021 12:28 PM SPECIMENS: * No specimens in log * EQUIPMENT: Colonoscope - IMPRESSION: 1. Normal-appearing colon. Anastomosis site was seen in the rectum appeared healthy. No evidence of local recurrence. 2. Couple small scattered diverticuli. RECOMMENDATION: Repeat colonoscopy in 2 years due to a personal history of colon cancer. Estiven Alberts MD     Result Date: 10/6/2021  Narrative: [address] [place] [address],[address] [id number] [facility] [address] [phone] DATE OF SERVICE: 10/06/21 PHYSICIAN(S): Estiven Alberts MD - Attending Physician INDICATION: Personal history of rectal cancer Colonoscopy performed for a diagnostic indication. POST-OP DIAGNOSIS: See the impression below. HISTORY: Prior colonoscopy: Less than 3 years ago. It is being repeated at an interval of less than 3 years because: This colonoscopy is being performed for a diagnostic indication BOWEL PREPARATION: Miralax/Dulcolax PREPROCEDURE: Informed consent was obtained for the procedure, including sedation. Risks including but not limited to bleeding, infection, perforation, adverse drug reaction and aspiration were explained in detail. Also explained about less than 100% sensitivity with the exam and other alternatives. The patient was placed in the left lateral decubitus position.  DETAILS OF PROCEDURE: Patient was taken to the procedure room where a time out was performed to confirm correct patient and correct procedure. The patient underwent monitored anesthesia care, which was administered by an anesthesia professional. The patient's blood pressure, heart rate, level of consciousness, oxygen and respirations were monitored throughout the procedure. A digital rectal exam was performed. The scope was introduced through the anus and advanced to the cecum. Retroflexion was performed in the rectum. The quality of bowel preparation was evaluated using the [facility] Bowel Preparation Scale with scores of: right colon = 2, transverse colon = 2, left colon = 2. The total BBPS score was 6. Bowel prep was adequate. The patient experienced no blood loss. The procedure was not difficult. The patient tolerated the procedure well. There were no apparent complications. ANESTHESIA INFORMATION: ASA: III Anesthesia Type: IV Sedation with Anesthesia MEDICATIONS: No administrations occurring from 1215 to 1230 on 10/06/21 FINDINGS: Healthy end-to-end colorectal anastomosis with no bleeding in the mid rectum All observed locations appeared normal, including the cecum, ascending colon, transverse colon, splenic flexure and descending colon. A couple scattered diverticuli seen EVENTS: Procedure Events Event Event Time ENDO CECUM REACHED 10/6/2021 12:21 PM ENDO SCOPE OUT TIME 10/6/2021 12:28 PM SPECIMENS: * No specimens in log * EQUIPMENT: Colonoscope -     Impression: 1. Normal-appearing colon. Anastomosis site was seen in the rectum appeared healthy. No evidence of local recurrence. 2. Couple small scattered diverticuli. RECOMMENDATION: Repeat colonoscopy in 2 years due to a personal history of colon cancer. Cristela Urias MD     DXA bone density spine hip and pelvis    Result Date: 10/14/2021  Narrative: CENTRAL  DXA SCAN CLINICAL HISTORY:  76-year-old postmenopausal female. OTHER RISK FACTORS:  History of fracture resulting from minor injury.  PHARMACOLOGIC THERAPY FOR OSTEOPOROSIS: None. TECHNIQUE: Bone densitometry was performed using a [employer] iDXA   bone densitometer. Regions of interest appear properly placed. COMPARISON: 5/6/2019. RESULTS: LUMBAR SPINE L1-L4 : BMD  1.141  gm/cm2 T-score -0.4 LEFT TOTAL HIP: BMD: 0.992  gm/cm2 T-score: -0.1 LEFT FEMORAL NECK: BMD: 0.856  gm/cm2 T score: -1.3 RIGHT TOTAL HIP: BMD:  1.004  gm/cm2 T-score: 0.0 RIGHT FEMORAL NECK: BMD:  0.851  gm/cm2  T score: -1.3     Impression: 1. Low bone mass (osteopenia). [Based on the left and right femoral necks] 2. Since a DXA study from 5/6/2019, there has been: A  STATISTICALLY SIGNIFICANT DECREASE in bone mineral density of  0.039 g/cm2 (3.3)% in the lumbar spine. A  STATISTICALLY SIGNIFICANT DECREASE in bone mineral density of  0.046 g/cm2 (4.4)% in the hips. 3.  The 10 year risk of hip fracture is 1.2% with the 10 year risk of major osteoporotic fracture being 12.6% as calculated by the [facility] fracture risk assessment tool (FRAX, which is based on data generated by the [facility]). 4.  The current NOF guidelines recommend treating patients with a T-score of -2.5 or less in the lumbar spine or hips, or in post-menopausal women and men over the age of 48 with low bone mass (osteopenia) and a FRAX 10 year risk score of >3% for hip fracture and/or >20% for major osteoporotic fracture. 5.  The NOF recommends follow-up DXA in 1-2 years after initiating therapy for osteoporosis and every 2 years thereafter. More frequent evaluation is appropriate for patients with conditions associated with rapid bone loss, such as glucocorticoid therapy. The interval between DXA screenings may be longer for individuals without major risk factors and initial T-score in the normal or upper low bone mass range. The FRAX algorithm has certain limitations: -FRAX has not been validated in patients currently or previously treated with pharmacotherapy for osteoporosis.   In such patients, clinical judgment must be exercised in interpreting FRAX scores. -Prior hip, vertebral and humeral fragility fractures appear to confer greater risk of subsequent fracture than fractures at other sites (this is especially true for individuals with severe vertebral fractures), but quantification of this incremental risk is not possible with FRAX. -FRAX underestimates fracture risk in patients with history of multiple fragility fractures. -FRAX may underestimate fracture risk in patients with history of frequent falls. -It is not appropriate to use FRAX to monitor treatment response. WHO CLASSIFICATION: Normal (a T-score of -1.0 or higher) Low bone mineral density (a T-score of less than -1.0 but higher than -2.5) Osteoporosis (a T-score of -2.5 or less) Severe osteoporosis (a T-score of -2.5 or less with a fragility fracture) LEAST SIGNIFICANT CHANGE AT 95% C.I: Lumbar spine: 0.036 gm/cm2 (3.2%). Total hip: 0.018 gm/cm2 (2.0%). Forearm: 0.024 gm/cm2 (3.2%). Workstation performed: [id number]     NM PET CT skull base to mid thigh    Result Date: 9/28/2021  Narrative: PET/CT SCAN INDICATION:  C18.7: Malignant neoplasm of sigmoid colon C20: Malignant neoplasm of rectum   Rectosigmoid carcinoma, restaging/surveillance examination. Borderline elevated CEA (most recently 4.4). MODIFIER: PS COMPARISON: CT chest abdomen and pelvis 2/24/2021. CELL TYPE:  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 TECHNIQUE:   12.1 mCi F-18-FDG administered IV. Multiplanar attenuation corrected and non attenuation corrected PET images were acquired 60 minutes post injection. Contiguous, low dose, axial CT sections were obtained from the skull base through the femurs . Intravenous contrast material was not utilized.   This examination, like all CT scans performed in the [facility], was performed utilizing techniques to minimize radiation dose exposure, including the use of iterative reconstruction and automated exposure control. Fasting serum glucose: 90 mg/dl FINDINGS: VISUALIZED BRAIN:   No acute abnormalities are seen. HEAD/NECK:   There is a physiologic distribution of FDG. No FDG avid cervical adenopathy is seen. CT images: Unremarkable. CHEST:   No FDG avid soft tissue lesions are seen. CT images: Mild coronary artery calcification. No pericardial effusion, no pleural effusion ABDOMEN:   There is a large hypermetabolic left lateral abdominal lobular mass situated immediately inferior to the spleen in the left paracolic gutter region, which measures 4.3 x 3.9 x 4.5 cm in size, SUV max 20.8. No other definite hypermetabolic abnormalities are seen within the upper abdomen. Activity at the bowel anastomotic site in the right abdomen on image 165 demonstrates SUV max of 3.8. This is nonspecific but may simply be inflammatory or physiologic. Direct visualization is recommended if not recently performed. CT images: No ascites. No hydronephrosis or bowel obstruction. PELVIS: Additional rectosigmoid anastomotic site noted image 212. No evidence of abnormal glucose activity. No evidence of glucose avid pelvic adenopathy. No pelvic ascites. OSSEOUS STRUCTURES: No FDG avid lesions are seen. CT images: No significant findings. Impression: 1. There is a large left paracolic gutter markedly hypermetabolic mass immediately inferior to the spleen, most consistent with metastatic colorectal carcinoma. 2. Mildly increased activity at the right abdominal bowel anastomotic site. If not recently performed, direct visualization is recommended 3. There are no other glucose avid abnormalities identified within the abdomen or pelvis 4. No evidence of distant glucose avid metastatic disease in the neck, chest, or skeleton The examination demonstrates a significant  finding and was documented as such in [facility] for liaison and referring practitioner notification.  Workstation performed: [id number]     Mammo screening bilateral w 3d & cad    Result Date: 10/14/2021  Narrative: DIAGNOSIS: Encounter for screening mammogram for malignant neoplasm of breast TECHNIQUE: Digital screening mammography was performed. Computer Aided Detection (CAD) analyzed all applicable images. COMPARISONS: Prior breast imaging dated: 06/26/2020, 05/06/2019, 10/18/2016, 10/14/2016, and 09/14/2015 RELEVANT HISTORY: Family Breast Cancer History: History of breast cancer in [address] [place] [address]. Family Medical History: Family medical history includes breast cancer in 2 relatives (cousin, family) and colon cancer in 2 relatives (family, paternal uncle). Personal History: No known relevant hormone history. Surgical history includes lumpectomy. No known relevant medical history. The patient is scheduled in a reminder system for screening mammography. 8-10% of cancers will be missed on mammography. Management of a palpable abnormality must be based on clinical grounds. Patients will be notified of their results via letter from our facility. Accredited by [employer] and FDA. RISK ASSESSMENT: 5 Year Tyrer-Cuzick: 2.13 % 10 Year Tyrer-Cuzick: 4.17 % Lifetime Tyrer-Cuzick: 7.93 % TISSUE DENSITY: There are scattered areas of fibroglandular density. INDICATION: Monica Nevarez is a 78 y.o. female presenting for screening mammography. FINDINGS: Breast parenchymal distribution is unchanged from priors including multiple focal asymmetries in the right breast.  Long-term stability confirms benignancy. No developing asymmetries or concerning masses. Single upper-outer-anterior coarse calcification in the left breast is definitively benign. No suspicious microcalcifications, architectural distortion, skin thickening, or abnormalities of the nipples in either breast.      Impression: No mammographic evidence of malignancy or significant change from priors.  ASSESSMENT/BI-RADS CATEGORY: Left: 2 - Benign Right: 2 - Benign Overall: 2 - Benign RECOMMENDATION:      - Routine screening mammogram in 1 year for both breasts. Workstation ID: [id number]     CT 6/2023     IMPRESSION:     No evidence of acute intrathoracic pathology. New 7 mm hypodensity of the right hepatic lobe which was not present on prior examinations. Contrast enhanced abdominal MRI recommended for further evaluation. Nodular soft tissue adjacent to the left upper renal capsule and prior splenectomy site is stable    I have personally reviewed and interpreted the patient's CT scans, MRIs, radiation oncology, medical oncology, and interventional radiology notes. 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 S/p omentectomy, splenectomy, resection of involved diaphragm and diaphragm repair + DILMA/BSO and HIPEC 4/2022. Completed chemo 8/2022. The patient is doing very well clinically with the exception of back pain 2/2 tumor. Has doubled in size since July. Set for SBRT soon. Consult for MWA of liver Friday. Will present at  and forgo systemic treatment for now per conversation with Dr Leila Garcia given limited disease and long DFI. Will see her in 4 mo.

## 2023-10-17 NOTE — PROGRESS NOTES
Surgical Oncology Consultation F/U    [address] [facility]  [facility] [address]    Patient:  Mary Mendez  1955  [id number]    Primary Care provider:  Navi De La Cruz, [address] [facility] [address]  [address] [facility] [address]    Referring provider:  No referring provider defined for this encounter. 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 Rectosigmoid colon, low anterior resection:  - Adenocarcinoma, moderately differentiated. - Tumor invades through the muscularis propria into pericolorectal tissue, T3  - Diverticula. - All margins are negative for tumor.  - Thirty-four lymph nodes, negative for malignancy (0/34). B. Colon, anastomotic rings, resection:  - Two portions of colon with no pathologic abnormality     12/10/2019 Genomic Testing    RESULTS OF IMMUNOHISTOCHEMICAL ANALYSIS FOR MISMATCH REPAIR PROTEIN LOSS  INTERPRETATION: NO LOSS OF NUCLEAR EXPRESSION OF MMR PROTEINS: LOW PROBABILITY OF MSI-H  Note: Background non-neoplastic tissue and/or internal control with intact nuclear expression.       RESULTS:  Antibody          Clone               Description                           Results  MLH1               M1                  Mismatch repair protein       Intact nuclear expression  MSH2              [id number]       Mismatch repair protein       Intact nuclear expression  MSH6              44                   Mismatch repair protein Intact nuclear expression  PMS2              [id number]         Mismatch repair protein       Intact nuclear expression     2/4/2020 Surgery    Ileostomy, takedown:  - Portion of small intestine with mucocutaneous anastomosis consistent with stoma. - Negative for malignancy     8/18/2021 Progression    CEA trends- observed by Dr. Yany Fernández  2/17/21: 2.9  8/18/2021: 4.5  9/13/2021: 4.4    PET/CT  9/28/2021 completed due to elevating CEA  1. There is a large left paracolic gutter markedly hypermetabolic mass immediately inferior to the spleen, most consistent with metastatic colorectal carcinoma. 2. Mildly increased activity at the right abdominal bowel anastomotic site. If not recently performed, direct visualization is recommended  3. There are no other glucose avid abnormalities identified within the abdomen or pelvis  4. No evidence of distant glucose avid metastatic disease in the neck, chest, or skeleton      11/12/2021 Biopsy    Omental mass:  - Metastatic adenocarcinoma, with an immunophenotype compatible with metastasis from patient's known colonic primary. 12/30/2021 - 12/30/2021 Chemotherapy    CapOx x 1 dose of Oxaliplatin: D/c due to tolerance. 1/2/2022 Genomic Testing         1/4/2022 - 3/17/2022 Chemotherapy    First 6 cycles of Folfox given prior to surgery- 1/4 through 3/17/2022     Folfox was dose reduced due to anticipated tolerance. Minimal side effects     3/22/2022 Observation    CT CAP  1. Decreased size of biopsy-proven omental metastasis, which now only focally contacting rather than grossly invading the spleen and adjacent abdominal wall. No new evidence of metastatic disease in the abdomen or pelvis. 2.  No new or suspicious pulmonary nodules. One of the previously noted new 1-2 mm nodules is no longer seen, and the other is unchanged. Recommend continued attention on follow-up. 3.  Top-normal thickness endometrial stripe measuring 7 mm.  If there is history of vaginal bleeding, suggest catheterization with endometrial biopsy. 4.  Hepatic steatosis. 4/28/2022 -  Cancer Staged    Staging form: Colon and Rectum, AJCC 8th Edition  - Clinical stage from 4/28/2022: cT3, cN0, pM1 - Signed by Niles Mak MD on 6/14/2023  Total positive nodes: 0       4/29/2022 Surgery    Escobar Kirkpatrick and Kassidy Lilly preformed the following procedures:   DIAGNOSTIC LAPAROSCOPY, TOTAL ABDOMINAL HYSTERECTOMY, BILATERAL SALPINGO-OOPHORECTOMY, EXTENSIVE ADHESIOLYSIS (N/A)  DIAGNOSTIC LAPAROSCOPY, OPEN OMENTECTOMY, POSSIBLE SPLENECTOMY (N/A)  INSTILLATION CHEMOTHERAPY INTRAPERITONEAL (HIPEC) LAPAROTOMY (N/A)  REPAIR DIAPHRAGM TEAR      A. Uterus, Bilateral Ovaries and Fallopian Tubes; Hysterosalpingo-oophorectomy:  - Leiomyoma. - Left ovary with serous cystadenoma. - Unremarkable cervix. - Benign inactive endometrium with no specific pathologic change. - Unremarkable right ovary and bilateral fallopian tubes. B. Spleen, spleen, omentum, darotis:  - Metastatic adenocarcinoma involving spleen and omentum, consistent with colonic primary. See Note. Note: Comparison to prior material ([id number], omental mass) reveals identical morphology to previous metastatic colonic adenocarcinoma. 5/18/2022 -  Cancer Staged    Staging form: Colon and Rectum, AJCC 8th Edition  - Pathologic stage from 5/18/2022: Stage KRISTAL (pT3, pN0, pM1a) - Signed by Niles Mak MD on 6/14/2023  Total positive nodes: 0       6/14/2022 - 8/23/2022 Chemotherapy    7th cycle started on 6/14/2022  8th cycle worsening neuropathy; could not tolerate gabapentin  D/lizette oxaliplatin  9th cycle Irinotecan added at 85% dose reduction: SE Diarrhea  10th cycle Irinotecan dose redcued to 50%     9/19/2022 Observation    9/19/2022 CT CAP:   1. Previously seen omental metastasis in the left upper quadrant has been resected.   There is a small area of nodular soft tissue thickening abutting the lateral aspect of the left kidney, cannot exclude residual/recurrent tumor. Follow up in 4 months CEA not completed at time of scan. 9/13/2023 Biopsy    Mass, "Chest wall mass 4 cores," Biopsy:  - Metastatic moderately differentiated adenocarcinoma, consistent with known colonic primary      Omental metastasis   9/23/2019 Biopsy    Rectal Mass ( 2 slides [id number] [facility] Pathology, Collected on 09/23/2019, biopsy):  - Adenocarcinoma     2/4/2020 Surgery    Ileostomy, takedown:  - Portion of small intestine with mucocutaneous anastomosis consistent with stoma. - Negative for malignancy     9/13/2023 Biopsy    Mass, "Chest wall mass 4 cores," Biopsy:  - Metastatic moderately differentiated adenocarcinoma, consistent with known colonic primary          History of Present Illness  :   Miss Narayan Jackson is seen here today for ongoing surveillance of metastatic colorectal cancer. Briefly, the patient underwent screening colonoscopy in late 2019. This detected a rectosigmoid mass, which was then resected in December of 2019. Surgery required a diverting loop ileostomy, which was subsequently reversed in February of 2020. Of note, preop MRI suggested a T3bN1 lesion above the peritoneal reflection, and upfront surgery was recommended. This revealed a final path T3 N0 cancer with no aggressive features and no adjuvant therapy was recommended by her surgeon Dr. Garrett Crowley. She underwent a PET scan due to a rising CEA (4.4 from 2.2 posotp), and this demonstrated a large 4.5 cm left pericolic gutter markedly hypermetabolic mass. There was mild increased activity at the colon anastomosis. Subsequent colonoscopy revealed no abnormality at this location. She denies any systemic symptoms including weight loss, nausea, vomiting, changes in her bowel habits. MRI of the abdomen revealed a single metastatic lesion invading the spleen. No evidence of other peritoneal implants. MRI of the pelvis revealed a concerning appearance of the ovary.   This was followed by a transvaginal ultrasound, which ultimately determined that the ovary appeared benign. She then underwent interventional radiology biopsy, which confirmed a metastatic lesion from a colorectal primary. She was iniatiated on FOLFOX and then 4/2022 underwent open omentectomy, splenectomy, resection of involved diaphragm with primary repair, hysterectomy and oophorectomy with HIPEC. Completed chemo August 2022. Interval 2/2023  Doing very well. Recent CT ISIAH. CEA 1.6 No c/o fatigue, n/v, changes in stool habits, fevers, chills, weight loss. I will see her in 4 months time for repeat chest abdomen pelvis cross-sectional imaging and CEA. 6/2023  Casie Cooney is doing very well clinically. She has no abdominal pain, no nausea vomiting, no change in stool habits. No weight loss, bone pain, fatigue, headaches. Her CEA remains very low, and was previously very reliable and correlated with her degree of disease. Her CT did demonstrate a new very small subcentimeter liver lesion. We will obtain an MR    10/2023  Obtained an MR confirming suspicion for liver lesion as well as left posterior inferior rib lesion. IR bx of liver confirmed met. Discussed with Dr Ag Gonzales rad onc for SBRT and Dr Ridge Blackmon to discuss ablation of liver, as well as med onc Dr Ag Gonzales to ensure agreement with plan - she is on her way to initiating treatment. Recent imaging with doubling in size of chest wall met. Review of Systems  Complete ROS Surg Onc:   Constitutional: The patient denies new or recent history of general fatigue, no recent weight loss, normal appetite. Eyes: No complaints of visual problems, no scleral icterus. ENT: No complaints of ear pain, no hoarseness, no difficulty swallowing,  no tinnitus and no new masses in head, oral cavity, or neck. Cardiovascular: No complaints of chest pain, no palpitations, no ankle edema. Respiratory: No complaints of shortness of breath, no cough.    Gastrointestinal: No complaints of jaundice, no bloody stools, no pale stools. Genitourinary: No complaints of dysuria, no hematuria, no nocturia, no frequent urination, no urethral discharge. Musculoskeletal: No complaints of weakness, paralysis, joint stiffness or arthralgias. Integumentary: No complaints of rash, no new lesions. Neurological: No complaints of convulsions, no seizures, no dizziness. Hematologic/Lymphatic: No complaints of easy bruising. Endocrine:  ENDORSES cold intolerance. No polydipsia, polyphagia, or polyuria. Allergy/immunology:  No environmental allergies. No food allergies. Not immunocompromised.       Patient Active Problem List   Diagnosis    Callus of foot    Foot pain    GERD (gastroesophageal reflux disease)    Mixed hyperlipidemia    Prediabetes    Varicose veins with pain    Morbid obesity (HCC)    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HCC)    Dyspnea on exertion    Dyslipidemia    Sigmoid diverticulosis    PONV (postoperative nausea and vomiting)    Omental metastasis    Lesion of ovary    Chemotherapy adverse reaction    Chemotherapy-induced nausea    Platelets decreased (HCC)    Stage 3 chronic kidney disease, unspecified whether stage 3a or 3b CKD ([address])    H/O splenectomy    Asplenia    Postoperative state    Acute embolism and thrombosis of right tibial vein ([address])    Metastases to the liver [facility])     Past Medical History:   Diagnosis Date    Blood in stool     Breast disorder     Cancer ([address])     rectal    Cancer determined by colorectal biopsy ([address])     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    Colon polyp     GERD (gastroesophageal reflux disease)     History of chemotherapy 2021    History of rectal surgery     Hyperlipidemia     PONV (postoperative nausea and vomiting)      Past Surgical History:   Procedure Laterality Date    BREAST CYST EXCISION Right      SECTION      x3    COLON SIGMOID RESECTION      COLONOSCOPY      DILATION AND CURETTAGE OF UTERUS      ILEO LOOP DIVERSION N/A 12/10/2019    Procedure: Arely Liner LOOP;  Surgeon: Chelsea Mendieta MD;  Location: [facility];  Service: Robotics    INSTILLATION CHEMOTHERAPY INTRAPERITONEAL (HIPEC) LAPAROTOMY N/A 04/29/2022    Procedure: INSTILLATION CHEMOTHERAPY INTRAPERITONEAL (HIPEC) LAPAROTOMY;  Surgeon: Bi See MD;  [facility];  Service: Surgical Oncology    IR BIOPSY CHEST WALL  9/13/2023    IR BIOPSY OMENTUM  11/12/2021    IR PORT PLACEMENT  12/28/2021    OMENTECTOMY N/A 04/29/2022    Procedure: DIAGNOSTIC LAPAROSCOPY, OPEN OMENTECTOMY, SPLENECTOMY, DIAPHRAGM REPAIR, CHEST TUBE INSERTION;  Surgeon: Bi See MD;  [facility];  Service: Surgical Oncology    IL CLOSURE ENTEROSTOMY LG/SMALL INTESTINE N/A 02/04/2020    Procedure: CLOSURE ILEOSTOMY;  Surgeon: Chelsea Mendieta MD;  [facility];  Service: Colorectal    IL LAPAROSCOPY COLECTOMY PARTIAL W/ANASTOMOSIS N/A 12/10/2019    Procedure: SIGMOID RESECTION COLON LAPAROSCOPIC W ROBOTICS converted to lap hand assisted  with Partial proctectomy , LASER FLUORESCENCE ANGIOGRAPHY, INTRA OP COLONOSCOPY;  Surgeon: Chelsea Mendieta MD;  [facility];  Service: Robotics    IL TOTAL ABDOMINAL HYSTERECT W/WO RMVL TUBE OVARY N/A 04/29/2022    Procedure: DIAGNOSTIC LAPAROSCOPY, TOTAL ABDOMINAL HYSTERECTOMY, BILATERAL SALPINGO-OOPHORECTOMY, EXTENSIVE ADHESIOLYSIS;  Surgeon: Danielle Puente MD;  [facility];  Service: Gynecology Oncology    IL UNLISTED PROCEDURE DIAPHRAGM  04/29/2022    Procedure: REPAIR DIAPHRAGM TEAR;  Surgeon: Devante David MD;  [facility]: [facility];  Service: Thoracic    SMALL INTESTINE SURGERY  02/04/2020    Procedure: RESECTION SMALL BOWEL;  Surgeon: Chelsea Mendieta MD;  [facility];  Service: Colorectal     Family History   Problem Relation Age of Onset    Hypertension Mother     Heart attack Mother     Heart attack Father     Heart disease Father     Hypertension Brother     Heart attack Brother     Colon cancer Paternal Uncle     Leukemia Cousin     Breast cancer Cousin     Diabetes Cousin     Breast cancer Cousin     Colon cancer Family         paternal uncle    Stroke Neg Hx      Social History     Socioeconomic History    Marital status:      Spouse name: Not on file    Number of children: Not on file    Years of education: Not on file    Highest education level: Not on file   Occupational History    Not on file   Tobacco Use    Smoking status: Never    Smokeless tobacco: Never   Vaping Use    Vaping Use: Never used   Substance and Sexual Activity    Alcohol use: Yes     Comment: "occasionally"    Drug use: Never    Sexual activity: Not Currently   Other Topics Concern    Not on file   Social History Narrative    Not on file     Social Determinants of Health     Financial Resource Strain: Low Risk  (3/21/2023)    Overall Financial Resource Strain (CARDIA)     Difficulty of Paying Living Expenses: Not hard at all   Food Insecurity: No Food Insecurity (5/2/2022)    Hunger Vital Sign     Worried About Running Out of Food in the Last Year: Never true     [address] in the Last Year: Never true   Transportation Needs: No Transportation Needs (3/21/2023)    PRAPARE - Transportation     Lack of Transportation (Medical): No     Lack of Transportation (Non-Medical):  No   Physical Activity: Not on file   Stress: Not on file   Social Connections: Not on file   Intimate Partner Violence: Not on file   Housing Stability: Unknown (5/2/2022)    Housing Stability Vital Sign     Unable to Pay for Housing in the Last Year: No     Number of Places Lived in the Last Year: Not on file     Unstable Housing in the Last Year: No       Current Outpatient Medications:     Acetaminophen (TYLENOL 8 HOUR PO), Take by mouth PRN, Disp: , Rfl:     apixaban (Eliquis) 5 mg, Take 1 tablet (5 mg total) by mouth 2 (two) times a day, Disp: 60 tablet, Rfl: 5    ezetimibe (ZETIA) 10 mg tablet, Take 1 tablet (10 mg total) by mouth daily, Disp: 90 tablet, Rfl: 1    famotidine (PEPCID) 20 mg tablet, Take 1 tablet (20 mg total) by mouth 2 (two) times a day as needed for heartburn As needed, Disp: 90 tablet, Rfl: 1    meclizine (ANTIVERT) 12.5 MG tablet, Take 1 tablet (12.5 mg total) by mouth 3 (three) times a day as needed for dizziness (Patient taking differently: Take 12.5 mg by mouth 3 (three) times a day as needed for dizziness PRN), Disp: 30 tablet, Rfl: 0  Allergies   Allergen Reactions    Medical Tape Rash     Skin irritation  Skin peeling  Skin irritation  Skin peeling  Blisters  Rash       Vitals:    10/17/23 1314   BP: 136/80   Pulse: 71   Resp: 16   Temp: 97.8 °F (36.6 °C)   SpO2: 99%       Physical Exam   General: Appears well, appears stated age  Skin: Warm, anicteric  HEENT: Normocephalic, atraumatic; sclera aniceteric, mucous membranes moist; cervical nodes without adenopathy  Cardiopulmonary: RRR, Easy WOB, no BLE edema  Abd: Obese, nontender, no masses appreciated, no hepatosplenomegaly. Incision well-healed  MSK: Symmetric, no cyanosis, no overt weakness  Lymphatic: No cervical, axillary or inguinal lymphadenopathy  Neuro: Affect appropriate, no gross motor abnormalities      Pathology:  Final Diagnosis   A. Rectosigmoid colon, low anterior resection:  - Adenocarcinoma, moderately differentiated. - Tumor invades through the muscularis propria into pericolorectal tissue, T3  - Diverticula. - All margins are negative for tumor.  - Thirty-four lymph nodes, negative for malignancy (0/34). B. Colon, anastomotic rings, resection:  - Two portions of colon with no pathologic abnormality     Intradepartmental consultation is in agreement. Interpretation performed at [facility], [address] [facility], [place], [address]  Immunohistochemistry for desmin  is performed on tissue blocks A13 and A16 to help in the assessment of this case. Final Diagnosis   A. Uterus, Bilateral Ovaries and Fallopian Tubes;  Hysterosalpingo-oophorectomy:  - Leiomyoma. - Left ovary with serous cystadenoma. - Unremarkable cervix. - Benign inactive endometrium with no specific pathologic change. - Unremarkable right ovary and bilateral fallopian tubes. B. Spleen, spleen, omentum, darotis:  - Metastatic adenocarcinoma involving spleen and omentum, consistent with colonic primary. See Note. Labs: Reviewed in [facility]    Imaging  Colonoscopy    Addendum Date: 10/6/2021 Addendum:   [address] [address] [facility] [address] [phone] DATE OF SERVICE: 10/06/21 PHYSICIAN(S): Xochitl Aleman MD - Attending Physician INDICATION: Personal history of rectal cancer , had a low anterior resection in December of 2019. T3 N0, 34 lymph nodes were removed and were negative. Patient did not receive any chemotherapy. Colonoscopy performed for a diagnostic indication. POST-OP DIAGNOSIS: See the impression below. HISTORY: Prior colonoscopy: Less than 3 years ago. It is being repeated at an interval of less than 3 years because: This colonoscopy is being performed for a diagnostic indication BOWEL PREPARATION: Miralax/Dulcolax PREPROCEDURE: Informed consent was obtained for the procedure, including sedation. Risks including but not limited to bleeding, infection, perforation, adverse drug reaction and aspiration were explained in detail. Also explained about less than 100% sensitivity with the exam and other alternatives. The patient was placed in the left lateral decubitus position. DETAILS OF PROCEDURE: Patient was taken to the procedure room where a time out was performed to confirm correct patient and correct procedure. The patient underwent monitored anesthesia care, which was administered by an anesthesia professional. The patient's blood pressure, heart rate, level of consciousness, oxygen and respirations were monitored throughout the procedure. A digital rectal exam was performed.  The scope was introduced through the anus and advanced to the cecum. Retroflexion was performed in the rectum. The quality of bowel preparation was evaluated using the [facility] Bowel Preparation Scale with scores of: right colon = 2, transverse colon = 2, left colon = 2. The total BBPS score was 6. Bowel prep was adequate. The patient experienced no blood loss. The procedure was not difficult. The patient tolerated the procedure well. There were no apparent complications. ANESTHESIA INFORMATION: ASA: III Anesthesia Type: IV Sedation with Anesthesia MEDICATIONS: No administrations occurring from 1215 to 1230 on 10/06/21 FINDINGS: Healthy end-to-end colorectal anastomosis with no bleeding in the mid rectum All observed locations appeared normal, including the cecum, ascending colon, transverse colon, splenic flexure and descending colon. A couple scattered diverticuli seen EVENTS: Procedure Events Event Event Time ENDO CECUM REACHED 10/6/2021 12:21 PM ENDO SCOPE OUT TIME 10/6/2021 12:28 PM SPECIMENS: * No specimens in log * EQUIPMENT: Colonoscope - IMPRESSION: 1. Normal-appearing colon. Anastomosis site was seen in the rectum appeared healthy. No evidence of local recurrence. 2. Couple small scattered diverticuli. RECOMMENDATION: Repeat colonoscopy in 2 years due to a personal history of colon cancer. Advised her urgent evaluation by medical oncologist and Dr. Esther Carlos. Avinash Encinas MD     Addendum Date: 10/6/2021 Addendum:   [address] [address] [facility] [address] [phone] DATE OF SERVICE: 10/06/21 PHYSICIAN(S): Avinash Encinas MD - Attending Physician INDICATION: Personal history of rectal cancer , had a low anterior resection in December of 2019. T3 N0, 34 lymph nodes were removed and were negative. Patient did not receive any chemotherapy. Colonoscopy performed for a diagnostic indication. POST-OP DIAGNOSIS: See the impression below. HISTORY: Prior colonoscopy: Less than 3 years ago.  It is being repeated at an interval of less than 3 years because: This colonoscopy is being performed for a diagnostic indication BOWEL PREPARATION: Miralax/Dulcolax PREPROCEDURE: Informed consent was obtained for the procedure, including sedation. Risks including but not limited to bleeding, infection, perforation, adverse drug reaction and aspiration were explained in detail. Also explained about less than 100% sensitivity with the exam and other alternatives. The patient was placed in the left lateral decubitus position. DETAILS OF PROCEDURE: Patient was taken to the procedure room where a time out was performed to confirm correct patient and correct procedure. The patient underwent monitored anesthesia care, which was administered by an anesthesia professional. The patient's blood pressure, heart rate, level of consciousness, oxygen and respirations were monitored throughout the procedure. A digital rectal exam was performed. The scope was introduced through the anus and advanced to the cecum. Retroflexion was performed in the rectum. The quality of bowel preparation was evaluated using the [facility] Bowel Preparation Scale with scores of: right colon = 2, transverse colon = 2, left colon = 2. The total BBPS score was 6. Bowel prep was adequate. The patient experienced no blood loss. The procedure was not difficult. The patient tolerated the procedure well. There were no apparent complications. ANESTHESIA INFORMATION: ASA: III Anesthesia Type: IV Sedation with Anesthesia MEDICATIONS: No administrations occurring from 1215 to 1230 on 10/06/21 FINDINGS: Healthy end-to-end colorectal anastomosis with no bleeding in the mid rectum All observed locations appeared normal, including the cecum, ascending colon, transverse colon, splenic flexure and descending colon.  A couple scattered diverticuli seen EVENTS: Procedure Events Event Event Time ENDO CECUM REACHED 10/6/2021 12:21 PM ENDO SCOPE OUT TIME 10/6/2021 12:28 PM SPECIMENS: * No specimens in log * EQUIPMENT: Colonoscope - IMPRESSION: 1. Normal-appearing colon. Anastomosis site was seen in the rectum appeared healthy. No evidence of local recurrence. 2. Couple small scattered diverticuli. RECOMMENDATION: Repeat colonoscopy in 2 years due to a personal history of colon cancer. Janie De Leon MD     Result Date: 10/6/2021  Narrative: [address] [address] [place] [address] [phone] DATE OF SERVICE: 10/06/21 PHYSICIAN(S): Janie De Leon MD - Attending Physician INDICATION: Personal history of rectal cancer Colonoscopy performed for a diagnostic indication. POST-OP DIAGNOSIS: See the impression below. HISTORY: Prior colonoscopy: Less than 3 years ago. It is being repeated at an interval of less than 3 years because: This colonoscopy is being performed for a diagnostic indication BOWEL PREPARATION: Miralax/Dulcolax PREPROCEDURE: Informed consent was obtained for the procedure, including sedation. Risks including but not limited to bleeding, infection, perforation, adverse drug reaction and aspiration were explained in detail. Also explained about less than 100% sensitivity with the exam and other alternatives. The patient was placed in the left lateral decubitus position. DETAILS OF PROCEDURE: Patient was taken to the procedure room where a time out was performed to confirm correct patient and correct procedure. The patient underwent monitored anesthesia care, which was administered by an anesthesia professional. The patient's blood pressure, heart rate, level of consciousness, oxygen and respirations were monitored throughout the procedure. A digital rectal exam was performed. The scope was introduced through the anus and advanced to the cecum. Retroflexion was performed in the rectum. The quality of bowel preparation was evaluated using the [facility] Bowel Preparation Scale with scores of: right colon = 2, transverse colon = 2, left colon = 2. The total BBPS score was 6.  Bowel prep was adequate. The patient experienced no blood loss. The procedure was not difficult. The patient tolerated the procedure well. There were no apparent complications. ANESTHESIA INFORMATION: ASA: III Anesthesia Type: IV Sedation with Anesthesia MEDICATIONS: No administrations occurring from 1215 to 1230 on 10/06/21 FINDINGS: Healthy end-to-end colorectal anastomosis with no bleeding in the mid rectum All observed locations appeared normal, including the cecum, ascending colon, transverse colon, splenic flexure and descending colon. A couple scattered diverticuli seen EVENTS: Procedure Events Event Event Time ENDO CECUM REACHED 10/6/2021 12:21 PM ENDO SCOPE OUT TIME 10/6/2021 12:28 PM SPECIMENS: * No specimens in log * EQUIPMENT: Colonoscope -     Impression: 1. Normal-appearing colon. Anastomosis site was seen in the rectum appeared healthy. No evidence of local recurrence. 2. Couple small scattered diverticuli. RECOMMENDATION: Repeat colonoscopy in 2 years due to a personal history of colon cancer. Keara Luna MD     DXA bone density spine hip and pelvis    Result Date: 10/14/2021  Narrative: CENTRAL  DXA SCAN CLINICAL HISTORY:  79-year-old postmenopausal female. OTHER RISK FACTORS:  History of fracture resulting from minor injury. PHARMACOLOGIC THERAPY FOR OSTEOPOROSIS:  None. TECHNIQUE: Bone densitometry was performed using a [employer]   bone densitometer. Regions of interest appear properly placed. COMPARISON: 5/6/2019. RESULTS: LUMBAR SPINE L1-L4 : BMD  1.141  gm/cm2 T-score -0.4 LEFT TOTAL HIP: BMD: 0.992  gm/cm2 T-score: -0.1 LEFT FEMORAL NECK: BMD: 0.856  gm/cm2 T score: -1.3 RIGHT TOTAL HIP: BMD:  1.004  gm/cm2 T-score: 0.0 RIGHT FEMORAL NECK: BMD:  0.851  gm/cm2  T score: -1.3     Impression: 1. Low bone mass (osteopenia). [Based on the left and right femoral necks] 2.   Since a DXA study from 5/6/2019, there has been: A  STATISTICALLY SIGNIFICANT DECREASE in bone mineral density of  0.039 g/cm2 (3.3)% in the lumbar spine. A  STATISTICALLY SIGNIFICANT DECREASE in bone mineral density of  0.046 g/cm2 (4.4)% in the hips. 3.  The 10 year risk of hip fracture is 1.2% with the 10 year risk of major osteoporotic fracture being 12.6% as calculated by the [facility] fracture risk assessment tool (FRAX, which is based on data generated by the [facility] Metabolic Bone Diseases). 4.  The current NOF guidelines recommend treating patients with a T-score of -2.5 or less in the lumbar spine or hips, or in post-menopausal women and men over the age of 48 with low bone mass (osteopenia) and a FRAX 10 year risk score of >3% for hip fracture and/or >20% for major osteoporotic fracture. 5.  The NOF recommends follow-up DXA in 1-2 years after initiating therapy for osteoporosis and every 2 years thereafter. More frequent evaluation is appropriate for patients with conditions associated with rapid bone loss, such as glucocorticoid therapy. The interval between DXA screenings may be longer for individuals without major risk factors and initial T-score in the normal or upper low bone mass range. The FRAX algorithm has certain limitations: -FRAX has not been validated in patients currently or previously treated with pharmacotherapy for osteoporosis. In such patients, clinical judgment must be exercised in interpreting FRAX scores. -Prior hip, vertebral and humeral fragility fractures appear to confer greater risk of subsequent fracture than fractures at other sites (this is especially true for individuals with severe vertebral fractures), but quantification of this incremental risk is not possible with FRAX. -FRAX underestimates fracture risk in patients with history of multiple fragility fractures. -FRAX may underestimate fracture risk in patients with history of frequent falls. -It is not appropriate to use FRAX to monitor treatment response.  WHO CLASSIFICATION: Normal (a T-score of -1.0 or higher) Low bone mineral density (a T-score of less than -1.0 but higher than -2.5) Osteoporosis (a T-score of -2.5 or less) Severe osteoporosis (a T-score of -2.5 or less with a fragility fracture) LEAST SIGNIFICANT CHANGE AT 95% C.I: Lumbar spine: 0.036 gm/cm2 (3.2%). Total hip: 0.018 gm/cm2 (2.0%). Forearm: 0.024 gm/cm2 (3.2%). Workstation performed: [id number]     NM PET CT skull base to mid thigh    Result Date: 9/28/2021  Narrative: PET/CT SCAN INDICATION:  C18.7: Malignant neoplasm of sigmoid colon C20: Malignant neoplasm of rectum   Rectosigmoid carcinoma, restaging/surveillance examination. Borderline elevated CEA (most recently 4.4). MODIFIER: PS COMPARISON: CT chest abdomen and pelvis 2/24/2021. CELL TYPE:  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 TECHNIQUE:   12.1 mCi F-18-FDG administered IV. Multiplanar attenuation corrected and non attenuation corrected PET images were acquired 60 minutes post injection. Contiguous, low dose, axial CT sections were obtained from the skull base through the femurs . Intravenous contrast material was not utilized. This examination, like all CT scans performed in the [facility], was performed utilizing techniques to minimize radiation dose exposure, including the use of iterative reconstruction and automated exposure control. Fasting serum glucose: 90 mg/dl FINDINGS: VISUALIZED BRAIN:   No acute abnormalities are seen. HEAD/NECK:   There is a physiologic distribution of FDG. No FDG avid cervical adenopathy is seen. CT images: Unremarkable. CHEST:   No FDG avid soft tissue lesions are seen. CT images: Mild coronary artery calcification. No pericardial effusion, no pleural effusion ABDOMEN:   There is a large hypermetabolic left lateral abdominal lobular mass situated immediately inferior to the spleen in the left paracolic gutter region, which measures 4.3 x 3.9 x 4.5 cm in size, SUV max 20.8.   No other definite hypermetabolic abnormalities are seen within the upper abdomen. Activity at the bowel anastomotic site in the right abdomen on image 165 demonstrates SUV max of 3.8. This is nonspecific but may simply be inflammatory or physiologic. Direct visualization is recommended if not recently performed. CT images: No ascites. No hydronephrosis or bowel obstruction. PELVIS: Additional rectosigmoid anastomotic site noted image 212. No evidence of abnormal glucose activity. No evidence of glucose avid pelvic adenopathy. No pelvic ascites. OSSEOUS STRUCTURES: No FDG avid lesions are seen. CT images: No significant findings. Impression: 1. There is a large left paracolic gutter markedly hypermetabolic mass immediately inferior to the spleen, most consistent with metastatic colorectal carcinoma. 2. Mildly increased activity at the right abdominal bowel anastomotic site. If not recently performed, direct visualization is recommended 3. There are no other glucose avid abnormalities identified within the abdomen or pelvis 4. No evidence of distant glucose avid metastatic disease in the neck, chest, or skeleton The examination demonstrates a significant  finding and was documented as such in [facility] for liaison and referring practitioner notification. Workstation performed: [id number]     Mammo screening bilateral w 3d & cad    Result Date: 10/14/2021  Narrative: DIAGNOSIS: Encounter for screening mammogram for malignant neoplasm of breast TECHNIQUE: Digital screening mammography was performed. Computer Aided Detection (CAD) analyzed all applicable images. COMPARISONS: Prior breast imaging dated: 06/26/2020, 05/06/2019, 10/18/2016, 10/14/2016, and 09/14/2015 RELEVANT HISTORY: Family Breast Cancer History: History of breast cancer in [address] [place] [address]. Family Medical History: Family medical history includes breast cancer in 2 relatives (cousin, family) and colon cancer in 2 relatives (family, paternal uncle). Personal History: No known relevant hormone history. Surgical history includes lumpectomy. No known relevant medical history. The patient is scheduled in a reminder system for screening mammography. 8-10% of cancers will be missed on mammography. Management of a palpable abnormality must be based on clinical grounds. Patients will be notified of their results via letter from our facility. Accredited by [employer] of [employer] and FDA. RISK ASSESSMENT: 5 Year Tyrer-Cuzick: 2.13 % 10 Year Tyrer-Cuzick: 4.17 % Lifetime Tyrer-Cuzick: 7.93 % TISSUE DENSITY: There are scattered areas of fibroglandular density. INDICATION: Gen Mansfield is a 78 y.o. female presenting for screening mammography. FINDINGS: Breast parenchymal distribution is unchanged from priors including multiple focal asymmetries in the right breast.  Long-term stability confirms benignancy. No developing asymmetries or concerning masses. Single upper-outer-anterior coarse calcification in the left breast is definitively benign. No suspicious microcalcifications, architectural distortion, skin thickening, or abnormalities of the nipples in either breast.      Impression: No mammographic evidence of malignancy or significant change from priors. ASSESSMENT/BI-RADS CATEGORY: Left: 2 - Benign Right: 2 - Benign Overall: 2 - Benign RECOMMENDATION:      - Routine screening mammogram in 1 year for both breasts. Workstation ID: [id number]     CT 6/2023     IMPRESSION:     No evidence of acute intrathoracic pathology. New 7 mm hypodensity of the right hepatic lobe which was not present on prior examinations. Contrast enhanced abdominal MRI recommended for further evaluation. Nodular soft tissue adjacent to the left upper renal capsule and prior splenectomy site is stable    I have personally reviewed and interpreted the patient's CT scans, MRIs, radiation oncology, medical oncology, and interventional radiology notes. 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 S/p omentectomy, splenectomy, resection of involved diaphragm and diaphragm repair + DILMA/BSO and HIPEC 4/2022. Completed chemo 8/2022. The patient is doing very well clinically with the exception of back pain 2/2 tumor. Has doubled in size since July. Set for SBRT soon. Consult for MWA of liver Friday. Will present at  and forgo systemic treatment for now per conversation with Dr Laurie Barnett given limited disease and long DFI. Will see her in 4 mo.

## 2023-10-20 ENCOUNTER — TELEPHONE (OUTPATIENT)
Dept: FAMILY MEDICINE CLINIC | Facility: CLINIC | Age: 68
End: 2023-10-20

## 2023-10-20 ENCOUNTER — TELEMEDICINE (OUTPATIENT)
Dept: INTERVENTIONAL RADIOLOGY/VASCULAR | Facility: CLINIC | Age: 68
End: 2023-10-20
Payer: MEDICARE

## 2023-10-20 DIAGNOSIS — C78.7 METASTASIS TO LIVER (HCC): Primary | ICD-10-CM

## 2023-10-20 DIAGNOSIS — C19 RECTOSIGMOID CANCER (HCC): ICD-10-CM

## 2023-10-20 PROCEDURE — 99215 OFFICE O/P EST HI 40 MIN: CPT | Performed by: RADIOLOGY

## 2023-10-20 RX ORDER — SODIUM CHLORIDE 9 MG/ML
75 INJECTION, SOLUTION INTRAVENOUS CONTINUOUS
OUTPATIENT
Start: 2023-10-20

## 2023-10-20 NOTE — PROGRESS NOTES
Virtual Regular Visit    Verification of patient location:    Patient is located at Home in the following state in which I hold an active license [place]      Assessment/Plan:    Problem List Items Addressed This Visit          Digestive    Rectosigmoid cancer [facility])     Other Visit Diagnoses       Metastasis to liver [facility])                     Reason for visit is   Chief Complaint   Patient presents with    Virtual Regular Visit          Encounter provider El Delvalle MD    Provider located at [address] [place] [address],[address]  [address] [facility] [address] [facility] [address] [phone]      Recent Visits  No visits were found meeting these conditions. Showing recent visits within past 7 days and meeting all other requirements  Today's Visits  Date Type Provider Dept   10/20/23 Telemedicine El Delvalle MD [facility] [facility] today's visits and meeting all other requirements  Future Appointments  No visits were found meeting these conditions. Showing future appointments within next 150 days and meeting all other requirements       The patient was identified by name and date of birth. Dayna Berg was informed that this is a telemedicine visit and that the visit is being conducted through the [address] [place] [address] [employer] platform. She agrees to proceed. .  My office door was closed. No one else was in the room. She acknowledged consent and understanding of privacy and security of the video platform. The patient has agreed to participate and understands they can discontinue the visit at any time. Patient is aware this is a billable service. Subjective  Dayna Berg is a 70 y.o. 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   She is doing well but has a small 1.2cm peripheral liver mass and IR has been consulted for microwave ablation of this mass. She also has a large left posterior rib mass for which she will undergo SBRT. I explained the risks, benefits and alternatives to microwave ablation and she wishes to undergo the procedure. We will schedule it with CT and US at [facility] with Anesthesia.     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    Colon polyp     GERD (gastroesophageal reflux disease)     History of chemotherapy 2021    History of rectal surgery     Hyperlipidemia     PONV (postoperative nausea and vomiting)        Past Surgical History:   Procedure Laterality Date    BREAST CYST EXCISION Right      SECTION      x3    COLON SIGMOID RESECTION      COLONOSCOPY      DILATION AND CURETTAGE OF UTERUS      ILEO LOOP DIVERSION N/A 12/10/2019    Procedure: ILEOSTOMY LOOP;  Surgeon: Kaden Foster MD;  [facility]: [facility];  Service: Robotics    INSTILLATION CHEMOTHERAPY INTRAPERITONEAL (HIPEC) LAPAROTOMY N/A 2022    Procedure: INSTILLATION CHEMOTHERAPY INTRAPERITONEAL (HIPEC) LAPAROTOMY;  Surgeon: Sravanthi oGnzalez MD;  [facility];  Service: Surgical Oncology    IR BIOPSY CHEST WALL  2023    IR BIOPSY OMENTUM  2021    IR PORT PLACEMENT  2021    OMENTECTOMY N/A 2022    Procedure: DIAGNOSTIC LAPAROSCOPY, OPEN OMENTECTOMY, SPLENECTOMY, DIAPHRAGM REPAIR, CHEST TUBE INSERTION;  Surgeon: Sravanthi Gonzalez MD;  [facility];  Service: Surgical Oncology    VT CLOSURE ENTEROSTOMY LG/SMALL INTESTINE N/A 2020    Procedure: CLOSURE ILEOSTOMY;  Surgeon: Kaden Foster MD;  [facility];  Service: Colorectal    VT LAPAROSCOPY COLECTOMY PARTIAL W/ANASTOMOSIS N/A 12/10/2019    Procedure: SIGMOID RESECTION COLON LAPAROSCOPIC W ROBOTICS converted to lap hand assisted  with Partial proctectomy , LASER FLUORESCENCE ANGIOGRAPHY, INTRA OP COLONOSCOPY;  Surgeon: Brown Luna MD;  [facility];  Service: Robotics    MT TOTAL ABDOMINAL HYSTERECT W/WO RMVL TUBE OVARY N/A 04/29/2022    Procedure: DIAGNOSTIC LAPAROSCOPY, TOTAL ABDOMINAL HYSTERECTOMY, BILATERAL SALPINGO-OOPHORECTOMY, EXTENSIVE ADHESIOLYSIS;  Surgeon: Endy Castillo MD;  [facility];  Service: Gynecology Oncology    MT UNLISTED PROCEDURE DIAPHRAGM  04/29/2022    Procedure: REPAIR DIAPHRAGM TEAR;  Surgeon: Patricia Perez MD;  [facility];  Service: Thoracic    SMALL INTESTINE SURGERY  02/04/2020    Procedure: RESECTION SMALL BOWEL;  Surgeon: Brown Luna MD;  [facility];  Service: Colorectal       Current Outpatient Medications   Medication Sig Dispense Refill    Acetaminophen (TYLENOL 8 HOUR PO) Take by mouth PRN      apixaban (Eliquis) 5 mg Take 1 tablet (5 mg total) by mouth 2 (two) times a day 60 tablet 5    ezetimibe (ZETIA) 10 mg tablet Take 1 tablet (10 mg total) by mouth daily 90 tablet 1    famotidine (PEPCID) 20 mg tablet Take 1 tablet (20 mg total) by mouth 2 (two) times a day as needed for heartburn As needed 90 tablet 1    lidocaine (Lidoderm) 5 % Apply 1 patch topically over 12 hours daily Remove & Discard patch within 12 hours or as directed by MD 12 patch 3    meclizine (ANTIVERT) 12.5 MG tablet Take 1 tablet (12.5 mg total) by mouth 3 (three) times a day as needed for dizziness (Patient taking differently: Take 12.5 mg by mouth 3 (three) times a day as needed for dizziness PRN) 30 tablet 0     No current facility-administered medications for this visit. Allergies   Allergen Reactions    Medical Tape Rash     Skin irritation  Skin peeling  Skin irritation  Skin peeling  Blisters  Rash       Review of Systems    Video Exam    There were no vitals filed for this visit.     Physical Exam     Visit Time  Total Visit Duration: 45 minutes

## 2023-10-24 ENCOUNTER — HOSPITAL ENCOUNTER (OUTPATIENT)
Dept: INFUSION CENTER | Facility: HOSPITAL | Age: 68
Discharge: HOME | End: 2023-10-24

## 2023-10-27 ENCOUNTER — CLINICAL SUPPORT (OUTPATIENT)
Dept: FAMILY MEDICINE CLINIC | Facility: CLINIC | Age: 68
End: 2023-10-27
Payer: MEDICARE

## 2023-10-27 DIAGNOSIS — Z23 NEED FOR HEPATITIS B VACCINATION: Primary | ICD-10-CM

## 2023-10-27 DIAGNOSIS — Z23 NEED FOR VACCINATION: ICD-10-CM

## 2023-10-27 PROCEDURE — 90746 HEPB VACCINE 3 DOSE ADULT IM: CPT

## 2023-10-27 PROCEDURE — 77338 DESIGN MLC DEVICE FOR IMRT: CPT | Performed by: STUDENT IN AN ORGANIZED HEALTH CARE EDUCATION/TRAINING PROGRAM

## 2023-10-27 PROCEDURE — 90471 IMMUNIZATION ADMIN: CPT

## 2023-10-27 PROCEDURE — 77301 RADIOTHERAPY DOSE PLAN IMRT: CPT | Performed by: STUDENT IN AN ORGANIZED HEALTH CARE EDUCATION/TRAINING PROGRAM

## 2023-10-27 PROCEDURE — G0010 ADMIN HEPATITIS B VACCINE: HCPCS

## 2023-10-27 PROCEDURE — 77300 RADIATION THERAPY DOSE PLAN: CPT | Performed by: STUDENT IN AN ORGANIZED HEALTH CARE EDUCATION/TRAINING PROGRAM

## 2023-10-27 PROCEDURE — 90716 VAR VACCINE LIVE SUBQ: CPT

## 2023-10-31 ENCOUNTER — APPOINTMENT (OUTPATIENT)
Dept: RADIATION ONCOLOGY | Facility: HOSPITAL | Age: 68
End: 2023-10-31
Attending: STUDENT IN AN ORGANIZED HEALTH CARE EDUCATION/TRAINING PROGRAM
Payer: MEDICARE

## 2023-10-31 ENCOUNTER — OFFICE VISIT (OUTPATIENT)
Dept: HEMATOLOGY ONCOLOGY | Facility: CLINIC | Age: 68
End: 2023-10-31
Payer: MEDICARE

## 2023-10-31 VITALS
RESPIRATION RATE: 18 BRPM | DIASTOLIC BLOOD PRESSURE: 78 MMHG | TEMPERATURE: 97.4 F | WEIGHT: 209 LBS | BODY MASS INDEX: 45.09 KG/M2 | OXYGEN SATURATION: 99 % | HEART RATE: 69 BPM | HEIGHT: 57 IN | SYSTOLIC BLOOD PRESSURE: 136 MMHG

## 2023-10-31 DIAGNOSIS — M79.672 PAIN IN BOTH FEET: ICD-10-CM

## 2023-10-31 DIAGNOSIS — M79.671 PAIN IN BOTH FEET: ICD-10-CM

## 2023-10-31 DIAGNOSIS — C19 RECTOSIGMOID CANCER (HCC): Primary | ICD-10-CM

## 2023-10-31 PROCEDURE — 77373 STRTCTC BDY RAD THER TX DLVR: CPT | Performed by: RADIOLOGY

## 2023-10-31 PROCEDURE — 99214 OFFICE O/P EST MOD 30 MIN: CPT | Performed by: INTERNAL MEDICINE

## 2023-10-31 NOTE — PROGRESS NOTES
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 Recurrent rectosigmoid adenocarcinoma. Patient was seen/evaluated by Dr. Haven Habermann surgical oncology. Patient received 3 months of preoperative FOLFOX. Follow-up CT scans demonstrated response to treatment with no evidence of progressive disease. Patient then underwent resection of the omental mass and spleen as well as DILMA/BSO (see below). Mrs. Terri Lr was then restarted on FOLFOX. Patient had problems with neuropathy; FOLFOX change to FOLFIRI. The 12th/final cycle of chemotherapy was completed on August 23, 2022. CT scan in May 2023 showed a new lesion to the right hepatic lobe. MRI abdomen in July 2023 confirmed liver metastasis and revealed a new, posterior left chest wall/pleural lesion suspicious for metastasis. The chest wall mass was biopsied on 9/13/2023 revealing metastatic moderately differentiated adenocarcinoma consistent with known colonic primary. Mrs. Terri Lr was seen/evaluated by Dr. Elinor Morales (Radiation Oncology) and patient is presently receiving SBRT. Mrs. Terri Lr will continue with the Tylenol; patient will call if her pain control issues quire additional treatment. Patient has also been seen by IR and is scheduled for microwave ablation on November 15, 2023. Mrs. Kelsea Osorio is also scheduled to be presented at the GI tumor board within the next 1 to 2 weeks. As discussed previously, patient originally had stage II, recurred once which was treated and now has recurrence at one side but the new liver lesion. Ovarian lesion. MRI/pelvis in October 2021 demonstrated a multi septated left ovarian cystic lesion. Transabdominal ultrasound results did not demonstrate any concerning abnormality. Patient underwent DILMA/BSO. Pathology results are listed below - no evidence of malignancy. Patient was last seen by Gynecologic Oncology in June 2022, and was advised to return for follow-up as needed. Genetics. Patient has a complicated family history but multiple family members with colon/rectal cancer. Patient can be seen by Oncology genetics in the future. Bilateral lower extremity swelling. Patient has a history of lower extremity DVTs, is on Eliquis. To be sure, Dopplers have been reordered. Mrs. Camille Yeung is to return in 6 weeks. Patient knows to call if she has any other questions or concerns. Carefully review your medication list and verify that the list is accurate and up-to-date. Please call the hematology/oncology office if there are medications missing from the list, medications on the list that you are not currently taking or if there is a dosage or instruction that is different from how you're taking that medication. Patient goals and areas of care: Complete SBRT, move on to microwave ablation  Barriers to care:  None  Patient is able to self-care  ______________________________________________________________________________________     Chief complaint: Rectosigmoid carcinoma, omental recurrence     History of Present Illness:  70year old female previously diagnosed with rectosigmoid carcinoma status post resection in September 2019. Postoperatively patient did well; Mrs. Camille Yeung did not need/receive adjuvant chemotherapy. Surveillance CEA level was found to be elevated in June 2022. Patient underwent workup. Results demonstrated an intra-abdominal mass by the spleen. Patient underwent neoadjuvant chemotherapy with FOLFOX, with follow-up scans demonstrating response. She then underwent resection, followed by postoperative chemotherapy with FOLFOX initially.  FOLFOX was changed to FOLFIRI due to peripheral neuropathy. Treatment was completed in August 2022. CT scan in May 2023 showed a new lesion to the right hepatic lobe. MRI abdomen in July 2023 confirmed liver metastasis and revealed a new, posterior left chest wall/pleural lesion suspicious for metastasis. The chest wall mass was biopsied on 9/13/2023 revealing adenocarcinoma consistent with known colonic primary (see Pathology . She was seen/evaluated by Radiation Oncology, with a tentative plan for SBRT to the left chest wall. IR ablation of the liver lesion is planned with Dr. Neal Hong. CT on 10/9/2023 revealed enlarging rib metastasis and stable liver nodule. Patient started SBRT this morning (5 fractions every other day). Pain is relatively controlled, patient is using Tylenol. No respiratory issues, no cough, no shortness of breath or dyspnea on exertion. No GI problems, no abdominal pain. Appetite is good, weight is stable. Activities are baseline. Review of Systems   Constitutional: Negative for activity change, appetite change and fatigue. HENT: Negative. Eyes: Negative. Respiratory: Negative. Cardiovascular: Negative. Gastrointestinal: Negative for nausea and vomiting. Endocrine: Negative. Genitourinary: Negative. Musculoskeletal: Negative. Skin: Negative. Allergic/Immunologic: Negative. Neurological: Negative. Hematological: Negative. Psychiatric/Behavioral: Negative. All other systems reviewed and are negative.          Patient Active Problem List   Diagnosis    Callus of foot    Foot pain    GERD (gastroesophageal reflux disease)    Mixed hyperlipidemia    Prediabetes    Varicose veins with pain    Morbid obesity (HCC)    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HCC)    Dyspnea on exertion    Dyslipidemia    Sigmoid diverticulosis    PONV (postoperative nausea and vomiting)    Omental metastasis    Lesion of ovary    Chemotherapy adverse reaction    Chemotherapy-induced nausea    Platelets decreased ([address])    Stage 3 chronic kidney disease, unspecified whether stage 3a or 3b CKD ([address])    H/O splenectomy    Asplenia    Postoperative state    Acute embolism and thrombosis of right tibial vein [facility])      Medical History        Past Medical History:   Diagnosis Date    Blood in stool      Breast disorder      Cancer ([address])       rectal    Cancer determined by colorectal biopsy ([address])       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    Colon polyp      GERD (gastroesophageal reflux disease)      History of chemotherapy 2021    History of rectal surgery      Hyperlipidemia      PONV (postoperative nausea and vomiting)           Surgical History         Past Surgical History:   Procedure Laterality Date    BREAST CYST EXCISION Right       SECTION         x3    COLON SIGMOID RESECTION        COLONOSCOPY        DILATION AND CURETTAGE OF UTERUS        ILEO LOOP DIVERSION N/A 12/10/2019     Procedure: ILEOSTOMY LOOP;  Surgeon: Norberto Gannon MD;  [facility];  Service: Robotics    INSTILLATION CHEMOTHERAPY INTRAPERITONEAL (HIPEC) LAPAROTOMY N/A 2022     Procedure: INSTILLATION CHEMOTHERAPY INTRAPERITONEAL (HIPEC) LAPAROTOMY;  Surgeon: Elio Schroeder MD;  [facility];  Service: Surgical Oncology    IR BIOPSY CHEST WALL   2023    IR BIOPSY OMENTUM   2021    IR PORT PLACEMENT   2021    OMENTECTOMY N/A 2022     Procedure: DIAGNOSTIC LAPAROSCOPY, OPEN OMENTECTOMY, SPLENECTOMY, DIAPHRAGM REPAIR, CHEST TUBE INSERTION;  Surgeon: Elio Schroeder MD;  [facility];  Service: Surgical Oncology    IL CLOSURE ENTEROSTOMY LG/SMALL INTESTINE N/A 2020     Procedure: CLOSURE ILEOSTOMY;  Surgeon: Norberto Gannon MD;  [facility];  Service: Colorectal    IL LAPAROSCOPY COLECTOMY PARTIAL W/ANASTOMOSIS N/A 12/10/2019     Procedure: SIGMOID RESECTION COLON LAPAROSCOPIC W ROBOTICS converted to lap hand assisted  with Partial proctectomy , LASER FLUORESCENCE ANGIOGRAPHY, INTRA OP COLONOSCOPY;  Surgeon: Josephine Baez MD;  [facility];  Service: Robotics    ND TOTAL ABDOMINAL HYSTERECT W/WO RMVL TUBE OVARY N/A 04/29/2022     Procedure: DIAGNOSTIC LAPAROSCOPY, TOTAL ABDOMINAL HYSTERECTOMY, BILATERAL SALPINGO-OOPHORECTOMY, EXTENSIVE ADHESIOLYSIS;  Surgeon: Adam Jacobson MD;  [facility];  Service: Gynecology Oncology    ND UNLISTED PROCEDURE DIAPHRAGM   04/29/2022     Procedure: REPAIR DIAPHRAGM TEAR;  Surgeon: Jessi Mendoza MD;  [facility];  Service: Thoracic    SMALL INTESTINE SURGERY   02/04/2020     Procedure: RESECTION SMALL BOWEL;  Surgeon: Josephine Baez MD;  [facility];  Service: Colorectal      Past surgical history:  No prior blood transfusions     OBGYN:  No breast issues, no abnormal mammograms previously, no postmenopausal bleeding, no other GYN issues     Family History         Family History   Problem Relation Age of Onset    Hypertension Mother      Heart attack Mother      Heart attack Father      Heart disease Father      Hypertension Brother      Heart attack Brother      Colon cancer Paternal Uncle      Leukemia Cousin      Breast cancer Cousin      Diabetes Cousin      Breast cancer Cousin      Colon cancer Family           paternal uncle    Stroke Neg Hx        Family history:  Somewhat complicated, mother with colostomy but etiology for this is unclear, paternal uncle with rectal cancer, patient has to first-degree relatives on the paternal side with history of colon cancer, 3 sons and 1 grandchild in good general health     Social History               Socioeconomic History    Marital status:        Spouse name: Not on file    Number of children: Not on file    Years of education: Not on file    Highest education level: Not on file   Occupational History    Not on file   Tobacco Use    Smoking status: Never    Smokeless tobacco: Never   Vaping Use    Vaping Use: Never used   Substance and Sexual Activity    Alcohol use: Yes       Comment: "occasionally"    Drug use: Never    Sexual activity: Not Currently   Other Topics Concern    Not on file   Social History Narrative    Not on file      Social Determinants of Health           Financial Resource Strain: Low Risk  (3/21/2023)     Overall Financial Resource Strain (CARDIA)      Difficulty of Paying Living Expenses: Not hard at all   Food Insecurity: No Food Insecurity (5/2/2022)     Hunger Vital Sign      Worried About Running Out of Food in the Last Year: Never true      [address] in the Last Year: Never true   Transportation Needs: No Transportation Needs (3/21/2023)     PRAPARE - Transportation      Lack of Transportation (Medical): No      Lack of Transportation (Non-Medical):  No   Physical Activity: Not on file   Stress: Not on file   Social Connections: Not on file   Intimate Partner Violence: Not on file   Housing Stability: Unknown (5/2/2022)     Housing Stability Vital Sign      Unable to Pay for Housing in the Last Year: No      Number of Places Lived in the Last Year: Not on file      Unstable Housing in the Last Year: No      Social history:  No tobacco, alcohol or drug abuse, no toxic exposure     Current Outpatient Medications:     Acetaminophen (TYLENOL 8 HOUR PO), Take by mouth PRN, Disp: , Rfl:     apixaban (Eliquis) 5 mg, Take 1 tablet (5 mg total) by mouth 2 (two) times a day, Disp: 60 tablet, Rfl: 5    ezetimibe (ZETIA) 10 mg tablet, Take 1 tablet (10 mg total) by mouth daily, Disp: 90 tablet, Rfl: 1    famotidine (PEPCID) 20 mg tablet, Take 1 tablet (20 mg total) by mouth 2 (two) times a day as needed for heartburn As needed, Disp: 90 tablet, Rfl: 1    meclizine (ANTIVERT) 12.5 MG tablet, Take 1 tablet (12.5 mg total) by mouth 3 (three) times a day as needed for dizziness (Patient taking differently: Take 12.5 mg by mouth 3 (three) times a day as needed for dizziness PRN), Disp: 30 tablet, Rfl: 0           Allergies   Allergen Reactions    Medical Tape Rash       Skin irritation  Skin peeling  Skin irritation  Skin peeling  Blisters  Rash          Vitals:     10/10/23 1356   BP: 136/76   Pulse: 78   Resp: 17   Temp: 98 °F (36.7 °C)   SpO2: 98%      Physical Exam  Constitutional:       Appearance: She is well-developed. She is obese. HENT:      Head: Normocephalic and atraumatic. Right Ear: External ear normal.      Left Ear: External ear normal.   Eyes:      Extraocular Movements: Extraocular movements intact. Conjunctiva/sclera: Conjunctivae normal.      Pupils: Pupils are equal, round, and reactive to light. Cardiovascular:      Rate and Rhythm: Normal rate and regular rhythm. Heart sounds: Normal heart sounds. Pulmonary:      Effort: Pulmonary effort is normal.      Breath sounds: Normal breath sounds. Abdominal:      General: Bowel sounds are normal. There is no distension. Palpations: Abdomen is soft. Tenderness: There is no abdominal tenderness. There is no guarding. Musculoskeletal:         General: Normal range of motion. Right lower leg: No edema. Left lower leg: No edema. Lymphadenopathy:      Cervical: No cervical adenopathy. Upper Body:      Right upper body: No supraclavicular or axillary adenopathy. Left upper body: No supraclavicular or axillary adenopathy. Skin:     General: Skin is warm and dry. Findings: No bruising, ecchymosis or petechiae. Neurological:      General: No focal deficit present. Mental Status: She is alert and oriented to person, place, and time.    Psychiatric:         Mood and Affect: Mood normal.         Behavior: Behavior normal.         Judgment: Judgment normal.   Extremities: Bilateral lower extremity edema, 1+, no cords, pulses are 1+     Laboratory        9/25/2023 WBC = 8.75 hemoglobin = 13.3 hematocrit = 41 platelet = 093 neutrophil = 53% BUN = 16 creatinine = 1.01 calcium = 9.4 LFTs WNL     9/25/2023 WBC = 8.75 hemoglobin = 13.3 hematocrit = 40.9 MCV = 91 platelet = 068 neutrophil = 53% BUN = 16 creatinine = 1.01 calcium = 9.4 LFTs WNL total bilirubin = 0.76 CEA 6.2  08/08/2022 WBC = 4.57 hemoglobin = 10.0 hematocrit = 31.3 MCV = 87 platelet = 954 neutrophil = 50% BUN = 21 creatinine = 1.35 calcium = 9.1 LFTs WNL total bilirubin = 1.31    Procedures     10/06/2021 colonoscopy     FINDINGS:  Healthy end-to-end colorectal anastomosis with no bleeding in the mid rectum  All observed locations appeared normal, including the cecum, ascending colon, transverse colon, splenic flexure and descending colon. A couple scattered diverticuli seen     Imaging     10/09/2023 CT chest abdomen pelvis     IMPRESSION:     Enlarging 6.8 x 4.4 x 4.1 cm T11 rib metastasis. Stable ill-defined hypovascular nodule in the right lobe of the liver which remains concerning for metastasis. Cholelithiasis. 09/13/2023 IR biopsy chest wall     IMPRESSION:  Successful ultrasound guided core biopsy of the left posterior pleural-based mass. 07/24/2023 MRI abdomen      IMPRESSION:     16 mm segment 6 hepatic lesion unchanged from the CT suspicious for metastasis. New posterior left chest wall/pleural lesion measuring 2.7 x 2.3 cm also suspicious for metastasis. 05/30/2023 CT chest abdomen pelvis     IMPRESSION:     No evidence of acute intrathoracic pathology. New 7 mm hypodensity of the right hepatic lobe which was not present on prior examinations. Contrast enhanced abdominal MRI recommended for further evaluation. Nodular soft tissue adjacent to the left upper renal capsule and prior splenectomy site is stable     01/27/2023 CT chest abdomen pelvis     IMPRESSION:     No definite evidence for metastatic disease involving the chest, abdomen, or pelvis.      Note is made of stable nodular soft tissue about the lateral aspect of the left upper renal pole which could reflect postsurgical changes related to prior splenectomy although continued short interval follow-up is recommended to exclude   residual/recurrent tumor in this location. 07/25/2022 vascular lower limb venous duplex study     RIGHT LOWER LIMB:  Acute mostly deep vein thrombosis in the paired posterior tibial veins  throughout the calf. No evidence of superficial thrombophlebitis noted. Popliteal, posterior tibial and anterior tibial arterial Doppler waveforms are  triphasic. LEFT LOWER LIMB:  No evidence of acute or chronic deep vein thrombosis. No evidence of superficial thrombophlebitis noted. Doppler evaluation shows a normal response to augmentation maneuvers. Popliteal, posterior tibial and anterior tibial arterial Doppler waveforms are  triphasic.     03/22/2022 CT scan chest abdomen pelvis with contrast     ABDOMINOPELVIC CAVITY: Left upper quadrant biopsy-proven omental metastasis has decreased in size now measuring 4.1 x 2.5 x 2.7 cm (previously 5.3 x 4.5 x 4.3 cm), and now only focally contacts rather than grossly invades the spleen and intercostal soft   tissues, on series 2/54, 601/71. Adjacent omental nodularity is less conspicuous. No new evidence of metastatic disease in the abdomen or pelvis. No lymphadenopathy. No ascites or intraperitoneal free air. IMPRESSION:     1. Decreased size of biopsy-proven omental metastasis, which now only focally contacting rather than grossly invading the spleen and adjacent abdominal wall. No new evidence of metastatic disease in the abdomen or pelvis. 2.  No new or suspicious pulmonary nodules. One of the previously noted new 1-2 mm nodules is no longer seen, and the other is unchanged. Recommend continued attention on follow-up. 3.  Top-normal thickness endometrial stripe measuring 7 mm. If there is history of vaginal bleeding, suggest catheterization with endometrial biopsy. 4.  Hepatic steatosis.      11/11/2021 ultrasound pelvis transabdominal only     Cluster of anechoic cystic areas seen replacing the left ovary similar to MRI largest measuring 8 mm compared to 1.3 cm. Based on the ACR O-RADS system, this is O-RADS category 2 (almost certain benign with <2% risk of malignancy.) The management recommendation is followup in 1 year. 10/25/2021 MRI pelvis rectal cancer staging     1. No recurrent or metastatic disease in the pelvis. Please refer to the separately dictated MRI abdomen report regarding mesenteric mass in the left upper quadrant. 2.  Multi septated cystic lesion versus cluster of small cysts in the left ovary measuring 2.6 x 2.5 x 1.9 cm, increased in size from October 2019. Further characterization with pelvic ultrasound is recommended. 10/25/2021 MRI abdomen     1.  5.2 cm mesenteric mass in the left upper quadrant with thickening of peritoneal reflection and invasion of the spleen, similar to prior PET/CT. This is highly suspicious for metastatic tumor implant. 2.  No additional potential sites of metastasis in the abdomen. 3.  Moderately distended gallbladder with gallstones and probable adenomyomatosis. 09/28/2021 PET-CT     1. There is a large left paracolic gutter markedly hypermetabolic mass immediately inferior to the spleen, most consistent with metastatic colorectal carcinoma. 2. Mildly increased activity at the right abdominal bowel anastomotic site. If not recently performed, direct visualization is recommended  3. There are no other glucose avid abnormalities identified within the abdomen or pelvis  4.  No evidence of distant glucose avid metastatic disease in the neck, chest, or skeleton      Pathology         Case Report   Surgical Pathology Report                         Case: [id number]                                    Authorizing Provider:  Ramiro Ram MD       Collected:           09/13/2023 1146               Ordering Location:     [address] Received:            09/13/2023 1225                                      Cardiac Cath Lab Pathologist:           Alfreda Sumner MD                                                            Specimen:    Chest Wall, chest wall mass, 4 cores                                                        Final Diagnosis   A. Mass, "Chest wall mass 4 cores," Biopsy:  - Metastatic moderately differentiated adenocarcinoma, consistent with known colonic primary (see note)   Electronically signed by Alfreda Sumner MD on 9/18/2023 at  9:45 AM   Note     Immunohistochemistry was performed on block A1 with adequate controls. Tumor cells are positive for AE1/AE3, CAM5.2, CDX2, and SATB2. They are negative for CK20 and CK7. Immunohistochemistry was performed on block A2 with adequate controls. Tumor cells are positive for AE1/AE3, Cam5.2, and negative for CK20. Case Report   Surgical Pathology Report                         Case: [id number]                                    Authorizing Provider:  Susie Bear,   Collected:           04/29/2022 Gregg GOMEZ                                                                            Ordering Location:     [facility]      Received:            04/29/2022 [address]  Operating Room                                                       Pathologist:           Barbara Friedman DO                                                      Specimens:   A) - Uterus w/Bilateral Ovaries and Fallopian Tubes                                                  B) - Spleen, spleen, omentum, darotis                                                       Final Diagnosis   A. Uterus, Bilateral Ovaries and Fallopian Tubes; Hysterosalpingo-oophorectomy:  - Leiomyoma. - Left ovary with serous cystadenoma. - Unremarkable cervix. - Benign inactive endometrium with no specific pathologic change. - Unremarkable right ovary and bilateral fallopian tubes. B. Spleen, spleen, omentum, darotis:  - Metastatic adenocarcinoma involving spleen and omentum, consistent with colonic primary. See Note. Electronically signed by Adrianna Hartley DO on 5/12/2022 at  2:49 PM      Note     Note B: Comparison to prior material ([id number], omental mass) reveals identical morphology to previous metastatic colonic adenocarcinoma. Case Report   Surgical Pathology Report                         Case: [id number]                                    Authorizing Provider:  Deo Meneses MD           Collected:           11/12/2021 1328               [facility] Received:            11/12/2021 0941                                      CAT Scan                                                                      Pathologist:           Reji Jeffrey MD                                                         Specimen:    Omentum, Omental mass                                                                       Final Diagnosis   A. Omentum, Omental mass:  - Metastatic adenocarcinoma, with an immunophenotype compatible with metastasis from patient's known colonic primary.  - See note. Note: Tumor is positive with CK20, CDX2 and negative with CK7, PAX8. This immunophenotype supports the above interpretation. Best representative block with tumor A5. This case was reviewed at the intradepartmental  conference.    Dr. David Malin is notified of the diagnosis in [facility] via [address] [place] [address] on 11/17/2021  at 8.45 am.   Electronically signed by Reji Jeffrey MD on 11/17/2021 at  8:53 AM                Case Report   Surgical Pathology Report                         Case: [id number]                                    Authorizing Provider:  Nisha Zuniga MD       Collected:           12/10/2019 1159               Ordering Location:     [facility]      Received:            12/10/2019 [address]                                                       Pathologist:           Cedric Luevano MD                                                                  Specimens:   A) - Large Intestine, Sigmoid Colon, and portion of rectum                                           B) - Anastomatic site, anastamotic rings                                                    Addendum   RRESULTS OF IMMUNOHISTOCHEMICAL ANALYSIS FOR MISMATCH REPAIR PROTEIN LOSS  INTERPRETATION: NO LOSS OF NUCLEAR EXPRESSION OF MMR PROTEINS: LOW PROBABILITY OF MSI-H  Note: Background non-neoplastic tissue and/or internal control with intact nuclear expression. RESULTS:  Antibody          Clone               Description                           Results  MLH1               M1                  Mismatch repair protein       Intact nuclear expression  MSH2              [id number]       Mismatch repair protein       Intact nuclear expression  MSH6              40                   Mismatch repair protein       Intact nuclear expression  PMS2              [id number]         Mismatch repair protein       Intact nuclear expression     Comment:  A negative control and a positive control for each antibody have been reviewed and accepted. [employer] Specimen ID: [id number], Evaluator:  JOSÉ MIGUEL Landis MD  These tests were developed and their performance characteristics determined by [facility]. They may not be cleared or approved by the U.S. Food and Drug Administration. The FDA determined that such clearance or approval is not necessary. These tests are used for clinical purposes. They should not be regarded as investigational or for research. This laboratory has been approved by CLIA 88, designated as a high-complexity laboratory and is qualified to perform these tests. Comments: Patients whose tumors demonstrate lack of expression of one or more DNA mismatch repair proteins might be at risk for Bee Syndrome.  This cancer susceptibility syndrome greatly increases the risk of synchronous and/or metachronous cancers in the affected patients and their family members. Normal expression of all proteins does not completely rule out familial cancer predisposition. The [address] Task Forces recommends that all patients with lack of expression of one or more DNA mismatch repair proteins and those with concerning personal or family history should undergo thorough evaluation, counseling and possibly genetic testing. Addendum electronically signed by Maria Esther Martinez MD on 12/20/2019 at  3:28 PM   Final Diagnosis   A. Rectosigmoid colon, low anterior resection:  - Adenocarcinoma, moderately differentiated. - Tumor invades through the muscularis propria into pericolorectal tissue, T3  - Diverticula. - All margins are negative for tumor.  - Thirty-four lymph nodes, negative for malignancy (0/34). B. Colon, anastomotic rings, resection:  - Two portions of colon with no pathologic abnormality     Intradepartmental consultation is in agreement. Interpretation performed at [facility], [facility], [place], [address]  Immunohistochemistry for desmin  is performed on tissue blocks A13 and A16 to help in the assessment of this case. Electronically signed by Maria Esther Martinez MD on 12/18/2019 at 10:42 AM   Additional Information     All controls performed with the immunohistochemical stains reported above reacted appropriately. These tests were developed and their performance characteristics determined by [facility] or [facility]. They may not be cleared or approved by the U.S. Food and Drug Administration. The FDA has determined that such clearance or approval is not necessary. These tests are used for clinical purposes. They should not be regarded as investigational or for research.  This laboratory has been approved by CLIA 88, designated as a high-complexity laboratory and is qualified to perform these tests. Synoptic Checklist   COLON AND RECTUM: Resection, Including Transanal Disk Excision of Rectal Neoplasms  8th Edition - Protocol posted: 2/27/2019  ColoRectal - All Specimens       SPECIMEN   Procedure   Low anterior resection    Macroscopic Intactness of Mesorectum   Complete    TUMOR   Tumor Site   Rectosigmoid    Histologic Type   Adenocarcinoma    Histologic Grade   G2: Moderately differentiated    Tumor Size   Greatest dimension (Centimeters): 5.4 cm   Additional Dimension (Centimeters)   4.6 cm       1 cm   Tumor Deposits   Not identified    Tumor Extension   Tumor invades through the muscularis propria into pericolorectal tissue    Macroscopic Tumor Perforation   Not identified    Lymphovascular Invasion   Not identified    Perineural Invasion   Not identified    Type of Polyp in Which Invasive Carcinoma Arose   Tubular adenoma    Treatment Effect   No known presurgical therapy    MARGINS   Margins   All margins are uninvolved by invasive carcinoma, high-grade dysplasia, intramucosal adenocarcinoma, and adenoma    Margins Examined   Proximal        Distal        Radial or Mesenteric    Distance of Invasive Carcinoma from Closest Margin   1.5 cm   Closest Margin   Radial or Mesenteric    Distance of Tumor from Radial Margin   1.5 cm   LYMPH NODES   Number of Lymph Nodes Involved   0    Number of Lymph Nodes Examined   34    PATHOLOGIC STAGE CLASSIFICATION (pTNM, AJCC 8th Edition)   Primary Tumor (pT)   pT3    Regional Lymph Nodes (pN)   pN0    ADDITIONAL FINDINGS   Additional Pathologic Findings   Diverticulosis    .

## 2023-11-02 ENCOUNTER — APPOINTMENT (OUTPATIENT)
Dept: RADIATION ONCOLOGY | Facility: HOSPITAL | Age: 68
End: 2023-11-02
Attending: STUDENT IN AN ORGANIZED HEALTH CARE EDUCATION/TRAINING PROGRAM
Payer: MEDICARE

## 2023-11-02 ENCOUNTER — HOSPITAL ENCOUNTER (OUTPATIENT)
Dept: RADIOLOGY | Facility: HOSPITAL | Age: 68
Discharge: HOME | End: 2023-11-02
Attending: INTERNAL MEDICINE
Payer: MEDICARE

## 2023-11-02 DIAGNOSIS — M79.671 PAIN IN BOTH FEET: ICD-10-CM

## 2023-11-02 DIAGNOSIS — C19 RECTOSIGMOID CANCER (HCC): ICD-10-CM

## 2023-11-02 DIAGNOSIS — M79.672 PAIN IN BOTH FEET: ICD-10-CM

## 2023-11-02 PROCEDURE — 93970 EXTREMITY STUDY: CPT | Performed by: SURGERY

## 2023-11-02 PROCEDURE — 93970 EXTREMITY STUDY: CPT

## 2023-11-02 PROCEDURE — 77373 STRTCTC BDY RAD THER TX DLVR: CPT | Performed by: RADIOLOGY

## 2023-11-06 ENCOUNTER — APPOINTMENT (OUTPATIENT)
Dept: RADIATION ONCOLOGY | Facility: HOSPITAL | Age: 68
End: 2023-11-06
Attending: STUDENT IN AN ORGANIZED HEALTH CARE EDUCATION/TRAINING PROGRAM
Payer: MEDICARE

## 2023-11-06 PROCEDURE — 77373 STRTCTC BDY RAD THER TX DLVR: CPT | Performed by: STUDENT IN AN ORGANIZED HEALTH CARE EDUCATION/TRAINING PROGRAM

## 2023-11-08 ENCOUNTER — APPOINTMENT (OUTPATIENT)
Dept: RADIATION ONCOLOGY | Facility: HOSPITAL | Age: 68
End: 2023-11-08
Attending: STUDENT IN AN ORGANIZED HEALTH CARE EDUCATION/TRAINING PROGRAM
Payer: MEDICARE

## 2023-11-08 PROCEDURE — 77373 STRTCTC BDY RAD THER TX DLVR: CPT | Performed by: STUDENT IN AN ORGANIZED HEALTH CARE EDUCATION/TRAINING PROGRAM

## 2023-11-08 NOTE — NURSING NOTE
Spoke with patient regarding microwave ablation scheduled on Wed  11/15 at [facility]. Pt to arrive in [facility] at 0700. Pt has a lyht set up for arrival.  NPO after midnight. Pt to hold eliquis, last dose 11/12, pt will be able to restart eliquis on 11/16 in the evening. Dr Delinda Dubin ol'd eliquis hold. Pt's daughter will be taking patient home after procedure. Pt verbalized understanding of instructions, questions answered as offered. -------------------------------

## 2023-11-10 ENCOUNTER — DOCUMENTATION (OUTPATIENT)
Dept: HEMATOLOGY ONCOLOGY | Facility: CLINIC | Age: 68
End: 2023-11-10

## 2023-11-10 ENCOUNTER — APPOINTMENT (OUTPATIENT)
Dept: RADIATION ONCOLOGY | Facility: HOSPITAL | Age: 68
End: 2023-11-10
Attending: STUDENT IN AN ORGANIZED HEALTH CARE EDUCATION/TRAINING PROGRAM
Payer: MEDICARE

## 2023-11-10 ENCOUNTER — APPOINTMENT (OUTPATIENT)
Dept: RADIATION ONCOLOGY | Facility: HOSPITAL | Age: 68
End: 2023-11-10
Payer: MEDICARE

## 2023-11-10 PROCEDURE — 77373 STRTCTC BDY RAD THER TX DLVR: CPT | Performed by: STUDENT IN AN ORGANIZED HEALTH CARE EDUCATION/TRAINING PROGRAM

## 2023-11-10 PROCEDURE — 77336 RADIATION PHYSICS CONSULT: CPT | Performed by: STUDENT IN AN ORGANIZED HEALTH CARE EDUCATION/TRAINING PROGRAM

## 2023-11-10 PROCEDURE — 77435 SBRT MANAGEMENT: CPT | Performed by: STUDENT IN AN ORGANIZED HEALTH CARE EDUCATION/TRAINING PROGRAM

## 2023-11-10 NOTE — PROGRESS NOTES
RECTAL/GI MULTIDISCIPLINARY CASE REVIEW    DATE: 11/9/2023      PRESENTING DOCTOR: Dr. Tomy Sandhoff      DIAGNOSIS: Metastatic rectosigmoid cancer      Gerson Lopez was presented at the Rectal/GI Multidisciplinary Conference today. PHYSICIAN RECOMMENDED PLAN:    -Recommend localized treatment of the hepatic lesions.  -Recommend obtaining circulating tumor DNA to determine if patient should be put under surveillance versus obtaining systemic therapy with either Xeloda or 5FU if circulating tumor is positive. -CARIS testing has been ordered. Team agreed to plan. The final treatment plan will be left to the discretion of the patient and the treating physician. DISCLAIMERS:  TO THE TREATING PHYSICIAN:  This conference is a meeting of clinicians from various specialty areas who evaluate and discuss patients for whom a multidisciplinary treatment approach is being considered. Please note that the above opinion was a consensus of the conference attendees and is intended only to assist in quality care of the discussed patient. The responsibility for follow up on the input given during the conference, along with any final decisions regarding plan of care, is that of the patient and the patient's provider. Accordingly, appointments have only been recommended based on this information and have NOT been scheduled unless otherwise noted. TO THE PATIENT:  This summary is a brief record of major aspects of your cancer treatment. You may choose to share a copy with any of your doctors or nurses. However, this is not a detailed or comprehensive record of your care.       NCCN guidelines were readily available for review at this discussion

## 2023-11-14 ENCOUNTER — ANESTHESIA EVENT (OUTPATIENT)
Dept: RADIOLOGY | Facility: HOSPITAL | Age: 68
End: 2023-11-14

## 2023-11-14 NOTE — ANESTHESIA PREPROCEDURE EVALUATION
Procedure:  IR MICROWAVE ABLATION    Relevant Problems   ANESTHESIA   (+) PONV (postoperative nausea and vomiting)      CARDIO   (+) Acute embolism and thrombosis of right tibial vein (HCC)   (+) Dyspnea on exertion   (+) Mixed hyperlipidemia      GI/HEPATIC  1.2 cm mass on liver for Microwave ablation; history of rectal surgery & chemotherapy for Colorectal Ca   (+) GERD (gastroesophageal reflux disease)   (+) Metastases to the liver (HCC)   (+) 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HCC)      /RENAL   (+) Stage 3 chronic kidney disease, unspecified whether stage 3a or 3b CKD ([address])      MUSCULOSKELETAL  Portacath in place      PULMONARY   (+) Dyspnea on exertion      Other   (+) Asplenia   (+) H/O splenectomy   (+) Morbid obesity (HCC)   (+) Prediabetes        Physical Exam    Airway    Mallampati score: II  TM Distance: >3 FB  Neck ROM: full     Dental       Cardiovascular  Rhythm: regular, Rate: normal    Pulmonary   Breath sounds clear to auscultation    Other Findings        Anesthesia Plan  ASA Score- 3     Anesthesia Type- general with ASA Monitors. Additional Monitors:     Airway Plan: ETT. Plan Factors-    Chart reviewed. Patient is not a current smoker. Induction- intravenous. Postoperative Plan- Plan for postoperative opioid use. Informed Consent- Anesthetic plan and risks discussed with patient. I personally reviewed this patient with the CRNA. Discussed and agreed on the Anesthesia Plan with the CRNA. Zara Faulkner

## 2023-11-15 ENCOUNTER — HOSPITAL ENCOUNTER (OUTPATIENT)
Dept: RADIOLOGY | Facility: HOSPITAL | Age: 68
Discharge: HOME | End: 2023-11-15
Attending: RADIOLOGY
Payer: MEDICARE

## 2023-11-15 ENCOUNTER — ANESTHESIA (OUTPATIENT)
Dept: RADIOLOGY | Facility: HOSPITAL | Age: 68
End: 2023-11-15

## 2023-11-15 ENCOUNTER — TELEPHONE (OUTPATIENT)
Dept: HEMATOLOGY ONCOLOGY | Facility: CLINIC | Age: 68
End: 2023-11-15

## 2023-11-15 VITALS
RESPIRATION RATE: 18 BRPM | HEIGHT: 57 IN | SYSTOLIC BLOOD PRESSURE: 142 MMHG | OXYGEN SATURATION: 100 % | HEART RATE: 76 BPM | TEMPERATURE: 97 F | WEIGHT: 210.32 LBS | DIASTOLIC BLOOD PRESSURE: 66 MMHG | BODY MASS INDEX: 45.37 KG/M2

## 2023-11-15 DIAGNOSIS — C78.7 METASTASIS TO LIVER (HCC): ICD-10-CM

## 2023-11-15 LAB
ABO GROUP BLD: NORMAL
BASOPHILS # BLD AUTO: 0.01 THOUSANDS/ÂΜL (ref 0–0.1)
BASOPHILS NFR BLD AUTO: 0 % (ref 0–1)
BLD GP AB SCN SERPL QL: NEGATIVE
EOSINOPHIL # BLD AUTO: 0.08 THOUSAND/ÂΜL (ref 0–0.61)
EOSINOPHIL NFR BLD AUTO: 1 % (ref 0–6)
ERYTHROCYTE [DISTWIDTH] IN BLOOD BY AUTOMATED COUNT: 17.4 % (ref 11.6–15.1)
HCT VFR BLD AUTO: 34.1 % (ref 34.8–46.1)
HGB BLD-MCNC: 11 G/DL (ref 11.5–15.4)
IMM GRANULOCYTES # BLD AUTO: 0.01 THOUSAND/UL (ref 0–0.2)
IMM GRANULOCYTES NFR BLD AUTO: 0 % (ref 0–2)
INR PPP: 0.99 (ref 0.84–1.19)
LYMPHOCYTES # BLD AUTO: 1.15 THOUSANDS/ÂΜL (ref 0.6–4.47)
LYMPHOCYTES NFR BLD AUTO: 19 % (ref 14–44)
MCH RBC QN AUTO: 29.1 PG (ref 26.8–34.3)
MCHC RBC AUTO-ENTMCNC: 32.3 G/DL (ref 31.4–37.4)
MCV RBC AUTO: 90 FL (ref 82–98)
MONOCYTES # BLD AUTO: 0.8 THOUSAND/ÂΜL (ref 0.17–1.22)
MONOCYTES NFR BLD AUTO: 13 % (ref 4–12)
NEUTROPHILS # BLD AUTO: 3.95 THOUSANDS/ÂΜL (ref 1.85–7.62)
NEUTS SEG NFR BLD AUTO: 67 % (ref 43–75)
NRBC BLD AUTO-RTO: 0 /100 WBCS
PLATELET # BLD AUTO: 247 THOUSANDS/UL (ref 149–390)
PMV BLD AUTO: 12 FL (ref 8.9–12.7)
PROTHROMBIN TIME: 13.3 SECONDS (ref 11.6–14.5)
RBC # BLD AUTO: 3.78 MILLION/UL (ref 3.81–5.12)
RH BLD: POSITIVE
SPECIMEN EXPIRATION DATE: NORMAL
WBC # BLD AUTO: 6 THOUSAND/UL (ref 4.31–10.16)

## 2023-11-15 PROCEDURE — 85025 COMPLETE CBC W/AUTO DIFF WBC: CPT | Performed by: RADIOLOGY

## 2023-11-15 PROCEDURE — 85610 PROTHROMBIN TIME: CPT | Performed by: RADIOLOGY

## 2023-11-15 PROCEDURE — 86900 BLOOD TYPING SEROLOGIC ABO: CPT | Performed by: RADIOLOGY

## 2023-11-15 PROCEDURE — 86901 BLOOD TYPING SEROLOGIC RH(D): CPT | Performed by: RADIOLOGY

## 2023-11-15 PROCEDURE — 86850 RBC ANTIBODY SCREEN: CPT | Performed by: RADIOLOGY

## 2023-11-15 RX ORDER — ROCURONIUM BROMIDE 10 MG/ML
INJECTION, SOLUTION INTRAVENOUS AS NEEDED
Status: DISCONTINUED | OUTPATIENT
Start: 2023-11-15 | End: 2023-11-15

## 2023-11-15 RX ORDER — MEPERIDINE HYDROCHLORIDE 25 MG/ML
12.5 INJECTION INTRAMUSCULAR; INTRAVENOUS; SUBCUTANEOUS
Status: DISCONTINUED | OUTPATIENT
Start: 2023-11-15 | End: 2023-11-16 | Stop reason: HOSPADM

## 2023-11-15 RX ORDER — SODIUM CHLORIDE 9 MG/ML
75 INJECTION, SOLUTION INTRAVENOUS CONTINUOUS
Status: DISCONTINUED | OUTPATIENT
Start: 2023-11-15 | End: 2023-11-16 | Stop reason: HOSPADM

## 2023-11-15 RX ORDER — FENTANYL CITRATE 50 UG/ML
INJECTION, SOLUTION INTRAMUSCULAR; INTRAVENOUS AS NEEDED
Status: DISCONTINUED | OUTPATIENT
Start: 2023-11-15 | End: 2023-11-15

## 2023-11-15 RX ORDER — PROPOFOL 10 MG/ML
INJECTION, EMULSION INTRAVENOUS AS NEEDED
Status: DISCONTINUED | OUTPATIENT
Start: 2023-11-15 | End: 2023-11-15

## 2023-11-15 RX ORDER — FENTANYL CITRATE/PF 50 MCG/ML
25 SYRINGE (ML) INJECTION
Status: DISCONTINUED | OUTPATIENT
Start: 2023-11-15 | End: 2023-11-16 | Stop reason: HOSPADM

## 2023-11-15 RX ORDER — ONDANSETRON 2 MG/ML
4 INJECTION INTRAMUSCULAR; INTRAVENOUS ONCE AS NEEDED
Status: DISCONTINUED | OUTPATIENT
Start: 2023-11-15 | End: 2023-11-16 | Stop reason: HOSPADM

## 2023-11-15 RX ORDER — MIDAZOLAM HYDROCHLORIDE 2 MG/2ML
INJECTION, SOLUTION INTRAMUSCULAR; INTRAVENOUS AS NEEDED
Status: DISCONTINUED | OUTPATIENT
Start: 2023-11-15 | End: 2023-11-15

## 2023-11-15 RX ORDER — SODIUM CHLORIDE 9 MG/ML
INJECTION, SOLUTION INTRAVENOUS CONTINUOUS PRN
Status: DISCONTINUED | OUTPATIENT
Start: 2023-11-15 | End: 2023-11-15

## 2023-11-15 RX ORDER — ONDANSETRON 2 MG/ML
INJECTION INTRAMUSCULAR; INTRAVENOUS AS NEEDED
Status: DISCONTINUED | OUTPATIENT
Start: 2023-11-15 | End: 2023-11-15

## 2023-11-15 RX ORDER — DEXAMETHASONE SODIUM PHOSPHATE 10 MG/ML
INJECTION, SOLUTION INTRAMUSCULAR; INTRAVENOUS AS NEEDED
Status: DISCONTINUED | OUTPATIENT
Start: 2023-11-15 | End: 2023-11-15

## 2023-11-15 RX ADMIN — FENTANYL CITRATE 50 MCG: 50 INJECTION, SOLUTION INTRAMUSCULAR; INTRAVENOUS at 09:03

## 2023-11-15 RX ADMIN — ROCURONIUM BROMIDE 10 MG: 10 INJECTION, SOLUTION INTRAVENOUS at 09:46

## 2023-11-15 RX ADMIN — ONDANSETRON 4 MG: 2 INJECTION INTRAMUSCULAR; INTRAVENOUS at 09:16

## 2023-11-15 RX ADMIN — SODIUM CHLORIDE: 0.9 INJECTION, SOLUTION INTRAVENOUS at 09:13

## 2023-11-15 RX ADMIN — DEXAMETHASONE SODIUM PHOSPHATE 10 MG: 10 INJECTION, SOLUTION INTRAMUSCULAR; INTRAVENOUS at 09:16

## 2023-11-15 RX ADMIN — ROCURONIUM BROMIDE 40 MG: 10 INJECTION, SOLUTION INTRAVENOUS at 09:05

## 2023-11-15 RX ADMIN — PROPOFOL 200 MG: 10 INJECTION, EMULSION INTRAVENOUS at 09:03

## 2023-11-15 RX ADMIN — IOHEXOL 85 ML: 350 INJECTION, SOLUTION INTRAVENOUS at 10:21

## 2023-11-15 RX ADMIN — SUGAMMADEX 200 MG: 100 INJECTION, SOLUTION INTRAVENOUS at 10:14

## 2023-11-15 RX ADMIN — MIDAZOLAM 2 MG: 1 INJECTION INTRAMUSCULAR; INTRAVENOUS at 09:02

## 2023-11-15 RX ADMIN — SODIUM CHLORIDE: 0.9 INJECTION, SOLUTION INTRAVENOUS at 08:56

## 2023-11-15 NOTE — ANESTHESIA POSTPROCEDURE EVALUATION
Post-Op Assessment Note    CV Status:  Stable  Pain Score: 0    Pain management: adequate       Mental Status:  Alert and awake   Hydration Status:  Euvolemic   PONV Controlled:  Controlled   Airway Patency:  Patent     Post Op Vitals Reviewed: Yes    No anethesia notable event occurred.     Staff: CRNA               BP   129/58   Temp   97.4   Pulse  78   Resp   17   SpO2   99%

## 2023-11-15 NOTE — PERIOPERATIVE NURSING NOTE
Received patient to [facility] from PACU, alert, VSS. IR procedure not performed. Dr. Buddy Wills will come to speak with patient and daughter regarding this. Pt OOB to bathroom, voided without difficulty. No pain at this time. Tolerating po fluids.

## 2023-11-15 NOTE — TELEPHONE ENCOUNTER
Spoke with Renan Kingsley from [facility] regarding Circulating Tumor Cells test Dr. Haydee Dhillon is attempting to order.   She helped relay information back to me regarding the test, and informed me Josef (representative of [facility]) would reach back out tonight/tomorrow morning to schedule a meeting to help order test.

## 2023-11-15 NOTE — PERIOPERATIVE NURSING NOTE
Dr. Orr Early here to discuss plans with patient and daughter for further testing and procedures. Discharge instructions then given to patient and daughter by myself. All questions answered.

## 2023-11-15 NOTE — BRIEF OP NOTE (RAD/CATH)
INTERVENTIONAL RADIOLOGY PROCEDURE NOTE    Date: 11/15/2023    Procedure: IR PROCEDURE NOT PERFORMED     Preoperative diagnosis:   1. Metastasis to liver [facility])       Postoperative diagnosis: Same. Surgeon: Maddie Mota MD     Assistant: None. No qualified resident was available. Blood loss: none    Specimens: none    Findings: Small 1.2cm liver mass was behind a rib and difficult to access percutaneously and therefore the procedure was not performed and the patient will be scheduled for Y-90 Radioembolization instead. Complications: None immediate.     Anesthesia: general anesthesia

## 2023-11-16 ENCOUNTER — TELEPHONE (OUTPATIENT)
Dept: HEMATOLOGY ONCOLOGY | Facility: CLINIC | Age: 68
End: 2023-11-16

## 2023-11-16 ENCOUNTER — TELEMEDICINE (OUTPATIENT)
Dept: INTERVENTIONAL RADIOLOGY/VASCULAR | Facility: CLINIC | Age: 68
End: 2023-11-16

## 2023-11-16 DIAGNOSIS — C78.7 METASTASIS TO LIVER (HCC): Primary | ICD-10-CM

## 2023-11-16 NOTE — TELEPHONE ENCOUNTER
Talked to Amber Landis from [facility] regarding ordering the Circulating Tumor Cells test.  Amber Landis sent the portal link, as well as explained how to order it through their online portal.  Also indicated a mobile phlebotomy lab would go to patients' house to obtain results.

## 2023-11-17 ENCOUNTER — PREP FOR PROCEDURE (OUTPATIENT)
Dept: INTERVENTIONAL RADIOLOGY/VASCULAR | Facility: CLINIC | Age: 68
End: 2023-11-17

## 2023-11-17 DIAGNOSIS — C78.7 METASTASIS TO LIVER (HCC): Primary | ICD-10-CM

## 2023-11-17 RX ORDER — OMEPRAZOLE 40 MG/1
40 CAPSULE, DELAYED RELEASE ORAL DAILY
Qty: 30 CAPSULE | Refills: 0 | Status: SHIPPED | OUTPATIENT
Start: 2023-11-17

## 2023-11-17 RX ORDER — METHYLPREDNISOLONE 4 MG/1
TABLET ORAL
Qty: 1 EACH | Refills: 0 | Status: SHIPPED | OUTPATIENT
Start: 2023-11-17

## 2023-11-17 NOTE — PROGRESS NOTES
Virtual Regular Visit    Verification of patient location:    Patient is located at Home in the following state in which I hold an active license [place]      Assessment/Plan:    Problem List Items Addressed This Visit    None  Visit Diagnoses       Metastasis to liver [facility])    -  Primary    Relevant Orders    Ambulatory Referral to Radiation Oncology    Ambulatory Referral to Radiation Oncology    IR Y-90 PRE-ANGIO/EMBO W/ LUNG SCAN        In summary, 68yF w/ solitary metastatic liver lesion and treated L pleural lesion by SBRT s/p aborted ablation due to lesion size / location. I described Y90 details, risks and benefits. I explained the goal would be segmentectomy as the lesion seems to occupy segment 5 and this would depend on the Y90 mapping. I also discussed systemic therapy though she is hoping for local therapy only with avoidance of chemotherapy. I also spoke with Dr. Abelardo Rehman to confirm. I explained about need for radiation oncology consult and also she could ask at the time re: the possibility of SBRT as well. Following our discussion, she wishes to proceed based on further discussion with radiation oncology. Reason for visit is No chief complaint on file. Encounter provider Tom Pratt DO    Provider located at [address] [place] [id number] [phone]      Recent Visits  Date Type Provider Dept   11/16/23 Telemedicine Tom Pratt DO [id number]   11/15/23 Telephone Emile Watkins MD [facility] [address] recent visits within past 7 days and meeting all other requirements  Future Appointments  No visits were found meeting these conditions. Showing future appointments within next 150 days and meeting all other requirements       The patient was identified by name and date of birth.  Javier Reeves was informed that this is a telemedicine visit and that the visit is being conducted through Telephone. My office door was closed. No one else was in the room. She acknowledged consent and understanding of privacy and security of the video platform. The patient has agreed to participate and understands they can discontinue the visit at any time. Patient is aware this is a billable service. CC: Liver lesion    HPI: 70 yF h/o rectosigmoid CA s/p resection , surveillance  showed intra-abdominal mass by spleen s/p neoadjuvant chemo / resection with post-op chemotherapy with treatment completion 2022. May 2023 CT - right hepatic lobe lesion  2023 MRI - 1.2 cm liver (seg 5/6) metastasis and L chest wall/pleural lesion c/b biopsy revealing adenoCA treated with SBRT. Goal for local therapy to both liver lesion and pleural wall mass. Patient wishes to avoid systemic therapy as well. Awaiting tumor circulating cells lab result. Attempted microwave ablation was aborted on 11/15/2023 due to inconspicuous lesion by imaging, small size, location behind rib and body habitus. Referred to discuss Y90 (hopeful segmentectomy). Denies CP, SOB, N/V, weight loss. Activities are at baseline.          Past Medical History:   Diagnosis Date    Blood in stool     Breast disorder     Cancer [facility])     rectal    Cancer determined by colorectal biopsy ([address])     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    Colon polyp     GERD (gastroesophageal reflux disease)     History of chemotherapy 2021    History of rectal surgery     Hyperlipidemia     PONV (postoperative nausea and vomiting)        Past Surgical History:   Procedure Laterality Date    BREAST CYST EXCISION Right      SECTION      x3    COLON SIGMOID RESECTION      COLONOSCOPY      DILATION AND CURETTAGE OF UTERUS      ILEO LOOP DIVERSION N/A 12/10/2019    Procedure: ILEOSTOMY LOOP;  Surgeon: Sheree Nunez MD;  [facility]: [facility];  Service: Robotics    INSTILLATION CHEMOTHERAPY INTRAPERITONEAL (HIPEC) LAPAROTOMY N/A 04/29/2022    Procedure: INSTILLATION CHEMOTHERAPY INTRAPERITONEAL (HIPEC) LAPAROTOMY;  Surgeon: Anastasiya Bell MD;  [facility];  Service: Surgical Oncology    IR BIOPSY CHEST WALL  9/13/2023    IR BIOPSY OMENTUM  11/12/2021    IR PORT PLACEMENT  12/28/2021    OMENTECTOMY N/A 04/29/2022    Procedure: DIAGNOSTIC LAPAROSCOPY, OPEN OMENTECTOMY, SPLENECTOMY, DIAPHRAGM REPAIR, CHEST TUBE INSERTION;  Surgeon: Anastasiya Bell MD;  [facility];  Service: Surgical Oncology    WI CLOSURE ENTEROSTOMY LG/SMALL INTESTINE N/A 02/04/2020    Procedure: CLOSURE ILEOSTOMY;  Surgeon: Nisha Zuniga MD;  [facility];  Service: Colorectal    WI LAPAROSCOPY COLECTOMY PARTIAL W/ANASTOMOSIS N/A 12/10/2019    Procedure: SIGMOID RESECTION COLON LAPAROSCOPIC W ROBOTICS converted to lap hand assisted  with Partial proctectomy , LASER FLUORESCENCE ANGIOGRAPHY, INTRA OP COLONOSCOPY;  Surgeon: Nisha Zuniga MD;  [facility];  Service: Robotics    WI TOTAL ABDOMINAL HYSTERECT W/WO RMVL TUBE OVARY N/A 04/29/2022    Procedure: DIAGNOSTIC LAPAROSCOPY, TOTAL ABDOMINAL HYSTERECTOMY, BILATERAL SALPINGO-OOPHORECTOMY, EXTENSIVE ADHESIOLYSIS;  Surgeon: Christophe Orourke MD;  [facility];  Service: Gynecology Oncology    WI UNLISTED PROCEDURE DIAPHRAGM  04/29/2022    Procedure: REPAIR DIAPHRAGM TEAR;  Surgeon: Adonay Chung MD;  [facility];  Service: Thoracic    SMALL INTESTINE SURGERY  02/04/2020    Procedure: RESECTION SMALL BOWEL;  Surgeon: Nisha Zuniga MD;  [facility];  Service: Colorectal       Current Outpatient Medications   Medication Sig Dispense Refill    Acetaminophen (TYLENOL 8 HOUR PO) Take by mouth PRN      apixaban (Eliquis) 5 mg Take 1 tablet (5 mg total) by mouth 2 (two) times a day 60 tablet 5    ezetimibe (ZETIA) 10 mg tablet Take 1 tablet (10 mg total) by mouth daily 90 tablet 1    famotidine (PEPCID) 20 mg tablet Take 1 tablet (20 mg total) by mouth 2 (two) times a day as needed for heartburn As needed 90 tablet 1    lidocaine (Lidoderm) 5 % Apply 1 patch topically over 12 hours daily Remove & Discard patch within 12 hours or as directed by MD 12 patch 3    meclizine (ANTIVERT) 12.5 MG tablet Take 1 tablet (12.5 mg total) by mouth 3 (three) times a day as needed for dizziness (Patient taking differently: Take 12.5 mg by mouth 3 (three) times a day as needed for dizziness PRN) 30 tablet 0     No current facility-administered medications for this visit. Allergies   Allergen Reactions    Medical Tape Rash     Skin irritation  Skin peeling  Skin irritation  Skin peeling  Blisters  Rash       Review of Systems   All other systems reviewed and are negative.       Visit Time  Total Visit Duration: 30 minutes

## 2023-11-20 DIAGNOSIS — C19 RECTOSIGMOID CANCER (HCC): Primary | ICD-10-CM

## 2023-11-20 DIAGNOSIS — C78.6 MALIGNANT NEOPLASM METASTATIC TO OMENTUM (HCC): ICD-10-CM

## 2023-11-21 ENCOUNTER — HOSPITAL ENCOUNTER (OUTPATIENT)
Dept: INFUSION CENTER | Facility: HOSPITAL | Age: 68
Discharge: HOME | End: 2023-11-21
Attending: INTERNAL MEDICINE

## 2023-12-05 LAB
CARIS GENOMIC LOH - EXOME: NORMAL
CARIS HER2/NEU: NEGATIVE
CARIS MISMATCH REPAIR STATUS: NORMAL
CARIS MLH1: NORMAL
CARIS MSH2: NORMAL
CARIS MSH6: NORMAL
CARIS MSI - EXOME: NORMAL
CARIS PD-L1 (SP142): NEGATIVE
CARIS PMS2: NORMAL
CARIS PTEN: POSITIVE
CARIS TMB - EXOME: NORMAL

## 2023-12-12 ENCOUNTER — LAB REQUISITION (OUTPATIENT)
Dept: LAB | Facility: HOSPITAL | Age: 68
End: 2023-12-12

## 2023-12-12 DIAGNOSIS — C19 MALIGNANT NEOPLASM OF RECTOSIGMOID JUNCTION (HCC): ICD-10-CM

## 2023-12-12 DIAGNOSIS — C78.6 SECONDARY MALIGNANT NEOPLASM OF RETROPERITONEUM AND PERITONEUM (HCC): ICD-10-CM

## 2023-12-15 ENCOUNTER — RADIATION ONCOLOGY FOLLOW-UP (OUTPATIENT)
Dept: RADIATION ONCOLOGY | Facility: HOSPITAL | Age: 68
End: 2023-12-15
Attending: STUDENT IN AN ORGANIZED HEALTH CARE EDUCATION/TRAINING PROGRAM
Payer: MEDICARE

## 2023-12-15 ENCOUNTER — CLINICAL SUPPORT (OUTPATIENT)
Dept: RADIATION ONCOLOGY | Facility: HOSPITAL | Age: 68
End: 2023-12-15
Attending: STUDENT IN AN ORGANIZED HEALTH CARE EDUCATION/TRAINING PROGRAM

## 2023-12-15 VITALS
RESPIRATION RATE: 18 BRPM | BODY MASS INDEX: 45.3 KG/M2 | WEIGHT: 210 LBS | DIASTOLIC BLOOD PRESSURE: 78 MMHG | SYSTOLIC BLOOD PRESSURE: 140 MMHG | HEIGHT: 57 IN | OXYGEN SATURATION: 99 % | TEMPERATURE: 98.7 F | HEART RATE: 78 BPM

## 2023-12-15 DIAGNOSIS — C78.7 METASTASES TO THE LIVER (HCC): Primary | ICD-10-CM

## 2023-12-15 PROCEDURE — 99024 POSTOP FOLLOW-UP VISIT: CPT | Performed by: STUDENT IN AN ORGANIZED HEALTH CARE EDUCATION/TRAINING PROGRAM

## 2023-12-15 PROCEDURE — 99211 OFF/OP EST MAY X REQ PHY/QHP: CPT | Performed by: STUDENT IN AN ORGANIZED HEALTH CARE EDUCATION/TRAINING PROGRAM

## 2023-12-15 RX ORDER — ONDANSETRON 8 MG/1
8 TABLET, ORALLY DISINTEGRATING ORAL EVERY 8 HOURS PRN
Qty: 20 TABLET | Refills: 0 | Status: SHIPPED | OUTPATIENT
Start: 2023-12-15

## 2023-12-15 NOTE — PROGRESS NOTES
Itzel Sanches 1955 is a 68 y.o. female with history of adenocarcinoma of rectosigmoid colon, initially diagnosed with T3 N0 in September 2019 s/p low anterior resection with Dr. Pastor in December 2019. She had ileostomy reversal in Feb 2020. She developed progression with omental metastasis in 2021 and underwent omentectomy, splenectomy, resection of involved diaphragm and diaphragm repair + DILMA/BSO and instillation of HIPEC (Dr. Mae, Dr. Freeman, Dr. Hebert) in April 2022. She completed chemotherapy in August 2022 and has continued observation with Dr. Grubbs, Med Onc and Dr. Freeman, Surg Onc.   CT in May 2023 showed new small liver lesion. MRI abdomen was ordered that confirmed liver metastasis and also revealed a new posterior left chest wall/pleural lesion suspicious for metastasis. The chest wall mass was biopsied on 9/13/23 revealing metastatic moderately differentiated adenocarcinoma, consistent with colon primary. On 11/10/23 she completed a course of SBRT to the left posterior chest wall to a dose of 4500cGy in 5 fractions.  The pt is seen today for complex follow-up due to liver metastasis. She is scheduled for Y90 in January.       11/15/23 IR procedure not performed-  The patient was scheduled for microwave ablation of her right lobe liver mass. Preop CT scan for marking was performed and the lesion was behind a rib and could not be easily accessed. It was decided to not perform the procedure and have the patient scheduled for Y90 radio embolization.      Upcoming appts:   12/21/23 Dr. Romie James  1/4/24 IR  Y90 mapping  1/11/24 IR Y90 treatment   2/19/24 Surg OncDr. Freeman        Oncology History   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HCC)   9/23/2019 Initial Diagnosis    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HCC)     9/23/2019 Biopsy    Rectal Mass ( 2 slides [id number] [facility] Pathology, Collected on 09/23/2019, biopsy):  - Adenocarcinoma     12/10/2019 Surgery    Low anterior resection (Dr Jose Pastor):    ALISHA  Rectosigmoid colon, low anterior resection:  - Adenocarcinoma, moderately differentiated.   - Tumor invades through the muscularis propria into pericolorectal tissue, T3  - Diverticula.  - All margins are negative for tumor.  - Thirty-four lymph nodes, negative for malignancy (0/34).     B. Colon, anastomotic rings, resection:  - Two portions of colon with no pathologic abnormality     12/10/2019 Genomic Testing    RESULTS OF IMMUNOHISTOCHEMICAL ANALYSIS FOR MISMATCH REPAIR PROTEIN LOSS  INTERPRETATION: NO LOSS OF NUCLEAR EXPRESSION OF MMR PROTEINS: LOW PROBABILITY OF MSI-H  Note: Background non-neoplastic tissue and/or internal control with intact nuclear expression.      RESULTS:  Antibody          Clone               Description                           Results  MLH1               M1                  Mismatch repair protein       Intact nuclear expression  MSH2              [id number]       Mismatch repair protein       Intact nuclear expression  MSH6              44                   Mismatch repair protein       Intact nuclear expression  PMS2              [id number]         Mismatch repair protein       Intact nuclear expression     2/4/2020 Surgery    Ileostomy, takedown:  - Portion of small intestine with mucocutaneous anastomosis consistent with stoma.  - Negative for malignancy     8/18/2021 Progression    CEA trends- observed by Dr. Juarez  2/17/21: 2.9  8/18/2021: 4.5  9/13/2021: 4.4    PET/CT  9/28/2021 completed due to elevating CEA  1. There is a large left paracolic gutter markedly hypermetabolic mass immediately inferior to the spleen, most consistent with metastatic colorectal carcinoma.  2. Mildly increased activity at the right abdominal bowel anastomotic site.  If not recently performed, direct visualization is recommended  3. There are no other glucose avid abnormalities identified within the abdomen or pelvis  4. No evidence of distant glucose avid metastatic disease in the neck, chest, or  skeleton      11/12/2021 Biopsy    Omental mass:  - Metastatic adenocarcinoma, with an immunophenotype compatible with metastasis from patient's known colonic primary.     12/30/2021 - 12/30/2021 Chemotherapy    CapOx x 1 dose of Oxaliplatin: D/c due to tolerance.      1/2/2022 Genomic Testing         1/4/2022 - 3/17/2022 Chemotherapy    First 6 cycles of Folfox given prior to surgery- 1/4 through 3/17/2022     Folfox was dose reduced due to anticipated tolerance.  Minimal side effects     3/22/2022 Observation    CT CAP  1.  Decreased size of biopsy-proven omental metastasis, which now only focally contacting rather than grossly invading the spleen and adjacent abdominal wall. No new evidence of metastatic disease in the abdomen or pelvis.  2.  No new or suspicious pulmonary nodules. One of the previously noted new 1-2 mm nodules is no longer seen, and the other is unchanged. Recommend continued attention on follow-up.  3.  Top-normal thickness endometrial stripe measuring 7 mm. If there is history of vaginal bleeding, suggest catheterization with endometrial biopsy.  4.  Hepatic steatosis.     4/28/2022 -  Cancer Staged    Staging form: Colon and Rectum, AJCC 8th Edition  - Clinical stage from 4/28/2022: cT3, cN0, pM1 - Signed by Jessie Freeman MD on 6/14/2023  Total positive nodes: 0       4/29/2022 Surgery    Lydia Shaw and Anup preformed the following procedures:   DIAGNOSTIC LAPAROSCOPY, TOTAL ABDOMINAL HYSTERECTOMY, BILATERAL SALPINGO-OOPHORECTOMY, EXTENSIVE ADHESIOLYSIS (N/A)  DIAGNOSTIC LAPAROSCOPY, OPEN OMENTECTOMY, POSSIBLE SPLENECTOMY (N/A)  INSTILLATION CHEMOTHERAPY INTRAPERITONEAL (HIPEC) LAPAROTOMY (N/A)  REPAIR DIAPHRAGM TEAR      A. Uterus, Bilateral Ovaries and Fallopian Tubes; Hysterosalpingo-oophorectomy:  - Leiomyoma.  - Left ovary with serous cystadenoma.  - Unremarkable cervix.  - Benign inactive endometrium with no specific pathologic change.  - Unremarkable right ovary and  "bilateral fallopian tubes.     B. Spleen, spleen, omentum, darotis:  - Metastatic adenocarcinoma involving spleen and omentum, consistent with colonic primary. See Note.    Note: Comparison to prior material ([id number], omental mass) reveals identical morphology to previous metastatic colonic adenocarcinoma.          5/18/2022 -  Cancer Staged    Staging form: Colon and Rectum, AJCC 8th Edition  - Pathologic stage from 5/18/2022: Stage KRISTAL (pT3, pN0, pM1a) - Signed by Jessie Freeman MD on 6/14/2023  Total positive nodes: 0       6/14/2022 - 8/23/2022 Chemotherapy    7th cycle started on 6/14/2022  8th cycle worsening neuropathy; could not tolerate gabapentin  D/lizette oxaliplatin  9th cycle Irinotecan added at 85% dose reduction: SE Diarrhea  10th cycle Irinotecan dose redcued to 50%     9/19/2022 Observation    9/19/2022 CT CAP:   1.  Previously seen omental metastasis in the left upper quadrant has been resected.  There is a small area of nodular soft tissue thickening abutting the lateral aspect of the left kidney, cannot exclude residual/recurrent tumor.  Follow up in 4 months CEA not completed at time of scan.      9/13/2023 Biopsy    Mass, \"Chest wall mass 4 cores,\" Biopsy:  - Metastatic moderately differentiated adenocarcinoma, consistent with known colonic primary      10/31/2023 - 11/10/2023 Radiation    Treatments:  Course: C1 SBRT    Plan ID Energy Fractions Dose per Fraction (cGy) Dose Correction (cGy) Total Dose Delivered (cGy) Elapsed Days   SBRT L CW 6X-FFF 5 / 5 900 0 4,500 10      Treatment Dates:  10/31/2023 - 11/10/2023.      Omental metastasis   9/23/2019 Biopsy    Rectal Mass ( 2 slides [id number] [facility] Pathology, Collected on 09/23/2019, biopsy):  - Adenocarcinoma     2/4/2020 Surgery    Ileostomy, takedown:  - Portion of small intestine with mucocutaneous anastomosis consistent with stoma.  - Negative for malignancy     9/13/2023 Biopsy    Mass, \"Chest wall mass 4 cores,\" Biopsy:  - " Metastatic moderately differentiated adenocarcinoma, consistent with known colonic primary          Review of Systems:  Review of Systems   Constitutional:  Positive for fatigue.   HENT:  Positive for postnasal drip.    Eyes: Negative.    Cardiovascular: Negative.    Gastrointestinal:  Positive for diarrhea (loose stools some days). Negative for nausea and vomiting.   Endocrine: Negative.    Genitourinary: Negative.    Musculoskeletal:  Positive for arthralgias (generalized).   Skin: Negative.    Allergic/Immunologic: Negative.    Neurological: Negative.    Hematological: Negative.    Psychiatric/Behavioral: Negative.         Clinical Trial: no      Health Maintenance   Topic Date Due    PT PLAN OF CARE  10/15/2020    COVID-19 Vaccine (4 - 2023-24 season) 09/01/2023    Breast Cancer Screening: Mammogram  12/08/2023    Fall Risk  03/21/2024    Urinary Incontinence Screening  03/21/2024    Medicare Annual Wellness Visit (AWV)  03/21/2024    BMI: Followup Plan  09/25/2024    BMI: Adult  11/15/2024    Depression Screening  12/15/2024    Meningococcal ACWY Vaccine (3 - Risk 2-dose series) 03/21/2028    Hepatitis C Screening  Completed    Osteoporosis Screening  Completed    Pneumococcal Vaccine: 65+ Years  Completed    HIB Vaccine  Completed    Influenza Vaccine  Completed    IPV Vaccine  Aged Out    Hepatitis A Vaccine  Aged Out    HPV Vaccine  Aged Out    Colonoscopy  Discontinued     Patient Active Problem List   Diagnosis    Callus of foot    Foot pain    GERD (gastroesophageal reflux disease)    Mixed hyperlipidemia    Prediabetes    Varicose veins with pain    Morbid obesity (HCC)    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HCC)    Dyspnea on exertion    Dyslipidemia    Sigmoid diverticulosis    PONV (postoperative nausea and vomiting)    Omental metastasis    Lesion of ovary    Chemotherapy adverse reaction    Chemotherapy-induced nausea    Platelets decreased (HCC)    Stage 3 chronic kidney disease, unspecified whether stage 3a or  3b CKD (HCC)    H/O splenectomy    Asplenia    Postoperative state    Acute embolism and thrombosis of right tibial vein (HCC)    Metastases to the liver (HCC)     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    Colon polyp     GERD (gastroesophageal reflux disease)     History of chemotherapy 2021    History of rectal surgery     Hyperlipidemia     PONV (postoperative nausea and vomiting)      Past Surgical History:   Procedure Laterality Date    BREAST CYST EXCISION Right      SECTION      x3    COLON SIGMOID RESECTION      COLONOSCOPY      DILATION AND CURETTAGE OF UTERUS      ILEO LOOP DIVERSION N/A 12/10/2019    Procedure: ILEOSTOMY LOOP;  Surgeon: WINNIE Pastor MD;  [facility]: [facility];  Service: Robotics    INSTILLATION CHEMOTHERAPY INTRAPERITONEAL (HIPEC) LAPAROTOMY N/A 2022    Procedure: INSTILLATION CHEMOTHERAPY INTRAPERITONEAL (HIPEC) LAPAROTOMY;  Surgeon: Jessie Freeman MD;  Location: [facility];  Service: Surgical Oncology    IR BIOPSY CHEST WALL  2023    IR BIOPSY OMENTUM  2021    IR PORT PLACEMENT  2021    OMENTECTOMY N/A 2022    Procedure: DIAGNOSTIC LAPAROSCOPY, OPEN OMENTECTOMY, SPLENECTOMY, DIAPHRAGM REPAIR, CHEST TUBE INSERTION;  Surgeon: Jessie Freeman MD;  [facility];  Service: Surgical Oncology    WV CLOSURE ENTEROSTOMY LG/SMALL INTESTINE N/A 2020    Procedure: CLOSURE ILEOSTOMY;  Surgeon: WINNIE Pastor MD;  [facility];  Service: Colorectal    WV LAPAROSCOPY COLECTOMY PARTIAL W/ANASTOMOSIS N/A 12/10/2019    Procedure: SIGMOID RESECTION COLON LAPAROSCOPIC W ROBOTICS converted to lap hand assisted  with Partial proctectomy , LASER FLUORESCENCE ANGIOGRAPHY, INTRA OP COLONOSCOPY;  Surgeon: WINNIE Pastor MD;  [facility];  Service: Robotics    WV TOTAL ABDOMINAL HYSTERECT  "W/WO RMVL TUBE OVARY N/A 04/29/2022    Procedure: DIAGNOSTIC LAPAROSCOPY, TOTAL ABDOMINAL HYSTERECTOMY, BILATERAL SALPINGO-OOPHORECTOMY, EXTENSIVE ADHESIOLYSIS;  Surgeon: Peterson Mae MD;  [facility];  Service: Gynecology Oncology    NH UNLISTED PROCEDURE DIAPHRAGM  04/29/2022    Procedure: REPAIR DIAPHRAGM TEAR;  Surgeon: Yana Hebert MD;  [facility];  Service: Thoracic    SMALL INTESTINE SURGERY  02/04/2020    Procedure: RESECTION SMALL BOWEL;  Surgeon: WINNIE Pastor MD;  [facility];  Service: Colorectal     Family History   Problem Relation Age of Onset    Hypertension Mother     Heart attack Mother     Heart attack Father     Heart disease Father     Hypertension Brother     Heart attack Brother     Colon cancer Paternal Uncle     Leukemia Cousin     Breast cancer Cousin     Diabetes Cousin     Breast cancer Cousin     Colon cancer Family         paternal uncle    Stroke Neg Hx      Social History     Socioeconomic History    Marital status:      Spouse name: Not on file    Number of children: Not on file    Years of education: Not on file    Highest education level: Not on file   Occupational History    Not on file   Tobacco Use    Smoking status: Never    Smokeless tobacco: Never   Vaping Use    Vaping status: Never Used   Substance and Sexual Activity    Alcohol use: Yes     Comment: \"occasionally\"    Drug use: Never    Sexual activity: Not Currently   Other Topics Concern    Not on file   Social History Narrative    Not on file     Social Determinants of Health     Financial Resource Strain: Low Risk  (3/21/2023)    Overall Financial Resource Strain (CARDIA)     Difficulty of Paying Living Expenses: Not hard at all   Food Insecurity: No Food Insecurity (5/2/2022)    Hunger Vital Sign     Worried About Running Out of Food in the Last Year: Never true     Ran Out of Food in the Last Year: Never true   Transportation Needs: No Transportation Needs " "(3/21/2023)    PRAPARE - Transportation     Lack of Transportation (Medical): No     Lack of Transportation (Non-Medical): No   Physical Activity: Not on file   Stress: Not on file   Social Connections: Not on file   Intimate Partner Violence: Not on file   Housing Stability: Unknown (5/2/2022)    Housing Stability Vital Sign     Unable to Pay for Housing in the Last Year: No     Number of Places Lived in the Last Year: Not on file     Unstable Housing in the Last Year: No       Current Outpatient Medications:     Acetaminophen (TYLENOL 8 HOUR PO), Take by mouth PRN, Disp: , Rfl:     apixaban (Eliquis) 5 mg, Take 1 tablet (5 mg total) by mouth 2 (two) times a day, Disp: 60 tablet, Rfl: 5    ezetimibe (ZETIA) 10 mg tablet, Take 1 tablet (10 mg total) by mouth daily, Disp: 90 tablet, Rfl: 1    famotidine (PEPCID) 20 mg tablet, Take 1 tablet (20 mg total) by mouth 2 (two) times a day as needed for heartburn As needed, Disp: 90 tablet, Rfl: 1    lidocaine (Lidoderm) 5 %, Apply 1 patch topically over 12 hours daily Remove & Discard patch within 12 hours or as directed by MD, Disp: 12 patch, Rfl: 3    meclizine (ANTIVERT) 12.5 MG tablet, Take 1 tablet (12.5 mg total) by mouth 3 (three) times a day as needed for dizziness (Patient taking differently: Take 12.5 mg by mouth 3 (three) times a day as needed for dizziness PRN), Disp: 30 tablet, Rfl: 0    methylPREDNISolone 4 MG tablet therapy pack, Begin day of procedure; take as directed, Disp: 1 each, Rfl: 0    omeprazole (PriLOSEC) 40 MG capsule, Take 1 capsule (40 mg total) by mouth daily Begin 3 days before procedure, Disp: 30 capsule, Rfl: 0  Allergies   Allergen Reactions    Medical Tape Rash     Skin irritation  Skin peeling  Skin irritation  Skin peeling  Blisters  Rash     Vitals:    12/15/23 1101   BP: 140/78   BP Location: Left arm   Pulse: 78   Resp: 18   Temp: 98.7 °F (37.1 °C)   TempSrc: Temporal   SpO2: 99%   Weight: 95.3 kg (210 lb)   Height: 4' 9\" (1.448 m)    "

## 2023-12-18 NOTE — PROGRESS NOTES
Follow-up - Radiation Oncology   Itzel Sanches 1955 68 y.o. female [id number]      History of Present Illness   Cancer Staging   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HCC)  Staging form: Colon and Rectum, AJCC 8th Edition  - Clinical stage from 4/28/2022: cT3, cN0, pM1 - Signed by Jessie Freeman MD on 6/14/2023  Total positive nodes: 0  - Pathologic stage from 5/18/2022: Stage KRISTAL (pT3, pN0, pM1a) - Signed by Jessie Freeman MD on 6/14/2023  Total positive nodes: 0    Ms. Itzel Sanches is a 68 year old woman with Stage IV colorectal adenocarcinoma with oligometastatic disease recurrence along the L posterior chest wall and liver segment 6. On 11/10/23 she completed a course of SBRT to the left posterior chest wall to a dose of 4500cGy in 5 fractions. She returns today for follow-up.     Interval History:  The patient was last seen at the completion of SBRT. She tolerated treatment well overall without any notable side effects. She had improvement in her pain by the completion of SBRT requiring decreasing doses of Tylenol for relief.     Following completion of SBRT plan was for IR ablation of her liver segment 6 lesion. This was attempted on 11/15/23, however this was aborted due to poor positioning and increase in the size of the lesion. She returns today for follow-up and for discussion of Y-90 radioembolization.     Currently the patient is doing well overall. She has subjectively felt that the pain in her left back and the mass she had have both improved. She otherwise had no side effects from SBRT.       Historical Information   Oncology History   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HCC)   9/23/2019 Initial Diagnosis    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HCC)     9/23/2019 Biopsy    Rectal Mass ( 2 slides [id number] [facility] Pathology, Collected on 09/23/2019, biopsy):  - Adenocarcinoma     12/10/2019 Surgery    Low anterior resection (Dr Jose Pastor):    A. Rectosigmoid colon, low anterior resection:  - Adenocarcinoma, moderately  differentiated.   - Tumor invades through the muscularis propria into pericolorectal tissue, T3  - Diverticula.  - All margins are negative for tumor.  - Thirty-four lymph nodes, negative for malignancy (0/34).     B. Colon, anastomotic rings, resection:  - Two portions of colon with no pathologic abnormality     12/10/2019 Genomic Testing    RESULTS OF IMMUNOHISTOCHEMICAL ANALYSIS FOR MISMATCH REPAIR PROTEIN LOSS  INTERPRETATION: NO LOSS OF NUCLEAR EXPRESSION OF MMR PROTEINS: LOW PROBABILITY OF MSI-H  Note: Background non-neoplastic tissue and/or internal control with intact nuclear expression.      RESULTS:  Antibody          Clone               Description                           Results  MLH1               M1                  Mismatch repair protein       Intact nuclear expression  MSH2              [id number]       Mismatch repair protein       Intact nuclear expression  MSH6              44                   Mismatch repair protein       Intact nuclear expression  PMS2              [id number]         Mismatch repair protein       Intact nuclear expression     2/4/2020 Surgery    Ileostomy, takedown:  - Portion of small intestine with mucocutaneous anastomosis consistent with stoma.  - Negative for malignancy     8/18/2021 Progression    CEA trends- observed by Dr. Juarez  2/17/21: 2.9  8/18/2021: 4.5  9/13/2021: 4.4    PET/CT  9/28/2021 completed due to elevating CEA  1. There is a large left paracolic gutter markedly hypermetabolic mass immediately inferior to the spleen, most consistent with metastatic colorectal carcinoma.  2. Mildly increased activity at the right abdominal bowel anastomotic site.  If not recently performed, direct visualization is recommended  3. There are no other glucose avid abnormalities identified within the abdomen or pelvis  4. No evidence of distant glucose avid metastatic disease in the neck, chest, or skeleton      11/12/2021 Biopsy    Omental mass:  - Metastatic adenocarcinoma,  with an immunophenotype compatible with metastasis from patient's known colonic primary.     12/30/2021 - 12/30/2021 Chemotherapy    CapOx x 1 dose of Oxaliplatin: D/c due to tolerance.      1/2/2022 Genomic Testing         1/4/2022 - 3/17/2022 Chemotherapy    First 6 cycles of Folfox given prior to surgery- 1/4 through 3/17/2022     Folfox was dose reduced due to anticipated tolerance.  Minimal side effects     3/22/2022 Observation    CT CAP  1.  Decreased size of biopsy-proven omental metastasis, which now only focally contacting rather than grossly invading the spleen and adjacent abdominal wall. No new evidence of metastatic disease in the abdomen or pelvis.  2.  No new or suspicious pulmonary nodules. One of the previously noted new 1-2 mm nodules is no longer seen, and the other is unchanged. Recommend continued attention on follow-up.  3.  Top-normal thickness endometrial stripe measuring 7 mm. If there is history of vaginal bleeding, suggest catheterization with endometrial biopsy.  4.  Hepatic steatosis.     4/28/2022 -  Cancer Staged    Staging form: Colon and Rectum, AJCC 8th Edition  - Clinical stage from 4/28/2022: cT3, cN0, pM1 - Signed by Jessie Freeman MD on 6/14/2023  Total positive nodes: 0       4/29/2022 Surgery    Lydia Shaw and Anup preformed the following procedures:   DIAGNOSTIC LAPAROSCOPY, TOTAL ABDOMINAL HYSTERECTOMY, BILATERAL SALPINGO-OOPHORECTOMY, EXTENSIVE ADHESIOLYSIS (N/A)  DIAGNOSTIC LAPAROSCOPY, OPEN OMENTECTOMY, POSSIBLE SPLENECTOMY (N/A)  INSTILLATION CHEMOTHERAPY INTRAPERITONEAL (HIPEC) LAPAROTOMY (N/A)  REPAIR DIAPHRAGM TEAR      A. Uterus, Bilateral Ovaries and Fallopian Tubes; Hysterosalpingo-oophorectomy:  - Leiomyoma.  - Left ovary with serous cystadenoma.  - Unremarkable cervix.  - Benign inactive endometrium with no specific pathologic change.  - Unremarkable right ovary and bilateral fallopian tubes.     B. Spleen, spleen, omentum, darotis:  - Metastatic  "adenocarcinoma involving spleen and omentum, consistent with colonic primary. See Note.    Note: Comparison to prior material ([id number], omental mass) reveals identical morphology to previous metastatic colonic adenocarcinoma.          5/18/2022 -  Cancer Staged    Staging form: Colon and Rectum, AJCC 8th Edition  - Pathologic stage from 5/18/2022: Stage KRISTAL (pT3, pN0, pM1a) - Signed by Jessie Freeman MD on 6/14/2023  Total positive nodes: 0       6/14/2022 - 8/23/2022 Chemotherapy    7th cycle started on 6/14/2022  8th cycle worsening neuropathy; could not tolerate gabapentin  D/lizette oxaliplatin  9th cycle Irinotecan added at 85% dose reduction: SE Diarrhea  10th cycle Irinotecan dose redcued to 50%     9/19/2022 Observation    9/19/2022 CT CAP:   1.  Previously seen omental metastasis in the left upper quadrant has been resected.  There is a small area of nodular soft tissue thickening abutting the lateral aspect of the left kidney, cannot exclude residual/recurrent tumor.  Follow up in 4 months CEA not completed at time of scan.      9/13/2023 Biopsy    Mass, \"Chest wall mass 4 cores,\" Biopsy:  - Metastatic moderately differentiated adenocarcinoma, consistent with known colonic primary      10/31/2023 - 11/10/2023 Radiation    Treatments:  Course: C1 SBRT    Plan ID Energy Fractions Dose per Fraction (cGy) Dose Correction (cGy) Total Dose Delivered (cGy) Elapsed Days   SBRT L CW 6X-FFF 5 / 5 900 0 4,500 10      Treatment Dates:  10/31/2023 - 11/10/2023.      Omental metastasis   9/23/2019 Biopsy    Rectal Mass ( 2 slides [id number] [facility] Pathology, Collected on 09/23/2019, biopsy):  - Adenocarcinoma     2/4/2020 Surgery    Ileostomy, takedown:  - Portion of small intestine with mucocutaneous anastomosis consistent with stoma.  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    Colon polyp     GERD (gastroesophageal reflux disease)     History of chemotherapy 2021    History of rectal surgery     Hyperlipidemia     PONV (postoperative nausea and vomiting)      Past Surgical History:   Procedure Laterality Date    BREAST CYST EXCISION Right      SECTION      x3    COLON SIGMOID RESECTION      COLONOSCOPY      DILATION AND CURETTAGE OF UTERUS      ILEO LOOP DIVERSION N/A 12/10/2019    Procedure: ILEOSTOMY LOOP;  Surgeon: WINNIE Pastor MD;  [facility]: [facility];  Service: Robotics    INSTILLATION CHEMOTHERAPY INTRAPERITONEAL (HIPEC) LAPAROTOMY N/A 2022    Procedure: INSTILLATION CHEMOTHERAPY INTRAPERITONEAL (HIPEC) LAPAROTOMY;  Surgeon: Jessie Freeman MD;  Location: [facility];  Service: Surgical Oncology    IR BIOPSY CHEST WALL  2023    IR BIOPSY OMENTUM  2021    IR PORT PLACEMENT  2021    OMENTECTOMY N/A 2022    Procedure: DIAGNOSTIC LAPAROSCOPY, OPEN OMENTECTOMY, SPLENECTOMY, DIAPHRAGM REPAIR, CHEST TUBE INSERTION;  Surgeon: Jessie Freeman MD;  [facility];  Service: Surgical Oncology    DC CLOSURE ENTEROSTOMY LG/SMALL INTESTINE N/A 2020    Procedure: CLOSURE ILEOSTOMY;  Surgeon: WINNIE Pastor MD;  [facility];  Service: Colorectal    DC LAPAROSCOPY COLECTOMY PARTIAL W/ANASTOMOSIS N/A 12/10/2019    Procedure: SIGMOID RESECTION COLON LAPAROSCOPIC W ROBOTICS converted to lap hand assisted  with Partial proctectomy , LASER FLUORESCENCE ANGIOGRAPHY, INTRA OP COLONOSCOPY;  Surgeon: WINNIE Pastor MD;  [facility];  Service: Robotics    DC TOTAL ABDOMINAL HYSTERECT W/WO RMVL TUBE OVARY N/A 2022    Procedure: DIAGNOSTIC LAPAROSCOPY, TOTAL ABDOMINAL HYSTERECTOMY, BILATERAL SALPINGO-OOPHORECTOMY, EXTENSIVE  "ADHESIOLYSIS;  Surgeon: Peterson Mae MD;  [facility];  Service: Gynecology Oncology    OR UNLISTED PROCEDURE DIAPHRAGM  04/29/2022    Procedure: REPAIR DIAPHRAGM TEAR;  Surgeon: Yana Hebert MD;  [facility];  Service: Thoracic    SMALL INTESTINE SURGERY  02/04/2020    Procedure: RESECTION SMALL BOWEL;  Surgeon: WINNIE Pastor MD;  [facility];  Service: Colorectal       Social History   Social History     Substance and Sexual Activity   Alcohol Use Yes    Comment: \"occasionally\"     Social History     Substance and Sexual Activity   Drug Use Never     Social History     Tobacco Use   Smoking Status Never   Smokeless Tobacco Never         Meds/Allergies     Current Outpatient Medications:     Acetaminophen (TYLENOL 8 HOUR PO), Take by mouth PRN, Disp: , Rfl:     apixaban (Eliquis) 5 mg, Take 1 tablet (5 mg total) by mouth 2 (two) times a day, Disp: 60 tablet, Rfl: 5    ezetimibe (ZETIA) 10 mg tablet, Take 1 tablet (10 mg total) by mouth daily, Disp: 90 tablet, Rfl: 1    famotidine (PEPCID) 20 mg tablet, Take 1 tablet (20 mg total) by mouth 2 (two) times a day as needed for heartburn As needed, Disp: 90 tablet, Rfl: 1    lidocaine (Lidoderm) 5 %, Apply 1 patch topically over 12 hours daily Remove & Discard patch within 12 hours or as directed by MD, Disp: 12 patch, Rfl: 3    meclizine (ANTIVERT) 12.5 MG tablet, Take 1 tablet (12.5 mg total) by mouth 3 (three) times a day as needed for dizziness (Patient taking differently: Take 12.5 mg by mouth 3 (three) times a day as needed for dizziness PRN), Disp: 30 tablet, Rfl: 0    ondansetron (Zofran ODT) 8 mg disintegrating tablet, Take 1 tablet (8 mg total) by mouth every 8 (eight) hours as needed for nausea or vomiting, Disp: 20 tablet, Rfl: 0    methylPREDNISolone 4 MG tablet therapy pack, Begin day of procedure; take as directed, Disp: 1 each, Rfl: 0    omeprazole (PriLOSEC) 40 MG capsule, Take 1 capsule (40 mg total) by " "mouth daily Begin 3 days before procedure, Disp: 30 capsule, Rfl: 0  Allergies   Allergen Reactions    Medical Tape Rash     Skin irritation  Skin peeling  Skin irritation  Skin peeling  Blisters  Rash     Review of Systems   Constitutional:  Positive for fatigue.   HENT:  Positive for postnasal drip.    Eyes: Negative.    Cardiovascular: Negative.    Gastrointestinal:  Positive for diarrhea (loose stools some days). Negative for nausea and vomiting.   Endocrine: Negative.    Genitourinary: Negative.    Musculoskeletal:  Positive for arthralgias (generalized).   Skin: Negative.    Allergic/Immunologic: Negative.    Neurological: Negative.    Hematological: Negative.    Psychiatric/Behavioral: Negative.          OBJECTIVE:   /78 (BP Location: Left arm)   Pulse 78   Temp 98.7 °F (37.1 °C) (Temporal)   Resp 18   Ht 4' 9\" (1.448 m)   Wt 95.3 kg (210 lb)   LMP 09/01/2011 (Approximate)   SpO2 99%   BMI 45.44 kg/m²   Pain Assessment:  0  ECOG/Zubrod/WHO: 1 - Symptomatic but completely ambulatory    Physical Exam   Well appearing. NAD.   No increased work of breathing.   Extremities warm and well perfused.     RESULTS    Lab Results:   Recent Results (from the past 672 hour(s))   [facility] MI Tumor Seek Hybrid™ + IHCs and Other Tests by Tumor Type (MI Profile Comprehensive Testing)    Collection Time: 11/21/23 10:34 AM   Result Value Ref Range    CARIS Mismatch Repair Status Proficient (Intact)     CARIS Genomic Loss of Heterozygosity - Exome Equivocal 15%     CARIS Microsatellite Instability - Exome Stable     CARIS Tumor Mutational [place] - Exome Low 4 per Mb     CARIS Her2/Beto Negative     CARIS PTEN Positive     CARIS PD-L1 () Negative     CARIS MLH1 Intact nuclear expression     CARIS MSH2 Intact nuclear expression     CARIS MSH6 Intact nuclear expression     CARIS PMS2 Intact nuclear expression        Imaging Studies:No results found.        Assessment/Plan:  Orders Placed This Encounter   Procedures    " "MRI abdomen w wo contrast      Approximately 5 weeks following completion of left chest wall SBRT the patient is doing well overall and has seen significant improvement in her symptoms. She has had reduction in her pain and has seen a subjective decrease in the size of her posterior chest wall mass. Unfortunately she was not able to undergo IR ablation as planned and is seen for further consideration of liver directed therapy. We again discussed the purpose of total metastatic ablation with metastatic colorectal cancer, noting this such approaches have been shown to lead to prolonged periods free of disease progression. For her liver segment 6 lesion I feel that either Y-90 segmentectomy or SBRT would be appropriate. Final decision between these options could be made following Y-90 mapping; if the lesion maps well we will proceed with Y-90. If multiple feeding vessels are identified or if there is a high shunt fraction we can proceed with SBRT.     The advantages and disadvantages of Y-90 vs SBRT were reviewed in detail. Side effects were discussed including fatigue, radiation related liver injury, skin irritation, chest wall pain, gastritis, pneumonitis, and pancreatitis.     Prior to proceeding with any therapy I have recommended repeat MRI Abdomen to ensure she still only has a single site of disease in the liver. I will arrange for this in the near future. I will coordinate further with IR thereafter.     Brown Grubbs MD  12/18/2023,11:34 AM    Portions of the record may have been created with voice recognition software.  Occasional wrong word or \"sound a like\" substitutions may have occurred due to the inherent limitations of voice recognition software.  Read the chart carefully and recognize, using context, where substitutions have occurred.        "

## 2023-12-21 ENCOUNTER — OFFICE VISIT (OUTPATIENT)
Dept: HEMATOLOGY ONCOLOGY | Facility: CLINIC | Age: 68
End: 2023-12-21
Payer: MEDICARE

## 2023-12-21 VITALS
HEART RATE: 80 BPM | OXYGEN SATURATION: 99 % | WEIGHT: 207.5 LBS | DIASTOLIC BLOOD PRESSURE: 84 MMHG | BODY MASS INDEX: 44.77 KG/M2 | RESPIRATION RATE: 17 BRPM | HEIGHT: 57 IN | SYSTOLIC BLOOD PRESSURE: 134 MMHG | TEMPERATURE: 98.1 F

## 2023-12-21 DIAGNOSIS — C78.7 METASTASES TO THE LIVER (HCC): Primary | ICD-10-CM

## 2023-12-21 DIAGNOSIS — C19 RECTOSIGMOID CANCER (HCC): ICD-10-CM

## 2023-12-21 PROCEDURE — 99214 OFFICE O/P EST MOD 30 MIN: CPT | Performed by: INTERNAL MEDICINE

## 2023-12-21 NOTE — H&P (VIEW-ONLY)
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     Recurrent rectosigmoid adenocarcinoma.  Patient was seen/evaluated by Dr. Freeman surgical oncology.  Patient received 3 months of preoperative FOLFOX.  Follow-up CT scans demonstrated response to treatment with no evidence of progressive disease.  Patient then underwent resection of the omental mass and spleen as well as DILMA/BSO (see below).  Mrs. Sanches was then restarted on FOLFOX.  Patient had problems with neuropathy; FOLFOX change to FOLFIRI.       The 12th/final cycle of chemotherapy was completed on August 23, 2022. CT scan in May 2023 showed a new lesion to the right hepatic lobe. MRI abdomen in July 2023 confirmed liver metastasis and revealed a new, posterior left chest wall/pleural lesion suspicious for metastasis. The chest wall mass was biopsied on 9/13/2023 revealing metastatic moderately differentiated adenocarcinoma consistent with known colonic primary.     Mrs. Sanches was seen/evaluated by Dr. Brown Grubbs (Radiation Oncology) and patient has received SBRT (left-sided lower rib cage/upper abdomen).  Mrs. Sanches is now scheduled for Y90; mapping is happening next week.  Patient is tentatively scheduled for mid January 2024.    Mrs. Lovett was also recently presented at the GI tumor board.  The consensus was to send for circulating tumor cells.  If positive, there will be consideration for restarting systemic treatment.  If not, patient will continue with surveillance.    Ovarian lesion.  MRI/pelvis in October 2021 demonstrated a multi septated left ovarian cystic lesion.  Transabdominal ultrasound results did not demonstrate any concerning  abnormality.  Patient underwent DILMA/BSO.  Pathology results are listed below - no evidence of malignancy. Patient was last seen by Gynecologic Oncology in June 2022, and was advised to return for follow-up as needed.     Genetics.  Patient has a complicated family history but multiple family members with colon/rectal cancer.  Patient can be seen by Oncology genetics in the future.     Bilateral lower extremity swelling. Patient has a history of lower extremity DVTs, is on Eliquis.  To be sure, Dopplers have been reordered.    Mrs. Sanches is to return in 6 weeks (this will change if the CTC results are positive).  CEA has been ordered for now.  Patient knows to call if she has any other questions or concerns.    Carefully review your medication list and verify that the list is accurate and up-to-date. Please call the hematology/oncology office if there are medications missing from the list, medications on the list that you are not currently taking or if there is a dosage or instruction that is different from how you're taking that medication.     Patient goals and areas of care: Follow-up with radiation oncology/IR, Y90, CTC results in process  Barriers to care:  None  Patient is able to self-care  ______________________________________________________________________________________     Chief complaint: Rectosigmoid carcinoma, omental recurrence     History of Present Illness:  68 year old female previously diagnosed with rectosigmoid carcinoma status post resection in September 2019.  Postoperatively patient did well; Mrs. Sanches did not need/receive adjuvant chemotherapy. Surveillance CEA level was found to be elevated in June 2022.  Patient underwent workup.  Results demonstrated an intra-abdominal mass by the spleen. Patient underwent neoadjuvant chemotherapy with FOLFOX, with follow-up scans demonstrating response. She then underwent resection, followed by postoperative chemotherapy with FOLFOX initially.  FOLFOX was changed to FOLFIRI due to peripheral neuropathy. Treatment was completed in August 2022.      CT scan in May 2023 showed a new lesion to the right hepatic lobe. MRI abdomen in July 2023 confirmed liver metastasis and revealed a new, posterior left chest wall/pleural lesion suspicious for metastasis. The chest wall mass was biopsied on 9/13/2023 revealing adenocarcinoma consistent with known colonic primary (see Pathology . She was seen/evaluated by Radiation Oncology, with a tentative plan for SBRT to the left chest wall. IR ablation of the liver lesion is planned with Dr. Christiansen. CT on 10/9/2023 revealed enlarging rib metastasis and stable liver nodule.      Patient patiently completed SBRT to the right rib lesion.  Presently Mrs. Sanches is feeling okay, baseline.  No nausea or vomiting.  Appetite is good.  No fevers no recent hospitalizations.  No pain control issues.  Activities are baseline.    Review of Systems   Constitutional: Negative for activity change, appetite change and fatigue.   HENT: Negative.    Eyes: Negative.    Respiratory: Negative.    Cardiovascular: Negative.    Gastrointestinal: Negative for nausea and vomiting.   Endocrine: Negative.    Genitourinary: Negative.    Musculoskeletal: Negative.    Skin: Negative.    Allergic/Immunologic: Negative.    Neurological: Negative.    Hematological: Negative.    Psychiatric/Behavioral: Negative.    All other systems reviewed and are negative.         Patient Active Problem List   Diagnosis    Callus of foot    Foot pain    GERD (gastroesophageal reflux disease)    Mixed hyperlipidemia    Prediabetes    Varicose veins with pain    Morbid obesity (HCC)    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HCC)    Dyspnea on exertion    Dyslipidemia    Sigmoid diverticulosis    PONV (postoperative nausea and vomiting)    Omental metastasis    Lesion of ovary    Chemotherapy adverse reaction    Chemotherapy-induced nausea    Platelets decreased (HCC)    Stage 3 chronic  kidney disease, unspecified whether stage 3a or 3b CKD (HCC)    H/O splenectomy    Asplenia    Postoperative state    Acute embolism and thrombosis of right tibial vein (HCC)      Medical History        Past Medical History:   Diagnosis Date    Blood in stool      Breast disorder      Cancer (HCC)       rectal    Cancer determined by colorectal biopsy (HCC)       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    Colon polyp      GERD (gastroesophageal reflux disease)      History of chemotherapy 2021    History of rectal surgery      Hyperlipidemia      PONV (postoperative nausea and vomiting)           Surgical History         Past Surgical History:   Procedure Laterality Date    BREAST CYST EXCISION Right       SECTION         x3    COLON SIGMOID RESECTION        COLONOSCOPY        DILATION AND CURETTAGE OF UTERUS        ILEO LOOP DIVERSION N/A 12/10/2019     Procedure: ILEOSTOMY LOOP;  Surgeon: WINNIE Pastor MD;  [facility];  Service: Robotics    INSTILLATION CHEMOTHERAPY INTRAPERITONEAL (HIPEC) LAPAROTOMY N/A 2022     Procedure: INSTILLATION CHEMOTHERAPY INTRAPERITONEAL (HIPEC) LAPAROTOMY;  Surgeon: Jessie Freeman MD;  [facility]: [facility];  Service: Surgical Oncology    IR BIOPSY CHEST WALL   2023    IR BIOPSY OMENTUM   2021    IR PORT PLACEMENT   2021    OMENTECTOMY N/A 2022     Procedure: DIAGNOSTIC LAPAROSCOPY, OPEN OMENTECTOMY, SPLENECTOMY, DIAPHRAGM REPAIR, CHEST TUBE INSERTION;  Surgeon: Jessie Freeman MD;  [facility];  Service: Surgical Oncology    WA CLOSURE ENTEROSTOMY LG/SMALL INTESTINE N/A 2020     Procedure: CLOSURE ILEOSTOMY;  Surgeon: WINNIE Pastor MD;  [facility];  Service: Colorectal    WA LAPAROSCOPY COLECTOMY PARTIAL W/ANASTOMOSIS N/A 12/10/2019     Procedure: SIGMOID RESECTION COLON LAPAROSCOPIC W ROBOTICS converted to lap hand assisted  with Partial proctectomy , LASER FLUORESCENCE ANGIOGRAPHY, INTRA OP COLONOSCOPY;  Surgeon: WINNIE  Too Pastor MD;  [facility];  Service: Robotics    NV TOTAL ABDOMINAL HYSTERECT W/WO RMVL TUBE OVARY N/A 04/29/2022     Procedure: DIAGNOSTIC LAPAROSCOPY, TOTAL ABDOMINAL HYSTERECTOMY, BILATERAL SALPINGO-OOPHORECTOMY, EXTENSIVE ADHESIOLYSIS;  Surgeon: Peterson Mae MD;  [facility];  Service: Gynecology Oncology    NV UNLISTED PROCEDURE DIAPHRAGM   04/29/2022     Procedure: REPAIR DIAPHRAGM TEAR;  Surgeon: Yana Hebert MD;  [facility];  Service: Thoracic    SMALL INTESTINE SURGERY   02/04/2020     Procedure: RESECTION SMALL BOWEL;  Surgeon: WINNIE Pastor MD;  [facility];  Service: Colorectal      Past surgical history:  No prior blood transfusions     OBGYN:  No breast issues, no abnormal mammograms previously, no postmenopausal bleeding, no other GYN issues     Family History         Family History   Problem Relation Age of Onset    Hypertension Mother      Heart attack Mother      Heart attack Father      Heart disease Father      Hypertension Brother      Heart attack Brother      Colon cancer Paternal Uncle      Leukemia Cousin      Breast cancer Cousin      Diabetes Cousin      Breast cancer Cousin      Colon cancer Family           paternal uncle    Stroke Neg Hx        Family history:  Somewhat complicated, mother with colostomy but etiology for this is unclear, paternal uncle with rectal cancer, patient has to first-degree relatives on the paternal side with history of colon cancer, 3 sons and 1 grandchild in good general health     Social History               Socioeconomic History    Marital status:        Spouse name: Not on file    Number of children: Not on file    Years of education: Not on file    Highest education level: Not on file   Occupational History    Not on file   Tobacco Use    Smoking status: Never    Smokeless tobacco: Never   Vaping Use    Vaping Use: Never used   Substance and Sexual Activity    Alcohol use: Yes     "   Comment: \"occasionally\"    Drug use: Never    Sexual activity: Not Currently   Other Topics Concern    Not on file   Social History Narrative    Not on file      Social Determinants of Health           Financial Resource Strain: Low Risk  (3/21/2023)     Overall Financial Resource Strain (CARDIA)      Difficulty of Paying Living Expenses: Not hard at all   Food Insecurity: No Food Insecurity (5/2/2022)     Hunger Vital Sign      Worried About Running Out of Food in the Last Year: Never true      Ran Out of Food in the Last Year: Never true   Transportation Needs: No Transportation Needs (3/21/2023)     PRAPARE - Transportation      Lack of Transportation (Medical): No      Lack of Transportation (Non-Medical): No   Physical Activity: Not on file   Stress: Not on file   Social Connections: Not on file   Intimate Partner Violence: Not on file   Housing Stability: Unknown (5/2/2022)     Housing Stability Vital Sign      Unable to Pay for Housing in the Last Year: No      Number of Places Lived in the Last Year: Not on file      Unstable Housing in the Last Year: No      Social history:  No tobacco, alcohol or drug abuse, no toxic exposure     Current Outpatient Medications:     Acetaminophen (TYLENOL 8 HOUR PO), Take by mouth PRN, Disp: , Rfl:     apixaban (Eliquis) 5 mg, Take 1 tablet (5 mg total) by mouth 2 (two) times a day, Disp: 60 tablet, Rfl: 5    ezetimibe (ZETIA) 10 mg tablet, Take 1 tablet (10 mg total) by mouth daily, Disp: 90 tablet, Rfl: 1    famotidine (PEPCID) 20 mg tablet, Take 1 tablet (20 mg total) by mouth 2 (two) times a day as needed for heartburn As needed, Disp: 90 tablet, Rfl: 1    meclizine (ANTIVERT) 12.5 MG tablet, Take 1 tablet (12.5 mg total) by mouth 3 (three) times a day as needed for dizziness (Patient taking differently: Take 12.5 mg by mouth 3 (three) times a day as needed for dizziness PRN), Disp: 30 tablet, Rfl: 0           Allergies   Allergen Reactions    Medical Tape Rash       " Skin irritation  Skin peeling  Skin irritation  Skin peeling  Blisters  Rash          Vitals:     10/10/23 1356   BP: 136/76   Pulse: 78   Resp: 17   Temp: 98 °F (36.7 °C)   SpO2: 98%      Physical Exam  Constitutional:       Appearance: She is well-developed. She is obese.   HENT:      Head: Normocephalic and atraumatic.      Right Ear: External ear normal.      Left Ear: External ear normal.   Eyes:      Extraocular Movements: Extraocular movements intact.      Conjunctiva/sclera: Conjunctivae normal.      Pupils: Pupils are equal, round, and reactive to light.   Cardiovascular:      Rate and Rhythm: Normal rate and regular rhythm.      Heart sounds: Normal heart sounds.   Pulmonary:      Effort: Pulmonary effort is normal.      Breath sounds: Normal breath sounds.   Abdominal:      General: Bowel sounds are normal. There is no distension.      Palpations: Abdomen is soft.      Tenderness: There is no abdominal tenderness. There is no guarding.   Musculoskeletal:         General: Normal range of motion.      Right lower leg: No edema.      Left lower leg: No edema.   Lymphadenopathy:      Cervical: No cervical adenopathy.      Upper Body:      Right upper body: No supraclavicular or axillary adenopathy.      Left upper body: No supraclavicular or axillary adenopathy.   Skin:     General: Skin is warm and dry.      Findings: No bruising, ecchymosis or petechiae.   Neurological:      General: No focal deficit present.      Mental Status: She is alert and oriented to person, place, and time.   Psychiatric:         Mood and Affect: Mood normal.         Behavior: Behavior normal.         Judgment: Judgment normal.   Extremities: Bilateral lower extremity edema, 1+, no cords, pulses are 1+     Laboratory    11/15/2023 WBC = 6.0 hemoglobin = 11 hematocrit = 34 platelet = 247 neutrophil = 67%          9/25/2023 WBC = 8.75 hemoglobin = 13.3 hematocrit = 41 platelet = 314 neutrophil = 53% BUN = 16 creatinine = 1.01 calcium  = 9.4 LFTs WNL     9/25/2023 WBC = 8.75 hemoglobin = 13.3 hematocrit = 40.9 MCV = 91 platelet = 314 neutrophil = 53% BUN = 16 creatinine = 1.01 calcium = 9.4 LFTs WNL total bilirubin = 0.76 CEA 6.2  08/08/2022 WBC = 4.57 hemoglobin = 10.0 hematocrit = 31.3 MCV = 87 platelet = 292 neutrophil = 50% BUN = 21 creatinine = 1.35 calcium = 9.1 LFTs WNL total bilirubin = 1.31    Procedures     10/06/2021 colonoscopy     FINDINGS:  Healthy end-to-end colorectal anastomosis with no bleeding in the mid rectum  All observed locations appeared normal, including the cecum, ascending colon, transverse colon, splenic flexure and descending colon. A couple scattered diverticuli seen     Imaging     10/09/2023 CT chest abdomen pelvis     IMPRESSION:     Enlarging 6.8 x 4.4 x 4.1 cm T11 rib metastasis.     Stable ill-defined hypovascular nodule in the right lobe of the liver which remains concerning for metastasis.     Cholelithiasis.     09/13/2023 IR biopsy chest wall     IMPRESSION:  Successful ultrasound guided core biopsy of the left posterior pleural-based mass.     07/24/2023 MRI abdomen      IMPRESSION:     16 mm segment 6 hepatic lesion unchanged from the CT suspicious for metastasis.     New posterior left chest wall/pleural lesion measuring 2.7 x 2.3 cm also suspicious for metastasis.     05/30/2023 CT chest abdomen pelvis     IMPRESSION:     No evidence of acute intrathoracic pathology.     New 7 mm hypodensity of the right hepatic lobe which was not present on prior examinations. Contrast enhanced abdominal MRI recommended for further evaluation.     Nodular soft tissue adjacent to the left upper renal capsule and prior splenectomy site is stable     01/27/2023 CT chest abdomen pelvis     IMPRESSION:     No definite evidence for metastatic disease involving the chest, abdomen, or pelvis.     Note is made of stable nodular soft tissue about the lateral aspect of the left upper renal pole which could reflect postsurgical  changes related to prior splenectomy although continued short interval follow-up is recommended to exclude   residual/recurrent tumor in this location.     07/25/2022 vascular lower limb venous duplex study     RIGHT LOWER LIMB:  Acute mostly deep vein thrombosis in the paired posterior tibial veins  throughout the calf.  No evidence of superficial thrombophlebitis noted.  Popliteal, posterior tibial and anterior tibial arterial Doppler waveforms are  triphasic.     LEFT LOWER LIMB:  No evidence of acute or chronic deep vein thrombosis.  No evidence of superficial thrombophlebitis noted.  Doppler evaluation shows a normal response to augmentation maneuvers.  Popliteal, posterior tibial and anterior tibial arterial Doppler waveforms are  triphasic.     03/22/2022 CT scan chest abdomen pelvis with contrast     ABDOMINOPELVIC CAVITY: Left upper quadrant biopsy-proven omental metastasis has decreased in size now measuring 4.1 x 2.5 x 2.7 cm (previously 5.3 x 4.5 x 4.3 cm), and now only focally contacts rather than grossly invades the spleen and intercostal soft   tissues, on series 2/54, 601/71. Adjacent omental nodularity is less conspicuous. No new evidence of metastatic disease in the abdomen or pelvis. No lymphadenopathy. No ascites or intraperitoneal free air.     IMPRESSION:     1.  Decreased size of biopsy-proven omental metastasis, which now only focally contacting rather than grossly invading the spleen and adjacent abdominal wall. No new evidence of metastatic disease in the abdomen or pelvis.  2.  No new or suspicious pulmonary nodules. One of the previously noted new 1-2 mm nodules is no longer seen, and the other is unchanged. Recommend continued attention on follow-up.  3.  Top-normal thickness endometrial stripe measuring 7 mm. If there is history of vaginal bleeding, suggest catheterization with endometrial biopsy.  4.  Hepatic steatosis.     11/11/2021 ultrasound pelvis transabdominal only     Cluster  of anechoic cystic areas seen replacing the left ovary similar to MRI largest measuring 8 mm compared to 1.3 cm.  Based on the ACR O-RADS system, this is O-RADS category 2 (almost certain benign with <1% risk of malignancy.) The management recommendation is followup in 1 year.     10/25/2021 MRI pelvis rectal cancer staging     1.  No recurrent or metastatic disease in the pelvis.  Please refer to the separately dictated MRI abdomen report regarding mesenteric mass in the left upper quadrant.     2.  Multi septated cystic lesion versus cluster of small cysts in the left ovary measuring 2.6 x 2.5 x 1.9 cm, increased in size from October 2019.  Further characterization with pelvic ultrasound is recommended.     10/25/2021 MRI abdomen     1.  5.2 cm mesenteric mass in the left upper quadrant with thickening of peritoneal reflection and invasion of the spleen, similar to prior PET/CT.  This is highly suspicious for metastatic tumor implant.  2.  No additional potential sites of metastasis in the abdomen.  3.  Moderately distended gallbladder with gallstones and probable adenomyomatosis.     09/28/2021 PET-CT     1. There is a large left paracolic gutter markedly hypermetabolic mass immediately inferior to the spleen, most consistent with metastatic colorectal carcinoma.  2. Mildly increased activity at the right abdominal bowel anastomotic site.  If not recently performed, direct visualization is recommended  3. There are no other glucose avid abnormalities identified within the abdomen or pelvis  4. No evidence of distant glucose avid metastatic disease in the neck, chest, or skeleton      Pathology    9/13/2023 [facility] results on the chart.  Patient had a K-fermin pathologic variant on  2 = lack of benefit of cetuximab or panitumumab.  Patient also had PIK3CA pathologic variant on  21.  No other actionable mutations, BRAF was not mutated, MSI was stable, mismatch repair proteins were proficient, tumor mutational burden was  "low, PD-L1 = 0% = negative, NTRK1/2/3 fusion not detected.         Case Report   Surgical Pathology Report                         Case: [id number]                                    Authorizing Provider:  Jessie Freeman MD       Collected:           09/13/2023 1146               Ordering Location:     [facility] Received:            09/13/2023 1225                                      Cardiac Cath Lab                                                              Pathologist:           Aristeo Rashid MD                                                            Specimen:    Chest Wall, chest wall mass, 4 cores                                                        Final Diagnosis   A. Mass, \"Chest wall mass 4 cores,\" Biopsy:  - Metastatic moderately differentiated adenocarcinoma, consistent with known colonic primary (see note)   Electronically signed by Aristeo Rashid MD on 9/18/2023 at  9:45 AM   Note     Immunohistochemistry was performed on block A1 with adequate controls. Tumor cells are positive for AE1/AE3, CAM5.2, CDX2, and SATB2. They are negative for CK20 and CK7.   Immunohistochemistry was performed on block A2 with adequate controls. Tumor cells are positive for AE1/AE3, Cam5.2, and negative for CK20.      Case Report   Surgical Pathology Report                         Case: [id number]                                    Authorizing Provider:  Peterson Mae,   Collected:           04/29/2022 1118                                      MD                                                                            Ordering Location:     [facility]      Received:            04/29/2022 2236                                      Hospital Operating Room                                                       Pathologist:           Roselyn King DO                                                      Specimens:   A) - Uterus w/Bilateral Ovaries and Fallopian Tubes         "                                          B) - Spleen, spleen, omentum, darotis                                                       Final Diagnosis   A. Uterus, Bilateral Ovaries and Fallopian Tubes; Hysterosalpingo-oophorectomy:  - Leiomyoma.  - Left ovary with serous cystadenoma.  - Unremarkable cervix.  - Benign inactive endometrium with no specific pathologic change.  - Unremarkable right ovary and bilateral fallopian tubes.     B. Spleen, spleen, omentum, darotis:  - Metastatic adenocarcinoma involving spleen and omentum, consistent with colonic primary. See Note.   Electronically signed by Roselyn King DO on 5/12/2022 at  2:49 PM      Note     Note B: Comparison to prior material ([id number], omental mass) reveals identical morphology to previous metastatic colonic adenocarcinoma.            Case Report   Surgical Pathology Report                         Case: [id number]                                    Authorizing Provider:  Sohail Grubbs MD           Collected:           11/12/2021 0921               Ordering Location:     [facility] Received:            11/12/2021 0941                                      CAT Scan                                                                      Pathologist:           Pamela Ramos MD                                                         Specimen:    Omentum, Omental mass                                                                       Final Diagnosis   A. Omentum, Omental mass:  - Metastatic adenocarcinoma, with an immunophenotype compatible with metastasis from patient's known colonic primary.  - See note.     Note: Tumor is positive with CK20, CDX2 and negative with CK7, PAX8. This immunophenotype supports the above interpretation.  Best representative block with tumor A5.     This case was reviewed at the intradepartmental  conference.   Dr. Grubbs is notified of the diagnosis in EPIC via [employer] on 11/17/2021   at 8.45 am.   Electronically signed by Pamela Ramos MD on 11/17/2021 at  8:53 AM                Case Report   Surgical Pathology Report                         Case: [id number]                                    Authorizing Provider:  WINNIE Pastor MD       Collected:           12/10/2019 1159               Ordering Location:     [facility]      Received:            12/10/2019 [address] [place] [address] Operating Room                                                       Pathologist:           Osman Beltran MD                                                                  Specimens:   A) - Large Intestine, Sigmoid Colon, and portion of rectum                                           B) - Anastomatic site, anastamotic rings                                                    Addendum   RRESULTS OF IMMUNOHISTOCHEMICAL ANALYSIS FOR MISMATCH REPAIR PROTEIN LOSS  INTERPRETATION: NO LOSS OF NUCLEAR EXPRESSION OF MMR PROTEINS: LOW PROBABILITY OF MSI-H  Note: Background non-neoplastic tissue and/or internal control with intact nuclear expression.      RESULTS:  Antibody          Clone               Description                           Results  MLH1               M1                  Mismatch repair protein       Intact nuclear expression  MSH2              [id number]       Mismatch repair protein       Intact nuclear expression  MSH6              44                   Mismatch repair protein       Intact nuclear expression  PMS2              [id number]         Mismatch repair protein       Intact nuclear expression     Comment:  A negative control and a positive control for each antibody have been reviewed and accepted.  [facility] Specimen ID: [id number], Evaluator:  JOSÉ MIGUEL Perales MD  These tests were developed and their performance characteristics determined by [facility].  They may not be cleared or approved by the U.[place]. Food and Drug Administration.  The FDA determined that such  clearance or approval is not necessary.  These tests are used for clinical purposes.  They should not be regarded as investigational or for research.  This laboratory has been approved by [place] [id number], designated as a high-complexity laboratory and is qualified to perform these tests.     Comments: Patients whose tumors demonstrate lack of expression of one or more DNA mismatch repair proteins might be at risk for Bee Syndrome. This cancer susceptibility syndrome greatly increases the risk of synchronous and/or metachronous cancers in the affected patients and their family members. Normal expression of all proteins does not completely rule out familial cancer predisposition.     The [facility] Bee Syndrome Surveillance Program Task Forces recommends that all patients with lack of expression of one or more DNA mismatch repair proteins and those with concerning personal or family history should undergo thorough evaluation, counseling and possibly genetic testing.       Addendum electronically signed by Osman Beltran MD on 12/20/2019 at  3:28 PM   Final Diagnosis   A. Rectosigmoid colon, low anterior resection:  - Adenocarcinoma, moderately differentiated.   - Tumor invades through the muscularis propria into pericolorectal tissue, T3  - Diverticula.  - All margins are negative for tumor.  - Thirty-four lymph nodes, negative for malignancy (0/34).     B. Colon, anastomotic rings, resection:  - Two portions of colon with no pathologic abnormality     Intradepartmental consultation is in agreement.  Interpretation performed at [facility] [place] [id number]  Immunohistochemistry for desmin  is performed on tissue blocks A13 and A16 to help in the assessment of this case.         Electronically signed by Osman Beltran MD on 12/18/2019 at 10:42 AM   Additional Information     All controls performed with the immunohistochemical stains reported above reacted appropriately.  These tests were developed  and their performance characteristics determined by [facility] or [employer]. They may not be cleared or approved by the [place]. Food and Drug Administration. The FDA has determined that such clearance or approval is not necessary. These tests are used for clinical purposes. They should not be regarded as investigational or for research. This laboratory has been approved by CLIA 88, designated as a high-complexity laboratory and is qualified to perform these tests.   Synoptic Checklist   COLON AND RECTUM: Resection, Including Transanal Disk Excision of Rectal Neoplasms  8th Edition - Protocol posted: 2/27/2019  ColoRectal - All Specimens       SPECIMEN   Procedure   Low anterior resection    Macroscopic Intactness of Mesorectum   Complete    TUMOR   Tumor Site   Rectosigmoid    Histologic Type   Adenocarcinoma    Histologic Grade   G2: Moderately differentiated    Tumor Size   Greatest dimension (Centimeters): 5.4 cm   Additional Dimension (Centimeters)   4.6 cm       1 cm   Tumor Deposits   Not identified    Tumor Extension   Tumor invades through the muscularis propria into pericolorectal tissue    Macroscopic Tumor Perforation   Not identified    Lymphovascular Invasion   Not identified    Perineural Invasion   Not identified    Type of Polyp in Which Invasive Carcinoma Arose   Tubular adenoma    Treatment Effect   No known presurgical therapy    MARGINS   Margins   All margins are uninvolved by invasive carcinoma, high-grade dysplasia, intramucosal adenocarcinoma, and adenoma    Margins Examined   Proximal        Distal        Radial or Mesenteric    Distance of Invasive Carcinoma from Closest Margin   1.5 cm   Closest Margin   Radial or Mesenteric    Distance of Tumor from Radial Margin   1.5 cm   LYMPH NODES   Number of Lymph Nodes Involved   0    Number of Lymph Nodes Examined   34    PATHOLOGIC STAGE CLASSIFICATION (pTNM, AJCC 8th Edition)   Primary Tumor (pT)   pT3     Regional Lymph Nodes (pN)   pN0    ADDITIONAL FINDINGS   Additional Pathologic Findings   Diverticulosis    .

## 2023-12-21 NOTE — PROGRESS NOTES
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     Recurrent rectosigmoid adenocarcinoma.  Patient was seen/evaluated by Dr. Freeman surgical oncology.  Patient received 3 months of preoperative FOLFOX.  Follow-up CT scans demonstrated response to treatment with no evidence of progressive disease.  Patient then underwent resection of the omental mass and spleen as well as DILMA/BSO (see below).  Mrs. Sanches was then restarted on FOLFOX.  Patient had problems with neuropathy; FOLFOX change to FOLFIRI.       The 12th/final cycle of chemotherapy was completed on August 23, 2022. CT scan in May 2023 showed a new lesion to the right hepatic lobe. MRI abdomen in July 2023 confirmed liver metastasis and revealed a new, posterior left chest wall/pleural lesion suspicious for metastasis. The chest wall mass was biopsied on 9/13/2023 revealing metastatic moderately differentiated adenocarcinoma consistent with known colonic primary.     Mrs. Sanches was seen/evaluated by Dr. Brown Grubbs (Radiation Oncology) and patient has received SBRT (left-sided lower rib cage/upper abdomen).  Mrs. Sanches is now scheduled for Y90; mapping is happening next week.  Patient is tentatively scheduled for mid January 2024.    Mrs. Lovett was also recently presented at the GI tumor board.  The consensus was to send for circulating tumor cells.  If positive, there will be consideration for restarting systemic treatment.  If not, patient will continue with surveillance.    Ovarian lesion.  MRI/pelvis in October 2021 demonstrated a multi septated left ovarian cystic lesion.  Transabdominal ultrasound results did not demonstrate any concerning  abnormality.  Patient underwent DILMA/BSO.  Pathology results are listed below - no evidence of malignancy. Patient was last seen by Gynecologic Oncology in June 2022, and was advised to return for follow-up as needed.     Genetics.  Patient has a complicated family history but multiple family members with colon/rectal cancer.  Patient can be seen by Oncology genetics in the future.     Bilateral lower extremity swelling. Patient has a history of lower extremity DVTs, is on Eliquis.  To be sure, Dopplers have been reordered.    Mrs. Sanches is to return in 6 weeks (this will change if the CTC results are positive).  CEA has been ordered for now.  Patient knows to call if she has any other questions or concerns.    Carefully review your medication list and verify that the list is accurate and up-to-date. Please call the hematology/oncology office if there are medications missing from the list, medications on the list that you are not currently taking or if there is a dosage or instruction that is different from how you're taking that medication.     Patient goals and areas of care: Follow-up with radiation oncology/IR, Y90, CTC results in process  Barriers to care:  None  Patient is able to self-care  ______________________________________________________________________________________     Chief complaint: Rectosigmoid carcinoma, omental recurrence     History of Present Illness:  68 year old female previously diagnosed with rectosigmoid carcinoma status post resection in September 2019.  Postoperatively patient did well; Mrs. Sanches did not need/receive adjuvant chemotherapy. Surveillance CEA level was found to be elevated in June 2022.  Patient underwent workup.  Results demonstrated an intra-abdominal mass by the spleen. Patient underwent neoadjuvant chemotherapy with FOLFOX, with follow-up scans demonstrating response. She then underwent resection, followed by postoperative chemotherapy with FOLFOX initially.  FOLFOX was changed to FOLFIRI due to peripheral neuropathy. Treatment was completed in August 2022.      CT scan in May 2023 showed a new lesion to the right hepatic lobe. MRI abdomen in July 2023 confirmed liver metastasis and revealed a new, posterior left chest wall/pleural lesion suspicious for metastasis. The chest wall mass was biopsied on 9/13/2023 revealing adenocarcinoma consistent with known colonic primary (see Pathology . She was seen/evaluated by Radiation Oncology, with a tentative plan for SBRT to the left chest wall. IR ablation of the liver lesion is planned with Dr. Christiansen. CT on 10/9/2023 revealed enlarging rib metastasis and stable liver nodule.      Patient patiently completed SBRT to the right rib lesion.  Presently Mrs. Sanches is feeling okay, baseline.  No nausea or vomiting.  Appetite is good.  No fevers no recent hospitalizations.  No pain control issues.  Activities are baseline.    Review of Systems   Constitutional: Negative for activity change, appetite change and fatigue.   HENT: Negative.    Eyes: Negative.    Respiratory: Negative.    Cardiovascular: Negative.    Gastrointestinal: Negative for nausea and vomiting.   Endocrine: Negative.    Genitourinary: Negative.    Musculoskeletal: Negative.    Skin: Negative.    Allergic/Immunologic: Negative.    Neurological: Negative.    Hematological: Negative.    Psychiatric/Behavioral: Negative.    All other systems reviewed and are negative.         Patient Active Problem List   Diagnosis    Callus of foot    Foot pain    GERD (gastroesophageal reflux disease)    Mixed hyperlipidemia    Prediabetes    Varicose veins with pain    Morbid obesity (HCC)    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HCC)    Dyspnea on exertion    Dyslipidemia    Sigmoid diverticulosis    PONV (postoperative nausea and vomiting)    Omental metastasis    Lesion of ovary    Chemotherapy adverse reaction    Chemotherapy-induced nausea    Platelets decreased (HCC)    Stage 3 chronic  kidney disease, unspecified whether stage 3a or 3b CKD (HCC)    H/O splenectomy    Asplenia    Postoperative state    Acute embolism and thrombosis of right tibial vein (HCC)      Medical History        Past Medical History:   Diagnosis Date    Blood in stool      Breast disorder      Cancer (HCC)       rectal    Cancer determined by colorectal biopsy (HCC)       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    Colon polyp      GERD (gastroesophageal reflux disease)      History of chemotherapy 2021    History of rectal surgery      Hyperlipidemia      PONV (postoperative nausea and vomiting)           Surgical History         Past Surgical History:   Procedure Laterality Date    BREAST CYST EXCISION Right       SECTION         x3    COLON SIGMOID RESECTION        COLONOSCOPY        DILATION AND CURETTAGE OF UTERUS        ILEO LOOP DIVERSION N/A 12/10/2019     Procedure: ILEOSTOMY LOOP;  Surgeon: WINNIE Pastor MD;  [facility];  Service: Robotics    INSTILLATION CHEMOTHERAPY INTRAPERITONEAL (HIPEC) LAPAROTOMY N/A 2022     Procedure: INSTILLATION CHEMOTHERAPY INTRAPERITONEAL (HIPEC) LAPAROTOMY;  Surgeon: Jessie Freeman MD;  [facility]: [facility];  Service: Surgical Oncology    IR BIOPSY CHEST WALL   2023    IR BIOPSY OMENTUM   2021    IR PORT PLACEMENT   2021    OMENTECTOMY N/A 2022     Procedure: DIAGNOSTIC LAPAROSCOPY, OPEN OMENTECTOMY, SPLENECTOMY, DIAPHRAGM REPAIR, CHEST TUBE INSERTION;  Surgeon: Jessie Freeman MD;  [facility];  Service: Surgical Oncology    MT CLOSURE ENTEROSTOMY LG/SMALL INTESTINE N/A 2020     Procedure: CLOSURE ILEOSTOMY;  Surgeon: WINNIE Pastor MD;  [facility];  Service: Colorectal    MT LAPAROSCOPY COLECTOMY PARTIAL W/ANASTOMOSIS N/A 12/10/2019     Procedure: SIGMOID RESECTION COLON LAPAROSCOPIC W ROBOTICS converted to lap hand assisted  with Partial proctectomy , LASER FLUORESCENCE ANGIOGRAPHY, INTRA OP COLONOSCOPY;  Surgeon: WINNIE  Too Pastor MD;  [facility];  Service: Robotics    ID TOTAL ABDOMINAL HYSTERECT W/WO RMVL TUBE OVARY N/A 04/29/2022     Procedure: DIAGNOSTIC LAPAROSCOPY, TOTAL ABDOMINAL HYSTERECTOMY, BILATERAL SALPINGO-OOPHORECTOMY, EXTENSIVE ADHESIOLYSIS;  Surgeon: Peterson Mae MD;  [facility];  Service: Gynecology Oncology    ID UNLISTED PROCEDURE DIAPHRAGM   04/29/2022     Procedure: REPAIR DIAPHRAGM TEAR;  Surgeon: Yana Hebert MD;  [facility];  Service: Thoracic    SMALL INTESTINE SURGERY   02/04/2020     Procedure: RESECTION SMALL BOWEL;  Surgeon: WINNIE Pastor MD;  [facility];  Service: Colorectal      Past surgical history:  No prior blood transfusions     OBGYN:  No breast issues, no abnormal mammograms previously, no postmenopausal bleeding, no other GYN issues     Family History         Family History   Problem Relation Age of Onset    Hypertension Mother      Heart attack Mother      Heart attack Father      Heart disease Father      Hypertension Brother      Heart attack Brother      Colon cancer Paternal Uncle      Leukemia Cousin      Breast cancer Cousin      Diabetes Cousin      Breast cancer Cousin      Colon cancer Family           paternal uncle    Stroke Neg Hx        Family history:  Somewhat complicated, mother with colostomy but etiology for this is unclear, paternal uncle with rectal cancer, patient has to first-degree relatives on the paternal side with history of colon cancer, 3 sons and 1 grandchild in good general health     Social History               Socioeconomic History    Marital status:        Spouse name: Not on file    Number of children: Not on file    Years of education: Not on file    Highest education level: Not on file   Occupational History    Not on file   Tobacco Use    Smoking status: Never    Smokeless tobacco: Never   Vaping Use    Vaping Use: Never used   Substance and Sexual Activity    Alcohol use: Yes     "   Comment: \"occasionally\"    Drug use: Never    Sexual activity: Not Currently   Other Topics Concern    Not on file   Social History Narrative    Not on file      Social Determinants of Health           Financial Resource Strain: Low Risk  (3/21/2023)     Overall Financial Resource Strain (CARDIA)      Difficulty of Paying Living Expenses: Not hard at all   Food Insecurity: No Food Insecurity (5/2/2022)     Hunger Vital Sign      Worried About Running Out of Food in the Last Year: Never true      Ran Out of Food in the Last Year: Never true   Transportation Needs: No Transportation Needs (3/21/2023)     PRAPARE - Transportation      Lack of Transportation (Medical): No      Lack of Transportation (Non-Medical): No   Physical Activity: Not on file   Stress: Not on file   Social Connections: Not on file   Intimate Partner Violence: Not on file   Housing Stability: Unknown (5/2/2022)     Housing Stability Vital Sign      Unable to Pay for Housing in the Last Year: No      Number of Places Lived in the Last Year: Not on file      Unstable Housing in the Last Year: No      Social history:  No tobacco, alcohol or drug abuse, no toxic exposure     Current Outpatient Medications:     Acetaminophen (TYLENOL 8 HOUR PO), Take by mouth PRN, Disp: , Rfl:     apixaban (Eliquis) 5 mg, Take 1 tablet (5 mg total) by mouth 2 (two) times a day, Disp: 60 tablet, Rfl: 5    ezetimibe (ZETIA) 10 mg tablet, Take 1 tablet (10 mg total) by mouth daily, Disp: 90 tablet, Rfl: 1    famotidine (PEPCID) 20 mg tablet, Take 1 tablet (20 mg total) by mouth 2 (two) times a day as needed for heartburn As needed, Disp: 90 tablet, Rfl: 1    meclizine (ANTIVERT) 12.5 MG tablet, Take 1 tablet (12.5 mg total) by mouth 3 (three) times a day as needed for dizziness (Patient taking differently: Take 12.5 mg by mouth 3 (three) times a day as needed for dizziness PRN), Disp: 30 tablet, Rfl: 0           Allergies   Allergen Reactions    Medical Tape Rash       " Skin irritation  Skin peeling  Skin irritation  Skin peeling  Blisters  Rash          Vitals:     10/10/23 1356   BP: 136/76   Pulse: 78   Resp: 17   Temp: 98 °F (36.7 °C)   SpO2: 98%      Physical Exam  Constitutional:       Appearance: She is well-developed. She is obese.   HENT:      Head: Normocephalic and atraumatic.      Right Ear: External ear normal.      Left Ear: External ear normal.   Eyes:      Extraocular Movements: Extraocular movements intact.      Conjunctiva/sclera: Conjunctivae normal.      Pupils: Pupils are equal, round, and reactive to light.   Cardiovascular:      Rate and Rhythm: Normal rate and regular rhythm.      Heart sounds: Normal heart sounds.   Pulmonary:      Effort: Pulmonary effort is normal.      Breath sounds: Normal breath sounds.   Abdominal:      General: Bowel sounds are normal. There is no distension.      Palpations: Abdomen is soft.      Tenderness: There is no abdominal tenderness. There is no guarding.   Musculoskeletal:         General: Normal range of motion.      Right lower leg: No edema.      Left lower leg: No edema.   Lymphadenopathy:      Cervical: No cervical adenopathy.      Upper Body:      Right upper body: No supraclavicular or axillary adenopathy.      Left upper body: No supraclavicular or axillary adenopathy.   Skin:     General: Skin is warm and dry.      Findings: No bruising, ecchymosis or petechiae.   Neurological:      General: No focal deficit present.      Mental Status: She is alert and oriented to person, place, and time.   Psychiatric:         Mood and Affect: Mood normal.         Behavior: Behavior normal.         Judgment: Judgment normal.   Extremities: Bilateral lower extremity edema, 1+, no cords, pulses are 1+     Laboratory    11/15/2023 WBC = 6.0 hemoglobin = 11 hematocrit = 34 platelet = 247 neutrophil = 67%          9/25/2023 WBC = 8.75 hemoglobin = 13.3 hematocrit = 41 platelet = 314 neutrophil = 53% BUN = 16 creatinine = 1.01 calcium  = 9.4 LFTs WNL     9/25/2023 WBC = 8.75 hemoglobin = 13.3 hematocrit = 40.9 MCV = 91 platelet = 314 neutrophil = 53% BUN = 16 creatinine = 1.01 calcium = 9.4 LFTs WNL total bilirubin = 0.76 CEA 6.2  08/08/2022 WBC = 4.57 hemoglobin = 10.0 hematocrit = 31.3 MCV = 87 platelet = 292 neutrophil = 50% BUN = 21 creatinine = 1.35 calcium = 9.1 LFTs WNL total bilirubin = 1.31    Procedures     10/06/2021 colonoscopy     FINDINGS:  Healthy end-to-end colorectal anastomosis with no bleeding in the mid rectum  All observed locations appeared normal, including the cecum, ascending colon, transverse colon, splenic flexure and descending colon. A couple scattered diverticuli seen     Imaging     10/09/2023 CT chest abdomen pelvis     IMPRESSION:     Enlarging 6.8 x 4.4 x 4.1 cm T11 rib metastasis.     Stable ill-defined hypovascular nodule in the right lobe of the liver which remains concerning for metastasis.     Cholelithiasis.     09/13/2023 IR biopsy chest wall     IMPRESSION:  Successful ultrasound guided core biopsy of the left posterior pleural-based mass.     07/24/2023 MRI abdomen      IMPRESSION:     16 mm segment 6 hepatic lesion unchanged from the CT suspicious for metastasis.     New posterior left chest wall/pleural lesion measuring 2.7 x 2.3 cm also suspicious for metastasis.     05/30/2023 CT chest abdomen pelvis     IMPRESSION:     No evidence of acute intrathoracic pathology.     New 7 mm hypodensity of the right hepatic lobe which was not present on prior examinations. Contrast enhanced abdominal MRI recommended for further evaluation.     Nodular soft tissue adjacent to the left upper renal capsule and prior splenectomy site is stable     01/27/2023 CT chest abdomen pelvis     IMPRESSION:     No definite evidence for metastatic disease involving the chest, abdomen, or pelvis.     Note is made of stable nodular soft tissue about the lateral aspect of the left upper renal pole which could reflect postsurgical  changes related to prior splenectomy although continued short interval follow-up is recommended to exclude   residual/recurrent tumor in this location.     07/25/2022 vascular lower limb venous duplex study     RIGHT LOWER LIMB:  Acute mostly deep vein thrombosis in the paired posterior tibial veins  throughout the calf.  No evidence of superficial thrombophlebitis noted.  Popliteal, posterior tibial and anterior tibial arterial Doppler waveforms are  triphasic.     LEFT LOWER LIMB:  No evidence of acute or chronic deep vein thrombosis.  No evidence of superficial thrombophlebitis noted.  Doppler evaluation shows a normal response to augmentation maneuvers.  Popliteal, posterior tibial and anterior tibial arterial Doppler waveforms are  triphasic.     03/22/2022 CT scan chest abdomen pelvis with contrast     ABDOMINOPELVIC CAVITY: Left upper quadrant biopsy-proven omental metastasis has decreased in size now measuring 4.1 x 2.5 x 2.7 cm (previously 5.3 x 4.5 x 4.3 cm), and now only focally contacts rather than grossly invades the spleen and intercostal soft   tissues, on series 2/54, 601/71. Adjacent omental nodularity is less conspicuous. No new evidence of metastatic disease in the abdomen or pelvis. No lymphadenopathy. No ascites or intraperitoneal free air.     IMPRESSION:     1.  Decreased size of biopsy-proven omental metastasis, which now only focally contacting rather than grossly invading the spleen and adjacent abdominal wall. No new evidence of metastatic disease in the abdomen or pelvis.  2.  No new or suspicious pulmonary nodules. One of the previously noted new 1-2 mm nodules is no longer seen, and the other is unchanged. Recommend continued attention on follow-up.  3.  Top-normal thickness endometrial stripe measuring 7 mm. If there is history of vaginal bleeding, suggest catheterization with endometrial biopsy.  4.  Hepatic steatosis.     11/11/2021 ultrasound pelvis transabdominal only     Cluster  of anechoic cystic areas seen replacing the left ovary similar to MRI largest measuring 8 mm compared to 1.3 cm.  Based on the ACR O-RADS system, this is O-RADS category 2 (almost certain benign with <1% risk of malignancy.) The management recommendation is followup in 1 year.     10/25/2021 MRI pelvis rectal cancer staging     1.  No recurrent or metastatic disease in the pelvis.  Please refer to the separately dictated MRI abdomen report regarding mesenteric mass in the left upper quadrant.     2.  Multi septated cystic lesion versus cluster of small cysts in the left ovary measuring 2.6 x 2.5 x 1.9 cm, increased in size from October 2019.  Further characterization with pelvic ultrasound is recommended.     10/25/2021 MRI abdomen     1.  5.2 cm mesenteric mass in the left upper quadrant with thickening of peritoneal reflection and invasion of the spleen, similar to prior PET/CT.  This is highly suspicious for metastatic tumor implant.  2.  No additional potential sites of metastasis in the abdomen.  3.  Moderately distended gallbladder with gallstones and probable adenomyomatosis.     09/28/2021 PET-CT     1. There is a large left paracolic gutter markedly hypermetabolic mass immediately inferior to the spleen, most consistent with metastatic colorectal carcinoma.  2. Mildly increased activity at the right abdominal bowel anastomotic site.  If not recently performed, direct visualization is recommended  3. There are no other glucose avid abnormalities identified within the abdomen or pelvis  4. No evidence of distant glucose avid metastatic disease in the neck, chest, or skeleton      Pathology    9/13/2023 [facility] results on the chart.  Patient had a K-fermin pathologic variant on  2 = lack of benefit of cetuximab or panitumumab.  Patient also had PIK3CA pathologic variant on  21.  No other actionable mutations, BRAF was not mutated, MSI was stable, mismatch repair proteins were proficient, tumor mutational burden was  "low, PD-L1 = 0% = negative, NTRK1/2/3 fusion not detected.         Case Report   Surgical Pathology Report                         Case: [id number]                                    Authorizing Provider:  Jessie Freeman MD       Collected:           09/13/2023 1146               Ordering Location:     [facility] Received:            09/13/2023 1225                                      Cardiac Cath Lab                                                              Pathologist:           Aristeo Rashid MD                                                            Specimen:    Chest Wall, chest wall mass, 4 cores                                                        Final Diagnosis   A. Mass, \"Chest wall mass 4 cores,\" Biopsy:  - Metastatic moderately differentiated adenocarcinoma, consistent with known colonic primary (see note)   Electronically signed by Aristeo Rashid MD on 9/18/2023 at  9:45 AM   Note     Immunohistochemistry was performed on block A1 with adequate controls. Tumor cells are positive for AE1/AE3, CAM5.2, CDX2, and SATB2. They are negative for CK20 and CK7.   Immunohistochemistry was performed on block A2 with adequate controls. Tumor cells are positive for AE1/AE3, Cam5.2, and negative for CK20.      Case Report   Surgical Pathology Report                         Case: [id number]                                    Authorizing Provider:  Peterson Mae,   Collected:           04/29/2022 1118                                      MD                                                                            Ordering Location:     [facility]      Received:            04/29/2022 2236                                      Hospital Operating Room                                                       Pathologist:           Roselyn King DO                                                      Specimens:   A) - Uterus w/Bilateral Ovaries and Fallopian Tubes         "                                          B) - Spleen, spleen, omentum, darotis                                                       Final Diagnosis   A. Uterus, Bilateral Ovaries and Fallopian Tubes; Hysterosalpingo-oophorectomy:  - Leiomyoma.  - Left ovary with serous cystadenoma.  - Unremarkable cervix.  - Benign inactive endometrium with no specific pathologic change.  - Unremarkable right ovary and bilateral fallopian tubes.     B. Spleen, spleen, omentum, darotis:  - Metastatic adenocarcinoma involving spleen and omentum, consistent with colonic primary. See Note.   Electronically signed by Roselyn King DO on 5/12/2022 at  2:49 PM      Note     Note B: Comparison to prior material ([id number], omental mass) reveals identical morphology to previous metastatic colonic adenocarcinoma.            Case Report   Surgical Pathology Report                         Case: [id number]                                    Authorizing Provider:  Sohail Grubbs MD           Collected:           11/12/2021 0921               Ordering Location:     [facility] Received:            11/12/2021 0941                                      CAT Scan                                                                      Pathologist:           Pamela Ramos MD                                                         Specimen:    Omentum, Omental mass                                                                       Final Diagnosis   A. Omentum, Omental mass:  - Metastatic adenocarcinoma, with an immunophenotype compatible with metastasis from patient's known colonic primary.  - See note.     Note: Tumor is positive with CK20, CDX2 and negative with CK7, PAX8. This immunophenotype supports the above interpretation.  Best representative block with tumor A5.     This case was reviewed at the intradepartmental  conference.   Dr. Grubbs is notified of the diagnosis in EPIC via [email or web address] on 11/17/2021   at 8.45 am.   Electronically signed by Pamela Ramos MD on 11/17/2021 at  8:53 AM                Case Report   Surgical Pathology Report                         Case: [id number]                                    Authorizing Provider:  WINNIE Pastor MD       Collected:           12/10/2019 1159               Ordering Location:     [facility]      Received:            12/10/2019 [address] [place] [address] Operating Room                                                       Pathologist:           Osman Beltran MD                                                                  Specimens:   A) - Large Intestine, Sigmoid Colon, and portion of rectum                                           B) - Anastomatic site, anastamotic rings                                                    Addendum   RRESULTS OF IMMUNOHISTOCHEMICAL ANALYSIS FOR MISMATCH REPAIR PROTEIN LOSS  INTERPRETATION: NO LOSS OF NUCLEAR EXPRESSION OF MMR PROTEINS: LOW PROBABILITY OF MSI-H  Note: Background non-neoplastic tissue and/or internal control with intact nuclear expression.      RESULTS:  Antibody          Clone               Description                           Results  MLH1               M1                  Mismatch repair protein       Intact nuclear expression  MSH2              [id number]       Mismatch repair protein       Intact nuclear expression  MSH6              44                   Mismatch repair protein       Intact nuclear expression  PMS2              [id number]         Mismatch repair protein       Intact nuclear expression     Comment:  A negative control and a positive control for each antibody have been reviewed and accepted.  [facility] Specimen ID: [id number], Evaluator:  JOSÉ MIGUEL Perales MD  These tests were developed and their performance characteristics determined by [facility].  They may not be cleared or approved by the [place]. Food and Drug Administration.  The FDA determined that such  clearance or approval is not necessary.  These tests are used for clinical purposes.  They should not be regarded as investigational or for research.  This laboratory has been approved by [place] [id number], designated as a high-complexity laboratory and is qualified to perform these tests.     Comments: Patients whose tumors demonstrate lack of expression of one or more DNA mismatch repair proteins might be at risk for Bee Syndrome. This cancer susceptibility syndrome greatly increases the risk of synchronous and/or metachronous cancers in the affected patients and their family members. Normal expression of all proteins does not completely rule out familial cancer predisposition.     The [facility] Bee Syndrome Surveillance Program Task Forces recommends that all patients with lack of expression of one or more DNA mismatch repair proteins and those with concerning personal or family history should undergo thorough evaluation, counseling and possibly genetic testing.       Addendum electronically signed by Osman Beltran MD on 12/20/2019 at  3:28 PM   Final Diagnosis   A. Rectosigmoid colon, low anterior resection:  - Adenocarcinoma, moderately differentiated.   - Tumor invades through the muscularis propria into pericolorectal tissue, T3  - Diverticula.  - All margins are negative for tumor.  - Thirty-four lymph nodes, negative for malignancy (0/34).     B. Colon, anastomotic rings, resection:  - Two portions of colon with no pathologic abnormality     Intradepartmental consultation is in agreement.  Interpretation performed at [facility] [place] [place] [address]  Immunohistochemistry for desmin  is performed on tissue blocks A13 and A16 to help in the assessment of this case.         Electronically signed by Osman Beltran MD on 12/18/2019 at 10:42 AM   Additional Information     All controls performed with the immunohistochemical stains reported above reacted appropriately.  These tests were developed  and their performance characteristics determined by [facility] or [facility]. They may not be cleared or approved by the [place]. Food and Drug Administration. The FDA has determined that such clearance or approval is not necessary. These tests are used for clinical purposes. They should not be regarded as investigational or for research. This laboratory has been approved by CLIA 88, designated as a high-complexity laboratory and is qualified to perform these tests.   Synoptic Checklist   COLON AND RECTUM: Resection, Including Transanal Disk Excision of Rectal Neoplasms  8th Edition - Protocol posted: 2/27/2019  ColoRectal - All Specimens       SPECIMEN   Procedure   Low anterior resection    Macroscopic Intactness of Mesorectum   Complete    TUMOR   Tumor Site   Rectosigmoid    Histologic Type   Adenocarcinoma    Histologic Grade   G2: Moderately differentiated    Tumor Size   Greatest dimension (Centimeters): 5.4 cm   Additional Dimension (Centimeters)   4.6 cm       1 cm   Tumor Deposits   Not identified    Tumor Extension   Tumor invades through the muscularis propria into pericolorectal tissue    Macroscopic Tumor Perforation   Not identified    Lymphovascular Invasion   Not identified    Perineural Invasion   Not identified    Type of Polyp in Which Invasive Carcinoma Arose   Tubular adenoma    Treatment Effect   No known presurgical therapy    MARGINS   Margins   All margins are uninvolved by invasive carcinoma, high-grade dysplasia, intramucosal adenocarcinoma, and adenoma    Margins Examined   Proximal        Distal        Radial or Mesenteric    Distance of Invasive Carcinoma from Closest Margin   1.5 cm   Closest Margin   Radial or Mesenteric    Distance of Tumor from Radial Margin   1.5 cm   LYMPH NODES   Number of Lymph Nodes Involved   0    Number of Lymph Nodes Examined   34    PATHOLOGIC STAGE CLASSIFICATION (pTNM, AJCC 8th Edition)   Primary Tumor (pT)   pT3     Regional Lymph Nodes (pN)   pN0    ADDITIONAL FINDINGS   Additional Pathologic Findings   Diverticulosis    .

## 2023-12-21 NOTE — H&P (VIEW-ONLY)
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     Recurrent rectosigmoid adenocarcinoma.  Patient was seen/evaluated by Dr. Freeman surgical oncology.  Patient received 3 months of preoperative FOLFOX.  Follow-up CT scans demonstrated response to treatment with no evidence of progressive disease.  Patient then underwent resection of the omental mass and spleen as well as DILMA/BSO (see below).  Mrs. Sanches was then restarted on FOLFOX.  Patient had problems with neuropathy; FOLFOX change to FOLFIRI.       The 12th/final cycle of chemotherapy was completed on August 23, 2022. CT scan in May 2023 showed a new lesion to the right hepatic lobe. MRI abdomen in July 2023 confirmed liver metastasis and revealed a new, posterior left chest wall/pleural lesion suspicious for metastasis. The chest wall mass was biopsied on 9/13/2023 revealing metastatic moderately differentiated adenocarcinoma consistent with known colonic primary.     Mrs. Sanches was seen/evaluated by Dr. Brown Grubbs (Radiation Oncology) and patient has received SBRT (left-sided lower rib cage/upper abdomen).  Mrs. Sanches is now scheduled for Y90; mapping is happening next week.  Patient is tentatively scheduled for mid January 2024.    Mrs. Lovett was also recently presented at the GI tumor board.  The consensus was to send for circulating tumor cells.  If positive, there will be consideration for restarting systemic treatment.  If not, patient will continue with surveillance.    Ovarian lesion.  MRI/pelvis in October 2021 demonstrated a multi septated left ovarian cystic lesion.  Transabdominal ultrasound results did not demonstrate any concerning  abnormality.  Patient underwent DILMA/BSO.  Pathology results are listed below - no evidence of malignancy. Patient was last seen by Gynecologic Oncology in June 2022, and was advised to return for follow-up as needed.     Genetics.  Patient has a complicated family history but multiple family members with colon/rectal cancer.  Patient can be seen by Oncology genetics in the future.     Bilateral lower extremity swelling. Patient has a history of lower extremity DVTs, is on Eliquis.  To be sure, Dopplers have been reordered.    Mrs. Sanches is to return in 6 weeks (this will change if the CTC results are positive).  CEA has been ordered for now.  Patient knows to call if she has any other questions or concerns.    Carefully review your medication list and verify that the list is accurate and up-to-date. Please call the hematology/oncology office if there are medications missing from the list, medications on the list that you are not currently taking or if there is a dosage or instruction that is different from how you're taking that medication.     Patient goals and areas of care: Follow-up with radiation oncology/IR, Y90, CTC results in process  Barriers to care:  None  Patient is able to self-care  ______________________________________________________________________________________     Chief complaint: Rectosigmoid carcinoma, omental recurrence     History of Present Illness:  68 year old female previously diagnosed with rectosigmoid carcinoma status post resection in September 2019.  Postoperatively patient did well; Mrs. Sanches did not need/receive adjuvant chemotherapy. Surveillance CEA level was found to be elevated in June 2022.  Patient underwent workup.  Results demonstrated an intra-abdominal mass by the spleen. Patient underwent neoadjuvant chemotherapy with FOLFOX, with follow-up scans demonstrating response. She then underwent resection, followed by postoperative chemotherapy with FOLFOX initially.  FOLFOX was changed to FOLFIRI due to peripheral neuropathy. Treatment was completed in August 2022.      CT scan in May 2023 showed a new lesion to the right hepatic lobe. MRI abdomen in July 2023 confirmed liver metastasis and revealed a new, posterior left chest wall/pleural lesion suspicious for metastasis. The chest wall mass was biopsied on 9/13/2023 revealing adenocarcinoma consistent with known colonic primary (see Pathology . She was seen/evaluated by Radiation Oncology, with a tentative plan for SBRT to the left chest wall. IR ablation of the liver lesion is planned with Dr. Christiansen. CT on 10/9/2023 revealed enlarging rib metastasis and stable liver nodule.      Patient patiently completed SBRT to the right rib lesion.  Presently Mrs. Sanches is feeling okay, baseline.  No nausea or vomiting.  Appetite is good.  No fevers no recent hospitalizations.  No pain control issues.  Activities are baseline.    Review of Systems   Constitutional: Negative for activity change, appetite change and fatigue.   HENT: Negative.    Eyes: Negative.    Respiratory: Negative.    Cardiovascular: Negative.    Gastrointestinal: Negative for nausea and vomiting.   Endocrine: Negative.    Genitourinary: Negative.    Musculoskeletal: Negative.    Skin: Negative.    Allergic/Immunologic: Negative.    Neurological: Negative.    Hematological: Negative.    Psychiatric/Behavioral: Negative.    All other systems reviewed and are negative.         Patient Active Problem List   Diagnosis    Callus of foot    Foot pain    GERD (gastroesophageal reflux disease)    Mixed hyperlipidemia    Prediabetes    Varicose veins with pain    Morbid obesity (HCC)    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HCC)    Dyspnea on exertion    Dyslipidemia    Sigmoid diverticulosis    PONV (postoperative nausea and vomiting)    Omental metastasis    Lesion of ovary    Chemotherapy adverse reaction    Chemotherapy-induced nausea    Platelets decreased (HCC)    Stage 3 chronic  kidney disease, unspecified whether stage 3a or 3b CKD (HCC)    H/O splenectomy    Asplenia    Postoperative state    Acute embolism and thrombosis of right tibial vein (HCC)      Medical History        Past Medical History:   Diagnosis Date    Blood in stool      Breast disorder      Cancer (HCC)       rectal    Cancer determined by colorectal biopsy (HCC)       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    Colon polyp      GERD (gastroesophageal reflux disease)      History of chemotherapy 2021    History of rectal surgery      Hyperlipidemia      PONV (postoperative nausea and vomiting)           Surgical History         Past Surgical History:   Procedure Laterality Date    BREAST CYST EXCISION Right       SECTION         x3    COLON SIGMOID RESECTION        COLONOSCOPY        DILATION AND CURETTAGE OF UTERUS        ILEO LOOP DIVERSION N/A 12/10/2019     Procedure: ILEOSTOMY LOOP;  Surgeon: WINNIE Pastor MD;  [facility];  Service: Robotics    INSTILLATION CHEMOTHERAPY INTRAPERITONEAL (HIPEC) LAPAROTOMY N/A 2022     Procedure: INSTILLATION CHEMOTHERAPY INTRAPERITONEAL (HIPEC) LAPAROTOMY;  Surgeon: Jessie Freeman MD;  [facility]: [facility];  Service: Surgical Oncology    IR BIOPSY CHEST WALL   2023    IR BIOPSY OMENTUM   2021    IR PORT PLACEMENT   2021    OMENTECTOMY N/A 2022     Procedure: DIAGNOSTIC LAPAROSCOPY, OPEN OMENTECTOMY, SPLENECTOMY, DIAPHRAGM REPAIR, CHEST TUBE INSERTION;  Surgeon: Jessie Freeman MD;  [facility];  Service: Surgical Oncology    AL CLOSURE ENTEROSTOMY LG/SMALL INTESTINE N/A 2020     Procedure: CLOSURE ILEOSTOMY;  Surgeon: WINNIE Pastor MD;  [facility];  Service: Colorectal    AL LAPAROSCOPY COLECTOMY PARTIAL W/ANASTOMOSIS N/A 12/10/2019     Procedure: SIGMOID RESECTION COLON LAPAROSCOPIC W ROBOTICS converted to lap hand assisted  with Partial proctectomy , LASER FLUORESCENCE ANGIOGRAPHY, INTRA OP COLONOSCOPY;  Surgeon: WINNIE  Too Pastor MD;  [facility];  Service: Robotics    IN TOTAL ABDOMINAL HYSTERECT W/WO RMVL TUBE OVARY N/A 04/29/2022     Procedure: DIAGNOSTIC LAPAROSCOPY, TOTAL ABDOMINAL HYSTERECTOMY, BILATERAL SALPINGO-OOPHORECTOMY, EXTENSIVE ADHESIOLYSIS;  Surgeon: Peterson Mae MD;  [facility];  Service: Gynecology Oncology    IN UNLISTED PROCEDURE DIAPHRAGM   04/29/2022     Procedure: REPAIR DIAPHRAGM TEAR;  Surgeon: Yana Hebert MD;  [facility];  Service: Thoracic    SMALL INTESTINE SURGERY   02/04/2020     Procedure: RESECTION SMALL BOWEL;  Surgeon: WINNIE Pastor MD;  [facility];  Service: Colorectal      Past surgical history:  No prior blood transfusions     OBGYN:  No breast issues, no abnormal mammograms previously, no postmenopausal bleeding, no other GYN issues     Family History         Family History   Problem Relation Age of Onset    Hypertension Mother      Heart attack Mother      Heart attack Father      Heart disease Father      Hypertension Brother      Heart attack Brother      Colon cancer Paternal Uncle      Leukemia Cousin      Breast cancer Cousin      Diabetes Cousin      Breast cancer Cousin      Colon cancer Family           paternal uncle    Stroke Neg Hx        Family history:  Somewhat complicated, mother with colostomy but etiology for this is unclear, paternal uncle with rectal cancer, patient has to first-degree relatives on the paternal side with history of colon cancer, 3 sons and 1 grandchild in good general health     Social History               Socioeconomic History    Marital status:        Spouse name: Not on file    Number of children: Not on file    Years of education: Not on file    Highest education level: Not on file   Occupational History    Not on file   Tobacco Use    Smoking status: Never    Smokeless tobacco: Never   Vaping Use    Vaping Use: Never used   Substance and Sexual Activity    Alcohol use: Yes     "   Comment: \"occasionally\"    Drug use: Never    Sexual activity: Not Currently   Other Topics Concern    Not on file   Social History Narrative    Not on file      Social Determinants of Health           Financial Resource Strain: Low Risk  (3/21/2023)     Overall Financial Resource Strain (CARDIA)      Difficulty of Paying Living Expenses: Not hard at all   Food Insecurity: No Food Insecurity (5/2/2022)     Hunger Vital Sign      Worried About Running Out of Food in the Last Year: Never true      Ran Out of Food in the Last Year: Never true   Transportation Needs: No Transportation Needs (3/21/2023)     PRAPARE - Transportation      Lack of Transportation (Medical): No      Lack of Transportation (Non-Medical): No   Physical Activity: Not on file   Stress: Not on file   Social Connections: Not on file   Intimate Partner Violence: Not on file   Housing Stability: Unknown (5/2/2022)     Housing Stability Vital Sign      Unable to Pay for Housing in the Last Year: No      Number of Places Lived in the Last Year: Not on file      Unstable Housing in the Last Year: No      Social history:  No tobacco, alcohol or drug abuse, no toxic exposure     Current Outpatient Medications:     Acetaminophen (TYLENOL 8 HOUR PO), Take by mouth PRN, Disp: , Rfl:     apixaban (Eliquis) 5 mg, Take 1 tablet (5 mg total) by mouth 2 (two) times a day, Disp: 60 tablet, Rfl: 5    ezetimibe (ZETIA) 10 mg tablet, Take 1 tablet (10 mg total) by mouth daily, Disp: 90 tablet, Rfl: 1    famotidine (PEPCID) 20 mg tablet, Take 1 tablet (20 mg total) by mouth 2 (two) times a day as needed for heartburn As needed, Disp: 90 tablet, Rfl: 1    meclizine (ANTIVERT) 12.5 MG tablet, Take 1 tablet (12.5 mg total) by mouth 3 (three) times a day as needed for dizziness (Patient taking differently: Take 12.5 mg by mouth 3 (three) times a day as needed for dizziness PRN), Disp: 30 tablet, Rfl: 0           Allergies   Allergen Reactions    Medical Tape Rash       " Skin irritation  Skin peeling  Skin irritation  Skin peeling  Blisters  Rash          Vitals:     10/10/23 1356   BP: 136/76   Pulse: 78   Resp: 17   Temp: 98 °F (36.7 °C)   SpO2: 98%      Physical Exam  Constitutional:       Appearance: She is well-developed. She is obese.   HENT:      Head: Normocephalic and atraumatic.      Right Ear: External ear normal.      Left Ear: External ear normal.   Eyes:      Extraocular Movements: Extraocular movements intact.      Conjunctiva/sclera: Conjunctivae normal.      Pupils: Pupils are equal, round, and reactive to light.   Cardiovascular:      Rate and Rhythm: Normal rate and regular rhythm.      Heart sounds: Normal heart sounds.   Pulmonary:      Effort: Pulmonary effort is normal.      Breath sounds: Normal breath sounds.   Abdominal:      General: Bowel sounds are normal. There is no distension.      Palpations: Abdomen is soft.      Tenderness: There is no abdominal tenderness. There is no guarding.   Musculoskeletal:         General: Normal range of motion.      Right lower leg: No edema.      Left lower leg: No edema.   Lymphadenopathy:      Cervical: No cervical adenopathy.      Upper Body:      Right upper body: No supraclavicular or axillary adenopathy.      Left upper body: No supraclavicular or axillary adenopathy.   Skin:     General: Skin is warm and dry.      Findings: No bruising, ecchymosis or petechiae.   Neurological:      General: No focal deficit present.      Mental Status: She is alert and oriented to person, place, and time.   Psychiatric:         Mood and Affect: Mood normal.         Behavior: Behavior normal.         Judgment: Judgment normal.   Extremities: Bilateral lower extremity edema, 1+, no cords, pulses are 1+     Laboratory    11/15/2023 WBC = 6.0 hemoglobin = 11 hematocrit = 34 platelet = 247 neutrophil = 67%          9/25/2023 WBC = 8.75 hemoglobin = 13.3 hematocrit = 41 platelet = 314 neutrophil = 53% BUN = 16 creatinine = 1.01 calcium  = 9.4 LFTs WNL     9/25/2023 WBC = 8.75 hemoglobin = 13.3 hematocrit = 40.9 MCV = 91 platelet = 314 neutrophil = 53% BUN = 16 creatinine = 1.01 calcium = 9.4 LFTs WNL total bilirubin = 0.76 CEA 6.2  08/08/2022 WBC = 4.57 hemoglobin = 10.0 hematocrit = 31.3 MCV = 87 platelet = 292 neutrophil = 50% BUN = 21 creatinine = 1.35 calcium = 9.1 LFTs WNL total bilirubin = 1.31    Procedures     10/06/2021 colonoscopy     FINDINGS:  Healthy end-to-end colorectal anastomosis with no bleeding in the mid rectum  All observed locations appeared normal, including the cecum, ascending colon, transverse colon, splenic flexure and descending colon. A couple scattered diverticuli seen     Imaging     10/09/2023 CT chest abdomen pelvis     IMPRESSION:     Enlarging 6.8 x 4.4 x 4.1 cm T11 rib metastasis.     Stable ill-defined hypovascular nodule in the right lobe of the liver which remains concerning for metastasis.     Cholelithiasis.     09/13/2023 IR biopsy chest wall     IMPRESSION:  Successful ultrasound guided core biopsy of the left posterior pleural-based mass.     07/24/2023 MRI abdomen      IMPRESSION:     16 mm segment 6 hepatic lesion unchanged from the CT suspicious for metastasis.     New posterior left chest wall/pleural lesion measuring 2.7 x 2.3 cm also suspicious for metastasis.     05/30/2023 CT chest abdomen pelvis     IMPRESSION:     No evidence of acute intrathoracic pathology.     New 7 mm hypodensity of the right hepatic lobe which was not present on prior examinations. Contrast enhanced abdominal MRI recommended for further evaluation.     Nodular soft tissue adjacent to the left upper renal capsule and prior splenectomy site is stable     01/27/2023 CT chest abdomen pelvis     IMPRESSION:     No definite evidence for metastatic disease involving the chest, abdomen, or pelvis.     Note is made of stable nodular soft tissue about the lateral aspect of the left upper renal pole which could reflect postsurgical  changes related to prior splenectomy although continued short interval follow-up is recommended to exclude   residual/recurrent tumor in this location.     07/25/2022 vascular lower limb venous duplex study     RIGHT LOWER LIMB:  Acute mostly deep vein thrombosis in the paired posterior tibial veins  throughout the calf.  No evidence of superficial thrombophlebitis noted.  Popliteal, posterior tibial and anterior tibial arterial Doppler waveforms are  triphasic.     LEFT LOWER LIMB:  No evidence of acute or chronic deep vein thrombosis.  No evidence of superficial thrombophlebitis noted.  Doppler evaluation shows a normal response to augmentation maneuvers.  Popliteal, posterior tibial and anterior tibial arterial Doppler waveforms are  triphasic.     03/22/2022 CT scan chest abdomen pelvis with contrast     ABDOMINOPELVIC CAVITY: Left upper quadrant biopsy-proven omental metastasis has decreased in size now measuring 4.1 x 2.5 x 2.7 cm (previously 5.3 x 4.5 x 4.3 cm), and now only focally contacts rather than grossly invades the spleen and intercostal soft   tissues, on series 2/54, 601/71. Adjacent omental nodularity is less conspicuous. No new evidence of metastatic disease in the abdomen or pelvis. No lymphadenopathy. No ascites or intraperitoneal free air.     IMPRESSION:     1.  Decreased size of biopsy-proven omental metastasis, which now only focally contacting rather than grossly invading the spleen and adjacent abdominal wall. No new evidence of metastatic disease in the abdomen or pelvis.  2.  No new or suspicious pulmonary nodules. One of the previously noted new 1-2 mm nodules is no longer seen, and the other is unchanged. Recommend continued attention on follow-up.  3.  Top-normal thickness endometrial stripe measuring 7 mm. If there is history of vaginal bleeding, suggest catheterization with endometrial biopsy.  4.  Hepatic steatosis.     11/11/2021 ultrasound pelvis transabdominal only     Cluster  of anechoic cystic areas seen replacing the left ovary similar to MRI largest measuring 8 mm compared to 1.3 cm.  Based on the ACR O-RADS system, this is O-RADS category 2 (almost certain benign with <1% risk of malignancy.) The management recommendation is followup in 1 year.     10/25/2021 MRI pelvis rectal cancer staging     1.  No recurrent or metastatic disease in the pelvis.  Please refer to the separately dictated MRI abdomen report regarding mesenteric mass in the left upper quadrant.     2.  Multi septated cystic lesion versus cluster of small cysts in the left ovary measuring 2.6 x 2.5 x 1.9 cm, increased in size from October 2019.  Further characterization with pelvic ultrasound is recommended.     10/25/2021 MRI abdomen     1.  5.2 cm mesenteric mass in the left upper quadrant with thickening of peritoneal reflection and invasion of the spleen, similar to prior PET/CT.  This is highly suspicious for metastatic tumor implant.  2.  No additional potential sites of metastasis in the abdomen.  3.  Moderately distended gallbladder with gallstones and probable adenomyomatosis.     09/28/2021 PET-CT     1. There is a large left paracolic gutter markedly hypermetabolic mass immediately inferior to the spleen, most consistent with metastatic colorectal carcinoma.  2. Mildly increased activity at the right abdominal bowel anastomotic site.  If not recently performed, direct visualization is recommended  3. There are no other glucose avid abnormalities identified within the abdomen or pelvis  4. No evidence of distant glucose avid metastatic disease in the neck, chest, or skeleton      Pathology    9/13/2023 [facility] results on the chart.  Patient had a K-fermin pathologic variant on  2 = lack of benefit of cetuximab or panitumumab.  Patient also had PIK3CA pathologic variant on  21.  No other actionable mutations, BRAF was not mutated, MSI was stable, mismatch repair proteins were proficient, tumor mutational burden was  "low, PD-L1 = 0% = negative, NTRK1/2/3 fusion not detected.         Case Report   Surgical Pathology Report                         Case: [id number]                                    Authorizing Provider:  Jessie Freeman MD       Collected:           09/13/2023 1146               Ordering Location:     [facility] Received:            09/13/2023 1225                                      Cardiac Cath Lab                                                              Pathologist:           Aristeo Rashid MD                                                            Specimen:    Chest Wall, chest wall mass, 4 cores                                                        Final Diagnosis   A. Mass, \"Chest wall mass 4 cores,\" Biopsy:  - Metastatic moderately differentiated adenocarcinoma, consistent with known colonic primary (see note)   Electronically signed by Aristeo Rashid MD on 9/18/2023 at  9:45 AM   Note     Immunohistochemistry was performed on block A1 with adequate controls. Tumor cells are positive for AE1/AE3, CAM5.2, CDX2, and SATB2. They are negative for CK20 and CK7.   Immunohistochemistry was performed on block A2 with adequate controls. Tumor cells are positive for AE1/AE3, Cam5.2, and negative for CK20.      Case Report   Surgical Pathology Report                         Case: [id number]                                    Authorizing Provider:  Peterson Mae,   Collected:           04/29/2022 1118                                      MD                                                                            Ordering Location:     [facility]      Received:            04/29/2022 2236                                      Hospital Operating Room                                                       Pathologist:           Roselyn King DO                                                      Specimens:   A) - Uterus w/Bilateral Ovaries and Fallopian Tubes         "                                          B) - Spleen, spleen, omentum, darotis                                                       Final Diagnosis   A. Uterus, Bilateral Ovaries and Fallopian Tubes; Hysterosalpingo-oophorectomy:  - Leiomyoma.  - Left ovary with serous cystadenoma.  - Unremarkable cervix.  - Benign inactive endometrium with no specific pathologic change.  - Unremarkable right ovary and bilateral fallopian tubes.     B. Spleen, spleen, omentum, darotis:  - Metastatic adenocarcinoma involving spleen and omentum, consistent with colonic primary. See Note.   Electronically signed by Roselyn King DO on 5/12/2022 at  2:49 PM      Note     Note B: Comparison to prior material ([id number], omental mass) reveals identical morphology to previous metastatic colonic adenocarcinoma.            Case Report   Surgical Pathology Report                         Case: [id number]                                    Authorizing Provider:  Sohail Grubbs MD           Collected:           11/12/2021 0921               Ordering Location:     [facility] Received:            11/12/2021 0941                                      CAT Scan                                                                      Pathologist:           Pamela Ramos MD                                                         Specimen:    Omentum, Omental mass                                                                       Final Diagnosis   A. Omentum, Omental mass:  - Metastatic adenocarcinoma, with an immunophenotype compatible with metastasis from patient's known colonic primary.  - See note.     Note: Tumor is positive with CK20, CDX2 and negative with CK7, PAX8. This immunophenotype supports the above interpretation.  Best representative block with tumor A5.     This case was reviewed at the intradepartmental  conference.   Dr. Grubbs is notified of the diagnosis in EPIC via [employer] on 11/17/2021   at 8.45 am.   Electronically signed by Pamela Ramos MD on 11/17/2021 at  8:53 AM                Case Report   Surgical Pathology Report                         Case: [id number]                                    Authorizing Provider:  WINNIE Pastor MD       Collected:           12/10/2019 1159               Ordering Location:     [facility]      Received:            12/10/2019 [address] [place] [address] Operating Room                                                       Pathologist:           Osman Beltran MD                                                                  Specimens:   A) - Large Intestine, Sigmoid Colon, and portion of rectum                                           B) - Anastomatic site, anastamotic rings                                                    Addendum   RRESULTS OF IMMUNOHISTOCHEMICAL ANALYSIS FOR MISMATCH REPAIR PROTEIN LOSS  INTERPRETATION: NO LOSS OF NUCLEAR EXPRESSION OF MMR PROTEINS: LOW PROBABILITY OF MSI-H  Note: Background non-neoplastic tissue and/or internal control with intact nuclear expression.      RESULTS:  Antibody          Clone               Description                           Results  MLH1               M1                  Mismatch repair protein       Intact nuclear expression  MSH2              [id number]       Mismatch repair protein       Intact nuclear expression  MSH6              44                   Mismatch repair protein       Intact nuclear expression  PMS2              [id number]         Mismatch repair protein       Intact nuclear expression     Comment:  A negative control and a positive control for each antibody have been reviewed and accepted.  [facility] Specimen ID: [id number], Evaluator:  JOSÉ MIGUEL Perales MD  These tests were developed and their performance characteristics determined by [facility].  They may not be cleared or approved by the U.S. Food and Drug Administration.  The FDA determined that such  clearance or approval is not necessary.  These tests are used for clinical purposes.  They should not be regarded as investigational or for research.  This laboratory has been approved by [place] [address], designated as a high-complexity laboratory and is qualified to perform these tests.     Comments: Patients whose tumors demonstrate lack of expression of one or more DNA mismatch repair proteins might be at risk for Bee Syndrome. This cancer susceptibility syndrome greatly increases the risk of synchronous and/or metachronous cancers in the affected patients and their family members. Normal expression of all proteins does not completely rule out familial cancer predisposition.     The [facility] Bee Syndrome Surveillance Program Task Forces recommends that all patients with lack of expression of one or more DNA mismatch repair proteins and those with concerning personal or family history should undergo thorough evaluation, counseling and possibly genetic testing.       Addendum electronically signed by Osman Beltran MD on 12/20/2019 at  3:28 PM   Final Diagnosis   A. Rectosigmoid colon, low anterior resection:  - Adenocarcinoma, moderately differentiated.   - Tumor invades through the muscularis propria into pericolorectal tissue, T3  - Diverticula.  - All margins are negative for tumor.  - Thirty-four lymph nodes, negative for malignancy (0/34).     B. Colon, anastomotic rings, resection:  - Two portions of colon with no pathologic abnormality     Intradepartmental consultation is in agreement.  Interpretation performed at [facility] [place] [address]  Immunohistochemistry for desmin  is performed on tissue blocks A13 and A16 to help in the assessment of this case.         Electronically signed by Osman Beltran MD on 12/18/2019 at 10:42 AM   Additional Information     All controls performed with the immunohistochemical stains reported above reacted appropriately.  These tests were developed  and their performance characteristics determined by [facility] or [facility]. They may not be cleared or approved by the U.S. Food and Drug Administration. The FDA has determined that such clearance or approval is not necessary. These tests are used for clinical purposes. They should not be regarded as investigational or for research. This laboratory has been approved by CLIA 88, designated as a high-complexity laboratory and is qualified to perform these tests.   Synoptic Checklist   COLON AND RECTUM: Resection, Including Transanal Disk Excision of Rectal Neoplasms  8th Edition - Protocol posted: 2/27/2019  ColoRectal - All Specimens       SPECIMEN   Procedure   Low anterior resection    Macroscopic Intactness of Mesorectum   Complete    TUMOR   Tumor Site   Rectosigmoid    Histologic Type   Adenocarcinoma    Histologic Grade   G2: Moderately differentiated    Tumor Size   Greatest dimension (Centimeters): 5.4 cm   Additional Dimension (Centimeters)   4.6 cm       1 cm   Tumor Deposits   Not identified    Tumor Extension   Tumor invades through the muscularis propria into pericolorectal tissue    Macroscopic Tumor Perforation   Not identified    Lymphovascular Invasion   Not identified    Perineural Invasion   Not identified    Type of Polyp in Which Invasive Carcinoma Arose   Tubular adenoma    Treatment Effect   No known presurgical therapy    MARGINS   Margins   All margins are uninvolved by invasive carcinoma, high-grade dysplasia, intramucosal adenocarcinoma, and adenoma    Margins Examined   Proximal        Distal        Radial or Mesenteric    Distance of Invasive Carcinoma from Closest Margin   1.5 cm   Closest Margin   Radial or Mesenteric    Distance of Tumor from Radial Margin   1.5 cm   LYMPH NODES   Number of Lymph Nodes Involved   0    Number of Lymph Nodes Examined   34    PATHOLOGIC STAGE CLASSIFICATION (pTNM, AJCC 8th Edition)   Primary Tumor (pT)   pT3     Regional Lymph Nodes (pN)   pN0    ADDITIONAL FINDINGS   Additional Pathologic Findings   Diverticulosis    .

## 2023-12-28 ENCOUNTER — TELEPHONE (OUTPATIENT)
Dept: RADIOLOGY | Facility: HOSPITAL | Age: 68
End: 2023-12-28

## 2023-12-28 RX ORDER — SODIUM CHLORIDE 9 MG/ML
75 INJECTION, SOLUTION INTRAVENOUS CONTINUOUS
Status: CANCELLED | OUTPATIENT
Start: 2023-12-28

## 2023-12-28 NOTE — PRE-PROCEDURE INSTRUCTIONS
Pre-procedure Instructions for Interventional Radiology  [address] [address]  INTERVENTIONAL RADIOLOGY [phone]    You are scheduled for a/an Y-90 mapping.    On Thursday 1-4-24.    Your tentative arrival time is 0730.  [facility] will notify you the day before your procedure with the exact arrival time and the location to arrive.    To prepare for your procedure:  Please arrange for someone to drive you home after the procedure and stay with you until the next morning if you are instructed to do so.  This is typically for patients receiving some type of sedative or anesthetic for the procedure.  DO NOT EAT OR DRINK ANYTHING after midnight on the evening before your procedure including candy & gum.  ONLY SIPS OF WATER with your medications are allowed on the morning of your procedure.  TAKE ALL OF YOUR REGULAR MEDICATIONS THE MORNING OF YOUR PROCEDURE with sips of water!  We may call you to stop some of your blood sugar, blood pressure and blood thinning medications depending on the procedure.  Please take all of these medications unless we instruct you to stop them.  If you have an allergy to x-ray dye, please contact Interventional Radiology for an x-ray dye preparation which usually consists of an oral steroid and Benadryl.    The day of your procedure:  Bring a list of the medications you take at home.  Bring medications you take for breathing problems (such as inhalers), medications for chest pain, or both.  Bring a case for your glasses or contacts.  Bring your insurance card and a form of photo ID.  Please leave all valuables such as credit cards and jewelry at home.  Report to the registration desk in the main lobby at the [facility], Entrance B.  Ask to be directed to [place] [facility] [place].  While your procedure is being performed, your family may wait in the Radiology Waiting Room on the 1st floor in Radiology.  if they need to leave, they may provide a  number to be called following the procedure.   Be prepared to stay overnight just in case. Sometimes procedures will indicate the need for further observation or treatment.   If you are scheduled for a follow-up visit with the Interventional Radiologist after your procedure, you will be called with a date and time.    Special Instructions (Medications to stop taking before your procedure etc.):  Hold Eliquis for 2 days prior to procedure.  (Last dose 1-1-24.  Restart 1-5-24.)  Okayed by Dr Sohail Grubbs.   prescriptions for Medrol and Prilosec.

## 2023-12-30 ENCOUNTER — HOSPITAL ENCOUNTER (OUTPATIENT)
Dept: MRI IMAGING | Facility: HOSPITAL | Age: 68
Discharge: HOME | End: 2023-12-30
Payer: MEDICARE

## 2023-12-30 DIAGNOSIS — C78.7 METASTASES TO THE LIVER (HCC): ICD-10-CM

## 2023-12-30 PROCEDURE — G1004 CDSM NDSC: HCPCS

## 2023-12-30 PROCEDURE — A9585 GADOBUTROL INJECTION: HCPCS | Performed by: STUDENT IN AN ORGANIZED HEALTH CARE EDUCATION/TRAINING PROGRAM

## 2023-12-30 PROCEDURE — 74183 MRI ABD W/O CNTR FLWD CNTR: CPT

## 2023-12-30 RX ORDER — GADOBUTROL 604.72 MG/ML
9 INJECTION INTRAVENOUS
Status: COMPLETED | OUTPATIENT
Start: 2023-12-30 | End: 2023-12-30

## 2023-12-30 RX ADMIN — GADOBUTROL 9 ML: 604.72 INJECTION INTRAVENOUS at 13:45

## 2024-01-02 ENCOUNTER — HOSPITAL ENCOUNTER (OUTPATIENT)
Dept: INFUSION CENTER | Facility: HOSPITAL | Age: 69
Discharge: HOME | End: 2024-01-02
Attending: INTERNAL MEDICINE
Payer: MEDICARE

## 2024-01-02 ENCOUNTER — TELEPHONE (OUTPATIENT)
Dept: HEMATOLOGY ONCOLOGY | Facility: CLINIC | Age: 69
End: 2024-01-02

## 2024-01-02 DIAGNOSIS — C19 RECTOSIGMOID CANCER (HCC): Primary | ICD-10-CM

## 2024-01-02 DIAGNOSIS — C78.7 METASTASES TO THE LIVER (HCC): ICD-10-CM

## 2024-01-02 LAB — CEA SERPL-MCNC: 4.3 NG/ML (ref 0–3)

## 2024-01-02 PROCEDURE — 82378 CARCINOEMBRYONIC ANTIGEN: CPT

## 2024-01-02 NOTE — TELEPHONE ENCOUNTER
Patient calling to inquire about a lab test Dr Grubbs discussed with her at last visit:  Mrs. Lovett was also recently presented at the GI tumor board.  The consensus was to send for circulating tumor cells.  If positive, there will be consideration for restarting systemic treatment.  If not, patient will continue with surveillance.     Lab order placed and patient will have drawn at infusion today

## 2024-01-02 NOTE — PLAN OF CARE
Problem: Potential for Falls  Goal: Patient will remain free of falls  Description: INTERVENTIONS:  - Educate patient/family on patient safety including physical limitations  - Instruct patient to call for assistance with activity   - Consult OT/PT to assist with strengthening/mobility   - Keep Call bell within reach  - Keep bed low and locked with side rails adjusted as appropriate  Outcome: Progressing     Problem: Knowledge Deficit  Goal: Patient/family/caregiver demonstrates understanding of disease process, treatment plan, medications, and discharge instructions  Description: Complete learning assessment and assess knowledge base.  Interventions:  - Provide teaching at level of understanding  - Provide teaching via preferred learning methods  Outcome: Progressing

## 2024-01-02 NOTE — PROGRESS NOTES
Port accessed, labs drawn as ordered. Port flushed and deaccessed per protocol. Unable to complete the Circulating Tumor Cells Kit per [place] at [facility]. Test is no longer offered at [facility]. Ansley Agarwal RN made aware and will reach out to pt tomorrow. Pt made aware.       Itzel Sanches  tolerated treatment well with no complications.      Itzel Sanches is aware of future appt on 2/13/24 at 1pm.     AVS printed and given to Itzel Sanches:  No (Declined by Itzel Sanches)

## 2024-01-04 ENCOUNTER — HOSPITAL ENCOUNTER (OUTPATIENT)
Dept: RADIOLOGY | Facility: HOSPITAL | Age: 69
Discharge: HOME | End: 2024-01-04
Attending: RADIOLOGY
Payer: MEDICARE

## 2024-01-04 VITALS
HEIGHT: 57 IN | WEIGHT: 207 LBS | OXYGEN SATURATION: 100 % | DIASTOLIC BLOOD PRESSURE: 72 MMHG | RESPIRATION RATE: 16 BRPM | SYSTOLIC BLOOD PRESSURE: 155 MMHG | HEART RATE: 73 BPM | TEMPERATURE: 97.1 F | BODY MASS INDEX: 44.66 KG/M2

## 2024-01-04 DIAGNOSIS — C78.7 METASTASIS TO LIVER (HCC): ICD-10-CM

## 2024-01-04 LAB
ALBUMIN SERPL BCP-MCNC: 3.4 G/DL (ref 3.5–5)
ALP SERPL-CCNC: 63 U/L (ref 34–104)
ALT SERPL W P-5'-P-CCNC: 11 U/L (ref 7–52)
ANION GAP SERPL CALCULATED.3IONS-SCNC: 7 MMOL/L
AST SERPL W P-5'-P-CCNC: 14 U/L (ref 13–39)
BILIRUB SERPL-MCNC: 0.81 MG/DL (ref 0.2–1)
BUN SERPL-MCNC: 20 MG/DL (ref 5–25)
CALCIUM ALBUM COR SERPL-MCNC: 9.4 MG/DL (ref 8.3–10.1)
CALCIUM SERPL-MCNC: 8.9 MG/DL (ref 8.4–10.2)
CHLORIDE SERPL-SCNC: 109 MMOL/L (ref 96–108)
CO2 SERPL-SCNC: 23 MMOL/L (ref 21–32)
CREAT SERPL-MCNC: 1.07 MG/DL (ref 0.6–1.3)
GFR SERPL CREATININE-BSD FRML MDRD: 53 ML/MIN/1.73SQ M
GLUCOSE P FAST SERPL-MCNC: 106 MG/DL (ref 65–99)
GLUCOSE SERPL-MCNC: 106 MG/DL (ref 65–140)
POTASSIUM SERPL-SCNC: 4 MMOL/L (ref 3.5–5.3)
PROT SERPL-MCNC: 6.8 G/DL (ref 6.4–8.4)
SODIUM SERPL-SCNC: 139 MMOL/L (ref 135–147)

## 2024-01-04 PROCEDURE — 99152 MOD SED SAME PHYS/QHP 5/>YRS: CPT

## 2024-01-04 PROCEDURE — A9540 TC99M MAA: HCPCS

## 2024-01-04 PROCEDURE — 36247 INS CATH ABD/L-EXT ART 3RD: CPT | Performed by: RADIOLOGY

## 2024-01-04 PROCEDURE — G1004 CDSM NDSC: HCPCS

## 2024-01-04 PROCEDURE — 36140 INTRO NDL ICATH UPR/LXTR ART: CPT

## 2024-01-04 PROCEDURE — 99153 MOD SED SAME PHYS/QHP EA: CPT

## 2024-01-04 PROCEDURE — 75774 ARTERY X-RAY EACH VESSEL: CPT | Performed by: RADIOLOGY

## 2024-01-04 PROCEDURE — 76937 US GUIDE VASCULAR ACCESS: CPT

## 2024-01-04 PROCEDURE — 76377 3D RENDER W/INTRP POSTPROCES: CPT | Performed by: RADIOLOGY

## 2024-01-04 PROCEDURE — C1887 CATHETER, GUIDING: HCPCS

## 2024-01-04 PROCEDURE — 75726 ARTERY X-RAYS ABDOMEN: CPT | Performed by: RADIOLOGY

## 2024-01-04 PROCEDURE — 36245 INS CATH ABD/L-EXT ART 1ST: CPT

## 2024-01-04 PROCEDURE — C1760 CLOSURE DEV, VASC: HCPCS

## 2024-01-04 PROCEDURE — C1769 GUIDE WIRE: HCPCS

## 2024-01-04 PROCEDURE — 99152 MOD SED SAME PHYS/QHP 5/>YRS: CPT | Performed by: RADIOLOGY

## 2024-01-04 PROCEDURE — 78830 RP LOCLZJ TUM SPECT W/CT 1: CPT

## 2024-01-04 PROCEDURE — 80053 COMPREHEN METABOLIC PANEL: CPT | Performed by: RADIOLOGY

## 2024-01-04 PROCEDURE — 36247 INS CATH ABD/L-EXT ART 3RD: CPT

## 2024-01-04 PROCEDURE — C1894 INTRO/SHEATH, NON-LASER: HCPCS

## 2024-01-04 RX ORDER — MIDAZOLAM HYDROCHLORIDE 2 MG/2ML
INJECTION, SOLUTION INTRAMUSCULAR; INTRAVENOUS AS NEEDED
Status: COMPLETED | OUTPATIENT
Start: 2024-01-04 | End: 2024-01-04

## 2024-01-04 RX ORDER — METRONIDAZOLE 500 MG/100ML
500 INJECTION, SOLUTION INTRAVENOUS ONCE
Status: DISCONTINUED | OUTPATIENT
Start: 2024-01-04 | End: 2024-01-05 | Stop reason: HOSPADM

## 2024-01-04 RX ORDER — SODIUM CHLORIDE 9 MG/ML
75 INJECTION, SOLUTION INTRAVENOUS CONTINUOUS
Status: DISCONTINUED | OUTPATIENT
Start: 2024-01-04 | End: 2024-01-05 | Stop reason: HOSPADM

## 2024-01-04 RX ORDER — CEFAZOLIN SODIUM 2 G/50ML
2000 SOLUTION INTRAVENOUS ONCE
Status: DISCONTINUED | OUTPATIENT
Start: 2024-01-04 | End: 2024-01-05 | Stop reason: HOSPADM

## 2024-01-04 RX ORDER — FENTANYL CITRATE 50 UG/ML
INJECTION, SOLUTION INTRAMUSCULAR; INTRAVENOUS AS NEEDED
Status: COMPLETED | OUTPATIENT
Start: 2024-01-04 | End: 2024-01-04

## 2024-01-04 RX ORDER — IODIXANOL 320 MG/ML
200 INJECTION, SOLUTION INTRAVASCULAR
Status: COMPLETED | OUTPATIENT
Start: 2024-01-04 | End: 2024-01-04

## 2024-01-04 RX ORDER — LIDOCAINE WITH 8.4% SOD BICARB 0.9%(10ML)
SYRINGE (ML) INJECTION AS NEEDED
Status: COMPLETED | OUTPATIENT
Start: 2024-01-04 | End: 2024-01-04

## 2024-01-04 RX ADMIN — MIDAZOLAM 1 MG: 1 INJECTION INTRAMUSCULAR; INTRAVENOUS at 09:12

## 2024-01-04 RX ADMIN — FENTANYL CITRATE 25 MCG: 50 INJECTION INTRAMUSCULAR; INTRAVENOUS at 09:51

## 2024-01-04 RX ADMIN — MIDAZOLAM 0.5 MG: 1 INJECTION INTRAMUSCULAR; INTRAVENOUS at 09:54

## 2024-01-04 RX ADMIN — FENTANYL CITRATE 50 MCG: 50 INJECTION INTRAMUSCULAR; INTRAVENOUS at 09:12

## 2024-01-04 RX ADMIN — Medication 10 ML: at 09:17

## 2024-01-04 RX ADMIN — IODIXANOL 140 ML: 320 INJECTION, SOLUTION INTRAVASCULAR at 10:59

## 2024-01-04 RX ADMIN — SODIUM CHLORIDE 75 ML/HR: 0.9 INJECTION, SOLUTION INTRAVENOUS at 08:11

## 2024-01-04 NOTE — SEDATION DOCUMENTATION
Y-90 mapping performed by Dr. Collado. [place] was unsuccessful, manual pressure held until hemostasis achieved. Pt tolerated procedure well. Pt will return to [facility] and Nuclear med to follow. Bed rest start time is 1030.

## 2024-01-04 NOTE — INTERVAL H&P NOTE
"H&P reviewed. After examining the patient, I find no changed to the H&P since it had been written.    /90 (BP Location: Left arm)   Pulse 84   Temp 97.6 °F (36.4 °C) (Oral)   Resp 16   Ht 4' 9\" (1.448 m)   Wt 93.9 kg (207 lb)   LMP 09/01/2011 (Approximate)   SpO2 97%   BMI 44.79 kg/m²     Patient re-evaluated. Accept as history and physical.    Yunior Collado, DO/January 4, 2024/8:32 AM  "

## 2024-01-04 NOTE — DISCHARGE INSTRUCTIONS
Discharge Instructions for SIRS Mapping Procedure                                    A Sirs mapping procedure is an arteriogram  done to prepare your liver for a SIRS Implantation. During the mapping your doctor will embolize (block) some of your blood vessels to keep the SIRS Spheres from traveling to areas outside your liver.  .     AFTER YOU LEAVE:     Self-care:   Limit activity: Rest for the remainder of the day of your procedure.Have some one with you until the next morning. Keep your arm or leg straight as much as possible .Rest as much as possible, sitting lying or reclining. Walk only to go to the bathroom, to bed or to eat. If the angiogram catheter was put in your leg, use the stairs as little as possible. No driving.     Keep your wound clean and dry.  Remove band aid/ dressing tomorrow. You may shower 24 hours after your procedure. Shower and wash groin area or wrist area gently with soap and water: beginning tomorrow. Rinse and pat Dry. Apply new water seal band aid. Repeat this process for 5 days.  If there is any drainage from the puncture site, you should put on a clean bandage. No Powders, creams, lotions or antibiotic ointments for 5 days.  No tub baths, hot tubs or swimming for 5 days.     Watch for bleeding and bruising: It is normal to have a bruise and soreness where the angiogram catheter went in.    Diet:   You may resume your regular diet. Small sips of flat soda will help with mild nausea.  Drink more liquids than usual for the next 24 hours                      Medications              Resume your normal medications              Start taking Prilosec as prescribed  daily for the next 30 days               Please get the Medrol prescription filled prior to your implantation.(you will start taking it after the implantation)             WHAT YOU SHOULD PREPARE FOR AT HOME AFTER THE IMPLANT    After your second procedure, the SIRS implant. For  72 hours   NO pregnant visitors or family. No physical contact with others for more than two hours. Sleep alone in bed. No children or pets sitting on your lap. Keep a distance of three feet between yourself and others.       IMMEDIATELY Contact Interventional Radiology at [phone] ([place] PATIENTS: Contact Interventional Radiology at [phone]) ([place] PATIENTS: Contact Interventional Radiology at [phone]) if any of the following occur:  If your bruise gets larger or if you notice any active bleeding. APPLY DIRECT PRESSURE TO THE BLEEDING SITE.   If you notice increased swelling or have increased pain at the puncture site   If you have any numbness or pain in the extremity of the puncture site   If that extremity seems cold or pale.    You have fever greater than 101  Persistent nausea or vomiting    Follow up with your primary healthcare provider  as directed: Write down your questions so you remember to ask them during your visits.

## 2024-01-04 NOTE — BRIEF OP NOTE (RAD/CATH)
IR Y-90 PRE-ANGIO/EMBO W/ LUNG SCAN  Procedure Note    PATIENT NAME: Itzel Sanches  : 1955  MRN: [id number]     Pre-op Diagnosis:   1. Metastasis to liver (HCC)      Post-op Diagnosis:   1. Metastasis to liver (HCC)        Surgeon:   Yunior Collado DO  Assistants:     No qualified resident was available.    Estimated Blood Loss: None  Findings: Seg 5 lesion isolated, plan for segmentectomy.    Specimens: none    Complications:  none    Anesthesia: conscious sedation and local    Yunior Collado DO     Date: 2024  Time: 10:59 AM

## 2024-01-05 ENCOUNTER — TELEPHONE (OUTPATIENT)
Dept: RADIOLOGY | Facility: HOSPITAL | Age: 69
End: 2024-01-05

## 2024-01-05 RX ORDER — METRONIDAZOLE 500 MG/100ML
500 INJECTION, SOLUTION INTRAVENOUS ONCE
Status: CANCELLED | OUTPATIENT
Start: 2024-01-11

## 2024-01-05 RX ORDER — CEFAZOLIN SODIUM 2 G/50ML
2000 SOLUTION INTRAVENOUS ONCE
Status: CANCELLED | OUTPATIENT
Start: 2024-01-05 | End: 2024-01-05

## 2024-01-05 RX ORDER — SODIUM CHLORIDE 9 MG/ML
75 INJECTION, SOLUTION INTRAVENOUS CONTINUOUS
Status: CANCELLED | OUTPATIENT
Start: 2024-01-05

## 2024-01-05 NOTE — PRE-PROCEDURE INSTRUCTIONS
Pre-procedure Instructions for Interventional Radiology  [address] [address]  INTERVENTIONAL RADIOLOGY [phone]    You are scheduled for a/an Y-90 implant.    On Thursday 1-11-24.    Your tentative arrival time is 0730.  [facility] will notify you the day before your procedure with the exact arrival time and the location to arrive.    To prepare for your procedure:  Please arrange for someone to drive you home after the procedure and stay with you until the next morning if you are instructed to do so.  This is typically for patients receiving some type of sedative or anesthetic for the procedure.  DO NOT EAT OR DRINK ANYTHING after midnight on the evening before your procedure including candy & gum.  ONLY SIPS OF WATER with your medications are allowed on the morning of your procedure.  TAKE ALL OF YOUR REGULAR MEDICATIONS THE MORNING OF YOUR PROCEDURE with sips of water!  We may call you to stop some of your blood sugar, blood pressure and blood thinning medications depending on the procedure.  Please take all of these medications unless we instruct you to stop them.  If you have an allergy to x-ray dye, please contact Interventional Radiology for an x-ray dye preparation which usually consists of an oral steroid and Benadryl.    The day of your procedure:  Bring a list of the medications you take at home.  Bring medications you take for breathing problems (such as inhalers), medications for chest pain, or both.  Bring a case for your glasses or contacts.  Bring your insurance card and a form of photo ID.  Please leave all valuables such as credit cards and jewelry at home.  Report to the registration desk in the main lobby at the [facility], Entrance B.  Ask to be directed to [place] [facility] [place].  While your procedure is being performed, your family may wait in the Radiology Waiting Room on the 1st floor in Radiology.  if they need to leave, they may provide a  number to be called following the procedure.   Be prepared to stay overnight just in case. Sometimes procedures will indicate the need for further observation or treatment.   If you are scheduled for a follow-up visit with the Interventional Radiologist after your procedure, you will be called with a date and time.    Special Instructions (Medications to stop taking before your procedure etc.):  Hold Eliquis for 2 days before procedure and restart on 1-12-24.  Start Omeprazole on 1-8-24.  Start Medrol on 1-11-24 after procedure.

## 2024-01-11 ENCOUNTER — HOSPITAL ENCOUNTER (OUTPATIENT)
Dept: RADIOLOGY | Facility: HOSPITAL | Age: 69
Discharge: HOME | End: 2024-01-11
Attending: RADIOLOGY
Payer: MEDICARE

## 2024-01-11 VITALS
HEART RATE: 67 BPM | HEIGHT: 57 IN | OXYGEN SATURATION: 100 % | WEIGHT: 207 LBS | BODY MASS INDEX: 44.66 KG/M2 | DIASTOLIC BLOOD PRESSURE: 84 MMHG | SYSTOLIC BLOOD PRESSURE: 158 MMHG | TEMPERATURE: 97.7 F | RESPIRATION RATE: 16 BRPM

## 2024-01-11 DIAGNOSIS — C78.7 METASTASIS TO LIVER (HCC): ICD-10-CM

## 2024-01-11 LAB
ALBUMIN SERPL BCP-MCNC: 3.3 G/DL (ref 3.5–5)
ALP SERPL-CCNC: 60 U/L (ref 34–104)
ALT SERPL W P-5'-P-CCNC: 10 U/L (ref 7–52)
ANION GAP SERPL CALCULATED.3IONS-SCNC: 7 MMOL/L
AST SERPL W P-5'-P-CCNC: 12 U/L (ref 13–39)
BILIRUB SERPL-MCNC: 0.82 MG/DL (ref 0.2–1)
BUN SERPL-MCNC: 18 MG/DL (ref 5–25)
CALCIUM ALBUM COR SERPL-MCNC: 9.5 MG/DL (ref 8.3–10.1)
CALCIUM SERPL-MCNC: 8.9 MG/DL (ref 8.4–10.2)
CHLORIDE SERPL-SCNC: 107 MMOL/L (ref 96–108)
CO2 SERPL-SCNC: 23 MMOL/L (ref 21–32)
CREAT SERPL-MCNC: 1.07 MG/DL (ref 0.6–1.3)
GFR SERPL CREATININE-BSD FRML MDRD: 53 ML/MIN/1.73SQ M
GLUCOSE P FAST SERPL-MCNC: 100 MG/DL (ref 65–99)
GLUCOSE SERPL-MCNC: 100 MG/DL (ref 65–140)
POTASSIUM SERPL-SCNC: 4 MMOL/L (ref 3.5–5.3)
PROT SERPL-MCNC: 6.9 G/DL (ref 6.4–8.4)
SODIUM SERPL-SCNC: 137 MMOL/L (ref 135–147)

## 2024-01-11 PROCEDURE — 36247 INS CATH ABD/L-EXT ART 3RD: CPT | Performed by: RADIOLOGY

## 2024-01-11 PROCEDURE — 80053 COMPREHEN METABOLIC PANEL: CPT | Performed by: RADIOLOGY

## 2024-01-11 PROCEDURE — C1887 CATHETER, GUIDING: HCPCS

## 2024-01-11 PROCEDURE — 76937 US GUIDE VASCULAR ACCESS: CPT

## 2024-01-11 PROCEDURE — 37243 VASC EMBOLIZE/OCCLUDE ORGAN: CPT | Performed by: RADIOLOGY

## 2024-01-11 PROCEDURE — 99153 MOD SED SAME PHYS/QHP EA: CPT

## 2024-01-11 PROCEDURE — 99152 MOD SED SAME PHYS/QHP 5/>YRS: CPT | Performed by: RADIOLOGY

## 2024-01-11 PROCEDURE — C1894 INTRO/SHEATH, NON-LASER: HCPCS

## 2024-01-11 PROCEDURE — C1769 GUIDE WIRE: HCPCS

## 2024-01-11 PROCEDURE — G1004 CDSM NDSC: HCPCS

## 2024-01-11 PROCEDURE — C1760 CLOSURE DEV, VASC: HCPCS

## 2024-01-11 PROCEDURE — 78830 RP LOCLZJ TUM SPECT W/CT 1: CPT

## 2024-01-11 PROCEDURE — 37243 VASC EMBOLIZE/OCCLUDE ORGAN: CPT

## 2024-01-11 PROCEDURE — 99152 MOD SED SAME PHYS/QHP 5/>YRS: CPT

## 2024-01-11 RX ORDER — MIDAZOLAM HYDROCHLORIDE 2 MG/2ML
INJECTION, SOLUTION INTRAMUSCULAR; INTRAVENOUS AS NEEDED
Status: COMPLETED | OUTPATIENT
Start: 2024-01-11 | End: 2024-01-11

## 2024-01-11 RX ORDER — FENTANYL CITRATE 50 UG/ML
INJECTION, SOLUTION INTRAMUSCULAR; INTRAVENOUS AS NEEDED
Status: COMPLETED | OUTPATIENT
Start: 2024-01-11 | End: 2024-01-11

## 2024-01-11 RX ORDER — HEPARIN SODIUM 1000 [USP'U]/ML
INJECTION, SOLUTION INTRAVENOUS; SUBCUTANEOUS AS NEEDED
Status: COMPLETED | OUTPATIENT
Start: 2024-01-11 | End: 2024-01-11

## 2024-01-11 RX ORDER — LIDOCAINE WITH 8.4% SOD BICARB 0.9%(10ML)
SYRINGE (ML) INJECTION AS NEEDED
Status: COMPLETED | OUTPATIENT
Start: 2024-01-11 | End: 2024-01-11

## 2024-01-11 RX ORDER — IODIXANOL 320 MG/ML
200 INJECTION, SOLUTION INTRAVASCULAR
Status: COMPLETED | OUTPATIENT
Start: 2024-01-11 | End: 2024-01-11

## 2024-01-11 RX ORDER — METRONIDAZOLE 500 MG/100ML
500 INJECTION, SOLUTION INTRAVENOUS ONCE
Status: COMPLETED | OUTPATIENT
Start: 2024-01-11 | End: 2024-01-11

## 2024-01-11 RX ORDER — SODIUM CHLORIDE 9 MG/ML
75 INJECTION, SOLUTION INTRAVENOUS CONTINUOUS
Status: DISCONTINUED | OUTPATIENT
Start: 2024-01-11 | End: 2024-01-12 | Stop reason: HOSPADM

## 2024-01-11 RX ORDER — CEFAZOLIN SODIUM 2 G/50ML
2000 SOLUTION INTRAVENOUS ONCE
Status: COMPLETED | OUTPATIENT
Start: 2024-01-11 | End: 2024-01-11

## 2024-01-11 RX ADMIN — Medication 10 ML: at 11:26

## 2024-01-11 RX ADMIN — FENTANYL CITRATE 25 MCG: 50 INJECTION INTRAMUSCULAR; INTRAVENOUS at 11:54

## 2024-01-11 RX ADMIN — MIDAZOLAM 0.5 MG: 1 INJECTION INTRAMUSCULAR; INTRAVENOUS at 11:17

## 2024-01-11 RX ADMIN — FENTANYL CITRATE 25 MCG: 50 INJECTION INTRAMUSCULAR; INTRAVENOUS at 12:17

## 2024-01-11 RX ADMIN — FENTANYL CITRATE 25 MCG: 50 INJECTION INTRAMUSCULAR; INTRAVENOUS at 11:17

## 2024-01-11 RX ADMIN — HEPARIN SODIUM 4000 UNITS: 1000 INJECTION INTRAVENOUS; SUBCUTANEOUS at 11:56

## 2024-01-11 RX ADMIN — CEFAZOLIN SODIUM 2000 MG: 2 SOLUTION INTRAVENOUS at 10:45

## 2024-01-11 RX ADMIN — MIDAZOLAM 0.5 MG: 1 INJECTION INTRAMUSCULAR; INTRAVENOUS at 12:17

## 2024-01-11 RX ADMIN — IODIXANOL 26 ML: 320 INJECTION, SOLUTION INTRAVASCULAR at 14:46

## 2024-01-11 RX ADMIN — METRONIDAZOLE 500 MG: 500 SOLUTION INTRAVENOUS at 11:09

## 2024-01-11 RX ADMIN — MIDAZOLAM 0.5 MG: 1 INJECTION INTRAMUSCULAR; INTRAVENOUS at 11:54

## 2024-01-11 RX ADMIN — FENTANYL CITRATE 25 MCG: 50 INJECTION INTRAMUSCULAR; INTRAVENOUS at 11:11

## 2024-01-11 RX ADMIN — MIDAZOLAM 0.5 MG: 1 INJECTION INTRAMUSCULAR; INTRAVENOUS at 11:12

## 2024-01-11 NOTE — INTERVAL H&P NOTE
H&P reviewed. After examining the patient, I find no changed to the H&P since it had been written.      Patient re-evaluated. Accept as history and physical.    Yunior Collado, DO/January 11, 2024/10:36 AM

## 2024-01-11 NOTE — BRIEF OP NOTE (RAD/CATH)
IR Y-90 RADIOEMBOLIZATION  Procedure Note    PATIENT NAME: Itzel Sanches  : 1955  MRN: [id number]     Pre-op Diagnosis:   1. Metastasis to liver (HCC)      Post-op Diagnosis:   1. Metastasis to liver (HCC)        Surgeon:   Yunior Collado DO  Assistants:     No qualified resident was available.    Estimated Blood Loss: none  Findings: R CFA 5F sheath access, closed with manual pressure. Y90 therasphere segmentectomy of segment 5    Specimens: none    Complications:  none    Anesthesia: conscious sedation and local    Yunior Collado DO     Date: 2024  Time: 12:52 PM

## 2024-01-11 NOTE — SEDATION DOCUMENTATION
Y90 embolization successfully completed by Dr. Collado without complications. Tolerated well. Bedrest start at 12:45 for approximately 4-6 hrs per Dr. Collado. Pt to return to [facility] for post scan at approximately 14:00. Report called to ANJU DESAI RN.

## 2024-01-11 NOTE — DISCHARGE INSTRUCTIONS
SIRS IMPLANT DISCHARGE INSTRUCTIONS    WHAT YOU SHOULD KNOW:  For the next 72 hours after your SIRS implant NO pregnant visitors or family. No physical contact with others for more than two hours. Sleep alone in bed. No children or pets sitting on your lap. Keep a distance of three feet between yourself and others.    AFTER YOU LEAVE:     Self-care:   Limit activity: Rest for the remainder of the day of your procedure.Have some one with you until the next morning. Keep your arm or leg straight as much as possible.Rest as much as possible, sitting lying or reclining. Walk only to go to the bathroom, to bed or to eat. If the angiogram catheter was put in your leg, use the stairs as little as possible. No driving.     Keep your wound clean and dry. Remove band aid/ dressing tomorrow. You may shower 24 hours after your procedure. Shower and wash groin area or wrist area gently with soap and water: beginning tomorrow. Rinse and pat Dry. Apply new water seal band aid. Repeat this process for 5 days.  If there is any drainage from the puncture site, you should put on a clean bandage. No Powders, creams, lotions or antibiotic ointments for 5 days.  No tub baths, hot tubs or swimming for 5 days.     Watch for bleeding and bruising: It is normal to have a bruise and soreness where the angiogram catheter went in.  Medication Continue  to take Prilosec 20 mg daily for a total of  30 days after the initial mapping procedure. Start taking the  Medrol as directed.  Diet: You may resume your regular diet. Small sips of flat soda will help with mild nausea.      Drink more liquids than usual for the next 24 hours      IMMEDIATELY Contact Interventional Radiology at [phone] ([place] PATIENTS: Contact Interventional Radiology at [phone]) ([place] PATIENTS: Contact Interventional Radiology at [phone]) if any of the following occur:  If your bruise gets larger or if you notice any active bleeding. APPLY DIRECT PRESSURE  TO THE BLEEDING SITE.   If you notice increased swelling or have increased pain at the puncture site   If you have any numbness or pain in the extremity of the puncture site   If that extremity seems cold or pale.    You have fever greater than 101  Persistent nausea or vomiting.    Follow up with your primary healthcare provider  as directed: Write down your questions so you remember to ask them during your visits.   Procedural Sedation   WHAT YOU NEED TO KNOW:   Procedural sedation is medicine used during procedures to help you feel relaxed and calm. You will remember little to none of the procedure. After sedation you may feel tired, weak, or unsteady on your feet. You may also have trouble concentrating or short-term memory loss. These symptoms should go away in 24 hours or less.   DISCHARGE INSTRUCTIONS:     Call 911 or have someone else call for any of the following:   You have sudden trouble breathing.     You cannot be woken.      Contact Interventional Radiology at [phone]   ([place] PATIENTS: Contact Interventional Radiology at [phone]) ([place] PATIENTS: Contact Interventional Radiology at [phone]) if any of the following occur:     You have a severe headache or dizziness.     Your heart is beating faster than usual.    You have a fever or chills.     Your skin is itchy, swollen, or you have a rash.     You have nausea or are vomiting for more than 8 hours after the procedure.      You have questions or concerns about your condition or care.  Self-care:   Have someone stay with you for 24 hours. This person can drive you to errands and help you do things around the house. This person can also watch for problems.      Rest and do quiet activities for 24 hours. Do not exercise, ride a bike, or play sports. Stand up slowly to prevent dizziness and falls. Take short walks around the house with another person. Slowly return to your usual activities the next day.      Do not drive or use dangerous  machines or tools for 24 hours. You may injure yourself or others. Examples include a lawnmower, saw, or drill. Do not return to work for 24 hours if you use dangerous machines or tools for work.      Do not make important decisions for 24 hours. For example, do not sign important papers or invest money.      Drink liquids as directed. Liquids help flush the sedation medicine out of your body. Ask how much liquid to drink each day and which liquids are best for you.      Eat small, frequent meals to prevent nausea and vomiting. Start with clear liquids such as juice or broth. If you do not vomit after clear liquids, you can eat your usual foods.      Do not drink alcohol or take medicines that make you drowsy. This includes medicines that help you sleep and anxiety medicines. Ask your healthcare provider if it is safe for you to take pain medicine.  Follow up with your healthcare provider as directed: Write down your questions so you remember to ask them during your visits.

## 2024-01-12 ENCOUNTER — APPOINTMENT (OUTPATIENT)
Dept: RADIATION ONCOLOGY | Facility: HOSPITAL | Age: 69
End: 2024-01-12
Payer: MEDICARE

## 2024-01-12 PROCEDURE — 77300 RADIATION THERAPY DOSE PLAN: CPT | Performed by: STUDENT IN AN ORGANIZED HEALTH CARE EDUCATION/TRAINING PROGRAM

## 2024-01-12 PROCEDURE — 77263 THER RADIOLOGY TX PLNG CPLX: CPT | Performed by: STUDENT IN AN ORGANIZED HEALTH CARE EDUCATION/TRAINING PROGRAM

## 2024-01-12 PROCEDURE — 79445 NUCLEAR RX INTRA-ARTERIAL: CPT | Performed by: STUDENT IN AN ORGANIZED HEALTH CARE EDUCATION/TRAINING PROGRAM

## 2024-01-17 ENCOUNTER — TELEPHONE (OUTPATIENT)
Dept: HEMATOLOGY ONCOLOGY | Facility: CLINIC | Age: 69
End: 2024-01-17

## 2024-01-17 NOTE — TELEPHONE ENCOUNTER
Called pt to assist with rescheduling upcoming Dr Freeman appt. No answer, no option to leave a vm

## 2024-01-30 ENCOUNTER — TELEPHONE (OUTPATIENT)
Dept: HEMATOLOGY ONCOLOGY | Facility: CLINIC | Age: 69
End: 2024-01-30

## 2024-01-30 NOTE — TELEPHONE ENCOUNTER
Appointment Change  Cancel, Reschedule, Change to Virtual      Who are you speaking with? Patient   If it is not the patient, is the caller listed on the communication consent form? N/A   Which provider is the appointment scheduled with? Dr. Freeman   When was the original appointment scheduled?    Please list date and time 2/19/24 @ 1:45pm   At which location is the appointment scheduled to take place? [place]   Was the appointment rescheduled?     Was the appointment changed from an in person visit to a virtual visit?    If so, please list the details of the change. Yes 3/4/24 @ 1:45pm   What is the reason for the appointment change? Dr. Freeman will be OOO       Was STAR transport scheduled? yes   Does STAR transport need to be scheduled for the new visit (if applicable) yes   Does the patient need an infusion appointment rescheduled? no   Does the patient have an upcoming infusion appointment scheduled? If so, when? no   Is the patient undergoing chemotherapy? no   For appointments cancelled with less than 24 hours:  Was the no-show policy reviewed? yes

## 2024-02-01 ENCOUNTER — OFFICE VISIT (OUTPATIENT)
Dept: HEMATOLOGY ONCOLOGY | Facility: CLINIC | Age: 69
End: 2024-02-01
Payer: MEDICARE

## 2024-02-01 VITALS
HEART RATE: 108 BPM | RESPIRATION RATE: 17 BRPM | BODY MASS INDEX: 44.01 KG/M2 | SYSTOLIC BLOOD PRESSURE: 154 MMHG | HEIGHT: 57 IN | DIASTOLIC BLOOD PRESSURE: 90 MMHG | WEIGHT: 204 LBS | TEMPERATURE: 98.3 F | OXYGEN SATURATION: 100 %

## 2024-02-01 DIAGNOSIS — C19 RECTOSIGMOID CANCER (HCC): Primary | ICD-10-CM

## 2024-02-01 PROCEDURE — 99215 OFFICE O/P EST HI 40 MIN: CPT | Performed by: INTERNAL MEDICINE

## 2024-02-01 NOTE — PROGRESS NOTES
Itzel Sanches  1955  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     Recurrent rectosigmoid adenocarcinoma.  Patient was seen/evaluated by Dr. Freeman surgical oncology.  Patient received 3 months of preoperative FOLFOX.  Follow-up CT scans demonstrated response to treatment with no evidence of progressive disease.  Patient then underwent resection of the omental mass and spleen as well as DILMA/BSO (other issue, see below).  Mrs. Sanches was then restarted on FOLFOX.  Patient had problems with neuropathy; FOLFOX was changed to FOLFIRI.       The 12th/final cycle of chemotherapy was completed on August 23, 2022. CT scan in May 2023 showed a new lesion to the right hepatic lobe.  MRI abdomen in July 2023 confirmed liver metastasis and revealed a new, posterior left chest wall/pleural lesion suspicious for metastasis. The chest wall mass was biopsied on 9/13/2023 revealing metastatic moderately differentiated adenocarcinoma consistent with known colonic primary.     Mrs. Sanches was seen/evaluated by Dr. Brown Grubbs (Radiation Oncology) and patient has received SBRT (left-sided lower rib cage/upper abdomen).  Mrs. Sanches also recently completed Y90.    Mrs. Lovett feels well and clinically there are no concerning findings.  Recent blood work was okay/acceptable.  No pain control issues.  Recent CEA level was lower than before.  At this moment, the plan is to continue with surveillance.    Patient was recently re-presented at the GI tumor board.  The consensus was for patient to go for circulating tumor cells.  If negative, patient would continue with surveillance.  If positive, patient would need to reconsider the possibility of  restarting chemotherapy.    Specifics not presently clear but patient never went for the CTC testing.  This will be reordered.      Patient also has a follow-up appointment with Dr. Freeman in early March 2024.  Eventually scans will need to be reordered.     Ovarian lesion.  MRI/pelvis in October 2021 demonstrated a multi septated left ovarian cystic lesion.  Transabdominal ultrasound results did not demonstrate any concerning abnormality.  Patient underwent DILMA/BSO.  Pathology results are listed below - no evidence of malignancy. Patient was last seen by Gynecologic Oncology in June 2022, and was advised to return for follow-up as needed.     Genetics.  Patient has a complicated family history but multiple family members with colon/rectal cancer.      Bilateral lower extremity swelling. Patient has a history of lower extremity DVTs, is on Eliquis.  No bruising or bleeding issues.  Patient will continue to monitor.    Mrs. Sanches is to return in 3 months.  As above, this may change if the CTC ALT are positive. Patient knows to call if she has any other questions or concerns.    Carefully review your medication list and verify that the list is accurate and up-to-date. Please call the hematology/oncology office if there are medications missing from the list, medications on the list that you are not currently taking or if there is a dosage or instruction that is different from how you're taking that medication.     Patient goals and areas of care: Follow-up with radiation oncology/IR, CTC analysis, surgical oncology follow-up  Barriers to care:  None  Patient is able to self-care  ______________________________________________________________________________________     Chief complaint: Rectosigmoid carcinoma, omental recurrence, second recurrence     History of Present Illness:  68 year old female previously diagnosed with rectosigmoid carcinoma status post resection in September 2019.  Postoperatively patient did  well; Mrs. Sanches did not need/receive adjuvant chemotherapy. Surveillance CEA level was found to be elevated in June 2022.  Patient underwent workup.  Results demonstrated an intra-abdominal mass by the spleen. Patient underwent neoadjuvant chemotherapy with FOLFOX, with follow-up scans demonstrating response.  Patient then underwent resection, followed by postoperative chemotherapy with FOLFOX initially. FOLFOX was changed to FOLFIRI due to peripheral neuropathy. Treatment was completed in August 2022.      CT scan in May 2023 showed a new lesion to the right hepatic lobe. MRI abdomen in July 2023 confirmed liver metastasis and revealed a new, posterior left chest wall/pleural lesion suspicious for metastasis. The chest wall mass was biopsied on 9/13/2023 revealing adenocarcinoma consistent with known colonic primary (see Pathology .  Patient was seen/evaluated by Radiation Oncology and underwent SBRT to the left posterior chest wall lesion and more recently patient received Y90 to the liver lesion.    Mrs. Sanches states feeling well, baseline.  No GI issues, appetite is good.  No pain control issues, no rib cage pain, no abdominal pain.  No respiratory issues.  No fevers or signs of infection.  Activities are baseline.    Review of Systems   Constitutional: Negative for activity change, appetite change and fatigue.   HENT: Negative.    Eyes: Negative.    Respiratory: Negative.    Cardiovascular: Negative.    Gastrointestinal: Negative for nausea and vomiting.   Endocrine: Negative.    Genitourinary: Negative.    Musculoskeletal: Negative.    Skin: Negative.    Allergic/Immunologic: Negative.    Neurological: Negative.    Hematological: Negative.    Psychiatric/Behavioral: Negative.    All other systems reviewed and are negative.         Patient Active Problem List   Diagnosis    Callus of foot    Foot pain    GERD (gastroesophageal reflux disease)    Mixed hyperlipidemia    Prediabetes    Varicose veins with  pain    Morbid obesity (HCC)    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HCC)    Dyspnea on exertion    Dyslipidemia    Sigmoid diverticulosis    PONV (postoperative nausea and vomiting)    Omental metastasis    Lesion of ovary    Chemotherapy adverse reaction    Chemotherapy-induced nausea    Platelets decreased (HCC)    Stage 3 chronic kidney disease, unspecified whether stage 3a or 3b CKD (HCC)    H/O splenectomy    Asplenia    Postoperative state    Acute embolism and thrombosis of right tibial vein (HCC)      Medical History        Past Medical History:   Diagnosis Date    Blood in stool      Breast disorder      Cancer (HCC)       rectal    Cancer determined by colorectal biopsy (HCC)       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    Colon polyp      GERD (gastroesophageal reflux disease)      History of chemotherapy 2021    History of rectal surgery      Hyperlipidemia      PONV (postoperative nausea and vomiting)           Surgical History         Past Surgical History:   Procedure Laterality Date    BREAST CYST EXCISION Right       SECTION         x3    COLON SIGMOID RESECTION        COLONOSCOPY        DILATION AND CURETTAGE OF UTERUS        ILEO LOOP DIVERSION N/A 12/10/2019     Procedure: ILEOSTOMY LOOP;  Surgeon: WINNIE Pastor MD;  [facility]: [facility];  Service: Robotics    INSTILLATION CHEMOTHERAPY INTRAPERITONEAL (HIPEC) LAPAROTOMY N/A 2022     Procedure: INSTILLATION CHEMOTHERAPY INTRAPERITONEAL (HIPEC) LAPAROTOMY;  Surgeon: Jessie Freeman MD;  [facility]: [facility];  Service: Surgical Oncology    IR BIOPSY CHEST WALL   2023    IR BIOPSY OMENTUM   2021    IR PORT PLACEMENT   2021    OMENTECTOMY N/A 2022     Procedure: DIAGNOSTIC LAPAROSCOPY, OPEN OMENTECTOMY, SPLENECTOMY, DIAPHRAGM REPAIR, CHEST TUBE INSERTION;  Surgeon: Jessie Freeman MD;  [facility];  Service: Surgical Oncology    TX CLOSURE ENTEROSTOMY LG/SMALL INTESTINE N/A 2020     Procedure: CLOSURE ILEOSTOMY;  Surgeon:  WINNIE Pastor MD;  [facility];  Service: Colorectal    NM LAPAROSCOPY COLECTOMY PARTIAL W/ANASTOMOSIS N/A 12/10/2019     Procedure: SIGMOID RESECTION COLON LAPAROSCOPIC W ROBOTICS converted to lap hand assisted  with Partial proctectomy , LASER FLUORESCENCE ANGIOGRAPHY, INTRA OP COLONOSCOPY;  Surgeon: WINNIE Pastor MD;  [facility];  Service: Robotics    NM TOTAL ABDOMINAL HYSTERECT W/WO RMVL TUBE OVARY N/A 04/29/2022     Procedure: DIAGNOSTIC LAPAROSCOPY, TOTAL ABDOMINAL HYSTERECTOMY, BILATERAL SALPINGO-OOPHORECTOMY, EXTENSIVE ADHESIOLYSIS;  Surgeon: Peterson Mae MD;  [facility];  Service: Gynecology Oncology    NM UNLISTED PROCEDURE DIAPHRAGM   04/29/2022     Procedure: REPAIR DIAPHRAGM TEAR;  Surgeon: Yana Hebert MD;  [facility];  Service: Thoracic    SMALL INTESTINE SURGERY   02/04/2020     Procedure: RESECTION SMALL BOWEL;  Surgeon: WINNIE Pastor MD;  [facility];  Service: Colorectal      Past surgical history:  No prior blood transfusions     OBGYN:  No breast issues, no abnormal mammograms previously, no postmenopausal bleeding, no other GYN issues     Family History         Family History   Problem Relation Age of Onset    Hypertension Mother      Heart attack Mother      Heart attack Father      Heart disease Father      Hypertension Brother      Heart attack Brother      Colon cancer Paternal Uncle      Leukemia Cousin      Breast cancer Cousin      Diabetes Cousin      Breast cancer Cousin      Colon cancer Family           paternal uncle    Stroke Neg Hx        Family history:  Somewhat complicated, mother with colostomy but etiology for this is unclear, paternal uncle with rectal cancer, patient has to first-degree relatives on the paternal side with history of colon cancer, 3 sons and 1 grandchild in good general health     Social History               Socioeconomic History    Marital status:        Spouse name:  "Not on file    Number of children: Not on file    Years of education: Not on file    Highest education level: Not on file   Occupational History    Not on file   Tobacco Use    Smoking status: Never    Smokeless tobacco: Never   Vaping Use    Vaping Use: Never used   Substance and Sexual Activity    Alcohol use: Yes       Comment: \"occasionally\"    Drug use: Never    Sexual activity: Not Currently   Other Topics Concern    Not on file   Social History Narrative    Not on file      Social Determinants of Health           Financial Resource Strain: Low Risk  (3/21/2023)     Overall Financial Resource Strain (CARDIA)      Difficulty of Paying Living Expenses: Not hard at all   Food Insecurity: No Food Insecurity (5/2/2022)     Hunger Vital Sign      Worried About Running Out of Food in the Last Year: Never true      Ran Out of Food in the Last Year: Never true   Transportation Needs: No Transportation Needs (3/21/2023)     PRAPARE - Transportation      Lack of Transportation (Medical): No      Lack of Transportation (Non-Medical): No   Physical Activity: Not on file   Stress: Not on file   Social Connections: Not on file   Intimate Partner Violence: Not on file   Housing Stability: Unknown (5/2/2022)     Housing Stability Vital Sign      Unable to Pay for Housing in the Last Year: No      Number of Places Lived in the Last Year: Not on file      Unstable Housing in the Last Year: No      Social history:  No tobacco, alcohol or drug abuse, no toxic exposure     Current Outpatient Medications:     Acetaminophen (TYLENOL 8 HOUR PO), Take by mouth PRN, Disp: , Rfl:     apixaban (Eliquis) 5 mg, Take 1 tablet (5 mg total) by mouth 2 (two) times a day, Disp: 60 tablet, Rfl: 5    ezetimibe (ZETIA) 10 mg tablet, Take 1 tablet (10 mg total) by mouth daily, Disp: 90 tablet, Rfl: 1    famotidine (PEPCID) 20 mg tablet, Take 1 tablet (20 mg total) by mouth 2 (two) times a day as needed for heartburn As needed, Disp: 90 tablet, Rfl: " 1    meclizine (ANTIVERT) 12.5 MG tablet, Take 1 tablet (12.5 mg total) by mouth 3 (three) times a day as needed for dizziness (Patient taking differently: Take 12.5 mg by mouth 3 (three) times a day as needed for dizziness PRN), Disp: 30 tablet, Rfl: 0           Allergies   Allergen Reactions    Medical Tape Rash       Skin irritation  Skin peeling  Skin irritation  Skin peeling  Blisters  Rash          Vitals:     10/10/23 1356   BP: 136/76   Pulse: 78   Resp: 17   Temp: 98 °F (36.7 °C)   SpO2: 98%      Physical Exam  Constitutional:       Appearance: She is well-developed. She is obese.   HENT:      Head: Normocephalic and atraumatic.      Right Ear: External ear normal.      Left Ear: External ear normal.   Eyes:      Extraocular Movements: Extraocular movements intact.      Conjunctiva/sclera: Conjunctivae normal.      Pupils: Pupils are equal, round, and reactive to light.   Cardiovascular:      Rate and Rhythm: Normal rate and regular rhythm.      Heart sounds: Normal heart sounds.   Pulmonary:      Effort: Pulmonary effort is normal.      Breath sounds: Normal breath sounds.   Abdominal:      General: Bowel sounds are normal. There is no distension.      Palpations: Abdomen is soft.      Tenderness: There is no abdominal tenderness. There is no guarding.   Musculoskeletal:         General: Normal range of motion.      Right lower leg: No edema.      Left lower leg: No edema.   Lymphadenopathy:      Cervical: No cervical adenopathy.      Upper Body:      Right upper body: No supraclavicular or axillary adenopathy.      Left upper body: No supraclavicular or axillary adenopathy.   Skin:     General: Skin is warm and dry.  Right anterior chest wall port area clean and dry     Findings: No bruising, ecchymosis or petechiae.   Neurological:      General: No focal deficit present.      Mental Status: She is alert and oriented to person, place, and time.   Psychiatric:         Mood and Affect: Mood normal.          Behavior: Behavior normal.         Judgment: Judgment normal.   Extremities: Bilateral lower extremity edema, 1+, no cords, pulses are 1+     Laboratory    1/11/2024 BUN = 18 creatinine = 1.07 LFTs WNL calcium = 8.9        11/15/2023 WBC = 6.0 hemoglobin = 11 hematocrit = 34 platelet = 247 neutrophil = 67%    Procedures    1/11/2024NM SPECT post microsphere implant    IMPRESSION:     1.  Successful intra-arterial placement of Y90 THERASPHERE.     10/06/2021 colonoscopy     FINDINGS:  Healthy end-to-end colorectal anastomosis with no bleeding in the mid rectum  All observed locations appeared normal, including the cecum, ascending colon, transverse colon, splenic flexure and descending colon. A couple scattered diverticuli seen     Imaging     10/09/2023 CT chest abdomen pelvis     IMPRESSION:     Enlarging 6.8 x 4.4 x 4.1 cm T11 rib metastasis.     Stable ill-defined hypovascular nodule in the right lobe of the liver which remains concerning for metastasis.     Cholelithiasis.     09/13/2023 IR biopsy chest wall     IMPRESSION:  Successful ultrasound guided core biopsy of the left posterior pleural-based mass.     07/24/2023 MRI abdomen      IMPRESSION:     16 mm segment 6 hepatic lesion unchanged from the CT suspicious for metastasis.     New posterior left chest wall/pleural lesion measuring 2.7 x 2.3 cm also suspicious for metastasis.     05/30/2023 CT chest abdomen pelvis     IMPRESSION:     No evidence of acute intrathoracic pathology.     New 7 mm hypodensity of the right hepatic lobe which was not present on prior examinations. Contrast enhanced abdominal MRI recommended for further evaluation.     Nodular soft tissue adjacent to the left upper renal capsule and prior splenectomy site is stable     01/27/2023 CT chest abdomen pelvis     IMPRESSION:     No definite evidence for metastatic disease involving the chest, abdomen, or pelvis.     Note is made of stable nodular soft tissue about the lateral aspect of  the left upper renal pole which could reflect postsurgical changes related to prior splenectomy although continued short interval follow-up is recommended to exclude   residual/recurrent tumor in this location.     07/25/2022 vascular lower limb venous duplex study     RIGHT LOWER LIMB:  Acute mostly deep vein thrombosis in the paired posterior tibial veins  throughout the calf.  No evidence of superficial thrombophlebitis noted.  Popliteal, posterior tibial and anterior tibial arterial Doppler waveforms are  triphasic.     LEFT LOWER LIMB:  No evidence of acute or chronic deep vein thrombosis.  No evidence of superficial thrombophlebitis noted.  Doppler evaluation shows a normal response to augmentation maneuvers.  Popliteal, posterior tibial and anterior tibial arterial Doppler waveforms are  triphasic.     03/22/2022 CT scan chest abdomen pelvis with contrast     ABDOMINOPELVIC CAVITY: Left upper quadrant biopsy-proven omental metastasis has decreased in size now measuring 4.1 x 2.5 x 2.7 cm (previously 5.3 x 4.5 x 4.3 cm), and now only focally contacts rather than grossly invades the spleen and intercostal soft   tissues, on series 2/54, 601/71. Adjacent omental nodularity is less conspicuous. No new evidence of metastatic disease in the abdomen or pelvis. No lymphadenopathy. No ascites or intraperitoneal free air.     IMPRESSION:     1.  Decreased size of biopsy-proven omental metastasis, which now only focally contacting rather than grossly invading the spleen and adjacent abdominal wall. No new evidence of metastatic disease in the abdomen or pelvis.  2.  No new or suspicious pulmonary nodules. One of the previously noted new 1-2 mm nodules is no longer seen, and the other is unchanged. Recommend continued attention on follow-up.  3.  Top-normal thickness endometrial stripe measuring 7 mm. If there is history of vaginal bleeding, suggest catheterization with endometrial biopsy.  4.  Hepatic steatosis.      11/11/2021 ultrasound pelvis transabdominal only     Cluster of anechoic cystic areas seen replacing the left ovary similar to MRI largest measuring 8 mm compared to 1.3 cm.  Based on the ACR O-RADS system, this is O-RADS category 2 (almost certain benign with <1% risk of malignancy.) The management recommendation is followup in 1 year.     10/25/2021 MRI pelvis rectal cancer staging     1.  No recurrent or metastatic disease in the pelvis.  Please refer to the separately dictated MRI abdomen report regarding mesenteric mass in the left upper quadrant.     2.  Multi septated cystic lesion versus cluster of small cysts in the left ovary measuring 2.6 x 2.5 x 1.9 cm, increased in size from October 2019.  Further characterization with pelvic ultrasound is recommended.     10/25/2021 MRI abdomen     1.  5.2 cm mesenteric mass in the left upper quadrant with thickening of peritoneal reflection and invasion of the spleen, similar to prior PET/CT.  This is highly suspicious for metastatic tumor implant.  2.  No additional potential sites of metastasis in the abdomen.  3.  Moderately distended gallbladder with gallstones and probable adenomyomatosis.     09/28/2021 PET-CT     1. There is a large left paracolic gutter markedly hypermetabolic mass immediately inferior to the spleen, most consistent with metastatic colorectal carcinoma.  2. Mildly increased activity at the right abdominal bowel anastomotic site.  If not recently performed, direct visualization is recommended  3. There are no other glucose avid abnormalities identified within the abdomen or pelvis  4. No evidence of distant glucose avid metastatic disease in the neck, chest, or skeleton      Pathology    9/13/2023 [facility] results on the chart.  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  No other actionable mutations, BRAF was not mutated, MSI was  "stable, mismatch repair proteins were proficient, tumor mutational burden was low, PD-L1 = 0% = negative, NTRK1/2/3 fusion not detected.         Case Report   Surgical Pathology Report                         Case: [id number]                                    Authorizing Provider:  Jessie Freeman MD       Collected:           09/13/2023 1146               Ordering Location:     [facility] Received:            09/13/2023 1225                                      Cardiac Cath Lab                                                              Pathologist:           Aristeo Rashid MD                                                            Specimen:    Chest Wall, chest wall mass, 4 cores                                                        Final Diagnosis   A. Mass, \"Chest wall mass 4 cores,\" Biopsy:  - Metastatic moderately differentiated adenocarcinoma, consistent with known colonic primary (see note)   Electronically signed by Aristeo Rashid MD on 9/18/2023 at  9:45 AM   Note     Immunohistochemistry was performed on block A1 with adequate controls. Tumor cells are positive for AE1/AE3, CAM5.2, CDX2, and SATB2. They are negative for CK20 and CK7.   Immunohistochemistry was performed on block A2 with adequate controls. Tumor cells are positive for AE1/AE3, Cam5.2, and negative for CK20.      Case Report   Surgical Pathology Report                         Case: [id number]                                    Authorizing Provider:  Peterson Mae,   Collected:           04/29/2022 1118                                      MD                                                                            Ordering Location:     [facility]      Received:            04/29/2022 2236                                      Hospital Operating Room                                                       Pathologist:           Roselyn King, DO                                                 "      Specimens:   A) - Uterus w/Bilateral Ovaries and Fallopian Tubes                                                  B) - Spleen, spleen, omentum, darotis                                                       Final Diagnosis   A. Uterus, Bilateral Ovaries and Fallopian Tubes; Hysterosalpingo-oophorectomy:  - Leiomyoma.  - Left ovary with serous cystadenoma.  - Unremarkable cervix.  - Benign inactive endometrium with no specific pathologic change.  - Unremarkable right ovary and bilateral fallopian tubes.     B. Spleen, spleen, omentum, darotis:  - Metastatic adenocarcinoma involving spleen and omentum, consistent with colonic primary. See Note.   Electronically signed by Roselyn King DO on 5/12/2022 at  2:49 PM      Note     Note B: Comparison to prior material ([id number], omental mass) reveals identical morphology to previous metastatic colonic adenocarcinoma.            Case Report   Surgical Pathology Report                         Case: [id number]                                    Authorizing Provider:  Sohail Grubbs MD           Collected:           11/12/2021 0921               Ordering Location:     [facility] Received:            11/12/2021 0941                                      CAT Scan                                                                      Pathologist:           Pamela Ramos MD                                                         Specimen:    Omentum, Omental mass                                                                       Final Diagnosis   A. Omentum, Omental mass:  - Metastatic adenocarcinoma, with an immunophenotype compatible with metastasis from patient's known colonic primary.  - See note.     Note: Tumor is positive with CK20, CDX2 and negative with CK7, PAX8. This immunophenotype supports the above interpretation.  Best representative block with tumor A5.     This case was reviewed at the intradepartmental  conference.    Dr. Grubbs is notified of the diagnosis in EPIC via [employer] on 11/17/2021  at 8.45 am.   Electronically signed by Pamela Ramos MD on 11/17/2021 at  8:53 AM                Case Report   Surgical Pathology Report                         Case: [id number]                                    Authorizing Provider:  WINNIE Pastor MD       Collected:           12/10/2019 1159               Ordering Location:     [facility]      Received:            12/10/2019 [facility]                                      Hospital Operating Room                                                       Pathologist:           Osman Beltran MD                                                                  Specimens:   A) - Large Intestine, Sigmoid Colon, and portion of rectum                                           B) - Anastomatic site, anastamotic rings                                                    Addendum   RRESULTS OF IMMUNOHISTOCHEMICAL ANALYSIS FOR MISMATCH REPAIR PROTEIN LOSS  INTERPRETATION: NO LOSS OF NUCLEAR EXPRESSION OF MMR PROTEINS: LOW PROBABILITY OF MSI-H  Note: Background non-neoplastic tissue and/or internal control with intact nuclear expression.      RESULTS:  Antibody          Clone               Description                           Results  MLH1               M1                  Mismatch repair protein       Intact nuclear expression  MSH2              [id number]       Mismatch repair protein       Intact nuclear expression  MSH6              44                   Mismatch repair protein       Intact nuclear expression  PMS2              [id number]         Mismatch repair protein       Intact nuclear expression     Comment:  A negative control and a positive control for each antibody have been reviewed and accepted.  [facility] Specimen ID: [id number], Evaluator:  JOSÉ MIGUEL Perales MD  These tests were developed and their performance characteristics determined by [facility].  They may not be cleared or  approved by the [place]. Food and Drug Administration.  The FDA determined that such clearance or approval is not necessary.  These tests are used for clinical purposes.  They should not be regarded as investigational or for research.  This laboratory has been approved by [place] , designated as a high-complexity laboratory and is qualified to perform these tests.     Comments: Patients whose tumors demonstrate lack of expression of one or more DNA mismatch repair proteins might be at risk for Bee Syndrome. This cancer susceptibility syndrome greatly increases the risk of synchronous and/or metachronous cancers in the affected patients and their family members. Normal expression of all proteins does not completely rule out familial cancer predisposition.     The [facility] Bee Syndrome Surveillance Program Task Forces recommends that all patients with lack of expression of one or more DNA mismatch repair proteins and those with concerning personal or family history should undergo thorough evaluation, counseling and possibly genetic testing.       Addendum electronically signed by Osman Beltran MD on 12/20/2019 at  3:28 PM   Final Diagnosis   A. Rectosigmoid colon, low anterior resection:  - Adenocarcinoma, moderately differentiated.   - Tumor invades through the muscularis propria into pericolorectal tissue, T3  - Diverticula.  - All margins are negative for tumor.  - Thirty-four lymph nodes, negative for malignancy (0/34).     B. Colon, anastomotic rings, resection:  - Two portions of colon with no pathologic abnormality     Intradepartmental consultation is in agreement.  Interpretation performed at [facility] [address]  Immunohistochemistry for desmin  is performed on tissue blocks A13 and A16 to help in the assessment of this case.         Electronically signed by Osman Beltran MD on 12/18/2019 at 10:42 AM   Additional Information     All controls performed with the  immunohistochemical stains reported above reacted appropriately.  These tests were developed and their performance characteristics determined by [facility] or [employer]. They may not be cleared or approved by the [place]. Food and Drug Administration. The FDA has determined that such clearance or approval is not necessary. These tests are used for clinical purposes. They should not be regarded as investigational or for research. This laboratory has been approved by [facility], designated as a high-complexity laboratory and is qualified to perform these tests.   Synoptic Checklist   COLON AND RECTUM: Resection, Including Transanal Disk Excision of Rectal Neoplasms  8th Edition - Protocol posted: 2/27/2019  ColoRectal - All Specimens       SPECIMEN   Procedure   Low anterior resection    Macroscopic Intactness of Mesorectum   Complete    TUMOR   Tumor Site   Rectosigmoid    Histologic Type   Adenocarcinoma    Histologic Grade   G2: Moderately differentiated    Tumor Size   Greatest dimension (Centimeters): 5.4 cm   Additional Dimension (Centimeters)   4.6 cm       1 cm   Tumor Deposits   Not identified    Tumor Extension   Tumor invades through the muscularis propria into pericolorectal tissue    Macroscopic Tumor Perforation   Not identified    Lymphovascular Invasion   Not identified    Perineural Invasion   Not identified    Type of Polyp in Which Invasive Carcinoma Arose   Tubular adenoma    Treatment Effect   No known presurgical therapy    MARGINS   Margins   All margins are uninvolved by invasive carcinoma, high-grade dysplasia, intramucosal adenocarcinoma, and adenoma    Margins Examined   Proximal        Distal        Radial or Mesenteric    Distance of Invasive Carcinoma from Closest Margin   1.5 cm   Closest Margin   Radial or Mesenteric    Distance of Tumor from Radial Margin   1.5 cm   LYMPH NODES   Number of Lymph Nodes Involved   0    Number of Lymph Nodes Examined   34     PATHOLOGIC STAGE CLASSIFICATION (pTNM, AJCC 8th Edition)   Primary Tumor (pT)   pT3    Regional Lymph Nodes (pN)   pN0    ADDITIONAL FINDINGS   Additional Pathologic Findings   Diverticulosis    .

## 2024-02-13 ENCOUNTER — HOSPITAL ENCOUNTER (OUTPATIENT)
Dept: INFUSION CENTER | Facility: HOSPITAL | Age: 69
Discharge: HOME | End: 2024-02-13

## 2024-02-16 LAB — SCAN RESULT: NORMAL

## 2024-02-27 ENCOUNTER — HOSPITAL ENCOUNTER (OUTPATIENT)
Dept: INFUSION CENTER | Facility: HOSPITAL | Age: 69
Discharge: HOME | End: 2024-02-27
Payer: MEDICARE

## 2024-02-27 DIAGNOSIS — C19 RECTOSIGMOID CANCER (HCC): Primary | ICD-10-CM

## 2024-02-27 PROCEDURE — 96523 IRRIG DRUG DELIVERY DEVICE: CPT

## 2024-02-27 NOTE — PROGRESS NOTES
Itzel Sanches  tolerated treatment well with no complications. Port flushed per protocol.    Itzel Sanches is aware of future appt on 4/9 at 1130.     AVS printed and given to Itzel Sanches:   No (Declined by Itzel Sanches)

## 2024-03-04 ENCOUNTER — OFFICE VISIT (OUTPATIENT)
Dept: SURGICAL ONCOLOGY | Facility: CLINIC | Age: 69
End: 2024-03-04
Payer: MEDICARE

## 2024-03-04 VITALS
WEIGHT: 204.4 LBS | TEMPERATURE: 97.8 F | DIASTOLIC BLOOD PRESSURE: 70 MMHG | SYSTOLIC BLOOD PRESSURE: 130 MMHG | HEIGHT: 57 IN | HEART RATE: 89 BPM | OXYGEN SATURATION: 98 % | BODY MASS INDEX: 44.1 KG/M2

## 2024-03-04 DIAGNOSIS — C78.7 METASTASES TO THE LIVER (HCC): ICD-10-CM

## 2024-03-04 DIAGNOSIS — C19 RECTOSIGMOID CANCER (HCC): Primary | ICD-10-CM

## 2024-03-04 PROCEDURE — 99214 OFFICE O/P EST MOD 30 MIN: CPT | Performed by: STUDENT IN AN ORGANIZED HEALTH CARE EDUCATION/TRAINING PROGRAM

## 2024-03-04 NOTE — PROGRESS NOTES
Surgical Oncology Consultation F/U    [address] [facility]  [facility]  [address]  [place] [address]    Patient:  Itzel Sanches  1955  [id number]    Primary Care provider:  DEEPAK Batista  [address] [facility] [address] [place] [address]    Referring provider:  No referring provider defined for this encounter.    Diagnoses and all orders for this visit:    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HCC)    Metastases to the liver (HCC)        Chief Complaint   Patient presents with    Follow-up       No follow-ups on file.    Oncology History   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HCC)   9/23/2019 Initial Diagnosis    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HCC)     9/23/2019 Biopsy    Rectal Mass ( 2 slides [id number] [facility] Pathology, Collected on 09/23/2019, biopsy):  - Adenocarcinoma     12/10/2019 Surgery    Low anterior resection (Dr Jose Pastor):    A. Rectosigmoid colon, low anterior resection:  - Adenocarcinoma, moderately differentiated.   - Tumor invades through the muscularis propria into pericolorectal tissue, T3  - Diverticula.  - All margins are negative for tumor.  - Thirty-four lymph nodes, negative for malignancy (0/34).     B. Colon, anastomotic rings, resection:  - Two portions of colon with no pathologic abnormality     12/10/2019 Genomic Testing    RESULTS OF IMMUNOHISTOCHEMICAL ANALYSIS FOR MISMATCH REPAIR PROTEIN LOSS  INTERPRETATION: NO LOSS OF NUCLEAR EXPRESSION OF MMR PROTEINS: LOW PROBABILITY OF MSI-H  Note: Background non-neoplastic tissue and/or internal control with intact nuclear expression.      RESULTS:  Antibody          Clone               Description                           Results  MLH1               M1                  Mismatch repair protein       Intact nuclear expression  MSH2              [id number]       Mismatch repair protein       Intact nuclear expression  MSH6              44                   Mismatch repair protein       Intact nuclear  expression  PMS2              [id number]         Mismatch repair protein       Intact nuclear expression     2/4/2020 Surgery    Ileostomy, takedown:  - Portion of small intestine with mucocutaneous anastomosis consistent with stoma.  - Negative for malignancy     8/18/2021 Progression    CEA trends- observed by Dr. Juarez  2/17/21: 2.9  8/18/2021: 4.5  9/13/2021: 4.4    PET/CT  9/28/2021 completed due to elevating CEA  1. There is a large left paracolic gutter markedly hypermetabolic mass immediately inferior to the spleen, most consistent with metastatic colorectal carcinoma.  2. Mildly increased activity at the right abdominal bowel anastomotic site.  If not recently performed, direct visualization is recommended  3. There are no other glucose avid abnormalities identified within the abdomen or pelvis  4. No evidence of distant glucose avid metastatic disease in the neck, chest, or skeleton      11/12/2021 Biopsy    Omental mass:  - Metastatic adenocarcinoma, with an immunophenotype compatible with metastasis from patient's known colonic primary.     12/30/2021 - 12/30/2021 Chemotherapy    CapOx x 1 dose of Oxaliplatin: D/c due to tolerance.      1/2/2022 Genomic Testing         1/4/2022 - 3/17/2022 Chemotherapy    First 6 cycles of Folfox given prior to surgery- 1/4 through 3/17/2022     Folfox was dose reduced due to anticipated tolerance.  Minimal side effects     3/22/2022 Observation    CT CAP  1.  Decreased size of biopsy-proven omental metastasis, which now only focally contacting rather than grossly invading the spleen and adjacent abdominal wall. No new evidence of metastatic disease in the abdomen or pelvis.  2.  No new or suspicious pulmonary nodules. One of the previously noted new 1-2 mm nodules is no longer seen, and the other is unchanged. Recommend continued attention on follow-up.  3.  Top-normal thickness endometrial stripe measuring 7 mm. If there is history of vaginal bleeding, suggest  catheterization with endometrial biopsy.  4.  Hepatic steatosis.     4/28/2022 -  Cancer Staged    Staging form: Colon and Rectum, AJCC 8th Edition  - Clinical stage from 4/28/2022: cT3, cN0, pM1 - Signed by Jessie Freeman MD on 6/14/2023  Total positive nodes: 0       4/29/2022 Surgery    Lydia Shaw and Anup preformed the following procedures:   DIAGNOSTIC LAPAROSCOPY, TOTAL ABDOMINAL HYSTERECTOMY, BILATERAL SALPINGO-OOPHORECTOMY, EXTENSIVE ADHESIOLYSIS (N/A)  DIAGNOSTIC LAPAROSCOPY, OPEN OMENTECTOMY, POSSIBLE SPLENECTOMY (N/A)  INSTILLATION CHEMOTHERAPY INTRAPERITONEAL (HIPEC) LAPAROTOMY (N/A)  REPAIR DIAPHRAGM TEAR      A. Uterus, Bilateral Ovaries and Fallopian Tubes; Hysterosalpingo-oophorectomy:  - Leiomyoma.  - Left ovary with serous cystadenoma.  - Unremarkable cervix.  - Benign inactive endometrium with no specific pathologic change.  - Unremarkable right ovary and bilateral fallopian tubes.     B. Spleen, spleen, omentum, darotis:  - Metastatic adenocarcinoma involving spleen and omentum, consistent with colonic primary. See Note.    Note: Comparison to prior material ([id number], omental mass) reveals identical morphology to previous metastatic colonic adenocarcinoma.          5/18/2022 -  Cancer Staged    Staging form: Colon and Rectum, AJCC 8th Edition  - Pathologic stage from 5/18/2022: Stage KRISTAL (pT3, pN0, pM1a) - Signed by Jessie Freeman MD on 6/14/2023  Total positive nodes: 0       6/14/2022 - 8/23/2022 Chemotherapy    7th cycle started on 6/14/2022  8th cycle worsening neuropathy; could not tolerate gabapentin  D/lizette oxaliplatin  9th cycle Irinotecan added at 85% dose reduction: SE Diarrhea  10th cycle Irinotecan dose redcued to 50%     9/19/2022 Observation    9/19/2022 CT CAP:   1.  Previously seen omental metastasis in the left upper quadrant has been resected.  There is a small area of nodular soft tissue thickening abutting the lateral aspect of the left kidney, cannot exclude  "residual/recurrent tumor.  Follow up in 4 months CEA not completed at time of scan.      9/13/2023 Biopsy    Mass, \"Chest wall mass 4 cores,\" Biopsy:  - Metastatic moderately differentiated adenocarcinoma, consistent with known colonic primary      10/31/2023 - 11/10/2023 Radiation    Treatments:  Course: C1 SBRT    Plan ID Energy Fractions Dose per Fraction (cGy) Dose Correction (cGy) Total Dose Delivered (cGy) Elapsed Days   SBRT L CW 6X-FFF 5 / 5 900 0 4,500 10      Treatment Dates:  10/31/2023 - 11/10/2023.      Omental metastasis   9/23/2019 Biopsy    Rectal Mass ( 2 slides [id number] [facility] Pathology, Collected on 09/23/2019, biopsy):  - Adenocarcinoma     2/4/2020 Surgery    Ileostomy, takedown:  - Portion of small intestine with mucocutaneous anastomosis consistent with stoma.  - Negative for malignancy     9/13/2023 Biopsy    Mass, \"Chest wall mass 4 cores,\" Biopsy:  - Metastatic moderately differentiated adenocarcinoma, consistent with known colonic primary          History of Present Illness  :   Miss Lovett is seen here today for ongoing surveillance of metastatic colorectal cancer.  Briefly, the patient underwent screening colonoscopy in late 2019.  This detected a rectosigmoid mass, which was then resected in December of 2019. Surgery required a diverting loop ileostomy, which was subsequently reversed in February of 2020. Of note, preop MRI suggested a T3bN1 lesion above the peritoneal reflection, and upfront surgery was recommended. This revealed a final path T3 N0 cancer with no aggressive features and no adjuvant therapy was recommended by her surgeon Dr. Cleary. She underwent a PET scan due to a rising CEA (4.4 from 2.2 posotp), and this demonstrated a large 4.5 cm left pericolic gutter markedly hypermetabolic mass.  There was mild increased activity at the colon anastomosis.  Subsequent colonoscopy revealed no abnormality at this location.  She denies any systemic symptoms including weight " loss, nausea, vomiting, changes in her bowel habits. MRI of the abdomen revealed a single metastatic lesion invading the spleen.  No evidence of other peritoneal implants.  MRI of the pelvis revealed a concerning appearance of the ovary.  This was followed by a transvaginal ultrasound, which ultimately determined that the ovary appeared benign.   She then underwent interventional radiology biopsy, which confirmed a metastatic lesion from a colorectal primary. She was iniatiated on FOLFOX and then 4/2022 underwent open omentectomy, splenectomy, resection of involved diaphragm with primary repair, hysterectomy and oophorectomy with HIPEC. Completed chemo August 2022.     Interval 2/2023  Doing very well. Recent CT ISIAH. CEA 1.6 No c/o fatigue, n/v, changes in stool habits, fevers, chills, weight loss.  I will see her in 4 months time for repeat chest abdomen pelvis cross-sectional imaging and CEA.      6/2023  Itzel is doing very well clinically.  She has no abdominal pain, no nausea vomiting, no change in stool habits.  No weight loss, bone pain, fatigue, headaches.  Her CEA remains very low, and was previously very reliable and correlated with her degree of disease.  Her CT did demonstrate a new very small subcentimeter liver lesion. We will obtain an MR    10/2023  Obtained an MR confirming suspicion for liver lesion as well as left posterior inferior rib lesion. IR bx of liver confirmed met. Discussed with Dr Grubbs rad onc for SBRT and Dr Christiansen to discuss ablation of liver, as well as med onc Dr Grubbs to ensure agreement with plan. Imaging with doubling in size of chest wall met. To initiate treatment asap    2/2024  Y90 to liver 1/2024. Completed XRT to lung 12/2023. Has not yet obtained repeat imaging. Will need this now. Feeling overall well. No residual pain at left ribcage. Has not yet had alicia testing.     Review of Systems  Complete ROS Surg Onc:   Constitutional: The patient denies new or recent  history of general fatigue, no recent weight loss, normal appetite.   Eyes: No complaints of visual problems, no scleral icterus.   ENT: No complaints of ear pain, no hoarseness, no difficulty swallowing,  no tinnitus and no new masses in head, oral cavity, or neck.   Cardiovascular: No complaints of chest pain, no palpitations, no ankle edema.   Respiratory: No complaints of shortness of breath, no cough.   Gastrointestinal: No complaints of jaundice, no bloody stools, no pale stools.   Genitourinary: No complaints of dysuria, no hematuria, no nocturia, no frequent urination, no urethral discharge.   Musculoskeletal: No complaints of weakness, paralysis, joint stiffness or arthralgias.  Integumentary: No complaints of rash, no new lesions.   Neurological: No complaints of convulsions, no seizures, no dizziness.   Hematologic/Lymphatic: No complaints of easy bruising.   Endocrine:  ENDORSES cold intolerance.  No polydipsia, polyphagia, or polyuria.  Allergy/immunology:  No environmental allergies.  No food allergies.  Not immunocompromised.      Patient Active Problem List   Diagnosis    Callus of foot    Foot pain    GERD (gastroesophageal reflux disease)    Mixed hyperlipidemia    Prediabetes    Varicose veins with pain    Morbid obesity (HCC)    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HCC)    Dyspnea on exertion    Dyslipidemia    Sigmoid diverticulosis    PONV (postoperative nausea and vomiting)    Omental metastasis    Lesion of ovary    Chemotherapy adverse reaction    Chemotherapy-induced nausea    Platelets decreased (HCC)    Stage 3 chronic kidney disease, unspecified whether stage 3a or 3b CKD (HCC)    H/O splenectomy    Asplenia    Postoperative state    Acute embolism and thrombosis of right tibial vein (HCC)    Metastases to the liver (HCC)     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     Colon polyp     GERD (gastroesophageal reflux disease)     History of chemotherapy 2021    History of rectal surgery     Hyperlipidemia     PONV (postoperative nausea and vomiting)      Past Surgical History:   Procedure Laterality Date    BREAST CYST EXCISION Right      SECTION      x3    COLON SIGMOID RESECTION      COLONOSCOPY      DILATION AND CURETTAGE OF UTERUS      ILEO LOOP DIVERSION N/A 12/10/2019    Procedure: ILEOSTOMY LOOP;  Surgeon: WINNIE Pastor MD;  [facility];  Service: Robotics    INSTILLATION CHEMOTHERAPY INTRAPERITONEAL (HIPEC) LAPAROTOMY N/A 2022    Procedure: INSTILLATION CHEMOTHERAPY INTRAPERITONEAL (HIPEC) LAPAROTOMY;  Surgeon: Jessie Freeman MD;  [facility];  Service: Surgical Oncology    IR BIOPSY CHEST WALL  2023    IR BIOPSY OMENTUM  2021    IR PORT PLACEMENT  2021    IR Y-90 PRE-ANGIO/EMBO W/ LUNG SCAN  2024    IR Y-90 RADIOEMBOLIZATION  2024    OMENTECTOMY N/A 2022    Procedure: DIAGNOSTIC LAPAROSCOPY, OPEN OMENTECTOMY, SPLENECTOMY, DIAPHRAGM REPAIR, CHEST TUBE INSERTION;  Surgeon: Jessie Freeman MD;  [facility];  Service: Surgical Oncology    TN CLOSURE ENTEROSTOMY LG/SMALL INTESTINE N/A 2020    Procedure: CLOSURE ILEOSTOMY;  Surgeon: WINNIE Pastor MD;  [facility];  Service: Colorectal    TN LAPAROSCOPY COLECTOMY PARTIAL W/ANASTOMOSIS N/A 12/10/2019    Procedure: SIGMOID RESECTION COLON LAPAROSCOPIC W ROBOTICS converted to lap hand assisted  with Partial proctectomy , LASER FLUORESCENCE ANGIOGRAPHY, INTRA OP COLONOSCOPY;  Surgeon: WINNIE Pastor MD;  [facility];  Service: Robotics    TN TOTAL ABDOMINAL HYSTERECT W/WO RMVL TUBE OVARY N/A 2022    Procedure: DIAGNOSTIC LAPAROSCOPY, TOTAL ABDOMINAL HYSTERECTOMY, BILATERAL SALPINGO-OOPHORECTOMY, EXTENSIVE ADHESIOLYSIS;  Surgeon: Peterson Mae MD;  [facility];  Service: Gynecology Oncology    TN  "UNLISTED PROCEDURE DIAPHRAGM  04/29/2022    Procedure: REPAIR DIAPHRAGM TEAR;  Surgeon: Yana Hebert MD;  [facility];  Service: Thoracic    SMALL INTESTINE SURGERY  02/04/2020    Procedure: RESECTION SMALL BOWEL;  Surgeon: WINNIE Pastor MD;  [facility];  Service: Colorectal     Family History   Problem Relation Age of Onset    Hypertension Mother     Heart attack Mother     Heart attack Father     Heart disease Father     Hypertension Brother     Heart attack Brother     Colon cancer Paternal Uncle     Leukemia Cousin     Breast cancer Cousin     Diabetes Cousin     Breast cancer Cousin     Colon cancer Family         paternal uncle    Stroke Neg Hx      Social History     Socioeconomic History    Marital status:      Spouse name: Not on file    Number of children: Not on file    Years of education: Not on file    Highest education level: Not on file   Occupational History    Not on file   Tobacco Use    Smoking status: Never    Smokeless tobacco: Never   Vaping Use    Vaping status: Never Used   Substance and Sexual Activity    Alcohol use: Yes     Comment: \"occasionally\"    Drug use: Never    Sexual activity: Not Currently   Other Topics Concern    Not on file   Social History Narrative    Not on file     Social Determinants of Health     Financial Resource Strain: Low Risk  (3/21/2023)    Overall Financial Resource Strain (CARDIA)     Difficulty of Paying Living Expenses: Not hard at all   Food Insecurity: No Food Insecurity (5/2/2022)    Hunger Vital Sign     Worried About Running Out of Food in the Last Year: Never true     Ran Out of Food in the Last Year: Never true   Transportation Needs: No Transportation Needs (3/21/2023)    PRAPARE - Transportation     Lack of Transportation (Medical): No     Lack of Transportation (Non-Medical): No   Physical Activity: Not on file   Stress: Not on file   Social Connections: Not on file   Intimate Partner Violence: Not on file "   Housing Stability: Unknown (5/2/2022)    Housing Stability Vital Sign     Unable to Pay for Housing in the Last Year: No     Number of Places Lived in the Last Year: Not on file     Unstable Housing in the Last Year: No       Current Outpatient Medications:     Acetaminophen (TYLENOL 8 HOUR PO), Take by mouth PRN, Disp: , Rfl:     apixaban (Eliquis) 5 mg, Take 1 tablet (5 mg total) by mouth 2 (two) times a day, Disp: 60 tablet, Rfl: 5    ezetimibe (ZETIA) 10 mg tablet, Take 1 tablet (10 mg total) by mouth daily, Disp: 90 tablet, Rfl: 1    meclizine (ANTIVERT) 12.5 MG tablet, Take 1 tablet (12.5 mg total) by mouth 3 (three) times a day as needed for dizziness (Patient taking differently: Take 12.5 mg by mouth 3 (three) times a day as needed for dizziness PRN), Disp: 30 tablet, Rfl: 0    famotidine (PEPCID) 20 mg tablet, Take 1 tablet (20 mg total) by mouth 2 (two) times a day as needed for heartburn As needed, Disp: 90 tablet, Rfl: 1    lidocaine (Lidoderm) 5 %, Apply 1 patch topically over 12 hours daily Remove & Discard patch within 12 hours or as directed by MD (Patient not taking: Reported on 2/1/2024), Disp: 12 patch, Rfl: 3    methylPREDNISolone 4 MG tablet therapy pack, Begin day of procedure; take as directed (Patient not taking: Reported on 2/1/2024), Disp: 1 each, Rfl: 0    omeprazole (PriLOSEC) 40 MG capsule, Take 1 capsule (40 mg total) by mouth daily Begin 3 days before procedure (Patient not taking: Reported on 3/4/2024), Disp: 30 capsule, Rfl: 0    ondansetron (Zofran ODT) 8 mg disintegrating tablet, Take 1 tablet (8 mg total) by mouth every 8 (eight) hours as needed for nausea or vomiting (Patient not taking: Reported on 3/4/2024), Disp: 20 tablet, Rfl: 0  Allergies   Allergen Reactions    Medical Tape Rash     Skin irritation  Skin peeling  Skin irritation  Skin peeling  Blisters  Rash       Vitals:    03/04/24 1351   BP: 130/70   Pulse: 89   Temp: 97.8 °F (36.6 °C)   SpO2: 98%       Physical Exam    General: Appears well, appears stated age  Skin: Warm, anicteric  HEENT: Normocephalic, atraumatic; sclera aniceteric, mucous membranes moist; cervical nodes without adenopathy  Cardiopulmonary: RRR, Easy WOB, no BLE edema. Mild tenderness at LUQ.   Abd: Obese, nontender, no masses appreciated, no hepatosplenomegaly. Incision well-healed  MSK: Symmetric, no cyanosis, no overt weakness  Lymphatic: No cervical, axillary or inguinal lymphadenopathy  Neuro: Affect appropriate, no gross motor abnormalities      Pathology:  Final Diagnosis   A. Rectosigmoid colon, low anterior resection:  - Adenocarcinoma, moderately differentiated.   - Tumor invades through the muscularis propria into pericolorectal tissue, T3  - Diverticula.  - All margins are negative for tumor.  - Thirty-four lymph nodes, negative for malignancy (0/34).     B. Colon, anastomotic rings, resection:  - Two portions of colon with no pathologic abnormality     Intradepartmental consultation is in agreement.  [place] [id number]  Immunohistochemistry for desmin  is performed on tissue blocks A13 and A16 to help in the assessment of this case.     Final Diagnosis   A. Uterus, Bilateral Ovaries and Fallopian Tubes; Hysterosalpingo-oophorectomy:  - Leiomyoma.  - Left ovary with serous cystadenoma.  - Unremarkable cervix.  - Benign inactive endometrium with no specific pathologic change.  - Unremarkable right ovary and bilateral fallopian tubes.     B. Spleen, spleen, omentum, darotis:  - Metastatic adenocarcinoma involving spleen and omentum, consistent with colonic primary. See Note.         Labs: Reviewed in [facility]    Imaging  Colonoscopy    Addendum Date: 10/6/2021 Addendum:   [facility] Surgery [address] [address] [phone] DATE OF SERVICE: 10/06/21 PHYSICIAN(S): Nadir Veronica MD - Attending Physician INDICATION: Personal history of rectal cancer ,  had a low anterior resection in December of 2019.  T3 N0, 34 lymph nodes were removed and were negative.  Patient did not receive any chemotherapy. Colonoscopy performed for a diagnostic indication. POST-OP DIAGNOSIS: See the impression below. HISTORY: Prior colonoscopy: Less than 3 years ago. It is being repeated at an interval of less than 3 years because: This colonoscopy is being performed for a diagnostic indication BOWEL PREPARATION: Miralax/Dulcolax PREPROCEDURE: Informed consent was obtained for the procedure, including sedation. Risks including but not limited to bleeding, infection, perforation, adverse drug reaction and aspiration were explained in detail. Also explained about less than 100% sensitivity with the exam and other alternatives. The patient was placed in the left lateral decubitus position. DETAILS OF PROCEDURE: Patient was taken to the procedure room where a time out was performed to confirm correct patient and correct procedure. The patient underwent monitored anesthesia care, which was administered by an anesthesia professional. The patient's blood pressure, heart rate, level of consciousness, oxygen and respirations were monitored throughout the procedure. A digital rectal exam was performed. The scope was introduced through the anus and advanced to the cecum. Retroflexion was performed in the rectum. The quality of bowel preparation was evaluated using the [place] Bowel Preparation Scale with scores of: right colon = 2, transverse colon = 2, left colon = 2. The total BBPS score was 6. Bowel prep was adequate. The patient experienced no blood loss. The procedure was not difficult. The patient tolerated the procedure well. There were no apparent complications. ANESTHESIA INFORMATION: ASA: III Anesthesia Type: IV Sedation with Anesthesia MEDICATIONS: No administrations occurring from 1215 to 1230 on 10/06/21 FINDINGS: Healthy end-to-end colorectal anastomosis with no bleeding in the mid  rectum All observed locations appeared normal, including the cecum, ascending colon, transverse colon, splenic flexure and descending colon. A couple scattered diverticuli seen EVENTS: Procedure Events Event Event Time ENDO CECUM REACHED 10/6/2021 12:21 PM ENDO SCOPE OUT TIME 10/6/2021 12:28 PM SPECIMENS: * No specimens in log * EQUIPMENT: Colonoscope - IMPRESSION: 1. Normal-appearing colon.  Anastomosis site was seen in the rectum appeared healthy.  No evidence of local recurrence. 2. Couple small scattered diverticuli. RECOMMENDATION: Repeat colonoscopy in 2 years due to a personal history of colon cancer. Advised her urgent evaluation by medical oncologist and Dr. Pastor. Nadir Veronica MD     Addendum Date: 10/6/2021 Addendum:   [address] [address] [phone] DATE OF SERVICE: 10/06/21 PHYSICIAN(S): Nadir Veronica MD - Attending Physician INDICATION: Personal history of rectal cancer , had a low anterior resection in December of 2019.  T3 N0, 34 lymph nodes were removed and were negative.  Patient did not receive any chemotherapy. Colonoscopy performed for a diagnostic indication. POST-OP DIAGNOSIS: See the impression below. HISTORY: Prior colonoscopy: Less than 3 years ago. It is being repeated at an interval of less than 3 years because: This colonoscopy is being performed for a diagnostic indication BOWEL PREPARATION: Miralax/Dulcolax PREPROCEDURE: Informed consent was obtained for the procedure, including sedation. Risks including but not limited to bleeding, infection, perforation, adverse drug reaction and aspiration were explained in detail. Also explained about less than 100% sensitivity with the exam and other alternatives. The patient was placed in the left lateral decubitus position. DETAILS OF PROCEDURE: Patient was taken to the procedure room where a time out was performed to confirm correct patient and correct procedure. The patient underwent  monitored anesthesia care, which was administered by an anesthesia professional. The patient's blood pressure, heart rate, level of consciousness, oxygen and respirations were monitored throughout the procedure. A digital rectal exam was performed. The scope was introduced through the anus and advanced to the cecum. Retroflexion was performed in the rectum. The quality of bowel preparation was evaluated using the [place] Bowel Preparation Scale with scores of: right colon = 2, transverse colon = 2, left colon = 2. The total BBPS score was 6. Bowel prep was adequate. The patient experienced no blood loss. The procedure was not difficult. The patient tolerated the procedure well. There were no apparent complications. ANESTHESIA INFORMATION: ASA: III Anesthesia Type: IV Sedation with Anesthesia MEDICATIONS: No administrations occurring from 1215 to 1230 on 10/06/21 FINDINGS: Healthy end-to-end colorectal anastomosis with no bleeding in the mid rectum All observed locations appeared normal, including the cecum, ascending colon, transverse colon, splenic flexure and descending colon. A couple scattered diverticuli seen EVENTS: Procedure Events Event Event Time ENDO CECUM REACHED 10/6/2021 12:21 PM ENDO SCOPE OUT TIME 10/6/2021 12:28 PM SPECIMENS: * No specimens in log * EQUIPMENT: Colonoscope - IMPRESSION: 1. Normal-appearing colon.  Anastomosis site was seen in the rectum appeared healthy.  No evidence of local recurrence. 2. Couple small scattered diverticuli. RECOMMENDATION: Repeat colonoscopy in 2 years due to a personal history of colon cancer. Nadir Veronica MD     Result Date: 10/6/2021  Narrative: [address] [address] [phone] DATE OF SERVICE: 10/06/21 PHYSICIAN(S): Nadir Veronica MD - Attending Physician INDICATION: Personal history of rectal cancer Colonoscopy performed for a diagnostic indication. POST-OP DIAGNOSIS: See the impression below. HISTORY: Prior  colonoscopy: Less than 3 years ago. It is being repeated at an interval of less than 3 years because: This colonoscopy is being performed for a diagnostic indication BOWEL PREPARATION: Miralax/Dulcolax PREPROCEDURE: Informed consent was obtained for the procedure, including sedation. Risks including but not limited to bleeding, infection, perforation, adverse drug reaction and aspiration were explained in detail. Also explained about less than 100% sensitivity with the exam and other alternatives. The patient was placed in the left lateral decubitus position. DETAILS OF PROCEDURE: Patient was taken to the procedure room where a time out was performed to confirm correct patient and correct procedure. The patient underwent monitored anesthesia care, which was administered by an anesthesia professional. The patient's blood pressure, heart rate, level of consciousness, oxygen and respirations were monitored throughout the procedure. A digital rectal exam was performed. The scope was introduced through the anus and advanced to the cecum. Retroflexion was performed in the rectum. The quality of bowel preparation was evaluated using the [place] Bowel Preparation Scale with scores of: right colon = 2, transverse colon = 2, left colon = 2. The total BBPS score was 6. Bowel prep was adequate. The patient experienced no blood loss. The procedure was not difficult. The patient tolerated the procedure well. There were no apparent complications. ANESTHESIA INFORMATION: ASA: III Anesthesia Type: IV Sedation with Anesthesia MEDICATIONS: No administrations occurring from 1215 to 1230 on 10/06/21 FINDINGS: Healthy end-to-end colorectal anastomosis with no bleeding in the mid rectum All observed locations appeared normal, including the cecum, ascending colon, transverse colon, splenic flexure and descending colon. A couple scattered diverticuli seen EVENTS: Procedure Events Event Event Time ENDO CECUM REACHED 10/6/2021 12:21 PM ENDO  SCOPE OUT TIME 10/6/2021 12:28 PM SPECIMENS: * No specimens in log * EQUIPMENT: Colonoscope -     Impression: 1. Normal-appearing colon.  Anastomosis site was seen in the rectum appeared healthy.  No evidence of local recurrence. 2. Couple small scattered diverticuli. RECOMMENDATION: Repeat colonoscopy in 2 years due to a personal history of colon cancer. Nadir Veronica MD     DXA bone density spine hip and pelvis    Result Date: 10/14/2021  Narrative: CENTRAL  DXA SCAN CLINICAL HISTORY:  66-year-old postmenopausal female. OTHER RISK FACTORS:  History of fracture resulting from minor injury. PHARMACOLOGIC THERAPY FOR OSTEOPOROSIS:  None. TECHNIQUE: Bone densitometry was performed using a [employer]   bone densitometer.  Regions of interest appear properly placed. COMPARISON: 5/6/2019. RESULTS: LUMBAR SPINE L1-L4 : BMD  1.141  gm/cm2 T-score -0.4 LEFT TOTAL HIP: BMD: 0.992  gm/cm2 T-score: -0.1 LEFT FEMORAL NECK: BMD: 0.856  gm/cm2 T score: -1.3 RIGHT TOTAL HIP: BMD:  1.004  gm/cm2 T-score: 0.0 RIGHT FEMORAL NECK: BMD:  0.851  gm/cm2  T score: -1.3     Impression: 1.  Low bone mass (osteopenia). [Based on the left and right femoral necks] 2.  Since a DXA study from 5/6/2019, there has been: A  STATISTICALLY SIGNIFICANT DECREASE in bone mineral density of  0.039 g/cm2 (3.3)% in the lumbar spine. A  STATISTICALLY SIGNIFICANT DECREASE in bone mineral density of  0.046 g/cm2 (4.4)% in the hips. 3.  The 10 year risk of hip fracture is 1.2% with the 10 year risk of major osteoporotic fracture being 12.6% as calculated by the [employer] fracture risk assessment tool (FRAX, which is based on data generated by the WHO Collaborating [place] for Metabolic Bone Diseases). 4.  The current NOF guidelines recommend treating patients with a T-score of -2.5 or less in the lumbar spine or hips, or in post-menopausal women and men over the age of 50 with low bone mass (osteopenia) and a FRAX 10 year risk score of >3% for  hip fracture and/or >20% for major osteoporotic fracture. 5.  The NOF recommends follow-up DXA in 1-2 years after initiating therapy for osteoporosis and every 2 years thereafter. More frequent evaluation is appropriate for patients with conditions associated with rapid bone loss, such as glucocorticoid therapy. The interval between DXA screenings may be longer for individuals without major risk factors and initial T-score in the normal or upper low bone mass range. The FRAX algorithm has certain limitations: -FRAX has not been validated in patients currently or previously treated with pharmacotherapy for osteoporosis.  In such patients, clinical judgment must be exercised in interpreting FRAX scores.  -Prior hip, vertebral and humeral fragility fractures appear to confer greater risk of subsequent fracture than fractures at other sites (this is especially true for individuals with severe vertebral fractures), but quantification of this incremental risk is not possible with FRAX. -FRAX underestimates fracture risk in patients with history of multiple fragility fractures. -FRAX may underestimate fracture risk in patients with history of frequent falls. -It is not appropriate to use FRAX to monitor treatment response. WHO CLASSIFICATION: Normal (a T-score of -1.0 or higher) Low bone mineral density (a T-score of less than -1.0 but higher than -2.5) Osteoporosis (a T-score of -2.5 or less) Severe osteoporosis (a T-score of -2.5 or less with a fragility fracture) LEAST SIGNIFICANT CHANGE AT 95% C.I: Lumbar spine: 0.036 gm/cm2 (3.2%). Total hip: 0.018 gm/cm2 (2.0%). Forearm: 0.024 gm/cm2 (3.2%). Workstation performed: [id number]     NM PET CT skull base to mid thigh    Result Date: 9/28/2021  Narrative: PET/CT SCAN INDICATION:  C18.7: Malignant neoplasm of sigmoid colon C20: Malignant neoplasm of rectum   Rectosigmoid carcinoma, restaging/surveillance examination.  Borderline elevated CEA (most recently 4.4).  MODIFIER: PS COMPARISON: CT chest abdomen and pelvis 2/24/2021. CELL TYPE:  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 TECHNIQUE:   12.1 mCi F-18-FDG administered IV. Multiplanar attenuation corrected and non attenuation corrected PET images were acquired 60 minutes post injection. Contiguous, low dose, axial CT sections were obtained from the skull base through the femurs . Intravenous contrast material was not utilized.  This examination, like all CT scans performed in the [facility], was performed utilizing techniques to minimize radiation dose exposure, including the use of iterative reconstruction and automated exposure control. Fasting serum glucose: 90 mg/dl FINDINGS: VISUALIZED BRAIN:   No acute abnormalities are seen. HEAD/NECK:   There is a physiologic distribution of FDG. No FDG avid cervical adenopathy is seen. CT images: Unremarkable. CHEST:   No FDG avid soft tissue lesions are seen. CT images: Mild coronary artery calcification.  No pericardial effusion, no pleural effusion ABDOMEN:   There is a large hypermetabolic left lateral abdominal lobular mass situated immediately inferior to the spleen in the left paracolic gutter region, which measures 4.3 x 3.9 x 4.5 cm in size, SUV max 20.8.  No other definite hypermetabolic abnormalities are seen within the upper abdomen.  Activity at the bowel anastomotic site in the right abdomen on image 165 demonstrates SUV max of 3.8.  This is nonspecific but may simply be inflammatory or physiologic.  Direct visualization is recommended if not recently performed. CT images: No ascites.  No hydronephrosis or bowel obstruction. PELVIS: Additional rectosigmoid anastomotic site noted image 212.  No evidence of abnormal glucose activity.  No evidence of glucose avid pelvic adenopathy.  No pelvic ascites. OSSEOUS STRUCTURES: No FDG avid lesions are seen. CT images: No significant findings.     Impression: 1. There is a large left paracolic  gutter markedly hypermetabolic mass immediately inferior to the spleen, most consistent with metastatic colorectal carcinoma. 2. Mildly increased activity at the right abdominal bowel anastomotic site.  If not recently performed, direct visualization is recommended 3. There are no other glucose avid abnormalities identified within the abdomen or pelvis 4. No evidence of distant glucose avid metastatic disease in the neck, chest, or skeleton The examination demonstrates a significant  finding and was documented as such in [facility] for liaison and referring practitioner notification. Workstation performed: [id number]     Mammo screening bilateral w 3d & cad    Result Date: 10/14/2021  Narrative: DIAGNOSIS: Encounter for screening mammogram for malignant neoplasm of breast TECHNIQUE: Digital screening mammography was performed. Computer Aided Detection (CAD) analyzed all applicable images. COMPARISONS: Prior breast imaging dated: 06/26/2020, 05/06/2019, 10/18/2016, 10/14/2016, and 09/14/2015 RELEVANT HISTORY: Family Breast Cancer History: History of breast cancer in Cousin, Family. Family Medical History: Family medical history includes breast cancer in 2 relatives (cousin, family) and colon cancer in 2 relatives (family, paternal uncle). Personal History: No known relevant hormone history. Surgical history includes lumpectomy. No known relevant medical history. The patient is scheduled in a reminder system for screening mammography. 8-10% of cancers will be missed on mammography. Management of a palpable abnormality must be based on clinical grounds.  Patients will be notified of their results via letter from our facility. Accredited by American College of Radiology and FDA. RISK ASSESSMENT: 5 Year Tyrer-Cuzick: 2.13 % 10 Year Tyrer-Cuzick: 4.17 % Lifetime Tyrer-Cuzick: 7.93 % TISSUE DENSITY: There are scattered areas of fibroglandular density.  INDICATION: Itzel Sanches is a 66 y.o. female presenting for screening  mammography. FINDINGS: Breast parenchymal distribution is unchanged from priors including multiple focal asymmetries in the right breast.  Long-term stability confirms benignancy.  No developing asymmetries or concerning masses.  Single upper-outer-anterior coarse calcification in the left breast is definitively benign.  No suspicious microcalcifications, architectural distortion, skin thickening, or abnormalities of the nipples in either breast.      Impression: No mammographic evidence of malignancy or significant change from priors. ASSESSMENT/BI-RADS CATEGORY: Left: 2 - Benign Right: 2 - Benign Overall: 2 - Benign RECOMMENDATION:      - Routine screening mammogram in 1 year for both breasts. Workstation ID: [id number]     CT 6/2023     IMPRESSION:     No evidence of acute intrathoracic pathology.     New 7 mm hypodensity of the right hepatic lobe which was not present on prior examinations. Contrast enhanced abdominal MRI recommended for further evaluation.     Nodular soft tissue adjacent to the left upper renal capsule and prior splenectomy site is stable    I have personally reviewed and interpreted the patient's CT scans, MRIs, radiation oncology, medical oncology, and interventional radiology notes. Have reviewed Y90, dr guzman's recent note, discussed with him.       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 S/p omentectomy, splenectomy, resection of involved diaphragm and diaphragm repair + DILMA/BSO and HIPEC 4/2022. Completed chemo 8/2022. Recurred at single liver met and left chest wall one year later 8/2023. Completed SBRT to chest wall 12/2023 and Y90 to liver 1/2024. Will need to have repeat scans now. Will call her with these results. Is awaiting CTC testing that has not yet been completed. Will eval scan to determine next steps.

## 2024-03-05 ENCOUNTER — TELEPHONE (OUTPATIENT)
Dept: HEMATOLOGY ONCOLOGY | Facility: CLINIC | Age: 69
End: 2024-03-05

## 2024-03-05 NOTE — TELEPHONE ENCOUNTER
Spoke with patient about ordering [facility] mobile phlebotomy team or having package delivered to [facility] for draws.  Discovered area of confusion was that patient previously thought [facility] calling regarding bloodwork was spam, and ignored all past attempts at communication.  [facility] indicated after several attempts, they discontinued repeating call attempts, and closed case on ctDNA test, due to lack of liquid tests.      Patient will be establishing contact with [facility] team in order to have labs drawn, so ctDNA test can be conducted. She indicated she struggled with answering service, so I indicated I would help with mediating this time to ensure follow-up successful.  Will continue to follow-up until tests completed successfully.    Also scheduled [facility] for Friday CT 1:00pm.

## 2024-03-05 NOTE — TELEPHONE ENCOUNTER
Patient calling to speak with David Gurbbs in regards to her care.  Patient would like a call back at [phone].

## 2024-03-06 ENCOUNTER — TELEPHONE (OUTPATIENT)
Dept: HEMATOLOGY ONCOLOGY | Facility: MEDICAL CENTER | Age: 69
End: 2024-03-06

## 2024-03-06 NOTE — TELEPHONE ENCOUNTER
Left message to daughter, Leidy, indicating [facility] would be contacting [facility] regarding testing.  Also indicated [facility] confrimed 1pm pickup for Friday.

## 2024-03-08 ENCOUNTER — HOSPITAL ENCOUNTER (OUTPATIENT)
Dept: RADIOLOGY | Facility: HOSPITAL | Age: 69
Discharge: HOME | End: 2024-03-08
Payer: MEDICARE

## 2024-03-08 ENCOUNTER — HOSPITAL ENCOUNTER (OUTPATIENT)
Dept: INFUSION CENTER | Facility: HOSPITAL | Age: 69
Discharge: HOME | End: 2024-03-08

## 2024-03-08 ENCOUNTER — TELEPHONE (OUTPATIENT)
Dept: HEMATOLOGY ONCOLOGY | Facility: MEDICAL CENTER | Age: 69
End: 2024-03-08

## 2024-03-08 DIAGNOSIS — C19 RECTOSIGMOID CANCER (HCC): ICD-10-CM

## 2024-03-08 DIAGNOSIS — C78.7 METASTASES TO THE LIVER (HCC): ICD-10-CM

## 2024-03-08 DIAGNOSIS — C19 RECTOSIGMOID CANCER (HCC): Primary | ICD-10-CM

## 2024-03-08 PROCEDURE — 71260 CT THORAX DX C+: CPT

## 2024-03-08 PROCEDURE — 74177 CT ABD & PELVIS W/CONTRAST: CPT

## 2024-03-08 RX ADMIN — IOHEXOL 100 ML: 350 INJECTION, SOLUTION INTRAVENOUS at 13:22

## 2024-03-08 NOTE — PLAN OF CARE
Problem: Potential for Falls  Goal: Patient will remain free of falls  Description: INTERVENTIONS:  - Educate patient/family on patient safety including physical limitations  - Instruct patient to call for assistance with activity   - Consult OT/PT to assist with strengthening/mobility   - Keep Call bell within reach  - Keep bed low and locked with side rails adjusted as appropriate  - Keep care items and personal belongings within reach  - Initiate and maintain comfort rounds  - Make Fall Risk Sign visible to staff

## 2024-03-13 ENCOUNTER — TELEPHONE (OUTPATIENT)
Dept: HEMATOLOGY ONCOLOGY | Facility: MEDICAL CENTER | Age: 69
End: 2024-03-13

## 2024-03-13 NOTE — TELEPHONE ENCOUNTER
Called regarding [facility] status.  Itzel informed me she already had package, was awaiting status to confirm if [facility]'s lab had package.  Indicated I would call them tomorrow morning then return her call, Itzel verbalized understanding.

## 2024-03-14 ENCOUNTER — TELEPHONE (OUTPATIENT)
Dept: HEMATOLOGY ONCOLOGY | Facility: MEDICAL CENTER | Age: 69
End: 2024-03-14

## 2024-03-14 NOTE — TELEPHONE ENCOUNTER
Confirmed Itzel can go to [facility]'s [facility] lab to have [facility] kit drawn.

## 2024-03-15 ENCOUNTER — APPOINTMENT (OUTPATIENT)
Dept: LAB | Facility: HOSPITAL | Age: 69
End: 2024-03-15
Payer: MEDICARE

## 2024-03-15 ENCOUNTER — TELEPHONE (OUTPATIENT)
Dept: SURGICAL ONCOLOGY | Facility: CLINIC | Age: 69
End: 2024-03-15

## 2024-03-15 DIAGNOSIS — C19 PRIMARY COLORECTAL ADENOCARCINOMA (HCC): ICD-10-CM

## 2024-03-15 PROCEDURE — 36415 COLL VENOUS BLD VENIPUNCTURE: CPT

## 2024-03-15 NOTE — TELEPHONE ENCOUNTER
Called to discuss results of scan. Liver lesion and local recurrence at ribcage appear to have responded however there is expansion of mediastinal disease, portal disease, and likely a new liver lesion. Contacted Dr Grubbs and care team to facilitate medical oncology eval. All questions answered.

## 2024-03-18 ENCOUNTER — TELEPHONE (OUTPATIENT)
Dept: HEMATOLOGY ONCOLOGY | Facility: MEDICAL CENTER | Age: 69
End: 2024-03-18

## 2024-03-18 NOTE — TELEPHONE ENCOUNTER
Spoke to David from [facility] to confirm status of ctDNA test.  Confirmed test was picked up, blood was drawn and received by [facility], and that it was undergoing processing - should take 3-4 weeks, per David.

## 2024-03-20 ENCOUNTER — OFFICE VISIT (OUTPATIENT)
Dept: HEMATOLOGY ONCOLOGY | Facility: MEDICAL CENTER | Age: 69
End: 2024-03-20
Payer: MEDICARE

## 2024-03-20 ENCOUNTER — RA CDI HCC (OUTPATIENT)
Dept: OTHER | Facility: HOSPITAL | Age: 69
End: 2024-03-20

## 2024-03-20 ENCOUNTER — TELEPHONE (OUTPATIENT)
Dept: HEMATOLOGY ONCOLOGY | Facility: MEDICAL CENTER | Age: 69
End: 2024-03-20

## 2024-03-20 VITALS
RESPIRATION RATE: 17 BRPM | BODY MASS INDEX: 44.23 KG/M2 | OXYGEN SATURATION: 98 % | HEIGHT: 57 IN | SYSTOLIC BLOOD PRESSURE: 136 MMHG | DIASTOLIC BLOOD PRESSURE: 84 MMHG | WEIGHT: 205 LBS | HEART RATE: 83 BPM | TEMPERATURE: 98.4 F

## 2024-03-20 DIAGNOSIS — C78.6 MALIGNANT NEOPLASM METASTATIC TO OMENTUM (HCC): ICD-10-CM

## 2024-03-20 DIAGNOSIS — C19 RECTOSIGMOID CANCER (HCC): Primary | ICD-10-CM

## 2024-03-20 LAB
Lab: NORMAL
Lab: NORMAL
MISCELLANEOUS LAB TEST RESULT: NORMAL
STONE ANALYSIS-IMP: NORMAL

## 2024-03-20 PROCEDURE — 99215 OFFICE O/P EST HI 40 MIN: CPT | Performed by: INTERNAL MEDICINE

## 2024-03-20 NOTE — PROGRESS NOTES
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     Recurrent rectosigmoid adenocarcinoma.  Patient was seen/evaluated by Dr. Freeman surgical oncology.  Patient received 3 months of preoperative FOLFOX.  Follow-up CT scans demonstrated response to treatment with no evidence of progressive disease.  Patient then underwent resection of the omental mass and spleen as well as DILMA/BSO (other issue, see below).  Mrs. Sanches was then restarted on FOLFOX.  Patient had problems with neuropathy; FOLFOX was changed to FOLFIRI (2 cycles of FOLFIRI only).     The 12th/final cycle of chemotherapy was completed on August 23, 2022. CT scan in May 2023 showed a new lesion to the right hepatic lobe.  MRI abdomen in July 2023 confirmed liver metastasis and revealed a new, posterior left chest wall/pleural lesion suspicious for metastasis. The chest wall mass was biopsied on 9/13/2023 revealing metastatic moderately differentiated adenocarcinoma consistent with known colonic primary.     Mrs. Sanches was seen/evaluated by Dr. Brown Grubbs (Radiation Oncology) and patient has received SBRT (left-sided lower rib cage/upper abdomen).  Mrs. Sanches also recently completed Y90.  Patient then went back on to surveillance.  During this surveillance.  From late 2023 to March 2024, analysis of circulating tumor cells was attempted but patient deferred.    Unfortunately repeat CAT scans on March 8, 2024 demonstrated new mediastinal adenopathy, increased paraspinal lesion anterior to L1 and stable lesion at T12.  The previously seen liver lesion was stable although there was a new small lesion suspicious for an additional metastatic implant.   Patient also had new retroperitoneal adenopathy and increased amee hepatis adenopathy.    Mrs. Lovett feels well and clinically there are no concerning findings.  Patient/son demonstrated an excellent understanding of the situation; understand that the rectosigmoid adenocarcinoma has recurred in multiple places.  The plan is to restart FOLFIRI/Avastin (as above patient only received 2 cycles of this).  Nursing staff spent time with patient going over the specifics, side effects and toxicities.  Literature was provided.    Patient already has a port in place.  We talked about starting treatment next week.  Mrs. Sanches would like to push off treatment until a week after Easter.  Patient wants to enjoy Easter as well as has a play that she wants to feel good for a few days after Easter.    Patient understands that eventually she will need to undergo repeat scanning, routine blood work, routine office visits.  In the future, depending upon results, chemotherapy regimen may need to be manipulated or changed.  Patient is to return here in 4 weeks.     Ovarian lesion.  MRI/pelvis in October 2021 demonstrated a multi septated left ovarian cystic lesion.  Transabdominal ultrasound results did not demonstrate any concerning abnormality.  Patient underwent DILMA/BSO.  Pathology results are listed below - no evidence of malignancy. Patient was last seen by Gynecologic Oncology in June 2022, and was advised to return for follow-up as needed.     Genetics.  Patient has a complicated family history but multiple family members with colon/rectal cancer.      Bilateral lower extremity swelling. Patient has a history of lower extremity DVTs, is on Eliquis.  No bruising or bleeding issues.  Patient will continue to monitor.    Mrs. Sanches knows to call the office if she has any other oncology questions or concerns.    Carefully review your medication list and verify that the list is accurate and up-to-date. Please call the  hematology/oncology office if there are medications missing from the list, medications on the list that you are not currently taking or if there is a dosage or instruction that is different from how you're taking that medication.     Patient goals and areas of care: Restart chemotherapy  Barriers to care:  None  Patient is able to self-care  ______________________________________________________________________________________     Chief complaint: Rectosigmoid carcinoma, omental recurrence, second recurrence     History of Present Illness:  69 year old female previously diagnosed with rectosigmoid carcinoma status post resection in September 2019.  Postoperatively patient did well; Mrs. Sanches did not need/receive adjuvant chemotherapy. Surveillance CEA level was found to be elevated in June 2022.  Patient underwent workup.  Results demonstrated an intra-abdominal mass by the spleen. Patient underwent neoadjuvant chemotherapy with FOLFOX, with follow-up scans demonstrating response.  Patient then underwent resection, followed by postoperative chemotherapy with FOLFOX initially. FOLFOX was changed to FOLFIRI due to peripheral neuropathy. Treatment was completed in August 2022.      CT scan in May 2023 showed a new lesion to the right hepatic lobe. MRI abdomen in July 2023 confirmed liver metastasis and revealed a new, posterior left chest wall/pleural lesion suspicious for metastasis. The chest wall mass was biopsied on 9/13/2023 revealing adenocarcinoma consistent with known colonic primary (see Pathology .  Patient was seen/evaluated by Radiation Oncology and underwent SBRT to the left posterior chest wall lesion and more recently patient received Y90 to the liver lesion.    Patient was placed on surveillance.  The patient was presented at the GI tumor board and the consensus was to send for circulating tumor cells.  Mrs. Sanches never went for this, patient was called a number of times by the company providing  the service.  Because patient did not recognize the telephone number, she would not .  Recent scans demonstrated recurrence.  Patient returns with her son.    Mrs. Sanches states feeling okay, baseline.  No nausea or vomiting, appetite is okay.  No GI problems, no diarrhea or constipation, no bleeding.  No  or GYN problems.  Patient is still trying to lose weight.  Activities are baseline.  No fevers or infections recently.  No pain control issues.      Review of Systems   Constitutional: Negative for activity change, appetite change and fatigue.   HENT: Negative.    Eyes: Negative.    Respiratory: Negative.    Cardiovascular: Negative.    Gastrointestinal: Negative for nausea and vomiting.   Endocrine: Negative.    Genitourinary: Negative.    Musculoskeletal: Negative.    Skin: Negative.    Allergic/Immunologic: Negative.    Neurological: Negative.    Hematological: Negative.    Psychiatric/Behavioral: Negative.    All other systems reviewed and are negative.         Patient Active Problem List   Diagnosis    Callus of foot    Foot pain    GERD (gastroesophageal reflux disease)    Mixed hyperlipidemia    Prediabetes    Varicose veins with pain    Morbid obesity (HCC)    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HCC)    Dyspnea on exertion    Dyslipidemia    Sigmoid diverticulosis    PONV (postoperative nausea and vomiting)    Omental metastasis    Lesion of ovary    Chemotherapy adverse reaction    Chemotherapy-induced nausea    Platelets decreased (HCC)    Stage 3 chronic kidney disease, unspecified whether stage 3a or 3b CKD (HCC)    H/O splenectomy    Asplenia    Postoperative state    Acute embolism and thrombosis of right tibial vein (HCC)      Medical History        Past Medical History:   Diagnosis Date    Blood in stool      Breast disorder      Cancer (HCC)       rectal    Cancer determined by colorectal biopsy (HCC)       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    Colon polyp      GERD (gastroesophageal reflux disease)      History of  chemotherapy 2021    History of rectal surgery      Hyperlipidemia      PONV (postoperative nausea and vomiting)           Surgical History         Past Surgical History:   Procedure Laterality Date    BREAST CYST EXCISION Right       SECTION         x3    COLON SIGMOID RESECTION        COLONOSCOPY        DILATION AND CURETTAGE OF UTERUS        ILEO LOOP DIVERSION N/A 12/10/2019     Procedure: ILEOSTOMY LOOP;  Surgeon: WINNIE Pastor MD;  [facility]: [facility];  Service: Robotics    INSTILLATION CHEMOTHERAPY INTRAPERITONEAL (HIPEC) LAPAROTOMY N/A 2022     Procedure: INSTILLATION CHEMOTHERAPY INTRAPERITONEAL (HIPEC) LAPAROTOMY;  Surgeon: Jessie Freeman MD;  [facility]: [facility];  Service: Surgical Oncology    IR BIOPSY CHEST WALL   2023    IR BIOPSY OMENTUM   2021    IR PORT PLACEMENT   2021    OMENTECTOMY N/A 2022     Procedure: DIAGNOSTIC LAPAROSCOPY, OPEN OMENTECTOMY, SPLENECTOMY, DIAPHRAGM REPAIR, CHEST TUBE INSERTION;  Surgeon: Jessie Freeman MD;  [facility];  Service: Surgical Oncology    RI CLOSURE ENTEROSTOMY LG/SMALL INTESTINE N/A 2020     Procedure: CLOSURE ILEOSTOMY;  Surgeon: WINNIE Pastor MD;  [facility];  Service: Colorectal    RI LAPAROSCOPY COLECTOMY PARTIAL W/ANASTOMOSIS N/A 12/10/2019     Procedure: SIGMOID RESECTION COLON LAPAROSCOPIC W ROBOTICS converted to lap hand assisted  with Partial proctectomy , LASER FLUORESCENCE ANGIOGRAPHY, INTRA OP COLONOSCOPY;  Surgeon: WINNIE Pastor MD;  [facility];  Service: Robotics    RI TOTAL ABDOMINAL HYSTERECT W/WO RMVL TUBE OVARY N/A 2022     Procedure: DIAGNOSTIC LAPAROSCOPY, TOTAL ABDOMINAL HYSTERECTOMY, BILATERAL SALPINGO-OOPHORECTOMY, EXTENSIVE ADHESIOLYSIS;  Surgeon: Peterson Mae MD;  [facility];  Service: Gynecology Oncology    RI UNLISTED PROCEDURE DIAPHRAGM   2022     Procedure: REPAIR DIAPHRAGM TEAR;  Surgeon: Yana MATSON  "MD Anup;  [facility];  Service: Thoracic    SMALL INTESTINE SURGERY   02/04/2020     Procedure: RESECTION SMALL BOWEL;  Surgeon: WINNIE Pastor MD;  [facility];  Service: Colorectal      Past surgical history:  No prior blood transfusions     OBGYN:  No breast issues, no abnormal mammograms previously, no postmenopausal bleeding, no other GYN issues     Family History         Family History   Problem Relation Age of Onset    Hypertension Mother      Heart attack Mother      Heart attack Father      Heart disease Father      Hypertension Brother      Heart attack Brother      Colon cancer Paternal Uncle      Leukemia Cousin      Breast cancer Cousin      Diabetes Cousin      Breast cancer Cousin      Colon cancer Family           paternal uncle    Stroke Neg Hx        Family history:  Somewhat complicated, mother with colostomy but etiology for this is unclear, paternal uncle with rectal cancer, patient has to first-degree relatives on the paternal side with history of colon cancer, 3 sons and 1 grandchild in good general health     Social History               Socioeconomic History    Marital status:        Spouse name: Not on file    Number of children: Not on file    Years of education: Not on file    Highest education level: Not on file   Occupational History    Not on file   Tobacco Use    Smoking status: Never    Smokeless tobacco: Never   Vaping Use    Vaping Use: Never used   Substance and Sexual Activity    Alcohol use: Yes       Comment: \"occasionally\"    Drug use: Never    Sexual activity: Not Currently   Other Topics Concern    Not on file   Social History Narrative    Not on file      Social Determinants of Health           Financial Resource Strain: Low Risk  (3/21/2023)     Overall Financial Resource Strain (CARDIA)      Difficulty of Paying Living Expenses: Not hard at all   Food Insecurity: No Food Insecurity (5/2/2022)     Hunger Vital Sign      Worried About " Running Out of Food in the Last Year: Never true      Ran Out of Food in the Last Year: Never true   Transportation Needs: No Transportation Needs (3/21/2023)     PRAPARE - Transportation      Lack of Transportation (Medical): No      Lack of Transportation (Non-Medical): No   Physical Activity: Not on file   Stress: Not on file   Social Connections: Not on file   Intimate Partner Violence: Not on file   Housing Stability: Unknown (5/2/2022)     Housing Stability Vital Sign      Unable to Pay for Housing in the Last Year: No      Number of Places Lived in the Last Year: Not on file      Unstable Housing in the Last Year: No      Social history:  No tobacco, alcohol or drug abuse, no toxic exposure     Current Outpatient Medications:     Acetaminophen (TYLENOL 8 HOUR PO), Take by mouth PRN, Disp: , Rfl:     apixaban (Eliquis) 5 mg, Take 1 tablet (5 mg total) by mouth 2 (two) times a day, Disp: 60 tablet, Rfl: 5    ezetimibe (ZETIA) 10 mg tablet, Take 1 tablet (10 mg total) by mouth daily, Disp: 90 tablet, Rfl: 1    famotidine (PEPCID) 20 mg tablet, Take 1 tablet (20 mg total) by mouth 2 (two) times a day as needed for heartburn As needed, Disp: 90 tablet, Rfl: 1    meclizine (ANTIVERT) 12.5 MG tablet, Take 1 tablet (12.5 mg total) by mouth 3 (three) times a day as needed for dizziness (Patient taking differently: Take 12.5 mg by mouth 3 (three) times a day as needed for dizziness PRN), Disp: 30 tablet, Rfl: 0           Allergies   Allergen Reactions    Medical Tape Rash       Skin irritation  Skin peeling  Skin irritation  Skin peeling  Blisters  Rash          Vitals:     10/10/23 1356   BP: 136/76   Pulse: 78   Resp: 17   Temp: 98 °F (36.7 °C)   SpO2: 98%      Physical Exam  Constitutional:       Appearance: She is well-developed. She is obese.   HENT:      Head: Normocephalic and atraumatic.      Right Ear: External ear normal.      Left Ear: External ear normal.   Eyes:      Extraocular Movements: Extraocular  movements intact.      Conjunctiva/sclera: Conjunctivae normal.      Pupils: Pupils are equal, round, and reactive to light.   Cardiovascular:      Rate and Rhythm: Normal rate and regular rhythm.      Heart sounds: Normal heart sounds.   Pulmonary:      Effort: Pulmonary effort is normal.      Breath sounds: Normal breath sounds.   Abdominal:      General: Bowel sounds are normal. There is no distension.      Palpations: Abdomen is soft.      Tenderness: There is no abdominal tenderness. There is no guarding.   Musculoskeletal:         General: Normal range of motion.      Right lower leg: No edema.      Left lower leg: No edema.   Lymphadenopathy:      Cervical: No cervical adenopathy.      Upper Body:      Right upper body: No supraclavicular or axillary adenopathy.      Left upper body: No supraclavicular or axillary adenopathy.   Skin:     General: Skin is warm and dry.  Right anterior chest wall port area clean and dry     Findings: No bruising, ecchymosis or petechiae.   Neurological:      General: No focal deficit present.      Mental Status: She is alert and oriented to person, place, and time.   Psychiatric:         Mood and Affect: Mood normal.         Behavior: Behavior normal.         Judgment: Judgment normal.   Extremities: Bilateral lower extremity edema, 1+, no cords, pulses are 1+     Laboratory    1/11/2024 BUN = 18 creatinine = 1.07 LFTs WNL calcium = 8.9        11/15/2023 WBC = 6.0 hemoglobin = 11 hematocrit = 34 platelet = 247 neutrophil = 67%    Procedures    1/11/2024NM SPECT post microsphere implant    IMPRESSION:     1.  Successful intra-arterial placement of Y90 THERASPHERE.     10/06/2021 colonoscopy     FINDINGS:  Healthy end-to-end colorectal anastomosis with no bleeding in the mid rectum  All observed locations appeared normal, including the cecum, ascending colon, transverse colon, splenic flexure and descending colon. A couple scattered diverticuli seen     Imaging    3/8/2024 CT of  the chest abdomen pelvis with contrast    IMPRESSION:     1.  New mediastinal lymphadenopathy suspicious for metastasis.     2.  Decreased size of left 11th rib metastasis status post radiation. New patchy density in the left lower lobe adjacent to the rib mass is likely related to interval radiation.     3.  Increased paraspinal lesion anterior to L1 and stable paraspinal lesion at T12.     4.  Previously seen liver lesion is stable though there is a new small lesion suspicious for metastasis.     5.  New retroperitoneal lymphadenopathy and increased amee hepatis lymphadenopathy suspicious for metastasis.     10/09/2023 CT chest abdomen pelvis     IMPRESSION:     Enlarging 6.8 x 4.4 x 4.1 cm T11 rib metastasis.     Stable ill-defined hypovascular nodule in the right lobe of the liver which remains concerning for metastasis.     Cholelithiasis.     09/13/2023 IR biopsy chest wall     IMPRESSION:  Successful ultrasound guided core biopsy of the left posterior pleural-based mass.     07/24/2023 MRI abdomen      IMPRESSION:     16 mm segment 6 hepatic lesion unchanged from the CT suspicious for metastasis.     New posterior left chest wall/pleural lesion measuring 2.7 x 2.3 cm also suspicious for metastasis.     05/30/2023 CT chest abdomen pelvis     IMPRESSION:     No evidence of acute intrathoracic pathology.     New 7 mm hypodensity of the right hepatic lobe which was not present on prior examinations. Contrast enhanced abdominal MRI recommended for further evaluation.     Nodular soft tissue adjacent to the left upper renal capsule and prior splenectomy site is stable     01/27/2023 CT chest abdomen pelvis     IMPRESSION:     No definite evidence for metastatic disease involving the chest, abdomen, or pelvis.     Note is made of stable nodular soft tissue about the lateral aspect of the left upper renal pole which could reflect postsurgical changes related to prior splenectomy although continued short interval  follow-up is recommended to exclude   residual/recurrent tumor in this location.     07/25/2022 vascular lower limb venous duplex study     RIGHT LOWER LIMB:  Acute mostly deep vein thrombosis in the paired posterior tibial veins  throughout the calf.  No evidence of superficial thrombophlebitis noted.  Popliteal, posterior tibial and anterior tibial arterial Doppler waveforms are  triphasic.     LEFT LOWER LIMB:  No evidence of acute or chronic deep vein thrombosis.  No evidence of superficial thrombophlebitis noted.  Doppler evaluation shows a normal response to augmentation maneuvers.  Popliteal, posterior tibial and anterior tibial arterial Doppler waveforms are  triphasic.     03/22/2022 CT scan chest abdomen pelvis with contrast     ABDOMINOPELVIC CAVITY: Left upper quadrant biopsy-proven omental metastasis has decreased in size now measuring 4.1 x 2.5 x 2.7 cm (previously 5.3 x 4.5 x 4.3 cm), and now only focally contacts rather than grossly invades the spleen and intercostal soft   tissues, on series 2/54, 601/71. Adjacent omental nodularity is less conspicuous. No new evidence of metastatic disease in the abdomen or pelvis. No lymphadenopathy. No ascites or intraperitoneal free air.     IMPRESSION:     1.  Decreased size of biopsy-proven omental metastasis, which now only focally contacting rather than grossly invading the spleen and adjacent abdominal wall. No new evidence of metastatic disease in the abdomen or pelvis.  2.  No new or suspicious pulmonary nodules. One of the previously noted new 1-2 mm nodules is no longer seen, and the other is unchanged. Recommend continued attention on follow-up.  3.  Top-normal thickness endometrial stripe measuring 7 mm. If there is history of vaginal bleeding, suggest catheterization with endometrial biopsy.  4.  Hepatic steatosis.     11/11/2021 ultrasound pelvis transabdominal only     Cluster of anechoic cystic areas seen replacing the left ovary similar to MRI  largest measuring 8 mm compared to 1.3 cm.  Based on the ACR O-RADS system, this is O-RADS category 2 (almost certain benign with <1% risk of malignancy.) The management recommendation is followup in 1 year.     10/25/2021 MRI pelvis rectal cancer staging     1.  No recurrent or metastatic disease in the pelvis.  Please refer to the separately dictated MRI abdomen report regarding mesenteric mass in the left upper quadrant.     2.  Multi septated cystic lesion versus cluster of small cysts in the left ovary measuring 2.6 x 2.5 x 1.9 cm, increased in size from October 2019.  Further characterization with pelvic ultrasound is recommended.     10/25/2021 MRI abdomen     1.  5.2 cm mesenteric mass in the left upper quadrant with thickening of peritoneal reflection and invasion of the spleen, similar to prior PET/CT.  This is highly suspicious for metastatic tumor implant.  2.  No additional potential sites of metastasis in the abdomen.  3.  Moderately distended gallbladder with gallstones and probable adenomyomatosis.     09/28/2021 PET-CT     1. There is a large left paracolic gutter markedly hypermetabolic mass immediately inferior to the spleen, most consistent with metastatic colorectal carcinoma.  2. Mildly increased activity at the right abdominal bowel anastomotic site.  If not recently performed, direct visualization is recommended  3. There are no other glucose avid abnormalities identified within the abdomen or pelvis  4. No evidence of distant glucose avid metastatic disease in the neck, chest, or skeleton      Pathology    9/13/2023 [facility] results on the chart.  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  No other actionable mutations, BRAF was not mutated, MSI was stable, mismatch repair proteins were proficient, tumor mutational burden was low, PD-L1 = 0% = negative, NTRK1/2/3 fusion not detected.    "      Case Report   Surgical Pathology Report                         Case: [id number]                                    Authorizing Provider:  Jessie Freeman MD       Collected:           09/13/2023 1146               Ordering Location:     [facility] Received:            09/13/2023 1225                                      Cardiac Cath Lab                                                              Pathologist:           Aristeo Rashid MD                                                            Specimen:    Chest Wall, chest wall mass, 4 cores                                                        Final Diagnosis   A. Mass, \"Chest wall mass 4 cores,\" Biopsy:  - Metastatic moderately differentiated adenocarcinoma, consistent with known colonic primary (see note)   Electronically signed by Aristeo Rashid MD on 9/18/2023 at  9:45 AM   Note     Immunohistochemistry was performed on block A1 with adequate controls. Tumor cells are positive for AE1/AE3, CAM5.2, CDX2, and SATB2. They are negative for CK20 and CK7.   Immunohistochemistry was performed on block A2 with adequate controls. Tumor cells are positive for AE1/AE3, Cam5.2, and negative for CK20.      Case Report   Surgical Pathology Report                         Case: [id number]                                    Authorizing Provider:  Peterson Mae,   Collected:           04/29/2022 1118                                      MD                                                                            Ordering Location:     [facility]      Received:            04/29/2022 2236                                      Hospital Operating Room                                                       Pathologist:           Roselyn King,                                                       Specimens:   A) - Uterus w/Bilateral Ovaries and Fallopian Tubes                                                  B) - Spleen, " spleen, omentum, darotis                                                       Final Diagnosis   A. Uterus, Bilateral Ovaries and Fallopian Tubes; Hysterosalpingo-oophorectomy:  - Leiomyoma.  - Left ovary with serous cystadenoma.  - Unremarkable cervix.  - Benign inactive endometrium with no specific pathologic change.  - Unremarkable right ovary and bilateral fallopian tubes.     B. Spleen, spleen, omentum, darotis:  - Metastatic adenocarcinoma involving spleen and omentum, consistent with colonic primary. See Note.   Electronically signed by Roselyn King DO on 5/12/2022 at  2:49 PM      Note     Note B: Comparison to prior material ([id number], omental mass) reveals identical morphology to previous metastatic colonic adenocarcinoma.            Case Report   Surgical Pathology Report                         Case: [id number]                                    Authorizing Provider:  Sohail Grubbs MD           Collected:           11/12/2021 0921               Ordering Location:     [facility] Received:            11/12/2021 0941                                      CAT Scan                                                                      Pathologist:           Pamela Ramos MD                                                         Specimen:    Omentum, Omental mass                                                                       Final Diagnosis   A. Omentum, Omental mass:  - Metastatic adenocarcinoma, with an immunophenotype compatible with metastasis from patient's known colonic primary.  - See note.     Note: Tumor is positive with CK20, CDX2 and negative with CK7, PAX8. This immunophenotype supports the above interpretation.  Best representative block with tumor A5.     This case was reviewed at the intradepartmental  conference.   Dr. Grubbs is notified of the diagnosis in EPIC via [employer] on 11/17/2021  at 8.45 am.   Electronically signed by Pamela Cole  MD Baldo on 11/17/2021 at  8:53 AM                Case Report   Surgical Pathology Report                         Case: [id number]                                    Authorizing Provider:  WINNIE Pastor MD       Collected:           12/10/2019 1159               Ordering Location:     [facility]      Received:            12/10/2019 [address] [place] [address] Operating Room                                                       Pathologist:           Osman Beltran MD                                                                  Specimens:   A) - Large Intestine, Sigmoid Colon, and portion of rectum                                           B) - Anastomatic site, anastamotic rings                                                    Addendum   RRESULTS OF IMMUNOHISTOCHEMICAL ANALYSIS FOR MISMATCH REPAIR PROTEIN LOSS  INTERPRETATION: NO LOSS OF NUCLEAR EXPRESSION OF MMR PROTEINS: LOW PROBABILITY OF MSI-H  Note: Background non-neoplastic tissue and/or internal control with intact nuclear expression.      RESULTS:  Antibody          Clone               Description                           Results  MLH1               M1                  Mismatch repair protein       Intact nuclear expression  MSH2              [id number]       Mismatch repair protein       Intact nuclear expression  MSH6              44                   Mismatch repair protein       Intact nuclear expression  PMS2              [id number]         Mismatch repair protein       Intact nuclear expression     Comment:  A negative control and a positive control for each antibody have been reviewed and accepted.  [facility] Specimen ID: [id number], Evaluator:  JOSÉ MIGUEL Perales MD  These tests were developed and their performance characteristics determined by "[employer]  They may not be cleared or approved by the U.S. Food and Drug Administration.  The FDA determined that such clearance or approval is not necessary.  These  tests are used for clinical purposes.  They should not be regarded as investigational or for research.  This laboratory has been approved by [place] [address], designated as a high-complexity laboratory and is qualified to perform these tests.     Comments: Patients whose tumors demonstrate lack of expression of one or more DNA mismatch repair proteins might be at risk for Bee Syndrome. This cancer susceptibility syndrome greatly increases the risk of synchronous and/or metachronous cancers in the affected patients and their family members. Normal expression of all proteins does not completely rule out familial cancer predisposition.     The [facility] Bee Syndrome Surveillance Program Task Forces recommends that all patients with lack of expression of one or more DNA mismatch repair proteins and those with concerning personal or family history should undergo thorough evaluation, counseling and possibly genetic testing.       Addendum electronically signed by Osman Beltran MD on 12/20/2019 at  3:28 PM   Final Diagnosis   A. Rectosigmoid colon, low anterior resection:  - Adenocarcinoma, moderately differentiated.   - Tumor invades through the muscularis propria into pericolorectal tissue, T3  - Diverticula.  - All margins are negative for tumor.  - Thirty-four lymph nodes, negative for malignancy (0/34).     B. Colon, anastomotic rings, resection:  - Two portions of colon with no pathologic abnormality     Intradepartmental consultation is in agreement.  Interpretation performed at [facility] [place] [address]  Immunohistochemistry for desmin  is performed on tissue blocks A13 and A16 to help in the assessment of this case.         Electronically signed by Osman Beltran MD on 12/18/2019 at 10:42 AM   Additional Information     All controls performed with the immunohistochemical stains reported above reacted appropriately.  These tests were developed and their performance characteristics  determined by [facility] or [email or web address]. They may not be cleared or approved by the U.S. Food and Drug Administration. The FDA has determined that such clearance or approval is not necessary. These tests are used for clinical purposes. They should not be regarded as investigational or for research. This laboratory has been approved by CLIA 88, designated as a high-complexity laboratory and is qualified to perform these tests.   Synoptic Checklist   COLON AND RECTUM: Resection, Including Transanal Disk Excision of Rectal Neoplasms  8th Edition - Protocol posted: 2/27/2019  ColoRectal - All Specimens       SPECIMEN   Procedure   Low anterior resection    Macroscopic Intactness of Mesorectum   Complete    TUMOR   Tumor Site   Rectosigmoid    Histologic Type   Adenocarcinoma    Histologic Grade   G2: Moderately differentiated    Tumor Size   Greatest dimension (Centimeters): 5.4 cm   Additional Dimension (Centimeters)   4.6 cm       1 cm   Tumor Deposits   Not identified    Tumor Extension   Tumor invades through the muscularis propria into pericolorectal tissue    Macroscopic Tumor Perforation   Not identified    Lymphovascular Invasion   Not identified    Perineural Invasion   Not identified    Type of Polyp in Which Invasive Carcinoma Arose   Tubular adenoma    Treatment Effect   No known presurgical therapy    MARGINS   Margins   All margins are uninvolved by invasive carcinoma, high-grade dysplasia, intramucosal adenocarcinoma, and adenoma    Margins Examined   Proximal        Distal        Radial or Mesenteric    Distance of Invasive Carcinoma from Closest Margin   1.5 cm   Closest Margin   Radial or Mesenteric    Distance of Tumor from Radial Margin   1.5 cm   LYMPH NODES   Number of Lymph Nodes Involved   0    Number of Lymph Nodes Examined   34    PATHOLOGIC STAGE CLASSIFICATION (pTNM, AJCC 8th Edition)   Primary Tumor (pT)   pT3    Regional Lymph Nodes (pN)   pN0     ADDITIONAL FINDINGS   Additional Pathologic Findings   Diverticulosis    .

## 2024-03-20 NOTE — TELEPHONE ENCOUNTER
Referring provider:  Dr. LACIE Maravilla    HPI:    Edu Wilkerson is a 53 year old female who presents to the ED for evaluation of left sided abdominal pain, that began three days ago.  She was moving a couch and it hit a wall and the couch pushed up on her LUQ.  She has had pain since that time and had continued to lift.     Pt reports the pain makes it difficult and painful to breathe. She also reports chills and fevers up too 100 degrees. Associated sx include HA, cough, and diarrhea. Pt denies vomiting or any other associated sx. She is not vaccinated against COVID-19. Pt reports her mother recently had COVID and got out of isolation a few days ago. She denies hx of pulmonary issues. There are no further complaints or modifying factors at this time.     PCP: No Pcp     No Known Allergies         Current Discharge Medication List           Prior to Admission Medications     Details   naproxen sodium (ALEVE) 220 MG tablet Take 220 mg by mouth 2 times daily as needed (Pain/fever).       acetaminophen (TYLENOL) 500 MG tablet Take 1,500 mg by mouth every 6 hours as needed for Pain.                 Past Medical History        Past Medical History:   Diagnosis Date   • Ulcer     • Urinary tract infection              Past Surgical History         Past Surgical History:   Procedure Laterality Date   • CHOLECYSTECTOMY       • DILATION AND CURETTAGE OF UTERUS       • FACIAL FRACTURE SURGERY   05/2017   • TONSILLECTOMY AND ADENOIDECTOMY       • TONSILLECTOMY AND ADENOIDECTOMY                History reviewed. No pertinent family history.        Social History            Tobacco Use   • Smoking status: Current Every Day Smoker       Packs/day: 0.25       Years: 3.00       Pack years: 0.75       Types: Cigarettes   • Smokeless tobacco: Never Used   Substance Use Topics   • Alcohol use: No       Comment: rarely   • Drug use: No       Types: Heroin, Cocaine       Comment: last use 5/17 - cocaine, 8/16 - heroin  While we try to accommodate patient requests, our priority is to schedule treatment according to Doctor's orders and site availability.    Does the Provider use the intake sheet or checkout note?  What would be a preferred day of the week that would work best for your infusion appointment? Monday tx, D/C Wednesday   Do you prefer mornings or afternoons for your appointments? Mid mornings   Are there any days or dates that do not work for your schedule, including any upcoming vacations?   We are going to try our best to schedule you at the infusion center closest to your home.  In the event that we are unable to what would be your next preferred infusion site or sites?     1. [place]  2. [facility]    Do you have transportation to take you to all of your appointments? Yes   Would you like the infusion center to draw labs from your port? (disregard if patient doesn't have a port or need labs for infusion appointment) Yes            E-cigarette/Vaping      E-Cigarette/Vaping Substances & Devices         Review of Systems   Constitutional: Positive for chills and fever. Negative for activity change.   HENT: Negative for congestion.    Eyes: Negative for discharge.   Respiratory: Positive for cough and shortness of breath.    Cardiovascular: Negative for chest pain.        Positive for pleuritic chest pain   Gastrointestinal: Positive for abdominal pain (left sided) and diarrhea. Negative for nausea.   Genitourinary: Negative for dysuria.   Musculoskeletal: Negative for myalgias.   Skin: Negative for pallor.   Neurological: Positive for headaches.   Psychiatric/Behavioral: The patient is not nervous/anxious.          Physical Exam       133/83    93 %                                    Multiple vitals with pain scale          Physical Exam  Visit Vitals  /55 (BP Location: RUE - Right upper extremity, Patient Position: Semi-Padilla's)   Pulse 88   Temp (!) 100.7 °F (38.2 °C) Comment: RN Notified   Resp 18   Ht 5' 7\" (1.702 m)   Wt 65.8 kg (145 lb 1 oz)   SpO2 96%   BMI 22.72 kg/m²       Constitutional:       Appearance: She is well-developed.   HENT:      Head: Normocephalic.   Eyes:      General: No scleral icterus.  Cardiovascular:      Rate and Rhythm: Normal rate and regular rhythm.      Pulses: Normal pulses.      Heart sounds: Normal heart sounds.   Pulmonary:      Effort: Pulmonary effort is normal. No respiratory distress.      Breath sounds: Normal breath sounds.      Comments: Pain with deep breathing, splints with deep breathing  Abdominal:      General: There is no distension.      Palpations: Abdomen is soft. Tender LUQ on palpation, no guarding or rebound  Musculoskeletal:         General: Normal range of motion.      Cervical back: Neck supple.   Lymphadenopathy:      Cervical: No cervical adenopathy.   Skin:     General: Skin is warm.   Neurological:      Mental Status: She is alert.    no abdominal tenderness          Dimer, Quantitative   Result Value Ref Range     D Dimer, Quantitative 13.85 (H) <0.57 mg/L (FEU)   CBC with Automated Differential (performable only)   Result Value Ref Range     WBC 10.6 4.2 - 11.0 K/mcL     RBC 3.09 (L) 4.00 - 5.20 mil/mcL     HGB 8.4 (L) 12.0 - 15.5 g/dL     HCT 26.8 (L) 36.0 - 46.5 %     MCV 86.7 78.0 - 100.0 fl     MCH 27.2 26.0 - 34.0 pg     MCHC 31.3 (L) 32.0 - 36.5 g/dL     RDW-CV 14.0 11.0 - 15.0 %     RDW-SD 44.5 39.0 - 50.0 fL      (H) 140 - 450 K/mcL     NRBC 0 <=0 /100 WBC     Neutrophil, Percent 78 %     Lymphocytes, Percent 10 %     Mono, Percent 7 %     Eosinophils, Percent 3 %     Basophils, Percent 1 %     Immature Granulocytes 1 %     Absolute Neutrophils 8.4 (H) 1.8 - 7.7 K/mcL     Absolute Lymphocytes 1.1 1.0 - 4.0 K/mcL     Absolute Monocytes 0.7 0.3 - 0.9 K/mcL     Absolute Eosinophils  0.3 0.0 - 0.5 K/mcL     Absolute Basophils 0.1 0.0 - 0.3 K/mcL     Absolute Immmature Granulocytes 0.1 0.0 - 0.2 K/mcL   Comprehensive Metabolic Panel   Result Value Ref Range     Fasting Status         Sodium 136 135 - 145 mmol/L     Potassium 3.3 (L) 3.4 - 5.1 mmol/L     Chloride 102 98 - 107 mmol/L     Carbon Dioxide 28 21 - 32 mmol/L     Anion Gap 9 (L) 10 - 20 mmol/L     Glucose 94 70 - 99 mg/dL     BUN 17 6 - 20 mg/dL     Creatinine 0.82 0.51 - 0.95 mg/dL     Glomerular Filtration Rate 81 >=60     BUN/ Creatinine Ratio 21 7 - 25     Calcium 9.5 8.4 - 10.2 mg/dL     Bilirubin, Total 0.6 0.2 - 1.0 mg/dL     GOT/AST 26 <=37 Units/L     GPT/ALT 29 <64 Units/L     Alkaline Phosphatase 98 45 - 117 Units/L     Albumin 3.8 3.6 - 5.1 g/dL     Protein, Total 8.1 6.4 - 8.2 g/dL     Globulin 4.3 (H) 2.0 - 4.0 g/dL     A/G Ratio 0.9 (L) 1.0 - 2.4   Partial Thromboplastin Time   Result Value Ref Range     PTT 25 22 - 30 sec   Prothrombin Time   Result Value Ref Range     Prothrombin Time 10.3 9.7 - 11.8 sec     INR 1.0     Stool occult blood POC   Result Value Ref Range     gFOB (guaiac) -  Occult Blood Negative Negative     Internal Procedural Controls Acceptable No     TYPE/SCREEN   Result Value Ref Range     ABO/RH(D) B Rh Positive       ANTIBODY SCREEN Negative       TYPE AND SCREEN EXPIRATION DATE 11/09/2021 23:59     Rapid SARS-CoV-2 by PCR     Specimen: Nasal, Mid-turbinate; Swab   Result Value Ref Range     Rapid SARS-COV-2 by PCR Not Detected Not Detected / Detected / Presumptive Positive / Inhibitors present     Isolation Guidelines         Procedural Comment       ISTAT8 VENOUS  POINT OF CARE   Result Value Ref Range     BUN - POINT OF CARE 18 6 - 20 mg/dL     SODIUM - POINT OF CARE 137 135 - 145 mmol/L     POTASSIUM - POINT OF CARE 3.5 3.4 - 5.1 mmol/L     CHLORIDE - POINT OF CARE 100 98 - 107 mmol/L     TCO2 - POINT OF CARE 26 (H) 19 - 24 mmol/L     ANION GAP - POINT OF CARE 15 10 - 20 mmol/L     HEMATOCRIT - POINT OF CARE 27.0 (L) 36.0 - 46.5 %     HEMOGLOBIN - POINT OF CARE 9.2 (L) 12.0 - 15.5 g/dL     GLUCOSE - POINT OF CARE 104 (H) 70 - 99 mg/dL     CALCIUM, IONIZED - POINT OF CARE 1.24 1.15 - 1.29 mmol/L     Creatinine 0.80 0.51 - 0.95 mg/dL     Glomerular Filtration Rate 84 >=60         Radiology Results    EXAM: CT ABDOMEN PELVIS W CONTRAST 11/6/2021   This CT exam was performed using one or more of the following dose  reduction techniques: Automated exposure control, adjustment of the mA  and/or KV according to patient size, and/or use of iterative reconstructive  technique.     ABDOMEN FINDINGS:     Lung bases: Left basilar effusion. Basilar lung atelectasis.      No free peritoneal air or fluid      Liver, Spleen and Pancreas: Spleen is enlarged and there appears be a large  hematoma with a large apparent multiple laceration of the lateral and  superior spleen. Spleen size with hematoma is 14.6 cm. There are scattered  stable small low-attenuation liver lesions, presumed cysts. These are  stable. There is intrahepatic biliary dilatation which is stable. No  pancreatic mass or  inflammation is seen      Gallbladder: Surgically absent .     Kidneys and Adrenal Glands: No stone mass or obstruction. No adrenal  nodule.      Aorta and Retroperitoneum: No aneurysm or adenopathy.     Bowel: Stomach is decompressed. The small bowel loops and colon have normal  appearance and distribution       Appendix: Absent            PELVIS FINDINGS:     The the bladder is normal. There is some free fluid in the low anterior  pelvis. The uterus and ovaries are identified without mass. There is venous  vascular congestion in the left pelvic sidewall similar to prior study  which can be symptomatic.      No adenopathy or mass      No inguinal mass or hernia.     Intact bony structures.         IMPRESSION:  Splenic laceration and perisplenic hematoma evident left upper  quadrant. There is minimal free fluid which is probably bloody in the low  anterior posterior pelvis.      Continued left pelvic sidewall venous vascular congestion.    Impression:  Shattered spleen with at least Grade IV laceration  No active extravasation  Plan for bed rest  Hgb q 6 hours  Low threshold for splenectomy  Will watch carefully

## 2024-03-20 NOTE — TELEPHONE ENCOUNTER
Patient seen by Dr Grubbs  Patient to restart folfiri plus avastin   Patient requests to begin 4/8/2024 after the Easter holiday  Will send to  to arrange

## 2024-03-28 ENCOUNTER — OFFICE VISIT (OUTPATIENT)
Dept: FAMILY MEDICINE CLINIC | Facility: CLINIC | Age: 69
End: 2024-03-28
Payer: MEDICARE

## 2024-03-28 VITALS
HEART RATE: 78 BPM | WEIGHT: 204.4 LBS | HEIGHT: 57 IN | RESPIRATION RATE: 16 BRPM | TEMPERATURE: 98.2 F | BODY MASS INDEX: 44.1 KG/M2 | SYSTOLIC BLOOD PRESSURE: 132 MMHG | DIASTOLIC BLOOD PRESSURE: 84 MMHG

## 2024-03-28 DIAGNOSIS — T45.1X5A CHEMOTHERAPY-INDUCED NEUROPATHY (HCC): ICD-10-CM

## 2024-03-28 DIAGNOSIS — G62.0 CHEMOTHERAPY-INDUCED NEUROPATHY (HCC): ICD-10-CM

## 2024-03-28 DIAGNOSIS — D69.6 PLATELETS DECREASED (HCC): ICD-10-CM

## 2024-03-28 DIAGNOSIS — Z13.820 SCREENING FOR OSTEOPOROSIS: ICD-10-CM

## 2024-03-28 DIAGNOSIS — C78.7 METASTASES TO THE LIVER (HCC): ICD-10-CM

## 2024-03-28 DIAGNOSIS — Z23 NEED FOR HEPATITIS B VACCINATION: ICD-10-CM

## 2024-03-28 DIAGNOSIS — N18.30 STAGE 3 CHRONIC KIDNEY DISEASE, UNSPECIFIED WHETHER STAGE 3A OR 3B CKD (HCC): ICD-10-CM

## 2024-03-28 DIAGNOSIS — E78.2 MIXED HYPERLIPIDEMIA: ICD-10-CM

## 2024-03-28 DIAGNOSIS — M85.80 OSTEOPENIA, UNSPECIFIED LOCATION: ICD-10-CM

## 2024-03-28 DIAGNOSIS — E78.5 DYSLIPIDEMIA: Primary | ICD-10-CM

## 2024-03-28 DIAGNOSIS — Z23 NEED FOR VACCINATION: ICD-10-CM

## 2024-03-28 DIAGNOSIS — Z00.00 MEDICARE ANNUAL WELLNESS VISIT, SUBSEQUENT: ICD-10-CM

## 2024-03-28 DIAGNOSIS — Z78.0 POST-MENOPAUSAL: ICD-10-CM

## 2024-03-28 DIAGNOSIS — E66.01 MORBID OBESITY (HCC): ICD-10-CM

## 2024-03-28 DIAGNOSIS — Z13.29 SCREENING FOR THYROID DISORDER: ICD-10-CM

## 2024-03-28 DIAGNOSIS — Z12.31 ENCOUNTER FOR SCREENING MAMMOGRAM FOR MALIGNANT NEOPLASM OF BREAST: ICD-10-CM

## 2024-03-28 PROBLEM — I82.441: Status: RESOLVED | Noted: 2023-03-21 | Resolved: 2024-03-28

## 2024-03-28 PROCEDURE — 99214 OFFICE O/P EST MOD 30 MIN: CPT | Performed by: NURSE PRACTITIONER

## 2024-03-28 PROCEDURE — G0439 PPPS, SUBSEQ VISIT: HCPCS | Performed by: NURSE PRACTITIONER

## 2024-03-28 PROCEDURE — 90746 HEPB VACCINE 3 DOSE ADULT IM: CPT

## 2024-03-28 PROCEDURE — G0010 ADMIN HEPATITIS B VACCINE: HCPCS

## 2024-03-28 RX ORDER — ONDANSETRON 8 MG/1
8 TABLET, ORALLY DISINTEGRATING ORAL EVERY 8 HOURS PRN
Qty: 30 TABLET | Refills: 0 | Status: SHIPPED | OUTPATIENT
Start: 2024-03-28

## 2024-03-28 RX ORDER — EZETIMIBE 10 MG/1
10 TABLET ORAL DAILY
Qty: 90 TABLET | Refills: 1 | Status: SHIPPED | OUTPATIENT
Start: 2024-03-28

## 2024-03-28 NOTE — PATIENT INSTRUCTIONS
Medicare Preventive Visit Patient Instructions  Thank you for completing your Welcome to Medicare Visit or Medicare Annual Wellness Visit today. Your next wellness visit will be due in one year (3/29/2025).  The screening/preventive services that you may require over the next 5-10 years are detailed below. Some tests may not apply to you based off risk factors and/or age. Screening tests ordered at today's visit but not completed yet may show as past due. Also, please note that scanned in results may not display below.  Preventive Screenings:  Service Recommendations Previous Testing/Comments   Colorectal Cancer Screening  * Colonoscopy    * Fecal Occult Blood Test (FOBT)/Fecal Immunochemical Test (FIT)  * Fecal DNA/Cologuard Test  * Flexible Sigmoidoscopy Age: 45-75 years old   Colonoscopy: every 10 years (may be performed more frequently if at higher risk)  OR  FOBT/FIT: every 1 year  OR  Cologuard: every 3 years  OR  Sigmoidoscopy: every 5 years  Screening may be recommended earlier than age 45 if at higher risk for colorectal cancer. Also, an individualized decision between you and your healthcare provider will decide whether screening between the ages of 76-85 would be appropriate. Colonoscopy: 10/06/2021  FOBT/FIT: Not on file  Cologuard: Not on file  Sigmoidoscopy: Not on file    History Colorectal Cancer     Breast Cancer Screening Age: 40+ years old  Frequency: every 1-2 years  Not required if history of left and right mastectomy Mammogram: 12/08/2022    Screening Current   Cervical Cancer Screening Between the ages of 21-29, pap smear recommended once every 3 years.   Between the ages of 30-65, can perform pap smear with HPV co-testing every 5 years.   Recommendations may differ for women with a history of total hysterectomy, cervical cancer, or abnormal pap smears in past. Pap Smear: 04/01/2019    Screening Not Indicated   Hepatitis C Screening Once for adults born between 1945 and 1965  More frequently  in patients at high risk for Hepatitis C Hep C Antibody: 04/15/2019    Screening Current   Diabetes Screening 1-2 times per year if you're at risk for diabetes or have pre-diabetes Fasting glucose: 100 mg/dL (1/11/2024)  A1C: 5.0 % (5/30/2023)  Screening Current   Cholesterol Screening Once every 5 years if you don't have a lipid disorder. May order more often based on risk factors. Lipid panel: 05/30/2023    Screening Not Indicated  History Lipid Disorder     Other Preventive Screenings Covered by Medicare:  Abdominal Aortic Aneurysm (AAA) Screening: covered once if your at risk. You're considered to be at risk if you have a family history of AAA.  Lung Cancer Screening: covers low dose CT scan once per year if you meet all of the following conditions: (1) Age 55-77; (2) No signs or symptoms of lung cancer; (3) Current smoker or have quit smoking within the last 15 years; (4) You have a tobacco smoking history of at least 20 pack years (packs per day multiplied by number of years you smoked); (5) You get a written order from a healthcare provider.  Glaucoma Screening: covered annually if you're considered high risk: (1) You have diabetes OR (2) Family history of glaucoma OR (3)  aged 50 and older OR (4)  American aged 65 and older  Osteoporosis Screening: covered every 2 years if you meet one of the following conditions: (1) You're estrogen deficient and at risk for osteoporosis based off medical history and other findings; (2) Have a vertebral abnormality; (3) On glucocorticoid therapy for more than 3 months; (4) Have primary hyperparathyroidism; (5) On osteoporosis medications and need to assess response to drug therapy.   Last bone density test (DXA Scan): 10/13/2021.  HIV Screening: covered annually if you're between the age of 15-65. Also covered annually if you are younger than 15 and older than 65 with risk factors for HIV infection. For pregnant patients, it is covered up to 3 times  per pregnancy.    Immunizations:  Immunization Recommendations   Influenza Vaccine Annual influenza vaccination during flu season is recommended for all persons aged >= 6 months who do not have contraindications   Pneumococcal Vaccine   * Pneumococcal conjugate vaccine = PCV13 (Prevnar 13), PCV15 (Vaxneuvance), PCV20 (Prevnar 20)  * Pneumococcal polysaccharide vaccine = PPSV23 (Pneumovax) Adults 19-63 yo with certain risk factors or if 65+ yo  If never received any pneumonia vaccine: recommend Prevnar 20 (PCV20)  Give PCV20 if previously received 1 dose of PCV13 or PPSV23   Hepatitis B Vaccine 3 dose series if at intermediate or high risk (ex: diabetes, end stage renal disease, liver disease)   Respiratory syncytial virus (RSV) Vaccine - COVERED BY MEDICARE PART D  * RSVPreF3 (Arexvy) CDC recommends that adults 60 years of age and older may receive a single dose of RSV vaccine using shared clinical decision-making (SCDM)   Tetanus (Td) Vaccine - COST NOT COVERED BY MEDICARE PART B Following completion of primary series, a booster dose should be given every 10 years to maintain immunity against tetanus. Td may also be given as tetanus wound prophylaxis.   Tdap Vaccine - COST NOT COVERED BY MEDICARE PART B Recommended at least once for all adults. For pregnant patients, recommended with each pregnancy.   Shingles Vaccine (Shingrix) - COST NOT COVERED BY MEDICARE PART B  2 shot series recommended in those 19 years and older who have or will have weakened immune systems or those 50 years and older     Health Maintenance Due:      Topic Date Due   • Breast Cancer Screening: Mammogram  12/08/2023   • Hepatitis C Screening  Completed     Immunizations Due:      Topic Date Due   • COVID-19 Vaccine (4 - 2023-24 season) 09/01/2023     Advance Directives   What are advance directives?  Advance directives are legal documents that state your wishes and plans for medical care. These plans are made ahead of time in case you lose  your ability to make decisions for yourself. Advance directives can apply to any medical decision, such as the treatments you want, and if you want to donate organs.   What are the types of advance directives?  There are many types of advance directives, and each state has rules about how to use them. You may choose a combination of any of the following:  Living will:  This is a written record of the treatment you want. You can also choose which treatments you do not want, which to limit, and which to stop at a certain time. This includes surgery, medicine, IV fluid, and tube feedings.   Durable power of  for healthcare (DPAHC):  This is a written record that states who you want to make healthcare choices for you when you are unable to make them for yourself. This person, called a proxy, is usually a family member or a friend. You may choose more than 1 proxy.  Do not resuscitate (DNR) order:  A DNR order is used in case your heart stops beating or you stop breathing. It is a request not to have certain forms of treatment, such as CPR. A DNR order may be included in other types of advance directives.  Medical directive:  This covers the care that you want if you are in a coma, near death, or unable to make decisions for yourself. You can list the treatments you want for each condition. Treatment may include pain medicine, surgery, blood transfusions, dialysis, IV or tube feedings, and a ventilator (breathing machine).  Values history:  This document has questions about your views, beliefs, and how you feel and think about life. This information can help others choose the care that you would choose.  Why are advance directives important?  An advance directive helps you control your care. Although spoken wishes may be used, it is better to have your wishes written down. Spoken wishes can be misunderstood, or not followed. Treatments may be given even if you do not want them. An advance directive may make it  easier for your family to make difficult choices about your care.   Weight Management   Why it is important to manage your weight:  Being overweight increases your risk of health conditions such as heart disease, high blood pressure, type 2 diabetes, and certain types of cancer. It can also increase your risk for osteoarthritis, sleep apnea, and other respiratory problems. Aim for a slow, steady weight loss. Even a small amount of weight loss can lower your risk of health problems.  How to lose weight safely:  A safe and healthy way to lose weight is to eat fewer calories and get regular exercise. You can lose up about 1 pound a week by decreasing the number of calories you eat by 500 calories each day.   Healthy meal plan for weight management:  A healthy meal plan includes a variety of foods, contains fewer calories, and helps you stay healthy. A healthy meal plan includes the following:  Eat whole-grain foods more often.  A healthy meal plan should contain fiber. Fiber is the part of grains, fruits, and vegetables that is not broken down by your body. Whole-grain foods are healthy and provide extra fiber in your diet. Some examples of whole-grain foods are whole-wheat breads and pastas, oatmeal, brown rice, and bulgur.  Eat a variety of vegetables every day.  Include dark, leafy greens such as spinach, kale, devin greens, and mustard greens. Eat yellow and orange vegetables such as carrots, sweet potatoes, and winter squash.   Eat a variety of fruits every day.  Choose fresh or canned fruit (canned in its own juice or light syrup) instead of juice. Fruit juice has very little or no fiber.  Eat low-fat dairy foods.  Drink fat-free (skim) milk or 1% milk. Eat fat-free yogurt and low-fat cottage cheese. Try low-fat cheeses such as mozzarella and other reduced-fat cheeses.  Choose meat and other protein foods that are low in fat.  Choose beans or other legumes such as split peas or lentils. Choose fish, skinless  poultry (chicken or turkey), or lean cuts of red meat (beef or pork). Before you cook meat or poultry, cut off any visible fat.   Use less fat and oil.  Try baking foods instead of frying them. Add less fat, such as margarine, sour cream, regular salad dressing and mayonnaise to foods. Eat fewer high-fat foods. Some examples of high-fat foods include french fries, doughnuts, ice cream, and cakes.  Eat fewer sweets.  Limit foods and drinks that are high in sugar. This includes candy, cookies, regular soda, and sweetened drinks.  Exercise:  Exercise at least 30 minutes per day on most days of the week. Some examples of exercise include walking, biking, dancing, and swimming. You can also fit in more physical activity by taking the stairs instead of the elevator or parking farther away from stores. Ask your healthcare provider about the best exercise plan for you.      © Copyright [employer] 2018 Information is for End User's use only and may not be sold, redistributed or otherwise used for commercial purposes. All illustrations and images included in CareNotes® are the copyrighted property of [employer] or [employer]

## 2024-03-28 NOTE — PROGRESS NOTES
Assessment and Plan:     Problem List Items Addressed This Visit          Digestive    Metastases to the liver (HCC)     Starting chemo in  2 weeks            Nervous and Auditory    Chemotherapy-induced neuropathy      Managed by oncologist         Relevant Medications    ondansetron (Zofran ODT) 8 mg disintegrating tablet       Genitourinary    Stage 3 chronic kidney disease, unspecified whether stage 3a or 3b CKD (HCC)     Due for CMP blood work and will follow with that  Lab Results   Component Value Date    EGFR 53 01/11/2024    EGFR 53 01/04/2024    EGFR 56 09/25/2023    CREATININE 1.07 01/11/2024    CREATININE 1.07 01/04/2024    CREATININE 1.01 09/25/2023               Blood    Platelets decreased (HCC)     Last CBC was done in November and platelets in normal range            Other    Mixed hyperlipidemia    Relevant Medications    ezetimibe (ZETIA) 10 mg tablet    Morbid obesity (HCC)    Dyslipidemia - Primary    Relevant Orders    Lipid Panel with Direct LDL reflex     Other Visit Diagnoses       Need for vaccination        Need for hepatitis B vaccination        Relevant Orders    HEPATITIS B VACCINE ADULT IM (Completed)    Screening for thyroid disorder        Relevant Orders    TSH, 3rd generation with Free T4 reflex    Encounter for screening mammogram for malignant neoplasm of breast        Post-menopausal        Relevant Orders    DXA bone density spine hip and pelvis    Screening for osteoporosis        Relevant Orders    DXA bone density spine hip and pelvis    Osteopenia, unspecified location        Relevant Orders    DXA bone density spine hip and pelvis    Medicare annual wellness visit, subsequent                Depression Screening and Follow-up Plan: Patient was screened for depression during today's encounter. They screened negative with a PHQ-2 score of 1.    BMI Counseling: Body mass index is 44.23 kg/m². The BMI is above normal. Nutrition recommendations include reducing portion sizes,  decreasing overall calorie intake, 3-5 servings of fruits/vegetables daily, reducing fast food intake, consuming healthier snacks, decreasing soda and/or juice intake, moderation in carbohydrate intake, increasing intake of lean protein, reducing intake of saturated fat and trans fat, and reducing intake of cholesterol.   Preventive health issues were discussed with patient, and age appropriate screening tests were ordered as noted in patient's After Visit Summary.  Personalized health advice and appropriate referrals for health education or preventive services given if needed, as noted in patient's After Visit Summary.     History of Present Illness:     Patient presents for a Medicare Wellness Visit    HPI   Patient is here for 6 month follow up.  Complaint with medications and tolerating it well.  Will be starting chemo in next weeks and followed by oncologist.  Denies chest pain, sob, headache and dizziness  Declined RSV vaccine  Has mammogram orders and advised to schedule that    Patient Care Team:  DEEPAK Batista as PCP - General (Family Medicine)  Augusto Sanches MD as PCP - PCP-[facility] ([facility])  Elias Mackay MD as PCP - [facility]CP-[facility] ([facility])  MD WINNIE Tyson MD (Colon and Rectal Surgery)  Jessie Freeman MD (Surgical Oncology)  Brown Grubbs MD (Radiation Oncology)     Review of Systems:     Review of Systems   HENT: Negative.     Respiratory: Negative.     Cardiovascular: Negative.    Gastrointestinal: Negative.    Genitourinary:  Negative for difficulty urinating.   Neurological: Negative.         Problem List:     Patient Active Problem List   Diagnosis    Callus of foot    Foot pain    GERD (gastroesophageal reflux disease)    Mixed hyperlipidemia    Prediabetes    Varicose veins with pain    Morbid obesity (HCC)    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HCC)    Dyspnea on exertion    Dyslipidemia    Sigmoid diverticulosis    PONV (postoperative nausea and vomiting)    Omental  metastasis    Lesion of ovary    Chemotherapy adverse reaction    Chemotherapy-induced nausea    Platelets decreased (HCC)    Stage 3 chronic kidney disease, unspecified whether stage 3a or 3b CKD (HCC)    H/O splenectomy    Asplenia    Postoperative state    Metastases to the liver (HCC)    Chemotherapy-induced neuropathy       Past Medical and Surgical History:     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    Colon polyp     GERD (gastroesophageal reflux disease)     History of chemotherapy 2021    History of rectal surgery     Hyperlipidemia     PONV (postoperative nausea and vomiting)      Past Surgical History:   Procedure Laterality Date    BREAST CYST EXCISION Right      SECTION      x3    COLON SIGMOID RESECTION      COLONOSCOPY      DILATION AND CURETTAGE OF UTERUS      ILEO LOOP DIVERSION N/A 12/10/2019    Procedure: ILEOSTOMY LOOP;  Surgeon: WINNIE Pastor MD;  [facility];  Service: Robotics    INSTILLATION CHEMOTHERAPY INTRAPERITONEAL (HIPEC) LAPAROTOMY N/A 2022    Procedure: INSTILLATION CHEMOTHERAPY INTRAPERITONEAL (HIPEC) LAPAROTOMY;  Surgeon: Jessie Freeman MD;  [facility];  Service: Surgical Oncology    IR BIOPSY CHEST WALL  2023    IR BIOPSY OMENTUM  2021    IR PORT PLACEMENT  2021    IR Y-90 PRE-ANGIO/EMBO W/ LUNG SCAN  2024    IR Y-90 RADIOEMBOLIZATION  2024    OMENTECTOMY N/A 2022    Procedure: DIAGNOSTIC LAPAROSCOPY, OPEN OMENTECTOMY, SPLENECTOMY, DIAPHRAGM REPAIR, CHEST TUBE INSERTION;  Surgeon: Jessie Freeman MD;  [facility];  Service: Surgical Oncology    NC CLOSURE ENTEROSTOMY LG/SMALL INTESTINE N/A 2020    Procedure: CLOSURE ILEOSTOMY;  Surgeon: WINNIE Pastor MD;  [facility];  Service: Colorectal    NC LAPAROSCOPY COLECTOMY PARTIAL W/ANASTOMOSIS N/A  "12/10/2019    Procedure: SIGMOID RESECTION COLON LAPAROSCOPIC W ROBOTICS converted to lap hand assisted  with Partial proctectomy , LASER FLUORESCENCE ANGIOGRAPHY, INTRA OP COLONOSCOPY;  Surgeon: WINNIE Pastor MD;  [facility];  Service: Robotics    DC TOTAL ABDOMINAL HYSTERECT W/WO RMVL TUBE OVARY N/A 04/29/2022    Procedure: DIAGNOSTIC LAPAROSCOPY, TOTAL ABDOMINAL HYSTERECTOMY, BILATERAL SALPINGO-OOPHORECTOMY, EXTENSIVE ADHESIOLYSIS;  Surgeon: Peterson Mae MD;  [facility];  Service: Gynecology Oncology    DC UNLISTED PROCEDURE DIAPHRAGM  04/29/2022    Procedure: REPAIR DIAPHRAGM TEAR;  Surgeon: Yana Hebert MD;  [facility];  Service: Thoracic    SMALL INTESTINE SURGERY  02/04/2020    Procedure: RESECTION SMALL BOWEL;  Surgeon: WINNIE Pastor MD;  [facility];  Service: Colorectal      Family History:     Family History   Problem Relation Age of Onset    Hypertension Mother     Heart attack Mother     Heart attack Father     Heart disease Father     Hypertension Brother     Heart attack Brother     Colon cancer Paternal Uncle     Leukemia Cousin     Breast cancer Cousin     Diabetes Cousin     Breast cancer Cousin     Colon cancer Family         paternal uncle    Stroke Neg Hx       Social History:     Social History     Socioeconomic History    Marital status:      Spouse name: None    Number of children: None    Years of education: None    Highest education level: None   Occupational History    None   Tobacco Use    Smoking status: Never     Passive exposure: Past    Smokeless tobacco: Never   Vaping Use    Vaping status: Never Used   Substance and Sexual Activity    Alcohol use: Yes     Comment: \"occasionally\"    Drug use: Never    Sexual activity: Not Currently   Other Topics Concern    None   Social History Narrative    None     Social Determinants of Health     Financial Resource Strain: Low Risk  (3/21/2023)    Overall Financial " Resource Strain (CARDIA)     Difficulty of Paying Living Expenses: Not hard at all   Food Insecurity: No Food Insecurity (3/28/2024)    Hunger Vital Sign     Worried About Running Out of Food in the Last Year: Never true     Ran Out of Food in the Last Year: Never true   Transportation Needs: No Transportation Needs (3/28/2024)    PRAPARE - Transportation     Lack of Transportation (Medical): No     Lack of Transportation (Non-Medical): No   Physical Activity: Not on file   Stress: Not on file   Social Connections: Not on file   Intimate Partner Violence: Not on file   Housing Stability: Unknown (3/28/2024)    Housing Stability Vital Sign     Unable to Pay for Housing in the Last Year: No     Number of Places Lived in the Last Year: Not on file     Unstable Housing in the Last Year: No      Medications and Allergies:     Current Outpatient Medications   Medication Sig Dispense Refill    Acetaminophen (TYLENOL 8 HOUR PO) Take by mouth PRN      apixaban (Eliquis) 5 mg Take 1 tablet (5 mg total) by mouth 2 (two) times a day 60 tablet 5    ezetimibe (ZETIA) 10 mg tablet Take 1 tablet (10 mg total) by mouth daily 90 tablet 1    famotidine (PEPCID) 20 mg tablet Take 1 tablet (20 mg total) by mouth 2 (two) times a day as needed for heartburn As needed 90 tablet 1    meclizine (ANTIVERT) 12.5 MG tablet Take 1 tablet (12.5 mg total) by mouth 3 (three) times a day as needed for dizziness (Patient taking differently: Take 12.5 mg by mouth 3 (three) times a day as needed for dizziness PRN) 30 tablet 0    ondansetron (Zofran ODT) 8 mg disintegrating tablet Take 1 tablet (8 mg total) by mouth every 8 (eight) hours as needed for nausea or vomiting 30 tablet 0    lidocaine (Lidoderm) 5 % Apply 1 patch topically over 12 hours daily Remove & Discard patch within 12 hours or as directed by MD (Patient not taking: Reported on 2/1/2024) 12 patch 3     No current facility-administered medications for this visit.     Allergies   Allergen  Reactions    Medical Tape Rash     Skin irritation  Skin peeling  Skin irritation  Skin peeling  Blisters  Rash      Immunizations:     Immunization History   Administered Date(s) Administered    COVID-19 PFIZER VACCINE 0.3 ML IM 03/04/2021, 03/23/2021, 10/27/2021    Hep B, adult 09/25/2023, 10/27/2023, 03/28/2024    HiB 05/05/2022    Hib (PRP-T) 05/05/2022    Influenza Quadrivalent Preservative Free 3 years and older IM 10/26/2016    Influenza, high dose seasonal 0.7 mL 09/25/2023    MMR 09/29/2023    Meningococcal B, Recombinant (TRUMENBA) 03/21/2023, 06/12/2023, 09/25/2023    Meningococcal MCV4P 05/05/2022, 05/05/2022    Pneumococcal Conjugate Vaccine 20-valent (Pcv20), Polysace 04/13/2022    Tdap 05/16/2022    Varicella 09/29/2023, 10/27/2023    meningococcal ACYW-135 TT Conjugate 03/21/2023      Health Maintenance:         Topic Date Due    Breast Cancer Screening: Mammogram  12/08/2023    Hepatitis C Screening  Completed         Topic Date Due    COVID-19 Vaccine (4 - 2023-24 season) 09/01/2023      Medicare Screening Tests and Risk Assessments:     Itzel is here for her Subsequent Wellness visit. Last Medicare Wellness visit information reviewed, patient interviewed and updates made to the record today.      Health Risk Assessment:   Patient rates overall health as good. Patient feels that their physical health rating is same. Patient is satisfied with their life. Eyesight was rated as slightly worse. Hearing was rated as same. Patient feels that their emotional and mental health rating is same. Patients states they are sometimes angry. Patient states they are sometimes unusually tired/fatigued. Pain experienced in the last 7 days has been some. Patient's pain rating has been 3/10. Patient states that she has experienced no weight loss or gain in last 6 months.     Depression Screening:   PHQ-2 Score: 1      Fall Risk Screening:   In the past year, patient has experienced: no history of falling in past  year      Urinary Incontinence Screening:   Patient has not leaked urine accidently in the last six months.     Home Safety:  Patient does not have trouble with stairs inside or outside of their home. Patient has working smoke alarms and has working carbon monoxide detector. Home safety hazards include: none.     Nutrition:   Current diet is Regular.     Medications:   Patient is not currently taking any over-the-counter supplements. Patient is able to manage medications.     Activities of Daily Living (ADLs)/Instrumental Activities of Daily Living (IADLs):   Walk and transfer into and out of bed and chair?: Yes  Dress and groom yourself?: Yes    Bathe or shower yourself?: Yes    Feed yourself? Yes  Do your laundry/housekeeping?: Yes  Manage your money, pay your bills and track your expenses?: Yes  Make your own meals?: Yes    Do your own shopping?: Yes    Previous Hospitalizations:   Any hospitalizations or ED visits within the last 12 months?: Yes    How many hospitalizations have you had in the last year?: 1-2    Advance Care Planning:   Living will: No    Durable POA for healthcare: No    Advanced directive: No    Advanced directive counseling given: Yes    ACP document given: Yes    Patient declined ACP directive: No      Cognitive Screening:   Provider or family/friend/caregiver concerned regarding cognition?: No    PREVENTIVE SCREENINGS      Cardiovascular Screening:    General: History Lipid Disorder and Screening Current      Diabetes Screening:     General: Screening Current      Colorectal Cancer Screening:     General: History Colorectal Cancer      Breast Cancer Screening:     General: Risks and Benefits Discussed    Due for: Mammogram        Cervical Cancer Screening:    General: Screening Not Indicated      Osteoporosis Screening:    General: Risks and Benefits Discussed    Due for: DXA Axial      Abdominal Aortic Aneurysm (AAA) Screening:        General: Screening Not Indicated      Lung Cancer  "Screening:     General: Screening Not Indicated      Hepatitis C Screening:    General: Screening Current    Screening, Brief Intervention, and Referral to Treatment (SBIRT)    Screening  Typical number of drinks in a day: 0  Typical number of drinks in a week: 1  Interpretation: Low risk drinking behavior.    Single Item Drug Screening:  How often have you used an illegal drug (including marijuana) or a prescription medication for non-medical reasons in the past year? never    Single Item Drug Screen Score: 0  Interpretation: Negative screen for possible drug use disorder    Brief Intervention  Alcohol & drug use screenings were reviewed. No concerns regarding substance use disorder identified.     Other Counseling Topics:   Car/seat belt/driving safety, skin self-exam, sunscreen and calcium and vitamin D intake and regular weightbearing exercise.     No results found.     Physical Exam:     /84   Pulse 78   Temp 98.2 °F (36.8 °C)   Resp 16   Ht 4' 9\" (1.448 m)   Wt 92.7 kg (204 lb 6.4 oz)   LMP 09/01/2011 (Approximate)   BMI 44.23 kg/m²     Physical Exam  Constitutional:       Appearance: Normal appearance. She is well-developed.   HENT:      Head: Normocephalic.      Right Ear: External ear normal.      Left Ear: External ear normal.      Nose: Nose normal.   Eyes:      General:         Right eye: No discharge.         Left eye: No discharge.      Conjunctiva/sclera: Conjunctivae normal.   Cardiovascular:      Rate and Rhythm: Normal rate and regular rhythm.      Heart sounds: Normal heart sounds. No murmur heard.  Pulmonary:      Effort: Pulmonary effort is normal.      Breath sounds: Normal breath sounds.   Musculoskeletal:         General: No tenderness. Normal range of motion.      Cervical back: Normal range of motion and neck supple.   Lymphadenopathy:      Cervical: No cervical adenopathy.      Right cervical: No superficial or posterior cervical adenopathy.     Left cervical: No superficial " or posterior cervical adenopathy.      Upper Body:      Right upper body: No supraclavicular adenopathy.      Left upper body: No supraclavicular adenopathy.   Skin:     General: Skin is warm and dry.      Findings: No rash.   Neurological:      Mental Status: She is alert and oriented to person, place, and time.      GCS: GCS eye subscore is 4. GCS verbal subscore is 5. GCS motor subscore is 6.      Sensory: No sensory deficit.      Coordination: Coordination normal.      Gait: Gait normal.   Psychiatric:         Behavior: Behavior normal.         Thought Content: Thought content normal.         Judgment: Judgment normal.          DEEPAK Batista

## 2024-03-28 NOTE — ASSESSMENT & PLAN NOTE
Due for CMP blood work and will follow with that  Lab Results   Component Value Date    EGFR 53 01/11/2024    EGFR 53 01/04/2024    EGFR 56 09/25/2023    CREATININE 1.07 01/11/2024    CREATININE 1.07 01/04/2024    CREATININE 1.01 09/25/2023

## 2024-04-02 DIAGNOSIS — C19 RECTOSIGMOID CANCER (HCC): ICD-10-CM

## 2024-04-02 DIAGNOSIS — C78.6 MALIGNANT NEOPLASM METASTATIC TO OMENTUM (HCC): Primary | ICD-10-CM

## 2024-04-02 RX ORDER — FLUOROURACIL 50 MG/ML
400 INJECTION, SOLUTION INTRAVENOUS ONCE
Status: CANCELLED | OUTPATIENT
Start: 2024-04-08

## 2024-04-02 RX ORDER — ATROPINE SULFATE 1 MG/ML
0.25 INJECTION, SOLUTION INTRAVENOUS ONCE
Status: CANCELLED | OUTPATIENT
Start: 2024-04-08

## 2024-04-02 RX ORDER — ATROPINE SULFATE 1 MG/ML
0.25 INJECTION, SOLUTION INTRAVENOUS ONCE AS NEEDED
Status: CANCELLED | OUTPATIENT
Start: 2024-04-08

## 2024-04-02 RX ORDER — SODIUM CHLORIDE 9 MG/ML
20 INJECTION, SOLUTION INTRAVENOUS ONCE
Status: CANCELLED | OUTPATIENT
Start: 2024-04-08

## 2024-04-05 ENCOUNTER — HOSPITAL ENCOUNTER (OUTPATIENT)
Dept: INFUSION CENTER | Facility: HOSPITAL | Age: 69
End: 2024-04-05
Payer: MEDICARE

## 2024-04-05 ENCOUNTER — TELEPHONE (OUTPATIENT)
Dept: FAMILY MEDICINE CLINIC | Facility: CLINIC | Age: 69
End: 2024-04-05

## 2024-04-05 DIAGNOSIS — C19 RECTOSIGMOID CANCER (HCC): Primary | ICD-10-CM

## 2024-04-05 DIAGNOSIS — E78.5 DYSLIPIDEMIA: ICD-10-CM

## 2024-04-05 DIAGNOSIS — Z13.29 SCREENING FOR THYROID DISORDER: ICD-10-CM

## 2024-04-05 DIAGNOSIS — C78.6 MALIGNANT NEOPLASM METASTATIC TO OMENTUM (HCC): ICD-10-CM

## 2024-04-05 LAB
ALBUMIN SERPL BCP-MCNC: 3.3 G/DL (ref 3.5–5)
ALP SERPL-CCNC: 54 U/L (ref 34–104)
ALT SERPL W P-5'-P-CCNC: 11 U/L (ref 7–52)
ANION GAP SERPL CALCULATED.3IONS-SCNC: 8 MMOL/L (ref 4–13)
AST SERPL W P-5'-P-CCNC: 14 U/L (ref 13–39)
BACTERIA UR QL AUTO: ABNORMAL /HPF
BASOPHILS # BLD AUTO: 0.04 THOUSANDS/ÂΜL (ref 0–0.1)
BASOPHILS NFR BLD AUTO: 1 % (ref 0–1)
BILIRUB SERPL-MCNC: 0.72 MG/DL (ref 0.2–1)
BILIRUB UR QL STRIP: ABNORMAL
BUN SERPL-MCNC: 13 MG/DL (ref 5–25)
CALCIUM ALBUM COR SERPL-MCNC: 9.6 MG/DL (ref 8.3–10.1)
CALCIUM SERPL-MCNC: 9 MG/DL (ref 8.4–10.2)
CHLORIDE SERPL-SCNC: 107 MMOL/L (ref 96–108)
CHOLEST SERPL-MCNC: 200 MG/DL
CLARITY UR: CLEAR
CO2 SERPL-SCNC: 23 MMOL/L (ref 21–32)
COLOR UR: YELLOW
CREAT SERPL-MCNC: 1.02 MG/DL (ref 0.6–1.3)
EOSINOPHIL # BLD AUTO: 0.16 THOUSAND/ÂΜL (ref 0–0.61)
EOSINOPHIL NFR BLD AUTO: 2 % (ref 0–6)
ERYTHROCYTE [DISTWIDTH] IN BLOOD BY AUTOMATED COUNT: 16.1 % (ref 11.6–15.1)
GFR SERPL CREATININE-BSD FRML MDRD: 55 ML/MIN/1.73SQ M
GLUCOSE P FAST SERPL-MCNC: 94 MG/DL (ref 65–99)
GLUCOSE SERPL-MCNC: 94 MG/DL (ref 65–140)
GLUCOSE UR STRIP-MCNC: NEGATIVE MG/DL
HCT VFR BLD AUTO: 38 % (ref 34.8–46.1)
HDLC SERPL-MCNC: 51 MG/DL
HGB BLD-MCNC: 12.3 G/DL (ref 11.5–15.4)
HGB UR QL STRIP.AUTO: ABNORMAL
IMM GRANULOCYTES # BLD AUTO: 0.02 THOUSAND/UL (ref 0–0.2)
IMM GRANULOCYTES NFR BLD AUTO: 0 % (ref 0–2)
KETONES UR STRIP-MCNC: NEGATIVE MG/DL
LDLC SERPL CALC-MCNC: 133 MG/DL (ref 0–100)
LEUKOCYTE ESTERASE UR QL STRIP: ABNORMAL
LYMPHOCYTES # BLD AUTO: 1.97 THOUSANDS/ÂΜL (ref 0.6–4.47)
LYMPHOCYTES NFR BLD AUTO: 26 % (ref 14–44)
MCH RBC QN AUTO: 28.4 PG (ref 26.8–34.3)
MCHC RBC AUTO-ENTMCNC: 32.4 G/DL (ref 31.4–37.4)
MCV RBC AUTO: 88 FL (ref 82–98)
MONOCYTES # BLD AUTO: 0.68 THOUSAND/ÂΜL (ref 0.17–1.22)
MONOCYTES NFR BLD AUTO: 9 % (ref 4–12)
NEUTROPHILS # BLD AUTO: 4.72 THOUSANDS/ÂΜL (ref 1.85–7.62)
NEUTS SEG NFR BLD AUTO: 62 % (ref 43–75)
NITRITE UR QL STRIP: NEGATIVE
NON-SQ EPI CELLS URNS QL MICRO: ABNORMAL /HPF
NRBC BLD AUTO-RTO: 0 /100 WBCS
PH UR STRIP.AUTO: 6 [PH]
PLATELET # BLD AUTO: 437 THOUSANDS/UL (ref 149–390)
PMV BLD AUTO: 10.5 FL (ref 8.9–12.7)
POTASSIUM SERPL-SCNC: 3.9 MMOL/L (ref 3.5–5.3)
PROT SERPL-MCNC: 7.4 G/DL (ref 6.4–8.4)
PROT UR STRIP-MCNC: NEGATIVE MG/DL
RBC # BLD AUTO: 4.33 MILLION/UL (ref 3.81–5.12)
RBC #/AREA URNS AUTO: ABNORMAL /HPF
SODIUM SERPL-SCNC: 138 MMOL/L (ref 135–147)
SP GR UR STRIP.AUTO: 1.02 (ref 1–1.03)
TRIGL SERPL-MCNC: 81 MG/DL
TSH SERPL DL<=0.05 MIU/L-ACNC: 2.18 UIU/ML (ref 0.45–4.5)
UROBILINOGEN UR QL STRIP.AUTO: 0.2 E.U./DL
WBC # BLD AUTO: 7.59 THOUSAND/UL (ref 4.31–10.16)
WBC #/AREA URNS AUTO: ABNORMAL /HPF

## 2024-04-05 PROCEDURE — 85025 COMPLETE CBC W/AUTO DIFF WBC: CPT | Performed by: INTERNAL MEDICINE

## 2024-04-05 PROCEDURE — 84443 ASSAY THYROID STIM HORMONE: CPT

## 2024-04-05 PROCEDURE — 80053 COMPREHEN METABOLIC PANEL: CPT | Performed by: INTERNAL MEDICINE

## 2024-04-05 PROCEDURE — 80061 LIPID PANEL: CPT

## 2024-04-05 PROCEDURE — 81001 URINALYSIS AUTO W/SCOPE: CPT | Performed by: INTERNAL MEDICINE

## 2024-04-05 NOTE — PROGRESS NOTES
Prechemo labs drawn via port    Itzel Sanches  tolerated treatment well with no complications.      Itzel Sanches is aware of future appt on 4/8/24 at 0930.     AVS printed and given to Itzel Sanches:  Yes

## 2024-04-05 NOTE — TELEPHONE ENCOUNTER
----- Message from DEEPAK Batista sent at 4/5/2024  1:46 PM EDT -----  Please let patient know that her thyroid level are in normal range and her cholesterol levels are stable and will continue with zetia. DEEPAK Batista

## 2024-04-08 ENCOUNTER — TELEPHONE (OUTPATIENT)
Dept: OTHER | Facility: OTHER | Age: 69
End: 2024-04-08

## 2024-04-08 ENCOUNTER — HOSPITAL ENCOUNTER (OUTPATIENT)
Dept: INFUSION CENTER | Facility: HOSPITAL | Age: 69
Discharge: HOME | End: 2024-04-08
Attending: INTERNAL MEDICINE
Payer: MEDICARE

## 2024-04-08 ENCOUNTER — TELEPHONE (OUTPATIENT)
Dept: OTHER | Facility: HOSPITAL | Age: 69
End: 2024-04-08

## 2024-04-08 VITALS
HEIGHT: 57 IN | RESPIRATION RATE: 18 BRPM | HEART RATE: 98 BPM | WEIGHT: 205.69 LBS | TEMPERATURE: 97.7 F | DIASTOLIC BLOOD PRESSURE: 56 MMHG | BODY MASS INDEX: 44.38 KG/M2 | SYSTOLIC BLOOD PRESSURE: 114 MMHG | OXYGEN SATURATION: 98 %

## 2024-04-08 DIAGNOSIS — C78.6 MALIGNANT NEOPLASM METASTATIC TO OMENTUM (HCC): Primary | ICD-10-CM

## 2024-04-08 DIAGNOSIS — C19 RECTOSIGMOID CANCER (HCC): ICD-10-CM

## 2024-04-08 RX ORDER — ATROPINE SULFATE 1 MG/ML
0.25 INJECTION, SOLUTION INTRAVENOUS ONCE
Status: COMPLETED | OUTPATIENT
Start: 2024-04-08 | End: 2024-04-08

## 2024-04-08 RX ORDER — ATROPINE SULFATE 1 MG/ML
0.25 INJECTION, SOLUTION INTRAVENOUS ONCE AS NEEDED
Status: DISCONTINUED | OUTPATIENT
Start: 2024-04-08 | End: 2024-04-11 | Stop reason: HOSPADM

## 2024-04-08 RX ORDER — SODIUM CHLORIDE 9 MG/ML
20 INJECTION, SOLUTION INTRAVENOUS ONCE
Status: COMPLETED | OUTPATIENT
Start: 2024-04-08 | End: 2024-04-08

## 2024-04-08 RX ORDER — FLUOROURACIL 50 MG/ML
400 INJECTION, SOLUTION INTRAVENOUS ONCE
Status: COMPLETED | OUTPATIENT
Start: 2024-04-08 | End: 2024-04-08

## 2024-04-08 RX ADMIN — IRINOTECAN HYDROCHLORIDE 300 MG: 20 INJECTION, SOLUTION INTRAVENOUS at 12:35

## 2024-04-08 RX ADMIN — LEUCOVORIN CALCIUM 700 MG: 200 INJECTION, POWDER, LYOPHILIZED, FOR SUSPENSION INTRAMUSCULAR; INTRAVENOUS at 12:39

## 2024-04-08 RX ADMIN — SODIUM CHLORIDE 20 ML/HR: 9 INJECTION, SOLUTION INTRAVENOUS at 09:57

## 2024-04-08 RX ADMIN — BEVACIZUMAB 465 MG: 400 INJECTION, SOLUTION INTRAVENOUS at 09:57

## 2024-04-08 RX ADMIN — ATROPINE SULFATE 0.25 MG: 1 INJECTION, SOLUTION INTRAVENOUS at 12:32

## 2024-04-08 RX ADMIN — DEXAMETHASONE SODIUM PHOSPHATE: 10 INJECTION, SOLUTION INTRAMUSCULAR; INTRAVENOUS at 12:06

## 2024-04-08 RX ADMIN — FLUOROURACIL 730 MG: 50 INJECTION, SOLUTION INTRAVENOUS at 14:21

## 2024-04-08 NOTE — TELEPHONE ENCOUNTER
My pump is not working that pumps fluid into my body that I use for chemo . Please f/u . Thank you.      VIA TT.

## 2024-04-08 NOTE — TELEPHONE ENCOUNTER
Telephone Encounter: Medical Oncology:  Itzel Sanches 69 y.o. female MRN: [id number]  Unit/Bed#:  Encounter: [id number]    Telephone Encounter:  Telephone Query: Patient concern.  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 Was notified via the on-call service line that patient had a concern regarding her CADD-pump. Contacted patient directly at the following number: [phone] at approximately 19:30. Patient states that she had her CADD-pump attached today (April 8th, 2024) at approximately 14:30, and she is concerned that the contents are not infusing given absence of a sound from the pump, or visible change in contents of the bulb, or within the tubing. Confirmed that the clamps are not closed, and the tubing is not kinked. She states that an end 'cap' on the CADD-pump is protruding out; however, there is no evidence of leakage from the pump itself. Counseled patient that she does not need to present to the Emergency Department at this time; however, in the event of any sign of leakage, she should go directly to the Emergency Department. In the interim, recommended that she contact the infusion center tomorrow for further instructions. She expressed understanding and agreement with the above plan. Will relay this message to patient's Medical Oncologist, as well.    Note: Plan was discussed with my attending physician, David Mackay M.D.    Sandy Mcknight D.O.  Hematology-Oncology Fellow (PGY-4)

## 2024-04-10 ENCOUNTER — HOSPITAL ENCOUNTER (OUTPATIENT)
Dept: INFUSION CENTER | Facility: HOSPITAL | Age: 69
Discharge: HOME | End: 2024-04-10
Attending: INTERNAL MEDICINE

## 2024-04-10 DIAGNOSIS — C19 RECTOSIGMOID CANCER (HCC): ICD-10-CM

## 2024-04-10 DIAGNOSIS — C78.6 MALIGNANT NEOPLASM METASTATIC TO OMENTUM (HCC): Primary | ICD-10-CM

## 2024-04-10 NOTE — PROGRESS NOTES
Pt complains of nausea  following treatment lasting several days - took her home nausea meds - with some relief - advised pt to call and speak with Ansley - since she won't see  - till after next treatment - pt agreed   AVS provided  Cadd d/c   Confirmed next appt 4/22 9:30

## 2024-04-11 DIAGNOSIS — C19 RECTOSIGMOID CANCER (HCC): Primary | ICD-10-CM

## 2024-04-12 RX ORDER — PROCHLORPERAZINE MALEATE 10 MG
10 TABLET ORAL EVERY 6 HOURS PRN
Qty: 30 TABLET | Refills: 5 | Status: SHIPPED | OUTPATIENT
Start: 2024-04-12

## 2024-04-15 DIAGNOSIS — C19 RECTOSIGMOID CANCER (HCC): ICD-10-CM

## 2024-04-15 DIAGNOSIS — C19 RECTOSIGMOID CANCER (HCC): Primary | ICD-10-CM

## 2024-04-15 DIAGNOSIS — C78.6 MALIGNANT NEOPLASM METASTATIC TO OMENTUM (HCC): ICD-10-CM

## 2024-04-15 DIAGNOSIS — C78.6 MALIGNANT NEOPLASM METASTATIC TO OMENTUM (HCC): Primary | ICD-10-CM

## 2024-04-15 RX ORDER — FLUOROURACIL 50 MG/ML
400 INJECTION, SOLUTION INTRAVENOUS ONCE
Status: CANCELLED | OUTPATIENT
Start: 2024-04-22

## 2024-04-15 RX ORDER — SODIUM CHLORIDE 9 MG/ML
20 INJECTION, SOLUTION INTRAVENOUS ONCE
Status: CANCELLED | OUTPATIENT
Start: 2024-04-22

## 2024-04-15 RX ORDER — ATROPINE SULFATE 1 MG/ML
0.25 INJECTION, SOLUTION INTRAVENOUS ONCE AS NEEDED
Status: CANCELLED | OUTPATIENT
Start: 2024-04-22

## 2024-04-15 RX ORDER — ATROPINE SULFATE 1 MG/ML
0.25 INJECTION, SOLUTION INTRAVENOUS ONCE
Status: CANCELLED | OUTPATIENT
Start: 2024-04-22

## 2024-04-16 DIAGNOSIS — E78.2 MIXED HYPERLIPIDEMIA: ICD-10-CM

## 2024-04-16 RX ORDER — EZETIMIBE 10 MG/1
10 TABLET ORAL DAILY
Qty: 90 TABLET | Refills: 1 | Status: SHIPPED | OUTPATIENT
Start: 2024-04-16

## 2024-04-16 NOTE — TELEPHONE ENCOUNTER
Reason for call:   [x] Refill   [] Prior Auth  [] Other:     Office:   [x] PCP/Provider - Mariano  [] Specialty/Provider -     Medication: ezetimibe (ZETIA) 10 mg tablet     Dose/Frequency: 10 mg, Oral, Daily     Quantity: 90    Pharmacy: [place], [place] - [address] [place] [address]     Does the patient have enough for 3 days?   [x] Yes   [] No - Send as HP to POD

## 2024-04-19 ENCOUNTER — APPOINTMENT (OUTPATIENT)
Dept: LAB | Facility: HOSPITAL | Age: 69
End: 2024-04-19
Payer: MEDICARE

## 2024-04-19 DIAGNOSIS — C78.6 MALIGNANT NEOPLASM METASTATIC TO OMENTUM (HCC): ICD-10-CM

## 2024-04-19 DIAGNOSIS — C19 RECTOSIGMOID CANCER (HCC): ICD-10-CM

## 2024-04-19 LAB
ALBUMIN SERPL BCP-MCNC: 3.3 G/DL (ref 3.5–5)
ALP SERPL-CCNC: 56 U/L (ref 34–104)
ALT SERPL W P-5'-P-CCNC: 18 U/L (ref 7–52)
ANION GAP SERPL CALCULATED.3IONS-SCNC: 8 MMOL/L (ref 4–13)
AST SERPL W P-5'-P-CCNC: 17 U/L (ref 13–39)
BASOPHILS # BLD AUTO: 0.01 THOUSANDS/ÂΜL (ref 0–0.1)
BASOPHILS NFR BLD AUTO: 0 % (ref 0–1)
BILIRUB SERPL-MCNC: 0.54 MG/DL (ref 0.2–1)
BUN SERPL-MCNC: 17 MG/DL (ref 5–25)
CALCIUM ALBUM COR SERPL-MCNC: 9.6 MG/DL (ref 8.3–10.1)
CALCIUM SERPL-MCNC: 9 MG/DL (ref 8.4–10.2)
CHLORIDE SERPL-SCNC: 107 MMOL/L (ref 96–108)
CO2 SERPL-SCNC: 23 MMOL/L (ref 21–32)
CREAT SERPL-MCNC: 1.23 MG/DL (ref 0.6–1.3)
EOSINOPHIL # BLD AUTO: 0.08 THOUSAND/ÂΜL (ref 0–0.61)
EOSINOPHIL NFR BLD AUTO: 3 % (ref 0–6)
ERYTHROCYTE [DISTWIDTH] IN BLOOD BY AUTOMATED COUNT: 15.9 % (ref 11.6–15.1)
GFR SERPL CREATININE-BSD FRML MDRD: 44 ML/MIN/1.73SQ M
GLUCOSE SERPL-MCNC: 89 MG/DL (ref 65–140)
HCT VFR BLD AUTO: 36.9 % (ref 34.8–46.1)
HGB BLD-MCNC: 11.7 G/DL (ref 11.5–15.4)
IMM GRANULOCYTES # BLD AUTO: 0 THOUSAND/UL (ref 0–0.2)
IMM GRANULOCYTES NFR BLD AUTO: 0 % (ref 0–2)
LYMPHOCYTES # BLD AUTO: 1.36 THOUSANDS/ÂΜL (ref 0.6–4.47)
LYMPHOCYTES NFR BLD AUTO: 42 % (ref 14–44)
MCH RBC QN AUTO: 28 PG (ref 26.8–34.3)
MCHC RBC AUTO-ENTMCNC: 31.7 G/DL (ref 31.4–37.4)
MCV RBC AUTO: 88 FL (ref 82–98)
MONOCYTES # BLD AUTO: 0.13 THOUSAND/ÂΜL (ref 0.17–1.22)
MONOCYTES NFR BLD AUTO: 4 % (ref 4–12)
NEUTROPHILS # BLD AUTO: 1.65 THOUSANDS/ÂΜL (ref 1.85–7.62)
NEUTS SEG NFR BLD AUTO: 51 % (ref 43–75)
NRBC BLD AUTO-RTO: 0 /100 WBCS
PLATELET # BLD AUTO: 262 THOUSANDS/UL (ref 149–390)
PMV BLD AUTO: 10.9 FL (ref 8.9–12.7)
POTASSIUM SERPL-SCNC: 3.9 MMOL/L (ref 3.5–5.3)
PROT SERPL-MCNC: 7.3 G/DL (ref 6.4–8.4)
RBC # BLD AUTO: 4.18 MILLION/UL (ref 3.81–5.12)
SODIUM SERPL-SCNC: 138 MMOL/L (ref 135–147)
WBC # BLD AUTO: 3.23 THOUSAND/UL (ref 4.31–10.16)

## 2024-04-19 PROCEDURE — 85025 COMPLETE CBC W/AUTO DIFF WBC: CPT

## 2024-04-19 PROCEDURE — 36415 COLL VENOUS BLD VENIPUNCTURE: CPT

## 2024-04-19 PROCEDURE — 80053 COMPREHEN METABOLIC PANEL: CPT

## 2024-04-22 ENCOUNTER — HOSPITAL ENCOUNTER (OUTPATIENT)
Dept: INFUSION CENTER | Facility: HOSPITAL | Age: 69
Discharge: HOME | End: 2024-04-22
Attending: INTERNAL MEDICINE
Payer: MEDICARE

## 2024-04-22 VITALS
DIASTOLIC BLOOD PRESSURE: 69 MMHG | BODY MASS INDEX: 43.28 KG/M2 | RESPIRATION RATE: 18 BRPM | OXYGEN SATURATION: 99 % | TEMPERATURE: 97.2 F | HEART RATE: 87 BPM | HEIGHT: 57 IN | WEIGHT: 200.62 LBS | SYSTOLIC BLOOD PRESSURE: 140 MMHG

## 2024-04-22 DIAGNOSIS — C78.6 MALIGNANT NEOPLASM METASTATIC TO OMENTUM (HCC): Primary | ICD-10-CM

## 2024-04-22 DIAGNOSIS — C78.6 MALIGNANT NEOPLASM METASTATIC TO OMENTUM (HCC): ICD-10-CM

## 2024-04-22 DIAGNOSIS — C19 RECTOSIGMOID CANCER (HCC): Primary | ICD-10-CM

## 2024-04-22 DIAGNOSIS — C19 RECTOSIGMOID CANCER (HCC): ICD-10-CM

## 2024-04-22 PROCEDURE — 96417 CHEMO IV INFUS EACH ADDL SEQ: CPT

## 2024-04-22 PROCEDURE — 96413 CHEMO IV INFUSION 1 HR: CPT

## 2024-04-22 PROCEDURE — 96411 CHEMO IV PUSH ADDL DRUG: CPT

## 2024-04-22 PROCEDURE — 96368 THER/DIAG CONCURRENT INF: CPT

## 2024-04-22 PROCEDURE — G0498 CHEMO EXTEND IV INFUS W/PUMP: HCPCS

## 2024-04-22 PROCEDURE — 96375 TX/PRO/DX INJ NEW DRUG ADDON: CPT

## 2024-04-22 PROCEDURE — 96367 TX/PROPH/DG ADDL SEQ IV INF: CPT

## 2024-04-22 RX ORDER — SODIUM CHLORIDE 9 MG/ML
20 INJECTION, SOLUTION INTRAVENOUS ONCE
Status: COMPLETED | OUTPATIENT
Start: 2024-04-22 | End: 2024-04-22

## 2024-04-22 RX ORDER — ATROPINE SULFATE 1 MG/ML
0.25 INJECTION, SOLUTION INTRAVENOUS ONCE AS NEEDED
Status: DISCONTINUED | OUTPATIENT
Start: 2024-04-22 | End: 2024-04-25 | Stop reason: HOSPADM

## 2024-04-22 RX ORDER — FLUOROURACIL 50 MG/ML
400 INJECTION, SOLUTION INTRAVENOUS ONCE
Status: COMPLETED | OUTPATIENT
Start: 2024-04-22 | End: 2024-04-22

## 2024-04-22 RX ORDER — ATROPINE SULFATE 1 MG/ML
0.25 INJECTION, SOLUTION INTRAVENOUS ONCE
Status: COMPLETED | OUTPATIENT
Start: 2024-04-22 | End: 2024-04-22

## 2024-04-22 RX ADMIN — IRINOTECAN HYDROCHLORIDE 295 MG: 20 INJECTION, SOLUTION INTRAVENOUS at 12:52

## 2024-04-22 RX ADMIN — BEVACIZUMAB 465 MG: 400 INJECTION, SOLUTION INTRAVENOUS at 11:16

## 2024-04-22 RX ADMIN — FOSAPREPITANT 150 MG: 150 INJECTION, POWDER, LYOPHILIZED, FOR SOLUTION INTRAVENOUS at 10:35

## 2024-04-22 RX ADMIN — LEUCOVORIN CALCIUM 700 MG: 200 INJECTION, POWDER, LYOPHILIZED, FOR SUSPENSION INTRAMUSCULAR; INTRAVENOUS at 12:51

## 2024-04-22 RX ADMIN — DEXAMETHASONE SODIUM PHOSPHATE: 10 INJECTION, SOLUTION INTRAMUSCULAR; INTRAVENOUS at 09:34

## 2024-04-22 RX ADMIN — SODIUM CHLORIDE 20 ML/HR: 0.9 INJECTION, SOLUTION INTRAVENOUS at 09:24

## 2024-04-22 RX ADMIN — ATROPINE SULFATE 0.25 MG: 1 INJECTION, SOLUTION INTRAVENOUS at 12:48

## 2024-04-22 RX ADMIN — FLUOROURACIL 730 MG: 50 INJECTION, SOLUTION INTRAVENOUS at 14:40

## 2024-04-22 NOTE — PROGRESS NOTES
Itzel Sanches  tolerated treatment well with no complications.      Itzel Sanches is aware of future appt on 4/24 at 1300.     AVS printed and given to Itzel Sanches:  No (Declined by Itzel Sanches)

## 2024-04-24 ENCOUNTER — HOSPITAL ENCOUNTER (OUTPATIENT)
Dept: INFUSION CENTER | Facility: HOSPITAL | Age: 69
Discharge: HOME | End: 2024-04-24
Attending: INTERNAL MEDICINE
Payer: MEDICARE

## 2024-04-24 ENCOUNTER — OFFICE VISIT (OUTPATIENT)
Dept: HEMATOLOGY ONCOLOGY | Facility: MEDICAL CENTER | Age: 69
End: 2024-04-24
Payer: MEDICARE

## 2024-04-24 VITALS
OXYGEN SATURATION: 99 % | BODY MASS INDEX: 43.41 KG/M2 | TEMPERATURE: 98 F | RESPIRATION RATE: 17 BRPM | DIASTOLIC BLOOD PRESSURE: 64 MMHG | SYSTOLIC BLOOD PRESSURE: 106 MMHG | HEIGHT: 57 IN | HEART RATE: 61 BPM

## 2024-04-24 VITALS
DIASTOLIC BLOOD PRESSURE: 62 MMHG | OXYGEN SATURATION: 99 % | RESPIRATION RATE: 18 BRPM | TEMPERATURE: 97.1 F | HEART RATE: 55 BPM | SYSTOLIC BLOOD PRESSURE: 132 MMHG

## 2024-04-24 DIAGNOSIS — C19 RECTOSIGMOID CANCER (HCC): ICD-10-CM

## 2024-04-24 DIAGNOSIS — T45.1X5A CHEMOTHERAPY-INDUCED NEUROPATHY (HCC): ICD-10-CM

## 2024-04-24 DIAGNOSIS — C78.6 MALIGNANT NEOPLASM METASTATIC TO OMENTUM (HCC): Primary | ICD-10-CM

## 2024-04-24 DIAGNOSIS — G62.0 CHEMOTHERAPY-INDUCED NEUROPATHY (HCC): ICD-10-CM

## 2024-04-24 DIAGNOSIS — C78.7 METASTASES TO THE LIVER (HCC): Primary | ICD-10-CM

## 2024-04-24 PROCEDURE — 96361 HYDRATE IV INFUSION ADD-ON: CPT

## 2024-04-24 PROCEDURE — 99215 OFFICE O/P EST HI 40 MIN: CPT | Performed by: INTERNAL MEDICINE

## 2024-04-24 PROCEDURE — 96374 THER/PROPH/DIAG INJ IV PUSH: CPT

## 2024-04-24 RX ORDER — ONDANSETRON 8 MG/1
8 TABLET, ORALLY DISINTEGRATING ORAL EVERY 8 HOURS PRN
Qty: 30 TABLET | Refills: 5 | Status: SHIPPED | OUTPATIENT
Start: 2024-04-24

## 2024-04-24 RX ORDER — SODIUM CHLORIDE 9 MG/ML
20 INJECTION, SOLUTION INTRAVENOUS ONCE
OUTPATIENT
Start: 2024-05-14

## 2024-04-24 RX ORDER — ATROPINE SULFATE 1 MG/ML
0.25 INJECTION, SOLUTION INTRAVENOUS ONCE
OUTPATIENT
Start: 2024-05-14

## 2024-04-24 RX ORDER — ATROPINE SULFATE 1 MG/ML
0.25 INJECTION, SOLUTION INTRAVENOUS ONCE AS NEEDED
OUTPATIENT
Start: 2024-05-14

## 2024-04-24 RX ORDER — FLUOROURACIL 50 MG/ML
360 INJECTION, SOLUTION INTRAVENOUS ONCE
Status: CANCELLED | OUTPATIENT
Start: 2024-05-14

## 2024-04-24 RX ADMIN — ONDANSETRON 8 MG: 2 INJECTION INTRAMUSCULAR; INTRAVENOUS at 13:37

## 2024-04-24 RX ADMIN — SODIUM CHLORIDE 1000 ML: 0.9 INJECTION, SOLUTION INTRAVENOUS at 13:02

## 2024-04-24 NOTE — PROGRESS NOTES
Pt still states not feeling well. C/o feeling cold. Afebrile. Hydration complete.    Itzel Sanches  tolerated treatment well with no complications.      Itzel Sanches is aware of future appt on 5/6/24 at 0900.     AVS printed and given to Itzel Sanches:  Yes

## 2024-04-24 NOTE — PROGRESS NOTES
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     Recurrent rectosigmoid adenocarcinoma.  Patient was seen/evaluated by Dr. Freeman surgical oncology.  Patient received 3 months of preoperative FOLFOX.  Follow-up CT scans demonstrated response to treatment with no evidence of progressive disease.  Patient then underwent resection of the omental mass and spleen as well as DILMA/BSO (other issue, see below).  Mrs. Sanches was then restarted on FOLFOX.  Patient had problems with neuropathy; FOLFOX was changed to FOLFIRI (2 cycles of FOLFIRI only).     The 12th/final cycle of chemotherapy was completed on August 23, 2022. CT scan in May 2023 showed a new lesion to the right hepatic lobe.  MRI abdomen in July 2023 confirmed liver metastasis and revealed a new, posterior left chest wall/pleural lesion suspicious for metastasis. The chest wall mass was biopsied on 9/13/2023 revealing metastatic moderately differentiated adenocarcinoma consistent with known colonic primary.     Mrs. Sanches was seen/evaluated by Dr. Brown Grubbs (Radiation Oncology) and patient has received SBRT (left-sided lower rib cage/upper abdomen).  Mrs. Sanches also recently completed Y90.  Patient then went back on to surveillance.  During this surveillance.  From late 2023 to March 2024, analysis of circulating tumor cells was attempted but patient deferred.    Unfortunately repeat CAT scans on March 8, 2024 demonstrated new mediastinal adenopathy, increased paraspinal lesion anterior to L1 and stable lesion at T12.  The previously seen liver lesion was stable although there was a new small lesion suspicious for an additional metastatic implant.   Patient also had new retroperitoneal adenopathy and increased amee hepatis adenopathy.  Options were recently discussed and patient is back on chemotherapy.  Patient is completing her second cycle today.    Mrs. Lovett feels +/-.  Patient had some queasiness but no specific nausea or vomiting with the Zofran.  Compazine seems to have caused more side effects with jitters and lethargy; this has been discontinued.  Patient is to continue with the Zofran only at this time.  Patient is also receiving extra IV fluids throughout the week of treatment.    Regimen has been manipulated.  Third cycle will include full dose bevacizumab.  The irinotecan is at 90%, the leucovorin and 5-FU IV push will both be at 90%.  The 5-FU CADD will be at 100%.    Mrs. Sanches has been referred to Dr. Garcia, palliative care.  Patient understands that she will need to follow-up either with her PCP or with surgery if she continues to have bleeding while wiping.  Etiology for this cyst is not clear.    Patient understands that eventually she will need to undergo repeat scanning, routine blood work, routine office visits.  In the future, depending upon results, chemotherapy regimen may need to be manipulated or changed.  Patient is to return here in 4 weeks.     Ovarian lesion.  MRI/pelvis in October 2021 demonstrated a multi septated left ovarian cystic lesion.  Transabdominal ultrasound results did not demonstrate any concerning abnormality.  Patient underwent DILMA/BSO.  Pathology results are listed below - no evidence of malignancy. Patient was last seen by Gynecologic Oncology in June 2022, and was advised to return for follow-up as needed.     Genetics.  Patient has a complicated family history but multiple family members with colon/rectal cancer.      Bilateral lower extremity swelling. Patient has a history of lower extremity DVTs, is on Eliquis.  No bruising or bleeding issues.  Patient will continue to monitor.    Mrs. Sanches  knows to call the office if she has any other oncology questions or concerns.    Carefully review your medication list and verify that the list is accurate and up-to-date. Please call the hematology/oncology office if there are medications missing from the list, medications on the list that you are not currently taking or if there is a dosage or instruction that is different from how you're taking that medication.     Patient goals and areas of care: Continue with palliative chemotherapy  Barriers to care:  None  Patient is able to self-care  ______________________________________________________________________________________     Chief complaint: Rectosigmoid carcinoma, omental recurrence, second recurrence     History of Present Illness:  69 year old female previously diagnosed with rectosigmoid carcinoma status post resection in September 2019.  Postoperatively patient did well; Mrs. Sanches did not need/receive adjuvant chemotherapy. Surveillance CEA level was found to be elevated in June 2022.  Patient underwent workup.  Results demonstrated an intra-abdominal mass by the spleen. Patient underwent neoadjuvant chemotherapy with FOLFOX, with follow-up scans demonstrating response.  Patient then underwent resection, followed by postoperative chemotherapy with FOLFOX initially. FOLFOX was changed to FOLFIRI due to peripheral neuropathy. Treatment was completed in August 2022.      CT scan in May 2023 showed a new lesion to the right hepatic lobe. MRI abdomen in July 2023 confirmed liver metastasis and revealed a new, posterior left chest wall/pleural lesion suspicious for metastasis. The chest wall mass was biopsied on 9/13/2023 revealing adenocarcinoma consistent with known colonic primary (see Pathology .  Patient was seen/evaluated by Radiation Oncology and underwent SBRT to the left posterior chest wall lesion and more recently patient received Y90 to the liver lesion.    Patient was placed on surveillance.   The patient was presented at the GI tumor board and the consensus was to send for circulating tumor cells.  Mrs. Sanches never went for this, patient was called a number of times by the company providing the service.  Because patient did not recognize the telephone number, she would not .  Recent scans demonstrated recurrence.  Patient recently restarted bevacizumab with FOLFIRI returns for follow-up.    Mrs. Sanches states having some trouble with the first cycle.  Patient had some queasiness, Zofran did not seem to be entirely effective.  Compazine caused side effects.  Presently patient is better as far as GI, no nausea or vomiting.  Appetite is +/-.  Fatigue is significant; patient states not having any strength to do anything.  No fevers or signs of infection.  No respiratory issues.  No pain control issues.  Patient states that she has a cyst by her rectum which occasionally burst and bleeds; this is from wiping.  No specific blood in the stool.    Review of Systems   Constitutional: Negative for activity change, appetite change and fatigue.   HENT: Negative.    Eyes: Negative.    Respiratory: Negative.    Cardiovascular: Negative.    Gastrointestinal: Negative for nausea and vomiting.   Endocrine: Negative.    Genitourinary: Negative.    Musculoskeletal: Negative.    Skin: Negative.    Allergic/Immunologic: Negative.    Neurological: Negative.    Hematological: Negative.    Psychiatric/Behavioral: Negative.    All other systems reviewed and are negative.         Patient Active Problem List   Diagnosis    Callus of foot    Foot pain    GERD (gastroesophageal reflux disease)    Mixed hyperlipidemia    Prediabetes    Varicose veins with pain    Morbid obesity (HCC)    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HCC)    Dyspnea on exertion    Dyslipidemia    Sigmoid diverticulosis    PONV (postoperative nausea and vomiting)    Omental metastasis    Lesion of ovary    Chemotherapy adverse reaction    Chemotherapy-induced nausea     Platelets decreased (HCC)    Stage 3 chronic kidney disease, unspecified whether stage 3a or 3b CKD (HCC)    H/O splenectomy    Asplenia    Postoperative state    Acute embolism and thrombosis of right tibial vein (HCC)      Medical History        Past Medical History:   Diagnosis Date    Blood in stool      Breast disorder      Cancer (HCC)       rectal    Cancer determined by colorectal biopsy (HCC)       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    Colon polyp      GERD (gastroesophageal reflux disease)      History of chemotherapy 2021    History of rectal surgery      Hyperlipidemia      PONV (postoperative nausea and vomiting)           Surgical History         Past Surgical History:   Procedure Laterality Date    BREAST CYST EXCISION Right       SECTION         x3    COLON SIGMOID RESECTION        COLONOSCOPY        DILATION AND CURETTAGE OF UTERUS        ILEO LOOP DIVERSION N/A 12/10/2019     Procedure: ILEOSTOMY LOOP;  Surgeon: WINNIE Pastor MD;  [facility];  Service: Robotics    INSTILLATION CHEMOTHERAPY INTRAPERITONEAL (HIPEC) LAPAROTOMY N/A 2022     Procedure: INSTILLATION CHEMOTHERAPY INTRAPERITONEAL (HIPEC) LAPAROTOMY;  Surgeon: Jessie Freeman MD;  [facility];  Service: Surgical Oncology    IR BIOPSY CHEST WALL   2023    IR BIOPSY OMENTUM   2021    IR PORT PLACEMENT   2021    OMENTECTOMY N/A 2022     Procedure: DIAGNOSTIC LAPAROSCOPY, OPEN OMENTECTOMY, SPLENECTOMY, DIAPHRAGM REPAIR, CHEST TUBE INSERTION;  Surgeon: Jessie Freeman MD;  [facility];  Service: Surgical Oncology    NE CLOSURE ENTEROSTOMY LG/SMALL INTESTINE N/A 2020     Procedure: CLOSURE ILEOSTOMY;  Surgeon: WINNIE Pastor MD;  [facility];  Service: Colorectal    NE LAPAROSCOPY COLECTOMY PARTIAL W/ANASTOMOSIS N/A 12/10/2019     Procedure: SIGMOID RESECTION COLON LAPAROSCOPIC W ROBOTICS converted to lap hand assisted  with Partial proctectomy , LASER FLUORESCENCE  ANGIOGRAPHY, INTRA OP COLONOSCOPY;  Surgeon: WINNIE Pastor MD;  [facility];  Service: Robotics    WV TOTAL ABDOMINAL HYSTERECT W/WO RMVL TUBE OVARY N/A 04/29/2022     Procedure: DIAGNOSTIC LAPAROSCOPY, TOTAL ABDOMINAL HYSTERECTOMY, BILATERAL SALPINGO-OOPHORECTOMY, EXTENSIVE ADHESIOLYSIS;  Surgeon: Peterson Mae MD;  [facility];  Service: Gynecology Oncology    WV UNLISTED PROCEDURE DIAPHRAGM   04/29/2022     Procedure: REPAIR DIAPHRAGM TEAR;  Surgeon: Yana Hebert MD;  [facility];  Service: Thoracic    SMALL INTESTINE SURGERY   02/04/2020     Procedure: RESECTION SMALL BOWEL;  Surgeon: WINNIE Pastor MD;  [facility];  Service: Colorectal      Past surgical history:  No prior blood transfusions     OBGYN:  No breast issues, no abnormal mammograms previously, no postmenopausal bleeding, no other GYN issues     Family History         Family History   Problem Relation Age of Onset    Hypertension Mother      Heart attack Mother      Heart attack Father      Heart disease Father      Hypertension Brother      Heart attack Brother      Colon cancer Paternal Uncle      Leukemia Cousin      Breast cancer Cousin      Diabetes Cousin      Breast cancer Cousin      Colon cancer Family           paternal uncle    Stroke Neg Hx        Family history:  Somewhat complicated, mother with colostomy but etiology for this is unclear, paternal uncle with rectal cancer, patient has to first-degree relatives on the paternal side with history of colon cancer, 3 sons and 1 grandchild in good general health     Social History               Socioeconomic History    Marital status:        Spouse name: Not on file    Number of children: Not on file    Years of education: Not on file    Highest education level: Not on file   Occupational History    Not on file   Tobacco Use    Smoking status: Never    Smokeless tobacco: Never   Vaping Use    Vaping Use: Never used  "  Substance and Sexual Activity    Alcohol use: Yes       Comment: \"occasionally\"    Drug use: Never    Sexual activity: Not Currently   Other Topics Concern    Not on file   Social History Narrative    Not on file      Social Determinants of Health           Financial Resource Strain: Low Risk  (3/21/2023)     Overall Financial Resource Strain (CARDIA)      Difficulty of Paying Living Expenses: Not hard at all   Food Insecurity: No Food Insecurity (5/2/2022)     Hunger Vital Sign      Worried About Running Out of Food in the Last Year: Never true      Ran Out of Food in the Last Year: Never true   Transportation Needs: No Transportation Needs (3/21/2023)     PRAPARE - Transportation      Lack of Transportation (Medical): No      Lack of Transportation (Non-Medical): No   Physical Activity: Not on file   Stress: Not on file   Social Connections: Not on file   Intimate Partner Violence: Not on file   Housing Stability: Unknown (5/2/2022)     Housing Stability Vital Sign      Unable to Pay for Housing in the Last Year: No      Number of Places Lived in the Last Year: Not on file      Unstable Housing in the Last Year: No      Social history:  No tobacco, alcohol or drug abuse, no toxic exposure     Current Outpatient Medications:     Acetaminophen (TYLENOL 8 HOUR PO), Take by mouth PRN, Disp: , Rfl:     apixaban (Eliquis) 5 mg, Take 1 tablet (5 mg total) by mouth 2 (two) times a day, Disp: 60 tablet, Rfl: 5    ezetimibe (ZETIA) 10 mg tablet, Take 1 tablet (10 mg total) by mouth daily, Disp: 90 tablet, Rfl: 1    famotidine (PEPCID) 20 mg tablet, Take 1 tablet (20 mg total) by mouth 2 (two) times a day as needed for heartburn As needed, Disp: 90 tablet, Rfl: 1    meclizine (ANTIVERT) 12.5 MG tablet, Take 1 tablet (12.5 mg total) by mouth 3 (three) times a day as needed for dizziness (Patient taking differently: Take 12.5 mg by mouth 3 (three) times a day as needed for dizziness PRN), Disp: 30 tablet, Rfl: 0         "   Allergies   Allergen Reactions    Medical Tape Rash       Skin irritation  Skin peeling  Skin irritation  Skin peeling  Blisters  Rash          Vitals:     10/10/23 1356   BP: 136/76   Pulse: 78   Resp: 17   Temp: 98 °F (36.7 °C)   SpO2: 98%      Physical Exam  Constitutional:       Appearance: She is well-developed. She is obese.   HENT:      Head: Normocephalic and atraumatic.      Right Ear: External ear normal.      Left Ear: External ear normal.   Eyes:      Extraocular Movements: Extraocular movements intact.      Conjunctiva/sclera: Conjunctivae normal.      Pupils: Pupils are equal, round, and reactive to light.   Cardiovascular:      Rate and Rhythm: Normal rate and regular rhythm.      Heart sounds: Normal heart sounds.   Pulmonary:      Effort: Pulmonary effort is normal.      Breath sounds: Normal breath sounds.   Abdominal:      General: Bowel sounds are normal. There is no distension.      Palpations: Abdomen is soft.      Tenderness: There is no abdominal tenderness. There is no guarding.   Musculoskeletal:         General: Normal range of motion.      Right lower leg: No edema.      Left lower leg: No edema.   Lymphadenopathy:      Cervical: No cervical adenopathy.      Upper Body:      Right upper body: No supraclavicular or axillary adenopathy.      Left upper body: No supraclavicular or axillary adenopathy.   Skin:     General: Skin is warm and dry.  Right anterior chest wall port area clean and dry     Findings: No bruising, ecchymosis or petechiae.   Neurological:      General: No focal deficit present.      Mental Status: She is alert and oriented to person, place, and time.   Psychiatric:         Mood and Affect: Mood normal.         Behavior: Behavior normal.         Judgment: Judgment normal.   Extremities: Bilateral lower extremity edema, 1+, no cords, pulses are 1+     Laboratory    4/19/2024 WBC = 3.23 hemoglobin = 11.7 hematocrit = 37 platelet = 262 neutrophil = 51% BUN = 17 creatinine  = 1.23 calcium = 9.0 LFTs WNL    1/11/2024 BUN = 18 creatinine = 1.07 LFTs WNL calcium = 8.9        11/15/2023 WBC = 6.0 hemoglobin = 11 hematocrit = 34 platelet = 247 neutrophil = 67%    Procedures    1/11/2024NM SPECT post microsphere implant    IMPRESSION:     1.  Successful intra-arterial placement of Y90 THERASPHERE.     10/06/2021 colonoscopy     FINDINGS:  Healthy end-to-end colorectal anastomosis with no bleeding in the mid rectum  All observed locations appeared normal, including the cecum, ascending colon, transverse colon, splenic flexure and descending colon. A couple scattered diverticuli seen     Imaging    3/8/2024 CT of the chest abdomen pelvis with contrast    IMPRESSION:     1.  New mediastinal lymphadenopathy suspicious for metastasis.     2.  Decreased size of left 11th rib metastasis status post radiation. New patchy density in the left lower lobe adjacent to the rib mass is likely related to interval radiation.     3.  Increased paraspinal lesion anterior to L1 and stable paraspinal lesion at T12.     4.  Previously seen liver lesion is stable though there is a new small lesion suspicious for metastasis.     5.  New retroperitoneal lymphadenopathy and increased amee hepatis lymphadenopathy suspicious for metastasis.     10/09/2023 CT chest abdomen pelvis     IMPRESSION:     Enlarging 6.8 x 4.4 x 4.1 cm T11 rib metastasis.     Stable ill-defined hypovascular nodule in the right lobe of the liver which remains concerning for metastasis.     Cholelithiasis.     09/13/2023 IR biopsy chest wall     IMPRESSION:  Successful ultrasound guided core biopsy of the left posterior pleural-based mass.     07/24/2023 MRI abdomen      IMPRESSION:     16 mm segment 6 hepatic lesion unchanged from the CT suspicious for metastasis.     New posterior left chest wall/pleural lesion measuring 2.7 x 2.3 cm also suspicious for metastasis.     05/30/2023 CT chest abdomen pelvis     IMPRESSION:     No evidence of acute  intrathoracic pathology.     New 7 mm hypodensity of the right hepatic lobe which was not present on prior examinations. Contrast enhanced abdominal MRI recommended for further evaluation.     Nodular soft tissue adjacent to the left upper renal capsule and prior splenectomy site is stable     01/27/2023 CT chest abdomen pelvis     IMPRESSION:     No definite evidence for metastatic disease involving the chest, abdomen, or pelvis.     Note is made of stable nodular soft tissue about the lateral aspect of the left upper renal pole which could reflect postsurgical changes related to prior splenectomy although continued short interval follow-up is recommended to exclude   residual/recurrent tumor in this location.     07/25/2022 vascular lower limb venous duplex study     RIGHT LOWER LIMB:  Acute mostly deep vein thrombosis in the paired posterior tibial veins  throughout the calf.  No evidence of superficial thrombophlebitis noted.  Popliteal, posterior tibial and anterior tibial arterial Doppler waveforms are  triphasic.     LEFT LOWER LIMB:  No evidence of acute or chronic deep vein thrombosis.  No evidence of superficial thrombophlebitis noted.  Doppler evaluation shows a normal response to augmentation maneuvers.  Popliteal, posterior tibial and anterior tibial arterial Doppler waveforms are  triphasic.     03/22/2022 CT scan chest abdomen pelvis with contrast     ABDOMINOPELVIC CAVITY: Left upper quadrant biopsy-proven omental metastasis has decreased in size now measuring 4.1 x 2.5 x 2.7 cm (previously 5.3 x 4.5 x 4.3 cm), and now only focally contacts rather than grossly invades the spleen and intercostal soft   tissues, on series 2/54, 601/71. Adjacent omental nodularity is less conspicuous. No new evidence of metastatic disease in the abdomen or pelvis. No lymphadenopathy. No ascites or intraperitoneal free air.     IMPRESSION:     1.  Decreased size of biopsy-proven omental metastasis, which now only focally  contacting rather than grossly invading the spleen and adjacent abdominal wall. No new evidence of metastatic disease in the abdomen or pelvis.  2.  No new or suspicious pulmonary nodules. One of the previously noted new 1-2 mm nodules is no longer seen, and the other is unchanged. Recommend continued attention on follow-up.  3.  Top-normal thickness endometrial stripe measuring 7 mm. If there is history of vaginal bleeding, suggest catheterization with endometrial biopsy.  4.  Hepatic steatosis.     11/11/2021 ultrasound pelvis transabdominal only     Cluster of anechoic cystic areas seen replacing the left ovary similar to MRI largest measuring 8 mm compared to 1.3 cm.  Based on the ACR O-RADS system, this is O-RADS category 2 (almost certain benign with <1% risk of malignancy.) The management recommendation is followup in 1 year.     10/25/2021 MRI pelvis rectal cancer staging     1.  No recurrent or metastatic disease in the pelvis.  Please refer to the separately dictated MRI abdomen report regarding mesenteric mass in the left upper quadrant.     2.  Multi septated cystic lesion versus cluster of small cysts in the left ovary measuring 2.6 x 2.5 x 1.9 cm, increased in size from October 2019.  Further characterization with pelvic ultrasound is recommended.     10/25/2021 MRI abdomen     1.  5.2 cm mesenteric mass in the left upper quadrant with thickening of peritoneal reflection and invasion of the spleen, similar to prior PET/CT.  This is highly suspicious for metastatic tumor implant.  2.  No additional potential sites of metastasis in the abdomen.  3.  Moderately distended gallbladder with gallstones and probable adenomyomatosis.     09/28/2021 PET-CT     1. There is a large left paracolic gutter markedly hypermetabolic mass immediately inferior to the spleen, most consistent with metastatic colorectal carcinoma.  2. Mildly increased activity at the right abdominal bowel anastomotic site.  If not recently  "performed, direct visualization is recommended  3. There are no other glucose avid abnormalities identified within the abdomen or pelvis  4. No evidence of distant glucose avid metastatic disease in the neck, chest, or skeleton      Pathology    9/13/2023 [facility] results on the chart.  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  No other actionable mutations, BRAF was not mutated, MSI was stable, mismatch repair proteins were proficient, tumor mutational burden was low, PD-L1 = 0% = negative, NTRK1/2/3 fusion not detected.         Case Report   Surgical Pathology Report                         Case: [id number]                                    Authorizing Provider:  Jessie Freeman MD       Collected:           09/13/2023 1146               Ordering Location:     [facility] Received:            09/13/2023 1225                                      Cardiac Cath Lab                                                              Pathologist:           Aristeo Rashid MD                                                            Specimen:    Chest Wall, chest wall mass, 4 cores                                                        Final Diagnosis   A. Mass, \"Chest wall mass 4 cores,\" Biopsy:  - Metastatic moderately differentiated adenocarcinoma, consistent with known colonic primary (see note)   Electronically signed by Aristeo Rashid MD on 9/18/2023 at  9:45 AM   Note     Immunohistochemistry was performed on block A1 with adequate controls. Tumor cells are positive for AE1/AE3, CAM5.2, CDX2, and SATB2. They are negative for CK20 and CK7.   Immunohistochemistry was performed on block A2 with adequate controls. Tumor cells are positive for AE1/AE3, Cam5.2, and negative for CK20.      Case Report   Surgical Pathology Report                         Case: [id number]                                  "   Authorizing Provider:  Peterson Mae,   Collected:           04/29/2022 1118                                      MD                                                                            Ordering Location:     [facility]      Received:            04/29/2022 2236                                      Hospital Operating Room                                                       Pathologist:           Roselyn King DO                                                      Specimens:   A) - Uterus w/Bilateral Ovaries and Fallopian Tubes                                                  B) - Spleen, spleen, omentum, darotis                                                       Final Diagnosis   A. Uterus, Bilateral Ovaries and Fallopian Tubes; Hysterosalpingo-oophorectomy:  - Leiomyoma.  - Left ovary with serous cystadenoma.  - Unremarkable cervix.  - Benign inactive endometrium with no specific pathologic change.  - Unremarkable right ovary and bilateral fallopian tubes.     B. Spleen, spleen, omentum, darotis:  - Metastatic adenocarcinoma involving spleen and omentum, consistent with colonic primary. See Note.   Electronically signed by Roselyn King DO on 5/12/2022 at  2:49 PM      Note     Note B: Comparison to prior material ([id number], omental mass) reveals identical morphology to previous metastatic colonic adenocarcinoma.            Case Report   Surgical Pathology Report                         Case: [id number]                                    Authorizing Provider:  Sohail Grubbs MD           Collected:           11/12/2021 0921               Ordering Location:     [facility] Received:            11/12/2021 0941                                      CAT Scan                                                                      Pathologist:           Pamela Ramos MD                                                         Specimen:    Omentum, Omental  mass                                                                       Final Diagnosis   A. Omentum, Omental mass:  - Metastatic adenocarcinoma, with an immunophenotype compatible with metastasis from patient's known colonic primary.  - See note.     Note: Tumor is positive with CK20, CDX2 and negative with CK7, PAX8. This immunophenotype supports the above interpretation.  Best representative block with tumor A5.     This case was reviewed at the intradepartmental  conference.   Dr. Grubbs is notified of the diagnosis in EPIC via [employer] on 11/17/2021  at 8.45 am.   Electronically signed by Pamela Ramos MD on 11/17/2021 at  8:53 AM                Case Report   Surgical Pathology Report                         Case: [id number]                                    Authorizing Provider:  WINNIE Pastor MD       Collected:           12/10/2019 1159               Ordering Location:     [facility]      Received:            12/10/2019 [address] [place] [address] Operating Room                                                       Pathologist:           Osman Beltran MD                                                                  Specimens:   A) - Large Intestine, Sigmoid Colon, and portion of rectum                                           B) - Anastomatic site, anastamotic rings                                                    Addendum   RRESULTS OF IMMUNOHISTOCHEMICAL ANALYSIS FOR MISMATCH REPAIR PROTEIN LOSS  INTERPRETATION: NO LOSS OF NUCLEAR EXPRESSION OF MMR PROTEINS: LOW PROBABILITY OF MSI-H  Note: Background non-neoplastic tissue and/or internal control with intact nuclear expression.      RESULTS:  Antibody          Clone               Description                           Results  MLH1               M1                  Mismatch repair protein       Intact nuclear expression  MSH2              [id number]       Mismatch repair protein       Intact  nuclear expression  MSH6              44                   Mismatch repair protein       Intact nuclear expression  PMS2              [id number]         Mismatch repair protein       Intact nuclear expression     Comment:  A negative control and a positive control for each antibody have been reviewed and accepted.  [facility] Specimen ID: [id number], Evaluator:  JOSÉ MIGUEL Perales MD  These tests were developed and their performance characteristics determined by [employer].  They may not be cleared or approved by the U.S. Food and Drug Administration.  The FDA determined that such clearance or approval is not necessary.  These tests are used for clinical purposes.  They should not be regarded as investigational or for research.  This laboratory has been approved by [place] [address], designated as a high-complexity laboratory and is qualified to perform these tests.     Comments: Patients whose tumors demonstrate lack of expression of one or more DNA mismatch repair proteins might be at risk for Bee Syndrome. This cancer susceptibility syndrome greatly increases the risk of synchronous and/or metachronous cancers in the affected patients and their family members. Normal expression of all proteins does not completely rule out familial cancer predisposition.     The [facility] Bee Syndrome Surveillance Program Task Forces recommends that all patients with lack of expression of one or more DNA mismatch repair proteins and those with concerning personal or family history should undergo thorough evaluation, counseling and possibly genetic testing.       Addendum electronically signed by Osman Beltran MD on 12/20/2019 at  3:28 PM   Final Diagnosis   A. Rectosigmoid colon, low anterior resection:  - Adenocarcinoma, moderately differentiated.   - Tumor invades through the muscularis propria into pericolorectal tissue, T3  - Diverticula.  - All margins are negative for tumor.  - Thirty-four lymph nodes, negative for malignancy (0/34).      B. Colon, anastomotic rings, resection:  - Two portions of colon with no pathologic abnormality     Intradepartmental consultation is in agreement.  Interpretation performed at [facility], [address] [place] [address], [place] [address]  Immunohistochemistry for desmin  is performed on tissue blocks A13 and A16 to help in the assessment of this case.         Electronically signed by Osman Beltran MD on 12/18/2019 at 10:42 AM   Additional Information     All controls performed with the immunohistochemical stains reported above reacted appropriately.  These tests were developed and their performance characteristics determined by [facility] or [employer]. They may not be cleared or approved by the U.S. Food and Drug Administration. The FDA has determined that such clearance or approval is not necessary. These tests are used for clinical purposes. They should not be regarded as investigational or for research. This laboratory has been approved by CLIA 88, designated as a high-complexity laboratory and is qualified to perform these tests.   Synoptic Checklist   COLON AND RECTUM: Resection, Including Transanal Disk Excision of Rectal Neoplasms  8th Edition - Protocol posted: 2/27/2019  ColoRectal - All Specimens       SPECIMEN   Procedure   Low anterior resection    Macroscopic Intactness of Mesorectum   Complete    TUMOR   Tumor Site   Rectosigmoid    Histologic Type   Adenocarcinoma    Histologic Grade   G2: Moderately differentiated    Tumor Size   Greatest dimension (Centimeters): 5.4 cm   Additional Dimension (Centimeters)   4.6 cm       1 cm   Tumor Deposits   Not identified    Tumor Extension   Tumor invades through the muscularis propria into pericolorectal tissue    Macroscopic Tumor Perforation   Not identified    Lymphovascular Invasion   Not identified    Perineural Invasion   Not identified    Type of Polyp in Which Invasive Carcinoma Arose   Tubular adenoma    Treatment  Effect   No known presurgical therapy    MARGINS   Margins   All margins are uninvolved by invasive carcinoma, high-grade dysplasia, intramucosal adenocarcinoma, and adenoma    Margins Examined   Proximal        Distal        Radial or Mesenteric    Distance of Invasive Carcinoma from Closest Margin   1.5 cm   Closest Margin   Radial or Mesenteric    Distance of Tumor from Radial Margin   1.5 cm   LYMPH NODES   Number of Lymph Nodes Involved   0    Number of Lymph Nodes Examined   34    PATHOLOGIC STAGE CLASSIFICATION (pTNM, AJCC 8th Edition)   Primary Tumor (pT)   pT3    Regional Lymph Nodes (pN)   pN0    ADDITIONAL FINDINGS   Additional Pathologic Findings   Diverticulosis    .

## 2024-04-24 NOTE — PLAN OF CARE
Problem: Potential for Falls  Goal: Patient will remain free of falls  Description: INTERVENTIONS:  - Educate patient/family on patient safety including physical limitations  - Instruct patient to call for assistance with activity   - Keep Call bell within reach  Outcome: Progressing

## 2024-04-26 ENCOUNTER — TELEPHONE (OUTPATIENT)
Dept: PALLIATIVE MEDICINE | Facility: CLINIC | Age: 69
End: 2024-04-26

## 2024-04-26 NOTE — TELEPHONE ENCOUNTER
Called patient to confirm appointment on Monday at 220. Patient stated she needed to change her appointment to 10:00 am and have [facility]. [facility] stated they need 48-72 hour notice and they were not able to accommodate. I called patient back and left message on voicemail.

## 2024-04-29 DIAGNOSIS — C19 RECTOSIGMOID CANCER (HCC): ICD-10-CM

## 2024-04-29 DIAGNOSIS — C78.6 MALIGNANT NEOPLASM METASTATIC TO OMENTUM (HCC): Primary | ICD-10-CM

## 2024-05-03 ENCOUNTER — APPOINTMENT (OUTPATIENT)
Dept: LAB | Facility: HOSPITAL | Age: 69
End: 2024-05-03
Payer: MEDICARE

## 2024-05-03 DIAGNOSIS — C78.6 MALIGNANT NEOPLASM METASTATIC TO OMENTUM (HCC): ICD-10-CM

## 2024-05-03 DIAGNOSIS — C19 RECTOSIGMOID CANCER (HCC): ICD-10-CM

## 2024-05-03 LAB
ALBUMIN SERPL BCP-MCNC: 3.4 G/DL (ref 3.5–5)
ALP SERPL-CCNC: 64 U/L (ref 34–104)
ALT SERPL W P-5'-P-CCNC: 22 U/L (ref 7–52)
ANION GAP SERPL CALCULATED.3IONS-SCNC: 10 MMOL/L (ref 4–13)
AST SERPL W P-5'-P-CCNC: 18 U/L (ref 13–39)
BACTERIA UR QL AUTO: ABNORMAL /HPF
BILIRUB SERPL-MCNC: 0.57 MG/DL (ref 0.2–1)
BILIRUB UR QL STRIP: NEGATIVE
BUN SERPL-MCNC: 22 MG/DL (ref 5–25)
CALCIUM ALBUM COR SERPL-MCNC: 9 MG/DL (ref 8.3–10.1)
CALCIUM SERPL-MCNC: 8.5 MG/DL (ref 8.4–10.2)
CHLORIDE SERPL-SCNC: 108 MMOL/L (ref 96–108)
CLARITY UR: ABNORMAL
CO2 SERPL-SCNC: 21 MMOL/L (ref 21–32)
COLOR UR: YELLOW
CREAT SERPL-MCNC: 1.15 MG/DL (ref 0.6–1.3)
ERYTHROCYTE [DISTWIDTH] IN BLOOD BY AUTOMATED COUNT: 17.3 % (ref 11.6–15.1)
GFR SERPL CREATININE-BSD FRML MDRD: 48 ML/MIN/1.73SQ M
GLUCOSE SERPL-MCNC: 92 MG/DL (ref 65–140)
GLUCOSE UR STRIP-MCNC: NEGATIVE MG/DL
HCT VFR BLD AUTO: 35.7 % (ref 34.8–46.1)
HGB BLD-MCNC: 11.5 G/DL (ref 11.5–15.4)
HGB UR QL STRIP.AUTO: ABNORMAL
KETONES UR STRIP-MCNC: ABNORMAL MG/DL
LEUKOCYTE ESTERASE UR QL STRIP: ABNORMAL
MCH RBC QN AUTO: 27.9 PG (ref 26.8–34.3)
MCHC RBC AUTO-ENTMCNC: 32.2 G/DL (ref 31.4–37.4)
MCV RBC AUTO: 87 FL (ref 82–98)
NITRITE UR QL STRIP: NEGATIVE
NON-SQ EPI CELLS URNS QL MICRO: ABNORMAL /HPF
PH UR STRIP.AUTO: 6 [PH]
PLATELET # BLD AUTO: 226 THOUSANDS/UL (ref 149–390)
PMV BLD AUTO: 10.8 FL (ref 8.9–12.7)
POTASSIUM SERPL-SCNC: 3.6 MMOL/L (ref 3.5–5.3)
PROT SERPL-MCNC: 6.6 G/DL (ref 6.4–8.4)
PROT UR STRIP-MCNC: ABNORMAL MG/DL
RBC # BLD AUTO: 4.12 MILLION/UL (ref 3.81–5.12)
RBC #/AREA URNS AUTO: ABNORMAL /HPF
SODIUM SERPL-SCNC: 139 MMOL/L (ref 135–147)
SP GR UR STRIP.AUTO: >=1.03 (ref 1–1.03)
UROBILINOGEN UR STRIP-ACNC: <2 MG/DL
WBC # BLD AUTO: 1.96 THOUSAND/UL (ref 4.31–10.16)
WBC #/AREA URNS AUTO: ABNORMAL /HPF

## 2024-05-03 PROCEDURE — 80053 COMPREHEN METABOLIC PANEL: CPT

## 2024-05-03 PROCEDURE — 36415 COLL VENOUS BLD VENIPUNCTURE: CPT

## 2024-05-03 PROCEDURE — 85007 BL SMEAR W/DIFF WBC COUNT: CPT

## 2024-05-03 PROCEDURE — 81001 URINALYSIS AUTO W/SCOPE: CPT

## 2024-05-03 PROCEDURE — 85027 COMPLETE CBC AUTOMATED: CPT

## 2024-05-06 ENCOUNTER — HOSPITAL ENCOUNTER (OUTPATIENT)
Dept: INFUSION CENTER | Facility: HOSPITAL | Age: 69
End: 2024-05-06
Attending: INTERNAL MEDICINE

## 2024-05-06 ENCOUNTER — HOSPITAL ENCOUNTER (OUTPATIENT)
Dept: INFUSION CENTER | Facility: HOSPITAL | Age: 69
Discharge: HOME | End: 2024-05-06
Attending: INTERNAL MEDICINE
Payer: MEDICARE

## 2024-05-06 VITALS
OXYGEN SATURATION: 99 % | TEMPERATURE: 96.7 F | DIASTOLIC BLOOD PRESSURE: 72 MMHG | WEIGHT: 195.55 LBS | RESPIRATION RATE: 18 BRPM | BODY MASS INDEX: 42.32 KG/M2 | HEART RATE: 113 BPM | SYSTOLIC BLOOD PRESSURE: 119 MMHG

## 2024-05-06 DIAGNOSIS — C19 RECTOSIGMOID CANCER (HCC): Primary | ICD-10-CM

## 2024-05-06 DIAGNOSIS — T45.1X5A CHEMOTHERAPY-INDUCED NAUSEA: ICD-10-CM

## 2024-05-06 DIAGNOSIS — T45.1X5A CHEMOTHERAPY-INDUCED NEUTROPENIA (HCC): ICD-10-CM

## 2024-05-06 DIAGNOSIS — R11.0 CHEMOTHERAPY-INDUCED NAUSEA: ICD-10-CM

## 2024-05-06 DIAGNOSIS — C78.6 MALIGNANT NEOPLASM METASTATIC TO OMENTUM (HCC): ICD-10-CM

## 2024-05-06 DIAGNOSIS — D70.1 CHEMOTHERAPY-INDUCED NEUTROPENIA (HCC): ICD-10-CM

## 2024-05-06 DIAGNOSIS — C19 RECTOSIGMOID CANCER (HCC): ICD-10-CM

## 2024-05-06 DIAGNOSIS — C78.7 METASTASES TO THE LIVER (HCC): Primary | ICD-10-CM

## 2024-05-06 DIAGNOSIS — C78.6 MALIGNANT NEOPLASM METASTATIC TO OMENTUM (HCC): Primary | ICD-10-CM

## 2024-05-06 DIAGNOSIS — D70.1 CHEMOTHERAPY-INDUCED NEUTROPENIA (HCC): Primary | ICD-10-CM

## 2024-05-06 DIAGNOSIS — T45.1X5A CHEMOTHERAPY-INDUCED NEUTROPENIA (HCC): Primary | ICD-10-CM

## 2024-05-06 LAB
ANISOCYTOSIS BLD QL SMEAR: PRESENT
BASOPHILS # BLD MANUAL: 0 THOUSAND/UL (ref 0–0.1)
BASOPHILS NFR MAR MANUAL: 0 % (ref 0–1)
BURR CELLS BLD QL SMEAR: PRESENT
EOSINOPHIL # BLD MANUAL: 0.02 THOUSAND/UL (ref 0–0.4)
EOSINOPHIL NFR BLD MANUAL: 1 % (ref 0–6)
ERYTHROCYTE [DISTWIDTH] IN BLOOD BY AUTOMATED COUNT: 18.3 % (ref 11.6–15.1)
HCT VFR BLD AUTO: 33.4 % (ref 34.8–46.1)
HGB BLD-MCNC: 10.9 G/DL (ref 11.5–15.4)
LYMPHOCYTES # BLD AUTO: 1.15 THOUSAND/UL (ref 0.6–4.47)
LYMPHOCYTES # BLD AUTO: 69 % (ref 14–44)
MCH RBC QN AUTO: 28.2 PG (ref 26.8–34.3)
MCHC RBC AUTO-ENTMCNC: 32.6 G/DL (ref 31.4–37.4)
MCV RBC AUTO: 86 FL (ref 82–98)
MONOCYTES # BLD AUTO: 0.35 THOUSAND/UL (ref 0–1.22)
MONOCYTES NFR BLD: 22 % (ref 4–12)
NEUTROPHILS # BLD MANUAL: 0.06 THOUSAND/UL (ref 1.85–7.62)
NEUTS SEG NFR BLD AUTO: 4 % (ref 43–75)
PLATELET # BLD AUTO: 264 THOUSANDS/UL (ref 149–390)
PLATELET BLD QL SMEAR: ADEQUATE
PMV BLD AUTO: 10 FL (ref 8.9–12.7)
POIKILOCYTOSIS BLD QL SMEAR: PRESENT
RBC # BLD AUTO: 3.87 MILLION/UL (ref 3.81–5.12)
RBC MORPH BLD: PRESENT
VARIANT LYMPHS # BLD AUTO: 4 %
WBC # BLD AUTO: 1.57 THOUSAND/UL (ref 4.31–10.16)

## 2024-05-06 PROCEDURE — 85027 COMPLETE CBC AUTOMATED: CPT | Performed by: INTERNAL MEDICINE

## 2024-05-06 PROCEDURE — 85060 BLOOD SMEAR INTERPRETATION: CPT | Performed by: STUDENT IN AN ORGANIZED HEALTH CARE EDUCATION/TRAINING PROGRAM

## 2024-05-06 PROCEDURE — 85007 BL SMEAR W/DIFF WBC COUNT: CPT | Performed by: INTERNAL MEDICINE

## 2024-05-06 PROCEDURE — 96372 THER/PROPH/DIAG INJ SC/IM: CPT

## 2024-05-06 RX ORDER — FLUOROURACIL 50 MG/ML
320 INJECTION, SOLUTION INTRAVENOUS ONCE
Status: CANCELLED | OUTPATIENT
Start: 2024-05-14

## 2024-05-06 RX ADMIN — TBO-FILGRASTIM 480 MCG: 480 INJECTION, SOLUTION SUBCUTANEOUS at 10:25

## 2024-05-06 NOTE — PROGRESS NOTES
Pt to infusion for chemo. ANC found to be 60. Per Dr Grubbs, pt will receive Granix this week. Chemo will be held. Chemo rescheduled for next week. Pt agreeable. Granix given. Pt discharged.

## 2024-05-07 ENCOUNTER — PATIENT OUTREACH (OUTPATIENT)
Dept: CASE MANAGEMENT | Facility: OTHER | Age: 69
End: 2024-05-07

## 2024-05-07 ENCOUNTER — HOSPITAL ENCOUNTER (OUTPATIENT)
Dept: INFUSION CENTER | Facility: HOSPITAL | Age: 69
Discharge: HOME | End: 2024-05-07
Attending: INTERNAL MEDICINE
Payer: MEDICARE

## 2024-05-07 DIAGNOSIS — T45.1X5A CHEMOTHERAPY-INDUCED NAUSEA: ICD-10-CM

## 2024-05-07 DIAGNOSIS — D70.1 CHEMOTHERAPY-INDUCED NEUTROPENIA (HCC): Primary | ICD-10-CM

## 2024-05-07 DIAGNOSIS — T45.1X5A CHEMOTHERAPY-INDUCED NEUTROPENIA (HCC): ICD-10-CM

## 2024-05-07 DIAGNOSIS — C78.6 MALIGNANT NEOPLASM METASTATIC TO OMENTUM (HCC): ICD-10-CM

## 2024-05-07 DIAGNOSIS — D70.1 CHEMOTHERAPY-INDUCED NEUTROPENIA (HCC): ICD-10-CM

## 2024-05-07 DIAGNOSIS — T45.1X5A CHEMOTHERAPY-INDUCED NEUTROPENIA (HCC): Primary | ICD-10-CM

## 2024-05-07 DIAGNOSIS — C19 RECTOSIGMOID CANCER (HCC): ICD-10-CM

## 2024-05-07 DIAGNOSIS — T45.1X5A CHEMOTHERAPY-INDUCED NAUSEA: Primary | ICD-10-CM

## 2024-05-07 DIAGNOSIS — R11.0 CHEMOTHERAPY-INDUCED NAUSEA: ICD-10-CM

## 2024-05-07 DIAGNOSIS — R11.0 CHEMOTHERAPY-INDUCED NAUSEA: Primary | ICD-10-CM

## 2024-05-07 DIAGNOSIS — C78.6 MALIGNANT NEOPLASM METASTATIC TO OMENTUM (HCC): Primary | ICD-10-CM

## 2024-05-07 LAB
ANISOCYTOSIS BLD QL SMEAR: PRESENT
BASOPHILS # BLD MANUAL: 0 THOUSAND/UL (ref 0–0.1)
BASOPHILS NFR MAR MANUAL: 0 % (ref 0–1)
DIFFERENTIAL COMMENT: ABNORMAL
EOSINOPHIL # BLD MANUAL: 0.22 THOUSAND/UL (ref 0–0.4)
EOSINOPHIL NFR BLD MANUAL: 11 % (ref 0–6)
HOWELL-JOLLY BOD BLD QL SMEAR: PRESENT
LYMPHOCYTES # BLD AUTO: 1.45 THOUSAND/UL (ref 0.6–4.47)
LYMPHOCYTES # BLD AUTO: 71 % (ref 14–44)
MONOCYTES # BLD AUTO: 0.02 THOUSAND/UL (ref 0–1.22)
MONOCYTES NFR BLD: 1 % (ref 4–12)
NEUTROPHILS # BLD MANUAL: 0.27 THOUSAND/UL (ref 1.85–7.62)
NEUTS SEG NFR BLD AUTO: 14 % (ref 43–75)
PATHOLOGY REVIEW: YES
PLATELET BLD QL SMEAR: ADEQUATE
POIKILOCYTOSIS BLD QL SMEAR: PRESENT
RBC MORPH BLD: PRESENT
VARIANT LYMPHS # BLD AUTO: 3 %

## 2024-05-07 RX ADMIN — TBO-FILGRASTIM 480 MCG: 480 INJECTION, SOLUTION SUBCUTANEOUS at 14:10

## 2024-05-07 NOTE — PLAN OF CARE
Problem: Knowledge Deficit  Goal: Patient/family/caregiver demonstrates understanding of disease process, treatment plan, medications, and discharge instructions  Description: Complete learning assessment and assess knowledge base.  Interventions:  - Provide teaching at level of understanding  - Provide teaching via preferred learning methods  Outcome: Progressing     Problem: INFECTION - ADULT  Goal: Absence or prevention of progression during hospitalization  Description: INTERVENTIONS:  - Assess and monitor for signs and symptoms of infection  - Monitor lab/diagnostic results  - Monitor all insertion sites, i.e. indwelling lines, tubes, and drains  - Monitor endotracheal if appropriate and nasal secretions for changes in amount and color  - [place] appropriate cooling/warming therapies per order  - Administer medications as ordered  - Instruct and encourage patient and family to use good hand hygiene technique  - Identify and instruct in appropriate isolation precautions for identified infection/condition  Outcome: Progressing  Goal: Absence of fever/infection during neutropenic period  Description: INTERVENTIONS:  - Monitor WBC    Outcome: Progressing

## 2024-05-07 NOTE — PROGRESS NOTES
..Itzel Sanches  tolerated treatment well with no complications.      Itzel Sanches is aware of future appt on 5/8 at 2pm.     AVS printed and given to Itzel Sanches:    No (Declined by Itzel Sanches)

## 2024-05-08 ENCOUNTER — HOSPITAL ENCOUNTER (OUTPATIENT)
Dept: INFUSION CENTER | Facility: HOSPITAL | Age: 69
End: 2024-05-08
Attending: INTERNAL MEDICINE

## 2024-05-08 ENCOUNTER — HOSPITAL ENCOUNTER (OUTPATIENT)
Dept: INFUSION CENTER | Facility: HOSPITAL | Age: 69
Discharge: HOME | End: 2024-05-08
Attending: INTERNAL MEDICINE
Payer: MEDICARE

## 2024-05-08 DIAGNOSIS — C19 RECTOSIGMOID CANCER (HCC): Primary | ICD-10-CM

## 2024-05-08 DIAGNOSIS — T45.1X5A CHEMOTHERAPY-INDUCED NAUSEA: ICD-10-CM

## 2024-05-08 DIAGNOSIS — C78.6 MALIGNANT NEOPLASM METASTATIC TO OMENTUM (HCC): ICD-10-CM

## 2024-05-08 DIAGNOSIS — R11.0 CHEMOTHERAPY-INDUCED NAUSEA: ICD-10-CM

## 2024-05-08 DIAGNOSIS — D70.1 CHEMOTHERAPY-INDUCED NEUTROPENIA (HCC): ICD-10-CM

## 2024-05-08 DIAGNOSIS — T45.1X5A CHEMOTHERAPY-INDUCED NEUTROPENIA (HCC): ICD-10-CM

## 2024-05-08 LAB
ACANTHOCYTES BLD QL SMEAR: PRESENT
ALBUMIN SERPL BCP-MCNC: 2.9 G/DL (ref 3.5–5)
ALP SERPL-CCNC: 78 U/L (ref 34–104)
ALT SERPL W P-5'-P-CCNC: 41 U/L (ref 7–52)
ANION GAP SERPL CALCULATED.3IONS-SCNC: 8 MMOL/L (ref 4–13)
ANISOCYTOSIS BLD QL SMEAR: PRESENT
AST SERPL W P-5'-P-CCNC: 31 U/L (ref 13–39)
BASOPHILS # BLD MANUAL: 0 THOUSAND/UL (ref 0–0.1)
BASOPHILS NFR MAR MANUAL: 0 % (ref 0–1)
BILIRUB SERPL-MCNC: 1.08 MG/DL (ref 0.2–1)
BUN SERPL-MCNC: 13 MG/DL (ref 5–25)
CALCIUM ALBUM COR SERPL-MCNC: 9.1 MG/DL (ref 8.3–10.1)
CALCIUM SERPL-MCNC: 8.2 MG/DL (ref 8.4–10.2)
CHLORIDE SERPL-SCNC: 108 MMOL/L (ref 96–108)
CO2 SERPL-SCNC: 21 MMOL/L (ref 21–32)
CREAT SERPL-MCNC: 1.11 MG/DL (ref 0.6–1.3)
EOSINOPHIL # BLD MANUAL: 0 THOUSAND/UL (ref 0–0.4)
EOSINOPHIL NFR BLD MANUAL: 0 % (ref 0–6)
ERYTHROCYTE [DISTWIDTH] IN BLOOD BY AUTOMATED COUNT: 19.4 % (ref 11.6–15.1)
GFR SERPL CREATININE-BSD FRML MDRD: 50 ML/MIN/1.73SQ M
GLUCOSE SERPL-MCNC: 90 MG/DL (ref 65–140)
HCT VFR BLD AUTO: 33.9 % (ref 34.8–46.1)
HGB BLD-MCNC: 11 G/DL (ref 11.5–15.4)
LYMPHOCYTES # BLD AUTO: 2.74 THOUSAND/UL (ref 0.6–4.47)
LYMPHOCYTES # BLD AUTO: 22 % (ref 14–44)
MCH RBC QN AUTO: 27.8 PG (ref 26.8–34.3)
MCHC RBC AUTO-ENTMCNC: 32.4 G/DL (ref 31.4–37.4)
MCV RBC AUTO: 86 FL (ref 82–98)
MONOCYTES # BLD AUTO: 0.62 THOUSAND/UL (ref 0–1.22)
MONOCYTES NFR BLD: 5 % (ref 4–12)
NEUTROPHILS # BLD MANUAL: 9.09 THOUSAND/UL (ref 1.85–7.62)
NEUTS BAND NFR BLD MANUAL: 7 % (ref 0–8)
NEUTS SEG NFR BLD AUTO: 66 % (ref 43–75)
NRBC BLD AUTO-RTO: 3 /100 WBC (ref 0–2)
PLATELET # BLD AUTO: 223 THOUSANDS/UL (ref 149–390)
PLATELET BLD QL SMEAR: ADEQUATE
PMV BLD AUTO: 10.5 FL (ref 8.9–12.7)
POIKILOCYTOSIS BLD QL SMEAR: PRESENT
POLYCHROMASIA BLD QL SMEAR: PRESENT
POTASSIUM SERPL-SCNC: 3 MMOL/L (ref 3.5–5.3)
PROT SERPL-MCNC: 6.2 G/DL (ref 6.4–8.4)
RBC # BLD AUTO: 3.95 MILLION/UL (ref 3.81–5.12)
RBC MORPH BLD: PRESENT
SODIUM SERPL-SCNC: 137 MMOL/L (ref 135–147)
WBC # BLD AUTO: 12.45 THOUSAND/UL (ref 4.31–10.16)

## 2024-05-08 PROCEDURE — 85027 COMPLETE CBC AUTOMATED: CPT | Performed by: INTERNAL MEDICINE

## 2024-05-08 PROCEDURE — 80053 COMPREHEN METABOLIC PANEL: CPT | Performed by: INTERNAL MEDICINE

## 2024-05-08 PROCEDURE — 85007 BL SMEAR W/DIFF WBC COUNT: CPT | Performed by: INTERNAL MEDICINE

## 2024-05-08 NOTE — PROGRESS NOTES
Labs drawn via port. ANC 9090. No Granix needed. Port flushed and deaccessed.     Itzel Sanches  tolerated treatment well with no complications.      Itzel Sanches is aware of future appt on 5/14/24 at 0900.     AVS printed and given to Itzel Sanches:  Yes

## 2024-05-08 NOTE — PROGRESS NOTES
Pt c/o lower back/sacral bone pain due to Granix. C/o mild cold-like symptoms. Mild sore throat and mild non-productive cough.

## 2024-05-08 NOTE — PLAN OF CARE
Problem: Potential for Falls  Goal: Patient will remain free of falls  Description: INTERVENTIONS:  - Educate patient/family on patient safety including physical limitations  - Instruct patient to call for assistance with activity   Outcome: Progressing     Problem: Knowledge Deficit  Goal: Patient/family/caregiver demonstrates understanding of disease process, treatment plan, medications, and discharge instructions  Description: Complete learning assessment and assess knowledge base.  Interventions:  - Provide teaching at level of understanding  - Provide teaching via preferred learning methods  Outcome: Progressing     Problem: INFECTION - ADULT  Goal: Absence or prevention of progression during hospitalization  Description: INTERVENTIONS:  - Assess and monitor for signs and symptoms of infection  - Monitor lab/diagnostic results  - Monitor all insertion sites, i.e. indwelling lines, tubes, and drains  - Monitor endotracheal if appropriate and nasal secretions for changes in amount and color  - [place] appropriate cooling/warming therapies per order  - Administer medications as ordered  - Instruct and encourage patient and family to use good hand hygiene technique  - Identify and instruct in appropriate isolation precautions for identified infection/condition  Outcome: Progressing  Goal: Absence of fever/infection during neutropenic period  Description: INTERVENTIONS:  - Monitor WBC    Outcome: Progressing

## 2024-05-09 ENCOUNTER — PATIENT OUTREACH (OUTPATIENT)
Dept: CASE MANAGEMENT | Facility: OTHER | Age: 69
End: 2024-05-09

## 2024-05-09 NOTE — PROGRESS NOTES
Biopsychosocial and Barriers Assessment    Cancer Diagnosis: Rectosigmoid Adenocarcinoma  Home/Cell Phone: [phone]  Emergency Contact: Leidy [phone]  Marital Status:   Interpretation concerns, speaks another language, preferred language: English  Cultural concerns: none  Ability to read or write: yes    Caregiver/Support: Strong support from children and friends  Children: 1 son and 2 daughters  Child/Elder care: n/a    Housing: Apartment  Home Setup: 3 flights of stairs  Lives With: Alone  Daily Living Activities: independent  Durable Medical Equipment: none  Ambulation: independent    Preferred Pharmacy: [facility]  High co-pays with insurance: Medicare/[employer] [place]  High co-pays with medication coverage: none  No medication coverage: n/a    Primary Care Provider: Dr. Mackay  Hx of Home Health Care: yes  Hx of Short term rehab: none  Mental Health Hx: none  Substance Abuse Hx: none  Employment: retired   Status/Location: n/a  Ability to pay bills: yes  POA/LW/AD: not addressed  Transportation Plan/Concerns: self      What do you know about your Cancer Diagnosis    What has your doctor told you about your cancer diagnosis:Rectosigmoid Adenocarcinoma    What has your doctor told you about your cancer treatment: Pt has been receiving chemotherapy off and on for 4 years.    What specific concerns do you have about your diagnosis and treatment: None    Have you been made aware of any hair loss associated with treatment: Yes-wig resources provided    Additional Comments:  OSW placed outreach TC to pt this morning. OSW introduced self and role. Pt is a very pleasant woman with a dx of rectosigmoid adenocarcinoma. She shared that she has been receiving treatment for 4 years off and on. She completed a Dt, where she scored a 5/10. She expressed that after her chemo her distress is at about a 10. She shared that 2 years ago she had the same chemo, but this time her hands  are turning color and it making her very ill. OSW encouraged her to inform Dr. Grubbs of the side effects that she is experiencing. She states that her son's girlfriend has been very helpful, as well as her children are very supportive. He also has friends that check on her.  We spoke about wig services. OSW provided the [facility] resource, as well as [facility] [employer] [facility]  [employer].  Both are located in [place], per pts preference. Pt was unaware of the [facility]. OSW offered to send out their newsletter and she was appreciative. She is in very good spirits and is very positive. OSW praised her for having such a positive attitude. OSW offered to check in with her in a month and she was agreeable.  OSW encouraged her to call if she needs anything. OSW will continue to follow.

## 2024-05-10 ENCOUNTER — TELEPHONE (OUTPATIENT)
Dept: HEMATOLOGY ONCOLOGY | Facility: MEDICAL CENTER | Age: 69
End: 2024-05-10

## 2024-05-10 ENCOUNTER — PATIENT OUTREACH (OUTPATIENT)
Dept: CASE MANAGEMENT | Facility: OTHER | Age: 69
End: 2024-05-10

## 2024-05-10 DIAGNOSIS — E87.6 HYPOKALEMIA: Primary | ICD-10-CM

## 2024-05-10 DIAGNOSIS — L64.0 DRUG-INDUCED ANDROGENIC ALOPECIA: Primary | ICD-10-CM

## 2024-05-10 RX ORDER — POTASSIUM CHLORIDE 14.9 MG/ML
20 INJECTION INTRAVENOUS ONCE
Status: CANCELLED
Start: 2024-05-14

## 2024-05-10 NOTE — PROGRESS NOTES
OSW received an email from Ansley Agarwal, regarding calling pt to answer questions about a wig. OSW placed outreach TC to the pt. She was asking what the steps are to see if her wig is covered by insurance and if so how does she go about getting a script. OSW advised her to call the number listed on the back of her insurance cards and call to ask if this is a covered benefit. If it is covered OSW encouraged her to call Dr. Grubbs's office and he can give her a script that will be submitted to the insurance. Pt verbalized understanding.     ADDENDUM:  OSW received another message from Ansley Agarwal regarding pt having questions regarding the use of compassion funds, as she called her insurance and they do not cover the cost of a wig.  This writer placed another outreach TC to her.  OSW reviewed the process again with her. We also spoke about the [facility] having a free wig fitting service. OSW provided their contact information as well.  She thanked this writer for the help.

## 2024-05-10 NOTE — TELEPHONE ENCOUNTER
Letter of medical necessity and prescription for cranial prosthesis faxed to [facility] at [phone]

## 2024-05-13 RX ORDER — FLUOROURACIL 50 MG/ML
320 INJECTION, SOLUTION INTRAVENOUS ONCE
Status: CANCELLED | OUTPATIENT
Start: 2024-05-14

## 2024-05-13 NOTE — PROGRESS NOTES
Ok to use UA results from 5/3/24 for Avastin on 5/14/24 per Dr Grubbs. Dr Grubbs aware bili 1.08. Per Dr Grubbs all pt's chemo should be at 80% except Avastin at 100%.

## 2024-05-14 ENCOUNTER — HOSPITAL ENCOUNTER (OUTPATIENT)
Dept: INFUSION CENTER | Facility: HOSPITAL | Age: 69
Discharge: HOME | End: 2024-05-14
Attending: INTERNAL MEDICINE
Payer: MEDICARE

## 2024-05-14 VITALS
SYSTOLIC BLOOD PRESSURE: 140 MMHG | HEART RATE: 89 BPM | RESPIRATION RATE: 18 BRPM | OXYGEN SATURATION: 97 % | TEMPERATURE: 97.4 F | HEIGHT: 57 IN | BODY MASS INDEX: 42.66 KG/M2 | WEIGHT: 197.75 LBS | DIASTOLIC BLOOD PRESSURE: 68 MMHG

## 2024-05-14 DIAGNOSIS — C78.6 MALIGNANT NEOPLASM METASTATIC TO OMENTUM (HCC): Primary | ICD-10-CM

## 2024-05-14 DIAGNOSIS — E87.6 HYPOKALEMIA: ICD-10-CM

## 2024-05-14 DIAGNOSIS — C19 RECTOSIGMOID CANCER (HCC): ICD-10-CM

## 2024-05-14 DIAGNOSIS — E66.01 MORBID OBESITY (HCC): ICD-10-CM

## 2024-05-14 RX ORDER — POTASSIUM CHLORIDE 14.9 MG/ML
20 INJECTION INTRAVENOUS ONCE
Status: COMPLETED | OUTPATIENT
Start: 2024-05-14 | End: 2024-05-14

## 2024-05-14 RX ORDER — SODIUM CHLORIDE 9 MG/ML
20 INJECTION, SOLUTION INTRAVENOUS ONCE
Status: COMPLETED | OUTPATIENT
Start: 2024-05-14 | End: 2024-05-14

## 2024-05-14 RX ORDER — ATROPINE SULFATE 1 MG/ML
0.25 INJECTION, SOLUTION INTRAVENOUS ONCE AS NEEDED
Status: DISCONTINUED | OUTPATIENT
Start: 2024-05-14 | End: 2024-05-17 | Stop reason: HOSPADM

## 2024-05-14 RX ORDER — FLUOROURACIL 50 MG/ML
320 INJECTION, SOLUTION INTRAVENOUS ONCE
Status: COMPLETED | OUTPATIENT
Start: 2024-05-14 | End: 2024-05-14

## 2024-05-14 RX ORDER — ATROPINE SULFATE 1 MG/ML
0.25 INJECTION, SOLUTION INTRAVENOUS ONCE
Status: COMPLETED | OUTPATIENT
Start: 2024-05-14 | End: 2024-05-14

## 2024-05-14 RX ADMIN — BEVACIZUMAB 465 MG: 400 INJECTION, SOLUTION INTRAVENOUS at 12:31

## 2024-05-14 RX ADMIN — ATROPINE SULFATE 0.25 MG: 1 INJECTION, SOLUTION INTRAVENOUS at 13:09

## 2024-05-14 RX ADMIN — DEXAMETHASONE SODIUM PHOSPHATE: 10 INJECTION, SOLUTION INTRAMUSCULAR; INTRAVENOUS at 11:33

## 2024-05-14 RX ADMIN — FLUOROURACIL 580 MG: 50 INJECTION, SOLUTION INTRAVENOUS at 14:50

## 2024-05-14 RX ADMIN — POTASSIUM CHLORIDE 20 MEQ: 14.9 INJECTION, SOLUTION INTRAVENOUS at 09:27

## 2024-05-14 RX ADMIN — IRINOTECAN HYDROCHLORIDE 262 MG: 20 INJECTION, SOLUTION INTRAVENOUS at 13:15

## 2024-05-14 RX ADMIN — SODIUM CHLORIDE 20 ML/HR: 0.9 INJECTION, SOLUTION INTRAVENOUS at 09:26

## 2024-05-14 RX ADMIN — FOSAPREPITANT 150 MG: 150 INJECTION, POWDER, LYOPHILIZED, FOR SOLUTION INTRAVENOUS at 12:00

## 2024-05-14 RX ADMIN — LEUCOVORIN CALCIUM 582 MG: 100 INJECTION, POWDER, LYOPHILIZED, FOR SUSPENSION INTRAMUSCULAR; INTRAVENOUS at 13:15

## 2024-05-14 NOTE — PROGRESS NOTES
Itzel Sanches  tolerated treatment well with no complications.      Itzel Sanches is aware of future appt on 5/16 at 1300. [facility] was updated via email with new appt. Time.    AVS printed and given to Itzel Sanches:  Yes

## 2024-05-16 ENCOUNTER — HOSPITAL ENCOUNTER (OUTPATIENT)
Dept: INFUSION CENTER | Facility: HOSPITAL | Age: 69
Discharge: HOME | End: 2024-05-16
Attending: INTERNAL MEDICINE
Payer: MEDICARE

## 2024-05-16 VITALS
DIASTOLIC BLOOD PRESSURE: 66 MMHG | HEART RATE: 65 BPM | TEMPERATURE: 96.6 F | OXYGEN SATURATION: 98 % | SYSTOLIC BLOOD PRESSURE: 141 MMHG | RESPIRATION RATE: 18 BRPM

## 2024-05-16 DIAGNOSIS — E87.6 HYPOKALEMIA: Primary | ICD-10-CM

## 2024-05-16 DIAGNOSIS — C19 RECTOSIGMOID CANCER (HCC): ICD-10-CM

## 2024-05-16 DIAGNOSIS — C78.6 MALIGNANT NEOPLASM METASTATIC TO OMENTUM (HCC): ICD-10-CM

## 2024-05-16 RX ADMIN — SODIUM CHLORIDE 1000 ML: 0.9 INJECTION, SOLUTION INTRAVENOUS at 13:09

## 2024-05-16 NOTE — PLAN OF CARE
Problem: Potential for Falls  Goal: Patient will remain free of falls  Description: INTERVENTIONS:  - Educate patient/family on patient safety including physical limitations  - Instruct patient to call for assistance with activity   - Keep Call bell within reach  - Keep bed low and locked with side rails adjusted as appropriate  - Keep care items and personal belongings within reach  - Initiate and maintain comfort rounds  Outcome: Progressing     Problem: DISCHARGE PLANNING  Goal: Discharge to home or other facility with appropriate resources  Description: INTERVENTIONS:  - Identify barriers to discharge w/patient and caregiver  - Arrange for needed discharge resources and transportation as appropriate  - Identify discharge learning needs (meds, wound care, etc.)  - Arrange for interpretive services to assist at discharge as needed  - Refer to Case Management Department for coordinating discharge planning if the patient needs post-hospital services based on physician/advanced practitioner order or complex needs related to functional status, cognitive ability, or social support system  Outcome: Progressing     Problem: Knowledge Deficit  Goal: Patient/family/caregiver demonstrates understanding of disease process, treatment plan, medications, and discharge instructions  Description: Complete learning assessment and assess knowledge base.  Interventions:  - Provide teaching at level of understanding  - Provide teaching via preferred learning methods  Outcome: Progressing

## 2024-05-16 NOTE — PROGRESS NOTES
..Itzel Sanches  tolerated treatment well with no complications.      Itzel Sanches is aware of future appt on 5/17 at 1230.     AVS printed and given to Itzel Sanches: yes

## 2024-05-17 ENCOUNTER — HOSPITAL ENCOUNTER (OUTPATIENT)
Dept: INFUSION CENTER | Facility: HOSPITAL | Age: 69
End: 2024-05-17
Attending: INTERNAL MEDICINE
Payer: MEDICARE

## 2024-05-17 VITALS
HEART RATE: 65 BPM | TEMPERATURE: 96.3 F | SYSTOLIC BLOOD PRESSURE: 110 MMHG | DIASTOLIC BLOOD PRESSURE: 78 MMHG | OXYGEN SATURATION: 93 % | RESPIRATION RATE: 18 BRPM

## 2024-05-17 DIAGNOSIS — C19 RECTOSIGMOID CANCER (HCC): ICD-10-CM

## 2024-05-17 DIAGNOSIS — E87.6 HYPOKALEMIA: Primary | ICD-10-CM

## 2024-05-17 DIAGNOSIS — C78.6 MALIGNANT NEOPLASM METASTATIC TO OMENTUM (HCC): ICD-10-CM

## 2024-05-17 RX ADMIN — PEGFILGRASTIM 3 MG: 6 INJECTION SUBCUTANEOUS at 12:30

## 2024-05-17 NOTE — PROGRESS NOTES
..Itzel Sanches  tolerated treatment well with no complications.      Itzel Sanches is aware of future appt on 5/29 at 1000.     AVS printed and given to Itzel Sanches:  No (Declined by Itzel Sanches)

## 2024-05-20 RX ORDER — ATROPINE SULFATE 1 MG/ML
0.25 INJECTION, SOLUTION INTRAVENOUS ONCE AS NEEDED
Status: CANCELLED | OUTPATIENT
Start: 2024-05-29

## 2024-05-20 RX ORDER — SODIUM CHLORIDE 9 MG/ML
20 INJECTION, SOLUTION INTRAVENOUS ONCE
Status: CANCELLED | OUTPATIENT
Start: 2024-05-29

## 2024-05-20 RX ORDER — FLUOROURACIL 50 MG/ML
320 INJECTION, SOLUTION INTRAVENOUS ONCE
Status: CANCELLED | OUTPATIENT
Start: 2024-05-29

## 2024-05-20 RX ORDER — ATROPINE SULFATE 1 MG/ML
0.25 INJECTION, SOLUTION INTRAVENOUS ONCE
Status: CANCELLED | OUTPATIENT
Start: 2024-05-29

## 2024-05-21 DIAGNOSIS — C19 RECTOSIGMOID CANCER (HCC): ICD-10-CM

## 2024-05-21 DIAGNOSIS — C78.6 MALIGNANT NEOPLASM METASTATIC TO OMENTUM (HCC): Primary | ICD-10-CM

## 2024-05-21 DIAGNOSIS — E87.6 HYPOKALEMIA: ICD-10-CM

## 2024-05-28 ENCOUNTER — PATIENT OUTREACH (OUTPATIENT)
Dept: CASE MANAGEMENT | Facility: OTHER | Age: 69
End: 2024-05-28

## 2024-05-28 ENCOUNTER — APPOINTMENT (OUTPATIENT)
Dept: LAB | Facility: HOSPITAL | Age: 69
End: 2024-05-28
Payer: MEDICARE

## 2024-05-28 ENCOUNTER — TELEPHONE (OUTPATIENT)
Dept: INFUSION CENTER | Facility: HOSPITAL | Age: 69
End: 2024-05-28

## 2024-05-28 DIAGNOSIS — C19 RECTOSIGMOID CANCER (HCC): ICD-10-CM

## 2024-05-28 DIAGNOSIS — T45.1X5A CHEMOTHERAPY-INDUCED NAUSEA: ICD-10-CM

## 2024-05-28 DIAGNOSIS — E87.6 HYPOKALEMIA: ICD-10-CM

## 2024-05-28 DIAGNOSIS — R11.0 CHEMOTHERAPY-INDUCED NAUSEA: ICD-10-CM

## 2024-05-28 DIAGNOSIS — C78.6 MALIGNANT NEOPLASM METASTATIC TO OMENTUM (HCC): ICD-10-CM

## 2024-05-28 DIAGNOSIS — T45.1X5A CHEMOTHERAPY-INDUCED NEUTROPENIA (HCC): ICD-10-CM

## 2024-05-28 DIAGNOSIS — D70.1 CHEMOTHERAPY-INDUCED NEUTROPENIA (HCC): ICD-10-CM

## 2024-05-28 LAB
ALBUMIN SERPL BCP-MCNC: 3.3 G/DL (ref 3.5–5)
ALP SERPL-CCNC: 77 U/L (ref 34–104)
ALT SERPL W P-5'-P-CCNC: 15 U/L (ref 7–52)
ANION GAP SERPL CALCULATED.3IONS-SCNC: 8 MMOL/L (ref 4–13)
AST SERPL W P-5'-P-CCNC: 12 U/L (ref 13–39)
BASOPHILS # BLD AUTO: 0.04 THOUSANDS/ÂΜL (ref 0–0.1)
BASOPHILS NFR BLD AUTO: 1 % (ref 0–1)
BILIRUB SERPL-MCNC: 0.5 MG/DL (ref 0.2–1)
BUN SERPL-MCNC: 11 MG/DL (ref 5–25)
CALCIUM ALBUM COR SERPL-MCNC: 9.3 MG/DL (ref 8.3–10.1)
CALCIUM SERPL-MCNC: 8.7 MG/DL (ref 8.4–10.2)
CHLORIDE SERPL-SCNC: 109 MMOL/L (ref 96–108)
CO2 SERPL-SCNC: 23 MMOL/L (ref 21–32)
CREAT SERPL-MCNC: 1 MG/DL (ref 0.6–1.3)
EOSINOPHIL # BLD AUTO: 0.17 THOUSAND/ÂΜL (ref 0–0.61)
EOSINOPHIL NFR BLD AUTO: 3 % (ref 0–6)
ERYTHROCYTE [DISTWIDTH] IN BLOOD BY AUTOMATED COUNT: 24 % (ref 11.6–15.1)
GFR SERPL CREATININE-BSD FRML MDRD: 57 ML/MIN/1.73SQ M
GLUCOSE P FAST SERPL-MCNC: 92 MG/DL (ref 65–99)
HCT VFR BLD AUTO: 34.1 % (ref 34.8–46.1)
HGB BLD-MCNC: 10.7 G/DL (ref 11.5–15.4)
IMM GRANULOCYTES # BLD AUTO: 0.08 THOUSAND/UL (ref 0–0.2)
IMM GRANULOCYTES NFR BLD AUTO: 1 % (ref 0–2)
LYMPHOCYTES # BLD AUTO: 2.25 THOUSANDS/ÂΜL (ref 0.6–4.47)
LYMPHOCYTES NFR BLD AUTO: 38 % (ref 14–44)
MCH RBC QN AUTO: 28.4 PG (ref 26.8–34.3)
MCHC RBC AUTO-ENTMCNC: 31.4 G/DL (ref 31.4–37.4)
MCV RBC AUTO: 91 FL (ref 82–98)
MONOCYTES # BLD AUTO: 0.81 THOUSAND/ÂΜL (ref 0.17–1.22)
MONOCYTES NFR BLD AUTO: 14 % (ref 4–12)
NEUTROPHILS # BLD AUTO: 2.59 THOUSANDS/ÂΜL (ref 1.85–7.62)
NEUTS SEG NFR BLD AUTO: 43 % (ref 43–75)
NRBC BLD AUTO-RTO: 1 /100 WBCS
PLATELET # BLD AUTO: 342 THOUSANDS/UL (ref 149–390)
PMV BLD AUTO: 11.4 FL (ref 8.9–12.7)
POTASSIUM SERPL-SCNC: 4.1 MMOL/L (ref 3.5–5.3)
PROT SERPL-MCNC: 6.4 G/DL (ref 6.4–8.4)
RBC # BLD AUTO: 3.77 MILLION/UL (ref 3.81–5.12)
SODIUM SERPL-SCNC: 140 MMOL/L (ref 135–147)
WBC # BLD AUTO: 5.94 THOUSAND/UL (ref 4.31–10.16)

## 2024-05-28 PROCEDURE — 36415 COLL VENOUS BLD VENIPUNCTURE: CPT

## 2024-05-28 PROCEDURE — 85025 COMPLETE CBC W/AUTO DIFF WBC: CPT

## 2024-05-28 PROCEDURE — 80053 COMPREHEN METABOLIC PANEL: CPT

## 2024-05-28 NOTE — PROGRESS NOTES
Osw received an email with a receipt from [employer] for the pt to purchase a wig. OSW emailed the receipt to the team for approval of compassion funds in the amount of $265.00.

## 2024-05-28 NOTE — TELEPHONE ENCOUNTER
Checking with pt about labs   Going today - pt also stated that she has an infusion appt and appt with Dr. Grubbs at the same time - advised pt I would reach out and check with office to see if Romie would see her in infusion room or reschedule her office appt

## 2024-05-29 ENCOUNTER — HOSPITAL ENCOUNTER (OUTPATIENT)
Dept: INFUSION CENTER | Facility: HOSPITAL | Age: 69
Discharge: HOME | End: 2024-05-29
Attending: INTERNAL MEDICINE
Payer: MEDICARE

## 2024-05-29 ENCOUNTER — OFFICE VISIT (OUTPATIENT)
Dept: HEMATOLOGY ONCOLOGY | Facility: MEDICAL CENTER | Age: 69
End: 2024-05-29
Payer: MEDICARE

## 2024-05-29 VITALS
OXYGEN SATURATION: 99 % | DIASTOLIC BLOOD PRESSURE: 69 MMHG | BODY MASS INDEX: 43 KG/M2 | RESPIRATION RATE: 18 BRPM | TEMPERATURE: 97 F | SYSTOLIC BLOOD PRESSURE: 132 MMHG | HEIGHT: 57 IN | HEART RATE: 104 BPM | WEIGHT: 199.3 LBS

## 2024-05-29 DIAGNOSIS — C78.7 METASTASES TO THE LIVER (HCC): ICD-10-CM

## 2024-05-29 DIAGNOSIS — C19 RECTOSIGMOID CANCER (HCC): Primary | ICD-10-CM

## 2024-05-29 DIAGNOSIS — C19 RECTOSIGMOID CANCER (HCC): ICD-10-CM

## 2024-05-29 DIAGNOSIS — E87.6 HYPOKALEMIA: Primary | ICD-10-CM

## 2024-05-29 DIAGNOSIS — C78.6 MALIGNANT NEOPLASM METASTATIC TO OMENTUM (HCC): ICD-10-CM

## 2024-05-29 PROCEDURE — 99214 OFFICE O/P EST MOD 30 MIN: CPT | Performed by: INTERNAL MEDICINE

## 2024-05-29 RX ORDER — FLUOROURACIL 50 MG/ML
320 INJECTION, SOLUTION INTRAVENOUS ONCE
Status: COMPLETED | OUTPATIENT
Start: 2024-05-29 | End: 2024-05-29

## 2024-05-29 RX ORDER — ATROPINE SULFATE 1 MG/ML
0.25 INJECTION, SOLUTION INTRAVENOUS ONCE
Status: COMPLETED | OUTPATIENT
Start: 2024-05-29 | End: 2024-05-29

## 2024-05-29 RX ORDER — SODIUM CHLORIDE 9 MG/ML
20 INJECTION, SOLUTION INTRAVENOUS ONCE
Status: COMPLETED | OUTPATIENT
Start: 2024-05-29 | End: 2024-05-29

## 2024-05-29 RX ORDER — ATROPINE SULFATE 1 MG/ML
0.25 INJECTION, SOLUTION INTRAVENOUS ONCE AS NEEDED
Status: DISCONTINUED | OUTPATIENT
Start: 2024-05-29 | End: 2024-06-01 | Stop reason: HOSPADM

## 2024-05-29 RX ADMIN — SODIUM CHLORIDE 20 ML/HR: 0.9 INJECTION, SOLUTION INTRAVENOUS at 10:30

## 2024-05-29 RX ADMIN — ATROPINE SULFATE 0.25 MG: 1 INJECTION, SOLUTION INTRAVENOUS at 12:13

## 2024-05-29 RX ADMIN — IRINOTECAN HYDROCHLORIDE 262 MG: 20 INJECTION, SOLUTION INTRAVENOUS at 12:24

## 2024-05-29 RX ADMIN — BEVACIZUMAB 465 MG: 400 INJECTION, SOLUTION INTRAVENOUS at 11:31

## 2024-05-29 RX ADMIN — FOSAPREPITANT 150 MG: 150 INJECTION, POWDER, LYOPHILIZED, FOR SOLUTION INTRAVENOUS at 10:52

## 2024-05-29 RX ADMIN — FLUOROURACIL 580 MG: 50 INJECTION, SOLUTION INTRAVENOUS at 14:03

## 2024-05-29 RX ADMIN — LEUCOVORIN CALCIUM 582 MG: 500 INJECTION, POWDER, LYOPHILIZED, FOR SOLUTION INTRAMUSCULAR; INTRAVENOUS at 12:24

## 2024-05-29 RX ADMIN — DEXAMETHASONE SODIUM PHOSPHATE: 10 INJECTION, SOLUTION INTRAMUSCULAR; INTRAVENOUS at 10:31

## 2024-05-29 NOTE — PROGRESS NOTES
Itzel Sanches  tolerated chemo well with no complications. Chemo ball connected, both clamps open. Aware of future appt on 12/31/24 at 100. AVS printed Patient left clinic ambulatory.

## 2024-05-29 NOTE — PROGRESS NOTES
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     Recurrent rectosigmoid adenocarcinoma.  Patient was seen/evaluated by Dr. Freeman surgical oncology.  Patient received 3 months of preoperative FOLFOX.  Follow-up CT scans demonstrated response to treatment with no evidence of progressive disease.  Patient then underwent resection of the omental mass and spleen as well as DILMA/BSO (other issue, see below).  Mrs. Sanches was then restarted on FOLFOX.  Patient had problems with neuropathy; FOLFOX was changed to FOLFIRI (2 cycles of FOLFIRI only).     The 12th/final cycle of chemotherapy was completed on August 23, 2022. CT scan in May 2023 showed a new lesion to the right hepatic lobe.  MRI abdomen in July 2023 confirmed liver metastasis and revealed a new, posterior left chest wall/pleural lesion suspicious for metastasis. The chest wall mass was biopsied on 9/13/2023 revealing metastatic moderately differentiated adenocarcinoma consistent with known colonic primary.     Mrs. Sanches was seen/evaluated by Dr. Brown Grubbs (Radiation Oncology) and patient has received SBRT (left-sided lower rib cage/upper abdomen).  Mrs. Sanches also recently completed Y90.  Patient then went back on to surveillance.  During this surveillance.  From late 2023 to March 2024, analysis of circulating tumor cells was attempted but patient deferred.    Unfortunately repeat CAT scans on March 8, 2024 demonstrated new mediastinal adenopathy, increased paraspinal lesion anterior to L1 and stable lesion at T12.  The previously seen liver lesion was stable although there  was a new small lesion suspicious for an additional metastatic implant.  Patient also had new retroperitoneal adenopathy and increased amee hepatis adenopathy.  Options were recently discussed and patient is back on chemotherapy.  Patient is receiving her fourth cycle today.    Mrs. Lovett feels okay, better than before.  Patient is tolerating the chemotherapy.  Clinically there are no concerning findings.  Recent blood work was good/acceptable.  The plan is to continue with the same regimen.    Recent manipulation: full dose bevacizumab, all of the other medications including irinotecan, 5-FU push, leucovorin and 5-FU CADD are at 80%.    Patient is to return to the office in 4 weeks.  At that time we will discuss when to set up the next set of scans.     Ovarian lesion.  MRI/pelvis in October 2021 demonstrated a multi septated left ovarian cystic lesion.  Transabdominal ultrasound results did not demonstrate any concerning abnormality.  Patient underwent DILMA/BSO.  Pathology results are listed below - no evidence of malignancy. Patient was last seen by Gynecologic Oncology in June 2022, and was advised to return for follow-up as needed.     Genetics.  Patient has a complicated family history but multiple family members with colon/rectal cancer.      Bilateral lower extremity swelling. Patient has a history of lower extremity DVTs, is on Eliquis.  No bruising or bleeding issues.  Patient will continue to monitor.    Mrs. Sanches knows to call the office if she has any other oncology questions or concerns.    Carefully review your medication list and verify that the list is accurate and up-to-date. Please call the hematology/oncology office if there are medications missing from the list, medications on the list that you are not currently taking or if there is a dosage or instruction that is different from how you're taking that medication.     Patient goals and areas of care: Continue with palliative  chemotherapy  Barriers to care:  None  Patient is able to self-care  ______________________________________________________________________________________     Chief complaint: Rectosigmoid carcinoma, omental recurrence, second recurrence     History of Present Illness:  69 year old female previously diagnosed with rectosigmoid carcinoma status post resection in September 2019.  Postoperatively patient did well; Mrs. Sanches did not need/receive adjuvant chemotherapy. Surveillance CEA level was found to be elevated in June 2022.  Patient underwent workup.  Results demonstrated an intra-abdominal mass by the spleen. Patient underwent neoadjuvant chemotherapy with FOLFOX, with follow-up scans demonstrating response.  Patient then underwent resection, followed by postoperative chemotherapy with FOLFOX initially. FOLFOX was changed to FOLFIRI due to peripheral neuropathy. Treatment was completed in August 2022.      CT scan in May 2023 showed a new lesion to the right hepatic lobe. MRI abdomen in July 2023 confirmed liver metastasis and revealed a new, posterior left chest wall/pleural lesion suspicious for metastasis. The chest wall mass was biopsied on 9/13/2023 revealing adenocarcinoma consistent with known colonic primary (see Pathology .  Patient was seen/evaluated by Radiation Oncology and underwent SBRT to the left posterior chest wall lesion and more recently patient received Y90 to the liver lesion.    Patient was placed on surveillance.  The patient was presented at the GI tumor board and the consensus was to send for circulating tumor cells.  Mrs. Sanches never went for this, patient was called a number of times by the company providing the service.  Because patient did not recognize the telephone number, she would not .  Recent scans demonstrated recurrence.  Patient recently restarted bevacizumab with FOLFIRI.  Patient was seen in the infusion center.    Mrs. Sanches states feeling okay,  tolerating the chemotherapy better now.  No nausea or vomiting, appetite is okay.  Weight is stable.  No fevers or signs of infection.  No pain control issues.  No port issues.  Activities are limited but about the same as before.    Review of Systems   Constitutional: Negative for activity change, appetite change and fatigue.   HENT: Negative.    Eyes: Negative.    Respiratory: Negative.    Cardiovascular: Negative.    Gastrointestinal: Negative for nausea and vomiting.   Endocrine: Negative.    Genitourinary: Negative.    Musculoskeletal: Negative.    Skin: + Hair loss  Allergic/Immunologic: Negative.    Neurological: Negative.    Hematological: Negative.    Psychiatric/Behavioral: Negative.    All other systems reviewed and are negative.         Patient Active Problem List   Diagnosis    Callus of foot    Foot pain    GERD (gastroesophageal reflux disease)    Mixed hyperlipidemia    Prediabetes    Varicose veins with pain    Morbid obesity (HCC)    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HCC)    Dyspnea on exertion    Dyslipidemia    Sigmoid diverticulosis    PONV (postoperative nausea and vomiting)    Omental metastasis    Lesion of ovary    Chemotherapy adverse reaction    Chemotherapy-induced nausea    Platelets decreased (HCC)    Stage 3 chronic kidney disease, unspecified whether stage 3a or 3b CKD (HCC)    H/O splenectomy    Asplenia    Postoperative state    Acute embolism and thrombosis of right tibial vein (HCC)      Medical History        Past Medical History:   Diagnosis Date    Blood in stool      Breast disorder      Cancer (HCC)       rectal    Cancer determined by colorectal biopsy (HCC)       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    Colon polyp      GERD (gastroesophageal reflux disease)      History of chemotherapy 12/2021    History of rectal surgery      Hyperlipidemia      PONV (postoperative nausea and vomiting)           Surgical History         Past Surgical History:   Procedure Laterality Date    BREAST CYST EXCISION Right        SECTION         x3    COLON SIGMOID RESECTION        COLONOSCOPY        DILATION AND CURETTAGE OF UTERUS        ILEO LOOP DIVERSION N/A 12/10/2019     Procedure: ILEOSTOMY LOOP;  Surgeon: WINNIE Pastor MD;  [facility]: [facility];  Service: Robotics    INSTILLATION CHEMOTHERAPY INTRAPERITONEAL (HIPEC) LAPAROTOMY N/A 2022     Procedure: INSTILLATION CHEMOTHERAPY INTRAPERITONEAL (HIPEC) LAPAROTOMY;  Surgeon: Jessie Freeman MD;  [facility]: [facility];  Service: Surgical Oncology    IR BIOPSY CHEST WALL   2023    IR BIOPSY OMENTUM   2021    IR PORT PLACEMENT   2021    OMENTECTOMY N/A 2022     Procedure: DIAGNOSTIC LAPAROSCOPY, OPEN OMENTECTOMY, SPLENECTOMY, DIAPHRAGM REPAIR, CHEST TUBE INSERTION;  Surgeon: Jessie Freeman MD;  [facility];  Service: Surgical Oncology    AK CLOSURE ENTEROSTOMY LG/SMALL INTESTINE N/A 2020     Procedure: CLOSURE ILEOSTOMY;  Surgeon: WINNIE Pastor MD;  [facility];  Service: Colorectal    AK LAPAROSCOPY COLECTOMY PARTIAL W/ANASTOMOSIS N/A 12/10/2019     Procedure: SIGMOID RESECTION COLON LAPAROSCOPIC W ROBOTICS converted to lap hand assisted  with Partial proctectomy , LASER FLUORESCENCE ANGIOGRAPHY, INTRA OP COLONOSCOPY;  Surgeon: WINNIE Pastor MD;  [facility];  Service: Robotics    AK TOTAL ABDOMINAL HYSTERECT W/WO RMVL TUBE OVARY N/A 2022     Procedure: DIAGNOSTIC LAPAROSCOPY, TOTAL ABDOMINAL HYSTERECTOMY, BILATERAL SALPINGO-OOPHORECTOMY, EXTENSIVE ADHESIOLYSIS;  Surgeon: Peterson Mae MD;  [facility];  Service: Gynecology Oncology    AK UNLISTED PROCEDURE DIAPHRAGM   2022     Procedure: REPAIR DIAPHRAGM TEAR;  Surgeon: Yana Hebert MD;  [facility];  Service: Thoracic    SMALL INTESTINE SURGERY   2020     Procedure: RESECTION SMALL BOWEL;  Surgeon: WINNIE Pastor MD;  [facility];  Service: Colorectal      Past surgical history:  No  "prior blood transfusions     OBGYN:  No breast issues, no abnormal mammograms previously, no postmenopausal bleeding, no other GYN issues     Family History         Family History   Problem Relation Age of Onset    Hypertension Mother      Heart attack Mother      Heart attack Father      Heart disease Father      Hypertension Brother      Heart attack Brother      Colon cancer Paternal Uncle      Leukemia Cousin      Breast cancer Cousin      Diabetes Cousin      Breast cancer Cousin      Colon cancer Family           paternal uncle    Stroke Neg Hx        Family history:  Somewhat complicated, mother with colostomy but etiology for this is unclear, paternal uncle with rectal cancer, patient has to first-degree relatives on the paternal side with history of colon cancer, 3 sons and 1 grandchild in good general health     Social History               Socioeconomic History    Marital status:        Spouse name: Not on file    Number of children: Not on file    Years of education: Not on file    Highest education level: Not on file   Occupational History    Not on file   Tobacco Use    Smoking status: Never    Smokeless tobacco: Never   Vaping Use    Vaping Use: Never used   Substance and Sexual Activity    Alcohol use: Yes       Comment: \"occasionally\"    Drug use: Never    Sexual activity: Not Currently   Other Topics Concern    Not on file   Social History Narrative    Not on file      Social Determinants of Health           Financial Resource Strain: Low Risk  (3/21/2023)     Overall Financial Resource Strain (CARDIA)      Difficulty of Paying Living Expenses: Not hard at all   Food Insecurity: No Food Insecurity (5/2/2022)     Hunger Vital Sign      Worried About Running Out of Food in the Last Year: Never true      Ran Out of Food in the Last Year: Never true   Transportation Needs: No Transportation Needs (3/21/2023)     PRAPARE - Transportation      Lack of Transportation (Medical): No      Lack of " Transportation (Non-Medical): No   Physical Activity: Not on file   Stress: Not on file   Social Connections: Not on file   Intimate Partner Violence: Not on file   Housing Stability: Unknown (5/2/2022)     Housing Stability Vital Sign      Unable to Pay for Housing in the Last Year: No      Number of Places Lived in the Last Year: Not on file      Unstable Housing in the Last Year: No      Social history:  No tobacco, alcohol or drug abuse, no toxic exposure     Current Outpatient Medications:     Acetaminophen (TYLENOL 8 HOUR PO), Take by mouth PRN, Disp: , Rfl:     apixaban (Eliquis) 5 mg, Take 1 tablet (5 mg total) by mouth 2 (two) times a day, Disp: 60 tablet, Rfl: 5    ezetimibe (ZETIA) 10 mg tablet, Take 1 tablet (10 mg total) by mouth daily, Disp: 90 tablet, Rfl: 1    famotidine (PEPCID) 20 mg tablet, Take 1 tablet (20 mg total) by mouth 2 (two) times a day as needed for heartburn As needed, Disp: 90 tablet, Rfl: 1    meclizine (ANTIVERT) 12.5 MG tablet, Take 1 tablet (12.5 mg total) by mouth 3 (three) times a day as needed for dizziness (Patient taking differently: Take 12.5 mg by mouth 3 (three) times a day as needed for dizziness PRN), Disp: 30 tablet, Rfl: 0           Allergies   Allergen Reactions    Medical Tape Rash       Skin irritation  Skin peeling  Skin irritation  Skin peeling  Blisters  Rash          Vitals:     10/10/23 1356   BP: 136/76   Pulse: 78   Resp: 17   Temp: 98 °F (36.7 °C)   SpO2: 98%      Physical Exam  Constitutional:       Appearance: She is well-developed. She is obese.   HENT:      Head: Normocephalic and atraumatic.      Right Ear: External ear normal.      Left Ear: External ear normal.   Eyes:      Extraocular Movements: Extraocular movements intact.      Conjunctiva/sclera: Conjunctivae normal.      Pupils: Pupils are equal, round, and reactive to light.   Cardiovascular:      Rate and Rhythm: Normal rate and regular rhythm.      Heart sounds: Normal heart sounds.    Pulmonary:      Effort: Pulmonary effort is normal.      Breath sounds: Normal breath sounds.   Abdominal:      General: Bowel sounds are normal. There is no distension.      Palpations: Abdomen is soft.      Tenderness: There is no abdominal tenderness. There is no guarding.   Musculoskeletal:         General: Normal range of motion.      Right lower leg: No edema.      Left lower leg: No edema.   Lymphadenopathy:      Cervical: No cervical adenopathy.      Upper Body:      Right upper body: No supraclavicular or axillary adenopathy.      Left upper body: No supraclavicular or axillary adenopathy.   Skin:     General: Skin is warm and dry.  Right anterior chest wall port area clean and dry     Findings: No bruising, ecchymosis or petechiae.   Neurological:      General: No focal deficit present.      Mental Status: She is alert and oriented to person, place, and time.   Psychiatric:         Mood and Affect: Mood normal.         Behavior: Behavior normal.         Judgment: Judgment normal.   Extremities: Bilateral lower extremity edema, 1+, no cords, pulses are 1+     Laboratory    5/28/2024 WBC = 5.94 hemoglobin = 10.7 hematocrit = 34 platelet = 342 neutrophil = 43% bands = 1% BUN = 11 creatinine = 1.00 calcium = 9.3 LFTs WNL    4/19/2024 WBC = 3.23 hemoglobin = 11.7 hematocrit = 37 platelet = 262 neutrophil = 51% BUN = 17 creatinine = 1.23 calcium = 9.0 LFTs WNL    1/11/2024 BUN = 18 creatinine = 1.07 LFTs WNL calcium = 8.9        11/15/2023 WBC = 6.0 hemoglobin = 11 hematocrit = 34 platelet = 247 neutrophil = 67%    Procedures    1/11/2024NM SPECT post microsphere implant    IMPRESSION:     1.  Successful intra-arterial placement of Y90 THERASPHERE.     10/06/2021 colonoscopy     FINDINGS:  Healthy end-to-end colorectal anastomosis with no bleeding in the mid rectum  All observed locations appeared normal, including the cecum, ascending colon, transverse colon, splenic flexure and descending colon. A couple  scattered diverticuli seen     Imaging    3/8/2024 CT of the chest abdomen pelvis with contrast    IMPRESSION:     1.  New mediastinal lymphadenopathy suspicious for metastasis.     2.  Decreased size of left 11th rib metastasis status post radiation. New patchy density in the left lower lobe adjacent to the rib mass is likely related to interval radiation.     3.  Increased paraspinal lesion anterior to L1 and stable paraspinal lesion at T12.     4.  Previously seen liver lesion is stable though there is a new small lesion suspicious for metastasis.     5.  New retroperitoneal lymphadenopathy and increased amee hepatis lymphadenopathy suspicious for metastasis.     10/09/2023 CT chest abdomen pelvis     IMPRESSION:     Enlarging 6.8 x 4.4 x 4.1 cm T11 rib metastasis.     Stable ill-defined hypovascular nodule in the right lobe of the liver which remains concerning for metastasis.     Cholelithiasis.     09/13/2023 IR biopsy chest wall     IMPRESSION:  Successful ultrasound guided core biopsy of the left posterior pleural-based mass.     07/24/2023 MRI abdomen      IMPRESSION:     16 mm segment 6 hepatic lesion unchanged from the CT suspicious for metastasis.     New posterior left chest wall/pleural lesion measuring 2.7 x 2.3 cm also suspicious for metastasis.     05/30/2023 CT chest abdomen pelvis     IMPRESSION:     No evidence of acute intrathoracic pathology.     New 7 mm hypodensity of the right hepatic lobe which was not present on prior examinations. Contrast enhanced abdominal MRI recommended for further evaluation.     Nodular soft tissue adjacent to the left upper renal capsule and prior splenectomy site is stable     01/27/2023 CT chest abdomen pelvis     IMPRESSION:     No definite evidence for metastatic disease involving the chest, abdomen, or pelvis.     Note is made of stable nodular soft tissue about the lateral aspect of the left upper renal pole which could reflect postsurgical changes related  to prior splenectomy although continued short interval follow-up is recommended to exclude   residual/recurrent tumor in this location.     07/25/2022 vascular lower limb venous duplex study     RIGHT LOWER LIMB:  Acute mostly deep vein thrombosis in the paired posterior tibial veins  throughout the calf.  No evidence of superficial thrombophlebitis noted.  Popliteal, posterior tibial and anterior tibial arterial Doppler waveforms are  triphasic.     LEFT LOWER LIMB:  No evidence of acute or chronic deep vein thrombosis.  No evidence of superficial thrombophlebitis noted.  Doppler evaluation shows a normal response to augmentation maneuvers.  Popliteal, posterior tibial and anterior tibial arterial Doppler waveforms are  triphasic.     03/22/2022 CT scan chest abdomen pelvis with contrast     ABDOMINOPELVIC CAVITY: Left upper quadrant biopsy-proven omental metastasis has decreased in size now measuring 4.1 x 2.5 x 2.7 cm (previously 5.3 x 4.5 x 4.3 cm), and now only focally contacts rather than grossly invades the spleen and intercostal soft   tissues, on series 2/54, 601/71. Adjacent omental nodularity is less conspicuous. No new evidence of metastatic disease in the abdomen or pelvis. No lymphadenopathy. No ascites or intraperitoneal free air.     IMPRESSION:     1.  Decreased size of biopsy-proven omental metastasis, which now only focally contacting rather than grossly invading the spleen and adjacent abdominal wall. No new evidence of metastatic disease in the abdomen or pelvis.  2.  No new or suspicious pulmonary nodules. One of the previously noted new 1-2 mm nodules is no longer seen, and the other is unchanged. Recommend continued attention on follow-up.  3.  Top-normal thickness endometrial stripe measuring 7 mm. If there is history of vaginal bleeding, suggest catheterization with endometrial biopsy.  4.  Hepatic steatosis.     11/11/2021 ultrasound pelvis transabdominal only     Cluster of anechoic  cystic areas seen replacing the left ovary similar to MRI largest measuring 8 mm compared to 1.3 cm.  Based on the ACR O-RADS system, this is O-RADS category 2 (almost certain benign with <1% risk of malignancy.) The management recommendation is followup in 1 year.     10/25/2021 MRI pelvis rectal cancer staging     1.  No recurrent or metastatic disease in the pelvis.  Please refer to the separately dictated MRI abdomen report regarding mesenteric mass in the left upper quadrant.     2.  Multi septated cystic lesion versus cluster of small cysts in the left ovary measuring 2.6 x 2.5 x 1.9 cm, increased in size from October 2019.  Further characterization with pelvic ultrasound is recommended.     10/25/2021 MRI abdomen     1.  5.2 cm mesenteric mass in the left upper quadrant with thickening of peritoneal reflection and invasion of the spleen, similar to prior PET/CT.  This is highly suspicious for metastatic tumor implant.  2.  No additional potential sites of metastasis in the abdomen.  3.  Moderately distended gallbladder with gallstones and probable adenomyomatosis.     09/28/2021 PET-CT     1. There is a large left paracolic gutter markedly hypermetabolic mass immediately inferior to the spleen, most consistent with metastatic colorectal carcinoma.  2. Mildly increased activity at the right abdominal bowel anastomotic site.  If not recently performed, direct visualization is recommended  3. There are no other glucose avid abnormalities identified within the abdomen or pelvis  4. No evidence of distant glucose avid metastatic disease in the neck, chest, or skeleton      Pathology    9/13/2023 [facility] results on the chart.  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  No other actionable mutations, BRAF was not mutated, MSI was stable, mismatch repair proteins were proficient, tumor mutational burden was  "low, PD-L1 = 0% = negative, NTRK1/2/3 fusion not detected.         Case Report   Surgical Pathology Report                         Case: [id number]                                    Authorizing Provider:  Jessie Freeman MD       Collected:           09/13/2023 1146               Ordering Location:     [facility] Received:            09/13/2023 1225                                      Cardiac Cath Lab                                                              Pathologist:           Aristeo Rashid MD                                                            Specimen:    Chest Wall, chest wall mass, 4 cores                                                        Final Diagnosis   A. Mass, \"Chest wall mass 4 cores,\" Biopsy:  - Metastatic moderately differentiated adenocarcinoma, consistent with known colonic primary (see note)   Electronically signed by Aristeo Rashid MD on 9/18/2023 at  9:45 AM   Note     Immunohistochemistry was performed on block A1 with adequate controls. Tumor cells are positive for AE1/AE3, CAM5.2, CDX2, and SATB2. They are negative for CK20 and CK7.   Immunohistochemistry was performed on block A2 with adequate controls. Tumor cells are positive for AE1/AE3, Cam5.2, and negative for CK20.      Case Report   Surgical Pathology Report                         Case: [id number]                                    Authorizing Provider:  Peterson Mae,   Collected:           04/29/2022 1118                                      MD                                                                            Ordering Location:     [facility]      Received:            04/29/2022 2236                                      Hospital Operating Room                                                       Pathologist:           Roselyn King DO                                                      Specimens:   A) - Uterus w/Bilateral Ovaries and Fallopian Tubes         "                                          B) - Spleen, spleen, omentum, darotis                                                       Final Diagnosis   A. Uterus, Bilateral Ovaries and Fallopian Tubes; Hysterosalpingo-oophorectomy:  - Leiomyoma.  - Left ovary with serous cystadenoma.  - Unremarkable cervix.  - Benign inactive endometrium with no specific pathologic change.  - Unremarkable right ovary and bilateral fallopian tubes.     B. Spleen, spleen, omentum, darotis:  - Metastatic adenocarcinoma involving spleen and omentum, consistent with colonic primary. See Note.   Electronically signed by Roselyn King DO on 5/12/2022 at  2:49 PM      Note     Note B: Comparison to prior material ([id number], omental mass) reveals identical morphology to previous metastatic colonic adenocarcinoma.            Case Report   Surgical Pathology Report                         Case: [id number]                                    Authorizing Provider:  Sohail Grubbs MD           Collected:           11/12/2021 0921               Ordering Location:     [facility] Received:            11/12/2021 0941                                      CAT Scan                                                                      Pathologist:           Pamela Ramos MD                                                         Specimen:    Omentum, Omental mass                                                                       Final Diagnosis   A. Omentum, Omental mass:  - Metastatic adenocarcinoma, with an immunophenotype compatible with metastasis from patient's known colonic primary.  - See note.     Note: Tumor is positive with CK20, CDX2 and negative with CK7, PAX8. This immunophenotype supports the above interpretation.  Best representative block with tumor A5.     This case was reviewed at the intradepartmental  conference.   Dr. Grubbs is notified of the diagnosis in EPIC via docplanner on 11/17/2021   at 8.45 am.   Electronically signed by Pamela Ramos MD on 11/17/2021 at  8:53 AM                Case Report   Surgical Pathology Report                         Case: [id number]                                    Authorizing Provider:  WINNIE Pastor MD       Collected:           12/10/2019 1159               Ordering Location:     [facility]      Received:            12/10/2019 [address] [place] [address] Operating Room                                                       Pathologist:           Osman Beltran MD                                                                  Specimens:   A) - Large Intestine, Sigmoid Colon, and portion of rectum                                           B) - Anastomatic site, anastamotic rings                                                    Addendum   RRESULTS OF IMMUNOHISTOCHEMICAL ANALYSIS FOR MISMATCH REPAIR PROTEIN LOSS  INTERPRETATION: NO LOSS OF NUCLEAR EXPRESSION OF MMR PROTEINS: LOW PROBABILITY OF MSI-H  Note: Background non-neoplastic tissue and/or internal control with intact nuclear expression.      RESULTS:  Antibody          Clone               Description                           Results  MLH1               M1                  Mismatch repair protein       Intact nuclear expression  MSH2              [id number]       Mismatch repair protein       Intact nuclear expression  MSH6              44                   Mismatch repair protein       Intact nuclear expression  PMS2              [id number]         Mismatch repair protein       Intact nuclear expression     Comment:  A negative control and a positive control for each antibody have been reviewed and accepted.  [facility] Specimen ID: [id number], Evaluator:  JOSÉ MIGUEL Perales MD  These tests were developed and their performance characteristics determined by [facility].  They may not be cleared or approved by the U.S. Food and Drug Administration.  The FDA determined that such  clearance or approval is not necessary.  These tests are used for clinical purposes.  They should not be regarded as investigational or for research.  This laboratory has been approved by [place] [address], designated as a high-complexity laboratory and is qualified to perform these tests.     Comments: Patients whose tumors demonstrate lack of expression of one or more DNA mismatch repair proteins might be at risk for Bee Syndrome. This cancer susceptibility syndrome greatly increases the risk of synchronous and/or metachronous cancers in the affected patients and their family members. Normal expression of all proteins does not completely rule out familial cancer predisposition.     The [facility] Bee Syndrome Surveillance Program Task Forces recommends that all patients with lack of expression of one or more DNA mismatch repair proteins and those with concerning personal or family history should undergo thorough evaluation, counseling and possibly genetic testing.       Addendum electronically signed by Osman Beltran MD on 12/20/2019 at  3:28 PM   Final Diagnosis   A. Rectosigmoid colon, low anterior resection:  - Adenocarcinoma, moderately differentiated.   - Tumor invades through the muscularis propria into pericolorectal tissue, T3  - Diverticula.  - All margins are negative for tumor.  - Thirty-four lymph nodes, negative for malignancy (0/34).     B. Colon, anastomotic rings, resection:  - Two portions of colon with no pathologic abnormality     Intradepartmental consultation is in agreement.  Interpretation performed at [facility] [place] [address]  Immunohistochemistry for desmin  is performed on tissue blocks A13 and A16 to help in the assessment of this case.         Electronically signed by Osman Beltran MD on 12/18/2019 at 10:42 AM   Additional Information     All controls performed with the immunohistochemical stains reported above reacted appropriately.  These tests were developed  and their performance characteristics determined by [facility] or [facility]. They may not be cleared or approved by the U.S. Food and Drug Administration. The FDA has determined that such clearance or approval is not necessary. These tests are used for clinical purposes. They should not be regarded as investigational or for research. This laboratory has been approved by CLIA 88, designated as a high-complexity laboratory and is qualified to perform these tests.   Synoptic Checklist   COLON AND RECTUM: Resection, Including Transanal Disk Excision of Rectal Neoplasms  8th Edition - Protocol posted: 2/27/2019  ColoRectal - All Specimens       SPECIMEN   Procedure   Low anterior resection    Macroscopic Intactness of Mesorectum   Complete    TUMOR   Tumor Site   Rectosigmoid    Histologic Type   Adenocarcinoma    Histologic Grade   G2: Moderately differentiated    Tumor Size   Greatest dimension (Centimeters): 5.4 cm   Additional Dimension (Centimeters)   4.6 cm       1 cm   Tumor Deposits   Not identified    Tumor Extension   Tumor invades through the muscularis propria into pericolorectal tissue    Macroscopic Tumor Perforation   Not identified    Lymphovascular Invasion   Not identified    Perineural Invasion   Not identified    Type of Polyp in Which Invasive Carcinoma Arose   Tubular adenoma    Treatment Effect   No known presurgical therapy    MARGINS   Margins   All margins are uninvolved by invasive carcinoma, high-grade dysplasia, intramucosal adenocarcinoma, and adenoma    Margins Examined   Proximal        Distal        Radial or Mesenteric    Distance of Invasive Carcinoma from Closest Margin   1.5 cm   Closest Margin   Radial or Mesenteric    Distance of Tumor from Radial Margin   1.5 cm   LYMPH NODES   Number of Lymph Nodes Involved   0    Number of Lymph Nodes Examined   34    PATHOLOGIC STAGE CLASSIFICATION (pTNM, AJCC 8th Edition)   Primary Tumor (pT)   pT3     Regional Lymph Nodes (pN)   pN0    ADDITIONAL FINDINGS   Additional Pathologic Findings   Diverticulosis    .

## 2024-05-29 NOTE — PLAN OF CARE
Problem: Potential for Falls  Goal: Patient will remain free of falls  Description: INTERVENTIONS:  - Educate patient/family on patient safety including physical limitations  - Instruct patient to call for assistance with activity   - Consult OT/PT to assist with strengthening/mobility   - Keep Call bell within reach  - Keep bed low and locked with side rails adjusted as appropriate  - Keep care items and personal belongings within reach  - Initiate and maintain comfort rounds  - Make Fall Risk Sign visible to staff  - Apply yellow socks and bracelet for high fall risk patients  - Consider moving patient to room near nurses station  5/29/2024 0946 by Soila Rebollar, RN  Outcome: Progressing  5/29/2024 0946 by Soila Rebollar, RN  Outcome: Progressing      ----- Message from Abdullahi Chacon sent at 4/5/2023 11:56 AM CDT -----  Regarding: pt returned call, try again at this number   Contact: pt   pt returned call, try again at this number

## 2024-05-29 NOTE — TELEPHONE ENCOUNTER
Dr. Grubbs will see patient at infusion - exact time TBD, probably between 10 and 12, as Ms. Sanches does have a 5 hour infusion appointment scheduled.  If I have misread and her appointment ends sooner, she is more than welcome to come to the office, and we can see her at a later time today.  Thank you for reaching out!

## 2024-05-31 ENCOUNTER — HOSPITAL ENCOUNTER (OUTPATIENT)
Dept: INFUSION CENTER | Facility: HOSPITAL | Age: 69
End: 2024-05-31
Attending: INTERNAL MEDICINE
Payer: MEDICARE

## 2024-05-31 VITALS
SYSTOLIC BLOOD PRESSURE: 160 MMHG | OXYGEN SATURATION: 98 % | WEIGHT: 198.63 LBS | TEMPERATURE: 97.2 F | RESPIRATION RATE: 18 BRPM | BODY MASS INDEX: 42.97 KG/M2 | DIASTOLIC BLOOD PRESSURE: 79 MMHG | HEART RATE: 63 BPM

## 2024-05-31 DIAGNOSIS — C78.6 MALIGNANT NEOPLASM METASTATIC TO OMENTUM (HCC): ICD-10-CM

## 2024-05-31 DIAGNOSIS — C19 RECTOSIGMOID CANCER (HCC): ICD-10-CM

## 2024-05-31 DIAGNOSIS — E87.6 HYPOKALEMIA: Primary | ICD-10-CM

## 2024-05-31 RX ADMIN — SODIUM CHLORIDE 1000 ML: 0.9 INJECTION, SOLUTION INTRAVENOUS at 13:09

## 2024-05-31 NOTE — PROGRESS NOTES
Pt tolerated at home pump device without incidence. Pump disconnected as per protocol.    Itzel Sanches  tolerated treatment well with no complications.      Itzel Sanches is aware of future appt on 6/3/24 at 1030.     AVS printed and given to Itzel Sanches:  Yes ***  No (Declined by Itzel Sanches) ***

## 2024-05-31 NOTE — PROGRESS NOTES
Elastomeric pump d/c'd. Hydration given.     Itzel Sanches  tolerated treatment well with no complications.      Itzel Sanches is aware of future appt on 6/3/24 at 1030.     AVS printed and given to Itzel Sanches:  Yes

## 2024-05-31 NOTE — PLAN OF CARE
Problem: Potential for Falls  Goal: Patient will remain free of falls  Description: INTERVENTIONS:  - Educate patient/family on patient safety including physical limitations  - Instruct patient to call for assistance with activity   - Keep Call bell within reach  - Keep care items and personal belongings within reach    Outcome: Progressing

## 2024-06-03 ENCOUNTER — HOSPITAL ENCOUNTER (OUTPATIENT)
Dept: INFUSION CENTER | Facility: HOSPITAL | Age: 69
Discharge: HOME | End: 2024-06-03
Attending: INTERNAL MEDICINE
Payer: MEDICARE

## 2024-06-03 DIAGNOSIS — C19 RECTOSIGMOID CANCER (HCC): ICD-10-CM

## 2024-06-03 DIAGNOSIS — C78.6 MALIGNANT NEOPLASM METASTATIC TO OMENTUM (HCC): ICD-10-CM

## 2024-06-03 DIAGNOSIS — E87.6 HYPOKALEMIA: Primary | ICD-10-CM

## 2024-06-03 RX ORDER — ATROPINE SULFATE 1 MG/ML
0.25 INJECTION, SOLUTION INTRAVENOUS ONCE AS NEEDED
OUTPATIENT
Start: 2024-06-11

## 2024-06-03 RX ORDER — ATROPINE SULFATE 1 MG/ML
0.25 INJECTION, SOLUTION INTRAVENOUS ONCE
OUTPATIENT
Start: 2024-06-11

## 2024-06-03 RX ORDER — SODIUM CHLORIDE 9 MG/ML
20 INJECTION, SOLUTION INTRAVENOUS ONCE
OUTPATIENT
Start: 2024-06-11

## 2024-06-03 RX ORDER — FLUOROURACIL 50 MG/ML
320 INJECTION, SOLUTION INTRAVENOUS ONCE
OUTPATIENT
Start: 2024-06-11

## 2024-06-03 RX ADMIN — PEGFILGRASTIM 3 MG: 6 INJECTION SUBCUTANEOUS at 10:46

## 2024-06-03 NOTE — PROGRESS NOTES
Itzel Sanches  tolerated treatment well with no complications.  Injection admin in L arm    Itzel Sanches is aware of future appt on 6/11/24 at 930.     AVS printed and given to Itzel Sanches:  Yes

## 2024-06-04 DIAGNOSIS — C78.6 MALIGNANT NEOPLASM METASTATIC TO OMENTUM (HCC): Primary | ICD-10-CM

## 2024-06-04 DIAGNOSIS — E87.6 HYPOKALEMIA: ICD-10-CM

## 2024-06-04 DIAGNOSIS — C19 RECTOSIGMOID CANCER (HCC): ICD-10-CM

## 2024-06-06 ENCOUNTER — PATIENT OUTREACH (OUTPATIENT)
Dept: CASE MANAGEMENT | Facility: OTHER | Age: 69
End: 2024-06-06

## 2024-06-06 NOTE — PROGRESS NOTES
OSW placed outreach TC to pt this afternoon. She states that her chemo was reduced and now she has been able to do things right after she is disconnected. She is unsure exactly what the reduction was, but is feeling very happy that her quality of life has improved so greatly. She shared that she was able to go to the [facility] at the [facility] and visited with her friends. OSW expressed that is wonderful news!   She expressed that her son's girlfriend checks in on her daily and she is excited to report to her that she just got done mopping and cleaning the house. She states that she has been a significant help when she was unable to get out of bed after her treatments.  Pt received her wig and thanked this writer for assisting with the compassion funds. She states it looks so real. Many of her friends have made compliments thinking it was her actual hair, which makes her feel so good. She had a great experience at [facility]. Her friend accompanied her and tried a wig on as well.    She expressed that she has 2 more treatments to go. She is hopeful that she will not have to continue anymore.   OSW offered to call her in a month and check in with her and she was appreciative. OSW will continue to follow.

## 2024-06-10 ENCOUNTER — APPOINTMENT (OUTPATIENT)
Dept: LAB | Facility: HOSPITAL | Age: 69
End: 2024-06-10
Payer: MEDICARE

## 2024-06-10 ENCOUNTER — TELEPHONE (OUTPATIENT)
Dept: INFUSION CENTER | Facility: HOSPITAL | Age: 69
End: 2024-06-10

## 2024-06-10 DIAGNOSIS — C78.6 MALIGNANT NEOPLASM METASTATIC TO OMENTUM (HCC): ICD-10-CM

## 2024-06-10 DIAGNOSIS — E87.6 HYPOKALEMIA: ICD-10-CM

## 2024-06-10 DIAGNOSIS — C19 RECTOSIGMOID CANCER (HCC): ICD-10-CM

## 2024-06-10 LAB
ACANTHOCYTES BLD QL SMEAR: PRESENT
ALBUMIN SERPL BCP-MCNC: 3.4 G/DL (ref 3.5–5)
ALP SERPL-CCNC: 90 U/L (ref 34–104)
ALT SERPL W P-5'-P-CCNC: 11 U/L (ref 7–52)
ANION GAP SERPL CALCULATED.3IONS-SCNC: 7 MMOL/L (ref 4–13)
ANISOCYTOSIS BLD QL SMEAR: PRESENT
AST SERPL W P-5'-P-CCNC: 10 U/L (ref 13–39)
BACTERIA UR QL AUTO: ABNORMAL /HPF
BASOPHILS # BLD MANUAL: 0 THOUSAND/UL (ref 0–0.1)
BASOPHILS NFR MAR MANUAL: 0 % (ref 0–1)
BILIRUB SERPL-MCNC: 0.36 MG/DL (ref 0.2–1)
BILIRUB UR QL STRIP: NEGATIVE
BUN SERPL-MCNC: 14 MG/DL (ref 5–25)
CALCIUM ALBUM COR SERPL-MCNC: 9.2 MG/DL (ref 8.3–10.1)
CALCIUM SERPL-MCNC: 8.7 MG/DL (ref 8.4–10.2)
CHLORIDE SERPL-SCNC: 107 MMOL/L (ref 96–108)
CLARITY UR: CLEAR
CO2 SERPL-SCNC: 24 MMOL/L (ref 21–32)
COLOR UR: YELLOW
CREAT SERPL-MCNC: 1.09 MG/DL (ref 0.6–1.3)
EOSINOPHIL # BLD MANUAL: 0 THOUSAND/UL (ref 0–0.4)
EOSINOPHIL NFR BLD MANUAL: 0 % (ref 0–6)
ERYTHROCYTE [DISTWIDTH] IN BLOOD BY AUTOMATED COUNT: 26 % (ref 11.6–15.1)
GFR SERPL CREATININE-BSD FRML MDRD: 51 ML/MIN/1.73SQ M
GLUCOSE SERPL-MCNC: 113 MG/DL (ref 65–140)
GLUCOSE UR STRIP-MCNC: NEGATIVE MG/DL
HCT VFR BLD AUTO: 34.3 % (ref 34.8–46.1)
HGB BLD-MCNC: 10.7 G/DL (ref 11.5–15.4)
HGB UR QL STRIP.AUTO: ABNORMAL
HOWELL-JOLLY BOD BLD QL SMEAR: PRESENT
KETONES UR STRIP-MCNC: NEGATIVE MG/DL
LEUKOCYTE ESTERASE UR QL STRIP: ABNORMAL
LG PLATELETS BLD QL SMEAR: PRESENT
LYMPHOCYTES # BLD AUTO: 3.73 THOUSAND/UL (ref 0.6–4.47)
LYMPHOCYTES # BLD AUTO: 37 % (ref 14–44)
MACROCYTES BLD QL AUTO: PRESENT
MCH RBC QN AUTO: 29 PG (ref 26.8–34.3)
MCHC RBC AUTO-ENTMCNC: 31.2 G/DL (ref 31.4–37.4)
MCV RBC AUTO: 93 FL (ref 82–98)
MONOCYTES # BLD AUTO: 0.91 THOUSAND/UL (ref 0–1.22)
MONOCYTES NFR BLD: 9 % (ref 4–12)
MUCOUS THREADS UR QL AUTO: ABNORMAL
MYELOCYTE ABSOLUTE CT: 0.2 THOUSAND/UL (ref 0–0.1)
MYELOCYTES NFR BLD MANUAL: 2 % (ref 0–1)
NEUTROPHILS # BLD MANUAL: 5.25 THOUSAND/UL (ref 1.85–7.62)
NEUTS BAND NFR BLD MANUAL: 1 % (ref 0–8)
NEUTS SEG NFR BLD AUTO: 51 % (ref 43–75)
NITRITE UR QL STRIP: NEGATIVE
NON-SQ EPI CELLS URNS QL MICRO: ABNORMAL /HPF
PH UR STRIP.AUTO: 5.5 [PH]
PLATELET # BLD AUTO: 331 THOUSANDS/UL (ref 149–390)
PLATELET BLD QL SMEAR: ADEQUATE
PMV BLD AUTO: 10.7 FL (ref 8.9–12.7)
POIKILOCYTOSIS BLD QL SMEAR: PRESENT
POLYCHROMASIA BLD QL SMEAR: PRESENT
POTASSIUM SERPL-SCNC: 3.7 MMOL/L (ref 3.5–5.3)
PROT SERPL-MCNC: 6.6 G/DL (ref 6.4–8.4)
PROT UR STRIP-MCNC: ABNORMAL MG/DL
RBC # BLD AUTO: 3.69 MILLION/UL (ref 3.81–5.12)
RBC #/AREA URNS AUTO: ABNORMAL /HPF
RBC MORPH BLD: PRESENT
SODIUM SERPL-SCNC: 138 MMOL/L (ref 135–147)
SP GR UR STRIP.AUTO: >=1.03 (ref 1–1.03)
TARGETS BLD QL SMEAR: PRESENT
UROBILINOGEN UR STRIP-ACNC: 2 MG/DL
WBC # BLD AUTO: 10.09 THOUSAND/UL (ref 4.31–10.16)
WBC #/AREA URNS AUTO: ABNORMAL /HPF

## 2024-06-10 PROCEDURE — 81001 URINALYSIS AUTO W/SCOPE: CPT

## 2024-06-10 PROCEDURE — 80053 COMPREHEN METABOLIC PANEL: CPT

## 2024-06-10 PROCEDURE — 85027 COMPLETE CBC AUTOMATED: CPT

## 2024-06-10 PROCEDURE — 36415 COLL VENOUS BLD VENIPUNCTURE: CPT

## 2024-06-10 PROCEDURE — 85007 BL SMEAR W/DIFF WBC COUNT: CPT

## 2024-06-11 ENCOUNTER — HOSPITAL ENCOUNTER (OUTPATIENT)
Dept: INFUSION CENTER | Facility: HOSPITAL | Age: 69
Discharge: HOME | End: 2024-06-11
Attending: INTERNAL MEDICINE
Payer: MEDICARE

## 2024-06-11 VITALS
WEIGHT: 196.87 LBS | OXYGEN SATURATION: 100 % | SYSTOLIC BLOOD PRESSURE: 137 MMHG | HEART RATE: 93 BPM | DIASTOLIC BLOOD PRESSURE: 67 MMHG | TEMPERATURE: 96.1 F | BODY MASS INDEX: 42.47 KG/M2 | HEIGHT: 57 IN | RESPIRATION RATE: 18 BRPM

## 2024-06-11 DIAGNOSIS — E87.6 HYPOKALEMIA: Primary | ICD-10-CM

## 2024-06-11 DIAGNOSIS — C78.6 MALIGNANT NEOPLASM METASTATIC TO OMENTUM (HCC): ICD-10-CM

## 2024-06-11 DIAGNOSIS — C19 RECTOSIGMOID CANCER (HCC): ICD-10-CM

## 2024-06-11 PROCEDURE — 96417 CHEMO IV INFUS EACH ADDL SEQ: CPT

## 2024-06-11 PROCEDURE — 96367 TX/PROPH/DG ADDL SEQ IV INF: CPT

## 2024-06-11 PROCEDURE — 96413 CHEMO IV INFUSION 1 HR: CPT

## 2024-06-11 PROCEDURE — G0498 CHEMO EXTEND IV INFUS W/PUMP: HCPCS

## 2024-06-11 PROCEDURE — 96411 CHEMO IV PUSH ADDL DRUG: CPT

## 2024-06-11 PROCEDURE — 96368 THER/DIAG CONCURRENT INF: CPT

## 2024-06-11 PROCEDURE — 96375 TX/PRO/DX INJ NEW DRUG ADDON: CPT

## 2024-06-11 RX ORDER — SODIUM CHLORIDE 9 MG/ML
20 INJECTION, SOLUTION INTRAVENOUS ONCE
Status: COMPLETED | OUTPATIENT
Start: 2024-06-11 | End: 2024-06-11

## 2024-06-11 RX ORDER — ATROPINE SULFATE 1 MG/ML
0.25 INJECTION, SOLUTION INTRAVENOUS ONCE AS NEEDED
Status: DISCONTINUED | OUTPATIENT
Start: 2024-06-11 | End: 2024-06-14 | Stop reason: HOSPADM

## 2024-06-11 RX ORDER — ATROPINE SULFATE 1 MG/ML
0.25 INJECTION, SOLUTION INTRAVENOUS ONCE
Status: COMPLETED | OUTPATIENT
Start: 2024-06-11 | End: 2024-06-11

## 2024-06-11 RX ORDER — FLUOROURACIL 50 MG/ML
320 INJECTION, SOLUTION INTRAVENOUS ONCE
Status: COMPLETED | OUTPATIENT
Start: 2024-06-11 | End: 2024-06-11

## 2024-06-11 RX ADMIN — IRINOTECAN HYDROCHLORIDE 262 MG: 20 INJECTION, SOLUTION INTRAVENOUS at 11:37

## 2024-06-11 RX ADMIN — FLUOROURACIL 580 MG: 50 INJECTION, SOLUTION INTRAVENOUS at 13:07

## 2024-06-11 RX ADMIN — DEXAMETHASONE SODIUM PHOSPHATE: 10 INJECTION, SOLUTION INTRAMUSCULAR; INTRAVENOUS at 09:51

## 2024-06-11 RX ADMIN — BEVACIZUMAB 465 MG: 400 INJECTION, SOLUTION INTRAVENOUS at 10:54

## 2024-06-11 RX ADMIN — ATROPINE SULFATE 0.25 MG: 1 INJECTION, SOLUTION INTRAVENOUS at 11:33

## 2024-06-11 RX ADMIN — SODIUM CHLORIDE 20 ML/HR: 0.9 INJECTION, SOLUTION INTRAVENOUS at 09:52

## 2024-06-11 RX ADMIN — LEUCOVORIN CALCIUM 582 MG: 100 INJECTION, POWDER, LYOPHILIZED, FOR SUSPENSION INTRAMUSCULAR; INTRAVENOUS at 11:37

## 2024-06-11 RX ADMIN — SODIUM CHLORIDE 150 MG: 0.9 INJECTION, SOLUTION INTRAVENOUS at 10:15

## 2024-06-13 ENCOUNTER — HOSPITAL ENCOUNTER (OUTPATIENT)
Dept: INFUSION CENTER | Facility: HOSPITAL | Age: 69
Discharge: HOME | End: 2024-06-13
Attending: INTERNAL MEDICINE
Payer: MEDICARE

## 2024-06-13 VITALS
SYSTOLIC BLOOD PRESSURE: 139 MMHG | DIASTOLIC BLOOD PRESSURE: 86 MMHG | RESPIRATION RATE: 18 BRPM | HEART RATE: 81 BPM | OXYGEN SATURATION: 91 % | TEMPERATURE: 98 F

## 2024-06-13 DIAGNOSIS — E87.6 HYPOKALEMIA: Primary | ICD-10-CM

## 2024-06-13 DIAGNOSIS — E66.01 MORBID OBESITY (HCC): ICD-10-CM

## 2024-06-13 DIAGNOSIS — C19 RECTOSIGMOID CANCER (HCC): ICD-10-CM

## 2024-06-13 DIAGNOSIS — C78.6 MALIGNANT NEOPLASM METASTATIC TO OMENTUM (HCC): ICD-10-CM

## 2024-06-13 PROCEDURE — 96360 HYDRATION IV INFUSION INIT: CPT

## 2024-06-13 RX ADMIN — SODIUM CHLORIDE 1000 ML: 0.9 INJECTION, SOLUTION INTRAVENOUS at 11:41

## 2024-06-13 NOTE — PROGRESS NOTES
Itzel Sanches  tolerated treatment well with no complications.      Itzel Sanches is aware of future appt on 6/14/24 at 1330.     AVS printed and given to Itzel Sanches:  Yes x

## 2024-06-14 ENCOUNTER — HOSPITAL ENCOUNTER (OUTPATIENT)
Dept: INFUSION CENTER | Facility: HOSPITAL | Age: 69
End: 2024-06-14
Attending: INTERNAL MEDICINE
Payer: MEDICARE

## 2024-06-14 DIAGNOSIS — C78.6 MALIGNANT NEOPLASM METASTATIC TO OMENTUM (HCC): ICD-10-CM

## 2024-06-14 DIAGNOSIS — E87.6 HYPOKALEMIA: Primary | ICD-10-CM

## 2024-06-14 DIAGNOSIS — C19 RECTOSIGMOID CANCER (HCC): ICD-10-CM

## 2024-06-14 PROCEDURE — 96372 THER/PROPH/DIAG INJ SC/IM: CPT

## 2024-06-14 RX ADMIN — PEGFILGRASTIM 3 MG: 6 INJECTION SUBCUTANEOUS at 13:47

## 2024-06-14 NOTE — PROGRESS NOTES
Itzel Sanches  tolerated treatment well with no complications.      Itzel Sanches is aware of future appt on 6/24/24 at 0900.     AVS printed and given to Itzel Sanches:    No (Declined by Itzel Sanches)

## 2024-06-18 RX ORDER — ATROPINE SULFATE 1 MG/ML
0.25 INJECTION, SOLUTION INTRAVENOUS ONCE
Status: CANCELLED | OUTPATIENT
Start: 2024-06-24

## 2024-06-18 RX ORDER — FLUOROURACIL 50 MG/ML
320 INJECTION, SOLUTION INTRAVENOUS ONCE
Status: CANCELLED | OUTPATIENT
Start: 2024-06-24

## 2024-06-18 RX ORDER — ATROPINE SULFATE 1 MG/ML
0.25 INJECTION, SOLUTION INTRAVENOUS ONCE AS NEEDED
Status: CANCELLED | OUTPATIENT
Start: 2024-06-24

## 2024-06-18 RX ORDER — SODIUM CHLORIDE 9 MG/ML
20 INJECTION, SOLUTION INTRAVENOUS ONCE
Status: CANCELLED | OUTPATIENT
Start: 2024-06-24

## 2024-06-21 ENCOUNTER — APPOINTMENT (OUTPATIENT)
Dept: LAB | Facility: HOSPITAL | Age: 69
End: 2024-06-21
Payer: MEDICARE

## 2024-06-21 ENCOUNTER — TELEPHONE (OUTPATIENT)
Dept: INFUSION CENTER | Facility: HOSPITAL | Age: 69
End: 2024-06-21

## 2024-06-21 DIAGNOSIS — C19 RECTOSIGMOID CANCER (HCC): ICD-10-CM

## 2024-06-21 DIAGNOSIS — C78.6 MALIGNANT NEOPLASM METASTATIC TO OMENTUM (HCC): Primary | ICD-10-CM

## 2024-06-21 DIAGNOSIS — C78.6 MALIGNANT NEOPLASM METASTATIC TO OMENTUM (HCC): ICD-10-CM

## 2024-06-21 DIAGNOSIS — E87.6 HYPOKALEMIA: ICD-10-CM

## 2024-06-21 LAB
ALBUMIN SERPL BCG-MCNC: 3.5 G/DL (ref 3.5–5)
ALP SERPL-CCNC: 101 U/L (ref 34–104)
ALT SERPL W P-5'-P-CCNC: 18 U/L (ref 7–52)
ANION GAP SERPL CALCULATED.3IONS-SCNC: -3 MMOL/L (ref 4–13)
AST SERPL W P-5'-P-CCNC: 16 U/L (ref 13–39)
BASOPHILS # BLD AUTO: 0.06 THOUSANDS/ÂΜL (ref 0–0.1)
BASOPHILS NFR BLD AUTO: 1 % (ref 0–1)
BILIRUB SERPL-MCNC: 0.51 MG/DL (ref 0.2–1)
BUN SERPL-MCNC: 14 MG/DL (ref 5–25)
CALCIUM SERPL-MCNC: 9 MG/DL (ref 8.4–10.2)
CHLORIDE SERPL-SCNC: 114 MMOL/L (ref 96–108)
CO2 SERPL-SCNC: 22 MMOL/L (ref 21–32)
CREAT SERPL-MCNC: 1.02 MG/DL (ref 0.6–1.3)
EOSINOPHIL # BLD AUTO: 0.23 THOUSAND/ÂΜL (ref 0–0.61)
EOSINOPHIL NFR BLD AUTO: 3 % (ref 0–6)
ERYTHROCYTE [DISTWIDTH] IN BLOOD BY AUTOMATED COUNT: 26.1 % (ref 11.6–15.1)
GFR SERPL CREATININE-BSD FRML MDRD: 55 ML/MIN/1.73SQ M
GLUCOSE P FAST SERPL-MCNC: 97 MG/DL (ref 65–99)
HCT VFR BLD AUTO: 34.5 % (ref 34.8–46.1)
HGB BLD-MCNC: 10.8 G/DL (ref 11.5–15.4)
IMM GRANULOCYTES # BLD AUTO: 0.07 THOUSAND/UL (ref 0–0.2)
IMM GRANULOCYTES NFR BLD AUTO: 1 % (ref 0–2)
LYMPHOCYTES # BLD AUTO: 2.16 THOUSANDS/ÂΜL (ref 0.6–4.47)
LYMPHOCYTES NFR BLD AUTO: 26 % (ref 14–44)
MCH RBC QN AUTO: 29.6 PG (ref 26.8–34.3)
MCHC RBC AUTO-ENTMCNC: 31.3 G/DL (ref 31.4–37.4)
MCV RBC AUTO: 95 FL (ref 82–98)
MONOCYTES # BLD AUTO: 0.98 THOUSAND/ÂΜL (ref 0.17–1.22)
MONOCYTES NFR BLD AUTO: 12 % (ref 4–12)
NEUTROPHILS # BLD AUTO: 4.98 THOUSANDS/ÂΜL (ref 1.85–7.62)
NEUTS SEG NFR BLD AUTO: 57 % (ref 43–75)
NRBC BLD AUTO-RTO: 0 /100 WBCS
PLATELET # BLD AUTO: 344 THOUSANDS/UL (ref 149–390)
PMV BLD AUTO: 11.9 FL (ref 8.9–12.7)
POTASSIUM SERPL-SCNC: 4 MMOL/L (ref 3.5–5.3)
PROT SERPL-MCNC: 6.6 G/DL (ref 6.4–8.4)
RBC # BLD AUTO: 3.65 MILLION/UL (ref 3.81–5.12)
SODIUM SERPL-SCNC: 133 MMOL/L (ref 135–147)
WBC # BLD AUTO: 8.48 THOUSAND/UL (ref 4.31–10.16)

## 2024-06-21 PROCEDURE — 85025 COMPLETE CBC W/AUTO DIFF WBC: CPT

## 2024-06-21 PROCEDURE — 36415 COLL VENOUS BLD VENIPUNCTURE: CPT

## 2024-06-21 PROCEDURE — 80053 COMPREHEN METABOLIC PANEL: CPT

## 2024-06-24 ENCOUNTER — HOSPITAL ENCOUNTER (OUTPATIENT)
Dept: INFUSION CENTER | Facility: HOSPITAL | Age: 69
Discharge: HOME | End: 2024-06-24
Attending: INTERNAL MEDICINE
Payer: MEDICARE

## 2024-06-24 VITALS
DIASTOLIC BLOOD PRESSURE: 66 MMHG | OXYGEN SATURATION: 98 % | BODY MASS INDEX: 41.62 KG/M2 | TEMPERATURE: 97.6 F | WEIGHT: 192.9 LBS | RESPIRATION RATE: 18 BRPM | SYSTOLIC BLOOD PRESSURE: 134 MMHG | HEIGHT: 57 IN | HEART RATE: 96 BPM

## 2024-06-24 DIAGNOSIS — C19 RECTOSIGMOID CANCER (HCC): ICD-10-CM

## 2024-06-24 DIAGNOSIS — C78.6 MALIGNANT NEOPLASM METASTATIC TO OMENTUM (HCC): ICD-10-CM

## 2024-06-24 DIAGNOSIS — E87.6 HYPOKALEMIA: Primary | ICD-10-CM

## 2024-06-24 LAB
BACTERIA UR QL AUTO: ABNORMAL /HPF
BILIRUB UR QL STRIP: NEGATIVE
CLARITY UR: ABNORMAL
COLOR UR: ABNORMAL
GLUCOSE UR STRIP-MCNC: NEGATIVE MG/DL
HGB UR QL STRIP.AUTO: ABNORMAL
KETONES UR STRIP-MCNC: NEGATIVE MG/DL
LEUKOCYTE ESTERASE UR QL STRIP: ABNORMAL
MUCOUS THREADS UR QL AUTO: ABNORMAL
NITRITE UR QL STRIP: NEGATIVE
NON-SQ EPI CELLS URNS QL MICRO: ABNORMAL /HPF
PH UR STRIP.AUTO: 5.5 [PH]
PROT UR STRIP-MCNC: ABNORMAL MG/DL
RBC #/AREA URNS AUTO: ABNORMAL /HPF
SP GR UR STRIP.AUTO: >=1.03 (ref 1–1.03)
UROBILINOGEN UR STRIP-ACNC: 2 MG/DL
WBC #/AREA URNS AUTO: ABNORMAL /HPF

## 2024-06-24 PROCEDURE — 81001 URINALYSIS AUTO W/SCOPE: CPT | Performed by: INTERNAL MEDICINE

## 2024-06-24 RX ORDER — ATROPINE SULFATE 1 MG/ML
0.25 INJECTION, SOLUTION INTRAVENOUS ONCE
Status: COMPLETED | OUTPATIENT
Start: 2024-06-24 | End: 2024-06-24

## 2024-06-24 RX ORDER — FLUOROURACIL 50 MG/ML
320 INJECTION, SOLUTION INTRAVENOUS ONCE
Status: COMPLETED | OUTPATIENT
Start: 2024-06-24 | End: 2024-06-24

## 2024-06-24 RX ORDER — ATROPINE SULFATE 1 MG/ML
0.25 INJECTION, SOLUTION INTRAVENOUS ONCE AS NEEDED
Status: DISCONTINUED | OUTPATIENT
Start: 2024-06-24 | End: 2024-06-27 | Stop reason: HOSPADM

## 2024-06-24 RX ORDER — SODIUM CHLORIDE 9 MG/ML
20 INJECTION, SOLUTION INTRAVENOUS ONCE
Status: COMPLETED | OUTPATIENT
Start: 2024-06-24 | End: 2024-06-24

## 2024-06-24 RX ADMIN — ATROPINE SULFATE 0.25 MG: 1 INJECTION, SOLUTION INTRAVENOUS at 11:24

## 2024-06-24 RX ADMIN — IRINOTECAN HYDROCHLORIDE 262 MG: 20 INJECTION, SOLUTION INTRAVENOUS at 11:29

## 2024-06-24 RX ADMIN — LEUCOVORIN CALCIUM 582 MG: 100 INJECTION, POWDER, LYOPHILIZED, FOR SUSPENSION INTRAMUSCULAR; INTRAVENOUS at 11:29

## 2024-06-24 RX ADMIN — FLUOROURACIL 580 MG: 50 INJECTION, SOLUTION INTRAVENOUS at 13:05

## 2024-06-24 RX ADMIN — SODIUM CHLORIDE 20 ML/HR: 0.9 INJECTION, SOLUTION INTRAVENOUS at 09:33

## 2024-06-24 RX ADMIN — DEXAMETHASONE SODIUM PHOSPHATE: 10 INJECTION, SOLUTION INTRAMUSCULAR; INTRAVENOUS at 09:33

## 2024-06-24 RX ADMIN — FOSAPREPITANT 150 MG: 150 INJECTION, POWDER, LYOPHILIZED, FOR SOLUTION INTRAVENOUS at 09:55

## 2024-06-24 RX ADMIN — BEVACIZUMAB 465 MG: 400 INJECTION, SOLUTION INTRAVENOUS at 10:35

## 2024-06-24 NOTE — PROGRESS NOTES
Itzel Sanches tolerated treatment well with no complications.      Itzel Sanches is aware of future appt on 6/26/24 at 1130.     AVS printed and given to Itzel Sanches: No.

## 2024-06-25 ENCOUNTER — OFFICE VISIT (OUTPATIENT)
Dept: HEMATOLOGY ONCOLOGY | Facility: MEDICAL CENTER | Age: 69
End: 2024-06-25
Payer: MEDICARE

## 2024-06-25 VITALS
SYSTOLIC BLOOD PRESSURE: 134 MMHG | DIASTOLIC BLOOD PRESSURE: 72 MMHG | BODY MASS INDEX: 42.07 KG/M2 | TEMPERATURE: 98 F | HEART RATE: 80 BPM | OXYGEN SATURATION: 98 % | RESPIRATION RATE: 17 BRPM | HEIGHT: 57 IN | WEIGHT: 195 LBS

## 2024-06-25 DIAGNOSIS — E87.6 HYPOKALEMIA: ICD-10-CM

## 2024-06-25 DIAGNOSIS — C78.6 MALIGNANT NEOPLASM METASTATIC TO OMENTUM (HCC): Primary | ICD-10-CM

## 2024-06-25 DIAGNOSIS — R97.0 ELEVATED CARCINOEMBRYONIC ANTIGEN (CEA): ICD-10-CM

## 2024-06-25 DIAGNOSIS — C19 RECTOSIGMOID CANCER (HCC): ICD-10-CM

## 2024-06-25 PROCEDURE — 99214 OFFICE O/P EST MOD 30 MIN: CPT | Performed by: PHYSICIAN ASSISTANT

## 2024-06-25 NOTE — PROGRESS NOTES
Oncology Outpatient Follow- up Note  Itzel Sanches 69 y.o. 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     Recurrent rectosigmoid adenocarcinoma.  Patient was seen/evaluated by Dr. Freeman surgical oncology.  Patient received 3 months of preoperative FOLFOX.  Follow-up CT scans demonstrated response to treatment with no evidence of progressive disease.  Patient then underwent resection of the omental mass and spleen as well as DILMA/BSO (other issue, see below).  Mrs. Sanches was then restarted on FOLFOX.  Patient had problems with neuropathy; FOLFOX was changed to FOLFIRI (2 cycles of FOLFIRI only).     The 12th/final cycle of chemotherapy was completed on August 23, 2022. CT scan in May 2023 showed a new lesion to the right hepatic lobe.  MRI abdomen in July 2023 confirmed liver metastasis and revealed a new, posterior left chest wall/pleural lesion suspicious for metastasis. The chest wall mass was biopsied on 9/13/2023 revealing metastatic moderately differentiated adenocarcinoma consistent with known colonic primary.      Mrs. Sanches was seen/evaluated by Dr. Brown Grubbs (Radiation Oncology) and patient has received SBRT (left-sided lower rib cage/upper abdomen).  Mrs. Sanches also recently completed Y90.  Patient then went back on to surveillance.  During this surveillance, from late 2023 to March 2024, analysis of circulating tumor cells was attempted but patient deferred.     Unfortunately repeat CAT scans on March 8, 2024 demonstrated new mediastinal adenopathy, increased paraspinal lesion anterior to L1 and stable lesion at T12.  The previously seen liver lesion was stable although there was a new small lesion suspicious for an additional metastatic implant.  Patient also had new retroperitoneal adenopathy and  increased amee hepatis adenopathy.  Options were recently discussed and patient is back on chemotherapy.       Mrs. Lovett feels okay, better than before.  Patient is tolerating the chemotherapy.  Clinically there are no concerning findings.  Recent blood work was good/acceptable.  The plan is to continue with the same regimen.     Recent manipulation: full dose bevacizumab, all of the other medications including irinotecan, 5-FU push, leucovorin and 5-FU CADD are at 80%.    Patient is receiving her sixth cycle currently. Will repeat CT scans now.     Patient is to return to the office in 4 weeks.      Ovarian lesion.  MRI/pelvis in October 2021 demonstrated a multi septated left ovarian cystic lesion.  Transabdominal ultrasound results did not demonstrate any concerning abnormality.  Patient underwent DILMA/BSO.  Pathology results are listed below - no evidence of malignancy. Patient was last seen by Gynecologic Oncology in June 2022, and was advised to return for follow-up as needed.     Genetics.  Patient has a complicated family history but multiple family members with colon/rectal cancer.      Bilateral lower extremity swelling. Patient has a history of lower extremity DVTs, is on Eliquis.  No bruising or bleeding issues.  Patient will continue to monitor.     Mrs. Sanches knows to call the office if she has any other oncology questions or concerns.     Carefully review your medication list and verify that the list is accurate and up-to-date. Please call the hematology/oncology office if there are medications missing from the list, medications on the list that you are not currently taking or if there is a dosage or instruction that is different from how you're taking that medication.     Patient goals and areas of care: Continue with palliative chemotherapy. CT scans ordered.  Barriers to care:  None  Patient is able to self-care    CC:    Chief Complaint   Patient presents with    Follow-up    Rectosigmoid  carcinoma, omental recurrence, second recurren     HPI:  69 year old female previously diagnosed with rectosigmoid carcinoma status post resection in September 2019.  Postoperatively patient did well; Mrs. Sanches did not need/receive adjuvant chemotherapy. Surveillance CEA level was found to be elevated in June 2022.  Patient underwent workup.  Results demonstrated an intra-abdominal mass by the spleen. Patient underwent neoadjuvant chemotherapy with FOLFOX, with follow-up scans demonstrating response.  Patient then underwent resection, followed by postoperative chemotherapy with FOLFOX initially. FOLFOX was changed to FOLFIRI due to peripheral neuropathy. Treatment was completed in August 2022.      CT scan in May 2023 showed a new lesion to the right hepatic lobe. MRI abdomen in July 2023 confirmed liver metastasis and revealed a new, posterior left chest wall/pleural lesion suspicious for metastasis. The chest wall mass was biopsied on 9/13/2023 revealing adenocarcinoma consistent with known colonic primary (see Pathology .  Patient was seen/evaluated by Radiation Oncology and underwent SBRT to the left posterior chest wall lesion and more recently patient received Y90 to the liver lesion.     Patient was placed on surveillance.  The patient was presented at the GI tumor board and the consensus was to send for circulating tumor cells.  Mrs. Sanches never went for this, patient was called a number of times by the company providing the service.  Because patient did not recognize the telephone number, she would not .  Recent scans demonstrated recurrence.  Patient recently restarted bevacizumab with FOLFIRI.  Patient is currently in her sixth cycle.    Interval History:   Mrs. Sanches states feeling okay, tolerating the chemotherapy better with dose modifications.  No nausea or vomiting, appetite is okay.  Weight is stable.  No fevers or signs of infection.  No pain control issues.  No port issues.   Activities are limited but about the same as before.    Cancer History:     Cancer Staging   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HCC)  Staging form: Colon and Rectum, AJCC 8th Edition  - Clinical stage from 4/28/2022: cT3, cN0, pM1 - Signed by Jessie Freeman MD on 6/14/2023  Total positive nodes: 0  - Pathologic stage from 5/18/2022: Stage KRISTAL (pT3, pN0, pM1a) - Signed by Jessie Freeman MD on 6/14/2023  Total positive nodes: 0      Previous Hematologic/ Oncologic History:    Oncology History   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HCC)   9/23/2019 Initial Diagnosis    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HCC)     9/23/2019 Biopsy    Rectal Mass ( 2 slides [id number] [facility] Pathology, Collected on 09/23/2019, biopsy):  - Adenocarcinoma     12/10/2019 Surgery    Low anterior resection (Dr Jose Pastor):    A. Rectosigmoid colon, low anterior resection:  - Adenocarcinoma, moderately differentiated.   - Tumor invades through the muscularis propria into pericolorectal tissue, T3  - Diverticula.  - All margins are negative for tumor.  - Thirty-four lymph nodes, negative for malignancy (0/34).     B. Colon, anastomotic rings, resection:  - Two portions of colon with no pathologic abnormality     12/10/2019 Genomic Testing    RESULTS OF IMMUNOHISTOCHEMICAL ANALYSIS FOR MISMATCH REPAIR PROTEIN LOSS  INTERPRETATION: NO LOSS OF NUCLEAR EXPRESSION OF MMR PROTEINS: LOW PROBABILITY OF MSI-H  Note: Background non-neoplastic tissue and/or internal control with intact nuclear expression.      RESULTS:  Antibody          Clone               Description                           Results  MLH1               M1                  Mismatch repair protein       Intact nuclear expression  MSH2              [id number]       Mismatch repair protein       Intact nuclear expression  MSH6              44                   Mismatch repair protein       Intact nuclear expression  PMS2              [id number]         Mismatch repair protein       Intact nuclear expression     2/4/2020  Surgery    Ileostomy, takedown:  - Portion of small intestine with mucocutaneous anastomosis consistent with stoma.  - Negative for malignancy     8/18/2021 Progression    CEA trends- observed by Dr. Juarez  2/17/21: 2.9  8/18/2021: 4.5  9/13/2021: 4.4    PET/CT  9/28/2021 completed due to elevating CEA  1. There is a large left paracolic gutter markedly hypermetabolic mass immediately inferior to the spleen, most consistent with metastatic colorectal carcinoma.  2. Mildly increased activity at the right abdominal bowel anastomotic site.  If not recently performed, direct visualization is recommended  3. There are no other glucose avid abnormalities identified within the abdomen or pelvis  4. No evidence of distant glucose avid metastatic disease in the neck, chest, or skeleton      11/12/2021 Biopsy    Omental mass:  - Metastatic adenocarcinoma, with an immunophenotype compatible with metastasis from patient's known colonic primary.     12/30/2021 - 12/30/2021 Chemotherapy    CapOx x 1 dose of Oxaliplatin: D/c due to tolerance.      1/2/2022 Genomic Testing         1/4/2022 - 3/17/2022 Chemotherapy    First 6 cycles of Folfox given prior to surgery- 1/4 through 3/17/2022     Folfox was dose reduced due to anticipated tolerance.  Minimal side effects     3/22/2022 Observation    CT CAP  1.  Decreased size of biopsy-proven omental metastasis, which now only focally contacting rather than grossly invading the spleen and adjacent abdominal wall. No new evidence of metastatic disease in the abdomen or pelvis.  2.  No new or suspicious pulmonary nodules. One of the previously noted new 1-2 mm nodules is no longer seen, and the other is unchanged. Recommend continued attention on follow-up.  3.  Top-normal thickness endometrial stripe measuring 7 mm. If there is history of vaginal bleeding, suggest catheterization with endometrial biopsy.  4.  Hepatic steatosis.     4/28/2022 -  Cancer Staged    Staging form: Colon and  "Rectum, AJCC 8th Edition  - Clinical stage from 4/28/2022: cT3, cN0, pM1 - Signed by Jessie Freeman MD on 6/14/2023  Total positive nodes: 0       4/29/2022 Surgery    Lydia Shaw and Anup preformed the following procedures:   DIAGNOSTIC LAPAROSCOPY, TOTAL ABDOMINAL HYSTERECTOMY, BILATERAL SALPINGO-OOPHORECTOMY, EXTENSIVE ADHESIOLYSIS (N/A)  DIAGNOSTIC LAPAROSCOPY, OPEN OMENTECTOMY, POSSIBLE SPLENECTOMY (N/A)  INSTILLATION CHEMOTHERAPY INTRAPERITONEAL (HIPEC) LAPAROTOMY (N/A)  REPAIR DIAPHRAGM TEAR      A. Uterus, Bilateral Ovaries and Fallopian Tubes; Hysterosalpingo-oophorectomy:  - Leiomyoma.  - Left ovary with serous cystadenoma.  - Unremarkable cervix.  - Benign inactive endometrium with no specific pathologic change.  - Unremarkable right ovary and bilateral fallopian tubes.     B. Spleen, spleen, omentum, darotis:  - Metastatic adenocarcinoma involving spleen and omentum, consistent with colonic primary. See Note.    Note: Comparison to prior material ([id number], omental mass) reveals identical morphology to previous metastatic colonic adenocarcinoma.          5/18/2022 -  Cancer Staged    Staging form: Colon and Rectum, AJCC 8th Edition  - Pathologic stage from 5/18/2022: Stage KRISTAL (pT3, pN0, pM1a) - Signed by Jessie Freeman MD on 6/14/2023  Total positive nodes: 0       6/14/2022 - 8/23/2022 Chemotherapy    7th cycle started on 6/14/2022  8th cycle worsening neuropathy; could not tolerate gabapentin  D/lizette oxaliplatin  9th cycle Irinotecan added at 85% dose reduction: SE Diarrhea  10th cycle Irinotecan dose redcued to 50%     9/19/2022 Observation    9/19/2022 CT CAP:   1.  Previously seen omental metastasis in the left upper quadrant has been resected.  There is a small area of nodular soft tissue thickening abutting the lateral aspect of the left kidney, cannot exclude residual/recurrent tumor.  Follow up in 4 months CEA not completed at time of scan.      9/13/2023 Biopsy    Mass, \"Chest " "wall mass 4 cores,\" Biopsy:  - Metastatic moderately differentiated adenocarcinoma, consistent with known colonic primary      10/31/2023 - 11/10/2023 Radiation    Treatments:  Course: C1 SBRT    Plan ID Energy Fractions Dose per Fraction (cGy) Dose Correction (cGy) Total Dose Delivered (cGy) Elapsed Days   SBRT L CW 6X-FFF 5 / 5 900 0 4,500 10      Treatment Dates:  10/31/2023 - 11/10/2023.      4/8/2024 -  Chemotherapy    potassium chloride, 20 mEq, Intravenous, Once, 1 of 1 cycle  Administration: 20 mEq (5/14/2024)  alteplase (CATHFLO), 2 mg, Intracatheter, Every 1 Minute as needed, 6 of 6 cycles  pegfilgrastim (NEULASTA), 3 mg (100 % of original dose 3 mg), Subcutaneous, Once, 4 of 4 cycles  Dose modification: 3 mg (original dose 3 mg, Cycle 3)  Administration: 3 mg (5/17/2024), 3 mg (6/3/2024), 3 mg (6/14/2024)  fosaprepitant (EMEND) IVPB, 150 mg, Intravenous, Once, 5 of 5 cycles  Administration: 150 mg (4/22/2024), 150 mg (5/14/2024), 150 mg (5/29/2024), 150 mg (6/11/2024), 150 mg (6/24/2024)  fluorouracil (ADRUCIL), 400 mg/m2 = 730 mg, Intravenous, Once, 6 of 6 cycles  Dose modification: 360 mg/m2 (90 % of original dose 400 mg/m2, Cycle 3, Reason: Other (see comments)), 320 mg/m2 (80 % of original dose 400 mg/m2, Cycle 3, Reason: Other (see comments)), 360 mg/m2 (90 % of original dose 400 mg/m2, Cycle 3, Reason: Other (see comments)), 320 mg/m2 (80 % of original dose 400 mg/m2, Cycle 3, Reason: Other (see comments))  Administration: 730 mg (4/8/2024), 730 mg (4/22/2024), 580 mg (5/14/2024), 580 mg (5/29/2024), 580 mg (6/11/2024), 580 mg (6/24/2024)  bevacizumab (AVASTIN) IVPB, 5 mg/kg = 465 mg, Intravenous, Once, 6 of 6 cycles  Administration: 465 mg (4/8/2024), 465 mg (4/22/2024), 465 mg (5/14/2024), 465 mg (5/29/2024), 465 mg (6/11/2024), 465 mg (6/24/2024)  irinotecan (CAMPTOSAR) chemo infusion, 295 mg (90 % of original dose 180 mg/m2), Intravenous, Once, 6 of 6 cycles  Dose modification: 162 mg/m2 (90 % " "of original dose 180 mg/m2, Cycle 1, Reason: Other (see comments)), 144 mg/m2 (80 % of original dose 180 mg/m2, Cycle 3, Reason: Dose Not Tolerated), 162 mg/m2 (90 % of original dose 180 mg/m2, Cycle 3, Reason: Other (see comments)), 144 mg/m2 (80 % of original dose 180 mg/m2, Cycle 3, Reason: Other (see comments))  Administration: 300 mg (4/8/2024), 295 mg (4/22/2024), 262 mg (5/14/2024), 262 mg (5/29/2024), 262 mg (6/11/2024), 262 mg (6/24/2024)  leucovorin calcium IVPB, 728 mg, Intravenous, Once, 6 of 6 cycles  Dose modification: 360 mg/m2 (90 % of original dose 400 mg/m2, Cycle 3, Reason: Other (see comments)), 320 mg/m2 (80 % of original dose 400 mg/m2, Cycle 3, Reason: Other (see comments)), 360 mg/m2 (90 % of original dose 400 mg/m2, Cycle 3, Reason: Other (see comments)), 320 mg/m2 (80 % of original dose 400 mg/m2, Cycle 3, Reason: Other (see comments))  Administration: 700 mg (4/8/2024), 700 mg (4/22/2024), 582 mg (5/14/2024), 582 mg (5/29/2024), 582 mg (6/11/2024), 582 mg (6/24/2024)  fluorouracil (ADRUCIL) ambulatory infusion Soln, 1,200 mg/m2/day = 4,370 mg, Intravenous, Over 46 hours, 6 of 6 cycles  Dose modification: 960 mg/m2/day (80 % of original dose 1,200 mg/m2/day, Cycle 4, Reason: Other (see comments))     Omental metastasis   9/23/2019 Biopsy    Rectal Mass ( 2 slides [id number] [facility] Pathology, Collected on 09/23/2019, biopsy):  - Adenocarcinoma     2/4/2020 Surgery    Ileostomy, takedown:  - Portion of small intestine with mucocutaneous anastomosis consistent with stoma.  - Negative for malignancy     9/13/2023 Biopsy    Mass, \"Chest wall mass 4 cores,\" Biopsy:  - Metastatic moderately differentiated adenocarcinoma, consistent with known colonic primary      4/8/2024 -  Chemotherapy    potassium chloride, 20 mEq, Intravenous, Once, 1 of 1 cycle  Administration: 20 mEq (5/14/2024)  alteplase (CATHFLO), 2 mg, Intracatheter, Every 1 Minute as needed, 6 of 6 cycles  pegfilgrastim (NEULASTA), 3 " mg (100 % of original dose 3 mg), Subcutaneous, Once, 4 of 4 cycles  Dose modification: 3 mg (original dose 3 mg, Cycle 3)  Administration: 3 mg (5/17/2024), 3 mg (6/3/2024), 3 mg (6/14/2024)  fosaprepitant (EMEND) IVPB, 150 mg, Intravenous, Once, 5 of 5 cycles  Administration: 150 mg (4/22/2024), 150 mg (5/14/2024), 150 mg (5/29/2024), 150 mg (6/11/2024), 150 mg (6/24/2024)  fluorouracil (ADRUCIL), 400 mg/m2 = 730 mg, Intravenous, Once, 6 of 6 cycles  Dose modification: 360 mg/m2 (90 % of original dose 400 mg/m2, Cycle 3, Reason: Other (see comments)), 320 mg/m2 (80 % of original dose 400 mg/m2, Cycle 3, Reason: Other (see comments)), 360 mg/m2 (90 % of original dose 400 mg/m2, Cycle 3, Reason: Other (see comments)), 320 mg/m2 (80 % of original dose 400 mg/m2, Cycle 3, Reason: Other (see comments))  Administration: 730 mg (4/8/2024), 730 mg (4/22/2024), 580 mg (5/14/2024), 580 mg (5/29/2024), 580 mg (6/11/2024), 580 mg (6/24/2024)  bevacizumab (AVASTIN) IVPB, 5 mg/kg = 465 mg, Intravenous, Once, 6 of 6 cycles  Administration: 465 mg (4/8/2024), 465 mg (4/22/2024), 465 mg (5/14/2024), 465 mg (5/29/2024), 465 mg (6/11/2024), 465 mg (6/24/2024)  irinotecan (CAMPTOSAR) chemo infusion, 295 mg (90 % of original dose 180 mg/m2), Intravenous, Once, 6 of 6 cycles  Dose modification: 162 mg/m2 (90 % of original dose 180 mg/m2, Cycle 1, Reason: Other (see comments)), 144 mg/m2 (80 % of original dose 180 mg/m2, Cycle 3, Reason: Dose Not Tolerated), 162 mg/m2 (90 % of original dose 180 mg/m2, Cycle 3, Reason: Other (see comments)), 144 mg/m2 (80 % of original dose 180 mg/m2, Cycle 3, Reason: Other (see comments))  Administration: 300 mg (4/8/2024), 295 mg (4/22/2024), 262 mg (5/14/2024), 262 mg (5/29/2024), 262 mg (6/11/2024), 262 mg (6/24/2024)  leucovorin calcium IVPB, 728 mg, Intravenous, Once, 6 of 6 cycles  Dose modification: 360 mg/m2 (90 % of original dose 400 mg/m2, Cycle 3, Reason: Other (see comments)), 320 mg/m2 (80  % of original dose 400 mg/m2, Cycle 3, Reason: Other (see comments)), 360 mg/m2 (90 % of original dose 400 mg/m2, Cycle 3, Reason: Other (see comments)), 320 mg/m2 (80 % of original dose 400 mg/m2, Cycle 3, Reason: Other (see comments))  Administration: 700 mg (4/8/2024), 700 mg (4/22/2024), 582 mg (5/14/2024), 582 mg (5/29/2024), 582 mg (6/11/2024), 582 mg (6/24/2024)  fluorouracil (ADRUCIL) ambulatory infusion Soln, 1,200 mg/m2/day = 4,370 mg, Intravenous, Over 46 hours, 6 of 6 cycles  Dose modification: 960 mg/m2/day (80 % of original dose 1,200 mg/m2/day, Cycle 4, Reason: Other (see comments))         Test Results:    Laboratory    6/21/2024 WBC 8.48, hemoglobin 10.8, hematocrit 34.5, platelets 344, potassium 4.0, BUN 14, creatinine 1.02, LFTs within normal limits.     5/28/2024 WBC = 5.94 hemoglobin = 10.7 hematocrit = 34 platelet = 342 neutrophil = 43% bands = 1% BUN = 11 creatinine = 1.00 calcium = 9.3 LFTs WNL     4/19/2024 WBC = 3.23 hemoglobin = 11.7 hematocrit = 37 platelet = 262 neutrophil = 51% BUN = 17 creatinine = 1.23 calcium = 9.0 LFTs WNL     1/11/2024 BUN = 18 creatinine = 1.07 LFTs WNL calcium = 8.9          11/15/2023 WBC = 6.0 hemoglobin = 11 hematocrit = 34 platelet = 247 neutrophil = 67%     Procedures     1/11/2024 NM SPECT post microsphere implant     IMPRESSION:     1.  Successful intra-arterial placement of Y90 THERASPHERE.     10/06/2021 colonoscopy     FINDINGS:  Healthy end-to-end colorectal anastomosis with no bleeding in the mid rectum  All observed locations appeared normal, including the cecum, ascending colon, transverse colon, splenic flexure and descending colon. A couple scattered diverticuli seen     Imaging     3/8/2024 CT of the chest abdomen pelvis with contrast     IMPRESSION:     1.  New mediastinal lymphadenopathy suspicious for metastasis.     2.  Decreased size of left 11th rib metastasis status post radiation. New patchy density in the left lower lobe adjacent to the  rib mass is likely related to interval radiation.     3.  Increased paraspinal lesion anterior to L1 and stable paraspinal lesion at T12.     4.  Previously seen liver lesion is stable though there is a new small lesion suspicious for metastasis.     5.  New retroperitoneal lymphadenopathy and increased amee hepatis lymphadenopathy suspicious for metastasis.     10/09/2023 CT chest abdomen pelvis     IMPRESSION:     Enlarging 6.8 x 4.4 x 4.1 cm T11 rib metastasis.     Stable ill-defined hypovascular nodule in the right lobe of the liver which remains concerning for metastasis.     Cholelithiasis.     09/13/2023 IR biopsy chest wall     IMPRESSION:  Successful ultrasound guided core biopsy of the left posterior pleural-based mass.     07/24/2023 MRI abdomen      IMPRESSION:     16 mm segment 6 hepatic lesion unchanged from the CT suspicious for metastasis.     New posterior left chest wall/pleural lesion measuring 2.7 x 2.3 cm also suspicious for metastasis.     05/30/2023 CT chest abdomen pelvis     IMPRESSION:     No evidence of acute intrathoracic pathology.     New 7 mm hypodensity of the right hepatic lobe which was not present on prior examinations. Contrast enhanced abdominal MRI recommended for further evaluation.     Nodular soft tissue adjacent to the left upper renal capsule and prior splenectomy site is stable     01/27/2023 CT chest abdomen pelvis     IMPRESSION:     No definite evidence for metastatic disease involving the chest, abdomen, or pelvis.     Note is made of stable nodular soft tissue about the lateral aspect of the left upper renal pole which could reflect postsurgical changes related to prior splenectomy although continued short interval follow-up is recommended to exclude   residual/recurrent tumor in this location.     07/25/2022 vascular lower limb venous duplex study     RIGHT LOWER LIMB:  Acute mostly deep vein thrombosis in the paired posterior tibial veins  throughout the calf.  No  evidence of superficial thrombophlebitis noted.  Popliteal, posterior tibial and anterior tibial arterial Doppler waveforms are  triphasic.     LEFT LOWER LIMB:  No evidence of acute or chronic deep vein thrombosis.  No evidence of superficial thrombophlebitis noted.  Doppler evaluation shows a normal response to augmentation maneuvers.  Popliteal, posterior tibial and anterior tibial arterial Doppler waveforms are  triphasic.     03/22/2022 CT scan chest abdomen pelvis with contrast     ABDOMINOPELVIC CAVITY: Left upper quadrant biopsy-proven omental metastasis has decreased in size now measuring 4.1 x 2.5 x 2.7 cm (previously 5.3 x 4.5 x 4.3 cm), and now only focally contacts rather than grossly invades the spleen and intercostal soft   tissues, on series 2/54, 601/71. Adjacent omental nodularity is less conspicuous. No new evidence of metastatic disease in the abdomen or pelvis. No lymphadenopathy. No ascites or intraperitoneal free air.     IMPRESSION:     1.  Decreased size of biopsy-proven omental metastasis, which now only focally contacting rather than grossly invading the spleen and adjacent abdominal wall. No new evidence of metastatic disease in the abdomen or pelvis.  2.  No new or suspicious pulmonary nodules. One of the previously noted new 1-2 mm nodules is no longer seen, and the other is unchanged. Recommend continued attention on follow-up.  3.  Top-normal thickness endometrial stripe measuring 7 mm. If there is history of vaginal bleeding, suggest catheterization with endometrial biopsy.  4.  Hepatic steatosis.     11/11/2021 ultrasound pelvis transabdominal only     Cluster of anechoic cystic areas seen replacing the left ovary similar to MRI largest measuring 8 mm compared to 1.3 cm.  Based on the ACR O-RADS system, this is O-RADS category 2 (almost certain benign with <1% risk of malignancy.) The management recommendation is followup in 1 year.     10/25/2021 MRI pelvis rectal cancer staging      1.  No recurrent or metastatic disease in the pelvis.  Please refer to the separately dictated MRI abdomen report regarding mesenteric mass in the left upper quadrant.     2.  Multi septated cystic lesion versus cluster of small cysts in the left ovary measuring 2.6 x 2.5 x 1.9 cm, increased in size from October 2019.  Further characterization with pelvic ultrasound is recommended.     10/25/2021 MRI abdomen     1.  5.2 cm mesenteric mass in the left upper quadrant with thickening of peritoneal reflection and invasion of the spleen, similar to prior PET/CT.  This is highly suspicious for metastatic tumor implant.  2.  No additional potential sites of metastasis in the abdomen.  3.  Moderately distended gallbladder with gallstones and probable adenomyomatosis.     09/28/2021 PET-CT     1. There is a large left paracolic gutter markedly hypermetabolic mass immediately inferior to the spleen, most consistent with metastatic colorectal carcinoma.  2. Mildly increased activity at the right abdominal bowel anastomotic site.  If not recently performed, direct visualization is recommended  3. There are no other glucose avid abnormalities identified within the abdomen or pelvis  4. No evidence of distant glucose avid metastatic disease in the neck, chest, or skeleton      Pathology     9/13/2023 [facility] results on the chart.  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  No other actionable mutations, BRAF was not mutated, MSI was stable, mismatch repair proteins were proficient, tumor mutational burden was low, PD-L1 = 0% = negative, NTRK1/2/3 fusion not detected.           Case Report   Surgical Pathology Report                         Case: [id number]                                    Authorizing Provider:  Jessie Freeman MD       Collected:           09/13/2023 1146               Ordering Location:     [facility]  "[facility] Received:            09/13/2023 1225                                      Cardiac Cath Lab                                                              Pathologist:           Aristeo Rashid MD                                                            Specimen:    Chest Wall, chest wall mass, 4 cores                                                        Final Diagnosis   A. Mass, \"Chest wall mass 4 cores,\" Biopsy:  - Metastatic moderately differentiated adenocarcinoma, consistent with known colonic primary (see note)   Electronically signed by Aristeo Rashid MD on 9/18/2023 at  9:45 AM   Note     Immunohistochemistry was performed on block A1 with adequate controls. Tumor cells are positive for AE1/AE3, CAM5.2, CDX2, and SATB2. They are negative for CK20 and CK7.   Immunohistochemistry was performed on block A2 with adequate controls. Tumor cells are positive for AE1/AE3, Cam5.2, and negative for CK20.      Case Report   Surgical Pathology Report                         Case: [id number]                                    Authorizing Provider:  Peterson Mae,   Collected:           04/29/2022 1118                                      MD                                                                            Ordering Location:     [facility]      Received:            04/29/2022 2236                                      Hospital Operating Room                                                       Pathologist:           Roselyn King DO                                                      Specimens:   A) - Uterus w/Bilateral Ovaries and Fallopian Tubes                                                  B) - Spleen, spleen, omentum, darotis                                                       Final Diagnosis   A. Uterus, Bilateral Ovaries and Fallopian Tubes; Hysterosalpingo-oophorectomy:  - Leiomyoma.  - Left ovary with serous cystadenoma.  - Unremarkable " cervix.  - Benign inactive endometrium with no specific pathologic change.  - Unremarkable right ovary and bilateral fallopian tubes.     B. Spleen, spleen, omentum, darotis:  - Metastatic adenocarcinoma involving spleen and omentum, consistent with colonic primary. See Note.   Electronically signed by Roselyn King DO on 5/12/2022 at  2:49 PM      Note     Note B: Comparison to prior material ([id number], omental mass) reveals identical morphology to previous metastatic colonic adenocarcinoma.            Case Report   Surgical Pathology Report                         Case: [id number]                                    Authorizing Provider:  Sohail Grubbs MD           Collected:           11/12/2021 0921               Ordering Location:     [facility] Received:            11/12/2021 0941                                      CAT Scan                                                                      Pathologist:           Pamela Ramos MD                                                         Specimen:    Omentum, Omental mass                                                                       Final Diagnosis   A. Omentum, Omental mass:  - Metastatic adenocarcinoma, with an immunophenotype compatible with metastasis from patient's known colonic primary.  - See note.     Note: Tumor is positive with CK20, CDX2 and negative with CK7, PAX8. This immunophenotype supports the above interpretation.  Best representative block with tumor A5.     This case was reviewed at the intradepartmental  conference.   Dr. Grubbs is notified of the diagnosis in EPIC via [employer] on 11/17/2021  at 8.45 am.   Electronically signed by Pamela Ramos MD on 11/17/2021 at  8:53 AM                  Case Report   Surgical Pathology Report                         Case: [id number]                                    Authorizing Provider:  WINNIE Pastor MD       Collected:           12/10/2019  1159               Ordering Location:     [facility]      Received:            12/10/2019 [address] [place] [address] Operating Room                                                       Pathologist:           Osman Beltran MD                                                                  Specimens:   A) - Large Intestine, Sigmoid Colon, and portion of rectum                                           B) - Anastomatic site, anastamotic rings                                                    Addendum   RRESULTS OF IMMUNOHISTOCHEMICAL ANALYSIS FOR MISMATCH REPAIR PROTEIN LOSS  INTERPRETATION: NO LOSS OF NUCLEAR EXPRESSION OF MMR PROTEINS: LOW PROBABILITY OF MSI-H  Note: Background non-neoplastic tissue and/or internal control with intact nuclear expression.      RESULTS:  Antibody          Clone               Description                           Results  MLH1               M1                  Mismatch repair protein       Intact nuclear expression  MSH2              [id number]       Mismatch repair protein       Intact nuclear expression  MSH6              44                   Mismatch repair protein       Intact nuclear expression  PMS2              [id number]         Mismatch repair protein       Intact nuclear expression     Comment:  A negative control and a positive control for each antibody have been reviewed and accepted.  [facility] Specimen ID: [id number], Evaluator:  JOSÉ MIGUEL Perales MD  These tests were developed and their performance characteristics determined by [employer]  They may not be cleared or approved by the U.S. Food and Drug Administration.  The FDA determined that such clearance or approval is not necessary.  These tests are used for clinical purposes.  They should not be regarded as investigational or for research.  This laboratory has been approved by CLIA 88, designated as a high-complexity laboratory and is qualified to perform these tests.     Comments:  Patients whose tumors demonstrate lack of expression of one or more DNA mismatch repair proteins might be at risk for Bee Syndrome. This cancer susceptibility syndrome greatly increases the risk of synchronous and/or metachronous cancers in the affected patients and their family members. Normal expression of all proteins does not completely rule out familial cancer predisposition.     The [facility] Bee Syndrome Surveillance Program Task Forces recommends that all patients with lack of expression of one or more DNA mismatch repair proteins and those with concerning personal or family history should undergo thorough evaluation, counseling and possibly genetic testing.       Addendum electronically signed by Osman Beltran MD on 12/20/2019 at  3:28 PM   Final Diagnosis   A. Rectosigmoid colon, low anterior resection:  - Adenocarcinoma, moderately differentiated.   - Tumor invades through the muscularis propria into pericolorectal tissue, T3  - Diverticula.  - All margins are negative for tumor.  - Thirty-four lymph nodes, negative for malignancy (0/34).     B. Colon, anastomotic rings, resection:  - Two portions of colon with no pathologic abnormality     Intradepartmental consultation is in agreement.  [place] [address]  Immunohistochemistry for desmin  is performed on tissue blocks A13 and A16 to help in the assessment of this case.         Electronically signed by Osman Beltran MD on 12/18/2019 at 10:42 AM   Additional Information     All controls performed with the immunohistochemical stains reported above reacted appropriately.  These tests were developed and their performance characteristics determined by [facility] or [facility]. They may not be cleared or approved by the U.S. Food and Drug Administration. The FDA has determined that such clearance or approval is not necessary. These tests are used for  clinical purposes. They should not be regarded as investigational or for research. This laboratory has been approved by [facility] 88, designated as a high-complexity laboratory and is qualified to perform these tests.   Synoptic Checklist   COLON AND RECTUM: Resection, Including Transanal Disk Excision of Rectal Neoplasms  8th Edition - Protocol posted: 2/27/2019  ColoRectal - All Specimens          SPECIMEN   Procedure   Low anterior resection    Macroscopic Intactness of Mesorectum   Complete    TUMOR   Tumor Site   Rectosigmoid    Histologic Type   Adenocarcinoma    Histologic Grade   G2: Moderately differentiated    Tumor Size   Greatest dimension (Centimeters): 5.4 cm   Additional Dimension (Centimeters)   4.6 cm       1 cm   Tumor Deposits   Not identified    Tumor Extension   Tumor invades through the muscularis propria into pericolorectal tissue    Macroscopic Tumor Perforation   Not identified    Lymphovascular Invasion   Not identified    Perineural Invasion   Not identified    Type of Polyp in Which Invasive Carcinoma Arose   Tubular adenoma    Treatment Effect   No known presurgical therapy    MARGINS   Margins   All margins are uninvolved by invasive carcinoma, high-grade dysplasia, intramucosal adenocarcinoma, and adenoma    Margins Examined   Proximal        Distal        Radial or Mesenteric    Distance of Invasive Carcinoma from Closest Margin   1.5 cm   Closest Margin   Radial or Mesenteric    Distance of Tumor from Radial Margin   1.5 cm   LYMPH NODES   Number of Lymph Nodes Involved   0    Number of Lymph Nodes Examined   34    PATHOLOGIC STAGE CLASSIFICATION (pTNM, AJCC 8th Edition)   Primary Tumor (pT)   pT3    Regional Lymph Nodes (pN)   pN0    ADDITIONAL FINDINGS   Additional Pathologic Findings   Diverticulosis    .        Review of Systems   Constitutional:  Negative for appetite change, fatigue, fever and unexpected weight change.   HENT:  Negative for nosebleeds.    Respiratory:  Negative for  cough, choking and shortness of breath.         Negative hemoptysis.   Cardiovascular:  Negative for chest pain, palpitations and leg swelling.   Gastrointestinal: Negative.  Negative for abdominal distention, abdominal pain, anal bleeding, blood in stool, constipation, diarrhea, nausea and vomiting.   Endocrine: Negative.  Negative for cold intolerance.   Genitourinary: Negative.  Negative for hematuria, menstrual problem, vaginal bleeding, vaginal discharge and vaginal pain.   Musculoskeletal: Negative.  Negative for arthralgias, myalgias, neck pain and neck stiffness.   Skin: Negative.  Negative for color change, pallor and rash.   Allergic/Immunologic: Negative.  Negative for immunocompromised state.   Neurological: Negative.  Negative for weakness and headaches.   Hematological:  Negative for adenopathy. Does not bruise/bleed easily.   All other systems reviewed and are negative.    Active Problems:   Patient Active Problem List   Diagnosis    Callus of foot    Foot pain    GERD (gastroesophageal reflux disease)    Mixed hyperlipidemia    Prediabetes    Varicose veins with pain    Morbid obesity (HCC)    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HCC)    Dyspnea on exertion    Dyslipidemia    Sigmoid diverticulosis    PONV (postoperative nausea and vomiting)    Omental metastasis    Lesion of ovary    Chemotherapy adverse reaction    Chemotherapy-induced nausea    Platelets decreased (HCC)    Stage 3 chronic kidney disease, unspecified whether stage 3a or 3b CKD (HCC)    H/O splenectomy    Asplenia    Postoperative state    Metastases to the liver (HCC)    Chemotherapy-induced neuropathy (HCC)    Chemotherapy-induced neutropenia (HCC)    Hypokalemia       Past Medical History:   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    Colon polyp     GERD (gastroesophageal reflux disease)     History of  chemotherapy 2021    History of rectal surgery     Hyperlipidemia     PONV (postoperative nausea and vomiting)        Surgical History:   Past Surgical History:   Procedure Laterality Date    BREAST CYST EXCISION Right      SECTION      x3    COLON SIGMOID RESECTION      COLONOSCOPY      DILATION AND CURETTAGE OF UTERUS      ILEO LOOP DIVERSION N/A 12/10/2019    Procedure: ILEOSTOMY LOOP;  Surgeon: WINNIE Pastor MD;  [facility]: [facility];  Service: Robotics    INSTILLATION CHEMOTHERAPY INTRAPERITONEAL (HIPEC) LAPAROTOMY N/A 2022    Procedure: INSTILLATION CHEMOTHERAPY INTRAPERITONEAL (HIPEC) LAPAROTOMY;  Surgeon: Jessie Freeman MD;  [facility];  Service: Surgical Oncology    IR BIOPSY CHEST WALL  2023    IR BIOPSY OMENTUM  2021    IR PORT PLACEMENT  2021    IR Y-90 PRE-ANGIO/EMBO W/ LUNG SCAN  2024    IR Y-90 RADIOEMBOLIZATION  2024    OMENTECTOMY N/A 2022    Procedure: DIAGNOSTIC LAPAROSCOPY, OPEN OMENTECTOMY, SPLENECTOMY, DIAPHRAGM REPAIR, CHEST TUBE INSERTION;  Surgeon: Jessie Freeman MD;  [facility];  Service: Surgical Oncology    KY CLOSURE ENTEROSTOMY LG/SMALL INTESTINE N/A 2020    Procedure: CLOSURE ILEOSTOMY;  Surgeon: WINNIE Pastor MD;  [facility];  Service: Colorectal    KY LAPAROSCOPY COLECTOMY PARTIAL W/ANASTOMOSIS N/A 12/10/2019    Procedure: SIGMOID RESECTION COLON LAPAROSCOPIC W ROBOTICS converted to lap hand assisted  with Partial proctectomy , LASER FLUORESCENCE ANGIOGRAPHY, INTRA OP COLONOSCOPY;  Surgeon: WINNIE Pastor MD;  [facility];  Service: Robotics    KY TOTAL ABDOMINAL HYSTERECT W/WO RMVL TUBE OVARY N/A 2022    Procedure: DIAGNOSTIC LAPAROSCOPY, TOTAL ABDOMINAL HYSTERECTOMY, BILATERAL SALPINGO-OOPHORECTOMY, EXTENSIVE ADHESIOLYSIS;  Surgeon: Peterson Mae MD;  [facility];  Service: Gynecology Oncology    KY UNLISTED PROCEDURE DIAPHRAGM  2022     "Procedure: REPAIR DIAPHRAGM TEAR;  Surgeon: Yana Hebert MD;  [facility];  Service: Thoracic    SMALL INTESTINE SURGERY  02/04/2020    Procedure: RESECTION SMALL BOWEL;  Surgeon: WINNIE Pastor MD;  [facility];  Service: Colorectal       Family History:    Family History   Problem Relation Age of Onset    Hypertension Mother     Heart attack Mother     Heart attack Father     Heart disease Father     Hypertension Brother     Heart attack Brother     Colon cancer Paternal Uncle     Leukemia Cousin     Breast cancer Cousin     Diabetes Cousin     Breast cancer Cousin     Colon cancer Family         paternal uncle    Stroke Neg Hx        Cancer-related family history includes Breast cancer in her cousin and cousin; Colon cancer in her family and paternal uncle.    Social History:   Social History     Socioeconomic History    Marital status:      Spouse name: Not on file    Number of children: Not on file    Years of education: Not on file    Highest education level: Not on file   Occupational History    Not on file   Tobacco Use    Smoking status: Never     Passive exposure: Past    Smokeless tobacco: Never   Vaping Use    Vaping status: Never Used   Substance and Sexual Activity    Alcohol use: Yes     Comment: \"occasionally\"    Drug use: Never    Sexual activity: Not Currently   Other Topics Concern    Not on file   Social History Narrative    Not on file     Social Determinants of Health     Financial Resource Strain: Low Risk  (3/21/2023)    Overall Financial Resource Strain (CARDIA)     Difficulty of Paying Living Expenses: Not hard at all   Food Insecurity: No Food Insecurity (3/28/2024)    Hunger Vital Sign     Worried About Running Out of Food in the Last Year: Never true     Ran Out of Food in the Last Year: Never true   Transportation Needs: No Transportation Needs (3/28/2024)    PRAPARE - Transportation     Lack of Transportation (Medical): No     Lack of Transportation " (Non-Medical): No   Physical Activity: Not on file   Stress: Not on file   Social Connections: Not on file   Intimate Partner Violence: Not on file   Housing Stability: Unknown (3/28/2024)    Housing Stability Vital Sign     Unable to Pay for Housing in the Last Year: No     Number of Places Lived in the Last Year: Not on file     Unstable Housing in the Last Year: No       Current Medications:   Current Outpatient Medications   Medication Sig Dispense Refill    Acetaminophen (TYLENOL 8 HOUR PO) Take by mouth PRN      apixaban (Eliquis) 5 mg Take 1 tablet (5 mg total) by mouth 2 (two) times a day 60 tablet 0    ezetimibe (ZETIA) 10 mg tablet Take 1 tablet (10 mg total) by mouth daily 90 tablet 1    fluorouracil 3,495 mg in CADD/Elastomeric Infusion Device Infuse 3,495 mg (960 mg/m2/day x 1.82 m2) into a catheter in a vein via infusion device over 46 hours for 2 days  Infusion planned for June 24, 2024. 1 each 3    meclizine (ANTIVERT) 12.5 MG tablet Take 1 tablet (12.5 mg total) by mouth 3 (three) times a day as needed for dizziness (Patient taking differently: Take 12.5 mg by mouth 3 (three) times a day as needed for dizziness PRN) 30 tablet 0    ondansetron (Zofran ODT) 8 mg disintegrating tablet Take 1 tablet (8 mg total) by mouth every 8 (eight) hours as needed for nausea or vomiting 30 tablet 5    famotidine (PEPCID) 20 mg tablet Take 1 tablet (20 mg total) by mouth 2 (two) times a day as needed for heartburn As needed 90 tablet 1    lidocaine (Lidoderm) 5 % Apply 1 patch topically over 12 hours daily Remove & Discard patch within 12 hours or as directed by MD (Patient not taking: Reported on 2/1/2024) 12 patch 3     No current facility-administered medications for this visit.     Facility-Administered Medications Ordered in Other Visits   Medication Dose Route Frequency Provider Last Rate Last Admin    alteplase (CATHFLO) injection 2 mg  2 mg Intracatheter Q1MIN PRN Declan Wiggins MD        Atropine Sulfate  injection 0.25 mg  0.25 mg Intravenous Once PRN Declan Wiggins MD           Allergies:   Allergies   Allergen Reactions    Medical Tape Rash     Skin irritation  Skin peeling  Skin irritation  Skin peeling  Blisters  Rash     Physical Exam:    Body surface area is 1.78 meters squared.    Wt Readings from Last 3 Encounters:   06/25/24 88.5 kg (195 lb)   06/24/24 87.5 kg (192 lb 14.4 oz)   06/11/24 89.3 kg (196 lb 13.9 oz)        Temp Readings from Last 3 Encounters:   06/25/24 98 °F (36.7 °C) (Temporal)   06/24/24 97.6 °F (36.4 °C) (Temporal)   06/13/24 98 °F (36.7 °C) (Temporal)        BP Readings from Last 3 Encounters:   06/25/24 134/72   06/24/24 134/66   06/13/24 139/86         Pulse Readings from Last 3 Encounters:   06/25/24 80   06/24/24 96   06/13/24 81     @LASTSAO2(3)@    Constitutional:       Appearance: She is well-developed. She is obese.   HENT:      Head: Normocephalic and atraumatic.      Right Ear: External ear normal.      Left Ear: External ear normal.   Eyes:      Extraocular Movements: Extraocular movements intact.      Conjunctiva/sclera: Conjunctivae normal.      Pupils: Pupils are equal, round, and reactive to light.   Cardiovascular:      Rate and Rhythm: Normal rate and regular rhythm.      Heart sounds: Normal heart sounds.   Pulmonary:      Effort: Pulmonary effort is normal.      Breath sounds: Normal breath sounds.   Abdominal:      General: There is no distension.      Palpations: Abdomen is soft.      Tenderness: There is no abdominal tenderness. There is no guarding.   Musculoskeletal:         General: Normal range of motion.      Right lower leg: No edema.      Left lower leg: No edema.   Lymphadenopathy:      Upper Body:      Right upper body: No supraclavicular or axillary adenopathy.      Left upper body: No supraclavicular or axillary adenopathy.   Skin:     General: Skin is warm and dry.  Right anterior chest wall port area clean and dry     Findings: No bruising, ecchymosis or  petechiae.   Neurological:      General: No focal deficit present.      Mental Status: She is alert and oriented to person, place, and time.   Psychiatric:         Mood and Affect: Mood normal.         Behavior: Behavior normal.         Judgment: Judgment normal.   Extremities: Bilateral lower extremity edema.

## 2024-06-26 ENCOUNTER — HOSPITAL ENCOUNTER (OUTPATIENT)
Dept: INFUSION CENTER | Facility: HOSPITAL | Age: 69
Discharge: HOME | End: 2024-06-26
Attending: INTERNAL MEDICINE
Payer: MEDICARE

## 2024-06-26 VITALS
RESPIRATION RATE: 18 BRPM | DIASTOLIC BLOOD PRESSURE: 55 MMHG | HEART RATE: 89 BPM | TEMPERATURE: 96.5 F | SYSTOLIC BLOOD PRESSURE: 130 MMHG | OXYGEN SATURATION: 90 %

## 2024-06-26 DIAGNOSIS — C78.6 MALIGNANT NEOPLASM METASTATIC TO OMENTUM (HCC): ICD-10-CM

## 2024-06-26 DIAGNOSIS — C19 RECTOSIGMOID CANCER (HCC): ICD-10-CM

## 2024-06-26 DIAGNOSIS — E87.6 HYPOKALEMIA: Primary | ICD-10-CM

## 2024-06-26 PROCEDURE — 96360 HYDRATION IV INFUSION INIT: CPT

## 2024-06-26 RX ADMIN — SODIUM CHLORIDE 1000 ML: 0.9 INJECTION, SOLUTION INTRAVENOUS at 11:14

## 2024-06-26 NOTE — PROGRESS NOTES
Pt tolerated at home pump without incidence.    Itzel Sanches  tolerated treatment well with no complications.      Itzel Sanches is aware of future appt    AVS Declined by Itzel Sanches

## 2024-06-27 ENCOUNTER — HOSPITAL ENCOUNTER (OUTPATIENT)
Dept: INFUSION CENTER | Facility: HOSPITAL | Age: 69
Discharge: HOME | End: 2024-06-27
Attending: INTERNAL MEDICINE
Payer: MEDICARE

## 2024-06-27 DIAGNOSIS — C19 RECTOSIGMOID CANCER (HCC): ICD-10-CM

## 2024-06-27 DIAGNOSIS — C78.6 MALIGNANT NEOPLASM METASTATIC TO OMENTUM (HCC): ICD-10-CM

## 2024-06-27 DIAGNOSIS — E87.6 HYPOKALEMIA: Primary | ICD-10-CM

## 2024-06-27 PROCEDURE — 96372 THER/PROPH/DIAG INJ SC/IM: CPT

## 2024-06-27 RX ADMIN — PEGFILGRASTIM 3 MG: 6 INJECTION SUBCUTANEOUS at 11:26

## 2024-06-27 NOTE — PROGRESS NOTES
..Itzel Sanches  tolerated treatment well with no complications.      Itzel Sanches is aware of future appt on 7/3 at 1130    AVS printed and given to Itzel Sanches: yes

## 2024-06-28 ENCOUNTER — HOSPITAL ENCOUNTER (OUTPATIENT)
Dept: INFUSION CENTER | Facility: HOSPITAL | Age: 69
End: 2024-06-28
Attending: INTERNAL MEDICINE

## 2024-07-03 ENCOUNTER — HOSPITAL ENCOUNTER (OUTPATIENT)
Dept: INFUSION CENTER | Facility: HOSPITAL | Age: 69
Discharge: HOME | End: 2024-07-03

## 2024-07-03 ENCOUNTER — HOSPITAL ENCOUNTER (OUTPATIENT)
Dept: RADIOLOGY | Facility: HOSPITAL | Age: 69
Discharge: HOME | End: 2024-07-03
Payer: MEDICARE

## 2024-07-03 DIAGNOSIS — C78.6 MALIGNANT NEOPLASM METASTATIC TO OMENTUM (HCC): ICD-10-CM

## 2024-07-03 DIAGNOSIS — C19 RECTOSIGMOID CANCER (HCC): Primary | ICD-10-CM

## 2024-07-03 PROCEDURE — 74177 CT ABD & PELVIS W/CONTRAST: CPT

## 2024-07-03 PROCEDURE — 71260 CT THORAX DX C+: CPT

## 2024-07-03 RX ADMIN — IOHEXOL 100 ML: 350 INJECTION, SOLUTION INTRAVENOUS at 11:35

## 2024-07-05 ENCOUNTER — PATIENT OUTREACH (OUTPATIENT)
Dept: CASE MANAGEMENT | Facility: OTHER | Age: 69
End: 2024-07-05

## 2024-07-05 NOTE — PROGRESS NOTES
OSW placed outreach TC to pt this morning. OSW received her VM. OSW left detailed VM encouraging her to return the call with any needs.

## 2024-07-15 ENCOUNTER — TELEPHONE (OUTPATIENT)
Dept: HEMATOLOGY ONCOLOGY | Facility: CLINIC | Age: 69
End: 2024-07-15

## 2024-07-15 NOTE — TELEPHONE ENCOUNTER
Spoke with patient indicating that Dr. Grubbs had a medical emergency, and would be out of the office indefinitely.  Relayed fact that Dr. Bridgette Babin would be covering his patients at [facility] on both Tuesdays and Thursdays, and that patient would be moved to her schedule to accommodate them.  Patient verbalized understanding, and new appointment time was scheduled for July 18th at 1:00pm with Dr. Babin.  [facility] cancelled (patient indicated she has transportation).    Also spoke and informed her to have CEA drawn prior to appointment.

## 2024-07-16 ENCOUNTER — APPOINTMENT (OUTPATIENT)
Dept: LAB | Facility: CLINIC | Age: 69
End: 2024-07-16
Payer: MEDICARE

## 2024-07-16 ENCOUNTER — OFFICE VISIT (OUTPATIENT)
Dept: SURGICAL ONCOLOGY | Facility: CLINIC | Age: 69
End: 2024-07-16
Payer: MEDICARE

## 2024-07-16 VITALS
HEART RATE: 98 BPM | SYSTOLIC BLOOD PRESSURE: 140 MMHG | OXYGEN SATURATION: 99 % | BODY MASS INDEX: 41.31 KG/M2 | DIASTOLIC BLOOD PRESSURE: 80 MMHG | WEIGHT: 191.5 LBS | TEMPERATURE: 97.4 F | HEIGHT: 57 IN

## 2024-07-16 DIAGNOSIS — E66.01 MORBID OBESITY (HCC): ICD-10-CM

## 2024-07-16 DIAGNOSIS — C78.6 MALIGNANT NEOPLASM METASTATIC TO OMENTUM (HCC): ICD-10-CM

## 2024-07-16 DIAGNOSIS — R97.0 ELEVATED CARCINOEMBRYONIC ANTIGEN (CEA): ICD-10-CM

## 2024-07-16 DIAGNOSIS — C78.7 METASTASES TO THE LIVER (HCC): ICD-10-CM

## 2024-07-16 DIAGNOSIS — C19 RECTOSIGMOID CANCER (HCC): Primary | ICD-10-CM

## 2024-07-16 LAB — CEA SERPL-MCNC: 3 NG/ML (ref 0–3)

## 2024-07-16 PROCEDURE — 36415 COLL VENOUS BLD VENIPUNCTURE: CPT

## 2024-07-16 PROCEDURE — 99215 OFFICE O/P EST HI 40 MIN: CPT | Performed by: STUDENT IN AN ORGANIZED HEALTH CARE EDUCATION/TRAINING PROGRAM

## 2024-07-16 PROCEDURE — 82378 CARCINOEMBRYONIC ANTIGEN: CPT

## 2024-07-16 RX ORDER — PROCHLORPERAZINE MALEATE 5 MG/1
TABLET ORAL
COMMUNITY
Start: 2024-05-09

## 2024-07-16 NOTE — LETTER
July 16, 2024     Bridgette Babin MD  [address] [place] [address]    Patient: Itzel Sanches   YOB: 1955   Date of Visit: 7/16/2024       Dear Dr. Babin:    Thank you for referring Itzel Sanches to me for evaluation. Below are my notes for this consultation.    If you have questions, please do not hesitate to call me. I look forward to following your patient along with you.         Sincerely,        Jessie Freeman MD        CC: No Recipients    Jessie Freeman MD  7/16/2024  1:30 PM  Sign when Signing Visit  Surgical Oncology Consultation F/U    [address] [facility]  [facility]  [address] [facility]  [place] [id number]    Patient:  Itzel Sanches  1955  [id number]    Primary Care provider:  DEEPAK Batista  [address] [facility] [address] [place] [address]      ASSESSMENT AND PLAN    1. 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 Completed adj chemo 8/2022. Recurred at single liver met and left chest wall one year later 8/2023. Completed SBRT to chest wall 12/2023 and Y90 to liver 1/2024. Scan 3/2024 showed more extensive lymphadenopathy and she has completed a course of chemo according to her. New scan with excellent response at every site. CEA pending. Should see Dr Babin as planned - may initiate maintenance given excellent response? I will see her in 6 mo but mainstay of her treatment will be with Dr Babin  2. Metastases to the liver (HCC)  As above      Oncology History   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HCC)   9/23/2019 Initial Diagnosis    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HCC)     9/23/2019 Biopsy    Rectal Mass ( 2 slides [id number] [facility] Pathology, Collected on 09/23/2019, biopsy):  -  Adenocarcinoma     12/10/2019 Surgery    Low anterior resection (Dr Jose Pastor):    A. Rectosigmoid colon, low anterior resection:  - Adenocarcinoma, moderately differentiated.   - Tumor invades through the muscularis propria into pericolorectal tissue, T3  - Diverticula.  - All margins are negative for tumor.  - Thirty-four lymph nodes, negative for malignancy (0/34).     B. Colon, anastomotic rings, resection:  - Two portions of colon with no pathologic abnormality     12/10/2019 Genomic Testing    RESULTS OF IMMUNOHISTOCHEMICAL ANALYSIS FOR MISMATCH REPAIR PROTEIN LOSS  INTERPRETATION: NO LOSS OF NUCLEAR EXPRESSION OF MMR PROTEINS: LOW PROBABILITY OF MSI-H  Note: Background non-neoplastic tissue and/or internal control with intact nuclear expression.      RESULTS:  Antibody          Clone               Description                           Results  MLH1               M1                  Mismatch repair protein       Intact nuclear expression  MSH2              [id number]       Mismatch repair protein       Intact nuclear expression  MSH6              44                   Mismatch repair protein       Intact nuclear expression  PMS2              [id number]         Mismatch repair protein       Intact nuclear expression     2/4/2020 Surgery    Ileostomy, takedown:  - Portion of small intestine with mucocutaneous anastomosis consistent with stoma.  - Negative for malignancy     8/18/2021 Progression    CEA trends- observed by Dr. Juarez  2/17/21: 2.9  8/18/2021: 4.5  9/13/2021: 4.4    PET/CT  9/28/2021 completed due to elevating CEA  1. There is a large left paracolic gutter markedly hypermetabolic mass immediately inferior to the spleen, most consistent with metastatic colorectal carcinoma.  2. Mildly increased activity at the right abdominal bowel anastomotic site.  If not recently performed, direct visualization is recommended  3. There are no other glucose avid abnormalities identified within the abdomen or  pelvis  4. No evidence of distant glucose avid metastatic disease in the neck, chest, or skeleton      11/12/2021 Biopsy    Omental mass:  - Metastatic adenocarcinoma, with an immunophenotype compatible with metastasis from patient's known colonic primary.     12/30/2021 - 12/30/2021 Chemotherapy    CapOx x 1 dose of Oxaliplatin: D/c due to tolerance.      1/2/2022 Genomic Testing         1/4/2022 - 3/17/2022 Chemotherapy    First 6 cycles of Folfox given prior to surgery- 1/4 through 3/17/2022     Folfox was dose reduced due to anticipated tolerance.  Minimal side effects     3/22/2022 Observation    CT CAP  1.  Decreased size of biopsy-proven omental metastasis, which now only focally contacting rather than grossly invading the spleen and adjacent abdominal wall. No new evidence of metastatic disease in the abdomen or pelvis.  2.  No new or suspicious pulmonary nodules. One of the previously noted new 1-2 mm nodules is no longer seen, and the other is unchanged. Recommend continued attention on follow-up.  3.  Top-normal thickness endometrial stripe measuring 7 mm. If there is history of vaginal bleeding, suggest catheterization with endometrial biopsy.  4.  Hepatic steatosis.     4/28/2022 -  Cancer Staged    Staging form: Colon and Rectum, AJCC 8th Edition  - Clinical stage from 4/28/2022: cT3, cN0, pM1 - Signed by Jessie Freeman MD on 6/14/2023  Total positive nodes: 0       4/29/2022 Surgery    Lydia Shaw and Anup preformed the following procedures:   DIAGNOSTIC LAPAROSCOPY, TOTAL ABDOMINAL HYSTERECTOMY, BILATERAL SALPINGO-OOPHORECTOMY, EXTENSIVE ADHESIOLYSIS (N/A)  DIAGNOSTIC LAPAROSCOPY, OPEN OMENTECTOMY, POSSIBLE SPLENECTOMY (N/A)  INSTILLATION CHEMOTHERAPY INTRAPERITONEAL (HIPEC) LAPAROTOMY (N/A)  REPAIR DIAPHRAGM TEAR      A. Uterus, Bilateral Ovaries and Fallopian Tubes; Hysterosalpingo-oophorectomy:  - Leiomyoma.  - Left ovary with serous cystadenoma.  - Unremarkable cervix.  - Benign  "inactive endometrium with no specific pathologic change.  - Unremarkable right ovary and bilateral fallopian tubes.     B. Spleen, spleen, omentum, darotis:  - Metastatic adenocarcinoma involving spleen and omentum, consistent with colonic primary. See Note.    Note: Comparison to prior material ([id number], omental mass) reveals identical morphology to previous metastatic colonic adenocarcinoma.          5/18/2022 -  Cancer Staged    Staging form: Colon and Rectum, AJCC 8th Edition  - Pathologic stage from 5/18/2022: Stage KRISTAL (pT3, pN0, pM1a) - Signed by Jessie Freeman MD on 6/14/2023  Total positive nodes: 0       6/14/2022 - 8/23/2022 Chemotherapy    7th cycle started on 6/14/2022  8th cycle worsening neuropathy; could not tolerate gabapentin  D/lizette oxaliplatin  9th cycle Irinotecan added at 85% dose reduction: SE Diarrhea  10th cycle Irinotecan dose redcued to 50%     9/19/2022 Observation    9/19/2022 CT CAP:   1.  Previously seen omental metastasis in the left upper quadrant has been resected.  There is a small area of nodular soft tissue thickening abutting the lateral aspect of the left kidney, cannot exclude residual/recurrent tumor.  Follow up in 4 months CEA not completed at time of scan.      9/13/2023 Biopsy    Mass, \"Chest wall mass 4 cores,\" Biopsy:  - Metastatic moderately differentiated adenocarcinoma, consistent with known colonic primary      10/31/2023 - 11/10/2023 Radiation    Treatments:  Course: C1 SBRT    Plan ID Energy Fractions Dose per Fraction (cGy) Dose Correction (cGy) Total Dose Delivered (cGy) Elapsed Days   SBRT L CW 6X-FFF 5 / 5 900 0 4,500 10      Treatment Dates:  10/31/2023 - 11/10/2023.      4/8/2024 -  Chemotherapy    potassium chloride, 20 mEq, Intravenous, Once, 1 of 1 cycle  Administration: 20 mEq (5/14/2024)  alteplase (CATHFLO), 2 mg, Intracatheter, Every 1 Minute as needed, 6 of 6 cycles  pegfilgrastim (NEULASTA), 3 mg (100 % of original dose 3 mg), Subcutaneous, Once, " 4 of 4 cycles  Dose modification: 3 mg (original dose 3 mg, Cycle 3)  Administration: 3 mg (5/17/2024), 3 mg (6/3/2024), 3 mg (6/14/2024), 3 mg (6/27/2024)  fosaprepitant (EMEND) IVPB, 150 mg, Intravenous, Once, 5 of 5 cycles  Administration: 150 mg (4/22/2024), 150 mg (5/14/2024), 150 mg (5/29/2024), 150 mg (6/11/2024), 150 mg (6/24/2024)  fluorouracil (ADRUCIL), 400 mg/m2 = 730 mg, Intravenous, Once, 6 of 6 cycles  Dose modification: 360 mg/m2 (90 % of original dose 400 mg/m2, Cycle 3, Reason: Other (see comments)), 320 mg/m2 (80 % of original dose 400 mg/m2, Cycle 3, Reason: Other (see comments)), 360 mg/m2 (90 % of original dose 400 mg/m2, Cycle 3, Reason: Other (see comments)), 320 mg/m2 (80 % of original dose 400 mg/m2, Cycle 3, Reason: Other (see comments))  Administration: 730 mg (4/8/2024), 730 mg (4/22/2024), 580 mg (5/14/2024), 580 mg (5/29/2024), 580 mg (6/11/2024), 580 mg (6/24/2024)  bevacizumab (AVASTIN) IVPB, 5 mg/kg = 465 mg, Intravenous, Once, 6 of 6 cycles  Administration: 465 mg (4/8/2024), 465 mg (4/22/2024), 465 mg (5/14/2024), 465 mg (5/29/2024), 465 mg (6/11/2024), 465 mg (6/24/2024)  irinotecan (CAMPTOSAR) chemo infusion, 295 mg (90 % of original dose 180 mg/m2), Intravenous, Once, 6 of 6 cycles  Dose modification: 162 mg/m2 (90 % of original dose 180 mg/m2, Cycle 1, Reason: Other (see comments)), 144 mg/m2 (80 % of original dose 180 mg/m2, Cycle 3, Reason: Dose Not Tolerated), 162 mg/m2 (90 % of original dose 180 mg/m2, Cycle 3, Reason: Other (see comments)), 144 mg/m2 (80 % of original dose 180 mg/m2, Cycle 3, Reason: Other (see comments))  Administration: 300 mg (4/8/2024), 295 mg (4/22/2024), 262 mg (5/14/2024), 262 mg (5/29/2024), 262 mg (6/11/2024), 262 mg (6/24/2024)  leucovorin calcium IVPB, 728 mg, Intravenous, Once, 6 of 6 cycles  Dose modification: 360 mg/m2 (90 % of original dose 400 mg/m2, Cycle 3, Reason: Other (see comments)), 320 mg/m2 (80 % of original dose 400 mg/m2, Cycle  "3, Reason: Other (see comments)), 360 mg/m2 (90 % of original dose 400 mg/m2, Cycle 3, Reason: Other (see comments)), 320 mg/m2 (80 % of original dose 400 mg/m2, Cycle 3, Reason: Other (see comments))  Administration: 700 mg (4/8/2024), 700 mg (4/22/2024), 582 mg (5/14/2024), 582 mg (5/29/2024), 582 mg (6/11/2024), 582 mg (6/24/2024)  fluorouracil (ADRUCIL) ambulatory infusion Soln, 1,200 mg/m2/day = 4,370 mg, Intravenous, Over 46 hours, 6 of 6 cycles  Dose modification: 960 mg/m2/day (80 % of original dose 1,200 mg/m2/day, Cycle 4, Reason: Other (see comments))     Omental metastasis   9/23/2019 Biopsy    Rectal Mass ( 2 slides [id number] [facility] Pathology, Collected on 09/23/2019, biopsy):  - Adenocarcinoma     2/4/2020 Surgery    Ileostomy, takedown:  - Portion of small intestine with mucocutaneous anastomosis consistent with stoma.  - Negative for malignancy     9/13/2023 Biopsy    Mass, \"Chest wall mass 4 cores,\" Biopsy:  - Metastatic moderately differentiated adenocarcinoma, consistent with known colonic primary      4/8/2024 -  Chemotherapy    potassium chloride, 20 mEq, Intravenous, Once, 1 of 1 cycle  Administration: 20 mEq (5/14/2024)  alteplase (CATHFLO), 2 mg, Intracatheter, Every 1 Minute as needed, 6 of 6 cycles  pegfilgrastim (NEULASTA), 3 mg (100 % of original dose 3 mg), Subcutaneous, Once, 4 of 4 cycles  Dose modification: 3 mg (original dose 3 mg, Cycle 3)  Administration: 3 mg (5/17/2024), 3 mg (6/3/2024), 3 mg (6/14/2024), 3 mg (6/27/2024)  fosaprepitant (EMEND) IVPB, 150 mg, Intravenous, Once, 5 of 5 cycles  Administration: 150 mg (4/22/2024), 150 mg (5/14/2024), 150 mg (5/29/2024), 150 mg (6/11/2024), 150 mg (6/24/2024)  fluorouracil (ADRUCIL), 400 mg/m2 = 730 mg, Intravenous, Once, 6 of 6 cycles  Dose modification: 360 mg/m2 (90 % of original dose 400 mg/m2, Cycle 3, Reason: Other (see comments)), 320 mg/m2 (80 % of original dose 400 mg/m2, Cycle 3, Reason: Other (see comments)), 360 mg/m2 " (90 % of original dose 400 mg/m2, Cycle 3, Reason: Other (see comments)), 320 mg/m2 (80 % of original dose 400 mg/m2, Cycle 3, Reason: Other (see comments))  Administration: 730 mg (4/8/2024), 730 mg (4/22/2024), 580 mg (5/14/2024), 580 mg (5/29/2024), 580 mg (6/11/2024), 580 mg (6/24/2024)  bevacizumab (AVASTIN) IVPB, 5 mg/kg = 465 mg, Intravenous, Once, 6 of 6 cycles  Administration: 465 mg (4/8/2024), 465 mg (4/22/2024), 465 mg (5/14/2024), 465 mg (5/29/2024), 465 mg (6/11/2024), 465 mg (6/24/2024)  irinotecan (CAMPTOSAR) chemo infusion, 295 mg (90 % of original dose 180 mg/m2), Intravenous, Once, 6 of 6 cycles  Dose modification: 162 mg/m2 (90 % of original dose 180 mg/m2, Cycle 1, Reason: Other (see comments)), 144 mg/m2 (80 % of original dose 180 mg/m2, Cycle 3, Reason: Dose Not Tolerated), 162 mg/m2 (90 % of original dose 180 mg/m2, Cycle 3, Reason: Other (see comments)), 144 mg/m2 (80 % of original dose 180 mg/m2, Cycle 3, Reason: Other (see comments))  Administration: 300 mg (4/8/2024), 295 mg (4/22/2024), 262 mg (5/14/2024), 262 mg (5/29/2024), 262 mg (6/11/2024), 262 mg (6/24/2024)  leucovorin calcium IVPB, 728 mg, Intravenous, Once, 6 of 6 cycles  Dose modification: 360 mg/m2 (90 % of original dose 400 mg/m2, Cycle 3, Reason: Other (see comments)), 320 mg/m2 (80 % of original dose 400 mg/m2, Cycle 3, Reason: Other (see comments)), 360 mg/m2 (90 % of original dose 400 mg/m2, Cycle 3, Reason: Other (see comments)), 320 mg/m2 (80 % of original dose 400 mg/m2, Cycle 3, Reason: Other (see comments))  Administration: 700 mg (4/8/2024), 700 mg (4/22/2024), 582 mg (5/14/2024), 582 mg (5/29/2024), 582 mg (6/11/2024), 582 mg (6/24/2024)  fluorouracil (ADRUCIL) ambulatory infusion Soln, 1,200 mg/m2/day = 4,370 mg, Intravenous, Over 46 hours, 6 of 6 cycles  Dose modification: 960 mg/m2/day (80 % of original dose 1,200 mg/m2/day, Cycle 4, Reason: Other (see comments))         History of Present Illness  :   Miss  Bony is seen here today for ongoing surveillance of metastatic colorectal cancer.  Briefly, the patient underwent screening colonoscopy in late 2019.  This detected a rectosigmoid mass, which was then resected in December of 2019. Surgery required a diverting loop ileostomy, which was subsequently reversed in February of 2020. Of note, preop MRI suggested a T3bN1 lesion above the peritoneal reflection, and upfront surgery was recommended. This revealed a final path T3 N0 cancer with no aggressive features and no adjuvant therapy was recommended by her surgeon Dr. Cleary. She underwent a PET scan due to a rising CEA (4.4 from 2.2 posotp), and this demonstrated a large 4.5 cm left pericolic gutter markedly hypermetabolic mass.  There was mild increased activity at the colon anastomosis.  Subsequent colonoscopy revealed no abnormality at this location.  She denies any systemic symptoms including weight loss, nausea, vomiting, changes in her bowel habits. MRI of the abdomen revealed a single metastatic lesion invading the spleen.  No evidence of other peritoneal implants.  MRI of the pelvis revealed a concerning appearance of the ovary.  This was followed by a transvaginal ultrasound, which ultimately determined that the ovary appeared benign.   She then underwent interventional radiology biopsy, which confirmed a metastatic lesion from a colorectal primary. She was iniatiated on FOLFOX and then 4/2022 underwent open omentectomy, splenectomy, resection of involved diaphragm with primary repair, hysterectomy and oophorectomy with HIPEC. Completed chemo August 2022.     Interval 2/2023  Doing very well. Recent CT ISIAH. CEA 1.6 No c/o fatigue, n/v, changes in stool habits, fevers, chills, weight loss.  I will see her in 4 months time for repeat chest abdomen pelvis cross-sectional imaging and CEA.      6/2023  Itzel is doing very well clinically.  She has no abdominal pain, no nausea vomiting, no change in stool  habits.  No weight loss, bone pain, fatigue, headaches.  Her CEA remains very low, and was previously very reliable and correlated with her degree of disease.  Her CT did demonstrate a new very small subcentimeter liver lesion. We will obtain an MR    10/2023  Obtained an MR confirming suspicion for liver lesion as well as left posterior inferior rib lesion. IR bx of liver confirmed met. Discussed with Dr Grubbs rad onc for SBRT and Dr Christiansen to discuss ablation of liver, as well as med onc Dr Grubbs to ensure agreement with plan. Imaging with doubling in size of chest wall met. To initiate treatment asap    2/2024  Y90 to liver 1/2024. Completed XRT to chest wall 12/2023.     7/2024  Unfortunately repeat scan 3/2024 demonstrated new mediastinal adenopathy, increased paraspinal lesion anterior to L1 and stable lesion at T12.  The previously seen liver lesion was stable although there was a new small lesion suspicious for an additional metastatic implant.  Patient also had new retroperitoneal adenopathy and increased amee hepatis adenopathy. She restarted FOLFIRI/avastin. She has had a good response in all sites of disease on most recent scan 7/2024. Had trouble with the chemo in terms of her functional status and the dose was lowered and this was better tolerated. She states today she has completed the treatment course. She will be seeing Dr Babin later this week for ongoing care. No recent CEA though she plans to have this drawn before seeing Dr Babin. Diarrhea alternating with constipation depending on when in chemo cycle she is. No weight loss. Remains ECOG 0.     Review of Systems  Complete ROS Surg Onc:   Constitutional: The patient denies new or recent history of general fatigue, no recent weight loss, normal appetite.   Eyes: No complaints of visual problems, no scleral icterus.   ENT: No complaints of ear pain, no hoarseness, no difficulty swallowing,  no tinnitus and no new masses in head, oral cavity, or  neck.   Cardiovascular: No complaints of chest pain, no palpitations, no ankle edema.   Respiratory: No complaints of shortness of breath, no cough.   Gastrointestinal: No complaints of jaundice, no bloody stools, no pale stools.   Genitourinary: No complaints of dysuria, no hematuria, no nocturia, no frequent urination, no urethral discharge.   Musculoskeletal: No complaints of weakness, paralysis, joint stiffness or arthralgias.  Integumentary: No complaints of rash, no new lesions.   Neurological: No complaints of convulsions, no seizures, no dizziness.   Hematologic/Lymphatic: No complaints of easy bruising.   Endocrine:  ENDORSES cold intolerance.  No polydipsia, polyphagia, or polyuria.  Allergy/immunology:  No environmental allergies.  No food allergies.  Not immunocompromised.      Patient Active Problem List   Diagnosis   • Callus of foot   • Foot pain   • GERD (gastroesophageal reflux disease)   • Mixed hyperlipidemia   • Prediabetes   • Varicose veins with pain   • Morbid obesity (HCC)   • 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HCC)   • Dyspnea on exertion   • Dyslipidemia   • Sigmoid diverticulosis   • PONV (postoperative nausea and vomiting)   • Omental metastasis   • Lesion of ovary   • Chemotherapy adverse reaction   • Chemotherapy-induced nausea   • Platelets decreased (HCC)   • Stage 3 chronic kidney disease, unspecified whether stage 3a or 3b CKD (HCC)   • H/O splenectomy   • Asplenia   • Postoperative state   • Metastases to the liver (HCC)   • Chemotherapy-induced neuropathy (HCC)   • Chemotherapy-induced neutropenia (HCC)   • Hypokalemia     Past Medical History:   Diagnosis Date   • Blood in stool    • Breast disorder    • Cancer (HCC)     rectal   • Cancer determined by colorectal biopsy (HCC)     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   • Colon polyp    • GERD (gastroesophageal reflux disease)    • History of chemotherapy 12/2021   • History of rectal surgery    • Hyperlipidemia    • PONV (postoperative nausea and vomiting)       Past Surgical History:   Procedure Laterality Date   • BREAST CYST EXCISION Right    •  SECTION      x3   • COLON SIGMOID RESECTION     • COLONOSCOPY     • DILATION AND CURETTAGE OF UTERUS     • ILEO LOOP DIVERSION N/A 12/10/2019    Procedure: ILEOSTOMY LOOP;  Surgeon: WINNIE Pastor MD;  [facility];  Service: Robotics   • INSTILLATION CHEMOTHERAPY INTRAPERITONEAL (HIPEC) LAPAROTOMY N/A 2022    Procedure: INSTILLATION CHEMOTHERAPY INTRAPERITONEAL (HIPEC) LAPAROTOMY;  Surgeon: Jessie Freeman MD;  [facility];  Service: Surgical Oncology   • IR BIOPSY CHEST WALL  2023   • IR BIOPSY OMENTUM  2021   • IR PORT PLACEMENT  2021   • IR Y-90 PRE-ANGIO/EMBO W/ LUNG SCAN  2024   • IR Y-90 RADIOEMBOLIZATION  2024   • OMENTECTOMY N/A 2022    Procedure: DIAGNOSTIC LAPAROSCOPY, OPEN OMENTECTOMY, SPLENECTOMY, DIAPHRAGM REPAIR, CHEST TUBE INSERTION;  Surgeon: Jessie Freeman MD;  [facility];  Service: Surgical Oncology   • NV CLOSURE ENTEROSTOMY LG/SMALL INTESTINE N/A 2020    Procedure: CLOSURE ILEOSTOMY;  Surgeon: WINNIE Pastor MD;  [facility];  Service: Colorectal   • NV LAPAROSCOPY COLECTOMY PARTIAL W/ANASTOMOSIS N/A 12/10/2019    Procedure: SIGMOID RESECTION COLON LAPAROSCOPIC W ROBOTICS converted to lap hand assisted  with Partial proctectomy , LASER FLUORESCENCE ANGIOGRAPHY, INTRA OP COLONOSCOPY;  Surgeon: WINNIE Pastor MD;  [facility];  Service: Robotics   • NV TOTAL ABDOMINAL HYSTERECT W/WO RMVL TUBE OVARY N/A 2022    Procedure: DIAGNOSTIC LAPAROSCOPY, TOTAL ABDOMINAL HYSTERECTOMY, BILATERAL SALPINGO-OOPHORECTOMY, EXTENSIVE ADHESIOLYSIS;  Surgeon: Peterson Mae MD;  [facility];  Service: Gynecology Oncology   • NV UNLISTED PROCEDURE DIAPHRAGM  2022    Procedure: REPAIR DIAPHRAGM TEAR;  Surgeon: Yana Hebert MD;  [facility];  Service: Thoracic   • SMALL  "INTESTINE SURGERY  02/04/2020    Procedure: RESECTION SMALL BOWEL;  Surgeon: WINNIE Pastor MD;  [facility];  Service: Colorectal     Family History   Problem Relation Age of Onset   • Hypertension Mother    • Heart attack Mother    • Heart attack Father    • Heart disease Father    • Hypertension Brother    • Heart attack Brother    • Colon cancer Paternal Uncle    • Leukemia Cousin    • Breast cancer Cousin    • Diabetes Cousin    • Breast cancer Cousin    • Colon cancer Family         paternal uncle   • Stroke Neg Hx      Social History     Socioeconomic History   • Marital status:      Spouse name: Not on file   • Number of children: Not on file   • Years of education: Not on file   • Highest education level: Not on file   Occupational History   • Not on file   Tobacco Use   • Smoking status: Never     Passive exposure: Past   • Smokeless tobacco: Never   Vaping Use   • Vaping status: Never Used   Substance and Sexual Activity   • Alcohol use: Yes     Comment: \"occasionally\"   • Drug use: Never   • Sexual activity: Not Currently   Other Topics Concern   • Not on file   Social History Narrative   • Not on file     Social Determinants of Health     Financial Resource Strain: Low Risk  (3/21/2023)    Overall Financial Resource Strain (CARDIA)    • Difficulty of Paying Living Expenses: Not hard at all   Food Insecurity: No Food Insecurity (3/28/2024)    Hunger Vital Sign    • Worried About Running Out of Food in the Last Year: Never true    • Ran Out of Food in the Last Year: Never true   Transportation Needs: No Transportation Needs (3/28/2024)    PRAPARE - Transportation    • Lack of Transportation (Medical): No    • Lack of Transportation (Non-Medical): No   Physical Activity: Not on file   Stress: Not on file   Social Connections: Not on file   Intimate Partner Violence: Not on file   Housing Stability: Unknown (3/28/2024)    Housing Stability Vital Sign    • Unable to Pay for Housing in the " Last Year: No    • Number of Times Moved in the Last Year: Not on file    • Homeless in the Last Year: No       Current Outpatient Medications:   •  Acetaminophen (TYLENOL 8 HOUR PO), Take by mouth PRN, Disp: , Rfl:   •  apixaban (Eliquis) 5 mg, Take 1 tablet (5 mg total) by mouth 2 (two) times a day, Disp: 60 tablet, Rfl: 0  •  ezetimibe (ZETIA) 10 mg tablet, Take 1 tablet (10 mg total) by mouth daily, Disp: 90 tablet, Rfl: 1  •  famotidine (PEPCID) 20 mg tablet, Take 1 tablet (20 mg total) by mouth 2 (two) times a day as needed for heartburn As needed, Disp: 90 tablet, Rfl: 1  •  meclizine (ANTIVERT) 12.5 MG tablet, Take 1 tablet (12.5 mg total) by mouth 3 (three) times a day as needed for dizziness (Patient taking differently: Take 12.5 mg by mouth 3 (three) times a day as needed for dizziness PRN), Disp: 30 tablet, Rfl: 0  •  ondansetron (Zofran ODT) 8 mg disintegrating tablet, Take 1 tablet (8 mg total) by mouth every 8 (eight) hours as needed for nausea or vomiting, Disp: 30 tablet, Rfl: 5  •  lidocaine (Lidoderm) 5 %, Apply 1 patch topically over 12 hours daily Remove & Discard patch within 12 hours or as directed by MD (Patient not taking: Reported on 2/1/2024), Disp: 12 patch, Rfl: 3  •  prochlorperazine (COMPAZINE) 5 mg tablet, , Disp: , Rfl:   Allergies   Allergen Reactions   • Medical Tape Rash     Skin irritation  Skin peeling  Skin irritation  Skin peeling  Blisters  Rash       Vitals:    07/16/24 1254   BP: 140/80   Pulse: 98   Temp: (!) 97.4 °F (36.3 °C)   SpO2: 99%       Physical Exam   General: Appears well, appears stated age  Skin: Warm, anicteric. Incisions well-healed  HEENT: Normocephalic, atraumatic; sclera aniceteric, mucous membranes moist; cervical nodes without adenopathy  Cardiopulmonary: RRR, Easy WOB, no BLE edema.   Abd: Obese, nontender, no masses appreciated, no hepatosplenomegaly. Incision well-healed  MSK: Symmetric, no cyanosis, no overt weakness  Lymphatic: No cervical, axillary  or inguinal lymphadenopathy  Neuro: Affect appropriate, no gross motor abnormalities      Pathology:  Final Diagnosis   A. Rectosigmoid colon, low anterior resection:  - Adenocarcinoma, moderately differentiated.   - Tumor invades through the muscularis propria into pericolorectal tissue, T3  - Diverticula.  - All margins are negative for tumor.  - Thirty-four lymph nodes, negative for malignancy (0/34).     B. Colon, anastomotic rings, resection:  - Two portions of colon with no pathologic abnormality     Intradepartmental consultation is in agreement.  [place] [address]  Immunohistochemistry for desmin  is performed on tissue blocks A13 and A16 to help in the assessment of this case.     Final Diagnosis   A. Uterus, Bilateral Ovaries and Fallopian Tubes; Hysterosalpingo-oophorectomy:  - Leiomyoma.  - Left ovary with serous cystadenoma.  - Unremarkable cervix.  - Benign inactive endometrium with no specific pathologic change.  - Unremarkable right ovary and bilateral fallopian tubes.     B. Spleen, spleen, omentum, darotis:  - Metastatic adenocarcinoma involving spleen and omentum, consistent with colonic primary. See Note.         Labs: Reviewed in [employer]    Imaging  Colonoscopy    Addendum Date: 10/6/2021 Addendum:   [address] [address] [phone] DATE OF SERVICE: 10/06/21 PHYSICIAN(S): Nadir Veronica MD - Attending Physician INDICATION: Personal history of rectal cancer , had a low anterior resection in December of 2019.  T3 N0, 34 lymph nodes were removed and were negative.  Patient did not receive any chemotherapy. Colonoscopy performed for a diagnostic indication. POST-OP DIAGNOSIS: See the impression below. HISTORY: Prior colonoscopy: Less than 3 years ago. It is being repeated at an interval of less than 3 years because: This colonoscopy is being performed for a diagnostic indication  BOWEL PREPARATION: Miralax/Dulcolax PREPROCEDURE: Informed consent was obtained for the procedure, including sedation. Risks including but not limited to bleeding, infection, perforation, adverse drug reaction and aspiration were explained in detail. Also explained about less than 100% sensitivity with the exam and other alternatives. The patient was placed in the left lateral decubitus position. DETAILS OF PROCEDURE: Patient was taken to the procedure room where a time out was performed to confirm correct patient and correct procedure. The patient underwent monitored anesthesia care, which was administered by an anesthesia professional. The patient's blood pressure, heart rate, level of consciousness, oxygen and respirations were monitored throughout the procedure. A digital rectal exam was performed. The scope was introduced through the anus and advanced to the cecum. Retroflexion was performed in the rectum. The quality of bowel preparation was evaluated using the [place] Bowel Preparation Scale with scores of: right colon = 2, transverse colon = 2, left colon = 2. The total BBPS score was 6. Bowel prep was adequate. The patient experienced no blood loss. The procedure was not difficult. The patient tolerated the procedure well. There were no apparent complications. ANESTHESIA INFORMATION: ASA: III Anesthesia Type: IV Sedation with Anesthesia MEDICATIONS: No administrations occurring from 1215 to 1230 on 10/06/21 FINDINGS: Healthy end-to-end colorectal anastomosis with no bleeding in the mid rectum All observed locations appeared normal, including the cecum, ascending colon, transverse colon, splenic flexure and descending colon. A couple scattered diverticuli seen EVENTS: Procedure Events Event Event Time ENDO CECUM REACHED 10/6/2021 12:21 PM ENDO SCOPE OUT TIME 10/6/2021 12:28 PM SPECIMENS: * No specimens in log * EQUIPMENT: Colonoscope - IMPRESSION: 1. Normal-appearing colon.  Anastomosis site was seen in the  rectum appeared healthy.  No evidence of local recurrence. 2. Couple small scattered diverticuli. RECOMMENDATION: Repeat colonoscopy in 2 years due to a personal history of colon cancer. Advised her urgent evaluation by medical oncologist and Dr. Pastor. Nadir Veronica MD     Addendum Date: 10/6/2021 Addendum:   [address] [address] [phone] DATE OF SERVICE: 10/06/21 PHYSICIAN(S): Nadir Veronica MD - Attending Physician INDICATION: Personal history of rectal cancer , had a low anterior resection in December of 2019.  T3 N0, 34 lymph nodes were removed and were negative.  Patient did not receive any chemotherapy. Colonoscopy performed for a diagnostic indication. POST-OP DIAGNOSIS: See the impression below. HISTORY: Prior colonoscopy: Less than 3 years ago. It is being repeated at an interval of less than 3 years because: This colonoscopy is being performed for a diagnostic indication BOWEL PREPARATION: Miralax/Dulcolax PREPROCEDURE: Informed consent was obtained for the procedure, including sedation. Risks including but not limited to bleeding, infection, perforation, adverse drug reaction and aspiration were explained in detail. Also explained about less than 100% sensitivity with the exam and other alternatives. The patient was placed in the left lateral decubitus position. DETAILS OF PROCEDURE: Patient was taken to the procedure room where a time out was performed to confirm correct patient and correct procedure. The patient underwent monitored anesthesia care, which was administered by an anesthesia professional. The patient's blood pressure, heart rate, level of consciousness, oxygen and respirations were monitored throughout the procedure. A digital rectal exam was performed. The scope was introduced through the anus and advanced to the cecum. Retroflexion was performed in the rectum. The quality of bowel preparation was evaluated using the [place] Bowel  Preparation Scale with scores of: right colon = 2, transverse colon = 2, left colon = 2. The total BBPS score was 6. Bowel prep was adequate. The patient experienced no blood loss. The procedure was not difficult. The patient tolerated the procedure well. There were no apparent complications. ANESTHESIA INFORMATION: ASA: III Anesthesia Type: IV Sedation with Anesthesia MEDICATIONS: No administrations occurring from 1215 to 1230 on 10/06/21 FINDINGS: Healthy end-to-end colorectal anastomosis with no bleeding in the mid rectum All observed locations appeared normal, including the cecum, ascending colon, transverse colon, splenic flexure and descending colon. A couple scattered diverticuli seen EVENTS: Procedure Events Event Event Time ENDO CECUM REACHED 10/6/2021 12:21 PM ENDO SCOPE OUT TIME 10/6/2021 12:28 PM SPECIMENS: * No specimens in log * EQUIPMENT: Colonoscope - IMPRESSION: 1. Normal-appearing colon.  Anastomosis site was seen in the rectum appeared healthy.  No evidence of local recurrence. 2. Couple small scattered diverticuli. RECOMMENDATION: Repeat colonoscopy in 2 years due to a personal history of colon cancer. Nadir Veronica MD     Result Date: 10/6/2021  Narrative: [address] [address] [phone] DATE OF SERVICE: 10/06/21 PHYSICIAN(S): Nadir Veronica MD - Attending Physician INDICATION: Personal history of rectal cancer Colonoscopy performed for a diagnostic indication. POST-OP DIAGNOSIS: See the impression below. HISTORY: Prior colonoscopy: Less than 3 years ago. It is being repeated at an interval of less than 3 years because: This colonoscopy is being performed for a diagnostic indication BOWEL PREPARATION: Miralax/Dulcolax PREPROCEDURE: Informed consent was obtained for the procedure, including sedation. Risks including but not limited to bleeding, infection, perforation, adverse drug reaction and aspiration were explained in detail. Also explained  about less than 100% sensitivity with the exam and other alternatives. The patient was placed in the left lateral decubitus position. DETAILS OF PROCEDURE: Patient was taken to the procedure room where a time out was performed to confirm correct patient and correct procedure. The patient underwent monitored anesthesia care, which was administered by an anesthesia professional. The patient's blood pressure, heart rate, level of consciousness, oxygen and respirations were monitored throughout the procedure. A digital rectal exam was performed. The scope was introduced through the anus and advanced to the cecum. Retroflexion was performed in the rectum. The quality of bowel preparation was evaluated using the [place] Bowel Preparation Scale with scores of: right colon = 2, transverse colon = 2, left colon = 2. The total BBPS score was 6. Bowel prep was adequate. The patient experienced no blood loss. The procedure was not difficult. The patient tolerated the procedure well. There were no apparent complications. ANESTHESIA INFORMATION: ASA: III Anesthesia Type: IV Sedation with Anesthesia MEDICATIONS: No administrations occurring from 1215 to 1230 on 10/06/21 FINDINGS: Healthy end-to-end colorectal anastomosis with no bleeding in the mid rectum All observed locations appeared normal, including the cecum, ascending colon, transverse colon, splenic flexure and descending colon. A couple scattered diverticuli seen EVENTS: Procedure Events Event Event Time ENDO CECUM REACHED 10/6/2021 12:21 PM ENDO SCOPE OUT TIME 10/6/2021 12:28 PM SPECIMENS: * No specimens in log * EQUIPMENT: Colonoscope -     Impression: 1. Normal-appearing colon.  Anastomosis site was seen in the rectum appeared healthy.  No evidence of local recurrence. 2. Couple small scattered diverticuli. RECOMMENDATION: Repeat colonoscopy in 2 years due to a personal history of colon cancer. Nadir Veronica MD     DXA bone density spine hip and pelvis    Result Date:  10/14/2021  Narrative: CENTRAL  DXA SCAN CLINICAL HISTORY:  66-year-old postmenopausal female. OTHER RISK FACTORS:  History of fracture resulting from minor injury. PHARMACOLOGIC THERAPY FOR OSTEOPOROSIS:  None. TECHNIQUE: Bone densitometry was performed using a [employer]   bone densitometer.  Regions of interest appear properly placed. COMPARISON: 5/6/2019. RESULTS: LUMBAR SPINE L1-L4 : BMD  1.141  gm/cm2 T-score -0.4 LEFT TOTAL HIP: BMD: 0.992  gm/cm2 T-score: -0.1 LEFT FEMORAL NECK: BMD: 0.856  gm/cm2 T score: -1.3 RIGHT TOTAL HIP: BMD:  1.004  gm/cm2 T-score: 0.0 RIGHT FEMORAL NECK: BMD:  0.851  gm/cm2  T score: -1.3     Impression: 1.  Low bone mass (osteopenia). [Based on the left and right femoral necks] 2.  Since a DXA study from 5/6/2019, there has been: A  STATISTICALLY SIGNIFICANT DECREASE in bone mineral density of  0.039 g/cm2 (3.3)% in the lumbar spine. A  STATISTICALLY SIGNIFICANT DECREASE in bone mineral density of  0.046 g/cm2 (4.4)% in the hips. 3.  The 10 year risk of hip fracture is 1.2% with the 10 year risk of major osteoporotic fracture being 12.6% as calculated by the [employer] fracture risk assessment tool (FRAX, which is based on data generated by the WHO Collaborating [place] for Metabolic Bone Diseases). 4.  The current NOF guidelines recommend treating patients with a T-score of -2.5 or less in the lumbar spine or hips, or in post-menopausal women and men over the age of 50 with low bone mass (osteopenia) and a FRAX 10 year risk score of >3% for hip fracture and/or >20% for major osteoporotic fracture. 5.  The NOF recommends follow-up DXA in 1-2 years after initiating therapy for osteoporosis and every 2 years thereafter. More frequent evaluation is appropriate for patients with conditions associated with rapid bone loss, such as glucocorticoid therapy. The interval between DXA screenings may be longer for individuals without major risk factors and initial T-score in the  normal or upper low bone mass range. The FRAX algorithm has certain limitations: -FRAX has not been validated in patients currently or previously treated with pharmacotherapy for osteoporosis.  In such patients, clinical judgment must be exercised in interpreting FRAX scores.  -Prior hip, vertebral and humeral fragility fractures appear to confer greater risk of subsequent fracture than fractures at other sites (this is especially true for individuals with severe vertebral fractures), but quantification of this incremental risk is not possible with FRAX. -FRAX underestimates fracture risk in patients with history of multiple fragility fractures. -FRAX may underestimate fracture risk in patients with history of frequent falls. -It is not appropriate to use FRAX to monitor treatment response. WHO CLASSIFICATION: Normal (a T-score of -1.0 or higher) Low bone mineral density (a T-score of less than -1.0 but higher than -2.5) Osteoporosis (a T-score of -2.5 or less) Severe osteoporosis (a T-score of -2.5 or less with a fragility fracture) LEAST SIGNIFICANT CHANGE AT 95% C.I: Lumbar spine: 0.036 gm/cm2 (3.2%). Total hip: 0.018 gm/cm2 (2.0%). Forearm: 0.024 gm/cm2 (3.2%). Workstation performed: [id number]     NM PET CT skull base to mid thigh    Result Date: 9/28/2021  Narrative: PET/CT SCAN INDICATION:  C18.7: Malignant neoplasm of sigmoid colon C20: Malignant neoplasm of rectum   Rectosigmoid carcinoma, restaging/surveillance examination.  Borderline elevated CEA (most recently 4.4). MODIFIER: PS COMPARISON: CT chest abdomen and pelvis 2/24/2021. CELL TYPE:  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 TECHNIQUE:   12.1 mCi F-18-FDG administered IV. Multiplanar attenuation corrected and non attenuation corrected PET images were acquired 60 minutes post injection. Contiguous, low dose, axial CT sections were obtained from the skull base through the femurs . Intravenous contrast material was not utilized.   This examination, like all CT scans performed in the [facility], was performed utilizing techniques to minimize radiation dose exposure, including the use of iterative reconstruction and automated exposure control. Fasting serum glucose: 90 mg/dl FINDINGS: VISUALIZED BRAIN:   No acute abnormalities are seen. HEAD/NECK:   There is a physiologic distribution of FDG. No FDG avid cervical adenopathy is seen. CT images: Unremarkable. CHEST:   No FDG avid soft tissue lesions are seen. CT images: Mild coronary artery calcification.  No pericardial effusion, no pleural effusion ABDOMEN:   There is a large hypermetabolic left lateral abdominal lobular mass situated immediately inferior to the spleen in the left paracolic gutter region, which measures 4.3 x 3.9 x 4.5 cm in size, SUV max 20.8.  No other definite hypermetabolic abnormalities are seen within the upper abdomen.  Activity at the bowel anastomotic site in the right abdomen on image 165 demonstrates SUV max of 3.8.  This is nonspecific but may simply be inflammatory or physiologic.  Direct visualization is recommended if not recently performed. CT images: No ascites.  No hydronephrosis or bowel obstruction. PELVIS: Additional rectosigmoid anastomotic site noted image 212.  No evidence of abnormal glucose activity.  No evidence of glucose avid pelvic adenopathy.  No pelvic ascites. OSSEOUS STRUCTURES: No FDG avid lesions are seen. CT images: No significant findings.     Impression: 1. There is a large left paracolic gutter markedly hypermetabolic mass immediately inferior to the spleen, most consistent with metastatic colorectal carcinoma. 2. Mildly increased activity at the right abdominal bowel anastomotic site.  If not recently performed, direct visualization is recommended 3. There are no other glucose avid abnormalities identified within the abdomen or pelvis 4. No evidence of distant glucose avid metastatic disease in the neck, chest, or  skeleton The examination demonstrates a significant  finding and was documented as such in [facility] for liaison and referring practitioner notification. Workstation performed: [id number]     Mammo screening bilateral w 3d & cad    Result Date: 10/14/2021  Narrative: DIAGNOSIS: Encounter for screening mammogram for malignant neoplasm of breast TECHNIQUE: Digital screening mammography was performed. Computer Aided Detection (CAD) analyzed all applicable images. COMPARISONS: Prior breast imaging dated: 06/26/2020, 05/06/2019, 10/18/2016, 10/14/2016, and 09/14/2015 RELEVANT HISTORY: Family Breast Cancer History: History of breast cancer in Cousin, Family. Family Medical History: Family medical history includes breast cancer in 2 relatives (cousin, family) and colon cancer in 2 relatives (family, paternal uncle). Personal History: No known relevant hormone history. Surgical history includes lumpectomy. No known relevant medical history. The patient is scheduled in a reminder system for screening mammography. 8-10% of cancers will be missed on mammography. Management of a palpable abnormality must be based on clinical grounds.  Patients will be notified of their results via letter from our facility. Accredited by American College of [employer] and FDA. RISK ASSESSMENT: 5 Year Tyrer-Cuzick: 2.13 % 10 Year Tyrer-Cuzick: 4.17 % Lifetime Tyrer-Cuzick: 7.93 % TISSUE DENSITY: There are scattered areas of fibroglandular density.  INDICATION: Itzel Sanches is a 66 y.o. female presenting for screening mammography. FINDINGS: Breast parenchymal distribution is unchanged from priors including multiple focal asymmetries in the right breast.  Long-term stability confirms benignancy.  No developing asymmetries or concerning masses.  Single upper-outer-anterior coarse calcification in the left breast is definitively benign.  No suspicious microcalcifications, architectural distortion, skin thickening, or abnormalities of the nipples in  either breast.      Impression: No mammographic evidence of malignancy or significant change from priors. ASSESSMENT/BI-RADS CATEGORY: Left: 2 - Benign Right: 2 - Benign Overall: 2 - Benign RECOMMENDATION:      - Routine screening mammogram in 1 year for both breasts. Workstation ID: [id number]     CT 6/2023     IMPRESSION:     No evidence of acute intrathoracic pathology.     New 7 mm hypodensity of the right hepatic lobe which was not present on prior examinations. Contrast enhanced abdominal MRI recommended for further evaluation.     Nodular soft tissue adjacent to the left upper renal capsule and prior splenectomy site is stable    I have personally reviewed and interpreted the patient's CT scans, MRIs, radiation oncology, medical oncology, and interventional radiology notes. Have reviewed Y90, SBRT, scans from this current year, med onc notes

## 2024-07-16 NOTE — PROGRESS NOTES
Surgical Oncology Consultation F/U    [address] [facility] [address] [address]  [facility]  [address]  [place] [address]    Patient:  Itzel Sanches  1955  [id number]    Primary Care provider:  DEEPAK Batista  [address]  [facility] [address]      ASSESSMENT AND PLAN    1. 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 Completed adj chemo 8/2022. Recurred at single liver met and left chest wall one year later 8/2023. Completed SBRT to chest wall 12/2023 and Y90 to liver 1/2024. Scan 3/2024 showed more extensive lymphadenopathy and she has completed a course of chemo according to her. New scan with excellent response at every site. CEA pending. Should see Dr Babin as planned - may initiate maintenance given excellent response? I will see her in 6 mo but mainstay of her treatment will be with Dr Babin  2. Metastases to the liver (HCC)  As above      Oncology History   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HCC)   9/23/2019 Initial Diagnosis    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HCC)     9/23/2019 Biopsy    Rectal Mass ( 2 slides [id number] [facility] Pathology, Collected on 09/23/2019, biopsy):  - Adenocarcinoma     12/10/2019 Surgery    Low anterior resection (Dr Jose Pastor):    A. Rectosigmoid colon, low anterior resection:  - Adenocarcinoma, moderately differentiated.   - Tumor invades through the muscularis propria into pericolorectal tissue, T3  - Diverticula.  - All margins are negative for tumor.  - Thirty-four lymph nodes, negative for malignancy (0/34).     B. Colon, anastomotic rings, resection:  - Two portions of colon with no pathologic abnormality     12/10/2019 Genomic Testing    RESULTS OF IMMUNOHISTOCHEMICAL ANALYSIS FOR  MISMATCH REPAIR PROTEIN LOSS  INTERPRETATION: NO LOSS OF NUCLEAR EXPRESSION OF MMR PROTEINS: LOW PROBABILITY OF MSI-H  Note: Background non-neoplastic tissue and/or internal control with intact nuclear expression.      RESULTS:  Antibody          Clone               Description                           Results  MLH1               M1                  Mismatch repair protein       Intact nuclear expression  MSH2              [id number]       Mismatch repair protein       Intact nuclear expression  MSH6              44                   Mismatch repair protein       Intact nuclear expression  PMS2              [id number]         Mismatch repair protein       Intact nuclear expression     2/4/2020 Surgery    Ileostomy, takedown:  - Portion of small intestine with mucocutaneous anastomosis consistent with stoma.  - Negative for malignancy     8/18/2021 Progression    CEA trends- observed by Dr. Juarez  2/17/21: 2.9  8/18/2021: 4.5  9/13/2021: 4.4    PET/CT  9/28/2021 completed due to elevating CEA  1. There is a large left paracolic gutter markedly hypermetabolic mass immediately inferior to the spleen, most consistent with metastatic colorectal carcinoma.  2. Mildly increased activity at the right abdominal bowel anastomotic site.  If not recently performed, direct visualization is recommended  3. There are no other glucose avid abnormalities identified within the abdomen or pelvis  4. No evidence of distant glucose avid metastatic disease in the neck, chest, or skeleton      11/12/2021 Biopsy    Omental mass:  - Metastatic adenocarcinoma, with an immunophenotype compatible with metastasis from patient's known colonic primary.     12/30/2021 - 12/30/2021 Chemotherapy    CapOx x 1 dose of Oxaliplatin: D/c due to tolerance.      1/2/2022 Genomic Testing         1/4/2022 - 3/17/2022 Chemotherapy    First 6 cycles of Folfox given prior to surgery- 1/4 through 3/17/2022     Folfox was dose reduced due to anticipated  tolerance.  Minimal side effects     3/22/2022 Observation    CT CAP  1.  Decreased size of biopsy-proven omental metastasis, which now only focally contacting rather than grossly invading the spleen and adjacent abdominal wall. No new evidence of metastatic disease in the abdomen or pelvis.  2.  No new or suspicious pulmonary nodules. One of the previously noted new 1-2 mm nodules is no longer seen, and the other is unchanged. Recommend continued attention on follow-up.  3.  Top-normal thickness endometrial stripe measuring 7 mm. If there is history of vaginal bleeding, suggest catheterization with endometrial biopsy.  4.  Hepatic steatosis.     4/28/2022 -  Cancer Staged    Staging form: Colon and Rectum, AJCC 8th Edition  - Clinical stage from 4/28/2022: cT3, cN0, pM1 - Signed by Jessie Freeman MD on 6/14/2023  Total positive nodes: 0       4/29/2022 Surgery    Lydia Shaw and Anup preformed the following procedures:   DIAGNOSTIC LAPAROSCOPY, TOTAL ABDOMINAL HYSTERECTOMY, BILATERAL SALPINGO-OOPHORECTOMY, EXTENSIVE ADHESIOLYSIS (N/A)  DIAGNOSTIC LAPAROSCOPY, OPEN OMENTECTOMY, POSSIBLE SPLENECTOMY (N/A)  INSTILLATION CHEMOTHERAPY INTRAPERITONEAL (HIPEC) LAPAROTOMY (N/A)  REPAIR DIAPHRAGM TEAR      A. Uterus, Bilateral Ovaries and Fallopian Tubes; Hysterosalpingo-oophorectomy:  - Leiomyoma.  - Left ovary with serous cystadenoma.  - Unremarkable cervix.  - Benign inactive endometrium with no specific pathologic change.  - Unremarkable right ovary and bilateral fallopian tubes.     B. Spleen, spleen, omentum, darotis:  - Metastatic adenocarcinoma involving spleen and omentum, consistent with colonic primary. See Note.    Note: Comparison to prior material ([id number], omental mass) reveals identical morphology to previous metastatic colonic adenocarcinoma.          5/18/2022 -  Cancer Staged    Staging form: Colon and Rectum, AJCC 8th Edition  - Pathologic stage from 5/18/2022: Stage KRISTAL (pT3, pN0, pM1a) -  "Signed by Jessie Freeman MD on 6/14/2023  Total positive nodes: 0       6/14/2022 - 8/23/2022 Chemotherapy    7th cycle started on 6/14/2022  8th cycle worsening neuropathy; could not tolerate gabapentin  D/lizette oxaliplatin  9th cycle Irinotecan added at 85% dose reduction: SE Diarrhea  10th cycle Irinotecan dose redcued to 50%     9/19/2022 Observation    9/19/2022 CT CAP:   1.  Previously seen omental metastasis in the left upper quadrant has been resected.  There is a small area of nodular soft tissue thickening abutting the lateral aspect of the left kidney, cannot exclude residual/recurrent tumor.  Follow up in 4 months CEA not completed at time of scan.      9/13/2023 Biopsy    Mass, \"Chest wall mass 4 cores,\" Biopsy:  - Metastatic moderately differentiated adenocarcinoma, consistent with known colonic primary      10/31/2023 - 11/10/2023 Radiation    Treatments:  Course: C1 SBRT    Plan ID Energy Fractions Dose per Fraction (cGy) Dose Correction (cGy) Total Dose Delivered (cGy) Elapsed Days   SBRT L CW 6X-FFF 5 / 5 900 0 4,500 10      Treatment Dates:  10/31/2023 - 11/10/2023.      4/8/2024 -  Chemotherapy    potassium chloride, 20 mEq, Intravenous, Once, 1 of 1 cycle  Administration: 20 mEq (5/14/2024)  alteplase (CATHFLO), 2 mg, Intracatheter, Every 1 Minute as needed, 6 of 6 cycles  pegfilgrastim (NEULASTA), 3 mg (100 % of original dose 3 mg), Subcutaneous, Once, 4 of 4 cycles  Dose modification: 3 mg (original dose 3 mg, Cycle 3)  Administration: 3 mg (5/17/2024), 3 mg (6/3/2024), 3 mg (6/14/2024), 3 mg (6/27/2024)  fosaprepitant (EMEND) IVPB, 150 mg, Intravenous, Once, 5 of 5 cycles  Administration: 150 mg (4/22/2024), 150 mg (5/14/2024), 150 mg (5/29/2024), 150 mg (6/11/2024), 150 mg (6/24/2024)  fluorouracil (ADRUCIL), 400 mg/m2 = 730 mg, Intravenous, Once, 6 of 6 cycles  Dose modification: 360 mg/m2 (90 % of original dose 400 mg/m2, Cycle 3, Reason: Other (see comments)), 320 mg/m2 (80 % of original " dose 400 mg/m2, Cycle 3, Reason: Other (see comments)), 360 mg/m2 (90 % of original dose 400 mg/m2, Cycle 3, Reason: Other (see comments)), 320 mg/m2 (80 % of original dose 400 mg/m2, Cycle 3, Reason: Other (see comments))  Administration: 730 mg (4/8/2024), 730 mg (4/22/2024), 580 mg (5/14/2024), 580 mg (5/29/2024), 580 mg (6/11/2024), 580 mg (6/24/2024)  bevacizumab (AVASTIN) IVPB, 5 mg/kg = 465 mg, Intravenous, Once, 6 of 6 cycles  Administration: 465 mg (4/8/2024), 465 mg (4/22/2024), 465 mg (5/14/2024), 465 mg (5/29/2024), 465 mg (6/11/2024), 465 mg (6/24/2024)  irinotecan (CAMPTOSAR) chemo infusion, 295 mg (90 % of original dose 180 mg/m2), Intravenous, Once, 6 of 6 cycles  Dose modification: 162 mg/m2 (90 % of original dose 180 mg/m2, Cycle 1, Reason: Other (see comments)), 144 mg/m2 (80 % of original dose 180 mg/m2, Cycle 3, Reason: Dose Not Tolerated), 162 mg/m2 (90 % of original dose 180 mg/m2, Cycle 3, Reason: Other (see comments)), 144 mg/m2 (80 % of original dose 180 mg/m2, Cycle 3, Reason: Other (see comments))  Administration: 300 mg (4/8/2024), 295 mg (4/22/2024), 262 mg (5/14/2024), 262 mg (5/29/2024), 262 mg (6/11/2024), 262 mg (6/24/2024)  leucovorin calcium IVPB, 728 mg, Intravenous, Once, 6 of 6 cycles  Dose modification: 360 mg/m2 (90 % of original dose 400 mg/m2, Cycle 3, Reason: Other (see comments)), 320 mg/m2 (80 % of original dose 400 mg/m2, Cycle 3, Reason: Other (see comments)), 360 mg/m2 (90 % of original dose 400 mg/m2, Cycle 3, Reason: Other (see comments)), 320 mg/m2 (80 % of original dose 400 mg/m2, Cycle 3, Reason: Other (see comments))  Administration: 700 mg (4/8/2024), 700 mg (4/22/2024), 582 mg (5/14/2024), 582 mg (5/29/2024), 582 mg (6/11/2024), 582 mg (6/24/2024)  fluorouracil (ADRUCIL) ambulatory infusion Soln, 1,200 mg/m2/day = 4,370 mg, Intravenous, Over 46 hours, 6 of 6 cycles  Dose modification: 960 mg/m2/day (80 % of original dose 1,200 mg/m2/day, Cycle 4, Reason: Other  "(see comments))     Omental metastasis   9/23/2019 Biopsy    Rectal Mass ( 2 slides [id number] [facility] Pathology, Collected on 09/23/2019, biopsy):  - Adenocarcinoma     2/4/2020 Surgery    Ileostomy, takedown:  - Portion of small intestine with mucocutaneous anastomosis consistent with stoma.  - Negative for malignancy     9/13/2023 Biopsy    Mass, \"Chest wall mass 4 cores,\" Biopsy:  - Metastatic moderately differentiated adenocarcinoma, consistent with known colonic primary      4/8/2024 -  Chemotherapy    potassium chloride, 20 mEq, Intravenous, Once, 1 of 1 cycle  Administration: 20 mEq (5/14/2024)  alteplase (CATHFLO), 2 mg, Intracatheter, Every 1 Minute as needed, 6 of 6 cycles  pegfilgrastim (NEULASTA), 3 mg (100 % of original dose 3 mg), Subcutaneous, Once, 4 of 4 cycles  Dose modification: 3 mg (original dose 3 mg, Cycle 3)  Administration: 3 mg (5/17/2024), 3 mg (6/3/2024), 3 mg (6/14/2024), 3 mg (6/27/2024)  fosaprepitant (EMEND) IVPB, 150 mg, Intravenous, Once, 5 of 5 cycles  Administration: 150 mg (4/22/2024), 150 mg (5/14/2024), 150 mg (5/29/2024), 150 mg (6/11/2024), 150 mg (6/24/2024)  fluorouracil (ADRUCIL), 400 mg/m2 = 730 mg, Intravenous, Once, 6 of 6 cycles  Dose modification: 360 mg/m2 (90 % of original dose 400 mg/m2, Cycle 3, Reason: Other (see comments)), 320 mg/m2 (80 % of original dose 400 mg/m2, Cycle 3, Reason: Other (see comments)), 360 mg/m2 (90 % of original dose 400 mg/m2, Cycle 3, Reason: Other (see comments)), 320 mg/m2 (80 % of original dose 400 mg/m2, Cycle 3, Reason: Other (see comments))  Administration: 730 mg (4/8/2024), 730 mg (4/22/2024), 580 mg (5/14/2024), 580 mg (5/29/2024), 580 mg (6/11/2024), 580 mg (6/24/2024)  bevacizumab (AVASTIN) IVPB, 5 mg/kg = 465 mg, Intravenous, Once, 6 of 6 cycles  Administration: 465 mg (4/8/2024), 465 mg (4/22/2024), 465 mg (5/14/2024), 465 mg (5/29/2024), 465 mg (6/11/2024), 465 mg (6/24/2024)  irinotecan (CAMPTOSAR) chemo infusion, 295 mg " (90 % of original dose 180 mg/m2), Intravenous, Once, 6 of 6 cycles  Dose modification: 162 mg/m2 (90 % of original dose 180 mg/m2, Cycle 1, Reason: Other (see comments)), 144 mg/m2 (80 % of original dose 180 mg/m2, Cycle 3, Reason: Dose Not Tolerated), 162 mg/m2 (90 % of original dose 180 mg/m2, Cycle 3, Reason: Other (see comments)), 144 mg/m2 (80 % of original dose 180 mg/m2, Cycle 3, Reason: Other (see comments))  Administration: 300 mg (4/8/2024), 295 mg (4/22/2024), 262 mg (5/14/2024), 262 mg (5/29/2024), 262 mg (6/11/2024), 262 mg (6/24/2024)  leucovorin calcium IVPB, 728 mg, Intravenous, Once, 6 of 6 cycles  Dose modification: 360 mg/m2 (90 % of original dose 400 mg/m2, Cycle 3, Reason: Other (see comments)), 320 mg/m2 (80 % of original dose 400 mg/m2, Cycle 3, Reason: Other (see comments)), 360 mg/m2 (90 % of original dose 400 mg/m2, Cycle 3, Reason: Other (see comments)), 320 mg/m2 (80 % of original dose 400 mg/m2, Cycle 3, Reason: Other (see comments))  Administration: 700 mg (4/8/2024), 700 mg (4/22/2024), 582 mg (5/14/2024), 582 mg (5/29/2024), 582 mg (6/11/2024), 582 mg (6/24/2024)  fluorouracil (ADRUCIL) ambulatory infusion Soln, 1,200 mg/m2/day = 4,370 mg, Intravenous, Over 46 hours, 6 of 6 cycles  Dose modification: 960 mg/m2/day (80 % of original dose 1,200 mg/m2/day, Cycle 4, Reason: Other (see comments))         History of Present Illness  :   Miss Lovett is seen here today for ongoing surveillance of metastatic colorectal cancer.  Briefly, the patient underwent screening colonoscopy in late 2019.  This detected a rectosigmoid mass, which was then resected in December of 2019. Surgery required a diverting loop ileostomy, which was subsequently reversed in February of 2020. Of note, preop MRI suggested a T3bN1 lesion above the peritoneal reflection, and upfront surgery was recommended. This revealed a final path T3 N0 cancer with no aggressive features and no adjuvant therapy was recommended by  her surgeon Dr. Cleary. She underwent a PET scan due to a rising CEA (4.4 from 2.2 posotp), and this demonstrated a large 4.5 cm left pericolic gutter markedly hypermetabolic mass.  There was mild increased activity at the colon anastomosis.  Subsequent colonoscopy revealed no abnormality at this location.  She denies any systemic symptoms including weight loss, nausea, vomiting, changes in her bowel habits. MRI of the abdomen revealed a single metastatic lesion invading the spleen.  No evidence of other peritoneal implants.  MRI of the pelvis revealed a concerning appearance of the ovary.  This was followed by a transvaginal ultrasound, which ultimately determined that the ovary appeared benign.   She then underwent interventional radiology biopsy, which confirmed a metastatic lesion from a colorectal primary. She was iniatiated on FOLFOX and then 4/2022 underwent open omentectomy, splenectomy, resection of involved diaphragm with primary repair, hysterectomy and oophorectomy with HIPEC. Completed chemo August 2022.     Interval 2/2023  Doing very well. Recent CT ISIAH. CEA 1.6 No c/o fatigue, n/v, changes in stool habits, fevers, chills, weight loss.  I will see her in 4 months time for repeat chest abdomen pelvis cross-sectional imaging and CEA.      6/2023  Itzel is doing very well clinically.  She has no abdominal pain, no nausea vomiting, no change in stool habits.  No weight loss, bone pain, fatigue, headaches.  Her CEA remains very low, and was previously very reliable and correlated with her degree of disease.  Her CT did demonstrate a new very small subcentimeter liver lesion. We will obtain an MR    10/2023  Obtained an MR confirming suspicion for liver lesion as well as left posterior inferior rib lesion. IR bx of liver confirmed met. Discussed with Dr Grubbs rad onc for SBRT and Dr Christiansen to discuss ablation of liver, as well as med onc Dr Grubbs to ensure agreement with plan. Imaging with doubling in  size of chest wall met. To initiate treatment asap    2/2024  Y90 to liver 1/2024. Completed XRT to chest wall 12/2023.     7/2024  Unfortunately repeat scan 3/2024 demonstrated new mediastinal adenopathy, increased paraspinal lesion anterior to L1 and stable lesion at T12.  The previously seen liver lesion was stable although there was a new small lesion suspicious for an additional metastatic implant.  Patient also had new retroperitoneal adenopathy and increased amee hepatis adenopathy. She restarted FOLFIRI/avastin. She has had a good response in all sites of disease on most recent scan 7/2024. Had trouble with the chemo in terms of her functional status and the dose was lowered and this was better tolerated. She states today she has completed the treatment course. She will be seeing Dr Babin later this week for ongoing care. No recent CEA though she plans to have this drawn before seeing Dr Babin. Diarrhea alternating with constipation depending on when in chemo cycle she is. No weight loss. Remains ECOG 0.     Review of Systems  Complete ROS Surg Onc:   Constitutional: The patient denies new or recent history of general fatigue, no recent weight loss, normal appetite.   Eyes: No complaints of visual problems, no scleral icterus.   ENT: No complaints of ear pain, no hoarseness, no difficulty swallowing,  no tinnitus and no new masses in head, oral cavity, or neck.   Cardiovascular: No complaints of chest pain, no palpitations, no ankle edema.   Respiratory: No complaints of shortness of breath, no cough.   Gastrointestinal: No complaints of jaundice, no bloody stools, no pale stools.   Genitourinary: No complaints of dysuria, no hematuria, no nocturia, no frequent urination, no urethral discharge.   Musculoskeletal: No complaints of weakness, paralysis, joint stiffness or arthralgias.  Integumentary: No complaints of rash, no new lesions.   Neurological: No complaints of convulsions, no seizures, no dizziness.    Hematologic/Lymphatic: No complaints of easy bruising.   Endocrine:  ENDORSES cold intolerance.  No polydipsia, polyphagia, or polyuria.  Allergy/immunology:  No environmental allergies.  No food allergies.  Not immunocompromised.      Patient Active Problem List   Diagnosis    Callus of foot    Foot pain    GERD (gastroesophageal reflux disease)    Mixed hyperlipidemia    Prediabetes    Varicose veins with pain    Morbid obesity (HCC)    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HCC)    Dyspnea on exertion    Dyslipidemia    Sigmoid diverticulosis    PONV (postoperative nausea and vomiting)    Omental metastasis    Lesion of ovary    Chemotherapy adverse reaction    Chemotherapy-induced nausea    Platelets decreased (HCC)    Stage 3 chronic kidney disease, unspecified whether stage 3a or 3b CKD (HCC)    H/O splenectomy    Asplenia    Postoperative state    Metastases to the liver (HCC)    Chemotherapy-induced neuropathy (HCC)    Chemotherapy-induced neutropenia (HCC)    Hypokalemia     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    Colon polyp     GERD (gastroesophageal reflux disease)     History of chemotherapy 2021    History of rectal surgery     Hyperlipidemia     PONV (postoperative nausea and vomiting)      Past Surgical History:   Procedure Laterality Date    BREAST CYST EXCISION Right      SECTION      x3    COLON SIGMOID RESECTION      COLONOSCOPY      DILATION AND CURETTAGE OF UTERUS      ILEO LOOP DIVERSION N/A 12/10/2019    Procedure: ILEOSTOMY LOOP;  Surgeon: WINNIE Pastor MD;  [facility]: [facility];  Service: Robotics    INSTILLATION CHEMOTHERAPY INTRAPERITONEAL (HIPEC) LAPAROTOMY N/A 2022    Procedure: INSTILLATION CHEMOTHERAPY INTRAPERITONEAL (HIPEC) LAPAROTOMY;  Surgeon: Jessie Freeman MD;  [facility]: [facility];  Service: Surgical Oncology    IR BIOPSY CHEST  WALL  9/13/2023    IR BIOPSY OMENTUM  11/12/2021    IR PORT PLACEMENT  12/28/2021    IR Y-90 PRE-ANGIO/EMBO W/ LUNG SCAN  1/4/2024    IR Y-90 RADIOEMBOLIZATION  1/11/2024    OMENTECTOMY N/A 04/29/2022    Procedure: DIAGNOSTIC LAPAROSCOPY, OPEN OMENTECTOMY, SPLENECTOMY, DIAPHRAGM REPAIR, CHEST TUBE INSERTION;  Surgeon: Jessie Freeman MD;  [facility];  Service: Surgical Oncology    TX CLOSURE ENTEROSTOMY LG/SMALL INTESTINE N/A 02/04/2020    Procedure: CLOSURE ILEOSTOMY;  Surgeon: WINNIE Pastor MD;  [facility];  Service: Colorectal    TX LAPAROSCOPY COLECTOMY PARTIAL W/ANASTOMOSIS N/A 12/10/2019    Procedure: SIGMOID RESECTION COLON LAPAROSCOPIC W ROBOTICS converted to lap hand assisted  with Partial proctectomy , LASER FLUORESCENCE ANGIOGRAPHY, INTRA OP COLONOSCOPY;  Surgeon: WINNIE Pastor MD;  [facility];  Service: Robotics    TX TOTAL ABDOMINAL HYSTERECT W/WO RMVL TUBE OVARY N/A 04/29/2022    Procedure: DIAGNOSTIC LAPAROSCOPY, TOTAL ABDOMINAL HYSTERECTOMY, BILATERAL SALPINGO-OOPHORECTOMY, EXTENSIVE ADHESIOLYSIS;  Surgeon: Peterson Mae MD;  [facility];  Service: Gynecology Oncology    TX UNLISTED PROCEDURE DIAPHRAGM  04/29/2022    Procedure: REPAIR DIAPHRAGM TEAR;  Surgeon: Yana Hebert MD;  [facility];  Service: Thoracic    SMALL INTESTINE SURGERY  02/04/2020    Procedure: RESECTION SMALL BOWEL;  Surgeon: WINNIE Pastor MD;  [facility];  Service: Colorectal     Family History   Problem Relation Age of Onset    Hypertension Mother     Heart attack Mother     Heart attack Father     Heart disease Father     Hypertension Brother     Heart attack Brother     Colon cancer Paternal Uncle     Leukemia Cousin     Breast cancer Cousin     Diabetes Cousin     Breast cancer Cousin     Colon cancer Family         paternal uncle    Stroke Neg Hx      Social History     Socioeconomic History    Marital status:      Spouse name:  "Not on file    Number of children: Not on file    Years of education: Not on file    Highest education level: Not on file   Occupational History    Not on file   Tobacco Use    Smoking status: Never     Passive exposure: Past    Smokeless tobacco: Never   Vaping Use    Vaping status: Never Used   Substance and Sexual Activity    Alcohol use: Yes     Comment: \"occasionally\"    Drug use: Never    Sexual activity: Not Currently   Other Topics Concern    Not on file   Social History Narrative    Not on file     Social Determinants of Health     Financial Resource Strain: Low Risk  (3/21/2023)    Overall Financial Resource Strain (CARDIA)     Difficulty of Paying Living Expenses: Not hard at all   Food Insecurity: No Food Insecurity (3/28/2024)    Hunger Vital Sign     Worried About Running Out of Food in the Last Year: Never true     Ran Out of Food in the Last Year: Never true   Transportation Needs: No Transportation Needs (3/28/2024)    PRAPARE - Transportation     Lack of Transportation (Medical): No     Lack of Transportation (Non-Medical): No   Physical Activity: Not on file   Stress: Not on file   Social Connections: Not on file   Intimate Partner Violence: Not on file   Housing Stability: Unknown (3/28/2024)    Housing Stability Vital Sign     Unable to Pay for Housing in the Last Year: No     Number of Times Moved in the Last Year: Not on file     Homeless in the Last Year: No       Current Outpatient Medications:     Acetaminophen (TYLENOL 8 HOUR PO), Take by mouth PRN, Disp: , Rfl:     apixaban (Eliquis) 5 mg, Take 1 tablet (5 mg total) by mouth 2 (two) times a day, Disp: 60 tablet, Rfl: 0    ezetimibe (ZETIA) 10 mg tablet, Take 1 tablet (10 mg total) by mouth daily, Disp: 90 tablet, Rfl: 1    famotidine (PEPCID) 20 mg tablet, Take 1 tablet (20 mg total) by mouth 2 (two) times a day as needed for heartburn As needed, Disp: 90 tablet, Rfl: 1    meclizine (ANTIVERT) 12.5 MG tablet, Take 1 tablet (12.5 mg total) " by mouth 3 (three) times a day as needed for dizziness (Patient taking differently: Take 12.5 mg by mouth 3 (three) times a day as needed for dizziness PRN), Disp: 30 tablet, Rfl: 0    ondansetron (Zofran ODT) 8 mg disintegrating tablet, Take 1 tablet (8 mg total) by mouth every 8 (eight) hours as needed for nausea or vomiting, Disp: 30 tablet, Rfl: 5    lidocaine (Lidoderm) 5 %, Apply 1 patch topically over 12 hours daily Remove & Discard patch within 12 hours or as directed by MD (Patient not taking: Reported on 2/1/2024), Disp: 12 patch, Rfl: 3    prochlorperazine (COMPAZINE) 5 mg tablet, , Disp: , Rfl:   Allergies   Allergen Reactions    Medical Tape Rash     Skin irritation  Skin peeling  Skin irritation  Skin peeling  Blisters  Rash       Vitals:    07/16/24 1254   BP: 140/80   Pulse: 98   Temp: (!) 97.4 °F (36.3 °C)   SpO2: 99%       Physical Exam   General: Appears well, appears stated age  Skin: Warm, anicteric. Incisions well-healed  HEENT: Normocephalic, atraumatic; sclera aniceteric, mucous membranes moist; cervical nodes without adenopathy  Cardiopulmonary: RRR, Easy WOB, no BLE edema.   Abd: Obese, nontender, no masses appreciated, no hepatosplenomegaly. Incision well-healed  MSK: Symmetric, no cyanosis, no overt weakness  Lymphatic: No cervical, axillary or inguinal lymphadenopathy  Neuro: Affect appropriate, no gross motor abnormalities      Pathology:  Final Diagnosis   A. Rectosigmoid colon, low anterior resection:  - Adenocarcinoma, moderately differentiated.   - Tumor invades through the muscularis propria into pericolorectal tissue, T3  - Diverticula.  - All margins are negative for tumor.  - Thirty-four lymph nodes, negative for malignancy (0/34).     B. Colon, anastomotic rings, resection:  - Two portions of colon with no pathologic abnormality     Intradepartmental consultation is in agreement.  Interpretation performed at [address]  [id number]  Immunohistochemistry for desmin  is performed on tissue blocks A13 and A16 to help in the assessment of this case.     Final Diagnosis   A. Uterus, Bilateral Ovaries and Fallopian Tubes; Hysterosalpingo-oophorectomy:  - Leiomyoma.  - Left ovary with serous cystadenoma.  - Unremarkable cervix.  - Benign inactive endometrium with no specific pathologic change.  - Unremarkable right ovary and bilateral fallopian tubes.     B. Spleen, spleen, omentum, darotis:  - Metastatic adenocarcinoma involving spleen and omentum, consistent with colonic primary. See Note.         Labs: Reviewed in [facility]    Imaging  Colonoscopy    Addendum Date: 10/6/2021 Addendum:   [address] [address] [phone] DATE OF SERVICE: 10/06/21 PHYSICIAN(S): Nadir Veronica MD - Attending Physician INDICATION: Personal history of rectal cancer , had a low anterior resection in December of 2019.  T3 N0, 34 lymph nodes were removed and were negative.  Patient did not receive any chemotherapy. Colonoscopy performed for a diagnostic indication. POST-OP DIAGNOSIS: See the impression below. HISTORY: Prior colonoscopy: Less than 3 years ago. It is being repeated at an interval of less than 3 years because: This colonoscopy is being performed for a diagnostic indication BOWEL PREPARATION: Miralax/Dulcolax PREPROCEDURE: Informed consent was obtained for the procedure, including sedation. Risks including but not limited to bleeding, infection, perforation, adverse drug reaction and aspiration were explained in detail. Also explained about less than 100% sensitivity with the exam and other alternatives. The patient was placed in the left lateral decubitus position. DETAILS OF PROCEDURE: Patient was taken to the procedure room where a time out was performed to confirm correct patient and correct procedure. The patient underwent monitored anesthesia care, which was administered by an anesthesia professional.  The patient's blood pressure, heart rate, level of consciousness, oxygen and respirations were monitored throughout the procedure. A digital rectal exam was performed. The scope was introduced through the anus and advanced to the cecum. Retroflexion was performed in the rectum. The quality of bowel preparation was evaluated using the [place] Bowel Preparation Scale with scores of: right colon = 2, transverse colon = 2, left colon = 2. The total BBPS score was 6. Bowel prep was adequate. The patient experienced no blood loss. The procedure was not difficult. The patient tolerated the procedure well. There were no apparent complications. ANESTHESIA INFORMATION: ASA: III Anesthesia Type: IV Sedation with Anesthesia MEDICATIONS: No administrations occurring from 1215 to 1230 on 10/06/21 FINDINGS: Healthy end-to-end colorectal anastomosis with no bleeding in the mid rectum All observed locations appeared normal, including the cecum, ascending colon, transverse colon, splenic flexure and descending colon. A couple scattered diverticuli seen EVENTS: Procedure Events Event Event Time ENDO CECUM REACHED 10/6/2021 12:21 PM ENDO SCOPE OUT TIME 10/6/2021 12:28 PM SPECIMENS: * No specimens in log * EQUIPMENT: Colonoscope - IMPRESSION: 1. Normal-appearing colon.  Anastomosis site was seen in the rectum appeared healthy.  No evidence of local recurrence. 2. Couple small scattered diverticuli. RECOMMENDATION: Repeat colonoscopy in 2 years due to a personal history of colon cancer. Advised her urgent evaluation by medical oncologist and Dr. Pastor. Nadir Veronica MD     Addendum Date: 10/6/2021 Addendum:   [address] [address] [phone] DATE OF SERVICE: 10/06/21 PHYSICIAN(S): Nadir Veroinca MD - Attending Physician INDICATION: Personal history of rectal cancer , had a low anterior resection in December of 2019.  T3 N0, 34 lymph nodes were removed and were negative.  Patient did not  receive any chemotherapy. Colonoscopy performed for a diagnostic indication. POST-OP DIAGNOSIS: See the impression below. HISTORY: Prior colonoscopy: Less than 3 years ago. It is being repeated at an interval of less than 3 years because: This colonoscopy is being performed for a diagnostic indication BOWEL PREPARATION: Miralax/Dulcolax PREPROCEDURE: Informed consent was obtained for the procedure, including sedation. Risks including but not limited to bleeding, infection, perforation, adverse drug reaction and aspiration were explained in detail. Also explained about less than 100% sensitivity with the exam and other alternatives. The patient was placed in the left lateral decubitus position. DETAILS OF PROCEDURE: Patient was taken to the procedure room where a time out was performed to confirm correct patient and correct procedure. The patient underwent monitored anesthesia care, which was administered by an anesthesia professional. The patient's blood pressure, heart rate, level of consciousness, oxygen and respirations were monitored throughout the procedure. A digital rectal exam was performed. The scope was introduced through the anus and advanced to the cecum. Retroflexion was performed in the rectum. The quality of bowel preparation was evaluated using the [place] Bowel Preparation Scale with scores of: right colon = 2, transverse colon = 2, left colon = 2. The total BBPS score was 6. Bowel prep was adequate. The patient experienced no blood loss. The procedure was not difficult. The patient tolerated the procedure well. There were no apparent complications. ANESTHESIA INFORMATION: ASA: III Anesthesia Type: IV Sedation with Anesthesia MEDICATIONS: No administrations occurring from 1215 to 1230 on 10/06/21 FINDINGS: Healthy end-to-end colorectal anastomosis with no bleeding in the mid rectum All observed locations appeared normal, including the cecum, ascending colon, transverse colon, splenic flexure and  descending colon. A couple scattered diverticuli seen EVENTS: Procedure Events Event Event Time ENDO CECUM REACHED 10/6/2021 12:21 PM ENDO SCOPE OUT TIME 10/6/2021 12:28 PM SPECIMENS: * No specimens in log * EQUIPMENT: Colonoscope - IMPRESSION: 1. Normal-appearing colon.  Anastomosis site was seen in the rectum appeared healthy.  No evidence of local recurrence. 2. Couple small scattered diverticuli. RECOMMENDATION: Repeat colonoscopy in 2 years due to a personal history of colon cancer. Nadir Veronica MD     Result Date: 10/6/2021  Narrative: [address] [address] [phone] DATE OF SERVICE: 10/06/21 PHYSICIAN(S): Nadir Veronica MD - Attending Physician INDICATION: Personal history of rectal cancer Colonoscopy performed for a diagnostic indication. POST-OP DIAGNOSIS: See the impression below. HISTORY: Prior colonoscopy: Less than 3 years ago. It is being repeated at an interval of less than 3 years because: This colonoscopy is being performed for a diagnostic indication BOWEL PREPARATION: Miralax/Dulcolax PREPROCEDURE: Informed consent was obtained for the procedure, including sedation. Risks including but not limited to bleeding, infection, perforation, adverse drug reaction and aspiration were explained in detail. Also explained about less than 100% sensitivity with the exam and other alternatives. The patient was placed in the left lateral decubitus position. DETAILS OF PROCEDURE: Patient was taken to the procedure room where a time out was performed to confirm correct patient and correct procedure. The patient underwent monitored anesthesia care, which was administered by an anesthesia professional. The patient's blood pressure, heart rate, level of consciousness, oxygen and respirations were monitored throughout the procedure. A digital rectal exam was performed. The scope was introduced through the anus and advanced to the cecum. Retroflexion was performed in  the rectum. The quality of bowel preparation was evaluated using the [place] Bowel Preparation Scale with scores of: right colon = 2, transverse colon = 2, left colon = 2. The total BBPS score was 6. Bowel prep was adequate. The patient experienced no blood loss. The procedure was not difficult. The patient tolerated the procedure well. There were no apparent complications. ANESTHESIA INFORMATION: ASA: III Anesthesia Type: IV Sedation with Anesthesia MEDICATIONS: No administrations occurring from 1215 to 1230 on 10/06/21 FINDINGS: Healthy end-to-end colorectal anastomosis with no bleeding in the mid rectum All observed locations appeared normal, including the cecum, ascending colon, transverse colon, splenic flexure and descending colon. A couple scattered diverticuli seen EVENTS: Procedure Events Event Event Time ENDO CECUM REACHED 10/6/2021 12:21 PM ENDO SCOPE OUT TIME 10/6/2021 12:28 PM SPECIMENS: * No specimens in log * EQUIPMENT: Colonoscope -     Impression: 1. Normal-appearing colon.  Anastomosis site was seen in the rectum appeared healthy.  No evidence of local recurrence. 2. Couple small scattered diverticuli. RECOMMENDATION: Repeat colonoscopy in 2 years due to a personal history of colon cancer. Nadir Veronica MD     DXA bone density spine hip and pelvis    Result Date: 10/14/2021  Narrative: CENTRAL  DXA SCAN CLINICAL HISTORY:  66-year-old postmenopausal female. OTHER RISK FACTORS:  History of fracture resulting from minor injury. PHARMACOLOGIC THERAPY FOR OSTEOPOROSIS:  None. TECHNIQUE: Bone densitometry was performed using a [employer]   bone densitometer.  Regions of interest appear properly placed. COMPARISON: 5/6/2019. RESULTS: LUMBAR SPINE L1-L4 : BMD  1.141  gm/cm2 T-score -0.4 LEFT TOTAL HIP: BMD: 0.992  gm/cm2 T-score: -0.1 LEFT FEMORAL NECK: BMD: 0.856  gm/cm2 T score: -1.3 RIGHT TOTAL HIP: BMD:  1.004  gm/cm2 T-score: 0.0 RIGHT FEMORAL NECK: BMD:  0.851  gm/cm2  T score: -1.3     Impression:  1.  Low bone mass (osteopenia). [Based on the left and right femoral necks] 2.  Since a DXA study from 5/6/2019, there has been: A  STATISTICALLY SIGNIFICANT DECREASE in bone mineral density of  0.039 g/cm2 (3.3)% in the lumbar spine. A  STATISTICALLY SIGNIFICANT DECREASE in bone mineral density of  0.046 g/cm2 (4.4)% in the hips. 3.  The 10 year risk of hip fracture is 1.2% with the 10 year risk of major osteoporotic fracture being 12.6% as calculated by the [employer] fracture risk assessment tool (FRAX, which is based on data generated by the WHO Collaborating [place] for Metabolic Bone Diseases). 4.  The current NOF guidelines recommend treating patients with a T-score of -2.5 or less in the lumbar spine or hips, or in post-menopausal women and men over the age of 50 with low bone mass (osteopenia) and a FRAX 10 year risk score of >3% for hip fracture and/or >20% for major osteoporotic fracture. 5.  The NOF recommends follow-up DXA in 1-2 years after initiating therapy for osteoporosis and every 2 years thereafter. More frequent evaluation is appropriate for patients with conditions associated with rapid bone loss, such as glucocorticoid therapy. The interval between DXA screenings may be longer for individuals without major risk factors and initial T-score in the normal or upper low bone mass range. The FRAX algorithm has certain limitations: -FRAX has not been validated in patients currently or previously treated with pharmacotherapy for osteoporosis.  In such patients, clinical judgment must be exercised in interpreting FRAX scores.  -Prior hip, vertebral and humeral fragility fractures appear to confer greater risk of subsequent fracture than fractures at other sites (this is especially true for individuals with severe vertebral fractures), but quantification of this incremental risk is not possible with FRAX. -FRAX underestimates fracture risk in patients with history of multiple fragility  fractures. -FRAX may underestimate fracture risk in patients with history of frequent falls. -It is not appropriate to use FRAX to monitor treatment response. WHO CLASSIFICATION: Normal (a T-score of -1.0 or higher) Low bone mineral density (a T-score of less than -1.0 but higher than -2.5) Osteoporosis (a T-score of -2.5 or less) Severe osteoporosis (a T-score of -2.5 or less with a fragility fracture) LEAST SIGNIFICANT CHANGE AT 95% C.I: Lumbar spine: 0.036 gm/cm2 (3.2%). Total hip: 0.018 gm/cm2 (2.0%). Forearm: 0.024 gm/cm2 (3.2%). Workstation performed: [id number]     NM PET CT skull base to mid thigh    Result Date: 9/28/2021  Narrative: PET/CT SCAN INDICATION:  C18.7: Malignant neoplasm of sigmoid colon C20: Malignant neoplasm of rectum   Rectosigmoid carcinoma, restaging/surveillance examination.  Borderline elevated CEA (most recently 4.4). MODIFIER: PS COMPARISON: CT chest abdomen and pelvis 2/24/2021. CELL TYPE:  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 TECHNIQUE:   12.1 mCi F-18-FDG administered IV. Multiplanar attenuation corrected and non attenuation corrected PET images were acquired 60 minutes post injection. Contiguous, low dose, axial CT sections were obtained from the skull base through the femurs . Intravenous contrast material was not utilized.  This examination, like all CT scans performed in the [facility], was performed utilizing techniques to minimize radiation dose exposure, including the use of iterative reconstruction and automated exposure control. Fasting serum glucose: 90 mg/dl FINDINGS: VISUALIZED BRAIN:   No acute abnormalities are seen. HEAD/NECK:   There is a physiologic distribution of FDG. No FDG avid cervical adenopathy is seen. CT images: Unremarkable. CHEST:   No FDG avid soft tissue lesions are seen. CT images: Mild coronary artery calcification.  No pericardial effusion, no pleural effusion ABDOMEN:   There is a large hypermetabolic left  lateral abdominal lobular mass situated immediately inferior to the spleen in the left paracolic gutter region, which measures 4.3 x 3.9 x 4.5 cm in size, SUV max 20.8.  No other definite hypermetabolic abnormalities are seen within the upper abdomen.  Activity at the bowel anastomotic site in the right abdomen on image 165 demonstrates SUV max of 3.8.  This is nonspecific but may simply be inflammatory or physiologic.  Direct visualization is recommended if not recently performed. CT images: No ascites.  No hydronephrosis or bowel obstruction. PELVIS: Additional rectosigmoid anastomotic site noted image 212.  No evidence of abnormal glucose activity.  No evidence of glucose avid pelvic adenopathy.  No pelvic ascites. OSSEOUS STRUCTURES: No FDG avid lesions are seen. CT images: No significant findings.     Impression: 1. There is a large left paracolic gutter markedly hypermetabolic mass immediately inferior to the spleen, most consistent with metastatic colorectal carcinoma. 2. Mildly increased activity at the right abdominal bowel anastomotic site.  If not recently performed, direct visualization is recommended 3. There are no other glucose avid abnormalities identified within the abdomen or pelvis 4. No evidence of distant glucose avid metastatic disease in the neck, chest, or skeleton The examination demonstrates a significant  finding and was documented as such in [facility] for liaison and referring practitioner notification. Workstation performed: [id number]     Mammo screening bilateral w 3d & cad    Result Date: 10/14/2021  Narrative: DIAGNOSIS: Encounter for screening mammogram for malignant neoplasm of breast TECHNIQUE: Digital screening mammography was performed. Computer Aided Detection (CAD) analyzed all applicable images. COMPARISONS: Prior breast imaging dated: 06/26/2020, 05/06/2019, 10/18/2016, 10/14/2016, and 09/14/2015 RELEVANT HISTORY: Family Breast Cancer History: History of breast cancer in Cousin,  Family. Family Medical History: Family medical history includes breast cancer in 2 relatives (cousin, family) and colon cancer in 2 relatives (family, paternal uncle). Personal History: No known relevant hormone history. Surgical history includes lumpectomy. No known relevant medical history. The patient is scheduled in a reminder system for screening mammography. 8-10% of cancers will be missed on mammography. Management of a palpable abnormality must be based on clinical grounds.  Patients will be notified of their results via letter from our facility. Accredited by American College of Radiology and FDA. RISK ASSESSMENT: 5 Year Tyrer-Cuzick: 2.13 % 10 Year Tyrer-Cuzick: 4.17 % Lifetime Tyrer-Cuzick: 7.93 % TISSUE DENSITY: There are scattered areas of fibroglandular density.  INDICATION: Itzel Sanches is a 66 y.o. female presenting for screening mammography. FINDINGS: Breast parenchymal distribution is unchanged from priors including multiple focal asymmetries in the right breast.  Long-term stability confirms benignancy.  No developing asymmetries or concerning masses.  Single upper-outer-anterior coarse calcification in the left breast is definitively benign.  No suspicious microcalcifications, architectural distortion, skin thickening, or abnormalities of the nipples in either breast.      Impression: No mammographic evidence of malignancy or significant change from priors. ASSESSMENT/BI-RADS CATEGORY: Left: 2 - Benign Right: 2 - Benign Overall: 2 - Benign RECOMMENDATION:      - Routine screening mammogram in 1 year for both breasts. Workstation ID: [id number]     CT 6/2023     IMPRESSION:     No evidence of acute intrathoracic pathology.     New 7 mm hypodensity of the right hepatic lobe which was not present on prior examinations. Contrast enhanced abdominal MRI recommended for further evaluation.     Nodular soft tissue adjacent to the left upper renal capsule and prior splenectomy site is stable    I  have personally reviewed and interpreted the patient's CT scans, MRIs, radiation oncology, medical oncology, and interventional radiology notes. Have reviewed Y90, SBRT, scans from this current year, med onc notes

## 2024-07-16 NOTE — ASSESSMENT & PLAN NOTE
T3 N0 rectosigmoid adeno resected 12/2019, then with a single site of recurrence invading the spleen as well as multicystic pelvic disease s/p omentectomy, splenectomy, resection of involved diaphragm and diaphragm repair + DILMA/BSO and HIPEC 4/2022. Completed adj chemo 8/2022. Recurred at single liver met and left chest wall one year later 8/2023. Completed SBRT to chest wall 12/2023 and Y90 to liver 1/2024. Scan 3/2024 showed more extensive lymphadenopathy and she has completed a course of chemo according to her. New scan with excellent response at every site. CEA pending. Should see Dr Babin as planned - may initiate maintenance given excellent response? I will see her in 6 mo but mainstay of her treatment will be with Dr Babin

## 2024-07-18 ENCOUNTER — OFFICE VISIT (OUTPATIENT)
Dept: HEMATOLOGY ONCOLOGY | Facility: CLINIC | Age: 69
End: 2024-07-18
Payer: MEDICARE

## 2024-07-18 ENCOUNTER — TELEPHONE (OUTPATIENT)
Dept: HEMATOLOGY ONCOLOGY | Facility: CLINIC | Age: 69
End: 2024-07-18

## 2024-07-18 VITALS
RESPIRATION RATE: 17 BRPM | BODY MASS INDEX: 41.21 KG/M2 | TEMPERATURE: 97 F | HEART RATE: 81 BPM | SYSTOLIC BLOOD PRESSURE: 140 MMHG | OXYGEN SATURATION: 99 % | DIASTOLIC BLOOD PRESSURE: 80 MMHG | WEIGHT: 191 LBS | HEIGHT: 57 IN

## 2024-07-18 DIAGNOSIS — E87.6 HYPOKALEMIA: ICD-10-CM

## 2024-07-18 DIAGNOSIS — T45.1X5A ANEMIA ASSOCIATED WITH CHEMOTHERAPY: ICD-10-CM

## 2024-07-18 DIAGNOSIS — G62.0 CHEMOTHERAPY-INDUCED NEUROPATHY (HCC): ICD-10-CM

## 2024-07-18 DIAGNOSIS — D64.81 ANEMIA ASSOCIATED WITH CHEMOTHERAPY: ICD-10-CM

## 2024-07-18 DIAGNOSIS — K57.30 SIGMOID DIVERTICULOSIS: ICD-10-CM

## 2024-07-18 DIAGNOSIS — T45.1X5A CHEMOTHERAPY-INDUCED NEUROPATHY (HCC): ICD-10-CM

## 2024-07-18 DIAGNOSIS — C78.6 MALIGNANT NEOPLASM METASTATIC TO OMENTUM (HCC): ICD-10-CM

## 2024-07-18 DIAGNOSIS — C19 RECTOSIGMOID CANCER (HCC): Primary | ICD-10-CM

## 2024-07-18 PROCEDURE — 99214 OFFICE O/P EST MOD 30 MIN: CPT | Performed by: INTERNAL MEDICINE

## 2024-07-18 PROCEDURE — G2211 COMPLEX E/M VISIT ADD ON: HCPCS | Performed by: INTERNAL MEDICINE

## 2024-07-18 RX ORDER — SODIUM CHLORIDE 9 MG/ML
20 INJECTION, SOLUTION INTRAVENOUS ONCE
OUTPATIENT
Start: 2024-08-13

## 2024-07-18 RX ORDER — ONDANSETRON 8 MG/1
8 TABLET, ORALLY DISINTEGRATING ORAL EVERY 8 HOURS PRN
Qty: 30 TABLET | Refills: 5 | Status: SHIPPED | OUTPATIENT
Start: 2024-07-18

## 2024-07-18 RX ORDER — SODIUM CHLORIDE 9 MG/ML
20 INJECTION, SOLUTION INTRAVENOUS ONCE
Status: CANCELLED | OUTPATIENT
Start: 2024-07-30

## 2024-07-18 RX ORDER — SODIUM CHLORIDE 9 MG/ML
20 INJECTION, SOLUTION INTRAVENOUS ONCE
OUTPATIENT
Start: 2024-08-27

## 2024-07-18 RX ORDER — SODIUM CHLORIDE 9 MG/ML
20 INJECTION, SOLUTION INTRAVENOUS ONCE
OUTPATIENT
Start: 2024-09-10

## 2024-07-18 NOTE — PROGRESS NOTES
[facility]  [address] [address]  [phone]     Date of Visit: 7/18/2024  Name: Itzel Sanches   YOB: 1955     Assessment/Plan  In summary, Itzel Sanches is a 69 y.o. 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     She had undergone 3 months of preoperative FOLFOX.  Follow-up CT scans demonstrated response to treatment with no evidence of progressive disease.  Patient then underwent resection of the omental mass and spleen as well as DILMA/BSO (other issue, see below).  Mrs. Sanches was then restarted on FOLFOX.  Patient had problems with neuropathy; FOLFOX was changed to FOLFIRI (2 cycles of FOLFIRI only).     The 12th/final cycle of chemotherapy was completed on August 23, 2022. CT scan in May 2023 showed a new lesion to the right hepatic lobe.  MRI abdomen in July 2023 confirmed liver metastasis and revealed a new, posterior left chest wall/pleural lesion suspicious for metastasis. The chest wall mass was biopsied on 9/13/2023 revealing metastatic moderately differentiated adenocarcinoma consistent with known colonic primary.      Mrs. Sanches was seen/evaluated by Dr. Brown Grubbs (Radiation Oncology) and patient has received SBRT (left-sided lower rib cage/upper abdomen).  Mrs. Sanches also completed Y90.  Patient then went back on to surveillance.  During this surveillance.  From late 2023 to March 2024, analysis of circulating tumor cells was attempted but patient deferred.     Unfortunately repeat CAT scans on March 8, 2024 demonstrated new mediastinal adenopathy, increased paraspinal lesion anterior to L1 and stable lesion at T12.  The previously seen liver lesion was stable although there was a new small lesion suspicious for an additional metastatic implant.  Patient also had new retroperitoneal adenopathy and increased amee  hepatis adenopathy.     She was started on FOLFIRI and avastin and has completed 6 cycles so far from 4/8 to 6/24/24.  Patient is tolerating the chemotherapy.  Clinically there are no concerning findings. Recent blood work was good/acceptable.      CT c/a/p from 7/3/24 were reviewed-that showed continued interval decrease in size of the left 11th rib metastasis, interval decrease in postradiation changes in the left lower lobe.  Decrease in conspicuity and size of the hepatic metastasis in segment 5.  Previously noted 5 mm lesion is no longer visualized.  No new lesions.  There is also an improvement in the mediastinal, amee hepatis and retroperitoneal lymphadenopathy.    Discussed either continuing on the same regimen, maintenance 5-FU/Avastin versus going on a treatment break.  Patient would opt to go on a treatment break during the holiday season, and is comfortable moving onto maintenance 5-FU Avastin.  She would like to start sometime at the end of this month, she has other commitments  Will omit the 5-FU push/leucovorin and 5-FU CADD will be dose reduced.  Avastin at full dose..       Patient has a history of lower extremity DVTs, is on Eliquis.  No bruising or bleeding issues.  Patient will continue to monitor.     Mrs. Sanches knows to call the office if she has any other oncology questions or concerns.       Return in about 6 weeks (around 8/26/2024) for Office visit with labs prior.     All the patient's questions were answered to their apparent satisfaction.  Patient has been strongly urged to call with any questions       Oncology History   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HCC)   9/23/2019 Initial Diagnosis    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HCC)     9/23/2019 Biopsy    Rectal Mass ( 2 slides [id number] [facility] Pathology, Collected on 09/23/2019, biopsy):  - Adenocarcinoma     12/10/2019 Surgery    Low anterior resection (Dr Jose Pastor):    A. Rectosigmoid colon, low anterior resection:  - Adenocarcinoma, moderately  differentiated.   - Tumor invades through the muscularis propria into pericolorectal tissue, T3  - Diverticula.  - All margins are negative for tumor.  - Thirty-four lymph nodes, negative for malignancy (0/34).     B. Colon, anastomotic rings, resection:  - Two portions of colon with no pathologic abnormality     12/10/2019 Genomic Testing    RESULTS OF IMMUNOHISTOCHEMICAL ANALYSIS FOR MISMATCH REPAIR PROTEIN LOSS  INTERPRETATION: NO LOSS OF NUCLEAR EXPRESSION OF MMR PROTEINS: LOW PROBABILITY OF MSI-H  Note: Background non-neoplastic tissue and/or internal control with intact nuclear expression.      RESULTS:  Antibody          Clone               Description                           Results  MLH1               M1                  Mismatch repair protein       Intact nuclear expression  MSH2              [id number]       Mismatch repair protein       Intact nuclear expression  MSH6              44                   Mismatch repair protein       Intact nuclear expression  PMS2              [id number]         Mismatch repair protein       Intact nuclear expression     2/4/2020 Surgery    Ileostomy, takedown:  - Portion of small intestine with mucocutaneous anastomosis consistent with stoma.  - Negative for malignancy     8/18/2021 Progression    CEA trends- observed by Dr. Juarez  2/17/21: 2.9  8/18/2021: 4.5  9/13/2021: 4.4    PET/CT  9/28/2021 completed due to elevating CEA  1. There is a large left paracolic gutter markedly hypermetabolic mass immediately inferior to the spleen, most consistent with metastatic colorectal carcinoma.  2. Mildly increased activity at the right abdominal bowel anastomotic site.  If not recently performed, direct visualization is recommended  3. There are no other glucose avid abnormalities identified within the abdomen or pelvis  4. No evidence of distant glucose avid metastatic disease in the neck, chest, or skeleton      11/12/2021 Biopsy    Omental mass:  - Metastatic adenocarcinoma,  with an immunophenotype compatible with metastasis from patient's known colonic primary.     12/30/2021 - 12/30/2021 Chemotherapy    CapOx x 1 dose of Oxaliplatin: D/c due to tolerance.      1/2/2022 Genomic Testing         1/4/2022 - 3/17/2022 Chemotherapy    First 6 cycles of Folfox given prior to surgery- 1/4 through 3/17/2022     Folfox was dose reduced due to anticipated tolerance.  Minimal side effects     3/22/2022 Observation    CT CAP  1.  Decreased size of biopsy-proven omental metastasis, which now only focally contacting rather than grossly invading the spleen and adjacent abdominal wall. No new evidence of metastatic disease in the abdomen or pelvis.  2.  No new or suspicious pulmonary nodules. One of the previously noted new 1-2 mm nodules is no longer seen, and the other is unchanged. Recommend continued attention on follow-up.  3.  Top-normal thickness endometrial stripe measuring 7 mm. If there is history of vaginal bleeding, suggest catheterization with endometrial biopsy.  4.  Hepatic steatosis.     4/28/2022 -  Cancer Staged    Staging form: Colon and Rectum, AJCC 8th Edition  - Clinical stage from 4/28/2022: cT3, cN0, pM1 - Signed by Jessie Freeman MD on 6/14/2023  Total positive nodes: 0       4/29/2022 Surgery    Lydia Shaw and Anup preformed the following procedures:   DIAGNOSTIC LAPAROSCOPY, TOTAL ABDOMINAL HYSTERECTOMY, BILATERAL SALPINGO-OOPHORECTOMY, EXTENSIVE ADHESIOLYSIS (N/A)  DIAGNOSTIC LAPAROSCOPY, OPEN OMENTECTOMY, POSSIBLE SPLENECTOMY (N/A)  INSTILLATION CHEMOTHERAPY INTRAPERITONEAL (HIPEC) LAPAROTOMY (N/A)  REPAIR DIAPHRAGM TEAR      A. Uterus, Bilateral Ovaries and Fallopian Tubes; Hysterosalpingo-oophorectomy:  - Leiomyoma.  - Left ovary with serous cystadenoma.  - Unremarkable cervix.  - Benign inactive endometrium with no specific pathologic change.  - Unremarkable right ovary and bilateral fallopian tubes.     B. Spleen, spleen, omentum, darotis:  - Metastatic  "adenocarcinoma involving spleen and omentum, consistent with colonic primary. See Note.    Note: Comparison to prior material ([id number], omental mass) reveals identical morphology to previous metastatic colonic adenocarcinoma.          5/18/2022 -  Cancer Staged    Staging form: Colon and Rectum, AJCC 8th Edition  - Pathologic stage from 5/18/2022: Stage KRISTAL (pT3, pN0, pM1a) - Signed by Jessie Freeman MD on 6/14/2023  Total positive nodes: 0       6/14/2022 - 8/23/2022 Chemotherapy    7th cycle started on 6/14/2022  8th cycle worsening neuropathy; could not tolerate gabapentin  D/lizette oxaliplatin  9th cycle Irinotecan added at 85% dose reduction: SE Diarrhea  10th cycle Irinotecan dose redcued to 50%     9/19/2022 Observation    9/19/2022 CT CAP:   1.  Previously seen omental metastasis in the left upper quadrant has been resected.  There is a small area of nodular soft tissue thickening abutting the lateral aspect of the left kidney, cannot exclude residual/recurrent tumor.  Follow up in 4 months CEA not completed at time of scan.      9/13/2023 Biopsy    Mass, \"Chest wall mass 4 cores,\" Biopsy:  - Metastatic moderately differentiated adenocarcinoma, consistent with known colonic primary      10/31/2023 - 11/10/2023 Radiation    Treatments:  Course: C1 SBRT    Plan ID Energy Fractions Dose per Fraction (cGy) Dose Correction (cGy) Total Dose Delivered (cGy) Elapsed Days   SBRT L CW 6X-FFF 5 / 5 900 0 4,500 10      Treatment Dates:  10/31/2023 - 11/10/2023.      4/8/2024 -  Chemotherapy    potassium chloride, 20 mEq, Intravenous, Once, 1 of 1 cycle  Administration: 20 mEq (5/14/2024)  alteplase (CATHFLO), 2 mg, Intracatheter, Every 1 Minute as needed, 6 of 6 cycles  pegfilgrastim (NEULASTA), 3 mg (100 % of original dose 3 mg), Subcutaneous, Once, 4 of 4 cycles  Dose modification: 3 mg (original dose 3 mg, Cycle 3)  Administration: 3 mg (5/17/2024), 3 mg (6/3/2024), 3 mg (6/14/2024), 3 mg (6/27/2024)  fosaprepitant " (EMEND) IVPB, 150 mg, Intravenous, Once, 5 of 5 cycles  Administration: 150 mg (4/22/2024), 150 mg (5/14/2024), 150 mg (5/29/2024), 150 mg (6/11/2024), 150 mg (6/24/2024)  fluorouracil (ADRUCIL), 400 mg/m2 = 730 mg, Intravenous, Once, 6 of 6 cycles  Dose modification: 360 mg/m2 (90 % of original dose 400 mg/m2, Cycle 3, Reason: Other (see comments)), 320 mg/m2 (80 % of original dose 400 mg/m2, Cycle 3, Reason: Other (see comments)), 360 mg/m2 (90 % of original dose 400 mg/m2, Cycle 3, Reason: Other (see comments)), 320 mg/m2 (80 % of original dose 400 mg/m2, Cycle 3, Reason: Other (see comments))  Administration: 730 mg (4/8/2024), 730 mg (4/22/2024), 580 mg (5/14/2024), 580 mg (5/29/2024), 580 mg (6/11/2024), 580 mg (6/24/2024)  bevacizumab (AVASTIN) IVPB, 5 mg/kg = 465 mg, Intravenous, Once, 6 of 6 cycles  Administration: 465 mg (4/8/2024), 465 mg (4/22/2024), 465 mg (5/14/2024), 465 mg (5/29/2024), 465 mg (6/11/2024), 465 mg (6/24/2024)  irinotecan (CAMPTOSAR) chemo infusion, 295 mg (90 % of original dose 180 mg/m2), Intravenous, Once, 6 of 6 cycles  Dose modification: 162 mg/m2 (90 % of original dose 180 mg/m2, Cycle 1, Reason: Other (see comments)), 144 mg/m2 (80 % of original dose 180 mg/m2, Cycle 3, Reason: Dose Not Tolerated), 162 mg/m2 (90 % of original dose 180 mg/m2, Cycle 3, Reason: Other (see comments)), 144 mg/m2 (80 % of original dose 180 mg/m2, Cycle 3, Reason: Other (see comments))  Administration: 300 mg (4/8/2024), 295 mg (4/22/2024), 262 mg (5/14/2024), 262 mg (5/29/2024), 262 mg (6/11/2024), 262 mg (6/24/2024)  leucovorin calcium IVPB, 728 mg, Intravenous, Once, 6 of 6 cycles  Dose modification: 360 mg/m2 (90 % of original dose 400 mg/m2, Cycle 3, Reason: Other (see comments)), 320 mg/m2 (80 % of original dose 400 mg/m2, Cycle 3, Reason: Other (see comments)), 360 mg/m2 (90 % of original dose 400 mg/m2, Cycle 3, Reason: Other (see comments)), 320 mg/m2 (80 % of original dose 400 mg/m2, Cycle 3,  "Reason: Other (see comments))  Administration: 700 mg (4/8/2024), 700 mg (4/22/2024), 582 mg (5/14/2024), 582 mg (5/29/2024), 582 mg (6/11/2024), 582 mg (6/24/2024)  fluorouracil (ADRUCIL) ambulatory infusion Soln, 1,200 mg/m2/day = 4,370 mg, Intravenous, Over 46 hours, 6 of 6 cycles  Dose modification: 960 mg/m2/day (80 % of original dose 1,200 mg/m2/day, Cycle 4, Reason: Other (see comments))     Omental metastasis   9/23/2019 Biopsy    Rectal Mass ( 2 slides [id number] [facility] Pathology, Collected on 09/23/2019, biopsy):  - Adenocarcinoma     2/4/2020 Surgery    Ileostomy, takedown:  - Portion of small intestine with mucocutaneous anastomosis consistent with stoma.  - Negative for malignancy     9/13/2023 Biopsy    Mass, \"Chest wall mass 4 cores,\" Biopsy:  - Metastatic moderately differentiated adenocarcinoma, consistent with known colonic primary      4/8/2024 -  Chemotherapy    potassium chloride, 20 mEq, Intravenous, Once, 1 of 1 cycle  Administration: 20 mEq (5/14/2024)  alteplase (CATHFLO), 2 mg, Intracatheter, Every 1 Minute as needed, 6 of 6 cycles  pegfilgrastim (NEULASTA), 3 mg (100 % of original dose 3 mg), Subcutaneous, Once, 4 of 4 cycles  Dose modification: 3 mg (original dose 3 mg, Cycle 3)  Administration: 3 mg (5/17/2024), 3 mg (6/3/2024), 3 mg (6/14/2024), 3 mg (6/27/2024)  fosaprepitant (EMEND) IVPB, 150 mg, Intravenous, Once, 5 of 5 cycles  Administration: 150 mg (4/22/2024), 150 mg (5/14/2024), 150 mg (5/29/2024), 150 mg (6/11/2024), 150 mg (6/24/2024)  fluorouracil (ADRUCIL), 400 mg/m2 = 730 mg, Intravenous, Once, 6 of 6 cycles  Dose modification: 360 mg/m2 (90 % of original dose 400 mg/m2, Cycle 3, Reason: Other (see comments)), 320 mg/m2 (80 % of original dose 400 mg/m2, Cycle 3, Reason: Other (see comments)), 360 mg/m2 (90 % of original dose 400 mg/m2, Cycle 3, Reason: Other (see comments)), 320 mg/m2 (80 % of original dose 400 mg/m2, Cycle 3, Reason: Other (see " comments))  Administration: 730 mg (4/8/2024), 730 mg (4/22/2024), 580 mg (5/14/2024), 580 mg (5/29/2024), 580 mg (6/11/2024), 580 mg (6/24/2024)  bevacizumab (AVASTIN) IVPB, 5 mg/kg = 465 mg, Intravenous, Once, 6 of 6 cycles  Administration: 465 mg (4/8/2024), 465 mg (4/22/2024), 465 mg (5/14/2024), 465 mg (5/29/2024), 465 mg (6/11/2024), 465 mg (6/24/2024)  irinotecan (CAMPTOSAR) chemo infusion, 295 mg (90 % of original dose 180 mg/m2), Intravenous, Once, 6 of 6 cycles  Dose modification: 162 mg/m2 (90 % of original dose 180 mg/m2, Cycle 1, Reason: Other (see comments)), 144 mg/m2 (80 % of original dose 180 mg/m2, Cycle 3, Reason: Dose Not Tolerated), 162 mg/m2 (90 % of original dose 180 mg/m2, Cycle 3, Reason: Other (see comments)), 144 mg/m2 (80 % of original dose 180 mg/m2, Cycle 3, Reason: Other (see comments))  Administration: 300 mg (4/8/2024), 295 mg (4/22/2024), 262 mg (5/14/2024), 262 mg (5/29/2024), 262 mg (6/11/2024), 262 mg (6/24/2024)  leucovorin calcium IVPB, 728 mg, Intravenous, Once, 6 of 6 cycles  Dose modification: 360 mg/m2 (90 % of original dose 400 mg/m2, Cycle 3, Reason: Other (see comments)), 320 mg/m2 (80 % of original dose 400 mg/m2, Cycle 3, Reason: Other (see comments)), 360 mg/m2 (90 % of original dose 400 mg/m2, Cycle 3, Reason: Other (see comments)), 320 mg/m2 (80 % of original dose 400 mg/m2, Cycle 3, Reason: Other (see comments))  Administration: 700 mg (4/8/2024), 700 mg (4/22/2024), 582 mg (5/14/2024), 582 mg (5/29/2024), 582 mg (6/11/2024), 582 mg (6/24/2024)  fluorouracil (ADRUCIL) ambulatory infusion Soln, 1,200 mg/m2/day = 4,370 mg, Intravenous, Over 46 hours, 6 of 6 cycles  Dose modification: 960 mg/m2/day (80 % of original dose 1,200 mg/m2/day, Cycle 4, Reason: Other (see comments))        Cancer Staging   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HCC)  Staging form: Colon and Rectum, AJCC 8th Edition  - Clinical stage from 4/28/2022: cT3, cN0, pM1 - Signed by Jessie Freeman MD on  6/14/2023  - Pathologic stage from 5/18/2022: Stage KRISTAL (pT3, pN0, pM1a) - Signed by Jessie Freeman MD on 6/14/2023     Treatment Details   Treatment goal Curative   Plan Name OP Xelox / CapeOx (Oral Capecitabine + IV OXALIplatin) Q21 days   Status Inactive   Start Date 12/10/2021   End Date 12/10/2021   Provider Sohail Grubbs MD   Chemotherapy capecitabine (XELODA), 850 mg/m2 = 1,600 mg (100 % of original dose 850 mg/m2), Oral, 2 times daily with meals, 1 of 8 cycles  Dose modification: 850 mg/m2 (100 % of original dose 850 mg/m2, Cycle 1, Reason: Other (see comments))    fosaprepitant (EMEND) IVPB, 150 mg, Intravenous, Once, 1 of 1 cycle  Administration: 150 mg (12/10/2021)    oxaliplatin (ELOXATIN) chemo infusion, 244.4 mg, Intravenous, Once, 1 of 8 cycles  Administration: 250 mg (12/10/2021)       Treatment Details   Treatment goal [No plan goal]   Plan Name HYDRATION AND ELECTROLYTE INFUSIONS   Status Inactive   Start Date 12/10/2021   End Date 12/10/2021   Provider Sohail Grubbs MD   Chemotherapy [No matching medication found in this treatment plan]     Treatment Details   Treatment goal Supportive   Plan Name Folfiri    Status Inactive   Start Date 1/4/2022   End Date 8/25/2022   Provider Sohail Grubbs MD   Chemotherapy fluorouracil (ADRUCIL), 300 mg/m2 = 560 mg (75 % of original dose 400 mg/m2), Intravenous, Once, 12 of 12 cycles  Dose modification: 300 mg/m2 (75 % of original dose 400 mg/m2, Cycle 1, Reason: Dose Not Tolerated), 340 mg/m2 (85 % of original dose 400 mg/m2, Cycle 4, Reason: Other (see comments)), 340 mg/m2 (85 % of original dose 400 mg/m2, Cycle 5, Reason: Other (see comments))  Administration: 560 mg (1/4/2022), 560 mg (1/18/2022), 560 mg (2/1/2022), 635 mg (2/15/2022), 635 mg (3/1/2022), 635 mg (3/15/2022), 635 mg (6/14/2022), 635 mg (6/28/2022), 635 mg (7/12/2022), 635 mg (7/26/2022), 590 mg (8/9/2022), 590 mg (8/23/2022)    irinotecan (CAMPTOSAR) chemo infusion, 153 mg/m2 = 286 mg (85  % of original dose 180 mg/m2), Intravenous, Once, 4 of 4 cycles  Dose modification: 180 mg/m2 (original dose 180 mg/m2, Cycle 9), 153 mg/m2 (85 % of original dose 180 mg/m2, Cycle 9, Reason: Other (see comments), Comment: anticipated tolerance), 90 mg/m2 (50 % of original dose 180 mg/m2, Cycle 10, Reason: Dose Not Tolerated)  Administration: 286 mg (7/12/2022), 168 mg (7/26/2022), 157 mg (8/9/2022), 157 mg (8/23/2022)    leucovorin calcium IVPB, 300 mg/m2 = 561 mg (75 % of original dose 400 mg/m2), Intravenous, Once, 12 of 12 cycles  Dose modification: 300 mg/m2 (75 % of original dose 400 mg/m2, Cycle 1, Reason: Dose Not Tolerated), 340 mg/m2 (85 % of original dose 400 mg/m2, Cycle 4, Reason: Other (see comments)), 340 mg/m2 (85 % of original dose 400 mg/m2, Cycle 5, Reason: Other (see comments))  Administration: 561 mg (1/4/2022), 561 mg (1/18/2022), 561 mg (2/1/2022), 636 mg (2/15/2022), 636 mg (3/1/2022), 636 mg (3/15/2022), 636 mg (6/14/2022), 636 mg (6/28/2022), 636 mg (7/12/2022), 636 mg (7/26/2022), 592 mg (8/9/2022), 592 mg (8/23/2022)    oxaliplatin (ELOXATIN) chemo infusion, 63.75 mg/m2 = 119.2125 mg (75 % of original dose 85 mg/m2), Intravenous, Once, 8 of 8 cycles  Dose modification: 63.75 mg/m2 (75 % of original dose 85 mg/m2, Cycle 1, Reason: Dose Not Tolerated), 72.25 mg/m2 (85 % of original dose 85 mg/m2, Cycle 4, Reason: Other (see comments)), 72.25 mg/m2 (85 % of original dose 85 mg/m2, Cycle 5, Reason: Other (see comments))  Administration: 119.2125 mg (1/4/2022), 119.2125 mg (1/18/2022), 119.2125 mg (2/1/2022), 135.1075 mg (2/15/2022), 135.1075 mg (3/1/2022), 135.1075 mg (3/15/2022), 135.1075 mg (6/14/2022), 135.1075 mg (6/28/2022)    fluorouracil (ADRUCIL) ambulatory infusion Soln, 900 mg/m2/day = 3,365 mg (75 % of original dose 1,200 mg/m2/day), Intravenous, Over 46 hours, 12 of 12 cycles  Dose modification: 900 mg/m2/day (75 % of original dose 1,200 mg/m2/day, Cycle 2, Reason: Other (see  comments)), 1,020 mg/m2/day (85 % of original dose 1,200 mg/m2/day, Cycle 4, Reason: Other (see comments), Comment: anticipated tolerance)       Treatment Details   Treatment goal [No plan goal]   Plan Name IV SITE LINE CARE / FLUSH PROTOCOL   Status Active   Start Date 2/28/2022   End Date Until discontinued   Provider Sohail Grubbs MD   Chemotherapy [No matching medication found in this treatment plan]     Treatment Details   Treatment goal [No plan goal]   Plan Name HYDRATION AND ELECTROLYTE INFUSIONS   Status Inactive   Start Date 2/3/2022   End Date 3/17/2022   Provider Shirley Hector PA-C   Chemotherapy [No matching medication found in this treatment plan]     Treatment Details   Treatment goal Palliative   Plan Name OP FOLFIRI + Bevacizumab Q 14 Days   Status Active   Start Date 4/8/2024   End Date 6/27/2024   Provider Sohail Grubbs MD   Chemotherapy potassium chloride, 20 mEq, Intravenous, Once, 1 of 1 cycle  Administration: 20 mEq (5/14/2024)    alteplase (CATHFLO), 2 mg, Intracatheter, Every 1 Minute as needed, 6 of 6 cycles    pegfilgrastim (NEULASTA), 3 mg (100 % of original dose 3 mg), Subcutaneous, Once, 4 of 4 cycles  Dose modification: 3 mg (original dose 3 mg, Cycle 3)  Administration: 3 mg (5/17/2024), 3 mg (6/3/2024), 3 mg (6/14/2024), 3 mg (6/27/2024)    fosaprepitant (EMEND) IVPB, 150 mg, Intravenous, Once, 5 of 5 cycles  Administration: 150 mg (4/22/2024), 150 mg (5/14/2024), 150 mg (5/29/2024), 150 mg (6/11/2024), 150 mg (6/24/2024)    fluorouracil (ADRUCIL), 400 mg/m2 = 730 mg, Intravenous, Once, 6 of 6 cycles  Dose modification: 360 mg/m2 (90 % of original dose 400 mg/m2, Cycle 3, Reason: Other (see comments)), 320 mg/m2 (80 % of original dose 400 mg/m2, Cycle 3, Reason: Other (see comments)), 360 mg/m2 (90 % of original dose 400 mg/m2, Cycle 3, Reason: Other (see comments)), 320 mg/m2 (80 % of original dose 400 mg/m2, Cycle 3, Reason: Other (see comments))  Administration: 730 mg  (4/8/2024), 730 mg (4/22/2024), 580 mg (5/14/2024), 580 mg (5/29/2024), 580 mg (6/11/2024), 580 mg (6/24/2024)    bevacizumab (AVASTIN) IVPB, 5 mg/kg = 465 mg, Intravenous, Once, 6 of 6 cycles  Administration: 465 mg (4/8/2024), 465 mg (4/22/2024), 465 mg (5/14/2024), 465 mg (5/29/2024), 465 mg (6/11/2024), 465 mg (6/24/2024)    irinotecan (CAMPTOSAR) chemo infusion, 295 mg (90 % of original dose 180 mg/m2), Intravenous, Once, 6 of 6 cycles  Dose modification: 162 mg/m2 (90 % of original dose 180 mg/m2, Cycle 1, Reason: Other (see comments)), 144 mg/m2 (80 % of original dose 180 mg/m2, Cycle 3, Reason: Dose Not Tolerated), 162 mg/m2 (90 % of original dose 180 mg/m2, Cycle 3, Reason: Other (see comments)), 144 mg/m2 (80 % of original dose 180 mg/m2, Cycle 3, Reason: Other (see comments))  Administration: 300 mg (4/8/2024), 295 mg (4/22/2024), 262 mg (5/14/2024), 262 mg (5/29/2024), 262 mg (6/11/2024), 262 mg (6/24/2024)    leucovorin calcium IVPB, 728 mg, Intravenous, Once, 6 of 6 cycles  Dose modification: 360 mg/m2 (90 % of original dose 400 mg/m2, Cycle 3, Reason: Other (see comments)), 320 mg/m2 (80 % of original dose 400 mg/m2, Cycle 3, Reason: Other (see comments)), 360 mg/m2 (90 % of original dose 400 mg/m2, Cycle 3, Reason: Other (see comments)), 320 mg/m2 (80 % of original dose 400 mg/m2, Cycle 3, Reason: Other (see comments))  Administration: 700 mg (4/8/2024), 700 mg (4/22/2024), 582 mg (5/14/2024), 582 mg (5/29/2024), 582 mg (6/11/2024), 582 mg (6/24/2024)    fluorouracil (ADRUCIL) ambulatory infusion Soln, 1,200 mg/m2/day = 4,370 mg, Intravenous, Over 46 hours, 6 of 6 cycles  Dose modification: 960 mg/m2/day (80 % of original dose 1,200 mg/m2/day, Cycle 4, Reason: Other (see comments))       Treatment Details   Treatment goal [No plan goal]   Plan Name Filgrastim SC Injection   Status Inactive   Start Date 5/6/2024   End Date 5/6/2024   Provider Sohail Grubbs MD   Chemotherapy tbo-filgrastim (GRANIX),  480 mcg (original dose ), Subcutaneous, Once, 1 of 1 cycle  Dose modification: 480 mcg (Cycle 1)  Administration: 480 mcg (5/6/2024)       Treatment Details   Treatment goal [No plan goal]   Plan Name Filgrastim SC Injection   Status Active   Start Date 5/7/2024   End Date Until discontinued   Provider Sohail Grubbs MD   Chemotherapy tbo-filgrastim (GRANIX), 480 mcg (original dose ), Subcutaneous, Once, 1 of 1 cycle  Dose modification: 480 mcg (Cycle 1)  Administration: 480 mcg (5/7/2024)          HISTORY OF PRESENT ILLNESS:   Itzel Sanches is a 69 y.o. female  who is on treatment for colorectal cancer and is here today for a follow up visit and to review scans.    Subjective  Patient here today by herself.  She is doing well overall.  She has noticed improvement in her fatigue since stopping treatment, but continues to remain dyspneic on exertion and tired.  She denies any bleeding.  She remains on the Eliquis.  Appetite is good.    REVIEW OF SYSTEMS:  14 point review of systems otherwise  negative      Current Outpatient Medications:     ondansetron (Zofran ODT) 8 mg disintegrating tablet, Take 1 tablet (8 mg total) by mouth every 8 (eight) hours as needed for nausea or vomiting, Disp: 30 tablet, Rfl: 5    Acetaminophen (TYLENOL 8 HOUR PO), Take by mouth PRN, Disp: , Rfl:     apixaban (Eliquis) 5 mg, Take 1 tablet (5 mg total) by mouth 2 (two) times a day, Disp: 60 tablet, Rfl: 0    ezetimibe (ZETIA) 10 mg tablet, Take 1 tablet (10 mg total) by mouth daily, Disp: 90 tablet, Rfl: 1    famotidine (PEPCID) 20 mg tablet, Take 1 tablet (20 mg total) by mouth 2 (two) times a day as needed for heartburn As needed, Disp: 90 tablet, Rfl: 1    lidocaine (Lidoderm) 5 %, Apply 1 patch topically over 12 hours daily Remove & Discard patch within 12 hours or as directed by MD (Patient not taking: Reported on 2/1/2024), Disp: 12 patch, Rfl: 3    meclizine (ANTIVERT) 12.5 MG tablet, Take 1 tablet (12.5 mg total) by mouth 3  (three) times a day as needed for dizziness (Patient taking differently: Take 12.5 mg by mouth 3 (three) times a day as needed for dizziness PRN), Disp: 30 tablet, Rfl: 0    prochlorperazine (COMPAZINE) 5 mg tablet, , Disp: , Rfl:      Allergies   Allergen Reactions    Medical Tape Rash     Skin irritation  Skin peeling  Skin irritation  Skin peeling  Blisters  Rash        ACTIVE PROBLEMS:  Patient Active Problem List   Diagnosis    Callus of foot    Foot pain    GERD (gastroesophageal reflux disease)    Mixed hyperlipidemia    Prediabetes    Varicose veins with pain    Morbid obesity (HCC)    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HCC)    Dyspnea on exertion    Dyslipidemia    Sigmoid diverticulosis    PONV (postoperative nausea and vomiting)    Omental metastasis    Lesion of ovary    Chemotherapy adverse reaction    Chemotherapy-induced nausea    Platelets decreased (HCC)    Stage 3 chronic kidney disease, unspecified whether stage 3a or 3b CKD (HCC)    H/O splenectomy    Asplenia    Postoperative state    Metastases to the liver (HCC)    Chemotherapy-induced neuropathy (HCC)    Chemotherapy-induced neutropenia (HCC)    Hypokalemia          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    Colon polyp     GERD (gastroesophageal reflux disease)     History of chemotherapy 2021    History of rectal surgery     Hyperlipidemia     PONV (postoperative nausea and vomiting)         Past Surgical History:   Procedure Laterality Date    BREAST CYST EXCISION Right      SECTION      x3    COLON SIGMOID RESECTION      COLONOSCOPY      DILATION AND CURETTAGE OF UTERUS      ILEO LOOP DIVERSION N/A 12/10/2019    Procedure: ILEOSTOMY LOOP;  Surgeon: WINNIE Pastor MD;  [facility];  Service: Robotics    INSTILLATION CHEMOTHERAPY INTRAPERITONEAL (HIPEC) LAPAROTOMY N/A 2022    Procedure:  INSTILLATION CHEMOTHERAPY INTRAPERITONEAL (HIPEC) LAPAROTOMY;  Surgeon: Jessie Freeman MD;  [facility];  Service: Surgical Oncology    IR BIOPSY CHEST WALL  9/13/2023    IR BIOPSY OMENTUM  11/12/2021    IR PORT PLACEMENT  12/28/2021    IR Y-90 PRE-ANGIO/EMBO W/ LUNG SCAN  1/4/2024    IR Y-90 RADIOEMBOLIZATION  1/11/2024    OMENTECTOMY N/A 04/29/2022    Procedure: DIAGNOSTIC LAPAROSCOPY, OPEN OMENTECTOMY, SPLENECTOMY, DIAPHRAGM REPAIR, CHEST TUBE INSERTION;  Surgeon: Jessie Freeman MD;  [facility];  Service: Surgical Oncology    SD CLOSURE ENTEROSTOMY LG/SMALL INTESTINE N/A 02/04/2020    Procedure: CLOSURE ILEOSTOMY;  Surgeon: WINNIE Pastor MD;  [facility];  Service: Colorectal    SD LAPAROSCOPY COLECTOMY PARTIAL W/ANASTOMOSIS N/A 12/10/2019    Procedure: SIGMOID RESECTION COLON LAPAROSCOPIC W ROBOTICS converted to lap hand assisted  with Partial proctectomy , LASER FLUORESCENCE ANGIOGRAPHY, INTRA OP COLONOSCOPY;  Surgeon: WINNIE Pastor MD;  [facility];  Service: Robotics    SD TOTAL ABDOMINAL HYSTERECT W/WO RMVL TUBE OVARY N/A 04/29/2022    Procedure: DIAGNOSTIC LAPAROSCOPY, TOTAL ABDOMINAL HYSTERECTOMY, BILATERAL SALPINGO-OOPHORECTOMY, EXTENSIVE ADHESIOLYSIS;  Surgeon: Peterson Mae MD;  [facility];  Service: Gynecology Oncology    SD UNLISTED PROCEDURE DIAPHRAGM  04/29/2022    Procedure: REPAIR DIAPHRAGM TEAR;  Surgeon: Yana Hebert MD;  [facility];  Service: Thoracic    SMALL INTESTINE SURGERY  02/04/2020    Procedure: RESECTION SMALL BOWEL;  Surgeon: WINNIE Pastor MD;  [facility];  Service: Colorectal        Social History     Socioeconomic History    Marital status:      Spouse name: Not on file    Number of children: Not on file    Years of education: Not on file    Highest education level: Not on file   Occupational History    Not on file   Tobacco Use    Smoking status: Never     Passive  "exposure: Past    Smokeless tobacco: Never   Vaping Use    Vaping status: Never Used   Substance and Sexual Activity    Alcohol use: Yes     Comment: \"occasionally\"    Drug use: Never    Sexual activity: Not Currently   Other Topics Concern    Not on file   Social History Narrative    Not on file     Social Determinants of Health     Financial Resource Strain: Low Risk  (3/21/2023)    Overall Financial Resource Strain (CARDIA)     Difficulty of Paying Living Expenses: Not hard at all   Food Insecurity: No Food Insecurity (3/28/2024)    Hunger Vital Sign     Worried About Running Out of Food in the Last Year: Never true     Ran Out of Food in the Last Year: Never true   Transportation Needs: No Transportation Needs (3/28/2024)    PRAPARE - Transportation     Lack of Transportation (Medical): No     Lack of Transportation (Non-Medical): No   Physical Activity: Not on file   Stress: Not on file   Social Connections: Not on file   Intimate Partner Violence: Not on file   Housing Stability: Unknown (3/28/2024)    Housing Stability Vital Sign     Unable to Pay for Housing in the Last Year: No     Number of Times Moved in the Last Year: Not on file     Homeless in the Last Year: No        Family History   Problem Relation Age of Onset    Hypertension Mother     Heart attack Mother     Heart attack Father     Heart disease Father     Hypertension Brother     Heart attack Brother     Colon cancer Paternal Uncle     Leukemia Cousin     Breast cancer Cousin     Diabetes Cousin     Breast cancer Cousin     Colon cancer Family         paternal uncle    Stroke Neg Hx         Objective        Physical Exam  ECOG performance status: 1  Blood pressure 140/80, pulse 81, temperature (!) 97 °F (36.1 °C), resp. rate 17, height 4' 9\" (1.448 m), weight 86.6 kg (191 lb), last menstrual period 09/01/2011, SpO2 99%, not currently breastfeeding.     General appearance: Not in acute distress, well appearing and well nourished.    Alert and " oriented.    Normal breath sounds bilaterally.  Clear to auscultation without any added sounds  Heart sounds are normal.  No murmurs noted.    Abdomen is soft and nontender.  No rebound.  No evidence of ascites.   Extremities without any edema.    No focal deficits.    I reviewed lab data in the chart as noted above.  Additional labs as noted below    WBC   Date Value Ref Range Status   06/21/2024 8.48 4.31 - 10.16 Thousand/uL Final   06/10/2024 10.09 4.31 - 10.16 Thousand/uL Final   05/28/2024 5.94 4.31 - 10.16 Thousand/uL Final   11/19/2016 5.4 3.4 - 10.8 x10E3/uL Final     Hemoglobin   Date Value Ref Range Status   06/21/2024 10.8 (L) 11.5 - 15.4 g/dL Final   06/10/2024 10.7 (L) 11.5 - 15.4 g/dL Final   05/28/2024 10.7 (L) 11.5 - 15.4 g/dL Final   11/19/2016 13.6 11.1 - 15.9 g/dL Final     Platelets   Date Value Ref Range Status   06/21/2024 344 149 - 390 Thousands/uL Final   06/10/2024 331 149 - 390 Thousands/uL Final   05/28/2024 342 149 - 390 Thousands/uL Final   11/19/2016 224 150 - 379 x10E3/uL Final     MCV   Date Value Ref Range Status   06/21/2024 95 82 - 98 fL Final   06/10/2024 93 82 - 98 fL Final   05/28/2024 91 82 - 98 fL Final   11/19/2016 85 79 - 97 fL Final      Sodium   Date Value Ref Range Status   12/03/2016 141 136 - 144 mmol/L Final     Comment:     Result Comment: **Effective December 12, 2016 the reference interval**                   for Sodium, Serum will be changing to:                                                             134 - 144     11/19/2016 139 136 - 144 mmol/L Final     Potassium   Date Value Ref Range Status   06/21/2024 4.0 3.5 - 5.3 mmol/L Final   06/10/2024 3.7 3.5 - 5.3 mmol/L Final   05/28/2024 4.1 3.5 - 5.3 mmol/L Final   09/13/2021 4.7 3.5 - 5.2 mmol/L Final   06/11/2021 4.6 3.5 - 5.2 mmol/L Final   04/16/2021 4.3 3.5 - 5.2 mmol/L Final     Chloride   Date Value Ref Range Status   06/21/2024 114 (H) 96 - 108 mmol/L Final   06/10/2024 107 96 - 108 mmol/L Final    05/28/2024 109 (H) 96 - 108 mmol/L Final   09/13/2021 105 96 - 106 mmol/L Final   06/11/2021 107 (H) 96 - 106 mmol/L Final   04/16/2021 104 96 - 106 mmol/L Final     CO2   Date Value Ref Range Status   06/21/2024 22 21 - 32 mmol/L Final   06/10/2024 24 21 - 32 mmol/L Final   05/28/2024 23 21 - 32 mmol/L Final   09/13/2021 22 20 - 29 mmol/L Final   06/11/2021 24 20 - 29 mmol/L Final   04/16/2021 21 20 - 29 mmol/L Final     BUN   Date Value Ref Range Status   06/21/2024 14 5 - 25 mg/dL Final   06/10/2024 14 5 - 25 mg/dL Final   05/28/2024 11 5 - 25 mg/dL Final   09/13/2021 19 8 - 27 mg/dL Final   06/11/2021 17 8 - 27 mg/dL Final   04/16/2021 23 8 - 27 mg/dL Final     Creatinine   Date Value Ref Range Status   06/21/2024 1.02 0.60 - 1.30 mg/dL Final     Comment:     Standardized to IDMS reference method   06/10/2024 1.09 0.60 - 1.30 mg/dL Final     Comment:     Standardized to IDMS reference method   05/28/2024 1.00 0.60 - 1.30 mg/dL Final     Comment:     Standardized to IDMS reference method   12/03/2016 0.91 0.57 - 1.00 mg/dL Final   11/19/2016 0.90 0.57 - 1.00 mg/dL Final     Glucose   Date Value Ref Range Status   06/10/2024 113 65 - 140 mg/dL Final     Comment:     If the patient is fasting, the ADA then defines impaired fasting glucose as > 100 mg/dL and diabetes as > or equal to 123 mg/dL.   05/08/2024 90 65 - 140 mg/dL Final     Comment:     If the patient is fasting, the ADA then defines impaired fasting glucose as > 100 mg/dL and diabetes as > or equal to 123 mg/dL.   05/03/2024 92 65 - 140 mg/dL Final     Comment:     If the patient is fasting, the ADA then defines impaired fasting glucose as > 100 mg/dL and diabetes as > or equal to 123 mg/dL.     Glucose, Random   Date Value Ref Range Status   09/13/2021 93 65 - 99 mg/dL Final   06/11/2021 94 65 - 99 mg/dL Final   04/16/2021 85 65 - 99 mg/dL Final     eGFR    Date Value Ref Range Status   09/13/2021 74 >59 mL/min/1.73 Final     Comment:      **[facility] currently reports eGFR in compliance with the current**    recommendations of the [employer]. [facility] will    update reporting as new guidelines are published from the NKF-ASN    Task force.     06/11/2021 75 >59 mL/min/1.73 Final     Comment:     **[facility] currently reports eGFR in compliance with the current**    recommendations of the [employer]. [facility] will    update reporting as new guidelines are published from the NKF-ASN    Task force.     04/16/2021 61 >59 mL/min/1.73 Final     Calcium   Date Value Ref Range Status   06/21/2024 9.0 8.4 - 10.2 mg/dL Final   06/10/2024 8.7 8.4 - 10.2 mg/dL Final   05/28/2024 8.7 8.4 - 10.2 mg/dL Final   12/03/2016 9.2 8.7 - 10.3 mg/dL Final   11/19/2016 9.4 8.7 - 10.3 mg/dL Final     Total Protein   Date Value Ref Range Status   12/03/2016 6.6 6.0 - 8.5 g/dL Final   11/19/2016 6.6 6.0 - 8.5 g/dL Final     Albumin   Date Value Ref Range Status   06/21/2024 3.5 3.5 - 5.0 g/dL Final   06/10/2024 3.4 (L) 3.5 - 5.0 g/dL Final   05/28/2024 3.3 (L) 3.5 - 5.0 g/dL Final   12/03/2016 3.6 3.6 - 4.8 g/dL Final   11/19/2016 3.8 3.6 - 4.8 g/dL Final     Total Bilirubin   Date Value Ref Range Status   06/21/2024 0.51 0.20 - 1.00 mg/dL Final     Comment:     Use of this assay is not recommended for patients undergoing treatment with eltrombopag due to the potential for falsely elevated results.  N-acetyl-p-benzoquinone imine (metabolite of Acetaminophen) will generate erroneously low results in samples for patients that have taken an overdose of Acetaminophen.   06/10/2024 0.36 0.20 - 1.00 mg/dL Final     Comment:     Use of this assay is not recommended for patients undergoing treatment with eltrombopag due to the potential for falsely elevated results.  N-acetyl-p-benzoquinone imine (metabolite of Acetaminophen) will generate erroneously low results in samples for patients that have taken an overdose of Acetaminophen.   05/28/2024 0.50 0.20 -  "1.00 mg/dL Final     Comment:     Use of this assay is not recommended for patients undergoing treatment with eltrombopag due to the potential for falsely elevated results.  N-acetyl-p-benzoquinone imine (metabolite of Acetaminophen) will generate erroneously low results in samples for patients that have taken an overdose of Acetaminophen.     TOTAL BILIRUBIN   Date Value Ref Range Status   09/13/2021 0.6 0.0 - 1.2 mg/dL Final   04/16/2021 0.8 0.0 - 1.2 mg/dL Final   09/18/2020 0.9 0.0 - 1.2 mg/dL Final     Alkaline Phosphatase   Date Value Ref Range Status   06/21/2024 101 34 - 104 U/L Final   06/10/2024 90 34 - 104 U/L Final   05/28/2024 77 34 - 104 U/L Final   12/03/2016 54 39 - 117 IU/L Final   11/19/2016 51 39 - 117 IU/L Final     AST   Date Value Ref Range Status   06/21/2024 16 13 - 39 U/L Final   06/10/2024 10 (L) 13 - 39 U/L Final   05/28/2024 12 (L) 13 - 39 U/L Final   09/13/2021 14 0 - 40 IU/L Final   04/16/2021 18 0 - 40 IU/L Final   09/18/2020 14 0 - 40 IU/L Final     ALT   Date Value Ref Range Status   06/21/2024 18 7 - 52 U/L Final     Comment:     Specimen collection should occur prior to Sulfasalazine administration due to the potential for falsely depressed results.    06/10/2024 11 7 - 52 U/L Final     Comment:     Specimen collection should occur prior to Sulfasalazine administration due to the potential for falsely depressed results.    05/28/2024 15 7 - 52 U/L Final     Comment:     Specimen collection should occur prior to Sulfasalazine administration due to the potential for falsely depressed results.    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 COMPARISON: March 8, 2024, October 9, 2023 TECHNIQUE: CT examination of the chest, abdomen and pelvis was performed. Multiplanar 2D reformatted images were created from the source data. This examination, like all CT scans performed in the [facility], was performed utilizing techniques to minimize radiation dose exposure, including the use of iterative reconstruction and automated exposure control. Radiation dose length product (DLP) for this visit: 842.93 mGy-cm IV Contrast: 100 mL of iohexol (OMNIPAQUE) Enteric Contrast: Not administered. FINDINGS: CHEST LUNGS: New small subsegmental region of tree-in-bud like opacities in the base of the right middle lobe, nonspecific, may represent small inflammatory etiology. Interval improvement in the postradiation changes in the left lower lobe with more confluent and smaller area of consolidation in the posterior left lung base. Stable subsegmental scarring in the subpleural lingular region. No new pulmonary lesions. PLEURA: Unremarkable. HEART/GREAT VESSELS: Heart is unremarkable for patient's age. No thoracic aortic aneurysm. Small pericardial effusion, increased. MEDIASTINUM AND BRII: Interval decrease in size of the paratracheal and AP window region CHEST WALL AND LOWER NECK: Continued interval decrease in size of the left 11th rib mass, currently approximately 3.2 x 1.9 cm image 2/90. ABDOMEN LIVER/BILIARY TREE: Interval decrease in size and conspicuity of the right subcapsular metastasis anterior segment image 2/133. Mostly mild focus of relative hyperemic enhancement at the site with no significant central hypodensity as on priors. Approximately 11 mm. GALLBLADDER: Cholelithiasis without findings of acute cholecystitis. Stable area of wall  "thickening and mild hyperemia in the body of the gallbladder, chronic. SPLEEN: Unremarkable. PANCREAS: Unremarkable. ADRENAL GLANDS: Unremarkable. KIDNEYS/URETERS: Unremarkable. No hydronephrosis. STOMACH AND BOWEL: Stable postop changes in the distal rectosigmoid region. No evidence for local recurrence. APPENDIX: No findings to suggest appendicitis. ABDOMINOPELVIC CAVITY: Significant interval improvement of the left para-aortic lymph nodes nearly nonmeasurable. No ascites. No new adenopathy. VESSELS: Unremarkable for patient's age. PELVIS REPRODUCTIVE ORGANS: Post hysterectomy. URINARY BLADDER: Unremarkable. ABDOMINAL WALL/INGUINAL REGIONS: Stable nonobstructing umbilical hernia containing loop of small bowel. Significant interval improvement in the amee hepatis adenopathy, largest node decreased to short axis dimension of 8 mm compared to prior at 12 mm. BONES: No acute fracture or suspicious osseous lesion.     Impression: Continued interval decrease in size of the left 11th rib metastasis. Interval decreasing post radiation changes in the left lower lobe. Decrease in conspicuity and size of the hepatic metastasis seen in the subcapsular segment 5 region. The previously described 5 mm lesion is not apparent. There are no new lesions identified. Interval decreasing mediastinal, amee hepatis and retroperitoneal adenopathy. No new metastatic disease appreciated. Workstation performed: [id number]       Total minutes spent reviewing EMR, seeing patient in clinic visit, documenting visit, placing treatment orders, and communicating with other medical providers: 35    Portions of the record may have been created with voice recognition software.  Occasional wrong word or \"sound a like\" substitutions may have occurred due to the inherent limitations of voice recognition software.  Read the chart carefully and recognize, using context, where substitutions have occurred.    "

## 2024-07-23 DIAGNOSIS — C78.6 MALIGNANT NEOPLASM METASTATIC TO OMENTUM (HCC): Primary | ICD-10-CM

## 2024-07-23 DIAGNOSIS — E87.6 HYPOKALEMIA: ICD-10-CM

## 2024-07-23 DIAGNOSIS — C19 RECTOSIGMOID CANCER (HCC): ICD-10-CM

## 2024-07-23 RX ORDER — SODIUM CHLORIDE 9 MG/ML
20 INJECTION, SOLUTION INTRAVENOUS ONCE
Status: CANCELLED | OUTPATIENT
Start: 2024-07-30

## 2024-07-24 DIAGNOSIS — C78.6 MALIGNANT NEOPLASM METASTATIC TO OMENTUM (HCC): Primary | ICD-10-CM

## 2024-07-24 DIAGNOSIS — C19 RECTOSIGMOID CANCER (HCC): ICD-10-CM

## 2024-07-24 DIAGNOSIS — E87.6 HYPOKALEMIA: ICD-10-CM

## 2024-07-29 ENCOUNTER — APPOINTMENT (OUTPATIENT)
Dept: LAB | Facility: HOSPITAL | Age: 69
End: 2024-07-29
Payer: MEDICARE

## 2024-07-29 DIAGNOSIS — C78.6 MALIGNANT NEOPLASM METASTATIC TO OMENTUM (HCC): ICD-10-CM

## 2024-07-29 DIAGNOSIS — E87.6 HYPOKALEMIA: ICD-10-CM

## 2024-07-29 DIAGNOSIS — R97.0 ELEVATED CARCINOEMBRYONIC ANTIGEN (CEA): ICD-10-CM

## 2024-07-29 DIAGNOSIS — C19 RECTOSIGMOID CANCER (HCC): ICD-10-CM

## 2024-07-29 LAB
ALBUMIN SERPL BCG-MCNC: 3.3 G/DL (ref 3.5–5)
ALP SERPL-CCNC: 55 U/L (ref 34–104)
ALT SERPL W P-5'-P-CCNC: 9 U/L (ref 7–52)
ANION GAP SERPL CALCULATED.3IONS-SCNC: 7 MMOL/L (ref 4–13)
AST SERPL W P-5'-P-CCNC: 12 U/L (ref 13–39)
BACTERIA UR QL AUTO: ABNORMAL /HPF
BASOPHILS # BLD AUTO: 0.11 THOUSANDS/ÂΜL (ref 0–0.1)
BASOPHILS NFR BLD AUTO: 2 % (ref 0–1)
BILIRUB SERPL-MCNC: 0.6 MG/DL (ref 0.2–1)
BILIRUB UR QL STRIP: NEGATIVE
BUN SERPL-MCNC: 18 MG/DL (ref 5–25)
CALCIUM ALBUM COR SERPL-MCNC: 9.5 MG/DL (ref 8.3–10.1)
CALCIUM SERPL-MCNC: 8.9 MG/DL (ref 8.4–10.2)
CEA SERPL-MCNC: 2.5 NG/ML (ref 0–3)
CHLORIDE SERPL-SCNC: 109 MMOL/L (ref 96–108)
CLARITY UR: CLEAR
CO2 SERPL-SCNC: 22 MMOL/L (ref 21–32)
COLOR UR: YELLOW
CREAT SERPL-MCNC: 0.97 MG/DL (ref 0.6–1.3)
EOSINOPHIL # BLD AUTO: 0.44 THOUSAND/ÂΜL (ref 0–0.61)
EOSINOPHIL NFR BLD AUTO: 6 % (ref 0–6)
ERYTHROCYTE [DISTWIDTH] IN BLOOD BY AUTOMATED COUNT: 19.5 % (ref 11.6–15.1)
GFR SERPL CREATININE-BSD FRML MDRD: 59 ML/MIN/1.73SQ M
GLUCOSE SERPL-MCNC: 105 MG/DL (ref 65–140)
GLUCOSE UR STRIP-MCNC: NEGATIVE MG/DL
HCT VFR BLD AUTO: 36.8 % (ref 34.8–46.1)
HGB BLD-MCNC: 11.5 G/DL (ref 11.5–15.4)
HGB UR QL STRIP.AUTO: NEGATIVE
IMM GRANULOCYTES # BLD AUTO: 0.01 THOUSAND/UL (ref 0–0.2)
IMM GRANULOCYTES NFR BLD AUTO: 0 % (ref 0–2)
KETONES UR STRIP-MCNC: NEGATIVE MG/DL
LEUKOCYTE ESTERASE UR QL STRIP: NEGATIVE
LYMPHOCYTES # BLD AUTO: 2.29 THOUSANDS/ÂΜL (ref 0.6–4.47)
LYMPHOCYTES NFR BLD AUTO: 33 % (ref 14–44)
MCH RBC QN AUTO: 31.1 PG (ref 26.8–34.3)
MCHC RBC AUTO-ENTMCNC: 31.3 G/DL (ref 31.4–37.4)
MCV RBC AUTO: 100 FL (ref 82–98)
MONOCYTES # BLD AUTO: 0.9 THOUSAND/ÂΜL (ref 0.17–1.22)
MONOCYTES NFR BLD AUTO: 13 % (ref 4–12)
NEUTROPHILS # BLD AUTO: 3.1 THOUSANDS/ÂΜL (ref 1.85–7.62)
NEUTS SEG NFR BLD AUTO: 46 % (ref 43–75)
NITRITE UR QL STRIP: NEGATIVE
NON-SQ EPI CELLS URNS QL MICRO: ABNORMAL /HPF
NRBC BLD AUTO-RTO: 0 /100 WBCS
PH UR STRIP.AUTO: 6 [PH]
PLATELET # BLD AUTO: 286 THOUSANDS/UL (ref 149–390)
PMV BLD AUTO: 11.2 FL (ref 8.9–12.7)
POTASSIUM SERPL-SCNC: 4 MMOL/L (ref 3.5–5.3)
PROT SERPL-MCNC: 6.7 G/DL (ref 6.4–8.4)
PROT UR STRIP-MCNC: NEGATIVE MG/DL
RBC # BLD AUTO: 3.7 MILLION/UL (ref 3.81–5.12)
RBC #/AREA URNS AUTO: ABNORMAL /HPF
SODIUM SERPL-SCNC: 138 MMOL/L (ref 135–147)
SP GR UR STRIP.AUTO: >=1.03 (ref 1–1.03)
UROBILINOGEN UR STRIP-ACNC: <2 MG/DL
WBC # BLD AUTO: 6.85 THOUSAND/UL (ref 4.31–10.16)
WBC #/AREA URNS AUTO: ABNORMAL /HPF

## 2024-07-29 PROCEDURE — 81001 URINALYSIS AUTO W/SCOPE: CPT

## 2024-07-29 PROCEDURE — 80053 COMPREHEN METABOLIC PANEL: CPT

## 2024-07-29 PROCEDURE — 85025 COMPLETE CBC W/AUTO DIFF WBC: CPT

## 2024-07-29 PROCEDURE — 36415 COLL VENOUS BLD VENIPUNCTURE: CPT

## 2024-07-29 PROCEDURE — 82378 CARCINOEMBRYONIC ANTIGEN: CPT

## 2024-07-30 ENCOUNTER — HOSPITAL ENCOUNTER (OUTPATIENT)
Dept: INFUSION CENTER | Facility: HOSPITAL | Age: 69
Discharge: HOME | End: 2024-07-30
Payer: MEDICARE

## 2024-07-30 VITALS
TEMPERATURE: 97.5 F | RESPIRATION RATE: 18 BRPM | HEIGHT: 57 IN | SYSTOLIC BLOOD PRESSURE: 140 MMHG | HEART RATE: 100 BPM | WEIGHT: 190.48 LBS | BODY MASS INDEX: 41.09 KG/M2 | DIASTOLIC BLOOD PRESSURE: 73 MMHG | OXYGEN SATURATION: 98 %

## 2024-07-30 DIAGNOSIS — C78.6 MALIGNANT NEOPLASM METASTATIC TO OMENTUM (HCC): ICD-10-CM

## 2024-07-30 DIAGNOSIS — E87.6 HYPOKALEMIA: Primary | ICD-10-CM

## 2024-07-30 DIAGNOSIS — C19 RECTOSIGMOID CANCER (HCC): ICD-10-CM

## 2024-07-30 PROCEDURE — 96367 TX/PROPH/DG ADDL SEQ IV INF: CPT

## 2024-07-30 PROCEDURE — 96413 CHEMO IV INFUSION 1 HR: CPT

## 2024-07-30 RX ORDER — SODIUM CHLORIDE 9 MG/ML
20 INJECTION, SOLUTION INTRAVENOUS ONCE
Status: COMPLETED | OUTPATIENT
Start: 2024-07-30 | End: 2024-07-30

## 2024-07-30 RX ADMIN — SODIUM CHLORIDE 20 ML/HR: 9 INJECTION, SOLUTION INTRAVENOUS at 10:55

## 2024-07-30 RX ADMIN — DEXAMETHASONE SODIUM PHOSPHATE: 10 INJECTION, SOLUTION INTRAMUSCULAR; INTRAVENOUS at 10:50

## 2024-07-30 RX ADMIN — BEVACIZUMAB 465 MG: 400 INJECTION, SOLUTION INTRAVENOUS at 11:47

## 2024-07-30 RX ADMIN — FOSAPREPITANT 150 MG: 150 INJECTION, POWDER, LYOPHILIZED, FOR SOLUTION INTRAVENOUS at 11:12

## 2024-07-30 NOTE — PROGRESS NOTES
Itzel Sanches  tolerated treatment well with no complications.      Itzel Sanches is aware of future appt on 7/30/24 at 1030.     AVS printed and given to Itzel Sanches:  Yes

## 2024-08-01 ENCOUNTER — HOSPITAL ENCOUNTER (OUTPATIENT)
Dept: INFUSION CENTER | Facility: HOSPITAL | Age: 69
Discharge: HOME | End: 2024-08-01
Payer: MEDICARE

## 2024-08-01 VITALS
SYSTOLIC BLOOD PRESSURE: 145 MMHG | RESPIRATION RATE: 18 BRPM | BODY MASS INDEX: 41.26 KG/M2 | WEIGHT: 190.7 LBS | OXYGEN SATURATION: 98 % | HEART RATE: 86 BPM | DIASTOLIC BLOOD PRESSURE: 64 MMHG | TEMPERATURE: 98.2 F

## 2024-08-01 DIAGNOSIS — C78.6 MALIGNANT NEOPLASM METASTATIC TO OMENTUM (HCC): ICD-10-CM

## 2024-08-01 DIAGNOSIS — C19 RECTOSIGMOID CANCER (HCC): ICD-10-CM

## 2024-08-01 DIAGNOSIS — E87.6 HYPOKALEMIA: Primary | ICD-10-CM

## 2024-08-01 PROCEDURE — 96360 HYDRATION IV INFUSION INIT: CPT

## 2024-08-01 RX ADMIN — SODIUM CHLORIDE 1000 ML: 0.9 INJECTION, SOLUTION INTRAVENOUS at 10:44

## 2024-08-01 NOTE — PROGRESS NOTES
Itzel Sanches  tolerated treatment well with no complications.      Itzel Sanches is aware of future appt on 8/13 at 1030.     AVS printed and given to Itzel Sanches:    No (Declined by Itzel Sanches)

## 2024-08-06 ENCOUNTER — PATIENT OUTREACH (OUTPATIENT)
Dept: CASE MANAGEMENT | Facility: OTHER | Age: 69
End: 2024-08-06

## 2024-08-06 NOTE — PROGRESS NOTES
OSW placed outreach TC to pt this afternoon. She reports that she has continued with maintenance chemo. She shared that it used to cause diarrhea, however this last time she was suffering from constipation. We spoke about Miralax and Senakot. She was appreciative of the information. Overall she is tolerating well. She states that her main chemo medication was reduced. She goes for her infusions every 2 weeks and expressed she should finish up in October.   We spoke about [employer]. She has been going for over 30 years and is hopeful she can get there this year, as it is a family tradition. Even if it is only to walk around for a few hours.  OSW offered to check in with her in another month and she was agreeable.  OSW encouraged her to reach out with any questions/concerns.

## 2024-08-07 DIAGNOSIS — C78.6 MALIGNANT NEOPLASM METASTATIC TO OMENTUM (HCC): Primary | ICD-10-CM

## 2024-08-07 DIAGNOSIS — C19 RECTOSIGMOID CANCER (HCC): ICD-10-CM

## 2024-08-07 DIAGNOSIS — E87.6 HYPOKALEMIA: ICD-10-CM

## 2024-08-12 ENCOUNTER — APPOINTMENT (OUTPATIENT)
Dept: LAB | Facility: HOSPITAL | Age: 69
End: 2024-08-12
Payer: MEDICARE

## 2024-08-12 ENCOUNTER — TELEPHONE (OUTPATIENT)
Age: 69
End: 2024-08-12

## 2024-08-12 DIAGNOSIS — C19 RECTOSIGMOID CANCER (HCC): ICD-10-CM

## 2024-08-12 DIAGNOSIS — E87.6 HYPOKALEMIA: ICD-10-CM

## 2024-08-12 DIAGNOSIS — C78.6 MALIGNANT NEOPLASM METASTATIC TO OMENTUM (HCC): Primary | ICD-10-CM

## 2024-08-12 DIAGNOSIS — E87.6 HYPOKALEMIA: Primary | ICD-10-CM

## 2024-08-12 DIAGNOSIS — C78.6 MALIGNANT NEOPLASM METASTATIC TO OMENTUM (HCC): ICD-10-CM

## 2024-08-12 LAB
ALBUMIN SERPL BCG-MCNC: 3.2 G/DL (ref 3.5–5)
ALP SERPL-CCNC: 51 U/L (ref 34–104)
ALT SERPL W P-5'-P-CCNC: 10 U/L (ref 7–52)
ANION GAP SERPL CALCULATED.3IONS-SCNC: 7 MMOL/L (ref 4–13)
AST SERPL W P-5'-P-CCNC: 12 U/L (ref 13–39)
BACTERIA UR QL AUTO: ABNORMAL /HPF
BASOPHILS # BLD AUTO: 0.05 THOUSANDS/ÂΜL (ref 0–0.1)
BASOPHILS NFR BLD AUTO: 1 % (ref 0–1)
BILIRUB SERPL-MCNC: 0.68 MG/DL (ref 0.2–1)
BILIRUB UR QL STRIP: NEGATIVE
BUN SERPL-MCNC: 19 MG/DL (ref 5–25)
CALCIUM ALBUM COR SERPL-MCNC: 9.5 MG/DL (ref 8.3–10.1)
CALCIUM SERPL-MCNC: 8.9 MG/DL (ref 8.4–10.2)
CAOX CRY URNS QL MICRO: ABNORMAL /HPF
CHLORIDE SERPL-SCNC: 108 MMOL/L (ref 96–108)
CLARITY UR: CLEAR
CO2 SERPL-SCNC: 23 MMOL/L (ref 21–32)
COLOR UR: YELLOW
CREAT SERPL-MCNC: 1 MG/DL (ref 0.6–1.3)
EOSINOPHIL # BLD AUTO: 0.3 THOUSAND/ÂΜL (ref 0–0.61)
EOSINOPHIL NFR BLD AUTO: 5 % (ref 0–6)
ERYTHROCYTE [DISTWIDTH] IN BLOOD BY AUTOMATED COUNT: 17.2 % (ref 11.6–15.1)
GFR SERPL CREATININE-BSD FRML MDRD: 57 ML/MIN/1.73SQ M
GLUCOSE SERPL-MCNC: 90 MG/DL (ref 65–140)
GLUCOSE UR STRIP-MCNC: NEGATIVE MG/DL
HCT VFR BLD AUTO: 37.3 % (ref 34.8–46.1)
HGB BLD-MCNC: 11.5 G/DL (ref 11.5–15.4)
HGB UR QL STRIP.AUTO: NEGATIVE
IMM GRANULOCYTES # BLD AUTO: 0.02 THOUSAND/UL (ref 0–0.2)
IMM GRANULOCYTES NFR BLD AUTO: 0 % (ref 0–2)
KETONES UR STRIP-MCNC: NEGATIVE MG/DL
LEUKOCYTE ESTERASE UR QL STRIP: ABNORMAL
LYMPHOCYTES # BLD AUTO: 1.94 THOUSANDS/ÂΜL (ref 0.6–4.47)
LYMPHOCYTES NFR BLD AUTO: 29 % (ref 14–44)
MCH RBC QN AUTO: 30.7 PG (ref 26.8–34.3)
MCHC RBC AUTO-ENTMCNC: 30.8 G/DL (ref 31.4–37.4)
MCV RBC AUTO: 100 FL (ref 82–98)
MONOCYTES # BLD AUTO: 0.69 THOUSAND/ÂΜL (ref 0.17–1.22)
MONOCYTES NFR BLD AUTO: 10 % (ref 4–12)
NEUTROPHILS # BLD AUTO: 3.61 THOUSANDS/ÂΜL (ref 1.85–7.62)
NEUTS SEG NFR BLD AUTO: 55 % (ref 43–75)
NITRITE UR QL STRIP: NEGATIVE
NON-SQ EPI CELLS URNS QL MICRO: ABNORMAL /HPF
NRBC BLD AUTO-RTO: 0 /100 WBCS
PH UR STRIP.AUTO: 5.5 [PH]
PLATELET # BLD AUTO: 296 THOUSANDS/UL (ref 149–390)
PMV BLD AUTO: 10.8 FL (ref 8.9–12.7)
POTASSIUM SERPL-SCNC: 4 MMOL/L (ref 3.5–5.3)
PROT SERPL-MCNC: 6.4 G/DL (ref 6.4–8.4)
PROT UR STRIP-MCNC: NEGATIVE MG/DL
RBC # BLD AUTO: 3.75 MILLION/UL (ref 3.81–5.12)
RBC #/AREA URNS AUTO: ABNORMAL /HPF
SODIUM SERPL-SCNC: 138 MMOL/L (ref 135–147)
SP GR UR STRIP.AUTO: 1.02 (ref 1–1.03)
UROBILINOGEN UR STRIP-ACNC: <2 MG/DL
WBC # BLD AUTO: 6.61 THOUSAND/UL (ref 4.31–10.16)
WBC #/AREA URNS AUTO: ABNORMAL /HPF

## 2024-08-12 PROCEDURE — 85025 COMPLETE CBC W/AUTO DIFF WBC: CPT

## 2024-08-12 PROCEDURE — 36415 COLL VENOUS BLD VENIPUNCTURE: CPT

## 2024-08-12 PROCEDURE — 80053 COMPREHEN METABOLIC PANEL: CPT

## 2024-08-12 PROCEDURE — 81001 URINALYSIS AUTO W/SCOPE: CPT

## 2024-08-12 RX ORDER — SODIUM CHLORIDE 9 MG/ML
20 INJECTION, SOLUTION INTRAVENOUS ONCE
Status: CANCELLED | OUTPATIENT
Start: 2024-08-13

## 2024-08-12 NOTE — TELEPHONE ENCOUNTER
Pt at the lab and urine was collected and brought in by patient. Lab called reporting that pt usually gets urine tested for treatment, pt noted to be on avastin and last ua was collected on 07/29. A order was placed today. Please advise how often UA should be collected and place a standing order. Thank you

## 2024-08-12 NOTE — TELEPHONE ENCOUNTER
Called and spoke to Ina to let her know this lab was placed in her standing orders with the rest of her labs. No other needs at this time.

## 2024-08-13 ENCOUNTER — HOSPITAL ENCOUNTER (OUTPATIENT)
Dept: INFUSION CENTER | Facility: HOSPITAL | Age: 69
Discharge: HOME | End: 2024-08-13
Payer: MEDICARE

## 2024-08-13 VITALS
HEART RATE: 86 BPM | HEIGHT: 57 IN | OXYGEN SATURATION: 98 % | WEIGHT: 190.04 LBS | SYSTOLIC BLOOD PRESSURE: 140 MMHG | RESPIRATION RATE: 18 BRPM | BODY MASS INDEX: 41 KG/M2 | DIASTOLIC BLOOD PRESSURE: 64 MMHG | TEMPERATURE: 98 F

## 2024-08-13 DIAGNOSIS — C78.6 MALIGNANT NEOPLASM METASTATIC TO OMENTUM (HCC): ICD-10-CM

## 2024-08-13 DIAGNOSIS — E87.6 HYPOKALEMIA: Primary | ICD-10-CM

## 2024-08-13 DIAGNOSIS — C19 RECTOSIGMOID CANCER (HCC): ICD-10-CM

## 2024-08-13 PROCEDURE — G0498 CHEMO EXTEND IV INFUS W/PUMP: HCPCS

## 2024-08-13 PROCEDURE — 96367 TX/PROPH/DG ADDL SEQ IV INF: CPT

## 2024-08-13 PROCEDURE — 96413 CHEMO IV INFUSION 1 HR: CPT

## 2024-08-13 RX ORDER — SODIUM CHLORIDE 9 MG/ML
20 INJECTION, SOLUTION INTRAVENOUS ONCE
Status: COMPLETED | OUTPATIENT
Start: 2024-08-13 | End: 2024-08-13

## 2024-08-13 RX ADMIN — BEVACIZUMAB 465 MG: 400 INJECTION, SOLUTION INTRAVENOUS at 11:39

## 2024-08-13 RX ADMIN — SODIUM CHLORIDE 20 ML/HR: 0.9 INJECTION, SOLUTION INTRAVENOUS at 10:36

## 2024-08-13 RX ADMIN — DEXAMETHASONE SODIUM PHOSPHATE: 10 INJECTION, SOLUTION INTRAMUSCULAR; INTRAVENOUS at 10:36

## 2024-08-13 RX ADMIN — FOSAPREPITANT 150 MG: 150 INJECTION, POWDER, LYOPHILIZED, FOR SOLUTION INTRAVENOUS at 10:59

## 2024-08-13 NOTE — PROGRESS NOTES
Itzel Sanches  tolerated treatment well with no complications.      Itzel Sanches is aware of future appt on 8/15 at 1030.     AVS printed and given to Itzel Sanchse: yes

## 2024-08-13 NOTE — PROGRESS NOTES
Pt here for avastin+5fu cadd, port accessed, labs reviewed, pt resting in chair. Pt says she has had some constipation post treatment, I encouraged hydration and to start her dulcalax before the constipation starts and not waiting till after.

## 2024-08-14 ENCOUNTER — HOSPITAL ENCOUNTER (OUTPATIENT)
Dept: INFUSION CENTER | Facility: HOSPITAL | Age: 69
Discharge: HOME | End: 2024-08-14

## 2024-08-15 ENCOUNTER — HOSPITAL ENCOUNTER (OUTPATIENT)
Dept: INFUSION CENTER | Facility: HOSPITAL | Age: 69
Discharge: HOME | End: 2024-08-15
Payer: MEDICARE

## 2024-08-15 VITALS
RESPIRATION RATE: 18 BRPM | OXYGEN SATURATION: 97 % | DIASTOLIC BLOOD PRESSURE: 77 MMHG | HEART RATE: 67 BPM | SYSTOLIC BLOOD PRESSURE: 167 MMHG | TEMPERATURE: 97.8 F

## 2024-08-15 DIAGNOSIS — C19 RECTOSIGMOID CANCER (HCC): ICD-10-CM

## 2024-08-15 DIAGNOSIS — E87.6 HYPOKALEMIA: Primary | ICD-10-CM

## 2024-08-15 DIAGNOSIS — C78.6 MALIGNANT NEOPLASM METASTATIC TO OMENTUM (HCC): ICD-10-CM

## 2024-08-15 PROCEDURE — 96360 HYDRATION IV INFUSION INIT: CPT

## 2024-08-15 RX ADMIN — SODIUM CHLORIDE 1000 ML: 0.9 INJECTION, SOLUTION INTRAVENOUS at 10:38

## 2024-08-15 NOTE — PROGRESS NOTES
Patient complaining of constipation states hasn't moved her bowels is 2 days. Patient took Senokot and Miralax. Prune juice given with small results as per patient. Patient instructed to notify office if she doesn't go in next 2 days. Also suggested patient try MOM.

## 2024-08-15 NOTE — PROGRESS NOTES
Itzel Sanches  tolerated treatment well with no complications.      Itzel Sanches is aware of future appt on 8/27/24 at 0930.     AVS printed and given to Itzel Sanches:    No (Declined by Itzel Sanches) x

## 2024-08-16 DIAGNOSIS — E66.01 MORBID OBESITY (HCC): ICD-10-CM

## 2024-08-19 DIAGNOSIS — E87.6 HYPOKALEMIA: ICD-10-CM

## 2024-08-19 DIAGNOSIS — C19 RECTOSIGMOID CANCER (HCC): ICD-10-CM

## 2024-08-19 DIAGNOSIS — C78.6 MALIGNANT NEOPLASM METASTATIC TO OMENTUM (HCC): Primary | ICD-10-CM

## 2024-08-24 ENCOUNTER — APPOINTMENT (OUTPATIENT)
Dept: LAB | Facility: HOSPITAL | Age: 69
End: 2024-08-24
Payer: MEDICARE

## 2024-08-24 DIAGNOSIS — E87.6 HYPOKALEMIA: ICD-10-CM

## 2024-08-24 DIAGNOSIS — C78.6 MALIGNANT NEOPLASM METASTATIC TO OMENTUM (HCC): ICD-10-CM

## 2024-08-24 DIAGNOSIS — C19 RECTOSIGMOID CANCER (HCC): ICD-10-CM

## 2024-08-24 LAB
ALBUMIN SERPL BCG-MCNC: 3.4 G/DL (ref 3.5–5)
ALP SERPL-CCNC: 52 U/L (ref 34–104)
ALT SERPL W P-5'-P-CCNC: 11 U/L (ref 7–52)
ANION GAP SERPL CALCULATED.3IONS-SCNC: 8 MMOL/L (ref 4–13)
AST SERPL W P-5'-P-CCNC: 13 U/L (ref 13–39)
BACTERIA UR QL AUTO: ABNORMAL /HPF
BASOPHILS # BLD AUTO: 0.02 THOUSANDS/ÂΜL (ref 0–0.1)
BASOPHILS NFR BLD AUTO: 0 % (ref 0–1)
BILIRUB SERPL-MCNC: 0.45 MG/DL (ref 0.2–1)
BILIRUB UR QL STRIP: NEGATIVE
BUN SERPL-MCNC: 20 MG/DL (ref 5–25)
CALCIUM ALBUM COR SERPL-MCNC: 9.6 MG/DL (ref 8.3–10.1)
CALCIUM SERPL-MCNC: 9.1 MG/DL (ref 8.4–10.2)
CHLORIDE SERPL-SCNC: 109 MMOL/L (ref 96–108)
CLARITY UR: CLEAR
CO2 SERPL-SCNC: 21 MMOL/L (ref 21–32)
COLOR UR: YELLOW
CREAT SERPL-MCNC: 1.02 MG/DL (ref 0.6–1.3)
EOSINOPHIL # BLD AUTO: 0.12 THOUSAND/ÂΜL (ref 0–0.61)
EOSINOPHIL NFR BLD AUTO: 2 % (ref 0–6)
ERYTHROCYTE [DISTWIDTH] IN BLOOD BY AUTOMATED COUNT: 16.5 % (ref 11.6–15.1)
GFR SERPL CREATININE-BSD FRML MDRD: 55 ML/MIN/1.73SQ M
GLUCOSE SERPL-MCNC: 112 MG/DL (ref 65–140)
GLUCOSE UR STRIP-MCNC: NEGATIVE MG/DL
HCT VFR BLD AUTO: 40.4 % (ref 34.8–46.1)
HGB BLD-MCNC: 12.5 G/DL (ref 11.5–15.4)
HGB UR QL STRIP.AUTO: NEGATIVE
IMM GRANULOCYTES # BLD AUTO: 0.02 THOUSAND/UL (ref 0–0.2)
IMM GRANULOCYTES NFR BLD AUTO: 0 % (ref 0–2)
KETONES UR STRIP-MCNC: NEGATIVE MG/DL
LEUKOCYTE ESTERASE UR QL STRIP: ABNORMAL
LYMPHOCYTES # BLD AUTO: 1.84 THOUSANDS/ÂΜL (ref 0.6–4.47)
LYMPHOCYTES NFR BLD AUTO: 28 % (ref 14–44)
MCH RBC QN AUTO: 30.9 PG (ref 26.8–34.3)
MCHC RBC AUTO-ENTMCNC: 30.9 G/DL (ref 31.4–37.4)
MCV RBC AUTO: 100 FL (ref 82–98)
MONOCYTES # BLD AUTO: 0.83 THOUSAND/ÂΜL (ref 0.17–1.22)
MONOCYTES NFR BLD AUTO: 13 % (ref 4–12)
NEUTROPHILS # BLD AUTO: 3.81 THOUSANDS/ÂΜL (ref 1.85–7.62)
NEUTS SEG NFR BLD AUTO: 57 % (ref 43–75)
NITRITE UR QL STRIP: NEGATIVE
NON-SQ EPI CELLS URNS QL MICRO: ABNORMAL /HPF
NRBC BLD AUTO-RTO: 0 /100 WBCS
PH UR STRIP.AUTO: 5.5 [PH]
PLATELET # BLD AUTO: 323 THOUSANDS/UL (ref 149–390)
PMV BLD AUTO: 10.9 FL (ref 8.9–12.7)
POTASSIUM SERPL-SCNC: 4.2 MMOL/L (ref 3.5–5.3)
PROT SERPL-MCNC: 6.8 G/DL (ref 6.4–8.4)
PROT UR STRIP-MCNC: NEGATIVE MG/DL
RBC # BLD AUTO: 4.04 MILLION/UL (ref 3.81–5.12)
RBC #/AREA URNS AUTO: ABNORMAL /HPF
SODIUM SERPL-SCNC: 138 MMOL/L (ref 135–147)
SP GR UR STRIP.AUTO: 1.02 (ref 1–1.03)
UROBILINOGEN UR STRIP-ACNC: <2 MG/DL
WBC # BLD AUTO: 6.64 THOUSAND/UL (ref 4.31–10.16)
WBC #/AREA URNS AUTO: ABNORMAL /HPF

## 2024-08-24 PROCEDURE — 85025 COMPLETE CBC W/AUTO DIFF WBC: CPT

## 2024-08-24 PROCEDURE — 36415 COLL VENOUS BLD VENIPUNCTURE: CPT

## 2024-08-24 PROCEDURE — 81001 URINALYSIS AUTO W/SCOPE: CPT

## 2024-08-24 PROCEDURE — 80053 COMPREHEN METABOLIC PANEL: CPT

## 2024-08-26 RX ORDER — SODIUM CHLORIDE 9 MG/ML
20 INJECTION, SOLUTION INTRAVENOUS ONCE
Status: CANCELLED | OUTPATIENT
Start: 2024-08-27

## 2024-08-27 ENCOUNTER — HOSPITAL ENCOUNTER (OUTPATIENT)
Dept: INFUSION CENTER | Facility: HOSPITAL | Age: 69
Discharge: HOME | End: 2024-08-27
Payer: MEDICARE

## 2024-08-27 VITALS
DIASTOLIC BLOOD PRESSURE: 83 MMHG | RESPIRATION RATE: 18 BRPM | BODY MASS INDEX: 41 KG/M2 | WEIGHT: 190.04 LBS | HEIGHT: 57 IN | OXYGEN SATURATION: 95 % | HEART RATE: 89 BPM | TEMPERATURE: 97.8 F | SYSTOLIC BLOOD PRESSURE: 138 MMHG

## 2024-08-27 DIAGNOSIS — C78.6 MALIGNANT NEOPLASM METASTATIC TO OMENTUM (HCC): ICD-10-CM

## 2024-08-27 DIAGNOSIS — C19 RECTOSIGMOID CANCER (HCC): ICD-10-CM

## 2024-08-27 DIAGNOSIS — E87.6 HYPOKALEMIA: Primary | ICD-10-CM

## 2024-08-27 PROCEDURE — 96413 CHEMO IV INFUSION 1 HR: CPT

## 2024-08-27 PROCEDURE — 96367 TX/PROPH/DG ADDL SEQ IV INF: CPT

## 2024-08-27 PROCEDURE — G0498 CHEMO EXTEND IV INFUS W/PUMP: HCPCS

## 2024-08-27 RX ORDER — SODIUM CHLORIDE 9 MG/ML
20 INJECTION, SOLUTION INTRAVENOUS ONCE
Status: COMPLETED | OUTPATIENT
Start: 2024-08-27 | End: 2024-08-27

## 2024-08-27 RX ADMIN — SODIUM CHLORIDE 20 ML/HR: 0.9 INJECTION, SOLUTION INTRAVENOUS at 10:07

## 2024-08-27 RX ADMIN — FOSAPREPITANT 150 MG: 150 INJECTION, POWDER, LYOPHILIZED, FOR SOLUTION INTRAVENOUS at 10:38

## 2024-08-27 RX ADMIN — DEXAMETHASONE SODIUM PHOSPHATE: 10 INJECTION, SOLUTION INTRAMUSCULAR; INTRAVENOUS at 10:17

## 2024-08-27 RX ADMIN — BEVACIZUMAB 465 MG: 400 INJECTION, SOLUTION INTRAVENOUS at 11:17

## 2024-08-27 NOTE — PROGRESS NOTES
Itzel MATSON Myron  tolerated chemotherapy well with no complications. Chemo ball attached, both clamps open. Aware of future appt on 8/29/24 at 1030. AVS printed. Patient left clinic ambulatory.

## 2024-08-29 ENCOUNTER — HOSPITAL ENCOUNTER (OUTPATIENT)
Dept: INFUSION CENTER | Facility: HOSPITAL | Age: 69
Discharge: HOME | End: 2024-08-29
Payer: MEDICARE

## 2024-08-29 ENCOUNTER — OFFICE VISIT (OUTPATIENT)
Dept: HEMATOLOGY ONCOLOGY | Facility: CLINIC | Age: 69
End: 2024-08-29
Payer: MEDICARE

## 2024-08-29 VITALS
HEART RATE: 73 BPM | OXYGEN SATURATION: 98 % | TEMPERATURE: 98.2 F | DIASTOLIC BLOOD PRESSURE: 65 MMHG | RESPIRATION RATE: 18 BRPM | SYSTOLIC BLOOD PRESSURE: 131 MMHG

## 2024-08-29 VITALS
HEART RATE: 85 BPM | SYSTOLIC BLOOD PRESSURE: 122 MMHG | BODY MASS INDEX: 41.31 KG/M2 | OXYGEN SATURATION: 99 % | HEIGHT: 57 IN | TEMPERATURE: 97.4 F | WEIGHT: 191.5 LBS | DIASTOLIC BLOOD PRESSURE: 70 MMHG

## 2024-08-29 DIAGNOSIS — C78.6 MALIGNANT NEOPLASM METASTATIC TO OMENTUM (HCC): ICD-10-CM

## 2024-08-29 DIAGNOSIS — C19 RECTOSIGMOID CANCER (HCC): ICD-10-CM

## 2024-08-29 DIAGNOSIS — E87.6 HYPOKALEMIA: Primary | ICD-10-CM

## 2024-08-29 PROCEDURE — 99214 OFFICE O/P EST MOD 30 MIN: CPT | Performed by: INTERNAL MEDICINE

## 2024-08-29 PROCEDURE — 96360 HYDRATION IV INFUSION INIT: CPT

## 2024-08-29 PROCEDURE — G2211 COMPLEX E/M VISIT ADD ON: HCPCS | Performed by: INTERNAL MEDICINE

## 2024-08-29 RX ORDER — SODIUM CHLORIDE 9 MG/ML
20 INJECTION, SOLUTION INTRAVENOUS ONCE
OUTPATIENT
Start: 2024-09-24

## 2024-08-29 RX ORDER — SODIUM CHLORIDE 9 MG/ML
20 INJECTION, SOLUTION INTRAVENOUS ONCE
OUTPATIENT
Start: 2024-10-08

## 2024-08-29 RX ADMIN — SODIUM CHLORIDE 1000 ML: 0.9 INJECTION, SOLUTION INTRAVENOUS at 10:19

## 2024-08-29 NOTE — PROGRESS NOTES
Pt here for cadd dc and hydration, cadd appears deflated, port flushed and de accessed, next treatment scheduled for 9/10 at 10, AVS provided

## 2024-08-29 NOTE — PROGRESS NOTES
[facility]  [address] [facility] [address]  [phone]     Date of Visit: 8/29/2024  Name: Itzel Sanches   YOB: 1955     Assessment/Plan  In summary, Itzel Sanches is a 69 y.o. 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     She had undergone 3 months of preoperative FOLFOX.  Follow-up CT scans demonstrated response to treatment with no evidence of progressive disease.  Patient then underwent resection of the omental mass and spleen as well as DILMA/BSO (other issue, see below).  Mrs. Sanches was then restarted on FOLFOX. Patient had problems with neuropathy; FOLFOX was changed to FOLFIRI (2 cycles of FOLFIRI only).  The 12th/final cycle of chemotherapy was completed on August 23, 2022.     MRI abdomen in July 2023 confirmed liver metastasis and revealed a new, posterior left chest wall/pleural lesion suspicious for metastasis. The chest wall mass was biopsied on 9/13/2023 revealing metastatic moderately differentiated adenocarcinoma consistent with known colonic primary.      Mrs. Sanches was seen/evaluated by Dr. rBown Grubbs (Radiation Oncology) and patient has received SBRT (left-sided lower rib cage/upper abdomen).  Mrs. Sanches also completed Y90.  Patient then went back on to surveillance.  During this surveillance From late 2023 to March 2024, analysis of circulating tumor cells was attempted but patient deferred.     Unfortunately repeat CAT scans on March 8, 2024 demonstrated new mediastinal adenopathy, increased paraspinal lesion anterior to L1 and stable lesion at T12.  The previously seen liver lesion was stable although there was a new small lesion suspicious for an additional metastatic implant.  Patient also had new retroperitoneal adenopathy and increased amee hepatis adenopathy.     She was started on FOLFIRI and avastin and has  completed 6 cycles so far from 4/8 to 6/24/24.      CT c/a/p from 7/3/24 were reviewed-that showed continued interval decrease in size of the left 11th rib metastasis, interval decrease in postradiation changes in the left lower lobe.  Decrease in conspicuity and size of the hepatic metastasis in segment 5.  Previously noted 5 mm lesion is no longer visualized.  No new lesions.  There is also an improvement in the mediastinal, amee hepatis and retroperitoneal lymphadenopathy.    Irinotecan was dropped and she has been on  maintenance 5-FU Avastin.  Last treatment ws on 8/27. Clinically doing well. Patient knows to call us if the pain in her left back worsens or if she develops any palpable abnormality.   Labs are unremarkable. Will check CEA     Patient has a history of lower extremity DVTs, is on Eliquis, which she will continue.       Return in about 6 weeks (around 10/10/2024) for Office Visit.     All the patient's questions were answered to their apparent satisfaction.  Patient has been strongly urged to call with any questions       Oncology History   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HCC)   9/23/2019 Initial Diagnosis    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HCC)     9/23/2019 Biopsy    Rectal Mass ( 2 slides [id number] [facility] Pathology, Collected on 09/23/2019, biopsy):  - Adenocarcinoma     12/10/2019 Surgery    Low anterior resection (Dr Jose Pastor):    A. Rectosigmoid colon, low anterior resection:  - Adenocarcinoma, moderately differentiated.   - Tumor invades through the muscularis propria into pericolorectal tissue, T3  - Diverticula.  - All margins are negative for tumor.  - Thirty-four lymph nodes, negative for malignancy (0/34).     B. Colon, anastomotic rings, resection:  - Two portions of colon with no pathologic abnormality     12/10/2019 Genomic Testing    RESULTS OF IMMUNOHISTOCHEMICAL ANALYSIS FOR MISMATCH REPAIR PROTEIN LOSS  INTERPRETATION: NO LOSS OF NUCLEAR EXPRESSION OF MMR PROTEINS: LOW PROBABILITY OF  MSI-H  Note: Background non-neoplastic tissue and/or internal control with intact nuclear expression.      RESULTS:  Antibody          Clone               Description                           Results  MLH1               M1                  Mismatch repair protein       Intact nuclear expression  MSH2              [id number]       Mismatch repair protein       Intact nuclear expression  MSH6              44                   Mismatch repair protein       Intact nuclear expression  PMS2              [id number]         Mismatch repair protein       Intact nuclear expression     2/4/2020 Surgery    Ileostomy, takedown:  - Portion of small intestine with mucocutaneous anastomosis consistent with stoma.  - Negative for malignancy     8/18/2021 Progression    CEA trends- observed by Dr. Juarez  2/17/21: 2.9  8/18/2021: 4.5  9/13/2021: 4.4    PET/CT  9/28/2021 completed due to elevating CEA  1. There is a large left paracolic gutter markedly hypermetabolic mass immediately inferior to the spleen, most consistent with metastatic colorectal carcinoma.  2. Mildly increased activity at the right abdominal bowel anastomotic site.  If not recently performed, direct visualization is recommended  3. There are no other glucose avid abnormalities identified within the abdomen or pelvis  4. No evidence of distant glucose avid metastatic disease in the neck, chest, or skeleton      11/12/2021 Biopsy    Omental mass:  - Metastatic adenocarcinoma, with an immunophenotype compatible with metastasis from patient's known colonic primary.     12/30/2021 - 12/30/2021 Chemotherapy    CapOx x 1 dose of Oxaliplatin: D/c due to tolerance.      1/2/2022 Genomic Testing         1/4/2022 - 3/17/2022 Chemotherapy    First 6 cycles of Folfox given prior to surgery- 1/4 through 3/17/2022     Folfox was dose reduced due to anticipated tolerance.  Minimal side effects     3/22/2022 Observation    CT CAP  1.  Decreased size of biopsy-proven omental  metastasis, which now only focally contacting rather than grossly invading the spleen and adjacent abdominal wall. No new evidence of metastatic disease in the abdomen or pelvis.  2.  No new or suspicious pulmonary nodules. One of the previously noted new 1-2 mm nodules is no longer seen, and the other is unchanged. Recommend continued attention on follow-up.  3.  Top-normal thickness endometrial stripe measuring 7 mm. If there is history of vaginal bleeding, suggest catheterization with endometrial biopsy.  4.  Hepatic steatosis.     4/28/2022 -  Cancer Staged    Staging form: Colon and Rectum, AJCC 8th Edition  - Clinical stage from 4/28/2022: cT3, cN0, pM1 - Signed by eJssie Freeman MD on 6/14/2023  Total positive nodes: 0       4/29/2022 Surgery    Drs Lydia Mae and Anup preformed the following procedures:   DIAGNOSTIC LAPAROSCOPY, TOTAL ABDOMINAL HYSTERECTOMY, BILATERAL SALPINGO-OOPHORECTOMY, EXTENSIVE ADHESIOLYSIS (N/A)  DIAGNOSTIC LAPAROSCOPY, OPEN OMENTECTOMY, POSSIBLE SPLENECTOMY (N/A)  INSTILLATION CHEMOTHERAPY INTRAPERITONEAL (HIPEC) LAPAROTOMY (N/A)  REPAIR DIAPHRAGM TEAR      A. Uterus, Bilateral Ovaries and Fallopian Tubes; Hysterosalpingo-oophorectomy:  - Leiomyoma.  - Left ovary with serous cystadenoma.  - Unremarkable cervix.  - Benign inactive endometrium with no specific pathologic change.  - Unremarkable right ovary and bilateral fallopian tubes.     B. Spleen, spleen, omentum, darotis:  - Metastatic adenocarcinoma involving spleen and omentum, consistent with colonic primary. See Note.    Note: Comparison to prior material ([id number], omental mass) reveals identical morphology to previous metastatic colonic adenocarcinoma.          5/18/2022 -  Cancer Staged    Staging form: Colon and Rectum, AJCC 8th Edition  - Pathologic stage from 5/18/2022: Stage KRISTAL (pT3, pN0, pM1a) - Signed by Jessie Freeman MD on 6/14/2023  Total positive nodes: 0       6/14/2022 - 8/23/2022 Chemotherapy     "7th cycle started on 6/14/2022  8th cycle worsening neuropathy; could not tolerate gabapentin  D/lizette oxaliplatin  9th cycle Irinotecan added at 85% dose reduction: SE Diarrhea  10th cycle Irinotecan dose redcued to 50%     9/19/2022 Observation    9/19/2022 CT CAP:   1.  Previously seen omental metastasis in the left upper quadrant has been resected.  There is a small area of nodular soft tissue thickening abutting the lateral aspect of the left kidney, cannot exclude residual/recurrent tumor.  Follow up in 4 months CEA not completed at time of scan.      9/13/2023 Biopsy    Mass, \"Chest wall mass 4 cores,\" Biopsy:  - Metastatic moderately differentiated adenocarcinoma, consistent with known colonic primary      10/31/2023 - 11/10/2023 Radiation    Treatments:  Course: C1 SBRT    Plan ID Energy Fractions Dose per Fraction (cGy) Dose Correction (cGy) Total Dose Delivered (cGy) Elapsed Days   SBRT L CW 6X-FFF 5 / 5 900 0 4,500 10      Treatment Dates:  10/31/2023 - 11/10/2023.      4/8/2024 -  Chemotherapy    potassium chloride, 20 mEq, Intravenous, Once, 1 of 1 cycle  Administration: 20 mEq (5/14/2024)  alteplase (CATHFLO), 2 mg, Intracatheter, Every 1 Minute as needed, 9 of 12 cycles  pegfilgrastim (NEULASTA), 3 mg (100 % of original dose 3 mg), Subcutaneous, Once, 4 of 4 cycles  Dose modification: 3 mg (original dose 3 mg, Cycle 3)  Administration: 3 mg (5/17/2024), 3 mg (6/3/2024), 3 mg (6/14/2024), 3 mg (6/27/2024)  fosaprepitant (EMEND) IVPB, 150 mg, Intravenous, Once, 8 of 11 cycles  Administration: 150 mg (4/22/2024), 150 mg (5/14/2024), 150 mg (5/29/2024), 150 mg (6/11/2024), 150 mg (6/24/2024), 150 mg (7/30/2024), 150 mg (8/13/2024), 150 mg (8/27/2024)  fluorouracil (ADRUCIL), 400 mg/m2 = 730 mg, Intravenous, Once, 6 of 6 cycles  Dose modification: 360 mg/m2 (90 % of original dose 400 mg/m2, Cycle 3, Reason: Other (see comments)), 320 mg/m2 (80 % of original dose 400 mg/m2, Cycle 3, Reason: Other (see " comments)), 360 mg/m2 (90 % of original dose 400 mg/m2, Cycle 3, Reason: Other (see comments)), 320 mg/m2 (80 % of original dose 400 mg/m2, Cycle 3, Reason: Other (see comments))  Administration: 730 mg (4/8/2024), 730 mg (4/22/2024), 580 mg (5/14/2024), 580 mg (5/29/2024), 580 mg (6/11/2024), 580 mg (6/24/2024)  bevacizumab (AVASTIN) IVPB, 5 mg/kg = 465 mg, Intravenous, Once, 9 of 12 cycles  Administration: 465 mg (4/8/2024), 465 mg (4/22/2024), 465 mg (5/14/2024), 465 mg (5/29/2024), 465 mg (6/11/2024), 465 mg (6/24/2024), 465 mg (7/30/2024), 465 mg (8/13/2024), 465 mg (8/27/2024)  irinotecan (CAMPTOSAR) chemo infusion, 295 mg (90 % of original dose 180 mg/m2), Intravenous, Once, 6 of 6 cycles  Dose modification: 162 mg/m2 (90 % of original dose 180 mg/m2, Cycle 1, Reason: Other (see comments)), 144 mg/m2 (80 % of original dose 180 mg/m2, Cycle 3, Reason: Dose Not Tolerated), 162 mg/m2 (90 % of original dose 180 mg/m2, Cycle 3, Reason: Other (see comments)), 144 mg/m2 (80 % of original dose 180 mg/m2, Cycle 3, Reason: Other (see comments))  Administration: 300 mg (4/8/2024), 295 mg (4/22/2024), 262 mg (5/14/2024), 262 mg (5/29/2024), 262 mg (6/11/2024), 262 mg (6/24/2024)  leucovorin calcium IVPB, 728 mg, Intravenous, Once, 6 of 6 cycles  Dose modification: 360 mg/m2 (90 % of original dose 400 mg/m2, Cycle 3, Reason: Other (see comments)), 320 mg/m2 (80 % of original dose 400 mg/m2, Cycle 3, Reason: Other (see comments)), 360 mg/m2 (90 % of original dose 400 mg/m2, Cycle 3, Reason: Other (see comments)), 320 mg/m2 (80 % of original dose 400 mg/m2, Cycle 3, Reason: Other (see comments))  Administration: 700 mg (4/8/2024), 700 mg (4/22/2024), 582 mg (5/14/2024), 582 mg (5/29/2024), 582 mg (6/11/2024), 582 mg (6/24/2024)  fluorouracil (ADRUCIL) ambulatory infusion Soln, 1,200 mg/m2/day = 4,370 mg, Intravenous, Over 46 hours, 9 of 12 cycles  Dose modification: 960 mg/m2/day (80 % of original dose 1,200 mg/m2/day,  "Cycle 4, Reason: Other (see comments))     Omental metastasis   9/23/2019 Biopsy    Rectal Mass ( 2 slides [id number] [facility] Pathology, Collected on 09/23/2019, biopsy):  - Adenocarcinoma     2/4/2020 Surgery    Ileostomy, takedown:  - Portion of small intestine with mucocutaneous anastomosis consistent with stoma.  - Negative for malignancy     9/13/2023 Biopsy    Mass, \"Chest wall mass 4 cores,\" Biopsy:  - Metastatic moderately differentiated adenocarcinoma, consistent with known colonic primary      4/8/2024 -  Chemotherapy    potassium chloride, 20 mEq, Intravenous, Once, 1 of 1 cycle  Administration: 20 mEq (5/14/2024)  alteplase (CATHFLO), 2 mg, Intracatheter, Every 1 Minute as needed, 9 of 12 cycles  pegfilgrastim (NEULASTA), 3 mg (100 % of original dose 3 mg), Subcutaneous, Once, 4 of 4 cycles  Dose modification: 3 mg (original dose 3 mg, Cycle 3)  Administration: 3 mg (5/17/2024), 3 mg (6/3/2024), 3 mg (6/14/2024), 3 mg (6/27/2024)  fosaprepitant (EMEND) IVPB, 150 mg, Intravenous, Once, 8 of 11 cycles  Administration: 150 mg (4/22/2024), 150 mg (5/14/2024), 150 mg (5/29/2024), 150 mg (6/11/2024), 150 mg (6/24/2024), 150 mg (7/30/2024), 150 mg (8/13/2024), 150 mg (8/27/2024)  fluorouracil (ADRUCIL), 400 mg/m2 = 730 mg, Intravenous, Once, 6 of 6 cycles  Dose modification: 360 mg/m2 (90 % of original dose 400 mg/m2, Cycle 3, Reason: Other (see comments)), 320 mg/m2 (80 % of original dose 400 mg/m2, Cycle 3, Reason: Other (see comments)), 360 mg/m2 (90 % of original dose 400 mg/m2, Cycle 3, Reason: Other (see comments)), 320 mg/m2 (80 % of original dose 400 mg/m2, Cycle 3, Reason: Other (see comments))  Administration: 730 mg (4/8/2024), 730 mg (4/22/2024), 580 mg (5/14/2024), 580 mg (5/29/2024), 580 mg (6/11/2024), 580 mg (6/24/2024)  bevacizumab (AVASTIN) IVPB, 5 mg/kg = 465 mg, Intravenous, Once, 9 of 12 cycles  Administration: 465 mg (4/8/2024), 465 mg (4/22/2024), 465 mg (5/14/2024), 465 mg (5/29/2024), " 465 mg (6/11/2024), 465 mg (6/24/2024), 465 mg (7/30/2024), 465 mg (8/13/2024), 465 mg (8/27/2024)  irinotecan (CAMPTOSAR) chemo infusion, 295 mg (90 % of original dose 180 mg/m2), Intravenous, Once, 6 of 6 cycles  Dose modification: 162 mg/m2 (90 % of original dose 180 mg/m2, Cycle 1, Reason: Other (see comments)), 144 mg/m2 (80 % of original dose 180 mg/m2, Cycle 3, Reason: Dose Not Tolerated), 162 mg/m2 (90 % of original dose 180 mg/m2, Cycle 3, Reason: Other (see comments)), 144 mg/m2 (80 % of original dose 180 mg/m2, Cycle 3, Reason: Other (see comments))  Administration: 300 mg (4/8/2024), 295 mg (4/22/2024), 262 mg (5/14/2024), 262 mg (5/29/2024), 262 mg (6/11/2024), 262 mg (6/24/2024)  leucovorin calcium IVPB, 728 mg, Intravenous, Once, 6 of 6 cycles  Dose modification: 360 mg/m2 (90 % of original dose 400 mg/m2, Cycle 3, Reason: Other (see comments)), 320 mg/m2 (80 % of original dose 400 mg/m2, Cycle 3, Reason: Other (see comments)), 360 mg/m2 (90 % of original dose 400 mg/m2, Cycle 3, Reason: Other (see comments)), 320 mg/m2 (80 % of original dose 400 mg/m2, Cycle 3, Reason: Other (see comments))  Administration: 700 mg (4/8/2024), 700 mg (4/22/2024), 582 mg (5/14/2024), 582 mg (5/29/2024), 582 mg (6/11/2024), 582 mg (6/24/2024)  fluorouracil (ADRUCIL) ambulatory infusion Soln, 1,200 mg/m2/day = 4,370 mg, Intravenous, Over 46 hours, 9 of 12 cycles  Dose modification: 960 mg/m2/day (80 % of original dose 1,200 mg/m2/day, Cycle 4, Reason: Other (see comments))        Cancer Staging   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HCC)  Staging form: Colon and Rectum, AJCC 8th Edition  - Clinical stage from 4/28/2022: cT3, cN0, pM1 - Signed by Jessie Freeman MD on 6/14/2023  - Pathologic stage from 5/18/2022: Stage KRISTAL (pT3, pN0, pM1a) - Signed by Jessie Freeman MD on 6/14/2023     Treatment Details   Treatment goal Curative   Plan Name OP Xelox / CapeOx (Oral Capecitabine + IV OXALIplatin) Q21 days   Status Inactive   Start  Date 12/10/2021   End Date 12/10/2021   Provider Sohail Grubbs MD   Chemotherapy capecitabine (XELODA), 850 mg/m2 = 1,600 mg (100 % of original dose 850 mg/m2), Oral, 2 times daily with meals, 1 of 8 cycles  Dose modification: 850 mg/m2 (100 % of original dose 850 mg/m2, Cycle 1, Reason: Other (see comments))    fosaprepitant (EMEND) IVPB, 150 mg, Intravenous, Once, 1 of 1 cycle  Administration: 150 mg (12/10/2021)    oxaliplatin (ELOXATIN) chemo infusion, 244.4 mg, Intravenous, Once, 1 of 8 cycles  Administration: 250 mg (12/10/2021)       Treatment Details   Treatment goal [No plan goal]   Plan Name HYDRATION AND ELECTROLYTE INFUSIONS   Status Inactive   Start Date 12/10/2021   End Date 12/10/2021   Provider Sohail Grubbs MD   Chemotherapy [No matching medication found in this treatment plan]     Treatment Details   Treatment goal Supportive   Plan Name Folfiri    Status Inactive   Start Date 1/4/2022   End Date 8/25/2022   Provider Sohail Grubbs MD   Chemotherapy fluorouracil (ADRUCIL), 300 mg/m2 = 560 mg (75 % of original dose 400 mg/m2), Intravenous, Once, 12 of 12 cycles  Dose modification: 300 mg/m2 (75 % of original dose 400 mg/m2, Cycle 1, Reason: Dose Not Tolerated), 340 mg/m2 (85 % of original dose 400 mg/m2, Cycle 4, Reason: Other (see comments)), 340 mg/m2 (85 % of original dose 400 mg/m2, Cycle 5, Reason: Other (see comments))  Administration: 560 mg (1/4/2022), 560 mg (1/18/2022), 560 mg (2/1/2022), 635 mg (2/15/2022), 635 mg (3/1/2022), 635 mg (3/15/2022), 635 mg (6/14/2022), 635 mg (6/28/2022), 635 mg (7/12/2022), 635 mg (7/26/2022), 590 mg (8/9/2022), 590 mg (8/23/2022)    irinotecan (CAMPTOSAR) chemo infusion, 153 mg/m2 = 286 mg (85 % of original dose 180 mg/m2), Intravenous, Once, 4 of 4 cycles  Dose modification: 180 mg/m2 (original dose 180 mg/m2, Cycle 9), 153 mg/m2 (85 % of original dose 180 mg/m2, Cycle 9, Reason: Other (see comments), Comment: anticipated tolerance), 90 mg/m2 (50 % of  original dose 180 mg/m2, Cycle 10, Reason: Dose Not Tolerated)  Administration: 286 mg (7/12/2022), 168 mg (7/26/2022), 157 mg (8/9/2022), 157 mg (8/23/2022)    leucovorin calcium IVPB, 300 mg/m2 = 561 mg (75 % of original dose 400 mg/m2), Intravenous, Once, 12 of 12 cycles  Dose modification: 300 mg/m2 (75 % of original dose 400 mg/m2, Cycle 1, Reason: Dose Not Tolerated), 340 mg/m2 (85 % of original dose 400 mg/m2, Cycle 4, Reason: Other (see comments)), 340 mg/m2 (85 % of original dose 400 mg/m2, Cycle 5, Reason: Other (see comments))  Administration: 561 mg (1/4/2022), 561 mg (1/18/2022), 561 mg (2/1/2022), 636 mg (2/15/2022), 636 mg (3/1/2022), 636 mg (3/15/2022), 636 mg (6/14/2022), 636 mg (6/28/2022), 636 mg (7/12/2022), 636 mg (7/26/2022), 592 mg (8/9/2022), 592 mg (8/23/2022)    oxaliplatin (ELOXATIN) chemo infusion, 63.75 mg/m2 = 119.2125 mg (75 % of original dose 85 mg/m2), Intravenous, Once, 8 of 8 cycles  Dose modification: 63.75 mg/m2 (75 % of original dose 85 mg/m2, Cycle 1, Reason: Dose Not Tolerated), 72.25 mg/m2 (85 % of original dose 85 mg/m2, Cycle 4, Reason: Other (see comments)), 72.25 mg/m2 (85 % of original dose 85 mg/m2, Cycle 5, Reason: Other (see comments))  Administration: 119.2125 mg (1/4/2022), 119.2125 mg (1/18/2022), 119.2125 mg (2/1/2022), 135.1075 mg (2/15/2022), 135.1075 mg (3/1/2022), 135.1075 mg (3/15/2022), 135.1075 mg (6/14/2022), 135.1075 mg (6/28/2022)    fluorouracil (ADRUCIL) ambulatory infusion Soln, 900 mg/m2/day = 3,365 mg (75 % of original dose 1,200 mg/m2/day), Intravenous, Over 46 hours, 12 of 12 cycles  Dose modification: 900 mg/m2/day (75 % of original dose 1,200 mg/m2/day, Cycle 2, Reason: Other (see comments)), 1,020 mg/m2/day (85 % of original dose 1,200 mg/m2/day, Cycle 4, Reason: Other (see comments), Comment: anticipated tolerance)       Treatment Details   Treatment goal [No plan goal]   Plan Name IV SITE LINE CARE / FLUSH PROTOCOL   Status Active   Start  Date 2/28/2022   End Date Until discontinued   Provider Sohail Grubbs MD   Chemotherapy [No matching medication found in this treatment plan]     Treatment Details   Treatment goal [No plan goal]   Plan Name HYDRATION AND ELECTROLYTE INFUSIONS   Status Inactive   Start Date 2/3/2022   End Date 3/17/2022   Provider Shirley Hector PA-C   Chemotherapy [No matching medication found in this treatment plan]     Treatment Details   Treatment goal Palliative   Plan Name OP FOLFIRI + Bevacizumab Q 14 Days   Status Active   Start Date 4/8/2024   End Date 10/10/2024 (Planned)   Provider Sohail Grubbs MD   Chemotherapy potassium chloride, 20 mEq, Intravenous, Once, 1 of 1 cycle  Administration: 20 mEq (5/14/2024)    alteplase (CATHFLO), 2 mg, Intracatheter, Every 1 Minute as needed, 9 of 12 cycles    pegfilgrastim (NEULASTA), 3 mg (100 % of original dose 3 mg), Subcutaneous, Once, 4 of 4 cycles  Dose modification: 3 mg (original dose 3 mg, Cycle 3)  Administration: 3 mg (5/17/2024), 3 mg (6/3/2024), 3 mg (6/14/2024), 3 mg (6/27/2024)    fosaprepitant (EMEND) IVPB, 150 mg, Intravenous, Once, 8 of 11 cycles  Administration: 150 mg (4/22/2024), 150 mg (5/14/2024), 150 mg (5/29/2024), 150 mg (6/11/2024), 150 mg (6/24/2024), 150 mg (7/30/2024), 150 mg (8/13/2024), 150 mg (8/27/2024)    fluorouracil (ADRUCIL), 400 mg/m2 = 730 mg, Intravenous, Once, 6 of 6 cycles  Dose modification: 360 mg/m2 (90 % of original dose 400 mg/m2, Cycle 3, Reason: Other (see comments)), 320 mg/m2 (80 % of original dose 400 mg/m2, Cycle 3, Reason: Other (see comments)), 360 mg/m2 (90 % of original dose 400 mg/m2, Cycle 3, Reason: Other (see comments)), 320 mg/m2 (80 % of original dose 400 mg/m2, Cycle 3, Reason: Other (see comments))  Administration: 730 mg (4/8/2024), 730 mg (4/22/2024), 580 mg (5/14/2024), 580 mg (5/29/2024), 580 mg (6/11/2024), 580 mg (6/24/2024)    bevacizumab (AVASTIN) IVPB, 5 mg/kg = 465 mg, Intravenous, Once, 9 of 12  cycles  Administration: 465 mg (4/8/2024), 465 mg (4/22/2024), 465 mg (5/14/2024), 465 mg (5/29/2024), 465 mg (6/11/2024), 465 mg (6/24/2024), 465 mg (7/30/2024), 465 mg (8/13/2024), 465 mg (8/27/2024)    irinotecan (CAMPTOSAR) chemo infusion, 295 mg (90 % of original dose 180 mg/m2), Intravenous, Once, 6 of 6 cycles  Dose modification: 162 mg/m2 (90 % of original dose 180 mg/m2, Cycle 1, Reason: Other (see comments)), 144 mg/m2 (80 % of original dose 180 mg/m2, Cycle 3, Reason: Dose Not Tolerated), 162 mg/m2 (90 % of original dose 180 mg/m2, Cycle 3, Reason: Other (see comments)), 144 mg/m2 (80 % of original dose 180 mg/m2, Cycle 3, Reason: Other (see comments))  Administration: 300 mg (4/8/2024), 295 mg (4/22/2024), 262 mg (5/14/2024), 262 mg (5/29/2024), 262 mg (6/11/2024), 262 mg (6/24/2024)    leucovorin calcium IVPB, 728 mg, Intravenous, Once, 6 of 6 cycles  Dose modification: 360 mg/m2 (90 % of original dose 400 mg/m2, Cycle 3, Reason: Other (see comments)), 320 mg/m2 (80 % of original dose 400 mg/m2, Cycle 3, Reason: Other (see comments)), 360 mg/m2 (90 % of original dose 400 mg/m2, Cycle 3, Reason: Other (see comments)), 320 mg/m2 (80 % of original dose 400 mg/m2, Cycle 3, Reason: Other (see comments))  Administration: 700 mg (4/8/2024), 700 mg (4/22/2024), 582 mg (5/14/2024), 582 mg (5/29/2024), 582 mg (6/11/2024), 582 mg (6/24/2024)    fluorouracil (ADRUCIL) ambulatory infusion Soln, 1,200 mg/m2/day = 4,370 mg, Intravenous, Over 46 hours, 9 of 12 cycles  Dose modification: 960 mg/m2/day (80 % of original dose 1,200 mg/m2/day, Cycle 4, Reason: Other (see comments))       Treatment Details   Treatment goal [No plan goal]   Plan Name Filgrastim SC Injection   Status Inactive   Start Date 5/6/2024   End Date 5/6/2024   Provider Sohail Grubbs MD   Chemotherapy tbo-filgrastim (GRANIX), 480 mcg (original dose ), Subcutaneous, Once, 1 of 1 cycle  Dose modification: 480 mcg (Cycle 1)  Administration: 480 mcg  (5/6/2024)       Treatment Details   Treatment goal [No plan goal]   Plan Name Filgrastim SC Injection   Status Active   Start Date 5/7/2024   End Date Until discontinued   Provider Sohail Grubbs MD   Chemotherapy tbo-filgrastim (GRANIX), 480 mcg (original dose ), Subcutaneous, Once, 1 of 1 cycle  Dose modification: 480 mcg (Cycle 1)  Administration: 480 mcg (5/7/2024)          HISTORY OF PRESENT ILLNESS:   Itzel Sanches is a 69 y.o. female  who is on treatment for colorectal cancer and is here today for a follow up visit.     Subjective  Patient here today by herself.  She is doing well overall.  C/o constipation after the treatments.   Uses miralax, dulcolax and prune juice  When constipated, she has noted some bright red blood in the stools  Resolves after constipation resolves.  Has hemorrhoids  She remains on the Eliquis.  Has multiple bruises.   Appetite is good.  C/o new pain in the left posterior rib that started 2 weeks ago. She only notices this when she lies down    REVIEW OF SYSTEMS:  No nausea/vomiting  No mouth sores  No HFS  14 point review of systems otherwise  negative      Current Outpatient Medications:     Acetaminophen (TYLENOL 8 HOUR PO), Take by mouth PRN, Disp: , Rfl:     apixaban (Eliquis) 5 mg, Take 1 tablet (5 mg total) by mouth 2 (two) times a day, Disp: 60 tablet, Rfl: 5    ezetimibe (ZETIA) 10 mg tablet, Take 1 tablet (10 mg total) by mouth daily, Disp: 90 tablet, Rfl: 1    fluorouracil 3,495 mg in CADD/Elastomeric Infusion Device, Infuse 3,495 mg (960 mg/m2/day x 1.82 m2) into a catheter in a vein via infusion device over 46 hours for 2 days  Infusion planned for August 27, 2024., Disp: 1 each, Rfl: 3    meclizine (ANTIVERT) 12.5 MG tablet, Take 1 tablet (12.5 mg total) by mouth 3 (three) times a day as needed for dizziness (Patient taking differently: Take 12.5 mg by mouth 3 (three) times a day as needed for dizziness PRN), Disp: 30 tablet, Rfl: 0    ondansetron (Zofran ODT) 8  mg disintegrating tablet, Take 1 tablet (8 mg total) by mouth every 8 (eight) hours as needed for nausea or vomiting, Disp: 30 tablet, Rfl: 5    prochlorperazine (COMPAZINE) 5 mg tablet, , Disp: , Rfl:     famotidine (PEPCID) 20 mg tablet, Take 1 tablet (20 mg total) by mouth 2 (two) times a day as needed for heartburn As needed, Disp: 90 tablet, Rfl: 1    lidocaine (Lidoderm) 5 %, Apply 1 patch topically over 12 hours daily Remove & Discard patch within 12 hours or as directed by MD (Patient not taking: Reported on 2/1/2024), Disp: 12 patch, Rfl: 3  No current facility-administered medications for this visit.    Facility-Administered Medications Ordered in Other Visits:     alteplase (CATHFLO) injection 2 mg, 2 mg, Intracatheter, Q1MIN PRN, Declan Wiggins MD    alteplase (CATHFLO) injection 2 mg, 2 mg, Intracatheter, Q1MIN PRN, Declan Wiggins MD     Allergies   Allergen Reactions    Medical Tape Rash     Skin irritation  Skin peeling  Skin irritation  Skin peeling  Blisters  Rash        ACTIVE PROBLEMS:  Patient Active Problem List   Diagnosis    Callus of foot    Foot pain    GERD (gastroesophageal reflux disease)    Mixed hyperlipidemia    Prediabetes    Varicose veins with pain    Morbid obesity (HCC)    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HCC)    Dyspnea on exertion    Dyslipidemia    Sigmoid diverticulosis    PONV (postoperative nausea and vomiting)    Omental metastasis    Lesion of ovary    Chemotherapy adverse reaction    Chemotherapy-induced nausea    Platelets decreased (HCC)    Stage 3 chronic kidney disease, unspecified whether stage 3a or 3b CKD (HCC)    H/O splenectomy    Asplenia    Postoperative state    Metastases to the liver (HCC)    Chemotherapy-induced neuropathy (HCC)    Chemotherapy-induced neutropenia (HCC)    Hypokalemia    Anemia associated with chemotherapy          Past Medical History:   Diagnosis Date    Blood in stool     Breast disorder     Cancer (HCC)     rectal    Cancer determined by colorectal  biopsy (HCC)     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    Colon polyp     GERD (gastroesophageal reflux disease)     History of chemotherapy 2021    History of rectal surgery     Hyperlipidemia     PONV (postoperative nausea and vomiting)         Past Surgical History:   Procedure Laterality Date    BREAST CYST EXCISION Right      SECTION      x3    COLON SIGMOID RESECTION      COLONOSCOPY      DILATION AND CURETTAGE OF UTERUS      ILEO LOOP DIVERSION N/A 12/10/2019    Procedure: ILEOSTOMY LOOP;  Surgeon: WINNIE Pastor MD;  [facility];  Service: Robotics    INSTILLATION CHEMOTHERAPY INTRAPERITONEAL (HIPEC) LAPAROTOMY N/A 2022    Procedure: INSTILLATION CHEMOTHERAPY INTRAPERITONEAL (HIPEC) LAPAROTOMY;  Surgeon: Jessie Freeman MD;  [facility];  Service: Surgical Oncology    IR BIOPSY CHEST WALL  2023    IR BIOPSY OMENTUM  2021    IR PORT PLACEMENT  2021    IR Y-90 PRE-ANGIO/EMBO W/ LUNG SCAN  2024    IR Y-90 RADIOEMBOLIZATION  2024    OMENTECTOMY N/A 2022    Procedure: DIAGNOSTIC LAPAROSCOPY, OPEN OMENTECTOMY, SPLENECTOMY, DIAPHRAGM REPAIR, CHEST TUBE INSERTION;  Surgeon: Jessie Freeman MD;  [facility];  Service: Surgical Oncology    MS CLOSURE ENTEROSTOMY LG/SMALL INTESTINE N/A 2020    Procedure: CLOSURE ILEOSTOMY;  Surgeon: WINNIE Pastor MD;  [facility];  Service: Colorectal    MS LAPAROSCOPY COLECTOMY PARTIAL W/ANASTOMOSIS N/A 12/10/2019    Procedure: SIGMOID RESECTION COLON LAPAROSCOPIC W ROBOTICS converted to lap hand assisted  with Partial proctectomy , LASER FLUORESCENCE ANGIOGRAPHY, INTRA OP COLONOSCOPY;  Surgeon: WINNIE Pastor MD;  [facility];  Service: Robotics    MS TOTAL ABDOMINAL HYSTERECT W/WO RMVL TUBE OVARY N/A 2022    Procedure: DIAGNOSTIC LAPAROSCOPY, TOTAL ABDOMINAL HYSTERECTOMY, BILATERAL SALPINGO-OOPHORECTOMY, EXTENSIVE ADHESIOLYSIS;  Surgeon: Peterson Mae MD;  [facility];  Service: Gynecology Oncology    CT UNLISTED PROCEDURE DIAPHRAGM  04/29/2022    Procedure: REPAIR DIAPHRAGM TEAR;  Surgeon: Yana Hebert MD;  [facility];  Service: Thoracic    SMALL INTESTINE SURGERY  02/04/2020    Procedure: RESECTION SMALL BOWEL;  Surgeon: WINNIE Pastor MD;  [facility];  Service: Colorectal        Social History     Socioeconomic History    Marital status:      Spouse name: None    Number of children: None    Years of education: None    Highest education level: None   Occupational History    None   Tobacco Use    Smoking status: Never     Passive exposure: Past    Smokeless tobacco: Never   Vaping Use    Vaping status: Never Used   Substance and Sexual Activity    Alcohol use: Yes     Comment: \"occasionally\"    Drug use: Never    Sexual activity: Not Currently   Other Topics Concern    None   Social History Narrative    None     Social Determinants of Health     Financial Resource Strain: Low Risk  (3/21/2023)    Overall Financial Resource Strain (CARDIA)     Difficulty of Paying Living Expenses: Not hard at all   Food Insecurity: No Food Insecurity (3/28/2024)    Hunger Vital Sign     Worried About Running Out of Food in the Last Year: Never true     Ran Out of Food in the Last Year: Never true   Transportation Needs: No Transportation Needs (3/28/2024)    PRAPARE - Transportation     Lack of Transportation (Medical): No     Lack of Transportation (Non-Medical): No   Physical Activity: Not on file   Stress: Not on file   Social Connections: Not on file   Intimate Partner Violence: Not on file   Housing Stability: Unknown (3/28/2024)    Housing Stability Vital Sign     Unable to Pay for Housing in the Last Year: No     Number of Times Moved in the Last Year: Not on file     Homeless in the Last Year: No        Family History   Problem Relation Age of Onset    Hypertension Mother     Heart attack Mother     Heart attack Father     Heart disease " "Father     Hypertension Brother     Heart attack Brother     Colon cancer Paternal Uncle     Leukemia Cousin     Breast cancer Cousin     Diabetes Cousin     Breast cancer Cousin     Colon cancer Family         paternal uncle    Stroke Neg Hx         Objective        Physical Exam  ECOG performance status: 1  Blood pressure 122/70, pulse 85, temperature (!) 97.4 °F (36.3 °C), height 4' 9.01\" (1.448 m), weight 86.9 kg (191 lb 8 oz), last menstrual period 09/01/2011, SpO2 99%, not currently breastfeeding.     General appearance: Not in acute distress, well appearing and well nourished.    Alert and oriented.    Normal breath sounds bilaterally.  Clear to auscultation without any added sounds  Heart sounds are normal.  No murmurs noted.    Abdomen is soft and nontender.  No rebound.  Extremities without any edema.    Back exam - no palpable abnormality. No tenderness where she is experiencing pain  No focal deficits.    I reviewed lab data in the chart as noted above.  Additional labs as noted below    WBC   Date Value Ref Range Status   08/24/2024 6.64 4.31 - 10.16 Thousand/uL Final   08/12/2024 6.61 4.31 - 10.16 Thousand/uL Final   07/29/2024 6.85 4.31 - 10.16 Thousand/uL Final   11/19/2016 5.4 3.4 - 10.8 x10E3/uL Final     Hemoglobin   Date Value Ref Range Status   08/24/2024 12.5 11.5 - 15.4 g/dL Final   08/12/2024 11.5 11.5 - 15.4 g/dL Final   07/29/2024 11.5 11.5 - 15.4 g/dL Final   11/19/2016 13.6 11.1 - 15.9 g/dL Final     Platelets   Date Value Ref Range Status   08/24/2024 323 149 - 390 Thousands/uL Final   08/12/2024 296 149 - 390 Thousands/uL Final   07/29/2024 286 149 - 390 Thousands/uL Final   11/19/2016 224 150 - 379 x10E3/uL Final     MCV   Date Value Ref Range Status   08/24/2024 100 (H) 82 - 98 fL Final   08/12/2024 100 (H) 82 - 98 fL Final   07/29/2024 100 (H) 82 - 98 fL Final   11/19/2016 85 79 - 97 fL Final      Sodium   Date Value Ref Range Status   12/03/2016 141 136 - 144 mmol/L Final     " Comment:     Result Comment: **Effective December 12, 2016 the reference interval**                   for Sodium, Serum will be changing to:                                                             134 - 144     11/19/2016 139 136 - 144 mmol/L Final     Potassium   Date Value Ref Range Status   08/24/2024 4.2 3.5 - 5.3 mmol/L Final   08/12/2024 4.0 3.5 - 5.3 mmol/L Final   07/29/2024 4.0 3.5 - 5.3 mmol/L Final   09/13/2021 4.7 3.5 - 5.2 mmol/L Final   06/11/2021 4.6 3.5 - 5.2 mmol/L Final   04/16/2021 4.3 3.5 - 5.2 mmol/L Final     Chloride   Date Value Ref Range Status   08/24/2024 109 (H) 96 - 108 mmol/L Final   08/12/2024 108 96 - 108 mmol/L Final   07/29/2024 109 (H) 96 - 108 mmol/L Final   09/13/2021 105 96 - 106 mmol/L Final   06/11/2021 107 (H) 96 - 106 mmol/L Final   04/16/2021 104 96 - 106 mmol/L Final     CO2   Date Value Ref Range Status   08/24/2024 21 21 - 32 mmol/L Final   08/12/2024 23 21 - 32 mmol/L Final   07/29/2024 22 21 - 32 mmol/L Final   09/13/2021 22 20 - 29 mmol/L Final   06/11/2021 24 20 - 29 mmol/L Final   04/16/2021 21 20 - 29 mmol/L Final     BUN   Date Value Ref Range Status   08/24/2024 20 5 - 25 mg/dL Final   08/12/2024 19 5 - 25 mg/dL Final   07/29/2024 18 5 - 25 mg/dL Final   09/13/2021 19 8 - 27 mg/dL Final   06/11/2021 17 8 - 27 mg/dL Final   04/16/2021 23 8 - 27 mg/dL Final     Creatinine   Date Value Ref Range Status   08/24/2024 1.02 0.60 - 1.30 mg/dL Final     Comment:     Standardized to IDMS reference method   08/12/2024 1.00 0.60 - 1.30 mg/dL Final     Comment:     Standardized to IDMS reference method   07/29/2024 0.97 0.60 - 1.30 mg/dL Final     Comment:     Standardized to IDMS reference method   12/03/2016 0.91 0.57 - 1.00 mg/dL Final   11/19/2016 0.90 0.57 - 1.00 mg/dL Final     Glucose   Date Value Ref Range Status   08/24/2024 112 65 - 140 mg/dL Final     Comment:     If the patient is fasting, the ADA then defines impaired fasting glucose as > 100 mg/dL and  diabetes as > or equal to 123 mg/dL.   08/12/2024 90 65 - 140 mg/dL Final     Comment:     If the patient is fasting, the ADA then defines impaired fasting glucose as > 100 mg/dL and diabetes as > or equal to 123 mg/dL.   07/29/2024 105 65 - 140 mg/dL Final     Comment:     If the patient is fasting, the ADA then defines impaired fasting glucose as > 100 mg/dL and diabetes as > or equal to 123 mg/dL.     Glucose, Random   Date Value Ref Range Status   09/13/2021 93 65 - 99 mg/dL Final   06/11/2021 94 65 - 99 mg/dL Final   04/16/2021 85 65 - 99 mg/dL Final     eGFR    Date Value Ref Range Status   09/13/2021 74 >59 mL/min/1.73 Final     Comment:     **[facility] currently reports eGFR in compliance with the current**    recommendations of the [employer]. [facility] will    update reporting as new guidelines are published from the NKF-ASN    Task force.     06/11/2021 75 >59 mL/min/1.73 Final     Comment:     **[facility] currently reports eGFR in compliance with the current**    recommendations of the [employer]. [facility] will    update reporting as new guidelines are published from the NKF-ASN    Task force.     04/16/2021 61 >59 mL/min/1.73 Final     Calcium   Date Value Ref Range Status   08/24/2024 9.1 8.4 - 10.2 mg/dL Final   08/12/2024 8.9 8.4 - 10.2 mg/dL Final   07/29/2024 8.9 8.4 - 10.2 mg/dL Final   12/03/2016 9.2 8.7 - 10.3 mg/dL Final   11/19/2016 9.4 8.7 - 10.3 mg/dL Final     Total Protein   Date Value Ref Range Status   12/03/2016 6.6 6.0 - 8.5 g/dL Final   11/19/2016 6.6 6.0 - 8.5 g/dL Final     Albumin   Date Value Ref Range Status   08/24/2024 3.4 (L) 3.5 - 5.0 g/dL Final   08/12/2024 3.2 (L) 3.5 - 5.0 g/dL Final   07/29/2024 3.3 (L) 3.5 - 5.0 g/dL Final   12/03/2016 3.6 3.6 - 4.8 g/dL Final   11/19/2016 3.8 3.6 - 4.8 g/dL Final     Total Bilirubin   Date Value Ref Range Status   08/24/2024 0.45 0.20 - 1.00 mg/dL Final     Comment:     Use of this assay is not  recommended for patients undergoing treatment with eltrombopag due to the potential for falsely elevated results.  N-acetyl-p-benzoquinone imine (metabolite of Acetaminophen) will generate erroneously low results in samples for patients that have taken an overdose of Acetaminophen.   08/12/2024 0.68 0.20 - 1.00 mg/dL Final     Comment:     Use of this assay is not recommended for patients undergoing treatment with eltrombopag due to the potential for falsely elevated results.  N-acetyl-p-benzoquinone imine (metabolite of Acetaminophen) will generate erroneously low results in samples for patients that have taken an overdose of Acetaminophen.   07/29/2024 0.60 0.20 - 1.00 mg/dL Final     Comment:     Use of this assay is not recommended for patients undergoing treatment with eltrombopag due to the potential for falsely elevated results.  N-acetyl-p-benzoquinone imine (metabolite of Acetaminophen) will generate erroneously low results in samples for patients that have taken an overdose of Acetaminophen.     TOTAL BILIRUBIN   Date Value Ref Range Status   09/13/2021 0.6 0.0 - 1.2 mg/dL Final   04/16/2021 0.8 0.0 - 1.2 mg/dL Final   09/18/2020 0.9 0.0 - 1.2 mg/dL Final     Alkaline Phosphatase   Date Value Ref Range Status   08/24/2024 52 34 - 104 U/L Final   08/12/2024 51 34 - 104 U/L Final   07/29/2024 55 34 - 104 U/L Final   12/03/2016 54 39 - 117 IU/L Final   11/19/2016 51 39 - 117 IU/L Final     AST   Date Value Ref Range Status   08/24/2024 13 13 - 39 U/L Final   08/12/2024 12 (L) 13 - 39 U/L Final   07/29/2024 12 (L) 13 - 39 U/L Final   09/13/2021 14 0 - 40 IU/L Final   04/16/2021 18 0 - 40 IU/L Final   09/18/2020 14 0 - 40 IU/L Final     ALT   Date Value Ref Range Status   08/24/2024 11 7 - 52 U/L Final     Comment:     Specimen collection should occur prior to Sulfasalazine administration due to the potential for falsely depressed results.    08/12/2024 10 7 - 52 U/L Final     Comment:     Specimen collection  "should occur prior to Sulfasalazine administration due to the potential for falsely depressed results.    07/29/2024 9 7 - 52 U/L Final     Comment:     Specimen collection should occur prior to Sulfasalazine administration due to the potential for falsely depressed results.    09/13/2021 16 0 - 32 IU/L Final   04/16/2021 17 0 - 32 IU/L Final   09/18/2020 15 0 - 32 IU/L Final      No results found for: \"LDH\"  TSH   Date Value Ref Range Status   09/13/2021 1.860 0.450 - 4.500 uIU/mL Final   04/15/2019 1.600 0.450 - 4.500 uIU/mL Final     No results found for: \"T2QBUUF\"   No results found for: \"FREET4\"      RECENT IMAGING:  No results found.     Total minutes spent reviewing EMR, seeing patient in clinic visit, documenting visit, placing treatment orders, and communicating with other medical providers: 30    Portions of the record may have been created with voice recognition software.  Occasional wrong word or \"sound a like\" substitutions may have occurred due to the inherent limitations of voice recognition software.  Read the chart carefully and recognize, using context, where substitutions have occurred.    "

## 2024-09-03 ENCOUNTER — PATIENT OUTREACH (OUTPATIENT)
Dept: CASE MANAGEMENT | Facility: OTHER | Age: 69
End: 2024-09-03

## 2024-09-03 NOTE — PROGRESS NOTES
OSW placed outreach TC to pt this afternoon. She states that overall she is doing well. She was in really good spirits. She states that she still suffers from some constipation following her infusions.  She is counting down her infusions. She has 3 more to go. She reports that she usually doesn't look forward to the cooler weather, but is counting down and can't wait because her infusions will be finished at that time.   She was in the process of getting her fall clothes out of big bins. She shared that her 2 daughters are in [place] together. They are very close and travel everywhere together. They even live together. She states she has adventures through them. She expressed that they really don't speak to their brother and this is uncomfortable for her, as it puts her in the middle at times. OSW allowed time for her to express her feelings and offered support.     Pt spoke about her love of decorating for every holiday and change of season. She shared that she loves light houses and her home is filled with them.   Last time we spoke she was hopeful to get to [facility]. She reports that she was able to get there once, as it is a tradition. OSW expressed I am glad she was able to get there.   OSW offered to outreach in another month and she was appreciative.   OSW will continue to follow.

## 2024-09-04 DIAGNOSIS — C78.6 MALIGNANT NEOPLASM METASTATIC TO OMENTUM (HCC): Primary | ICD-10-CM

## 2024-09-04 DIAGNOSIS — E87.6 HYPOKALEMIA: ICD-10-CM

## 2024-09-04 DIAGNOSIS — C19 RECTOSIGMOID CANCER (HCC): ICD-10-CM

## 2024-09-06 DIAGNOSIS — C19 RECTOSIGMOID CANCER (HCC): Primary | ICD-10-CM

## 2024-09-06 DIAGNOSIS — C78.6 MALIGNANT NEOPLASM METASTATIC TO OMENTUM (HCC): ICD-10-CM

## 2024-09-06 RX ORDER — SODIUM CHLORIDE 9 MG/ML
20 INJECTION, SOLUTION INTRAVENOUS ONCE
Status: CANCELLED | OUTPATIENT
Start: 2024-09-10

## 2024-09-07 ENCOUNTER — APPOINTMENT (OUTPATIENT)
Dept: LAB | Facility: HOSPITAL | Age: 69
End: 2024-09-07
Payer: MEDICARE

## 2024-09-07 DIAGNOSIS — C78.6 MALIGNANT NEOPLASM METASTATIC TO OMENTUM (HCC): ICD-10-CM

## 2024-09-07 DIAGNOSIS — C19 RECTOSIGMOID CANCER (HCC): ICD-10-CM

## 2024-09-07 DIAGNOSIS — E87.6 HYPOKALEMIA: ICD-10-CM

## 2024-09-07 LAB
ALBUMIN SERPL BCG-MCNC: 3.3 G/DL (ref 3.5–5)
ALP SERPL-CCNC: 42 U/L (ref 34–104)
ALT SERPL W P-5'-P-CCNC: 10 U/L (ref 7–52)
ANION GAP SERPL CALCULATED.3IONS-SCNC: 7 MMOL/L (ref 4–13)
AST SERPL W P-5'-P-CCNC: 12 U/L (ref 13–39)
BACTERIA UR QL AUTO: ABNORMAL /HPF
BASOPHILS # BLD AUTO: 0.02 THOUSANDS/ÂΜL (ref 0–0.1)
BASOPHILS NFR BLD AUTO: 0 % (ref 0–1)
BILIRUB SERPL-MCNC: 0.45 MG/DL (ref 0.2–1)
BILIRUB UR QL STRIP: NEGATIVE
BUN SERPL-MCNC: 16 MG/DL (ref 5–25)
CALCIUM ALBUM COR SERPL-MCNC: 9.7 MG/DL (ref 8.3–10.1)
CALCIUM SERPL-MCNC: 9.1 MG/DL (ref 8.4–10.2)
CEA SERPL-MCNC: 2.3 NG/ML (ref 0–3)
CHLORIDE SERPL-SCNC: 110 MMOL/L (ref 96–108)
CLARITY UR: CLEAR
CO2 SERPL-SCNC: 24 MMOL/L (ref 21–32)
COLOR UR: COLORLESS
CREAT SERPL-MCNC: 1.02 MG/DL (ref 0.6–1.3)
EOSINOPHIL # BLD AUTO: 0.14 THOUSAND/ÂΜL (ref 0–0.61)
EOSINOPHIL NFR BLD AUTO: 3 % (ref 0–6)
ERYTHROCYTE [DISTWIDTH] IN BLOOD BY AUTOMATED COUNT: 16.3 % (ref 11.6–15.1)
GFR SERPL CREATININE-BSD FRML MDRD: 55 ML/MIN/1.73SQ M
GLUCOSE SERPL-MCNC: 101 MG/DL (ref 65–140)
GLUCOSE UR STRIP-MCNC: NEGATIVE MG/DL
HCT VFR BLD AUTO: 38.1 % (ref 34.8–46.1)
HGB BLD-MCNC: 12.1 G/DL (ref 11.5–15.4)
HGB UR QL STRIP.AUTO: NEGATIVE
IMM GRANULOCYTES # BLD AUTO: 0.01 THOUSAND/UL (ref 0–0.2)
IMM GRANULOCYTES NFR BLD AUTO: 0 % (ref 0–2)
KETONES UR STRIP-MCNC: NEGATIVE MG/DL
LEUKOCYTE ESTERASE UR QL STRIP: NEGATIVE
LYMPHOCYTES # BLD AUTO: 1.62 THOUSANDS/ÂΜL (ref 0.6–4.47)
LYMPHOCYTES NFR BLD AUTO: 29 % (ref 14–44)
MCH RBC QN AUTO: 30.9 PG (ref 26.8–34.3)
MCHC RBC AUTO-ENTMCNC: 31.8 G/DL (ref 31.4–37.4)
MCV RBC AUTO: 97 FL (ref 82–98)
MONOCYTES # BLD AUTO: 0.8 THOUSAND/ÂΜL (ref 0.17–1.22)
MONOCYTES NFR BLD AUTO: 14 % (ref 4–12)
NEUTROPHILS # BLD AUTO: 3.06 THOUSANDS/ÂΜL (ref 1.85–7.62)
NEUTS SEG NFR BLD AUTO: 54 % (ref 43–75)
NITRITE UR QL STRIP: NEGATIVE
NON-SQ EPI CELLS URNS QL MICRO: ABNORMAL /HPF
NRBC BLD AUTO-RTO: 0 /100 WBCS
PH UR STRIP.AUTO: 6 [PH]
PLATELET # BLD AUTO: 324 THOUSANDS/UL (ref 149–390)
PMV BLD AUTO: 10.5 FL (ref 8.9–12.7)
POTASSIUM SERPL-SCNC: 4.1 MMOL/L (ref 3.5–5.3)
PROT SERPL-MCNC: 6.3 G/DL (ref 6.4–8.4)
PROT UR STRIP-MCNC: NEGATIVE MG/DL
RBC # BLD AUTO: 3.92 MILLION/UL (ref 3.81–5.12)
RBC #/AREA URNS AUTO: ABNORMAL /HPF
SODIUM SERPL-SCNC: 141 MMOL/L (ref 135–147)
SP GR UR STRIP.AUTO: 1.01 (ref 1–1.03)
UROBILINOGEN UR STRIP-ACNC: <2 MG/DL
WBC # BLD AUTO: 5.65 THOUSAND/UL (ref 4.31–10.16)
WBC #/AREA URNS AUTO: ABNORMAL /HPF

## 2024-09-07 PROCEDURE — 36415 COLL VENOUS BLD VENIPUNCTURE: CPT

## 2024-09-07 PROCEDURE — 80053 COMPREHEN METABOLIC PANEL: CPT

## 2024-09-07 PROCEDURE — 82378 CARCINOEMBRYONIC ANTIGEN: CPT

## 2024-09-07 PROCEDURE — 85025 COMPLETE CBC W/AUTO DIFF WBC: CPT

## 2024-09-07 PROCEDURE — 81001 URINALYSIS AUTO W/SCOPE: CPT

## 2024-09-10 ENCOUNTER — HOSPITAL ENCOUNTER (OUTPATIENT)
Dept: INFUSION CENTER | Facility: HOSPITAL | Age: 69
Discharge: HOME | End: 2024-09-10
Payer: MEDICARE

## 2024-09-10 VITALS
TEMPERATURE: 97.9 F | BODY MASS INDEX: 40.9 KG/M2 | OXYGEN SATURATION: 98 % | SYSTOLIC BLOOD PRESSURE: 154 MMHG | RESPIRATION RATE: 18 BRPM | DIASTOLIC BLOOD PRESSURE: 74 MMHG | HEIGHT: 57 IN | WEIGHT: 189.6 LBS | HEART RATE: 100 BPM

## 2024-09-10 DIAGNOSIS — E87.6 HYPOKALEMIA: ICD-10-CM

## 2024-09-10 DIAGNOSIS — C78.6 MALIGNANT NEOPLASM METASTATIC TO OMENTUM (HCC): ICD-10-CM

## 2024-09-10 DIAGNOSIS — C19 RECTOSIGMOID CANCER (HCC): Primary | ICD-10-CM

## 2024-09-10 PROCEDURE — 96413 CHEMO IV INFUSION 1 HR: CPT

## 2024-09-10 PROCEDURE — 96367 TX/PROPH/DG ADDL SEQ IV INF: CPT

## 2024-09-10 PROCEDURE — G0498 CHEMO EXTEND IV INFUS W/PUMP: HCPCS

## 2024-09-10 RX ORDER — SODIUM CHLORIDE 9 MG/ML
20 INJECTION, SOLUTION INTRAVENOUS ONCE
Status: COMPLETED | OUTPATIENT
Start: 2024-09-10 | End: 2024-09-10

## 2024-09-10 RX ADMIN — DEXAMETHASONE SODIUM PHOSPHATE: 10 INJECTION, SOLUTION INTRAMUSCULAR; INTRAVENOUS at 10:27

## 2024-09-10 RX ADMIN — BEVACIZUMAB 465 MG: 400 INJECTION, SOLUTION INTRAVENOUS at 11:29

## 2024-09-10 RX ADMIN — SODIUM CHLORIDE 20 ML/HR: 0.9 INJECTION, SOLUTION INTRAVENOUS at 10:24

## 2024-09-10 RX ADMIN — FOSAPREPITANT 150 MG: 150 INJECTION, POWDER, LYOPHILIZED, FOR SOLUTION INTRAVENOUS at 10:50

## 2024-09-10 NOTE — PROGRESS NOTES
Itzel DANO Sanches  tolerated chemotherapy well with no complications. Aware of future appt on 9/12/24 at 1100. AVS printed. Patient left clinic ambulatory.

## 2024-09-12 ENCOUNTER — HOSPITAL ENCOUNTER (OUTPATIENT)
Dept: INFUSION CENTER | Facility: HOSPITAL | Age: 69
Discharge: HOME | End: 2024-09-12
Payer: MEDICARE

## 2024-09-12 VITALS
DIASTOLIC BLOOD PRESSURE: 70 MMHG | OXYGEN SATURATION: 94 % | HEART RATE: 78 BPM | TEMPERATURE: 97.8 F | SYSTOLIC BLOOD PRESSURE: 149 MMHG | RESPIRATION RATE: 18 BRPM

## 2024-09-12 DIAGNOSIS — E87.6 HYPOKALEMIA: Primary | ICD-10-CM

## 2024-09-12 DIAGNOSIS — C19 RECTOSIGMOID CANCER (HCC): ICD-10-CM

## 2024-09-12 DIAGNOSIS — C78.6 MALIGNANT NEOPLASM METASTATIC TO OMENTUM (HCC): ICD-10-CM

## 2024-09-12 PROCEDURE — 96360 HYDRATION IV INFUSION INIT: CPT

## 2024-09-12 RX ADMIN — SODIUM CHLORIDE 1000 ML: 0.9 INJECTION, SOLUTION INTRAVENOUS at 11:00

## 2024-09-12 NOTE — PROGRESS NOTES
Elastomeric pump d/c'd. Hydration complete.    Itzel Sanches  tolerated treatment well with no complications.      Itzel Sanches is aware of future appt on 9/23/24.     AVS printed and given to Itzel Sanches:  Yes

## 2024-09-16 DIAGNOSIS — C19 RECTOSIGMOID CANCER (HCC): ICD-10-CM

## 2024-09-16 DIAGNOSIS — E87.6 HYPOKALEMIA: ICD-10-CM

## 2024-09-16 DIAGNOSIS — C78.6 MALIGNANT NEOPLASM METASTATIC TO OMENTUM (HCC): Primary | ICD-10-CM

## 2024-09-20 ENCOUNTER — APPOINTMENT (OUTPATIENT)
Dept: LAB | Facility: HOSPITAL | Age: 69
End: 2024-09-20
Payer: MEDICARE

## 2024-09-20 DIAGNOSIS — C19 RECTOSIGMOID CANCER (HCC): ICD-10-CM

## 2024-09-20 DIAGNOSIS — E87.6 HYPOKALEMIA: ICD-10-CM

## 2024-09-20 DIAGNOSIS — C78.6 MALIGNANT NEOPLASM METASTATIC TO OMENTUM (HCC): ICD-10-CM

## 2024-09-20 LAB
ALBUMIN SERPL BCG-MCNC: 3.3 G/DL (ref 3.5–5)
ALP SERPL-CCNC: 50 U/L (ref 34–104)
ALT SERPL W P-5'-P-CCNC: 11 U/L (ref 7–52)
ANION GAP SERPL CALCULATED.3IONS-SCNC: 13 MMOL/L (ref 4–13)
AST SERPL W P-5'-P-CCNC: 12 U/L (ref 13–39)
BACTERIA UR QL AUTO: ABNORMAL /HPF
BASOPHILS # BLD AUTO: 0.03 THOUSANDS/ΜL (ref 0–0.1)
BASOPHILS NFR BLD AUTO: 0 % (ref 0–1)
BILIRUB SERPL-MCNC: 0.3 MG/DL (ref 0.2–1)
BILIRUB UR QL STRIP: NEGATIVE
BUN SERPL-MCNC: 21 MG/DL (ref 5–25)
CALCIUM ALBUM COR SERPL-MCNC: 9.6 MG/DL (ref 8.3–10.1)
CALCIUM SERPL-MCNC: 9 MG/DL (ref 8.4–10.2)
CAOX CRY URNS QL MICRO: ABNORMAL /HPF
CEA SERPL-MCNC: 2.1 NG/ML (ref 0–3)
CHLORIDE SERPL-SCNC: 110 MMOL/L (ref 96–108)
CLARITY UR: CLEAR
CO2 SERPL-SCNC: 18 MMOL/L (ref 21–32)
COLOR UR: YELLOW
CREAT SERPL-MCNC: 0.93 MG/DL (ref 0.6–1.3)
EOSINOPHIL # BLD AUTO: 0.15 THOUSAND/ΜL (ref 0–0.61)
EOSINOPHIL NFR BLD AUTO: 2 % (ref 0–6)
ERYTHROCYTE [DISTWIDTH] IN BLOOD BY AUTOMATED COUNT: 16.8 % (ref 11.6–15.1)
GFR SERPL CREATININE-BSD FRML MDRD: 62 ML/MIN/1.73SQ M
GLUCOSE P FAST SERPL-MCNC: 88 MG/DL (ref 65–99)
GLUCOSE UR STRIP-MCNC: NEGATIVE MG/DL
HCT VFR BLD AUTO: 39.3 % (ref 34.8–46.1)
HGB BLD-MCNC: 12.4 G/DL (ref 11.5–15.4)
HGB UR QL STRIP.AUTO: NEGATIVE
IMM GRANULOCYTES # BLD AUTO: 0.01 THOUSAND/UL (ref 0–0.2)
IMM GRANULOCYTES NFR BLD AUTO: 0 % (ref 0–2)
KETONES UR STRIP-MCNC: NEGATIVE MG/DL
LEUKOCYTE ESTERASE UR QL STRIP: NEGATIVE
LYMPHOCYTES # BLD AUTO: 1.94 THOUSANDS/ΜL (ref 0.6–4.47)
LYMPHOCYTES NFR BLD AUTO: 28 % (ref 14–44)
MCH RBC QN AUTO: 30.5 PG (ref 26.8–34.3)
MCHC RBC AUTO-ENTMCNC: 31.6 G/DL (ref 31.4–37.4)
MCV RBC AUTO: 97 FL (ref 82–98)
MONOCYTES # BLD AUTO: 0.92 THOUSAND/ΜL (ref 0.17–1.22)
MONOCYTES NFR BLD AUTO: 13 % (ref 4–12)
MUCOUS THREADS UR QL AUTO: ABNORMAL
NEUTROPHILS # BLD AUTO: 3.95 THOUSANDS/ΜL (ref 1.85–7.62)
NEUTS SEG NFR BLD AUTO: 57 % (ref 43–75)
NITRITE UR QL STRIP: NEGATIVE
NON-SQ EPI CELLS URNS QL MICRO: ABNORMAL /HPF
NRBC BLD AUTO-RTO: 0 /100 WBCS
PH UR STRIP.AUTO: 6 [PH]
PLATELET # BLD AUTO: 328 THOUSANDS/UL (ref 149–390)
PMV BLD AUTO: 10.8 FL (ref 8.9–12.7)
POTASSIUM SERPL-SCNC: 4.1 MMOL/L (ref 3.5–5.3)
PROT SERPL-MCNC: 6.5 G/DL (ref 6.4–8.4)
PROT UR STRIP-MCNC: ABNORMAL MG/DL
RBC # BLD AUTO: 4.07 MILLION/UL (ref 3.81–5.12)
RBC #/AREA URNS AUTO: ABNORMAL /HPF
SODIUM SERPL-SCNC: 141 MMOL/L (ref 135–147)
SP GR UR STRIP.AUTO: >=1.03 (ref 1–1.03)
UROBILINOGEN UR STRIP-ACNC: <2 MG/DL
WBC # BLD AUTO: 7 THOUSAND/UL (ref 4.31–10.16)
WBC #/AREA URNS AUTO: ABNORMAL /HPF

## 2024-09-20 PROCEDURE — 36415 COLL VENOUS BLD VENIPUNCTURE: CPT

## 2024-09-20 PROCEDURE — 81001 URINALYSIS AUTO W/SCOPE: CPT

## 2024-09-20 PROCEDURE — 85025 COMPLETE CBC W/AUTO DIFF WBC: CPT

## 2024-09-20 PROCEDURE — 82378 CARCINOEMBRYONIC ANTIGEN: CPT

## 2024-09-20 PROCEDURE — 80053 COMPREHEN METABOLIC PANEL: CPT

## 2024-09-20 RX ORDER — SODIUM CHLORIDE 9 MG/ML
20 INJECTION, SOLUTION INTRAVENOUS ONCE
Status: CANCELLED | OUTPATIENT
Start: 2024-09-23

## 2024-09-23 ENCOUNTER — HOSPITAL ENCOUNTER (OUTPATIENT)
Dept: INFUSION CENTER | Facility: HOSPITAL | Age: 69
Discharge: HOME | End: 2024-09-23
Payer: MEDICARE

## 2024-09-23 ENCOUNTER — RA CDI HCC (OUTPATIENT)
Dept: OTHER | Facility: HOSPITAL | Age: 69
End: 2024-09-23

## 2024-09-23 VITALS
HEIGHT: 57 IN | SYSTOLIC BLOOD PRESSURE: 136 MMHG | TEMPERATURE: 97 F | OXYGEN SATURATION: 99 % | HEART RATE: 93 BPM | RESPIRATION RATE: 18 BRPM | WEIGHT: 190.26 LBS | BODY MASS INDEX: 41.05 KG/M2 | DIASTOLIC BLOOD PRESSURE: 78 MMHG

## 2024-09-23 DIAGNOSIS — C78.6 MALIGNANT NEOPLASM METASTATIC TO OMENTUM (HCC): ICD-10-CM

## 2024-09-23 DIAGNOSIS — C19 RECTOSIGMOID CANCER (HCC): Primary | ICD-10-CM

## 2024-09-23 DIAGNOSIS — E87.6 HYPOKALEMIA: ICD-10-CM

## 2024-09-23 PROCEDURE — 96367 TX/PROPH/DG ADDL SEQ IV INF: CPT

## 2024-09-23 PROCEDURE — 96413 CHEMO IV INFUSION 1 HR: CPT

## 2024-09-23 PROCEDURE — G0498 CHEMO EXTEND IV INFUS W/PUMP: HCPCS

## 2024-09-23 RX ORDER — SODIUM CHLORIDE 9 MG/ML
20 INJECTION, SOLUTION INTRAVENOUS ONCE
Status: COMPLETED | OUTPATIENT
Start: 2024-09-23 | End: 2024-09-23

## 2024-09-23 RX ADMIN — SODIUM CHLORIDE 20 ML/HR: 0.9 INJECTION, SOLUTION INTRAVENOUS at 09:43

## 2024-09-23 RX ADMIN — BEVACIZUMAB 465 MG: 400 INJECTION, SOLUTION INTRAVENOUS at 11:11

## 2024-09-23 RX ADMIN — DEXAMETHASONE SODIUM PHOSPHATE: 10 INJECTION, SOLUTION INTRAMUSCULAR; INTRAVENOUS at 09:43

## 2024-09-23 RX ADMIN — FOSAPREPITANT 150 MG: 150 INJECTION, POWDER, LYOPHILIZED, FOR SOLUTION INTRAVENOUS at 10:05

## 2024-09-23 NOTE — PROGRESS NOTES
Itzel Sanches  tolerated treatment well with no complications.      Itzel Sanches is aware of future appt on 9/25 at 1200.     AVS printed and given to Itzel Sanches:  No (Declined by Itzel Sanches)

## 2024-09-25 ENCOUNTER — HOSPITAL ENCOUNTER (OUTPATIENT)
Dept: INFUSION CENTER | Facility: HOSPITAL | Age: 69
Discharge: HOME | End: 2024-09-25
Payer: MEDICARE

## 2024-09-25 VITALS
TEMPERATURE: 97.1 F | OXYGEN SATURATION: 98 % | DIASTOLIC BLOOD PRESSURE: 72 MMHG | BODY MASS INDEX: 41.06 KG/M2 | SYSTOLIC BLOOD PRESSURE: 157 MMHG | RESPIRATION RATE: 18 BRPM | HEART RATE: 69 BPM | WEIGHT: 189.82 LBS

## 2024-09-25 DIAGNOSIS — C78.6 MALIGNANT NEOPLASM METASTATIC TO OMENTUM (HCC): ICD-10-CM

## 2024-09-25 DIAGNOSIS — E87.6 HYPOKALEMIA: ICD-10-CM

## 2024-09-25 DIAGNOSIS — C19 RECTOSIGMOID CANCER (HCC): Primary | ICD-10-CM

## 2024-09-25 PROCEDURE — 96360 HYDRATION IV INFUSION INIT: CPT

## 2024-09-25 RX ADMIN — SODIUM CHLORIDE 1000 ML: 0.9 INJECTION, SOLUTION INTRAVENOUS at 12:08

## 2024-09-30 ENCOUNTER — OFFICE VISIT (OUTPATIENT)
Dept: FAMILY MEDICINE CLINIC | Facility: CLINIC | Age: 69
End: 2024-09-30
Payer: MEDICARE

## 2024-09-30 VITALS
HEIGHT: 57 IN | HEART RATE: 68 BPM | TEMPERATURE: 97.3 F | DIASTOLIC BLOOD PRESSURE: 74 MMHG | WEIGHT: 188.4 LBS | RESPIRATION RATE: 16 BRPM | SYSTOLIC BLOOD PRESSURE: 132 MMHG | BODY MASS INDEX: 40.65 KG/M2

## 2024-09-30 DIAGNOSIS — E78.2 MIXED HYPERLIPIDEMIA: ICD-10-CM

## 2024-09-30 DIAGNOSIS — K21.9 GASTROESOPHAGEAL REFLUX DISEASE, UNSPECIFIED WHETHER ESOPHAGITIS PRESENT: ICD-10-CM

## 2024-09-30 DIAGNOSIS — Z23 ENCOUNTER FOR IMMUNIZATION: ICD-10-CM

## 2024-09-30 DIAGNOSIS — Z13.29 SCREENING FOR THYROID DISORDER: ICD-10-CM

## 2024-09-30 DIAGNOSIS — J30.9 ALLERGIC RHINITIS, UNSPECIFIED SEASONALITY, UNSPECIFIED TRIGGER: Primary | ICD-10-CM

## 2024-09-30 DIAGNOSIS — C19 RECTOSIGMOID CANCER (HCC): ICD-10-CM

## 2024-09-30 DIAGNOSIS — R42 DIZZINESS: ICD-10-CM

## 2024-09-30 PROCEDURE — G0008 ADMIN INFLUENZA VIRUS VAC: HCPCS

## 2024-09-30 PROCEDURE — 90662 IIV NO PRSV INCREASED AG IM: CPT

## 2024-09-30 PROCEDURE — 99214 OFFICE O/P EST MOD 30 MIN: CPT | Performed by: NURSE PRACTITIONER

## 2024-09-30 RX ORDER — FAMOTIDINE 20 MG/1
20 TABLET, FILM COATED ORAL 2 TIMES DAILY PRN
Qty: 90 TABLET | Refills: 1 | Status: SHIPPED | OUTPATIENT
Start: 2024-09-30 | End: 2024-12-29

## 2024-09-30 RX ORDER — DESLORATADINE 5 MG/1
5 TABLET ORAL DAILY
Qty: 30 TABLET | Refills: 5 | Status: SHIPPED | OUTPATIENT
Start: 2024-09-30

## 2024-09-30 RX ORDER — EZETIMIBE 10 MG/1
10 TABLET ORAL DAILY
Qty: 90 TABLET | Refills: 1 | Status: SHIPPED | OUTPATIENT
Start: 2024-09-30

## 2024-09-30 RX ORDER — MECLIZINE HCL 12.5 MG 12.5 MG/1
12.5 TABLET ORAL EVERY 12 HOURS PRN
Qty: 60 TABLET | Refills: 3 | Status: SHIPPED | OUTPATIENT
Start: 2024-09-30

## 2024-09-30 NOTE — PROGRESS NOTES
"Assessment/Plan:    1. Allergic rhinitis, unspecified seasonality, unspecified trigger  -     desloratadine (CLARINEX) 5 MG tablet; Take 1 tablet (5 mg total) by mouth daily  2. Mixed hyperlipidemia  Assessment & Plan:  Complaint with zetia  Orders:  -     ezetimibe (ZETIA) 10 mg tablet; Take 1 tablet (10 mg total) by mouth daily  -     Lipid Panel with Direct LDL reflex; Future; Expected date: 02/28/2025  3. Gastroesophageal reflux disease, unspecified whether esophagitis present  Assessment & Plan:  Stable with current regimen  Orders:  -     famotidine (PEPCID) 20 mg tablet; Take 1 tablet (20 mg total) by mouth 2 (two) times a day as needed for heartburn As needed  4. Dizziness  -     meclizine (ANTIVERT) 12.5 MG tablet; Take 1 tablet (12.5 mg total) by mouth every 12 (twelve) hours as needed for dizziness  5. Encounter for immunization  -     influenza vaccine, high-dose, PF 0.5 mL (Fluzone High Dose)  6. Screening for thyroid disorder  -     TSH, 3rd generation with Free T4 reflex; Future; Expected date: 02/28/2025  7. 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HCC)  Assessment & Plan:  Management as per oncologist      Patient Instructions:  Return in about 6 months (around 3/30/2025), or if symptoms worsen or fail to improve.      Future Appointments   Date Time Provider Department Center   10/7/2024 10:30 AM [place] INF CHAIR 6 [facility]   10/9/2024  9:30 AM [place] [facility] CHAIR 12 [facility]   10/14/2024 11:40 AM Torin Arthur MD HEM ONC [facility]   1/21/2025  1:00 PM Jessie Freeman MD SURG ONC [facility]   3/31/2025  1:00 PM DEEPAK Batista [facility]           Subjective:      Patient ID: Itzel Sanches is a 69 y.o. female.    Chief Complaint   Patient presents with    Follow-up     6 month follow up  Lakeshia Menchaca MA         Vitals:  /74   Pulse 68   Temp (!) 97.3 °F (36.3 °C)   Resp 16   Ht 4' 9.01\" (1.448 m)   Wt 85.5 kg (188 lb 6.4 oz)   LMP 09/01/2011 " (Approximate)   BMI 40.76 kg/m²     HPI  Patient is here to follow up on chronic conditions.  Denies any chest pain, sob, headache and dizziness.  Complaint with medications and tolerating it well.  Following routinely with oncologist.                The following portions of the patient's history were reviewed and updated as appropriate: allergies, current medications, past family history, past medical history, past social history, past surgical history and problem list.      Review of Systems   HENT: Negative.     Respiratory: Negative.     Cardiovascular: Negative.    Gastrointestinal: Negative.    Neurological: Negative.          Objective:    Social History     Tobacco Use   Smoking Status Never    Passive exposure: Past   Smokeless Tobacco Never       Allergies:   Allergies   Allergen Reactions    Medical Tape Rash     Skin irritation  Skin peeling  Skin irritation  Skin peeling  Blisters  Rash         Current Outpatient Medications   Medication Sig Dispense Refill    Acetaminophen (TYLENOL 8 HOUR PO) Take by mouth PRN      apixaban (Eliquis) 5 mg Take 1 tablet (5 mg total) by mouth 2 (two) times a day 60 tablet 5    desloratadine (CLARINEX) 5 MG tablet Take 1 tablet (5 mg total) by mouth daily 30 tablet 5    ezetimibe (ZETIA) 10 mg tablet Take 1 tablet (10 mg total) by mouth daily 90 tablet 1    famotidine (PEPCID) 20 mg tablet Take 1 tablet (20 mg total) by mouth 2 (two) times a day as needed for heartburn As needed 90 tablet 1    meclizine (ANTIVERT) 12.5 MG tablet Take 1 tablet (12.5 mg total) by mouth every 12 (twelve) hours as needed for dizziness 60 tablet 3    ondansetron (Zofran ODT) 8 mg disintegrating tablet Take 1 tablet (8 mg total) by mouth every 8 (eight) hours as needed for nausea or vomiting 30 tablet 5    lidocaine (Lidoderm) 5 % Apply 1 patch topically over 12 hours daily Remove & Discard patch within 12 hours or as directed by MD (Patient not taking: Reported on 9/30/2024) 12 patch 3     prochlorperazine (COMPAZINE) 5 mg tablet  (Patient not taking: Reported on 9/30/2024)       No current facility-administered medications for this visit.          Physical Exam  Constitutional:       Appearance: She is morbidly obese.   HENT:      Head: Normocephalic and atraumatic.      Nose: Nose normal.   Eyes:      Conjunctiva/sclera: Conjunctivae normal.   Cardiovascular:      Rate and Rhythm: Normal rate and regular rhythm.      Pulses: Normal pulses.      Heart sounds: Normal heart sounds.   Pulmonary:      Effort: Pulmonary effort is normal.      Breath sounds: Normal breath sounds.   Skin:     General: Skin is warm and dry.      Findings: No rash.   Neurological:      Mental Status: She is alert and oriented to person, place, and time.   Psychiatric:         Mood and Affect: Mood normal.         Behavior: Behavior normal.         Thought Content: Thought content normal.         Judgment: Judgment normal.                     DEEPAK Batista

## 2024-10-01 ENCOUNTER — PATIENT OUTREACH (OUTPATIENT)
Dept: CASE MANAGEMENT | Facility: OTHER | Age: 69
End: 2024-10-01

## 2024-10-01 NOTE — PROGRESS NOTES
OSW placed outreach TC to pt this afternoon. She shared that she has one treatment left to go. She expressed that it has been a long 6 months. She has 3 months of chemo and then maintenance. She reports that she went to [place] [employer] in [place] and saw [employer] with her friend. Her cousin, who lives in [place], attended as well. She expressed it was nice to see her, even if it was  for only a few minutes. She also went and saw the [employer]. It was at the [employer] in [place]. She states that this is on her bucket list.   She is looking forward to an annual outing in October that she spends with her daughter. She states that she does not drink alcohol during her chemo and can't wait to enjoy a glass of wine.   She spoke about her daughters and how they have traveled to 49 states so far. Her oldest just returned from her [place] trip and won a trip to the [facility].       Pt is also looking forward to a bus trip to [place]. 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 We spoke about [employer] this Saturday and she shared that she volunteers for this, as well as [employer] every year. She is a member of the [facility] [employer].   She asked about assistance with compassion funds for another wig. She expressed that she uses her wig hard and sometimes it is not completely dry. OSW offered to email to receive confirmation that this request would be approved. OSW expressed that I will get back to her once I receive an answer. She thanked this writer.  OSW will continue to follow.

## 2024-10-03 DIAGNOSIS — C78.6 MALIGNANT NEOPLASM METASTATIC TO OMENTUM (HCC): Primary | ICD-10-CM

## 2024-10-03 DIAGNOSIS — C19 RECTOSIGMOID CANCER (HCC): ICD-10-CM

## 2024-10-03 DIAGNOSIS — E87.6 HYPOKALEMIA: ICD-10-CM

## 2024-10-04 ENCOUNTER — APPOINTMENT (OUTPATIENT)
Dept: LAB | Facility: HOSPITAL | Age: 69
End: 2024-10-04
Payer: MEDICARE

## 2024-10-04 DIAGNOSIS — C19 RECTOSIGMOID CANCER (HCC): ICD-10-CM

## 2024-10-04 DIAGNOSIS — E87.6 HYPOKALEMIA: ICD-10-CM

## 2024-10-04 DIAGNOSIS — C78.6 MALIGNANT NEOPLASM METASTATIC TO OMENTUM (HCC): ICD-10-CM

## 2024-10-04 LAB
ALBUMIN SERPL BCG-MCNC: 3.3 G/DL (ref 3.5–5)
ALP SERPL-CCNC: 54 U/L (ref 34–104)
ALT SERPL W P-5'-P-CCNC: 11 U/L (ref 7–52)
ANION GAP SERPL CALCULATED.3IONS-SCNC: 5 MMOL/L (ref 4–13)
AST SERPL W P-5'-P-CCNC: 12 U/L (ref 13–39)
BACTERIA UR QL AUTO: ABNORMAL /HPF
BASOPHILS # BLD AUTO: 0.01 THOUSANDS/ΜL (ref 0–0.1)
BASOPHILS NFR BLD AUTO: 0 % (ref 0–1)
BILIRUB SERPL-MCNC: 0.45 MG/DL (ref 0.2–1)
BILIRUB UR QL STRIP: NEGATIVE
BUN SERPL-MCNC: 19 MG/DL (ref 5–25)
CALCIUM ALBUM COR SERPL-MCNC: 9.7 MG/DL (ref 8.3–10.1)
CALCIUM SERPL-MCNC: 9.1 MG/DL (ref 8.4–10.2)
CEA SERPL-MCNC: 2.5 NG/ML (ref 0–3)
CHLORIDE SERPL-SCNC: 106 MMOL/L (ref 96–108)
CLARITY UR: CLEAR
CO2 SERPL-SCNC: 25 MMOL/L (ref 21–32)
COLOR UR: COLORLESS
CREAT SERPL-MCNC: 1.01 MG/DL (ref 0.6–1.3)
EOSINOPHIL # BLD AUTO: 0.12 THOUSAND/ΜL (ref 0–0.61)
EOSINOPHIL NFR BLD AUTO: 2 % (ref 0–6)
ERYTHROCYTE [DISTWIDTH] IN BLOOD BY AUTOMATED COUNT: 17.3 % (ref 11.6–15.1)
GFR SERPL CREATININE-BSD FRML MDRD: 56 ML/MIN/1.73SQ M
GLUCOSE SERPL-MCNC: 99 MG/DL (ref 65–140)
GLUCOSE UR STRIP-MCNC: NEGATIVE MG/DL
HCT VFR BLD AUTO: 39.7 % (ref 34.8–46.1)
HGB BLD-MCNC: 12.8 G/DL (ref 11.5–15.4)
HGB UR QL STRIP.AUTO: NEGATIVE
IMM GRANULOCYTES # BLD AUTO: 0.01 THOUSAND/UL (ref 0–0.2)
IMM GRANULOCYTES NFR BLD AUTO: 0 % (ref 0–2)
KETONES UR STRIP-MCNC: NEGATIVE MG/DL
LEUKOCYTE ESTERASE UR QL STRIP: ABNORMAL
LYMPHOCYTES # BLD AUTO: 2.03 THOUSANDS/ΜL (ref 0.6–4.47)
LYMPHOCYTES NFR BLD AUTO: 29 % (ref 14–44)
MCH RBC QN AUTO: 30.4 PG (ref 26.8–34.3)
MCHC RBC AUTO-ENTMCNC: 32.2 G/DL (ref 31.4–37.4)
MCV RBC AUTO: 94 FL (ref 82–98)
MONOCYTES # BLD AUTO: 0.82 THOUSAND/ΜL (ref 0.17–1.22)
MONOCYTES NFR BLD AUTO: 12 % (ref 4–12)
NEUTROPHILS # BLD AUTO: 4.06 THOUSANDS/ΜL (ref 1.85–7.62)
NEUTS SEG NFR BLD AUTO: 57 % (ref 43–75)
NITRITE UR QL STRIP: NEGATIVE
NON-SQ EPI CELLS URNS QL MICRO: ABNORMAL /HPF
NRBC BLD AUTO-RTO: 0 /100 WBCS
PH UR STRIP.AUTO: 6.5 [PH]
PLATELET # BLD AUTO: 321 THOUSANDS/UL (ref 149–390)
PMV BLD AUTO: 11 FL (ref 8.9–12.7)
POTASSIUM SERPL-SCNC: 4.3 MMOL/L (ref 3.5–5.3)
PROT SERPL-MCNC: 6.7 G/DL (ref 6.4–8.4)
PROT UR STRIP-MCNC: NEGATIVE MG/DL
RBC # BLD AUTO: 4.21 MILLION/UL (ref 3.81–5.12)
RBC #/AREA URNS AUTO: ABNORMAL /HPF
SODIUM SERPL-SCNC: 136 MMOL/L (ref 135–147)
SP GR UR STRIP.AUTO: 1.01 (ref 1–1.03)
UROBILINOGEN UR STRIP-ACNC: <2 MG/DL
WBC # BLD AUTO: 7.05 THOUSAND/UL (ref 4.31–10.16)
WBC #/AREA URNS AUTO: ABNORMAL /HPF

## 2024-10-04 PROCEDURE — 82378 CARCINOEMBRYONIC ANTIGEN: CPT

## 2024-10-04 PROCEDURE — 80053 COMPREHEN METABOLIC PANEL: CPT

## 2024-10-04 PROCEDURE — 85025 COMPLETE CBC W/AUTO DIFF WBC: CPT

## 2024-10-04 PROCEDURE — 81001 URINALYSIS AUTO W/SCOPE: CPT

## 2024-10-04 PROCEDURE — 36415 COLL VENOUS BLD VENIPUNCTURE: CPT

## 2024-10-07 ENCOUNTER — PATIENT OUTREACH (OUTPATIENT)
Dept: CASE MANAGEMENT | Facility: OTHER | Age: 69
End: 2024-10-07

## 2024-10-07 ENCOUNTER — HOSPITAL ENCOUNTER (OUTPATIENT)
Dept: INFUSION CENTER | Facility: HOSPITAL | Age: 69
Discharge: HOME | End: 2024-10-07
Payer: MEDICARE

## 2024-10-07 VITALS
DIASTOLIC BLOOD PRESSURE: 78 MMHG | TEMPERATURE: 98.2 F | OXYGEN SATURATION: 98 % | HEART RATE: 88 BPM | WEIGHT: 189.15 LBS | RESPIRATION RATE: 18 BRPM | HEIGHT: 57 IN | SYSTOLIC BLOOD PRESSURE: 122 MMHG | BODY MASS INDEX: 40.81 KG/M2

## 2024-10-07 DIAGNOSIS — C19 RECTOSIGMOID CANCER (HCC): ICD-10-CM

## 2024-10-07 DIAGNOSIS — C78.6 MALIGNANT NEOPLASM METASTATIC TO OMENTUM (HCC): ICD-10-CM

## 2024-10-07 DIAGNOSIS — E87.6 HYPOKALEMIA: Primary | ICD-10-CM

## 2024-10-07 PROCEDURE — 96413 CHEMO IV INFUSION 1 HR: CPT

## 2024-10-07 PROCEDURE — 96367 TX/PROPH/DG ADDL SEQ IV INF: CPT

## 2024-10-07 PROCEDURE — G0498 CHEMO EXTEND IV INFUS W/PUMP: HCPCS

## 2024-10-07 RX ORDER — SODIUM CHLORIDE 9 MG/ML
20 INJECTION, SOLUTION INTRAVENOUS ONCE
Status: COMPLETED | OUTPATIENT
Start: 2024-10-07 | End: 2024-10-07

## 2024-10-07 RX ORDER — SODIUM CHLORIDE 9 MG/ML
20 INJECTION, SOLUTION INTRAVENOUS ONCE
Status: CANCELLED | OUTPATIENT
Start: 2024-10-07

## 2024-10-07 RX ADMIN — BEVACIZUMAB 465 MG: 400 INJECTION, SOLUTION INTRAVENOUS at 12:04

## 2024-10-07 RX ADMIN — FOSAPREPITANT 150 MG: 150 INJECTION, POWDER, LYOPHILIZED, FOR SOLUTION INTRAVENOUS at 11:27

## 2024-10-07 RX ADMIN — SODIUM CHLORIDE 20 ML/HR: 9 INJECTION, SOLUTION INTRAVENOUS at 11:05

## 2024-10-07 RX ADMIN — DEXAMETHASONE SODIUM PHOSPHATE: 10 INJECTION, SOLUTION INTRAMUSCULAR; INTRAVENOUS at 11:05

## 2024-10-07 NOTE — PLAN OF CARE
Problem: Potential for Falls  Goal: Patient will remain free of falls  Description: INTERVENTIONS:  - Educate patient/family on patient safety including physical limitations  - Instruct patient to call for assistance with activity   - Consult OT/PT to assist with strengthening/mobility   - Keep Call bell within reach  - Keep bed low and locked with side rails adjusted as appropriate  - Keep care items and personal belongings within reach  - Initiate and maintain comfort rounds  10/7/2024 0900 by Katherine Rodriguez Reyes, RN  Outcome: Progressing     Problem: DISCHARGE PLANNING  Goal: Discharge to home or other facility with appropriate resources  Description: INTERVENTIONS:  - Identify barriers to discharge w/patient and caregiver  - Arrange for needed discharge resources and transportation as appropriate  - Identify discharge learning needs (meds, wound care, etc.)  - Arrange for interpretive services to assist at discharge as needed  - Refer to Case Management Department for coordinating discharge planning if the patient needs post-hospital services based on physician/advanced practitioner order or complex needs related to functional status, cognitive ability, or social support system  Outcome: Progressing     Problem: Knowledge Deficit  Goal: Patient/family/caregiver demonstrates understanding of disease process, treatment plan, medications, and discharge instructions  Description: Complete learning assessment and assess knowledge base.  Interventions:  - Provide teaching at level of understanding  - Provide teaching via preferred learning methods  Outcome: Progressing

## 2024-10-07 NOTE — PROGRESS NOTES
Itzel Sanches  tolerated treatment well with no complications.      Itzel Sanches is aware of future appt on 10/9 at 1100.     AVS printed and given to Itzel Sanches:    No (Declined by Itzel Sanches)

## 2024-10-07 NOTE — PROGRESS NOTES
OSW placed outreach TC to pt this morning. This writer informed her that this writer did receive confirmation that another wig will be approved for her through the use of compassion funds. OSW informed her to ask [facility] to send the invoice to this writer and it will then be sent over for payment. Pt asked this writer to send an email to her so she has this writer's contact information. OSW sent email to [email or web address].  OSW will continue to follow.

## 2024-10-09 ENCOUNTER — HOSPITAL ENCOUNTER (OUTPATIENT)
Dept: INFUSION CENTER | Facility: HOSPITAL | Age: 69
Discharge: HOME | End: 2024-10-09
Payer: MEDICARE

## 2024-10-09 VITALS
OXYGEN SATURATION: 99 % | RESPIRATION RATE: 18 BRPM | SYSTOLIC BLOOD PRESSURE: 162 MMHG | DIASTOLIC BLOOD PRESSURE: 74 MMHG | HEART RATE: 71 BPM | BODY MASS INDEX: 40.92 KG/M2 | WEIGHT: 189.15 LBS | TEMPERATURE: 96.6 F

## 2024-10-09 DIAGNOSIS — C19 RECTOSIGMOID CANCER (HCC): ICD-10-CM

## 2024-10-09 DIAGNOSIS — C78.6 MALIGNANT NEOPLASM METASTATIC TO OMENTUM (HCC): ICD-10-CM

## 2024-10-09 DIAGNOSIS — E87.6 HYPOKALEMIA: Primary | ICD-10-CM

## 2024-10-09 PROCEDURE — 96360 HYDRATION IV INFUSION INIT: CPT

## 2024-10-09 RX ADMIN — SODIUM CHLORIDE 1000 ML: 0.9 INJECTION, SOLUTION INTRAVENOUS at 11:19

## 2024-10-14 ENCOUNTER — OFFICE VISIT (OUTPATIENT)
Dept: HEMATOLOGY ONCOLOGY | Facility: MEDICAL CENTER | Age: 69
End: 2024-10-14
Payer: MEDICARE

## 2024-10-14 VITALS
BODY MASS INDEX: 40.56 KG/M2 | HEIGHT: 57 IN | HEART RATE: 86 BPM | DIASTOLIC BLOOD PRESSURE: 84 MMHG | RESPIRATION RATE: 17 BRPM | SYSTOLIC BLOOD PRESSURE: 136 MMHG | OXYGEN SATURATION: 100 % | TEMPERATURE: 97.7 F | WEIGHT: 188 LBS

## 2024-10-14 DIAGNOSIS — C19 RECTOSIGMOID CANCER (HCC): ICD-10-CM

## 2024-10-14 DIAGNOSIS — C78.6 MALIGNANT NEOPLASM METASTATIC TO OMENTUM (HCC): ICD-10-CM

## 2024-10-14 DIAGNOSIS — E87.6 HYPOKALEMIA: Primary | ICD-10-CM

## 2024-10-14 PROCEDURE — 99213 OFFICE O/P EST LOW 20 MIN: CPT | Performed by: INTERNAL MEDICINE

## 2024-10-14 NOTE — PROGRESS NOTES
[facility]  [address] [address]  [phone]     Date of Visit: 10/14/2024  Name: Itzel Sanches   YOB: 1955     Assessment/Plan  Itzel Sanches is a 69 y.o. 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     She had undergone 3 months of preoperative FOLFOX.  Follow-up CT scans demonstrated response to treatment with no evidence of progressive disease.  Patient then underwent resection of the omental mass and spleen as well as DILMA/BSO (other issue, see below).  Mrs. Sanches was then restarted on FOLFOX. Patient had problems with neuropathy; FOLFOX was changed to FOLFIRI (2 cycles of FOLFIRI only).  The 12th/final cycle of chemotherapy was completed on August 23, 2022.     MRI abdomen in July 2023 confirmed liver metastasis and revealed a new, posterior left chest wall/pleural lesion suspicious for metastasis. The chest wall mass was biopsied on 9/13/2023 revealing metastatic moderately differentiated adenocarcinoma consistent with known colonic primary.      Mrs. Sanches was seen/evaluated by Dr. Brown Grubbs (Radiation Oncology) and patient has received SBRT (left-sided lower rib cage/upper abdomen).  Mrs. Sanches also completed Y90.  Patient then went back on to surveillance.  During this surveillance From late 2023 to March 2024, analysis of circulating tumor cells was attempted but patient deferred.     Unfortunately repeat CAT scans on March 8, 2024 demonstrated new mediastinal adenopathy, increased paraspinal lesion anterior to L1 and stable lesion at T12.  The previously seen liver lesion was stable although there was a new small lesion suspicious for an additional metastatic implant.  Patient also had new retroperitoneal adenopathy and increased amee hepatis adenopathy.  She was started on FOLFIRI and avastin and has completed 6  cycles so far from 4/8 to 6/24/24.      CT c/a/p from 7/3/24 were reviewed-that showed continued interval decrease in size of the left 11th rib metastasis, interval decrease in postradiation changes in the left lower lobe.  Decrease in conspicuity and size of the hepatic metastasis in segment 5.  Previously noted 5 mm lesion is no longer visualized.  No new lesions.  There is also an improvement in the mediastinal, amee hepatis and retroperitoneal lymphadenopathy.    Irinotecan was dropped and she has been on  maintenance 5-FU Avastin.  Last treatment ws on 8/27. Clinically doing well. Patient knows to call us if the pain in her left back worsens or if she develops any palpable abnormality.   Patient has a history of lower extremity DVTs, is on Eliquis, which she will continue.       Oncology History   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HCC)   9/23/2019 Initial Diagnosis    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HCC)     9/23/2019 Biopsy    Rectal Mass ( 2 slides [id number] [facility] Pathology, Collected on 09/23/2019, biopsy):  - Adenocarcinoma     12/10/2019 Surgery    Low anterior resection (Dr Jose Pastor):    A. Rectosigmoid colon, low anterior resection:  - Adenocarcinoma, moderately differentiated.   - Tumor invades through the muscularis propria into pericolorectal tissue, T3  - Diverticula.  - All margins are negative for tumor.  - Thirty-four lymph nodes, negative for malignancy (0/34).     B. Colon, anastomotic rings, resection:  - Two portions of colon with no pathologic abnormality     12/10/2019 Genomic Testing    RESULTS OF IMMUNOHISTOCHEMICAL ANALYSIS FOR MISMATCH REPAIR PROTEIN LOSS  INTERPRETATION: NO LOSS OF NUCLEAR EXPRESSION OF MMR PROTEINS: LOW PROBABILITY OF MSI-H  Note: Background non-neoplastic tissue and/or internal control with intact nuclear expression.      RESULTS:  Antibody          Clone               Description                           Results  MLH1               M1                  Mismatch repair protein        Intact nuclear expression  MSH2              [id number]       Mismatch repair protein       Intact nuclear expression  MSH6              44                   Mismatch repair protein       Intact nuclear expression  PMS2              [id number]         Mismatch repair protein       Intact nuclear expression     2/4/2020 Surgery    Ileostomy, takedown:  - Portion of small intestine with mucocutaneous anastomosis consistent with stoma.  - Negative for malignancy     8/18/2021 Progression    CEA trends- observed by Dr. Juarez  2/17/21: 2.9  8/18/2021: 4.5  9/13/2021: 4.4    PET/CT  9/28/2021 completed due to elevating CEA  1. There is a large left paracolic gutter markedly hypermetabolic mass immediately inferior to the spleen, most consistent with metastatic colorectal carcinoma.  2. Mildly increased activity at the right abdominal bowel anastomotic site.  If not recently performed, direct visualization is recommended  3. There are no other glucose avid abnormalities identified within the abdomen or pelvis  4. No evidence of distant glucose avid metastatic disease in the neck, chest, or skeleton      11/12/2021 Biopsy    Omental mass:  - Metastatic adenocarcinoma, with an immunophenotype compatible with metastasis from patient's known colonic primary.     12/30/2021 - 12/30/2021 Chemotherapy    CapOx x 1 dose of Oxaliplatin: D/c due to tolerance.      1/2/2022 Genomic Testing         1/4/2022 - 3/17/2022 Chemotherapy    First 6 cycles of Folfox given prior to surgery- 1/4 through 3/17/2022     Folfox was dose reduced due to anticipated tolerance.  Minimal side effects     3/22/2022 Observation    CT CAP  1.  Decreased size of biopsy-proven omental metastasis, which now only focally contacting rather than grossly invading the spleen and adjacent abdominal wall. No new evidence of metastatic disease in the abdomen or pelvis.  2.  No new or suspicious pulmonary nodules. One of the previously noted new 1-2 mm nodules is no  longer seen, and the other is unchanged. Recommend continued attention on follow-up.  3.  Top-normal thickness endometrial stripe measuring 7 mm. If there is history of vaginal bleeding, suggest catheterization with endometrial biopsy.  4.  Hepatic steatosis.     4/28/2022 -  Cancer Staged    Staging form: Colon and Rectum, AJCC 8th Edition  - Clinical stage from 4/28/2022: cT3, cN0, pM1 - Signed by Jessie Freeman MD on 6/14/2023  Total positive nodes: 0       4/29/2022 Surgery    Lydia Shaw and Anup preformed the following procedures:   DIAGNOSTIC LAPAROSCOPY, TOTAL ABDOMINAL HYSTERECTOMY, BILATERAL SALPINGO-OOPHORECTOMY, EXTENSIVE ADHESIOLYSIS (N/A)  DIAGNOSTIC LAPAROSCOPY, OPEN OMENTECTOMY, POSSIBLE SPLENECTOMY (N/A)  INSTILLATION CHEMOTHERAPY INTRAPERITONEAL (HIPEC) LAPAROTOMY (N/A)  REPAIR DIAPHRAGM TEAR      A. Uterus, Bilateral Ovaries and Fallopian Tubes; Hysterosalpingo-oophorectomy:  - Leiomyoma.  - Left ovary with serous cystadenoma.  - Unremarkable cervix.  - Benign inactive endometrium with no specific pathologic change.  - Unremarkable right ovary and bilateral fallopian tubes.     B. Spleen, spleen, omentum, darotis:  - Metastatic adenocarcinoma involving spleen and omentum, consistent with colonic primary. See Note.    Note: Comparison to prior material ([id number], omental mass) reveals identical morphology to previous metastatic colonic adenocarcinoma.          5/18/2022 -  Cancer Staged    Staging form: Colon and Rectum, AJCC 8th Edition  - Pathologic stage from 5/18/2022: Stage KRISTAL (pT3, pN0, pM1a) - Signed by Jessie Freeman MD on 6/14/2023  Total positive nodes: 0       6/14/2022 - 8/23/2022 Chemotherapy    7th cycle started on 6/14/2022  8th cycle worsening neuropathy; could not tolerate gabapentin  D/lizette oxaliplatin  9th cycle Irinotecan added at 85% dose reduction: SE Diarrhea  10th cycle Irinotecan dose redcued to 50%     9/19/2022 Observation    9/19/2022 CT CAP:   1.   "Previously seen omental metastasis in the left upper quadrant has been resected.  There is a small area of nodular soft tissue thickening abutting the lateral aspect of the left kidney, cannot exclude residual/recurrent tumor.  Follow up in 4 months CEA not completed at time of scan.      9/13/2023 Biopsy    Mass, \"Chest wall mass 4 cores,\" Biopsy:  - Metastatic moderately differentiated adenocarcinoma, consistent with known colonic primary      10/31/2023 - 11/10/2023 Radiation    Treatments:  Course: C1 SBRT    Plan ID Energy Fractions Dose per Fraction (cGy) Dose Correction (cGy) Total Dose Delivered (cGy) Elapsed Days   SBRT L CW 6X-FFF 5 / 5 900 0 4,500 10      Treatment Dates:  10/31/2023 - 11/10/2023.      4/8/2024 -  Chemotherapy    potassium chloride, 20 mEq, Intravenous, Once, 1 of 1 cycle  Administration: 20 mEq (5/14/2024)  alteplase (CATHFLO), 2 mg, Intracatheter, Every 1 Minute as needed, 12 of 12 cycles  pegfilgrastim (NEULASTA), 3 mg (100 % of original dose 3 mg), Subcutaneous, Once, 4 of 4 cycles  Dose modification: 3 mg (original dose 3 mg, Cycle 3)  Administration: 3 mg (5/17/2024), 3 mg (6/3/2024), 3 mg (6/14/2024), 3 mg (6/27/2024)  sodium chloride, 1,000 mL (100 % of original dose 1,000 mL), Intravenous, Once, 11 of 11 cycles  Dose modification: 1,000 mL (original dose 1,000 mL, Cycle 2)  Administration: 1,000 mL (4/24/2024), 1,000 mL (5/16/2024), 1,000 mL (5/31/2024), 1,000 mL (6/13/2024), 1,000 mL (6/26/2024), 1,000 mL (8/1/2024), 1,000 mL (8/15/2024), 1,000 mL (8/29/2024), 1,000 mL (9/12/2024), 1,000 mL (9/25/2024), 1,000 mL (10/9/2024)  fosaprepitant (EMEND) IVPB, 150 mg, Intravenous, Once, 11 of 11 cycles  Administration: 150 mg (4/22/2024), 150 mg (5/14/2024), 150 mg (5/29/2024), 150 mg (6/11/2024), 150 mg (6/24/2024), 150 mg (7/30/2024), 150 mg (8/13/2024), 150 mg (8/27/2024), 150 mg (9/10/2024), 150 mg (9/23/2024), 150 mg (10/7/2024)  fluorouracil (ADRUCIL), 400 mg/m2 = 730 mg, " Intravenous, Once, 6 of 6 cycles  Dose modification: 360 mg/m2 (90 % of original dose 400 mg/m2, Cycle 3, Reason: Other (see comments)), 320 mg/m2 (80 % of original dose 400 mg/m2, Cycle 3, Reason: Other (see comments)), 360 mg/m2 (90 % of original dose 400 mg/m2, Cycle 3, Reason: Other (see comments)), 320 mg/m2 (80 % of original dose 400 mg/m2, Cycle 3, Reason: Other (see comments))  Administration: 730 mg (4/8/2024), 730 mg (4/22/2024), 580 mg (5/14/2024), 580 mg (5/29/2024), 580 mg (6/11/2024), 580 mg (6/24/2024)  bevacizumab (AVASTIN) IVPB, 5 mg/kg = 465 mg, Intravenous, Once, 12 of 12 cycles  Administration: 465 mg (4/8/2024), 465 mg (4/22/2024), 465 mg (5/14/2024), 465 mg (5/29/2024), 465 mg (6/11/2024), 465 mg (6/24/2024), 465 mg (7/30/2024), 465 mg (8/13/2024), 465 mg (8/27/2024), 465 mg (9/10/2024), 465 mg (9/23/2024), 465 mg (10/7/2024)  irinotecan (CAMPTOSAR) chemo infusion, 295 mg (90 % of original dose 180 mg/m2), Intravenous, Once, 6 of 6 cycles  Dose modification: 162 mg/m2 (90 % of original dose 180 mg/m2, Cycle 1, Reason: Other (see comments)), 144 mg/m2 (80 % of original dose 180 mg/m2, Cycle 3, Reason: Dose Not Tolerated), 162 mg/m2 (90 % of original dose 180 mg/m2, Cycle 3, Reason: Other (see comments)), 144 mg/m2 (80 % of original dose 180 mg/m2, Cycle 3, Reason: Other (see comments))  Administration: 300 mg (4/8/2024), 295 mg (4/22/2024), 262 mg (5/14/2024), 262 mg (5/29/2024), 262 mg (6/11/2024), 262 mg (6/24/2024)  leucovorin calcium IVPB, 728 mg, Intravenous, Once, 6 of 6 cycles  Dose modification: 360 mg/m2 (90 % of original dose 400 mg/m2, Cycle 3, Reason: Other (see comments)), 320 mg/m2 (80 % of original dose 400 mg/m2, Cycle 3, Reason: Other (see comments)), 360 mg/m2 (90 % of original dose 400 mg/m2, Cycle 3, Reason: Other (see comments)), 320 mg/m2 (80 % of original dose 400 mg/m2, Cycle 3, Reason: Other (see comments))  Administration: 700 mg (4/8/2024), 700 mg (4/22/2024), 582 mg  "(5/14/2024), 582 mg (5/29/2024), 582 mg (6/11/2024), 582 mg (6/24/2024)  fluorouracil (ADRUCIL) ambulatory infusion Soln, 1,200 mg/m2/day = 4,370 mg, Intravenous, Over 46 hours, 12 of 12 cycles  Dose modification: 960 mg/m2/day (80 % of original dose 1,200 mg/m2/day, Cycle 4, Reason: Other (see comments))     Omental metastasis   9/23/2019 Biopsy    Rectal Mass ( 2 slides [id number] [facility] Pathology, Collected on 09/23/2019, biopsy):  - Adenocarcinoma     2/4/2020 Surgery    Ileostomy, takedown:  - Portion of small intestine with mucocutaneous anastomosis consistent with stoma.  - Negative for malignancy     9/13/2023 Biopsy    Mass, \"Chest wall mass 4 cores,\" Biopsy:  - Metastatic moderately differentiated adenocarcinoma, consistent with known colonic primary      4/8/2024 -  Chemotherapy    potassium chloride, 20 mEq, Intravenous, Once, 1 of 1 cycle  Administration: 20 mEq (5/14/2024)  alteplase (CATHFLO), 2 mg, Intracatheter, Every 1 Minute as needed, 12 of 12 cycles  pegfilgrastim (NEULASTA), 3 mg (100 % of original dose 3 mg), Subcutaneous, Once, 4 of 4 cycles  Dose modification: 3 mg (original dose 3 mg, Cycle 3)  Administration: 3 mg (5/17/2024), 3 mg (6/3/2024), 3 mg (6/14/2024), 3 mg (6/27/2024)  sodium chloride, 1,000 mL (100 % of original dose 1,000 mL), Intravenous, Once, 11 of 11 cycles  Dose modification: 1,000 mL (original dose 1,000 mL, Cycle 2)  Administration: 1,000 mL (4/24/2024), 1,000 mL (5/16/2024), 1,000 mL (5/31/2024), 1,000 mL (6/13/2024), 1,000 mL (6/26/2024), 1,000 mL (8/1/2024), 1,000 mL (8/15/2024), 1,000 mL (8/29/2024), 1,000 mL (9/12/2024), 1,000 mL (9/25/2024), 1,000 mL (10/9/2024)  fosaprepitant (EMEND) IVPB, 150 mg, Intravenous, Once, 11 of 11 cycles  Administration: 150 mg (4/22/2024), 150 mg (5/14/2024), 150 mg (5/29/2024), 150 mg (6/11/2024), 150 mg (6/24/2024), 150 mg (7/30/2024), 150 mg (8/13/2024), 150 mg (8/27/2024), 150 mg (9/10/2024), 150 mg (9/23/2024), 150 mg " (10/7/2024)  fluorouracil (ADRUCIL), 400 mg/m2 = 730 mg, Intravenous, Once, 6 of 6 cycles  Dose modification: 360 mg/m2 (90 % of original dose 400 mg/m2, Cycle 3, Reason: Other (see comments)), 320 mg/m2 (80 % of original dose 400 mg/m2, Cycle 3, Reason: Other (see comments)), 360 mg/m2 (90 % of original dose 400 mg/m2, Cycle 3, Reason: Other (see comments)), 320 mg/m2 (80 % of original dose 400 mg/m2, Cycle 3, Reason: Other (see comments))  Administration: 730 mg (4/8/2024), 730 mg (4/22/2024), 580 mg (5/14/2024), 580 mg (5/29/2024), 580 mg (6/11/2024), 580 mg (6/24/2024)  bevacizumab (AVASTIN) IVPB, 5 mg/kg = 465 mg, Intravenous, Once, 12 of 12 cycles  Administration: 465 mg (4/8/2024), 465 mg (4/22/2024), 465 mg (5/14/2024), 465 mg (5/29/2024), 465 mg (6/11/2024), 465 mg (6/24/2024), 465 mg (7/30/2024), 465 mg (8/13/2024), 465 mg (8/27/2024), 465 mg (9/10/2024), 465 mg (9/23/2024), 465 mg (10/7/2024)  irinotecan (CAMPTOSAR) chemo infusion, 295 mg (90 % of original dose 180 mg/m2), Intravenous, Once, 6 of 6 cycles  Dose modification: 162 mg/m2 (90 % of original dose 180 mg/m2, Cycle 1, Reason: Other (see comments)), 144 mg/m2 (80 % of original dose 180 mg/m2, Cycle 3, Reason: Dose Not Tolerated), 162 mg/m2 (90 % of original dose 180 mg/m2, Cycle 3, Reason: Other (see comments)), 144 mg/m2 (80 % of original dose 180 mg/m2, Cycle 3, Reason: Other (see comments))  Administration: 300 mg (4/8/2024), 295 mg (4/22/2024), 262 mg (5/14/2024), 262 mg (5/29/2024), 262 mg (6/11/2024), 262 mg (6/24/2024)  leucovorin calcium IVPB, 728 mg, Intravenous, Once, 6 of 6 cycles  Dose modification: 360 mg/m2 (90 % of original dose 400 mg/m2, Cycle 3, Reason: Other (see comments)), 320 mg/m2 (80 % of original dose 400 mg/m2, Cycle 3, Reason: Other (see comments)), 360 mg/m2 (90 % of original dose 400 mg/m2, Cycle 3, Reason: Other (see comments)), 320 mg/m2 (80 % of original dose 400 mg/m2, Cycle 3, Reason: Other (see  comments))  Administration: 700 mg (4/8/2024), 700 mg (4/22/2024), 582 mg (5/14/2024), 582 mg (5/29/2024), 582 mg (6/11/2024), 582 mg (6/24/2024)  fluorouracil (ADRUCIL) ambulatory infusion Soln, 1,200 mg/m2/day = 4,370 mg, Intravenous, Over 46 hours, 12 of 12 cycles  Dose modification: 960 mg/m2/day (80 % of original dose 1,200 mg/m2/day, Cycle 4, Reason: Other (see comments))        Cancer Staging   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HCC)  Staging form: Colon and Rectum, AJCC 8th Edition  - Clinical stage from 4/28/2022: cT3, cN0, pM1 - Signed by Jessie Freeman MD on 6/14/2023  - Pathologic stage from 5/18/2022: Stage KRISTAL (pT3, pN0, pM1a) - Signed by Jessie Freeman MD on 6/14/2023     Treatment Details   Treatment goal Curative   Plan Name OP Xelox / CapeOx (Oral Capecitabine + IV OXALIplatin) Q21 days   Status Inactive   Start Date 12/10/2021   End Date 12/10/2021   Provider Sohail Grubbs MD   Chemotherapy capecitabine (XELODA), 850 mg/m2 = 1,600 mg (100 % of original dose 850 mg/m2), Oral, 2 times daily with meals, 1 of 8 cycles  Dose modification: 850 mg/m2 (100 % of original dose 850 mg/m2, Cycle 1, Reason: Other (see comments))    fosaprepitant (EMEND) IVPB, 150 mg, Intravenous, Once, 1 of 1 cycle  Administration: 150 mg (12/10/2021)    oxaliplatin (ELOXATIN) chemo infusion, 244.4 mg, Intravenous, Once, 1 of 8 cycles  Administration: 250 mg (12/10/2021)       Treatment Details   Treatment goal [No plan goal]   Plan Name HYDRATION AND ELECTROLYTE INFUSIONS   Status Inactive   Start Date 12/10/2021   End Date 12/10/2021   Provider Sohail Grubbs MD   Chemotherapy sodium chloride, 500 mL, Intravenous, Once, 1 of 1 cycle  Administration: 500 mL (12/10/2021)       Treatment Details   Treatment goal Supportive   Plan Name Folfiri    Status Inactive   Start Date 1/4/2022   End Date 8/25/2022   Provider Sohail Grubbs MD   Chemotherapy fluorouracil (ADRUCIL), 300 mg/m2 = 560 mg (75 % of original dose 400 mg/m2), Intravenous,  Once, 12 of 12 cycles  Dose modification: 300 mg/m2 (75 % of original dose 400 mg/m2, Cycle 1, Reason: Dose Not Tolerated), 340 mg/m2 (85 % of original dose 400 mg/m2, Cycle 4, Reason: Other (see comments)), 340 mg/m2 (85 % of original dose 400 mg/m2, Cycle 5, Reason: Other (see comments))  Administration: 560 mg (1/4/2022), 560 mg (1/18/2022), 560 mg (2/1/2022), 635 mg (2/15/2022), 635 mg (3/1/2022), 635 mg (3/15/2022), 635 mg (6/14/2022), 635 mg (6/28/2022), 635 mg (7/12/2022), 635 mg (7/26/2022), 590 mg (8/9/2022), 590 mg (8/23/2022)    irinotecan (CAMPTOSAR) chemo infusion, 153 mg/m2 = 286 mg (85 % of original dose 180 mg/m2), Intravenous, Once, 4 of 4 cycles  Dose modification: 180 mg/m2 (original dose 180 mg/m2, Cycle 9), 153 mg/m2 (85 % of original dose 180 mg/m2, Cycle 9, Reason: Other (see comments), Comment: anticipated tolerance), 90 mg/m2 (50 % of original dose 180 mg/m2, Cycle 10, Reason: Dose Not Tolerated)  Administration: 286 mg (7/12/2022), 168 mg (7/26/2022), 157 mg (8/9/2022), 157 mg (8/23/2022)    leucovorin calcium IVPB, 300 mg/m2 = 561 mg (75 % of original dose 400 mg/m2), Intravenous, Once, 12 of 12 cycles  Dose modification: 300 mg/m2 (75 % of original dose 400 mg/m2, Cycle 1, Reason: Dose Not Tolerated), 340 mg/m2 (85 % of original dose 400 mg/m2, Cycle 4, Reason: Other (see comments)), 340 mg/m2 (85 % of original dose 400 mg/m2, Cycle 5, Reason: Other (see comments))  Administration: 561 mg (1/4/2022), 561 mg (1/18/2022), 561 mg (2/1/2022), 636 mg (2/15/2022), 636 mg (3/1/2022), 636 mg (3/15/2022), 636 mg (6/14/2022), 636 mg (6/28/2022), 636 mg (7/12/2022), 636 mg (7/26/2022), 592 mg (8/9/2022), 592 mg (8/23/2022)    oxaliplatin (ELOXATIN) chemo infusion, 63.75 mg/m2 = 119.2125 mg (75 % of original dose 85 mg/m2), Intravenous, Once, 8 of 8 cycles  Dose modification: 63.75 mg/m2 (75 % of original dose 85 mg/m2, Cycle 1, Reason: Dose Not Tolerated), 72.25 mg/m2 (85 % of original dose 85  mg/m2, Cycle 4, Reason: Other (see comments)), 72.25 mg/m2 (85 % of original dose 85 mg/m2, Cycle 5, Reason: Other (see comments))  Administration: 119.2125 mg (1/4/2022), 119.2125 mg (1/18/2022), 119.2125 mg (2/1/2022), 135.1075 mg (2/15/2022), 135.1075 mg (3/1/2022), 135.1075 mg (3/15/2022), 135.1075 mg (6/14/2022), 135.1075 mg (6/28/2022)    fluorouracil (ADRUCIL) ambulatory infusion Soln, 900 mg/m2/day = 3,365 mg (75 % of original dose 1,200 mg/m2/day), Intravenous, Over 46 hours, 12 of 12 cycles  Dose modification: 900 mg/m2/day (75 % of original dose 1,200 mg/m2/day, Cycle 2, Reason: Other (see comments)), 1,020 mg/m2/day (85 % of original dose 1,200 mg/m2/day, Cycle 4, Reason: Other (see comments), Comment: anticipated tolerance)       Treatment Details   Treatment goal [No plan goal]   Plan Name IV SITE LINE CARE / FLUSH PROTOCOL   Status Active   Start Date 2/28/2022   End Date Until discontinued   Provider Sohail Grubbs MD   Chemotherapy [No matching medication found in this treatment plan]     Treatment Details   Treatment goal [No plan goal]   Plan Name HYDRATION AND ELECTROLYTE INFUSIONS   Status Inactive   Start Date 2/3/2022   End Date 3/17/2022   Provider Shirley Hector PA-C   Chemotherapy sodium chloride, 1,000 mL, Intravenous, Once, 1 of 1 cycle  Administration: 1,000 mL (2/3/2022), 1,000 mL (2/17/2022), 1,000 mL (3/3/2022), 1,000 mL (3/17/2022)       Treatment Details   Treatment goal Palliative   Plan Name OP FOLFIRI + Bevacizumab Q 14 Days   Status Active   Start Date 4/8/2024   End Date 10/9/2024   Provider Bridgette Babin MD   Chemotherapy potassium chloride, 20 mEq, Intravenous, Once, 1 of 1 cycle  Administration: 20 mEq (5/14/2024)    alteplase (CATHFLO), 2 mg, Intracatheter, Every 1 Minute as needed, 12 of 12 cycles    pegfilgrastim (NEULASTA), 3 mg (100 % of original dose 3 mg), Subcutaneous, Once, 4 of 4 cycles  Dose modification: 3 mg (original dose 3 mg, Cycle 3)  Administration: 3  mg (5/17/2024), 3 mg (6/3/2024), 3 mg (6/14/2024), 3 mg (6/27/2024)    sodium chloride, 1,000 mL (100 % of original dose 1,000 mL), Intravenous, Once, 11 of 11 cycles  Dose modification: 1,000 mL (original dose 1,000 mL, Cycle 2)  Administration: 1,000 mL (4/24/2024), 1,000 mL (5/16/2024), 1,000 mL (5/31/2024), 1,000 mL (6/13/2024), 1,000 mL (6/26/2024), 1,000 mL (8/1/2024), 1,000 mL (8/15/2024), 1,000 mL (8/29/2024), 1,000 mL (9/12/2024), 1,000 mL (9/25/2024), 1,000 mL (10/9/2024)    fosaprepitant (EMEND) IVPB, 150 mg, Intravenous, Once, 11 of 11 cycles  Administration: 150 mg (4/22/2024), 150 mg (5/14/2024), 150 mg (5/29/2024), 150 mg (6/11/2024), 150 mg (6/24/2024), 150 mg (7/30/2024), 150 mg (8/13/2024), 150 mg (8/27/2024), 150 mg (9/10/2024), 150 mg (9/23/2024), 150 mg (10/7/2024)    fluorouracil (ADRUCIL), 400 mg/m2 = 730 mg, Intravenous, Once, 6 of 6 cycles  Dose modification: 360 mg/m2 (90 % of original dose 400 mg/m2, Cycle 3, Reason: Other (see comments)), 320 mg/m2 (80 % of original dose 400 mg/m2, Cycle 3, Reason: Other (see comments)), 360 mg/m2 (90 % of original dose 400 mg/m2, Cycle 3, Reason: Other (see comments)), 320 mg/m2 (80 % of original dose 400 mg/m2, Cycle 3, Reason: Other (see comments))  Administration: 730 mg (4/8/2024), 730 mg (4/22/2024), 580 mg (5/14/2024), 580 mg (5/29/2024), 580 mg (6/11/2024), 580 mg (6/24/2024)    bevacizumab (AVASTIN) IVPB, 5 mg/kg = 465 mg, Intravenous, Once, 12 of 12 cycles  Administration: 465 mg (4/8/2024), 465 mg (4/22/2024), 465 mg (5/14/2024), 465 mg (5/29/2024), 465 mg (6/11/2024), 465 mg (6/24/2024), 465 mg (7/30/2024), 465 mg (8/13/2024), 465 mg (8/27/2024), 465 mg (9/10/2024), 465 mg (9/23/2024), 465 mg (10/7/2024)    irinotecan (CAMPTOSAR) chemo infusion, 295 mg (90 % of original dose 180 mg/m2), Intravenous, Once, 6 of 6 cycles  Dose modification: 162 mg/m2 (90 % of original dose 180 mg/m2, Cycle 1, Reason: Other (see comments)), 144 mg/m2 (80 % of  original dose 180 mg/m2, Cycle 3, Reason: Dose Not Tolerated), 162 mg/m2 (90 % of original dose 180 mg/m2, Cycle 3, Reason: Other (see comments)), 144 mg/m2 (80 % of original dose 180 mg/m2, Cycle 3, Reason: Other (see comments))  Administration: 300 mg (4/8/2024), 295 mg (4/22/2024), 262 mg (5/14/2024), 262 mg (5/29/2024), 262 mg (6/11/2024), 262 mg (6/24/2024)    leucovorin calcium IVPB, 728 mg, Intravenous, Once, 6 of 6 cycles  Dose modification: 360 mg/m2 (90 % of original dose 400 mg/m2, Cycle 3, Reason: Other (see comments)), 320 mg/m2 (80 % of original dose 400 mg/m2, Cycle 3, Reason: Other (see comments)), 360 mg/m2 (90 % of original dose 400 mg/m2, Cycle 3, Reason: Other (see comments)), 320 mg/m2 (80 % of original dose 400 mg/m2, Cycle 3, Reason: Other (see comments))  Administration: 700 mg (4/8/2024), 700 mg (4/22/2024), 582 mg (5/14/2024), 582 mg (5/29/2024), 582 mg (6/11/2024), 582 mg (6/24/2024)    fluorouracil (ADRUCIL) ambulatory infusion Soln, 1,200 mg/m2/day = 4,370 mg, Intravenous, Over 46 hours, 12 of 12 cycles  Dose modification: 960 mg/m2/day (80 % of original dose 1,200 mg/m2/day, Cycle 4, Reason: Other (see comments))       Treatment Details   Treatment goal [No plan goal]   Plan Name Filgrastim SC Injection   Status Inactive   Start Date 5/6/2024   End Date 5/6/2024   Provider Sohail Grubbs MD   Chemotherapy tbo-filgrastim (GRANIX), 480 mcg (original dose ), Subcutaneous, Once, 1 of 1 cycle  Dose modification: 480 mcg (Cycle 1)  Administration: 480 mcg (5/6/2024)       Treatment Details   Treatment goal [No plan goal]   Plan Name Filgrastim SC Injection   Status Inactive   Start Date 5/7/2024   End Date 5/28/2024 (Planned)   Provider Sohail Grubbs MD   Chemotherapy tbo-filgrastim (GRANIX), 480 mcg (original dose ), Subcutaneous, Once, 1 of 1 cycle  Dose modification: 480 mcg (Cycle 1)  Administration: 480 mcg (5/7/2024)          HISTORY OF PRESENT ILLNESS:   Itzel Sanches is a 69 y.o.  female  who is on treatment for colorectal cancer and is here today for a follow up visit.     Subjective  Patient here today by herself.  She is doing well overall.  C/o constipation after the treatments.   Uses miralax, dulcolax and prune juice  When constipated, she has noted some bright red blood in the stools  Resolves after constipation resolves.  Has hemorrhoids  She remains on the Eliquis.  Has multiple bruises.   Appetite is good.  C/o new pain in the left posterior rib that started 2 weeks ago. She only notices this when she lies down    REVIEW OF SYSTEMS:  No nausea/vomiting  No mouth sores  No HFS  14 point review of systems otherwise  negative  Allergies   Allergen Reactions    Medical Tape Rash     Skin irritation  Skin peeling  Skin irritation  Skin peeling  Blisters  Rash        ACTIVE PROBLEMS:  Patient Active Problem List   Diagnosis    Callus of foot    Foot pain    GERD (gastroesophageal reflux disease)    Mixed hyperlipidemia    Prediabetes    Varicose veins with pain    Morbid obesity (HCC)    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HCC)    Dyspnea on exertion    Dyslipidemia    Sigmoid diverticulosis    PONV (postoperative nausea and vomiting)    Omental metastasis    Lesion of ovary    Chemotherapy adverse reaction    Chemotherapy-induced nausea    Platelets decreased (HCC)    Stage 3 chronic kidney disease, unspecified whether stage 3a or 3b CKD (HCC)    H/O splenectomy    Asplenia    Postoperative state    Metastases to the liver (HCC)    Chemotherapy-induced neuropathy (HCC)    Chemotherapy-induced neutropenia (HCC)    Hypokalemia    Anemia associated with chemotherapy          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    Colon polyp     GERD (gastroesophageal reflux disease)     History of chemotherapy 12/2021    History of rectal surgery     Hyperlipidemia     PONV  (postoperative nausea and vomiting)         Past Surgical History:   Procedure Laterality Date    BREAST CYST EXCISION Right      SECTION      x3    COLON SIGMOID RESECTION      COLONOSCOPY      DILATION AND CURETTAGE OF UTERUS      ILEO LOOP DIVERSION N/A 12/10/2019    Procedure: ILEOSTOMY LOOP;  Surgeon: WINNIE Pastor MD;  [facility];  Service: Robotics    INSTILLATION CHEMOTHERAPY INTRAPERITONEAL (HIPEC) LAPAROTOMY N/A 2022    Procedure: INSTILLATION CHEMOTHERAPY INTRAPERITONEAL (HIPEC) LAPAROTOMY;  Surgeon: Jessie Freeman MD;  Location[facility];  Service: Surgical Oncology    IR BIOPSY CHEST WALL  2023    IR BIOPSY OMENTUM  2021    IR PORT PLACEMENT  2021    IR Y-90 PRE-ANGIO/EMBO W/ LUNG SCAN  2024    IR Y-90 RADIOEMBOLIZATION  2024    OMENTECTOMY N/A 2022    Procedure: DIAGNOSTIC LAPAROSCOPY, OPEN OMENTECTOMY, SPLENECTOMY, DIAPHRAGM REPAIR, CHEST TUBE INSERTION;  Surgeon: Jessie Freeman MD;  [facility];  Service: Surgical Oncology    SD CLOSURE ENTEROSTOMY LG/SMALL INTESTINE N/A 2020    Procedure: CLOSURE ILEOSTOMY;  Surgeon: WINNIE Pastor MD;  [facility];  Service: Colorectal    SD LAPAROSCOPY COLECTOMY PARTIAL W/ANASTOMOSIS N/A 12/10/2019    Procedure: SIGMOID RESECTION COLON LAPAROSCOPIC W ROBOTICS converted to lap hand assisted  with Partial proctectomy , LASER FLUORESCENCE ANGIOGRAPHY, INTRA OP COLONOSCOPY;  Surgeon: WINNIE Pastor MD;  [facility];  Service: Robotics    SD TOTAL ABDOMINAL HYSTERECT W/WO RMVL TUBE OVARY N/A 2022    Procedure: DIAGNOSTIC LAPAROSCOPY, TOTAL ABDOMINAL HYSTERECTOMY, BILATERAL SALPINGO-OOPHORECTOMY, EXTENSIVE ADHESIOLYSIS;  Surgeon: Peterson Mae MD;  [facility];  Service: Gynecology Oncology    SD UNLISTED PROCEDURE DIAPHRAGM  2022    Procedure: REPAIR DIAPHRAGM TEAR;  Surgeon: Yana Hebert MD;  [facility];  Service:  "Thoracic    SMALL INTESTINE SURGERY  02/04/2020    Procedure: RESECTION SMALL BOWEL;  Surgeon: WINNIE Pastor MD;  [facility];  Service: Colorectal        Social History     Socioeconomic History    Marital status:      Spouse name: None    Number of children: None    Years of education: None    Highest education level: None   Occupational History    None   Tobacco Use    Smoking status: Never     Passive exposure: Past    Smokeless tobacco: Never   Vaping Use    Vaping status: Never Used   Substance and Sexual Activity    Alcohol use: Yes     Comment: \"occasionally\"    Drug use: Never    Sexual activity: Not Currently   Other Topics Concern    None   Social History Narrative    None     Social Determinants of Health     Financial Resource Strain: Low Risk  (3/21/2023)    Overall Financial Resource Strain (CARDIA)     Difficulty of Paying Living Expenses: Not hard at all   Food Insecurity: No Food Insecurity (3/28/2024)    Hunger Vital Sign     Worried About Running Out of Food in the Last Year: Never true     Ran Out of Food in the Last Year: Never true   Transportation Needs: No Transportation Needs (3/28/2024)    PRAPARE - Transportation     Lack of Transportation (Medical): No     Lack of Transportation (Non-Medical): No   Physical Activity: Not on file   Stress: Not on file   Social Connections: Not on file   Intimate Partner Violence: Not on file   Housing Stability: Unknown (3/28/2024)    Housing Stability Vital Sign     Unable to Pay for Housing in the Last Year: No     Number of Times Moved in the Last Year: Not on file     Homeless in the Last Year: No        Family History   Problem Relation Age of Onset    Hypertension Mother     Heart attack Mother     Heart attack Father     Heart disease Father     Hypertension Brother     Heart attack Brother     Colon cancer Paternal Uncle     Leukemia Cousin     Breast cancer Cousin     Diabetes Cousin     Breast cancer Cousin     Colon cancer " "Family         paternal uncle    Stroke Neg Hx         Objective        Physical Exam  ECOG performance status: 1  Blood pressure 136/84, pulse 86, temperature 97.7 °F (36.5 °C), temperature source Temporal, resp. rate 17, height 4' 9\" (1.448 m), weight 85.3 kg (188 lb), last menstrual period 09/01/2011, SpO2 100%, not currently breastfeeding.     General appearance: Not in acute distress, well appearing and well nourished.    Alert and oriented.    Normal breath sounds bilaterally.  Clear to auscultation without any added sounds  Heart sounds are normal.  No murmurs noted.    Abdomen is soft and nontender.  No rebound.  Extremities without any edema.    Back exam - no palpable abnormality. No tenderness where she is experiencing pain  No focal deficits.    I reviewed lab data in the chart as noted above.  Additional labs as noted below    WBC   Date Value Ref Range Status   10/04/2024 7.05 4.31 - 10.16 Thousand/uL Final   09/20/2024 7.00 4.31 - 10.16 Thousand/uL Final   09/07/2024 5.65 4.31 - 10.16 Thousand/uL Final   11/19/2016 5.4 3.4 - 10.8 x10E3/uL Final     Hemoglobin   Date Value Ref Range Status   10/04/2024 12.8 11.5 - 15.4 g/dL Final   09/20/2024 12.4 11.5 - 15.4 g/dL Final   09/07/2024 12.1 11.5 - 15.4 g/dL Final   11/19/2016 13.6 11.1 - 15.9 g/dL Final     Platelets   Date Value Ref Range Status   10/04/2024 321 149 - 390 Thousands/uL Final   09/20/2024 328 149 - 390 Thousands/uL Final   09/07/2024 324 149 - 390 Thousands/uL Final   11/19/2016 224 150 - 379 x10E3/uL Final     MCV   Date Value Ref Range Status   10/04/2024 94 82 - 98 fL Final   09/20/2024 97 82 - 98 fL Final   09/07/2024 97 82 - 98 fL Final   11/19/2016 85 79 - 97 fL Final      Sodium   Date Value Ref Range Status   12/03/2016 141 136 - 144 mmol/L Final     Comment:     Result Comment: **Effective December 12, 2016 the reference interval**                   for Sodium, Serum will be changing to:                                             "                 134 - 144     11/19/2016 139 136 - 144 mmol/L Final     Potassium   Date Value Ref Range Status   10/04/2024 4.3 3.5 - 5.3 mmol/L Final   09/20/2024 4.1 3.5 - 5.3 mmol/L Corrected     Comment:     This is a corrected result. Previous result was 4.4 mmol/L on 9/20/2024 at 1406 EDT   09/07/2024 4.1 3.5 - 5.3 mmol/L Final   09/13/2021 4.7 3.5 - 5.2 mmol/L Final   06/11/2021 4.6 3.5 - 5.2 mmol/L Final   04/16/2021 4.3 3.5 - 5.2 mmol/L Final     Chloride   Date Value Ref Range Status   10/04/2024 106 96 - 108 mmol/L Final   09/20/2024 110 (H) 96 - 108 mmol/L Corrected     Comment:     This is a corrected result. Previous result was 104 mmol/L on 9/20/2024 at 1406 EDT   09/07/2024 110 (H) 96 - 108 mmol/L Final   09/13/2021 105 96 - 106 mmol/L Final   06/11/2021 107 (H) 96 - 106 mmol/L Final   04/16/2021 104 96 - 106 mmol/L Final     CO2   Date Value Ref Range Status   10/04/2024 25 21 - 32 mmol/L Final   09/20/2024 18 (L) 21 - 32 mmol/L Corrected     Comment:     This is a corrected result. Previous result was 22 mmol/L on 9/20/2024 at 1406 EDT   09/07/2024 24 21 - 32 mmol/L Final   09/13/2021 22 20 - 29 mmol/L Final   06/11/2021 24 20 - 29 mmol/L Final   04/16/2021 21 20 - 29 mmol/L Final     BUN   Date Value Ref Range Status   10/04/2024 19 5 - 25 mg/dL Final   09/20/2024 21 5 - 25 mg/dL Corrected     Comment:     This is a corrected result. Previous result was 22 mg/dL on 9/20/2024 at 1406 EDT   09/07/2024 16 5 - 25 mg/dL Final   09/13/2021 19 8 - 27 mg/dL Final   06/11/2021 17 8 - 27 mg/dL Final   04/16/2021 23 8 - 27 mg/dL Final     Creatinine   Date Value Ref Range Status   10/04/2024 1.01 0.60 - 1.30 mg/dL Final     Comment:     Standardized to IDMS reference method   09/20/2024 0.93 0.60 - 1.30 mg/dL Corrected     Comment:     Standardized to IDMS reference method  This is a corrected result. Previous result was 0.76 mg/dL on 9/20/2024 at 1406 EDT   09/07/2024 1.02 0.60 - 1.30 mg/dL Final      Comment:     Standardized to IDMS reference method   12/03/2016 0.91 0.57 - 1.00 mg/dL Final   11/19/2016 0.90 0.57 - 1.00 mg/dL Final     Glucose   Date Value Ref Range Status   10/04/2024 99 65 - 140 mg/dL Final     Comment:     If the patient is fasting, the ADA then defines impaired fasting glucose as > 100 mg/dL and diabetes as > or equal to 123 mg/dL.   09/07/2024 101 65 - 140 mg/dL Final     Comment:     If the patient is fasting, the ADA then defines impaired fasting glucose as > 100 mg/dL and diabetes as > or equal to 123 mg/dL.   08/24/2024 112 65 - 140 mg/dL Final     Comment:     If the patient is fasting, the ADA then defines impaired fasting glucose as > 100 mg/dL and diabetes as > or equal to 123 mg/dL.     Glucose, Random   Date Value Ref Range Status   09/13/2021 93 65 - 99 mg/dL Final   06/11/2021 94 65 - 99 mg/dL Final   04/16/2021 85 65 - 99 mg/dL Final     eGFR    Date Value Ref Range Status   09/13/2021 74 >59 mL/min/1.73 Final     Comment:     **[facility] currently reports eGFR in compliance with the current**    recommendations of the [employer]. [facility] will    update reporting as new guidelines are published from the NKF-ASN    Task force.     06/11/2021 75 >59 mL/min/1.73 Final     Comment:     **[facility] currently reports eGFR in compliance with the current**    recommendations of the [employer]. [facility] will    update reporting as new guidelines are published from the NKF-ASN    Task force.     04/16/2021 61 >59 mL/min/1.73 Final     Calcium   Date Value Ref Range Status   10/04/2024 9.1 8.4 - 10.2 mg/dL Final   09/20/2024 9.0 8.4 - 10.2 mg/dL Corrected     Comment:     This is a corrected result. Previous result was 9.2 mg/dL on 9/20/2024 at 1406 EDT   09/07/2024 9.1 8.4 - 10.2 mg/dL Final   12/03/2016 9.2 8.7 - 10.3 mg/dL Final   11/19/2016 9.4 8.7 - 10.3 mg/dL Final     Total Protein   Date Value Ref Range Status   12/03/2016 6.6 6.0 - 8.5  g/dL Final   11/19/2016 6.6 6.0 - 8.5 g/dL Final     Albumin   Date Value Ref Range Status   10/04/2024 3.3 (L) 3.5 - 5.0 g/dL Final   09/20/2024 3.3 (L) 3.5 - 5.0 g/dL Corrected     Comment:     This is a corrected result. Previous result was 4.2 g/dL on 9/20/2024 at 1406 EDT   09/07/2024 3.3 (L) 3.5 - 5.0 g/dL Final   12/03/2016 3.6 3.6 - 4.8 g/dL Final   11/19/2016 3.8 3.6 - 4.8 g/dL Final     Total Bilirubin   Date Value Ref Range Status   10/04/2024 0.45 0.20 - 1.00 mg/dL Final     Comment:     Use of this assay is not recommended for patients undergoing treatment with eltrombopag due to the potential for falsely elevated results.  N-acetyl-p-benzoquinone imine (metabolite of Acetaminophen) will generate erroneously low results in samples for patients that have taken an overdose of Acetaminophen.   09/20/2024 0.30 0.20 - 1.00 mg/dL Corrected     Comment:     Use of this assay is not recommended for patients undergoing treatment with eltrombopag due to the potential for falsely elevated results.  N-acetyl-p-benzoquinone imine (metabolite of Acetaminophen) will generate erroneously low results in samples for patients that have taken an overdose of Acetaminophen.  This is a corrected result. Previous result was 0.39 mg/dL on 9/20/2024 at 1406 EDT   09/07/2024 0.45 0.20 - 1.00 mg/dL Final     Comment:     Use of this assay is not recommended for patients undergoing treatment with eltrombopag due to the potential for falsely elevated results.  N-acetyl-p-benzoquinone imine (metabolite of Acetaminophen) will generate erroneously low results in samples for patients that have taken an overdose of Acetaminophen.     TOTAL BILIRUBIN   Date Value Ref Range Status   09/13/2021 0.6 0.0 - 1.2 mg/dL Final   04/16/2021 0.8 0.0 - 1.2 mg/dL Final   09/18/2020 0.9 0.0 - 1.2 mg/dL Final     Alkaline Phosphatase   Date Value Ref Range Status   10/04/2024 54 34 - 104 U/L Final   09/20/2024 50 34 - 104 U/L Corrected     Comment:      "This is a corrected result. Previous result was 58 U/L on 9/20/2024 at 1406 EDT   09/07/2024 42 34 - 104 U/L Final   12/03/2016 54 39 - 117 IU/L Final   11/19/2016 51 39 - 117 IU/L Final     AST   Date Value Ref Range Status   10/04/2024 12 (L) 13 - 39 U/L Final   09/20/2024 12 (L) 13 - 39 U/L Corrected     Comment:     This is a corrected result. Previous result was 15 U/L on 9/20/2024 at 1406 EDT   09/07/2024 12 (L) 13 - 39 U/L Final   09/13/2021 14 0 - 40 IU/L Final   04/16/2021 18 0 - 40 IU/L Final   09/18/2020 14 0 - 40 IU/L Final     ALT   Date Value Ref Range Status   10/04/2024 11 7 - 52 U/L Final     Comment:     Specimen collection should occur prior to Sulfasalazine administration due to the potential for falsely depressed results.    09/20/2024 11 7 - 52 U/L Corrected     Comment:     Specimen collection should occur prior to Sulfasalazine administration due to the potential for falsely depressed results.   This is a corrected result. Previous result was 15 U/L on 9/20/2024 at 1406 EDT   09/07/2024 10 7 - 52 U/L Final     Comment:     Specimen collection should occur prior to Sulfasalazine administration due to the potential for falsely depressed results.    09/13/2021 16 0 - 32 IU/L Final   04/16/2021 17 0 - 32 IU/L Final   09/18/2020 15 0 - 32 IU/L Final      No results found for: \"LDH\"  TSH   Date Value Ref Range Status   09/13/2021 1.860 0.450 - 4.500 uIU/mL Final   04/15/2019 1.600 0.450 - 4.500 uIU/mL Final     Imaging CT c.a.p 7/3/2024       Continued interval decrease in size of the left 11th rib metastasis. Interval decreasing post radiation changes in the left lower lobe.     Decrease in conspicuity and size of the hepatic metastasis seen in the subcapsular segment 5 region. The previously described 5 mm lesion is not apparent. There are no new lesions identified.     Interval decreasing mediastinal, amee hepatis and retroperitoneal adenopathy.  Labs  10/4/2-24    CEA 2.5, CBC normal  CMP " normal (AST 12, Albumin 3.3)    Impression    Ms. Sanches is continuing to obtain benefit from chemotherapy.  The CEA level is low and the CAT scan above shows continued response.  She very much wants to have a break  Until after the holidays. of this  We discussed the ups and downs decision.  We decided to hold her chemotherapy, not even continue Avastin, but do CEA's every month until the next visit and then repeat the CAT scan in early January and make a decision about continuation.  We will follow monthly.

## 2024-10-19 ENCOUNTER — APPOINTMENT (EMERGENCY)
Dept: RADIOLOGY | Facility: HOSPITAL | Age: 69
DRG: 074 | End: 2024-10-19
Payer: MEDICARE

## 2024-10-19 ENCOUNTER — NURSE TRIAGE (OUTPATIENT)
Dept: OTHER | Facility: OTHER | Age: 69
End: 2024-10-19

## 2024-10-19 ENCOUNTER — HOSPITAL ENCOUNTER (INPATIENT)
Facility: HOSPITAL | Age: 69
LOS: 5 days | Discharge: NURSING, REHAB OR LONG-TERM CARE | DRG: 074 | End: 2024-10-25
Attending: EMERGENCY MEDICINE | Admitting: INTERNAL MEDICINE
Payer: MEDICARE

## 2024-10-19 DIAGNOSIS — R20.0 RIGHT LEG NUMBNESS: Primary | ICD-10-CM

## 2024-10-19 DIAGNOSIS — I10 HYPERTENSION: ICD-10-CM

## 2024-10-19 DIAGNOSIS — R29.90 STROKE-LIKE SYMPTOM: ICD-10-CM

## 2024-10-19 DIAGNOSIS — M54.50 LOWER BACK PAIN: ICD-10-CM

## 2024-10-19 PROBLEM — Z86.718 HISTORY OF DVT (DEEP VEIN THROMBOSIS): Status: ACTIVE | Noted: 2024-10-19

## 2024-10-19 LAB
2HR DELTA HS TROPONIN: 0 NG/L
ANION GAP SERPL CALCULATED.3IONS-SCNC: 9 MMOL/L (ref 4–13)
APTT PPP: 26 SECONDS (ref 23–34)
BUN SERPL-MCNC: 16 MG/DL (ref 5–25)
CALCIUM SERPL-MCNC: 9.2 MG/DL (ref 8.4–10.2)
CARDIAC TROPONIN I PNL SERPL HS: 7 NG/L
CARDIAC TROPONIN I PNL SERPL HS: 7 NG/L
CHLORIDE SERPL-SCNC: 106 MMOL/L (ref 96–108)
CO2 SERPL-SCNC: 25 MMOL/L (ref 21–32)
CREAT SERPL-MCNC: 0.98 MG/DL (ref 0.6–1.3)
ERYTHROCYTE [DISTWIDTH] IN BLOOD BY AUTOMATED COUNT: 18.4 % (ref 11.6–15.1)
GFR SERPL CREATININE-BSD FRML MDRD: 58 ML/MIN/1.73SQ M
GLUCOSE SERPL-MCNC: 71 MG/DL (ref 65–140)
GLUCOSE SERPL-MCNC: 89 MG/DL (ref 65–140)
HCT VFR BLD AUTO: 40.6 % (ref 34.8–46.1)
HGB BLD-MCNC: 13.1 G/DL (ref 11.5–15.4)
INR PPP: 1.08 (ref 0.85–1.19)
MCH RBC QN AUTO: 30.1 PG (ref 26.8–34.3)
MCHC RBC AUTO-ENTMCNC: 32.3 G/DL (ref 31.4–37.4)
MCV RBC AUTO: 93 FL (ref 82–98)
PLATELET # BLD AUTO: 313 THOUSANDS/UL (ref 149–390)
PMV BLD AUTO: 10.7 FL (ref 8.9–12.7)
POTASSIUM SERPL-SCNC: 3.3 MMOL/L (ref 3.5–5.3)
PROTHROMBIN TIME: 14.5 SECONDS (ref 12.3–15)
RBC # BLD AUTO: 4.35 MILLION/UL (ref 3.81–5.12)
SODIUM SERPL-SCNC: 140 MMOL/L (ref 135–147)
WBC # BLD AUTO: 7.73 THOUSAND/UL (ref 4.31–10.16)

## 2024-10-19 PROCEDURE — 84484 ASSAY OF TROPONIN QUANT: CPT | Performed by: EMERGENCY MEDICINE

## 2024-10-19 PROCEDURE — 85027 COMPLETE CBC AUTOMATED: CPT | Performed by: EMERGENCY MEDICINE

## 2024-10-19 PROCEDURE — 99291 CRITICAL CARE FIRST HOUR: CPT | Performed by: EMERGENCY MEDICINE

## 2024-10-19 PROCEDURE — 74177 CT ABD & PELVIS W/CONTRAST: CPT

## 2024-10-19 PROCEDURE — 82948 REAGENT STRIP/BLOOD GLUCOSE: CPT

## 2024-10-19 PROCEDURE — 99223 1ST HOSP IP/OBS HIGH 75: CPT | Performed by: HOSPITALIST

## 2024-10-19 PROCEDURE — 36415 COLL VENOUS BLD VENIPUNCTURE: CPT | Performed by: EMERGENCY MEDICINE

## 2024-10-19 PROCEDURE — 99285 EMERGENCY DEPT VISIT HI MDM: CPT

## 2024-10-19 PROCEDURE — 80048 BASIC METABOLIC PNL TOTAL CA: CPT | Performed by: EMERGENCY MEDICINE

## 2024-10-19 PROCEDURE — 93005 ELECTROCARDIOGRAM TRACING: CPT

## 2024-10-19 PROCEDURE — 85610 PROTHROMBIN TIME: CPT | Performed by: EMERGENCY MEDICINE

## 2024-10-19 PROCEDURE — 85730 THROMBOPLASTIN TIME PARTIAL: CPT | Performed by: EMERGENCY MEDICINE

## 2024-10-19 PROCEDURE — 96374 THER/PROPH/DIAG INJ IV PUSH: CPT

## 2024-10-19 PROCEDURE — 99284 EMERGENCY DEPT VISIT MOD MDM: CPT | Performed by: PSYCHIATRY & NEUROLOGY

## 2024-10-19 PROCEDURE — 70496 CT ANGIOGRAPHY HEAD: CPT

## 2024-10-19 PROCEDURE — 70498 CT ANGIOGRAPHY NECK: CPT

## 2024-10-19 RX ORDER — ASPIRIN 325 MG
325 TABLET ORAL ONCE
Status: DISCONTINUED | OUTPATIENT
Start: 2024-10-19 | End: 2024-10-19

## 2024-10-19 RX ORDER — ASPIRIN 81 MG/1
81 TABLET, CHEWABLE ORAL DAILY
Status: DISCONTINUED | OUTPATIENT
Start: 2024-10-20 | End: 2024-10-19 | Stop reason: ALTCHOICE

## 2024-10-19 RX ORDER — FAMOTIDINE 20 MG/1
10 TABLET, FILM COATED ORAL 2 TIMES DAILY PRN
Status: DISCONTINUED | OUTPATIENT
Start: 2024-10-19 | End: 2024-10-25 | Stop reason: HOSPADM

## 2024-10-19 RX ORDER — ACETAMINOPHEN 325 MG/1
975 TABLET ORAL ONCE
Status: COMPLETED | OUTPATIENT
Start: 2024-10-19 | End: 2024-10-19

## 2024-10-19 RX ORDER — LABETALOL HYDROCHLORIDE 5 MG/ML
10 INJECTION, SOLUTION INTRAVENOUS ONCE
Status: DISCONTINUED | OUTPATIENT
Start: 2024-10-19 | End: 2024-10-19

## 2024-10-19 RX ORDER — LORATADINE 10 MG/1
10 TABLET ORAL DAILY
Status: DISCONTINUED | OUTPATIENT
Start: 2024-10-20 | End: 2024-10-23

## 2024-10-19 RX ORDER — EZETIMIBE 10 MG/1
10 TABLET ORAL DAILY
Status: DISCONTINUED | OUTPATIENT
Start: 2024-10-20 | End: 2024-10-19

## 2024-10-19 RX ORDER — ATORVASTATIN CALCIUM 40 MG/1
40 TABLET, FILM COATED ORAL EVERY EVENING
Status: DISCONTINUED | OUTPATIENT
Start: 2024-10-19 | End: 2024-10-19

## 2024-10-19 RX ORDER — EZETIMIBE 10 MG/1
10 TABLET ORAL
Status: DISCONTINUED | OUTPATIENT
Start: 2024-10-19 | End: 2024-10-25 | Stop reason: HOSPADM

## 2024-10-19 RX ORDER — ACETAMINOPHEN 325 MG/1
650 TABLET ORAL EVERY 6 HOURS PRN
Status: DISCONTINUED | OUTPATIENT
Start: 2024-10-19 | End: 2024-10-25 | Stop reason: HOSPADM

## 2024-10-19 RX ORDER — LABETALOL HYDROCHLORIDE 5 MG/ML
10 INJECTION, SOLUTION INTRAVENOUS ONCE
Status: COMPLETED | OUTPATIENT
Start: 2024-10-19 | End: 2024-10-19

## 2024-10-19 RX ORDER — ONDANSETRON 2 MG/ML
4 INJECTION INTRAMUSCULAR; INTRAVENOUS EVERY 6 HOURS PRN
Status: DISCONTINUED | OUTPATIENT
Start: 2024-10-19 | End: 2024-10-25 | Stop reason: HOSPADM

## 2024-10-19 RX ORDER — HYDRALAZINE HYDROCHLORIDE 20 MG/ML
5 INJECTION INTRAMUSCULAR; INTRAVENOUS ONCE
Status: COMPLETED | OUTPATIENT
Start: 2024-10-19 | End: 2024-10-19

## 2024-10-19 RX ORDER — LABETALOL HYDROCHLORIDE 5 MG/ML
10 INJECTION, SOLUTION INTRAVENOUS EVERY 4 HOURS PRN
Status: DISCONTINUED | OUTPATIENT
Start: 2024-10-19 | End: 2024-10-25 | Stop reason: HOSPADM

## 2024-10-19 RX ADMIN — EZETIMIBE 10 MG: 10 TABLET ORAL at 23:11

## 2024-10-19 RX ADMIN — ACETAMINOPHEN 975 MG: 325 TABLET ORAL at 21:12

## 2024-10-19 RX ADMIN — APIXABAN 5 MG: 5 TABLET, FILM COATED ORAL at 23:00

## 2024-10-19 RX ADMIN — IOHEXOL 85 ML: 350 INJECTION, SOLUTION INTRAVENOUS at 17:46

## 2024-10-19 RX ADMIN — HYDRALAZINE HYDROCHLORIDE 5 MG: 20 INJECTION INTRAMUSCULAR; INTRAVENOUS at 21:01

## 2024-10-19 RX ADMIN — IOHEXOL 80 ML: 350 INJECTION, SOLUTION INTRAVENOUS at 18:05

## 2024-10-19 RX ADMIN — LABETALOL HYDROCHLORIDE 10 MG: 5 INJECTION, SOLUTION INTRAVENOUS at 22:09

## 2024-10-19 RX ADMIN — LABETALOL HYDROCHLORIDE 10 MG: 5 INJECTION, SOLUTION INTRAVENOUS at 18:06

## 2024-10-19 NOTE — CONSULTS
Consultation - Neurology   Name: Itzel Sanches 69 y.o. female I MRN: [id number]  Unit/Bed#: ED CT2 I Date of Admission: 10/19/2024   Date of Service: 10/19/2024 I Hospital Day: 0   Consult to Neurology  Consult performed by: José Miguel Carcamo DO  Consult ordered by: Bladimir Lugo MD        Physician Requesting Evaluation: Bladimir Lugo MD   Reason for Evaluation / Principal Problem: Stroke alert    Assessment & Plan  Stroke-like symptom  Sudden onset right lower extremity sensory symptoms unclear etiology.  Could represent stroke versus local nerve compression, possibly due to adjacent cancer.  Recommend continuing Eliquis for now.  - Would treat accelerated hypertension, goal systolics less than 180 while anticoagulated, ideally 1 40-1 60.  - MRI brain with and without contrast to clarify above as well as the right frontal hypodensity seen on head CT.  - Would also consider MRI pelvis and lumbosacral plexus to evaluate for local neoplastic pathology.  - No additional recommendations at this time.  - Will follow workup      Recommendations for outpatient neurological follow up have yet to be determined.    History of Present Illness   Itzel Sanches is a 69 y.o. 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  ) Who presents with sudden onset right lower extremity sensory disturbances.  Patient reports onset around 3:45 PM.  Patient reports calf and thigh numbness and foot tingling.  Patient also started to develop right buttocks pain while in the ER.  ED denies any clear weakness.    Patient reports sitting in the chair watching TV for a few hours, then she attempted to move and noticed lack of sensation in the right lower extremity, currently denies paresthesias.  Reports some difficulty with use, heaviness.       Called by  regarding stroke alert at 5:34 PM, neuro response was immediate.  -  NIHSS 1 points for decree sensation  - LKW 3:45 PM symptom onset    CTH demonstrated subtle hypodensity in right frontal region without clear mass effect.  Age indeterminate.  Could represent age-indeterminate ischemia versus metastatic disease.  CTA there is stenosis at the origin of the right vert which is hypoplastic throughout its course.  The contralateral left vert is dominant and patent.  The basilar is severely stenotic however this is likely chronic and perhaps congenital as patient has persistent fetal circulation with intact PCOM's bilaterally.  The anterior circulation appears free of any hemodynamically significant stenosis.    IV thrombolysis was deferred due to concurrent Eliquis use          Review of Systems   Constitutional:  Negative for chills and fever.   HENT:  Negative for facial swelling and hearing loss.    Eyes:  Negative for pain and discharge.   Respiratory:  Negative for cough, choking and shortness of breath.    Cardiovascular:  Negative for chest pain.   Gastrointestinal:  Negative for constipation, diarrhea, nausea and vomiting.   Endocrine: Negative for cold intolerance and heat intolerance.   Genitourinary:  Negative for difficulty urinating, frequency and urgency.   Skin:  Negative for color change and rash.   Neurological:  Positive for numbness. Negative for dizziness, facial asymmetry, weakness, light-headedness and headaches.   All other systems reviewed and are negative.       I have reviewed the patient's PMH, PSH, Social History, Family History, Meds, and Allergies    Objective :  HR:  [75] 75  BP: (239)/(102) 239/102  Resp:  [18] 18  SpO2:  [100 %] 100 %  O2 Device: None (Room air)    Physical Exam  Constitutional:       General: She is not in acute distress.     Appearance: Normal appearance. She is normal weight. She is not ill-appearing, toxic-appearing or diaphoretic.   HENT:      Head: Normocephalic.      Right Ear: External ear normal.      Left Ear: External ear  normal.   Eyes:      General: Lids are normal. No scleral icterus.        Right eye: No discharge.         Left eye: No discharge.      Extraocular Movements: Extraocular movements intact.      Conjunctiva/sclera: Conjunctivae normal.      Pupils: Pupils are equal, round, and reactive to light.   Pulmonary:      Effort: Pulmonary effort is normal. No respiratory distress.      Breath sounds: No stridor.   Skin:     Coloration: Skin is not jaundiced or pale.   Neurological:      General: No focal deficit present.      Mental Status: She is alert.      Cranial Nerves: No cranial nerve deficit.      Sensory: No sensory deficit.      Motor: No weakness.      Coordination: Coordination normal.   Psychiatric:         Mood and Affect: Mood normal.         Speech: Speech normal.         Behavior: Behavior normal.         Thought Content: Thought content normal.         Judgment: Judgment normal.     Neurological Exam  Mental Status  Alert. Oriented to person, place and time. Speech is normal. Language is fluent with no aphasia. Attention and concentration are normal.    Cranial Nerves  CN II: Visual acuity is normal. Visual fields full to confrontation.  CN III, IV, VI: Extraocular movements intact bilaterally. Normal lids and orbits bilaterally. Pupils equal round and reactive to light bilaterally.  CN V: Facial sensation is normal.  CN VII: Full and symmetric facial movement.  CN VIII: Hearing is normal.  CN IX, X: Palate elevates symmetrically. Normal gag reflex.  CN XI: Shoulder shrug strength is normal.  CN XII: Tongue midline without atrophy or fasciculations.    Motor  Normal muscle bulk throughout.  Slight drift of the right lower extremity.    Sensory  Diminished sensation in the right lower extremity.    Coordination  Right: Finger-to-nose normal.Left: Finger-to-nose normal.  No gross ataxia, right lower extremity limited by numbness and direct.        Lab Results: I have reviewed the following results:I have  "personally reviewed pertinent reports.    No results for input(s): \"WBC\", \"HGB\", \"HCT\", \"PLT\", \"BANDSPCT\", \"SODIUM\", \"K\", \"CL\", \"CO2\", \"BUN\", \"CREATININE\", \"GLUC\", \"CAIONIZED\", \"MG\", \"PHOS\" in the last 72 hours.  That is a new on Wednesday and then  Other Study Results Review: No additional pertinent studies reviewed.    VTE Prophylaxis: VTE covered by:    None       Administrative Statements   I have spent a total time of 25 minutes in caring for this patient on the day of the visit/encounter including Diagnostic results, Instructions for management, Impressions, Counseling / Coordination of care, Documenting in the medical record, Reviewing / ordering tests, medicine, procedures  , Obtaining or reviewing history  , and Communicating with other healthcare professionals .  VIRTUAL CARE DOCUMENTATION:     1. This service was provided via Telemedicine using [employer]     2. Parties in the room with patient during teleconsult Patient only    3. Confidentiality My office door was closed     4. Participants No one else was in the room    5. Patient acknowledged consent and understanding of privacy and security of the  Telemedicine consult. I informed the patient that I have reviewed their record in Epic and presented the opportunity for them to ask any questions regarding the visit today.  The patient agreed to participate.    6. I have spent a total time of 25 minutes in caring for this patient on the day of the visit/encounter including  as above .     "

## 2024-10-19 NOTE — ED PROVIDER NOTES
Time reflects when diagnosis was documented in both MDM as applicable and the Disposition within this note       Time User Action Codes Description Comment    10/19/2024  5:33 PM Bladimir Lugo Add [R20.0] Right leg numbness     10/19/2024  8:53 PM Bladimir Lugo Add [R29.898] Right leg weakness     10/19/2024  8:53 PM Bladimir Lugo Remove [R29.898] Right leg weakness     10/19/2024  8:53 PM Bladimir Lugo Add [M54.50] Lower back pain     10/19/2024  8:55 PM Bladimir Lugo Add [I10] Hypertension           ED Disposition       ED Disposition   Admit    Condition   Stable    Date/Time   Sat Oct 19, 2024  8:52 PM    Comment   Case was discussed with KALIE and the patient's admission status was agreed to be Admission Status: observation status to the service of Dr. Hernandez .               Assessment & Plan       Medical Decision Making  Given recent onset of symptoms, persistent symptoms, gross hypertension, patient stroke alerted.  I discussed patient with neurology who agrees that given patient's buttock pain there is a good chance that the symptoms are being caused by peripheral nervous etiology and patient is not a TNKase candidate given her Eliquis use regardless.  I ordered and reviewed a CT/CTA of the head and neck which demonstrate possible area of infarction in the right frontal lobe which does not correlate to current symptoms.  I ordered and reviewed lab work including CBC, CMP, PT, PTT.  I ordered and independently interpreted an EKG. Patient receiving multiple IV antihypertensives and still remaining significantly hypertensive.  Per neurology blood pressure goals below 180.  I discussed patient with the hospitalist and admitted patient for further evaluation and management.    Amount and/or Complexity of Data Reviewed  Labs: ordered.  Radiology: ordered.    Risk  OTC drugs.  Prescription drug management.  Decision regarding hospitalization.             Medications   labetalol  (NORMODYNE) injection 10 mg (10 mg Intravenous Not Given 10/19/24 1838)   aspirin tablet 325 mg (325 mg Oral Not Given 10/19/24 1905)   hydrALAZINE (APRESOLINE) injection 5 mg (has no administration in time range)   labetalol (NORMODYNE) injection 10 mg (10 mg Intravenous Given 10/19/24 1806)   iohexol (OMNIPAQUE) 350 MG/ML injection (MULTI-DOSE) 85 mL (85 mL Intravenous Given 10/19/24 1746)   iohexol (OMNIPAQUE) 350 MG/ML injection (MULTI-DOSE) 80 mL (80 mL Intravenous Given 10/19/24 1805)       ED Risk Strat Scores       Stroke Assessment       Row Name 10/19/24 1743             NIH Stroke Scale    Interval Baseline      Level of Consciousness (1a.) 0      LOC Questions (1b.) 0      LOC Commands (1c.) 0      Best Gaze (2.) 0      Visual (3.) 0      Facial Palsy (4.) 0      Motor Arm, Left (5a.) 0      Motor Arm, Right (5b.) 0      Motor Leg, Left (6a.) 0      Motor Leg, Right (6b.) 0      Limb Ataxia (7.) 0      Sensory (8.) 1      Best Language (9.) 0      Dysarthria (10.) 0      Extinction and Inattention (11.) (Formerly Neglect) 0      Total 1                    Flowsheet Row Most Recent Value   Thrombolytic Decision Options    Thrombolytic Decision Patient not a candidate.   Patient is not a candidate options Bleeding risk.                            SBIRT 22yo+      Flowsheet Row Most Recent Value   Initial Alcohol Screen: US AUDIT-C     1. How often do you have a drink containing alcohol? 0 Filed at: 10/19/2024 1739   2. How many drinks containing alcohol do you have on a typical day you are drinking?  0 Filed at: 10/19/2024 1739   3a. Male UNDER 65: How often do you have five or more drinks on one occasion? 0 Filed at: 10/19/2024 1739   3b. FEMALE Any Age, or MALE 65+: How often do you have 4 or more drinks on one occassion? 0 Filed at: 10/19/2024 1739   Audit-C Score 0 Filed at: 10/19/2024 1739   HYACINTH: How many times in the past year have you...    Used an illegal drug or used a prescription medication for  non-medical reasons? Never Filed at: 10/19/2024 1558                            History of Present Illness       Chief Complaint   Patient presents with    Numbness     Pt arrived EMS. Pt reports right thigh numbness starting around 1600 radiating up up back. Pt denies any other symptoms or trauma.        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    Colon polyp     GERD (gastroesophageal reflux disease)     History of chemotherapy 2021    History of rectal surgery     Hyperlipidemia     PONV (postoperative nausea and vomiting)       Past Surgical History:   Procedure Laterality Date    BREAST CYST EXCISION Right      SECTION      x3    COLON SIGMOID RESECTION      COLONOSCOPY      DILATION AND CURETTAGE OF UTERUS      ILEO LOOP DIVERSION N/A 12/10/2019    Procedure: ILEOSTOMY LOOP;  Surgeon: WINNIE Pastor MD;  [facility];  Service: Robotics    INSTILLATION CHEMOTHERAPY INTRAPERITONEAL (HIPEC) LAPAROTOMY N/A 2022    Procedure: INSTILLATION CHEMOTHERAPY INTRAPERITONEAL (HIPEC) LAPAROTOMY;  Surgeon: Jessie Freeman MD;  [facility];  Service: Surgical Oncology    IR BIOPSY CHEST WALL  2023    IR BIOPSY OMENTUM  2021    IR PORT PLACEMENT  2021    IR Y-90 PRE-ANGIO/EMBO W/ LUNG SCAN  2024    IR Y-90 RADIOEMBOLIZATION  2024    OMENTECTOMY N/A 2022    Procedure: DIAGNOSTIC LAPAROSCOPY, OPEN OMENTECTOMY, SPLENECTOMY, DIAPHRAGM REPAIR, CHEST TUBE INSERTION;  Surgeon: Jessie Freeman MD;  [facility];  Service: Surgical Oncology    ID CLOSURE ENTEROSTOMY LG/SMALL INTESTINE N/A 2020    Procedure: CLOSURE ILEOSTOMY;  Surgeon: WINNIE Pastor MD;  [facility];  Service: Colorectal    ID LAPAROSCOPY COLECTOMY PARTIAL W/ANASTOMOSIS N/A 12/10/2019    Procedure: SIGMOID RESECTION COLON LAPAROSCOPIC  "W ROBOTICS converted to lap hand assisted  with Partial proctectomy , LASER FLUORESCENCE ANGIOGRAPHY, INTRA OP COLONOSCOPY;  Surgeon: WINNIE Pastor MD;  [facility];  Service: Robotics    CT TOTAL ABDOMINAL HYSTERECT W/WO RMVL TUBE OVARY N/A 04/29/2022    Procedure: DIAGNOSTIC LAPAROSCOPY, TOTAL ABDOMINAL HYSTERECTOMY, BILATERAL SALPINGO-OOPHORECTOMY, EXTENSIVE ADHESIOLYSIS;  Surgeon: Peterson Mae MD;  [facility];  Service: Gynecology Oncology    CT UNLISTED PROCEDURE DIAPHRAGM  04/29/2022    Procedure: REPAIR DIAPHRAGM TEAR;  Surgeon: Yana Hebert MD;  [facility];  Service: Thoracic    SMALL INTESTINE SURGERY  02/04/2020    Procedure: RESECTION SMALL BOWEL;  Surgeon: WINNIE Pastor MD;  [facility];  Service: Colorectal      Family History   Problem Relation Age of Onset    Hypertension Mother     Heart attack Mother     Heart attack Father     Heart disease Father     Hypertension Brother     Heart attack Brother     Colon cancer Paternal Uncle     Leukemia Cousin     Breast cancer Cousin     Diabetes Cousin     Breast cancer Cousin     Colon cancer Family         paternal uncle    Stroke Neg Hx       Social History     Tobacco Use    Smoking status: Never     Passive exposure: Past    Smokeless tobacco: Never   Vaping Use    Vaping status: Never Used   Substance Use Topics    Alcohol use: Yes     Comment: \"occasionally\"    Drug use: Never      E-Cigarette/Vaping    E-Cigarette Use Never User       E-Cigarette/Vaping Substances    Nicotine No     THC No     CBD No     Flavoring No     Other No     Unknown No       I have reviewed and agree with the history as documented.     Patient is a 69-year-old female history of rectosigmoid adenocarcinoma with metastatic disease presenting for evaluation of right leg numbness.  Patient states that symptoms started at 1545, or sudden onset while she was sitting in a chair.  Patient states numbness of the calf " and upper thigh and states more of a tingling sensation of the foot.  Patient states it is hard to tell if the leg is weak because she can't feel whether on not she is moving it but on exam strength is intact.  Patient denies headache, neck pain, chest pain, shortness of breath, palpitations, syncope or near syncope.  Patient takes Eliquis and states good compliance with this.  Patient states that while in the ED she started to notice right buttock pain which is moderate in severity.        Review of Systems   Constitutional:  Negative for chills, fatigue and fever.   Respiratory:  Negative for cough and shortness of breath.    Gastrointestinal:  Negative for diarrhea, nausea and vomiting.   Musculoskeletal:  Negative for arthralgias and myalgias.   Neurological:  Positive for numbness. Negative for weakness and headaches.   Psychiatric/Behavioral:  Negative for confusion.    All other systems reviewed and are negative.          Objective       ED Triage Vitals [10/19/24 1729]   Temperature Pulse Blood Pressure Respirations SpO2 Patient Position - Orthostatic VS   97.5 °F (36.4 °C) 75 (!) 239/102 18 100 % Sitting      Temp Source Heart Rate Source BP Location FiO2 (%) Pain Score    Oral Monitor Right arm -- --      Vitals      Date and Time Temp Pulse SpO2 Resp BP Pain Score FACES Pain Rating User   10/19/24 2030 -- 75 99 % 14 197/84 -- -- SD   10/19/24 2000 -- 78 99 % 15 206/85 -- -- SD   10/19/24 1930 -- 79 100 % 15 235/96 -- -- SD   10/19/24 1900 -- 74 99 % 17 207/85 -- -- SD   10/19/24 1855 -- 75 99 % 20 -- -- -- SD   10/19/24 1850 -- 76 99 % 28 -- -- -- SD   10/19/24 1845 -- 74 99 % 26 -- -- -- SD   10/19/24 1840 -- 76 99 % 18 -- -- -- SD   10/19/24 1835 -- 73 100 % 27 -- -- -- SD   10/19/24 1831 -- 81 100 % 20 187/78 -- --    10/19/24 1830 -- 77 99 % 28 -- -- -- SD   10/19/24 1825 -- 75 99 % 17 -- -- -- SD   10/19/24 1823 -- -- -- -- 205/82 -- --    10/19/24 1820 -- 76 100 % 29 -- -- -- SD   10/19/24 1815  -- 83 98 % 23 -- -- -- SD   10/19/24 1810 -- 85 98 % 44 -- -- -- SD   10/19/24 1805 -- 83 99 % 43 -- -- -- SD   10/19/24 1804 -- 86 97 % 18 216/86 -- -- CG   10/19/24 1729 97.5 °F (36.4 °C) 75 100 % 18 239/102 Pt moving arm -- -- CG            Physical Exam  Vitals and nursing note reviewed.   Constitutional:       General: She is in acute distress.      Appearance: Normal appearance. She is not ill-appearing, toxic-appearing or diaphoretic.      Comments: Anxious appearing, moderately distressed   HENT:      Head: Normocephalic and atraumatic.      Comments: No external signs of trauma.  No scalp hematoma.  Pupils pinpoint bilaterally     Right Ear: External ear normal.      Left Ear: External ear normal.   Eyes:      General:         Right eye: No discharge.         Left eye: No discharge.   Cardiovascular:      Comments: Regular rate and rhythm, no murmurs rubs or gallops.  Extremities warm and well-perfused without mottling  Pulmonary:      Effort: No respiratory distress.      Comments: No increased work of breathing.  Speaking in complete sentences.  Lungs clear to auscultation bilaterally without wheezes, rales, rhonchi.  Satting 100% on room air indicating adequate oxygenation  Abdominal:      General: There is no distension.      Comments: Abdomen soft, nontender, nondistended without rigidity, rebound, guarding   Musculoskeletal:         General: No deformity.      Cervical back: Normal range of motion.      Comments: Stating some tenderness to palpation of the right thigh but separately stating diminished sensation to light touch.  Standing diminished sensation to touch of the right calf and shin, full sensation of the foot.  Full strength of hip, knee flexion and extension, dorsal and plantarflexion.  No drift of the lower extremity on exam.  Full strength and sensation bilaterally of upper extremities without drift.  No facial droop.  Full sensation to light touch of the face.   Skin:     Findings: No  lesion or rash.   Neurological:      Mental Status: She is alert and oriented to person, place, and time. Mental status is at baseline.      Comments: Awake, alert, pleasant, interactive.     Psychiatric:         Mood and Affect: Mood and affect normal.         Results Reviewed       Procedure Component Value Units Date/Time    HS Troponin I 2hr [[id number]]  (Normal) Collected: 10/19/24 2008    Lab Status: Final result Specimen: Blood from Arm, Left Updated: 10/19/24 2041     hs TnI 2hr 7 ng/L      Delta 2hr hsTnI 0 ng/L     HS Troponin I 4hr [[id number]]     Lab Status: No result Specimen: Blood     Fingerstick Glucose (POCT) [[id number]]  (Normal) Collected: 10/19/24 1809    Lab Status: Final result Specimen: Blood Updated: 10/19/24 1920     POC Glucose 71 mg/dl     HS Troponin 0hr (reflex protocol) [[id number]]  (Normal) Collected: 10/19/24 1738    Lab Status: Final result Specimen: Blood from Arm, Right Updated: 10/19/24 1814     hs TnI 0hr 7 ng/L     Basic metabolic panel [[id number]]  (Abnormal) Collected: 10/19/24 1738    Lab Status: Final result Specimen: Blood from Arm, Right Updated: 10/19/24 1806     Sodium 140 mmol/L      Potassium 3.3 mmol/L      Chloride 106 mmol/L      CO2 25 mmol/L      ANION GAP 9 mmol/L      BUN 16 mg/dL      Creatinine 0.98 mg/dL      Glucose 89 mg/dL      Calcium 9.2 mg/dL      eGFR 58 ml/min/1.73sq m     Narrative:      National Kid[employer] guidelines for Chronic Kidney Disease (CKD):     Stage 1 with normal or high GFR (GFR > 90 mL/min/1.73 square meters)    Stage 2 Mild CKD (GFR = 60-89 mL/min/1.73 square meters)    Stage 3A Moderate CKD (GFR = 45-59 mL/min/1.73 square meters)    Stage 3B Moderate CKD (GFR = 30-44 mL/min/1.73 square meters)    Stage 4 Severe CKD (GFR = 15-29 mL/min/1.73 square meters)    Stage 5 End Stage CKD (GFR <15 mL/min/1.73 square meters)  Note: GFR calculation is accurate only with a steady state creatinine    Protime-INR [[id number]]  (Normal)  Collected: 10/19/24 1738    Lab Status: Final result Specimen: Blood from Arm, Right Updated: 10/19/24 1802     Protime 14.5 seconds      INR 1.08    Narrative:      INR Therapeutic Range    Indication                                             INR Range      Atrial Fibrillation                                               2.0-3.0  Hypercoagulable State                                    2.0.2.3  Left Ventricular Asist Device                            2.0-3.0  Mechanical Heart Valve                                  -    Aortic(with afib, MI, embolism, HF, LA enlargement,    and/or coagulopathy)                                     2.0-3.0 (2.5-3.5)     Mitral                                                             2.5-3.5  Prosthetic/Bioprosthetic Heart Valve               2.0-3.0  Venous thromboembolism (VTE: VT, PE        2.0-3.0    APTT [[id number]]  (Normal) Collected: 10/19/24 1738    Lab Status: Final result Specimen: Blood from Arm, Right Updated: 10/19/24 1802     PTT 26 seconds     CBC and Platelet [[id number]]  (Abnormal) Collected: 10/19/24 1738    Lab Status: Final result Specimen: Blood from Arm, Right Updated: 10/19/24 1748     WBC 7.73 Thousand/uL      RBC 4.35 Million/uL      Hemoglobin 13.1 g/dL      Hematocrit 40.6 %      MCV 93 fL      MCH 30.1 pg      MCHC 32.3 g/dL      RDW 18.4 %      Platelets 313 Thousands/uL      MPV 10.7 fL             CT abdomen pelvis with contrast   Final Interpretation by Too Joel MD (10/19 1959)         1. Mild disc degenerative change in the lumbar spine. No significant central canal stenosis or neural foraminal narrowing.   2. No evidence of diverticulitis, colitis or bowel obstruction.         Workstation performed: [id number]         CT stroke alert brain   Final Interpretation by Too Joel MD (10/19 1847)      Area of white matter hypoattenuation in the right frontal lobe could represent a subacute to chronic infarct. No intracranial  hemorrhage or mass effect.      Findings were directly discussed with José Miguel Carcamo at approximately  6:45 PM  .      Workstation performed: [id number]         CTA stroke alert (head/neck)   Final Interpretation by Too Joel MD (10/19 1847)      Unremarkable CTA neck and brain.      No hemodynamically significant stenosis, dissection or occlusion of the carotid or vertebral arteries or major vessels of the [place]. Hypoplastic vertebrobasilar system.         Findings were directly discussed with José Miguel Carcamo at  6:45 PM  .      Workstation performed: [id number]             CriticalCare Time    Date/Time: 10/19/2024 8:59 PM    Performed by: Bladimir Lugo MD  Authorized by: Bladimir Lugo MD    Critical care provider statement:     Critical care time (minutes):  45    Critical care time was exclusive of:  Separately billable procedures and treating other patients and teaching time    Critical care was necessary to treat or prevent imminent or life-threatening deterioration of the following conditions:  CNS failure or compromise    Critical care was time spent personally by me on the following activities:  Blood draw for specimens, interpretation of cardiac output measurements, ordering and performing treatments and interventions, obtaining history from patient or surrogate, development of treatment plan with patient or surrogate, discussions with consultants, evaluation of patient's response to treatment, examination of patient, review of old charts, re-evaluation of patient's condition, ordering and review of radiographic studies and ordering and review of laboratory studies  Comments:      Stroke alerted, discussed patient with neurology, treated with multiple IV antihypertensives to achieve blood pressure goal      ED Medication and Procedure Management   Prior to Admission Medications   Prescriptions Last Dose Informant Patient Reported? Taking?   Acetaminophen (TYLENOL 8 HOUR  PO)  Self Yes No   Sig: Take by mouth PRN   apixaban (Eliquis) 5 mg   No No   Sig: Take 1 tablet (5 mg total) by mouth 2 (two) times a day   desloratadine (CLARINEX) 5 MG tablet   No No   Sig: Take 1 tablet (5 mg total) by mouth daily   ezetimibe (ZETIA) 10 mg tablet   No No   Sig: Take 1 tablet (10 mg total) by mouth daily   famotidine (PEPCID) 20 mg tablet   No No   Sig: Take 1 tablet (20 mg total) by mouth 2 (two) times a day as needed for heartburn As needed   lidocaine (Lidoderm) 5 %   No No   Sig: Apply 1 patch topically over 12 hours daily Remove & Discard patch within 12 hours or as directed by MD   Patient not taking: Reported on 9/30/2024   meclizine (ANTIVERT) 12.5 MG tablet   No No   Sig: Take 1 tablet (12.5 mg total) by mouth every 12 (twelve) hours as needed for dizziness   ondansetron (Zofran ODT) 8 mg disintegrating tablet   No No   Sig: Take 1 tablet (8 mg total) by mouth every 8 (eight) hours as needed for nausea or vomiting   prochlorperazine (COMPAZINE) 5 mg tablet   Yes No   Patient not taking: Reported on 9/30/2024      Facility-Administered Medications: None     Patient's Medications   Discharge Prescriptions    No medications on file     No discharge procedures on file.  ED SEPSIS DOCUMENTATION   Time reflects when diagnosis was documented in both MDM as applicable and the Disposition within this note       Time User Action Codes Description Comment    10/19/2024  5:33 PM Bladimir Lugo Add [R20.0] Right leg numbness     10/19/2024  8:53 PM Bladimir Lugo Add [R29.898] Right leg weakness     10/19/2024  8:53 PM Bladimir Lugo Remove [R29.898] Right leg weakness     10/19/2024  8:53 PM Bladimir Lugo Add [M54.50] Lower back pain     10/19/2024  8:55 PM Bladimir Lugo Add [I10] Hypertension                  Bladimir Lugo MD  10/19/24 2100

## 2024-10-19 NOTE — TELEPHONE ENCOUNTER
"Reason for Disposition  • [1] Weakness (i.e., paralysis, loss of muscle strength) of the face, arm / hand, or leg / foot on one side of the body AND [2] sudden onset AND [3] present now  (Exception: Bell's palsy suspected [weakness on one side of the face, over hours to days].)    Answer Assessment - Initial Assessment Questions  1. SYMPTOM: \"What is the main symptom you are concerned about?\" (e.g., weakness, numbness)      Numbness of right leg    2. ONSET: \"When did this start?\" (minutes, hours, days; while sleeping)      Approx 45 minutes to 1 hour ago    3. LAST NORMAL: \"When was the last time you (the patient) were normal (no symptoms)?\"       Approx 45 minutes to 1 hour ago; numb since    4. PATTERN \"Does this come and go, or has it been constant since it started?\"  \"Is it present now?\"      Constant    5. CARDIAC SYMPTOMS: \"Have you had any of the following symptoms: chest pain, difficulty breathing, palpitations?\"      Denies    6. NEUROLOGIC SYMPTOMS: \"Have you had any of the following symptoms: headache, dizziness, vision loss, double vision, changes in speech, unsteady on your feet?\"      Reports entire thigh does not have sensation  Denies headache, dizziness or vision changes  Denies loss of bowel/bladder      7. OTHER SYMPTOMS: \"Do you have any other symptoms?\"      Mild back pain just below waistline; is able to move ankle and toes    Protocols used: Neurologic Deficit-Adult-      Recommend emergency room visit.  Patient requested assistance with calling [phone].  [phone]    "

## 2024-10-19 NOTE — ASSESSMENT & PLAN NOTE
Sudden onset right lower extremity sensory symptoms unclear etiology.  Could represent stroke versus local nerve compression, possibly due to adjacent cancer.  Recommend continuing Eliquis for now.  - Would treat accelerated hypertension, goal systolics less than 180 while anticoagulated, ideally 1 40-1 60.  - MRI brain with and without contrast to clarify above as well as the right frontal hypodensity seen on head CT.  - Would also consider MRI pelvis and lumbosacral plexus to evaluate for local neoplastic pathology.  - No additional recommendations at this time.  - Will follow workup

## 2024-10-20 ENCOUNTER — APPOINTMENT (OUTPATIENT)
Dept: RADIOLOGY | Facility: HOSPITAL | Age: 69
DRG: 074 | End: 2024-10-20
Payer: MEDICARE

## 2024-10-20 LAB
ALBUMIN SERPL BCG-MCNC: 3 G/DL (ref 3.5–5)
ALP SERPL-CCNC: 46 U/L (ref 34–104)
ALT SERPL W P-5'-P-CCNC: 9 U/L (ref 7–52)
ANION GAP SERPL CALCULATED.3IONS-SCNC: 8 MMOL/L (ref 4–13)
AST SERPL W P-5'-P-CCNC: 11 U/L (ref 13–39)
BASOPHILS # BLD AUTO: 0.03 THOUSANDS/ΜL (ref 0–0.1)
BASOPHILS NFR BLD AUTO: 0 % (ref 0–1)
BILIRUB SERPL-MCNC: 0.52 MG/DL (ref 0.2–1)
BUN SERPL-MCNC: 15 MG/DL (ref 5–25)
CALCIUM ALBUM COR SERPL-MCNC: 9.6 MG/DL (ref 8.3–10.1)
CALCIUM SERPL-MCNC: 8.8 MG/DL (ref 8.4–10.2)
CHLORIDE SERPL-SCNC: 108 MMOL/L (ref 96–108)
CHOLEST SERPL-MCNC: 193 MG/DL
CO2 SERPL-SCNC: 22 MMOL/L (ref 21–32)
CREAT SERPL-MCNC: 1.02 MG/DL (ref 0.6–1.3)
EOSINOPHIL # BLD AUTO: 0.09 THOUSAND/ΜL (ref 0–0.61)
EOSINOPHIL NFR BLD AUTO: 1 % (ref 0–6)
ERYTHROCYTE [DISTWIDTH] IN BLOOD BY AUTOMATED COUNT: 18.2 % (ref 11.6–15.1)
EST. AVERAGE GLUCOSE BLD GHB EST-MCNC: 123 MG/DL
EST. AVERAGE GLUCOSE BLD GHB EST-MCNC: 123 MG/DL
GFR SERPL CREATININE-BSD FRML MDRD: 55 ML/MIN/1.73SQ M
GLUCOSE P FAST SERPL-MCNC: 103 MG/DL (ref 65–99)
GLUCOSE SERPL-MCNC: 103 MG/DL (ref 65–140)
HBA1C MFR BLD: 5.9 %
HBA1C MFR BLD: 5.9 %
HCT VFR BLD AUTO: 35.2 % (ref 34.8–46.1)
HDLC SERPL-MCNC: 52 MG/DL
HGB BLD-MCNC: 11.5 G/DL (ref 11.5–15.4)
IMM GRANULOCYTES # BLD AUTO: 0.01 THOUSAND/UL (ref 0–0.2)
IMM GRANULOCYTES NFR BLD AUTO: 0 % (ref 0–2)
LDLC SERPL CALC-MCNC: 118 MG/DL (ref 0–100)
LYMPHOCYTES # BLD AUTO: 2.29 THOUSANDS/ΜL (ref 0.6–4.47)
LYMPHOCYTES NFR BLD AUTO: 31 % (ref 14–44)
MCH RBC QN AUTO: 30.3 PG (ref 26.8–34.3)
MCHC RBC AUTO-ENTMCNC: 32.7 G/DL (ref 31.4–37.4)
MCV RBC AUTO: 93 FL (ref 82–98)
MONOCYTES # BLD AUTO: 1.23 THOUSAND/ΜL (ref 0.17–1.22)
MONOCYTES NFR BLD AUTO: 17 % (ref 4–12)
NEUTROPHILS # BLD AUTO: 3.64 THOUSANDS/ΜL (ref 1.85–7.62)
NEUTS SEG NFR BLD AUTO: 51 % (ref 43–75)
NRBC BLD AUTO-RTO: 0 /100 WBCS
PLATELET # BLD AUTO: 268 THOUSANDS/UL (ref 149–390)
PMV BLD AUTO: 10.8 FL (ref 8.9–12.7)
POTASSIUM SERPL-SCNC: 3.7 MMOL/L (ref 3.5–5.3)
PROT SERPL-MCNC: 6.1 G/DL (ref 6.4–8.4)
RBC # BLD AUTO: 3.79 MILLION/UL (ref 3.81–5.12)
SODIUM SERPL-SCNC: 138 MMOL/L (ref 135–147)
T4 FREE SERPL-MCNC: 0.79 NG/DL (ref 0.61–1.12)
TRIGL SERPL-MCNC: 117 MG/DL
TSH SERPL DL<=0.05 MIU/L-ACNC: 7.1 UIU/ML (ref 0.45–4.5)
WBC # BLD AUTO: 7.29 THOUSAND/UL (ref 4.31–10.16)

## 2024-10-20 PROCEDURE — 86140 C-REACTIVE PROTEIN: CPT | Performed by: PHYSICIAN ASSISTANT

## 2024-10-20 PROCEDURE — A9585 GADOBUTROL INJECTION: HCPCS | Performed by: NURSE PRACTITIONER

## 2024-10-20 PROCEDURE — 85025 COMPLETE CBC W/AUTO DIFF WBC: CPT | Performed by: HOSPITALIST

## 2024-10-20 PROCEDURE — 70553 MRI BRAIN STEM W/O & W/DYE: CPT

## 2024-10-20 PROCEDURE — 80053 COMPREHEN METABOLIC PANEL: CPT | Performed by: HOSPITALIST

## 2024-10-20 PROCEDURE — 99232 SBSQ HOSP IP/OBS MODERATE 35: CPT | Performed by: NURSE PRACTITIONER

## 2024-10-20 PROCEDURE — 84443 ASSAY THYROID STIM HORMONE: CPT | Performed by: HOSPITALIST

## 2024-10-20 PROCEDURE — 80061 LIPID PANEL: CPT | Performed by: HOSPITALIST

## 2024-10-20 PROCEDURE — 84439 ASSAY OF FREE THYROXINE: CPT | Performed by: HOSPITALIST

## 2024-10-20 PROCEDURE — 82550 ASSAY OF CK (CPK): CPT | Performed by: PHYSICIAN ASSISTANT

## 2024-10-20 PROCEDURE — 83036 HEMOGLOBIN GLYCOSYLATED A1C: CPT | Performed by: HOSPITALIST

## 2024-10-20 PROCEDURE — 83036 HEMOGLOBIN GLYCOSYLATED A1C: CPT | Performed by: NURSE PRACTITIONER

## 2024-10-20 RX ORDER — AMLODIPINE BESYLATE 5 MG/1
5 TABLET ORAL ONCE
Status: DISCONTINUED | OUTPATIENT
Start: 2024-10-20 | End: 2024-10-20

## 2024-10-20 RX ORDER — LORAZEPAM 2 MG/ML
0.5 INJECTION INTRAMUSCULAR ONCE
Status: COMPLETED | OUTPATIENT
Start: 2024-10-20 | End: 2024-10-20

## 2024-10-20 RX ORDER — LANOLIN ALCOHOL/MO/W.PET/CERES
3 CREAM (GRAM) TOPICAL ONCE
Status: COMPLETED | OUTPATIENT
Start: 2024-10-20 | End: 2024-10-20

## 2024-10-20 RX ORDER — GADOBUTROL 604.72 MG/ML
9 INJECTION INTRAVENOUS
Status: COMPLETED | OUTPATIENT
Start: 2024-10-20 | End: 2024-10-20

## 2024-10-20 RX ADMIN — LORATADINE 10 MG: 10 TABLET ORAL at 08:56

## 2024-10-20 RX ADMIN — LORAZEPAM 0.5 MG: 2 INJECTION INTRAMUSCULAR; INTRAVENOUS at 09:59

## 2024-10-20 RX ADMIN — APIXABAN 5 MG: 5 TABLET, FILM COATED ORAL at 17:04

## 2024-10-20 RX ADMIN — Medication 3 MG: at 02:13

## 2024-10-20 RX ADMIN — EZETIMIBE 10 MG: 10 TABLET ORAL at 21:54

## 2024-10-20 RX ADMIN — LABETALOL HYDROCHLORIDE 10 MG: 5 INJECTION, SOLUTION INTRAVENOUS at 11:33

## 2024-10-20 RX ADMIN — GADOBUTROL 9 ML: 604.72 INJECTION INTRAVENOUS at 10:39

## 2024-10-20 RX ADMIN — APIXABAN 5 MG: 5 TABLET, FILM COATED ORAL at 08:56

## 2024-10-20 NOTE — INCIDENTAL FINDINGS
The following findings require follow up:  Radiographic finding   Finding: MRI of brain shows no evidence of metastatic disease, focus of FLAIR abnormality in right posterior frontal deep white matter corresponds to the area of low-density on CT.  This likely represents either gliosis or mild edema surrounding a venous angioma.  Follow-up gadolinium enhanced MRI is recommended in 2 months to exclude more aggressive pathologies as per radiology report.   Follow up required: Gadolinium enhanced MRI of brain   Follow up should be done within 2 month(s)    Please notify the following clinician to assist with the follow up:   Dr. Augusto Sanches    Incidental finding results were discussed with the Patient  and daughter by DEEPAK López on 10/20/24.   They expressed understanding and all questions answered.

## 2024-10-20 NOTE — PROGRESS NOTES
Progress Note - Hospitalist   Name: Itzel Sanches 69 y.o. female I MRN: [id number]  Unit/Bed#: [address] [place] [address] Date of Admission: 10/19/2024   Date of Service: 10/20/2024 I Hospital Day: 0    Assessment & Plan  Stroke-like symptom  Patient presents with right lower extremity numbness and on CTH has a right frontal hypodensity described as could represent subacute versus chronic infarct which is contralateral to the side of her symptoms.  This is with a background of metastatic rectosigmoid cancer.  [facility] stroke orders placed; telemetry monitor, neurochecks, MRI of the brain 2D echo.  PT OT speech.  Neurology recommends continue Eliquis.  Lipid panel A1c as part of the protocol ordered for completeness of workup.  MRI of the lumbar and pelvis are also ordered given malignancy history; MRI team indicated that this would most likely be performed on 10/21, follow-up on report  MRI of the brain was performed 10/20 with no evidence of metastatic disease, focus of FLAIR abnormality in right posterior frontal deep white matter corresponds to the area of low-density on CT.  This likely represents either gliosis or mild edema surrounding a venous angioma, follow-up gadolinium enhanced MRI recommended in 2 months to exclude more aggressive pathologies as per radiology report.  Right leg numbness  Patient noted to have right lower extremity numbness, has sensation in her right foot however numbness from ankle up to groin.  She is able to pump her right foot however is unable to bend her right knee or move her right leg.  MRI lumbar pelvis are ordered, follow-up on results  PT/OT  Monitor  Hypertension  Patient with no prior hypertension.    Patient largely asymptomatic.    Will place on labetalol IV as needed.    Hold off on instituting standing antihypertensive for now  Neurology indicated SBP less than 180 with goal 140-160.  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HCC)  As outlined in HPI.    Await further MRI imaging as above.  GERD  (gastroesophageal reflux disease)  Continue PPI  Mixed hyperlipidemia  On Zetia  Heart healthy diet  Chemotherapy-induced neuropathy (HCC)  Chronic.  History of DVT (deep vein thrombosis)  Continue Eliquis    VTE Pharmacologic Prophylaxis: VTE Score: 9 High Risk (Score >/= 5) - Pharmacological DVT Prophylaxis Ordered: apixaban (Eliquis). Sequential Compression Devices Ordered.    Mobility:   Basic Mobility Inpatient Raw Score: 14  JH-HLM Goal: 4: Move to chair/commode  JH-HLM Achieved: 4: Move to chair/commode  JH-HLM Goal achieved. Continue to encourage appropriate mobility.    Patient Centered Rounds: I performed bedside rounds with nursing staff today.   Discussions with Specialists or Other Care Team Provider: nursing     Education and Discussions with Family / Patient:  patient indicated she would call her family .     Current Length of Stay: 0 day(s)  Current Patient Status: Inpatient   Certification Statement: The patient will continue to require additional inpatient hospital stay due to MRI lumbar/pelvis; PT and OT eval, serial leg exams, neuro consult  Discharge Plan: Anticipate discharge in 24-48 hrs to discharge location to be determined pending rehab evaluations.    Code Status: Prior    Subjective   Patient seen sitting up in bed resting comfortably.  She reports her only complaint that she has is the numbness in her right lower extremity and inability to lift or bend that leg.  She is able to do foot pump on the right side, noted to be slightly weaker than left foot.  She does have sensation in her right foot which she feels is slightly less than noted in her left foot.  However she has no sensation from her ankle up to her groin on the right lower extremity.  Good appetite no nausea or vomiting.  She reports that she does have some anxiety about going into the MRI and is asking for something to help sedate her prior to MRI today.    Objective :  Temp:  [97.5 °F (36.4 °C)-97.7 °F (36.5 °C)] 97.5 °F  (36.4 °C)  HR:  [65-96] 75  BP: (130-239)/() 181/86  Resp:  [14-44] 19  SpO2:  [94 %-100 %] 97 %  O2 Device: None (Room air)    Body mass index is 41.81 kg/m².     Input and Output Summary (last 24 hours):   No intake or output data in the 24 hours ending 10/20/24 1142    Physical Exam  Vitals and nursing note reviewed.   Constitutional:       Appearance: She is obese.   HENT:      Head: Normocephalic.      Nose: Nose normal.      Mouth/Throat:      Mouth: Mucous membranes are moist.   Eyes:      Extraocular Movements: Extraocular movements intact.      Conjunctiva/sclera: Conjunctivae normal.      Pupils: Pupils are equal, round, and reactive to light.   Cardiovascular:      Rate and Rhythm: Normal rate and regular rhythm.      Pulses: Normal pulses.      Heart sounds: Normal heart sounds.   Pulmonary:      Effort: Pulmonary effort is normal.      Breath sounds: Normal breath sounds.   Abdominal:      General: Bowel sounds are normal. There is no distension.      Palpations: Abdomen is soft.      Tenderness: There is no abdominal tenderness.   Genitourinary:     Comments: Voiding spontaneously, denies any incontinence of bowel or bladder  Musculoskeletal:      Cervical back: Normal range of motion.      Comments: Right LE patient has sensation to foot, able to pump it slightly weaker than left; unable to bend leg, lift leg. Has no sensation from ankle to groin. Pedal pulse +, no discoloration noted.    Skin:     General: Skin is warm and dry.      Capillary Refill: Capillary refill takes less than 2 seconds.   Neurological:      Mental Status: She is alert and oriented to person, place, and time.      Comments: Able to pump right foot, cannot lift or bend right leg. Has sensation in right foot, however no sensation from ankle to groin on right.    Psychiatric:         Mood and Affect: Mood normal.         Behavior: Behavior normal.         Thought Content: Thought content normal.         Judgment: Judgment  normal.           Lines/Drains:  Lines/Drains/Airways       Active Status       Name Placement date Placement time Site Days    Port A Cath  Right Subclavian --  --  Subclavian  --    External Urinary Catheter 10/20/24  0545  -- less than 1                    Central Line:  Goal for removal: Will discontinue when meds requiring line are completed.         Telemetry:  Telemetry Orders (From admission, onward)               24 Hour Telemetry Monitoring  Continuous x 24 Hours (Telem)        Question:  Reason for 24 Hour Telemetry  Answer:  TIA/Suspected CVA/ Confirmed CVA                     Telemetry Reviewed: Normal Sinus Rhythm  Indication for Continued Telemetry Use: No indication for continued use. Will discontinue.                Lab Results: I have reviewed the following results:   Results from last 7 days   Lab Units 10/20/24  0412   WBC Thousand/uL 7.29   HEMOGLOBIN g/dL 11.5   HEMATOCRIT % 35.2   PLATELETS Thousands/uL 268   SEGS PCT % 51   LYMPHO PCT % 31   MONO PCT % 17*   EOS PCT % 1     Results from last 7 days   Lab Units 10/20/24  0412   SODIUM mmol/L 138   POTASSIUM mmol/L 3.7   CHLORIDE mmol/L 108   CO2 mmol/L 22   BUN mg/dL 15   CREATININE mg/dL 1.02   ANION GAP mmol/L 8   CALCIUM mg/dL 8.8   ALBUMIN g/dL 3.0*   TOTAL BILIRUBIN mg/dL 0.52   ALK PHOS U/L 46   ALT U/L 9   AST U/L 11*   GLUCOSE RANDOM mg/dL 103     Results from last 7 days   Lab Units 10/19/24  1738   INR  1.08     Results from last 7 days   Lab Units 10/19/24  1809   POC GLUCOSE mg/dl 71               Recent Cultures (last 7 days):         Imaging Results Review: I reviewed radiology reports from this admission including: CT head and MRI brain.  Other Study Results Review: No additional pertinent studies reviewed.    Last 24 Hours Medication List:     Current Facility-Administered Medications:     acetaminophen (TYLENOL) tablet 650 mg, Q6H PRN    apixaban (ELIQUIS) tablet 5 mg, BID    ezetimibe (ZETIA) tablet 10 mg, HS    famotidine  (PEPCID) tablet 10 mg, BID PRN    labetalol (NORMODYNE) injection 10 mg, Q4H PRN    loratadine (CLARITIN) tablet 10 mg, Daily    ondansetron (ZOFRAN) injection 4 mg, Q6H PRN    Administrative Statements   Today, Patient Was Seen By: DEEPAK López  I have spent a total time of greater than 45 minutes in caring for this patient on the day of the visit/encounter including Diagnostic results, Counseling / Coordination of care, Documenting in the medical record, Reviewing / ordering tests, medicine, procedures  , and Communicating with other healthcare professionals .    **Please Note: This note may have been constructed using a voice recognition system.**

## 2024-10-20 NOTE — ASSESSMENT & PLAN NOTE
Patient noted to have right lower extremity numbness, has sensation in her right foot however numbness from ankle up to groin.  She is able to pump her right foot however is unable to bend her right knee or move her right leg.  MRI lumbar pelvis are ordered, follow-up on results  PT/OT  Monitor

## 2024-10-20 NOTE — ASSESSMENT & PLAN NOTE
Patient with no prior hypertension.  Patient largely asymptomatic.  Will place on labetalol IV as needed.  Hold off on instituting standing antihypertensive for now  Neurology indicated SBP less than 180 with goal 140-160.

## 2024-10-20 NOTE — ASSESSMENT & PLAN NOTE
Patient presents with right lower extremity numbness and on CTH has a right frontal hypodensity described as could represent subacute versus chronic infarct which is contralateral to the side of her symptoms.  This is with a background of metastatic rectosigmoid cancer.  [facility] stroke orders placed; telemetry monitor, neurochecks, MRI of the brain 2D echo.  PT OT speech.  Neurology recommends continue Eliquis.  Lipid panel A1c as part of the protocol ordered for completeness of workup.  MRI of the lumbar and pelvis are also ordered given malignancy history; MRI team indicated that this would most likely be performed on 10/21, follow-up on report  MRI of the brain was performed 10/20 with no evidence of metastatic disease, focus of FLAIR abnormality in right posterior frontal deep white matter corresponds to the area of low-density on CT.  This likely represents either gliosis or mild edema surrounding a venous angioma, follow-up gadolinium enhanced MRI recommended in 2 months to exclude more aggressive pathologies as per radiology report.

## 2024-10-20 NOTE — PLAN OF CARE
Problem: PAIN - ADULT  Goal: Verbalizes/displays adequate comfort level or baseline comfort level  Description: Interventions:  - Encourage patient to monitor pain and request assistance  - Assess pain using appropriate pain scale  - Administer analgesics based on type and severity of pain and evaluate response  - Implement non-pharmacological measures as appropriate and evaluate response  - Consider cultural and social influences on pain and pain management  - Notify physician/advanced practitioner if interventions unsuccessful or patient reports new pain  Outcome: Progressing     Problem: INFECTION - ADULT  Goal: Absence or prevention of progression during hospitalization  Description: INTERVENTIONS:  - Assess and monitor for signs and symptoms of infection  - Monitor lab/diagnostic results  - Monitor all insertion sites, i.e. indwelling lines, tubes, and drains  - Monitor endotracheal if appropriate and nasal secretions for changes in amount and color  - [place] appropriate cooling/warming therapies per order  - Administer medications as ordered  - Instruct and encourage patient and family to use good hand hygiene technique  - Identify and instruct in appropriate isolation precautions for identified infection/condition  Outcome: Progressing  Goal: Absence of fever/infection during neutropenic period  Description: INTERVENTIONS:  - Monitor WBC    Outcome: Progressing     Problem: SAFETY ADULT  Goal: Patient will remain free of falls  Description: INTERVENTIONS:  - Educate patient/family on patient safety including physical limitations  - Instruct patient to call for assistance with activity   - Consult OT/PT to assist with strengthening/mobility   - Keep Call bell within reach  - Keep bed low and locked with side rails adjusted as appropriate  - Keep care items and personal belongings within reach  - Initiate and maintain comfort rounds  - Make Fall Risk Sign visible to staff  - Offer Toileting every 2 Hours,  in advance of need  - Initiate/Maintain bed and chair alarm  - Obtain necessary fall risk management equipment: bed and chair alarm/yellow socks and bracelet   - Apply yellow socks and bracelet for high fall risk patients  - Consider moving patient to room near nurses station  Outcome: Progressing  Goal: Maintain or return to baseline ADL function  Description: INTERVENTIONS:  -  Assess patient's ability to carry out ADLs; assess patient's baseline for ADL function and identify physical deficits which impact ability to perform ADLs (bathing, care of mouth/teeth, toileting, grooming, dressing, etc.)  - Assess/evaluate cause of self-care deficits   - Assess range of motion  - Assess patient's mobility; develop plan if impaired  - Assess patient's need for assistive devices and provide as appropriate  - Encourage maximum independence but intervene and supervise when necessary  - Involve family in performance of ADLs  - Assess for home care needs following discharge   - Consider OT consult to assist with ADL evaluation and planning for discharge  - Provide patient education as appropriate  Outcome: Progressing  Goal: Maintains/Returns to pre admission functional level  Description: INTERVENTIONS:  - Perform AM-PAC 6 Click Basic Mobility/ Daily Activity assessment daily.  - Set and communicate daily mobility goal to care team and patient/family/caregiver.   - Collaborate with rehabilitation services on mobility goals if consulted  - Perform Range of Motion 3 times a day.  - Reposition patient every 2 hours.  - Dangle patient 3 times a day  - Stand patient 3 times a day  - Ambulate patient 3 times a day  - Out of bed to chair 3 times a day   - Out of bed for meals 3 times a day  - Out of bed for toileting  - Record patient progress and toleration of activity level   Outcome: Progressing     Problem: DISCHARGE PLANNING  Goal: Discharge to home or other facility with appropriate resources  Description: INTERVENTIONS:  -  Identify barriers to discharge w/patient and caregiver  - Arrange for needed discharge resources and transportation as appropriate  - Identify discharge learning needs (meds, wound care, etc.)  - Arrange for interpretive services to assist at discharge as needed  - Refer to Case Management Department for coordinating discharge planning if the patient needs post-hospital services based on physician/advanced practitioner order or complex needs related to functional status, cognitive ability, or social support system  Outcome: Progressing     Problem: Knowledge Deficit  Goal: Patient/family/caregiver demonstrates understanding of disease process, treatment plan, medications, and discharge instructions  Description: Complete learning assessment and assess knowledge base.  Interventions:  - Provide teaching at level of understanding  - Provide teaching via preferred learning methods  Outcome: Progressing     Problem: Neurological Deficit  Goal: Neurological status is stable or improving  Description: Interventions:  - Monitor and assess patient's level of consciousness, motor function, sensory function, and level of assistance needed for ADLs.   - Monitor and report changes from baseline. Collaborate with interdisciplinary team to initiate plan and implement interventions as ordered.   - Provide and maintain a safe environment.  - Consider seizure precautions.  - Consider fall precautions.  - Consider aspiration precautions.  - Consider bleeding precautions.  Outcome: Progressing     Problem: Activity Intolerance/Impaired Mobility  Goal: Mobility/activity is maintained at optimum level for patient  Description: Interventions:  - Assess and monitor patient  barriers to mobility and need for assistive/adaptive devices.  - Assess patient's emotional response to limitations.  - Collaborate with interdisciplinary team and initiate plans and interventions as ordered.  - Encourage independent activity per ability.  - Maintain  proper body alignment.  - Perform active/passive rom as tolerated/ordered.  - Plan activities to conserve energy.  - Turn patient as appropriate  Outcome: Progressing     Problem: Communication Impairment  Goal: Ability to express needs and understand communication  Description: Assess patient's communication skills and ability to understand information.  Patient will demonstrate use of effective communication techniques, alternative methods of communication and understanding even if not able to speak.     - Encourage communication and provide alternate methods of communication as needed.  - Collaborate with case management/ for discharge needs.  - Include patient/family/caregiver in decisions related to communication.  Outcome: Progressing     Problem: Potential for Aspiration  Goal: Non-ventilated patient's risk of aspiration is minimized  Description: Assess and monitor vital signs, respiratory status, and labs (WBC).  Monitor for signs of aspiration (tachypnea, cough, rales, wheezing, cyanosis, fever).    - Assess and monitor patient's ability to swallow.  - Place patient up in chair to eat if possible.  - HOB up at 90 degrees to eat if unable to get patient up into chair.  - Supervise patient during oral intake.   - Instruct patient/ family to take small bites.  - Instruct patient/ family to take small single sips when taking liquids.  - Follow patient-specific strategies generated by speech pathologist.  Outcome: Progressing  Goal: Ventilated patient's risk of aspiration is minimized  Description: Assess and monitor vital signs, respiratory status, airway cuff pressure, and labs (WBC).  Monitor for signs of aspiration (tachypnea, cough, rales, wheezing, cyanosis, fever).    - Elevate head of bed 30 degrees if patient has tube feeding.  - Monitor tube feeding.  Outcome: Progressing     Problem: Nutrition  Goal: Nutrition/Hydration status is improving  Description: Monitor and assess patient's  nutrition/hydration status for malnutrition (ex- brittle hair, bruises, dry skin, pale skin and conjunctiva, muscle wasting, smooth red tongue, and disorientation). Collaborate with interdisciplinary team and initiate plan and interventions as ordered.  Monitor patient's weight and dietary intake as ordered or per policy. Utilize nutrition screening tool and intervene per policy. Determine patient's food preferences and provide high-protein, high-caloric foods as appropriate.     - Assist patient with eating.  - Allow adequate time for meals.  - Encourage patient to take dietary supplement as ordered.  - Collaborate with clinical nutritionist.  - Include patient/family/caregiver in decisions related to nutrition.  Outcome: Progressing     Problem: NEUROSENSORY - ADULT  Goal: Achieves stable or improved neurological status  Description: INTERVENTIONS  - Monitor and report changes in neurological status  - Monitor vital signs such as temperature, blood pressure, glucose, and any other labs ordered   - Initiate measures to prevent increased intracranial pressure  - Monitor for seizure activity and implement precautions if appropriate      Outcome: Progressing  Goal: Remains free of injury related to seizures activity  Description: INTERVENTIONS  - Maintain airway, patient safety  and administer oxygen as ordered  - Monitor patient for seizure activity, document and report duration and description of seizure to physician/advanced practitioner  - If seizure occurs,  ensure patient safety during seizure  - Reorient patient post seizure  - Seizure pads on all 4 side rails  - Instruct patient/family to notify RN of any seizure activity including if an aura is experienced  - Instruct patient/family to call for assistance with activity based on nursing assessment  - Administer anti-seizure medications if ordered    Outcome: Progressing  Goal: Achieves maximal functionality and self care  Description: INTERVENTIONS  - Monitor  swallowing and airway patency with patient fatigue and changes in neurological status  - Encourage and assist patient to increase activity and self care.   - Encourage visually impaired, hearing impaired and aphasic patients to use assistive/communication devices  Outcome: Progressing     Problem: CARDIOVASCULAR - ADULT  Goal: Maintains optimal cardiac output and hemodynamic stability  Description: INTERVENTIONS:  - Monitor I/O, vital signs and rhythm  - Monitor for S/S and trends of decreased cardiac output  - Administer and titrate ordered vasoactive medications to optimize hemodynamic stability  - Assess quality of pulses, skin color and temperature  - Assess for signs of decreased coronary artery perfusion  - Instruct patient to report change in severity of symptoms  Outcome: Progressing  Goal: Absence of cardiac dysrhythmias or at baseline rhythm  Description: INTERVENTIONS:  - Continuous cardiac monitoring, vital signs, obtain 12 lead EKG if ordered  - Administer antiarrhythmic and heart rate control medications as ordered  - Monitor electrolytes and administer replacement therapy as ordered  Outcome: Progressing     Problem: RESPIRATORY - ADULT  Goal: Achieves optimal ventilation and oxygenation  Description: INTERVENTIONS:  - Assess for changes in respiratory status  - Assess for changes in mentation and behavior  - Position to facilitate oxygenation and minimize respiratory effort  - Oxygen administered by appropriate delivery if ordered  - Initiate smoking cessation education as indicated  - Encourage broncho-pulmonary hygiene including cough, deep breathe, Incentive Spirometry  - Assess the need for suctioning and aspirate as needed  - Assess and instruct to report SOB or any respiratory difficulty  - Respiratory Therapy support as indicated  Outcome: Progressing     Problem: GASTROINTESTINAL - ADULT  Goal: Minimal or absence of nausea and/or vomiting  Description: INTERVENTIONS:  - Administer IV fluids  if ordered to ensure adequate hydration  - Maintain NPO status until nausea and vomiting are resolved  - Nasogastric tube if ordered  - Administer ordered antiemetic medications as needed  - Provide nonpharmacologic comfort measures as appropriate  - Advance diet as tolerated, if ordered  - Consider nutrition services referral to assist patient with adequate nutrition and appropriate food choices  Outcome: Progressing  Goal: Maintains or returns to baseline bowel function  Description: INTERVENTIONS:  - Assess bowel function  - Encourage oral fluids to ensure adequate hydration  - Administer IV fluids if ordered to ensure adequate hydration  - Administer ordered medications as needed  - Encourage mobilization and activity  - Consider nutritional services referral to assist patient with adequate nutrition and appropriate food choices  Outcome: Progressing  Goal: Maintains adequate nutritional intake  Description: INTERVENTIONS:  - Monitor percentage of each meal consumed  - Identify factors contributing to decreased intake, treat as appropriate  - Assist with meals as needed  - Monitor I&O, weight, and lab values if indicated  - Obtain nutrition services referral as needed  Outcome: Progressing  Goal: Establish and maintain optimal ostomy function  Description: INTERVENTIONS:  - Assess bowel function  - Encourage oral fluids to ensure adequate hydration  - Administer IV fluids if ordered to ensure adequate hydration   - Administer ordered medications as needed  - Encourage mobilization and activity  - Nutrition services referral to assist patient with appropriate food choices  - Assess stoma site  - Consider wound care consult   Outcome: Progressing  Goal: Oral mucous membranes remain intact  Description: INTERVENTIONS  - Assess oral mucosa and hygiene practices  - Implement preventative oral hygiene regimen  - Implement oral medicated treatments as ordered  - Initiate Nutrition services referral as needed  Outcome:  Progressing     Problem: GENITOURINARY - ADULT  Goal: Maintains or returns to baseline urinary function  Description: INTERVENTIONS:  - Assess urinary function  - Encourage oral fluids to ensure adequate hydration if ordered  - Administer IV fluids as ordered to ensure adequate hydration  - Administer ordered medications as needed  - Offer frequent toileting  - Follow urinary retention protocol if ordered  Outcome: Progressing  Goal: Absence of urinary retention  Description: INTERVENTIONS:  - Assess patient’s ability to void and empty bladder  - Monitor I/O  - Bladder scan as needed  - Discuss with physician/AP medications to alleviate retention as needed  - Discuss catheterization for long term situations as appropriate  Outcome: Progressing  Goal: Urinary catheter remains patent  Description: INTERVENTIONS:  - Assess patency of urinary catheter  - If patient has a chronic ortiz, consider changing catheter if non-functioning  - Follow guidelines for intermittent irrigation of non-functioning urinary catheter  Outcome: Progressing     Problem: METABOLIC, FLUID AND ELECTROLYTES - ADULT  Goal: Electrolytes maintained within normal limits  Description: INTERVENTIONS:  - Monitor labs and assess patient for signs and symptoms of electrolyte imbalances  - Administer electrolyte replacement as ordered  - Monitor response to electrolyte replacements, including repeat lab results as appropriate  - Instruct patient on fluid and nutrition as appropriate  Outcome: Progressing  Goal: Fluid balance maintained  Description: INTERVENTIONS:  - Monitor labs   - Monitor I/O and WT  - Instruct patient on fluid and nutrition as appropriate  - Assess for signs & symptoms of volume excess or deficit  Outcome: Progressing  Goal: Glucose maintained within target range  Description: INTERVENTIONS:  - Monitor Blood Glucose as ordered  - Assess for signs and symptoms of hyperglycemia and hypoglycemia  - Administer ordered medications to  maintain glucose within target range  - Assess nutritional intake and initiate nutrition service referral as needed  Outcome: Progressing     Problem: SKIN/TISSUE INTEGRITY - ADULT  Goal: Skin Integrity remains intact(Skin Breakdown Prevention)  Description: Assess:  -Perform Madi assessment every shift  -Clean and moisturize skin every daily/PRN   -Inspect skin when repositioning, toileting, and assisting with ADLS  -Assess extremities for adequate circulation and sensation     Bed Management:  -Have minimal linens on bed & keep smooth, unwrinkled  -Change linens as needed when moist or perspiring  -Avoid sitting or lying in one position for more than 2 hours while in bed  -Keep HOB at 45 degrees     Toileting:  -Offer bedside commode  -Assess for incontinence every hour   -Use incontinent care products after each incontinent episode such as foaming cleanser     Activity:  -Mobilize patient 3 times a day  -Encourage activity and walks on unit  -Encourage or provide ROM exercises   -Turn and reposition patient every 2 Hours  -Use appropriate equipment to lift or move patient in bed  -Instruct/ Assist with weight shifting every hour when out of bed in chair  -Consider limitation of chair time 2 hour intervals    Skin Care:  -Avoid use of baby powder, tape, friction and shearing, hot water or constrictive clothing  -Relieve pressure over bony prominences using positioning system  -Do not massage red bony areas    Next Steps:  -Teach patient strategies to minimize risks such as turning/repositioning and hygiene    -Consider consults to  interdisciplinary teams such as wound care   Outcome: Progressing  Goal: Incision(s), wounds(s) or drain site(s) healing without S/S of infection  Description: INTERVENTIONS  - Assess and document dressing, incision, wound bed, drain sites and surrounding tissue  - Provide patient and family education  - Perform skin care/dressing changes every 2 hours   Outcome: Progressing  Problem:  MUSCULOSKELETAL - ADULT  Goal: Maintain or return mobility to safest level of function  Description: INTERVENTIONS:  - Assess patient's ability to carry out ADLs; assess patient's baseline for ADL function and identify physical deficits which impact ability to perform ADLs (bathing, care of mouth/teeth, toileting, grooming, dressing, etc.)  - Assess/evaluate cause of self-care deficits   - Assess range of motion  - Assess patient's mobility  - Assess patient's need for assistive devices and provide as appropriate  - Encourage maximum independence but intervene and supervise when necessary  - Involve family in performance of ADLs  - Assess for home care needs following discharge   - Consider OT consult to assist with ADL evaluation and planning for discharge  - Provide patient education as appropriate  Outcome: Progressing  Goal: Maintain proper alignment of affected body part  Description: INTERVENTIONS:  - Support, maintain and protect limb and body alignment  - Provide patient/ family with appropriate education  Outcome: Progressing

## 2024-10-20 NOTE — H&P
H&P - Hospitalist   Name: Itzel Sanches 69 y.o. female I MRN: [id number]  Unit/Bed#: [address] [place] [address] Date of Admission: 10/19/2024   Date of Service: 10/19/2024 I Hospital Day: 0     Assessment & Plan  Stroke-like symptom  Patient presents with right lower extremity numbness and on CTH has a right frontal hypodensity described as could represent subacute versus chronic infarct which is contralateral to the side of her symptoms.  This is with a background of metastatic rectosigmoid cancer.  [facility] stroke orders placed; telemetry monitor, neurochecks, MRI of the brain 2D echo.  PT OT speech.  Neurology recommends continue Eliquis.  Lipid panel A1c as part of the protocol ordered for completeness of workup.  MRI of the lumbar and pelvis are also ordered given malignancy history.  Right leg numbness  As outlined above.  Hypertension  Patient with no prior hypertension.  Patient largely asymptomatic.  Will place on labetalol IV as needed.  Hold off on instituting standing antihypertensive for now  Neurology indicated SBP less than 180 with goal 140-160.  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HCC)  As outlined in HPI.  Await further MRI imaging as above.  GERD (gastroesophageal reflux disease)  Continue PPI  Mixed hyperlipidemia  Continue Zetia  Chemotherapy-induced neuropathy (HCC)  Chronic.  History of DVT (deep vein thrombosis)  Continue Eliquis      VTE Pharmacologic Prophylaxis:   Moderate Risk (Score 3-4) - Pharmacological DVT Prophylaxis Ordered: apixaban (Eliquis).  Code Status: Prior   Discussion with family:  None at bedside.     Anticipated Length of Stay: Patient will be admitted on an inpatient basis with an anticipated length of stay of greater than 2 midnights secondary to right leg numbness.    History of Present Illness   Chief Complaint: Right leg weakness    Itzel Sanches is a 69 y.o. female with a PMH of metastatic rectosigmoid adenocarcinoma who presents with right lower extremity numbness.  Oncology  history is reviewed in recent office visit October 14, 2024 by her oncologist Dr. Patiño.    Briefly, most recently patient has been on FOLFIRI and avastin and has completed 6 cycles so far from 4/8 to 6/24/24 this is after CAT scan in March showed new mediastinal amee hepatis adenopathy increased paraspinal lesion anterior to L1 and stable lesion at T12 and although liver lesion was stable there was a new small lesion suspicious to be additional metastatic implant CT abdomen pelvis July 2024 showed interval increase in left 11th rib mets decrease in hepatic mets and improvement of mediastinal amee hepatis and retroperitoneal lymphadenopathy.  Patient had been on 5-FU Avastin last treatment was August 27 and reports is on a treatment holiday until January.    So it is in the setting of the above that the patient reports she went to do her errands in the morning she went up 2 flights of stairs does not use any assistive devices she then watched a movie and after she had spoken to son's girlfriend at about 3:45 PM noticed right leg numbness.  Patient describes it as right buttock down to the leg.  She has no bladder or bowel incontinence.  She has some lower right back pain.  She has associated weakness of the leg.  The patient denies any headache dizziness she denies any chest pain.  Denies any visual changes speech impairment no other extremity weakness.  She is right-handed does not use any assistive devices.  She has a history of lower extremity DVT as he is on Eliquis and has been compliant with Eliquis.    In the emergency room patient was triaged to stroke pathway and was seen by neurology already CT of the head showed right frontal lobe area of white matter hypoattenuation could represent subacute to chronic infarct no bleed CT angio head and neck no large vessel occlusion hypoplastic vertebrobasilar system.  She also presented with very elevated blood pressures without prior history of blood pressure i.e.  239/102.  She is referred to the hospitalist service for admission to stepdown to for further workup.  Neurology notes reviewed with recommendations for MRI brain MRI of the lumbar plexus and pelvis to rule out local neoplastic etiology also goal blood pressure less than 180.  In the emergency room patient received a dose of hydralazine 5 mg and labetalol 10 mg at the time I was evaluating her her blood pressure was 192/93.  She denied any associated symptoms with this blood pressure at the time of evaluation.      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    Colon polyp     GERD (gastroesophageal reflux disease)     History of chemotherapy 2021    History of rectal surgery     Hyperlipidemia     PONV (postoperative nausea and vomiting)      Past Surgical History:   Procedure Laterality Date    BREAST CYST EXCISION Right      SECTION      x3    COLON SIGMOID RESECTION      COLONOSCOPY      DILATION AND CURETTAGE OF UTERUS      ILEO LOOP DIVERSION N/A 12/10/2019    Procedure: ILEOSTOMY LOOP;  Surgeon: WINNIE Pastor MD;  [facility]: [facility];  Service: Robotics    INSTILLATION CHEMOTHERAPY INTRAPERITONEAL (HIPEC) LAPAROTOMY N/A 2022    Procedure: INSTILLATION CHEMOTHERAPY INTRAPERITONEAL (HIPEC) LAPAROTOMY;  Surgeon: Jessie Freeman MD;  [facility];  Service: Surgical Oncology    IR BIOPSY CHEST WALL  2023    IR BIOPSY OMENTUM  2021    IR PORT PLACEMENT  2021    IR Y-90 PRE-ANGIO/EMBO W/ LUNG SCAN  2024    IR Y-90 RADIOEMBOLIZATION  2024    OMENTECTOMY N/A 2022    Procedure: DIAGNOSTIC LAPAROSCOPY, OPEN OMENTECTOMY, SPLENECTOMY, DIAPHRAGM REPAIR, CHEST TUBE INSERTION;  Surgeon: Jessie Freeman MD;  [facility];  Service: Surgical Oncology    VA CLOSURE ENTEROSTOMY  "LG/SMALL INTESTINE N/A 02/04/2020    Procedure: CLOSURE ILEOSTOMY;  Surgeon: WINNIE Pastor MD;  [facility];  Service: Colorectal    CO LAPAROSCOPY COLECTOMY PARTIAL W/ANASTOMOSIS N/A 12/10/2019    Procedure: SIGMOID RESECTION COLON LAPAROSCOPIC W ROBOTICS converted to lap hand assisted  with Partial proctectomy , LASER FLUORESCENCE ANGIOGRAPHY, INTRA OP COLONOSCOPY;  Surgeon: WINNIE Pastor MD;  [facility];  Service: Robotics    CO TOTAL ABDOMINAL HYSTERECT W/WO RMVL TUBE OVARY N/A 04/29/2022    Procedure: DIAGNOSTIC LAPAROSCOPY, TOTAL ABDOMINAL HYSTERECTOMY, BILATERAL SALPINGO-OOPHORECTOMY, EXTENSIVE ADHESIOLYSIS;  Surgeon: Peterson Mae MD;  [facility];  Service: Gynecology Oncology    CO UNLISTED PROCEDURE DIAPHRAGM  04/29/2022    Procedure: REPAIR DIAPHRAGM TEAR;  Surgeon: Yana Hebert MD;  [facility];  Service: Thoracic    SMALL INTESTINE SURGERY  02/04/2020    Procedure: RESECTION SMALL BOWEL;  Surgeon: WINNIE Pastor MD;  [facility];  Service: Colorectal     Social History     Tobacco Use    Smoking status: Never     Passive exposure: Past    Smokeless tobacco: Never   Vaping Use    Vaping status: Never Used   Substance and Sexual Activity    Alcohol use: Yes     Comment: \"occasionally\"    Drug use: Never    Sexual activity: Not Currently     E-Cigarette/Vaping    E-Cigarette Use Never User      E-Cigarette/Vaping Substances    Nicotine No     THC No     CBD No     Flavoring No     Other No     Unknown No        Social History:  Marital Status:    Occupation:   Patient Pre-hospital Living Situation: Home  Patient Pre-hospital Level of Mobility: walks  Patient Pre-hospital Diet Restrictions:     Meds/Allergies   I have reviewed home medications using recent Epic encounter.  Prior to Admission medications    Medication Sig Start Date End Date Taking? Authorizing Provider   apixaban (Eliquis) 5 mg Take 1 tablet (5 mg total) by " mouth 2 (two) times a day 8/19/24  Yes Bridgette Babin MD   ezetimibe (ZETIA) 10 mg tablet Take 1 tablet (10 mg total) by mouth daily 9/30/24  Yes DEEPAK Batista   famotidine (PEPCID) 20 mg tablet Take 1 tablet (20 mg total) by mouth 2 (two) times a day as needed for heartburn As needed 9/30/24 12/29/24 Yes DEEPAK Batista   meclizine (ANTIVERT) 12.5 MG tablet Take 1 tablet (12.5 mg total) by mouth every 12 (twelve) hours as needed for dizziness 9/30/24  Yes DEEPAK Batista   Acetaminophen (TYLENOL 8 HOUR PO) Take by mouth PRN    Historical Provider, MD   desloratadine (CLARINEX) 5 MG tablet Take 1 tablet (5 mg total) by mouth daily 9/30/24   DEEPAK Batista   lidocaine (Lidoderm) 5 % Apply 1 patch topically over 12 hours daily Remove & Discard patch within 12 hours or as directed by MD  Patient not taking: Reported on 9/30/2024 10/17/23   Jessie Freeman MD   ondansetron (Zofran ODT) 8 mg disintegrating tablet Take 1 tablet (8 mg total) by mouth every 8 (eight) hours as needed for nausea or vomiting 7/18/24   Bridgette Babin MD   prochlorperazine (COMPAZINE) 5 mg tablet  5/9/24   Historical Provider, MD   capecitabine (XELODA) 500 MG tablet Take 2 tablets (1,000 mg total) by mouth 2 (two) times a day with meals for 14 days 12/10/21 12/14/21  Sohail Grubbs MD     Allergies   Allergen Reactions    Medical Tape Rash     Skin irritation  Skin peeling  Skin irritation  Skin peeling  Blisters  Rash       Objective :  Temp:  [97.5 °F (36.4 °C)-97.7 °F (36.5 °C)] 97.7 °F (36.5 °C)  HR:  [73-96] 80  BP: (130-239)/() 130/74  Resp:  [14-44] 19  SpO2:  [97 %-100 %] 98 %  O2 Device: None (Room air)    Physical Exam  Constitutional:       Appearance: Normal appearance.   HENT:      Head: Normocephalic and atraumatic.      Mouth/Throat:      Mouth: Mucous membranes are moist.      Pharynx: Oropharynx is clear.   Eyes:      Extraocular Movements: Extraocular movements intact.      Pupils: Pupils are equal,  round, and reactive to light.   Cardiovascular:      Rate and Rhythm: Normal rate and regular rhythm.      Pulses: Normal pulses.      Heart sounds: Normal heart sounds.   Pulmonary:      Effort: Pulmonary effort is normal.      Breath sounds: Normal breath sounds.   Abdominal:      General: Bowel sounds are normal.      Palpations: Abdomen is soft.   Neurological:      Mental Status: She is alert.      Cranial Nerves: No cranial nerve deficit.      Comments: Right lower extremity diminished sensation from around the groin to the ankle..  She is able to dorsiflex and erika her foot and is only able to very minimally attempt lifting up the leg against gravity.  She complains of pain and sensitivity especially when touching posterior aspect of her leg.  Patellar reflex equivocal on the right but again was hypersensitive during attempt.    Left lower extremity able to lift her up against gravity and resistance.  Sensation is intact patellar reflexes present.    Cranial nerves II through XII grossly intact.  Sensation grossly intact bilateral face.  No pronator drift with outstretched arms.  Finger-to-nose is normal without any dysmetria                       Lab Results: I have reviewed the following results:  Results from last 7 days   Lab Units 10/19/24  1738   WBC Thousand/uL 7.73   HEMOGLOBIN g/dL 13.1   HEMATOCRIT % 40.6   PLATELETS Thousands/uL 313     Results from last 7 days   Lab Units 10/19/24  1738   SODIUM mmol/L 140   POTASSIUM mmol/L 3.3*   CHLORIDE mmol/L 106   CO2 mmol/L 25   BUN mg/dL 16   CREATININE mg/dL 0.98   ANION GAP mmol/L 9   CALCIUM mg/dL 9.2   GLUCOSE RANDOM mg/dL 89     Results from last 7 days   Lab Units 10/19/24  1738   INR  1.08     Results from last 7 days   Lab Units 10/19/24  1809   POC GLUCOSE mg/dl 71     Lab Results   Component Value Date    HGBA1C 5.0 05/30/2023    HGBA1C 5.3 04/15/2022    HGBA1C 5.3 01/17/2020     CTA neck and brain     Unremarkable CTA neck and brain.     No  hemodynamically significant stenosis, dissection or occlusion of the carotid or vertebral arteries or major vessels of the [place]. Hypoplastic vertebrobasilar system.    CT head   Area of white matter hypoattenuation in the right frontal lobe could represent a subacute to chronic infarct. No intracranial hemorrhage or mass effect.       CT abdomen pelvis with contrast 10/ 19 /2024  1. Mild disc degenerative change in the lumbar spine. No significant central canal stenosis or neural foraminal narrowing.  2. No evidence of diverticulitis, colitis or bowel obstruction.    CT abdomen pelvis July 2024  Continued interval decrease in size of the left 11th rib metastasis. Interval decreasing post radiation changes in the left lower lobe.     Decrease in conspicuity and size of the hepatic metastasis seen in the subcapsular segment 5 region. The previously described 5 mm lesion is not apparent. There are no new lesions identified.     Interval decreasing mediastinal, amee hepatis and retroperitoneal adenopathy.     No new metastatic disease appreciated.          Imaging Results Review: I personally reviewed the following image studies/reports in PACS and discussed pertinent findings with Radiology: CT head. My interpretation of the radiology images/reports is:  .      Administrative Statements       ** Please Note: This note has been constructed using a voice recognition system. **

## 2024-10-20 NOTE — ASSESSMENT & PLAN NOTE
Patient presents with right lower extremity numbness and on CTH has a right frontal hypodensity described as could represent subacute versus chronic infarct which is contralateral to the side of her symptoms.  This is with a background of metastatic rectosigmoid cancer.  [facility] stroke orders placed; telemetry monitor, neurochecks, MRI of the brain 2D echo.  PT OT speech.  Neurology recommends continue Eliquis.  Lipid panel A1c as part of the protocol ordered for completeness of workup.  MRI of the lumbar and pelvis are also ordered given malignancy history.

## 2024-10-20 NOTE — PLAN OF CARE
Problem: PAIN - ADULT  Goal: Verbalizes/displays adequate comfort level or baseline comfort level  Description: Interventions:  - Encourage patient to monitor pain and request assistance  - Assess pain using appropriate pain scale  - Administer analgesics based on type and severity of pain and evaluate response  - Implement non-pharmacological measures as appropriate and evaluate response  - Consider cultural and social influences on pain and pain management  - Notify physician/advanced practitioner if interventions unsuccessful or patient reports new pain  Outcome: Progressing     Problem: INFECTION - ADULT  Goal: Absence or prevention of progression during hospitalization  Description: INTERVENTIONS:  - Assess and monitor for signs and symptoms of infection  - Monitor lab/diagnostic results  - Monitor all insertion sites, i.e. indwelling lines, tubes, and drains  - Monitor endotracheal if appropriate and nasal secretions for changes in amount and color  - [facility] appropriate cooling/warming therapies per order  - Administer medications as ordered  - Instruct and encourage patient and family to use good hand hygiene technique  - Identify and instruct in appropriate isolation precautions for identified infection/condition  Outcome: Progressing  Goal: Absence of fever/infection during neutropenic period  Description: INTERVENTIONS:  - Monitor WBC    Outcome: Progressing     Problem: SAFETY ADULT  Goal: Patient will remain free of falls  Description: INTERVENTIONS:  - Educate patient/family on patient safety including physical limitations  - Instruct patient to call for assistance with activity   - Consult OT/PT to assist with strengthening/mobility   - Keep Call bell within reach  - Keep bed low and locked with side rails adjusted as appropriate  - Keep care items and personal belongings within reach  - Initiate and maintain comfort rounds  - Make Fall Risk Sign visible to staff  - Offer Toileting every 2 Hours,  in advance of need  - Initiate/Maintain bed alarm  - Obtain necessary fall risk management equipment: slipper socks  - Apply yellow socks and bracelet for high fall risk patients  - Consider moving patient to room near nurses station  Outcome: Progressing  Goal: Maintain or return to baseline ADL function  Description: INTERVENTIONS:  -  Assess patient's ability to carry out ADLs; assess patient's baseline for ADL function and identify physical deficits which impact ability to perform ADLs (bathing, care of mouth/teeth, toileting, grooming, dressing, etc.)  - Assess/evaluate cause of self-care deficits   - Assess range of motion  - Assess patient's mobility; develop plan if impaired  - Assess patient's need for assistive devices and provide as appropriate  - Encourage maximum independence but intervene and supervise when necessary  - Involve family in performance of ADLs  - Assess for home care needs following discharge   - Consider OT consult to assist with ADL evaluation and planning for discharge  - Provide patient education as appropriate  Outcome: Progressing  Goal: Maintains/Returns to pre admission functional level  Description: INTERVENTIONS:  - Perform AM-PAC 6 Click Basic Mobility/ Daily Activity assessment daily.  - Set and communicate daily mobility goal to care team and patient/family/caregiver.   - Collaborate with rehabilitation services on mobility goals if consulted  - Perform Range of Motion 3 times a day.  - Reposition patient every 2 hours.  - Dangle patient 3 times a day  - Stand patient 3 times a day  - Ambulate patient 3 times a day  - Out of bed to chair 3 times a day   - Out of bed for meals 3 times a day  - Out of bed for toileting  - Record patient progress and toleration of activity level   Outcome: Progressing     Problem: DISCHARGE PLANNING  Goal: Discharge to home or other facility with appropriate resources  Description: INTERVENTIONS:  - Identify barriers to discharge w/patient and  caregiver  - Arrange for needed discharge resources and transportation as appropriate  - Identify discharge learning needs (meds, wound care, etc.)  - Arrange for interpretive services to assist at discharge as needed  - Refer to Case Management Department for coordinating discharge planning if the patient needs post-hospital services based on physician/advanced practitioner order or complex needs related to functional status, cognitive ability, or social support system  Outcome: Progressing     Problem: Knowledge Deficit  Goal: Patient/family/caregiver demonstrates understanding of disease process, treatment plan, medications, and discharge instructions  Description: Complete learning assessment and assess knowledge base.  Interventions:  - Provide teaching at level of understanding  - Provide teaching via preferred learning methods  Outcome: Progressing     Problem: Neurological Deficit  Goal: Neurological status is stable or improving  Description: Interventions:  - Monitor and assess patient's level of consciousness, motor function, sensory function, and level of assistance needed for ADLs.   - Monitor and report changes from baseline. Collaborate with interdisciplinary team to initiate plan and implement interventions as ordered.   - Provide and maintain a safe environment.  - Consider seizure precautions.  - Consider fall precautions.  - Consider aspiration precautions.  - Consider bleeding precautions.  Outcome: Progressing     Problem: Activity Intolerance/Impaired Mobility  Goal: Mobility/activity is maintained at optimum level for patient  Description: Interventions:  - Assess and monitor patient  barriers to mobility and need for assistive/adaptive devices.  - Assess patient's emotional response to limitations.  - Collaborate with interdisciplinary team and initiate plans and interventions as ordered.  - Encourage independent activity per ability.  - Maintain proper body alignment.  - Perform  active/passive rom as tolerated/ordered.  - Plan activities to conserve energy.  - Turn patient as appropriate  Outcome: Progressing     Problem: Communication Impairment  Goal: Ability to express needs and understand communication  Description: Assess patient's communication skills and ability to understand information.  Patient will demonstrate use of effective communication techniques, alternative methods of communication and understanding even if not able to speak.     - Encourage communication and provide alternate methods of communication as needed.  - Collaborate with case management/ for discharge needs.  - Include patient/family/caregiver in decisions related to communication.  Outcome: Progressing     Problem: Potential for Aspiration  Goal: Non-ventilated patient's risk of aspiration is minimized  Description: Assess and monitor vital signs, respiratory status, and labs (WBC).  Monitor for signs of aspiration (tachypnea, cough, rales, wheezing, cyanosis, fever).    - Assess and monitor patient's ability to swallow.  - Place patient up in chair to eat if possible.  - HOB up at 90 degrees to eat if unable to get patient up into chair.  - Supervise patient during oral intake.   - Instruct patient/ family to take small bites.  - Instruct patient/ family to take small single sips when taking liquids.  - Follow patient-specific strategies generated by speech pathologist.  Outcome: Progressing  Goal: Ventilated patient's risk of aspiration is minimized  Description: Assess and monitor vital signs, respiratory status, airway cuff pressure, and labs (WBC).  Monitor for signs of aspiration (tachypnea, cough, rales, wheezing, cyanosis, fever).    - Elevate head of bed 30 degrees if patient has tube feeding.  - Monitor tube feeding.  Outcome: Progressing     Problem: Nutrition  Goal: Nutrition/Hydration status is improving  Description: Monitor and assess patient's nutrition/hydration status for  malnutrition (ex- brittle hair, bruises, dry skin, pale skin and conjunctiva, muscle wasting, smooth red tongue, and disorientation). Collaborate with interdisciplinary team and initiate plan and interventions as ordered.  Monitor patient's weight and dietary intake as ordered or per policy. Utilize nutrition screening tool and intervene per policy. Determine patient's food preferences and provide high-protein, high-caloric foods as appropriate.     - Assist patient with eating.  - Allow adequate time for meals.  - Encourage patient to take dietary supplement as ordered.  - Collaborate with clinical nutritionist.  - Include patient/family/caregiver in decisions related to nutrition.  Outcome: Progressing     Problem: NEUROSENSORY - ADULT  Goal: Achieves stable or improved neurological status  Description: INTERVENTIONS  - Monitor and report changes in neurological status  - Monitor vital signs such as temperature, blood pressure, glucose, and any other labs ordered   - Initiate measures to prevent increased intracranial pressure  - Monitor for seizure activity and implement precautions if appropriate      Outcome: Progressing  Goal: Remains free of injury related to seizures activity  Description: INTERVENTIONS  - Maintain airway, patient safety  and administer oxygen as ordered  - Monitor patient for seizure activity, document and report duration and description of seizure to physician/advanced practitioner  - If seizure occurs,  ensure patient safety during seizure  - Reorient patient post seizure  - Seizure pads on all 4 side rails  - Instruct patient/family to notify RN of any seizure activity including if an aura is experienced  - Instruct patient/family to call for assistance with activity based on nursing assessment  - Administer anti-seizure medications if ordered    Outcome: Progressing  Goal: Achieves maximal functionality and self care  Description: INTERVENTIONS  - Monitor swallowing and airway patency  with patient fatigue and changes in neurological status  - Encourage and assist patient to increase activity and self care.   - Encourage visually impaired, hearing impaired and aphasic patients to use assistive/communication devices  Outcome: Progressing     Problem: CARDIOVASCULAR - ADULT  Goal: Maintains optimal cardiac output and hemodynamic stability  Description: INTERVENTIONS:  - Monitor I/O, vital signs and rhythm  - Monitor for S/S and trends of decreased cardiac output  - Administer and titrate ordered vasoactive medications to optimize hemodynamic stability  - Assess quality of pulses, skin color and temperature  - Assess for signs of decreased coronary artery perfusion  - Instruct patient to report change in severity of symptoms  Outcome: Progressing  Goal: Absence of cardiac dysrhythmias or at baseline rhythm  Description: INTERVENTIONS:  - Continuous cardiac monitoring, vital signs, obtain 12 lead EKG if ordered  - Administer antiarrhythmic and heart rate control medications as ordered  - Monitor electrolytes and administer replacement therapy as ordered  Outcome: Progressing     Problem: RESPIRATORY - ADULT  Goal: Achieves optimal ventilation and oxygenation  Description: INTERVENTIONS:  - Assess for changes in respiratory status  - Assess for changes in mentation and behavior  - Position to facilitate oxygenation and minimize respiratory effort  - Oxygen administered by appropriate delivery if ordered  - Initiate smoking cessation education as indicated  - Encourage broncho-pulmonary hygiene including cough, deep breathe, Incentive Spirometry  - Assess the need for suctioning and aspirate as needed  - Assess and instruct to report SOB or any respiratory difficulty  - Respiratory Therapy support as indicated  Outcome: Progressing     Problem: GASTROINTESTINAL - ADULT  Goal: Minimal or absence of nausea and/or vomiting  Description: INTERVENTIONS:  - Administer IV fluids if ordered to ensure adequate  hydration  - Maintain NPO status until nausea and vomiting are resolved  - Nasogastric tube if ordered  - Administer ordered antiemetic medications as needed  - Provide nonpharmacologic comfort measures as appropriate  - Advance diet as tolerated, if ordered  - Consider nutrition services referral to assist patient with adequate nutrition and appropriate food choices  Outcome: Progressing  Goal: Maintains or returns to baseline bowel function  Description: INTERVENTIONS:  - Assess bowel function  - Encourage oral fluids to ensure adequate hydration  - Administer IV fluids if ordered to ensure adequate hydration  - Administer ordered medications as needed  - Encourage mobilization and activity  - Consider nutritional services referral to assist patient with adequate nutrition and appropriate food choices  Outcome: Progressing  Goal: Maintains adequate nutritional intake  Description: INTERVENTIONS:  - Monitor percentage of each meal consumed  - Identify factors contributing to decreased intake, treat as appropriate  - Assist with meals as needed  - Monitor I&O, weight, and lab values if indicated  - Obtain nutrition services referral as needed  Outcome: Progressing  Goal: Establish and maintain optimal ostomy function  Description: INTERVENTIONS:  - Assess bowel function  - Encourage oral fluids to ensure adequate hydration  - Administer IV fluids if ordered to ensure adequate hydration   - Administer ordered medications as needed  - Encourage mobilization and activity  - Nutrition services referral to assist patient with appropriate food choices  - Assess stoma site  - Consider wound care consult   Outcome: Progressing  Goal: Oral mucous membranes remain intact  Description: INTERVENTIONS  - Assess oral mucosa and hygiene practices  - Implement preventative oral hygiene regimen  - Implement oral medicated treatments as ordered  - Initiate Nutrition services referral as needed  Outcome: Progressing     Problem:  GENITOURINARY - ADULT  Goal: Maintains or returns to baseline urinary function  Description: INTERVENTIONS:  - Assess urinary function  - Encourage oral fluids to ensure adequate hydration if ordered  - Administer IV fluids as ordered to ensure adequate hydration  - Administer ordered medications as needed  - Offer frequent toileting  - Follow urinary retention protocol if ordered  Outcome: Progressing  Goal: Absence of urinary retention  Description: INTERVENTIONS:  - Assess patient’s ability to void and empty bladder  - Monitor I/O  - Bladder scan as needed  - Discuss with physician/AP medications to alleviate retention as needed  - Discuss catheterization for long term situations as appropriate  Outcome: Progressing  Goal: Urinary catheter remains patent  Description: INTERVENTIONS:  - Assess patency of urinary catheter  - If patient has a chronic ortiz, consider changing catheter if non-functioning  - Follow guidelines for intermittent irrigation of non-functioning urinary catheter  Outcome: Progressing     Problem: METABOLIC, FLUID AND ELECTROLYTES - ADULT  Goal: Electrolytes maintained within normal limits  Description: INTERVENTIONS:  - Monitor labs and assess patient for signs and symptoms of electrolyte imbalances  - Administer electrolyte replacement as ordered  - Monitor response to electrolyte replacements, including repeat lab results as appropriate  - Instruct patient on fluid and nutrition as appropriate  Outcome: Progressing  Goal: Fluid balance maintained  Description: INTERVENTIONS:  - Monitor labs   - Monitor I/O and WT  - Instruct patient on fluid and nutrition as appropriate  - Assess for signs & symptoms of volume excess or deficit  Outcome: Progressing  Goal: Glucose maintained within target range  Description: INTERVENTIONS:  - Monitor Blood Glucose as ordered  - Assess for signs and symptoms of hyperglycemia and hypoglycemia  - Administer ordered medications to maintain glucose within target  range  - Assess nutritional intake and initiate nutrition service referral as needed  Outcome: Progressing     Problem: SKIN/TISSUE INTEGRITY - ADULT  Goal: Skin Integrity remains intact(Skin Breakdown Prevention)  Description: Assess:  -Perform Madi assessment every shift  -Clean and moisturize skin every shift or prn  -Inspect skin when repositioning, toileting, and assisting with ADLS  -Assess under medical devices such as wires every 2-4 hr  -Assess extremities for adequate circulation and sensation     Bed Management:  -Have minimal linens on bed & keep smooth, unwrinkled  -Change linens as needed when moist or perspiring  -Avoid sitting or lying in one position for more than 2 hours while in bed  -Keep HOB at 45degrees     Toileting:  -Offer bedside commode  -Assess for incontinence every foam cleanser  -Use incontinent care products after each incontinent episode such as foam cleanser    Activity:  -Mobilize patient 3 times a day  -Encourage activity and walks on unit  -Encourage or provide ROM exercises   -Turn and reposition patient every 2 Hours  -Use appropriate equipment to lift or move patient in bed  -Instruct/ Assist with weight shifting every 60 min when out of bed in chair  -Consider limitation of chair time 2 hour intervals    Skin Care:  -Avoid use of baby powder, tape, friction and shearing, hot water or constrictive clothing  -Relieve pressure over bony prominences using waffle cushion  -Do not massage red bony areas    Next Steps:  -Teach patient strategies to minimize risks such as weight shifting   -Consider consults to  interdisciplinary teams  Outcome: Progressing  Goal: Incision(s), wounds(s) or drain site(s) healing without S/S of infection  Description: INTERVENTIONS  - Assess and document dressing, incision, wound bed, drain sites and surrounding tissue  - Provide patient and family education  - Perform skin care/dressing change  Outcome: Progressing  Goal: Pressure injury heals and  does not worsen  Description: Interventions:  - Implement low air loss mattress or specialty surface (Criteria met)  - Apply silicone foam dressing  - Instruct/assist with weight shifting every 60 minutes when in chair   - Limit chair time to 2 hour intervals  - Use special pressure reducing interventions such as waffle cushion when in chair   - Apply fecal or urinary incontinence containment device   - Perform passive or active ROM every 6 hr  - Turn and reposition patient & offload bony prominences every 2 hours   - Utilize friction reducing device or surface for transfers   - Consider consults to  interdisciplinary teams   - Use incontinent care products after each incontinent episode such as foam cleanser  - Consider nutrition services referral as needed  Outcome: Progressing     Problem: HEMATOLOGIC - ADULT  Goal: Maintains hematologic stability  Description: INTERVENTIONS  - Assess for signs and symptoms of bleeding or hemorrhage  - Monitor labs  - Administer supportive blood products/factors as ordered and appropriate  Outcome: Progressing     Problem: MUSCULOSKELETAL - ADULT  Goal: Maintain or return mobility to safest level of function  Description: INTERVENTIONS:  - Assess patient's ability to carry out ADLs; assess patient's baseline for ADL function and identify physical deficits which impact ability to perform ADLs (bathing, care of mouth/teeth, toileting, grooming, dressing, etc.)  - Assess/evaluate cause of self-care deficits   - Assess range of motion  - Assess patient's mobility  - Assess patient's need for assistive devices and provide as appropriate  - Encourage maximum independence but intervene and supervise when necessary  - Involve family in performance of ADLs  - Assess for home care needs following discharge   - Consider OT consult to assist with ADL evaluation and planning for discharge  - Provide patient education as appropriate  Outcome: Progressing  Goal: Maintain proper alignment of  affected body part  Description: INTERVENTIONS:  - Support, maintain and protect limb and body alignment  - Provide patient/ family with appropriate education  Outcome: Progressing     Problem: Nutrition/Hydration-ADULT  Goal: Nutrient/Hydration intake appropriate for improving, restoring or maintaining nutritional needs  Description: Monitor and assess patient's nutrition/hydration status for malnutrition. Collaborate with interdisciplinary team and initiate plan and interventions as ordered.  Monitor patient's weight and dietary intake as ordered or per policy. Utilize nutrition screening tool and intervene as necessary. Determine patient's food preferences and provide high-protein, high-caloric foods as appropriate.     INTERVENTIONS:  - Monitor oral intake, urinary output, labs, and treatment plans  - Assess nutrition and hydration status and recommend course of action  - Evaluate amount of meals eaten  - Assist patient with eating if necessary   - Allow adequate time for meals  - Recommend/ encourage appropriate diets, oral nutritional supplements, and vitamin/mineral supplements  - Order, calculate, and assess calorie counts as needed  - Recommend, monitor, and adjust tube feedings and TPN/PPN based on assessed needs  - Assess need for intravenous fluids  - Provide specific nutrition/hydration education as appropriate  - Include patient/family/caregiver in decisions related to nutrition  Outcome: Progressing

## 2024-10-21 ENCOUNTER — APPOINTMENT (INPATIENT)
Dept: RADIOLOGY | Facility: HOSPITAL | Age: 69
DRG: 074 | End: 2024-10-21
Payer: MEDICARE

## 2024-10-21 ENCOUNTER — APPOINTMENT (INPATIENT)
Dept: NON INVASIVE DIAGNOSTICS | Facility: HOSPITAL | Age: 69
DRG: 074 | End: 2024-10-21
Payer: MEDICARE

## 2024-10-21 PROBLEM — R90.89 ABNORMAL FINDING ON MRI OF BRAIN: Status: ACTIVE | Noted: 2024-10-21

## 2024-10-21 LAB
ANION GAP SERPL CALCULATED.3IONS-SCNC: 7 MMOL/L (ref 4–13)
AORTIC ROOT: 3 CM
APICAL FOUR CHAMBER EJECTION FRACTION: 70 %
ASCENDING AORTA: 2.8 CM
ATRIAL RATE: 83 BPM
BSA FOR ECHO PROCEDURE: 1.77 M2
BUN SERPL-MCNC: 15 MG/DL (ref 5–25)
CALCIUM SERPL-MCNC: 8.4 MG/DL (ref 8.4–10.2)
CHLORIDE SERPL-SCNC: 108 MMOL/L (ref 96–108)
CK SERPL-CCNC: 100 U/L (ref 26–192)
CO2 SERPL-SCNC: 21 MMOL/L (ref 21–32)
CREAT SERPL-MCNC: 1.01 MG/DL (ref 0.6–1.3)
CRP SERPL QL: 8.9 MG/L
E WAVE DECELERATION TIME: 231 MS
E/A RATIO: 0.77
ERYTHROCYTE [DISTWIDTH] IN BLOOD BY AUTOMATED COUNT: 18.5 % (ref 11.6–15.1)
ERYTHROCYTE [SEDIMENTATION RATE] IN BLOOD: 31 MM/HOUR (ref 0–29)
FRACTIONAL SHORTENING: 29 (ref 28–44)
GFR SERPL CREATININE-BSD FRML MDRD: 56 ML/MIN/1.73SQ M
GLUCOSE SERPL-MCNC: 95 MG/DL (ref 65–140)
HCT VFR BLD AUTO: 37.2 % (ref 34.8–46.1)
HGB BLD-MCNC: 11.9 G/DL (ref 11.5–15.4)
INTERVENTRICULAR SEPTUM IN DIASTOLE (PARASTERNAL SHORT AXIS VIEW): 1.1 CM
INTERVENTRICULAR SEPTUM: 1.1 CM (ref 0.6–1.1)
LAAS-AP2: 17.1 CM2
LAAS-AP4: 10.4 CM2
LEFT ATRIUM AREA SYSTOLE SINGLE PLANE A4C: 10.5 CM2
LEFT ATRIUM SIZE: 3.3 CM
LEFT ATRIUM VOLUME (MOD BIPLANE): 35 ML
LEFT ATRIUM VOLUME INDEX (MOD BIPLANE): 19.8 ML/M2
LEFT INTERNAL DIMENSION IN SYSTOLE: 2.7 CM (ref 2.1–4)
LEFT VENTRICULAR INTERNAL DIMENSION IN DIASTOLE: 3.8 CM (ref 3.5–6)
LEFT VENTRICULAR POSTERIOR WALL IN END DIASTOLE: 0.9 CM
LEFT VENTRICULAR STROKE VOLUME: 34 ML
LVSV (TEICH): 34 ML
MCH RBC QN AUTO: 30.2 PG (ref 26.8–34.3)
MCHC RBC AUTO-ENTMCNC: 32 G/DL (ref 31.4–37.4)
MCV RBC AUTO: 94 FL (ref 82–98)
MV E'TISSUE VEL-SEP: 7 CM/S
MV PEAK A VEL: 0.88 M/S
MV PEAK E VEL: 68 CM/S
MV STENOSIS PRESSURE HALF TIME: 67 MS
MV VALVE AREA P 1/2 METHOD: 3.28
P AXIS: 56 DEGREES
PLATELET # BLD AUTO: 285 THOUSANDS/UL (ref 149–390)
PMV BLD AUTO: 10.7 FL (ref 8.9–12.7)
POTASSIUM SERPL-SCNC: 3.7 MMOL/L (ref 3.5–5.3)
PR INTERVAL: 174 MS
QRS AXIS: 44 DEGREES
QRSD INTERVAL: 82 MS
QT INTERVAL: 382 MS
QTC INTERVAL: 448 MS
RBC # BLD AUTO: 3.94 MILLION/UL (ref 3.81–5.12)
RIGHT ATRIAL 2D VOLUME: 22 ML
RIGHT ATRIUM AREA SYSTOLE A4C: 10 CM2
RIGHT VENTRICLE ID DIMENSION: 2.9 CM
SL CV LEFT ATRIUM LENGTH A2C: 5.3 CM
SL CV LV EF: 55
SL CV PED ECHO LEFT VENTRICLE DIASTOLIC VOLUME (MOD BIPLANE) 2D: 60 ML
SL CV PED ECHO LEFT VENTRICLE SYSTOLIC VOLUME (MOD BIPLANE) 2D: 26 ML
SODIUM SERPL-SCNC: 136 MMOL/L (ref 135–147)
T WAVE AXIS: 52 DEGREES
TRICUSPID ANNULAR PLANE SYSTOLIC EXCURSION: 2 CM
VENTRICULAR RATE: 83 BPM
WBC # BLD AUTO: 7.1 THOUSAND/UL (ref 4.31–10.16)

## 2024-10-21 PROCEDURE — 73502 X-RAY EXAM HIP UNI 2-3 VIEWS: CPT

## 2024-10-21 PROCEDURE — 99232 SBSQ HOSP IP/OBS MODERATE 35: CPT

## 2024-10-21 PROCEDURE — 97167 OT EVAL HIGH COMPLEX 60 MIN: CPT

## 2024-10-21 PROCEDURE — A9585 GADOBUTROL INJECTION: HCPCS

## 2024-10-21 PROCEDURE — 85027 COMPLETE CBC AUTOMATED: CPT | Performed by: NURSE PRACTITIONER

## 2024-10-21 PROCEDURE — 72158 MRI LUMBAR SPINE W/O & W/DYE: CPT

## 2024-10-21 PROCEDURE — 97535 SELF CARE MNGMENT TRAINING: CPT

## 2024-10-21 PROCEDURE — 85652 RBC SED RATE AUTOMATED: CPT | Performed by: PHYSICIAN ASSISTANT

## 2024-10-21 PROCEDURE — 93306 TTE W/DOPPLER COMPLETE: CPT

## 2024-10-21 PROCEDURE — 93306 TTE W/DOPPLER COMPLETE: CPT | Performed by: INTERNAL MEDICINE

## 2024-10-21 PROCEDURE — 97163 PT EVAL HIGH COMPLEX 45 MIN: CPT

## 2024-10-21 PROCEDURE — 80048 BASIC METABOLIC PNL TOTAL CA: CPT | Performed by: NURSE PRACTITIONER

## 2024-10-21 PROCEDURE — 99232 SBSQ HOSP IP/OBS MODERATE 35: CPT | Performed by: PSYCHIATRY & NEUROLOGY

## 2024-10-21 PROCEDURE — 93010 ELECTROCARDIOGRAM REPORT: CPT | Performed by: INTERNAL MEDICINE

## 2024-10-21 RX ORDER — GADOBUTROL 604.72 MG/ML
9 INJECTION INTRAVENOUS
Status: COMPLETED | OUTPATIENT
Start: 2024-10-21 | End: 2024-10-21

## 2024-10-21 RX ADMIN — APIXABAN 5 MG: 5 TABLET, FILM COATED ORAL at 09:28

## 2024-10-21 RX ADMIN — GADOBUTROL 9 ML: 604.72 INJECTION INTRAVENOUS at 10:33

## 2024-10-21 RX ADMIN — ACETAMINOPHEN 650 MG: 325 TABLET ORAL at 20:23

## 2024-10-21 RX ADMIN — LABETALOL HYDROCHLORIDE 10 MG: 5 INJECTION, SOLUTION INTRAVENOUS at 20:11

## 2024-10-21 RX ADMIN — APIXABAN 5 MG: 5 TABLET, FILM COATED ORAL at 18:47

## 2024-10-21 RX ADMIN — EZETIMIBE 10 MG: 10 TABLET ORAL at 20:22

## 2024-10-21 NOTE — PHYSICAL THERAPY NOTE
PT cancellation     10/21/24 1045   PT Last Visit   PT Visit Date 10/21/24   Note Type   Note type Cancelled Session   Cancel Reasons Patient off floor/test     Marie Camacho PT  [id number]

## 2024-10-21 NOTE — PLAN OF CARE
Problem: PAIN - ADULT  Goal: Verbalizes/displays adequate comfort level or baseline comfort level  Description: Interventions:  - Encourage patient to monitor pain and request assistance  - Assess pain using appropriate pain scale  - Administer analgesics based on type and severity of pain and evaluate response  - Implement non-pharmacological measures as appropriate and evaluate response  - Consider cultural and social influences on pain and pain management  - Notify physician/advanced practitioner if interventions unsuccessful or patient reports new pain  Outcome: Progressing     Problem: INFECTION - ADULT  Goal: Absence or prevention of progression during hospitalization  Description: INTERVENTIONS:  - Assess and monitor for signs and symptoms of infection  - Monitor lab/diagnostic results  - Monitor all insertion sites, i.e. indwelling lines, tubes, and drains  - Monitor endotracheal if appropriate and nasal secretions for changes in amount and color  - [place] appropriate cooling/warming therapies per order  - Administer medications as ordered  - Instruct and encourage patient and family to use good hand hygiene technique  - Identify and instruct in appropriate isolation precautions for identified infection/condition  Outcome: Progressing  Goal: Absence of fever/infection during neutropenic period  Description: INTERVENTIONS:  - Monitor WBC    Outcome: Progressing     Problem: SAFETY ADULT  Goal: Patient will remain free of falls  Description: INTERVENTIONS:  - Educate patient/family on patient safety including physical limitations  - Instruct patient to call for assistance with activity   - Consult OT/PT to assist with strengthening/mobility   - Keep Call bell within reach  - Keep bed low and locked with side rails adjusted as appropriate  - Keep care items and personal belongings within reach  - Initiate and maintain comfort rounds  - Make Fall Risk Sign visible to staff  - Offer Toileting every 2 Hours,  in advance of need  - Initiate/Maintain bed and chair alarm  - Obtain necessary fall risk management equipment: bed and chair alarm/yellow socks and bracelet   - Apply yellow socks and bracelet for high fall risk patients  - Consider moving patient to room near nurses station  Outcome: Progressing  Goal: Maintain or return to baseline ADL function  Description: INTERVENTIONS:  -  Assess patient's ability to carry out ADLs; assess patient's baseline for ADL function and identify physical deficits which impact ability to perform ADLs (bathing, care of mouth/teeth, toileting, grooming, dressing, etc.)  - Assess/evaluate cause of self-care deficits   - Assess range of motion  - Assess patient's mobility; develop plan if impaired  - Assess patient's need for assistive devices and provide as appropriate  - Encourage maximum independence but intervene and supervise when necessary  - Involve family in performance of ADLs  - Assess for home care needs following discharge   - Consider OT consult to assist with ADL evaluation and planning for discharge  - Provide patient education as appropriate  Outcome: Progressing  Goal: Maintains/Returns to pre admission functional level  Description: INTERVENTIONS:  - Perform AM-PAC 6 Click Basic Mobility/ Daily Activity assessment daily.  - Set and communicate daily mobility goal to care team and patient/family/caregiver.   - Collaborate with rehabilitation services on mobility goals if consulted  - Perform Range of Motion 3 times a day.  - Reposition patient every 2 hours.  - Dangle patient 3 times a day  - Stand patient 3 times a day  - Ambulate patient 3 times a day  - Out of bed to chair 3 times a day   - Out of bed for meals 3 times a day  - Out of bed for toileting  - Record patient progress and toleration of activity level   Outcome: Progressing     Problem: DISCHARGE PLANNING  Goal: Discharge to home or other facility with appropriate resources  Description: INTERVENTIONS:  -  Identify barriers to discharge w/patient and caregiver  - Arrange for needed discharge resources and transportation as appropriate  - Identify discharge learning needs (meds, wound care, etc.)  - Arrange for interpretive services to assist at discharge as needed  - Refer to Case Management Department for coordinating discharge planning if the patient needs post-hospital services based on physician/advanced practitioner order or complex needs related to functional status, cognitive ability, or social support system  Outcome: Progressing     Problem: Knowledge Deficit  Goal: Patient/family/caregiver demonstrates understanding of disease process, treatment plan, medications, and discharge instructions  Description: Complete learning assessment and assess knowledge base.  Interventions:  - Provide teaching at level of understanding  - Provide teaching via preferred learning methods  Outcome: Progressing     Problem: Neurological Deficit  Goal: Neurological status is stable or improving  Description: Interventions:  - Monitor and assess patient's level of consciousness, motor function, sensory function, and level of assistance needed for ADLs.   - Monitor and report changes from baseline. Collaborate with interdisciplinary team to initiate plan and implement interventions as ordered.   - Provide and maintain a safe environment.  - Consider seizure precautions.  - Consider fall precautions.  - Consider aspiration precautions.  - Consider bleeding precautions.  Outcome: Progressing     Problem: Activity Intolerance/Impaired Mobility  Goal: Mobility/activity is maintained at optimum level for patient  Description: Interventions:  - Assess and monitor patient  barriers to mobility and need for assistive/adaptive devices.  - Assess patient's emotional response to limitations.  - Collaborate with interdisciplinary team and initiate plans and interventions as ordered.  - Encourage independent activity per ability.  - Maintain  proper body alignment.  - Perform active/passive rom as tolerated/ordered.  - Plan activities to conserve energy.  - Turn patient as appropriate  Outcome: Progressing     Problem: Communication Impairment  Goal: Ability to express needs and understand communication  Description: Assess patient's communication skills and ability to understand information.  Patient will demonstrate use of effective communication techniques, alternative methods of communication and understanding even if not able to speak.     - Encourage communication and provide alternate methods of communication as needed.  - Collaborate with case management/ for discharge needs.  - Include patient/family/caregiver in decisions related to communication.  Outcome: Progressing     Problem: Potential for Aspiration  Goal: Non-ventilated patient's risk of aspiration is minimized  Description: Assess and monitor vital signs, respiratory status, and labs (WBC).  Monitor for signs of aspiration (tachypnea, cough, rales, wheezing, cyanosis, fever).    - Assess and monitor patient's ability to swallow.  - Place patient up in chair to eat if possible.  - HOB up at 90 degrees to eat if unable to get patient up into chair.  - Supervise patient during oral intake.   - Instruct patient/ family to take small bites.  - Instruct patient/ family to take small single sips when taking liquids.  - Follow patient-specific strategies generated by speech pathologist.  Outcome: Progressing  Goal: Ventilated patient's risk of aspiration is minimized  Description: Assess and monitor vital signs, respiratory status, airway cuff pressure, and labs (WBC).  Monitor for signs of aspiration (tachypnea, cough, rales, wheezing, cyanosis, fever).    - Elevate head of bed 30 degrees if patient has tube feeding.  - Monitor tube feeding.  Outcome: Progressing     Problem: Nutrition  Goal: Nutrition/Hydration status is improving  Description: Monitor and assess patient's  nutrition/hydration status for malnutrition (ex- brittle hair, bruises, dry skin, pale skin and conjunctiva, muscle wasting, smooth red tongue, and disorientation). Collaborate with interdisciplinary team and initiate plan and interventions as ordered.  Monitor patient's weight and dietary intake as ordered or per policy. Utilize nutrition screening tool and intervene per policy. Determine patient's food preferences and provide high-protein, high-caloric foods as appropriate.     - Assist patient with eating.  - Allow adequate time for meals.  - Encourage patient to take dietary supplement as ordered.  - Collaborate with clinical nutritionist.  - Include patient/family/caregiver in decisions related to nutrition.  Outcome: Progressing     Problem: NEUROSENSORY - ADULT  Goal: Achieves stable or improved neurological status  Description: INTERVENTIONS  - Monitor and report changes in neurological status  - Monitor vital signs such as temperature, blood pressure, glucose, and any other labs ordered   - Initiate measures to prevent increased intracranial pressure  - Monitor for seizure activity and implement precautions if appropriate      Outcome: Progressing  Goal: Remains free of injury related to seizures activity  Description: INTERVENTIONS  - Maintain airway, patient safety  and administer oxygen as ordered  - Monitor patient for seizure activity, document and report duration and description of seizure to physician/advanced practitioner  - If seizure occurs,  ensure patient safety during seizure  - Reorient patient post seizure  - Seizure pads on all 4 side rails  - Instruct patient/family to notify RN of any seizure activity including if an aura is experienced  - Instruct patient/family to call for assistance with activity based on nursing assessment  - Administer anti-seizure medications if ordered    Outcome: Progressing  Goal: Achieves maximal functionality and self care  Description: INTERVENTIONS  - Monitor  swallowing and airway patency with patient fatigue and changes in neurological status  - Encourage and assist patient to increase activity and self care.   - Encourage visually impaired, hearing impaired and aphasic patients to use assistive/communication devices  Outcome: Progressing     Problem: CARDIOVASCULAR - ADULT  Goal: Maintains optimal cardiac output and hemodynamic stability  Description: INTERVENTIONS:  - Monitor I/O, vital signs and rhythm  - Monitor for S/S and trends of decreased cardiac output  - Administer and titrate ordered vasoactive medications to optimize hemodynamic stability  - Assess quality of pulses, skin color and temperature  - Assess for signs of decreased coronary artery perfusion  - Instruct patient to report change in severity of symptoms  Outcome: Progressing  Goal: Absence of cardiac dysrhythmias or at baseline rhythm  Description: INTERVENTIONS:  - Continuous cardiac monitoring, vital signs, obtain 12 lead EKG if ordered  - Administer antiarrhythmic and heart rate control medications as ordered  - Monitor electrolytes and administer replacement therapy as ordered  Outcome: Progressing     Problem: RESPIRATORY - ADULT  Goal: Achieves optimal ventilation and oxygenation  Description: INTERVENTIONS:  - Assess for changes in respiratory status  - Assess for changes in mentation and behavior  - Position to facilitate oxygenation and minimize respiratory effort  - Oxygen administered by appropriate delivery if ordered  - Initiate smoking cessation education as indicated  - Encourage broncho-pulmonary hygiene including cough, deep breathe, Incentive Spirometry  - Assess the need for suctioning and aspirate as needed  - Assess and instruct to report SOB or any respiratory difficulty  - Respiratory Therapy support as indicated  Outcome: Progressing     Problem: GASTROINTESTINAL - ADULT  Goal: Minimal or absence of nausea and/or vomiting  Description: INTERVENTIONS:  - Administer IV fluids  if ordered to ensure adequate hydration  - Maintain NPO status until nausea and vomiting are resolved  - Nasogastric tube if ordered  - Administer ordered antiemetic medications as needed  - Provide nonpharmacologic comfort measures as appropriate  - Advance diet as tolerated, if ordered  - Consider nutrition services referral to assist patient with adequate nutrition and appropriate food choices  Outcome: Progressing  Goal: Maintains or returns to baseline bowel function  Description: INTERVENTIONS:  - Assess bowel function  - Encourage oral fluids to ensure adequate hydration  - Administer IV fluids if ordered to ensure adequate hydration  - Administer ordered medications as needed  - Encourage mobilization and activity  - Consider nutritional services referral to assist patient with adequate nutrition and appropriate food choices  Outcome: Progressing  Goal: Maintains adequate nutritional intake  Description: INTERVENTIONS:  - Monitor percentage of each meal consumed  - Identify factors contributing to decreased intake, treat as appropriate  - Assist with meals as needed  - Monitor I&O, weight, and lab values if indicated  - Obtain nutrition services referral as needed  Outcome: Progressing  Goal: Establish and maintain optimal ostomy function  Description: INTERVENTIONS:  - Assess bowel function  - Encourage oral fluids to ensure adequate hydration  - Administer IV fluids if ordered to ensure adequate hydration   - Administer ordered medications as needed  - Encourage mobilization and activity  - Nutrition services referral to assist patient with appropriate food choices  - Assess stoma site  - Consider wound care consult   Outcome: Progressing  Goal: Oral mucous membranes remain intact  Description: INTERVENTIONS  - Assess oral mucosa and hygiene practices  - Implement preventative oral hygiene regimen  - Implement oral medicated treatments as ordered  - Initiate Nutrition services referral as needed  Outcome:  Progressing     Problem: GENITOURINARY - ADULT  Goal: Maintains or returns to baseline urinary function  Description: INTERVENTIONS:  - Assess urinary function  - Encourage oral fluids to ensure adequate hydration if ordered  - Administer IV fluids as ordered to ensure adequate hydration  - Administer ordered medications as needed  - Offer frequent toileting  - Follow urinary retention protocol if ordered  Outcome: Progressing  Goal: Absence of urinary retention  Description: INTERVENTIONS:  - Assess patient’s ability to void and empty bladder  - Monitor I/O  - Bladder scan as needed  - Discuss with physician/AP medications to alleviate retention as needed  - Discuss catheterization for long term situations as appropriate  Outcome: Progressing  Goal: Urinary catheter remains patent  Description: INTERVENTIONS:  - Assess patency of urinary catheter  - If patient has a chronic ortiz, consider changing catheter if non-functioning  - Follow guidelines for intermittent irrigation of non-functioning urinary catheter  Outcome: Progressing     Problem: METABOLIC, FLUID AND ELECTROLYTES - ADULT  Goal: Electrolytes maintained within normal limits  Description: INTERVENTIONS:  - Monitor labs and assess patient for signs and symptoms of electrolyte imbalances  - Administer electrolyte replacement as ordered  - Monitor response to electrolyte replacements, including repeat lab results as appropriate  - Instruct patient on fluid and nutrition as appropriate  Outcome: Progressing  Goal: Fluid balance maintained  Description: INTERVENTIONS:  - Monitor labs   - Monitor I/O and WT  - Instruct patient on fluid and nutrition as appropriate  - Assess for signs & symptoms of volume excess or deficit  Outcome: Progressing  Goal: Glucose maintained within target range  Description: INTERVENTIONS:  - Monitor Blood Glucose as ordered  - Assess for signs and symptoms of hyperglycemia and hypoglycemia  - Administer ordered medications to  maintain glucose within target range  - Assess nutritional intake and initiate nutrition service referral as needed  Outcome: Progressing     Problem: SKIN/TISSUE INTEGRITY - ADULT  Goal: Skin Integrity remains intact(Skin Breakdown Prevention)  Description: Assess:  -Perform Madi assessment every shift   -Clean and moisturize skin every shift/PRN   -Inspect skin when repositioning, toileting, and assisting with ADLS  -Assess extremities for adequate circulation and sensation     Bed Management:  -Have minimal linens on bed & keep smooth, unwrinkled  -Change linens as needed when moist or perspiring  -Avoid sitting or lying in one position for more than 2 hours while in bed  -Keep HOB at 45 degrees     Toileting:  -Offer bedside commode  -Assess for incontinence every hour   -Use incontinent care products after each incontinent episode such as cleansing wipes     Activity:  -Mobilize patient 3 times a day  -Encourage activity and walks on unit  -Encourage or provide ROM exercises   -Turn and reposition patient every 2 Hours  -Use appropriate equipment to lift or move patient in bed  -Instruct/ Assist with weight shifting every 2 hours when out of bed in chair  -Consider limitation of chair time 2 hour intervals    Skin Care:  -Avoid use of baby powder, tape, friction and shearing, hot water or constrictive clothing  -Do not massage red bony areas    Next Steps:  -Teach patient strategies to minimize risks such as hygiene/weight shifting and turning    -Consider consults to  interdisciplinary teams such as wound care consult   Outcome: Progressing  Goal: Incision(s), wounds(s) or drain site(s) healing without S/S of infection  Description: INTERVENTIONS  - Assess and document dressing, incision, wound bed, drain sites and surrounding tissue  - Provide patient and family education  - Perform skin care/dressing changes every per order/PRN   Outcome: Progressing    Problem: MUSCULOSKELETAL - ADULT  Goal: Maintain or  return mobility to safest level of function  Description: INTERVENTIONS:  - Assess patient's ability to carry out ADLs; assess patient's baseline for ADL function and identify physical deficits which impact ability to perform ADLs (bathing, care of mouth/teeth, toileting, grooming, dressing, etc.)  - Assess/evaluate cause of self-care deficits   - Assess range of motion  - Assess patient's mobility  - Assess patient's need for assistive devices and provide as appropriate  - Encourage maximum independence but intervene and supervise when necessary  - Involve family in performance of ADLs  - Assess for home care needs following discharge   - Consider OT consult to assist with ADL evaluation and planning for discharge  - Provide patient education as appropriate  Outcome: Progressing  Goal: Maintain proper alignment of affected body part  Description: INTERVENTIONS:  - Support, maintain and protect limb and body alignment  - Provide patient/ family with appropriate education  Outcome: Progressing     Problem: HEMATOLOGIC - ADULT  Goal: Maintains hematologic stability  Description: INTERVENTIONS  - Assess for signs and symptoms of bleeding or hemorrhage  - Monitor labs  - Administer supportive blood products/factors as ordered and appropriate  Outcome: Progressing

## 2024-10-21 NOTE — ASSESSMENT & PLAN NOTE
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     Neuroimaging   10/19/24 CTH:   Area of white matter hypoattenuation in the right frontal lobe could respresent a subacute to chronic infarct. No intracranial hemorrhage or mass effect.   10/19/2024 CTA head and neck with and without contrast:   Unremarkable CTA neck and brain.   No hemodynamically significant stenosis, dissection, or occlusion of the carotid or vertebral arteries or major vessels of the [place].   Hypoplastic vertebrobasilar system.  10/20/24 MRI brain with and without contrast:   No evidence of metastatic disease.   Focus of FLAIR abnormality in the right posterior frontal deep white matter corresponds to the area of low density on CT. This likely represents either gliosis or mild edema surrounding a venous angioma. Follow-up gadolinium enhanced MRI recommended in 2 months to exclude more aggressive pathologies.   10/21/24 MRI lumbosacral plexus with and without contrast:   Mild to moderate motion degraded.   Normal MRI lumbosacral plexus on this motion degraded exam.   Degenerative changes of lumbar spine are suboptimally evaluated given large field of view of lumbar spine. Consider dedicated MRI lumbar spine with and without contrast for further evaluation.     Etiology of symptoms is unclear at this time. MRI brain and lumbosacral spine with and without contrast completed and no etiology for symptoms identified. Suspect local nerve compression underlying etiology.     Plan:  - Will discuss additional neuro imaging with attending neurologist. Consider MRI lumbar spine with and without contrast vs MRI pelvis with and without contrast. 10/21/24 1600 Addendum: Discussed with attending neurologist, will check xray right hip as well as MRI cervical and thoracic spine  with and without contrast. May ultimately need to obtain dedicated lumbar spine imaging if etiology not identified in above imaging.  - Attending neurologist to see and advise. Please see attestation for additional details/recommendations.

## 2024-10-21 NOTE — PROGRESS NOTES
Progress Note - Neurology   Name: Itzel Sanches 69 y.o. 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     Neuroimaging   10/19/24 CTH:   Area of white matter hypoattenuation in the right frontal lobe could respresent a subacute to chronic infarct. No intracranial hemorrhage or mass effect.   10/19/2024 CTA head and neck with and without contrast:   Unremarkable CTA neck and brain.   No hemodynamically significant stenosis, dissection, or occlusion of the carotid or vertebral arteries or major vessels of the [place].   Hypoplastic vertebrobasilar system.  10/20/24 MRI brain with and without contrast:   No evidence of metastatic disease.   Focus of FLAIR abnormality in the right posterior frontal deep white matter corresponds to the area of low density on CT. This likely represents either gliosis or mild edema surrounding a venous angioma. Follow-up gadolinium enhanced MRI recommended in 2 months to exclude more aggressive pathologies.   10/21/24 MRI lumbosacral plexus with and without contrast:   Mild to moderate motion degraded.   Normal MRI lumbosacral plexus on this motion degraded exam.   Degenerative changes of lumbar spine are suboptimally evaluated given large field of view of lumbar spine. Consider dedicated MRI lumbar spine with and without contrast for further evaluation.     Etiology of symptoms is unclear at this time. MRI brain and lumbosacral spine with and without contrast completed and no etiology for symptoms identified. Suspect local nerve compression underlying etiology.     Plan:  - Will discuss additional neuro imaging with attending  neurologist. Consider MRI lumbar spine with and without contrast vs MRI pelvis with and without contrast. 10/21/24 1600 Addendum: Discussed with attending neurologist, will check xray right hip as well as MRI cervical and thoracic spine with and without contrast. May ultimately need to obtain dedicated lumbar spine imaging if etiology not identified in above imaging.  - Attending neurologist to see and advise. Please see attestation for additional details/recommendations.   Mixed hyperlipidemia  - Lipid panel reviewed, total cholesterol 193, triglycerides 117, HDL 52, .  - Can continue on home dose of Zetia as do not suspect cerebrovascular etiology for symptoms.   Prediabetes  - Hemoglobin A1c reviewed, 5.9.   - Goal euglycemia.   - Management as per primary team.  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HCC)  - Follows with oncology team as an outpatient.   - Maintenance chemotherapy currently on hold until after the holidays.  Hypertension  - Goal normotension.   - Management as per primary team.  History of DVT (deep vein thrombosis)  - Continue on home Eliquis.  Abnormal finding on MRI of brain  - MRI brain with focus of FLAIR abnormality in the right posterior frontal deep white matter either representing gliosis or mild edema surrounding a venous angioma.   - This is an incidental finding and unrelated to presenting symptoms.   - Recommend follow-up MRI brain with contrast in 2 months to evaluate for stability.    Recommendations for outpatient neurological follow up have yet to be determined.    Subjective   Patient seen and examined at bedside this morning in follow-up. Discussed presenting symptoms with patient extensively. Patient reports sudden onset of RLE numbness which began on Saturday 10/19/24. She reports she woke up in her usual state of health on Saturday morning and ran errands, brought groceries up to her apartment (lives up several sets of stairs). She notes she then sat down on her reclining couch and  talked to her son's girlfriend for about 2 hours. She does not think she was in an unsual position. She notes that she had her feet up on the reclined aspect of the couch. She notes then she put her legs down to get up from the couch, she noted immediate numbness of her R calf region. She notes this felt like her leg was asleep so she stood up to walk around and see if the symptoms went away but they did not. She notes she sat back down and the numbness then began to involve her R thigh in addition to the calf. She notes she went to stand up again but her R leg was so weak that she was unable to stand well. She denies pain of the right leg and notes she has never had similar symptoms before in the past. She notes that her leg does hurt when someone touches her leg now and she feels that this is hypersensitive, and this is primarily in her foot. She notes she is unable to move the leg but is able to move her foot slightly.    She denies any significant recent injuries, but does note that the day prior to this occurring, she was replacing a railing outside of her house. She notes she had onset of pain in her left lateral knee but she notes that this has been an ongoing issue for several years. She notes that when she had that pain, she fell back and landed on her right hip region. She notes that she only fell a few inches to a seated position, but she thought this might be important to mention.    Patient notes she has metastatic colorectal cancer. She follows with oncology team as an outpatient. She notes she is on maintenance chemotherapy but has opted to take a break until after the holidays. She notes her most recent infusion was at the beginning of October. She notes overall she is doing well from this standpoint but does occasionally have left sided back pain when she is laying flat.    Review of Systems   Constitutional:  Negative for chills, fatigue, fever and unexpected weight change.   HENT:  Negative for  trouble swallowing.    Eyes:  Negative for visual disturbance.   Respiratory:  Negative for shortness of breath.    Cardiovascular:  Negative for chest pain.   Gastrointestinal:  Negative for abdominal pain, nausea and vomiting.        Denies stool incontinence   Genitourinary:  Negative for difficulty urinating and dysuria.        Denies urinary incontinence     Musculoskeletal:  Positive for gait problem. Negative for back pain and neck pain.   Skin:  Negative for rash.   Neurological:  Positive for weakness and numbness. Negative for dizziness, light-headedness and headaches.   Psychiatric/Behavioral:  Negative for confusion.        Medications  Scheduled Meds:  Current Facility-Administered Medications   Medication Dose Route Frequency Provider Last Rate    acetaminophen  650 mg Oral Q6H PRN Hattie Hernandez MD      apixaban  5 mg Oral BID Hattie Hernandez MD      ezetimibe  10 mg Oral HS Hattie Hernandez MD      famotidine  10 mg Oral BID PRN Hattie Hernandez MD      labetalol  10 mg Intravenous Q4H PRN Hattie Hernandez MD      loratadine  10 mg Oral Daily Hattie Hernandez MD      ondansetron  4 mg Intravenous Q6H PRN Hattie Hernandez MD       Continuous Infusions:   PRN Meds:.  acetaminophen    famotidine    labetalol    ondansetron    Objective :  Temp:  [97.5 °F (36.4 °C)-98.1 °F (36.7 °C)] 97.5 °F (36.4 °C)  HR:  [76-90] 77  BP: (143-174)/(75-90) 168/90  Resp:  [18-19] 18  SpO2:  [96 %-98 %] 98 %  O2 Device: None (Room air)    Physical Exam  Constitutional:       General: She is not in acute distress.     Appearance: Normal appearance. She is not ill-appearing, toxic-appearing or diaphoretic.   HENT:      Mouth/Throat:      Mouth: Mucous membranes are moist.      Pharynx: Oropharynx is clear. No oropharyngeal exudate or posterior oropharyngeal erythema.   Eyes:      General: No scleral icterus.        Right eye: No discharge.         Left eye: No discharge.      Extraocular Movements: EOM normal. No nystagmus.       Conjunctiva/sclera: Conjunctivae normal.   Cardiovascular:      Rate and Rhythm: Normal rate and regular rhythm.   Pulmonary:      Effort: Pulmonary effort is normal. No respiratory distress.      Breath sounds: Normal breath sounds.   Abdominal:      General: There is no distension.      Palpations: Abdomen is soft.      Tenderness: There is no abdominal tenderness.   Musculoskeletal:         General: Normal range of motion.      Cervical back: Normal range of motion and neck supple.      Right lower leg: No edema.      Left lower leg: No edema.   Skin:     General: Skin is warm and dry.      Findings: No erythema or rash.   Neurological:      Mental Status: She is alert and oriented to person, place, and time.      Deep Tendon Reflexes:      Reflex Scores:       Bicep reflexes are 1+ on the right side and 1+ on the left side.       Brachioradialis reflexes are 1+ on the right side and 1+ on the left side.       Patellar reflexes are 0 on the right side and 1+ on the left side.       Achilles reflexes are 0 on the right side and 1+ on the left side.  Psychiatric:         Mood and Affect: Mood normal.         Speech: Speech normal.         Behavior: Behavior normal.     Neurological Exam  Mental Status  Alert. Oriented to person, place, time and situation. Oriented to person, place, and time. Speech is normal. Language is fluent with no aphasia. Attention and concentration are normal.    Cranial Nerves  CN II: Right normal visual field. Left normal visual field.  CN III, IV, VI: Extraocular movements intact bilaterally. No nystagmus. Normal saccades.   Right pupil: 3 mm. Round. Reactive to light.   Left pupil: 3 mm. Round. Reactive to light.  CN V:  Right: Facial sensation is normal.  Left: Facial sensation is normal on the left.  CN VII:  Right: There is no facial weakness.  Left: There is no facial weakness.  CN XII: Tongue midline without atrophy or fasciculations.    Motor   No fasciculations present. Tone  normal except for RLE flaccid  . No abnormal involuntary movements.  Motor strength B/L UE and LLE 5/5, RLE 0/5 hip flexion, knee flexion/extension, DF/PF 4-/5.    Sensory  Patient unable to sense crude touch RLE  . Patient unable to sense pinprick RLE  . Patient unable to sense temperature below knee RLE.     Reflexes                                            Right                      Left  Brachioradialis                    1+                         1+  Biceps                                 1+                         1+  Patellar                                0                         1+  Achilles                                0                         1+  Right Plantar: upgoing  Left Plantar: equivocal    Right pathological reflexes: Brent's absent. Ankle clonus absent.  Left pathological reflexes: Brent's absent. Ankle clonus absent.    Coordination  Right: Finger-to-nose normal.    Gait    Deferred for safety  .        Lab Results: I have reviewed the following results:  Recent Results (from the past 24 hour(s))   Basic metabolic panel    Collection Time: 10/21/24  4:56 AM   Result Value Ref Range    Sodium 136 135 - 147 mmol/L    Potassium 3.7 3.5 - 5.3 mmol/L    Chloride 108 96 - 108 mmol/L    CO2 21 21 - 32 mmol/L    ANION GAP 7 4 - 13 mmol/L    BUN 15 5 - 25 mg/dL    Creatinine 1.01 0.60 - 1.30 mg/dL    Glucose 95 65 - 140 mg/dL    Calcium 8.4 8.4 - 10.2 mg/dL    eGFR 56 ml/min/1.73sq m   CBC    Collection Time: 10/21/24  4:56 AM   Result Value Ref Range    WBC 7.10 4.31 - 10.16 Thousand/uL    RBC 3.94 3.81 - 5.12 Million/uL    Hemoglobin 11.9 11.5 - 15.4 g/dL    Hematocrit 37.2 34.8 - 46.1 %    MCV 94 82 - 98 fL    MCH 30.2 26.8 - 34.3 pg    MCHC 32.0 31.4 - 37.4 g/dL    RDW 18.5 (H) 11.6 - 15.1 %    Platelets 285 149 - 390 Thousands/uL    MPV 10.7 8.9 - 12.7 fL   Echo complete w/ contrast if indicated    Collection Time: 10/21/24  7:45 AM   Result Value Ref Range    RAA A4C 10 cm2    EPHRAIM A4C  10.5 cm2    LA Volume Index (BP) 19.8 mL/m2    MV Peak A Srikanth 0.88 m/s    MV stenosis pressure 1/2 time 67 ms    MV Peak E Srikanth 68 cm/s    E wave deceleration time 231 ms    E/A ratio 0.77     MV valve area p 1/2 method 3.28     RA 2D Volume 22.0 mL    RVID d 2.9 cm    A4C EF 70 %    Left ventricular stroke volume (2D) 34.00 mL    IVSd 1.10 cm    Tricuspid annular plane systolic excursion 2.00 cm    Ao root 3.00 cm    LVPWd 0.90 cm    LA size 3.3 cm    Asc Ao 2.8 cm    LA volume (BP) 35 mL    FS 29 28 - 44    LVIDS 2.70 cm    IVS 1.1 cm    LVIDd 3.80 cm    LA length (A2C) 5.30 cm    LEFT VENTRICLE SYSTOLIC VOLUME (MOD BIPLANE) 2D 26 mL    LV DIASTOLIC VOLUME (MOD BIPLANE) 2D 60 mL    Left Atrium Area-systolic Four Chamber 10.4 cm2    Left Atrium Area-systolic Apical Two Chamber 17.1 cm2    MV E' Tissue Velocity Septal 7 cm/s    LVSV, 2D 34 mL    BSA 1.77 m2    LV EF 55      Imaging   Imaging Results Review: I personally reviewed the following image studies in PACS and associated radiology reports: MRI lumbar spine with and without contrast- Mild to moderate motion degraded. Normal MRI lumbosacral plexus on this motion-degraded exam. Degenerative changes of lumbar spine are suboptimally evaluated given large field of lumbar spine. Consider dedicated MRI lumbar spine with and without contrast for further evaluation.     VTE Pharmacologic Prophylaxis: Apixaban

## 2024-10-21 NOTE — CASE MANAGEMENT
Case Management Assessment    Patient name Itzel Sanches  Location [address] [address]/[address] [address]-* MRN [id number]  : 1955 Date 10/21/2024       Current Admission Date: 10/19/2024  Current Admission Diagnosis:Stroke-like symptom   Patient Active Problem List    Diagnosis Date Noted Date Diagnosed    Abnormal finding on MRI of brain 10/21/2024     Stroke-like symptom 10/19/2024     Right leg numbness 10/19/2024     History of DVT (deep vein thrombosis) 10/19/2024     Anemia associated with chemotherapy 2024     Hypokalemia 05/10/2024     Chemotherapy-induced neutropenia (HCC) 2024     Chemotherapy-induced neuropathy (HCC) 2024     Metastases to the liver (HCC) 10/12/2023     Postoperative state 2022     H/O splenectomy 2022     Asplenia 2022     Platelets decreased (HCC) 2022     Stage 3 chronic kidney disease, unspecified whether stage 3a or 3b CKD (HCC) 2022     Chemotherapy-induced nausea 2022     Hypertension 12/10/2021     Chemotherapy adverse reaction 12/10/2021     Lesion of ovary 2021     Omental metastasis 2021     PONV (postoperative nausea and vomiting) 10/06/2021     Sigmoid diverticulosis 2020     Dyslipidemia 10/30/2019     Dyspnea on exertion 10/21/2019     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HCC) 2019     Morbid obesity (HCC) 05/15/2019     Callus of foot 2017     Foot pain 2017     GERD (gastroesophageal reflux disease) 2016     Mixed hyperlipidemia 2015     Prediabetes 2015     Varicose veins with pain 2015       LOS (days): 1  Geometric Mean LOS (GMLOS) (days): 3  Days to GMLOS:1.8     OBJECTIVE:    Risk of Unplanned Readmission Score: 12.4       Current admission status: Inpatient  Referral Reason: Stroke, Other (Discharge planning)    Preferred Pharmacy:   [facility] - [place], [place] - [address] [place] [id number]  Phone: [phone] Fax:  [phone]    Primary Care Provider: DEEPAK Batista    Primary Insurance: MEDICARE  Secondary Insurance: [employer] FIDELITY    ASSESSMENT:  Active Health Care Proxies    There are no active Health Care Proxies on file.          Readmission Root Cause  30 Day Readmission: No    Patient Information  Admitted from:: Home  Mental Status: Alert  During Assessment patient was accompanied by: Not accompanied during assessment  Assessment information provided by:: Patient  Primary Caregiver: Self  Support Systems: Son, Daughter, Family members, Friends/neighbors  County of Residence: [place]  What city do you live in?: [place]  Home entry access options. Select all that apply.: Stairs  Number of steps to enter home.:  (2 flights to 3rd floor apartment)  Do the steps have railings?: Yes  Type of Current Residence: Apartment  Floor Level: 3  Upon entering residence, is there a bedroom on the main floor (no further steps)?: Yes  Upon entering residence, is there a bathroom on the main floor (no further steps)?: Yes  Living Arrangements: Lives Alone  Is patient a ?: No    Activities of Daily Living Prior to Admission  Functional Status: Independent  Completes ADLs independently?: Yes  Ambulates independently?: Yes  Does patient use assisted devices?: No  Does patient currently own DME?: Yes  What DME does the patient currently own?: Walker (occasionally uses after chemo, keeps 1 in her car)  Does the patient have a history of Short-Term Rehab?: No  Does patient have a history of HHC?: Yes ([facility] when staying with daughters in [place])  Does patient currently have HHC?: No    Patient Information Continued  Income Source: Pension/FPC  Does patient have prescription coverage?: Yes  Does patient receive dialysis treatments?: No  Does patient have a history of substance abuse?: No  Does patient have a history of Mental Health Diagnosis?: No    Means of Transportation  Means of Transport to [facility]:: Drives  Self      SW spoke with patient at bedside to introduce role of CM, conduct assessment and discuss discharge planning.  Patient lives alone in a 3rd floor apartment and is independent at baseline.  PT/OT evaluations are pending but it is anticipated that patient may need acute or sub-acute rehabilitation.  Patient is agreeable to referrals being placed and SW will do so once therapy evaluations have been completed.  She noted that her daughters live in [place] and if going to [facility], she would prefer to be as close to them as possible.  SW will continue to follow.

## 2024-10-21 NOTE — ASSESSMENT & PLAN NOTE
Patient noted to have right lower extremity numbness, has sensation in her right foot however numbness from ankle up to groin. She is able to pump her right foot however is unable to bend her right knee or move her right leg.    10/21 update: patient states there is improvement with sensation, reports itching sensation to the back of her knee and pain with deep palpation throughout entire leg; however, states she cannot sense light palpation. Denies any foot tingling today.    MRI lumbar pelvis are ordered, follow-up on results as above  PT/OT recs would likely   Monitor neurovascular and neuro checks

## 2024-10-21 NOTE — PLAN OF CARE
Problem: PAIN - ADULT  Goal: Verbalizes/displays adequate comfort level or baseline comfort level  Description: Interventions:  - Encourage patient to monitor pain and request assistance  - Assess pain using appropriate pain scale  - Administer analgesics based on type and severity of pain and evaluate response  - Implement non-pharmacological measures as appropriate and evaluate response  - Consider cultural and social influences on pain and pain management  - Notify physician/advanced practitioner if interventions unsuccessful or patient reports new pain  Outcome: Progressing     Problem: INFECTION - ADULT  Goal: Absence or prevention of progression during hospitalization  Description: INTERVENTIONS:  - Assess and monitor for signs and symptoms of infection  - Monitor lab/diagnostic results  - Monitor all insertion sites, i.e. indwelling lines, tubes, and drains  - Monitor endotracheal if appropriate and nasal secretions for changes in amount and color  - [place] appropriate cooling/warming therapies per order  - Administer medications as ordered  - Instruct and encourage patient and family to use good hand hygiene technique  - Identify and instruct in appropriate isolation precautions for identified infection/condition  Outcome: Progressing     Problem: SAFETY ADULT  Goal: Patient will remain free of falls  Description: INTERVENTIONS:  - Educate patient/family on patient safety including physical limitations  - Instruct patient to call for assistance with activity   - Consult OT/PT to assist with strengthening/mobility   - Keep Call bell within reach  - Keep bed low and locked with side rails adjusted as appropriate  - Keep care items and personal belongings within reach  - Initiate and maintain comfort rounds  - Make Fall Risk Sign visible to staff  - Offer Toileting every 2 Hours, in advance of need  - Initiate/Maintain bed alarm  - Apply yellow socks and bracelet for high fall risk patients  - Consider  moving patient to room near nurses station  Outcome: Progressing  Goal: Maintain or return to baseline ADL function  Description: INTERVENTIONS:  -  Assess patient's ability to carry out ADLs; assess patient's baseline for ADL function and identify physical deficits which impact ability to perform ADLs (bathing, care of mouth/teeth, toileting, grooming, dressing, etc.)  - Assess/evaluate cause of self-care deficits   - Assess range of motion  - Assess patient's mobility; develop plan if impaired  - Assess patient's need for assistive devices and provide as appropriate  - Encourage maximum independence but intervene and supervise when necessary  - Involve family in performance of ADLs  - Assess for home care needs following discharge   - Consider OT consult to assist with ADL evaluation and planning for discharge  - Provide patient education as appropriate  Outcome: Progressing  Goal: Maintains/Returns to pre admission functional level  Description: INTERVENTIONS:  - Perform AM-PAC 6 Click Basic Mobility/ Daily Activity assessment daily.  - Set and communicate daily mobility goal to care team and patient/family/caregiver.   - Collaborate with rehabilitation services on mobility goals if consulted  - Perform Range of Motion 6  Problem: DISCHARGE PLANNING  Goal: Discharge to home or other facility with appropriate resources  Description: INTERVENTIONS:  - Identify barriers to discharge w/patient and caregiver  - Arrange for needed discharge resources and transportation as appropriate  - Identify discharge learning needs (meds, wound care, etc.)  - Arrange for interpretive services to assist at discharge as needed  - Refer to Case Management Department for coordinating discharge planning if the patient needs post-hospital services based on physician/advanced practitioner order or complex needs related to functional status, cognitive ability, or social support system  Outcome: Progressing     Problem: Knowledge  Deficit  Goal: Patient/family/caregiver demonstrates understanding of disease process, treatment plan, medications, and discharge instructions  Description: Complete learning assessment and assess knowledge base.  Interventions:  - Provide teaching at level of understanding  - Provide teaching via preferred learning methods  Outcome: Progressing     Problem: Neurological Deficit  Goal: Neurological status is stable or improving  Description: Interventions:  - Monitor and assess patient's level of consciousness, motor function, sensory function, and level of assistance needed for ADLs.   - Monitor and report changes from baseline. Collaborate with interdisciplinary team to initiate plan and implement interventions as ordered.   - Provide and maintain a safe environment.  - Consider seizure precautions.  - Consider fall precautions.  - Consider aspiration precautions.  - Consider bleeding precautions.  Outcome: Progressing     Problem: Activity Intolerance/Impaired Mobility  Goal: Mobility/activity is maintained at optimum level for patient  Description: Interventions:  - Assess and monitor patient  barriers to mobility and need for assistive/adaptive devices.  - Assess patient's emotional response to limitations.  - Collaborate with interdisciplinary team and initiate plans and interventions as ordered.  - Encourage independent activity per ability.  - Maintain proper body alignment.  - Perform active/passive rom as tolerated/ordered.  - Plan activities to conserve energy.  - Turn patient as appropriate  Outcome: Progressing     Problem: Potential for Aspiration  Goal: Non-ventilated patient's risk of aspiration is minimized  Description: Assess and monitor vital signs, respiratory status, and labs (WBC).  Monitor for signs of aspiration (tachypnea, cough, rales, wheezing, cyanosis, fever).    - Assess and monitor patient's ability to swallow.  - Place patient up in chair to eat if possible.  - HOB up at 90 degrees  to eat if unable to get patient up into chair.  - Supervise patient during oral intake.   - Instruct patient/ family to take small bites.  - Instruct patient/ family to take small single sips when taking liquids.  - Follow patient-specific strategies generated by speech pathologist.  Outcome: Progressing     Problem: Nutrition  Goal: Nutrition/Hydration status is improving  Description: Monitor and assess patient's nutrition/hydration status for malnutrition (ex- brittle hair, bruises, dry skin, pale skin and conjunctiva, muscle wasting, smooth red tongue, and disorientation). Collaborate with interdisciplinary team and initiate plan and interventions as ordered.  Monitor patient's weight and dietary intake as ordered or per policy. Utilize nutrition screening tool and intervene per policy. Determine patient's food preferences and provide high-protein, high-caloric foods as appropriate.     - Assist patient with eating.  - Allow adequate time for meals.  - Encourage patient to take dietary supplement as ordered.  - Collaborate with clinical nutritionist.  - Include patient/family/caregiver in decisions related to nutrition.  Outcome: Progressing     Problem: NEUROSENSORY - ADULT  Goal: Achieves stable or improved neurological status  Description: INTERVENTIONS  - Monitor and report changes in neurological status  - Monitor vital signs such as temperature, blood pressure, glucose, and any other labs ordered   - Initiate measures to prevent increased intracranial pressure  - Monitor for seizure activity and implement precautions if appropriate      Outcome: Progressing  Goal: Achieves maximal functionality and self care  Description: INTERVENTIONS  - Monitor swallowing and airway patency with patient fatigue and changes in neurological status  - Encourage and assist patient to increase activity and self care.   - Encourage visually impaired, hearing impaired and aphasic patients to use assistive/communication  devices  Outcome: Progressing     Problem: CARDIOVASCULAR - ADULT  Goal: Maintains optimal cardiac output and hemodynamic stability  Description: INTERVENTIONS:  - Monitor I/O, vital signs and rhythm  - Monitor for S/S and trends of decreased cardiac output  - Administer and titrate ordered vasoactive medications to optimize hemodynamic stability  - Assess quality of pulses, skin color and temperature  - Assess for signs of decreased coronary artery perfusion  - Instruct patient to report change in severity of symptoms  Outcome: Progressing  Goal: Absence of cardiac dysrhythmias or at baseline rhythm  Description: INTERVENTIONS:  - Continuous cardiac monitoring, vital signs, obtain 12 lead EKG if ordered  - Administer antiarrhythmic and heart rate control medications as ordered  - Monitor electrolytes and administer replacement therapy as ordered  Outcome: Progressing     Problem: GASTROINTESTINAL - ADULT  Goal: Minimal or absence of nausea and/or vomiting  Description: INTERVENTIONS:  - Administer IV fluids if ordered to ensure adequate hydration  - Maintain NPO status until nausea and vomiting are resolved  - Nasogastric tube if ordered  - Administer ordered antiemetic medications as needed  - Provide nonpharmacologic comfort measures as appropriate  - Advance diet as tolerated, if ordered  - Consider nutrition services referral to assist patient with adequate nutrition and appropriate food choices  Outcome: Progressing  Goal: Maintains or returns to baseline bowel function  Description: INTERVENTIONS:  - Assess bowel function  - Encourage oral fluids to ensure adequate hydration  - Administer IV fluids if ordered to ensure adequate hydration  - Administer ordered medications as needed  - Encourage mobilization and activity  - Consider nutritional services referral to assist patient with adequate nutrition and appropriate food choices  Outcome: Progressing  Goal: Maintains adequate nutritional intake  Description:  INTERVENTIONS:  - Monitor percentage of each meal consumed  - Identify factors contributing to decreased intake, treat as appropriate  - Assist with meals as needed  - Monitor I&O, weight, and lab values if indicated  - Obtain nutrition services referral as needed  Outcome: Progressing     Problem: GENITOURINARY - ADULT  Goal: Maintains or returns to baseline urinary function  Description: INTERVENTIONS:  - Assess urinary function  - Encourage oral fluids to ensure adequate hydration if ordered  - Administer IV fluids as ordered to ensure adequate hydration  - Administer ordered medications as needed  - Offer frequent toileting  - Follow urinary retention protocol if ordered  Outcome: Progressing  Goal: Absence of urinary retention  Description: INTERVENTIONS:  - Assess patient’s ability to void and empty bladder  - Monitor I/O  - Bladder scan as needed  - Discuss with physician/AP medications to alleviate retention as needed  - Discuss catheterization for long term situations as appropriate  Outcome: Progressing     Problem: METABOLIC, FLUID AND ELECTROLYTES - ADULT  Goal: Electrolytes maintained within normal limits  Description: INTERVENTIONS:  - Monitor labs and assess patient for signs and symptoms of electrolyte imbalances  - Administer electrolyte replacement as ordered  - Monitor response to electrolyte replacements, including repeat lab results as appropriate  - Instruct patient on fluid and nutrition as appropriate  Outcome: Progressing  Goal: Fluid balance maintained  Description: INTERVENTIONS:  - Monitor labs   - Monitor I/O and WT  - Instruct patient on fluid and nutrition as appropriate  - Assess for signs & symptoms of volume excess or deficit  Outcome: Progressing     Problem: SKIN/TISSUE INTEGRITY - ADULT  Goal: Skin Integrity remains intact(Skin Breakdown Prevention)  Description: Assess:  -Perform Madi assessment every daily  Problem: HEMATOLOGIC - ADULT  Goal: Maintains hematologic  stability  Description: INTERVENTIONS  - Assess for signs and symptoms of bleeding or hemorrhage  - Monitor labs  - Administer supportive blood products/factors as ordered and appropriate  Outcome: Progressing     Problem: MUSCULOSKELETAL - ADULT  Goal: Maintain or return mobility to safest level of function  Description: INTERVENTIONS:  - Assess patient's ability to carry out ADLs; assess patient's baseline for ADL function and identify physical deficits which impact ability to perform ADLs (bathing, care of mouth/teeth, toileting, grooming, dressing, etc.)  - Assess/evaluate cause of self-care deficits   - Assess range of motion  - Assess patient's mobility  - Assess patient's need for assistive devices and provide as appropriate  - Encourage maximum independence but intervene and supervise when necessary  - Involve family in performance of ADLs  - Assess for home care needs following discharge   - Consider OT consult to assist with ADL evaluation and planning for discharge  - Provide patient education as appropriate  Outcome: Progressing  Goal: Maintain proper alignment of affected body part  Description: INTERVENTIONS:  - Support, maintain and protect limb and body alignment  - Provide patient/ family with appropriate education  Outcome: Progressing     Problem: Nutrition/Hydration-ADULT  Goal: Nutrient/Hydration intake appropriate for improving, restoring or maintaining nutritional needs  Description: Monitor and assess patient's nutrition/hydration status for malnutrition. Collaborate with interdisciplinary team and initiate plan and interventions as ordered.  Monitor patient's weight and dietary intake as ordered or per policy. Utilize nutrition screening tool and intervene as necessary. Determine patient's food preferences and provide high-protein, high-caloric foods as appropriate.     INTERVENTIONS:  - Monitor oral intake, urinary output, labs, and treatment plans  - Assess nutrition and hydration status  and recommend course of action  - Evaluate amount of meals eaten  - Assist patient with eating if necessary   - Allow adequate time for meals  - Recommend/ encourage appropriate diets, oral nutritional supplements, and vitamin/mineral supplements  - Order, calculate, and assess calorie counts as needed  - Assess need for intravenous fluids  - Provide specific nutrition/hydration education as appropriate  - Include patient/family/caregiver in decisions related to nutrition  Outcome: Progressing     -Clean and moisturize skin every daily  -Inspect skin when repositioning, toileting, and assisting with ADLS  -Assess under medical devices such as purewick every 2  -Assess extremities for adequate circulation and sensation     Bed Management:  -Have minimal linens on bed & keep smooth, unwrinkled  -Change linens as needed when moist or perspiring  -Avoid sitting or lying in one position for more than 2 hours while in bed      Toileting:  -Offer bedside commode  -Assess for incontinence every 2  -Use incontinent care products after each incontinent episode such as foam cleanser    Activity:  -Mobilize patient as pt tolerates  -Encourage activity  -Encourage or provide ROM exercises   -Turn and reposition patient every 2 Hours  -Use appropriate equipment to lift or move patient in bed  -Instruct/ Assist with weight shifting every 2 when out of bed in chair  -Consider limitation of chair time 2 hour intervals    Skin Care:  -Avoid use of baby powder, tape, friction and shearing, hot water or constrictive clothing  -Do not massage red bony areas    Outcome: Progressing    times a day.  - Reposition patient every 2 hours.    - Record patient progress and toleration of activity level   Outcome: Progressing

## 2024-10-21 NOTE — OCCUPATIONAL THERAPY NOTE
Occupational Therapy Evaluation     Patient Name: Itzel Sanches  Today's Date: 10/21/2024  Problem List  Principal Problem:    Stroke-like symptom  Active Problems:    GERD (gastroesophageal reflux disease)    Mixed hyperlipidemia    Prediabetes    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HCC)    Hypertension    Chemotherapy-induced neuropathy (HCC)    Right leg numbness    History of DVT (deep vein thrombosis)    Abnormal finding on MRI of brain    Past Medical History  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    Colon polyp     GERD (gastroesophageal reflux disease)     History of chemotherapy 2021    History of rectal surgery     Hyperlipidemia     PONV (postoperative nausea and vomiting)      Past Surgical History  Past Surgical History:   Procedure Laterality Date    BREAST CYST EXCISION Right      SECTION      x3    COLON SIGMOID RESECTION      COLONOSCOPY      DILATION AND CURETTAGE OF UTERUS      ILEO LOOP DIVERSION N/A 12/10/2019    Procedure: ILEOSTOMY LOOP;  Surgeon: WINNIE Pastor MD;  [facility];  Service: Robotics    INSTILLATION CHEMOTHERAPY INTRAPERITONEAL (HIPEC) LAPAROTOMY N/A 2022    Procedure: INSTILLATION CHEMOTHERAPY INTRAPERITONEAL (HIPEC) LAPAROTOMY;  Surgeon: Jessie Freeman MD;  [facility];  Service: Surgical Oncology    IR BIOPSY CHEST WALL  2023    IR BIOPSY OMENTUM  2021    IR PORT PLACEMENT  2021    IR Y-90 PRE-ANGIO/EMBO W/ LUNG SCAN  2024    IR Y-90 RADIOEMBOLIZATION  2024    OMENTECTOMY N/A 2022    Procedure: DIAGNOSTIC LAPAROSCOPY, OPEN OMENTECTOMY, SPLENECTOMY, DIAPHRAGM REPAIR, CHEST TUBE INSERTION;  Surgeon: Jessie Freeman MD;  [facility];  Service: Surgical Oncology    NV CLOSURE ENTEROSTOMY LG/SMALL INTESTINE N/A 2020    Procedure: CLOSURE ILEOSTOMY;  Surgeon: WINNIE  "Too Pastor MD;  [facility];  Service: Colorectal    TX LAPAROSCOPY COLECTOMY PARTIAL W/ANASTOMOSIS N/A 12/10/2019    Procedure: SIGMOID RESECTION COLON LAPAROSCOPIC W ROBOTICS converted to lap hand assisted  with Partial proctectomy , LASER FLUORESCENCE ANGIOGRAPHY, INTRA OP COLONOSCOPY;  Surgeon: WINNIE Pastor MD;  [facility];  Service: Robotics    TX TOTAL ABDOMINAL HYSTERECT W/WO RMVL TUBE OVARY N/A 04/29/2022    Procedure: DIAGNOSTIC LAPAROSCOPY, TOTAL ABDOMINAL HYSTERECTOMY, BILATERAL SALPINGO-OOPHORECTOMY, EXTENSIVE ADHESIOLYSIS;  Surgeon: Peterson Mae MD;  [facility];  Service: Gynecology Oncology    TX UNLISTED PROCEDURE DIAPHRAGM  04/29/2022    Procedure: REPAIR DIAPHRAGM TEAR;  Surgeon: Yana Hebert MD;  [facility];  Service: Thoracic    SMALL INTESTINE SURGERY  02/04/2020    Procedure: RESECTION SMALL BOWEL;  Surgeon: WINNIE Pastor MD;  [facility];  Service: Colorectal         10/21/24 1705   OT Last Visit   OT Visit Date 10/21/24  (Eval)   Note Type   Note type Evaluation  (Eval 3513-3845, 9993-7008; tx 8083-1673)   Additional Comments Identified pt by full name and birthdate. Pt prefers \"Ina\"   Pain Assessment   Pain Assessment Tool 0-10   Pain Score No Pain   Restrictions/Precautions   Weight Bearing Precautions Per Order No   Other Precautions Chair Alarm;Bed Alarm;Fall Risk;Pain;Telemetry   Home Living   Type of Home Apartment;Other (Comment)  (3rd floor apt)   Home Layout One level;Performs ADLs on one level;Stairs to enter with rails;Other (Comment)  (2 flight of stairs to access apt)   Bathroom Shower/Tub Tub/shower unit   Bathroom Toilet Standard   Bathroom Equipment   (no DME; pt reports she has asked her landlord to install grab bars)   Bathroom Accessibility Accessible   Home Equipment Walker  (2 walker)   Additional Comments Pt reports living alone in 3rd floor apt   Prior Function   Level of [place] " "Independent with ADLs;Independent with functional mobility;Independent with IADLS   Lives With (S)  Alone   Receives Help From   (supprotive local family /friends)   IADLs Independent with driving;Independent with meal prep;Independent with medication management   Falls in the last 6 months   (1 loss of balance when kneeling)   Vocational Part time employment   Comments Pt reports I and active lifestyle at baseline w/ out use of AD or DME   Lifestyle   Autonomy Pt reports I w/ ADL/ IADL   Reciprocal Relationships Pt reports supportive family /friends   Service to Others Pt reports retied  at [employer] and 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     Focus of FLAIR abnormality in the right posterior frontal deep white matter corresponds to the area of low density on CT. This likely represents either gliosis or mild edema surrounding a venous angioma   Subjective   Subjective \"as long as I do not have to stop eating\"   ADL   Where Assessed Edge of bed   Eating Assistance 7  Independent   Eating Deficit Setup   Grooming Assistance 6  Modified Independent   Grooming Deficit Setup  (seated vs supine HOB elevated)   UB Bathing Assistance 5  Supervision/Setup  (seated)   UB Bathing Deficit Setup;Increased time to complete;Supervision/safety;Verbal cueing   LB Bathing Assistance Unable to assess  (anticipate max A based on fxal obs skills, clinical judgement)   UB Dressing Assistance 5  Supervision/Setup   UB Dressing Deficit Setup;Supervision/safety;Increased time to complete  (seated at EOB)   LB Dressing Assistance Unable to " "assess  (anticipate max A based on fxal obs skills, clinical judgement)   LB Dressing Deficit Setup;Steadying;Requires assistive device for steadying;Increased time to complete;Verbal cueing;Supervision/safety;Don/doff R sock   Toileting Assistance  Unable to assess   Bed Mobility   Supine to Sit 4  Minimal assistance   Additional items Assist x 1;HOB elevated;Bedrails;Increased time required;Verbal cues;LE management  (R LE mgmt)   Sit to Supine 3  Moderate assistance   Additional items Assist x 1;Increased time required;Verbal cues;Bedrails;LE management  (R LE mgmt)   Additional Comments Pt supine HOB elevated post eval w/ needs met, call bell in reach   Transfers   Sit to Stand 2  Maximal assistance   Additional items Assist x 1;Increased time required;Verbal cues;Bedrails;Armrests   Stand to Sit 2  Maximal assistance   Additional items Assist x 1;Increased time required;Verbal cues;Bedrails;Armrests   Stand pivot 2  Maximal assistance   Additional items Assist x 2;Increased time required;Verbal cues;Bedrails;Armrests   Toilet transfer 2  Maximal assistance   Additional items Assist x 2;Increased time required;Verbal cues;Commode  (drop arm commode to pt's L)   Additional Comments Pt performed SPT to pt's L using RW w/ max A x2. Pt reports \"feels like her R foot is pushing through a cloud\"   Functional Mobility   Additional Comments Will continu to assess   Balance   Static Sitting Fair -   Static Standing Poor -   Ambulatory   (Will cotninue to assess)   Activity Tolerance   Activity Tolerance Patient limited by fatigue;Patient limited by pain   Nurse Made Aware spoke w/ Katlin PADILLA pre / post eval   RUE Assessment   RUE Assessment WFL   RUE Strength   RUE Overall Strength Within Functional Limits - able to perform ADL tasks with strength   LUE Assessment   LUE Assessment WFL   LUE Strength   LUE Overall Strength Within Functional Limits - able to perform ADL tasks with strength   Hand Function   Gross Motor " Coordination Functional   Fine Motor Coordination Functional   Hand Function Comments Able to feed self, manage untensils, open / manage condiments   Sensation   Light Touch Severe deficits in the RLE   Vision-Basic Assessment   Current Vision Wears glasses only for reading   Vision - Complex Assessment   Additional Comments denies visual deficits   Perception   Inattention/Neglect Appears intact   Motor Planning Appears intact   Perseveration Not present   Cognition   Overall Cognitive Status WFL   Arousal/Participation Alert;Cooperative   Attention Within functional limits   Orientation Level Oriented X4   Memory Within functional limits   Following Commands Follows all commands and directions without difficulty   Comments Identified pt by full name and birthdate. Alert, oriented and able to follow directions / communicate wants / needs. Talkative and benefits from redirection at times to shift / sustain attention to task   Assessment   Limitation Decreased ADL status;Decreased endurance;Decreased self-care trans;Decreased high-level ADLs  (impaired sensation, R LE ROM / strength, sitting tolerance, balance, standing tolerance)   Assessment Pt is a 70yo female admitted to [facility] on 10/19/24 from home w/ R LE numbness. Diagnosed w/ stroke like symptoms. 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 Personal and environmental factors supporting performance includes independent at baseline, supportive family; barriers include difficulty managing stairs, lives alone. Pt reports living alone in 3rd floor apt and I w/ ADL/ IADL w/ out use of AD or DME. Pt reports having access to 2 walkers. Upon eval, pt alert and oriented. Pt reports R hand dominance and demonstrated R UE AROM / strength WFL. Pt required min A to perform bed mobility supine <> sit to her R, max A x1 sit to stand to RW w/ R knee blocked, S UBD, mod I grooming seated.  Deferred functional mobility / transfer due to R LE deficits. Pt is completing ADLs below baseline level of I and would benefit from OT in acute care to max I w/ ADL, achieve highest level of function. Recommend level I rehab resource intensity when medically stable for discharge from acute care. Will continue to follow   Goals   Patient Goals Pt stated that she would like to do everything she was doing including driving, traveling.   LTG Time Frame 10-14   Long Term Goal #1 see below   Plan   Treatment Interventions ADL retraining;Functional transfer training;Endurance training;Patient/family training;Equipment evaluation/education;Compensatory technique education;Continued evaluation;Energy conservation;Activityengagement   Goal Expiration Date 10/31/24   OT Treatment Day 0  (Monday 10/21/24)   OT Frequency 3-5x/wk   Discharge Recommendation   Rehab Resource Intensity Level, OT I (Maximum Resource Intensity)   Equipment Recommended Bedside commode;Shower/Tub chair with back ($)   Commode Type Standard   AM-PAC Daily Activity Inpatient   Lower Body Dressing 2   Bathing 2   Toileting 2   Upper Body Dressing 3   Grooming 4   Eating 4   Daily Activity Raw Score 17   Daily Activity Standardized Score (Calc for Raw Score >=11) 37.26   AM-PAC Applied Cognition Inpatient   Following a Speech/Presentation 4   Understanding Ordinary Conversation 4   Taking Medications 4   Remembering Where Things Are Placed or Put Away 4   Remembering List of 4-5 Errands 4   Taking Care of Complicated Tasks 4   Applied Cognition Raw Score 24   Applied Cognition Standardized Score 62.21   Barthel Index   Feeding 10   Bathing 0   Grooming Score 5   Dressing Score 5   Bladder Score 10   Bowels Score 10   Toilet Use Score 5   Transfers (Bed/Chair) Score 5   Mobility (Level Surface) Score 0   Stairs Score 0   Barthel Index Score 50   Modified [place] Scale   Modified [place] Scale 4   Additional Treatment Session   Start Time 1735   End Time 1800    Treatment Assessment Pt seen for skilled OT tx session day 1 following eval focusing on activity engagement, ongoing eval and challenging activity tolerance. Care coordination w/ PT, Sveta due to decreased activity tolerance, regression from baseline and skilled physical assistance required. Pt motivated to use the commode reporting she can't use the bedpan. Pt required consistent physical assistance to perform bed mobility supine to sit w/ min A. Pt required max A x1-2 to stand from EOB to RW and max A x2 using RW to SPT to pt's L EOB to commode. Pt required max A to participate in hygiene/ clothing mgmt. Pt supine HOB elevated at end of session w/ needs met, call bell in reach and bed alarm activated. Continue to recommend level I rehab resource intensity when medically stable for discharge from acute care. Will continue to follow   Additional Treatment Day 1  (Monday 10/21/24)   End of Consult   Education Provided Yes   Patient Position at End of Consult All needs within reach   Nurse Communication Nurse aware of consult   Licensure   [place] License Number  Celia Barnett, OTR/L [id number]         The patient's raw score on the AM-PAC Daily Activity Inpatient Short Form is 17. A raw score of less than 19 suggests the patient may benefit from discharge to post-acute rehabilitation services. Please refer to the recommendation of the Occupational Therapist for safe discharge planning.      Pt goals to be met by 10/31/24 to max I w/ ADLs and improve engagement to return home to baseline level of I; drive and travel includes:    -Pt will complete bed mobility supine <> sit independently    -Pt will complete UBD independently while seated unsupported at EOB w/ fair + balance    -Pt will complete LBD w/ min A using LHAE as needed to max I and minimize burden of care    -Pt will complete functional transfers to bed, chair, and toilet using LRAD, DME as needed w/ mod     -Pt will consistently engage in functional  mobility using LRAD, DME as needed household distances w/ mod I    STG to be met in 3-5 days to max I w/ ADLs, improve engagement includes:    -Pt will complete bed mobility supine <> sit w/ S in preparation for ADLs    -Pt will demonstrate improved activity and sitting tolerance OOB in chair for all meals    -Pt will complete UBD w/ mod I seated unsupported at EOB w/ fair balance    -Pt will complete LBD w/ mod A using LHAE as needed    -Pt will demonstrate good attention and participation in ongoing eval of functional mobility using LRAD, DME as needed to assist in DC Planning    -Pt will demonstrate improved AMPAC score to at least 21 to max I w/ ADLs, assist in DC planning    -Pt will demonstrate improved functional standing tolerance for at least 5 minutes using LRAD, DME as needed w/ fair balance while engaged in grooming in stance w/ S.       Celia Barnett, LETAR/L  [id number]  [id number]

## 2024-10-21 NOTE — ASSESSMENT & PLAN NOTE
- MRI brain with focus of FLAIR abnormality in the right posterior frontal deep white matter either representing gliosis or mild edema surrounding a venous angioma.   - This is an incidental finding and unrelated to presenting symptoms.   - Recommend follow-up MRI brain with contrast in 2 months to evaluate for stability.

## 2024-10-21 NOTE — SPEECH THERAPY NOTE
Consult received.  Records reviewed.  Pt admitted c R LE numbness (concerning for CVA/TIA).  CT of head revealed right frontal hypodensity described as could represent subacute versus chronic infarct which is contralateral to the side of her symptoms. This is with a background of metastatic rectosigmoid cancer.    [facility] stroke orders placed.   Pt passed RN Dysphagia Assessment (and eating breakfast at this time)  Communication deficits denied and pt engaging in fluent conversation with staff.  Presume no additional SLP evaluation warranted. MRI results pending (await results).   Radha Wen MS CCC-SLP   [place] License [id number]

## 2024-10-21 NOTE — PHYSICAL THERAPY NOTE
"   PT EVALUATION     10/21/24 7141   PT Last Visit   PT Visit Date 10/21/24   Note Type   Note type Evaluation   Pain Assessment   Pain Assessment Tool 0-10   Pain Score No Pain   Restrictions/Precautions   Weight Bearing Precautions Per Order No   Other Precautions (S)  Chair Alarm;Bed Alarm;Pain  (R LE is numb/weak;  pt is unable to bear weight on that leg)   Home Living   Type of Home Apartment   Home Layout One level  (2 flights f steps to enter)   Bathroom Shower/Tub Tub/shower unit   Bathroom Toilet Standard   Bathroom Accessibility Accessible   Home Equipment   (2 walkers however pt does not use them)   Prior Function   Level of [place] Independent with ADLs;Independent with functional mobility;Independent with IADLS   Lives With Alone   Receives Help From Family;Friend(s)   Falls in the last 6 months 1 to 4   Vocational Part time employment   General   Additional Pertinent History Pt admitted with insidious onset of R LE numbness and weakness from her hip to her toes.  MRI of the lumbar spine and brain have not been remarkable.  Medical workup still in process.  Of note pt had rectosigmoid cancer with chemo induced neuropathy.   Subjective   Subjective \"My leg is totally numb\"   RLE Assessment   RLE Assessment   (PROM WFL, MMT hip2/5, knee 2+/5, ankle 3-/5)   LLE Assessment   LLE Assessment WNL   Light Touch   RLE Light Touch Absent   LLE Light Touch Grossly intact   Bed Mobility   Supine to Sit 4  Minimal assistance   Sit to Supine 4  Minimal assistance   Transfers   Sit to Stand 2  Maximal assistance   Additional items Assist x 2  (R knee blocked)   Stand to Sit 2  Maximal assistance   Additional items Assist x 2   Stand pivot 2  Maximal assistance   Additional items Assist x 2;Verbal cues  (to L side only due to R LE numbness/weakness;  with walker to and from commode)  Pt is unable to bear any weight on her R LE due to impaired sensation.   Ambulation/Elevation   Gait pattern   (Pt unable to walk " due to R LE numbness/weakness.)   Balance   Static Sitting Fair   Static Standing Poor   Activity Tolerance   Activity Tolerance Patient limited by fatigue   Nurse Made Aware RN Katlin aware that pt may only transfer to her L side with a walker and assist x 2   Assessment   Prognosis Good   Problem List Decreased strength;Impaired balance;Decreased mobility;Obesity   Assessment Pt is 69 y.o. female seen for PT evaluation s/p admit to [facility] on 10/19/2024 w/ Stroke-like symptom. PT consulted to assess pt's functional mobility and d/c needs. Order placed for PT eval and tx.  Co-morbidities affecting patient's physical performance include: chemotherapy induced neuropathy, retosigmoid cancer with liver mets.  Personal factors affecting patient at time of initial evaluation include: inaccessible home environment, stairs to enter home, inability to ambulate household distances, and inability to perform ADLS.         Prior to admission, patient was independent with functional mobility without assistive device and independent with ADLS.    Upon evaluation: Pt required max assist x 2 with a walker to transfer to her L side only due to R LE weakness/numbness.          Please find objective findings from Physical Therapy assessment regarding body systems outlined above with impairments and limitations including weakness, decreased activity tolerance, decreased functional mobility tolerance, and fall risk.The Barthel Index was used as a functional outcome tool presenting with a score of Barthel Index Score: 50 today indicating marked limitations of functional mobility and ADLS.  Patient's clinical presentation is currently unstable/unpredictable as seen in patient's presentation of increased fall risk, new onset of impairment of functional mobility, and new onset of weakness. Pt would benefit from continued Physical Therapy treatment to address deficits as defined above and maximize level of functional mobility.     As  demonstrated by objective findings, the assigned level of complexity for this evaluation is high.    The patient's -[facility] Basic Mobility Inpatient Short Form Raw Score is 11. A Raw score of less than or equal to 16 suggests the patient may benefit from discharge to post-acute rehabilitation services. Please also refer to the recommendation of the Physical Therapist for safe discharge planning.   Goals   Patient Goals Pt stated that she would like to do everything she was doing including driving, traveling.   STG Expiration Date 10/28/24   Short Term Goal #1 1.  Independent bed mobility,   2.  Max assist transfers with a walker to chair or commode,   3.  Improve right lower extremity strength to at least 3 out of 5,   4. Assess sliding board transfers   LTG Expiration Date 11/04/24   Long Term Goal #1 1.  Patient will transfer bed to chair with a walker with mod assist of 1,   2.  Patient will ambulate with a walker with mod assist of 2 x 10 feet   Plan   Treatment/Interventions Functional transfer training;LE strengthening/ROM;Therapeutic exercise;Gait training;Bed mobility;Equipment eval/education;Patient/family training;ADL retraining   PT Frequency Other (Comment)  (5x/week)   Discharge Recommendation   Rehab Resource Intensity Level, PT I (Maximum Resource Intensity)   AM-PAC Basic Mobility Inpatient   Turning in Flat Bed Without Bedrails 3   Lying on Back to Sitting on Edge of Flat Bed Without Bedrails 3   Moving Bed to Chair 1   Standing Up From Chair Using Arms 2   Walk in Room 1   Climb 3-5 Stairs With Railing 1   Basic Mobility Inpatient Raw Score 11   Basic Mobility Standardized Score 30.25   [facility] Highest Level Of Mobility   -[facility] Goal 4: Move to chair/commode   -[facility] Achieved 4: Move to chair/commode   Barthel Index   Feeding 10   Bathing 0   Grooming Score 5   Dressing Score 5   Bladder Score 10   Bowels Score 10   Toilet Use Score 5   Transfers (Bed/Chair) Score 5   Mobility (Level Surface)  Score 0   Stairs Score 0   Barthel Index Score 50   End of Consult   Patient Position at End of Consult All needs within reach;Bed/Chair alarm activated;Supine  (RN aware pt's purewick needs to be replaced)   Licensure   [place] License Number  Marie Camacho PT  [id number]

## 2024-10-21 NOTE — ASSESSMENT & PLAN NOTE
"Patient presents with right lower extremity numbness and on CTH has a right frontal hypodensity described as could represent subacute versus chronic infarct which is contralateral to the side of her symptoms.  This is with a background of metastatic rectosigmoid cancer. Patient states she was going about her normal daily routine and noticed this while sitting on the couch and had a sensation that her right leg fell asleep.  [facility] stroke orders placed; telemetry monitor, neurochecks, MRI of the brain 2D echo.  PT OT speech.  Neurology recommends continue Eliquis.    Lipid panel, LDL slightly elevated otherwise unremarkable  Hgb A1c 5.9   ECHO 10/21 with normal systolic function and wall motion. EF 55%. No indication of valve disease.  MRI of the brain was performed 10/20 with no evidence of metastatic disease, focus of FLAIR abnormality in right posterior frontal deep white matter corresponds to the area of low-density on CT.  This likely represents either gliosis or mild edema surrounding a venous angioma, follow-up gadolinium enhanced MRI recommended in 2 months to exclude more aggressive pathologies as per radiology report.  MRI lumbosacral plexus performed 10/21 showing \"Normal MRI lumbosacral plexus on this motion degraded exam. Degenerative changes of lumbar spine are suboptimally evaluated given large field of view of lumbar spine. Consider dedicated MRI lumbar spine with and without contrast contrast for further evaluation.\"   Appreciate neurology recommendations  "

## 2024-10-21 NOTE — ASSESSMENT & PLAN NOTE
- Follows with oncology team as an outpatient.   - Maintenance chemotherapy currently on hold until after the holidays.

## 2024-10-21 NOTE — PLAN OF CARE
Problem: OCCUPATIONAL THERAPY ADULT  Goal: Performs self-care activities at highest level of function for planned discharge setting.  See evaluation for individualized goals.  Description: Treatment Interventions: ADL retraining, Functional transfer training, Endurance training, Patient/family training, Equipment evaluation/education, Compensatory technique education, Continued evaluation, Energy conservation, Activityengagement  Equipment Recommended: Bedside commode, Shower/Tub chair with back ($)       See flowsheet documentation for full assessment, interventions and recommendations.   Note: Limitation: Decreased ADL status, Decreased endurance, Decreased self-care trans, Decreased high-level ADLs (impaired sensation, R LE ROM / strength, sitting tolerance, balance, standing tolerance)     Assessment: Pt is a 68yo female admitted to [facility] on 10/19/24 from home w/ R LE numbness. Diagnosed w/ stroke like symptoms. 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 Personal and environmental factors supporting performance includes independent at baseline, supportive family; barriers include difficulty managing stairs, lives alone. Pt reports living alone in 3rd floor apt and I w/ ADL/ IADL w/ out use of AD or DME. Pt reports having access to 2 walkers. Upon eval, pt alert and oriented. Pt reports R hand dominance and demonstrated R UE AROM / strength WFL. Pt required min A to perform bed mobility supine <> sit to her R, max A x1 sit to stand to RW w/ R knee blocked, S UBD, mod I grooming seated. Deferred functional mobility / transfer due to R LE deficits. Pt is completing ADLs below baseline level of I and would benefit from OT in acute care to max I w/ ADL, achieve highest level of function. Recommend level I rehab resource intensity when medically stable for discharge from acute care. Will continue to follow     Rehab Resource  Intensity Level, OT: I (Maximum Resource Intensity)

## 2024-10-21 NOTE — PLAN OF CARE
Problem: PHYSICAL THERAPY ADULT  Goal: Performs mobility at highest level of function for planned discharge setting.  See evaluation for individualized goals.  Description: Treatment/Interventions: Functional transfer training, LE strengthening/ROM, Therapeutic exercise, Gait training, Bed mobility, Equipment eval/education, Patient/family training, ADL retraining          See flowsheet documentation for full assessment, interventions and recommendations.  Note: Prognosis: Good  Problem List: Decreased strength, Impaired balance, Decreased mobility, Obesity  Assessment: Pt is 69 y.o. female seen for PT evaluation s/p admit to [facility] on 10/19/2024 w/ Stroke-like symptom. PT consulted to assess pt's functional mobility and d/c needs. Order placed for PT eval and tx.  Co-morbidities affecting patient's physical performance include: chemotherapy induced neuropathy, retosigmoid cancer with liver mets.  Personal factors affecting patient at time of initial evaluation include: inaccessible home environment, stairs to enter home, inability to ambulate household distances, and inability to perform ADLS.     Prior to admission, patient was independent with functional mobility without assistive device and independent with ADLS.  Upon evaluation: Pt required max assist x 2 with a walker to transfer to her L side only due to R LE weakness/numbness.      Please find objective findings from Physical Therapy assessment regarding body systems outlined above with impairments and limitations including weakness, decreased activity tolerance, decreased functional mobility tolerance, and fall risk.The Barthel Index was used as a functional outcome tool presenting with a score of Barthel Index Score: 50 today indicating marked limitations of functional mobility and ADLS.  Patient's clinical presentation is currently unstable/unpredictable as seen in patient's presentation of increased fall risk, new onset of impairment of  functional mobility, and new onset of weakness. Pt would benefit from continued Physical Therapy treatment to address deficits as defined above and maximize level of functional mobility.     As demonstrated by objective findings, the assigned level of complexity for this evaluation is high.The patient's -[facility] Basic Mobility Inpatient Short Form Raw Score is 11. A Raw score of less than or equal to 16 suggests the patient may benefit from discharge to post-acute rehabilitation services. Please also refer to the recommendation of the Physical Therapist for safe discharge planning.        Rehab Resource Intensity Level, PT: I (Maximum Resource Intensity)    See flowsheet documentation for full assessment.

## 2024-10-21 NOTE — PROGRESS NOTES
"Progress Note - Hospitalist   Name: Itzel Sanches 69 y.o. female I MRN: [id number]  Unit/Bed#: [address] [place] [address] Date of Admission: 10/19/2024   Date of Service: 10/21/2024 I Hospital Day: 1    Assessment & Plan  Stroke-like symptom  Patient presents with right lower extremity numbness and on CTH has a right frontal hypodensity described as could represent subacute versus chronic infarct which is contralateral to the side of her symptoms.  This is with a background of metastatic rectosigmoid cancer. Patient states she was going about her normal daily routine and noticed this while sitting on the couch and had a sensation that her right leg fell asleep.  [facility] stroke orders placed; telemetry monitor, neurochecks, MRI of the brain 2D echo.  PT OT speech.  Neurology recommends continue Eliquis.    Lipid panel, LDL slightly elevated otherwise unremarkable  Hgb A1c 5.9   ECHO 10/21 with normal systolic function and wall motion. EF 55%. No indication of valve disease.  MRI of the brain was performed 10/20 with no evidence of metastatic disease, focus of FLAIR abnormality in right posterior frontal deep white matter corresponds to the area of low-density on CT.  This likely represents either gliosis or mild edema surrounding a venous angioma, follow-up gadolinium enhanced MRI recommended in 2 months to exclude more aggressive pathologies as per radiology report.  MRI lumbosacral plexus performed 10/21 showing \"Normal MRI lumbosacral plexus on this motion degraded exam. Degenerative changes of lumbar spine are suboptimally evaluated given large field of view of lumbar spine. Consider dedicated MRI lumbar spine with and without contrast contrast for further evaluation.\"   Appreciate neurology recommendations  Right leg numbness  Patient noted to have right lower extremity numbness, has sensation in her right foot however numbness from ankle up to groin. She is able to pump her right foot however is unable to bend her " right knee or move her right leg.    10/21 update: patient states there is improvement with sensation, reports itching sensation to the back of her knee and pain with deep palpation throughout entire leg; however, states she cannot sense light palpation. Denies any foot tingling today.    MRI lumbar pelvis are ordered, follow-up on results as above  PT/OT recs would likely   Monitor neurovascular and neuro checks   Hypertension  Patient with no prior hypertension.    Patient largely asymptomatic.    Will place on labetalol IV as needed.    Hold off on instituting standing antihypertensive for now  Neurology indicated SBP less than 180 with goal 140-160.  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HCC)  As outlined in HPI.    GERD (gastroesophageal reflux disease)  Continue PPI  Mixed hyperlipidemia  On Zetia  Heart healthy diet  Chemotherapy-induced neuropathy (HCC)  Chronic.  History of DVT (deep vein thrombosis)  Continue Eliquis  No bruising or bleeding issues  Prediabetes  Noted with Hgb A1c 5.9   Recommend weight loss and diet control    VTE Pharmacologic Prophylaxis: VTE Score: 9 High Risk (Score >/= 5) - Pharmacological DVT Prophylaxis Ordered: apixaban (Eliquis). Sequential Compression Devices Ordered.    Mobility:   Basic Mobility Inpatient Raw Score: 14  JH-HLM Goal: 4: Move to chair/commode  JH-HLM Achieved: 4: Move to chair/commode  JH-HLM Goal NOT achieved. Continue with multidisciplinary rounding and encourage appropriate mobility to improve upon JH-HLM goals.    Patient Centered Rounds: I performed bedside rounds with nursing staff today.   Education and Discussions with Family / Patient: Patient declined call to .     Current Length of Stay: 1 day(s)  Current Patient Status: Inpatient   Certification Statement: The patient will continue to require additional inpatient hospital stay due to MRI and imaging results pending. PT/OT recs  Discharge Plan: Anticipate discharge in 48-72 hrs to STR pending PT/OT  recs    Code Status: Prior    Subjective   Patient seen and examined at bedside with no new complaints overnight. She tells me she is able to have new sensations to the RLE including an itch sensation and pain upon deep palpation which were no present yesterday.     Objective :  Temp:  [97.5 °F (36.4 °C)-98.1 °F (36.7 °C)] 97.5 °F (36.4 °C)  HR:  [75-90] 77  BP: (143-181)/(75-90) 168/90  Resp:  [18-19] 18  SpO2:  [96 %-98 %] 98 %  O2 Device: None (Room air)    Body mass index is 41.76 kg/m².     Input and Output Summary (last 24 hours):     Intake/Output Summary (Last 24 hours) at 10/21/2024 1013  Last data filed at 10/20/2024 1224  Gross per 24 hour   Intake 240 ml   Output --   Net 240 ml       Physical Exam  Vitals reviewed.   Constitutional:       General: She is not in acute distress.     Appearance: She is well-developed. She is obese. She is not ill-appearing or toxic-appearing.   HENT:      Head: Normocephalic and atraumatic.      Mouth/Throat:      Mouth: Mucous membranes are moist.      Pharynx: Oropharynx is clear.   Eyes:      Conjunctiva/sclera: Conjunctivae normal.      Pupils: Pupils are equal, round, and reactive to light.   Cardiovascular:      Rate and Rhythm: Normal rate and regular rhythm.      Pulses: Normal pulses.      Heart sounds: Normal heart sounds. No murmur heard.  Pulmonary:      Effort: Pulmonary effort is normal. No respiratory distress.      Breath sounds: Normal breath sounds. No wheezing or rhonchi.   Abdominal:      General: Bowel sounds are normal. There is no distension.      Palpations: Abdomen is soft.      Tenderness: There is no abdominal tenderness. There is no guarding.   Musculoskeletal:         General: No swelling.      Cervical back: Neck supple.      Right knee: Decreased range of motion.      Left knee: Normal.      Right lower leg: Tenderness present. No edema.      Left lower leg: Normal. No edema.      Right foot: Normal range of motion. No foot drop. Normal  pulse.      Left foot: Normal.      Comments: Right foot decreased strength  Left foot normal strength   Skin:     General: Skin is warm and dry.      Capillary Refill: Capillary refill takes less than 2 seconds.   Neurological:      Mental Status: She is alert and oriented to person, place, and time.      GCS: GCS eye subscore is 4. GCS verbal subscore is 5. GCS motor subscore is 6.      Sensory: Sensory deficit present.      Motor: Weakness present.      Gait: Gait abnormal.   Psychiatric:         Mood and Affect: Mood normal.       Lines/Drains:  Lines/Drains/Airways       Active Status       Name Placement date Placement time Site Days    Port A Cath  Right Subclavian --  --  Subclavian  --    External Urinary Catheter 10/20/24  0545  -- 1                  Central Line:  Goal for removal: N/A - Chronic         Telemetry:  Telemetry Orders (From admission, onward)               24 Hour Telemetry Monitoring  Continuous x 24 Hours (Telem)           Question:  Reason for 24 Hour Telemetry  Answer:  TIA/Suspected CVA/ Confirmed CVA                     Telemetry Reviewed: Normal Sinus Rhythm  Indication for Continued Telemetry Use: Acute CVA               Lab Results: I have reviewed the following results:   Results from last 7 days   Lab Units 10/21/24  0456 10/20/24  0412   WBC Thousand/uL 7.10 7.29   HEMOGLOBIN g/dL 11.9 11.5   HEMATOCRIT % 37.2 35.2   PLATELETS Thousands/uL 285 268   SEGS PCT %  --  51   LYMPHO PCT %  --  31   MONO PCT %  --  17*   EOS PCT %  --  1     Results from last 7 days   Lab Units 10/21/24  0456 10/20/24  0412   SODIUM mmol/L 136 138   POTASSIUM mmol/L 3.7 3.7   CHLORIDE mmol/L 108 108   CO2 mmol/L 21 22   BUN mg/dL 15 15   CREATININE mg/dL 1.01 1.02   ANION GAP mmol/L 7 8   CALCIUM mg/dL 8.4 8.8   ALBUMIN g/dL  --  3.0*   TOTAL BILIRUBIN mg/dL  --  0.52   ALK PHOS U/L  --  46   ALT U/L  --  9   AST U/L  --  11*   GLUCOSE RANDOM mg/dL 95 103     Results from last 7 days   Lab Units  10/19/24  1738   INR  1.08     Results from last 7 days   Lab Units 10/19/24  1809   POC GLUCOSE mg/dl 71     Results from last 7 days   Lab Units 10/20/24  0412   HEMOGLOBIN A1C % 5.9*  5.9*           Recent Cultures (last 7 days):         Imaging Results Review: I reviewed radiology reports from this admission including: CT abdomen/pelvis, CT head, MRI brain, and Echocardiogram.  Other Study Results Review: EKG was reviewed.     Last 24 Hours Medication List:     Current Facility-Administered Medications:     acetaminophen (TYLENOL) tablet 650 mg, Q6H PRN    apixaban (ELIQUIS) tablet 5 mg, BID    ezetimibe (ZETIA) tablet 10 mg, HS    famotidine (PEPCID) tablet 10 mg, BID PRN    labetalol (NORMODYNE) injection 10 mg, Q4H PRN    loratadine (CLARITIN) tablet 10 mg, Daily    ondansetron (ZOFRAN) injection 4 mg, Q6H PRN    Administrative Statements   Today, Patient Was Seen By: DEEPAK Rosario  I have spent a total time of 35 minutes in caring for this patient on the day of the visit/encounter including Risks and benefits of tx options, Instructions for management, Patient and family education, Importance of tx compliance, Risk factor reductions, Impressions, Counseling / Coordination of care, Documenting in the medical record, Reviewing / ordering tests, medicine, procedures  , Obtaining or reviewing history  , and Communicating with other healthcare professionals .    **Please Note: This note may have been constructed using a voice recognition system.**

## 2024-10-22 ENCOUNTER — APPOINTMENT (INPATIENT)
Dept: RADIOLOGY | Facility: HOSPITAL | Age: 69
DRG: 074 | End: 2024-10-22
Payer: MEDICARE

## 2024-10-22 LAB
ANION GAP SERPL CALCULATED.3IONS-SCNC: 6 MMOL/L (ref 4–13)
BUN SERPL-MCNC: 19 MG/DL (ref 5–25)
CALCIUM SERPL-MCNC: 8.7 MG/DL (ref 8.4–10.2)
CHLORIDE SERPL-SCNC: 108 MMOL/L (ref 96–108)
CO2 SERPL-SCNC: 25 MMOL/L (ref 21–32)
CREAT SERPL-MCNC: 1.03 MG/DL (ref 0.6–1.3)
ERYTHROCYTE [DISTWIDTH] IN BLOOD BY AUTOMATED COUNT: 18.8 % (ref 11.6–15.1)
GFR SERPL CREATININE-BSD FRML MDRD: 55 ML/MIN/1.73SQ M
GLUCOSE SERPL-MCNC: 95 MG/DL (ref 65–140)
HCT VFR BLD AUTO: 38.3 % (ref 34.8–46.1)
HGB BLD-MCNC: 12.3 G/DL (ref 11.5–15.4)
MCH RBC QN AUTO: 30.2 PG (ref 26.8–34.3)
MCHC RBC AUTO-ENTMCNC: 32.1 G/DL (ref 31.4–37.4)
MCV RBC AUTO: 94 FL (ref 82–98)
PLATELET # BLD AUTO: 283 THOUSANDS/UL (ref 149–390)
PMV BLD AUTO: 11 FL (ref 8.9–12.7)
POTASSIUM SERPL-SCNC: 4.4 MMOL/L (ref 3.5–5.3)
RBC # BLD AUTO: 4.07 MILLION/UL (ref 3.81–5.12)
SODIUM SERPL-SCNC: 139 MMOL/L (ref 135–147)
WBC # BLD AUTO: 6.72 THOUSAND/UL (ref 4.31–10.16)

## 2024-10-22 PROCEDURE — 97535 SELF CARE MNGMENT TRAINING: CPT

## 2024-10-22 PROCEDURE — 72156 MRI NECK SPINE W/O & W/DYE: CPT

## 2024-10-22 PROCEDURE — 80048 BASIC METABOLIC PNL TOTAL CA: CPT

## 2024-10-22 PROCEDURE — A9585 GADOBUTROL INJECTION: HCPCS | Performed by: NURSE PRACTITIONER

## 2024-10-22 PROCEDURE — 99232 SBSQ HOSP IP/OBS MODERATE 35: CPT

## 2024-10-22 PROCEDURE — 99232 SBSQ HOSP IP/OBS MODERATE 35: CPT | Performed by: PSYCHIATRY & NEUROLOGY

## 2024-10-22 PROCEDURE — 85027 COMPLETE CBC AUTOMATED: CPT

## 2024-10-22 PROCEDURE — 73560 X-RAY EXAM OF KNEE 1 OR 2: CPT

## 2024-10-22 RX ORDER — GADOBUTROL 604.72 MG/ML
9 INJECTION INTRAVENOUS
Status: COMPLETED | OUTPATIENT
Start: 2024-10-22 | End: 2024-10-22

## 2024-10-22 RX ORDER — LORAZEPAM 2 MG/ML
0.5 INJECTION INTRAMUSCULAR ONCE
Status: COMPLETED | OUTPATIENT
Start: 2024-10-22 | End: 2024-10-22

## 2024-10-22 RX ADMIN — ACETAMINOPHEN 650 MG: 325 TABLET ORAL at 08:21

## 2024-10-22 RX ADMIN — GADOBUTROL 9 ML: 604.72 INJECTION INTRAVENOUS at 11:30

## 2024-10-22 RX ADMIN — LORAZEPAM 0.5 MG: 2 INJECTION INTRAMUSCULAR; INTRAVENOUS at 10:43

## 2024-10-22 RX ADMIN — EZETIMIBE 10 MG: 10 TABLET ORAL at 21:24

## 2024-10-22 RX ADMIN — LABETALOL HYDROCHLORIDE 10 MG: 5 INJECTION, SOLUTION INTRAVENOUS at 08:23

## 2024-10-22 RX ADMIN — APIXABAN 5 MG: 5 TABLET, FILM COATED ORAL at 08:21

## 2024-10-22 RX ADMIN — APIXABAN 5 MG: 5 TABLET, FILM COATED ORAL at 18:18

## 2024-10-22 NOTE — PLAN OF CARE
Problem: OCCUPATIONAL THERAPY ADULT  Goal: Performs self-care activities at highest level of function for planned discharge setting.  See evaluation for individualized goals.  Description: Treatment Interventions: ADL retraining, Functional transfer training, Endurance training, Patient/family training, Equipment evaluation/education, Compensatory technique education, Continued evaluation, Energy conservation, Activityengagement  Equipment Recommended: Bedside commode, Shower/Tub chair with back ($)       See flowsheet documentation for full assessment, interventions and recommendations.   Outcome: Progressing  Note: Limitation: Decreased ADL status, Decreased endurance, Decreased self-care trans, Decreased high-level ADLs (impaired sensation, R LE ROM / strength, sitting tolerance, balance, standing tolerance)     Assessment: Pt seen for skilled OT tx session focusing on activity engagement, ongoing eval, challenging activity tolerance and pt education. Pt required increased assistance to perfrom bed mobility. Pt motivated to participate reporting she would like to use the commode. Pt required less physical assistance to perform SPT using RW EOB <> commode. Continue to recommend level I rehab resource intensity when mediclly stable for discharge from acute care. Will continue to follow     Rehab Resource Intensity Level, OT: I (Maximum Resource Intensity)

## 2024-10-22 NOTE — PROGRESS NOTES
Report given to Nancy RN, patient seen in her room A&O 4 resting in bed appears comfortable, offers no complaints.

## 2024-10-22 NOTE — PROGRESS NOTES
Progress Note - Neurology   Name: Itzel Sanches 69 y.o. 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     Neuroimaging   10/19/24 CTH:   Area of white matter hypoattenuation in the right frontal lobe could respresent a subacute to chronic infarct. No intracranial hemorrhage or mass effect.   10/19/2024 CTA head and neck with and without contrast:   Unremarkable CTA neck and brain.   No hemodynamically significant stenosis, dissection, or occlusion of the carotid or vertebral arteries or major vessels of the [place].   Hypoplastic vertebrobasilar system.  10/20/24 MRI brain with and without contrast:   No evidence of metastatic disease.   Focus of FLAIR abnormality in the right posterior frontal deep white matter corresponds to the area of low density on CT. This likely represents either gliosis or mild edema surrounding a venous angioma. Follow-up gadolinium enhanced MRI recommended in 2 months to exclude more aggressive pathologies.   10/21/24 MRI lumbosacral plexus with and without contrast:   Mild to moderate motion degraded.   Normal MRI lumbosacral plexus on this motion degraded exam.   Degenerative changes of lumbar spine are suboptimally evaluated given large field of view of lumbar spine. Consider dedicated MRI lumbar spine with and without contrast for further evaluation.     Etiology of symptoms is unclear at this time. Suspect local nerve compression underlying etiology but need to rule out spinal pathology.     Plan:  - MRI cervical, thoracic and lumbar spine with and without contrast pending.  - PT/OT  - Attending neurologist to see and  advise. Please see attestation for additional details/recommendations.   Mixed hyperlipidemia  - Lipid panel reviewed, total cholesterol 193, triglycerides 117, HDL 52, .  - Can continue on home dose of Zetia as do not suspect cerebrovascular etiology for symptoms.   Prediabetes  - Hemoglobin A1c reviewed, 5.9.   - Goal euglycemia.   - Management as per primary team.  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HCC)  - Follows with oncology team as an outpatient.   - Maintenance chemotherapy currently on hold until after the holidays.  Hypertension  - Goal normotension.   - Management as per primary team.  History of DVT (deep vein thrombosis)  - Continue on home Eliquis.  Abnormal finding on MRI of brain  - MRI brain with focus of FLAIR abnormality in the right posterior frontal deep white matter either representing gliosis or mild edema surrounding a venous angioma.   - This is an incidental finding and unrelated to presenting symptoms.   - Recommend follow-up MRI brain with contrast in 2 months to evaluate for stability.    Recommendations for outpatient neurological follow up have yet to be determined.    Subjective   Patient seen and examined at bedside. She reports maybe slight improvement in RLE and notes she is able to bend and lift at knee slightly today which she does not think she was able to do yesterday. She also notes sensory changes are slightly improving. She notes that she is starting to feel more in her RLE below her knee. She denies any new or worsening symptoms. She denies bowel/bladder incontinence but does note that she has not had a bowel movement since coming to the hospital.    She expressed frustration with her current situation and notes that she is feeling badly that many of her fall plans have to be cancelled due to what is going on with her leg.     Review of Systems   Constitutional:  Negative for chills, fatigue and fever.   HENT:  Negative for trouble swallowing.    Eyes:  Negative for visual  disturbance.   Respiratory:  Negative for shortness of breath.    Gastrointestinal:  Positive for constipation. Negative for abdominal pain, nausea and vomiting.   Genitourinary:  Negative for difficulty urinating and dysuria.   Musculoskeletal:  Positive for gait problem. Negative for back pain and neck pain.   Skin:  Negative for rash.   Neurological:  Positive for weakness and numbness. Negative for dizziness, facial asymmetry, light-headedness and headaches.   Psychiatric/Behavioral:  Negative for confusion.      Medications  Scheduled Meds:  Current Facility-Administered Medications   Medication Dose Route Frequency Provider Last Rate    acetaminophen  650 mg Oral Q6H PRN Hattie Hernandez MD      apixaban  5 mg Oral BID Hattie Hernandez MD      ezetimibe  10 mg Oral HS Hattie Hernandez MD      famotidine  10 mg Oral BID PRN Hattie Hernandez MD      labetalol  10 mg Intravenous Q4H PRN Hattie Hernandez MD      loratadine  10 mg Oral Daily Hattie Hernandez MD      ondansetron  4 mg Intravenous Q6H PRN Hattie Hernandez MD       Continuous Infusions:   PRN Meds:.  acetaminophen    famotidine    labetalol    ondansetron    Objective :  Temp:  [97.2 °F (36.2 °C)-98.7 °F (37.1 °C)] 97.2 °F (36.2 °C)  HR:  [73-84] 84  BP: (134-176)/(77-98) 145/92  Resp:  [16-18] 16  SpO2:  [97 %-98 %] 97 %  O2 Device: None (Room air)    Physical Exam  Constitutional:       General: She is not in acute distress.     Appearance: Normal appearance. She is ill-appearing. She is not toxic-appearing or diaphoretic.   HENT:      Head: Normocephalic and atraumatic.   Eyes:      General: No scleral icterus.        Right eye: No discharge.         Left eye: No discharge.      Extraocular Movements: Extraocular movements intact.      Conjunctiva/sclera: Conjunctivae normal.   Musculoskeletal:         General: Normal range of motion.      Cervical back: Normal range of motion and neck supple.      Right lower leg: No edema.      Left lower leg: No edema.    Skin:     General: Skin is warm and dry.      Findings: No erythema or rash.   Neurological:      Mental Status: She is alert and oriented to person, place, and time.      Deep Tendon Reflexes:      Reflex Scores:       Bicep reflexes are 1+ on the right side and 1+ on the left side.       Brachioradialis reflexes are 1+ on the right side and 1+ on the left side.       Patellar reflexes are 0 on the right side and 1+ on the left side.       Achilles reflexes are 0 on the right side and 1+ on the left side.  Psychiatric:         Speech: Speech normal.      Comments: Patient tearful at times throughout examination.       Neurological Exam  Mental Status  Alert. Oriented to person, place, time and situation. Oriented to person, place, and time. Speech is normal. Language is fluent with no aphasia. Attention and concentration are normal.    Cranial Nerves  CN III, IV, VI: Extraocular movements intact bilaterally.  CN VII:  Right: There is no facial weakness.  Left: There is no facial weakness.    Motor  Normal muscle bulk throughout. No fasciculations present.  Motor strength B/L UE and LLE 5/5. Motor strength RLE 1/5 hip flexion, 2/5 knee flexion/extension, DF/PF 4/5..    Sensory  Normal aside from RLE, today she is able to sense crude touch below knee on RLE.  . Diminished RLE below knee.     Reflexes                                            Right                      Left  Brachioradialis                    1+                         1+  Biceps                                 1+                         1+  Patellar                                0                         1+  Achilles                                0                         1+    Gait    Deferred for safety..        Lab Results: I have reviewed the following results:  Recent Results (from the past 24 hour(s))   Basic metabolic panel    Collection Time: 10/22/24  6:22 AM   Result Value Ref Range    Sodium 139 135 - 147 mmol/L    Potassium 4.4 3.5 -  5.3 mmol/L    Chloride 108 96 - 108 mmol/L    CO2 25 21 - 32 mmol/L    ANION GAP 6 4 - 13 mmol/L    BUN 19 5 - 25 mg/dL    Creatinine 1.03 0.60 - 1.30 mg/dL    Glucose 95 65 - 140 mg/dL    Calcium 8.7 8.4 - 10.2 mg/dL    eGFR 55 ml/min/1.73sq m   CBC    Collection Time: 10/22/24  6:22 AM   Result Value Ref Range    WBC 6.72 4.31 - 10.16 Thousand/uL    RBC 4.07 3.81 - 5.12 Million/uL    Hemoglobin 12.3 11.5 - 15.4 g/dL    Hematocrit 38.3 34.8 - 46.1 %    MCV 94 82 - 98 fL    MCH 30.2 26.8 - 34.3 pg    MCHC 32.1 31.4 - 37.4 g/dL    RDW 18.8 (H) 11.6 - 15.1 %    Platelets 283 149 - 390 Thousands/uL    MPV 11.0 8.9 - 12.7 fL     Imaging   No new neuro imaging available for review.    VTE Pharmacologic Prophylaxis: Apixaban

## 2024-10-22 NOTE — OCCUPATIONAL THERAPY NOTE
Occupational Therapy Progress Note     Patient Name: Itzel Sanches  Today's Date: 10/22/2024  Problem List  Principal Problem:    Stroke-like symptom  Active Problems:    GERD (gastroesophageal reflux disease)    Mixed hyperlipidemia    Prediabetes    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HCC)    Hypertension    Chemotherapy-induced neuropathy (HCC)    Right leg numbness    History of DVT (deep vein thrombosis)    Abnormal finding on MRI of brain       10/22/24 1354   OT Last Visit   OT Visit Date 10/22/24  (Tuesday)   Note Type   Note Type Treatment  (split tx session 0115-9292, [id number])   Pain Assessment   Pain Assessment Tool 0-10   Pain Score No Pain   Restrictions/Precautions   Weight Bearing Precautions Per Order No   Other Precautions Chair Alarm;Bed Alarm;Fall Risk;Pain;Telemetry   Lifestyle   Autonomy Pt reports I w/ ADL/ IADL   Reciprocal Relationships Pt reports supportive family /friends   Service to Others Pt reports retied  at [employer] Tuesday and 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   Intrinsic Gratification Pt reports enjoying family relations, bus trips, and is active w/ several clubs / organizations   ADL   Where Assessed Edge of bed  (vs commode)   Eating Assistance 7  Independent   Eating Deficit Setup   Grooming Assistance 6  Modified Independent   Grooming Deficit Setup   Grooming Comments seated after set- up   UB Dressing Assistance 4  Minimal Assistance   UB Dressing Deficit Setup;Pull around back;Fasteners   UB Dressing Comments seated   Toileting Assistance  2  Maximal Assistance   Toileting Deficit Bedside commode;Perineal hygiene;Setup;Clothing management up;Clothing management down;Increased time to complete;Supervison/safety   Toileting Comments voided seated on commode w/ max A to complete transfer and manage personal hygiene   Bed Mobility   Supine to Sit 3  Moderate assistance   Additional items Assist x 1;HOB elevated;Bedrails;Increased time  "required;Verbal cues;LE management  (to pt's R to simulate home environment)   Sit to Supine 4  Minimal assistance   Additional items Assist x 1;Increased time required;Verbal cues;LE management  (R LE mgmt)   Additional Comments Pt supine HOB elevated upon arrival and at end of session w/ needs met, call bell in reach   Transfers   Sit to Stand 2  Maximal assistance   Additional items Assist x 1;Increased time required;Verbal cues;Bedrails;Armrests  (from EOB w/ R LE / knee blocked)   Stand to Sit 2  Maximal assistance   Additional items Assist x 1;Increased time required;Verbal cues;Bedrails;Armrests   Stand pivot 2  Maximal assistance   Additional items Assist x 1;Increased time required;Verbal cues;Bedrails;Armrests  (using RW)   Toilet transfer 2  Maximal assistance   Additional items Assist x 1;Increased time required;Verbal cues;Commode;Bedrails;Armrests   Additional Comments Pt performed sit <> stand 2X w/ max A X1   Functional Mobility   Additional Comments Will continue to assess as appropriate   Toilet Transfers   Toilet Transfer From Bed;Rolling walker   Toilet Transfer Type To and from   Toilet Transfer to Drop-arm commode   Toilet Transfer Technique Stand pivot   Toilet Transfers Maximal assistance;Verbal cues   Toilet Transfers Comments EOB to commode to L; commode back to bed to weaker R side   Subjective   Subjective \"I remember you, didn't you see the smile\"   Cognition   Overall Cognitive Status WFL   Arousal/Participation Alert;Cooperative   Attention Within functional limits   Orientation Level Oriented X4   Memory Within functional limits   Following Commands Follows all commands and directions without difficulty   Comments Identified pt by full name and birthdate. Pt prefers \"Ina\"   Activity Tolerance   Activity Tolerance Patient limited by fatigue   Medical Staff Made Aware spoke w/ RN, Katlin and PCA. Spoke w/ PT   Assessment   Assessment Pt seen for skilled OT tx session focusing on activity " engagement, ongoing eval, challenging activity tolerance and pt education. Pt required increased assistance to perfrom bed mobility. Pt motivated to participate reporting she would like to use the commode. Pt required less physical assistance to perform SPT using RW EOB <> commode. Continue to recommend level I rehab resource intensity when mediclly stable for discharge from acute care. Will continue to follow   Plan   Treatment Interventions ADL retraining;Functional transfer training;Endurance training;Patient/family training;Equipment evaluation/education;Compensatory technique education;Continued evaluation;Energy conservation;Activityengagement   Goal Expiration Date 10/31/24   OT Treatment Day 2  (Tuesday 10/22/24)   OT Frequency 3-5x/wk   Discharge Recommendation   Rehab Resource Intensity Level, OT I (Maximum Resource Intensity)   Equipment Recommended Bedside commode;Shower/Tub chair with back ($)   Commode Type Standard   AM-PAC Daily Activity Inpatient   Lower Body Dressing 2   Bathing 2   Toileting 2   Upper Body Dressing 3   Grooming 4   Eating 4   Daily Activity Raw Score 17   Daily Activity Standardized Score (Calc for Raw Score >=11) 37.26   AM-PAC Applied Cognition Inpatient   Following a Speech/Presentation 4   Understanding Ordinary Conversation 4   Taking Medications 4   Remembering Where Things Are Placed or Put Away 4   Remembering List of 4-5 Errands 4   Taking Care of Complicated Tasks 4   Applied Cognition Raw Score 24   Applied Cognition Standardized Score 62.21   Barthel Index   Feeding 10   Bathing 0   Grooming Score 5   Dressing Score 5   Bladder Score 10   Bowels Score 10   Toilet Use Score 5   Transfers (Bed/Chair) Score 5   Mobility (Level Surface) Score 0   Stairs Score 0   Barthel Index Score 50   Modified [place] Scale   Modified [place] Scale 4   End of Consult   Education Provided Yes   Patient Position at End of Consult Supine;Bed/Chair alarm activated;All needs within reach    Nurse Communication Nurse aware of consult   Licensure   [place] License Number  Celia Barnett, OTR/L [id number]       The patient's raw score on the AM-PAC Daily Activity Inpatient Short Form is 17. A raw score of less than 19 suggests the patient may benefit from discharge to post-acute rehabilitation services. Please refer to the recommendation of the Occupational Therapist for safe discharge planning.    Celia BRADJalen Barnett, OTR/L  [id number]  [id number]

## 2024-10-22 NOTE — PLAN OF CARE
Problem: PAIN - ADULT  Goal: Verbalizes/displays adequate comfort level or baseline comfort level  Description: Interventions:  - Encourage patient to monitor pain and request assistance  - Assess pain using appropriate pain scale  - Administer analgesics based on type and severity of pain and evaluate response  - Implement non-pharmacological measures as appropriate and evaluate response  - Consider cultural and social influences on pain and pain management  - Notify physician/advanced practitioner if interventions unsuccessful or patient reports new pain  Outcome: Progressing     Problem: INFECTION - ADULT  Goal: Absence or prevention of progression during hospitalization  Description: INTERVENTIONS:  - Assess and monitor for signs and symptoms of infection  - Monitor lab/diagnostic results  - Monitor all insertion sites, i.e. indwelling lines, tubes, and drains  - Monitor endotracheal if appropriate and nasal secretions for changes in amount and color  - [place] appropriate cooling/warming therapies per order  - Administer medications as ordered  - Instruct and encourage patient and family to use good hand hygiene technique  - Identify and instruct in appropriate isolation precautions for identified infection/condition  Outcome: Progressing  Goal: Absence of fever/infection during neutropenic period  Description: INTERVENTIONS:  - Monitor WBC    Outcome: Progressing     Problem: SAFETY ADULT  Goal: Patient will remain free of falls  Description: INTERVENTIONS:  - Educate patient/family on patient safety including physical limitations  - Instruct patient to call for assistance with activity   - Consult OT/PT to assist with strengthening/mobility   - Keep Call bell within reach  - Keep bed low and locked with side rails adjusted as appropriate  - Keep care items and personal belongings within reach  - Initiate and maintain comfort rounds  - Make Fall Risk Sign visible to staff  - Offer Toileting every 2 Hours,  in advance of need  - Initiate/Maintain alarm  - Obtain necessary fall risk management equipment:   - Apply yellow socks and bracelet for high fall risk patients  - Consider moving patient to room near nurses station  Outcome: Progressing  Goal: Maintain or return to baseline ADL function  Description: INTERVENTIONS:  -  Assess patient's ability to carry out ADLs; assess patient's baseline for ADL function and identify physical deficits which impact ability to perform ADLs (bathing, care of mouth/teeth, toileting, grooming, dressing, etc.)  - Assess/evaluate cause of self-care deficits   - Assess range of motion  - Assess patient's mobility; develop plan if impaired  - Assess patient's need for assistive devices and provide as appropriate  - Encourage maximum independence but intervene and supervise when necessary  - Involve family in performance of ADLs  - Assess for home care needs following discharge   - Consider OT consult to assist with ADL evaluation and planning for discharge  - Provide patient education as appropriate  Outcome: Progressing  Goal: Maintains/Returns to pre admission functional level  Description: INTERVENTIONS:  - Perform AM-PAC 6 Click Basic Mobility/ Daily Activity assessment daily.  - Set and communicate daily mobility goal to care team and patient/family/caregiver.   - Collaborate with rehabilitation services on mobility goals if consulted  - Perform Range of Motion 3 times a day.  - Reposition patient every 2 hours.  - Dangle patient 3 times a day  - Stand patient 3 times a day  - Ambulate patient 3 times a day  - Out of bed to chair 3 times a day   - Out of bed for meals 3 times a day  - Out of bed for toileting  - Record patient progress and toleration of activity level   Outcome: Progressing     Problem: DISCHARGE PLANNING  Goal: Discharge to home or other facility with appropriate resources  Description: INTERVENTIONS:  - Identify barriers to discharge w/patient and caregiver  -  Arrange for needed discharge resources and transportation as appropriate  - Identify discharge learning needs (meds, wound care, etc.)  - Arrange for interpretive services to assist at discharge as needed  - Refer to Case Management Department for coordinating discharge planning if the patient needs post-hospital services based on physician/advanced practitioner order or complex needs related to functional status, cognitive ability, or social support system  Outcome: Progressing     Problem: Knowledge Deficit  Goal: Patient/family/caregiver demonstrates understanding of disease process, treatment plan, medications, and discharge instructions  Description: Complete learning assessment and assess knowledge base.  Interventions:  - Provide teaching at level of understanding  - Provide teaching via preferred learning methods  Outcome: Progressing     Problem: Neurological Deficit  Goal: Neurological status is stable or improving  Description: Interventions:  - Monitor and assess patient's level of consciousness, motor function, sensory function, and level of assistance needed for ADLs.   - Monitor and report changes from baseline. Collaborate with interdisciplinary team to initiate plan and implement interventions as ordered.   - Provide and maintain a safe environment.  - Consider seizure precautions.  - Consider fall precautions.  - Consider aspiration precautions.  - Consider bleeding precautions.  Outcome: Progressing     Problem: Activity Intolerance/Impaired Mobility  Goal: Mobility/activity is maintained at optimum level for patient  Description: Interventions:  - Assess and monitor patient  barriers to mobility and need for assistive/adaptive devices.  - Assess patient's emotional response to limitations.  - Collaborate with interdisciplinary team and initiate plans and interventions as ordered.  - Encourage independent activity per ability.  - Maintain proper body alignment.  - Perform active/passive rom as  tolerated/ordered.  - Plan activities to conserve energy.  - Turn patient as appropriate  Outcome: Progressing     Problem: Communication Impairment  Goal: Ability to express needs and understand communication  Description: Assess patient's communication skills and ability to understand information.  Patient will demonstrate use of effective communication techniques, alternative methods of communication and understanding even if not able to speak.     - Encourage communication and provide alternate methods of communication as needed.  - Collaborate with case management/ for discharge needs.  - Include patient/family/caregiver in decisions related to communication.  Outcome: Progressing     Problem: Potential for Aspiration  Goal: Non-ventilated patient's risk of aspiration is minimized  Description: Assess and monitor vital signs, respiratory status, and labs (WBC).  Monitor for signs of aspiration (tachypnea, cough, rales, wheezing, cyanosis, fever).    - Assess and monitor patient's ability to swallow.  - Place patient up in chair to eat if possible.  - HOB up at 90 degrees to eat if unable to get patient up into chair.  - Supervise patient during oral intake.   - Instruct patient/ family to take small bites.  - Instruct patient/ family to take small single sips when taking liquids.  - Follow patient-specific strategies generated by speech pathologist.  Outcome: Progressing  Goal: Ventilated patient's risk of aspiration is minimized  Description: Assess and monitor vital signs, respiratory status, airway cuff pressure, and labs (WBC).  Monitor for signs of aspiration (tachypnea, cough, rales, wheezing, cyanosis, fever).    - Elevate head of bed 30 degrees if patient has tube feeding.  - Monitor tube feeding.  Outcome: Progressing     Problem: Nutrition  Goal: Nutrition/Hydration status is improving  Description: Monitor and assess patient's nutrition/hydration status for malnutrition (ex- brittle  hair, bruises, dry skin, pale skin and conjunctiva, muscle wasting, smooth red tongue, and disorientation). Collaborate with interdisciplinary team and initiate plan and interventions as ordered.  Monitor patient's weight and dietary intake as ordered or per policy. Utilize nutrition screening tool and intervene per policy. Determine patient's food preferences and provide high-protein, high-caloric foods as appropriate.     - Assist patient with eating.  - Allow adequate time for meals.  - Encourage patient to take dietary supplement as ordered.  - Collaborate with clinical nutritionist.  - Include patient/family/caregiver in decisions related to nutrition.  Outcome: Progressing     Problem: NEUROSENSORY - ADULT  Goal: Achieves stable or improved neurological status  Description: INTERVENTIONS  - Monitor and report changes in neurological status  - Monitor vital signs such as temperature, blood pressure, glucose, and any other labs ordered   - Initiate measures to prevent increased intracranial pressure  - Monitor for seizure activity and implement precautions if appropriate      Outcome: Progressing  Goal: Remains free of injury related to seizures activity  Description: INTERVENTIONS  - Maintain airway, patient safety  and administer oxygen as ordered  - Monitor patient for seizure activity, document and report duration and description of seizure to physician/advanced practitioner  - If seizure occurs,  ensure patient safety during seizure  - Reorient patient post seizure  - Seizure pads on all 4 side rails  - Instruct patient/family to notify RN of any seizure activity including if an aura is experienced  - Instruct patient/family to call for assistance with activity based on nursing assessment  - Administer anti-seizure medications if ordered    Outcome: Progressing  Goal: Achieves maximal functionality and self care  Description: INTERVENTIONS  - Monitor swallowing and airway patency with patient fatigue and  changes in neurological status  - Encourage and assist patient to increase activity and self care.   - Encourage visually impaired, hearing impaired and aphasic patients to use assistive/communication devices  Outcome: Progressing     Problem: CARDIOVASCULAR - ADULT  Goal: Maintains optimal cardiac output and hemodynamic stability  Description: INTERVENTIONS:  - Monitor I/O, vital signs and rhythm  - Monitor for S/S and trends of decreased cardiac output  - Administer and titrate ordered vasoactive medications to optimize hemodynamic stability  - Assess quality of pulses, skin color and temperature  - Assess for signs of decreased coronary artery perfusion  - Instruct patient to report change in severity of symptoms  Outcome: Progressing  Goal: Absence of cardiac dysrhythmias or at baseline rhythm  Description: INTERVENTIONS:  - Continuous cardiac monitoring, vital signs, obtain 12 lead EKG if ordered  - Administer antiarrhythmic and heart rate control medications as ordered  - Monitor electrolytes and administer replacement therapy as ordered  Outcome: Progressing     Problem: RESPIRATORY - ADULT  Goal: Achieves optimal ventilation and oxygenation  Description: INTERVENTIONS:  - Assess for changes in respiratory status  - Assess for changes in mentation and behavior  - Position to facilitate oxygenation and minimize respiratory effort  - Oxygen administered by appropriate delivery if ordered  - Initiate smoking cessation education as indicated  - Encourage broncho-pulmonary hygiene including cough, deep breathe, Incentive Spirometry  - Assess the need for suctioning and aspirate as needed  - Assess and instruct to report SOB or any respiratory difficulty  - Respiratory Therapy support as indicated  Outcome: Progressing     Problem: GASTROINTESTINAL - ADULT  Goal: Minimal or absence of nausea and/or vomiting  Description: INTERVENTIONS:  - Administer IV fluids if ordered to ensure adequate hydration  - Maintain NPO  status until nausea and vomiting are resolved  - Nasogastric tube if ordered  - Administer ordered antiemetic medications as needed  - Provide nonpharmacologic comfort measures as appropriate  - Advance diet as tolerated, if ordered  - Consider nutrition services referral to assist patient with adequate nutrition and appropriate food choices  Outcome: Progressing  Goal: Maintains or returns to baseline bowel function  Description: INTERVENTIONS:  - Assess bowel function  - Encourage oral fluids to ensure adequate hydration  - Administer IV fluids if ordered to ensure adequate hydration  - Administer ordered medications as needed  - Encourage mobilization and activity  - Consider nutritional services referral to assist patient with adequate nutrition and appropriate food choices  Outcome: Progressing  Goal: Maintains adequate nutritional intake  Description: INTERVENTIONS:  - Monitor percentage of each meal consumed  - Identify factors contributing to decreased intake, treat as appropriate  - Assist with meals as needed  - Monitor I&O, weight, and lab values if indicated  - Obtain nutrition services referral as needed  Outcome: Progressing  Goal: Establish and maintain optimal ostomy function  Description: INTERVENTIONS:  - Assess bowel function  - Encourage oral fluids to ensure adequate hydration  - Administer IV fluids if ordered to ensure adequate hydration   - Administer ordered medications as needed  - Encourage mobilization and activity  - Nutrition services referral to assist patient with appropriate food choices  - Assess stoma site  - Consider wound care consult   Outcome: Progressing  Goal: Oral mucous membranes remain intact  Description: INTERVENTIONS  - Assess oral mucosa and hygiene practices  - Implement preventative oral hygiene regimen  - Implement oral medicated treatments as ordered  - Initiate Nutrition services referral as needed  Outcome: Progressing     Problem: GENITOURINARY - ADULT  Goal:  Maintains or returns to baseline urinary function  Description: INTERVENTIONS:  - Assess urinary function  - Encourage oral fluids to ensure adequate hydration if ordered  - Administer IV fluids as ordered to ensure adequate hydration  - Administer ordered medications as needed  - Offer frequent toileting  - Follow urinary retention protocol if ordered  Outcome: Progressing  Goal: Absence of urinary retention  Description: INTERVENTIONS:  - Assess patient’s ability to void and empty bladder  - Monitor I/O  - Bladder scan as needed  - Discuss with physician/AP medications to alleviate retention as needed  - Discuss catheterization for long term situations as appropriate  Outcome: Progressing  Goal: Urinary catheter remains patent  Description: INTERVENTIONS:  - Assess patency of urinary catheter  - If patient has a chronic ortiz, consider changing catheter if non-functioning  - Follow guidelines for intermittent irrigation of non-functioning urinary catheter  Outcome: Progressing     Problem: METABOLIC, FLUID AND ELECTROLYTES - ADULT  Goal: Electrolytes maintained within normal limits  Description: INTERVENTIONS:  - Monitor labs and assess patient for signs and symptoms of electrolyte imbalances  - Administer electrolyte replacement as ordered  - Monitor response to electrolyte replacements, including repeat lab results as appropriate  - Instruct patient on fluid and nutrition as appropriate  Outcome: Progressing  Goal: Fluid balance maintained  Description: INTERVENTIONS:  - Monitor labs   - Monitor I/O and WT  - Instruct patient on fluid and nutrition as appropriate  - Assess for signs & symptoms of volume excess or deficit  Outcome: Progressing  Goal: Glucose maintained within target range  Description: INTERVENTIONS:  - Monitor Blood Glucose as ordered  - Assess for signs and symptoms of hyperglycemia and hypoglycemia  - Administer ordered medications to maintain glucose within target range  - Assess nutritional  intake and initiate nutrition service referral as needed  Outcome: Progressing     Problem: SKIN/TISSUE INTEGRITY - ADULT  Goal: Skin Integrity remains intact(Skin Breakdown Prevention)  Description: Assess:  -Perform Madi assessment every day  -Clean and moisturize skin every day  -Inspect skin when repositioning, toileting, and assisting with ADLS  -Assess under medical devices such as Masimo every 3 hours  -Assess extremities for adequate circulation and sensation     Bed Management:  -Have minimal linens on bed & keep smooth, unwrinkled  -Change linens as needed when moist or perspiring  -Avoid sitting or lying in one position for more than 2 hours while in bed  -Keep HOB at 45degrees     Toileting:  -Offer bedside commode  -Assess for incontinence every 3 hours  -Use incontinent care products after each incontinent episode such as chucks, purewick    Activity:  -Mobilize patient 3 times a day  -Encourage activity and walks on unit  -Encourage or provide ROM exercises   -Turn and reposition patient every  Hours  -Use appropriate equipment to lift or move patient in bed  -Instruct/ Assist with weight shifting every  when out of bed in chair  -Consider limitation of chair time 2 hour intervals    Skin Care:  -Avoid use of baby powder, tape, friction and shearing, hot water or constrictive clothing  -Relieve pressure over bony prominences using wedge   -Do not massage red bony areas    Next Steps:  -Teach patient strategies to minimize risks such as shift weight   -Consider consults to  interdisciplinary teams such as pt, ot  Outcome: Progressing  Goal: Incision(s), wounds(s) or drain site(s) healing without S/S of infection  Description: INTERVENTIONS  - Assess and document dressing, incision, wound bed, drain sites and surrounding tissue  - Provide patient and family education  - Perform skin care/dressing changes every day  Outcome: Progressing  Goal: Pressure injury heals and does not worsen  Description:  Interventions:  - Implement low air loss mattress or specialty surface (Criteria met)  - Apply silicone foam dressing  - Instruct/assist with weight shifting every 60 minutes when in chair   - Limit chair time to 2 hour intervals  - Use special pressure reducing interventions such as  when in chair   - Apply fecal or urinary incontinence containment device   - Perform passive or active ROM 3 times a day  - Turn and reposition patient & offload bony prominences every 2 hours   - Utilize friction reducing device or surface for transfers   - Consider consults to  interdisciplinary teams such as pt, ot  - Use incontinent care products after each incontinent episode such as chucks, pure wick   - Consider nutrition services referral as needed  Outcome: Progressing     Problem: HEMATOLOGIC - ADULT  Goal: Maintains hematologic stability  Description: INTERVENTIONS  - Assess for signs and symptoms of bleeding or hemorrhage  - Monitor labs  - Administer supportive blood products/factors as ordered and appropriate  Outcome: Progressing     Problem: MUSCULOSKELETAL - ADULT  Goal: Maintain or return mobility to safest level of function  Description: INTERVENTIONS:  - Assess patient's ability to carry out ADLs; assess patient's baseline for ADL function and identify physical deficits which impact ability to perform ADLs (bathing, care of mouth/teeth, toileting, grooming, dressing, etc.)  - Assess/evaluate cause of self-care deficits   - Assess range of motion  - Assess patient's mobility  - Assess patient's need for assistive devices and provide as appropriate  - Encourage maximum independence but intervene and supervise when necessary  - Involve family in performance of ADLs  - Assess for home care needs following discharge   - Consider OT consult to assist with ADL evaluation and planning for discharge  - Provide patient education as appropriate  Outcome: Progressing  Goal: Maintain proper alignment of affected body  part  Description: INTERVENTIONS:  - Support, maintain and protect limb and body alignment  - Provide patient/ family with appropriate education  Outcome: Progressing     Problem: Nutrition/Hydration-ADULT  Goal: Nutrient/Hydration intake appropriate for improving, restoring or maintaining nutritional needs  Description: Monitor and assess patient's nutrition/hydration status for malnutrition. Collaborate with interdisciplinary team and initiate plan and interventions as ordered.  Monitor patient's weight and dietary intake as ordered or per policy. Utilize nutrition screening tool and intervene as necessary. Determine patient's food preferences and provide high-protein, high-caloric foods as appropriate.     INTERVENTIONS:  - Monitor oral intake, urinary output, labs, and treatment plans  - Assess nutrition and hydration status and recommend course of action  - Evaluate amount of meals eaten  - Assist patient with eating if necessary   - Allow adequate time for meals  - Recommend/ encourage appropriate diets, oral nutritional supplements, and vitamin/mineral supplements  - Order, calculate, and assess calorie counts as needed  - Recommend, monitor, and adjust tube feedings and TPN/PPN based on assessed needs  - Assess need for intravenous fluids  - Provide specific nutrition/hydration education as appropriate  - Include patient/family/caregiver in decisions related to nutrition  Outcome: Progressing     Problem: Prexisting or High Potential for Compromised Skin Integrity  Goal: Skin integrity is maintained or improved  Description: INTERVENTIONS:  - Identify patients at risk for skin breakdown  - Assess and monitor skin integrity  - Assess and monitor nutrition and hydration status  - Monitor labs   - Assess for incontinence   - Turn and reposition patient  - Assist with mobility/ambulation  - Relieve pressure over bony prominences  - Avoid friction and shearing  - Provide appropriate hygiene as needed including  keeping skin clean and dry  - Evaluate need for skin moisturizer/barrier cream  - Collaborate with interdisciplinary team   - Patient/family teaching  - Consider wound care consult   Outcome: Progressing

## 2024-10-22 NOTE — CASE MANAGEMENT
Case Management Progress Note    Patient name Itzel Sanches  Location [address] [address]/[address]-* MRN [id number]  : 1955 Date 10/22/2024       LOS (days): 2  Geometric Mean LOS (GMLOS) (days): 3  Days to GMLOS:1        OBJECTIVE:      Current admission status: Inpatient  Preferred Pharmacy:   [facility] - [address] [address]  Phone: [phone] Fax: [phone]    Primary Care Provider: DEEPAK Batista    Primary Insurance: MEDICARE  Secondary Insurance: [employer]    PROGRESS NOTE:      Referrals placed for acute rehabilitation and STR (SNF) in [place].  SW will follow for bed availability.  Patient had noted that [facility] is very close to her daughters' home and this would be her preference if going to STR.

## 2024-10-22 NOTE — ASSESSMENT & PLAN NOTE
Patient noted to have right lower extremity numbness, has sensation in her right foot however numbness from ankle up to groin. She is able to pump her right foot however is unable to bend her right knee or move her right leg.    10/21 update: patient states there is improvement with sensation, reports itching sensation to the back of her knee and pain with deep palpation throughout entire leg; however, states she cannot sense light palpation. Denies any foot tingling today.    10/22 update: patient states overall feeling slightly more sensation to the RLE including sensation to temperature changes. She still complains of numbness from ankle up to groin; though is able to sense pain and touch upon deep palpation. Upon my exam right knee appears slightly swollen. She does have chronic right knee pain and was recently installing hardware at home requiring her to be on her hands and knees. Will obtain xray right knee to r/o effusion.    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

## 2024-10-22 NOTE — ASSESSMENT & PLAN NOTE
"Patient presents with right lower extremity numbness and on CTH has a right frontal hypodensity described as could represent subacute versus chronic infarct which is contralateral to the side of her symptoms.  This is with a background of metastatic rectosigmoid cancer. Patient states she was going about her normal daily routine and noticed this while sitting on the couch and had a sensation that her right leg fell asleep.  [facility] stroke orders placed; telemetry monitor, neurochecks, MRI of the brain 2D echo.  PT OT speech.  Neurology recommends continue Eliquis.    Lipid panel, LDL slightly elevated otherwise unremarkable  Hgb A1c 5.9   ECHO 10/21 with normal systolic function and wall motion. EF 55%. No indication of valve disease.  MRI of the brain was performed 10/20 with no evidence of metastatic disease, focus of FLAIR abnormality in right posterior frontal deep white matter corresponds to the area of low-density on CT.  This likely represents either gliosis or mild edema surrounding a venous angioma, follow-up gadolinium enhanced MRI recommended in 2 months to exclude more aggressive pathologies as per radiology report.  MRI lumbosacral plexus performed 10/21 showing \"Normal MRI lumbosacral plexus on this motion degraded exam. Degenerative changes of lumbar spine are suboptimally evaluated given large field of view of lumbar spine. Consider dedicated MRI lumbar spine with and without contrast contrast for further evaluation.\"   Appreciate neurology recommendations  With her malignancy, will need to get all of spine imaging to r/o any mets or cord compression, abscess   Ordered MRI cervical, thoracic and lumbar spine showing \"No suspicious marrow replacing lesions noted involving the cervical spine. No epidural or paraspinal tumor noted. Multilevel cervical spondylosis, as described above, contributing to at most moderate canal stenosis at C5-C6, and multilevel neural foraminal stenosis, worst on the right at " "C4-C6. 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

## 2024-10-22 NOTE — ASSESSMENT & PLAN NOTE
- Lipid panel reviewed, total cholesterol 193, triglycerides 117, HDL 52, .  - Can continue on home dose of Zetia as do not suspect cerebrovascular etiology for symptoms.

## 2024-10-22 NOTE — ASSESSMENT & PLAN NOTE
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     Neuroimaging   10/19/24 CTH:   Area of white matter hypoattenuation in the right frontal lobe could respresent a subacute to chronic infarct. No intracranial hemorrhage or mass effect.   10/19/2024 CTA head and neck with and without contrast:   Unremarkable CTA neck and brain.   No hemodynamically significant stenosis, dissection, or occlusion of the carotid or vertebral arteries or major vessels of the [place].   Hypoplastic vertebrobasilar system.  10/20/24 MRI brain with and without contrast:   No evidence of metastatic disease.   Focus of FLAIR abnormality in the right posterior frontal deep white matter corresponds to the area of low density on CT. This likely represents either gliosis or mild edema surrounding a venous angioma. Follow-up gadolinium enhanced MRI recommended in 2 months to exclude more aggressive pathologies.   10/21/24 MRI lumbosacral plexus with and without contrast:   Mild to moderate motion degraded.   Normal MRI lumbosacral plexus on this motion degraded exam.   Degenerative changes of lumbar spine are suboptimally evaluated given large field of view of lumbar spine. Consider dedicated MRI lumbar spine with and without contrast for further evaluation.     Etiology of symptoms is unclear at this time. Suspect local nerve compression underlying etiology but need to rule out spinal pathology.     Plan:  - MRI cervical, thoracic and lumbar spine with and without contrast pending.  - PT/OT  - Attending neurologist to see and advise. Please see attestation for additional details/recommendations.

## 2024-10-22 NOTE — PLAN OF CARE
Problem: PAIN - ADULT  Goal: Verbalizes/displays adequate comfort level or baseline comfort level  Description: Interventions:  - Encourage patient to monitor pain and request assistance  - Assess pain using appropriate pain scale  - Administer analgesics based on type and severity of pain and evaluate response  - Implement non-pharmacological measures as appropriate and evaluate response  - Consider cultural and social influences on pain and pain management  - Notify physician/advanced practitioner if interventions unsuccessful or patient reports new pain  Outcome: Progressing     Problem: INFECTION - ADULT  Goal: Absence or prevention of progression during hospitalization  Description: INTERVENTIONS:  - Assess and monitor for signs and symptoms of infection  - Monitor lab/diagnostic results  - Monitor all insertion sites, i.e. indwelling lines, tubes, and drains  - Monitor endotracheal if appropriate and nasal secretions for changes in amount and color  - [place] appropriate cooling/warming therapies per order  - Administer medications as ordered  - Instruct and encourage patient and family to use good hand hygiene technique  - Identify and instruct in appropriate isolation precautions for identified infection/condition  Outcome: Progressing     Problem: SAFETY ADULT  Goal: Patient will remain free of falls  Description: INTERVENTIONS:  - Educate patient/family on patient safety including physical limitations  - Instruct patient to call for assistance with activity   - Consult OT/PT to assist with strengthening/mobility   - Keep Call bell within reach  - Keep bed low and locked with side rails adjusted as appropriate  - Keep care items and personal belongings within reach  - Initiate and maintain comfort rounds  - Make Fall Risk Sign visible to staff  - Offer Toileting every 2  Problem: DISCHARGE PLANNING  Goal: Discharge to home or other facility with appropriate resources  Description: INTERVENTIONS:  -  Identify barriers to discharge w/patient and caregiver  - Arrange for needed discharge resources and transportation as appropriate  - Identify discharge learning needs (meds, wound care, etc.)  - Arrange for interpretive services to assist at discharge as needed  - Refer to Case Management Department for coordinating discharge planning if the patient needs post-hospital services based on physician/advanced practitioner order or complex needs related to functional status, cognitive ability, or social support system  Outcome: Progressing     Problem: Knowledge Deficit  Goal: Patient/family/caregiver demonstrates understanding of disease process, treatment plan, medications, and discharge instructions  Description: Complete learning assessment and assess knowledge base.  Interventions:  - Provide teaching at level of understanding  - Provide teaching via preferred learning methods  Outcome: Progressing     Problem: Neurological Deficit  Goal: Neurological status is stable or improving  Description: Interventions:  - Monitor and assess patient's level of consciousness, motor function, sensory function, and level of assistance needed for ADLs.   - Monitor and report changes from baseline. Collaborate with interdisciplinary team to initiate plan and implement interventions as ordered.   - Provide and maintain a safe environment.  Problem: Activity Intolerance/Impaired Mobility  Goal: Mobility/activity is maintained at optimum level for patient  Description: Interventions:  - Assess and monitor patient  barriers to mobility and need for assistive/adaptive devices.  - Assess patient's emotional response to limitations.  - Collaborate with interdisciplinary team and initiate plans and interventions as ordered.  - Encourage independent activity per ability.  - Maintain proper body alignment.  - Perform active/passive rom as tolerated/ordered.  - Plan activities to conserve energy.  - Turn patient as appropriate  Outcome:  Progressing     Problem: Potential for Aspiration  Goal: Non-ventilated patient's risk of aspiration is minimized  Description: Assess and monitor vital signs, respiratory status, and labs (WBC).  Monitor for signs of aspiration (tachypnea, cough, rales, wheezing, cyanosis, fever).    - Assess and monitor patient's ability to swallow.  - Place patient up in chair to eat if possible.  - HOB up at 90 degrees to eat if unable to get patient up into chair.  - Supervise patient during oral intake.   - Instruct patient/ family to take small bites.  - Instruct patient/ family to take small single sips when taking liquids.  - Follow patient-specific strategies generated by speech pathologist.  Outcome: Progressing     Problem: Nutrition  Goal: Nutrition/Hydration status is improving  Description: Monitor and assess patient's nutrition/hydration status for malnutrition (ex- brittle hair, bruises, dry skin, pale skin and conjunctiva, muscle wasting, smooth red tongue, and disorientation). Collaborate with interdisciplinary team and initiate plan and interventions as ordered.  Monitor patient's weight and dietary intake as ordered or per policy. Utilize nutrition screening tool and intervene per policy. Determine patient's food preferences and provide high-protein, high-caloric foods as appropriate.     - Assist patient with eating.  - Allow adequate time for meals.  - Encourage patient to take dietary supplement as ordered.  - Collaborate with clinical nutritionist.  - Include patient/family/caregiver in decisions related to nutrition.  Outcome: Progressing     Problem: NEUROSENSORY - ADULT  Goal: Achieves stable or improved neurological status  Description: INTERVENTIONS  - Monitor and report changes in neurological status  - Monitor vital signs such as temperature, blood pressure, glucose, and any other labs ordered   - Initiate measures to prevent increased intracranial pressure  - Monitor for seizure activity and  implement precautions if appropriate      Outcome: Progressing  Goal: Remains free of injury related to seizures activity  Description: INTERVENTIONS  - Maintain airway, patient safety  and administer oxygen as ordered  - Monitor patient for seizure activity, document and report duration and description of seizure to physician/advanced practitioner  - If seizure occurs,  ensure patient safety during seizure  - Reorient patient post seizure  - Seizure pads on all 4 side rails  - Instruct patient/family to notify RN of any seizure activity including if an aura is experienced  - Instruct patient/family to call for assistance with activity based on nursing assessment  - Administer anti-seizure medications if ordered    Outcome: Progressing  Goal: Achieves maximal functionality and self care  Description: INTERVENTIONS  - Monitor swallowing and airway patency with patient fatigue and changes in neurological status  - Encourage and assist patient to increase activity and self care.   - Encourage visually impaired, hearing impaired and aphasic patients to use assistive/communication devices  Outcome: Progressing     Problem: CARDIOVASCULAR - ADULT  Goal: Maintains optimal cardiac output and hemodynamic stability  Description: INTERVENTIONS:  - Monitor I/O, vital signs and rhythm  - Monitor for S/S and trends of decreased cardiac output  - Administer and titrate ordered vasoactive medications to optimize hemodynamic stability  - Assess quality of pulses, skin color and temperature  - Assess for signs of decreased coronary artery perfusion  - Instruct patient to report change in severity of symptoms  Outcome: Progressing  Goal: Absence of cardiac dysrhythmias or at baseline rhythm  Description: INTERVENTIONS:  - Continuous cardiac monitoring, vital signs, obtain 12 lead EKG if ordered  - Administer antiarrhythmic and heart rate control medications as ordered  - Monitor electrolytes and administer replacement therapy as  ordered  Outcome: Progressing     Problem: GASTROINTESTINAL - ADULT  Goal: Minimal or absence of nausea and/or vomiting  Description: INTERVENTIONS:  - Administer IV fluids if ordered to ensure adequate hydration  - Maintain NPO status until nausea and vomiting are resolved  - Nasogastric tube if ordered  - Administer ordered antiemetic medications as needed  - Provide nonpharmacologic comfort measures as appropriate  - Advance diet as tolerated, if ordered  - Consider nutrition services referral to assist patient with adequate nutrition and appropriate food choices  Outcome: Progressing  Goal: Maintains or returns to baseline bowel function  Description: INTERVENTIONS:  - Assess bowel function  - Encourage oral fluids to ensure adequate hydration  - Administer IV fluids if ordered to ensure adequate hydration  - Administer ordered medications as needed  - Encourage mobilization and activity  - Consider nutritional services referral to assist patient with adequate nutrition and appropriate food choices  Outcome: Progressing  Goal: Maintains adequate nutritional intake  Description: INTERVENTIONS:  - Monitor percentage of each meal consumed  - Identify factors contributing to decreased intake, treat as appropriate  - Assist with meals as needed  - Monitor I&O, weight, and lab values if indicated  - Obtain nutrition services referral as needed  Outcome: Progressing  Goal: Oral mucous membranes remain intact  Description: INTERVENTIONS  - Assess oral mucosa and hygiene practices  - Implement preventative oral hygiene regimen  - Implement oral medicated treatments as ordered  - Initiate Nutrition services referral as needed  Outcome: Progressing     Problem: METABOLIC, FLUID AND ELECTROLYTES - ADULT  Goal: Electrolytes maintained within normal limits  Description: INTERVENTIONS:  - Monitor labs and assess patient for signs and symptoms of electrolyte imbalances  - Administer electrolyte replacement as ordered  - Monitor  response to electrolyte replacements, including repeat lab results as appropriate  - Instruct patient on fluid and nutrition as appropriate  Outcome: Progressing     Problem: SKIN/TISSUE INTEGRITY - ADULT  Goal: Skin Integrity remains intact(Skin Breakdown Prevention)  Description: Assess:  -Perform Madi assessment daily  -Clean and moisturize skin daily  -Inspect skin when repositioning, toileting, and assisting with ADLS  -Assess under medical devices such as purewick every 2 hrs  -Assess extremities for adequate circulation and sensation     Bed Management:  -Have minimal linens on bed & keep smooth, unwrinkled  -Change linens as needed when moist or perspiring  -Avoid sitting or lying in one position for more than 2 hours while in bed      Toileting:  -Offer bedside commode  -Assess for incontinence every 2 hrs  Problem: Nutrition/Hydration-ADULT  Goal: Nutrient/Hydration intake appropriate for improving, restoring or maintaining nutritional needs  Description: Monitor and assess patient's nutrition/hydration status for malnutrition. Collaborate with interdisciplinary team and initiate plan and interventions as ordered.  Monitor patient's weight and dietary intake as ordered or per policy. Utilize nutrition screening tool and intervene as necessary. Determine patient's food preferences and provide high-protein, high-caloric foods as appropriate.     INTERVENTIONS:  - Monitor oral intake, urinary output, labs, and treatment plans  - Assess nutrition and hydration status and recommend course of action  - Evaluate amount of meals eaten  - Assist patient with eating if necessary   - Allow adequate time for meals  - Recommend/ encourage appropriate diets, oral nutritional supplements, and vitamin/mineral supplements  - Order, calculate, and assess calorie counts as needed  - Recommend, monitor, and adjust tube feedings and TPN/PPN based on assessed needs  - Assess need for intravenous fluids  - Provide specific  nutrition/hydration education as appropriate  - Include patient/family/caregiver in decisions related to nutrition  Outcome: Progressing     -Use incontinent care products after each incontinent episode such as merle-wipes    Activity:  -Encourage or provide ROM exercises   -Turn and reposition patient every 2 Hours  -Use appropriate equipment to lift or move patient in bed    Skin Care:  -Avoid use of baby powder, tape, friction and shearing, hot water or constrictive clothing    Outcome: Progressing     - Consider fall precautions.  Outcome: Progressing    Hours, in advance of need  - Initiate/Maintain bed alarm  - Apply yellow socks and bracelet for high fall risk patients  - Consider moving patient to room near nurses station  Outcome: Progressing  Goal: Maintain or return to baseline ADL function  Description: INTERVENTIONS:  -  Assess patient's ability to carry out ADLs; assess patient's baseline for ADL function and identify physical deficits which impact ability to perform ADLs (bathing, care of mouth/teeth, toileting, grooming, dressing, etc.)  - Assess/evaluate cause of self-care deficits   - Assess range of motion  - Assess patient's mobility; develop plan if impaired  - Assess patient's need for assistive devices and provide as appropriate  - Encourage maximum independence but intervene and supervise when necessary  - Involve family in performance of ADLs  - Assess for home care needs following discharge   - Consider OT consult to assist with ADL evaluation and planning for discharge  - Provide patient education as appropriate  Outcome: Progressing  Goal: Maintains/Returns to pre admission functional level  Description: INTERVENTIONS:  - Perform AM-PAC 6 Click Basic Mobility/ Daily Activity assessment daily.  - Set and communicate daily mobility goal to care team and patient/family/caregiver.   - Collaborate with rehabilitation services on mobility goals if consulted  - Perform Range of Motion 6 times a  day.  - Reposition patient every 2 hours.  - Stand patient 2 times a day  - Out of bed for toileting  - Record patient progress and toleration of activity level   Outcome: Progressing

## 2024-10-22 NOTE — PROGRESS NOTES
"Progress Note - Hospitalist   Name: Itzel Sanches 69 y.o. female I MRN: [id number]  Unit/Bed#: [address] [place] [address] Date of Admission: 10/19/2024   Date of Service: 10/22/2024 I Hospital Day: 2    Assessment & Plan  Stroke-like symptom  Patient presents with right lower extremity numbness and on CTH has a right frontal hypodensity described as could represent subacute versus chronic infarct which is contralateral to the side of her symptoms.  This is with a background of metastatic rectosigmoid cancer. Patient states she was going about her normal daily routine and noticed this while sitting on the couch and had a sensation that her right leg fell asleep.  [facility] stroke orders placed; telemetry monitor, neurochecks, MRI of the brain 2D echo.  PT OT speech.  Neurology recommends continue Eliquis.    Lipid panel, LDL slightly elevated otherwise unremarkable  Hgb A1c 5.9   ECHO 10/21 with normal systolic function and wall motion. EF 55%. No indication of valve disease.  MRI of the brain was performed 10/20 with no evidence of metastatic disease, focus of FLAIR abnormality in right posterior frontal deep white matter corresponds to the area of low-density on CT.  This likely represents either gliosis or mild edema surrounding a venous angioma, follow-up gadolinium enhanced MRI recommended in 2 months to exclude more aggressive pathologies as per radiology report.  MRI lumbosacral plexus performed 10/21 showing \"Normal MRI lumbosacral plexus on this motion degraded exam. Degenerative changes of lumbar spine are suboptimally evaluated given large field of view of lumbar spine. Consider dedicated MRI lumbar spine with and without contrast contrast for further evaluation.\"   Appreciate neurology recommendations  With her malignancy, will need to get all of spine imaging to r/o any mets or cord compression, abscess   Ordered MRI cervical, thoracic and lumbar spine showing \"No suspicious marrow replacing lesions noted " "involving the cervical spine. No epidural or paraspinal tumor noted. Multilevel cervical spondylosis, as described above, contributing to at most moderate canal stenosis at C5-C6, and multilevel neural foraminal stenosis, worst on the right at C4-C6. 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 She is able to pump her right foot however is unable to bend her right knee or move her right leg.    10/21 update: patient states there is improvement with sensation, reports itching sensation to the back of her knee and pain with deep palpation throughout entire leg; however, states she cannot sense light palpation. Denies any foot tingling today.    10/22 update: patient states overall feeling slightly more sensation to the RLE including sensation to temperature changes. She still complains of numbness from ankle up to groin; though is able to sense pain and touch upon deep palpation. Upon my exam right knee appears slightly swollen. She does have chronic right knee pain and was recently installing hardware at home requiring her to be on her hands and knees. Will obtain xray right knee to r/o effusion.    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  Hypertension  Patient with no prior hypertension.    Patient largely asymptomatic.    Will place on labetalol IV as needed.    Hold off on instituting standing antihypertensive for now  Neurology indicated SBP less than 180 with goal 140-160.  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HCC)  As outlined in HPI.    GERD (gastroesophageal reflux disease)  Continue PPI  Mixed hyperlipidemia  On Zetia  Heart healthy " diet  Chemotherapy-induced neuropathy (HCC)  Chronic.  History of DVT (deep vein thrombosis)  Continue Eliquis  No bruising or bleeding issues  Prediabetes  Noted with Hgb A1c 5.9   Recommend weight loss and diet control    VTE Pharmacologic Prophylaxis: VTE Score: 9 High Risk (Score >/= 5) - Pharmacological DVT Prophylaxis Ordered: apixaban (Eliquis). Sequential Compression Devices Ordered.    Mobility:   Basic Mobility Inpatient Raw Score: 11  JH-HLM Goal: 4: Move to chair/commode  JH-HLM Achieved: 4: Move to chair/commode  JH-HLM Goal NOT achieved. Continue with multidisciplinary rounding and encourage appropriate mobility to improve upon JH-HLM goals.    Patient Centered Rounds: I performed bedside rounds with nursing staff today.   Education and Discussions with Family / Patient: Attempted to update  (daughter, Leidy) via phone. Left voicemail.     Current Length of Stay: 2 day(s)  Current Patient Status: Inpatient   Certification Statement: The patient will continue to require additional inpatient hospital stay due to further neuro workup including pending MRI and imaging for RLE numbness, dispo planning.  Discharge Plan: Anticipate discharge in 48-72 hrs to rehab facility.    Code Status: Prior    Subjective   Patient seen and examined at bedside with no new complaints, patient states she does feel slightly more and more sensation returning to the RLE; however, endorses right knee pain today. Denies any CP, SOB, dizziness. States good appetite and would like to work with PT/OT again today.    Objective :  Temp:  [97.2 °F (36.2 °C)-98.7 °F (37.1 °C)] 97.2 °F (36.2 °C)  HR:  [73-81] 81  BP: (134-176)/(77-98) 176/95  Resp:  [18] 18  SpO2:  [97 %-98 %] 98 %  O2 Device: None (Room air)    Body mass index is 41.76 kg/m².     Input and Output Summary (last 24 hours):     Intake/Output Summary (Last 24 hours) at 10/22/2024 0948  Last data filed at 10/22/2024 0701  Gross per 24 hour   Intake --    Output 750 ml   Net -750 ml       Physical Exam  Vitals reviewed.   Constitutional:       General: She is not in acute distress.     Appearance: She is well-developed. She is obese. She is not ill-appearing or toxic-appearing.   HENT:      Head: Normocephalic and atraumatic.      Mouth/Throat:      Mouth: Mucous membranes are moist.      Pharynx: Oropharynx is clear.   Eyes:      Conjunctiva/sclera: Conjunctivae normal.   Cardiovascular:      Rate and Rhythm: Normal rate and regular rhythm.      Heart sounds: No murmur heard.  Pulmonary:      Effort: Pulmonary effort is normal. No respiratory distress.      Breath sounds: Normal breath sounds. No wheezing or rhonchi.   Abdominal:      General: Bowel sounds are normal.      Palpations: Abdomen is soft.      Tenderness: There is no abdominal tenderness. There is no guarding.   Musculoskeletal:         General: No swelling.      Cervical back: Neck supple.      Right hip: Tenderness present. Decreased range of motion.      Left hip: No tenderness. Normal range of motion.      Right upper leg: No swelling.      Left upper leg: No swelling.      Right knee: Swelling present. Decreased range of motion. Tenderness present.      Left knee: No swelling. Normal range of motion. No tenderness.      Right lower leg: No edema.      Left lower leg: No edema.      Right ankle: No swelling.      Left ankle: No swelling.   Skin:     General: Skin is warm and dry.      Capillary Refill: Capillary refill takes less than 2 seconds.   Neurological:      Mental Status: She is alert and oriented to person, place, and time. Mental status is at baseline.      GCS: GCS eye subscore is 4. GCS verbal subscore is 5. GCS motor subscore is 6.      Motor: Weakness present.      Gait: Gait abnormal.   Psychiatric:         Mood and Affect: Mood normal.         Lines/Drains:  Lines/Drains/Airways       Active Status       Name Placement date Placement time Site Days    Port A Cath  Right Subclavian  --  --  Subclavian  --    External Urinary Catheter 10/22/24  0300  -- less than 1                    Central Line:  Goal for removal:  chronic implanted port         Telemetry:  Telemetry Orders (From admission, onward)               24 Hour Telemetry Monitoring  Continuous x 24 Hours (Telem)        Expiring   Question:  Reason for 24 Hour Telemetry  Answer:  TIA/Suspected CVA/ Confirmed CVA                     Telemetry Reviewed: Normal Sinus Rhythm  Indication for Continued Telemetry Use: Acute CVA               Lab Results: I have reviewed the following results:   Results from last 7 days   Lab Units 10/22/24  0622 10/21/24  0456 10/20/24  0412   WBC Thousand/uL 6.72   < > 7.29   HEMOGLOBIN g/dL 12.3   < > 11.5   HEMATOCRIT % 38.3   < > 35.2   PLATELETS Thousands/uL 283   < > 268   SEGS PCT %  --   --  51   LYMPHO PCT %  --   --  31   MONO PCT %  --   --  17*   EOS PCT %  --   --  1    < > = values in this interval not displayed.     Results from last 7 days   Lab Units 10/22/24  0622 10/21/24  0456 10/20/24  0412   SODIUM mmol/L 139   < > 138   POTASSIUM mmol/L 4.4   < > 3.7   CHLORIDE mmol/L 108   < > 108   CO2 mmol/L 25   < > 22   BUN mg/dL 19   < > 15   CREATININE mg/dL 1.03   < > 1.02   ANION GAP mmol/L 6   < > 8   CALCIUM mg/dL 8.7   < > 8.8   ALBUMIN g/dL  --   --  3.0*   TOTAL BILIRUBIN mg/dL  --   --  0.52   ALK PHOS U/L  --   --  46   ALT U/L  --   --  9   AST U/L  --   --  11*   GLUCOSE RANDOM mg/dL 95   < > 103    < > = values in this interval not displayed.     Results from last 7 days   Lab Units 10/19/24  1738   INR  1.08     Results from last 7 days   Lab Units 10/19/24  1809   POC GLUCOSE mg/dl 71     Results from last 7 days   Lab Units 10/20/24  0412   HEMOGLOBIN A1C % 5.9*  5.9*           Recent Cultures (last 7 days):         Imaging Results Review: I reviewed radiology reports from this admission including: MRI brain and MRI spine.  Other Study Results Review: No additional pertinent  studies reviewed.    Last 24 Hours Medication List:     Current Facility-Administered Medications:     acetaminophen (TYLENOL) tablet 650 mg, Q6H PRN    apixaban (ELIQUIS) tablet 5 mg, BID    ezetimibe (ZETIA) tablet 10 mg, HS    famotidine (PEPCID) tablet 10 mg, BID PRN    labetalol (NORMODYNE) injection 10 mg, Q4H PRN    loratadine (CLARITIN) tablet 10 mg, Daily    ondansetron (ZOFRAN) injection 4 mg, Q6H PRN    Administrative Statements   Today, Patient Was Seen By: DEEPAK Rosario  I have spent a total time of 35 minutes in caring for this patient on the day of the visit/encounter including Diagnostic results, Instructions for management, Patient and family education, Importance of tx compliance, Risk factor reductions, Impressions, Counseling / Coordination of care, Documenting in the medical record, Reviewing / ordering tests, medicine, procedures  , Obtaining or reviewing history  , and Communicating with other healthcare professionals .    **Please Note: This note may have been constructed using a voice recognition system.**

## 2024-10-23 ENCOUNTER — APPOINTMENT (INPATIENT)
Dept: RADIOLOGY | Facility: HOSPITAL | Age: 69
DRG: 074 | End: 2024-10-23
Payer: MEDICARE

## 2024-10-23 PROCEDURE — 99232 SBSQ HOSP IP/OBS MODERATE 35: CPT

## 2024-10-23 PROCEDURE — 97530 THERAPEUTIC ACTIVITIES: CPT

## 2024-10-23 PROCEDURE — 97110 THERAPEUTIC EXERCISES: CPT

## 2024-10-23 PROCEDURE — 72158 MRI LUMBAR SPINE W/O & W/DYE: CPT

## 2024-10-23 PROCEDURE — 72157 MRI CHEST SPINE W/O & W/DYE: CPT

## 2024-10-23 RX ORDER — LORAZEPAM 2 MG/ML
0.5 INJECTION INTRAMUSCULAR ONCE AS NEEDED
Status: COMPLETED | OUTPATIENT
Start: 2024-10-23 | End: 2024-10-23

## 2024-10-23 RX ORDER — AMLODIPINE BESYLATE 5 MG/1
5 TABLET ORAL DAILY
Status: DISCONTINUED | OUTPATIENT
Start: 2024-10-24 | End: 2024-10-25 | Stop reason: HOSPADM

## 2024-10-23 RX ADMIN — ACETAMINOPHEN 650 MG: 325 TABLET ORAL at 20:49

## 2024-10-23 RX ADMIN — APIXABAN 5 MG: 5 TABLET, FILM COATED ORAL at 17:02

## 2024-10-23 RX ADMIN — APIXABAN 5 MG: 5 TABLET, FILM COATED ORAL at 08:48

## 2024-10-23 RX ADMIN — EZETIMIBE 10 MG: 10 TABLET ORAL at 21:56

## 2024-10-23 RX ADMIN — LORAZEPAM 0.5 MG: 2 INJECTION INTRAMUSCULAR; INTRAVENOUS at 22:53

## 2024-10-23 NOTE — PLAN OF CARE
Problem: PAIN - ADULT  Goal: Verbalizes/displays adequate comfort level or baseline comfort level  Description: Interventions:  - Encourage patient to monitor pain and request assistance  - Assess pain using appropriate pain scale  - Administer analgesics based on type and severity of pain and evaluate response  - Implement non-pharmacological measures as appropriate and evaluate response  - Consider cultural and social influences on pain and pain management  - Notify physician/advanced practitioner if interventions unsuccessful or patient reports new pain  10/22/2024 2241 by Rhona Giles RN  Outcome: Progressing  10/22/2024 2143 by Rhona Giles RN  Outcome: Progressing  10/22/2024 2135 by Rhona Giles RN  Outcome: Progressing     Problem: INFECTION - ADULT  Goal: Absence or prevention of progression during hospitalization  Description: INTERVENTIONS:  - Assess and monitor for signs and symptoms of infection  - Monitor lab/diagnostic results  - Monitor all insertion sites, i.e. indwelling lines, tubes, and drains  - Monitor endotracheal if appropriate and nasal secretions for changes in amount and color  - Jackson appropriate cooling/warming therapies per order  - Administer medications as ordered  - Instruct and encourage patient and family to use good hand hygiene technique  - Identify and instruct in appropriate isolation precautions for identified infection/condition  10/22/2024 2241 by Rhona Giles RN  Outcome: Progressing  10/22/2024 2143 by Rhona iGles RN  Outcome: Progressing  10/22/2024 2135 by Rhona Giles RN  Outcome: Progressing  Goal: Absence of fever/infection during neutropenic period  Description: INTERVENTIONS:  - Monitor WBC    10/22/2024 2241 by Rhona Giles RN  Outcome: Progressing  10/22/2024 2143 by Rhona Giles RN  Outcome: Progressing  10/22/2024 2135 by Rhona Giles RN  Outcome: Progressing     Problem: SAFETY ADULT  Goal: Patient will remain free of falls  Description:  INTERVENTIONS:  - Educate patient/family on patient safety including physical limitations  - Instruct patient to call for assistance with activity   - Consult OT/PT to assist with strengthening/mobility   - Keep Call bell within reach  - Keep bed low and locked with side rails adjusted as appropriate  - Keep care items and personal belongings within reach  - Initiate and maintain comfort rounds  - Make Fall Risk Sign visible to staff  - Offer Toileting every 2 Hours, in advance of need  - Initiate/Maintain bedalarm  - Obtain necessary fall risk management equipment: bed alarm, no slip socks, pts belongings at bedside, room free of clutter   - Apply yellow socks and bracelet for high fall risk patients  - Consider moving patient to room near nurses station  10/22/2024 2241 by Rhona Giles RN  Outcome: Progressing  10/22/2024 2143 by Rhona Giles RN  Outcome: Progressing  10/22/2024 2135 by Rhona Giles RN  Outcome: Progressing  Goal: Maintain or return to baseline ADL function  Description: INTERVENTIONS:  -  Assess patient's ability to carry out ADLs; assess patient's baseline for ADL function and identify physical deficits which impact ability to perform ADLs (bathing, care of mouth/teeth, toileting, grooming, dressing, etc.)  - Assess/evaluate cause of self-care deficits   - Assess range of motion  - Assess patient's mobility; develop plan if impaired  - Assess patient's need for assistive devices and provide as appropriate  - Encourage maximum independence but intervene and supervise when necessary  - Involve family in performance of ADLs  - Assess for home care needs following discharge   - Consider OT consult to assist with ADL evaluation and planning for discharge  - Provide patient education as appropriate  10/22/2024 2241 by Rhona Giles RN  Outcome: Progressing  10/22/2024 2143 by Rhona Giles RN  Outcome: Progressing  10/22/2024 2135 by Rhona Giles RN  Outcome: Progressing  Goal: Maintains/Returns to pre  admission functional level  Description: INTERVENTIONS:  - Perform AM-PAC 6 Click Basic Mobility/ Daily Activity assessment daily.  - Set and communicate daily mobility goal to care team and patient/family/caregiver.   - Collaborate with rehabilitation services on mobility goals if consulted  - Perform Range of Motion 3 times a day.  - Reposition patient every 2 hours.  - Dangle patient 3 times a day  - Stand patient 3 times a day  - Ambulate patient 2 times a day  - Out of bed to chair 2 times a day   - Out of bed for meals 2 times a day  - Out of bed for toileting  - Record patient progress and toleration of activity level   10/22/2024 2241 by Rhona Giles RN  Outcome: Progressing  10/22/2024 2143 by Rhona Giles RN  Outcome: Progressing  10/22/2024 2135 by Rhona Giles RN  Outcome: Progressing     Problem: DISCHARGE PLANNING  Goal: Discharge to home or other facility with appropriate resources  Description: INTERVENTIONS:  - Identify barriers to discharge w/patient and caregiver  - Arrange for needed discharge resources and transportation as appropriate  - Identify discharge learning needs (meds, wound care, etc.)  - Arrange for interpretive services to assist at discharge as needed  - Refer to Case Management Department for coordinating discharge planning if the patient needs post-hospital services based on physician/advanced practitioner order or complex needs related to functional status, cognitive ability, or social support system  10/22/2024 2241 by Rhona Giles RN  Outcome: Progressing  10/22/2024 2143 by Rhona Giles RN  Outcome: Progressing  10/22/2024 2135 by Rhona Giles RN  Outcome: Progressing     Problem: Knowledge Deficit  Goal: Patient/family/caregiver demonstrates understanding of disease process, treatment plan, medications, and discharge instructions  Description: Complete learning assessment and assess knowledge base.  Interventions:  - Provide teaching at level of understanding  - Provide  teaching via preferred learning methods  10/22/2024 2241 by Rhona Giles RN  Outcome: Progressing  10/22/2024 2143 by Rhona Giles RN  Outcome: Progressing  10/22/2024 2135 by Rhona Giles RN  Outcome: Progressing     Problem: Neurological Deficit  Goal: Neurological status is stable or improving  Description: Interventions:  - Monitor and assess patient's level of consciousness, motor function, sensory function, and level of assistance needed for ADLs.   - Monitor and report changes from baseline. Collaborate with interdisciplinary team to initiate plan and implement interventions as ordered.   - Provide and maintain a safe environment.  - Consider seizure precautions.  - Consider fall precautions.  - Consider aspiration precautions.  - Consider bleeding precautions.  10/22/2024 2241 by Rhona Giles RN  Outcome: Progressing  10/22/2024 2143 by Rhona Giles RN  Outcome: Progressing  10/22/2024 2135 by Rhona Giles RN  Outcome: Progressing     Problem: Activity Intolerance/Impaired Mobility  Goal: Mobility/activity is maintained at optimum level for patient  Description: Interventions:  - Assess and monitor patient  barriers to mobility and need for assistive/adaptive devices.  - Assess patient's emotional response to limitations.  - Collaborate with interdisciplinary team and initiate plans and interventions as ordered.  - Encourage independent activity per ability.  - Maintain proper body alignment.  - Perform active/passive rom as tolerated/ordered.  - Plan activities to conserve energy.  - Turn patient as appropriate  10/22/2024 2241 by Rhona Giles RN  Outcome: Progressing  10/22/2024 2143 by Rhona Giles RN  Outcome: Progressing  10/22/2024 2135 by Rhona Giles RN  Outcome: Progressing     Problem: Communication Impairment  Goal: Ability to express needs and understand communication  Description: Assess patient's communication skills and ability to understand information.  Patient will demonstrate use of  effective communication techniques, alternative methods of communication and understanding even if not able to speak.     - Encourage communication and provide alternate methods of communication as needed.  - Collaborate with case management/ for discharge needs.  - Include patient/family/caregiver in decisions related to communication.  10/22/2024 2241 by Rhona Giles RN  Outcome: Progressing  10/22/2024 2143 by Rhona Giles RN  Outcome: Progressing  10/22/2024 2135 by Rhona Giles RN  Outcome: Progressing     Problem: Potential for Aspiration  Goal: Non-ventilated patient's risk of aspiration is minimized  Description: Assess and monitor vital signs, respiratory status, and labs (WBC).  Monitor for signs of aspiration (tachypnea, cough, rales, wheezing, cyanosis, fever).    - Assess and monitor patient's ability to swallow.  - Place patient up in chair to eat if possible.  - HOB up at 90 degrees to eat if unable to get patient up into chair.  - Supervise patient during oral intake.   - Instruct patient/ family to take small bites.  - Instruct patient/ family to take small single sips when taking liquids.  - Follow patient-specific strategies generated by speech pathologist.  10/22/2024 2241 by Rhona Giles RN  Outcome: Progressing  10/22/2024 2143 by Rhona Giles RN  Outcome: Progressing  10/22/2024 2135 by Rhona Giles RN  Outcome: Progressing  Goal: Ventilated patient's risk of aspiration is minimized  Description: Assess and monitor vital signs, respiratory status, airway cuff pressure, and labs (WBC).  Monitor for signs of aspiration (tachypnea, cough, rales, wheezing, cyanosis, fever).    - Elevate head of bed 30 degrees if patient has tube feeding.  - Monitor tube feeding.  10/22/2024 2241 by Rhona Giles RN  Outcome: Progressing  10/22/2024 2143 by Rhona Giles RN  Outcome: Progressing  10/22/2024 2135 by Rhona Giles RN  Outcome: Progressing     Problem: Nutrition  Goal: Nutrition/Hydration  status is improving  Description: Monitor and assess patient's nutrition/hydration status for malnutrition (ex- brittle hair, bruises, dry skin, pale skin and conjunctiva, muscle wasting, smooth red tongue, and disorientation). Collaborate with interdisciplinary team and initiate plan and interventions as ordered.  Monitor patient's weight and dietary intake as ordered or per policy. Utilize nutrition screening tool and intervene per policy. Determine patient's food preferences and provide high-protein, high-caloric foods as appropriate.     - Assist patient with eating.  - Allow adequate time for meals.  - Encourage patient to take dietary supplement as ordered.  - Collaborate with clinical nutritionist.  - Include patient/family/caregiver in decisions related to nutrition.  10/22/2024 2241 by Rhona Giles RN  Outcome: Progressing  10/22/2024 2143 by Rhona Giles RN  Outcome: Progressing  10/22/2024 2135 by Rhona Giles RN  Outcome: Progressing     Problem: NEUROSENSORY - ADULT  Goal: Achieves stable or improved neurological status  Description: INTERVENTIONS  - Monitor and report changes in neurological status  - Monitor vital signs such as temperature, blood pressure, glucose, and any other labs ordered   - Initiate measures to prevent increased intracranial pressure  - Monitor for seizure activity and implement precautions if appropriate      10/22/2024 2241 by Rhona Giles RN  Outcome: Progressing  10/22/2024 2143 by Rhona Giles RN  Outcome: Progressing  10/22/2024 2135 by Rhona Giles RN  Outcome: Progressing  Goal: Remains free of injury related to seizures activity  Description: INTERVENTIONS  - Maintain airway, patient safety  and administer oxygen as ordered  - Monitor patient for seizure activity, document and report duration and description of seizure to physician/advanced practitioner  - If seizure occurs,  ensure patient safety during seizure  - Reorient patient post seizure  - Seizure pads on all 4  side rails  - Instruct patient/family to notify RN of any seizure activity including if an aura is experienced  - Instruct patient/family to call for assistance with activity based on nursing assessment  - Administer anti-seizure medications if ordered    10/22/2024 2241 by Rhona Giles RN  Outcome: Progressing  10/22/2024 2143 by Rhona Giles RN  Outcome: Progressing  10/22/2024 2135 by Rhona Giles RN  Outcome: Progressing  Goal: Achieves maximal functionality and self care  Description: INTERVENTIONS  - Monitor swallowing and airway patency with patient fatigue and changes in neurological status  - Encourage and assist patient to increase activity and self care.   - Encourage visually impaired, hearing impaired and aphasic patients to use assistive/communication devices  10/22/2024 2241 by Rhona Giles RN  Outcome: Progressing  10/22/2024 2143 by Rhona Giles RN  Outcome: Progressing  10/22/2024 2135 by Rhona Giles RN  Outcome: Progressing     Problem: CARDIOVASCULAR - ADULT  Goal: Maintains optimal cardiac output and hemodynamic stability  Description: INTERVENTIONS:  - Monitor I/O, vital signs and rhythm  - Monitor for S/S and trends of decreased cardiac output  - Administer and titrate ordered vasoactive medications to optimize hemodynamic stability  - Assess quality of pulses, skin color and temperature  - Assess for signs of decreased coronary artery perfusion  - Instruct patient to report change in severity of symptoms  10/22/2024 2241 by Rhona Giles RN  Outcome: Progressing  10/22/2024 2143 by Rhona Giles RN  Outcome: Progressing  10/22/2024 2135 by Rhona Giles RN  Outcome: Progressing  Goal: Absence of cardiac dysrhythmias or at baseline rhythm  Description: INTERVENTIONS:  - Continuous cardiac monitoring, vital signs, obtain 12 lead EKG if ordered  - Administer antiarrhythmic and heart rate control medications as ordered  - Monitor electrolytes and administer replacement therapy as  ordered  10/22/2024 2241 by Rhona Giles RN  Outcome: Progressing  10/22/2024 2143 by Rhona Giles RN  Outcome: Progressing  10/22/2024 2135 by Rhona Giles RN  Outcome: Progressing     Problem: RESPIRATORY - ADULT  Goal: Achieves optimal ventilation and oxygenation  Description: INTERVENTIONS:  - Assess for changes in respiratory status  - Assess for changes in mentation and behavior  - Position to facilitate oxygenation and minimize respiratory effort  - Oxygen administered by appropriate delivery if ordered  - Initiate smoking cessation education as indicated  - Encourage broncho-pulmonary hygiene including cough, deep breathe, Incentive Spirometry  - Assess the need for suctioning and aspirate as needed  - Assess and instruct to report SOB or any respiratory difficulty  - Respiratory Therapy support as indicated  10/22/2024 2241 by Rhona Giles RN  Outcome: Progressing  10/22/2024 2143 by Rhona Giles RN  Outcome: Progressing  10/22/2024 2135 by Rhona Giles RN  Outcome: Progressing     Problem: GASTROINTESTINAL - ADULT  Goal: Minimal or absence of nausea and/or vomiting  Description: INTERVENTIONS:  - Administer IV fluids if ordered to ensure adequate hydration  - Maintain NPO status until nausea and vomiting are resolved  - Nasogastric tube if ordered  - Administer ordered antiemetic medications as needed  - Provide nonpharmacologic comfort measures as appropriate  - Advance diet as tolerated, if ordered  - Consider nutrition services referral to assist patient with adequate nutrition and appropriate food choices  10/22/2024 2241 by Rhona Giles RN  Outcome: Progressing  10/22/2024 2143 by Rhona Glies RN  Outcome: Progressing  10/22/2024 2135 by Rhona Giles RN  Outcome: Progressing  Goal: Maintains or returns to baseline bowel function  Description: INTERVENTIONS:  - Assess bowel function  - Encourage oral fluids to ensure adequate hydration  - Administer IV fluids if ordered to ensure adequate hydration  -  Administer ordered medications as needed  - Encourage mobilization and activity  - Consider nutritional services referral to assist patient with adequate nutrition and appropriate food choices  10/22/2024 2241 by Rhona Giles RN  Outcome: Progressing  10/22/2024 2143 by Rhona Giles RN  Outcome: Progressing  10/22/2024 2135 by Rhona Giles RN  Outcome: Progressing  Goal: Maintains adequate nutritional intake  Description: INTERVENTIONS:  - Monitor percentage of each meal consumed  - Identify factors contributing to decreased intake, treat as appropriate  - Assist with meals as needed  - Monitor I&O, weight, and lab values if indicated  - Obtain nutrition services referral as needed  10/22/2024 2241 by Rhona Giles RN  Outcome: Progressing  10/22/2024 2143 by Rhona Giles RN  Outcome: Progressing  10/22/2024 2135 by Rhona Giles RN  Outcome: Progressing  Goal: Establish and maintain optimal ostomy function  Description: INTERVENTIONS:  - Assess bowel function  - Encourage oral fluids to ensure adequate hydration  - Administer IV fluids if ordered to ensure adequate hydration   - Administer ordered medications as needed  - Encourage mobilization and activity  - Nutrition services referral to assist patient with appropriate food choices  - Assess stoma site  - Consider wound care consult   10/22/2024 2241 by Rhona Giles RN  Outcome: Progressing  10/22/2024 2143 by Rhona Giles RN  Outcome: Progressing  10/22/2024 2135 by Rhona Giles RN  Outcome: Progressing  Goal: Oral mucous membranes remain intact  Description: INTERVENTIONS  - Assess oral mucosa and hygiene practices  - Implement preventative oral hygiene regimen  - Implement oral medicated treatments as ordered  - Initiate Nutrition services referral as needed  10/22/2024 2241 by Rhona Giles RN  Outcome: Progressing  10/22/2024 2143 by Rhona Giles RN  Outcome: Progressing  10/22/2024 2135 by Rhona Giles RN  Outcome: Progressing     Problem: GENITOURINARY -  ADULT  Goal: Maintains or returns to baseline urinary function  Description: INTERVENTIONS:  - Assess urinary function  - Encourage oral fluids to ensure adequate hydration if ordered  - Administer IV fluids as ordered to ensure adequate hydration  - Administer ordered medications as needed  - Offer frequent toileting  - Follow urinary retention protocol if ordered  10/22/2024 2241 by Rhona Giles RN  Outcome: Progressing  10/22/2024 2143 by Rhona Giles RN  Outcome: Progressing  10/22/2024 2135 by Rhona Giles RN  Outcome: Progressing  Goal: Absence of urinary retention  Description: INTERVENTIONS:  - Assess patient’s ability to void and empty bladder  - Monitor I/O  - Bladder scan as needed  - Discuss with physician/AP medications to alleviate retention as needed  - Discuss catheterization for long term situations as appropriate  10/22/2024 2241 by Rhona Giles RN  Outcome: Progressing  10/22/2024 2143 by Rhona Giles RN  Outcome: Progressing  10/22/2024 2135 by Rhona Giles RN  Outcome: Progressing  Goal: Urinary catheter remains patent  Description: INTERVENTIONS:  - Assess patency of urinary catheter  - If patient has a chronic ortiz, consider changing catheter if non-functioning  - Follow guidelines for intermittent irrigation of non-functioning urinary catheter  10/22/2024 2241 by Rhona Giles RN  Outcome: Progressing  10/22/2024 2143 by Rhona Giles RN  Outcome: Progressing  10/22/2024 2135 by Rhona Giles RN  Outcome: Progressing     Problem: METABOLIC, FLUID AND ELECTROLYTES - ADULT  Goal: Electrolytes maintained within normal limits  Description: INTERVENTIONS:  - Monitor labs and assess patient for signs and symptoms of electrolyte imbalances  - Administer electrolyte replacement as ordered  - Monitor response to electrolyte replacements, including repeat lab results as appropriate  - Instruct patient on fluid and nutrition as appropriate  10/22/2024 2241 by Rhona Giles RN  Outcome:  Progressing  10/22/2024 2143 by Rhona Giles RN  Outcome: Progressing  10/22/2024 2135 by Rhona Giles RN  Outcome: Progressing  Goal: Fluid balance maintained  Description: INTERVENTIONS:  - Monitor labs   - Monitor I/O and WT  - Instruct patient on fluid and nutrition as appropriate  - Assess for signs & symptoms of volume excess or deficit  10/22/2024 2241 by Rhona Giles RN  Outcome: Progressing  10/22/2024 2143 by Rhona Giles RN  Outcome: Progressing  10/22/2024 2135 by Rhona Giles RN  Outcome: Progressing  Goal: Glucose maintained within target range  Description: INTERVENTIONS:  - Monitor Blood Glucose as ordered  - Assess for signs and symptoms of hyperglycemia and hypoglycemia  - Administer ordered medications to maintain glucose within target range  - Assess nutritional intake and initiate nutrition service referral as needed  10/22/2024 2241 by Rhona Giles RN  Outcome: Progressing  10/22/2024 2143 by Rhona Giles RN  Outcome: Progressing  10/22/2024 2135 by Rhona Giles RN  Outcome: Progressing     Problem: SKIN/TISSUE INTEGRITY - ADULT  Goal: Skin Integrity remains intact(Skin Breakdown Prevention)  Description: Assess:  -Perform Madi assessment every day, twice a day  -Clean and moisturize skin every day  -Inspect skin when repositioning, toileting, and assisting with ADLS  -Assess under medical devices such as jagdish every 3 hours  -Assess extremities for adequate circulation and sensation     Bed Management:  -Have minimal linens on bed & keep smooth, unwrinkled  -Change linens as needed when moist or perspiring  -Avoid sitting or lying in one position for more than 3 hours while in bed  -Keep HOB at 45degrees     Toileting:  -Offer bedside commode  -Assess for incontinence every day  -Use incontinent care products after each incontinent episode such as chucks, pure wik    Activity:  -Mobilize patient 3 times a day  -Encourage activity and walks on unit  -Encourage or provide ROM exercises    -Turn and reposition patient every 2 Hours  -Use appropriate equipment to lift or move patient in bed  -Instruct/ Assist with weight shifting every hor when out of bed in chair  -Consider limitation of chair time 2 hour intervals    Skin Care:  -Avoid use of baby powder, tape, friction and shearing, hot water or constrictive clothing  -Relieve pressure over bony prominences using pillows, wedge  -Do not massage red bony areas    Next Steps:  -Teach patient strategies to minimize risks such    -Consider consults to  interdisciplinary teams such as PT, OT  10/22/2024 2241 by Rhona Giles RN  Outcome: Progressing  10/22/2024 2143 by Rhona Giles RN  Outcome: Progressing  10/22/2024 2135 by Rhona Giles RN  Outcome: Progressing  Goal: Incision(s), wounds(s) or drain site(s) healing without S/S of infection  Description: INTERVENTIONS  - Assess and document dressing, incision, wound bed, drain sites and surrounding tissue  - Provide patient and family education  - Perform skin care/dressing changes every day  10/22/2024 2241 by Rhona Giles RN  Outcome: Progressing  10/22/2024 2143 by Rhona Giles RN  Outcome: Progressing  10/22/2024 2135 by Rhona Giles RN  Outcome: Progressing  Goal: Pressure injury heals and does not worsen  Description: Interventions:  - Implement low air loss mattress or specialty surface (Criteria met)  - Apply silicone foam dressing  - Instruct/assist with weight shifting every 60 minutes when in chair   - Limit chair time to 2 hour intervals  - Use special pressure reducing interventions such as pillows, recliner when in chair   - Apply fecal or urinary incontinence containment device   - Perform passive or active ROM every day  - Turn and reposition patient & offload bony prominences every 2 hours   - Utilize friction reducing device or surface for transfers   - Consider consults to  interdisciplinary teams such as PT, OT  - Use incontinent care products after each incontinent episode such as  gwendolyn cee   - Consider nutrition services referral as needed  10/22/2024 2241 by Rhona Giles RN  Outcome: Progressing  10/22/2024 2143 by Rhona Giles RN  Outcome: Progressing  10/22/2024 2135 by Rhona Giles RN  Outcome: Progressing     Problem: HEMATOLOGIC - ADULT  Goal: Maintains hematologic stability  Description: INTERVENTIONS  - Assess for signs and symptoms of bleeding or hemorrhage  - Monitor labs  - Administer supportive blood products/factors as ordered and appropriate  10/22/2024 2241 by Rhona Giles RN  Outcome: Progressing  10/22/2024 2143 by Rhona Giles RN  Outcome: Progressing  10/22/2024 2135 by Rhona Giles RN  Outcome: Progressing     Problem: MUSCULOSKELETAL - ADULT  Goal: Maintain or return mobility to safest level of function  Description: INTERVENTIONS:  - Assess patient's ability to carry out ADLs; assess patient's baseline for ADL function and identify physical deficits which impact ability to perform ADLs (bathing, care of mouth/teeth, toileting, grooming, dressing, etc.)  - Assess/evaluate cause of self-care deficits   - Assess range of motion  - Assess patient's mobility  - Assess patient's need for assistive devices and provide as appropriate  - Encourage maximum independence but intervene and supervise when necessary  - Involve family in performance of ADLs  - Assess for home care needs following discharge   - Consider OT consult to assist with ADL evaluation and planning for discharge  - Provide patient education as appropriate  10/22/2024 2241 by Rhona Giles RN  Outcome: Progressing  10/22/2024 2143 by Rhona Giles RN  Outcome: Progressing  10/22/2024 2135 by Rhona Giles RN  Outcome: Progressing  Goal: Maintain proper alignment of affected body part  Description: INTERVENTIONS:  - Support, maintain and protect limb and body alignment  - Provide patient/ family with appropriate education  10/22/2024 2241 by Rhona Giles RN  Outcome: Progressing  10/22/2024 2143 by Rhona  RANDY Giles  Outcome: Progressing  10/22/2024 2135 by Rhona Giles RN  Outcome: Progressing     Problem: Nutrition/Hydration-ADULT  Goal: Nutrient/Hydration intake appropriate for improving, restoring or maintaining nutritional needs  Description: Monitor and assess patient's nutrition/hydration status for malnutrition. Collaborate with interdisciplinary team and initiate plan and interventions as ordered.  Monitor patient's weight and dietary intake as ordered or per policy. Utilize nutrition screening tool and intervene as necessary. Determine patient's food preferences and provide high-protein, high-caloric foods as appropriate.     INTERVENTIONS:  - Monitor oral intake, urinary output, labs, and treatment plans  - Assess nutrition and hydration status and recommend course of action  - Evaluate amount of meals eaten  - Assist patient with eating if necessary   - Allow adequate time for meals  - Recommend/ encourage appropriate diets, oral nutritional supplements, and vitamin/mineral supplements  - Order, calculate, and assess calorie counts as needed  - Recommend, monitor, and adjust tube feedings and TPN/PPN based on assessed needs  - Assess need for intravenous fluids  - Provide specific nutrition/hydration education as appropriate  - Include patient/family/caregiver in decisions related to nutrition  10/22/2024 2241 by Rhona Giles RN  Outcome: Progressing  10/22/2024 2143 by Rhona Giles RN  Outcome: Progressing  10/22/2024 2135 by Rhona Giles RN  Outcome: Progressing     Problem: Prexisting or High Potential for Compromised Skin Integrity  Goal: Skin integrity is maintained or improved  Description: INTERVENTIONS:  - Identify patients at risk for skin breakdown  - Assess and monitor skin integrity  - Assess and monitor nutrition and hydration status  - Monitor labs   - Assess for incontinence   - Turn and reposition patient  - Assist with mobility/ambulation  - Relieve pressure over bony prominences  - Avoid  friction and shearing  - Provide appropriate hygiene as needed including keeping skin clean and dry  - Evaluate need for skin moisturizer/barrier cream  - Collaborate with interdisciplinary team   - Patient/family teaching  - Consider wound care consult   10/22/2024 2241 by Rhona Giles RN  Outcome: Progressing  10/22/2024 2143 by Rhona Giles RN  Outcome: Progressing  10/22/2024 2135 by Rhona Giles RN  Outcome: Progressing

## 2024-10-23 NOTE — ASSESSMENT & PLAN NOTE
Patient with history of metastatic rectosigmoid adenocarcinoma diagnosed in 2019 presented with right lower extremity numbness and weakness.  CT head with findings of possible subacute to chronic infarct but would not explain patient's symptoms per neurology  CTA head/neck negative for significant stenosis, dissection, or occlusion  MRI brain negative for metastatic disease, findings on CT likely represent gliosis or mild edema surrounding angioma  MRI cervical spine negative for suspicious lesions or epidural/paraspinal tumor, showed multilevel cervical spondylosis   MRI lumbosacral plexus showed degenerative changes of lumbar spine  Neurology following  Unclear etiology of symptoms at this time  MRI lumbar/thoracic spine scheduled for tonight  Patient reports gradual daily improvement of numbness and weakness  Continue PT/OT

## 2024-10-23 NOTE — PHYSICAL THERAPY NOTE
"   PT TREATMENT     10/23/24 3068   PT Last Visit   PT Visit Date 10/23/24   Note Type   Note Type Treatment   Pain Assessment   Pain Assessment Tool 0-10   Pain Score No Pain   Restrictions/Precautions   Other Precautions Pain;Fall Risk   General   Chart Reviewed Yes   Cognition   Overall Cognitive Status WFL   Subjective   Subjective \"My leg is very sensitive to touch. I don't think the strength has improved at all\"   Bed Mobility   Supine to Sit 5  Supervision   Additional items HOB elevated;Bedrails;Increased time required   Transfers   Sit to Stand 4  Minimal assistance   Additional items Verbal cues;Increased time required  (UE support on walker, pt unable to WB on R LE due to pain and weakness)   Stand to Sit 4  Minimal assistance   Stand pivot 4  Minimal assistance   Additional items Increased time required  (bed to commode and commode to chair transferring to the L side pivoting on the L LE only)   Additional Comments Pt declined trying to weight bear through her R LE in standing due to fear of falling/continued weakness.   Balance   Static Sitting Fair +   Static Standing Fair   Activity Tolerance   Activity Tolerance Patient tolerated treatment well   Exercises    x 10 each ankle pumps, quad set, glut set, AAROM heel slides, AAROM hip abd/add, AAROM SAQ, AAROM LAQ, hip IR/ER R LE   Assessment   Prognosis Good   Problem List Decreased strength;Decreased mobility;Pain;Obesity   Assessment Pt demonstrates good functional progress as pt is now able to transfer to her L with min assist and a walker by pivoting on her L LE.  Pt is not able to put any weight on her R leg due to pain and weakness. Plan to continue working on independence with transfers and strength of the R LE.      The patient's AM-PAC Basic Mobility Inpatient Short Form Raw Score is 13. A Raw score of less than or equal to 16 suggests the patient may benefit from discharge to post-acute rehabilitation services. Please also refer to the " recommendation of the Physical Therapist for safe discharge planning.         Plan   Treatment/Interventions Therapeutic exercise;LE strengthening/ROM;Functional transfer training;ADL retraining;Bed mobility;Equipment eval/education;Gait training   Progress Progressing toward goals   PT Frequency Other (Comment)  (5x/week)   Discharge Recommendation   Rehab Resource Intensity Level, PT I (Maximum Resource Intensity)   AM-PAC Basic Mobility Inpatient   Turning in Flat Bed Without Bedrails 3   Lying on Back to Sitting on Edge of Flat Bed Without Bedrails 3   Moving Bed to Chair 2   Standing Up From Chair Using Arms 3   Walk in Room 1   Climb 3-5 Stairs With Railing 1   Basic Mobility Inpatient Raw Score 13   Basic Mobility Standardized Score 33.99   [facility] Highest Level Of Mobility   -[facility] Goal 4: Move to chair/commode   -[facility] Achieved 5: Stand (1 or more minutes)   Licensure   [place] License Number  Marie Camacho PT [id number]

## 2024-10-23 NOTE — PROGRESS NOTES
Progress Note - Hospitalist   Name: Itzel Sanches 69 y.o. female I MRN: [id number]  Unit/Bed#: [address] [place] [address] Date of Admission: 10/19/2024   Date of Service: 10/23/2024 I Hospital Day: 3    Assessment & Plan  Right leg numbness  Patient with history of metastatic rectosigmoid adenocarcinoma diagnosed in 2019 presented with right lower extremity numbness and weakness.  CT head with findings of possible subacute to chronic infarct but would not explain patient's symptoms per neurology  CTA head/neck negative for significant stenosis, dissection, or occlusion  MRI brain negative for metastatic disease, findings on CT likely represent gliosis or mild edema surrounding angioma  MRI cervical spine negative for suspicious lesions or epidural/paraspinal tumor, showed multilevel cervical spondylosis   MRI lumbosacral plexus showed degenerative changes of lumbar spine  Neurology following  Unclear etiology of symptoms at this time  MRI lumbar/thoracic spine scheduled for tonight  Patient reports gradual daily improvement of numbness and weakness  Continue PT/OT  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HCC)  Rectosigmoid adenocarcinoma with metastatic disease  Follows with [facility] oncology  Patient has completed 6 cycles of FOLFIRI and Avastin 4/8 to 6/24/24 after CT scan in March showed new mediastinal amee hepatus adenopathy, increased paraspinal lesion, and new small suspicious liver lesion  CT July 2024 showed interval increase in left 11th rib mets, decrease in hepatic mets, and improvement in mediastinal adenopathy  Patient has been on 5-FU Avastin and last treatment was August 27  Reportedly on treatment holiday until January  Hypertension  Not on prior to admission antihypertensives  BP has been persistently elevated  Goal normotension per neurology  Will start amlodipine 5 mg daily and monitor  GERD (gastroesophageal reflux disease)  Continue PPI  Mixed hyperlipidemia  On Zetia  Heart healthy diet  Chemotherapy-induced  neuropathy (HCC)  Chronic.  History of DVT (deep vein thrombosis)  Continue Eliquis  Prediabetes  Noted with Hgb A1c 5.9   Recommend weight loss and diet control    VTE Pharmacologic Prophylaxis: VTE Score: 9 High Risk (Score >/= 5) - Pharmacological DVT Prophylaxis Ordered: apixaban (Eliquis). Sequential Compression Devices Ordered.    Mobility:   Basic Mobility Inpatient Raw Score: 13  JH-HLM Goal: 4: Move to chair/commode  JH-HLM Achieved: 5: Stand (1 or more minutes)  JH-HLM Goal achieved. Continue to encourage appropriate mobility.    Patient Centered Rounds: I performed bedside rounds with nursing staff today.   Discussions with Specialists or Other Care Team Provider: rnhoa    Education and Discussions with Family / Patient: Patient declined call to .     Current Length of Stay: 3 day(s)  Current Patient Status: Inpatient   Certification Statement: The patient will continue to require additional inpatient hospital stay due to RLE numbness/weakness, MRIs pending  Discharge Plan: Anticipate discharge in 24-48 hrs to rehab facility.    Code Status: Prior    Subjective   Patient reports gradual daily improvement of RLE weakness and numbness. She states she can feel some areas up to her knee now and able to move it more than yesterday. Denies bowel or bladder incontinence. Denies any new or worsening symptoms and states otherwise she is feeling well.    Objective :  Temp:  [97.3 °F (36.3 °C)-98 °F (36.7 °C)] 97.6 °F (36.4 °C)  HR:  [77-87] 87  BP: (149-161)/(80-91) 156/89  Resp:  [16-19] 16  SpO2:  [96 %-99 %] 98 %  O2 Device: None (Room air)    Body mass index is 41.76 kg/m².     Input and Output Summary (last 24 hours):     Intake/Output Summary (Last 24 hours) at 10/23/2024 1616  Last data filed at 10/23/2024 0801  Gross per 24 hour   Intake 900 ml   Output --   Net 900 ml       Physical Exam  Vitals and nursing note reviewed.   Constitutional:       General: She is not in acute distress.      Appearance: She is well-developed.   HENT:      Head: Normocephalic.   Cardiovascular:      Rate and Rhythm: Normal rate and regular rhythm.   Pulmonary:      Effort: Pulmonary effort is normal. No respiratory distress.      Breath sounds: Normal breath sounds.   Abdominal:      Tenderness: There is no abdominal tenderness.   Musculoskeletal:         General: No swelling.   Skin:     General: Skin is warm and dry.      Capillary Refill: Capillary refill takes less than 2 seconds.   Neurological:      Mental Status: She is alert.      Comments: No sensation right thigh  Diminished sensation below right knee  Normal sensation/strength in R foot   Psychiatric:         Mood and Affect: Mood normal.           Lines/Drains:  Lines/Drains/Airways       Active Status       Name Placement date Placement time Site Days    Port A Cath  Right Subclavian --  --  Subclavian  --                    Central Line:  Goal for removal: N/A - Chronic PICC             Lab Results: I have reviewed the following results:   Results from last 7 days   Lab Units 10/22/24  0622 10/21/24  0456 10/20/24  0412   WBC Thousand/uL 6.72   < > 7.29   HEMOGLOBIN g/dL 12.3   < > 11.5   HEMATOCRIT % 38.3   < > 35.2   PLATELETS Thousands/uL 283   < > 268   SEGS PCT %  --   --  51   LYMPHO PCT %  --   --  31   MONO PCT %  --   --  17*   EOS PCT %  --   --  1    < > = values in this interval not displayed.     Results from last 7 days   Lab Units 10/22/24  0622 10/21/24  0456 10/20/24  0412   SODIUM mmol/L 139   < > 138   POTASSIUM mmol/L 4.4   < > 3.7   CHLORIDE mmol/L 108   < > 108   CO2 mmol/L 25   < > 22   BUN mg/dL 19   < > 15   CREATININE mg/dL 1.03   < > 1.02   ANION GAP mmol/L 6   < > 8   CALCIUM mg/dL 8.7   < > 8.8   ALBUMIN g/dL  --   --  3.0*   TOTAL BILIRUBIN mg/dL  --   --  0.52   ALK PHOS U/L  --   --  46   ALT U/L  --   --  9   AST U/L  --   --  11*   GLUCOSE RANDOM mg/dL 95   < > 103    < > = values in this interval not displayed.     Results  from last 7 days   Lab Units 10/19/24  1738   INR  1.08     Results from last 7 days   Lab Units 10/19/24  1809   POC GLUCOSE mg/dl 71     Results from last 7 days   Lab Units 10/20/24  0412   HEMOGLOBIN A1C % 5.9*  5.9*           Recent Cultures (last 7 days):         Imaging Results Review: I reviewed radiology reports from this admission including: CT head, MRI brain, and MRI spine.  Other Study Results Review: No additional pertinent studies reviewed.    Last 24 Hours Medication List:     Current Facility-Administered Medications:     acetaminophen (TYLENOL) tablet 650 mg, Q6H PRN    [START ON 10/24/2024] amLODIPine (NORVASC) tablet 5 mg, Daily    apixaban (ELIQUIS) tablet 5 mg, BID    ezetimibe (ZETIA) tablet 10 mg, HS    famotidine (PEPCID) tablet 10 mg, BID PRN    labetalol (NORMODYNE) injection 10 mg, Q4H PRN    LORazepam (ATIVAN) injection 0.5 mg, Once PRN    ondansetron (ZOFRAN) injection 4 mg, Q6H PRN    Administrative Statements   Today, Patient Was Seen By: Eden France PA-C    **Please Note: This note may have been constructed using a voice recognition system.**

## 2024-10-23 NOTE — ASSESSMENT & PLAN NOTE
Not on prior to admission antihypertensives  BP has been persistently elevated  Goal normotension per neurology  Will start amlodipine 5 mg daily and monitor

## 2024-10-23 NOTE — ASSESSMENT & PLAN NOTE
Rectosigmoid adenocarcinoma with metastatic disease  Follows with [facility] oncology  Patient has completed 6 cycles of FOLFIRI and Avastin 4/8 to 6/24/24 after CT scan in March showed new mediastinal amee hepatus adenopathy, increased paraspinal lesion, and new small suspicious liver lesion  CT July 2024 showed interval increase in left 11th rib mets, decrease in hepatic mets, and improvement in mediastinal adenopathy  Patient has been on 5-FU Avastin and last treatment was August 27  Reportedly on treatment holiday until January

## 2024-10-23 NOTE — ASSESSMENT & PLAN NOTE
"Patient presents with right lower extremity numbness and weakness and on CTH has a right frontal hypodensity described as could represent subacute versus chronic infarct which is contralateral to the side of her symptoms.  This is with a background of metastatic rectosigmoid cancer. Patient states she was going about her normal daily routine and noticed this while sitting on the couch and had a sensation that her right leg fell asleep.    Neurology recommends continue Eliquis.    Lipid panel, LDL slightly elevated otherwise unremarkable  Hgb A1c 5.9   ECHO 10/21 with normal systolic function and wall motion. EF 55%. No indication of valve disease.  MRI of the brain was performed 10/20 with no evidence of metastatic disease, focus of FLAIR abnormality in right posterior frontal deep white matter corresponds to the area of low-density on CT.  This likely represents either gliosis or mild edema surrounding a venous angioma, follow-up gadolinium enhanced MRI recommended in 2 months to exclude more aggressive pathologies as per radiology report.  MRI lumbosacral plexus performed 10/21 showing \"Normal MRI lumbosacral plexus on this motion degraded exam. Degenerative changes of lumbar spine are suboptimally evaluated given large field of view of lumbar spine. Consider dedicated MRI lumbar spine with and without contrast contrast for further evaluation.\"   Appreciate neurology recommendations  With her malignancy, will need to get all of spine imaging to r/o any mets or cord compression, abscess   Ordered MRI cervical, thoracic and lumbar spine showing \"No suspicious marrow replacing lesions noted involving the cervical spine. No epidural or paraspinal tumor noted. Multilevel cervical spondylosis, as described above, contributing to at most moderate canal stenosis at C5-C6, and multilevel neural foraminal stenosis, worst on the right at C4-C6. 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

## 2024-10-24 ENCOUNTER — TELEPHONE (OUTPATIENT)
Age: 69
End: 2024-10-24

## 2024-10-24 DIAGNOSIS — R29.898 RIGHT LEG WEAKNESS: Primary | ICD-10-CM

## 2024-10-24 LAB
ANION GAP SERPL CALCULATED.3IONS-SCNC: 6 MMOL/L (ref 4–13)
BUN SERPL-MCNC: 21 MG/DL (ref 5–25)
CALCIUM SERPL-MCNC: 8.7 MG/DL (ref 8.4–10.2)
CHLORIDE SERPL-SCNC: 108 MMOL/L (ref 96–108)
CO2 SERPL-SCNC: 23 MMOL/L (ref 21–32)
CREAT SERPL-MCNC: 0.88 MG/DL (ref 0.6–1.3)
ERYTHROCYTE [DISTWIDTH] IN BLOOD BY AUTOMATED COUNT: 18.9 % (ref 11.6–15.1)
GFR SERPL CREATININE-BSD FRML MDRD: 66 ML/MIN/1.73SQ M
GLUCOSE SERPL-MCNC: 95 MG/DL (ref 65–140)
HCT VFR BLD AUTO: 40.2 % (ref 34.8–46.1)
HGB BLD-MCNC: 12.9 G/DL (ref 11.5–15.4)
MCH RBC QN AUTO: 30.1 PG (ref 26.8–34.3)
MCHC RBC AUTO-ENTMCNC: 32.1 G/DL (ref 31.4–37.4)
MCV RBC AUTO: 94 FL (ref 82–98)
PLATELET # BLD AUTO: 301 THOUSANDS/UL (ref 149–390)
PMV BLD AUTO: 10.8 FL (ref 8.9–12.7)
POTASSIUM SERPL-SCNC: 4.2 MMOL/L (ref 3.5–5.3)
RBC # BLD AUTO: 4.29 MILLION/UL (ref 3.81–5.12)
SODIUM SERPL-SCNC: 137 MMOL/L (ref 135–147)
WBC # BLD AUTO: 6.35 THOUSAND/UL (ref 4.31–10.16)

## 2024-10-24 PROCEDURE — 99233 SBSQ HOSP IP/OBS HIGH 50: CPT | Performed by: PSYCHIATRY & NEUROLOGY

## 2024-10-24 PROCEDURE — 99232 SBSQ HOSP IP/OBS MODERATE 35: CPT | Performed by: INTERNAL MEDICINE

## 2024-10-24 PROCEDURE — 80048 BASIC METABOLIC PNL TOTAL CA: CPT

## 2024-10-24 PROCEDURE — 97535 SELF CARE MNGMENT TRAINING: CPT

## 2024-10-24 PROCEDURE — 85027 COMPLETE CBC AUTOMATED: CPT

## 2024-10-24 PROCEDURE — A9585 GADOBUTROL INJECTION: HCPCS | Performed by: STUDENT IN AN ORGANIZED HEALTH CARE EDUCATION/TRAINING PROGRAM

## 2024-10-24 PROCEDURE — 97530 THERAPEUTIC ACTIVITIES: CPT

## 2024-10-24 PROCEDURE — 97110 THERAPEUTIC EXERCISES: CPT

## 2024-10-24 RX ORDER — GADOBUTROL 604.72 MG/ML
9 INJECTION INTRAVENOUS
Status: COMPLETED | OUTPATIENT
Start: 2024-10-24 | End: 2024-10-24

## 2024-10-24 RX ORDER — PREGABALIN 50 MG/1
50 CAPSULE ORAL 2 TIMES DAILY
Status: DISCONTINUED | OUTPATIENT
Start: 2024-10-24 | End: 2024-10-25 | Stop reason: HOSPADM

## 2024-10-24 RX ADMIN — AMLODIPINE BESYLATE 5 MG: 5 TABLET ORAL at 10:20

## 2024-10-24 RX ADMIN — GADOBUTROL 9 ML: 604.72 INJECTION INTRAVENOUS at 00:18

## 2024-10-24 RX ADMIN — APIXABAN 5 MG: 5 TABLET, FILM COATED ORAL at 10:20

## 2024-10-24 RX ADMIN — APIXABAN 5 MG: 5 TABLET, FILM COATED ORAL at 17:00

## 2024-10-24 RX ADMIN — EZETIMIBE 10 MG: 10 TABLET ORAL at 22:25

## 2024-10-24 RX ADMIN — SODIUM CHLORIDE 1000 MG: 0.9 INJECTION, SOLUTION INTRAVENOUS at 15:04

## 2024-10-24 RX ADMIN — PREGABALIN 50 MG: 50 CAPSULE ORAL at 22:25

## 2024-10-24 NOTE — CASE MANAGEMENT
Case Management Discharge Planning Note    Patient name Itzel Sanches  Location [address] [address] [address] MRN [id number]  : 1955 Date 10/24/2024       Current Admission Date: 10/19/2024  Current Admission Diagnosis:Right leg numbness   Patient Active Problem List    Diagnosis Date Noted Date Diagnosed    Abnormal finding on MRI of brain 10/21/2024     Stroke-like symptom 10/19/2024     Right leg numbness 10/19/2024     History of DVT (deep vein thrombosis) 10/19/2024     Anemia associated with chemotherapy 2024     Hypokalemia 05/10/2024     Chemotherapy-induced neutropenia (HCC) 2024     Chemotherapy-induced neuropathy (HCC) 2024     Metastases to the liver (HCC) 10/12/2023     Postoperative state 2022     H/O splenectomy 2022     Asplenia 2022     Platelets decreased (HCC) 2022     Stage 3 chronic kidney disease, unspecified whether stage 3a or 3b CKD (HCC) 2022     Chemotherapy-induced nausea 2022     Hypertension 12/10/2021     Chemotherapy adverse reaction 12/10/2021     Lesion of ovary 2021     Omental metastasis 2021     PONV (postoperative nausea and vomiting) 10/06/2021     Sigmoid diverticulosis 2020     Dyslipidemia 10/30/2019     Dyspnea on exertion 10/21/2019     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HCC) 2019     Morbid obesity (HCC) 05/15/2019     Callus of foot 2017     Foot pain 2017     GERD (gastroesophageal reflux disease) 2016     Mixed hyperlipidemia 2015     Prediabetes 2015     Varicose veins with pain 2015       LOS (days): 4  Geometric Mean LOS (GMLOS) (days): 3  Days to GMLOS:-1.2     OBJECTIVE:  Risk of Unplanned Readmission Score: 14.21       Current admission status: Inpatient   Preferred Pharmacy:   [facility] [place], [place] - [address] [place], [place] [id number]  Phone: [phone] Fax: [phone]    Primary Care Provider: Lashon  DEEPAK Quintana    Primary Insurance: MEDICARE  Secondary Insurance: [employer]    DISCHARGE DETAILS:    Discharge planning discussed with:: Patient  Freedom of Choice: Yes    Comments - Freedom of Choice: SW spoke with patient at bedside to discuss discharge planning.  She expressed preference for STR at [facility] due to its proximity to her daughters' home and she no longer wants to consider acute rehabilitation.  SW cancelled acute rehab referral in [facility] and updated [facility] on patient's anticipated discharge timeline.  [facility] transport was requested for tomorrow at 1500 and request remains pending in [facility]       Were Treatment Team discharge recommendations reviewed with patient/caregiver?: Yes  Did patient/caregiver verbalize understanding of patient care needs?: N/A- going to facility  Were patient/caregiver advised of the risks associated with not following Treatment Team discharge recommendations?: Yes    Requested Home Health Care         Is the patient interested in HHC at discharge?: No    DME Referral Provided  Referral made for DME?: No    Other Referral/Resources/Interventions Provided:  Interventions: Short Term Rehab, Transportation    Would you like to participate in our [facility] service program?  : No - Declined    Treatment Team Recommendation: Short Term Rehab  Discharge Destination Plan:: Short Term Rehab  Transport at Discharge : Wheelchair van     Number/Name of Dispatcher: [facility] via [facility]  Transported by (Company and Unit #): TBD  ETA of Transport (Date): 10/25/24  ETA of Transport (Time): 1500

## 2024-10-24 NOTE — PROGRESS NOTES
Progress Note - Hospitalist   Name: Itzel Sanches 69 y.o. female I MRN: [id number]  Unit/Bed#: [address] [place] [address] Date of Admission: 10/19/2024   Date of Service: 10/24/2024 I Hospital Day: 4    Assessment & Plan  Right leg numbness  History of metastatic rectosigmoid adenocarcinoma GERD hyperlipidemia and DVT presented for right thigh numbness and weakness  Extensive workup this admission with CT, CTA, and MRI of brain and spine negative for pathology  Likely small/local neuropathy.  Neurology ordered IV methylprednisolone 1000 mg.  Will trial pregabalin.  Disposition: Anticipate to rehab next 24 hours  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HCC)  Rectosigmoid adenocarcinoma with metastatic disease  Follows with Dr. Razo.  Recently noted to have left 11th rib metastasis with improvement in hepatic mets and mediastinal adenopathy  Follow-up as an outpatient  Hypertension  Started on 5 mg amlodipine with improvement  GERD (gastroesophageal reflux disease)  Continue pantoprazole  Mixed hyperlipidemia  Continue ezetimibe  Chemotherapy-induced neuropathy (HCC)  Chronic.  History of DVT (deep vein thrombosis)  Continue apixaban  Prediabetes  Monitor for steroid-induced hyperglycemia    Results from last 7 days   Lab Units 10/24/24  0637 10/22/24  0622 10/21/24  0456 10/20/24  0412   GLUCOSE RANDOM mg/dL 95 95 95 103     Abnormal finding on MRI of brain      VTE Pharmacologic Prophylaxis: VTE Score: 9 High Risk (Score >/= 5) - Pharmacological DVT Prophylaxis Ordered: apixaban (Eliquis). Sequential Compression Devices Ordered.    Mobility:   Basic Mobility Inpatient Raw Score: 12  JH-HLM Goal: 4: Move to chair/commode  JH-HLM Achieved: 5: Stand (1 or more minutes)  JH-HLM Goal achieved. Continue to encourage appropriate mobility.    Patient Centered Rounds: I have performed bedside rounds with nursing staff today.  Discussions with Specialists or Other Care Team Provider: Case management neurology    Education and Discussions with  Family / Patient:     Current Length of Stay: 4 day(s)  Current Patient Status: Inpatient   Certification Statement: The patient will continue to require additional inpatient hospital stay due to IV steroids today  Discharge Plan: Anticipate discharge tomorrow to rehab facility.    Code Status: Prior    Subjective   Patient seen and examined.  Feeling about the same.    Objective   Vitals:   Temp (24hrs), Av °F (36.7 °C), Min:97.6 °F (36.4 °C), Max:98.2 °F (36.8 °C)    Temp:  [97.6 °F (36.4 °C)-98.2 °F (36.8 °C)] 97.6 °F (36.4 °C)  HR:  [] 105  Resp:  [18] 18  BP: (151-155)/(74-79) 151/79  SpO2:  [95 %-98 %] 97 %  Body mass index is 41.76 kg/m².     Input and Output Summary (last 24 hours):     Intake/Output Summary (Last 24 hours) at 10/24/2024 1625  Last data filed at 10/24/2024 1215  Gross per 24 hour   Intake 1220 ml   Output --   Net 1220 ml       Physical Exam  Vitals reviewed.   Constitutional:       General: She is not in acute distress.     Appearance: Normal appearance.   HENT:      Head: Atraumatic.   Cardiovascular:      Rate and Rhythm: Regular rhythm.      Heart sounds: Normal heart sounds.   Pulmonary:      Breath sounds: Normal breath sounds. No wheezing.   Abdominal:      General: Bowel sounds are normal.      Palpations: Abdomen is soft.      Tenderness: There is abdominal tenderness (Right lower quadrant). There is no guarding.   Musculoskeletal:         General: No swelling.   Skin:     General: Skin is warm.   Neurological:      Mental Status: She is alert.      Motor: Weakness present.   Psychiatric:         Mood and Affect: Mood normal.       Lines/Drains:  Invasive Devices       Central Venous Catheter Line  Duration             Port A Cath  Right Subclavian -- days              Peripheral Intravenous Line  Duration             Peripheral IV 10/19/24 Right;Ventral (anterior) Forearm 4 days    Peripheral IV 10/23/24 Left;Ventral (anterior) Forearm <1 day                    Central  Line:  Goal for removal:                Lab Results: I have reviewed the following results:   Results from last 7 days   Lab Units 10/24/24  0637 10/22/24  0622 10/21/24  0456 10/20/24  0412 10/19/24  1738   WBC Thousand/uL 6.35 6.72 7.10   < > 7.73   HEMOGLOBIN g/dL 12.9 12.3 11.9   < > 13.1   PLATELETS Thousands/uL 301 283 285   < > 313   MCV fL 94 94 94   < > 93   INR   --   --   --   --  1.08    < > = values in this interval not displayed.     Results from last 7 days   Lab Units 10/24/24  0637 10/22/24  0622 10/21/24  0456 10/20/24  0412   SODIUM mmol/L 137 139 136 138   POTASSIUM mmol/L 4.2 4.4 3.7 3.7   CHLORIDE mmol/L 108 108 108 108   CO2 mmol/L 23 25 21 22   ANION GAP mmol/L 6 6 7 8   BUN mg/dL 21 19 15 15   CREATININE mg/dL 0.88 1.03 1.01 1.02   CALCIUM mg/dL 8.7 8.7 8.4 8.8   ALBUMIN g/dL  --   --   --  3.0*   TOTAL BILIRUBIN mg/dL  --   --   --  0.52   ALK PHOS U/L  --   --   --  46   ALT U/L  --   --   --  9   AST U/L  --   --   --  11*   EGFR ml/min/1.73sq m 66 55 56 55   GLUCOSE RANDOM mg/dL 95 95 95 103         Results from last 7 days   Lab Units 10/20/24  0412   CK TOTAL U/L 100     Results from last 7 days   Lab Units 10/19/24  2008 10/19/24  1738   HS TNI 0HR ng/L  --  7   HS TNI 2HR ng/L 7  --               Results from last 7 days   Lab Units 10/19/24  1809   POC GLUCOSE mg/dl 71     Results from last 7 days   Lab Units 10/20/24  0412   HEMOGLOBIN A1C % 5.9*  5.9*     Results from last 7 days   Lab Units 10/20/24  0412   TSH 3RD GENERATON uIU/mL 7.099*   FREE T4 ng/dL 0.79       Recent Cultures (last 7 days):         Imaging:  Reviewed radiology reports from this admission including:  MRI thoracic spine w wo contrast    Addendum Date: 10/24/2024    ADDENDUM: Small focal areas of enhancement with mild intramuscular edema in left lateral paraspinal lower thoracic musculature adjacent to expansile left posterior 11th osseous rib metastasis. Question soft tissue extension of left posterior 11th  rib metastasis, reactive change, or denervation changes. The study was marked in EPIC for immediate notification.    Result Date: 10/24/2024  Impression: No metastasis in thoracic spine. Partially imaged known osseous metastasis of expansile left posterior 11th rib. Known left 11th rib fracture best seen on CT abdomen pelvis 10/19/2024. Partially imaged small left pleural effusion. Mild multilevel degenerative changes of thoracic spine with minor canal stenosis at T10-T11 and T11-T12, as detailed above. No significant foraminal narrowing. The study was marked in EPIC for immediate notification. Workstation performed: [id number]     MRI lumbar spine w wo contrast    Result Date: 10/24/2024  Impression: Partially imaged small focal areas of enhancement in left lateral paraspinal lower thoracic musculature adjacent to expansile left posterior 11th osseous rib metastasis. Question soft tissue extension of left posterior 11th rib metastasis, reactive change, or denervation changes. This is better visualized on the MRI lumbar spine compared to MRI thoracic spine given postcontrast fat-saturation. No metastasis in lumbar spine. Multilevel degenerative changes of lumbar spine with varying degrees of canal stenosis (multilevel mild, worse at L3-L4 and L4-L5) and foraminal narrowing (mild right L2-L3, bilateral L3-L4, bilateral L4-L5), as detailed above. The study was marked in EPIC for immediate notification. Workstation performed: [id number]         Last 24 Hours Medication List:     Current Facility-Administered Medications:     acetaminophen (TYLENOL) tablet 650 mg, Q6H PRN    amLODIPine (NORVASC) tablet 5 mg, Daily    apixaban (ELIQUIS) tablet 5 mg, BID    ezetimibe (ZETIA) tablet 10 mg, HS    famotidine (PEPCID) tablet 10 mg, BID PRN    labetalol (NORMODYNE) injection 10 mg, Q4H PRN    ondansetron (ZOFRAN) injection 4 mg, Q6H PRN    Administrative Statements   Today, Patient Was Seen By: Ryder Oshea, DO  I have spent a  total time of 35 minutes in caring for this patient on the day of the visit/encounter including Diagnostic results, Documenting in the medical record, Reviewing / ordering tests, medicine, procedures  , Obtaining or reviewing history  , and Communicating with other healthcare professionals .    **Please Note: This note may have been constructed using a voice recognition system.**

## 2024-10-24 NOTE — OCCUPATIONAL THERAPY NOTE
Occupational Therapy Progress Note     Patient Name: Itzel Sanches  Today's Date: 10/24/2024  Problem List  Principal Problem:    Right leg numbness  Active Problems:    GERD (gastroesophageal reflux disease)    Mixed hyperlipidemia    Prediabetes    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HCC)    Hypertension    Chemotherapy-induced neuropathy (HCC)    Stroke-like symptom    History of DVT (deep vein thrombosis)    Abnormal finding on MRI of brain       10/24/24 1321   OT Last Visit   OT Visit Date 10/24/24  (Thursday)   Note Type   Note Type Treatment  (split tx session [id number], [id number])   Pain Assessment   Pain Assessment Tool FLACC   Pain Location/Orientation Orientation: Right;Location: Leg;Location: Buttocks  (posterior R LE at buttocks during sit <> stand, standing using RW)   Effect of Pain on Daily Activities limits pace, activity and standing tolerance during ADLs   Patient's Stated Pain Goal No pain   Hospital Pain Intervention(s) Repositioned;Ambulation/increased activity;Emotional support   Pain Rating: FLACC (Rest) - Face 0   Pain Rating: FLACC (Rest) - Legs 0   Pain Rating: FLACC (Rest) - Activity 0   Pain Rating: FLACC (Rest) - Cry 0   Pain Rating: FLACC (Rest) - Consolability 0   Score: FLACC (Rest) 0   Pain Rating: FLACC (Activity) - Face 1   Pain Rating: FLACC (Activity) - Legs 0   Pain Rating: FLACC (Activity) - Activity 0   Pain Rating: FLACC (Activity) - Cry 1   Pain Rating: FLACC (Activity) - Consolability 1   Score: FLACC (Activity) 3   Restrictions/Precautions   Weight Bearing Precautions Per Order No   Other Precautions Fall Risk;Pain  (Armen, room air)   Lifestyle   Reciprocal Relationships Pt reports living alone; supportive friends / family   ADL   Where Assessed Chair  (vs commode)   Eating Assistance 7  Independent   Grooming Assistance 6  Modified Independent   Grooming Deficit Setup   Grooming Comments seated OOB in bedside recliner chair   UB Dressing Assistance 4  Minimal Assistance  "  UB Dressing Deficit Setup;Pull around back   UB Dressing Comments seated w/ + time   Toileting Assistance  2  Maximal Assistance   Toileting Deficit Setup;Bedside commode;Clothing management up;Clothing management down;Perineal hygiene;Increased time to complete;Verbal cueing;Supervison/safety   Toileting Comments voided seated on commode   Bed Mobility   Supine to Sit Unable to assess   Sit to Supine Unable to assess   Additional Comments Pt seated OOB in bedside recliner chair upon arrival and at end of session w/ needs met, call bell in reach. Provided pt w/ soft care cushion   Transfers   Sit to Stand 3  Moderate assistance   Additional items Assist x 1;Armrests;Increased time required;Verbal cues   Stand to Sit 4  Minimal assistance   Additional items Assist x 1;Armrests;Increased time required;Verbal cues   Stand pivot 3  Moderate assistance   Additional items Assist x 1;Increased time required;Verbal cues;Bedrails;Armrests  (pt prefers to her L; use of RW)   Toilet transfer 3  Moderate assistance   Additional items Assist x 1;Increased time required;Verbal cues;Commode;Armrests;Bedrails   Additional Comments Pt performed sit <> stand 3X w/ mod A   Functional Mobility   Additional Comments Pt able to take one step forward and back to chair using RW w/ max A. Pt reports she is able to feel her R foot on the floor this afternoon. Therapist blocked R knee and assisted pt in advancing R LE   Additional items Rolling walker   Toilet Transfers   Toilet Transfer From Rolling walker  (bedside recliner chair)   Toilet Transfer Type To and from   Toilet Transfer to Drop-arm commode   Toilet Transfer Technique Stand pivot   Toilet Transfers Moderate assistance;Verbal cues   Toilet Transfers Comments chair <> commode   Subjective   Subjective \"I was supposed to be on a bus trip this AM to [facility]\"   Cognition   Overall Cognitive Status WFL  (recalled this therapist from previous session)   Arousal/Participation " Alert;Cooperative   Attention Within functional limits   Orientation Level Oriented X4   Memory Within functional limits   Following Commands Follows all commands and directions without difficulty   Comments Identified pt by full name and birthdate   Activity Tolerance   Activity Tolerance Patient limited by fatigue;Patient limited by pain   Medical Staff Made Aware spoke w/ RNCourtney and RN, Bernard   Assessment   Assessment Pt seen for skilled OT Tx session day 3 focusing on activity engagement, ongoing eval, challenging activity / standing tolerance. Pt agreeable and motivated to participate. Pt required less physical assistance to complete sit <> stand and functional transfer to / from commode using RW. Pt required max A to participate in toileting using commode. Continue to recommend level I rehab resource intensity when medically stable for discharge from acute care. Will continue to follow   Plan   Treatment Interventions ADL retraining;Functional transfer training;Endurance training;Patient/family training;Equipment evaluation/education;Compensatory technique education;Continued evaluation;Energy conservation;Activityengagement   Goal Expiration Date 10/31/24   OT Treatment Day 3  (Thursday 10/24/24)   OT Frequency 3-5x/wk   Discharge Recommendation   Rehab Resource Intensity Level, OT I (Maximum Resource Intensity)   Equipment Recommended Bedside commode;Shower/Tub chair with back ($)   Commode Type Standard   AM-PAC Daily Activity Inpatient   Lower Body Dressing 2   Bathing 2   Toileting 2   Upper Body Dressing 3   Grooming 4   Eating 4   Daily Activity Raw Score 17   Daily Activity Standardized Score (Calc for Raw Score >=11) 37.26   AM-PAC Applied Cognition Inpatient   Following a Speech/Presentation 4   Understanding Ordinary Conversation 4   Taking Medications 4   Remembering Where Things Are Placed or Put Away 4   Remembering List of 4-5 Errands 4   Taking Care of Complicated Tasks 4   Applied Cognition  Raw Score 24   Applied Cognition Standardized Score 62.21   Barthel Index   Feeding 10   Bathing 0   Grooming Score 5   Dressing Score 5   Bladder Score 10   Bowels Score 10   Toilet Use Score 5   Transfers (Bed/Chair) Score 5   Mobility (Level Surface) Score 0   Stairs Score 0   Barthel Index Score 50   End of Consult   Education Provided Yes   Patient Position at End of Consult Bedside chair;All needs within reach   Nurse Communication Nurse aware of consult   Licensure   [place] License Number  Celia Barnett, OTR/L [id number]        The patient's raw score on the -PAC Daily Activity Inpatient Short Form is 17. A raw score of less than 19 suggests the patient may benefit from discharge to post-acute rehabilitation services. Please refer to the recommendation of the Occupational Therapist for safe discharge planning.    Celia Barnett, OTR/L  [id number]  [id number]

## 2024-10-24 NOTE — ASSESSMENT & PLAN NOTE
History of metastatic rectosigmoid adenocarcinoma GERD hyperlipidemia and DVT presented for right thigh numbness and weakness  Extensive workup this admission with CT, CTA, and MRI of brain and spine negative for pathology  Likely small/local neuropathy.  Neurology ordered IV methylprednisolone 1000 mg.  Will trial pregabalin.  Disposition: Anticipate to rehab next 24 hours

## 2024-10-24 NOTE — TELEPHONE ENCOUNTER
STILL ADMITTED:10/19/2024 - present (5 days)  [facility]    HFU/ SL JEISON/ Right leg numbness       ----- Message from Jenny Antoine PA-C sent at 10/24/2024  2:15 PM EDT -----  Regarding: HFU  Itzel Sanches will need follow-up in in 6 weeks with neuromuscular team for Other = ATTENDING in 60 minute appointment. They will require a EMG/NCS within 4 weeks.

## 2024-10-24 NOTE — ASSESSMENT & PLAN NOTE
Rectosigmoid adenocarcinoma with metastatic disease  Follows with Dr. Razo.  Recently noted to have left 11th rib metastasis with improvement in hepatic mets and mediastinal adenopathy  Follow-up as an outpatient

## 2024-10-24 NOTE — PLAN OF CARE
Problem: OCCUPATIONAL THERAPY ADULT  Goal: Performs self-care activities at highest level of function for planned discharge setting.  See evaluation for individualized goals.  Description: Treatment Interventions: ADL retraining, Functional transfer training, Endurance training, Patient/family training, Equipment evaluation/education, Compensatory technique education, Continued evaluation, Energy conservation, Activityengagement  Equipment Recommended: Bedside commode, Shower/Tub chair with back ($)       See flowsheet documentation for full assessment, interventions and recommendations.   Outcome: Progressing  Note: Limitation: Decreased ADL status, Decreased endurance, Decreased self-care trans, Decreased high-level ADLs (impaired sensation, R LE ROM / strength, sitting tolerance, balance, standing tolerance)     Assessment: Pt seen for skilled OT Tx session day 3 focusing on activity engagement, ongoing eval, challenging activity / standing tolerance. Pt agreeable and motivated to participate. Pt required less physical assistance to complete sit <> stand and functional transfer to / from commode using RW. Pt required max A to participate in toileting using commode. Continue to recommend level I rehab resource intensity when medically stable for discharge from acute care. Will continue to follow     Rehab Resource Intensity Level, OT: I (Maximum Resource Intensity)

## 2024-10-24 NOTE — PROGRESS NOTES
Progress Note - Neurology   Name: Itzel Sanches 69 y.o. 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     Neuroimaging   10/19/24 CTH:   Area of white matter hypoattenuation in the right frontal lobe could respresent a subacute to chronic infarct. No intracranial hemorrhage or mass effect.   10/19/2024 CTA head and neck with and without contrast:   Unremarkable CTA neck and brain.   No hemodynamically significant stenosis, dissection, or occlusion of the carotid or vertebral arteries or major vessels of the [place].   Hypoplastic vertebrobasilar system.  10/20/24 MRI brain with and without contrast:   No evidence of metastatic disease.   Focus of FLAIR abnormality in the right posterior frontal deep white matter corresponds to the area of low density on CT. This likely represents either gliosis or mild edema surrounding a venous angioma. Follow-up gadolinium enhanced MRI recommended in 2 months to exclude more aggressive pathologies.   10/21/24 MRI lumbosacral plexus with and without contrast:   Mild to moderate motion degraded.   Normal MRI lumbosacral plexus on this motion degraded exam.   Degenerative changes of lumbar spine are suboptimally evaluated given large field of view of lumbar spine. Consider dedicated MRI lumbar spine with and without contrast for further evaluation.   10/22/2024 MRI cervical spine with and without contrast:   No suspicious marrow replacing lesions noted involving the cervical spine. No epidural or paraspinal tumor noted.   Multilevel cervical spondylosis, contributing to at most moderate canal stenosis at C5-6 and  multilevel neural foraminal stenosis, worse on the right at C4-6.   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   10/24/2024 MRI thoracic spine with and wihtout contrast:   No metastasis in thoracic spine.   Partially imaged known osseous metastasis of expansile left posterior 11th rib. Known left 11th rib fracture best seen on CT abdomen pelvis 10/19/2024.   Partially imaged small left pleural effusion.   Mild multilevel degenerative changes of thoracic spine with minor canal stenosis at T10-T11 and T11-T12. No significant foraminal narrowing.   Small focal areas of enhancement with mild intramuscular edema in left lateral paraspinal lower thoracic musculature adjacent to expansile left posterior 11th osseous rib metastasis. Question soft tissue extension of left posterior 11th rib metastasis, reactive change, or denervation changes.  10/24/2024 MRI lumbar spine with and without contrast:   Partially imaged small focal areas of enhancement in left lateral paraspinal lower thoracic musculature adjacent to expansile left posterior 11th osseous rib metastasis. Question soft tissue extension of left posterior 11th rib metastasis, reactive change or denervation changes. This is better visualized on the MRI lumbar spine compared to MRI thoracic spine given postcontrast fat-saturation.   No metastasis in lumbar spine.   Multilevel degenerative changes of lumbar spine with varying degrees of canal stenosis (multilevel mild, worse at L3-4, and L4-5) and foraminal narrowing (mild right L2-3, B/L L3-4, B/L L4-5).     Etiology of symptoms is unclear at this time. Suspect local nerve compression underlying etiology particular given no structural findings on imaging of neuroaxis to explain symptoms.    Plan:  - Recommend EMG-NCS in 4 weeks for further evaluation.  - PT/OT  - Attending neurologist to see and advise. Please see  "attestation for additional details/recommendations. 10/24/24 1581 Addendum: Reviewed care with attending neurologist, will give Solu-Medrol 1000 mg IV x 1 today to treat for possible inflammation. Recommend this be followed by short tapered dose of Prednisone 30 mg QD x 2 days and then 20 mg QD x 2 days.  Mixed hyperlipidemia  - Lipid panel reviewed, total cholesterol 193, triglycerides 117, HDL 52, .  - Can continue on home dose of Zetia as do not suspect cerebrovascular etiology for symptoms.   Prediabetes  - Hemoglobin A1c reviewed, 5.9.   - Goal euglycemia.   - Management as per primary team.  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HCC)  - Follows with oncology team as an outpatient.   - Partially imaged small focal areas in left lateral paraspinal lower thoracic musculature adjacent to expansile left posterior 11th osseous rib metastasis noted incidentally on MRI imaging of spine. This would not explain her current symptoms.   - Consider oncology evaluation.  - Maintenance chemotherapy currently on hold until after the holidays.  Hypertension  - Goal normotension.   - Management as per primary team.  History of DVT (deep vein thrombosis)  - Continue on home Eliquis.  Abnormal finding on MRI of brain  - MRI brain with focus of FLAIR abnormality in the right posterior frontal deep white matter either representing gliosis or mild edema surrounding a venous angioma.   - This is an incidental finding and unrelated to presenting symptoms.   - Recommend follow-up MRI brain with contrast in 2 months to evaluate for stability.    Itzel MATSON Myron will need follow-up in in 6 weeks with neuromuscular team for Other in 60 minute appointment. They will require a EMG/NCS within 4 weeks.    Subjective   Patient reports no significant changes in RLE weakness and numbness. She notes that she will occasionally have a sensation of being \"cold or itchy\" on RLE but she does not feel that she has had any significant change in ability to " "move the RLE. She notes it \"has a mind of its own.\" She denies any new or worsening symptoms.     Review of Systems   Constitutional:  Negative for chills, fatigue and fever.   HENT:  Negative for trouble swallowing.    Eyes:  Negative for visual disturbance.   Respiratory:  Negative for shortness of breath.    Cardiovascular:  Negative for chest pain.   Gastrointestinal:  Negative for abdominal pain, nausea and vomiting.   Genitourinary:  Negative for difficulty urinating and dysuria.   Musculoskeletal:  Positive for gait problem. Negative for back pain and neck pain.   Skin:  Negative for rash.   Neurological:  Positive for weakness and numbness. Negative for dizziness, facial asymmetry, speech difficulty, light-headedness and headaches.   Psychiatric/Behavioral:  Negative for confusion.      Medications  Scheduled Meds:  Current Facility-Administered Medications   Medication Dose Route Frequency Provider Last Rate    acetaminophen  650 mg Oral Q6H PRN Hattie Hernandez MD      amLODIPine  5 mg Oral Daily Eden France PA-C      apixaban  5 mg Oral BID Hattie Hernandez MD      ezetimibe  10 mg Oral HS Hattie Hernandez MD      famotidine  10 mg Oral BID PRN Hattie Hernandez MD      labetalol  10 mg Intravenous Q4H PRN Hattie Hernandez MD      ondansetron  4 mg Intravenous Q6H PRN Hattie Hernandez MD       Continuous Infusions:   PRN Meds:.  acetaminophen    famotidine    labetalol    ondansetron    Objective :  Temp:  [97.6 °F (36.4 °C)-98.2 °F (36.8 °C)] 98.2 °F (36.8 °C)  HR:  [87-94] 94  BP: (155-156)/(74-89) 155/74  Resp:  [16] 16  SpO2:  [95 %-98 %] 95 %    Physical Exam  Constitutional:       General: She is not in acute distress.     Appearance: She is ill-appearing. She is not toxic-appearing or diaphoretic.   HENT:      Mouth/Throat:      Mouth: Mucous membranes are moist.      Pharynx: No oropharyngeal exudate or posterior oropharyngeal erythema.   Eyes:      General:         Right eye: No discharge.         Left eye: " No discharge.      Extraocular Movements: EOM normal. No nystagmus.      Conjunctiva/sclera: Conjunctivae normal.   Cardiovascular:      Rate and Rhythm: Normal rate and regular rhythm.   Pulmonary:      Effort: Pulmonary effort is normal. No respiratory distress.      Breath sounds: Normal breath sounds.   Abdominal:      General: There is no distension.      Palpations: Abdomen is soft.      Tenderness: There is no abdominal tenderness.   Musculoskeletal:         General: Normal range of motion.      Cervical back: Normal range of motion and neck supple.      Right lower leg: No edema.      Left lower leg: No edema.   Skin:     General: Skin is warm and dry.      Findings: No erythema or rash.   Neurological:      Mental Status: She is alert and oriented to person, place, and time.      Deep Tendon Reflexes:      Reflex Scores:       Bicep reflexes are 1+ on the right side and 1+ on the left side.       Brachioradialis reflexes are 1+ on the right side and 1+ on the left side.       Patellar reflexes are 0 on the right side and 1+ on the left side.       Achilles reflexes are 0 on the right side and 1+ on the left side.  Psychiatric:         Mood and Affect: Mood normal.         Speech: Speech normal.         Behavior: Behavior normal.     Neurological Exam  Mental Status  Alert. Oriented to person, place, time and situation. Oriented to person, place, and time. Speech is normal. Language is fluent with no aphasia. Attention and concentration are normal.    Cranial Nerves  CN III, IV, VI: Extraocular movements intact bilaterally. No nystagmus. Normal saccades.   Right pupil: 4 mm. Round. Reactive to light.   Left pupil: 4 mm. Round. Reactive to light.  CN VII:  Right: There is no facial weakness.  Left: There is no facial weakness.  CN XII: Tongue midline without atrophy or fasciculations.    Motor  Normal muscle bulk throughout. No fasciculations present.  Motor strength B/L UE and LLE 5/5, motor strength RLE hip  flexion 1/5, KF/KE 3-/5, PF/DF 4/5..    Sensory  Light touch abnormality: Intact to crude touch B/L UE and LLE. Diminished to crude touch RLE, sensation patchy.  .     Reflexes                                            Right                      Left  Brachioradialis                    1+                         1+  Biceps                                 1+                         1+  Patellar                                0                         1+  Achilles                                0                         1+  Right Plantar: downgoing  Left Plantar: downgoing    Right pathological reflexes: Brent's absent. Ankle clonus absent.  Left pathological reflexes: Brent's absent. Ankle clonus absent.    Coordination  Right: Finger-to-nose normal.Left: Finger-to-nose normal.    Gait    Deferred for safety..        Lab Results: I have reviewed the following results:  Recent Results (from the past 24 hour(s))   CBC    Collection Time: 10/24/24  6:37 AM   Result Value Ref Range    WBC 6.35 4.31 - 10.16 Thousand/uL    RBC 4.29 3.81 - 5.12 Million/uL    Hemoglobin 12.9 11.5 - 15.4 g/dL    Hematocrit 40.2 34.8 - 46.1 %    MCV 94 82 - 98 fL    MCH 30.1 26.8 - 34.3 pg    MCHC 32.1 31.4 - 37.4 g/dL    RDW 18.9 (H) 11.6 - 15.1 %    Platelets 301 149 - 390 Thousands/uL    MPV 10.8 8.9 - 12.7 fL   Basic metabolic panel    Collection Time: 10/24/24  6:37 AM   Result Value Ref Range    Sodium 137 135 - 147 mmol/L    Potassium 4.2 3.5 - 5.3 mmol/L    Chloride 108 96 - 108 mmol/L    CO2 23 21 - 32 mmol/L    ANION GAP 6 4 - 13 mmol/L    BUN 21 5 - 25 mg/dL    Creatinine 0.88 0.60 - 1.30 mg/dL    Glucose 95 65 - 140 mg/dL    Calcium 8.7 8.4 - 10.2 mg/dL    eGFR 66 ml/min/1.73sq m     Imaging   Imaging Results Review: I personally reviewed the following image studies in PACS and associated radiology reports: MRI cervical spine with and without contrast, MRI thoracic spine with and without contrast, MRI lumbar spine with and  without contrast. Please see above results.    VTE Pharmacologic Prophylaxis: Apixaban

## 2024-10-24 NOTE — ASSESSMENT & PLAN NOTE
- Follows with oncology team as an outpatient.   - Partially imaged small focal areas in left lateral paraspinal lower thoracic musculature adjacent to expansile left posterior 11th osseous rib metastasis noted incidentally on MRI imaging of spine. This would not explain her current symptoms.   - Consider oncology evaluation.  - Maintenance chemotherapy currently on hold until after the holidays.

## 2024-10-24 NOTE — PLAN OF CARE
Problem: PAIN - ADULT  Goal: Verbalizes/displays adequate comfort level or baseline comfort level  Description: Interventions:  - Encourage patient to monitor pain and request assistance  - Assess pain using appropriate pain scale  - Administer analgesics based on type and severity of pain and evaluate response  - Implement non-pharmacological measures as appropriate and evaluate response  - Consider cultural and social influences on pain and pain management  - Notify physician/advanced practitioner if interventions unsuccessful or patient reports new pain  Outcome: Progressing     Problem: INFECTION - ADULT  Goal: Absence or prevention of progression during hospitalization  Description: INTERVENTIONS:  - Assess and monitor for signs and symptoms of infection  - Monitor lab/diagnostic results  - Monitor all insertion sites, i.e. indwelling lines, tubes, and drains  - Monitor endotracheal if appropriate and nasal secretions for changes in amount and color  - [place] appropriate cooling/warming therapies per order  - Administer medications as ordered  - Instruct and encourage patient and family to use good hand hygiene technique  - Identify and instruct in appropriate isolation precautions for identified infection/condition  Outcome: Progressing  Goal: Absence of fever/infection during neutropenic period  Description: INTERVENTIONS:  - Monitor WBC    Outcome: Progressing     Problem: SAFETY ADULT  Goal: Patient will remain free of falls  Description: INTERVENTIONS:  - Educate patient/family on patient safety including physical limitations  - Instruct patient to call for assistance with activity   - Consult OT/PT to assist with strengthening/mobility   - Keep Call bell within reach  - Keep bed low and locked with side rails adjusted as appropriate  - Keep care items and personal belongings within reach  - Initiate and maintain comfort rounds  - Make Fall Risk Sign visible to staff  - Offer Toileting every 2 Hours,  in advance of need  - Initiate/Maintain bed alarm  Problem: DISCHARGE PLANNING  Goal: Discharge to home or other facility with appropriate resources  Description: INTERVENTIONS:  - Identify barriers to discharge w/patient and caregiver  - Arrange for needed discharge resources and transportation as appropriate  - Identify discharge learning needs (meds, wound care, etc.)  - Arrange for interpretive services to assist at discharge as needed  - Refer to Case Management Department for coordinating discharge planning if the patient needs post-hospital services based on physician/advanced practitioner order or complex needs related to functional status, cognitive ability, or social support system  Outcome: Progressing     Problem: Knowledge Deficit  Goal: Patient/family/caregiver demonstrates understanding of disease process, treatment plan, medications, and discharge instructions  Description: Complete learning assessment and assess knowledge base.  Interventions:  - Provide teaching at level of understanding  - Provide teaching via preferred learning methods  Outcome: Progressing     - Apply yellow socks and bracelet for high fall risk patients  - Consider moving patient to room near nurses station  Outcome: Progressing  Goal: Maintain or return to baseline ADL function  Description: INTERVENTIONS:  -  Assess patient's ability to carry out ADLs; assess patient's baseline for ADL function and identify physical deficits which impact ability to perform ADLs (bathing, care of mouth/teeth, toileting, grooming, dressing, etc.)  - Assess/evaluate cause of self-care deficits   - Assess range of motion  - Assess patient's mobility; develop plan if impaired  - Assess patient's need for assistive devices and provide as appropriate  - Encourage maximum independence but intervene and supervise when necessary  - Involve family in performance of ADLs  - Assess for home care needs following discharge   - Consider OT consult to assist  with ADL evaluation and planning for discharge  - Provide patient education as appropriate  Outcome: Progressing  Goal: Maintains/Returns to pre admission functional level  Description: INTERVENTIONS:  - Perform AM-PAC 6 Click Basic Mobility/ Daily Activity assessment daily.  - Set and communicate daily mobility goal to care team and patient/family/caregiver.   - Collaborate with rehabilitation services on mobility goals if consulted  - Perform Range of Motion 2 times a day.  - Reposition patient every 2 hours.  - Dangle patient 2 times a day  - Stand patient 2 times a day  - Ambulate patient 2 times a day  - Out of bed to chair 2 times a day   - Out of bed for meals 2 times a day  - Out of bed for toileting  - Record patient progress and toleration of activity level   Outcome: Progressing

## 2024-10-24 NOTE — ASSESSMENT & PLAN NOTE
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     Neuroimaging   10/19/24 CTH:   Area of white matter hypoattenuation in the right frontal lobe could respresent a subacute to chronic infarct. No intracranial hemorrhage or mass effect.   10/19/2024 CTA head and neck with and without contrast:   Unremarkable CTA neck and brain.   No hemodynamically significant stenosis, dissection, or occlusion of the carotid or vertebral arteries or major vessels of the [place].   Hypoplastic vertebrobasilar system.  10/20/24 MRI brain with and without contrast:   No evidence of metastatic disease.   Focus of FLAIR abnormality in the right posterior frontal deep white matter corresponds to the area of low density on CT. This likely represents either gliosis or mild edema surrounding a venous angioma. Follow-up gadolinium enhanced MRI recommended in 2 months to exclude more aggressive pathologies.   10/21/24 MRI lumbosacral plexus with and without contrast:   Mild to moderate motion degraded.   Normal MRI lumbosacral plexus on this motion degraded exam.   Degenerative changes of lumbar spine are suboptimally evaluated given large field of view of lumbar spine. Consider dedicated MRI lumbar spine with and without contrast for further evaluation.   10/22/2024 MRI cervical spine with and without contrast:   No suspicious marrow replacing lesions noted involving the cervical spine. No epidural or paraspinal tumor noted.   Multilevel cervical spondylosis, contributing to at most moderate canal stenosis at C5-6 and multilevel neural foraminal stenosis, worse on the right at C4-6.   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   10/24/2024 MRI thoracic spine  with and wihtout contrast:   No metastasis in thoracic spine.   Partially imaged known osseous metastasis of expansile left posterior 11th rib. Known left 11th rib fracture best seen on CT abdomen pelvis 10/19/2024.   Partially imaged small left pleural effusion.   Mild multilevel degenerative changes of thoracic spine with minor canal stenosis at T10-T11 and T11-T12. No significant foraminal narrowing.   Small focal areas of enhancement with mild intramuscular edema in left lateral paraspinal lower thoracic musculature adjacent to expansile left posterior 11th osseous rib metastasis. Question soft tissue extension of left posterior 11th rib metastasis, reactive change, or denervation changes.  10/24/2024 MRI lumbar spine with and without contrast:   Partially imaged small focal areas of enhancement in left lateral paraspinal lower thoracic musculature adjacent to expansile left posterior 11th osseous rib metastasis. Question soft tissue extension of left posterior 11th rib metastasis, reactive change or denervation changes. This is better visualized on the MRI lumbar spine compared to MRI thoracic spine given postcontrast fat-saturation.   No metastasis in lumbar spine.   Multilevel degenerative changes of lumbar spine with varying degrees of canal stenosis (multilevel mild, worse at L3-4, and L4-5) and foraminal narrowing (mild right L2-3, B/L L3-4, B/L L4-5).     Etiology of symptoms is unclear at this time. Suspect local nerve compression underlying etiology particular given no structural findings on imaging of neuroaxis to explain symptoms.    Plan:  - Recommend EMG-NCS in 4 weeks for further evaluation.  - PT/OT  - Attending neurologist to see and advise. Please see attestation for additional details/recommendations. 10/24/24 5475 Addendum: Reviewed care with attending neurologist, will give Solu-Medrol 1000 mg IV x 1 today to treat for possible inflammation. Recommend this be followed by short tapered dose of  Prednisone 30 mg QD x 2 days and then 20 mg QD x 2 days.

## 2024-10-24 NOTE — PHYSICAL THERAPY NOTE
PT TREATMENT     10/24/24 1415   PT Last Visit   PT Visit Date 10/24/24   Pain Assessment   Pain Assessment Tool 0-10   Pain Score No Pain   Restrictions/Precautions   Other Precautions Fall Risk  (R LE profoundly weak)   General   Chart Reviewed Yes   Cognition   Overall Cognitive Status WFL   Following Commands Follows all commands and directions without difficulty   Subjective   Subjective 'My leg feels about the same.'   Transfers   Sit to Stand 3  Moderate assistance   Additional items Increased time required  (from recliner with 1 hand on chair, one on walker)   Stand to Sit 4  Minimal assistance   Additional Comments Pt stood x 1 minute with min/mod assist with tactile cues for R knee extension/quad engagement. Pt able to rest the R foot on the ground but knee albert if pt puts any weight through the R leg.   Balance   Static Sitting Fair   Static Standing Poor   Activity Tolerance   Activity Tolerance Patient tolerated treatment well;Patient limited by fatigue;Patient limited by pain   Exercises    x 10 each ankle pumps, quad set, glut set, AAROM LAQ, AAROM hip flexion, AAROM hip abd/add, hipIR/ER.   Assessment   Prognosis Good   Problem List Decreased strength;Impaired balance;Decreased mobility;Impaired sensation   Assessment Pt continues to demonstrate significant R LE weakness and paresthesia/lack of sensation.  Pt is able to transfer by pivoting on L foot but cannot weight bear on her R LE due to above with little change in strength or sensation over the past few days.    The patient's AM-PAC Basic Mobility Inpatient Short Form Raw Score is 13. A Raw score of less than or equal to 16 suggests the patient may benefit from discharge to post-acute rehabilitation services. Please also refer to the recommendation of the Physical Therapist for safe discharge planning.         Plan   Treatment/Interventions LE strengthening/ROM;Functional transfer training;ADL retraining;Equipment  eval/education;Patient/family training;Therapeutic exercise   Progress   (slow progress)   PT Frequency Other (Comment)  (5x/week)   Discharge Recommendation   Rehab Resource Intensity Level, PT I (Maximum Resource Intensity)   AM-PAC Basic Mobility Inpatient   Turning in Flat Bed Without Bedrails 3   Lying on Back to Sitting on Edge of Flat Bed Without Bedrails 3   Moving Bed to Chair 2   Standing Up From Chair Using Arms 2   Walk in Room 1   Climb 3-5 Stairs With Railing 1   Basic Mobility Inpatient Raw Score 12   Basic Mobility Standardized Score 32.23   [facility] Highest Level Of Mobility   -[facility] Goal 4: Move to chair/commode   -HLM Achieved 5: Stand (1 or more minutes)   Licensure   [place] License Number  Marie Camacho PT [id number]

## 2024-10-24 NOTE — ASSESSMENT & PLAN NOTE
Monitor for steroid-induced hyperglycemia    Results from last 7 days   Lab Units 10/24/24  0637 10/22/24  0622 10/21/24  0456 10/20/24  0412   GLUCOSE RANDOM mg/dL 95 95 95 103

## 2024-10-25 ENCOUNTER — TELEPHONE (OUTPATIENT)
Dept: FAMILY MEDICINE CLINIC | Facility: CLINIC | Age: 69
End: 2024-10-25

## 2024-10-25 VITALS
TEMPERATURE: 97.2 F | HEART RATE: 94 BPM | OXYGEN SATURATION: 98 % | SYSTOLIC BLOOD PRESSURE: 157 MMHG | HEIGHT: 57 IN | DIASTOLIC BLOOD PRESSURE: 86 MMHG | WEIGHT: 193 LBS | BODY MASS INDEX: 41.64 KG/M2 | RESPIRATION RATE: 16 BRPM

## 2024-10-25 PROCEDURE — 99239 HOSP IP/OBS DSCHRG MGMT >30: CPT

## 2024-10-25 RX ORDER — PREDNISONE 10 MG/1
10 TABLET ORAL DAILY
Status: DISCONTINUED | OUTPATIENT
Start: 2024-10-29 | End: 2024-10-25 | Stop reason: HOSPADM

## 2024-10-25 RX ORDER — PREGABALIN 50 MG/1
50 CAPSULE ORAL 2 TIMES DAILY
Start: 2024-10-25 | End: 2024-11-04

## 2024-10-25 RX ORDER — AMLODIPINE BESYLATE 5 MG/1
5 TABLET ORAL DAILY
Start: 2024-10-26

## 2024-10-25 RX ORDER — PREDNISONE 20 MG/1
20 TABLET ORAL DAILY
Status: DISCONTINUED | OUTPATIENT
Start: 2024-10-27 | End: 2024-10-25 | Stop reason: HOSPADM

## 2024-10-25 RX ORDER — PREDNISONE 10 MG/1
TABLET ORAL
Start: 2024-10-25 | End: 2024-10-30

## 2024-10-25 RX ADMIN — PREGABALIN 50 MG: 50 CAPSULE ORAL at 09:00

## 2024-10-25 RX ADMIN — AMLODIPINE BESYLATE 5 MG: 5 TABLET ORAL at 09:00

## 2024-10-25 RX ADMIN — APIXABAN 5 MG: 5 TABLET, FILM COATED ORAL at 09:00

## 2024-10-25 RX ADMIN — PREDNISONE 30 MG: 20 TABLET ORAL at 13:20

## 2024-10-25 RX ADMIN — ACETAMINOPHEN 650 MG: 325 TABLET ORAL at 08:38

## 2024-10-25 NOTE — PLAN OF CARE
Problem: PAIN - ADULT  Goal: Verbalizes/displays adequate comfort level or baseline comfort level  Description: Interventions:  - Encourage patient to monitor pain and request assistance  - Assess pain using appropriate pain scale  - Administer analgesics based on type and severity of pain and evaluate response  - Implement non-pharmacological measures as appropriate and evaluate response  - Consider cultural and social influences on pain and pain management  - Notify physician/advanced practitioner if interventions unsuccessful or patient reports new pain  Outcome: Progressing     Problem: INFECTION - ADULT  Goal: Absence or prevention of progression during hospitalization  Description: INTERVENTIONS:  - Assess and monitor for signs and symptoms of infection  - Monitor lab/diagnostic results  - Monitor all insertion sites, i.e. indwelling lines, tubes, and drains  - Monitor endotracheal if appropriate and nasal secretions for changes in amount and color  - [place] appropriate cooling/warming therapies per order  - Administer medications as ordered  - Instruct and encourage patient and family to use good hand hygiene technique  - Identify and instruct in appropriate isolation precautions for identified infection/condition  Outcome: Progressing  Goal: Absence of fever/infection during neutropenic period  Description: INTERVENTIONS:  - Monitor WBC    Outcome: Progressing     Problem: SAFETY ADULT  Goal: Patient will remain free of falls  Description: INTERVENTIONS:  - Educate patient/family on patient safety including physical limitations  - Instruct patient to call for assistance with activity   - Consult OT/PT to assist with strengthening/mobility   - Keep Call bell within reach  - Keep bed low and locked with side rails adjusted as appropriate  - Keep care items and personal belongings within reach  - Initiate and maintain comfort rounds  - Make Fall Risk Sign visible to staff  - Offer Toileting every 3 Hours,  in advance of need  - Initiate/Maintain bedalarm  - Obtain necessary fall risk management equipment: no slip socks, bed alarm, bed in lowest position  - Apply yellow socks and bracelet for high fall risk patients  - Consider moving patient to room near nurses station  Outcome: Progressing  Goal: Maintain or return to baseline ADL function  Description: INTERVENTIONS:  -  Assess patient's ability to carry out ADLs; assess patient's baseline for ADL function and identify physical deficits which impact ability to perform ADLs (bathing, care of mouth/teeth, toileting, grooming, dressing, etc.)  - Assess/evaluate cause of self-care deficits   - Assess range of motion  - Assess patient's mobility; develop plan if impaired  - Assess patient's need for assistive devices and provide as appropriate  - Encourage maximum independence but intervene and supervise when necessary  - Involve family in performance of ADLs  - Assess for home care needs following discharge   - Consider OT consult to assist with ADL evaluation and planning for discharge  - Provide patient education as appropriate  Outcome: Progressing  Goal: Maintains/Returns to pre admission functional level  Description: INTERVENTIONS:  - Perform AM-PAC 6 Click Basic Mobility/ Daily Activity assessment daily.  - Set and communicate daily mobility goal to care team and patient/family/caregiver.   - Collaborate with rehabilitation services on mobility goals if consulted  - Perform Range of Motion 3 times a day.  - Reposition patient every 2 hours.  - Dangle patient 3 times a day  - Stand patient 3 times a day  - Ambulate patient 3 times a day  - Out of bed to chair 3 times a day   - Out of bed for meals 3 times a day  - Out of bed for toileting  - Record patient progress and toleration of activity level   Outcome: Progressing     Problem: DISCHARGE PLANNING  Goal: Discharge to home or other facility with appropriate resources  Description: INTERVENTIONS:  - Identify  barriers to discharge w/patient and caregiver  - Arrange for needed discharge resources and transportation as appropriate  - Identify discharge learning needs (meds, wound care, etc.)  - Arrange for interpretive services to assist at discharge as needed  - Refer to Case Management Department for coordinating discharge planning if the patient needs post-hospital services based on physician/advanced practitioner order or complex needs related to functional status, cognitive ability, or social support system  Outcome: Progressing     Problem: Knowledge Deficit  Goal: Patient/family/caregiver demonstrates understanding of disease process, treatment plan, medications, and discharge instructions  Description: Complete learning assessment and assess knowledge base.  Interventions:  - Provide teaching at level of understanding  - Provide teaching via preferred learning methods  Outcome: Progressing     Problem: Neurological Deficit  Goal: Neurological status is stable or improving  Description: Interventions:  - Monitor and assess patient's level of consciousness, motor function, sensory function, and level of assistance needed for ADLs.   - Monitor and report changes from baseline. Collaborate with interdisciplinary team to initiate plan and implement interventions as ordered.   - Provide and maintain a safe environment.  - Consider seizure precautions.  - Consider fall precautions.  - Consider aspiration precautions.  - Consider bleeding precautions.  Outcome: Progressing     Problem: Activity Intolerance/Impaired Mobility  Goal: Mobility/activity is maintained at optimum level for patient  Description: Interventions:  - Assess and monitor patient  barriers to mobility and need for assistive/adaptive devices.  - Assess patient's emotional response to limitations.  - Collaborate with interdisciplinary team and initiate plans and interventions as ordered.  - Encourage independent activity per ability.  - Maintain proper  body alignment.  - Perform active/passive rom as tolerated/ordered.  - Plan activities to conserve energy.  - Turn patient as appropriate  Outcome: Progressing     Problem: Communication Impairment  Goal: Ability to express needs and understand communication  Description: Assess patient's communication skills and ability to understand information.  Patient will demonstrate use of effective communication techniques, alternative methods of communication and understanding even if not able to speak.     - Encourage communication and provide alternate methods of communication as needed.  - Collaborate with case management/ for discharge needs.  - Include patient/family/caregiver in decisions related to communication.  Outcome: Progressing     Problem: Potential for Aspiration  Goal: Non-ventilated patient's risk of aspiration is minimized  Description: Assess and monitor vital signs, respiratory status, and labs (WBC).  Monitor for signs of aspiration (tachypnea, cough, rales, wheezing, cyanosis, fever).    - Assess and monitor patient's ability to swallow.  - Place patient up in chair to eat if possible.  - HOB up at 90 degrees to eat if unable to get patient up into chair.  - Supervise patient during oral intake.   - Instruct patient/ family to take small bites.  - Instruct patient/ family to take small single sips when taking liquids.  - Follow patient-specific strategies generated by speech pathologist.  Outcome: Progressing  Goal: Ventilated patient's risk of aspiration is minimized  Description: Assess and monitor vital signs, respiratory status, airway cuff pressure, and labs (WBC).  Monitor for signs of aspiration (tachypnea, cough, rales, wheezing, cyanosis, fever).    - Elevate head of bed 30 degrees if patient has tube feeding.  - Monitor tube feeding.  Outcome: Progressing     Problem: Nutrition  Goal: Nutrition/Hydration status is improving  Description: Monitor and assess patient's  nutrition/hydration status for malnutrition (ex- brittle hair, bruises, dry skin, pale skin and conjunctiva, muscle wasting, smooth red tongue, and disorientation). Collaborate with interdisciplinary team and initiate plan and interventions as ordered.  Monitor patient's weight and dietary intake as ordered or per policy. Utilize nutrition screening tool and intervene per policy. Determine patient's food preferences and provide high-protein, high-caloric foods as appropriate.     - Assist patient with eating.  - Allow adequate time for meals.  - Encourage patient to take dietary supplement as ordered.  - Collaborate with clinical nutritionist.  - Include patient/family/caregiver in decisions related to nutrition.  Outcome: Progressing     Problem: NEUROSENSORY - ADULT  Goal: Achieves stable or improved neurological status  Description: INTERVENTIONS  - Monitor and report changes in neurological status  - Monitor vital signs such as temperature, blood pressure, glucose, and any other labs ordered   - Initiate measures to prevent increased intracranial pressure  - Monitor for seizure activity and implement precautions if appropriate      Outcome: Progressing  Goal: Remains free of injury related to seizures activity  Description: INTERVENTIONS  - Maintain airway, patient safety  and administer oxygen as ordered  - Monitor patient for seizure activity, document and report duration and description of seizure to physician/advanced practitioner  - If seizure occurs,  ensure patient safety during seizure  - Reorient patient post seizure  - Seizure pads on all 4 side rails  - Instruct patient/family to notify RN of any seizure activity including if an aura is experienced  - Instruct patient/family to call for assistance with activity based on nursing assessment  - Administer anti-seizure medications if ordered    Outcome: Progressing  Goal: Achieves maximal functionality and self care  Description: INTERVENTIONS  - Monitor  swallowing and airway patency with patient fatigue and changes in neurological status  - Encourage and assist patient to increase activity and self care.   - Encourage visually impaired, hearing impaired and aphasic patients to use assistive/communication devices  Outcome: Progressing     Problem: CARDIOVASCULAR - ADULT  Goal: Maintains optimal cardiac output and hemodynamic stability  Description: INTERVENTIONS:  - Monitor I/O, vital signs and rhythm  - Monitor for S/S and trends of decreased cardiac output  - Administer and titrate ordered vasoactive medications to optimize hemodynamic stability  - Assess quality of pulses, skin color and temperature  - Assess for signs of decreased coronary artery perfusion  - Instruct patient to report change in severity of symptoms  Outcome: Progressing  Goal: Absence of cardiac dysrhythmias or at baseline rhythm  Description: INTERVENTIONS:  - Continuous cardiac monitoring, vital signs, obtain 12 lead EKG if ordered  - Administer antiarrhythmic and heart rate control medications as ordered  - Monitor electrolytes and administer replacement therapy as ordered  Outcome: Progressing     Problem: RESPIRATORY - ADULT  Goal: Achieves optimal ventilation and oxygenation  Description: INTERVENTIONS:  - Assess for changes in respiratory status  - Assess for changes in mentation and behavior  - Position to facilitate oxygenation and minimize respiratory effort  - Oxygen administered by appropriate delivery if ordered  - Initiate smoking cessation education as indicated  - Encourage broncho-pulmonary hygiene including cough, deep breathe, Incentive Spirometry  - Assess the need for suctioning and aspirate as needed  - Assess and instruct to report SOB or any respiratory difficulty  - Respiratory Therapy support as indicated  Outcome: Progressing     Problem: GASTROINTESTINAL - ADULT  Goal: Minimal or absence of nausea and/or vomiting  Description: INTERVENTIONS:  - Administer IV fluids  if ordered to ensure adequate hydration  - Maintain NPO status until nausea and vomiting are resolved  - Nasogastric tube if ordered  - Administer ordered antiemetic medications as needed  - Provide nonpharmacologic comfort measures as appropriate  - Advance diet as tolerated, if ordered  - Consider nutrition services referral to assist patient with adequate nutrition and appropriate food choices  Outcome: Progressing  Goal: Maintains or returns to baseline bowel function  Description: INTERVENTIONS:  - Assess bowel function  - Encourage oral fluids to ensure adequate hydration  - Administer IV fluids if ordered to ensure adequate hydration  - Administer ordered medications as needed  - Encourage mobilization and activity  - Consider nutritional services referral to assist patient with adequate nutrition and appropriate food choices  Outcome: Progressing  Goal: Maintains adequate nutritional intake  Description: INTERVENTIONS:  - Monitor percentage of each meal consumed  - Identify factors contributing to decreased intake, treat as appropriate  - Assist with meals as needed  - Monitor I&O, weight, and lab values if indicated  - Obtain nutrition services referral as needed  Outcome: Progressing  Goal: Establish and maintain optimal ostomy function  Description: INTERVENTIONS:  - Assess bowel function  - Encourage oral fluids to ensure adequate hydration  - Administer IV fluids if ordered to ensure adequate hydration   - Administer ordered medications as needed  - Encourage mobilization and activity  - Nutrition services referral to assist patient with appropriate food choices  - Assess stoma site  - Consider wound care consult   Outcome: Progressing  Goal: Oral mucous membranes remain intact  Description: INTERVENTIONS  - Assess oral mucosa and hygiene practices  - Implement preventative oral hygiene regimen  - Implement oral medicated treatments as ordered  - Initiate Nutrition services referral as needed  Outcome:  Progressing     Problem: GENITOURINARY - ADULT  Goal: Maintains or returns to baseline urinary function  Description: INTERVENTIONS:  - Assess urinary function  - Encourage oral fluids to ensure adequate hydration if ordered  - Administer IV fluids as ordered to ensure adequate hydration  - Administer ordered medications as needed  - Offer frequent toileting  - Follow urinary retention protocol if ordered  Outcome: Progressing  Goal: Absence of urinary retention  Description: INTERVENTIONS:  - Assess patient’s ability to void and empty bladder  - Monitor I/O  - Bladder scan as needed  - Discuss with physician/AP medications to alleviate retention as needed  - Discuss catheterization for long term situations as appropriate  Outcome: Progressing  Goal: Urinary catheter remains patent  Description: INTERVENTIONS:  - Assess patency of urinary catheter  - If patient has a chronic ortiz, consider changing catheter if non-functioning  - Follow guidelines for intermittent irrigation of non-functioning urinary catheter  Outcome: Progressing     Problem: METABOLIC, FLUID AND ELECTROLYTES - ADULT  Goal: Electrolytes maintained within normal limits  Description: INTERVENTIONS:  - Monitor labs and assess patient for signs and symptoms of electrolyte imbalances  - Administer electrolyte replacement as ordered  - Monitor response to electrolyte replacements, including repeat lab results as appropriate  - Instruct patient on fluid and nutrition as appropriate  Outcome: Progressing  Goal: Fluid balance maintained  Description: INTERVENTIONS:  - Monitor labs   - Monitor I/O and WT  - Instruct patient on fluid and nutrition as appropriate  - Assess for signs & symptoms of volume excess or deficit  Outcome: Progressing  Goal: Glucose maintained within target range  Description: INTERVENTIONS:  - Monitor Blood Glucose as ordered  - Assess for signs and symptoms of hyperglycemia and hypoglycemia  - Administer ordered medications to  maintain glucose within target range  - Assess nutritional intake and initiate nutrition service referral as needed  Outcome: Progressing     Problem: SKIN/TISSUE INTEGRITY - ADULT  Goal: Skin Integrity remains intact(Skin Breakdown Prevention)  Description: Assess:  -Perform Madi assessment every day  -Clean and moisturize skin every day  -Inspect skin when repositioning, toileting, and assisting with ADLS  -Assess under medical devices such as jagdish every 3 hours  -Assess extremities for adequate circulation and sensation     Bed Management:  -Have minimal linens on bed & keep smooth, unwrinkled  -Change linens as needed when moist or perspiring  -Avoid sitting or lying in one position for more than 2 hours while in bed  -Keep HOB at 35degrees     Toileting:  -Offer bedside commode  -Assess for incontinence every 3 hours  -Use incontinent care products after each incontinent episode such as chucks    Activity:  -Mobilize patient 3 times a day  -Encourage activity and walks on unit  -Encourage or provide ROM exercises   -Turn and reposition patient every 2 Hours  -Use appropriate equipment to lift or move patient in bed  -Instruct/ Assist with weight shifting every 2 hours when out of bed in chair  -Consider limitation of chair time 2 hour intervals    Skin Care:  -Avoid use of baby powder, tape, friction and shearing, hot water or constrictive clothing  -Relieve pressure over bony prominences using pillows, wedge  -Do not massage red bony areas    Next Steps:  -Teach patient strategies to minimize risks such as wear no slip socks, use call bell for assistance to use comode   -Consider consults to  interdisciplinary teams such as pt, ot  Outcome: Progressing  Goal: Incision(s), wounds(s) or drain site(s) healing without S/S of infection  Description: INTERVENTIONS  - Assess and document dressing, incision, wound bed, drain sites and surrounding tissue  - Provide patient and family education  - Perform skin  care/dressing changes every day  Outcome: Progressing  Goal: Pressure injury heals and does not worsen  Description: Interventions:  - Implement low air loss mattress or specialty surface (Criteria met)  - Apply silicone foam dressing  - Instruct/assist with weight shifting every 60 minutes when in chair   - Limit chair time to 2 hour intervals  - Use special pressure reducing interventions such as cushion when in chair   - Apply fecal or urinary incontinence containment device   - Perform passive or active ROM every 3 times a day  - Turn and reposition patient & offload bony prominences every 2 hours   - Utilize friction reducing device or surface for transfers   - Consider consults to  interdisciplinary teams such as pt, ot  - Use incontinent care products after each incontinent episode such as chucks  - Consider nutrition services referral as needed  Outcome: Progressing     Problem: HEMATOLOGIC - ADULT  Goal: Maintains hematologic stability  Description: INTERVENTIONS  - Assess for signs and symptoms of bleeding or hemorrhage  - Monitor labs  - Administer supportive blood products/factors as ordered and appropriate  Outcome: Progressing     Problem: MUSCULOSKELETAL - ADULT  Goal: Maintain or return mobility to safest level of function  Description: INTERVENTIONS:  - Assess patient's ability to carry out ADLs; assess patient's baseline for ADL function and identify physical deficits which impact ability to perform ADLs (bathing, care of mouth/teeth, toileting, grooming, dressing, etc.)  - Assess/evaluate cause of self-care deficits   - Assess range of motion  - Assess patient's mobility  - Assess patient's need for assistive devices and provide as appropriate  - Encourage maximum independence but intervene and supervise when necessary  - Involve family in performance of ADLs  - Assess for home care needs following discharge   - Consider OT consult to assist with ADL evaluation and planning for discharge  -  Provide patient education as appropriate  Outcome: Progressing  Goal: Maintain proper alignment of affected body part  Description: INTERVENTIONS:  - Support, maintain and protect limb and body alignment  - Provide patient/ family with appropriate education  Outcome: Progressing     Problem: Nutrition/Hydration-ADULT  Goal: Nutrient/Hydration intake appropriate for improving, restoring or maintaining nutritional needs  Description: Monitor and assess patient's nutrition/hydration status for malnutrition. Collaborate with interdisciplinary team and initiate plan and interventions as ordered.  Monitor patient's weight and dietary intake as ordered or per policy. Utilize nutrition screening tool and intervene as necessary. Determine patient's food preferences and provide high-protein, high-caloric foods as appropriate.     INTERVENTIONS:  - Monitor oral intake, urinary output, labs, and treatment plans  - Assess nutrition and hydration status and recommend course of action  - Evaluate amount of meals eaten  - Assist patient with eating if necessary   - Allow adequate time for meals  - Recommend/ encourage appropriate diets, oral nutritional supplements, and vitamin/mineral supplements  - Order, calculate, and assess calorie counts as needed  - Recommend, monitor, and adjust tube feedings and TPN/PPN based on assessed needs  - Assess need for intravenous fluids  - Provide specific nutrition/hydration education as appropriate  - Include patient/family/caregiver in decisions related to nutrition  Outcome: Progressing     Problem: Prexisting or High Potential for Compromised Skin Integrity  Goal: Skin integrity is maintained or improved  Description: INTERVENTIONS:  - Identify patients at risk for skin breakdown  - Assess and monitor skin integrity  - Assess and monitor nutrition and hydration status  - Monitor labs   - Assess for incontinence   - Turn and reposition patient  - Assist with mobility/ambulation  - Relieve  pressure over bony prominences  - Avoid friction and shearing  - Provide appropriate hygiene as needed including keeping skin clean and dry  - Evaluate need for skin moisturizer/barrier cream  - Collaborate with interdisciplinary team   - Patient/family teaching  - Consider wound care consult   Outcome: Progressing

## 2024-10-25 NOTE — ASSESSMENT & PLAN NOTE
Rectosigmoid adenocarcinoma with metastatic disease  Follows with Dr. Razo  Recently noted to have left 11th rib metastasis with improvement in hepatic mets and mediastinal adenopathy  Follow-up as an outpatient

## 2024-10-25 NOTE — ASSESSMENT & PLAN NOTE
History of metastatic rectosigmoid adenocarcinoma GERD hyperlipidemia and DVT presented for right thigh numbness and weakness  Extensive workup this admission with CT, CTA, and MRI of brain and spine negative for pathology to explain unilateral weakness  Likely local nerve compression  Given IV methylprednisolone 1000 mg x 1 dose per neurology  Discussed with neurology, recommend following prednisone taper on discharge: 30 mg x 2 days, 20 mg x 2 days, 10 mg x 1 day  Started on Lyrica  EMG RLE ordered outpatient  Follow up with  neurology outpatient  Patient reports daily improvement

## 2024-10-25 NOTE — CASE MANAGEMENT
Case Management Discharge Planning Note    Patient name Itzel Sanches  Location [address] [address]/[address] [address]-* MRN [id number]  : 1955 Date 10/25/2024       Current Admission Date: 10/19/2024  Current Admission Diagnosis:Right leg numbness   Patient Active Problem List    Diagnosis Date Noted Date Diagnosed    Abnormal finding on MRI of brain 10/21/2024     Stroke-like symptom 10/19/2024     Right leg numbness 10/19/2024     History of DVT (deep vein thrombosis) 10/19/2024     Anemia associated with chemotherapy 2024     Hypokalemia 05/10/2024     Chemotherapy-induced neutropenia (HCC) 2024     Chemotherapy-induced neuropathy (HCC) 2024     Metastases to the liver (HCC) 10/12/2023     Postoperative state 2022     H/O splenectomy 2022     Asplenia 2022     Platelets decreased (HCC) 2022     Stage 3 chronic kidney disease, unspecified whether stage 3a or 3b CKD (HCC) 2022     Chemotherapy-induced nausea 2022     Hypertension 12/10/2021     Chemotherapy adverse reaction 12/10/2021     Lesion of ovary 2021     Omental metastasis 2021     PONV (postoperative nausea and vomiting) 10/06/2021     Sigmoid diverticulosis 2020     Dyslipidemia 10/30/2019     Dyspnea on exertion 10/21/2019     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HCC) 2019     Morbid obesity (HCC) 05/15/2019     Callus of foot 2017     Foot pain 2017     GERD (gastroesophageal reflux disease) 2016     Mixed hyperlipidemia 2015     Prediabetes 2015     Varicose veins with pain 2015       LOS (days): 5  Geometric Mean LOS (GMLOS) (days): 3  Days to GMLOS:-2     OBJECTIVE:  Risk of Unplanned Readmission Score: 14.2       Current admission status: Inpatient   Preferred Pharmacy:   [facility] - [place], [place] - [address] [place], [place] [id number]  Phone: [phone] Fax: [phone]    Primary Care Provider: Lashon Quintana  DEEPAK    Primary Insurance: MEDICARE  Secondary Insurance: [email or web address]    DISCHARGE DETAILS:    Discharge planning discussed with:: Patient  Freedom of Choice: Yes    Comments - Freedom of Choice: Plan is for STR at [facility] per patient's preference.      CM contacted family/caregiver?: No- see comments (Patient updates family independently)  Were Treatment Team discharge recommendations reviewed with patient/caregiver?: Yes  Did patient/caregiver verbalize understanding of patient care needs?: N/A- going to facility  Were patient/caregiver advised of the risks associated with not following Treatment Team discharge recommendations?: Yes    Requested Home Health Care         Is the patient interested in HHC at discharge?: No    DME Referral Provided  Referral made for DME?: No    Other Referral/Resources/Interventions Provided:  Interventions: Short Term Rehab, Transportation    Would you like to participate in our [facility] service program?  : No - Declined    Treatment Team Recommendation: Short Term Rehab  Discharge Destination Plan:: Short Term Rehab  Transport at Discharge : Wheelchair van     Number/Name of Dispatcher: [facility] via [employer]  Transported by (Company and Unit #): [employer]  ETA of Transport (Date): 10/25/24  ETA of Transport (Time): 1500         IMM Given (Date):: 10/25/24  IMM Given to:: Patient (IMM reviewed with and signed by patient.  Copy given to patient and copy placed in scan bin for chart.)

## 2024-10-25 NOTE — NURSING NOTE
Patient discharged via wheelchair to facility.  Report given to receiving nurse.  All questions answered.  All belongings sent to facility with patient.

## 2024-10-25 NOTE — TELEPHONE ENCOUNTER
----- Message from Eden France PA-C sent at 10/25/2024 11:18 AM EDT -----  Regarding: Hospital Discharge  Thank you for allowing us to participate in the care of your patient, Itzel Sanches, who was hospitalized from 10/19/2024 through 10/25/2024 with the admitting diagnosis of RLE weakness/numbness.      CT head, CTA head/neck, MRI brain and spine unrevealing for cause of symptoms. Given IV methyl prednisone and started on steroid taper with slow improvement daily. Seen by neurology, ordered for outpatient EMG and has outpatient follow up with neurology. Being discharged to [facility].    Medication Changes:  Prednisone taper  Lyrica x 10 days    Outpatient testing recommended:  EMG ordered  Needs repeat MRI brain in 2 months    If you have any additional questions or would like to discuss further, please feel free to contact me.    Eden France PA-C  [facility] Internal Medicine, Hospitalist  [phone]

## 2024-10-25 NOTE — ASSESSMENT & PLAN NOTE
MRI brain: Focus of FLAIR abnormality in the right posterior frontal deep white matter corresponds to the area of low density on CT. This likely represents either gliosis or mild edema surrounding a venous angioma.   Follow-up gadolinium-enhanced MRI recommended in 2 months to exclude more aggressive pathologies

## 2024-10-25 NOTE — INCIDENTAL FINDINGS
The following findings require follow up:  Radiographic finding   Finding: Small focal areas of enhancement with mild intramuscular edema in left lateral paraspinal lower thoracic musculature adjacent to expansile left posterior 11th osseous rib metastasis. Question soft tissue extension of left posterior 11th rib metastasis, reactive change, or denervation changes.   Follow up required: follow up with oncologist   Follow up should be done within 3 month(s)    Please notify the following clinician to assist with the follow up:   Dr. Torin Arthur    Incidental finding results were discussed with the Patient by Eden France PA-C on 10/25/24.   They expressed understanding and all questions answered.

## 2024-10-25 NOTE — DISCHARGE SUMMARY
Discharge Summary - Hospitalist   Name: Itzel Sanches 69 y.o. female I MRN: [id number]  Unit/Bed#: [address] [place] [address] Date of Admission: 10/19/2024   Date of Service: 10/25/2024 I Hospital Day: 5     Assessment & Plan  Right leg numbness  History of metastatic rectosigmoid adenocarcinoma GERD hyperlipidemia and DVT presented for right thigh numbness and weakness  Extensive workup this admission with CT, CTA, and MRI of brain and spine negative for pathology to explain unilateral weakness  Likely local nerve compression  Given IV methylprednisolone 1000 mg x 1 dose per neurology  Discussed with neurology, recommend following prednisone taper on discharge: 30 mg x 2 days, 20 mg x 2 days, 10 mg x 1 day  Started on Lyrica  EMG RLE ordered outpatient  Follow up with  neurology outpatient  Patient reports daily improvement  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HCC)  Rectosigmoid adenocarcinoma with metastatic disease  Follows with Dr. Razo  Recently noted to have left 11th rib metastasis with improvement in hepatic mets and mediastinal adenopathy  Follow-up as an outpatient  Hypertension  Started on 5 mg amlodipine with improvement  GERD (gastroesophageal reflux disease)  Continue pepcid  Mixed hyperlipidemia  Continue ezetimibe  Chemotherapy-induced neuropathy (HCC)  Chronic  Started on Lyrica  History of DVT (deep vein thrombosis)  Continue apixaban  Prediabetes  Sugars have been stable  Continue to monitor at rehab while on steroids  Abnormal finding on MRI of brain  MRI brain: Focus of FLAIR abnormality in the right posterior frontal deep white matter corresponds to the area of low density on CT. This likely represents either gliosis or mild edema surrounding a venous angioma.   Follow-up gadolinium-enhanced MRI recommended in 2 months to exclude more aggressive pathologies     Medical Problems       Resolved Problems  Date Reviewed: 10/25/2024   None       Discharging Physician / Practitioner: Eden France PA-C  PCP: Lashon  DEEPAK Quintana  Admission Date:   Admission Orders (From admission, onward)       Ordered        10/20/24 1120  INPATIENT ADMISSION  Once            10/19/24 2055  Place in Observation  Once                          Discharge Date: 10/25/24    Consultations During Hospital Stay:  Neurology    Procedures Performed:   none    Significant Findings / Test Results:   CT head: Area of white matter hypoattenuation in the right frontal lobe could represent a subacute to chronic infarct. No intracranial hemorrhage or mass effect.   CTA head/neck: Unremarkable CTA neck and brain. No hemodynamically significant stenosis, dissection or occlusion of the carotid or vertebral arteries or major vessels of the [place]. Hypoplastic vertebrobasilar system.  CT a/p: 1. Mild disc degenerative change in the lumbar spine. No significant central canal stenosis or neural foraminal narrowing. 2. No evidence of diverticulitis, colitis or bowel obstruction.   MRI brain: No evidence of metastatic disease. Focus of FLAIR abnormality in the right posterior frontal deep white matter corresponds to the area of low density on CT. This likely represents either gliosis or mild edema surrounding a venous angioma. Follow-up gadolinium-enhanced MRI recommended in 2 months to exclude more aggressive pathologies.  MRI lumbar spine: Mild-to-moderate motion degraded. Normal MRI lumbosacral plexus on this motion degraded exam. Degenerative changes of lumbar spine are suboptimally evaluated given large field of view of lumbar spine. Consider dedicated MRI lumbar spine with and without contrast contrast for further evaluation. Additional chronic/incidental findings as detailed above.   XR R hip/pelv: No acute osseous abnormality.   MRI cervical spine: No suspicious marrow replacing lesions noted involving the cervical spine. No epidural or paraspinal tumor noted. Multilevel cervical spondylosis, as described above, contributing to at most moderate canal  stenosis at C5-C6, and multilevel neural foraminal stenosis, worst on the right at C4-C6. 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   XR R knee: No acute osseous abnormality.   MRI thoracic spine: No metastasis in thoracic spine. Partially imaged known osseous metastasis of expansile left posterior 11th rib. Known left 11th rib fracture best seen on CT abdomen pelvis 10/19/2024. Partially imaged small left pleural effusion. Mild multilevel degenerative changes of thoracic spine with minor canal stenosis at T10-T11 and T11-T12, as detailed above. No significant foraminal narrowing. Small focal areas of enhancement with mild intramuscular edema in left lateral paraspinal lower thoracic musculature adjacent to expansile left posterior 11th osseous rib metastasis. Question soft tissue extension of left posterior 11th rib metastasis, reactive change, or denervation changes.  MRI lumbar spine: Partially imaged small focal areas of enhancement in left lateral paraspinal lower thoracic musculature adjacent to expansile left posterior 11th osseous rib metastasis. Question soft tissue extension of left posterior 11th rib metastasis, reactive change, or denervation changes. This is better visualized on the MRI lumbar spine compared to MRI thoracic spine given postcontrast fat-saturation. No metastasis in lumbar spine. Multilevel degenerative changes of lumbar spine with varying degrees of canal stenosis (multilevel mild, worse at L3-L4 and L4-L5) and foraminal narrowing (mild right L2-L3, bilateral L3-L4, bilateral L4-L5), as detailed above.     Incidental Findings:   Focus of FLAIR abnormality in the right posterior frontal deep white matter corresponds to the area of low density on CT. This likely represents either gliosis or mild edema surrounding a venous angioma. Follow-up gadolinium-enhanced MRI recommended in 2 months  to exclude more aggressive pathologies.  Small focal areas of enhancement with mild intramuscular edema in left lateral paraspinal lower thoracic musculature adjacent to expansile left posterior 11th osseous rib metastasis. Question soft tissue extension of left posterior 11th rib metastasis, reactive change, or denervation changes.  I reviewed the above mentioned incidental findings with the patient and/or family and they expressed understanding.    Test Results Pending at Discharge (will require follow up):   none     Outpatient Tests Requested:  EMG ordered by neurology    Complications:  none    Reason for Admission: RLE weakness/numbness    Hospital Course:   Itzel Sanches is a 69 y.o. female patient who originally presented to the hospital on 10/19/2024 due to right lower extremity numbness and weakness.  She was initially admitted on stroke pathway and seen by neurology. CT head, CTA head/neck, and MRI brain were negative for acute pathology.  MRI brain, cervical, thoracic, and lumbar spine did not have any findings that would explain her unilateral weakness.  She was given one-time dose of IV methylprednisone and transition to prednisone taper.  Patient reports daily improvement of her symptoms.  EMG is ordered outpatient and she has follow-up with neurology.  Patient will be discharged to short-term rehab. She is to notify neurology office if her symptoms worsen or spread to other extremities.    Please see above list of diagnoses and related plan for additional information.     Condition at Discharge: fair    Discharge Day Visit / Exam:   Subjective:  Patient seen and examined at bedside this morning. She reports slight improvement in her lower extremity weakness from yesterday. She states she was able to move her leg out of the bed and make it to commode with walker and only one aide which is improved from prior per patient. She is happy to be leaving the hospital today and understands plan for EMG  "outpatient and neurology follow up. Reiterated to contact neurology if symptoms were to worsen or involve any other extremities.  Denies any new symptoms.  Vitals: Blood Pressure: 157/86 (10/25/24 0834)  Pulse: 94 (10/25/24 0834)  Temperature: (!) 97.2 °F (36.2 °C) (10/25/24 0834)  Temp Source: Oral (10/23/24 1527)  Respirations: 16 (10/25/24 0834)  Height: 4' 9\" (144.8 cm) (10/21/24 0745)  Weight - Scale: 87.5 kg (193 lb) (10/21/24 0745)  SpO2: 98 % (10/25/24 0834)  Physical Exam  Vitals and nursing note reviewed.   Constitutional:       General: She is not in acute distress.     Appearance: She is well-developed.   HENT:      Head: Normocephalic.   Cardiovascular:      Rate and Rhythm: Normal rate and regular rhythm.   Pulmonary:      Effort: Pulmonary effort is normal. No respiratory distress.      Breath sounds: Normal breath sounds.   Abdominal:      Tenderness: There is no abdominal tenderness.   Musculoskeletal:         General: No swelling.   Skin:     General: Skin is warm and dry.      Capillary Refill: Capillary refill takes less than 2 seconds.   Neurological:      Mental Status: She is alert.      Motor: Weakness (RLE, improved from prior) present.   Psychiatric:         Mood and Affect: Mood normal.        Discussion with Family: Patient declined call to .     Discharge instructions/Information to patient and family:   See after visit summary for information provided to patient and family.      Provisions for Follow-Up Care:  See after visit summary for information related to follow-up care and any pertinent home health orders.      Mobility at time of Discharge:   Basic Mobility Inpatient Raw Score: 12  JH-HLM Goal: 4: Move to chair/commode  JH-HLM Achieved: 6: Walk 10 steps or more  HLM Goal achieved. Continue to encourage appropriate mobility.     Disposition:   Other Skilled Nursing Facility at [facility]    Planned Readmission: none    Discharge " Medications:  See after visit summary for reconciled discharge medications provided to patient and/or family.      Administrative Statements   Discharge Statement:  I have spent a total time of 45 minutes in caring for this patient on the day of the visit/encounter. >30 minutes of time was spent on: Diagnostic results, Risks and benefits of tx options, Instructions for management, Patient and family education, Importance of tx compliance, Impressions, Counseling / Coordination of care, Documenting in the medical record, Reviewing / ordering tests, medicine, procedures  , and Communicating with other healthcare professionals .    **Please Note: This note may have been constructed using a voice recognition system**

## 2024-10-25 NOTE — PLAN OF CARE
Problem: PAIN - ADULT  Goal: Verbalizes/displays adequate comfort level or baseline comfort level  Description: Interventions:  - Encourage patient to monitor pain and request assistance  - Assess pain using appropriate pain scale  - Administer analgesics based on type and severity of pain and evaluate response  - Implement non-pharmacological measures as appropriate and evaluate response  - Consider cultural and social influences on pain and pain management  - Notify physician/advanced practitioner if interventions unsuccessful or patient reports new pain  10/25/2024 1130 by Alejandra Graves RN  Outcome: Adequate for Discharge  10/25/2024 1129 by Alejandra Graves RN  Outcome: Not Progressing  10/25/2024 1125 by Alejandra Graves RN  Outcome: Progressing     Problem: INFECTION - ADULT  Goal: Absence or prevention of progression during hospitalization  Description: INTERVENTIONS:  - Assess and monitor for signs and symptoms of infection  - Monitor lab/diagnostic results  - Monitor all insertion sites, i.e. indwelling lines, tubes, and drains  - Monitor endotracheal if appropriate and nasal secretions for changes in amount and color  - [place] appropriate cooling/warming therapies per order  - Administer medications as ordered  - Instruct and encourage patient and family to use good hand hygiene technique  - Identify and instruct in appropriate isolation precautions for identified infection/condition  10/25/2024 1130 by Alejandra Graves RN  Outcome: Adequate for Discharge  10/25/2024 1129 by Alejandra Graves RN  Outcome: Not Progressing  10/25/2024 1125 by Alejandra Graves RN  Outcome: Progressing  Goal: Absence of fever/infection during neutropenic period  Description: INTERVENTIONS:  - Monitor WBC    10/25/2024 1130 by Alejandra Graves RN  Outcome: Adequate for Discharge  10/25/2024 1129 by Alejandra Graves RN  Outcome: Not Progressing  10/25/2024 1125 by Alejandra Graves RN  Outcome:  Progressing     Problem: SAFETY ADULT  Goal: Patient will remain free of falls  Description: INTERVENTIONS:  - Educate patient/family on patient safety including physical limitations  - Instruct patient to call for assistance with activity   - Consult OT/PT to assist with strengthening/mobility   - Keep Call bell within reach  - Keep bed low and locked with side rails adjusted as appropriate  - Keep care items and personal belongings within reach  - Initiate and maintain comfort rounds  - Make Fall Risk Sign visible to staff  Problem: SAFETY ADULT  Goal: Patient will remain free of falls  Description: INTERVENTIONS:  - Educate patient/family on patient safety including physical limitations  - Instruct patient to call for assistance with activity   - Consult OT/PT to assist with strengthening/mobility   - Keep Call bell within reach  - Keep bed low and locked with side rails adjusted as appropriate  - Keep care items and personal belongings within reach  - Initiate and maintain comfort rounds  - Make Fall Risk Sign visible to staff  - Apply yellow socks and bracelet for high fall risk patients  - Consider moving patient to room near nurses station  10/25/2024 1130 by Alejandra Graves RN  Outcome: Adequate for Discharge  10/25/2024 1129 by Alejandra Graves RN  Outcome: Not Progressing  10/25/2024 1125 by Alejandra Graves RN  Outcome: Progressing  Goal: Maintain or return to baseline ADL function  Description: INTERVENTIONS:  -  Assess patient's ability to carry out ADLs; assess patient's baseline for ADL function and identify physical deficits which impact ability to perform ADLs (bathing, care of mouth/teeth, toileting, grooming, dressing, etc.)  - Assess/evaluate cause of self-care deficits   - Assess range of motion  - Assess patient's mobility; develop plan if impaired  - Assess patient's need for assistive devices and provide as appropriate  - Encourage maximum independence but intervene and supervise when  necessary  - Involve family in performance of ADLs  - Assess for home care needs following discharge   - Consider OT consult to assist with ADL evaluation and planning for discharge  - Provide patient education as appropriate  10/25/2024 1130 by Alejandra Graves RN  Outcome: Adequate for Discharge  10/25/2024 1129 by Alejandra Graves RN  Outcome: Not Progressing  10/25/2024 1125 by Alejandra Graves RN  Outcome: Progressing  Goal: Maintains/Returns to pre admission functional level  Description: INTERVENTIONS:  - Perform AM-PAC 6 Click Basic Mobility/ Daily Activity assessment daily.  - Set and communicate daily mobility goal to care team and patient/family/caregiver.   Problem: DISCHARGE PLANNING  Goal: Discharge to home or other facility with appropriate resources  Description: INTERVENTIONS:  - Identify barriers to discharge w/patient and caregiver  - Arrange for needed discharge resources and transportation as appropriate  - Identify discharge learning needs (meds, wound care, etc.)  - Arrange for interpretive services to assist at discharge as needed  - Refer to Case Management Department for coordinating discharge planning if the patient needs post-hospital services based on physician/advanced practitioner order or complex needs related to functional status, cognitive ability, or social support system  10/25/2024 1130 by Alejandra Graves RN  Outcome: Adequate for Discharge  10/25/2024 1129 by Aeljandra Graves RN  Outcome: Not Progressing  10/25/2024 1125 by Alejandra Graves RN  Outcome: Progressing     Problem: Knowledge Deficit  Goal: Patient/family/caregiver demonstrates understanding of disease process, treatment plan, medications, and discharge instructions  Description: Complete learning assessment and assess knowledge base.  Interventions:  - Provide teaching at level of understanding  - Provide teaching via preferred learning methods  10/25/2024 1130 by Alejandra Graves RN  Outcome:  Adequate for Discharge  10/25/2024 1129 by Alejandra Graves RN  Outcome: Not Progressing  10/25/2024 1125 by Alejandra Graves RN  Outcome: Progressing     Problem: Neurological Deficit  Goal: Neurological status is stable or improving  Description: Interventions:  - Monitor and assess patient's level of consciousness, motor function, sensory function, and level of assistance needed for ADLs.   - Monitor and report changes from baseline. Collaborate with interdisciplinary team to initiate plan and implement interventions as ordered.   - Provide and maintain a safe environment.  - Consider seizure precautions.  - Consider fall precautions.  - Consider aspiration precautions.  - Consider bleeding precautions.  10/25/2024 1130 by Alejandra Graves RN  Outcome: Adequate for Discharge  10/25/2024 1129 by Alejandra Graves RN  Outcome: Not Progressing  10/25/2024 1125 by Alejandra Graves RN  Outcome: Progressing     Problem: Activity Intolerance/Impaired Mobility  Goal: Mobility/activity is maintained at optimum level for patient  Description: Interventions:  - Assess and monitor patient  barriers to mobility and need for assistive/adaptive devices.  - Assess patient's emotional response to limitations.  - Collaborate with interdisciplinary team and initiate plans and interventions as ordered.  - Encourage independent activity per ability.  - Maintain proper body alignment.  - Perform active/passive rom as tolerated/ordered.  - Plan activities to conserve energy.  - Turn patient as appropriate  10/25/2024 1130 by Alejandra Graves RN  Outcome: Adequate for Discharge  10/25/2024 1129 by Alejandra Graves RN  Outcome: Not Progressing  10/25/2024 1125 by Alejandra Graves RN  Outcome: Progressing     Problem: Communication Impairment  Goal: Ability to express needs and understand communication  Description: Assess patient's communication skills and ability to understand information.  Patient will demonstrate use of  effective communication techniques, alternative methods of communication and understanding even if not able to speak.     - Encourage communication and provide alternate methods of communication as needed.  - Collaborate with case management/ for discharge needs.  - Include patient/family/caregiver in decisions related to communication.  10/25/2024 1130 by Alejandra Graves RN  Outcome: Adequate for Discharge  10/25/2024 1129 by Alejandra Graves RN  Outcome: Not Progressing  10/25/2024 1125 by Alejandra Graves RN  Outcome: Progressing     Problem: Potential for Aspiration  Goal: Non-ventilated patient's risk of aspiration is minimized  Description: Assess and monitor vital signs, respiratory status, and labs (WBC).  Monitor for signs of aspiration (tachypnea, cough, rales, wheezing, cyanosis, fever).    - Assess and monitor patient's ability to swallow.  - Place patient up in chair to eat if possible.  - HOB up at 90 degrees to eat if unable to get patient up into chair.  - Supervise patient during oral intake.   - Instruct patient/ family to take small bites.  - Instruct patient/ family to take small single sips when taking liquids.  - Follow patient-specific strategies generated by speech pathologist.  10/25/2024 1130 by Alejandra Graves RN  Outcome: Adequate for Discharge  10/25/2024 1129 by Alejandra Graves RN  Outcome: Not Progressing  10/25/2024 1125 by Alejandra Graves RN  Outcome: Progressing  Goal: Ventilated patient's risk of aspiration is minimized  Description: Assess and monitor vital signs, respiratory status, airway cuff pressure, and labs (WBC).  Monitor for signs of aspiration (tachypnea, cough, rales, wheezing, cyanosis, fever).    - Elevate head of bed 30 degrees if patient has tube feeding.  - Monitor tube feeding.  10/25/2024 1130 by Alejandra Graves RN  Outcome: Adequate for Discharge  10/25/2024 1129 by Alejandra Graves RN  Outcome: Not Progressing  10/25/2024  1125 by Alejandra Graves RN  Outcome: Progressing     Problem: Nutrition  Goal: Nutrition/Hydration status is improving  Description: Monitor and assess patient's nutrition/hydration status for malnutrition (ex- brittle hair, bruises, dry skin, pale skin and conjunctiva, muscle wasting, smooth red tongue, and disorientation). Collaborate with interdisciplinary team and initiate plan and interventions as ordered.  Monitor patient's weight and dietary intake as ordered or per policy. Utilize nutrition screening tool and intervene per policy. Determine patient's food preferences and provide high-protein, high-caloric foods as appropriate.     - Assist patient with eating.  - Allow adequate time for meals.  - Encourage patient to take dietary supplement as ordered.  - Collaborate with clinical nutritionist.  - Include patient/family/caregiver in decisions related to nutrition.  10/25/2024 1130 by Alejandra Graves RN  Outcome: Adequate for Discharge  10/25/2024 1129 by Alejandra Graves RN  Outcome: Not Progressing  10/25/2024 1125 by Alejandra Graves RN  Outcome: Progressing     Problem: NEUROSENSORY - ADULT  Goal: Achieves stable or improved neurological status  Description: INTERVENTIONS  - Monitor and report changes in neurological status  - Monitor vital signs such as temperature, blood pressure, glucose, and any other labs ordered   - Initiate measures to prevent increased intracranial pressure  - Monitor for seizure activity and implement precautions if appropriate      10/25/2024 1130 by Alejandra Graves RN  Outcome: Adequate for Discharge  10/25/2024 1129 by Alejandra Graves RN  Outcome: Not Progressing  10/25/2024 1125 by Alejandra Graves RN  Outcome: Progressing  Goal: Remains free of injury related to seizures activity  Description: INTERVENTIONS  - Maintain airway, patient safety  and administer oxygen as ordered  - Monitor patient for seizure activity, document and report duration and  description of seizure to physician/advanced practitioner  - If seizure occurs,  ensure patient safety during seizure  - Reorient patient post seizure  - Seizure pads on all 4 side rails  - Instruct patient/family to notify RN of any seizure activity including if an aura is experienced  - Instruct patient/family to call for assistance with activity based on nursing assessment  - Administer anti-seizure medications if ordered    10/25/2024 1130 by Alejandra Graves RN  Outcome: Adequate for Discharge  10/25/2024 1129 by Alejandra Graves RN  Outcome: Not Progressing  10/25/2024 1125 by Alejandra Graves RN  Outcome: Progressing  Goal: Achieves maximal functionality and self care  Description: INTERVENTIONS  - Monitor swallowing and airway patency with patient fatigue and changes in neurological status  - Encourage and assist patient to increase activity and self care.   - Encourage visually impaired, hearing impaired and aphasic patients to use assistive/communication devices  10/25/2024 1130 by Alejandra Graves RN  Outcome: Adequate for Discharge  10/25/2024 1129 by Alejandra Graves RN  Outcome: Not Progressing  10/25/2024 1125 by Alejandra Graves RN  Outcome: Progressing     Problem: CARDIOVASCULAR - ADULT  Goal: Maintains optimal cardiac output and hemodynamic stability  Description: INTERVENTIONS:  - Monitor I/O, vital signs and rhythm  - Monitor for S/S and trends of decreased cardiac output  - Administer and titrate ordered vasoactive medications to optimize hemodynamic stability  - Assess quality of pulses, skin color and temperature  - Assess for signs of decreased coronary artery perfusion  - Instruct patient to report change in severity of symptoms  10/25/2024 1130 by Alejandra Graves RN  Outcome: Adequate for Discharge  10/25/2024 1129 by Alejandra Graves RN  Outcome: Not Progressing  10/25/2024 1125 by Alejandra Graves RN  Outcome: Progressing  Goal: Absence of cardiac dysrhythmias or at  baseline rhythm  Description: INTERVENTIONS:  - Continuous cardiac monitoring, vital signs, obtain 12 lead EKG if ordered  - Administer antiarrhythmic and heart rate control medications as ordered  - Monitor electrolytes and administer replacement therapy as ordered  10/25/2024 1130 by Alejandra Graves RN  Outcome: Adequate for Discharge  10/25/2024 1129 by Alejandra Graves RN  Outcome: Not Progressing  10/25/2024 1125 by Alejandra Graves RN  Outcome: Progressing     Problem: RESPIRATORY - ADULT  Goal: Achieves optimal ventilation and oxygenation  Description: INTERVENTIONS:  - Assess for changes in respiratory status  - Assess for changes in mentation and behavior  - Position to facilitate oxygenation and minimize respiratory effort  - Oxygen administered by appropriate delivery if ordered  - Initiate smoking cessation education as indicated  - Encourage broncho-pulmonary hygiene including cough, deep breathe, Incentive Spirometry  - Assess the need for suctioning and aspirate as needed  - Assess and instruct to report SOB or any respiratory difficulty  - Respiratory Therapy support as indicated  10/25/2024 1130 by Alejandra Graves RN  Outcome: Adequate for Discharge  10/25/2024 1129 by Alejandra Graves RN  Outcome: Not Progressing  10/25/2024 1125 by Alejandra Graves RN  Outcome: Progressing     Problem: GASTROINTESTINAL - ADULT  Goal: Minimal or absence of nausea and/or vomiting  Description: INTERVENTIONS:  - Administer IV fluids if ordered to ensure adequate hydration  - Maintain NPO status until nausea and vomiting are resolved  - Nasogastric tube if ordered  - Administer ordered antiemetic medications as needed  - Provide nonpharmacologic comfort measures as appropriate  - Advance diet as tolerated, if ordered  - Consider nutrition services referral to assist patient with adequate nutrition and appropriate food choices  10/25/2024 1130 by Alejandra Graves RN  Outcome: Adequate for  Discharge  10/25/2024 1129 by Alejandra Graves RN  Outcome: Not Progressing  10/25/2024 1125 by Alejandra Graves RN  Outcome: Progressing  Goal: Maintains or returns to baseline bowel function  Description: INTERVENTIONS:  - Assess bowel function  - Encourage oral fluids to ensure adequate hydration  - Administer IV fluids if ordered to ensure adequate hydration  - Administer ordered medications as needed  - Encourage mobilization and activity  - Consider nutritional services referral to assist patient with adequate nutrition and appropriate food choices  10/25/2024 1130 by Alejandra Graves RN  Outcome: Adequate for Discharge  10/25/2024 1129 by Alejandra Graves RN  Outcome: Not Progressing  10/25/2024 1125 by Alejandra Graves RN  Outcome: Progressing  Goal: Maintains adequate nutritional intake  Description: INTERVENTIONS:  - Monitor percentage of each meal consumed  - Identify factors contributing to decreased intake, treat as appropriate  - Assist with meals as needed  - Monitor I&O, weight, and lab values if indicated  - Obtain nutrition services referral as needed  10/25/2024 1130 by Alejandra Graves RN  Outcome: Adequate for Discharge  10/25/2024 1129 by Alejandra Graves RN  Outcome: Not Progressing  10/25/2024 1125 by Alejandra Graves RN  Outcome: Progressing  Goal: Establish and maintain optimal ostomy function  Description: INTERVENTIONS:  - Assess bowel function  - Encourage oral fluids to ensure adequate hydration  - Administer IV fluids if ordered to ensure adequate hydration   - Administer ordered medications as needed  - Encourage mobilization and activity  - Nutrition services referral to assist patient with appropriate food choices  - Assess stoma site  - Consider wound care consult   10/25/2024 1130 by Alejandra Graves RN  Outcome: Adequate for Discharge  10/25/2024 1129 by Alejandra Graves RN  Outcome: Not Progressing  10/25/2024 1125 by Alejandra Graves RN  Outcome:  Progressing  Goal: Oral mucous membranes remain intact  Description: INTERVENTIONS  - Assess oral mucosa and hygiene practices  - Implement preventative oral hygiene regimen  - Implement oral medicated treatments as ordered  - Initiate Nutrition services referral as needed  10/25/2024 1130 by Alejandra Graves RN  Outcome: Adequate for Discharge  10/25/2024 1129 by Alejandra Graves RN  Outcome: Not Progressing  10/25/2024 1125 by Alejandra Graves RN  Outcome: Progressing     Problem: GENITOURINARY - ADULT  Goal: Maintains or returns to baseline urinary function  Description: INTERVENTIONS:  - Assess urinary function  - Encourage oral fluids to ensure adequate hydration if ordered  - Administer IV fluids as ordered to ensure adequate hydration  - Administer ordered medications as needed  - Offer frequent toileting  - Follow urinary retention protocol if ordered  10/25/2024 1130 by Alejandra Graves RN  Outcome: Adequate for Discharge  10/25/2024 1129 by Alejandra Graves RN  Outcome: Not Progressing  10/25/2024 1125 by Alejandra Graves RN  Outcome: Progressing  Goal: Absence of urinary retention  Description: INTERVENTIONS:  - Assess patient’s ability to void and empty bladder  - Monitor I/O  - Bladder scan as needed  - Discuss with physician/AP medications to alleviate retention as needed  - Discuss catheterization for long term situations as appropriate  10/25/2024 1130 by Alejandra Graves RN  Outcome: Adequate for Discharge  10/25/2024 1129 by Alejandra Graves RN  Outcome: Not Progressing  10/25/2024 1125 by Alejandra Graves RN  Outcome: Progressing  Goal: Urinary catheter remains patent  Description: INTERVENTIONS:  - Assess patency of urinary catheter  - If patient has a chronic ortiz, consider changing catheter if non-functioning  - Follow guidelines for intermittent irrigation of non-functioning urinary catheter  10/25/2024 1130 by Alejandra Graves RN  Outcome: Adequate for  Discharge  10/25/2024 1129 by Alejandra Graves RN  Outcome: Not Progressing  10/25/2024 1125 by Alejandra Graves RN  Outcome: Progressing     Problem: METABOLIC, FLUID AND ELECTROLYTES - ADULT  Goal: Electrolytes maintained within normal limits  Description: INTERVENTIONS:  - Monitor labs and assess patient for signs and symptoms of electrolyte imbalances  - Administer electrolyte replacement as ordered  - Monitor response to electrolyte replacements, including repeat lab results as appropriate  - Instruct patient on fluid and nutrition as appropriate  10/25/2024 1130 by Alejandra Graves RN  Outcome: Adequate for Discharge  10/25/2024 1129 by Alejandra rGaves RN  Outcome: Not Progressing  10/25/2024 1125 by Alejandra Graves RN  Outcome: Progressing  Goal: Fluid balance maintained  Description: INTERVENTIONS:  - Monitor labs   - Monitor I/O and WT  - Instruct patient on fluid and nutrition as appropriate  - Assess for signs & symptoms of volume excess or deficit  10/25/2024 1130 by Alejandra Graves RN  Outcome: Adequate for Discharge  10/25/2024 1129 by Alejandra Graves RN  Outcome: Not Progressing  10/25/2024 1125 by Alejandra Graves RN  Outcome: Progressing  Goal: Glucose maintained within target range  Description: INTERVENTIONS:  - Monitor Blood Glucose as ordered  - Assess for signs and symptoms of hyperglycemia and hypoglycemia  - Administer ordered medications to maintain glucose within target range  - Assess nutritional intake and initiate nutrition service referral as needed  10/25/2024 1130 by Alejandra Graves RN  Outcome: Adequate for Discharge  10/25/2024 1129 by Alejandra Graves RN  Outcome: Not Progressing  10/25/2024 1125 by Alejandra Graves RN  Outcome: Progressing    Problem: MUSCULOSKELETAL - ADULT  Goal: Maintain or return mobility to safest level of function  Description: INTERVENTIONS:  - Assess patient's ability to carry out ADLs; assess patient's baseline for ADL function  and identify physical deficits which impact ability to perform ADLs (bathing, care of mouth/teeth, toileting, grooming, dressing, etc.)  - Assess/evaluate cause of self-care deficits   - Assess range of motion  - Assess patient's mobility  - Assess patient's need for assistive devices and provide as appropriate  - Encourage maximum independence but intervene and supervise when necessary  - Involve family in performance of ADLs  - Assess for home care needs following discharge   - Consider OT consult to assist with ADL evaluation and planning for discharge  - Provide patient education as appropriate  10/25/2024 1130 by Alejandra Graves RN  Outcome: Adequate for Discharge  10/25/2024 1129 by Alejandra Graves RN  Outcome: Not Progressing  10/25/2024 1125 by Alejandra Graves RN  Outcome: Progressing  Goal: Maintain proper alignment of affected body part  Description: INTERVENTIONS:  - Support, maintain and protect limb and body alignment  - Provide patient/ family with appropriate education  10/25/2024 1130 by Alejandra Graves RN  Outcome: Adequate for Discharge  10/25/2024 1129 by Alejandra Graves RN  Outcome: Not Progressing  10/25/2024 1125 by Alejandra Graves RN  Outcome: Progressing     Problem: Nutrition/Hydration-ADULT  Goal: Nutrient/Hydration intake appropriate for improving, restoring or maintaining nutritional needs  Description: Monitor and assess patient's nutrition/hydration status for malnutrition. Collaborate with interdisciplinary team and initiate plan and interventions as ordered.  Monitor patient's weight and dietary intake as ordered or per policy. Utilize nutrition screening tool and intervene as necessary. Determine patient's food preferences and provide high-protein, high-caloric foods as appropriate.     INTERVENTIONS:  - Monitor oral intake, urinary output, labs, and treatment plans  - Assess nutrition and hydration status and recommend course of action  - Evaluate amount of meals  eaten  - Assist patient with eating if necessary   - Allow adequate time for meals  - Recommend/ encourage appropriate diets, oral nutritional supplements, and vitamin/mineral supplements  - Order, calculate, and assess calorie counts as needed  - Recommend, monitor, and adjust tube feedings and TPN/PPN based on assessed needs  - Assess need for intravenous fluids  - Provide specific nutrition/hydration education as appropriate  - Include patient/family/caregiver in decisions related to nutrition  10/25/2024 1130 by Alejandra Graves RN  Outcome: Adequate for Discharge  10/25/2024 1129 by Alejandra Graves RN  Outcome: Not Progressing  10/25/2024 1125 by Alejandra Graves RN  Outcome: Progressing     Problem: Prexisting or High Potential for Compromised Skin Integrity  Goal: Skin integrity is maintained or improved  Description: INTERVENTIONS:  - Identify patients at risk for skin breakdown  - Assess and monitor skin integrity  - Assess and monitor nutrition and hydration status  - Monitor labs   - Assess for incontinence   - Turn and reposition patient  - Assist with mobility/ambulation  - Relieve pressure over bony prominences  - Avoid friction and shearing  - Provide appropriate hygiene as needed including keeping skin clean and dry  - Evaluate need for skin moisturizer/barrier cream  - Collaborate with interdisciplinary team   - Patient/family teaching  - Consider wound care consult   10/25/2024 1130 by Alejandra Graves RN  Outcome: Adequate for Discharge  10/25/2024 1129 by Alejandra Graves RN  Outcome: Not Progressing  10/25/2024 1125 by Alejandra Graves RN  Outcome: Progressing     - Consider nutrition services referral as needed  10/25/2024 1130 by Alejandra Graves RN  Outcome: Adequate for Discharge  10/25/2024 1129 by Alejandra Graves RN  Outcome: Not Progressing  10/25/2024 1125 by Alejandra Graves RN  Outcome: Progressing     - Out of bed for toileting  - Record patient progress and  toleration of activity level   10/25/2024 1130 by Alejandra Graves RN  Outcome: Adequate for Discharge  10/25/2024 1129 by Alejandra Graves RN  Outcome: Not Progressing  10/25/2024 1125 by Alejandra Graves RN  Outcome: Progressing     - Apply yellow socks and bracelet for high fall risk patients  - Consider moving patient to room near nurses station  10/25/2024 1130 by Alejandra Graves RN  Outcome: Adequate for Discharge  10/25/2024 1129 by Alejandra Graves RN  Outcome: Not Progressing  10/25/2024 1125 by Alejandra Graves RN  Outcome: Progressing  Goal: Maintain or return to baseline ADL function  Description: INTERVENTIONS:  -  Assess patient's ability to carry out ADLs; assess patient's baseline for ADL function and identify physical deficits which impact ability to perform ADLs (bathing, care of mouth/teeth, toileting, grooming, dressing, etc.)  - Assess/evaluate cause of self-care deficits   - Assess range of motion  - Assess patient's mobility; develop plan if impaired  - Assess patient's need for assistive devices and provide as appropriate  - Encourage maximum independence but intervene and supervise when necessary  - Involve family in performance of ADLs  - Assess for home care needs following discharge   - Consider OT consult to assist with ADL evaluation and planning for discharge  - Provide patient education as appropriate  10/25/2024 1130 by Alejandra Graves RN  Outcome: Adequate for Discharge  10/25/2024 1129 by Alejandra Graves RN  Outcome: Not Progressing  10/25/2024 1125 by Alejandra Graves RN  Outcome: Progressing  Goal: Maintains/Returns to pre admission functional level  Description: INTERVENTIONS:  - Perform AM-PAC 6 Click Basic Mobility/ Daily Activity assessment daily.  - Set and communicate daily mobility goal to care team and patient/family/caregiver.   - Collaborate with rehabilitation services on mobility goals if consulted  - Record patient progress and toleration of  activity level   10/25/2024 1130 by Alejandra Graves RN  Outcome: Adequate for Discharge  10/25/2024 1129 by Alejandra Graves RN  Outcome: Not Progressing  10/25/2024 1125 by Alejandra Graves, RN  Outcome: Progressing

## 2024-10-25 NOTE — TELEPHONE ENCOUNTER
Scheduled with Edward Boone, [facility], 12/11/2024, 10 am.      Patient is asking if you can please contact her to help setting up transportation?    Call Back # [phone].    Thank you in advance,     Keshia

## 2024-10-25 NOTE — PLAN OF CARE
Problem: PAIN - ADULT  Goal: Verbalizes/displays adequate comfort level or baseline comfort level  Description: Interventions:  - Encourage patient to monitor pain and request assistance  - Assess pain using appropriate pain scale  - Administer analgesics based on type and severity of pain and evaluate response  - Implement non-pharmacological measures as appropriate and evaluate response  - Consider cultural and social influences on pain and pain management  - Notify physician/advanced practitioner if interventions unsuccessful or patient reports new pain  10/25/2024 1129 by Alejandra Graves RN  Outcome: Not Progressing  10/25/2024 1125 by Alejandra Graves RN  Outcome: Progressing     Problem: INFECTION - ADULT  Goal: Absence or prevention of progression during hospitalization  Description: INTERVENTIONS:  - Assess and monitor for signs and symptoms of infection  - Monitor lab/diagnostic results  - Monitor all insertion sites, i.e. indwelling lines, tubes, and drains  - Monitor endotracheal if appropriate and nasal secretions for changes in amount and color  - [place] appropriate cooling/warming therapies per order  - Administer medications as ordered  - Instruct and encourage patient and family to use good hand hygiene technique  - Identify and instruct in appropriate isolation precautions for identified infection/condition  10/25/2024 1129 by Alejandra Graves RN  Outcome: Not Progressing  10/25/2024 1125 by Alejandra Graves RN  Outcome: Progressing  Goal: Absence of fever/infection during neutropenic period  Description: INTERVENTIONS:  - Monitor WBC    10/25/2024 1129 by Alejandra Graves RN  Outcome: Not Progressing  10/25/2024 1125 by Alejandra Graves RN  Outcome: Progressing     Problem: SAFETY ADULT  Goal: Patient will remain free of falls  Description: INTERVENTIONS:  - Educate patient/family on patient safety including physical limitations  - Instruct patient to call for assistance with  activity   - Consult OT/PT to assist with strengthening/mobility   - Keep Call bell within reach  - Keep bed low and locked with side rails adjusted as appropriate  - Keep care items and personal belongings within reach  - Initiate and maintain comfort rounds  - Apply yellow socks and bracelet for high fall risk patients  - Consider moving patient to room near nurses station  10/25/2024 1129 by Alejandra Graves RN  Outcome: Not Progressing  10/25/2024 1125 by Alejandra Graves RN  Outcome: Progressing  Goal: Maintain or return to baseline ADL function  Description: INTERVENTIONS:  -  Assess patient's ability to carry out ADLs; assess patient's baseline for ADL function and identify physical deficits which impact ability to perform ADLs (bathing, care of mouth/teeth, toileting, grooming, dressing, etc.)  - Assess/evaluate cause of self-care deficits   - Assess range of motion  - Assess patient's mobility; develop plan if impaired  - Assess patient's need for assistive devices and provide as appropriate  - Encourage maximum independence but intervene and supervise when necessary  - Involve family in performance of ADLs  - Assess for home care needs following discharge   - Consider OT consult to assist with ADL evaluation and planning for discharge  - Provide patient education as appropriate  10/25/2024 1129 by Alejandra Graves RN  Outcome: Not Progressing  10/25/2024 1125 by Alejandra Graves RN  Outcome: Progressing  Goal: Maintains/Returns to pre admission functional level  Description: INTERVENTIONS:  - Perform AM-PAC 6 Click Basic Mobility/ Daily Activity assessment daily.  - Set and communicate daily mobility goal to care team and patient/family/caregiver.   - Collaborate with rehabilitation services on mobility goals if consulted    Problem: DISCHARGE PLANNING  Goal: Discharge to home or other facility with appropriate resources  Description: INTERVENTIONS:  - Identify barriers to discharge w/patient and  caregiver  - Arrange for needed discharge resources and transportation as appropriate  - Identify discharge learning needs (meds, wound care, etc.)  - Arrange for interpretive services to assist at discharge as needed  - Refer to Case Management Department for coordinating discharge planning if the patient needs post-hospital services based on physician/advanced practitioner order or complex needs related to functional status, cognitive ability, or social support system  10/25/2024 1129 by Alejandra Graves RN  Outcome: Not Progressing  10/25/2024 1125 by Alejandra Graves RN  Outcome: Progressing     Problem: Knowledge Deficit  Goal: Patient/family/caregiver demonstrates understanding of disease process, treatment plan, medications, and discharge instructions  Description: Complete learning assessment and assess knowledge base.  Interventions:  - Provide teaching at level of understanding  - Provide teaching via preferred learning methods  10/25/2024 1129 by Alejandra Graves RN  Outcome: Not Progressing  10/25/2024 1125 by Alejandra Graves RN  Outcome: Progressing     Problem: Neurological Deficit  Goal: Neurological status is stable or improving  Description: Interventions:  - Monitor and assess patient's level of consciousness, motor function, sensory function, and level of assistance needed for ADLs.   - Monitor and report changes from baseline. Collaborate with interdisciplinary team to initiate plan and implement interventions as ordered.   - Provide and maintain a safe environment.  - Consider seizure precautions.  - Consider fall precautions.  - Consider aspiration precautions.  - Consider bleeding precautions.  10/25/2024 1129 by Alejandra Graves RN  Outcome: Not Progressing  10/25/2024 1125 by Alejandra Graves RN  Outcome: Progressing     Problem: Activity Intolerance/Impaired Mobility  Goal: Mobility/activity is maintained at optimum level for patient  Description: Interventions:  - Assess and  monitor patient  barriers to mobility and need for assistive/adaptive devices.  - Assess patient's emotional response to limitations.  - Collaborate with interdisciplinary team and initiate plans and interventions as ordered.  - Encourage independent activity per ability.  - Maintain proper body alignment.  - Perform active/passive rom as tolerated/ordered.  - Plan activities to conserve energy.  - Turn patient as appropriate  10/25/2024 1129 by Alejandra Graves RN  Outcome: Not Progressing  10/25/2024 1125 by Alejandra Graves RN  Outcome: Progressing     Problem: Activity Intolerance/Impaired Mobility  Goal: Mobility/activity is maintained at optimum level for patient  Description: Interventions:  - Assess and monitor patient  barriers to mobility and need for assistive/adaptive devices.  - Assess patient's emotional response to limitations.  - Collaborate with interdisciplinary team and initiate plans and interventions as ordered.  - Encourage independent activity per ability.  - Maintain proper body alignment.  - Perform active/passive rom as tolerated/ordered.  - Plan activities to conserve energy.  - Turn patient as appropriate  10/25/2024 1129 by Alejandra Graves RN  Outcome: Not Progressing  10/25/2024 1125 by Alejandra Graves RN  Outcome: Progressing     Problem: Communication Impairment  Goal: Ability to express needs and understand communication  Description: Assess patient's communication skills and ability to understand information.  Patient will demonstrate use of effective communication techniques, alternative methods of communication and understanding even if not able to speak.     - Encourage communication and provide alternate methods of communication as needed.  - Collaborate with case management/ for discharge needs.  - Include patient/family/caregiver in decisions related to communication.  10/25/2024 1129 by Alejandra Graves RN  Outcome: Not Progressing  10/25/2024 1125  by Alejandra Graves RN  Outcome: Progressing     Problem: Potential for Aspiration  Goal: Non-ventilated patient's risk of aspiration is minimized  Description: Assess and monitor vital signs, respiratory status, and labs (WBC).  Monitor for signs of aspiration (tachypnea, cough, rales, wheezing, cyanosis, fever).    - Assess and monitor patient's ability to swallow.  - Place patient up in chair to eat if possible.  - HOB up at 90 degrees to eat if unable to get patient up into chair.  - Supervise patient during oral intake.   - Instruct patient/ family to take small bites.  - Instruct patient/ family to take small single sips when taking liquids.  - Follow patient-specific strategies generated by speech pathologist.  10/25/2024 1129 by Alejandra Graves RN  Outcome: Not Progressing  10/25/2024 1125 by Alejandra Graves RN  Outcome: Progressing  Goal: Ventilated patient's risk of aspiration is minimized  Description: Assess and monitor vital signs, respiratory status, airway cuff pressure, and labs (WBC).  Monitor for signs of aspiration (tachypnea, cough, rales, wheezing, cyanosis, fever).    - Elevate head of bed 30 degrees if patient has tube feeding.  - Monitor tube feeding.  10/25/2024 1129 by Alejandra Graves RN  Outcome: Not Progressing  10/25/2024 1125 by Alejandra Graves RN  Outcome: Progressing     Problem: Nutrition  Goal: Nutrition/Hydration status is improving  Description: Monitor and assess patient's nutrition/hydration status for malnutrition (ex- brittle hair, bruises, dry skin, pale skin and conjunctiva, muscle wasting, smooth red tongue, and disorientation). Collaborate with interdisciplinary team and initiate plan and interventions as ordered.  Monitor patient's weight and dietary intake as ordered or per policy. Utilize nutrition screening tool and intervene per policy. Determine patient's food preferences and provide high-protein, high-caloric foods as appropriate.     - Assist patient  with eating.  - Allow adequate time for meals.  - Encourage patient to take dietary supplement as ordered.  - Collaborate with clinical nutritionist.  - Include patient/family/caregiver in decisions related to nutrition.  10/25/2024 1129 by Alejandra Graves RN  Outcome: Not Progressing  10/25/2024 1125 by Alejandra Graves RN  Outcome: Progressing     Problem: NEUROSENSORY - ADULT  Goal: Achieves stable or improved neurological status  Description: INTERVENTIONS  - Monitor and report changes in neurological status  - Monitor vital signs such as temperature, blood pressure, glucose, and any other labs ordered   - Initiate measures to prevent increased intracranial pressure  - Monitor for seizure activity and implement precautions if appropriate      10/25/2024 1129 by Alejandra Graves RN  Outcome: Not Progressing  10/25/2024 1125 by Alejandra Graves RN  Outcome: Progressing  Goal: Remains free of injury related to seizures activity  Description: INTERVENTIONS  - Maintain airway, patient safety  and administer oxygen as ordered  - Monitor patient for seizure activity, document and report duration and description of seizure to physician/advanced practitioner  - If seizure occurs,  ensure patient safety during seizure  - Reorient patient post seizure  - Seizure pads on all 4 side rails  - Instruct patient/family to notify RN of any seizure activity including if an aura is experienced  - Instruct patient/family to call for assistance with activity based on nursing assessment  - Administer anti-seizure medications if ordered    10/25/2024 1129 by Alejandra Graves RN  Outcome: Not Progressing  10/25/2024 1125 by Alejandra Graves RN  Outcome: Progressing  Goal: Achieves maximal functionality and self care  Description: INTERVENTIONS  - Monitor swallowing and airway patency with patient fatigue and changes in neurological status  - Encourage and assist patient to increase activity and self care.   - Encourage  visually impaired, hearing impaired and aphasic patients to use assistive/communication devices  10/25/2024 1129 by Alejandra Graves RN  Outcome: Not Progressing  10/25/2024 1125 by Alejandra Graves RN  Outcome: Progressing     Problem: CARDIOVASCULAR - ADULT  Goal: Maintains optimal cardiac output and hemodynamic stability  Description: INTERVENTIONS:  - Monitor I/O, vital signs and rhythm  - Monitor for S/S and trends of decreased cardiac output  - Administer and titrate ordered vasoactive medications to optimize hemodynamic stability  - Assess quality of pulses, skin color and temperature  - Assess for signs of decreased coronary artery perfusion  - Instruct patient to report change in severity of symptoms  10/25/2024 1129 by Alejandra Graves RN  Outcome: Not Progressing  10/25/2024 1125 by Alejandra Graves RN  Outcome: Progressing  Goal: Absence of cardiac dysrhythmias or at baseline rhythm  Description: INTERVENTIONS:  - Continuous cardiac monitoring, vital signs, obtain 12 lead EKG if ordered  - Administer antiarrhythmic and heart rate control medications as ordered  - Monitor electrolytes and administer replacement therapy as ordered  10/25/2024 1129 by Alejandra Graves RN  Outcome: Not Progressing  10/25/2024 1125 by Alejandra Graves RN  Outcome: Progressing     Problem: RESPIRATORY - ADULT  Goal: Achieves optimal ventilation and oxygenation  Description: INTERVENTIONS:  - Assess for changes in respiratory status  - Assess for changes in mentation and behavior  - Position to facilitate oxygenation and minimize respiratory effort  - Oxygen administered by appropriate delivery if ordered  - Initiate smoking cessation education as indicated  - Encourage broncho-pulmonary hygiene including cough, deep breathe, Incentive Spirometry  - Assess the need for suctioning and aspirate as needed  - Assess and instruct to report SOB or any respiratory difficulty  - Respiratory Therapy support as  indicated  10/25/2024 1129 by Alejandra Graves RN  Outcome: Not Progressing  10/25/2024 1125 by Alejandra Graves RN  Outcome: Progressing     Problem: GASTROINTESTINAL - ADULT  Goal: Minimal or absence of nausea and/or vomiting  Description: INTERVENTIONS:  - Administer IV fluids if ordered to ensure adequate hydration  - Maintain NPO status until nausea and vomiting are resolved  - Nasogastric tube if ordered  - Administer ordered antiemetic medications as needed  - Provide nonpharmacologic comfort measures as appropriate  - Advance diet as tolerated, if ordered  - Consider nutrition services referral to assist patient with adequate nutrition and appropriate food choices  10/25/2024 1129 by Alejandra Graves RN  Outcome: Not Progressing  10/25/2024 1125 by Alejandra Graves RN  Outcome: Progressing  Goal: Maintains or returns to baseline bowel function  Description: INTERVENTIONS:  - Assess bowel function  - Encourage oral fluids to ensure adequate hydration  - Administer IV fluids if ordered to ensure adequate hydration  - Administer ordered medications as needed  - Encourage mobilization and activity  - Consider nutritional services referral to assist patient with adequate nutrition and appropriate food choices  10/25/2024 1129 by Alejandra Graves RN  Outcome: Not Progressing  10/25/2024 1125 by Alejandra Graevs RN  Outcome: Progressing  Goal: Maintains adequate nutritional intake  Description: INTERVENTIONS:  - Monitor percentage of each meal consumed  - Identify factors contributing to decreased intake, treat as appropriate  - Assist with meals as needed  - Monitor I&O, weight, and lab values if indicated  - Obtain nutrition services referral as needed  10/25/2024 1129 by Alejandra Graves RN  Outcome: Not Progressing  10/25/2024 1125 by Alejandra Graves RN  Outcome: Progressing  Goal: Establish and maintain optimal ostomy function  Description: INTERVENTIONS:  - Assess bowel function  - Encourage  oral fluids to ensure adequate hydration  - Administer IV fluids if ordered to ensure adequate hydration   - Administer ordered medications as needed  - Encourage mobilization and activity  - Nutrition services referral to assist patient with appropriate food choices  - Assess stoma site  - Consider wound care consult   10/25/2024 1129 by Alejandra Graves RN  Outcome: Not Progressing  10/25/2024 1125 by Alejandra Graves RN  Outcome: Progressing  Goal: Oral mucous membranes remain intact  Description: INTERVENTIONS  - Assess oral mucosa and hygiene practices  - Implement preventative oral hygiene regimen  - Implement oral medicated treatments as ordered  - Initiate Nutrition services referral as needed  10/25/2024 1129 by Alejandra Graves RN  Outcome: Not Progressing  10/25/2024 1125 by Alejandra Graves RN  Outcome: Progressing     Problem: GENITOURINARY - ADULT  Goal: Maintains or returns to baseline urinary function  Description: INTERVENTIONS:  - Assess urinary function  - Encourage oral fluids to ensure adequate hydration if ordered  - Administer IV fluids as ordered to ensure adequate hydration  - Administer ordered medications as needed  - Offer frequent toileting  - Follow urinary retention protocol if ordered  10/25/2024 1129 by Alejandra Graves RN  Outcome: Not Progressing  10/25/2024 1125 by Alejandra Graves RN  Outcome: Progressing  Goal: Absence of urinary retention  Description: INTERVENTIONS:  - Assess patient’s ability to void and empty bladder  - Monitor I/O  - Bladder scan as needed  - Discuss with physician/AP medications to alleviate retention as needed  - Discuss catheterization for long term situations as appropriate  10/25/2024 1129 by Alejandra Graves RN  Outcome: Not Progressing  10/25/2024 1125 by Alejandra Graves RN  Outcome: Progressing  Goal: Urinary catheter remains patent  Description: INTERVENTIONS:  - Assess patency of urinary catheter  - If patient has a chronic ortiz,  consider changing catheter if non-functioning  - Follow guidelines for intermittent irrigation of non-functioning urinary catheter  10/25/2024 1129 by Alejandra Graves RN  Outcome: Not Progressing  10/25/2024 1125 by Alejandra Graves RN  Outcome: Progressing     Problem: METABOLIC, FLUID AND ELECTROLYTES - ADULT  Goal: Electrolytes maintained within normal limits  Description: INTERVENTIONS:  - Monitor labs and assess patient for signs and symptoms of electrolyte imbalances  - Administer electrolyte replacement as ordered  - Monitor response to electrolyte replacements, including repeat lab results as appropriate  - Instruct patient on fluid and nutrition as appropriate  10/25/2024 1129 by Alejandra Graves RN  Outcome: Not Progressing  10/25/2024 1125 by Alejandra Graves RN  Outcome: Progressing  Goal: Fluid balance maintained  Description: INTERVENTIONS:  - Monitor labs   - Monitor I/O and WT  - Instruct patient on fluid and nutrition as appropriate  - Assess for signs & symptoms of volume excess or deficit  10/25/2024 1129 by Alejandra Graves RN  Outcome: Not Progressing  10/25/2024 1125 by Alejandra Graves RN  Outcome: Progressing  Goal: Glucose maintained within target range  Description: INTERVENTIONS:  - Monitor Blood Glucose as ordered  - Assess for signs and symptoms of hyperglycemia and hypoglycemia  - Administer ordered medications to maintain glucose within target range  - Assess nutritional intake and initiate nutrition service referral as needed  10/25/2024 1129 by Alejandra Graves RN  Outcome: Not Progressing  10/25/2024 1125 by Alejandra Graves RN  Outcome: Progressing     Problem: HEMATOLOGIC - ADULT  Goal: Maintains hematologic stability  Description: INTERVENTIONS  - Assess for signs and symptoms of bleeding or hemorrhage  - Monitor labs  - Administer supportive blood products/factors as ordered and appropriate  10/25/2024 1129 by Alejandra Graves RN  Outcome: Not  Progressing  10/25/2024 1125 by Alejandra Graves RN  Outcome: Progressing     Problem: MUSCULOSKELETAL - ADULT  Goal: Maintain or return mobility to safest level of function  Description: INTERVENTIONS:  - Assess patient's ability to carry out ADLs; assess patient's baseline for ADL function and identify physical deficits which impact ability to perform ADLs (bathing, care of mouth/teeth, toileting, grooming, dressing, etc.)  - Assess/evaluate cause of self-care deficits   - Assess range of motion  - Assess patient's mobility  - Assess patient's need for assistive devices and provide as appropriate  - Encourage maximum independence but intervene and supervise when necessary  - Involve family in performance of ADLs  - Assess for home care needs following discharge   - Consider OT consult to assist with ADL evaluation and planning for discharge  - Provide patient education as appropriate  10/25/2024 1129 by Alejandra Graves RN  Outcome: Not Progressing  10/25/2024 1125 by Alejandra Graves RN  Outcome: Progressing  Goal: Maintain proper alignment of affected body part  Description: INTERVENTIONS:  - Support, maintain and protect limb and body alignment  - Provide patient/ family with appropriate education  10/25/2024 1129 by Alejandra Graves RN  Outcome: Not Progressing  10/25/2024 1125 by Alejandra Graves RN  Outcome: Progressing     Problem: Nutrition/Hydration-ADULT  Goal: Nutrient/Hydration intake appropriate for improving, restoring or maintaining nutritional needs  Description: Monitor and assess patient's nutrition/hydration status for malnutrition. Collaborate with interdisciplinary team and initiate plan and interventions as ordered.  Monitor patient's weight and dietary intake as ordered or per policy. Utilize nutrition screening tool and intervene as necessary. Determine patient's food preferences and provide high-protein, high-caloric foods as appropriate.     INTERVENTIONS:  - Monitor oral  intake, urinary output, labs, and treatment plans  - Assess nutrition and hydration status and recommend course of action  - Evaluate amount of meals eaten  - Assist patient with eating if necessary   - Allow adequate time for meals  - Recommend/ encourage appropriate diets, oral nutritional supplements, and vitamin/mineral supplements  - Order, calculate, and assess calorie counts as needed  - Recommend, monitor, and adjust tube feedings and TPN/PPN based on assessed needs  - Assess need for intravenous fluids  - Provide specific nutrition/hydration education as appropriate  - Include patient/family/caregiver in decisions related to nutrition  10/25/2024 1129 by Alejandra Graves RN  Outcome: Not Progressing  10/25/2024 1125 by Alejandra Graves RN  Outcome: Progressing     Problem: Prexisting or High Potential for Compromised Skin Integrity  Goal: Skin integrity is maintained or improved  Description: INTERVENTIONS:  - Identify patients at risk for skin breakdown  - Assess and monitor skin integrity  - Assess and monitor nutrition and hydration status  - Monitor labs   - Assess for incontinence   - Turn and reposition patient  - Assist with mobility/ambulation  - Relieve pressure over bony prominences  - Avoid friction and shearing  - Provide appropriate hygiene as needed including keeping skin clean and dry  - Evaluate need for skin moisturizer/barrier cream  - Collaborate with interdisciplinary team   - Patient/family teaching  - Consider wound care consult   10/25/2024 1129 by Alejandra Graves RN  Outcome: Not Progressing  10/25/2024 1125 by Alejandra Graves RN  Outcome: Progressing     Outcome: Not Progressing  10/25/2024 1125 by Alejandra Graves RN  Outcome: Progressing  Goal: Pressure injury heals and does not worsen  Description: Interventions:  - Implement low air loss mattress or specialty surface (Criteria met)  - Apply silicone foam dressing    - Consider nutrition services referral as  needed  10/25/2024 1129 by Alejandra Graves, RN  Outcome: Not Progressing  10/25/2024 1125 by Alejandra Graves, RN  Outcome: Progressing     - Out of bed for toileting  - Record patient progress and toleration of activity level   10/25/2024 1129 by Alejandra Graves, RN  Outcome: Not Progressing  10/25/2024 1125 by Alejandra Graves, RN  Outcome: Progressing

## 2024-10-25 NOTE — TELEPHONE ENCOUNTER
SUKHJINDER called patient at [phone].  Patient is currently inpatient in the hospital but going to short term rehab in a SNF later today.  Patient is not sure if after rehab if she will be going home to [place] or staying with one of her children in [place].  Discussed [facility] for local transportation.  Patient not sure she wants to use [facility] access hospital transit.  Made patient aware that the office does not have a transportation program.  Currently, patient is not able to walk due to leg numbness.  Provided her with the contact information for [facility] to contact for their volunteer services, #[phone].  Patient knows she needs to be able to get in and out of a vehicle for the service.  Patient plans to call once she is out of rehab.      SW remains available.  Will need to further assess type of transportation patient may need.

## 2024-10-26 ENCOUNTER — NURSE TRIAGE (OUTPATIENT)
Dept: OTHER | Facility: OTHER | Age: 69
End: 2024-10-26

## 2024-10-26 NOTE — TELEPHONE ENCOUNTER
"Regarding: nursing home transfer request  ----- Message from [place] sent at 10/26/2024  3:46 PM EDT -----  \"I was in the hospital and transferred to a rehab nursing home and I need to be transferred somewhere else--the people are nasty here, I can't spend another day. I would like my doctor to put me somewhere else\"    "

## 2024-10-26 NOTE — TELEPHONE ENCOUNTER
"Answer Assessment - Initial Assessment Questions  1. REASON FOR CALL or QUESTION: \"What is your reason for calling today?\" or \"How can I best help you?\" or \"What question do you have that I can help answer?\"      Patient is at [address] in [place] and reports the staff are unpleasant and would like to be transferred.  Admitted for physical therapy for leg weakness at current time.    Patient is interested in [facility] in [place].    Protocols used: Information Only Call-ADULT-      On call provided advised arranged via .  Provided patient with hospital unit number in attempt to connect her with a  for what her next steps would need to be.    "

## 2024-10-26 NOTE — TELEPHONE ENCOUNTER
Reason for Disposition  • [1] Follow-up call to recent contact AND [2] information only call, no triage required    Protocols used: Information Only Call-ADULT-

## 2024-10-28 ENCOUNTER — TELEPHONE (OUTPATIENT)
Age: 69
End: 2024-10-28

## 2024-10-28 NOTE — TELEPHONE ENCOUNTER
[facility] and wanted to know if the appt needs to be changed due to later EMG APPT?     Patient is scheduled for 12/11/24 at 10 am HFU with Dougie Nolasco/ Edward.     Patients EMG 2 limb lower extr appt is scheduled for 3/13/25  at 3 pm W/ Dr. [facility], [address] [place] [address], [id number]. APPT is already Added to the wait list.    Please call Brenda back with clarification if the HFU appt needs to be changed and if the EMG has to be completed prior to the appt?     Please assist and advise .    Please call phone # [phone]

## 2024-10-30 ENCOUNTER — PATIENT OUTREACH (OUTPATIENT)
Dept: CASE MANAGEMENT | Facility: OTHER | Age: 69
End: 2024-10-30

## 2024-10-30 NOTE — PROGRESS NOTES
OSW placed outreach TC to pt this morning. She recalled her recent hospitalization. She states that her left leg went numb and they did an extensive workup and are unsure of the cause. She reports she is now at a SNF for rehab and is receiving OT and PT. She shared that she just took a few months off of her chemo to prepare and decorate for the holiday's. She is feeling very disappointed that she is unable to decorate her home. OSW allowed time for her to express her feelings and offered support and reassurance. This writer stated that she is very positive and she will overcome this sara as well. Pt thanked this writer for the words of encouragement and admits it has been a difficult 5 years.   OSW expressed that once she is back home maybe her son's girlfriend will help her. She states that her youngest daughter helps sometimes as well and her neighbor offered.    At this time she is only able to take a step with a walker, and this is with one staff member assisting her. She is hopeful that she will progress quickly.  We spoke about the beautiful weather we are having and this writer asked if there is a patio. She states that she is going to ask about that this morning.   OSW wished her well and will check in with her in another month. OSW encouraged her to call if she wants to talk. She thanked this writer for the outreach.

## 2024-10-30 NOTE — TELEPHONE ENCOUNTER
SUKHJINDER called [facility] at [facility], ph# [phone].  Patient was recently admitted for short term rehab.  Uncertain how long patient will be in the SNF for rehab.  Patient is currently using a R/W w/ 1 person assist.  They can set up transportation for patient to get to the EMG if still there, however, patient will need to pay for it.      SUKHJINDER called [facility] to contact for their volunteer services, ph#[phone]. Registered patient for their volunteer services for 11/26 apt.  Provided requested information including patient demographics, appointment generally takes 45-60 minutes, 11/26 arrival 12:30, [facility]:  formerly [facility], [address], [address], [place], [place], [address].  [facility] will send paperwork needing to be signed by patient today and call later today for additional questions.      SUKHJINDER called patient and left detailed message of above and contact information to request c/b.

## 2024-11-01 NOTE — TELEPHONE ENCOUNTER
SUKHJINDER called patient at [phone].  Patient is still in [facility].  Explained SUKHJINDER assistance with [facility] Volunteer services for transportation.  Patient is unsure if she will be able to walk/transfer herself by the time of her apt or will even be d/c'd to home by then.  SUKHJINDER advised her to call [facility] to finish registration and have someone check her mail for paperwork to be signed next week.  SUKHJINDER provided number again for  [facility] to contact for their volunteer services, ph#[phone].  Patient expressed understanding.  SUKHJINDER remains available.

## 2024-11-07 NOTE — TELEPHONE ENCOUNTER
SUKHJINDER called patient.  She is still in short term rehab.  Walking short distance with walker and starting one step for stairs at a time.  Patient has not called [facility] Aging office yet.  She is also working to see what kids of hers can do to help with driving.  Encouraged patient to call so that she can finish registration for help with trnansportation for apts. SUKHJINDER remains available.

## 2024-11-14 ENCOUNTER — TELEPHONE (OUTPATIENT)
Dept: INFUSION CENTER | Facility: HOSPITAL | Age: 69
End: 2024-11-14

## 2024-11-14 NOTE — TELEPHONE ENCOUNTER
Received call from pt stating she is in rehab at [facility] on [address] in [place]. She is requesting transport from rehab to us. Advised her the rehab will need to set up transport.

## 2024-11-19 ENCOUNTER — TELEPHONE (OUTPATIENT)
Dept: HEMATOLOGY ONCOLOGY | Facility: MEDICAL CENTER | Age: 69
End: 2024-11-19

## 2024-11-19 NOTE — TELEPHONE ENCOUNTER
Patient called stating she has an appointment coming up with  on 11/25. Patient is requesting if possible if provider can go to the infusion center on 12/3 when she's getting her port flush due to patient not being able to walk/drive. At this time patient did not want to cancel appointment scheduled for 11/25. Patient is currently in rehab due to leg going numb on 10/19. Advised patient I will send message to team and they will return her call if rescheduling is needed.       Thank you!

## 2024-11-19 NOTE — TELEPHONE ENCOUNTER
Spoke to patient  Patient is in rehab for sudden onset on numbness right leg with unknown etiology  Patient cannot bear weight  Appt changed to virtual and moved to Dr Grubbs's schedule  Patient aware

## 2024-11-19 NOTE — TELEPHONE ENCOUNTER
To my understanding, Dr. Arthur stays in-office.  We could switch her and another patient? Or just do 12/03 appointment? Please advise, I can relay. Thank you

## 2024-11-20 ENCOUNTER — HOSPITAL ENCOUNTER (OUTPATIENT)
Dept: INFUSION CENTER | Facility: HOSPITAL | Age: 69
Discharge: HOME | End: 2024-11-20

## 2024-11-25 ENCOUNTER — TELEMEDICINE (OUTPATIENT)
Dept: HEMATOLOGY ONCOLOGY | Facility: MEDICAL CENTER | Age: 69
End: 2024-11-25
Payer: MEDICARE

## 2024-11-25 ENCOUNTER — TELEPHONE (OUTPATIENT)
Age: 69
End: 2024-11-25

## 2024-11-25 DIAGNOSIS — C20 RECTAL CANCER (HCC): Primary | ICD-10-CM

## 2024-11-25 PROCEDURE — 99442 PR PHYS/QHP TELEPHONE EVALUATION 11-20 MIN: CPT | Performed by: INTERNAL MEDICINE

## 2024-11-25 NOTE — PROGRESS NOTES
Itzel A Myron  1955  [facility]  [address] [address]    Virtual Brief Visit     This Visit is being completed by telephone. The Patient is located at work presently and in the following state in which I hold an active license in [place]     The patient was identified by name and date of birth.  Patient was informed that this is a telemedicine visit and that the visit is being conducted through Telephone.  My office door was closed. No one else was in the room.  He acknowledged consent and understanding of privacy and security of the video platform. The patient has agreed to participate and understands he can discontinue the visit at any time.     Patient is aware this is a billable service.      Assessment/Plan    69 y.o. 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     Patient underwent 3 months of preoperative FOLFOX.  Follow-up CT scans demonstrated response to treatment with no evidence of progressive disease.  Patient then underwent resection of the omental mass and spleen as well as DILMA/BSO.  Mrs. Sanches was then restarted on FOLFOX. Patient had problems with neuropathy; FOLFOX was changed to FOLFIRI (2 cycles of FOLFIRI only).  The 12th/final cycle of chemotherapy was completed on August 23, 2022.      MRI abdomen in July 2023 confirmed liver metastasis and revealed a new, posterior left chest wall/pleural lesion suspicious for metastasis. The chest wall mass was biopsied on 9/13/2023 revealing metastatic moderately differentiated adenocarcinoma consistent with known colonic primary.      Mrs. Sanches was seen/evaluated by Dr. Brown Grubbs (Radiation Oncology) and patient has received SBRT (left-sided lower rib cage/upper abdomen).  Mrs. Sanches also completed Y90.  Patient then went back on to  surveillance.  During this surveillance from late 2023 to March 2024, analysis of circulating tumor cells was attempted but patient deferred doing the blood work.     Unfortunately repeat CAT scans on March 8, 2024 demonstrated new mediastinal adenopathy, increased paraspinal lesion anterior to L1 and stable lesion at T12.  The previously seen liver lesion was stable although there was a new small lesion suspicious for an additional metastatic implant.  Patient also had new retroperitoneal adenopathy and increased amee hepatis adenopathy.      Patient was started on FOLFIRI and avastin and has completed 6 cycles so far from 4/8/2024 to 6/24/24.       CT c/a/p from 7/3/24 were reviewed-that showed continued interval decrease in size of the left 11th rib metastasis, interval decrease in postradiation changes in the left lower lobe.  Decrease in conspicuity and size of the hepatic metastasis in segment 5.  Previously noted 5 mm lesion is no longer visualized.  No new lesions.  There was also an improvement in the mediastinal, amee hepatis and retroperitoneal lymphadenopathy.     Irinotecan was dropped and patient had been on  maintenance 5-FU Avastin more recently. Last treatment ws on August 27, 2024.    The plan most recently has been surveillance, patient is not on any chemotherapy now.  Mrs. Sanches feels okay, baseline.  More recently patient developed right leg weakness and numbness, being followed by neurology.  EMG is scheduled for tomorrow.      The plan is to continue with surveillance.  Patient agrees to return to the office in January 2025 with repeat blood work and scans before.    Patient has a history of lower extremity DVTs, is on Eliquis, which she will continue.  Patient understands that this needs to be manipulated for any invasive procedure or surgery.     Carefully review your medication list and verify that the list is accurate and up-to-date. Please call the hematology/oncology office if there  are medications missing from the list, medications on the list that you are not currently taking or if there is a dosage or instruction that is different from how you're taking that medication.    Patient goals and areas of care: Anal cancer surveillance  Barriers to care: none  Patient is able to self-care  _________________________________________________________________________________    HPI: 69 y.o. female with a history of recurrent/metastatic rectosigmoid adenocarcinoma presently on surveillance.  Patient was seen via televisit today.    Mrs. Sanches states feeling okay, baseline.  Appetite is okay, weight is stable.  No GI issues, no nausea or vomiting, no GI bleeding, no constipation or diarrhea.  No urinary issues.  Pain is controlled.  Recently patient developed right leg numbness, being followed by Dr. Bolden.  EMG is pending.  Patient also recently with elevated BP being followed by PCP.  Patient is on Eliquis, no problems with excessive bruising or bleeding.    Laboratory    10/24/2024 WBC = 6.35 hemoglobin = 12.9 hematocrit = 40.2 MCV = 94 platelet = 301    10/20/2024 BUN = 21 creatinine = 0.88 calcium = 8.7    Imaging    10/24/2024 MRI lumbar spine with and without contrast    MPRESSION:     Partially imaged small focal areas of enhancement in left lateral paraspinal lower thoracic musculature adjacent to expansile left posterior 11th osseous rib metastasis. Question soft tissue extension of left posterior 11th rib metastasis, reactive   change, or denervation changes. This is better visualized on the MRI lumbar spine compared to MRI thoracic spine given postcontrast fat-saturation.     No metastasis in lumbar spine.     Multilevel degenerative changes of lumbar spine with varying degrees of canal stenosis (multilevel mild, worse at L3-L4 and L4-L5) and foraminal narrowing (mild right L2-L3, bilateral L3-L4, bilateral L4-L5), as detailed above.    10/24/2024 MRI thoracic spine    IMPRESSION:     No  metastasis in thoracic spine.     Partially imaged known osseous metastasis of expansile left posterior 11th rib. Known left 11th rib fracture best seen on CT abdomen pelvis 10/19/2024.     Partially imaged small left pleural effusion.     Mild multilevel degenerative changes of thoracic spine with minor canal stenosis at T10-T11 and T11-T12, as detailed above. No significant foraminal narrowing.    ADDENDUM:     Small focal areas of enhancement with mild intramuscular edema in left lateral paraspinal lower thoracic musculature adjacent to expansile left posterior 11th osseous rib metastasis. Question soft tissue extension of left posterior 11th rib metastasis,   reactive change, or denervation changes.     10/19/2024 CAT scan abdomen pelvis with contrast    IMPRESSION:     1. Mild disc degenerative change in the lumbar spine. No significant central canal stenosis or neural foraminal narrowing.  2. No evidence of diverticulitis, colitis or bowel obstruction.      Visit Time  Total Visit Duration: 15 minutes

## 2024-11-25 NOTE — TELEPHONE ENCOUNTER
Patient calling to have a word with yunier when she gets a chance at some point today. She stated that she was in the hospital because her leg went numb and was then in rehab and she wanted to discuss this with yunier.  Pat Betts

## 2024-11-25 NOTE — TELEPHONE ENCOUNTER
Patient called and concerns discussed. Still in rehab and will schedule once out and will do face to face and will order wheelchair if not able to get through rehab. DEEPAK Batista

## 2024-11-26 ENCOUNTER — HOSPITAL ENCOUNTER (OUTPATIENT)
Dept: NEUROLOGY | Facility: CLINIC | Age: 69
Discharge: HOME | End: 2024-11-26
Attending: PSYCHIATRY & NEUROLOGY
Payer: MEDICARE

## 2024-11-26 DIAGNOSIS — R29.898 RIGHT LEG WEAKNESS: ICD-10-CM

## 2024-11-26 DIAGNOSIS — R20.0 RIGHT LEG NUMBNESS: ICD-10-CM

## 2024-11-26 PROCEDURE — 95910 NRV CNDJ TEST 7-8 STUDIES: CPT | Performed by: PSYCHIATRY & NEUROLOGY

## 2024-11-26 PROCEDURE — 95886 MUSC TEST DONE W/N TEST COMP: CPT | Performed by: PSYCHIATRY & NEUROLOGY

## 2024-12-03 ENCOUNTER — HOSPITAL ENCOUNTER (OUTPATIENT)
Dept: INFUSION CENTER | Facility: HOSPITAL | Age: 69
Discharge: HOME | End: 2024-12-03
Attending: INTERNAL MEDICINE

## 2024-12-03 ENCOUNTER — TELEPHONE (OUTPATIENT)
Dept: INFUSION CENTER | Facility: HOSPITAL | Age: 69
End: 2024-12-03

## 2024-12-03 NOTE — TELEPHONE ENCOUNTER
Patient called and stated that the  from the [facility]illed Nursing Rehab facility at [address] [place] [address] where the patient currently resides called her son and told him that patient is not allowed to leave the nursing facility positive with Covid and come to a facility that has immunocompromised cancer patients. She called to cancel port flush appt and will call us back to reschedule when she is symptom free.

## 2024-12-05 ENCOUNTER — TELEPHONE (OUTPATIENT)
Dept: NEUROLOGY | Facility: CLINIC | Age: 69
End: 2024-12-05

## 2024-12-05 NOTE — TELEPHONE ENCOUNTER
Call to pt-confirmed appt for 12/11 with Dr Ruby in [place]-pt did state she tested positive for Covid on 12/2-pt is aware she would be required to wear a mask up to the 10th day after testing positive (appt is 9th day )-any staff ciming in contact with pt would also need to mask as well (N95). Unfortunately pt is new to provider so would not be able to be visit virtually.

## 2024-12-11 ENCOUNTER — OFFICE VISIT (OUTPATIENT)
Dept: NEUROLOGY | Facility: CLINIC | Age: 69
End: 2024-12-11
Payer: MEDICARE

## 2024-12-11 VITALS — HEART RATE: 80 BPM | SYSTOLIC BLOOD PRESSURE: 136 MMHG | DIASTOLIC BLOOD PRESSURE: 80 MMHG

## 2024-12-11 DIAGNOSIS — G54.1 LUMBOSACRAL PLEXOPATHY: ICD-10-CM

## 2024-12-11 DIAGNOSIS — C19 RECTOSIGMOID CANCER (HCC): Primary | ICD-10-CM

## 2024-12-11 PROCEDURE — 99215 OFFICE O/P EST HI 40 MIN: CPT | Performed by: PSYCHIATRY & NEUROLOGY

## 2024-12-11 NOTE — PROGRESS NOTES
Name: Itzel Sanches      : 1955      MRN: [id number]  Encounter Provider: Dougie Ruby MD  Encounter Date: 2024   Encounter department: [facility] [place]  :  Assessment & Plan  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HCC)  I had a long discussion with Ms. Sanches and her son.  I spent over 75 minutes on this visit.  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  She developed acute weakness and numbness confined to the right leg.  Cranial MRI showed a right sided lesion of uncertain significance.  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  Further imaging might be considered based on her clinical course but I do not think it is the priority right now and she is in agreement.  Spinal disease is an obvious consideration despite the lack of incontinence or back pain.  This possibility was also excluded with a series of MRI studies, which do not show spinal metastases or a high-grade neural compressive lesion.  EMG was suboptimal but was in keeping with lumbar radiculopathy (for which there is not an obvious structural cause) or lumbar plexopathy.  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  Her retroperitoneal adenopathy could certainly compress the lumbar plexus and account for her problems.  She is scheduled for another series of CT studies and I am in complete agreement to look for any change.  MR could be considered for better assessment of neural structures but is not the study of choice looking at pelvic anatomy.  I do not know exactly where her radiation was; radiation plexopathy is a  consideration but be expected to have a slow indolent onset, not an acute onset.  I think that her difficulty with the shoulders is probably mechanical because of her weight lifting (she tells me she has been doing more than her physical therapist want her to do) but further assessment including other MRI studies and EMG might be considered based on her clinical course.  I explained all of the above to her and she is in agreement with this plan.         Lumbosacral plexopathy               History of Present Illness   HPI    Ms Sanches is here for hospital follow-up.  She has a history of rectosigmoid cancer that was resected, but returned with splenic and omental metastases.  She had resection with DILMA/BSO, but has developed further metastases to the rib and liver.  She has been managed with chemotherapy and with targeted radiation. A series of CT scans in March 2024 showed new mediastinal adenopathy, an increased paraspinal lesion anterior to L1, and a stable lesion at T12.  The previously seen liver lesion was stable but there was a new small lesion suspicious for an additional metastases.  She also had new retroperitoneal adenopathy and increased amee hepatis adenopathy.   CT studies from July 2024 showed continued interval decrease in size of the left 11th rib metastasis; an interval decrease in postradiation changes in the left lower lobe; a decrease in the conspicuity and size of the hepatic metastasis.  A noted 5 mm lesion is no longer visualized.  There was an improvement in the mediastinal, amee hepatis and retroperitoneal lymphadenopathy.  She continues follow-up with oncology and has been managed with additional chemotherapeutic treatment.  She is not currently receiving active treatment but is scheduled for another series of CT studies next month.    She presented to the hospital in October with acute RIGHT leg tingling followed by loss of sensation.  She developed right buttock pain while in the ER.   She was seen to have prominent right leg weakness, worse proximally; she has been more concerned about her sensory deficits but is quite aware of the weakness which has impaired her ambulation.  Cranial CT showed subtle RIGHT frontal hypodensity without clear mass effect, age indeterminate thought to perhaps represent age-indeterminate ischemia or metastatic disease. CTA showed stenosis at the origin of the right vertebral artery which was hypoplastic throughout its course.  The contralateral left vertebral artery was dominant and patent.  The basilar artery was severely stenotic, but this was felt to be chronic and likely congenital, given persistent fetal circulation with intact PCOMs bilaterally.  The anterior circulation was free of any hemodynamically significant stenosis. IV thrombolysis was not given due to Eliquis use. Cranial MRI showed a focus of FLAIR abnormality in the RIGHT posterior frontal deep white matter corresponding to the CT lesion, likely representing either gliosis or mild edema surrounding a venous angioma.   MRI studies of the cervical, thoracic, and lumbar spinal cord showed age-related degenerative changes but without a severe neural compressive abnormality.  Rib metastases were noted. She had an EMG with Dr Sinha but the study was technically limited because the femoral nerve and the lateral femoral cutaneous nerve could not be performed. Paraspinal testing was also omitted due to the patient's use of Eliquis.  Within these limitations, nerve conduction studies were significant only for a right saphenous nerve SNAP showing a slightly diminished amplitude compared with the contralateral side.  EMG showed diminished recruitment in the vastus lateralis, iliopsoas, and adductor terry.  These findings were felt to be potentially in keeping with a lumbosacral plexopathy or an L3-4 radiculopathy.  She was given a very short course of corticosteroids.  She was discharged to rehab and has  "improved; she has been able to walk with a walker.  She has aggressively participated in therapy but now complains of pain in both shoulders that she attributes to weight lifting.  The left is more problematic than the right.    She has a history of DVT possibly related to hypercoagulable state for which she has been on Eliquis for several years.      Admission on 10/19/2024, Discharged on 10/25/2024   Component Date Value Ref Range Status    Sodium 10/19/2024 140  135 - 147 mmol/L Final    Potassium 10/19/2024 3.3 (L)  3.5 - 5.3 mmol/L Final    Chloride 10/19/2024 106  96 - 108 mmol/L Final    CO2 10/19/2024 25  21 - 32 mmol/L Final    ANION GAP 10/19/2024 9  4 - 13 mmol/L Final    BUN 10/19/2024 16  5 - 25 mg/dL Final    Creatinine 10/19/2024 0.98  0.60 - 1.30 mg/dL Final    Standardized to IDMS reference method    Glucose 10/19/2024 89  65 - 140 mg/dL Final    If the patient is fasting, the ADA then defines impaired fasting glucose as > 100 mg/dL and diabetes as > or equal to 123 mg/dL.    Calcium 10/19/2024 9.2  8.4 - 10.2 mg/dL Final    eGFR 10/19/2024 58  ml/min/1.73sq m Final    WBC 10/19/2024 7.73  4.31 - 10.16 Thousand/uL Final    RBC 10/19/2024 4.35  3.81 - 5.12 Million/uL Final    Hemoglobin 10/19/2024 13.1  11.5 - 15.4 g/dL Final    Hematocrit 10/19/2024 40.6  34.8 - 46.1 % Final    MCV 10/19/2024 93  82 - 98 fL Final    MCH 10/19/2024 30.1  26.8 - 34.3 pg Final    MCHC 10/19/2024 32.3  31.4 - 37.4 g/dL Final    RDW 10/19/2024 18.4 (H)  11.6 - 15.1 % Final    Platelets 10/19/2024 313  149 - 390 Thousands/uL Final    MPV 10/19/2024 10.7  8.9 - 12.7 fL Final    Protime 10/19/2024 14.5  12.3 - 15.0 seconds Final    INR 10/19/2024 1.08  0.85 - 1.19 Final    PTT 10/19/2024 26  23 - 34 seconds Final    Therapeutic Heparin Range =  60-90 seconds    hs TnI 0hr 10/19/2024 7  \"Refer to ACS Flowchart\"- see link ng/L Final    Comment:                                              Initial (time 0) result  If >=50 " "ng/L, Myocardial injury suggested ;  Type of myocardial injury and treatment strategy  to be determined.  If 5-49 ng/L, a delta result at 2 hours and or 4 hours will be needed to further evaluate.  If <4 ng/L, and chest pain has been >3 hours since onset, patient may qualify for discharge based on the HEART score in the ED.  If <5 ng/L and <3hours since onset of chest pain, a delta result at 2 hours will be needed to further evaluate.    HS Troponin 99th Percentile URL of a Health Population=12 ng/L with a 95% Confidence Interval of 8-18 ng/L.    Second Troponin (time 2 hours)  If calculated delta >= 20 ng/L,  Myocardial injury suggested ; Type of myocardial injury and treatment strategy to be determined.  If 5-49 ng/L and the calculated delta is 5-19 ng/L, consult medical service for evaluation.  Continue evaluation for ischemia on ecg and other possible etiology and repeat hs troponin at 4 hours.  If delta                            is <5 ng/L at 2 hours, consider discharge based on risk stratification via the HEART score (if in ED), or ROYAL risk score in IP/Observation.    HS Troponin 99th Percentile URL of a Health Population=12 ng/L with a 95% Confidence Interval of 8-18 ng/L.    hs TnI 2hr 10/19/2024 7  \"Refer to ACS Flowchart\"- see link ng/L Final    Comment:                                              Initial (time 0) result  If >=50 ng/L, Myocardial injury suggested ;  Type of myocardial injury and treatment strategy  to be determined.  If 5-49 ng/L, a delta result at 2 hours and or 4 hours will be needed to further evaluate.  If <4 ng/L, and chest pain has been >3 hours since onset, patient may qualify for discharge based on the HEART score in the ED.  If <5 ng/L and <3hours since onset of chest pain, a delta result at 2 hours will be needed to further evaluate.    HS Troponin 99th Percentile URL of a Health Population=12 ng/L with a 95% Confidence Interval of 8-18 ng/L.    Second Troponin (time 2 " hours)  If calculated delta >= 20 ng/L,  Myocardial injury suggested ; Type of myocardial injury and treatment strategy to be determined.  If 5-49 ng/L and the calculated delta is 5-19 ng/L, consult medical service for evaluation.  Continue evaluation for ischemia on ecg and other possible etiology and repeat hs troponin at 4 hours.  If delta                            is <5 ng/L at 2 hours, consider discharge based on risk stratification via the HEART score (if in ED), or ROYAL risk score in IP/Observation.    HS Troponin 99th Percentile URL of a Health Population=12 ng/L with a 95% Confidence Interval of 8-18 ng/L.    Delta 2hr hsTnI 10/19/2024 0  <20 ng/L Final    POC Glucose 10/19/2024 71  65 - 140 mg/dl Final    RAA A4C 10/21/2024 10  cm2 Final    EPHRAIM A4C 10/21/2024 10.5  cm2 Final    LA Volume Index (BP) 10/21/2024 19.8  mL/m2 Final    MV Peak A Srikanth 10/21/2024 0.88  m/s Final    MV stenosis pressure 1/2 time 10/21/2024 67  ms Final    MV Peak E Srikanth 10/21/2024 68  cm/s Final    E wave deceleration time 10/21/2024 231  ms Final    E/A ratio 10/21/2024 0.77   Final    MV valve area p 1/2 method 10/21/2024 3.28   Final    RA 2D Volume 10/21/2024 22.0  mL Final    RVID d 10/21/2024 2.9  cm Final    A4C EF 10/21/2024 70  % Final    Left ventricular stroke volume (2D) 10/21/2024 34.00  mL Final    IVSd 10/21/2024 1.10  cm Final    Tricuspid annular plane systolic e* 10/21/2024 2.00  cm Final    Ao root 10/21/2024 3.00  cm Final    LVPWd 10/21/2024 0.90  cm Final    LA size 10/21/2024 3.3  cm Final    Asc Ao 10/21/2024 2.8  cm Final    LA volume (BP) 10/21/2024 35  mL Final    FS 10/21/2024 29  28 - 44 Final    LVIDS 10/21/2024 2.70  cm Final    IVS 10/21/2024 1.1  cm Final    LVIDd 10/21/2024 3.80  cm Final    LA length (A2C) 10/21/2024 5.30  cm Final    LEFT VENTRICLE SYSTOLIC VOLUME (MO* 10/21/2024 26  mL Final    LV DIASTOLIC VOLUME (MOD BIPLANE) * 10/21/2024 60  mL Final    Left Atrium Area-systolic Four [place]*  10/21/2024 10.4  cm2 Final    Left Atrium Area-systolic Apical T* 10/21/2024 17.1  cm2 Final    MV E' Tissue Velocity Septal 10/21/2024 7  cm/s Final    LVSV, 2D 10/21/2024 34  mL Final    BSA 10/21/2024 1.77  m2 Final    LV EF 10/21/2024 55   Final    Cholesterol 10/20/2024 193  See Comment mg/dL Final    Cholesterol:         Pediatric <18 Years        Desirable          <170 mg/dL      Borderline High    170-199 mg/dL      High               >=200 mg/dL        Adult >=18 Years            Desirable         <200 mg/dL      Borderline High   200-239 mg/dL      High              >239 mg/dL      Triglycerides 10/20/2024 117  See Comment mg/dL Final    Triglyceride:     0-9Y            <75mg/dL     10Y-17Y         <90 mg/dL       >=18Y     Normal          <150 mg/dL     Borderline High 150-199 mg/dL     High            200-499 mg/dL        Very High       >499 mg/dL    Specimen collection should occur prior to Metamizole administration due to the potential for falsely depressed results.    HDL, Direct 10/20/2024 52  >=50 mg/dL Final    LDL Calculated 10/20/2024 118 (H)  0 - 100 mg/dL Final    LDL Cholesterol:     Optimal           <100 mg/dl     Near Optimal      100-129 mg/dl     Above Optimal       Borderline High 130-159 mg/dl       High            160-189 mg/dl       Very High       >189 mg/dl         This screening LDL is a calculated result.   It does not have the accuracy of the Direct Measured LDL in the monitoring of patients with hyperlipidemia and/or statin therapy.   Direct Measure LDL (OSY159) must be ordered separately in these patients.    Hemoglobin A1C 10/20/2024 5.9 (H)  Normal 4.0-5.6%; PreDiabetic 5.7-6.4%; Diabetic >=6.5%; Glycemic control for adults with diabetes <7.0% % Final    EAG 10/20/2024 123  mg/dl Final    WBC 10/20/2024 7.29  4.31 - 10.16 Thousand/uL Final    RBC 10/20/2024 3.79 (L)  3.81 - 5.12 Million/uL Final    Hemoglobin 10/20/2024 11.5  11.5 - 15.4 g/dL Final    Hematocrit 10/20/2024  35.2  34.8 - 46.1 % Final    MCV 10/20/2024 93  82 - 98 fL Final    MCH 10/20/2024 30.3  26.8 - 34.3 pg Final    MCHC 10/20/2024 32.7  31.4 - 37.4 g/dL Final    RDW 10/20/2024 18.2 (H)  11.6 - 15.1 % Final    MPV 10/20/2024 10.8  8.9 - 12.7 fL Final    Platelets 10/20/2024 268  149 - 390 Thousands/uL Final    nRBC 10/20/2024 0  /100 WBCs Final    Segmented % 10/20/2024 51  43 - 75 % Final    Immature Grans % 10/20/2024 0  0 - 2 % Final    Lymphocytes % 10/20/2024 31  14 - 44 % Final    Monocytes % 10/20/2024 17 (H)  4 - 12 % Final    Eosinophils Relative 10/20/2024 1  0 - 6 % Final    Basophils Relative 10/20/2024 0  0 - 1 % Final    Absolute Neutrophils 10/20/2024 3.64  1.85 - 7.62 Thousands/µL Final    Absolute Immature Grans 10/20/2024 0.01  0.00 - 0.20 Thousand/uL Final    Absolute Lymphocytes 10/20/2024 2.29  0.60 - 4.47 Thousands/µL Final    Absolute Monocytes 10/20/2024 1.23 (H)  0.17 - 1.22 Thousand/µL Final    Eosinophils Absolute 10/20/2024 0.09  0.00 - 0.61 Thousand/µL Final    Basophils Absolute 10/20/2024 0.03  0.00 - 0.10 Thousands/µL Final    Sodium 10/20/2024 138  135 - 147 mmol/L Final    Potassium 10/20/2024 3.7  3.5 - 5.3 mmol/L Final    Chloride 10/20/2024 108  96 - 108 mmol/L Final    CO2 10/20/2024 22  21 - 32 mmol/L Final    ANION GAP 10/20/2024 8  4 - 13 mmol/L Final    BUN 10/20/2024 15  5 - 25 mg/dL Final    Creatinine 10/20/2024 1.02  0.60 - 1.30 mg/dL Final    Standardized to IDMS reference method    Glucose 10/20/2024 103  65 - 140 mg/dL Final    If the patient is fasting, the ADA then defines impaired fasting glucose as > 100 mg/dL and diabetes as > or equal to 123 mg/dL.    Glucose, Fasting 10/20/2024 103 (H)  65 - 99 mg/dL Final    Calcium 10/20/2024 8.8  8.4 - 10.2 mg/dL Final    Corrected Calcium 10/20/2024 9.6  8.3 - 10.1 mg/dL Final    AST 10/20/2024 11 (L)  13 - 39 U/L Final    ALT 10/20/2024 9  7 - 52 U/L Final    Specimen collection should occur prior to Sulfasalazine  administration due to the potential for falsely depressed results.     Alkaline Phosphatase 10/20/2024 46  34 - 104 U/L Final    Total Protein 10/20/2024 6.1 (L)  6.4 - 8.4 g/dL Final    Albumin 10/20/2024 3.0 (L)  3.5 - 5.0 g/dL Final    Total Bilirubin 10/20/2024 0.52  0.20 - 1.00 mg/dL Final    Use of this assay is not recommended for patients undergoing treatment with eltrombopag due to the potential for falsely elevated results.  N-acetyl-p-benzoquinone imine (metabolite of Acetaminophen) will generate erroneously low results in samples for patients that have taken an overdose of Acetaminophen.    eGFR 10/20/2024 55  ml/min/1.73sq m Final    TSH 3RD GENERATON 10/20/2024 7.099 (H)  0.450 - 4.500 uIU/mL Final    The recommended reference ranges for TSH during pregnancy are as follows:   First trimester 0.100 to 2.500 uIU/mL   Second trimester  0.200 to 3.000 uIU/mL   Third trimester 0.300 to 3.000 uIU/m    Note: Normal ranges may not apply to patients who are transgender, non-binary, or whose legal sex, sex at birth, and gender identity differ.  Adult TSH (3rd generation) reference range follows the recommended guidelines of the [employer] Association, January, 2020.    Free T4 10/20/2024 0.79  0.61 - 1.12 ng/dL Final    Specimens with biotin concentrations > 10 ng/mL can lead to significant (> 10%) positive bias in result.    Hemoglobin A1C 10/20/2024 5.9 (H)  Normal 4.0-5.6%; PreDiabetic 5.7-6.4%; Diabetic >=6.5%; Glycemic control for adults with diabetes <7.0% % Final    EAG 10/20/2024 123  mg/dl Final    Sodium 10/21/2024 136  135 - 147 mmol/L Final    Potassium 10/21/2024 3.7  3.5 - 5.3 mmol/L Final    Chloride 10/21/2024 108  96 - 108 mmol/L Final    CO2 10/21/2024 21  21 - 32 mmol/L Final    ANION GAP 10/21/2024 7  4 - 13 mmol/L Final    BUN 10/21/2024 15  5 - 25 mg/dL Final    Creatinine 10/21/2024 1.01  0.60 - 1.30 mg/dL Final    Standardized to IDMS reference method    Glucose 10/21/2024 95  65 -  140 mg/dL Final    If the patient is fasting, the ADA then defines impaired fasting glucose as > 100 mg/dL and diabetes as > or equal to 123 mg/dL.    Calcium 10/21/2024 8.4  8.4 - 10.2 mg/dL Final    eGFR 10/21/2024 56  ml/min/1.73sq m Final    WBC 10/21/2024 7.10  4.31 - 10.16 Thousand/uL Final    RBC 10/21/2024 3.94  3.81 - 5.12 Million/uL Final    Hemoglobin 10/21/2024 11.9  11.5 - 15.4 g/dL Final    Hematocrit 10/21/2024 37.2  34.8 - 46.1 % Final    MCV 10/21/2024 94  82 - 98 fL Final    MCH 10/21/2024 30.2  26.8 - 34.3 pg Final    MCHC 10/21/2024 32.0  31.4 - 37.4 g/dL Final    RDW 10/21/2024 18.5 (H)  11.6 - 15.1 % Final    Platelets 10/21/2024 285  149 - 390 Thousands/uL Final    MPV 10/21/2024 10.7  8.9 - 12.7 fL Final    Ventricular Rate 10/19/2024 83  BPM Final    Atrial Rate 10/19/2024 83  BPM Final    SC Interval 10/19/2024 174  ms Final    QRSD Interval 10/19/2024 82  ms Final    QT Interval 10/19/2024 382  ms Final    QTC Interval 10/19/2024 448  ms Final    P Axis 10/19/2024 56  degrees Final    QRS Axis 10/19/2024 44  degrees Final    T Wave Axis 10/19/2024 52  degrees Final    Sed Rate 10/21/2024 31 (H)  0 - 29 mm/hour Final    CRP 10/20/2024 8.9 (H)  <3.0 mg/L Final    Total CK 10/20/2024 100  26 - 192 U/L Final    Sodium 10/22/2024 139  135 - 147 mmol/L Final    Potassium 10/22/2024 4.4  3.5 - 5.3 mmol/L Final    Chloride 10/22/2024 108  96 - 108 mmol/L Final    CO2 10/22/2024 25  21 - 32 mmol/L Final    ANION GAP 10/22/2024 6  4 - 13 mmol/L Final    BUN 10/22/2024 19  5 - 25 mg/dL Final    Creatinine 10/22/2024 1.03  0.60 - 1.30 mg/dL Final    Standardized to IDMS reference method    Glucose 10/22/2024 95  65 - 140 mg/dL Final    If the patient is fasting, the ADA then defines impaired fasting glucose as > 100 mg/dL and diabetes as > or equal to 123 mg/dL.    Calcium 10/22/2024 8.7  8.4 - 10.2 mg/dL Final    eGFR 10/22/2024 55  ml/min/1.73sq m Final    WBC 10/22/2024 6.72  4.31 - 10.16  Thousand/uL Final    RBC 10/22/2024 4.07  3.81 - 5.12 Million/uL Final    Hemoglobin 10/22/2024 12.3  11.5 - 15.4 g/dL Final    Hematocrit 10/22/2024 38.3  34.8 - 46.1 % Final    MCV 10/22/2024 94  82 - 98 fL Final    MCH 10/22/2024 30.2  26.8 - 34.3 pg Final    MCHC 10/22/2024 32.1  31.4 - 37.4 g/dL Final    RDW 10/22/2024 18.8 (H)  11.6 - 15.1 % Final    Platelets 10/22/2024 283  149 - 390 Thousands/uL Final    MPV 10/22/2024 11.0  8.9 - 12.7 fL Final    WBC 10/24/2024 6.35  4.31 - 10.16 Thousand/uL Final    RBC 10/24/2024 4.29  3.81 - 5.12 Million/uL Final    Hemoglobin 10/24/2024 12.9  11.5 - 15.4 g/dL Final    Hematocrit 10/24/2024 40.2  34.8 - 46.1 % Final    MCV 10/24/2024 94  82 - 98 fL Final    MCH 10/24/2024 30.1  26.8 - 34.3 pg Final    MCHC 10/24/2024 32.1  31.4 - 37.4 g/dL Final    RDW 10/24/2024 18.9 (H)  11.6 - 15.1 % Final    Platelets 10/24/2024 301  149 - 390 Thousands/uL Final    MPV 10/24/2024 10.8  8.9 - 12.7 fL Final    Sodium 10/24/2024 137  135 - 147 mmol/L Final    Potassium 10/24/2024 4.2  3.5 - 5.3 mmol/L Final    Chloride 10/24/2024 108  96 - 108 mmol/L Final    CO2 10/24/2024 23  21 - 32 mmol/L Final    ANION GAP 10/24/2024 6  4 - 13 mmol/L Final    BUN 10/24/2024 21  5 - 25 mg/dL Final    Creatinine 10/24/2024 0.88  0.60 - 1.30 mg/dL Final    Standardized to IDMS reference method    Glucose 10/24/2024 95  65 - 140 mg/dL Final    If the patient is fasting, the ADA then defines impaired fasting glucose as > 100 mg/dL and diabetes as > or equal to 123 mg/dL.    Calcium 10/24/2024 8.7  8.4 - 10.2 mg/dL Final    eGFR 10/24/2024 66  ml/min/1.73sq m Final   Appointment on 10/04/2024   Component Date Value Ref Range Status    Color, UA 10/04/2024 Colorless   Final    Clarity, UA 10/04/2024 Clear   Final    Specific Gravity, UA 10/04/2024 1.015  1.005 - 1.030 Final    pH, UA 10/04/2024 6.5  4.5, 5.0, 5.5, 6.0, 6.5, 7.0, 7.5, 8.0 Final    Leukocytes, UA 10/04/2024 Small (A)  Negative Final     Nitrite, UA 10/04/2024 Negative  Negative Final    Protein, UA 10/04/2024 Negative  Negative mg/dl Final    Glucose, UA 10/04/2024 Negative  Negative mg/dl Final    Ketones, UA 10/04/2024 Negative  Negative mg/dl Final    Urobilinogen, UA 10/04/2024 <2.0  <2.0 mg/dl mg/dl Final    Bilirubin, UA 10/04/2024 Negative  Negative Final    Occult Blood, UA 10/04/2024 Negative  Negative Final    RBC, UA 10/04/2024 1-2 (A)  None Seen, 2-4 /hpf Final    WBC, UA 10/04/2024 4-10 (A)  None Seen, 2-4, 5-60 /hpf Final    Epithelial Cells 10/04/2024 Occasional  None Seen, Occasional /hpf Final    Bacteria, UA 10/04/2024 Occasional  None Seen, Occasional /hpf Final    WBC 10/04/2024 7.05  4.31 - 10.16 Thousand/uL Final    RBC 10/04/2024 4.21  3.81 - 5.12 Million/uL Final    Hemoglobin 10/04/2024 12.8  11.5 - 15.4 g/dL Final    Hematocrit 10/04/2024 39.7  34.8 - 46.1 % Final    MCV 10/04/2024 94  82 - 98 fL Final    MCH 10/04/2024 30.4  26.8 - 34.3 pg Final    MCHC 10/04/2024 32.2  31.4 - 37.4 g/dL Final    RDW 10/04/2024 17.3 (H)  11.6 - 15.1 % Final    MPV 10/04/2024 11.0  8.9 - 12.7 fL Final    Platelets 10/04/2024 321  149 - 390 Thousands/uL Final    nRBC 10/04/2024 0  /100 WBCs Final    Segmented % 10/04/2024 57  43 - 75 % Final    Immature Grans % 10/04/2024 0  0 - 2 % Final    Lymphocytes % 10/04/2024 29  14 - 44 % Final    Monocytes % 10/04/2024 12  4 - 12 % Final    Eosinophils Relative 10/04/2024 2  0 - 6 % Final    Basophils Relative 10/04/2024 0  0 - 1 % Final    Absolute Neutrophils 10/04/2024 4.06  1.85 - 7.62 Thousands/µL Final    Absolute Immature Grans 10/04/2024 0.01  0.00 - 0.20 Thousand/uL Final    Absolute Lymphocytes 10/04/2024 2.03  0.60 - 4.47 Thousands/µL Final    Absolute Monocytes 10/04/2024 0.82  0.17 - 1.22 Thousand/µL Final    Eosinophils Absolute 10/04/2024 0.12  0.00 - 0.61 Thousand/µL Final    Basophils Absolute 10/04/2024 0.01  0.00 - 0.10 Thousands/µL Final    Sodium 10/04/2024 136  135 - 147 mmol/L  Final    Potassium 10/04/2024 4.3  3.5 - 5.3 mmol/L Final    Chloride 10/04/2024 106  96 - 108 mmol/L Final    CO2 10/04/2024 25  21 - 32 mmol/L Final    ANION GAP 10/04/2024 5  4 - 13 mmol/L Final    BUN 10/04/2024 19  5 - 25 mg/dL Final    Creatinine 10/04/2024 1.01  0.60 - 1.30 mg/dL Final    Standardized to IDMS reference method    Glucose 10/04/2024 99  65 - 140 mg/dL Final    If the patient is fasting, the ADA then defines impaired fasting glucose as > 100 mg/dL and diabetes as > or equal to 123 mg/dL.    Calcium 10/04/2024 9.1  8.4 - 10.2 mg/dL Final    Corrected Calcium 10/04/2024 9.7  8.3 - 10.1 mg/dL Final    AST 10/04/2024 12 (L)  13 - 39 U/L Final    ALT 10/04/2024 11  7 - 52 U/L Final    Specimen collection should occur prior to Sulfasalazine administration due to the potential for falsely depressed results.     Alkaline Phosphatase 10/04/2024 54  34 - 104 U/L Final    Total Protein 10/04/2024 6.7  6.4 - 8.4 g/dL Final    Albumin 10/04/2024 3.3 (L)  3.5 - 5.0 g/dL Final    Total Bilirubin 10/04/2024 0.45  0.20 - 1.00 mg/dL Final    Use of this assay is not recommended for patients undergoing treatment with eltrombopag due to the potential for falsely elevated results.  N-acetyl-p-benzoquinone imine (metabolite of Acetaminophen) will generate erroneously low results in samples for patients that have taken an overdose of Acetaminophen.    eGFR 10/04/2024 56  ml/min/1.73sq m Final    CEA 10/04/2024 2.5  0.0 - 3.0 ng/mL Final    Normal/Non-Smoker: 0.0-3.0 ng/mL   Normal/Smoker:     0.0-5.0 ng/mL    [employer] chemiluminescent immunoassay. Results cannot be interpreted for the presence or absence of malignant disease.  Confirm baseline values for patients being serially monitored.     Appointment on 09/20/2024   Component Date Value Ref Range Status    Color, UA 09/20/2024 Yellow   Final    Clarity, UA 09/20/2024 Clear   Final    Specific Gravity, UA 09/20/2024 >=1.030  1.005 - 1.030 Final    pH,  UA 09/20/2024 6.0  4.5, 5.0, 5.5, 6.0, 6.5, 7.0, 7.5, 8.0 Final    Leukocytes, UA 09/20/2024 Negative  Negative Final    Nitrite, UA 09/20/2024 Negative  Negative Final    Protein, UA 09/20/2024 Trace (A)  Negative mg/dl Final    Glucose, UA 09/20/2024 Negative  Negative mg/dl Final    Ketones, UA 09/20/2024 Negative  Negative mg/dl Final    Urobilinogen, UA 09/20/2024 <2.0  <2.0 mg/dl mg/dl Final    Bilirubin, UA 09/20/2024 Negative  Negative Final    Occult Blood, UA 09/20/2024 Negative  Negative Final    RBC, UA 09/20/2024 0-1 (A)  None Seen, 2-4 /hpf Final    WBC, UA 09/20/2024 4-10 (A)  None Seen, 2-4, 5-60 /hpf Final    Epithelial Cells 09/20/2024 Occasional  None Seen, Occasional /hpf Final    Bacteria, UA 09/20/2024 None Seen  None Seen, Occasional /hpf Final    MUCUS THREADS 09/20/2024 Moderate (A)  None Seen Final    Ca Oxalate Selena, UA 09/20/2024 Occasional (A)  None Seen /hpf Final    WBC 09/20/2024 7.00  4.31 - 10.16 Thousand/uL Final    RBC 09/20/2024 4.07  3.81 - 5.12 Million/uL Final    Hemoglobin 09/20/2024 12.4  11.5 - 15.4 g/dL Final    Hematocrit 09/20/2024 39.3  34.8 - 46.1 % Final    MCV 09/20/2024 97  82 - 98 fL Final    MCH 09/20/2024 30.5  26.8 - 34.3 pg Final    MCHC 09/20/2024 31.6  31.4 - 37.4 g/dL Final    RDW 09/20/2024 16.8 (H)  11.6 - 15.1 % Final    MPV 09/20/2024 10.8  8.9 - 12.7 fL Final    Platelets 09/20/2024 328  149 - 390 Thousands/uL Final    nRBC 09/20/2024 0  /100 WBCs Final    Segmented % 09/20/2024 57  43 - 75 % Final    Immature Grans % 09/20/2024 0  0 - 2 % Final    Lymphocytes % 09/20/2024 28  14 - 44 % Final    Monocytes % 09/20/2024 13 (H)  4 - 12 % Final    Eosinophils Relative 09/20/2024 2  0 - 6 % Final    Basophils Relative 09/20/2024 0  0 - 1 % Final    Absolute Neutrophils 09/20/2024 3.95  1.85 - 7.62 Thousands/µL Final    Absolute Immature Grans 09/20/2024 0.01  0.00 - 0.20 Thousand/uL Final    Absolute Lymphocytes 09/20/2024 1.94  0.60 - 4.47 Thousands/µL  Final    Absolute Monocytes 09/20/2024 0.92  0.17 - 1.22 Thousand/µL Final    Eosinophils Absolute 09/20/2024 0.15  0.00 - 0.61 Thousand/µL Final    Basophils Absolute 09/20/2024 0.03  0.00 - 0.10 Thousands/µL Final    Sodium 09/20/2024 141  135 - 147 mmol/L Final    Potassium 09/20/2024 4.1  3.5 - 5.3 mmol/L Corrected    This is a corrected result. Previous result was 4.4 mmol/L on 9/20/2024 at 1406 EDT    Chloride 09/20/2024 110 (H)  96 - 108 mmol/L Corrected    This is a corrected result. Previous result was 104 mmol/L on 9/20/2024 at 1406 EDT    CO2 09/20/2024 18 (L)  21 - 32 mmol/L Corrected    This is a corrected result. Previous result was 22 mmol/L on 9/20/2024 at 1406 EDT    ANION GAP 09/20/2024 13  4 - 13 mmol/L Corrected    This is a corrected result. Previous result was 15 mmol/L on 9/20/2024 at 1406 EDT    BUN 09/20/2024 21  5 - 25 mg/dL Corrected    This is a corrected result. Previous result was 22 mg/dL on 9/20/2024 at 1406 EDT    Creatinine 09/20/2024 0.93  0.60 - 1.30 mg/dL Corrected    Comment: Standardized to IDMS reference method                             This is a corrected result. Previous result was 0.76 mg/dL on 9/20/2024 at 1406 EDT    Glucose, Fasting 09/20/2024 88  65 - 99 mg/dL Corrected    This is a corrected result. Previous result was 100 mg/dL on 9/20/2024 at 1406 EDT    Calcium 09/20/2024 9.0  8.4 - 10.2 mg/dL Corrected    This is a corrected result. Previous result was 9.2 mg/dL on 9/20/2024 at 1406 EDT    Corrected Calcium 09/20/2024 9.6  8.3 - 10.1 mg/dL Final    This is an appended report.  These results have been appended to a previously final verified report.    AST 09/20/2024 12 (L)  13 - 39 U/L Corrected    This is a corrected result. Previous result was 15 U/L on 9/20/2024 at 1406 EDT    ALT 09/20/2024 11  7 - 52 U/L Corrected    Comment: Specimen collection should occur prior to Sulfasalazine administration due to the potential for falsely depressed results.                               This is a corrected result. Previous result was 15 U/L on 9/20/2024 at 1406 EDT    Alkaline Phosphatase 09/20/2024 50  34 - 104 U/L Corrected    This is a corrected result. Previous result was 58 U/L on 9/20/2024 at 1406 EDT    Total Protein 09/20/2024 6.5  6.4 - 8.4 g/dL Corrected    This is a corrected result. Previous result was 7.0 g/dL on 9/20/2024 at 1406 EDT    Albumin 09/20/2024 3.3 (L)  3.5 - 5.0 g/dL Corrected    This is a corrected result. Previous result was 4.2 g/dL on 9/20/2024 at 1406 EDT    Total Bilirubin 09/20/2024 0.30  0.20 - 1.00 mg/dL Corrected    Comment: Use of this assay is not recommended for patients undergoing treatment with eltrombopag due to the potential for falsely elevated results.  N-acetyl-p-benzoquinone imine (metabolite of Acetaminophen) will generate erroneously low results in samples for patients that have taken an overdose of Acetaminophen.                             This is a corrected result. Previous result was 0.39 mg/dL on 9/20/2024 at 1406 EDT    eGFR 09/20/2024 62  ml/min/1.73sq m Corrected    This is a corrected result. Previous result was 79 ml/min/1.73sq m on 9/20/2024 at 1406 EDT    CEA 09/20/2024 2.1  0.0 - 3.0 ng/mL Final    Normal/Non-Smoker: 0.0-3.0 ng/mL   Normal/Smoker:     0.0-5.0 ng/mL    [employer] [employer] Access chemiluminescent immunoassay. Results cannot be interpreted for the presence or absence of malignant disease.  Confirm baseline values for patients being serially monitored.     Appointment on 09/07/2024   Component Date Value Ref Range Status    WBC 09/07/2024 5.65  4.31 - 10.16 Thousand/uL Final    RBC 09/07/2024 3.92  3.81 - 5.12 Million/uL Final    Hemoglobin 09/07/2024 12.1  11.5 - 15.4 g/dL Final    Hematocrit 09/07/2024 38.1  34.8 - 46.1 % Final    MCV 09/07/2024 97  82 - 98 fL Final    MCH 09/07/2024 30.9  26.8 - 34.3 pg Final    MCHC 09/07/2024 31.8  31.4 - 37.4 g/dL Final    RDW 09/07/2024 16.3 (H)  11.6 - 15.1 % Final     MPV 09/07/2024 10.5  8.9 - 12.7 fL Final    Platelets 09/07/2024 324  149 - 390 Thousands/uL Final    nRBC 09/07/2024 0  /100 WBCs Final    Segmented % 09/07/2024 54  43 - 75 % Final    Immature Grans % 09/07/2024 0  0 - 2 % Final    Lymphocytes % 09/07/2024 29  14 - 44 % Final    Monocytes % 09/07/2024 14 (H)  4 - 12 % Final    Eosinophils Relative 09/07/2024 3  0 - 6 % Final    Basophils Relative 09/07/2024 0  0 - 1 % Final    Absolute Neutrophils 09/07/2024 3.06  1.85 - 7.62 Thousands/µL Final    Absolute Immature Grans 09/07/2024 0.01  0.00 - 0.20 Thousand/uL Final    Absolute Lymphocytes 09/07/2024 1.62  0.60 - 4.47 Thousands/µL Final    Absolute Monocytes 09/07/2024 0.80  0.17 - 1.22 Thousand/µL Final    Eosinophils Absolute 09/07/2024 0.14  0.00 - 0.61 Thousand/µL Final    Basophils Absolute 09/07/2024 0.02  0.00 - 0.10 Thousands/µL Final    Sodium 09/07/2024 141  135 - 147 mmol/L Final    Potassium 09/07/2024 4.1  3.5 - 5.3 mmol/L Final    Chloride 09/07/2024 110 (H)  96 - 108 mmol/L Final    CO2 09/07/2024 24  21 - 32 mmol/L Final    ANION GAP 09/07/2024 7  4 - 13 mmol/L Final    BUN 09/07/2024 16  5 - 25 mg/dL Final    Creatinine 09/07/2024 1.02  0.60 - 1.30 mg/dL Final    Standardized to IDMS reference method    Glucose 09/07/2024 101  65 - 140 mg/dL Final    If the patient is fasting, the ADA then defines impaired fasting glucose as > 100 mg/dL and diabetes as > or equal to 123 mg/dL.    Calcium 09/07/2024 9.1  8.4 - 10.2 mg/dL Final    Corrected Calcium 09/07/2024 9.7  8.3 - 10.1 mg/dL Final    AST 09/07/2024 12 (L)  13 - 39 U/L Final    ALT 09/07/2024 10  7 - 52 U/L Final    Specimen collection should occur prior to Sulfasalazine administration due to the potential for falsely depressed results.     Alkaline Phosphatase 09/07/2024 42  34 - 104 U/L Final    Total Protein 09/07/2024 6.3 (L)  6.4 - 8.4 g/dL Final    Albumin 09/07/2024 3.3 (L)  3.5 - 5.0 g/dL Final    Total Bilirubin 09/07/2024 0.45  0.20  - 1.00 mg/dL Final    Use of this assay is not recommended for patients undergoing treatment with eltrombopag due to the potential for falsely elevated results.  N-acetyl-p-benzoquinone imine (metabolite of Acetaminophen) will generate erroneously low results in samples for patients that have taken an overdose of Acetaminophen.    eGFR 09/07/2024 55  ml/min/1.73sq m Final    Color, UA 09/07/2024 Colorless   Final    Clarity, UA 09/07/2024 Clear   Final    Specific Gravity, UA 09/07/2024 1.015  1.005 - 1.030 Final    pH, UA 09/07/2024 6.0  4.5, 5.0, 5.5, 6.0, 6.5, 7.0, 7.5, 8.0 Final    Leukocytes, UA 09/07/2024 Negative  Negative Final    Nitrite, UA 09/07/2024 Negative  Negative Final    Protein, UA 09/07/2024 Negative  Negative mg/dl Final    Glucose, UA 09/07/2024 Negative  Negative mg/dl Final    Ketones, UA 09/07/2024 Negative  Negative mg/dl Final    Urobilinogen, UA 09/07/2024 <2.0  <2.0 mg/dl mg/dl Final    Bilirubin, UA 09/07/2024 Negative  Negative Final    Occult Blood, UA 09/07/2024 Negative  Negative Final    RBC, UA 09/07/2024 None Seen  None Seen, 2-4 /hpf Final    WBC, UA 09/07/2024 0-1 (A)  None Seen, 2-4, 5-60 /hpf Final    Epithelial Cells 09/07/2024 Occasional  None Seen, Occasional /hpf Final    Bacteria, UA 09/07/2024 None Seen  None Seen, Occasional /hpf Final    CEA 09/07/2024 2.3  0.0 - 3.0 ng/mL Final    Normal/Non-Smoker: 0.0-3.0 ng/mL   Normal/Smoker:     0.0-5.0 ng/mL    [facility] [employer] Access chemiluminescent immunoassay. Results cannot be interpreted for the presence or absence of malignant disease.  Confirm baseline values for patients being serially monitored.     Appointment on 08/24/2024   Component Date Value Ref Range Status    WBC 08/24/2024 6.64  4.31 - 10.16 Thousand/uL Final    RBC 08/24/2024 4.04  3.81 - 5.12 Million/uL Final    Hemoglobin 08/24/2024 12.5  11.5 - 15.4 g/dL Final    Hematocrit 08/24/2024 40.4  34.8 - 46.1 % Final    MCV 08/24/2024 100 (H)  82 - 98 fL Final     MCH 08/24/2024 30.9  26.8 - 34.3 pg Final    MCHC 08/24/2024 30.9 (L)  31.4 - 37.4 g/dL Final    RDW 08/24/2024 16.5 (H)  11.6 - 15.1 % Final    MPV 08/24/2024 10.9  8.9 - 12.7 fL Final    Platelets 08/24/2024 323  149 - 390 Thousands/uL Final    nRBC 08/24/2024 0  /100 WBCs Final    Segmented % 08/24/2024 57  43 - 75 % Final    Immature Grans % 08/24/2024 0  0 - 2 % Final    Lymphocytes % 08/24/2024 28  14 - 44 % Final    Monocytes % 08/24/2024 13 (H)  4 - 12 % Final    Eosinophils Relative 08/24/2024 2  0 - 6 % Final    Basophils Relative 08/24/2024 0  0 - 1 % Final    Absolute Neutrophils 08/24/2024 3.81  1.85 - 7.62 Thousands/µL Final    Absolute Immature Grans 08/24/2024 0.02  0.00 - 0.20 Thousand/uL Final    Absolute Lymphocytes 08/24/2024 1.84  0.60 - 4.47 Thousands/µL Final    Absolute Monocytes 08/24/2024 0.83  0.17 - 1.22 Thousand/µL Final    Eosinophils Absolute 08/24/2024 0.12  0.00 - 0.61 Thousand/µL Final    Basophils Absolute 08/24/2024 0.02  0.00 - 0.10 Thousands/µL Final    Sodium 08/24/2024 138  135 - 147 mmol/L Final    Potassium 08/24/2024 4.2  3.5 - 5.3 mmol/L Final    Chloride 08/24/2024 109 (H)  96 - 108 mmol/L Final    CO2 08/24/2024 21  21 - 32 mmol/L Final    ANION GAP 08/24/2024 8  4 - 13 mmol/L Final    BUN 08/24/2024 20  5 - 25 mg/dL Final    Creatinine 08/24/2024 1.02  0.60 - 1.30 mg/dL Final    Standardized to IDMS reference method    Glucose 08/24/2024 112  65 - 140 mg/dL Final    If the patient is fasting, the ADA then defines impaired fasting glucose as > 100 mg/dL and diabetes as > or equal to 123 mg/dL.    Calcium 08/24/2024 9.1  8.4 - 10.2 mg/dL Final    Corrected Calcium 08/24/2024 9.6  8.3 - 10.1 mg/dL Final    AST 08/24/2024 13  13 - 39 U/L Final    ALT 08/24/2024 11  7 - 52 U/L Final    Specimen collection should occur prior to Sulfasalazine administration due to the potential for falsely depressed results.     Alkaline Phosphatase 08/24/2024 52  34 - 104 U/L Final    Total  Protein 08/24/2024 6.8  6.4 - 8.4 g/dL Final    Albumin 08/24/2024 3.4 (L)  3.5 - 5.0 g/dL Final    Total Bilirubin 08/24/2024 0.45  0.20 - 1.00 mg/dL Final    Use of this assay is not recommended for patients undergoing treatment with eltrombopag due to the potential for falsely elevated results.  N-acetyl-p-benzoquinone imine (metabolite of Acetaminophen) will generate erroneously low results in samples for patients that have taken an overdose of Acetaminophen.    eGFR 08/24/2024 55  ml/min/1.73sq m Final    Color, UA 08/24/2024 Yellow   Final    Clarity, UA 08/24/2024 Clear   Final    Specific Gravity, UA 08/24/2024 1.025  1.005 - 1.030 Final    pH, UA 08/24/2024 5.5  4.5, 5.0, 5.5, 6.0, 6.5, 7.0, 7.5, 8.0 Final    Leukocytes, UA 08/24/2024 Large (A)  Negative Final    Nitrite, UA 08/24/2024 Negative  Negative Final    Protein, UA 08/24/2024 Negative  Negative mg/dl Final    Glucose, UA 08/24/2024 Negative  Negative mg/dl Final    Ketones, UA 08/24/2024 Negative  Negative mg/dl Final    Urobilinogen, UA 08/24/2024 <2.0  <2.0 mg/dl mg/dl Final    Bilirubin, UA 08/24/2024 Negative  Negative Final    Occult Blood, UA 08/24/2024 Negative  Negative Final    RBC, UA 08/24/2024 None Seen  None Seen, 2-4 /hpf Final    WBC, UA 08/24/2024 2-4  None Seen, 2-4, 5-60 /hpf Final    Epithelial Cells 08/24/2024 Occasional  None Seen, Occasional /hpf Final    Bacteria, UA 08/24/2024 Occasional  None Seen, Occasional /hpf Final   Appointment on 08/12/2024   Component Date Value Ref Range Status    WBC 08/12/2024 6.61  4.31 - 10.16 Thousand/uL Final    RBC 08/12/2024 3.75 (L)  3.81 - 5.12 Million/uL Final    Hemoglobin 08/12/2024 11.5  11.5 - 15.4 g/dL Final    Hematocrit 08/12/2024 37.3  34.8 - 46.1 % Final    MCV 08/12/2024 100 (H)  82 - 98 fL Final    MCH 08/12/2024 30.7  26.8 - 34.3 pg Final    MCHC 08/12/2024 30.8 (L)  31.4 - 37.4 g/dL Final    RDW 08/12/2024 17.2 (H)  11.6 - 15.1 % Final    MPV 08/12/2024 10.8  8.9 - 12.7 fL  Final    Platelets 08/12/2024 296  149 - 390 Thousands/uL Final    nRBC 08/12/2024 0  /100 WBCs Final    Segmented % 08/12/2024 55  43 - 75 % Final    Immature Grans % 08/12/2024 0  0 - 2 % Final    Lymphocytes % 08/12/2024 29  14 - 44 % Final    Monocytes % 08/12/2024 10  4 - 12 % Final    Eosinophils Relative 08/12/2024 5  0 - 6 % Final    Basophils Relative 08/12/2024 1  0 - 1 % Final    Absolute Neutrophils 08/12/2024 3.61  1.85 - 7.62 Thousands/µL Final    Absolute Immature Grans 08/12/2024 0.02  0.00 - 0.20 Thousand/uL Final    Absolute Lymphocytes 08/12/2024 1.94  0.60 - 4.47 Thousands/µL Final    Absolute Monocytes 08/12/2024 0.69  0.17 - 1.22 Thousand/µL Final    Eosinophils Absolute 08/12/2024 0.30  0.00 - 0.61 Thousand/µL Final    Basophils Absolute 08/12/2024 0.05  0.00 - 0.10 Thousands/µL Final    Sodium 08/12/2024 138  135 - 147 mmol/L Final    Potassium 08/12/2024 4.0  3.5 - 5.3 mmol/L Final    Chloride 08/12/2024 108  96 - 108 mmol/L Final    CO2 08/12/2024 23  21 - 32 mmol/L Final    ANION GAP 08/12/2024 7  4 - 13 mmol/L Final    BUN 08/12/2024 19  5 - 25 mg/dL Final    Creatinine 08/12/2024 1.00  0.60 - 1.30 mg/dL Final    Standardized to IDMS reference method    Glucose 08/12/2024 90  65 - 140 mg/dL Final    If the patient is fasting, the ADA then defines impaired fasting glucose as > 100 mg/dL and diabetes as > or equal to 123 mg/dL.    Calcium 08/12/2024 8.9  8.4 - 10.2 mg/dL Final    Corrected Calcium 08/12/2024 9.5  8.3 - 10.1 mg/dL Final    AST 08/12/2024 12 (L)  13 - 39 U/L Final    ALT 08/12/2024 10  7 - 52 U/L Final    Specimen collection should occur prior to Sulfasalazine administration due to the potential for falsely depressed results.     Alkaline Phosphatase 08/12/2024 51  34 - 104 U/L Final    Total Protein 08/12/2024 6.4  6.4 - 8.4 g/dL Final    Albumin 08/12/2024 3.2 (L)  3.5 - 5.0 g/dL Final    Total Bilirubin 08/12/2024 0.68  0.20 - 1.00 mg/dL Final    Use of this assay is not  recommended for patients undergoing treatment with eltrombopag due to the potential for falsely elevated results.  N-acetyl-p-benzoquinone imine (metabolite of Acetaminophen) will generate erroneously low results in samples for patients that have taken an overdose of Acetaminophen.    eGFR 08/12/2024 57  ml/min/1.73sq m Final    Color, UA 08/12/2024 Yellow   Final    Clarity, UA 08/12/2024 Clear   Final    Specific Gravity, UA 08/12/2024 1.025  1.005 - 1.030 Final    pH, UA 08/12/2024 5.5  4.5, 5.0, 5.5, 6.0, 6.5, 7.0, 7.5, 8.0 Final    Leukocytes, UA 08/12/2024 Moderate (A)  Negative Final    Nitrite, UA 08/12/2024 Negative  Negative Final    Protein, UA 08/12/2024 Negative  Negative mg/dl Final    Glucose, UA 08/12/2024 Negative  Negative mg/dl Final    Ketones, UA 08/12/2024 Negative  Negative mg/dl Final    Urobilinogen, UA 08/12/2024 <2.0  <2.0 mg/dl mg/dl Final    Bilirubin, UA 08/12/2024 Negative  Negative Final    Occult Blood, UA 08/12/2024 Negative  Negative Final    RBC, UA 08/12/2024 None Seen  None Seen, 0-1, 1-2, 2-4, 0-5 /hpf Final    WBC, UA 08/12/2024 4-10 (A)  None Seen, 0-1, 1-2, 0-5, 2-4 /hpf Final    Epithelial Cells 08/12/2024 Occasional  None Seen, Occasional /hpf Final    Bacteria, UA 08/12/2024 Occasional  None Seen, Occasional /hpf Final    Ca Oxalate Selena, UA 08/12/2024 Occasional (A)  None Seen /hpf Final   Appointment on 07/29/2024   Component Date Value Ref Range Status    CEA 07/29/2024 2.5  0.0 - 3.0 ng/mL Final    Normal/Non-Smoker: 0.0-3.0 ng/mL   Normal/Smoker:     0.0-5.0 ng/mL    [facility] [email or web address] Access chemiluminescent immunoassay. Results cannot be interpreted for the presence or absence of malignant disease.  Confirm baseline values for patients being serially monitored.      WBC 07/29/2024 6.85  4.31 - 10.16 Thousand/uL Final    RBC 07/29/2024 3.70 (L)  3.81 - 5.12 Million/uL Final    Hemoglobin 07/29/2024 11.5  11.5 - 15.4 g/dL Final    Hematocrit 07/29/2024 36.8  34.8 - 46.1  % Final    MCV 07/29/2024 100 (H)  82 - 98 fL Final    MCH 07/29/2024 31.1  26.8 - 34.3 pg Final    MCHC 07/29/2024 31.3 (L)  31.4 - 37.4 g/dL Final    RDW 07/29/2024 19.5 (H)  11.6 - 15.1 % Final    MPV 07/29/2024 11.2  8.9 - 12.7 fL Final    Platelets 07/29/2024 286  149 - 390 Thousands/uL Final    nRBC 07/29/2024 0  /100 WBCs Final    Segmented % 07/29/2024 46  43 - 75 % Final    Immature Grans % 07/29/2024 0  0 - 2 % Final    Lymphocytes % 07/29/2024 33  14 - 44 % Final    Monocytes % 07/29/2024 13 (H)  4 - 12 % Final    Eosinophils Relative 07/29/2024 6  0 - 6 % Final    Basophils Relative 07/29/2024 2 (H)  0 - 1 % Final    Absolute Neutrophils 07/29/2024 3.10  1.85 - 7.62 Thousands/µL Final    Absolute Immature Grans 07/29/2024 0.01  0.00 - 0.20 Thousand/uL Final    Absolute Lymphocytes 07/29/2024 2.29  0.60 - 4.47 Thousands/µL Final    Absolute Monocytes 07/29/2024 0.90  0.17 - 1.22 Thousand/µL Final    Eosinophils Absolute 07/29/2024 0.44  0.00 - 0.61 Thousand/µL Final    Basophils Absolute 07/29/2024 0.11 (H)  0.00 - 0.10 Thousands/µL Final    Sodium 07/29/2024 138  135 - 147 mmol/L Final    Potassium 07/29/2024 4.0  3.5 - 5.3 mmol/L Final    Chloride 07/29/2024 109 (H)  96 - 108 mmol/L Final    CO2 07/29/2024 22  21 - 32 mmol/L Final    ANION GAP 07/29/2024 7  4 - 13 mmol/L Final    BUN 07/29/2024 18  5 - 25 mg/dL Final    Creatinine 07/29/2024 0.97  0.60 - 1.30 mg/dL Final    Standardized to IDMS reference method    Glucose 07/29/2024 105  65 - 140 mg/dL Final    If the patient is fasting, the ADA then defines impaired fasting glucose as > 100 mg/dL and diabetes as > or equal to 123 mg/dL.    Calcium 07/29/2024 8.9  8.4 - 10.2 mg/dL Final    Corrected Calcium 07/29/2024 9.5  8.3 - 10.1 mg/dL Final    AST 07/29/2024 12 (L)  13 - 39 U/L Final    ALT 07/29/2024 9  7 - 52 U/L Final    Specimen collection should occur prior to Sulfasalazine administration due to the potential for falsely depressed results.      Alkaline Phosphatase 07/29/2024 55  34 - 104 U/L Final    Total Protein 07/29/2024 6.7  6.4 - 8.4 g/dL Final    Albumin 07/29/2024 3.3 (L)  3.5 - 5.0 g/dL Final    Total Bilirubin 07/29/2024 0.60  0.20 - 1.00 mg/dL Final    Use of this assay is not recommended for patients undergoing treatment with eltrombopag due to the potential for falsely elevated results.  N-acetyl-p-benzoquinone imine (metabolite of Acetaminophen) will generate erroneously low results in samples for patients that have taken an overdose of Acetaminophen.    eGFR 07/29/2024 59  ml/min/1.73sq m Final   Appointment on 07/16/2024   Component Date Value Ref Range Status    CEA 07/16/2024 3.0  0.0 - 3.0 ng/mL Final    Normal/Non-Smoker: 0.0-3.0 ng/mL   Normal/Smoker:     0.0-5.0 ng/mL    [employer] Access chemiluminescent immunoassay. Results cannot be interpreted for the presence or absence of malignant disease.  Confirm baseline values for patients being serially monitored.      Color, UA 07/29/2024 Yellow   Final    Clarity, UA 07/29/2024 Clear   Final    Specific Gravity, UA 07/29/2024 >=1.030  1.005 - 1.030 Final    pH, UA 07/29/2024 6.0  4.5, 5.0, 5.5, 6.0, 6.5, 7.0, 7.5, 8.0 Final    Leukocytes, UA 07/29/2024 Negative  Negative Final    Nitrite, UA 07/29/2024 Negative  Negative Final    Protein, UA 07/29/2024 Negative  Negative mg/dl Final    Glucose, UA 07/29/2024 Negative  Negative mg/dl Final    Ketones, UA 07/29/2024 Negative  Negative mg/dl Final    Urobilinogen, UA 07/29/2024 <2.0  <2.0 mg/dl mg/dl Final    Bilirubin, UA 07/29/2024 Negative  Negative Final    Occult Blood, UA 07/29/2024 Negative  Negative Final    RBC, UA 07/29/2024 None Seen  None Seen, 2-4 /hpf Final    WBC, UA 07/29/2024 1-2 (A)  None Seen, 2-4, 5-60 /hpf Final    Epithelial Cells 07/29/2024 Occasional  None Seen, Occasional /hpf Final    Bacteria, UA 07/29/2024 Occasional  None Seen, Occasional /hpf Final   Hospital Outpatient Visit on 06/24/2024   Component  Date Value Ref Range Status    Color, UA 06/24/2024 Dark Yellow   Final    Clarity, UA 06/24/2024 Slightly Cloudy   Final    Specific Gravity, UA 06/24/2024 >=1.030  1.005 - 1.030 Final    pH, UA 06/24/2024 5.5  4.5, 5.0, 5.5, 6.0, 6.5, 7.0, 7.5, 8.0 Final    Leukocytes, UA 06/24/2024 Moderate (A)  Negative Final    Nitrite, UA 06/24/2024 Negative  Negative Final    Protein, UA 06/24/2024 30 (1+) (A)  Negative mg/dl Final    Glucose, UA 06/24/2024 Negative  Negative mg/dl Final    Ketones, UA 06/24/2024 Negative  Negative mg/dl Final    Urobilinogen, UA 06/24/2024 2.0 (A)  <2.0 mg/dl mg/dl Final    Bilirubin, UA 06/24/2024 Negative  Negative Final    Occult Blood, UA 06/24/2024 Trace (A)  Negative Final    RBC, UA 06/24/2024 0-1  None Seen, 0-1, 1-2, 2-4, 0-5 /hpf Final    WBC, UA 06/24/2024 20-30 (A)  None Seen, 0-1, 1-2, 0-5, 2-4 /hpf Final    Epithelial Cells 06/24/2024 Innumerable (A)  None Seen, Occasional /hpf Final    Bacteria, UA 06/24/2024 Moderate (A)  None Seen, Occasional /hpf Final    MUCUS THREADS 06/24/2024 Moderate (A)  None Seen Final   Appointment on 06/21/2024   Component Date Value Ref Range Status    WBC 06/21/2024 8.48  4.31 - 10.16 Thousand/uL Final    RBC 06/21/2024 3.65 (L)  3.81 - 5.12 Million/uL Final    Hemoglobin 06/21/2024 10.8 (L)  11.5 - 15.4 g/dL Final    Hematocrit 06/21/2024 34.5 (L)  34.8 - 46.1 % Final    MCV 06/21/2024 95  82 - 98 fL Final    MCH 06/21/2024 29.6  26.8 - 34.3 pg Final    MCHC 06/21/2024 31.3 (L)  31.4 - 37.4 g/dL Final    RDW 06/21/2024 26.1 (H)  11.6 - 15.1 % Final    MPV 06/21/2024 11.9  8.9 - 12.7 fL Final    Platelets 06/21/2024 344  149 - 390 Thousands/uL Final    nRBC 06/21/2024 0  /100 WBCs Final    Segmented % 06/21/2024 57  43 - 75 % Final    Immature Grans % 06/21/2024 1  0 - 2 % Final    Lymphocytes % 06/21/2024 26  14 - 44 % Final    Monocytes % 06/21/2024 12  4 - 12 % Final    Eosinophils Relative 06/21/2024 3  0 - 6 % Final    Basophils Relative  06/21/2024 1  0 - 1 % Final    Absolute Neutrophils 06/21/2024 4.98  1.85 - 7.62 Thousands/µL Final    Absolute Immature Grans 06/21/2024 0.07  0.00 - 0.20 Thousand/uL Final    Absolute Lymphocytes 06/21/2024 2.16  0.60 - 4.47 Thousands/µL Final    Absolute Monocytes 06/21/2024 0.98  0.17 - 1.22 Thousand/µL Final    Eosinophils Absolute 06/21/2024 0.23  0.00 - 0.61 Thousand/µL Final    Basophils Absolute 06/21/2024 0.06  0.00 - 0.10 Thousands/µL Final    Sodium 06/21/2024 133 (L)  135 - 147 mmol/L Final    Potassium 06/21/2024 4.0  3.5 - 5.3 mmol/L Final    Chloride 06/21/2024 114 (H)  96 - 108 mmol/L Final    CO2 06/21/2024 22  21 - 32 mmol/L Final    ANION GAP 06/21/2024 -3 (L)  4 - 13 mmol/L Final    BUN 06/21/2024 14  5 - 25 mg/dL Final    Creatinine 06/21/2024 1.02  0.60 - 1.30 mg/dL Final    Standardized to IDMS reference method    Glucose, Fasting 06/21/2024 97  65 - 99 mg/dL Final    Calcium 06/21/2024 9.0  8.4 - 10.2 mg/dL Final    AST 06/21/2024 16  13 - 39 U/L Final    ALT 06/21/2024 18  7 - 52 U/L Final    Specimen collection should occur prior to Sulfasalazine administration due to the potential for falsely depressed results.     Alkaline Phosphatase 06/21/2024 101  34 - 104 U/L Final    Total Protein 06/21/2024 6.6  6.4 - 8.4 g/dL Final    Albumin 06/21/2024 3.5  3.5 - 5.0 g/dL Final    Total Bilirubin 06/21/2024 0.51  0.20 - 1.00 mg/dL Final    Use of this assay is not recommended for patients undergoing treatment with eltrombopag due to the potential for falsely elevated results.  N-acetyl-p-benzoquinone imine (metabolite of Acetaminophen) will generate erroneously low results in samples for patients that have taken an overdose of Acetaminophen.    eGFR 06/21/2024 55  ml/min/1.73sq m Final   There may be more visits with results that are not included.        No results found for this or any previous visit.     No results found for this or any previous visit.    Results for orders placed during the  hospital encounter of 10/19/24    MRI brain w wo contrast    Narrative  MRI BRAIN WITH AND WITHOUT CONTRAST    INDICATION: met colon cancer. right leg numbness. CTH shows right frontal lobe hypodensity.    COMPARISON:  None.    TECHNIQUE:  Multiplanar, multisequence imaging of the brain was performed before and after gadolinium administration.      IV Contrast:  9 mL of Gadobutrol injection (SINGLE-DOSE)    IMAGE QUALITY:   Diagnostic.    FINDINGS:    BRAIN PARENCHYMA: Well-circumscribed focus of FLAIR abnormality in the right posterior frontal deep white matter corresponds to a focus of low density on CT measuring approximately 2.2 cm. This region has no significant mass effect, no diffusion  abnormality and no parenchymal enhancement. There is a vascular enhancing lesion within the central portion of this FLAIR abnormality with appearance compatible with venous angioma. Findings may represent gliosis or edema associated with venous angioma.  Other etiologies such as metastatic disease are less likely in light of the lack of postcontrast enhancement. Follow-up MRI recommended in 2 months.    Small scattered hyperintensities on T2/FLAIR imaging are noted in the periventricular and subcortical white matter demonstrating an appearance that is statistically most likely to represent mild microangiopathic change.    Postcontrast imaging of the brain demonstrates no abnormal enhancement.    VENTRICLES:  Normal for the patient's age.    SELLA AND PITUITARY GLAND:  Normal.    ORBITS:  Normal.    PARANASAL SINUSES:  Normal.    VASCULATURE:  Evaluation of the major intracranial vasculature demonstrates appropriate flow voids.    CALVARIUM AND SKULL BASE:  Normal.    EXTRACRANIAL SOFT TISSUES:  Normal.    Impression  No evidence of metastatic disease.    Focus of FLAIR abnormality in the right posterior frontal deep white matter corresponds to the area of low density on CT. This likely represents either gliosis or mild edema  surrounding a venous angioma. Follow-up gadolinium-enhanced MRI recommended in 2  months to exclude more aggressive pathologies.    Workstation performed: [id number]    No results found for this or any previous visit.    No results found for this or any previous visit.    No results found for this or any previous visit.    No results found for this or any previous visit.    No results found for this or any previous visit.    No results found for this or any previous visit.    Results for orders placed during the hospital encounter of 10/19/24    MRI cervical spine w wo contrast    Narrative  MRI CERVICAL SPINE WITH AND WITHOUT CONTRAST    INDICATION: Extremity weakness, evaluate for metastases.    COMPARISON: Lumbar spine MR from 10/21/2024., Brain MR from 10/20/2024, CT angiogram of the head and neck from 10/19/2024    TECHNIQUE:  Multiplanar, multisequence imaging of the cervical spine was performed before and after gadolinium administration. .      IV Contrast:  9 mL of Gadobutrol injection (SINGLE-DOSE)    IMAGE QUALITY:  Diagnostic.    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 There is reversal of the normal edetic curvature the cervical spine, centered at C4, with  mild retrolisthesis of C5 on C6 and C6 on C7 noted. No compression fractures identified.    MARROW SIGNAL: There is disc height loss with degenerative endplate changes at multiple levels from C4-C7, but the bone marrow signal intensity is otherwise grossly normal on all sequences.    CERVICAL AND VISUALIZED UPPER THORACIC CORD:  Normal signal within the visualized cord.    PREVERTEBRAL AND PARASPINAL SOFT TISSUES:  Normal.    VISUALIZED POSTERIOR FOSSA:  The visualized posterior fossa demonstrates no abnormal signal.    CERVICAL DISC SPACES:    C2-C3: Congenital partial failure of segmentation at this level. Rudimentary  intervertebral disc noted. No canal or neuroforaminal stenosis identified.    C3-C4: Disc bulge. Ligamentum flavum infolding. No canal stenosis. Left greater than right facet arthropathy and right greater than left uncovertebral joint arthropathy. Moderate to severe right and moderate left neuroforaminal stenosis.    C4-C5: Disc height loss, disc osteophyte complex asymmetric to the left contributing to mild canal stenosis. Bilateral uncovertebral joint arthropathy and facet arthropathy. Moderate to severe right and moderate left neuroforaminal stenosis.    C5-C6: Disc height loss, disc osteophyte complex with superimposed central disc protrusion, contributing to moderate canal stenosis. Right greater than left uncovertebral joint arthropathy and facet arthropathy. Severe right and mild left neuroforaminal  stenosis.    C6-C7: Disc height loss, disc osteophyte complex, contributing to mild canal stenosis. Bilateral uncovertebral joint arthropathy and left facet arthropathy. Mild left neuroforaminal stenosis.    C7-T1: Bilateral facet arthropathy. Otherwise normal.    POSTCONTRAST IMAGING:  Normal.    Upper thoracic disc spaces: Grossly normal.    OTHER FINDINGS:  None.    Impression  No suspicious marrow replacing lesions noted involving the cervical spine. No epidural or paraspinal tumor noted.    Multilevel cervical spondylosis, as described above, contributing to at most moderate canal stenosis at C5-C6, and multilevel neural foraminal stenosis, worst on the right at C4-C6.    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      Workstation performed: [id number]    Results for orders placed during the hospital encounter of 10/19/24    MRI thoracic spine w wo contrast    Addendum 10/24/2024  7:18 AM  ADDENDUM:    Small focal areas of enhancement with mild intramuscular edema in left lateral paraspinal lower thoracic  musculature adjacent to expansile left posterior 11th osseous rib metastasis. Question soft tissue extension of left posterior 11th rib metastasis,  reactive change, or denervation changes.    The study was marked in EPIC for immediate notification.    Narrative  MRI THORACIC SPINE WITH AND WITHOUT CONTRAST    INDICATION: mets.    COMPARISON: Same day MRI lumbar MRI brain with and without contrast 10/20/2024. Spine with and without contrast. MRI cervical spine with and without contrast 10/22/2024. CT abdomen pelvis with contrast 10/19/2024. CT chest abdomen pelvis with contrast  7/3/2024.    TECHNIQUE:  Multiplanar, multisequence imaging of the thoracic spine was performed before and after gadolinium administration. .    IV Contrast:  9 mL of Gadobutrol injection (SINGLE-DOSE)    IMAGE QUALITY:  Diagnostic.    FINDINGS:    ALIGNMENT:  Normal alignment of the thoracic spine.  No compression fracture.  No subluxation.  No evidence of scoliosis.    MARROW SIGNAL: Type II Modic endplate change T5-T6 and T6-T7. No pathologic marrow replacing lesion or bone marrow edema.    THORACIC CORD:  Normal signal within the thoracic cord.    PREVERTEBRAL AND PARASPINAL SOFT TISSUES:   Normal.    THORACIC DEGENERATIVE CHANGE: Multilevel osteophytes, small intravertebral herniations, mild disc height loss. Small posterior disc bulge at T10-T11 and T11-T12 contribute to minor canal stenosis at these levels. Otherwise, no significant canal stenosis  or foraminal narrowing.    POSTCONTRAST:  No abnormal enhancement.    OTHER FINDINGS: Partially imaged expansile heterogeneous left posterior 11th rib lesion. Known left 11th rib fracture is best seen on CT abdomen pelvis. Small left pleural effusion.    Impression  No metastasis in thoracic spine.    Partially imaged known osseous metastasis of expansile left posterior 11th rib. Known left 11th rib fracture best seen on CT abdomen pelvis 10/19/2024.    Partially imaged small left pleural  effusion.    Mild multilevel degenerative changes of thoracic spine with minor canal stenosis at T10-T11 and T11-T12, as detailed above. No significant foraminal narrowing.    The study was marked in EPIC for immediate notification.                Workstation performed: [id number]    Results for orders placed during the hospital encounter of 10/19/24    MRI lumbar spine w wo contrast    Narrative  MRI LUMBAR SPINE WITH AND WITHOUT CONTRAST    INDICATION: mets, weakness.    COMPARISON: Same day MRI thoracic spine with and without contrast. MRI lumbar spine with and without contrast 10/21/2024 is actually a lumbosacral plexus MRI. CT abdomen pelvis with contrast 10/19/2024. CT chest abdomen pelvis with contrast 7/3/2024.    TECHNIQUE:  Multiplanar, multisequence imaging of the lumbar spine was performed before and after gadolinium administration. .      IV Contrast:  9 mL of Gadobutrol injection (SINGLE-DOSE)    IMAGE QUALITY:  Diagnostic    FINDINGS:    VERTEBRAL BODIES:  There are 5 lumbar type vertebral bodies.  Normal alignment of the lumbar spine.  No spondylolysis or spondylolisthesis. No scoliosis.  No compression fracture.    Normal marrow signal is identified within the visualized bony structures.  No discrete marrow lesion.    SACRUM:  Normal signal within the sacrum. No evidence of insufficiency or stress fracture.    DISTAL CORD AND CONUS:  Normal size and signal within the distal cord and conus.    PARASPINAL SOFT TISSUES:  Paraspinal soft tissues are unremarkable.    LOWER THORACIC DISC SPACES:  Mild noncompressive lower thoracic degenerative change. Please see same day MRI thoracic spine with and without contrast for further evaluation.    LUMBAR DISC SPACES: Multilevel osteophytes, small intravertebral herniations, disc height loss, facet arthropathy.    L1-L2: Diffuse disc bulge, small right subarticular disc extrusion. Mild canal stenosis. No significant foraminal narrowing.    L2-L3: Diffuse disc  bulge. Facet arthropathy, ligamentum flavum thickening. Mild canal stenosis. Mild right foraminal narrowing.    L3-L4: Diffuse disc bulge, narrowed bilateral subarticular zones. Facet arthropathy, ligamentum flavum thickening. Mild canal stenosis. Mild bilateral foraminal narrowing.    L4-L5: Diffuse disc bulge, narrowed bilateral subarticular zones. Facet arthropathy, ligamentum flavum thickening. Mild canal stenosis. Mild bilateral foraminal narrowing.    L5-S1: Facet arthropathy. No significant canal stenosis or foraminal narrowing.    POSTCONTRAST IMAGING: Partially imaged small focal areas of enhancement in left lateral paraspinal lower thoracic musculature adjacent to expansile left osseous rib metastasis.    OTHER FINDINGS: Partially imaged cholelithiasis, left renal cyst, expansile left posterior 11th rib osseous metastasis,    Impression  Partially imaged small focal areas of enhancement in left lateral paraspinal lower thoracic musculature adjacent to expansile left posterior 11th osseous rib metastasis. Question soft tissue extension of left posterior 11th rib metastasis, reactive  change, or denervation changes. This is better visualized on the MRI lumbar spine compared to MRI thoracic spine given postcontrast fat-saturation.    No metastasis in lumbar spine.    Multilevel degenerative changes of lumbar spine with varying degrees of canal stenosis (multilevel mild, worse at L3-L4 and L4-L5) and foraminal narrowing (mild right L2-L3, bilateral L3-L4, bilateral L4-L5), as detailed above.    The study was marked in EPIC for immediate notification.                Workstation performed: [id number]      MRI lumbar spine w wo contrast    Narrative  MRI LUMBOSACRAL PLEXUS WITH AND WITHOUT CONTRAST    INDICATION: met colon cancer. right leg numbness and weakness..    COMPARISON: CT abdomen pelvis with contrast 10/19/2024. CT chest abdomen pelvis with contrast 7/3/2024.    TECHNIQUE:  Multiplanar, multisequence  imaging of the lumbar plexus was performed. .      IV Contrast:  9 mL of Gadobutrol injection (SINGLE-DOSE)    FINDINGS: Mild-to-moderate motion degraded.    LUMBOSACRAL PLEXUS: Visualized lumbosacral plexus has normal course and signal intensity without mass or mass effect on the bilateral sciatic nerves. No abnormal enhancement.    SOFT TISSUES:  Muscle bulk is symmetric without denervation atrophy.    LUMBAR SPINE: Grade 1 anterolisthesis L4-L5, unchanged. Multilevel degenerative changes of osteophytes, small intervertebral herniations, disc height loss and facet arthropathy.    OTHER: Postsurgical changes of low anterior resection and hysterectomy are better evaluated on CT abdomen pelvis with contrast. Multiple small bowel loops are adherent to the anterior abdominal wall, suggestive of adherent disease. Fat-containing  umbilical hernia containing loop of small bowel.    Impression  Mild-to-moderate motion degraded.  - Normal MRI lumbosacral plexus on this motion degraded exam.  - Degenerative changes of lumbar spine are suboptimally evaluated given large field of view of lumbar spine. Consider dedicated MRI lumbar spine with and without contrast contrast for further evaluation.  - Additional chronic/incidental findings as detailed above.              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  )    Review of Systems I have personally reviewed the MA's review of systems and made changes as necessary.    Medical History Reviewed by provider this encounter:     .  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    Colon polyp     GERD (gastroesophageal reflux disease)     History of chemotherapy 12/2021    History of rectal surgery      Hyperlipidemia     PONV (postoperative nausea and vomiting)      Past Surgical History:   Procedure Laterality Date    BREAST CYST EXCISION Right      SECTION      x3    COLON SIGMOID RESECTION      COLONOSCOPY      DILATION AND CURETTAGE OF UTERUS      ILEO LOOP DIVERSION N/A 12/10/2019    Procedure: ILEOSTOMY LOOP;  Surgeon: WINNIE Pastor MD;  [facility]: [facility];  Service: Robotics    INSTILLATION CHEMOTHERAPY INTRAPERITONEAL (HIPEC) LAPAROTOMY N/A 2022    Procedure: INSTILLATION CHEMOTHERAPY INTRAPERITONEAL (HIPEC) LAPAROTOMY;  Surgeon: Jessie Freeman MD;  Location: [facility];  Service: Surgical Oncology    IR BIOPSY CHEST WALL  2023    IR BIOPSY OMENTUM  2021    IR PORT PLACEMENT  2021    IR Y-90 PRE-ANGIO/EMBO W/ LUNG SCAN  2024    IR Y-90 RADIOEMBOLIZATION  2024    OMENTECTOMY N/A 2022    Procedure: DIAGNOSTIC LAPAROSCOPY, OPEN OMENTECTOMY, SPLENECTOMY, DIAPHRAGM REPAIR, CHEST TUBE INSERTION;  Surgeon: Jessie Freeman MD;  Location: [facility];  Service: Surgical Oncology    HI CLOSURE ENTEROSTOMY LG/SMALL INTESTINE N/A 2020    Procedure: CLOSURE ILEOSTOMY;  Surgeon: WINNIE Pastor MD;  [facility]: [facility];  Service: Colorectal    HI LAPAROSCOPY COLECTOMY PARTIAL W/ANASTOMOSIS N/A 12/10/2019    Procedure: SIGMOID RESECTION COLON LAPAROSCOPIC W ROBOTICS converted to lap hand assisted  with Partial proctectomy , LASER FLUORESCENCE ANGIOGRAPHY, INTRA OP COLONOSCOPY;  Surgeon: WINNIE Pastor MD;  [facility]: [facility];  Service: Robotics    HI TOTAL ABDOMINAL HYSTERECT W/WO RMVL TUBE OVARY N/A 2022    Procedure: DIAGNOSTIC LAPAROSCOPY, TOTAL ABDOMINAL HYSTERECTOMY, BILATERAL SALPINGO-OOPHORECTOMY, EXTENSIVE ADHESIOLYSIS;  Surgeon: Peterson Mae MD;  Location[facility];  Service: Gynecology Oncology    HI UNLISTED PROCEDURE DIAPHRAGM  2022    Procedure: REPAIR DIAPHRAGM TEAR;  Surgeon: Yana Hebert MD;  Location:  [facility];  Service: Thoracic    SMALL INTESTINE SURGERY  02/04/2020    Procedure: RESECTION SMALL BOWEL;  Surgeon: WINNIE Pastor MD;  [facility]: [facility];  Service: Colorectal     Family History   Problem Relation Age of Onset    Hypertension Mother     Heart attack Mother     Heart attack Father     Heart disease Father     Hypertension Brother     Heart attack Brother     Colon cancer Paternal Uncle     Leukemia Cousin     Breast cancer Cousin     Diabetes Cousin     Breast cancer Cousin     Colon cancer Family         paternal uncle    Stroke Neg Hx       reports that she has never smoked. She has been exposed to tobacco smoke. She has never used smokeless tobacco. She reports current alcohol use. She reports that she does not use drugs.  Current Outpatient Medications on File Prior to Visit   Medication Sig Dispense Refill    Acetaminophen (TYLENOL 8 HOUR PO) Take by mouth PRN      amLODIPine (NORVASC) 5 mg tablet Take 1 tablet (5 mg total) by mouth daily      apixaban (Eliquis) 5 mg Take 1 tablet (5 mg total) by mouth 2 (two) times a day 60 tablet 5    desloratadine (CLARINEX) 5 MG tablet Take 1 tablet (5 mg total) by mouth daily 30 tablet 5    ezetimibe (ZETIA) 10 mg tablet Take 1 tablet (10 mg total) by mouth daily 90 tablet 1    famotidine (PEPCID) 20 mg tablet Take 1 tablet (20 mg total) by mouth 2 (two) times a day as needed for heartburn As needed 90 tablet 1    meclizine (ANTIVERT) 12.5 MG tablet Take 1 tablet (12.5 mg total) by mouth every 12 (twelve) hours as needed for dizziness 60 tablet 3    ondansetron (Zofran ODT) 8 mg disintegrating tablet Take 1 tablet (8 mg total) by mouth every 8 (eight) hours as needed for nausea or vomiting 30 tablet 5    pregabalin (LYRICA) 50 mg capsule Take 1 capsule (50 mg total) by mouth 2 (two) times a day for 10 days      prochlorperazine (COMPAZINE) 5 mg tablet  (Patient not taking: Reported on 12/11/2024)      [DISCONTINUED] capecitabine (XELODA) 500 MG  "tablet Take 2 tablets (1,000 mg total) by mouth 2 (two) times a day with meals for 14 days 56 tablet 0     No current facility-administered medications on file prior to visit.     Allergies   Allergen Reactions    Medical Tape Rash     Skin irritation  Skin peeling  Skin irritation  Skin peeling  Blisters  Rash      Current Outpatient Medications on File Prior to Visit   Medication Sig Dispense Refill    Acetaminophen (TYLENOL 8 HOUR PO) Take by mouth PRN      amLODIPine (NORVASC) 5 mg tablet Take 1 tablet (5 mg total) by mouth daily      apixaban (Eliquis) 5 mg Take 1 tablet (5 mg total) by mouth 2 (two) times a day 60 tablet 5    desloratadine (CLARINEX) 5 MG tablet Take 1 tablet (5 mg total) by mouth daily 30 tablet 5    ezetimibe (ZETIA) 10 mg tablet Take 1 tablet (10 mg total) by mouth daily 90 tablet 1    famotidine (PEPCID) 20 mg tablet Take 1 tablet (20 mg total) by mouth 2 (two) times a day as needed for heartburn As needed 90 tablet 1    meclizine (ANTIVERT) 12.5 MG tablet Take 1 tablet (12.5 mg total) by mouth every 12 (twelve) hours as needed for dizziness 60 tablet 3    ondansetron (Zofran ODT) 8 mg disintegrating tablet Take 1 tablet (8 mg total) by mouth every 8 (eight) hours as needed for nausea or vomiting 30 tablet 5    pregabalin (LYRICA) 50 mg capsule Take 1 capsule (50 mg total) by mouth 2 (two) times a day for 10 days      prochlorperazine (COMPAZINE) 5 mg tablet  (Patient not taking: Reported on 12/11/2024)      [DISCONTINUED] capecitabine (XELODA) 500 MG tablet Take 2 tablets (1,000 mg total) by mouth 2 (two) times a day with meals for 14 days 56 tablet 0     No current facility-administered medications on file prior to visit.      Social History     Tobacco Use    Smoking status: Never     Passive exposure: Past    Smokeless tobacco: Never   Vaping Use    Vaping status: Never Used   Substance and Sexual Activity    Alcohol use: Yes     Comment: \"occasionally\"    Drug use: Never    Sexual " activity: Not Currently        Objective   /80 (BP Location: Right arm, Patient Position: Sitting, Cuff Size: Extra-Large)   Pulse 80   LMP 09/01/2011 (Approximate)     Physical Exam  Neurological Exam  Obese, in no acute distress    Normocephalic, atraumatic    Heart: regular rate and rhythm    Extremities: no clubbing, cyanosis, or edema    Speech and cognition appeared normal    Cranial nerves:  II: Pupils equal, round, and reactive to light. No gross visual field defect. I did not appreciate optic disc edema.  III, IV, VI: Extraocular movements intact  V: Normal facial sensation in all three divisions of the trigeminal nerve bilaterally  VII: Normal facial strength  VIII: Hearing intact to finger rub bilaterally  IX, X: Palate elevated symmetrically  XI: Sternocleidomastoid strength normal bilaterally  XII: Tongue protruded in midline without atrophy or fibrillations    Motor:  Muscle testing by the MRC scale revealed:         Right   Left  Deltoid    5   4  Biceps    5   5  Triceps    5   5  Wrist flexors   5   5  Wrist extensors  5   5  Finger flexors   5   5  Finger extensors  5   5  Interossei   5   5  Hip flexors   4   5  Knee extensors  4   5  Tibialis anterior  4   5  Plantar flexors   4   5  Invertors   4   5  Evertors   4   5    MRC scale:  0/5 No contraction is present   1/5 Incomplete range of active motion, even when gravity is eliminated   2/5 Complete active range of motion with gravity eliminated   3/5 Full motion against gravity, but not with any resistance  4/5 The patient is able to complete the action against gravity and some resistance by the examiner   5/5 Completely normal strength, overcoming gravity and all resistance by the examiner       Deep tendon reflexes:   2+ and symmetrical in the biceps, triceps, brachioradialis  2+ in the left patella and ankle  1+ in the right patella, trace in the right ankle  Toes downgoing (no Babinski sign)  No Thompson's sign    Sensation: Normal  pinprick and light touch in the arms and the left leg  Diminished light touch throughout the right leg  Diminished pinprick throughout the right leg but less in the foot    Cerebellar: normal finger to nose and heel to shin testing    Gait: Nonambulatory

## 2024-12-11 NOTE — ASSESSMENT & PLAN NOTE
I had a long discussion with Ms. Sanches and her son.  I spent over 75 minutes on this visit.  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  She developed acute weakness and numbness confined to the right leg.  Cranial MRI showed a right sided lesion of uncertain significance.  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  Further imaging might be considered based on her clinical course but I do not think it is the priority right now and she is in agreement.  Spinal disease is an obvious consideration despite the lack of incontinence or back pain.  This possibility was also excluded with a series of MRI studies, which do not show spinal metastases or a high-grade neural compressive lesion.  EMG was suboptimal but was in keeping with lumbar radiculopathy (for which there is not an obvious structural cause) or lumbar plexopathy.  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  Her retroperitoneal adenopathy could certainly compress the lumbar plexus and account for her problems.  She is scheduled for another series of CT studies and I am in complete agreement to look for any change.  MR could be considered for better assessment of neural structures but is not the study of choice looking at pelvic anatomy.  I do not know exactly where her radiation was; radiation plexopathy is a consideration but be expected to have a slow indolent onset, not an acute onset.  I think that her difficulty with the shoulders is probably mechanical because of her weight lifting (she tells me she has been doing more than her physical therapist want her to do)  but further assessment including other MRI studies and EMG might be considered based on her clinical course.  I explained all of the above to her and she is in agreement with this plan.

## 2024-12-17 ENCOUNTER — TELEPHONE (OUTPATIENT)
Age: 69
End: 2024-12-17

## 2024-12-17 NOTE — TELEPHONE ENCOUNTER
I attempted to call patient unable to leave a detailed msg as VM is full.       Will need to try patient at another time

## 2024-12-17 NOTE — TELEPHONE ENCOUNTER
Patient called, did not know who to call in regards to her still not being released from [facility] on [address] [facility] in [place]    Patient stated that she has completed her PT and OT, was kept a little longer due to thais COVID, stomach virus and potassium levels high.    Patient is feeling better now, was suppose to be discharged Monday and no one has completed paper work for her to be released.    If someone could please call patient at [phone]

## 2024-12-19 NOTE — TELEPHONE ENCOUNTER
I spoke to pt whom advised she is home now appreciated the call. Pt informed the situation she just didn't know who to call as she  was trying to get d/c from rehab and she didn't know which provider would be doing that. I listened and provided support to the pt. She was very appreciative of the call back and is happy to be home. She has no other questions at this time .

## 2024-12-20 DIAGNOSIS — R42 DIZZINESS: ICD-10-CM

## 2024-12-20 RX ORDER — MECLIZINE HCL 12.5 MG 12.5 MG/1
12.5 TABLET ORAL EVERY 12 HOURS PRN
Qty: 60 TABLET | Refills: 3 | Status: SHIPPED | OUTPATIENT
Start: 2024-12-20

## 2024-12-23 DIAGNOSIS — K21.9 GASTROESOPHAGEAL REFLUX DISEASE, UNSPECIFIED WHETHER ESOPHAGITIS PRESENT: ICD-10-CM

## 2024-12-24 DIAGNOSIS — E78.2 MIXED HYPERLIPIDEMIA: ICD-10-CM

## 2024-12-24 RX ORDER — EZETIMIBE 10 MG/1
10 TABLET ORAL DAILY
Qty: 90 TABLET | Refills: 1 | Status: SHIPPED | OUTPATIENT
Start: 2024-12-24

## 2024-12-24 RX ORDER — FAMOTIDINE 20 MG/1
TABLET, FILM COATED ORAL
Qty: 180 TABLET | Refills: 1 | Status: SHIPPED | OUTPATIENT
Start: 2024-12-24

## 2024-12-30 ENCOUNTER — TELEPHONE (OUTPATIENT)
Age: 69
End: 2024-12-30

## 2024-12-30 NOTE — TELEPHONE ENCOUNTER
Patient called regarding her CT Scan for Monday 1/6. She has been experiencing issues with numbness in her right leg and was recently hospitalized and in rehab for 2 months due to this.    Patient now home with her son in [place], but continues to learn to walk with the assistance of a walker. She is still having difficulties walking and driving and so unable to move around by herself without assistance.    She recently saw neuro and was advised that symptoms could be due to the cancer in her lymph nodes, pushing against a nerve in her right leg.(Please review neuro notes/Dr Ruby from 12/11)    Dr Ruby advised her to reach out to Dr Grubbs to see if a CT can be ordered to assess the leg for the above, same day as she is having the CT Chest Abdomen and Pelvis,  to avoid her having to go for imaging on a different day, due to her mobility issues.    Patient is also requesting assistance with transportation, as she is now living with her son at [place], [place]. She usually uses [facility] [employer], but with this mobility issues, will require assistance getting her from the house into the vehicle, out of the vehicle for her CT and then same when she returns home.    She also stated she is  4 feet 9 inches tall, and has difficulties getting into SUV's or Vans.    Her current address is [address] [place] [address].      Please review and call patient back with recommendations.

## 2024-12-30 NOTE — TELEPHONE ENCOUNTER
Please advise, thank you. Also, please let me know if [facility] can be scheduled for imaging appointments (if needed). Thanks!

## 2025-01-02 DIAGNOSIS — E66.01 MORBID OBESITY (HCC): ICD-10-CM

## 2025-01-06 ENCOUNTER — HOSPITAL ENCOUNTER (OUTPATIENT)
Dept: INFUSION CENTER | Facility: HOSPITAL | Age: 70
Discharge: HOME | End: 2025-01-06

## 2025-01-06 ENCOUNTER — TELEPHONE (OUTPATIENT)
Dept: INFUSION CENTER | Facility: HOSPITAL | Age: 70
End: 2025-01-06

## 2025-01-06 RX ORDER — APIXABAN 5 MG/1
TABLET, FILM COATED ORAL
Qty: 60 TABLET | Refills: 5 | Status: SHIPPED | OUTPATIENT
Start: 2025-01-06

## 2025-01-06 NOTE — TELEPHONE ENCOUNTER
Pt called and stated she needs a w/c van transport going forth as she can not walk long distances or climb into a van. Her Ct access for today was rescheduled until 1/10 @ 10:30 Ct Scan @ 12. [facility] set up for w/c van through [facility]

## 2025-01-07 ENCOUNTER — TELEPHONE (OUTPATIENT)
Age: 70
End: 2025-01-07

## 2025-01-07 ENCOUNTER — TELEMEDICINE (OUTPATIENT)
Dept: FAMILY MEDICINE CLINIC | Facility: CLINIC | Age: 70
End: 2025-01-07
Payer: MEDICARE

## 2025-01-07 DIAGNOSIS — R26.2 DIFFICULTY WALKING: ICD-10-CM

## 2025-01-07 DIAGNOSIS — R20.0 NUMBNESS AND TINGLING OF RIGHT LEG: Primary | ICD-10-CM

## 2025-01-07 DIAGNOSIS — R90.89 ABNORMAL FINDING ON MRI OF BRAIN: ICD-10-CM

## 2025-01-07 DIAGNOSIS — R20.0 NUMBNESS AND TINGLING OF RIGHT LEG: ICD-10-CM

## 2025-01-07 DIAGNOSIS — R20.2 NUMBNESS AND TINGLING OF RIGHT LEG: ICD-10-CM

## 2025-01-07 DIAGNOSIS — I10 PRIMARY HYPERTENSION: ICD-10-CM

## 2025-01-07 DIAGNOSIS — E78.2 MIXED HYPERLIPIDEMIA: ICD-10-CM

## 2025-01-07 DIAGNOSIS — C19 RECTOSIGMOID CANCER (HCC): ICD-10-CM

## 2025-01-07 DIAGNOSIS — R20.2 NUMBNESS AND TINGLING OF RIGHT LEG: Primary | ICD-10-CM

## 2025-01-07 PROCEDURE — 99214 OFFICE O/P EST MOD 30 MIN: CPT | Performed by: NURSE PRACTITIONER

## 2025-01-07 RX ORDER — PREGABALIN 75 MG/1
75 CAPSULE ORAL 2 TIMES DAILY
Qty: 60 CAPSULE | Refills: 2 | Status: SHIPPED | OUTPATIENT
Start: 2025-01-07 | End: 2025-04-07

## 2025-01-07 RX ORDER — PREGABALIN 75 MG/1
75 CAPSULE ORAL 2 TIMES DAILY
Start: 2025-01-07 | End: 2025-01-07 | Stop reason: SDUPTHER

## 2025-01-07 NOTE — PROGRESS NOTES
Virtual Regular Visit  Name: Itzel Sanches      : 1955      MRN: [id number]  Encounter Provider: DEEPAK Batista  Encounter Date: 2025   Encounter department: [facility]    Chief Complaint   Patient presents with   • Virtual Regular Visit       Verification of patient location:  Patient is located at Home in the following state in which I hold an active license [place] :  Assessment & Plan  Numbness and tingling of right leg    Orders:  •  CT lower extremity w wo contrast; Future  •  pregabalin (LYRICA) 75 mg capsule; Take 1 capsule (75 mg total) by mouth 2 (two) times a day    Difficulty walking    Orders:  •  CT lower extremity w wo contrast; Future    Primary hypertension  Complaint with norvasc       Abnormal finding on MRI of brain  Will be following with neurologist for repeat MRI Orders       Mixed hyperlipidemia  Complaint with zetia       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HCC)  Managed by oncologist       Numbness and tingling of right leg                      Encounter provider DEEPAK Batista    The patient was identified by name and date of birth. Itzel Sanches was informed that this is a telemedicine visit and that the visit is being conducted through the Epic Embedded platform. She agrees to proceed..  My office door was closed. No one else was in the room.  She acknowledged consent and understanding of privacy and security of the video platform. The patient has agreed to participate and understands they can discontinue the visit at any time.    Patient is aware this is a billable service.     History of Present Illness     Patient is here to follow up.  Patient was admitted with stroke like symptoms in October but was not diagnosed with stroke. Patient has right leg numbness and tingling below knee but does feel leg but right upper leg does not feel anything. Uses walker for ambulation. Stated that after walking short distances, her leg starts burning.  Was in rehab and came  home around 12/17 and living with her son.  Taking lyrica and will continue with that as stated that helping her  Had MRI brain, lumbar spine and EMG done  Stated that still having issues with right leg and would like to have CT leg done    MRI lumbar spine :   Partially imaged small focal areas of enhancement in left lateral paraspinal lower thoracic musculature adjacent to expansile left posterior 11th osseous rib metastasis. Question soft tissue extension of left posterior 11th rib metastasis, reactive change, or denervation changes. This is better visualized on the MRI lumbar spine compared to MRI thoracic spine given postcontrast fat-saturation.   No metastasis in lumbar spine.   Multilevel degenerative changes of lumbar spine with varying degrees of canal stenosis (multilevel mild, worse at L3-L4 and L4-L5) and foraminal narrowing (mild right L2-L3, bilateral L3-L4, bilateral L4-L5), as detailed above.      EMG showed This EMG/ NCV study of the right lower extremity is a limited study due to numerous reasons as stated above. Etiology could include a L3-L4 lumbar radiculopathy and/or a lumbosacral plexopathy, given the abnormal saphenous nerves. The study limitations prevent accurate determination of the etiology of her symptoms      MRI brain showed No evidence of metastatic disease.    Focus of FLAIR abnormality in the right posterior frontal deep white matter corresponds to the area of low density on CT. This likely represents either gliosis or mild edema surrounding a venous angioma. Follow-up gadolinium-enhanced MRI recommended in 2  months to exclude more aggressive pathologies. Will follow with neurologist for repeat MRI orders            Review of Systems   HENT: Negative.     Respiratory: Negative.     Cardiovascular: Negative.    Gastrointestinal: Negative.    Neurological:  Positive for numbness.   All other systems reviewed and are negative.      Objective   LMP 09/01/2011 (Approximate)     Physical  Exam  HENT:      Head: Normocephalic.   Pulmonary:      Comments: No distress noted  Musculoskeletal:      Cervical back: Neck supple.   Neurological:      Mental Status: She is alert and oriented to person, place, and time.         Visit Time  Total Visit Duration: 20

## 2025-01-07 NOTE — TELEPHONE ENCOUNTER
Made aware  Leidy Varela CMA  
Please inform patient that medication sent to [facility]. DEEPAK Batista    
The patient called to provide the location of the pharmacy that she would like the medication to be send     [id number] [address] [place] [address]  
not applicable

## 2025-01-08 ENCOUNTER — PATIENT OUTREACH (OUTPATIENT)
Dept: CASE MANAGEMENT | Facility: OTHER | Age: 70
End: 2025-01-08

## 2025-01-08 DIAGNOSIS — E78.2 MIXED HYPERLIPIDEMIA: ICD-10-CM

## 2025-01-08 DIAGNOSIS — I10 HYPERTENSION: ICD-10-CM

## 2025-01-08 RX ORDER — EZETIMIBE 10 MG/1
10 TABLET ORAL DAILY
Qty: 90 TABLET | Refills: 1 | Status: SHIPPED | OUTPATIENT
Start: 2025-01-08

## 2025-01-08 RX ORDER — AMLODIPINE BESYLATE 5 MG/1
5 TABLET ORAL DAILY
Qty: 90 TABLET | Refills: 0 | Status: SHIPPED | OUTPATIENT
Start: 2025-01-08

## 2025-01-08 NOTE — PROGRESS NOTES
OSW placed outreach TC to pt this day. She states that she is staying at her sons apartment, as he owns an apartment building with 2 units. He lives upstairs with his girlfriend. She reports that the whole ordeal of her hospitalization and rehab was about 2 months. She states that it was a real hassle trying to get discharged from the SNF. She was given a discharge date and when her daughter arrived they told her that she was unable to go that day. It was pushed off until the next day and they tried to do the same thing. Pt states that she started crying and questioning why she was being misled. She states that after that she was able to go home. She still doesn't understand what the issue was.   Overall she has made progress and continues to receive therapy services within her home. She is using a walker, as well as a wheelchair at times. She is unable to stand for very long. She reports that they still do not know what has caused the weakness/numbness in her leg. She has a CT scan on Friday, which includes her leg. She is hoping that her cancer is stable, as well as is hoping to find some answers regarding her leg. She reports that she just sits in her apartment all day. She has to rely on her family to get her belongings because she was originally living on the third floor. OSW allowed time for her to express her feelings and offered support.    Pt did not have a chance to go to the salon for a wig. This request was put in previously and approved, but she was then hospitalized. OSW assured her that this should not be an issue. She is hoping that she can get to the salon soon.    She asked if her insurance would pay for an aide to assist her in the home. OSW explained that the only time this occurs is after a hospitalization, or after she has been discharged from a SNF. She is currently receiving nursing, PT and OT. She states that the nurse is only coming for one more visit. OSW explained that there is also the  waiver program, however she would have to be nursing home appropriate, as well as qualify financially for that. She states that she is not nursing home appropriate.    Overall she is in good spirits and is very grateful for all of her family and friends who have been supporting her.   OSW wished her luck on her upcoming scans. OSW offered to check back in with her in a month and she was appreciative.

## 2025-01-10 ENCOUNTER — HOSPITAL ENCOUNTER (OUTPATIENT)
Dept: RADIOLOGY | Facility: HOSPITAL | Age: 70
Discharge: HOME | End: 2025-01-10
Attending: INTERNAL MEDICINE
Payer: MEDICARE

## 2025-01-10 ENCOUNTER — HOSPITAL ENCOUNTER (OUTPATIENT)
Dept: INFUSION CENTER | Facility: HOSPITAL | Age: 70
Discharge: HOME | End: 2025-01-10
Attending: INTERNAL MEDICINE
Payer: MEDICARE

## 2025-01-10 DIAGNOSIS — C19 RECTOSIGMOID CANCER (HCC): Primary | ICD-10-CM

## 2025-01-10 DIAGNOSIS — R20.2 NUMBNESS AND TINGLING OF RIGHT LEG: ICD-10-CM

## 2025-01-10 DIAGNOSIS — R26.2 DIFFICULTY WALKING: ICD-10-CM

## 2025-01-10 DIAGNOSIS — C20 RECTAL CANCER (HCC): ICD-10-CM

## 2025-01-10 DIAGNOSIS — R20.0 NUMBNESS AND TINGLING OF RIGHT LEG: ICD-10-CM

## 2025-01-10 PROCEDURE — 74177 CT ABD & PELVIS W/CONTRAST: CPT

## 2025-01-10 PROCEDURE — 71260 CT THORAX DX C+: CPT

## 2025-01-10 PROCEDURE — 73700 CT LOWER EXTREMITY W/O DYE: CPT

## 2025-01-10 RX ADMIN — IOHEXOL 100 ML: 350 INJECTION, SOLUTION INTRAVENOUS at 12:23

## 2025-01-10 NOTE — PROGRESS NOTES
Itzel Sanches  tolerated treatment well with no complications.      Itzel Sanches is aware of future appt on 2/21 at 1200.     AVS printed and given to Itzel Sanches:  Yes

## 2025-01-13 ENCOUNTER — RESULTS FOLLOW-UP (OUTPATIENT)
Dept: FAMILY MEDICINE CLINIC | Facility: CLINIC | Age: 70
End: 2025-01-13

## 2025-01-13 DIAGNOSIS — R20.0 NUMBNESS AND TINGLING OF RIGHT LEG: Primary | ICD-10-CM

## 2025-01-13 DIAGNOSIS — R20.2 NUMBNESS AND TINGLING OF RIGHT LEG: Primary | ICD-10-CM

## 2025-01-13 DIAGNOSIS — R26.2 DIFFICULTY WALKING: ICD-10-CM

## 2025-01-14 ENCOUNTER — TELEPHONE (OUTPATIENT)
Age: 70
End: 2025-01-14

## 2025-01-14 NOTE — TELEPHONE ENCOUNTER
Pt calling in regarding her appt with Dr. Grubbs on 1/22. Pt is asking for a virtual appt due to her leg going numb and is unable to walk, noted by appt that a reschedule is needed, please advise, thank you.

## 2025-01-14 NOTE — TELEPHONE ENCOUNTER
Patient called back and stated CT scan was done on wrong leg.Patient stated should have been right leg not left and would like a call back from Dr. Quintana to discuss. Please advise. Thank you

## (undated) DEVICE — Device: Brand: LEVEL 1

## (undated) DEVICE — CURVED INTRALUMINAL STAPLER (ILS) 24 TITANIUM ADJUSTABLE HEIGHT STAPLES

## (undated) DEVICE — FENESTRATED BIPOLAR FORCEPS: Brand: ENDOWRIST

## (undated) DEVICE — SUT SILK 3-0 SH CR/8 18 IN C013D

## (undated) DEVICE — VISUALIZATION SYSTEM: Brand: CLEARIFY

## (undated) DEVICE — INTENDED FOR TISSUE SEPARATION, AND OTHER PROCEDURES THAT REQUIRE A SHARP SURGICAL BLADE TO PUNCTURE OR CUT.: Brand: BARD-PARKER SAFETY BLADES SIZE 11, STERILE

## (undated) DEVICE — PAD GROUNDING ADULT

## (undated) DEVICE — JP CHANNEL DRAIN 19FR, FULL FLUTES: Brand: JACKSON-PRATT

## (undated) DEVICE — LUBRICANT INST ELECTROLUBE ANTISTK WO PAD

## (undated) DEVICE — ECHELON FLEX  POWERED VASCULAR STAPLER WITH ADVANCED PLACEMENT TIP, 35MM: Brand: ECHELON FLEX

## (undated) DEVICE — INTENDED FOR TISSUE SEPARATION, AND OTHER PROCEDURES THAT REQUIRE A SHARP SURGICAL BLADE TO PUNCTURE OR CUT.: Brand: BARD-PARKER SAFETY BLADES SIZE 15, STERILE

## (undated) DEVICE — SUT MONOCRYL 4-0 PS-2 18 IN Y496G

## (undated) DEVICE — MEDI-VAC YANK SUCT HNDL W/TPRD BULBOUS TIP: Brand: CARDINAL HEALTH

## (undated) DEVICE — BETHLEHEM MAJOR GENERAL PACK: Brand: CARDINAL HEALTH

## (undated) DEVICE — TROCAR: Brand: KII FIOS FIRST ENTRY

## (undated) DEVICE — POOLE SUCTION HANDLE: Brand: CARDINAL HEALTH

## (undated) DEVICE — ADHESIVE SKIN HIGH VISCOSITY EXOFIN MICRO 0.5ML

## (undated) DEVICE — ENDOPATH ECHELON VASCULAR  RELOADS, WHITE, 35MM: Brand: ECHELON ENDOPATH

## (undated) DEVICE — ENDOPATH 5MM CURVED SCISSORS WITH MONOPOLAR CAUTERY: Brand: ENDOPATH

## (undated) DEVICE — ACCESS PLATFORM FOR MINIMALLY INVASIVE SURGERY.: Brand: GELPORT® LAPAROSCOPIC  SYSTEM

## (undated) DEVICE — SUT SILK 2-0 SH CR/8 18 IN C012D

## (undated) DEVICE — SUT STRATAFIX SPIRAL PDS PLUS 4-0 SH-1 9IN SXPP1B454

## (undated) DEVICE — 3M™ TEGADERM™ TRANSPARENT FILM DRESSING FRAME STYLE, 1628, 6 IN X 8 IN (15 CM X 20 CM), 10/CT 8CT/CASE: Brand: 3M™ TEGADERM™

## (undated) DEVICE — STAPLER 60: Brand: SUREFORM

## (undated) DEVICE — GAUZE SPONGES,16 PLY: Brand: CURITY

## (undated) DEVICE — PROXIMATE LINEAR CUTTER RELOAD, BLUE, 75MM: Brand: PROXIMATE

## (undated) DEVICE — VESSEL SEALER EXTEND: Brand: ENDOWRIST

## (undated) DEVICE — PACK PBDS STERILE LAP LITHOTOMY RF

## (undated) DEVICE — NEEDLE 18 G X 1 1/2 SAFETY

## (undated) DEVICE — SUT PDS II 4-0 SH 27 IN Z315H

## (undated) DEVICE — GAUZE SPONGES,USP TYPE VII GAUZE, 12 PLY: Brand: CURITY

## (undated) DEVICE — CHLORAPREP HI-LITE 26ML ORANGE

## (undated) DEVICE — TUBING SUCTION 5MM X 12 FT

## (undated) DEVICE — SUT ETHILON 2 LR 20 IN 460T

## (undated) DEVICE — SUT VICRYL 2-0 SH 27 IN UNDYED J417H

## (undated) DEVICE — MAYO STAND COVER: Brand: CONVERTORS

## (undated) DEVICE — SUT SILK 2-0 18 IN A185H

## (undated) DEVICE — TOWEL SET X-RAY

## (undated) DEVICE — SCD SEQUENTIAL COMPRESSION COMFORT SLEEVE MEDIUM KNEE LENGTH: Brand: KENDALL SCD

## (undated) DEVICE — 3000CC GUARDIAN II: Brand: GUARDIAN

## (undated) DEVICE — TRAY FOLEY 16FR SURESTEP TEMP SENS URIMETER STAT LOK

## (undated) DEVICE — SUT ETHIBOND 0 CTX CX31D

## (undated) DEVICE — PREMIUM DRY TRAY LF: Brand: MEDLINE INDUSTRIES, INC.

## (undated) DEVICE — SUT VICRYL 2-0 D-SPECIAL 27 IN D8114

## (undated) DEVICE — SUT SILK 0 SH 30 IN K834H

## (undated) DEVICE — SUT VICRYL 0 REEL 54 IN J287G

## (undated) DEVICE — GLOVE SRG BIOGEL 7.5

## (undated) DEVICE — 3M™ IOBAN™ 2 ANTIMICROBIAL INCISE DRAPE 6650EZ: Brand: IOBAN™ 2

## (undated) DEVICE — Device: Brand: DEFENDO AIR/WATER/SUCTION AND BIOPSY VALVE

## (undated) DEVICE — ADHESIVE REMOVER: Brand: UNI-SOLVE ADHESIVE REMOVER 8OZ US

## (undated) DEVICE — SUT PDS II 1 CTX 36 IN Z371T

## (undated) DEVICE — TIP COVER ACCESSORY

## (undated) DEVICE — GLOVE INDICATOR PI UNDERGLOVE SZ 8 BLUE

## (undated) DEVICE — ELECTRODE BLADE MOD  E-Z CLEAN 6.5IN -0014M

## (undated) DEVICE — GLOVE PI ULTRA TOUCH SZ.7.5

## (undated) DEVICE — REDUCER: Brand: ENDOWRIST

## (undated) DEVICE — SUT VICRYL 2-0 REEL 54 IN J286G

## (undated) DEVICE — PLUMEPEN PRO 10FT

## (undated) DEVICE — CO2 AND WATER TUBING/CAP SET FOR OLYMPUS® SCOPES & UCR: Brand: ERBE

## (undated) DEVICE — GLOVE SRG BIOGEL 6.5

## (undated) DEVICE — 28 FR STRAIGHT – SOFT PVC CATHETER: Brand: PVC THORACIC CATHETERS

## (undated) DEVICE — ARM DRAPE

## (undated) DEVICE — 40595 XL TRENDELENBURG POSITIONING KIT: Brand: 40595 XL TRENDELENBURG POSITIONING KIT

## (undated) DEVICE — JP PERF DRN SIL FLT 10MM FULL: Brand: CARDINAL HEALTH

## (undated) DEVICE — KIT, BETHLEHEM ROBOTIC PROST: Brand: CARDINAL HEALTH

## (undated) DEVICE — INSUFLATION TUBING INSUFLOW (LEXION)

## (undated) DEVICE — FIRST STEP BEDSIDE KIT - STAND-UP POUCH, ENDOSCOPIC CLEANING PAD - 1 POUCH: Brand: FIRST STEP BEDSIDE KIT - STAND-UP POUCH, ENDOSCOPIC CLEANING PAD

## (undated) DEVICE — Device

## (undated) DEVICE — 3M™ TEGADERM™ TRANSPARENT FILM DRESSING FRAME STYLE, 1626W, 4 IN X 4-3/4 IN (10 CM X 12 CM), 50/CT 4CT/CASE: Brand: 3M™ TEGADERM™

## (undated) DEVICE — PENCIL ELECTROSURG E-Z CLEAN -0035H

## (undated) DEVICE — TROCAR: Brand: KII® SLEEVE

## (undated) DEVICE — SUT VLOC 90 2-0 GS-22 12 IN VLOCM2115

## (undated) DEVICE — SUT SILK 2-0 TIES 144 IN LA55G

## (undated) DEVICE — TELFA NON-ADHERENT ABSORBENT DRESSING: Brand: TELFA

## (undated) DEVICE — SUT VICRYL 0 CT-1 CR/8 27 IN JJ41G

## (undated) DEVICE — SEAL

## (undated) DEVICE — TRAY FOLEY 16FR URIMETER SURESTEP

## (undated) DEVICE — SURGICEL 4 X 8

## (undated) DEVICE — SUT VICRYL 0 CT-1 27 IN J260H

## (undated) DEVICE — STAPLER 60 RELOAD BLUE: Brand: SUREFORM

## (undated) DEVICE — ADHESIVE SKIN HIGH VISCOSITY EXOFIN 1ML

## (undated) DEVICE — TUBING LASSO 1/2 IN F/THERMOCHEM HT-2000

## (undated) DEVICE — SUT CHROMIC 3-0 SH 27 IN G122H

## (undated) DEVICE — ANTIBACTERIAL UNDYED BRAIDED (POLYGLACTIN 910), SYNTHETIC ABSORBABLE SUTURE: Brand: COATED VICRYL

## (undated) DEVICE — HEMOSTATIC MATRIX SURGIFLO 8ML W/THROMBIN

## (undated) DEVICE — CANNULA SEAL

## (undated) DEVICE — CHLORHEXIDINE 4PCT 4 OZ

## (undated) DEVICE — ENSEAL 20 CM SHAFT, LARGE JAW: Brand: ENSEAL X1

## (undated) DEVICE — TIP-UP FENESTRATED GRASPER: Brand: ENDOWRIST

## (undated) DEVICE — SPY ELITE SINGLE VIAL KIT

## (undated) DEVICE — COLUMN DRAPE

## (undated) DEVICE — SUT PROLENE 3-0 SH 36 IN 8522H

## (undated) DEVICE — ENSEAL LAPAROSCOPIC TISSUE SEALER G2 CURVED JAW FOR USE WITH G2 GENERATOR 5MM DIAMETER 35CM SHAFT LENGTH: Brand: ENSEAL

## (undated) DEVICE — JACKSON-PRATT 100CC BULB RESERVOIR: Brand: CARDINAL HEALTH

## (undated) DEVICE — SPONGE STICK WITH PVP-I: Brand: KENDALL

## (undated) DEVICE — LAPAROSCOPIC ACCESS SYSTEM: Brand: ALEXIS LAPAROSCOPIC SYSTEM WITH KII FIOS FIRST ENTRY

## (undated) DEVICE — SPONGE LAP 18 X 18 IN STRL RFD

## (undated) DEVICE — KERLIX BANDAGE ROLL: Brand: KERLIX

## (undated) DEVICE — PROXIMATE RELOADABLE LINEAR CUTTER WITH SAFETY LOCK-OUT, 75MM: Brand: PROXIMATE

## (undated) DEVICE — ADHESIVE SKN CLSR HISTOACRYL FLEX 0.5ML LF

## (undated) DEVICE — ENSEAL X1 TISSUE SEALER, CURVED JAW, 37 CM SHAFT LENGTH: Brand: ENSEAL